# Patient Record
Sex: MALE | Race: WHITE | HISPANIC OR LATINO | Employment: OTHER | URBAN - METROPOLITAN AREA
[De-identification: names, ages, dates, MRNs, and addresses within clinical notes are randomized per-mention and may not be internally consistent; named-entity substitution may affect disease eponyms.]

---

## 2019-06-12 ENCOUNTER — LAB VISIT (OUTPATIENT)
Dept: LAB | Facility: HOSPITAL | Age: 79
End: 2019-06-12
Attending: INTERNAL MEDICINE
Payer: MEDICARE

## 2019-06-12 ENCOUNTER — OFFICE VISIT (OUTPATIENT)
Dept: FAMILY MEDICINE | Facility: CLINIC | Age: 79
End: 2019-06-12
Payer: MEDICARE

## 2019-06-12 VITALS
DIASTOLIC BLOOD PRESSURE: 67 MMHG | WEIGHT: 160.25 LBS | OXYGEN SATURATION: 98 % | HEART RATE: 65 BPM | SYSTOLIC BLOOD PRESSURE: 133 MMHG | TEMPERATURE: 98 F | RESPIRATION RATE: 18 BRPM

## 2019-06-12 DIAGNOSIS — I10 ESSENTIAL HYPERTENSION: ICD-10-CM

## 2019-06-12 DIAGNOSIS — E78.5 DYSLIPIDEMIA: ICD-10-CM

## 2019-06-12 DIAGNOSIS — R09.89 CAROTID BRUIT, UNSPECIFIED LATERALITY: Primary | ICD-10-CM

## 2019-06-12 DIAGNOSIS — I73.9 PAD (PERIPHERAL ARTERY DISEASE): ICD-10-CM

## 2019-06-12 DIAGNOSIS — I25.10 CORONARY ARTERY DISEASE, ANGINA PRESENCE UNSPECIFIED, UNSPECIFIED VESSEL OR LESION TYPE, UNSPECIFIED WHETHER NATIVE OR TRANSPLANTED HEART: ICD-10-CM

## 2019-06-12 DIAGNOSIS — Z98.61 S/P PTCA (PERCUTANEOUS TRANSLUMINAL CORONARY ANGIOPLASTY): ICD-10-CM

## 2019-06-12 DIAGNOSIS — Z86.79 S/P AAA REPAIR: ICD-10-CM

## 2019-06-12 DIAGNOSIS — F32.A DEPRESSION, UNSPECIFIED DEPRESSION TYPE: ICD-10-CM

## 2019-06-12 DIAGNOSIS — E11.65 TYPE 2 DIABETES MELLITUS WITH HYPERGLYCEMIA, WITHOUT LONG-TERM CURRENT USE OF INSULIN: ICD-10-CM

## 2019-06-12 DIAGNOSIS — H91.93 DECREASED HEARING OF BOTH EARS: ICD-10-CM

## 2019-06-12 DIAGNOSIS — Z98.890 S/P AAA REPAIR: ICD-10-CM

## 2019-06-12 LAB
ALBUMIN SERPL BCP-MCNC: 4 G/DL (ref 3.5–5.2)
ALP SERPL-CCNC: 62 U/L (ref 55–135)
ALT SERPL W/O P-5'-P-CCNC: 9 U/L (ref 10–44)
ANION GAP SERPL CALC-SCNC: 11 MMOL/L (ref 8–16)
AST SERPL-CCNC: 17 U/L (ref 10–40)
BASOPHILS # BLD AUTO: 0.03 K/UL (ref 0–0.2)
BASOPHILS NFR BLD: 0.4 % (ref 0–1.9)
BILIRUB SERPL-MCNC: 0.3 MG/DL (ref 0.1–1)
BUN SERPL-MCNC: 23 MG/DL (ref 8–23)
CALCIUM SERPL-MCNC: 9.9 MG/DL (ref 8.7–10.5)
CHLORIDE SERPL-SCNC: 105 MMOL/L (ref 95–110)
CHOLEST SERPL-MCNC: 217 MG/DL (ref 120–199)
CHOLEST/HDLC SERPL: 5.9 {RATIO} (ref 2–5)
CO2 SERPL-SCNC: 24 MMOL/L (ref 23–29)
CREAT SERPL-MCNC: 1.2 MG/DL (ref 0.5–1.4)
DIFFERENTIAL METHOD: NORMAL
EOSINOPHIL # BLD AUTO: 0.1 K/UL (ref 0–0.5)
EOSINOPHIL NFR BLD: 1.9 % (ref 0–8)
ERYTHROCYTE [DISTWIDTH] IN BLOOD BY AUTOMATED COUNT: 13.7 % (ref 11.5–14.5)
EST. GFR  (AFRICAN AMERICAN): >60 ML/MIN/1.73 M^2
EST. GFR  (NON AFRICAN AMERICAN): 57.2 ML/MIN/1.73 M^2
GLUCOSE SERPL-MCNC: 134 MG/DL (ref 70–110)
HCT VFR BLD AUTO: 48.2 % (ref 40–54)
HDLC SERPL-MCNC: 37 MG/DL (ref 40–75)
HDLC SERPL: 17.1 % (ref 20–50)
HGB BLD-MCNC: 15.5 G/DL (ref 14–18)
IMM GRANULOCYTES # BLD AUTO: 0.02 K/UL (ref 0–0.04)
IMM GRANULOCYTES NFR BLD AUTO: 0.3 % (ref 0–0.5)
LDLC SERPL CALC-MCNC: 129 MG/DL (ref 63–159)
LYMPHOCYTES # BLD AUTO: 1.7 K/UL (ref 1–4.8)
LYMPHOCYTES NFR BLD: 24.2 % (ref 18–48)
MCH RBC QN AUTO: 30.3 PG (ref 27–31)
MCHC RBC AUTO-ENTMCNC: 32.2 G/DL (ref 32–36)
MCV RBC AUTO: 94 FL (ref 82–98)
MONOCYTES # BLD AUTO: 0.6 K/UL (ref 0.3–1)
MONOCYTES NFR BLD: 8.6 % (ref 4–15)
NEUTROPHILS # BLD AUTO: 4.6 K/UL (ref 1.8–7.7)
NEUTROPHILS NFR BLD: 64.6 % (ref 38–73)
NONHDLC SERPL-MCNC: 180 MG/DL
NRBC BLD-RTO: 0 /100 WBC
PLATELET # BLD AUTO: 176 K/UL (ref 150–350)
PMV BLD AUTO: 11.7 FL (ref 9.2–12.9)
POTASSIUM SERPL-SCNC: 4.8 MMOL/L (ref 3.5–5.1)
PROT SERPL-MCNC: 7.1 G/DL (ref 6–8.4)
RBC # BLD AUTO: 5.11 M/UL (ref 4.6–6.2)
SODIUM SERPL-SCNC: 140 MMOL/L (ref 136–145)
TRIGL SERPL-MCNC: 255 MG/DL (ref 30–150)
TSH SERPL DL<=0.005 MIU/L-ACNC: 1.33 UIU/ML (ref 0.4–4)
WBC # BLD AUTO: 7.18 K/UL (ref 3.9–12.7)

## 2019-06-12 PROCEDURE — 80053 COMPREHEN METABOLIC PANEL: CPT | Mod: HCNC

## 2019-06-12 PROCEDURE — 99999 PR PBB SHADOW E&M-NEW PATIENT-LVL IV: ICD-10-PCS | Mod: PBBFAC,HCNC,, | Performed by: INTERNAL MEDICINE

## 2019-06-12 PROCEDURE — 3075F PR MOST RECENT SYSTOLIC BLOOD PRESS GE 130-139MM HG: ICD-10-PCS | Mod: HCNC,CPTII,S$GLB, | Performed by: INTERNAL MEDICINE

## 2019-06-12 PROCEDURE — 36415 COLL VENOUS BLD VENIPUNCTURE: CPT | Mod: HCNC,PO

## 2019-06-12 PROCEDURE — 3078F DIAST BP <80 MM HG: CPT | Mod: HCNC,CPTII,S$GLB, | Performed by: INTERNAL MEDICINE

## 2019-06-12 PROCEDURE — 80061 LIPID PANEL: CPT | Mod: HCNC

## 2019-06-12 PROCEDURE — 85025 COMPLETE CBC W/AUTO DIFF WBC: CPT | Mod: HCNC

## 2019-06-12 PROCEDURE — 99999 PR PBB SHADOW E&M-NEW PATIENT-LVL IV: CPT | Mod: PBBFAC,HCNC,, | Performed by: INTERNAL MEDICINE

## 2019-06-12 PROCEDURE — 1101F PT FALLS ASSESS-DOCD LE1/YR: CPT | Mod: HCNC,CPTII,S$GLB, | Performed by: INTERNAL MEDICINE

## 2019-06-12 PROCEDURE — 83036 HEMOGLOBIN GLYCOSYLATED A1C: CPT | Mod: HCNC

## 2019-06-12 PROCEDURE — 99205 OFFICE O/P NEW HI 60 MIN: CPT | Mod: HCNC,S$GLB,, | Performed by: INTERNAL MEDICINE

## 2019-06-12 PROCEDURE — 3075F SYST BP GE 130 - 139MM HG: CPT | Mod: HCNC,CPTII,S$GLB, | Performed by: INTERNAL MEDICINE

## 2019-06-12 PROCEDURE — 99205 PR OFFICE/OUTPT VISIT, NEW, LEVL V, 60-74 MIN: ICD-10-PCS | Mod: HCNC,S$GLB,, | Performed by: INTERNAL MEDICINE

## 2019-06-12 PROCEDURE — 84443 ASSAY THYROID STIM HORMONE: CPT | Mod: HCNC

## 2019-06-12 PROCEDURE — 3078F PR MOST RECENT DIASTOLIC BLOOD PRESSURE < 80 MM HG: ICD-10-PCS | Mod: HCNC,CPTII,S$GLB, | Performed by: INTERNAL MEDICINE

## 2019-06-12 PROCEDURE — 1101F PR PT FALLS ASSESS DOC 0-1 FALLS W/OUT INJ PAST YR: ICD-10-PCS | Mod: HCNC,CPTII,S$GLB, | Performed by: INTERNAL MEDICINE

## 2019-06-12 RX ORDER — LISINOPRIL 10 MG/1
10 TABLET ORAL DAILY
COMMUNITY
End: 2019-06-17 | Stop reason: SDUPTHER

## 2019-06-12 RX ORDER — ASPIRIN 325 MG
325 TABLET ORAL DAILY
Status: ON HOLD | COMMUNITY
End: 2019-10-17 | Stop reason: HOSPADM

## 2019-06-12 RX ORDER — SIMVASTATIN 20 MG/1
20 TABLET, FILM COATED ORAL NIGHTLY
COMMUNITY
End: 2019-06-17 | Stop reason: SDUPTHER

## 2019-06-12 RX ORDER — CLOPIDOGREL BISULFATE 75 MG/1
75 TABLET ORAL DAILY
COMMUNITY
End: 2019-06-17 | Stop reason: SDUPTHER

## 2019-06-12 RX ORDER — METFORMIN HYDROCHLORIDE 1000 MG/1
1000 TABLET ORAL 2 TIMES DAILY WITH MEALS
COMMUNITY
End: 2019-06-17 | Stop reason: SDUPTHER

## 2019-06-12 NOTE — PROGRESS NOTES
Subjective:       Patient ID: Aquiles Kelsey is a 78 y.o. male.    Chief Complaint: Establish Care; Hyperlipidemia; Hypertension; Diabetes; Coronary Artery Disease; and Peripheral Vascular Disease    HPI  Review of Systems    Objective:      Physical Exam    Assessment:       1. Carotid bruit, unspecified laterality    2. Coronary artery disease, angina presence unspecified, unspecified vessel or lesion type, unspecified whether native or transplanted heart    3. S/P PTCA (percutaneous transluminal coronary angioplasty)    4. PAD (peripheral artery disease)    5. S/P AAA repair    6. Essential hypertension    7. Dyslipidemia    8. Type 2 diabetes mellitus with hyperglycemia, without long-term current use of insulin    9. Depression, unspecified depression type    10. Decreased hearing of both ears        Plan:

## 2019-06-12 NOTE — PROGRESS NOTES
Subjective:       Patient ID: Aquiles eKlsey is a 78 y.o. male.    Chief Complaint: Establish Care; Hyperlipidemia; Hypertension; Diabetes; Coronary Artery Disease; and Peripheral Vascular Disease    -est care--------------------    Hyperlipidemia   Associated symptoms include myalgias. Pertinent negatives include no chest pain or shortness of breath.   Hypertension   Pertinent negatives include no chest pain, headaches, neck pain, palpitations or shortness of breath.   Diabetes   Pertinent negatives for hypoglycemia include no confusion, dizziness, headaches, nervousness/anxiousness, pallor, seizures, speech difficulty or tremors. Pertinent negatives for diabetes include no chest pain, no fatigue, no polydipsia, no polyphagia, no polyuria and no weakness.   Coronary Artery Disease   Pertinent negatives include no chest pain, chest tightness, dizziness, leg swelling, palpitations or shortness of breath. Risk factors include hyperlipidemia.     Past Medical History:   Diagnosis Date    Aneurysm, abdominal aortic     Coronary artery disease     Kidney stone      Past Surgical History:   Procedure Laterality Date    APPENDECTOMY      CORONARY STENT PLACEMENT       Family History   Problem Relation Age of Onset    Diabetes Mother     COPD Father     Diabetes Brother      Social History     Socioeconomic History    Marital status:      Spouse name: Not on file    Number of children: Not on file    Years of education: Not on file    Highest education level: Not on file   Occupational History    Not on file   Social Needs    Financial resource strain: Not on file    Food insecurity:     Worry: Not on file     Inability: Not on file    Transportation needs:     Medical: Not on file     Non-medical: Not on file   Tobacco Use    Smoking status: Current Every Day Smoker     Years: 15.00     Types: Cigars    Smokeless tobacco: Current User   Substance and Sexual Activity    Alcohol use: Not  Currently    Drug use: Not Currently    Sexual activity: Not Currently   Lifestyle    Physical activity:     Days per week: Not on file     Minutes per session: Not on file    Stress: Only a little   Relationships    Social connections:     Talks on phone: Not on file     Gets together: Not on file     Attends Alevism service: Not on file     Active member of club or organization: Not on file     Attends meetings of clubs or organizations: Not on file     Relationship status: Not on file   Other Topics Concern    Not on file   Social History Narrative    Not on file     Review of Systems   Constitutional: Negative for activity change, appetite change, chills, diaphoresis, fatigue, fever and unexpected weight change.   HENT: Positive for hearing loss. Negative for drooling, ear discharge, ear pain, facial swelling, mouth sores, nosebleeds, postnasal drip, rhinorrhea, sinus pressure, sneezing, sore throat, tinnitus, trouble swallowing and voice change.    Eyes: Negative for photophobia, redness and visual disturbance.   Respiratory: Negative for apnea, cough, choking, chest tightness, shortness of breath and wheezing.    Cardiovascular: Negative for chest pain, palpitations and leg swelling.   Gastrointestinal: Negative for abdominal distention, abdominal pain, anal bleeding, blood in stool, constipation, diarrhea, nausea and vomiting.   Endocrine: Negative for cold intolerance, heat intolerance, polydipsia, polyphagia and polyuria.   Genitourinary: Negative for difficulty urinating, dysuria, enuresis, flank pain, frequency, genital sores, hematuria and urgency.   Musculoskeletal: Positive for arthralgias and myalgias. Negative for back pain, gait problem, joint swelling, neck pain and neck stiffness.   Skin: Negative for color change, pallor, rash and wound.   Allergic/Immunologic: Negative for food allergies and immunocompromised state.   Neurological: Negative for dizziness, tremors, seizures, syncope,  facial asymmetry, speech difficulty, weakness, light-headedness, numbness and headaches.   Hematological: Negative for adenopathy. Does not bruise/bleed easily.   Psychiatric/Behavioral: Negative for agitation, behavioral problems, confusion, decreased concentration, dysphoric mood, hallucinations, self-injury, sleep disturbance and suicidal ideas. The patient is not nervous/anxious and is not hyperactive.        Objective:      Physical Exam   Constitutional: He is oriented to person, place, and time. He appears well-developed and well-nourished. No distress.   HENT:   Head: Normocephalic and atraumatic.   Eyes: Pupils are equal, round, and reactive to light.   Neck: Normal range of motion. Neck supple. No JVD present. Carotid bruit is not present. No tracheal deviation present. No thyromegaly present.   Cardiovascular: Normal rate, regular rhythm, normal heart sounds and intact distal pulses.   Pulmonary/Chest: Effort normal and breath sounds normal. No respiratory distress. He has no wheezes. He has no rales. He exhibits no tenderness.   Abdominal: Soft. Bowel sounds are normal. He exhibits no distension. There is no tenderness. There is no rebound and no guarding.   Musculoskeletal: Normal range of motion. He exhibits no edema or tenderness.   Lymphadenopathy:     He has no cervical adenopathy.   Neurological: He is alert and oriented to person, place, and time.   Skin: Skin is warm and dry. No rash noted. He is not diaphoretic. No erythema. No pallor.   Psychiatric: He has a normal mood and affect. His behavior is normal. Judgment and thought content normal.   Nursing note and vitals reviewed.      CMP  No results found for: NA, K, CL, CO2, GLU, BUN, CREATININE, CALCIUM, PROT, ALBUMIN, BILITOT, ALKPHOS, AST, ALT, ANIONGAP, ESTGFRAFRICA, EGFRNONAA  No results found for: WBC, HGB, HCT, MCV, PLT  No results found for: CHOL  No results found for: HDL  No results found for: LDLCALC  No results found for: TRIG  No  results found for: CHOLHDL  No results found for: TSH  No results found for: LABA1C, HGBA1C  Assessment:       1. Carotid bruit, unspecified laterality    2. Coronary artery disease, angina presence unspecified, unspecified vessel or lesion type, unspecified whether native or transplanted heart    3. S/P PTCA (percutaneous transluminal coronary angioplasty)    4. PAD (peripheral artery disease)    5. S/P AAA repair    6. Essential hypertension    7. Dyslipidemia    8. Type 2 diabetes mellitus with hyperglycemia, without long-term current use of insulin    9. Depression, unspecified depression type    10. Decreased hearing of both ears        Plan:   Carotid bruit, unspecified laterality  -     US Carotid Bilateral; Future; Expected date: 06/12/2019    Coronary artery disease, angina presence unspecified, unspecified vessel or lesion type, unspecified whether native or transplanted heart  -     Ambulatory referral to Cardiology    S/P PTCA (percutaneous transluminal coronary angioplasty)  -     Ambulatory referral to Cardiology    PAD (peripheral artery disease)  -     Ambulatory referral to Cardiology  -      Carotid Bilateral; Future; Expected date: 06/12/2019    S/P AAA repair    Essential hypertension  -     Comprehensive metabolic panel; Future; Expected date: 06/12/2019  -     CBC auto differential; Future; Expected date: 06/12/2019  -     TSH; Future; Expected date: 06/12/2019    Dyslipidemia  -     Lipid panel; Future; Expected date: 06/12/2019    Type 2 diabetes mellitus with hyperglycemia, without long-term current use of insulin  -     Hemoglobin A1c; Future; Expected date: 06/12/2019    Depression, unspecified depression type    Decreased hearing of both ears  -     Ambulatory referral to ENT    Stable--------------continue current meds.             As above--------------  F/u 1 month.

## 2019-06-13 ENCOUNTER — OFFICE VISIT (OUTPATIENT)
Dept: CARDIOLOGY | Facility: CLINIC | Age: 79
End: 2019-06-13
Payer: MEDICARE

## 2019-06-13 ENCOUNTER — CLINICAL SUPPORT (OUTPATIENT)
Dept: CARDIOLOGY | Facility: CLINIC | Age: 79
End: 2019-06-13
Payer: MEDICARE

## 2019-06-13 VITALS
HEIGHT: 70 IN | HEART RATE: 73 BPM | WEIGHT: 160.69 LBS | SYSTOLIC BLOOD PRESSURE: 148 MMHG | DIASTOLIC BLOOD PRESSURE: 70 MMHG | BODY MASS INDEX: 23.01 KG/M2

## 2019-06-13 DIAGNOSIS — I10 ESSENTIAL HYPERTENSION: ICD-10-CM

## 2019-06-13 DIAGNOSIS — I73.9 PAD (PERIPHERAL ARTERY DISEASE): ICD-10-CM

## 2019-06-13 DIAGNOSIS — I25.118 CORONARY ARTERY DISEASE OF NATIVE ARTERY OF NATIVE HEART WITH STABLE ANGINA PECTORIS: Primary | ICD-10-CM

## 2019-06-13 DIAGNOSIS — I10 ESSENTIAL HYPERTENSION: Primary | ICD-10-CM

## 2019-06-13 DIAGNOSIS — Z98.890 S/P AAA REPAIR: ICD-10-CM

## 2019-06-13 DIAGNOSIS — I73.9 CLAUDICATION IN PERIPHERAL VASCULAR DISEASE: ICD-10-CM

## 2019-06-13 DIAGNOSIS — Z86.79 S/P AAA REPAIR: ICD-10-CM

## 2019-06-13 DIAGNOSIS — R94.31 ABNORMAL ECG: ICD-10-CM

## 2019-06-13 DIAGNOSIS — Z98.61 HISTORY OF PTCA: ICD-10-CM

## 2019-06-13 DIAGNOSIS — E11.65 TYPE 2 DIABETES MELLITUS WITH HYPERGLYCEMIA, WITHOUT LONG-TERM CURRENT USE OF INSULIN: ICD-10-CM

## 2019-06-13 DIAGNOSIS — I49.1 PAC (PREMATURE ATRIAL CONTRACTION): ICD-10-CM

## 2019-06-13 DIAGNOSIS — E78.5 DYSLIPIDEMIA: ICD-10-CM

## 2019-06-13 DIAGNOSIS — Z98.61 S/P PTCA (PERCUTANEOUS TRANSLUMINAL CORONARY ANGIOPLASTY): ICD-10-CM

## 2019-06-13 LAB
ESTIMATED AVG GLUCOSE: 140 MG/DL (ref 68–131)
HBA1C MFR BLD HPLC: 6.5 % (ref 4–5.6)

## 2019-06-13 PROCEDURE — 1101F PR PT FALLS ASSESS DOC 0-1 FALLS W/OUT INJ PAST YR: ICD-10-PCS | Mod: HCNC,CPTII,S$GLB, | Performed by: INTERNAL MEDICINE

## 2019-06-13 PROCEDURE — 3077F SYST BP >= 140 MM HG: CPT | Mod: HCNC,CPTII,S$GLB, | Performed by: INTERNAL MEDICINE

## 2019-06-13 PROCEDURE — 3077F PR MOST RECENT SYSTOLIC BLOOD PRESSURE >= 140 MM HG: ICD-10-PCS | Mod: HCNC,CPTII,S$GLB, | Performed by: INTERNAL MEDICINE

## 2019-06-13 PROCEDURE — 93010 EKG 12-LEAD: ICD-10-PCS | Mod: HCNC,S$GLB,, | Performed by: INTERNAL MEDICINE

## 2019-06-13 PROCEDURE — 93005 EKG 12-LEAD: ICD-10-PCS | Mod: HCNC,S$GLB,, | Performed by: INTERNAL MEDICINE

## 2019-06-13 PROCEDURE — 3078F PR MOST RECENT DIASTOLIC BLOOD PRESSURE < 80 MM HG: ICD-10-PCS | Mod: HCNC,CPTII,S$GLB, | Performed by: INTERNAL MEDICINE

## 2019-06-13 PROCEDURE — 99204 OFFICE O/P NEW MOD 45 MIN: CPT | Mod: HCNC,S$GLB,, | Performed by: INTERNAL MEDICINE

## 2019-06-13 PROCEDURE — 93010 ELECTROCARDIOGRAM REPORT: CPT | Mod: HCNC,S$GLB,, | Performed by: INTERNAL MEDICINE

## 2019-06-13 PROCEDURE — 99204 PR OFFICE/OUTPT VISIT, NEW, LEVL IV, 45-59 MIN: ICD-10-PCS | Mod: HCNC,S$GLB,, | Performed by: INTERNAL MEDICINE

## 2019-06-13 PROCEDURE — 93005 ELECTROCARDIOGRAM TRACING: CPT | Mod: HCNC,S$GLB,, | Performed by: INTERNAL MEDICINE

## 2019-06-13 PROCEDURE — 99999 PR PBB SHADOW E&M-EST. PATIENT-LVL III: CPT | Mod: PBBFAC,HCNC,, | Performed by: INTERNAL MEDICINE

## 2019-06-13 PROCEDURE — 3078F DIAST BP <80 MM HG: CPT | Mod: HCNC,CPTII,S$GLB, | Performed by: INTERNAL MEDICINE

## 2019-06-13 PROCEDURE — 1101F PT FALLS ASSESS-DOCD LE1/YR: CPT | Mod: HCNC,CPTII,S$GLB, | Performed by: INTERNAL MEDICINE

## 2019-06-13 PROCEDURE — 99999 PR PBB SHADOW E&M-EST. PATIENT-LVL III: ICD-10-PCS | Mod: PBBFAC,HCNC,, | Performed by: INTERNAL MEDICINE

## 2019-06-13 RX ORDER — ESCITALOPRAM OXALATE 10 MG/1
10 TABLET ORAL DAILY
COMMUNITY
End: 2019-06-18 | Stop reason: SDUPTHER

## 2019-06-13 NOTE — LETTER
June 13, 2019      Holger Thornton MD  [address] [place] [address]           [facility]  [address] [place] [address]  Phone: [phone]          Patient: Aquiles Kelsey   MR Number: [id number]   YOB: 1940   Date of Visit: 6/13/2019       Dear Dr. Holger Thornton:    Thank you for referring Aquiles Kelsey to me for evaluation. Attached you will find relevant portions of my assessment and plan of care.    If you have questions, please do not hesitate to call me. I look forward to following Aquiles Kelsey along with you.    Sincerely,    Robe Gilbert MD    Enclosure  CC:  No Recipients    If you would like to receive this communication electronically, please contact [email or web address] or [phone] to request more information on [employer] Link access.    For providers and/or their staff who would like to refer a patient to [facility], please contact us through our one-stop-shop provider referral line, [facility], at [phone].    If you feel you have received this communication in error or would no longer like to receive these types of communications, please e-mail [email or web address]

## 2019-06-13 NOTE — PROGRESS NOTES
Subjective:    Patient ID:  Aquiles Kelsey is a 78 y.o. male who presents for evaluation of Coronary Artery Disease; Hypertension; Peripheral Arterial Disease; and Risk Factor Management For Atherosclerosis        Pt referred by Dr. Holger Thornton      HPI  Pt presents to hospitals care.  Pt moved to [place] from [place] recently.  His current medical conditions include CAD (multiple PCI procedures), HTN, DM, PAD, AAA stent graft (approx 2013), hyperlipidemia, cigar use.  ECG today is difficult to read, appears sinus, PACs, nonspecific t wave abnl.   First PCI 1998, total of 5 stents with last one in [place] 2013.  Has h/o L LE PAD stents.  DM controlled.  Lipids not well controlled -- off statin and PCP just prescribed it this week.  Smokes little, cigars.  BP stable.   He states he feels ok, no anginal or CHF sxs.  Has had chronic bilateral buttock pain walking since his AAA stent graft to occluded vessels per pt.  No change in sxs.       Past Medical History:   Diagnosis Date    Aneurysm, abdominal aortic     Coronary artery disease     Kidney stone        Current Outpatient Medications:     aspirin 325 MG tablet, Take 325 mg by mouth once daily., Disp: , Rfl:     clopidogrel (PLAVIX) 75 mg tablet, Take 75 mg by mouth once daily., Disp: , Rfl:     escitalopram oxalate (LEXAPRO) 10 MG tablet, Take 10 mg by mouth once daily., Disp: , Rfl:     lisinopril 10 MG tablet, Take 10 mg by mouth once daily., Disp: , Rfl:     metFORMIN (GLUCOPHAGE) 1000 MG tablet, Take 1,000 mg by mouth 2 (two) times daily with meals., Disp: , Rfl:     simvastatin (ZOCOR) 20 MG tablet, Take 20 mg by mouth every evening., Disp: , Rfl:       Review of Systems   Constitution: Negative.   HENT: Negative.    Eyes: Negative.    Cardiovascular: Positive for claudication.   Endocrine: Negative.    Hematologic/Lymphatic: Negative.    Skin: Negative.    Musculoskeletal: Negative.    Gastrointestinal: Negative.    Genitourinary:  "Negative.    Neurological: Negative.    Psychiatric/Behavioral: Negative.    Allergic/Immunologic: Negative.        BP (!) 148/70 (BP Location: Left arm, Patient Position: Sitting, BP Method: Medium (Manual))   Pulse 73   Ht 5' 10" (1.778 m)   Wt 72.9 kg (160 lb 11.5 oz)   BMI 23.06 kg/m²     Wt Readings from Last 3 Encounters:   06/13/19 72.9 kg (160 lb 11.5 oz)   06/12/19 72.7 kg (160 lb 4.4 oz)     Temp Readings from Last 3 Encounters:   06/12/19 98.1 °F (36.7 °C)     BP Readings from Last 3 Encounters:   06/13/19 (!) 148/70   06/12/19 133/67     Pulse Readings from Last 3 Encounters:   06/13/19 73   06/12/19 65          Objective:    Physical Exam   Constitutional: He is oriented to person, place, and time. He appears well-developed and well-nourished.   HENT:   Head: Normocephalic.   Neck: Normal range of motion. Neck supple. Normal carotid pulses, no hepatojugular reflux and no JVD present. Carotid bruit is not present. No thyromegaly present.   Cardiovascular: Normal rate, regular rhythm, S1 normal and S2 normal. PMI is not displaced. Exam reveals no S3, no S4, no distant heart sounds, no friction rub, no midsystolic click and no opening snap.   No murmur heard.  Pulses:       Radial pulses are 2+ on the right side, and 2+ on the left side.        Femoral pulses are 2+ on the right side, and 2+ on the left side.       Dorsalis pedis pulses are 0 on the right side, and 0 on the left side.        Posterior tibial pulses are 2+ on the right side, and 1+ on the left side.   Pulmonary/Chest: Effort normal and breath sounds normal. He has no wheezes. He has no rales.   Abdominal: Soft. Bowel sounds are normal. He exhibits no distension, no abdominal bruit, no ascites and no mass. There is no tenderness.   Musculoskeletal: He exhibits no edema.   Neurological: He is alert and oriented to person, place, and time.   Skin: Skin is warm.   Psychiatric: He has a normal mood and affect. His behavior is normal. "   Nursing note and vitals reviewed.      I have reviewed all pertinent labs and cardiac studies.      Chemistry        Component Value Date/Time     06/12/2019 1559    K 4.8 06/12/2019 1559     06/12/2019 1559    CO2 24 06/12/2019 1559    BUN 23 06/12/2019 1559    CREATININE 1.2 06/12/2019 1559     (H) 06/12/2019 1559        Component Value Date/Time    CALCIUM 9.9 06/12/2019 1559    ALKPHOS 62 06/12/2019 1559    AST 17 06/12/2019 1559    ALT 9 (L) 06/12/2019 1559    BILITOT 0.3 06/12/2019 1559    ESTGFRAFRICA >60.0 06/12/2019 1559    EGFRNONAA 57.2 (A) 06/12/2019 1559        Lab Results   Component Value Date    WBC 7.18 06/12/2019    HGB 15.5 06/12/2019    HCT 48.2 06/12/2019    MCV 94 06/12/2019     06/12/2019     Lab Results   Component Value Date    HGBA1C 6.5 (H) 06/12/2019     Lab Results   Component Value Date    CHOL 217 (H) 06/12/2019     Lab Results   Component Value Date    HDL 37 (L) 06/12/2019     Lab Results   Component Value Date    LDLCALC 129.0 06/12/2019     Lab Results   Component Value Date    TRIG 255 (H) 06/12/2019     Lab Results   Component Value Date    CHOLHDL 17.1 (L) 06/12/2019           Assessment:       1. Coronary artery disease of native artery of native heart with stable angina pectoris    2. Type 2 diabetes mellitus with hyperglycemia, without long-term current use of insulin    3. S/P PTCA (percutaneous transluminal coronary angioplasty)    4. S/P AAA repair    5. PAD (peripheral artery disease)    6. Essential hypertension    7. Dyslipidemia    8. Abnormal ECG    9. History of PTCA    10. PAC (premature atrial contraction)    11. Claudication in peripheral vascular disease         Plan:             Risk factor modification discussed.  Tobacco cessation advised.  Continue current meds.  Lower lipids -- statin just restarted.  Echocardiogram.  1 week Zio Holter.  B LE arterial vascular studies.  Cardiac diet.  Daily exercise.  F/u after above tests.

## 2019-06-17 ENCOUNTER — OFFICE VISIT (OUTPATIENT)
Dept: OTOLARYNGOLOGY | Facility: CLINIC | Age: 79
End: 2019-06-17
Payer: MEDICARE

## 2019-06-17 ENCOUNTER — HOSPITAL ENCOUNTER (OUTPATIENT)
Dept: RADIOLOGY | Facility: HOSPITAL | Age: 79
Discharge: HOME | End: 2019-06-17
Attending: INTERNAL MEDICINE
Payer: MEDICARE

## 2019-06-17 ENCOUNTER — CLINICAL SUPPORT (OUTPATIENT)
Dept: AUDIOLOGY | Facility: CLINIC | Age: 79
End: 2019-06-17
Payer: MEDICARE

## 2019-06-17 VITALS
HEIGHT: 70 IN | SYSTOLIC BLOOD PRESSURE: 170 MMHG | TEMPERATURE: 98 F | BODY MASS INDEX: 23.06 KG/M2 | HEART RATE: 57 BPM | DIASTOLIC BLOOD PRESSURE: 77 MMHG

## 2019-06-17 DIAGNOSIS — I73.9 PAD (PERIPHERAL ARTERY DISEASE): ICD-10-CM

## 2019-06-17 DIAGNOSIS — H61.23 BILATERAL IMPACTED CERUMEN: Primary | ICD-10-CM

## 2019-06-17 DIAGNOSIS — H90.3 SENSORINEURAL HEARING LOSS, BILATERAL: Primary | ICD-10-CM

## 2019-06-17 DIAGNOSIS — H90.3 SENSORINEURAL HEARING LOSS (SNHL) OF BOTH EARS: ICD-10-CM

## 2019-06-17 DIAGNOSIS — R09.89 CAROTID BRUIT, UNSPECIFIED LATERALITY: ICD-10-CM

## 2019-06-17 DIAGNOSIS — H61.23 IMPACTED CERUMEN OF BOTH EARS: ICD-10-CM

## 2019-06-17 PROCEDURE — 3077F SYST BP >= 140 MM HG: CPT | Mod: HCNC,CPTII,S$GLB, | Performed by: PHYSICIAN ASSISTANT

## 2019-06-17 PROCEDURE — 99203 OFFICE O/P NEW LOW 30 MIN: CPT | Mod: 25,HCNC,S$GLB, | Performed by: PHYSICIAN ASSISTANT

## 2019-06-17 PROCEDURE — 99999 PR PBB SHADOW E&M-EST. PATIENT-LVL III: ICD-10-PCS | Mod: PBBFAC,HCNC,, | Performed by: PHYSICIAN ASSISTANT

## 2019-06-17 PROCEDURE — 93880 US CAROTID BILATERAL: ICD-10-PCS | Mod: 26,HCNC,, | Performed by: RADIOLOGY

## 2019-06-17 PROCEDURE — 3078F DIAST BP <80 MM HG: CPT | Mod: HCNC,CPTII,S$GLB, | Performed by: PHYSICIAN ASSISTANT

## 2019-06-17 PROCEDURE — 93880 EXTRACRANIAL BILAT STUDY: CPT | Mod: TC,HCNC

## 2019-06-17 PROCEDURE — 3078F PR MOST RECENT DIASTOLIC BLOOD PRESSURE < 80 MM HG: ICD-10-PCS | Mod: HCNC,CPTII,S$GLB, | Performed by: PHYSICIAN ASSISTANT

## 2019-06-17 PROCEDURE — 69210 PR REMOVAL IMPACTED CERUMEN REQUIRING INSTRUMENTATION, UNILATERAL: ICD-10-PCS | Mod: HCNC,S$GLB,, | Performed by: PHYSICIAN ASSISTANT

## 2019-06-17 PROCEDURE — 1101F PT FALLS ASSESS-DOCD LE1/YR: CPT | Mod: HCNC,CPTII,S$GLB, | Performed by: PHYSICIAN ASSISTANT

## 2019-06-17 PROCEDURE — 92567 PR TYMPA2METRY: ICD-10-PCS | Mod: HCNC,S$GLB,, | Performed by: AUDIOLOGIST-HEARING AID FITTER

## 2019-06-17 PROCEDURE — 3077F PR MOST RECENT SYSTOLIC BLOOD PRESSURE >= 140 MM HG: ICD-10-PCS | Mod: HCNC,CPTII,S$GLB, | Performed by: PHYSICIAN ASSISTANT

## 2019-06-17 PROCEDURE — 1101F PR PT FALLS ASSESS DOC 0-1 FALLS W/OUT INJ PAST YR: ICD-10-PCS | Mod: HCNC,CPTII,S$GLB, | Performed by: PHYSICIAN ASSISTANT

## 2019-06-17 PROCEDURE — 92557 COMPREHENSIVE HEARING TEST: CPT | Mod: HCNC,S$GLB,, | Performed by: AUDIOLOGIST-HEARING AID FITTER

## 2019-06-17 PROCEDURE — 69210 REMOVE IMPACTED EAR WAX UNI: CPT | Mod: HCNC,S$GLB,, | Performed by: PHYSICIAN ASSISTANT

## 2019-06-17 PROCEDURE — 99999 PR PBB SHADOW E&M-EST. PATIENT-LVL III: CPT | Mod: PBBFAC,HCNC,, | Performed by: PHYSICIAN ASSISTANT

## 2019-06-17 PROCEDURE — 99203 PR OFFICE/OUTPT VISIT, NEW, LEVL III, 30-44 MIN: ICD-10-PCS | Mod: 25,HCNC,S$GLB, | Performed by: PHYSICIAN ASSISTANT

## 2019-06-17 PROCEDURE — 92557 PR COMPREHENSIVE HEARING TEST: ICD-10-PCS | Mod: HCNC,S$GLB,, | Performed by: AUDIOLOGIST-HEARING AID FITTER

## 2019-06-17 PROCEDURE — 92567 TYMPANOMETRY: CPT | Mod: HCNC,S$GLB,, | Performed by: AUDIOLOGIST-HEARING AID FITTER

## 2019-06-17 PROCEDURE — 93880 EXTRACRANIAL BILAT STUDY: CPT | Mod: 26,HCNC,, | Performed by: RADIOLOGY

## 2019-06-17 RX ORDER — SIMVASTATIN 20 MG/1
20 TABLET, FILM COATED ORAL NIGHTLY
Qty: 90 TABLET | Refills: 2 | Status: SHIPPED | OUTPATIENT
Start: 2019-06-17 | End: 2019-08-20 | Stop reason: SDUPTHER

## 2019-06-17 RX ORDER — CLOPIDOGREL BISULFATE 75 MG/1
75 TABLET ORAL DAILY
Qty: 90 TABLET | Refills: 2 | Status: SHIPPED | OUTPATIENT
Start: 2019-06-17 | End: 2019-08-20 | Stop reason: SDUPTHER

## 2019-06-17 RX ORDER — LISINOPRIL 10 MG/1
10 TABLET ORAL DAILY
Qty: 90 TABLET | Refills: 2 | Status: SHIPPED | OUTPATIENT
Start: 2019-06-17 | End: 2019-08-20 | Stop reason: SDUPTHER

## 2019-06-17 RX ORDER — METFORMIN HYDROCHLORIDE 1000 MG/1
1000 TABLET ORAL 2 TIMES DAILY WITH MEALS
Qty: 180 TABLET | Refills: 2 | Status: SHIPPED | OUTPATIENT
Start: 2019-06-17 | End: 2020-06-16

## 2019-06-17 NOTE — LETTER
June 17, 2019      Holger Thornton MD  [address] [place] [address]           [facility]  [address]  [place] [address]  Phone: [phone]  Fax: [phone]          Patient: Aquiles Kelsey   MR Number: [id number]   YOB: 1940   Date of Visit: 6/17/2019       Dear Dr. Holger Thornton:    Thank you for referring Aquiles Kelsey to me for evaluation. Attached you will find relevant portions of my assessment and plan of care.    If you have questions, please do not hesitate to call me. I look forward to following Aquiles Kelsey along with you.    Sincerely,    TRACEY Lopez  CC:  No Recipients    If you would like to receive this communication electronically, please contact [email or web address] or [phone] to request more information on [employer] Link access.    For providers and/or their staff who would like to refer a patient to [facility], please contact us through our one-stop-shop provider referral line, [facility], at [phone].    If you feel you have received this communication in error or would no longer like to receive these types of communications, please e-mail [email or web address]

## 2019-06-17 NOTE — TELEPHONE ENCOUNTER
----- Message from Obdulia Mccracken sent at 6/17/2019  3:12 PM CDT -----  Contact: Pt Wife   Pt Wife is calling regarding requesting to have nurse call pt wife back. Pt wife states that all is concerning  medication list that needs ton be transfer to pharmacy. .[phone] (home)     clopidogrel (PLAVIX) 75 mg tablet  lisinopril 10 MG tablet  metFORMIN (GLUCOPHAGE) 1000 MG tablet  simvastatin (ZOCOR) 20 MG tablet  .  [id number] - [place] - [address] [facility]  [address] [place] [address]  Phone: [phone] Fax: [phone]        .Thank You  Grace Mccracken

## 2019-06-17 NOTE — PROGRESS NOTES
"Referring Provider:    Holger Thornton Md  [address] [place] [address]  Subjective:   Patient: Aquiles Kelsey [id number], :1940   Visit date:2019 8:35 AM    Chief Complaint:  Other (decreased hearing and also wax.)    HPI:  Aquiles FINN is a 78 y.o. male who I was asked to see in consultation for evaluation of the following issue(s):    Hearing Loss:    He describes a both sided hearing loss starting many years ago and has been unchanged.      The patient reports the following risk factors for hearing loss (Negative unless checked off):   [] Familial deafness   [] Ototoxic medication exposure  [] Acoustic trauma  []  Occupational exposure  [] Head injury or trauma  [] Otologic infection  [] History of meningitis  [] Ear surgery (other than pediatric tympanostomy tubes)  [] Metabolic disease       Severity: moderate  Quality: muffled  Modifying factors:  None  Associated symptoms include   [] Vertigo  [] Tinnitus  [x] Otalgia "stuffed up"  [] Drainage or pain   Previous treatments include none.    Does not wear HA's, is interested.   C/o hx of recurrent cerumen impactions, uses peroxide at home, does not use Q-tips.     Review of Systems:  Negative unless checked off.  Gen:  []fever   []fatigue  HENT:  []nosebleeds  []dental problem   Eyes:  []photophobia  []visual disturbance  Resp:  []chest tightness []wheezing  Card:  []chest pain  []leg swelling  GI:  []abdominal pain []blood in stool  :  []dysuria  []hematuria  Musc:  []joint swelling  []gait problem  Skin:  []color change  []pallor  Neuro:  []seizures  []numbness  Hem:  []bruise/bleed easily  Psych:  []hallucinations  []behavioral problems  Allergy/Imm: has No Known Allergies.    His meds, allergies, medical, surgical, social & family histories were reviewed & updated:  -     He has a current medication list which includes the following prescription(s): aspirin, clopidogrel, escitalopram oxalate, lisinopril, metformin, " "and simvastatin.  -     He  has a past medical history of Aneurysm, abdominal aortic, Coronary artery disease, and Kidney stone.   -     He does not have any pertinent problems on file.   -     He  has a past surgical history that includes Coronary stent placement and Appendectomy.  -     He  reports that he has been smoking cigars.  He has smoked for the past 15.00 years. He uses smokeless tobacco. He reports that he drank alcohol. He reports that he has current or past drug history.  -     His family history includes COPD in his father; Diabetes in his brother and mother.  -     He has No Known Allergies.    Objective:     Physical Exam:  Vitals:  BP (!) 170/77   Pulse (!) 57   Temp 98.4 °F (36.9 °C) (Tympanic)   Ht 5' 10" (1.778 m)   BMI 23.06 kg/m²   General appearance:  Well developed, well nourished    Eyes:  Extraocular motions intact, PERRL    Communication:  no hoarseness, no dysphonia    Ears:  Bilateral cerumen impactions present, unable to view TM's prior to removing.   Nose:  No masses/lesions of external nose, nasal mucosa, septum, and turbinates were within normal limits.  Mouth:  No mass/lesion of lips, teeth, gums, hard/soft palate, tongue, tonsils, or oropharynx.    Cardiovascular:  No pedal edema; Radial Pulses +2     Neck & Lymphatics:  No cervical lymphadenopathy, no neck mass/crepitus/ asymmetry, trachea is midline, no thyroid enlargement/tenderness/mass.    Psych: Oriented x3,  Alert with normal mood and affect.     Respiration/Chest:  Symmetric expansion during respiration, normal respiratory effort.    Skin:  Warm and intact. No ulcerations of face, scalp, neck.      AUDIOGRAM:           Assessment & Plan:   Aquiles FINN was seen today for other.    Diagnoses and all orders for this visit:    Bilateral impacted cerumen    Sensorineural hearing loss (SNHL) of both ears        Aquiles FINN presents with new problem(s) today. We discussed that hearing loss may be multifactorial requiring additional " testing to determine various causes of hearing loss and the degree to which they are contributing to symptoms. The prognosis and progression of hearing loss is uncertain and significantly dependent on the etiology.     -HEARING LOSS-  I spent a considerable amount of time educating the patient on hearing and hearing loss.  We discussed the basic characteristics of conductive hearing loss versus sensorineural hearing loss and the significant differences in treatment options between the two categories.      We discussed that in cases of conductive hearing loss, this suggests a mechanical disorder that sometimes can be improved with medications &/or surgery.  It may occur secondary to external ear pathology (atresia, otitis externa, etc), tympanic membrane disorders (large perforations, immobility due to scarring or eustachian tube dysfunction), and middle ear disorders (effusions, ossicular disorders).    Sensorineural hearing loss is the expected hearing loss pattern with aging, but some disorders such as Meniere's may accelerate this process.  Additionally, amplification with hearing aids is generally the best option for hearing rehabilitation, except where the hearing loss is profound.  We discussed that this generally does not represent a dangerous condition, but in cases where there is a large discrepancy between the two ears in terms of nerve function, more investigation is often necessary due to the possiblity of vestibular schwannomas or meningiomas at the cerebellopontine angle.  The definitive test for this is an MRI with gadolinium.  However, these masses are usually very slow growing (1-2mm/year), so patients may elect to repeat an audiogram in about 6 months or obtain an ABR so long as they understand that this may result in a delay in diagnosis (although very unlikely that this would have a significant clinical impact on their outcome due to the slow growing nature of these masses) with the understanding  that if ABR is abnormal or asymmetry increases, an MRI would then be required.    Aquiles FINN  presents with what appears to be sensorineural hearing loss.  Based on this, my recommendation is HA's. Cerumen impactions removed w/o difficulty. Annual audiograms.     Thank you for allowing me to participate in the care of Aquiles FINN.    Lauren Toney PA-C  -----------------------------------------------------------------------------    Patient: Aquiles Kelsey [id number], :1940  Procedure date:2019  Patient's medications, allergies, past medical, surgical, social and family histories were reviewed and updated as appropriate.  Chief Complaint:  Other (decreased hearing and also wax.)    HPI:  Aquiles FINN is a 78 y.o. male with the history of present illness as discussed in the clinic note from today.  During this unrelated patient encounter I observed impacted cerumen.  The otoscopic examination of the tympanic membrane was not possible due to copious cerumen impaction.      Procedure: The patient was in agreement with the examination and debridement of the ears. Removal of the cerumen required a high level of expertise and use of an operating microscope and multiple micro-instruments.     With the patient in the supine position, we used the operating microscope to examine both ears with the appropriate sized ear speculum.  A variety of sterile, micro-instruments were utilized to remove the cerumen atraumatically.  I performed the procedure which required a significant amount of time and effort. The tympanic membrane was then well visualized.  The patient tolerated the procedure well and there were no complications.    Findings:   Right ear had significant wax, the EAC was normal, and the tympanic membrane was intact with no evidence of middle ear fluid.    Left ear had significant wax, the EAC was normal, and the tympanic membrane was intact with no evidence of middle ear fluid.

## 2019-06-17 NOTE — TELEPHONE ENCOUNTER
----- Message from Kenisha Chow sent at 6/17/2019  2:15 PM CDT -----  Contact: wife  Please call pt @ [phone] regarding pt medication, states [facility]/[phone] do not have pt medication, states it six prescriptions, wife do not have the names of meds.

## 2019-06-17 NOTE — TELEPHONE ENCOUNTER
----- Message from Marguerite Prado sent at 6/17/2019 12:14 PM CDT -----  Contact: Pt  Pt is calling in regards to medication not being sent to pharmacy. Pt is requesting call back from nurse.      Pls call pt back at [phone]

## 2019-06-18 RX ORDER — ESCITALOPRAM OXALATE 10 MG/1
10 TABLET ORAL DAILY
Qty: 90 TABLET | Refills: 0 | Status: SHIPPED | OUTPATIENT
Start: 2019-06-18 | End: 2019-12-05 | Stop reason: SDUPTHER

## 2019-06-18 NOTE — PROGRESS NOTES
Referring Provider:Dr. Ruben Edwards Laure was seen 06/18/2019 for an audiological evaluation.  Patient complains of gradual bilateral hearing loss that is greater in the right ear.     Results reveal a mild-to-severe sensorineural hearing loss 250-8000 Hz for the right ear, and a moderate-to-severe sensorineural hearing loss 250-8000 Hz for the left ear.   Speech Reception Thresholds were  40 dBHL for the right ear and 40 dBHL for the left ear. Tympanograms were Type A, normal for the right ear and Type A, normal for the left ear.    Patient was counseled on the above findings.    Recommendations:  1. ENT for cerumen removal.  2. Annual Audiograms.  3. Binaural hearing aids. Patient was provided a copy of his audiogram and was encouraged to contact [facility] regarding benefits.

## 2019-07-05 DIAGNOSIS — I49.1 PREMATURE ATRIAL CONTRACTION: Primary | ICD-10-CM

## 2019-07-10 ENCOUNTER — CLINICAL SUPPORT (OUTPATIENT)
Dept: CARDIOLOGY | Facility: CLINIC | Age: 79
End: 2019-07-10
Attending: INTERNAL MEDICINE
Payer: MEDICARE

## 2019-07-10 DIAGNOSIS — I25.118 CORONARY ARTERY DISEASE OF NATIVE ARTERY OF NATIVE HEART WITH STABLE ANGINA PECTORIS: ICD-10-CM

## 2019-07-10 DIAGNOSIS — Z98.890 S/P AAA REPAIR: ICD-10-CM

## 2019-07-10 DIAGNOSIS — I73.9 CLAUDICATION IN PERIPHERAL VASCULAR DISEASE: ICD-10-CM

## 2019-07-10 DIAGNOSIS — Z98.61 HISTORY OF PTCA: ICD-10-CM

## 2019-07-10 DIAGNOSIS — E78.5 DYSLIPIDEMIA: ICD-10-CM

## 2019-07-10 DIAGNOSIS — R94.31 ABNORMAL ECG: ICD-10-CM

## 2019-07-10 DIAGNOSIS — Z86.79 S/P AAA REPAIR: ICD-10-CM

## 2019-07-10 DIAGNOSIS — I49.1 PREMATURE ATRIAL CONTRACTION: ICD-10-CM

## 2019-07-10 DIAGNOSIS — I49.1 PAC (PREMATURE ATRIAL CONTRACTION): ICD-10-CM

## 2019-07-10 DIAGNOSIS — Z98.61 S/P PTCA (PERCUTANEOUS TRANSLUMINAL CORONARY ANGIOPLASTY): ICD-10-CM

## 2019-07-10 DIAGNOSIS — I73.9 PAD (PERIPHERAL ARTERY DISEASE): ICD-10-CM

## 2019-07-10 DIAGNOSIS — E11.65 TYPE 2 DIABETES MELLITUS WITH HYPERGLYCEMIA, WITHOUT LONG-TERM CURRENT USE OF INSULIN: ICD-10-CM

## 2019-07-10 DIAGNOSIS — I35.9 NONRHEUMATIC AORTIC VALVE DISORDER: ICD-10-CM

## 2019-07-10 DIAGNOSIS — I10 ESSENTIAL HYPERTENSION: ICD-10-CM

## 2019-07-10 LAB
AORTIC VALVE REGURGITATION: ABNORMAL
DIASTOLIC DYSFUNCTION: YES
ESTIMATED PA SYSTOLIC PRESSURE: 36.64
MITRAL VALVE MOBILITY: NORMAL
RETIRED EF AND QEF - SEE NOTES: 55 (ref 55–65)
TRICUSPID VALVE REGURGITATION: ABNORMAL
VASCULAR ANKLE BRACHIAL INDEX (ABI) LEFT: 0.69 (ref 0.9–1.2)

## 2019-07-10 PROCEDURE — 93306 2D ECHO WITH COLOR FLOW DOPPLER: ICD-10-PCS | Mod: HCNC,S$GLB,, | Performed by: INTERNAL MEDICINE

## 2019-07-10 PROCEDURE — 93925 LOWER EXTREMITY STUDY: CPT | Mod: HCNC,S$GLB,, | Performed by: INTERNAL MEDICINE

## 2019-07-10 PROCEDURE — 93922 UPR/L XTREMITY ART 2 LEVELS: CPT | Mod: HCNC,S$GLB,, | Performed by: INTERNAL MEDICINE

## 2019-07-10 PROCEDURE — 93925 CAR US DOPPLER ARTERIAL LEGS BILATERAL: ICD-10-PCS | Mod: HCNC,S$GLB,, | Performed by: INTERNAL MEDICINE

## 2019-07-10 PROCEDURE — 93224 XTRNL ECG REC UP TO 48 HRS: CPT | Mod: HCNC,S$GLB,, | Performed by: INTERNAL MEDICINE

## 2019-07-10 PROCEDURE — 93224 HOLTER MONITOR - 48 HOUR: ICD-10-PCS | Mod: HCNC,S$GLB,, | Performed by: INTERNAL MEDICINE

## 2019-07-10 PROCEDURE — 93922 CAR US ANKLE BRACHIAL INDICES EXT LTD WO STR: ICD-10-PCS | Mod: HCNC,S$GLB,, | Performed by: INTERNAL MEDICINE

## 2019-07-10 PROCEDURE — 93306 TTE W/DOPPLER COMPLETE: CPT | Mod: HCNC,S$GLB,, | Performed by: INTERNAL MEDICINE

## 2019-08-20 ENCOUNTER — OFFICE VISIT (OUTPATIENT)
Dept: CARDIOLOGY | Facility: CLINIC | Age: 79
End: 2019-08-20
Payer: MEDICARE

## 2019-08-20 VITALS
DIASTOLIC BLOOD PRESSURE: 72 MMHG | HEIGHT: 70 IN | HEART RATE: 67 BPM | SYSTOLIC BLOOD PRESSURE: 152 MMHG | WEIGHT: 161.38 LBS | BODY MASS INDEX: 23.1 KG/M2

## 2019-08-20 DIAGNOSIS — I35.1 NONRHEUMATIC AORTIC VALVE INSUFFICIENCY: ICD-10-CM

## 2019-08-20 DIAGNOSIS — I49.1 PAC (PREMATURE ATRIAL CONTRACTION): ICD-10-CM

## 2019-08-20 DIAGNOSIS — I10 ESSENTIAL HYPERTENSION: ICD-10-CM

## 2019-08-20 DIAGNOSIS — E78.5 DYSLIPIDEMIA: ICD-10-CM

## 2019-08-20 DIAGNOSIS — Z86.79 S/P AAA REPAIR: ICD-10-CM

## 2019-08-20 DIAGNOSIS — R94.31 ABNORMAL ECG: ICD-10-CM

## 2019-08-20 DIAGNOSIS — I73.9 CLAUDICATION IN PERIPHERAL VASCULAR DISEASE: Primary | ICD-10-CM

## 2019-08-20 DIAGNOSIS — Z98.61 HISTORY OF PTCA: ICD-10-CM

## 2019-08-20 DIAGNOSIS — I73.9 PAD (PERIPHERAL ARTERY DISEASE): ICD-10-CM

## 2019-08-20 DIAGNOSIS — I25.118 CORONARY ARTERY DISEASE OF NATIVE ARTERY OF NATIVE HEART WITH STABLE ANGINA PECTORIS: ICD-10-CM

## 2019-08-20 DIAGNOSIS — Z98.61 S/P PTCA (PERCUTANEOUS TRANSLUMINAL CORONARY ANGIOPLASTY): ICD-10-CM

## 2019-08-20 DIAGNOSIS — E11.65 TYPE 2 DIABETES MELLITUS WITH HYPERGLYCEMIA, WITHOUT LONG-TERM CURRENT USE OF INSULIN: ICD-10-CM

## 2019-08-20 DIAGNOSIS — Z98.890 S/P AAA REPAIR: ICD-10-CM

## 2019-08-20 PROCEDURE — 99214 PR OFFICE/OUTPT VISIT, EST, LEVL IV, 30-39 MIN: ICD-10-PCS | Mod: HCNC,S$GLB,, | Performed by: INTERNAL MEDICINE

## 2019-08-20 PROCEDURE — 1101F PT FALLS ASSESS-DOCD LE1/YR: CPT | Mod: HCNC,CPTII,S$GLB, | Performed by: INTERNAL MEDICINE

## 2019-08-20 PROCEDURE — 1101F PR PT FALLS ASSESS DOC 0-1 FALLS W/OUT INJ PAST YR: ICD-10-PCS | Mod: HCNC,CPTII,S$GLB, | Performed by: INTERNAL MEDICINE

## 2019-08-20 PROCEDURE — 99999 PR PBB SHADOW E&M-EST. PATIENT-LVL III: CPT | Mod: PBBFAC,HCNC,, | Performed by: INTERNAL MEDICINE

## 2019-08-20 PROCEDURE — 3077F PR MOST RECENT SYSTOLIC BLOOD PRESSURE >= 140 MM HG: ICD-10-PCS | Mod: HCNC,CPTII,S$GLB, | Performed by: INTERNAL MEDICINE

## 2019-08-20 PROCEDURE — 99999 PR PBB SHADOW E&M-EST. PATIENT-LVL III: ICD-10-PCS | Mod: PBBFAC,HCNC,, | Performed by: INTERNAL MEDICINE

## 2019-08-20 PROCEDURE — 3078F PR MOST RECENT DIASTOLIC BLOOD PRESSURE < 80 MM HG: ICD-10-PCS | Mod: HCNC,CPTII,S$GLB, | Performed by: INTERNAL MEDICINE

## 2019-08-20 PROCEDURE — 3077F SYST BP >= 140 MM HG: CPT | Mod: HCNC,CPTII,S$GLB, | Performed by: INTERNAL MEDICINE

## 2019-08-20 PROCEDURE — 99214 OFFICE O/P EST MOD 30 MIN: CPT | Mod: HCNC,S$GLB,, | Performed by: INTERNAL MEDICINE

## 2019-08-20 PROCEDURE — 3078F DIAST BP <80 MM HG: CPT | Mod: HCNC,CPTII,S$GLB, | Performed by: INTERNAL MEDICINE

## 2019-08-20 RX ORDER — CLOPIDOGREL BISULFATE 75 MG/1
75 TABLET ORAL DAILY
Qty: 90 TABLET | Refills: 3 | Status: SHIPPED | OUTPATIENT
Start: 2019-08-20 | End: 2020-10-08

## 2019-08-20 RX ORDER — SIMVASTATIN 20 MG/1
20 TABLET, FILM COATED ORAL NIGHTLY
Qty: 90 TABLET | Refills: 3 | Status: ON HOLD | OUTPATIENT
Start: 2019-08-20 | End: 2019-10-17 | Stop reason: SDUPTHER

## 2019-08-20 RX ORDER — LISINOPRIL 10 MG/1
10 TABLET ORAL DAILY
Qty: 90 TABLET | Refills: 3 | Status: ON HOLD | OUTPATIENT
Start: 2019-08-20 | End: 2019-10-17 | Stop reason: SDUPTHER

## 2019-08-20 NOTE — PROGRESS NOTES
Subjective:    Patient ID:  Aquiles Kelsey is a 78 y.o. male who presents for evaluation of Coronary Artery Disease; Peripheral Arterial Disease; Hypertension; Claudication; and Risk Factor Management For Atherosclerosis      HPI Pt presents for f/u.   Pt seen as new pt 6/19 after pt moved to [place] from [place] recently.  His current medical conditions include CAD (multiple PCI procedures), HTN, DM, PAD, AAA stent graft (approx 2013), hyperlipidemia, cigar use.  H/o L LE stents in [place].   ecg last visit appeared sinus, PACs, nonspecific t wave abnl.   First PCI 1998, total of 5 stents with last one in [place] 2013.  Now here.  CAD seems stable.  He does not seem to be having any active anginal sxs.  Denies cp sxs.  No CHF sxs. Denies dyspnea, pnd/orthopnea.  He has chronic B LE claudication sxs, improved since his last PTA procedure few years ago per pt.  Bilateral buttock pain, with walking.  No change in sxs.   Smokes some cigars.  Compliant with meds.  DM controlled.  Lipids above goal -- pt states he just started statin.  BP above goal.   Echo 7/19 normal LVEF, DD,  LVH, mild AI.   Holter 7/19 NSR, fleeting NSVT, EAT, rare PVCs, occasional PACs.  B LE vasc studies 7/19 B LE PAD, L > R LE.       Current Outpatient Medications:     aspirin 325 MG tablet, Take 325 mg by mouth once daily., Disp: , Rfl:     clopidogrel (PLAVIX) 75 mg tablet, Take 1 tablet (75 mg total) by mouth once daily., Disp: 90 tablet, Rfl: 3    escitalopram oxalate (LEXAPRO) 10 MG tablet, Take 1 tablet (10 mg total) by mouth once daily., Disp: 90 tablet, Rfl: 0    lisinopril 10 MG tablet, Take 1 tablet (10 mg total) by mouth once daily., Disp: 90 tablet, Rfl: 3    metFORMIN (GLUCOPHAGE) 1000 MG tablet, Take 1 tablet (1,000 mg total) by mouth 2 (two) times daily with meals., Disp: 180 tablet, Rfl: 2    simvastatin (ZOCOR) 20 MG tablet, Take 1 tablet (20 mg total) by mouth every evening., Disp: 90 tablet, Rfl: 3      Review of  "Systems   Constitution: Negative.   HENT: Negative.    Eyes: Negative.    Cardiovascular: Positive for claudication.   Respiratory: Negative.    Endocrine: Negative.    Hematologic/Lymphatic: Negative.    Skin: Negative.    Musculoskeletal: Positive for arthritis and joint pain.   Gastrointestinal: Negative.    Genitourinary: Negative.    Neurological: Negative.    Psychiatric/Behavioral: Negative.    Allergic/Immunologic: Negative.        BP (!) 152/72 (BP Location: Right arm, Patient Position: Sitting, BP Method: Medium (Manual))   Pulse 67   Ht 5' 10" (1.778 m)   Wt 73.2 kg (161 lb 6 oz)   BMI 23.16 kg/m²     Wt Readings from Last 3 Encounters:   08/20/19 73.2 kg (161 lb 6 oz)   06/13/19 72.9 kg (160 lb 11.5 oz)   06/12/19 72.7 kg (160 lb 4.4 oz)     Temp Readings from Last 3 Encounters:   06/17/19 98.4 °F (36.9 °C) (Tympanic)   06/12/19 98.1 °F (36.7 °C)     BP Readings from Last 3 Encounters:   08/20/19 (!) 152/72   06/17/19 (!) 170/77   06/13/19 (!) 148/70     Pulse Readings from Last 3 Encounters:   08/20/19 67   06/17/19 (!) 57   06/13/19 73          Objective:    Physical Exam   Constitutional: He is oriented to person, place, and time. He appears well-developed and well-nourished.   HENT:   Head: Normocephalic.   Neck: Normal range of motion. Neck supple. Normal carotid pulses and no JVD present. Carotid bruit is not present. No thyromegaly present.   Cardiovascular: Normal rate, regular rhythm, S1 normal and S2 normal. PMI is not displaced. Exam reveals no S3, no S4, no distant heart sounds, no friction rub, no midsystolic click and no opening snap.   No murmur heard.  Pulses:       Radial pulses are 2+ on the right side, and 2+ on the left side.   Pulmonary/Chest: Effort normal and breath sounds normal. He has no wheezes. He has no rales.   Abdominal: Soft. Bowel sounds are normal. He exhibits no distension, no abdominal bruit, no ascites and no mass. There is no tenderness.   Musculoskeletal: He " exhibits no edema.   Neurological: He is alert and oriented to person, place, and time.   Skin: Skin is warm.   Psychiatric: He has a normal mood and affect. His behavior is normal.   Nursing note and vitals reviewed.      I have reviewed all pertinent labs and cardiac studies.    Lab Results   Component Value Date    HGBA1C 6.5 (H) 06/12/2019           Chemistry        Component Value Date/Time     06/12/2019 1559    K 4.8 06/12/2019 1559     06/12/2019 1559    CO2 24 06/12/2019 1559    BUN 23 06/12/2019 1559    CREATININE 1.2 06/12/2019 1559     (H) 06/12/2019 1559        Component Value Date/Time    CALCIUM 9.9 06/12/2019 1559    ALKPHOS 62 06/12/2019 1559    AST 17 06/12/2019 1559    ALT 9 (L) 06/12/2019 1559    BILITOT 0.3 06/12/2019 1559    ESTGFRAFRICA >60.0 06/12/2019 1559    EGFRNONAA 57.2 (A) 06/12/2019 1559        No results found for: [id number]  Lab Results   Component Value Date    WBC 7.18 06/12/2019    HGB 15.5 06/12/2019    HCT 48.2 06/12/2019    MCV 94 06/12/2019     06/12/2019     Lab Results   Component Value Date    CHOL 217 (H) 06/12/2019     Lab Results   Component Value Date    HDL 37 (L) 06/12/2019     Lab Results   Component Value Date    LDLCALC 129.0 06/12/2019     Lab Results   Component Value Date    TRIG 255 (H) 06/12/2019     Lab Results   Component Value Date    CHOLHDL 17.1 (L) 06/12/2019          Narrative     Date of Procedure: 07/10/2019        TEST DESCRIPTION   Technical Quality: This is a technically challenging study. There is poor endocardial definition.     General: The patient was bradycardic throughout the study.    Aorta: The aortic root is normal in size, measuring 3.0 cm at sinotubular junction and 3.1 cm at Sinuses of Valsalva. The proximal ascending aorta is normal in size, measuring 3.7 cm across.     Left Atrium: The left atrial volume index is moderately enlarged, measuring 41.75 cc/m2.     Left Ventricle: The left ventricle is normal in  size, with an end-diastolic diameter of 4.6 cm, and an end-systolic diameter of 3.4 cm. LV wall thickness is normal, with the septum measuring 1.6 cm and the posterior wall measuring 1.5 cm across. Relative   wall thickness was increased at 0.65, and the LV mass index was increased at 189.5 g/m2 consistent with concentric left ventricular hypertrophy. There are no regional wall motion abnormalities. Left ventricular systolic function appears normal. Visually   estimated ejection fraction is 55-60%. The LV Doppler derived stroke volume equals 82.0 ccs.     Diastolic indices: E wave velocity 0.8 m/s, E/A ratio 0.9,  msec., E/e' ratio(avg) 11. There is pseudonormalization of mitral inflow pattern consistent with significant diastolic dysfunction.     Right Atrium: The right atrium is normal in size, measuring 5.2 cm in length and 3.5 cm in width in the apical view.     Right Ventricle: The right ventricle is normal in size measuring 2.6 cm at the base in the apical right ventricle-focused view. Global right ventricular systolic function appears normal. Tricuspid annular plane systolic excursion (TAPSE) is 2.2 cm. The   estimated PA systolic pressure is 37 mmHg.     Aortic Valve:  The aortic valve is mildly sclerotic with normal leaflet mobility. Additionally, there is mild aortic regurgitation.     Mitral Valve:  The mitral valve is normal in structure with normal leaflet mobility.     Tricuspid Valve:  The tricuspid valve is normal in structure with normal leaflet mobility. There is mild tricuspid regurgitation.     Pulmonary Valve:  The pulmonic valve is normal in structure with normal leaflet mobility. There is trivial pulmonic regurgitation.     IVC: IVC is normal in size and collapses > 50% with a sniff, suggesting normal right atrial pressure of 3 mmHg.     Intracavitary: There is no evidence of pericardial effusion, intracavity mass, thrombi, or vegetation.         CONCLUSIONS     1 - Normal left  ventricular systolic function (EF 55-60%).     2 - Impaired LV relaxation, elevated LAP (grade 2 diastolic dysfunction).     3 - Moderate left atrial enlargement.     4 - Normal right ventricular systolic function .     5 - The estimated PA systolic pressure is 37 mmHg.     6 - Mild aortic regurgitation.     7 - Mild tricuspid regurgitation.             This document has been electronically    SIGNED BY: Eris Severino MD On: 07/10/2019 11:06       Date of Procedure: 07/10/2019    PRE-TEST DATA   The diary was returned, but not completed.        TEST DESCRIPTION   PREDOMINANT RHYTHM  1. Sinus rhythm with heart rates varying between 39 and 98 bpm with an average of 52 bpm.     VENTRICULAR ARRHYTHMIAS  1. There were rare PVCs totalling 301 and averaging 6 per hour.   The ventricular arrhythmias percentage was .2.   There were 3 triplets.     2. There were 3 runs of non-sustained ventricular tachycardia.     3. Longest NSVT - 7 beats; fastest at 174 BPM    SUPRA VENTRICULAR ARRHYTHMIAS  1. There were occasional PACs totalling 1013 and averaging 21 per hour.     2. There were 7 runs of EAT lasting from 1 to 7 beats. The high rate was 150 bpm.     SINUS NODE FUNCTION  1. There was no evidence of high grade SA bria block.     AV CONDUCTION  1. There was no evidence of high grade AV block.     2. The longest RR interval was 1800 msec.     DIARY  1. The diary was returned, but not completed    MISCELLANEOUS  1. This was a tape of adequate length (48 hrs).             This document has been electronically    SIGNED BY: Eris Severino MD On: 07/12/2019 15:49             Narrative     Date of Procedure: 07/10/2019        TEST DESCRIPTION   RIGHT   cm/sec   cm/sec  SFAo 107 cm/sec  SFAp 128 cm/sec  SFAm 306 cm/sec,  hemodynamic significant stenosis  SFAd 101 cm/sec   cm/sec  TIB TRNK 52 cm/sec  PER 40 cm/sec  AT 51 cm/sec  PT 45 cm/sec    The right JOSELYN is .99    LEFT   cm/sec  PFA 52 cm/sec  SFAo 144  cm/sec  SFAp 525 cm/sec,  hemodynamic significant stenosis  SFAm 84 cm/sec  SFAd 33 cm/sec  POP 32 cm/sec  TIB TRNK 17 cm/sec  PER 18 cm/sec  AT 19 cm/sec  PT 19 cm/sec,  occlusion    SFAm stent velocity origin 88 cm/sec,  proximal 66 cm/sec,  mid 62 cm/sec,  distal 70 cm/sec, SFAd stent velocity proximal 63 cm/sec,  mid 75 cm/sec,  distal 214 cm/sec    The left JOSELYN is .69      50-99% stenosis of R mid SFA with biphasic and monophasic waveforms  Min PAD on RLE, with significant plaque noted  50-99% stenosis L prox SFA, patent mid SFA stent  50-99% stenosis of L distal SFA stent with biphasic and monophasic waveforms and scattered plaque with collaterals from distal  Moderate PAD on LLE        This document has been electronically    SIGNED BY: Eris Severino MD On: 07/10/2019 15:10           Assessment:       1. Claudication in peripheral vascular disease    2. PAC (premature atrial contraction)    3. PAD (peripheral artery disease)    4. S/P AAA repair    5. S/P PTCA (percutaneous transluminal coronary angioplasty)    6. Type 2 diabetes mellitus with hyperglycemia, without long-term current use of insulin    7. Abnormal ECG    8. Coronary artery disease of native artery of native heart with stable angina pectoris    9. Dyslipidemia    10. Essential hypertension    11. History of PTCA    12. Nonrheumatic aortic valve insufficiency         Plan:             Reviewed all tests with pt in detail.  Pt is clinically stable with no active anginal and/or CHF sxs and has chronic claudication sxs with h/o stent graft for AAA.  Continue current medical tx.  Refer to Vascular surgery for AAA repair f/u and PAD.  Needs to lower lipids to goal -- just started statin per pt.  Recheck 3 months.  Stop all tobacco products.  Adjust HTN meds next appt if still above goal.  Daily walking exercise.  Cardiac diet.  Medical tx for CAD.  F/u 3 - 4 months.

## 2019-08-26 ENCOUNTER — OFFICE VISIT (OUTPATIENT)
Dept: FAMILY MEDICINE | Facility: CLINIC | Age: 79
End: 2019-08-26
Payer: MEDICARE

## 2019-08-26 VITALS
TEMPERATURE: 98 F | HEART RATE: 65 BPM | BODY MASS INDEX: 22.82 KG/M2 | HEIGHT: 70 IN | SYSTOLIC BLOOD PRESSURE: 150 MMHG | DIASTOLIC BLOOD PRESSURE: 66 MMHG | OXYGEN SATURATION: 98 % | WEIGHT: 159.38 LBS

## 2019-08-26 DIAGNOSIS — R51.9 ACUTE NONINTRACTABLE HEADACHE, UNSPECIFIED HEADACHE TYPE: ICD-10-CM

## 2019-08-26 DIAGNOSIS — H92.02 LEFT EAR PAIN: Primary | ICD-10-CM

## 2019-08-26 PROCEDURE — 99213 PR OFFICE/OUTPT VISIT, EST, LEVL III, 20-29 MIN: ICD-10-PCS | Mod: HCNC,S$GLB,, | Performed by: FAMILY MEDICINE

## 2019-08-26 PROCEDURE — 99999 PR PBB SHADOW E&M-EST. PATIENT-LVL IV: ICD-10-PCS | Mod: PBBFAC,HCNC,, | Performed by: FAMILY MEDICINE

## 2019-08-26 PROCEDURE — 1101F PR PT FALLS ASSESS DOC 0-1 FALLS W/OUT INJ PAST YR: ICD-10-PCS | Mod: HCNC,CPTII,S$GLB, | Performed by: FAMILY MEDICINE

## 2019-08-26 PROCEDURE — 99213 OFFICE O/P EST LOW 20 MIN: CPT | Mod: HCNC,S$GLB,, | Performed by: FAMILY MEDICINE

## 2019-08-26 PROCEDURE — 99999 PR PBB SHADOW E&M-EST. PATIENT-LVL IV: CPT | Mod: PBBFAC,HCNC,, | Performed by: FAMILY MEDICINE

## 2019-08-26 PROCEDURE — 3077F PR MOST RECENT SYSTOLIC BLOOD PRESSURE >= 140 MM HG: ICD-10-PCS | Mod: HCNC,CPTII,S$GLB, | Performed by: FAMILY MEDICINE

## 2019-08-26 PROCEDURE — 3078F PR MOST RECENT DIASTOLIC BLOOD PRESSURE < 80 MM HG: ICD-10-PCS | Mod: HCNC,CPTII,S$GLB, | Performed by: FAMILY MEDICINE

## 2019-08-26 PROCEDURE — 3077F SYST BP >= 140 MM HG: CPT | Mod: HCNC,CPTII,S$GLB, | Performed by: FAMILY MEDICINE

## 2019-08-26 PROCEDURE — 1101F PT FALLS ASSESS-DOCD LE1/YR: CPT | Mod: HCNC,CPTII,S$GLB, | Performed by: FAMILY MEDICINE

## 2019-08-26 PROCEDURE — 3078F DIAST BP <80 MM HG: CPT | Mod: HCNC,CPTII,S$GLB, | Performed by: FAMILY MEDICINE

## 2019-08-26 RX ORDER — METHYLPREDNISOLONE 4 MG/1
TABLET ORAL
Qty: 1 PACKAGE | Refills: 0 | Status: SHIPPED | OUTPATIENT
Start: 2019-08-26 | End: 2019-10-11

## 2019-08-26 NOTE — PROGRESS NOTES
CHIEF COMPLAINT:  This is a 78-year-old male complaining of pain in left ear.    SUBJECTIVE:  Patient complains of a 2 day history of pain in left ear which he states is sharp at at times.  He rates the pain 4/10 on the pain scale.  When he takes 1500 mg of Tylenol pain dissipates for few hours and then reoccurs.  He also tried alcohol and water drops in his ear.  This morning he had pain not only in his ear but the left side of his head.  He apply cold compresses and took Tylenol and now pain has resolved.  Patient has a history of chronic hearing loss.  He denies tinnitus, vertigo or discharge from ear.  Patient has controlled type 2 diabetes.    ROS:  GENERAL: Patient denies fever, chills, night sweats.  Patient denies weight gain or loss. Patient denies anorexia, fatigue, weakness or swollen glands.  SKIN: Patient denies rash.  HEENT: Patient denies sore throat, nasal congestion, or runny nose. Patient denies visual disturbance, eye irritation or discharge. Positive ear pain and hearing loss.  LUNGS: Patient denies cough, wheeze or hemoptysis.  CARDIOVASCULAR: Patient denies chest pain, shortness of breath, palpitations, syncope or lower extremity edema.  NEUROLOGIC: Patient denies vertigo, paresthesias, weakness in limb, dysarthria, dysphagia or abnormality of gait.  Positive headache.    OBJECTIVE:   GENERAL: Well-developed well-nourished pleasant elderly male  alert and oriented x3 in no acute distress.  Memory, judgment and cognition without deficit.  SKIN: Clear without rash.  Normal color and tone.  HEENT: Eyes: Clear conjunctivae.  No scleral icterus.  Pupils equal reactive light accommodation. Extraocular movements intact. No nystagmus.  Fundi not visualized  Ears: Clear canals.  Clear TMs.  Nose: Without congestion.  Pharynx: Without injection or exudates.  NECK: Supple, normal range of motion.  No lymphadenopathy.  No masses or enlarged thyroid.  No JVD.  Carotids 2+ and equal.  No bruits.  LUNGS:  Clear to auscultation.  Normal respiratory effort.  CARDIOVASCULAR: Regular rhythm, normal S1, S2 without murmur, gallop or rub.  NEUROLOGIC:   Cranial nerves II through XII without deficit.  Motor strength equal bilaterally.  Sensation normal to touch.  Deep tendon reflexes 2+ and equal.  Gait without abnormality.  No tremor.  Negative cerebellar signs.    ASSESSMENT:  1. Left ear pain    2. Acute nonintractable headache, unspecified headache type      PLAN:   1.  Medrol Dosepak.  2.  Precautions regarding elevation of blood sugar.  3.  Take Tylenol extra-strength 1000 mg 4 times daily as needed.  4.  Follow up if no improvement or worsening symptoms.    This note is generated with speech recognition software and is subject to transcription error and sound alike phrases that may be missed by proofreading.

## 2019-10-08 ENCOUNTER — OFFICE VISIT (OUTPATIENT)
Dept: CARDIOLOGY | Facility: CLINIC | Age: 79
End: 2019-10-08
Payer: MEDICARE

## 2019-10-08 VITALS
OXYGEN SATURATION: 96 % | DIASTOLIC BLOOD PRESSURE: 62 MMHG | WEIGHT: 162.69 LBS | HEIGHT: 70 IN | HEART RATE: 65 BPM | BODY MASS INDEX: 23.29 KG/M2 | SYSTOLIC BLOOD PRESSURE: 124 MMHG

## 2019-10-08 DIAGNOSIS — R07.89 OTHER CHEST PAIN: ICD-10-CM

## 2019-10-08 DIAGNOSIS — I25.118 CORONARY ARTERY DISEASE OF NATIVE ARTERY OF NATIVE HEART WITH STABLE ANGINA PECTORIS: Primary | ICD-10-CM

## 2019-10-08 DIAGNOSIS — I73.9 PAD (PERIPHERAL ARTERY DISEASE): ICD-10-CM

## 2019-10-08 DIAGNOSIS — E11.65 TYPE 2 DIABETES MELLITUS WITH HYPERGLYCEMIA, WITHOUT LONG-TERM CURRENT USE OF INSULIN: ICD-10-CM

## 2019-10-08 DIAGNOSIS — Z98.890 S/P AAA REPAIR: ICD-10-CM

## 2019-10-08 DIAGNOSIS — I10 ESSENTIAL HYPERTENSION: ICD-10-CM

## 2019-10-08 DIAGNOSIS — Z98.61 S/P PTCA (PERCUTANEOUS TRANSLUMINAL CORONARY ANGIOPLASTY): ICD-10-CM

## 2019-10-08 DIAGNOSIS — R07.2 PRECORDIAL PAIN: ICD-10-CM

## 2019-10-08 DIAGNOSIS — E78.5 DYSLIPIDEMIA: ICD-10-CM

## 2019-10-08 DIAGNOSIS — I35.1 NONRHEUMATIC AORTIC VALVE INSUFFICIENCY: ICD-10-CM

## 2019-10-08 DIAGNOSIS — Z86.79 S/P AAA REPAIR: ICD-10-CM

## 2019-10-08 PROCEDURE — 3078F DIAST BP <80 MM HG: CPT | Mod: HCNC,CPTII,S$GLB, | Performed by: INTERNAL MEDICINE

## 2019-10-08 PROCEDURE — 99215 OFFICE O/P EST HI 40 MIN: CPT | Mod: HCNC,S$GLB,, | Performed by: INTERNAL MEDICINE

## 2019-10-08 PROCEDURE — 99999 PR PBB SHADOW E&M-EST. PATIENT-LVL III: CPT | Mod: PBBFAC,HCNC,, | Performed by: INTERNAL MEDICINE

## 2019-10-08 PROCEDURE — 1101F PR PT FALLS ASSESS DOC 0-1 FALLS W/OUT INJ PAST YR: ICD-10-PCS | Mod: HCNC,CPTII,S$GLB, | Performed by: INTERNAL MEDICINE

## 2019-10-08 PROCEDURE — 99215 PR OFFICE/OUTPT VISIT, EST, LEVL V, 40-54 MIN: ICD-10-PCS | Mod: HCNC,S$GLB,, | Performed by: INTERNAL MEDICINE

## 2019-10-08 PROCEDURE — 3074F PR MOST RECENT SYSTOLIC BLOOD PRESSURE < 130 MM HG: ICD-10-PCS | Mod: HCNC,CPTII,S$GLB, | Performed by: INTERNAL MEDICINE

## 2019-10-08 PROCEDURE — 99499 RISK ADDL DX/OHS AUDIT: ICD-10-PCS | Mod: HCNC,S$GLB,, | Performed by: INTERNAL MEDICINE

## 2019-10-08 PROCEDURE — 99499 UNLISTED E&M SERVICE: CPT | Mod: HCNC,S$GLB,, | Performed by: INTERNAL MEDICINE

## 2019-10-08 PROCEDURE — 3078F PR MOST RECENT DIASTOLIC BLOOD PRESSURE < 80 MM HG: ICD-10-PCS | Mod: HCNC,CPTII,S$GLB, | Performed by: INTERNAL MEDICINE

## 2019-10-08 PROCEDURE — 1101F PT FALLS ASSESS-DOCD LE1/YR: CPT | Mod: HCNC,CPTII,S$GLB, | Performed by: INTERNAL MEDICINE

## 2019-10-08 PROCEDURE — 99999 PR PBB SHADOW E&M-EST. PATIENT-LVL III: ICD-10-PCS | Mod: PBBFAC,HCNC,, | Performed by: INTERNAL MEDICINE

## 2019-10-08 PROCEDURE — 3074F SYST BP LT 130 MM HG: CPT | Mod: HCNC,CPTII,S$GLB, | Performed by: INTERNAL MEDICINE

## 2019-10-08 RX ORDER — ISOSORBIDE MONONITRATE 30 MG/1
30 TABLET, EXTENDED RELEASE ORAL DAILY
Qty: 30 TABLET | Refills: 11 | Status: SHIPPED | OUTPATIENT
Start: 2019-10-08 | End: 2019-10-22 | Stop reason: SDUPTHER

## 2019-10-08 RX ORDER — NITROGLYCERIN 0.4 MG/1
0.4 TABLET SUBLINGUAL EVERY 5 MIN PRN
Qty: 25 TABLET | Refills: 12 | Status: SHIPPED | OUTPATIENT
Start: 2019-10-08 | End: 2022-11-04

## 2019-10-08 NOTE — PROGRESS NOTES
Subjective:   Patient ID:  Aquiles Kelsey is a 79 y.o. male who presents for follow up of Chest Pain      80 yo male, came in for chest pain.  PMH CAD (multiple PCI procedures), First PCI 1998, total of 5 stents with last one in [place] 2013. HTN, DM, PAD H/o L LE stents in [place]. , AAA stent graft (approx 2013), hyperlipidemia, cigar use.  Echo 7/19 normal LVEF, DD,  LVH, mild AI.   Holter 7/19 NSR, fleeting NSVT, EAT, rare PVCs, occasional PACs.  B LE vasc studies 7/19 B LE PAD, L > R LE.    Today, c/ochest pain for 3 days, the pain could last a hour. Occurred at exertion and rest. No radiating pain. No dyspnea, dizziness and cold sweating.  ekg showed NSR and some TWI on anterior leads.  No pain now  Smokes cigar  Chronic lower back pain                    Past Medical History:   Diagnosis Date    Aneurysm, abdominal aortic     Coronary artery disease     Kidney stone        Past Surgical History:   Procedure Laterality Date    APPENDECTOMY      CORONARY STENT PLACEMENT         Social History     Tobacco Use    Smoking status: Current Every Day Smoker     Years: 15.00     Types: Cigars    Smokeless tobacco: Current User   Substance Use Topics    Alcohol use: Not Currently    Drug use: Not Currently       Family History   Problem Relation Age of Onset    Diabetes Mother     COPD Father     Diabetes Brother          Review of Systems   Constitution: Negative.   HENT: Negative.    Eyes: Negative.    Cardiovascular: Positive for chest pain.   Respiratory: Negative.    Endocrine: Negative.    Hematologic/Lymphatic: Negative.    Skin: Negative.    Musculoskeletal: Positive for arthritis and joint pain.   Gastrointestinal: Negative.    Genitourinary: Negative.    Neurological: Negative.    Psychiatric/Behavioral: Negative.    Allergic/Immunologic: Negative.        Objective:   Physical Exam   Constitutional: He is oriented to person, place, and time. He appears well-developed and well-nourished.    HENT:   Head: Normocephalic.   Neck: Normal range of motion. Neck supple. Normal carotid pulses and no JVD present. Carotid bruit is not present. No thyromegaly present.   Cardiovascular: Normal rate, regular rhythm, S1 normal and S2 normal. PMI is not displaced. Exam reveals no S3, no S4, no distant heart sounds, no friction rub, no midsystolic click and no opening snap.   No murmur heard.  Pulses:       Radial pulses are 2+ on the right side, and 2+ on the left side.   Pulmonary/Chest: Effort normal and breath sounds normal. He has no wheezes. He has no rales.   Abdominal: Soft. Bowel sounds are normal. He exhibits no distension, no abdominal bruit, no ascites and no mass. There is no tenderness.   Musculoskeletal: He exhibits no edema.   Neurological: He is alert and oriented to person, place, and time.   Skin: Skin is warm.   Psychiatric: He has a normal mood and affect. His behavior is normal.   Nursing note and vitals reviewed.      Lab Results   Component Value Date    CHOL 217 (H) 06/12/2019     Lab Results   Component Value Date    HDL 37 (L) 06/12/2019     Lab Results   Component Value Date    LDLCALC 129.0 06/12/2019     Lab Results   Component Value Date    TRIG 255 (H) 06/12/2019     Lab Results   Component Value Date    CHOLHDL 17.1 (L) 06/12/2019       Chemistry        Component Value Date/Time     06/12/2019 1559    K 4.8 06/12/2019 1559     06/12/2019 1559    CO2 24 06/12/2019 1559    BUN 23 06/12/2019 1559    CREATININE 1.2 06/12/2019 1559     (H) 06/12/2019 1559        Component Value Date/Time    CALCIUM 9.9 06/12/2019 1559    ALKPHOS 62 06/12/2019 1559    AST 17 06/12/2019 1559    ALT 9 (L) 06/12/2019 1559    BILITOT 0.3 06/12/2019 1559    ESTGFRAFRICA >60.0 06/12/2019 1559    EGFRNONAA 57.2 (A) 06/12/2019 1559          Lab Results   Component Value Date    HGBA1C 6.5 (H) 06/12/2019     Lab Results   Component Value Date    TSH 1.329 06/12/2019     No results found for: INR,  PROTIME  Lab Results   Component Value Date    WBC 7.18 06/12/2019    HGB 15.5 06/12/2019    HCT 48.2 06/12/2019    MCV 94 06/12/2019     06/12/2019     BMP  Sodium   Date Value Ref Range Status   06/12/2019 140 136 - 145 mmol/L Final     Potassium   Date Value Ref Range Status   06/12/2019 4.8 3.5 - 5.1 mmol/L Final     Chloride   Date Value Ref Range Status   06/12/2019 105 95 - 110 mmol/L Final     CO2   Date Value Ref Range Status   06/12/2019 24 23 - 29 mmol/L Final     BUN, Bld   Date Value Ref Range Status   06/12/2019 23 8 - 23 mg/dL Final     Creatinine   Date Value Ref Range Status   06/12/2019 1.2 0.5 - 1.4 mg/dL Final     Calcium   Date Value Ref Range Status   06/12/2019 9.9 8.7 - 10.5 mg/dL Final     Anion Gap   Date Value Ref Range Status   06/12/2019 11 8 - 16 mmol/L Final     eGFR if    Date Value Ref Range Status   06/12/2019 >60.0 >60 mL/min/1.73 m^2 Final     eGFR if non    Date Value Ref Range Status   06/12/2019 57.2 (A) >60 mL/min/1.73 m^2 Final     Comment:     Calculation used to obtain the estimated glomerular filtration  rate (eGFR) is the CKD-EPI equation.        BNP  @LABRCNTIP(BNP,BNPTRIAGEBLO)@  @LABRCNTIP(troponini)@  CrCl cannot be calculated (Patient's most recent lab result is older than the maximum 7 days allowed.).  No results found in the last 24 hours.  No results found in the last 24 hours.  No results found in the last 24 hours.    Assessment:      1. Coronary artery disease of native artery of native heart with stable angina pectoris    2. Precordial pain    3. Other chest pain    4. Essential hypertension    5. Dyslipidemia    6. Nonrheumatic aortic valve insufficiency    7. PAD (peripheral artery disease)    8. S/P PTCA (percutaneous transluminal coronary angioplasty)    9. S/P AAA repair    10. Type 2 diabetes mellitus with hyperglycemia, without long-term current use of insulin      Chest pain    Plan:   Check Troponin   Add NUKE  stress test  Add Imdur 30 mg daily, and NTG SL  Continue simva, lisinopril, asa and Plavix  If the chest pain worse, go to ER  RTC with Dr. Gilbert as scheduled

## 2019-10-09 ENCOUNTER — HOSPITAL ENCOUNTER (OUTPATIENT)
Dept: RADIOLOGY | Facility: HOSPITAL | Age: 79
Discharge: HOME | DRG: 246 | End: 2019-10-09
Attending: INTERNAL MEDICINE
Payer: MEDICARE

## 2019-10-09 ENCOUNTER — DOCUMENTATION ONLY (OUTPATIENT)
Dept: CARDIOLOGY | Facility: CLINIC | Age: 79
End: 2019-10-09

## 2019-10-09 ENCOUNTER — TELEPHONE (OUTPATIENT)
Dept: CARDIOLOGY | Facility: CLINIC | Age: 79
End: 2019-10-09

## 2019-10-09 DIAGNOSIS — R07.2 PRECORDIAL PAIN: ICD-10-CM

## 2019-10-09 NOTE — PROGRESS NOTES
Patient presented today for pharmaceutical nuclear stress test. Upon initial assessment, junctional rhythm noted. Patient c/o chest pain two days ago. /54. HR 51. Confirmed by [facility] Resendiz, and Dr. Severino. Rest dose of 8.8mci given to patient. Suggested discontinuing testing due to ekg tracings. IV removed, pressure dressing applied.  Nuclear tech states inferior wall abnormalities and EF 34% on resting images. Dr. Hobbs notified. Patient informed to report to ER if CP occurs. Dr. Hobbs will review findings and follow up with patient. Patient verbalized understanding.

## 2019-10-11 ENCOUNTER — HOSPITAL ENCOUNTER (INPATIENT)
Facility: HOSPITAL | Age: 79
LOS: 6 days | Discharge: HOME | DRG: 246 | End: 2019-10-17
Attending: EMERGENCY MEDICINE | Admitting: EMERGENCY MEDICINE
Payer: MEDICARE

## 2019-10-11 DIAGNOSIS — I42.9 CARDIOMYOPATHY: ICD-10-CM

## 2019-10-11 DIAGNOSIS — E78.5 DYSLIPIDEMIA: ICD-10-CM

## 2019-10-11 DIAGNOSIS — I21.4 NSTEMI (NON-ST ELEVATED MYOCARDIAL INFARCTION): Primary | ICD-10-CM

## 2019-10-11 DIAGNOSIS — I25.118 CORONARY ARTERY DISEASE OF NATIVE ARTERY OF NATIVE HEART WITH STABLE ANGINA PECTORIS: ICD-10-CM

## 2019-10-11 DIAGNOSIS — K92.0 HEMATEMESIS, PRESENCE OF NAUSEA NOT SPECIFIED: ICD-10-CM

## 2019-10-11 DIAGNOSIS — D62 ACUTE BLOOD LOSS ANEMIA: ICD-10-CM

## 2019-10-11 DIAGNOSIS — I10 ESSENTIAL HYPERTENSION: ICD-10-CM

## 2019-10-11 DIAGNOSIS — I25.10 ATHEROSCLEROTIC HEART DISEASE OF NATIVE CORONARY ARTERY WITHOUT ANGINA PECTORIS: ICD-10-CM

## 2019-10-11 DIAGNOSIS — K92.0 HEMATEMESIS WITH NAUSEA: ICD-10-CM

## 2019-10-11 DIAGNOSIS — R07.9 CHEST PAIN: ICD-10-CM

## 2019-10-11 PROBLEM — I20.9 AP (ANGINA PECTORIS): Status: ACTIVE | Noted: 2019-10-11

## 2019-10-11 PROBLEM — Z72.0 TOBACCO ABUSE: Status: ACTIVE | Noted: 2019-10-11

## 2019-10-11 LAB
ALBUMIN SERPL BCP-MCNC: 3.6 G/DL (ref 3.5–5.2)
ALP SERPL-CCNC: 61 U/L (ref 55–135)
ALT SERPL W/O P-5'-P-CCNC: 7 U/L (ref 10–44)
ANION GAP SERPL CALC-SCNC: 10 MMOL/L (ref 8–16)
AORTIC VALVE REGURGITATION: NORMAL
APTT BLDCRRT: 27.7 SEC (ref 21–32)
AST SERPL-CCNC: 12 U/L (ref 10–40)
BASOPHILS # BLD AUTO: 0.03 K/UL (ref 0–0.2)
BASOPHILS NFR BLD: 0.4 % (ref 0–1.9)
BILIRUB SERPL-MCNC: 0.4 MG/DL (ref 0.1–1)
BNP SERPL-MCNC: 300 PG/ML (ref 0–99)
BUN SERPL-MCNC: 17 MG/DL (ref 8–23)
CALCIUM SERPL-MCNC: 9.7 MG/DL (ref 8.7–10.5)
CHLORIDE SERPL-SCNC: 106 MMOL/L (ref 95–110)
CHOLEST SERPL-MCNC: 140 MG/DL (ref 120–199)
CHOLEST/HDLC SERPL: 4.2 {RATIO} (ref 2–5)
CO2 SERPL-SCNC: 24 MMOL/L (ref 23–29)
CORONARY STENOSIS: ABNORMAL
CORONARY STENT: YES
CREAT SERPL-MCNC: 1.1 MG/DL (ref 0.5–1.4)
DIFFERENTIAL METHOD: ABNORMAL
EOSINOPHIL # BLD AUTO: 0.1 K/UL (ref 0–0.5)
EOSINOPHIL NFR BLD: 1.5 % (ref 0–8)
ERYTHROCYTE [DISTWIDTH] IN BLOOD BY AUTOMATED COUNT: 13.5 % (ref 11.5–14.5)
EST. GFR  (AFRICAN AMERICAN): >60 ML/MIN/1.73 M^2
EST. GFR  (NON AFRICAN AMERICAN): >60 ML/MIN/1.73 M^2
ESTIMATED PA SYSTOLIC PRESSURE: 33.25
GLUCOSE SERPL-MCNC: 127 MG/DL (ref 70–110)
HCT VFR BLD AUTO: 40.5 % (ref 40–54)
HDLC SERPL-MCNC: 33 MG/DL (ref 40–75)
HDLC SERPL: 23.6 % (ref 20–50)
HGB BLD-MCNC: 13.4 G/DL (ref 14–18)
IMM GRANULOCYTES # BLD AUTO: 0.02 K/UL (ref 0–0.04)
IMM GRANULOCYTES NFR BLD AUTO: 0.3 % (ref 0–0.5)
INR PPP: 1 (ref 0.8–1.2)
LDLC SERPL CALC-MCNC: 74.8 MG/DL (ref 63–159)
LYMPHOCYTES # BLD AUTO: 1.5 K/UL (ref 1–4.8)
LYMPHOCYTES NFR BLD: 20.4 % (ref 18–48)
MCH RBC QN AUTO: 30.5 PG (ref 27–31)
MCHC RBC AUTO-ENTMCNC: 33.1 G/DL (ref 32–36)
MCV RBC AUTO: 92 FL (ref 82–98)
MITRAL VALVE REGURGITATION: NORMAL
MONOCYTES # BLD AUTO: 0.8 K/UL (ref 0.3–1)
MONOCYTES NFR BLD: 10.8 % (ref 4–15)
NEUTROPHILS # BLD AUTO: 4.8 K/UL (ref 1.8–7.7)
NEUTROPHILS NFR BLD: 66.6 % (ref 38–73)
NONHDLC SERPL-MCNC: 107 MG/DL
NRBC BLD-RTO: 0 /100 WBC
PLATELET # BLD AUTO: 147 K/UL (ref 150–350)
PMV BLD AUTO: 11.7 FL (ref 9.2–12.9)
POC ACTIVATED CLOTTING TIME K: 252 SEC (ref 74–137)
POC ACTIVATED CLOTTING TIME K: 268 SEC (ref 74–137)
POC ACTIVATED CLOTTING TIME K: 274 SEC (ref 74–137)
POC ACTIVATED CLOTTING TIME K: 395 SEC (ref 74–137)
POTASSIUM SERPL-SCNC: 4.7 MMOL/L (ref 3.5–5.1)
PROT SERPL-MCNC: 6.7 G/DL (ref 6–8.4)
PROTHROMBIN TIME: 10.7 SEC (ref 9–12.5)
RBC # BLD AUTO: 4.4 M/UL (ref 4.6–6.2)
RETIRED EF AND QEF - SEE NOTES: 55 (ref 55–65)
SAMPLE: ABNORMAL
SODIUM SERPL-SCNC: 140 MMOL/L (ref 136–145)
TRIGL SERPL-MCNC: 161 MG/DL (ref 30–150)
TROPONIN I SERPL DL<=0.01 NG/ML-MCNC: 0.47 NG/ML (ref 0–0.03)
TROPONIN I SERPL DL<=0.01 NG/ML-MCNC: 0.49 NG/ML (ref 0–0.03)
TROPONIN I SERPL DL<=0.01 NG/ML-MCNC: 1.04 NG/ML (ref 0–0.03)
WBC # BLD AUTO: 7.15 K/UL (ref 3.9–12.7)

## 2019-10-11 PROCEDURE — 83880 ASSAY OF NATRIURETIC PEPTIDE: CPT | Mod: HCNC

## 2019-10-11 PROCEDURE — 96374 THER/PROPH/DIAG INJ IV PUSH: CPT | Mod: HCNC

## 2019-10-11 PROCEDURE — 93010 ELECTROCARDIOGRAM REPORT: CPT | Mod: HCNC,,, | Performed by: INTERNAL MEDICINE

## 2019-10-11 PROCEDURE — 25000003 PHARM REV CODE 250: Mod: HCNC

## 2019-10-11 PROCEDURE — 25500020 PHARM REV CODE 255: Mod: HCNC

## 2019-10-11 PROCEDURE — 63600175 PHARM REV CODE 636 W HCPCS: Mod: HCNC

## 2019-10-11 PROCEDURE — 93010 EKG 12-LEAD: ICD-10-PCS | Mod: HCNC,,, | Performed by: INTERNAL MEDICINE

## 2019-10-11 PROCEDURE — 80061 LIPID PANEL: CPT | Mod: HCNC

## 2019-10-11 PROCEDURE — 99152 MOD SED SAME PHYS/QHP 5/>YRS: CPT | Mod: HCNC,,, | Performed by: INTERNAL MEDICINE

## 2019-10-11 PROCEDURE — 63600175 PHARM REV CODE 636 W HCPCS: Mod: HCNC | Performed by: EMERGENCY MEDICINE

## 2019-10-11 PROCEDURE — 99291 CRITICAL CARE FIRST HOUR: CPT | Mod: 25,HCNC

## 2019-10-11 PROCEDURE — 92928 PRQ TCAT PLMT NTRAC ST 1 LES: CPT | Mod: LC,HCNC,, | Performed by: INTERNAL MEDICINE

## 2019-10-11 PROCEDURE — 25000003 PHARM REV CODE 250: Mod: HCNC | Performed by: NURSE PRACTITIONER

## 2019-10-11 PROCEDURE — 93458 L HRT ARTERY/VENTRICLE ANGIO: CPT | Mod: 59,HCNC

## 2019-10-11 PROCEDURE — 85025 COMPLETE CBC W/AUTO DIFF WBC: CPT | Mod: HCNC

## 2019-10-11 PROCEDURE — 93458 CATH LAB PROCEDURE: ICD-10-PCS | Mod: 26,59,51,HCNC | Performed by: INTERNAL MEDICINE

## 2019-10-11 PROCEDURE — 93306 TTE W/DOPPLER COMPLETE: CPT | Mod: HCNC

## 2019-10-11 PROCEDURE — 93306 2D ECHO WITH COLOR FLOW DOPPLER: ICD-10-PCS | Mod: 26,HCNC,, | Performed by: INTERNAL MEDICINE

## 2019-10-11 PROCEDURE — C1887 CATHETER, GUIDING: HCPCS | Mod: HCNC

## 2019-10-11 PROCEDURE — 92928 CATH LAB PROCEDURE: ICD-10-PCS | Mod: LC,HCNC,, | Performed by: INTERNAL MEDICINE

## 2019-10-11 PROCEDURE — 93458 L HRT ARTERY/VENTRICLE ANGIO: CPT | Mod: 26,59,51,HCNC | Performed by: INTERNAL MEDICINE

## 2019-10-11 PROCEDURE — 36415 COLL VENOUS BLD VENIPUNCTURE: CPT | Mod: HCNC

## 2019-10-11 PROCEDURE — 85610 PROTHROMBIN TIME: CPT | Mod: HCNC

## 2019-10-11 PROCEDURE — 99223 PR INITIAL HOSPITAL CARE,LEVL III: ICD-10-PCS | Mod: HCNC,,, | Performed by: INTERNAL MEDICINE

## 2019-10-11 PROCEDURE — 85730 THROMBOPLASTIN TIME PARTIAL: CPT | Mod: HCNC

## 2019-10-11 PROCEDURE — 99223 1ST HOSP IP/OBS HIGH 75: CPT | Mod: HCNC,,, | Performed by: INTERNAL MEDICINE

## 2019-10-11 PROCEDURE — 92929 CATH LAB PROCEDURE: CPT | Mod: LC,HCNC,, | Performed by: INTERNAL MEDICINE

## 2019-10-11 PROCEDURE — 80053 COMPREHEN METABOLIC PANEL: CPT | Mod: HCNC

## 2019-10-11 PROCEDURE — 93306 TTE W/DOPPLER COMPLETE: CPT | Mod: 26,HCNC,, | Performed by: INTERNAL MEDICINE

## 2019-10-11 PROCEDURE — 21400001 HC TELEMETRY ROOM: Mod: HCNC

## 2019-10-11 PROCEDURE — 99152 CATH LAB PROCEDURE: ICD-10-PCS | Mod: HCNC,,, | Performed by: INTERNAL MEDICINE

## 2019-10-11 PROCEDURE — 92929 CATH LAB PROCEDURE: ICD-10-PCS | Mod: LC,HCNC,, | Performed by: INTERNAL MEDICINE

## 2019-10-11 PROCEDURE — 93005 ELECTROCARDIOGRAM TRACING: CPT | Mod: HCNC

## 2019-10-11 PROCEDURE — 84484 ASSAY OF TROPONIN QUANT: CPT | Mod: HCNC

## 2019-10-11 PROCEDURE — 84484 ASSAY OF TROPONIN QUANT: CPT | Mod: 91,HCNC

## 2019-10-11 RX ORDER — HEPARIN SODIUM,PORCINE/D5W 25000/250
12 INTRAVENOUS SOLUTION INTRAVENOUS CONTINUOUS
Status: DISCONTINUED | OUTPATIENT
Start: 2019-10-11 | End: 2019-10-12

## 2019-10-11 RX ORDER — GLUCAGON 1 MG
1 KIT INJECTION
Status: DISCONTINUED | OUTPATIENT
Start: 2019-10-11 | End: 2019-10-17 | Stop reason: HOSPADM

## 2019-10-11 RX ORDER — SIMVASTATIN 20 MG/1
20 TABLET, FILM COATED ORAL NIGHTLY
Status: DISCONTINUED | OUTPATIENT
Start: 2019-10-11 | End: 2019-10-12

## 2019-10-11 RX ORDER — CLOPIDOGREL BISULFATE 75 MG/1
75 TABLET ORAL DAILY
Status: DISCONTINUED | OUTPATIENT
Start: 2019-10-12 | End: 2019-10-14

## 2019-10-11 RX ORDER — ASPIRIN 325 MG
325 TABLET ORAL DAILY
Status: DISCONTINUED | OUTPATIENT
Start: 2019-10-12 | End: 2019-10-14

## 2019-10-11 RX ORDER — IBUPROFEN 200 MG
16 TABLET ORAL
Status: DISCONTINUED | OUTPATIENT
Start: 2019-10-11 | End: 2019-10-17 | Stop reason: HOSPADM

## 2019-10-11 RX ORDER — LISINOPRIL 10 MG/1
10 TABLET ORAL DAILY
Status: DISCONTINUED | OUTPATIENT
Start: 2019-10-12 | End: 2019-10-12

## 2019-10-11 RX ORDER — ESCITALOPRAM OXALATE 10 MG/1
10 TABLET ORAL DAILY
Status: DISCONTINUED | OUTPATIENT
Start: 2019-10-12 | End: 2019-10-17 | Stop reason: HOSPADM

## 2019-10-11 RX ORDER — ONDANSETRON 2 MG/ML
4 INJECTION INTRAMUSCULAR; INTRAVENOUS EVERY 8 HOURS PRN
Status: DISCONTINUED | OUTPATIENT
Start: 2019-10-11 | End: 2019-10-17 | Stop reason: HOSPADM

## 2019-10-11 RX ORDER — ISOSORBIDE MONONITRATE 30 MG/1
30 TABLET, EXTENDED RELEASE ORAL DAILY
Status: DISCONTINUED | OUTPATIENT
Start: 2019-10-12 | End: 2019-10-12

## 2019-10-11 RX ORDER — SODIUM CHLORIDE 0.9 % (FLUSH) 0.9 %
10 SYRINGE (ML) INJECTION
Status: DISCONTINUED | OUTPATIENT
Start: 2019-10-11 | End: 2019-10-17 | Stop reason: HOSPADM

## 2019-10-11 RX ORDER — ASPIRIN 325 MG
325 TABLET ORAL
Status: DISCONTINUED | OUTPATIENT
Start: 2019-10-11 | End: 2019-10-11

## 2019-10-11 RX ORDER — IBUPROFEN 200 MG
24 TABLET ORAL
Status: DISCONTINUED | OUTPATIENT
Start: 2019-10-11 | End: 2019-10-17 | Stop reason: HOSPADM

## 2019-10-11 RX ORDER — ACETAMINOPHEN 325 MG/1
650 TABLET ORAL EVERY 4 HOURS PRN
Status: DISCONTINUED | OUTPATIENT
Start: 2019-10-11 | End: 2019-10-17 | Stop reason: HOSPADM

## 2019-10-11 RX ORDER — SODIUM CHLORIDE 9 MG/ML
INJECTION, SOLUTION INTRAVENOUS CONTINUOUS
Status: DISCONTINUED | OUTPATIENT
Start: 2019-10-11 | End: 2019-10-12

## 2019-10-11 RX ORDER — SODIUM CHLORIDE 9 MG/ML
1000 INJECTION, SOLUTION INTRAVENOUS
Status: COMPLETED | OUTPATIENT
Start: 2019-10-11 | End: 2019-10-11

## 2019-10-11 RX ORDER — ONDANSETRON 8 MG/1
8 TABLET, ORALLY DISINTEGRATING ORAL EVERY 8 HOURS PRN
Status: DISCONTINUED | OUTPATIENT
Start: 2019-10-11 | End: 2019-10-17 | Stop reason: HOSPADM

## 2019-10-11 RX ADMIN — HEPARIN SODIUM AND DEXTROSE 12 UNITS/KG/HR: 10000; 5 INJECTION INTRAVENOUS at 12:10

## 2019-10-11 RX ADMIN — SIMVASTATIN 20 MG: 20 TABLET, FILM COATED ORAL at 08:10

## 2019-10-11 RX ADMIN — SODIUM CHLORIDE 1000 ML: 0.9 INJECTION, SOLUTION INTRAVENOUS at 11:10

## 2019-10-11 NOTE — ED NOTES
Pt continues to deny chest pain or SOB at this time. Verbalizes understanding of POC. Call light in reach.

## 2019-10-11 NOTE — H&P (VIEW-ONLY)
[facility] -   Cardiology  Consult Note    Patient Name: Aquiles Kelsey  MRN: [id number]  Admission Date: 10/11/2019  Hospital Length of Stay: 0 days  Code Status: No Order   Attending Provider: Grey Delcid MD   Consulting Provider: Robe Gilbert MD  Primary Care Physician: Saige Miranda MD  Principal Problem:NSTEMI (non-ST elevated myocardial infarction)    Patient information was obtained from patient, past medical records and ER records.     Consults  Subjective:     Chief Complaint:  CHEST PAIN     HPI:   Pt presents with NSTEMI.  His current medical conditions include CAD (multiple PCI procedures), HTN, DM, PAD, AAA stent graft (approx 2013), hyperlipidemia, cigar use.  H/o L LE stents in [place].   First PCI 1998, total of 5 stents with last one in [place] 2013.  Echo 7/19 normal LVEF, DD,  LVH, mild AI.   Holter 7/19 NSR, fleeting NSVT, EAT, rare PVCs, occasional PACs.  B LE vasc studies 7/19 B LE PAD, L > R LE.     Now in ER with several episodes of typical angina over past week and abnl troponin 0.8 range consistent w NSTEMI.  ECG earlier in week 10/9/19 showed new inferolateral st-t wave ischemic changes.  ECG today in ER shows improvement, nonspecific st-t abnl.  Cp free last couple of days.    Pt saw Dr. Hobbs, Cardiology, this week and was put on Imdur and had troponin checked and when found to be abnl pt was advised to go to ER.  He has chronic B LE claudication sxs.      Past Medical History:   Diagnosis Date    Aneurysm, abdominal aortic     Coronary artery disease     Kidney stone        Past Surgical History:   Procedure Laterality Date    APPENDECTOMY      CORONARY STENT PLACEMENT         Review of patient's allergies indicates:  No Known Allergies    No current facility-administered medications on file prior to encounter.      Current Outpatient Medications on File Prior to Encounter   Medication Sig    aspirin 325 MG tablet Take 325 mg by mouth once daily.     clopidogrel (PLAVIX) 75 mg tablet Take 1 tablet (75 mg total) by mouth once daily.    escitalopram oxalate (LEXAPRO) 10 MG tablet Take 1 tablet (10 mg total) by mouth once daily.    isosorbide mononitrate (IMDUR) 30 MG 24 hr tablet Take 1 tablet (30 mg total) by mouth once daily.    lisinopril 10 MG tablet Take 1 tablet (10 mg total) by mouth once daily.    metFORMIN (GLUCOPHAGE) 1000 MG tablet Take 1 tablet (1,000 mg total) by mouth 2 (two) times daily with meals.    simvastatin (ZOCOR) 20 MG tablet Take 1 tablet (20 mg total) by mouth every evening.    nitroGLYCERIN (NITROSTAT) 0.4 MG SL tablet Place 1 tablet (0.4 mg total) under the tongue every 5 (five) minutes as needed for Chest pain.    [DISCONTINUED] methylPREDNISolone (MEDROL DOSEPACK) 4 mg tablet use as directed     Family History     Problem Relation (Age of Onset)    COPD Father    Diabetes Mother, Brother        Tobacco Use    Smoking status: Current Every Day Smoker     Years: 15.00     Types: Cigars    Smokeless tobacco: Current User   Substance and Sexual Activity    Alcohol use: Not Currently    Drug use: Not Currently    Sexual activity: Not Currently     Review of Systems   Constitution: Negative.   HENT: Negative.    Eyes: Negative.    Cardiovascular: Positive for chest pain and claudication.   Respiratory: Negative.    Endocrine: Negative.    Hematologic/Lymphatic: Negative.    Skin: Negative.    Musculoskeletal: Positive for arthritis and joint pain.   Gastrointestinal: Negative.    Genitourinary: Negative.    Neurological: Negative.    Psychiatric/Behavioral: Negative.    Allergic/Immunologic: Negative.      Objective:     Vital Signs (Most Recent):  Temp: 97.9 °F (36.6 °C) (10/11/19 1050)  Pulse: (!) 53 (10/11/19 1131)  Resp: 18 (10/11/19 1050)  BP: (!) 143/61 (10/11/19 1050)  SpO2: 98 % (10/11/19 1050) Vital Signs (24h Range):  Temp:  [97.9 °F (36.6 °C)] 97.9 °F (36.6 °C)  Pulse:  [53-55] 53  Resp:  [18] 18  SpO2:  [98 %] 98  %  BP: (143)/(61) 143/61     Weight: 73.9 kg (162 lb 13 oz)  Body mass index is 23.36 kg/m².    SpO2: 98 %  O2 Device (Oxygen Therapy): room air    No intake or output data in the 24 hours ending 10/11/19 1146    Lines/Drains/Airways     Peripheral Intravenous Line                 Peripheral IV - Single Lumen 10/11/19 1127 20 G Left Forearm less than 1 day         Peripheral IV - Single Lumen 10/11/19 1127 20 G Right Forearm less than 1 day                Physical Exam   Constitutional: He is oriented to person, place, and time. He appears well-developed and well-nourished.   HENT:   Head: Normocephalic.   Neck: Normal range of motion. Neck supple. Normal carotid pulses, no hepatojugular reflux and no JVD present. Carotid bruit is not present. No thyromegaly present.   Cardiovascular: Normal rate, regular rhythm, S1 normal and S2 normal. PMI is not displaced. Exam reveals no S3, no S4, no distant heart sounds, no friction rub, no midsystolic click and no opening snap.   No murmur heard.  Pulses:       Radial pulses are 2+ on the right side, and 2+ on the left side.   Pulmonary/Chest: Effort normal and breath sounds normal. He has no wheezes. He has no rales.   Abdominal: Soft. Bowel sounds are normal. He exhibits no distension, no abdominal bruit, no ascites and no mass. There is no tenderness.   Musculoskeletal: He exhibits no edema.   Neurological: He is alert and oriented to person, place, and time.   Skin: Skin is warm.   Psychiatric: He has a normal mood and affect. His behavior is normal.   Nursing note and vitals reviewed.      Significant Labs:   BMP: No results for input(s): GLU, NA, K, CL, CO2, BUN, CREATININE, CALCIUM, MG in the last 48 hours., CMP No results for input(s): NA, K, CL, CO2, GLU, BUN, CREATININE, CALCIUM, PROT, ALBUMIN, BILITOT, ALKPHOS, AST, ALT, ANIONGAP, ESTGFRAFRICA, EGFRNONAA in the last 48 hours., CBC   Recent Labs   Lab 10/11/19  1108   WBC 7.15   HGB 13.4*   HCT 40.5   *   ,  INR No results for input(s): INR, PROTIME in the last 48 hours. and Troponin   Recent Labs   Lab 10/09/19  1358   TROPONINI 0.835*       Significant Imaging: Echocardiogram:   2D echo with color flow doppler:   Results for orders placed or performed in visit on 07/10/19   2D echo with color flow doppler   Result Value Ref Range    QEF 55 55 - 65    Diastolic Dysfunction Yes (A)     Aortic Valve Regurgitation MILD     Est. PA Systolic Pressure 36.64     Mitral Valve Mobility NORMAL     Tricuspid Valve Regurgitation MILD     Narrative    Date of Procedure: 07/10/2019        TEST DESCRIPTION   Technical Quality: This is a technically challenging study. There is poor endocardial definition.     General: The patient was bradycardic throughout the study.    Aorta: The aortic root is normal in size, measuring 3.0 cm at sinotubular junction and 3.1 cm at Sinuses of Valsalva. The proximal ascending aorta is normal in size, measuring 3.7 cm across.     Left Atrium: The left atrial volume index is moderately enlarged, measuring 41.75 cc/m2.     Left Ventricle: The left ventricle is normal in size, with an end-diastolic diameter of 4.6 cm, and an end-systolic diameter of 3.4 cm. LV wall thickness is normal, with the septum measuring 1.6 cm and the posterior wall measuring 1.5 cm across. Relative   wall thickness was increased at 0.65, and the LV mass index was increased at 189.5 g/m2 consistent with concentric left ventricular hypertrophy. There are no regional wall motion abnormalities. Left ventricular systolic function appears normal. Visually   estimated ejection fraction is 55-60%. The LV Doppler derived stroke volume equals 82.0 ccs.     Diastolic indices: E wave velocity 0.8 m/s, E/A ratio 0.9,  msec., E/e' ratio(avg) 11. There is pseudonormalization of mitral inflow pattern consistent with significant diastolic dysfunction.     Right Atrium: The right atrium is normal in size, measuring 5.2 cm in length and 3.5 cm  in width in the apical view.     Right Ventricle: The right ventricle is normal in size measuring 2.6 cm at the base in the apical right ventricle-focused view. Global right ventricular systolic function appears normal. Tricuspid annular plane systolic excursion (TAPSE) is 2.2 cm. The   estimated PA systolic pressure is 37 mmHg.     Aortic Valve:  The aortic valve is mildly sclerotic with normal leaflet mobility. Additionally, there is mild aortic regurgitation.     Mitral Valve:  The mitral valve is normal in structure with normal leaflet mobility.     Tricuspid Valve:  The tricuspid valve is normal in structure with normal leaflet mobility. There is mild tricuspid regurgitation.     Pulmonary Valve:  The pulmonic valve is normal in structure with normal leaflet mobility. There is trivial pulmonic regurgitation.     IVC: IVC is normal in size and collapses > 50% with a sniff, suggesting normal right atrial pressure of 3 mmHg.     Intracavitary: There is no evidence of pericardial effusion, intracavity mass, thrombi, or vegetation.         CONCLUSIONS     1 - Normal left ventricular systolic function (EF 55-60%).     2 - Impaired LV relaxation, elevated LAP (grade 2 diastolic dysfunction).     3 - Moderate left atrial enlargement.     4 - Normal right ventricular systolic function .     5 - The estimated PA systolic pressure is 37 mmHg.     6 - Mild aortic regurgitation.     7 - Mild tricuspid regurgitation.             This document has been electronically    SIGNED BY: Eris Severino MD On: 07/10/2019 11:06     Assessment and Plan:     NSTEMI  START IV HEPARIN GTT  CONTINUE ASA,  PLAVIX  NPO  IV FLUIDS  ECHO  LHC TODAY.    ALL INDICATIONS, RISKS/BENEFITS OF LHC TO INCLUDE PCI DISCUSSED WITH PT AND WIFE AND AGREEABLE TO PROCEEDING.  ALL QUESTIONS ANSWERED.  MED MGT FOR PAD.  FURTHER RECS TO FOLLOW CATH.      * NSTEMI (non-ST elevated myocardial infarction)  See management plan detailed above.     Coronary artery  disease  See management plan detailed above.     AP (angina pectoris)  See management plan detailed above.     Nonrheumatic aortic valve insufficiency  See management plan detailed above.     Claudication in peripheral vascular disease  See management plan detailed above.     History of PTCA  See management plan detailed above.     Abnormal ECG  See management plan detailed above.     Essential hypertension  See management plan detailed above.     PAD (peripheral artery disease)  See management plan detailed above.       VTE Risk Mitigation (From admission, onward)         Ordered     heparin 25,000 units in dextrose 5% (100 units/ml) IV bolus from bag INITIAL BOLUS (max bolus 4000 units)  Once      10/11/19 1120     heparin 25,000 units in dextrose 5% 250 mL (100 units/mL) infusion LOW INTENSITY nomogram - [facility]  Continuous      10/11/19 1120     heparin 25,000 units in dextrose 5% (100 units/ml) IV bolus from bag - ADDITIONAL PRN BOLUS - 60 units/kg (max bolus 4000 units)  As needed (PRN)      10/11/19 1120     heparin 25,000 units in dextrose 5% (100 units/ml) IV bolus from bag - ADDITIONAL PRN BOLUS - 30 units/kg (max bolus 4000 units)  As needed (PRN)      10/11/19 1120                Thank you for your consult.     Robe Gilbert MD  Cardiology   [facility] -

## 2019-10-11 NOTE — INTERVAL H&P NOTE
The patient has been examined and the H&P has been reviewed:    I concur with the findings and no changes have occurred since H&P was written.    Anesthesia/Surgery risks, benefits and alternative options discussed and understood by patient/family.          Active Hospital Problems    Diagnosis  POA    *NSTEMI (non-ST elevated myocardial infarction) [I21.4]  Yes    AP (angina pectoris) [I20.9]  Yes    Tobacco abuse [Z72.0]  Unknown    Nonrheumatic aortic valve insufficiency [I35.1]  Yes    Claudication in peripheral vascular disease [I73.9]  Yes    PAD (peripheral artery disease) [I73.9]  Yes    Essential hypertension [I10]  Yes    Type 2 diabetes mellitus with hyperglycemia, without long-term current use of insulin [E11.65]  Yes    Dyslipidemia [E78.5]  Yes    Depression [F32.9]  Yes    CAD (coronary artery disease) [I25.10]  Yes      Resolved Hospital Problems   No resolved problems to display.

## 2019-10-11 NOTE — CONSULTS
[facility] -   Cardiology  Consult Note    Patient Name: Aquiles Kelsey  MRN: [id number]  Admission Date: 10/11/2019  Hospital Length of Stay: 0 days  Code Status: No Order   Attending Provider: Grey Delcid MD   Consulting Provider: Robe Gilbert MD  Primary Care Physician: Saige Miranda MD  Principal Problem:NSTEMI (non-ST elevated myocardial infarction)    Patient information was obtained from patient, past medical records and ER records.     Consults  Subjective:     Chief Complaint:  CHEST PAIN     HPI:   Pt presents with NSTEMI.  His current medical conditions include CAD (multiple PCI procedures), HTN, DM, PAD, AAA stent graft (approx 2013), hyperlipidemia, cigar use.  H/o L LE stents in [place].   First PCI 1998, total of 5 stents with last one in [place] 2013.  Echo 7/19 normal LVEF, DD,  LVH, mild AI.   Holter 7/19 NSR, fleeting NSVT, EAT, rare PVCs, occasional PACs.  B LE vasc studies 7/19 B LE PAD, L > R LE.     Now in ER with several episodes of typical angina over past week and abnl troponin 0.8 range consistent w NSTEMI.  ECG earlier in week 10/9/19 showed new inferolateral st-t wave ischemic changes.  ECG today in ER shows improvement, nonspecific st-t abnl.  Cp free last couple of days.    Pt saw Dr. Hobbs, Cardiology, this week and was put on Imdur and had troponin checked and when found to be abnl pt was advised to go to ER.  He has chronic B LE claudication sxs.      Past Medical History:   Diagnosis Date    Aneurysm, abdominal aortic     Coronary artery disease     Kidney stone        Past Surgical History:   Procedure Laterality Date    APPENDECTOMY      CORONARY STENT PLACEMENT         Review of patient's allergies indicates:  No Known Allergies    No current facility-administered medications on file prior to encounter.      Current Outpatient Medications on File Prior to Encounter   Medication Sig    aspirin 325 MG tablet Take 325 mg by mouth once daily.     clopidogrel (PLAVIX) 75 mg tablet Take 1 tablet (75 mg total) by mouth once daily.    escitalopram oxalate (LEXAPRO) 10 MG tablet Take 1 tablet (10 mg total) by mouth once daily.    isosorbide mononitrate (IMDUR) 30 MG 24 hr tablet Take 1 tablet (30 mg total) by mouth once daily.    lisinopril 10 MG tablet Take 1 tablet (10 mg total) by mouth once daily.    metFORMIN (GLUCOPHAGE) 1000 MG tablet Take 1 tablet (1,000 mg total) by mouth 2 (two) times daily with meals.    simvastatin (ZOCOR) 20 MG tablet Take 1 tablet (20 mg total) by mouth every evening.    nitroGLYCERIN (NITROSTAT) 0.4 MG SL tablet Place 1 tablet (0.4 mg total) under the tongue every 5 (five) minutes as needed for Chest pain.    [DISCONTINUED] methylPREDNISolone (MEDROL DOSEPACK) 4 mg tablet use as directed     Family History     Problem Relation (Age of Onset)    COPD Father    Diabetes Mother, Brother        Tobacco Use    Smoking status: Current Every Day Smoker     Years: 15.00     Types: Cigars    Smokeless tobacco: Current User   Substance and Sexual Activity    Alcohol use: Not Currently    Drug use: Not Currently    Sexual activity: Not Currently     Review of Systems   Constitution: Negative.   HENT: Negative.    Eyes: Negative.    Cardiovascular: Positive for chest pain and claudication.   Respiratory: Negative.    Endocrine: Negative.    Hematologic/Lymphatic: Negative.    Skin: Negative.    Musculoskeletal: Positive for arthritis and joint pain.   Gastrointestinal: Negative.    Genitourinary: Negative.    Neurological: Negative.    Psychiatric/Behavioral: Negative.    Allergic/Immunologic: Negative.      Objective:     Vital Signs (Most Recent):  Temp: 97.9 °F (36.6 °C) (10/11/19 1050)  Pulse: (!) 53 (10/11/19 1131)  Resp: 18 (10/11/19 1050)  BP: (!) 143/61 (10/11/19 1050)  SpO2: 98 % (10/11/19 1050) Vital Signs (24h Range):  Temp:  [97.9 °F (36.6 °C)] 97.9 °F (36.6 °C)  Pulse:  [53-55] 53  Resp:  [18] 18  SpO2:  [98 %] 98  %  BP: (143)/(61) 143/61     Weight: 73.9 kg (162 lb 13 oz)  Body mass index is 23.36 kg/m².    SpO2: 98 %  O2 Device (Oxygen Therapy): room air    No intake or output data in the 24 hours ending 10/11/19 1146    Lines/Drains/Airways     Peripheral Intravenous Line                 Peripheral IV - Single Lumen 10/11/19 1127 20 G Left Forearm less than 1 day         Peripheral IV - Single Lumen 10/11/19 1127 20 G Right Forearm less than 1 day                Physical Exam   Constitutional: He is oriented to person, place, and time. He appears well-developed and well-nourished.   HENT:   Head: Normocephalic.   Neck: Normal range of motion. Neck supple. Normal carotid pulses, no hepatojugular reflux and no JVD present. Carotid bruit is not present. No thyromegaly present.   Cardiovascular: Normal rate, regular rhythm, S1 normal and S2 normal. PMI is not displaced. Exam reveals no S3, no S4, no distant heart sounds, no friction rub, no midsystolic click and no opening snap.   No murmur heard.  Pulses:       Radial pulses are 2+ on the right side, and 2+ on the left side.   Pulmonary/Chest: Effort normal and breath sounds normal. He has no wheezes. He has no rales.   Abdominal: Soft. Bowel sounds are normal. He exhibits no distension, no abdominal bruit, no ascites and no mass. There is no tenderness.   Musculoskeletal: He exhibits no edema.   Neurological: He is alert and oriented to person, place, and time.   Skin: Skin is warm.   Psychiatric: He has a normal mood and affect. His behavior is normal.   Nursing note and vitals reviewed.      Significant Labs:   BMP: No results for input(s): GLU, NA, K, CL, CO2, BUN, CREATININE, CALCIUM, MG in the last 48 hours., CMP No results for input(s): NA, K, CL, CO2, GLU, BUN, CREATININE, CALCIUM, PROT, ALBUMIN, BILITOT, ALKPHOS, AST, ALT, ANIONGAP, ESTGFRAFRICA, EGFRNONAA in the last 48 hours., CBC   Recent Labs   Lab 10/11/19  1108   WBC 7.15   HGB 13.4*   HCT 40.5   *   ,  INR No results for input(s): INR, PROTIME in the last 48 hours. and Troponin   Recent Labs   Lab 10/09/19  1358   TROPONINI 0.835*       Significant Imaging: Echocardiogram:   2D echo with color flow doppler:   Results for orders placed or performed in visit on 07/10/19   2D echo with color flow doppler   Result Value Ref Range    QEF 55 55 - 65    Diastolic Dysfunction Yes (A)     Aortic Valve Regurgitation MILD     Est. PA Systolic Pressure 36.64     Mitral Valve Mobility NORMAL     Tricuspid Valve Regurgitation MILD     Narrative    Date of Procedure: 07/10/2019        TEST DESCRIPTION   Technical Quality: This is a technically challenging study. There is poor endocardial definition.     General: The patient was bradycardic throughout the study.    Aorta: The aortic root is normal in size, measuring 3.0 cm at sinotubular junction and 3.1 cm at Sinuses of Valsalva. The proximal ascending aorta is normal in size, measuring 3.7 cm across.     Left Atrium: The left atrial volume index is moderately enlarged, measuring 41.75 cc/m2.     Left Ventricle: The left ventricle is normal in size, with an end-diastolic diameter of 4.6 cm, and an end-systolic diameter of 3.4 cm. LV wall thickness is normal, with the septum measuring 1.6 cm and the posterior wall measuring 1.5 cm across. Relative   wall thickness was increased at 0.65, and the LV mass index was increased at 189.5 g/m2 consistent with concentric left ventricular hypertrophy. There are no regional wall motion abnormalities. Left ventricular systolic function appears normal. Visually   estimated ejection fraction is 55-60%. The LV Doppler derived stroke volume equals 82.0 ccs.     Diastolic indices: E wave velocity 0.8 m/s, E/A ratio 0.9,  msec., E/e' ratio(avg) 11. There is pseudonormalization of mitral inflow pattern consistent with significant diastolic dysfunction.     Right Atrium: The right atrium is normal in size, measuring 5.2 cm in length and 3.5 cm  in width in the apical view.     Right Ventricle: The right ventricle is normal in size measuring 2.6 cm at the base in the apical right ventricle-focused view. Global right ventricular systolic function appears normal. Tricuspid annular plane systolic excursion (TAPSE) is 2.2 cm. The   estimated PA systolic pressure is 37 mmHg.     Aortic Valve:  The aortic valve is mildly sclerotic with normal leaflet mobility. Additionally, there is mild aortic regurgitation.     Mitral Valve:  The mitral valve is normal in structure with normal leaflet mobility.     Tricuspid Valve:  The tricuspid valve is normal in structure with normal leaflet mobility. There is mild tricuspid regurgitation.     Pulmonary Valve:  The pulmonic valve is normal in structure with normal leaflet mobility. There is trivial pulmonic regurgitation.     IVC: IVC is normal in size and collapses > 50% with a sniff, suggesting normal right atrial pressure of 3 mmHg.     Intracavitary: There is no evidence of pericardial effusion, intracavity mass, thrombi, or vegetation.         CONCLUSIONS     1 - Normal left ventricular systolic function (EF 55-60%).     2 - Impaired LV relaxation, elevated LAP (grade 2 diastolic dysfunction).     3 - Moderate left atrial enlargement.     4 - Normal right ventricular systolic function .     5 - The estimated PA systolic pressure is 37 mmHg.     6 - Mild aortic regurgitation.     7 - Mild tricuspid regurgitation.             This document has been electronically    SIGNED BY: Eris Severino MD On: 07/10/2019 11:06     Assessment and Plan:     NSTEMI  START IV HEPARIN GTT  CONTINUE ASA,  PLAVIX  NPO  IV FLUIDS  ECHO  LHC TODAY.    ALL INDICATIONS, RISKS/BENEFITS OF LHC TO INCLUDE PCI DISCUSSED WITH PT AND WIFE AND AGREEABLE TO PROCEEDING.  ALL QUESTIONS ANSWERED.  MED MGT FOR PAD.  FURTHER RECS TO FOLLOW CATH.      * NSTEMI (non-ST elevated myocardial infarction)  See management plan detailed above.     Coronary artery  disease  See management plan detailed above.     AP (angina pectoris)  See management plan detailed above.     Nonrheumatic aortic valve insufficiency  See management plan detailed above.     Claudication in peripheral vascular disease  See management plan detailed above.     History of PTCA  See management plan detailed above.     Abnormal ECG  See management plan detailed above.     Essential hypertension  See management plan detailed above.     PAD (peripheral artery disease)  See management plan detailed above.       VTE Risk Mitigation (From admission, onward)         Ordered     heparin 25,000 units in dextrose 5% (100 units/ml) IV bolus from bag INITIAL BOLUS (max bolus 4000 units)  Once      10/11/19 1120     heparin 25,000 units in dextrose 5% 250 mL (100 units/mL) infusion LOW INTENSITY nomogram - [facility]  Continuous      10/11/19 1120     heparin 25,000 units in dextrose 5% (100 units/ml) IV bolus from bag - ADDITIONAL PRN BOLUS - 60 units/kg (max bolus 4000 units)  As needed (PRN)      10/11/19 1120     heparin 25,000 units in dextrose 5% (100 units/ml) IV bolus from bag - ADDITIONAL PRN BOLUS - 30 units/kg (max bolus 4000 units)  As needed (PRN)      10/11/19 1120                Thank you for your consult.     Robe Gilbert MD  Cardiology   [facility] -

## 2019-10-11 NOTE — ED NOTES
"Patient had complaints of CP that started "about 3-4 days ago". Describes as substernal & sharp with "a lot of weakness.". Patient denies current chest pain at this time.   "

## 2019-10-11 NOTE — SUBJECTIVE & OBJECTIVE
Past Medical History:   Diagnosis Date    Aneurysm, abdominal aortic     Coronary artery disease     Depression     Diabetes mellitus     HLD (hyperlipidemia)     Hypertension     Kidney stone     PAD (peripheral artery disease)     Tobacco abuse        Past Surgical History:   Procedure Laterality Date    APPENDECTOMY      CORONARY STENT PLACEMENT         Review of patient's allergies indicates:  No Known Allergies    No current facility-administered medications on file prior to encounter.      Current Outpatient Medications on File Prior to Encounter   Medication Sig    aspirin 325 MG tablet Take 325 mg by mouth once daily.    clopidogrel (PLAVIX) 75 mg tablet Take 1 tablet (75 mg total) by mouth once daily.    escitalopram oxalate (LEXAPRO) 10 MG tablet Take 1 tablet (10 mg total) by mouth once daily.    isosorbide mononitrate (IMDUR) 30 MG 24 hr tablet Take 1 tablet (30 mg total) by mouth once daily.    lisinopril 10 MG tablet Take 1 tablet (10 mg total) by mouth once daily.    metFORMIN (GLUCOPHAGE) 1000 MG tablet Take 1 tablet (1,000 mg total) by mouth 2 (two) times daily with meals.    simvastatin (ZOCOR) 20 MG tablet Take 1 tablet (20 mg total) by mouth every evening.    nitroGLYCERIN (NITROSTAT) 0.4 MG SL tablet Place 1 tablet (0.4 mg total) under the tongue every 5 (five) minutes as needed for Chest pain.    [DISCONTINUED] methylPREDNISolone (MEDROL DOSEPACK) 4 mg tablet use as directed     Family History     Problem Relation (Age of Onset)    COPD Father    Diabetes Mother, Brother        Tobacco Use    Smoking status: Current Every Day Smoker     Years: 15.00     Types: Cigars    Smokeless tobacco: Current User   Substance and Sexual Activity    Alcohol use: Not Currently    Drug use: Not Currently    Sexual activity: Not Currently     Review of Systems   Constitutional: Negative for chills, diaphoresis, fatigue and fever.   HENT: Negative for hearing loss, mouth sores, sore  throat, tinnitus and trouble swallowing.    Eyes: Negative for pain, discharge and redness.   Respiratory: Positive for chest tightness. Negative for apnea, cough, choking, shortness of breath, wheezing and stridor.    Cardiovascular: Positive for chest pain. Negative for palpitations and leg swelling.        CP PTA that has since resolved.   Gastrointestinal: Negative for abdominal distention, abdominal pain, blood in stool, constipation, diarrhea, nausea, rectal pain and vomiting.   Endocrine: Negative for cold intolerance, heat intolerance, polydipsia, polyphagia and polyuria.   Genitourinary: Negative for difficulty urinating, dysuria, flank pain, frequency, hematuria and urgency.   Musculoskeletal: Negative for arthralgias, back pain, gait problem, joint swelling, neck pain and neck stiffness.   Skin: Negative for color change, rash and wound.   Allergic/Immunologic: Negative for food allergies.   Neurological: Negative for dizziness, tremors, seizures, syncope, speech difficulty, light-headedness and headaches.   Hematological: Negative for adenopathy. Does not bruise/bleed easily.   Psychiatric/Behavioral: Negative for agitation and confusion. The patient is not nervous/anxious.    All other systems reviewed and are negative.    Objective:     Vital Signs (Most Recent):  Temp: 98.3 °F (36.8 °C) (10/11/19 1302)  Pulse: (!) 45 (10/11/19 1302)  Resp: 18 (10/11/19 1302)  BP: 136/62 (10/11/19 1302)  SpO2: 96 % (10/11/19 1302) Vital Signs (24h Range):  Temp:  [97.9 °F (36.6 °C)-98.3 °F (36.8 °C)] 98.3 °F (36.8 °C)  Pulse:  [45-55] 45  Resp:  [18] 18  SpO2:  [96 %-98 %] 96 %  BP: (136-143)/(61-62) 136/62     Weight: 73.9 kg (162 lb 13 oz)  Body mass index is 23.36 kg/m².    Physical Exam   Constitutional: He is oriented to person, place, and time. He appears well-developed and well-nourished. No distress.   [place].     HENT:   Head: Normocephalic and atraumatic.   Mouth/Throat: Oropharynx is clear and moist.   Eyes:  Pupils are equal, round, and reactive to light. Conjunctivae and EOM are normal.   Neck: Normal range of motion. Neck supple. No JVD present.   Cardiovascular: Normal rate, regular rhythm, normal heart sounds and intact distal pulses. Exam reveals no gallop and no friction rub.   No murmur heard.  Pulmonary/Chest: Effort normal and breath sounds normal. No stridor. No respiratory distress. He has no wheezes. He has no rales. He exhibits no tenderness.   Comfortable on RA.   Abdominal: Soft. Bowel sounds are normal. He exhibits no distension and no mass. There is no tenderness. There is no rebound and no guarding.   Musculoskeletal: Normal range of motion. He exhibits no edema or tenderness.   Neurological: He is alert and oriented to person, place, and time. No cranial nerve deficit.   Skin: Skin is warm and dry. No rash noted. He is not diaphoretic. No erythema.   Psychiatric: He has a normal mood and affect. His behavior is normal. Judgment and thought content normal.   Nursing note and vitals reviewed.        CRANIAL NERVES     CN III, IV, VI   Pupils are equal, round, and reactive to light.  Extraocular motions are normal.        Significant Labs:   CBC:   Recent Labs   Lab 10/11/19  1108   WBC 7.15   HGB 13.4*   HCT 40.5   *     CMP:   Recent Labs   Lab 10/11/19  1108      K 4.7      CO2 24   *   BUN 17   CREATININE 1.1   CALCIUM 9.7   PROT 6.7   ALBUMIN 3.6   BILITOT 0.4   ALKPHOS 61   AST 12   ALT 7*   ANIONGAP 10   EGFRNONAA >60     Cardiac Markers:   Recent Labs   Lab 10/11/19  1108   *     Coagulation:   Recent Labs   Lab 10/11/19  1108   INR 1.0   APTT 27.7     Troponin:   Recent Labs   Lab 10/09/19  1358 10/11/19  1108   TROPONINI 0.835* 0.466*       Significant Imaging: I have reviewed all pertinent imaging results/findings within the past 24 hours.

## 2019-10-11 NOTE — ASSESSMENT & PLAN NOTE
- Admit to Tele  - Initial troponin 0.466, will trend  - Given  mg in the ER  - Continue home ASA, Plavix, ACEi, Zocor, and Imdur  - ECHO pending  - Cardiology consulted and recommended a Heparin gtt with plans for a heart cath today with additional recs to follow  - NPO  - IVFs  - Supplemental o2 and SL NTG prn  - Tele monitoring

## 2019-10-11 NOTE — H&P
[facility]  History & Physical    Patient Name: Aquiles Kelsey  MRN: [id number]  Admission Date: 10/11/2019  Attending Physician: Grey Delcid MD   Primary Care Provider: Saige Miranda MD         Patient information was obtained from patient, spouse/SO, past medical records and ER records.     Subjective:     Principal Problem:NSTEMI (non-ST elevated myocardial infarction)    Chief Complaint:   Chief Complaint   Patient presents with    Abnormal Lab     saw Dr. Gilbert 3-4 days ago for CP and had testing done; received t/c from Dr. Gilbert today and was told to come to ED for heart attack        HPI: Aquiles Kelsey is a 79 y.o. male patient with a PMHx of CAD who presented to the Emergency Department today for evaluation of abnormal lab results.  Patient reports seeing Dr. Hobbs (Cardiology) and labs resulting in elevated troponin levels.  Associated sxs include CP.  Patient states he has had CP with a pain rating of 3/10 the past three days, but has complete relief as of today.  Patient denies any SOB, N/V, fever, chills, diaphoresis, palpitations, extremity weakness, dizziness, hematochezia, cough, and all other sxs at this time.  Prior Tx includes Imdur and 325 mg Aspirin.  Pt denies recent bleeding of any kind.  No further complaints or concerns at this time.  ER workup showed:  Stable VS.  12 lead EKG showed SB with nonspecific T wave abnormality.  Troponin yesterday elevated to 0.8345, repeat today 0.466.  CXR negative.  Cardiology consulted from the ER and recommended admission with a Heparin gtt and plans for a heart cath today with additional recs to follow.  ECHO pending.  [facility] called for admission.  Patient will be admitted to [facility].  He is a Full Code.  His SDM is his wife Gayle who can be reached at [phone].       Past Medical History:   Diagnosis Date    Aneurysm, abdominal aortic     Coronary artery disease     Depression      Diabetes mellitus     HLD (hyperlipidemia)     Hypertension     Kidney stone     PAD (peripheral artery disease)     Tobacco abuse        Past Surgical History:   Procedure Laterality Date    APPENDECTOMY      CORONARY STENT PLACEMENT         Review of patient's allergies indicates:  No Known Allergies    No current facility-administered medications on file prior to encounter.      Current Outpatient Medications on File Prior to Encounter   Medication Sig    aspirin 325 MG tablet Take 325 mg by mouth once daily.    clopidogrel (PLAVIX) 75 mg tablet Take 1 tablet (75 mg total) by mouth once daily.    escitalopram oxalate (LEXAPRO) 10 MG tablet Take 1 tablet (10 mg total) by mouth once daily.    isosorbide mononitrate (IMDUR) 30 MG 24 hr tablet Take 1 tablet (30 mg total) by mouth once daily.    lisinopril 10 MG tablet Take 1 tablet (10 mg total) by mouth once daily.    metFORMIN (GLUCOPHAGE) 1000 MG tablet Take 1 tablet (1,000 mg total) by mouth 2 (two) times daily with meals.    simvastatin (ZOCOR) 20 MG tablet Take 1 tablet (20 mg total) by mouth every evening.    nitroGLYCERIN (NITROSTAT) 0.4 MG SL tablet Place 1 tablet (0.4 mg total) under the tongue every 5 (five) minutes as needed for Chest pain.    [DISCONTINUED] methylPREDNISolone (MEDROL DOSEPACK) 4 mg tablet use as directed     Family History     Problem Relation (Age of Onset)    COPD Father    Diabetes Mother, Brother        Tobacco Use    Smoking status: Current Every Day Smoker     Years: 15.00     Types: Cigars    Smokeless tobacco: Current User   Substance and Sexual Activity    Alcohol use: Not Currently    Drug use: Not Currently    Sexual activity: Not Currently     Review of Systems   Constitutional: Negative for chills, diaphoresis, fatigue and fever.   HENT: Negative for hearing loss, mouth sores, sore throat, tinnitus and trouble swallowing.    Eyes: Negative for pain, discharge and redness.   Respiratory: Positive for  chest tightness. Negative for apnea, cough, choking, shortness of breath, wheezing and stridor.    Cardiovascular: Positive for chest pain. Negative for palpitations and leg swelling.        CP PTA that has since resolved.   Gastrointestinal: Negative for abdominal distention, abdominal pain, blood in stool, constipation, diarrhea, nausea, rectal pain and vomiting.   Endocrine: Negative for cold intolerance, heat intolerance, polydipsia, polyphagia and polyuria.   Genitourinary: Negative for difficulty urinating, dysuria, flank pain, frequency, hematuria and urgency.   Musculoskeletal: Negative for arthralgias, back pain, gait problem, joint swelling, neck pain and neck stiffness.   Skin: Negative for color change, rash and wound.   Allergic/Immunologic: Negative for food allergies.   Neurological: Negative for dizziness, tremors, seizures, syncope, speech difficulty, light-headedness and headaches.   Hematological: Negative for adenopathy. Does not bruise/bleed easily.   Psychiatric/Behavioral: Negative for agitation and confusion. The patient is not nervous/anxious.    All other systems reviewed and are negative.    Objective:     Vital Signs (Most Recent):  Temp: 98.3 °F (36.8 °C) (10/11/19 1302)  Pulse: (!) 45 (10/11/19 1302)  Resp: 18 (10/11/19 1302)  BP: 136/62 (10/11/19 1302)  SpO2: 96 % (10/11/19 1302) Vital Signs (24h Range):  Temp:  [97.9 °F (36.6 °C)-98.3 °F (36.8 °C)] 98.3 °F (36.8 °C)  Pulse:  [45-55] 45  Resp:  [18] 18  SpO2:  [96 %-98 %] 96 %  BP: (136-143)/(61-62) 136/62     Weight: 73.9 kg (162 lb 13 oz)  Body mass index is 23.36 kg/m².    Physical Exam   Constitutional: He is oriented to person, place, and time. He appears well-developed and well-nourished. No distress.   [place].     HENT:   Head: Normocephalic and atraumatic.   Mouth/Throat: Oropharynx is clear and moist.   Eyes: Pupils are equal, round, and reactive to light. Conjunctivae and EOM are normal.   Neck: Normal range of motion. Neck  supple. No JVD present.   Cardiovascular: Bradycardia. Regular rhythm, normal heart sounds and intact distal pulses. Exam reveals no gallop and no friction rub.   No murmur heard.  Pulmonary/Chest: Effort normal and breath sounds normal. No stridor. No respiratory distress. He has no wheezes. He has no rales. He exhibits no tenderness.   Comfortable on RA.   Abdominal: Soft. Bowel sounds are normal. He exhibits no distension and no mass. There is no tenderness. There is no rebound and no guarding.   Musculoskeletal: Normal range of motion. He exhibits no edema or tenderness.   Neurological: He is alert and oriented to person, place, and time. No cranial nerve deficit.   Skin: Skin is warm and dry. No rash noted. He is not diaphoretic. No erythema.   Psychiatric: He has a normal mood and affect. His behavior is normal. Judgment and thought content normal.   Nursing note and vitals reviewed.        CRANIAL NERVES     CN III, IV, VI   Pupils are equal, round, and reactive to light.  Extraocular motions are normal.        Significant Labs:   CBC:   Recent Labs   Lab 10/11/19  1108   WBC 7.15   HGB 13.4*   HCT 40.5   *     CMP:   Recent Labs   Lab 10/11/19  1108      K 4.7      CO2 24   *   BUN 17   CREATININE 1.1   CALCIUM 9.7   PROT 6.7   ALBUMIN 3.6   BILITOT 0.4   ALKPHOS 61   AST 12   ALT 7*   ANIONGAP 10   EGFRNONAA >60     Cardiac Markers:   Recent Labs   Lab 10/11/19  1108   *     Coagulation:   Recent Labs   Lab 10/11/19  1108   INR 1.0   APTT 27.7     Troponin:   Recent Labs   Lab 10/09/19  1358 10/11/19  1108   TROPONINI 0.835* 0.466*       Significant Imaging: I have reviewed all pertinent imaging results/findings within the past 24 hours.    Assessment/Plan:     * NSTEMI (non-ST elevated myocardial infarction)  - Admit to Tele  - Initial troponin 0.466, will trend  - Given  mg in the ER  - Continue home ASA, Plavix, ACEi, Zocor, and Imdur  - ECHO pending  - Cardiology  consulted and recommended a Heparin gtt with plans for a heart cath today with additional recs to follow  - NPO  - IVFs  - Supplemental o2 and SL NTG prn  - Tele monitoring       AP (angina pectoris)  - See plan as per above      CAD (coronary artery disease)  - See plan as per above      PAD (peripheral artery disease)  - Continue home meds      Claudication in peripheral vascular disease  - Continue home meds      Essential hypertension  - BP elevated  - Continue home meds  - Monitor and adjust as needed      Type 2 diabetes mellitus with hyperglycemia, without long-term current use of insulin  - A1c 6.5 in June  - Hold home Metformin for now  - Accu checks ACHS with SSI PRN  - Monitor      Dyslipidemia  - Continue home meds      Nonrheumatic aortic valve insufficiency  - Cardiology following      Depression  - Continue home meds      Tobacco abuse  - Counseled on the need for cessation      VTE Risk Mitigation (From admission, onward)         Ordered     IP VTE HIGH RISK PATIENT  Once      10/11/19 1236     Place sequential compression device  Until discontinued      10/11/19 1236     heparin 25,000 units in dextrose 5% 250 mL (100 units/mL) infusion LOW INTENSITY nomogram - [facility]  Continuous      10/11/19 1120     heparin 25,000 units in dextrose 5% (100 units/ml) IV bolus from bag - ADDITIONAL PRN BOLUS - 60 units/kg (max bolus 4000 units)  As needed (PRN)      10/11/19 1120     heparin 25,000 units in dextrose 5% (100 units/ml) IV bolus from bag - ADDITIONAL PRN BOLUS - 30 units/kg (max bolus 4000 units)  As needed (PRN)      10/11/19 1120                   Adriana Álvarez, YOEL, ACNP-BC  Department of Hospital Medicine   [facility]

## 2019-10-11 NOTE — HPI
Aquiles Kelsey is a 79 y.o. male patient with a PMHx of CAD who presented to the Emergency Department today for evaluation of abnormal lab results.  Patient reports seeing Dr. Hobbs (Cardiology) and labs resulting in elevated troponin levels.  Associated sxs include CP.  Patient states he has had CP with a pain rating of 3/10 the past three days, but has complete relief as of today.  Patient denies any SOB, N/V, fever, chills, diaphoresis, palpitations, extremity weakness, dizziness, hematochezia, cough, and all other sxs at this time.  Prior Tx includes Imdur and 325 mg Aspirin.  Pt denies recent bleeding of any kind.  No further complaints or concerns at this time.  ER workup showed:  Stable VS.  12 lead EKG showed SB with nonspecific T wave abnormality.  Troponin yesterday elevated to 0.8345, repeat today 0.466.  CXR negative.  Cardiology consulted from the ER and recommended admission with a Heparin gtt and plans for a heart cath today with additional recs to follow.  ECHO pending.  Hospital Medicine called for admission.  Patient will be admitted to Tele.  He is a Full Code.  His SDM is his wife Gayle who can be reached at [phone].

## 2019-10-11 NOTE — SUBJECTIVE & OBJECTIVE
Past Medical History:   Diagnosis Date    Aneurysm, abdominal aortic     Coronary artery disease     Kidney stone        Past Surgical History:   Procedure Laterality Date    APPENDECTOMY      CORONARY STENT PLACEMENT         Review of patient's allergies indicates:  No Known Allergies    No current facility-administered medications on file prior to encounter.      Current Outpatient Medications on File Prior to Encounter   Medication Sig    aspirin 325 MG tablet Take 325 mg by mouth once daily.    clopidogrel (PLAVIX) 75 mg tablet Take 1 tablet (75 mg total) by mouth once daily.    escitalopram oxalate (LEXAPRO) 10 MG tablet Take 1 tablet (10 mg total) by mouth once daily.    isosorbide mononitrate (IMDUR) 30 MG 24 hr tablet Take 1 tablet (30 mg total) by mouth once daily.    lisinopril 10 MG tablet Take 1 tablet (10 mg total) by mouth once daily.    metFORMIN (GLUCOPHAGE) 1000 MG tablet Take 1 tablet (1,000 mg total) by mouth 2 (two) times daily with meals.    simvastatin (ZOCOR) 20 MG tablet Take 1 tablet (20 mg total) by mouth every evening.    nitroGLYCERIN (NITROSTAT) 0.4 MG SL tablet Place 1 tablet (0.4 mg total) under the tongue every 5 (five) minutes as needed for Chest pain.    [DISCONTINUED] methylPREDNISolone (MEDROL DOSEPACK) 4 mg tablet use as directed     Family History     Problem Relation (Age of Onset)    COPD Father    Diabetes Mother, Brother        Tobacco Use    Smoking status: Current Every Day Smoker     Years: 15.00     Types: Cigars    Smokeless tobacco: Current User   Substance and Sexual Activity    Alcohol use: Not Currently    Drug use: Not Currently    Sexual activity: Not Currently     Review of Systems   Constitution: Negative.   HENT: Negative.    Eyes: Negative.    Cardiovascular: Positive for chest pain and claudication.   Respiratory: Negative.    Endocrine: Negative.    Hematologic/Lymphatic: Negative.    Skin: Negative.    Musculoskeletal: Positive for  arthritis and joint pain.   Gastrointestinal: Negative.    Genitourinary: Negative.    Neurological: Negative.    Psychiatric/Behavioral: Negative.    Allergic/Immunologic: Negative.      Objective:     Vital Signs (Most Recent):  Temp: 97.9 °F (36.6 °C) (10/11/19 1050)  Pulse: (!) 53 (10/11/19 1131)  Resp: 18 (10/11/19 1050)  BP: (!) 143/61 (10/11/19 1050)  SpO2: 98 % (10/11/19 1050) Vital Signs (24h Range):  Temp:  [97.9 °F (36.6 °C)] 97.9 °F (36.6 °C)  Pulse:  [53-55] 53  Resp:  [18] 18  SpO2:  [98 %] 98 %  BP: (143)/(61) 143/61     Weight: 73.9 kg (162 lb 13 oz)  Body mass index is 23.36 kg/m².    SpO2: 98 %  O2 Device (Oxygen Therapy): room air    No intake or output data in the 24 hours ending 10/11/19 1146    Lines/Drains/Airways     Peripheral Intravenous Line                 Peripheral IV - Single Lumen 10/11/19 1127 20 G Left Forearm less than 1 day         Peripheral IV - Single Lumen 10/11/19 1127 20 G Right Forearm less than 1 day                Physical Exam   Constitutional: He is oriented to person, place, and time. He appears well-developed and well-nourished.   HENT:   Head: Normocephalic.   Neck: Normal range of motion. Neck supple. Normal carotid pulses, no hepatojugular reflux and no JVD present. Carotid bruit is not present. No thyromegaly present.   Cardiovascular: Normal rate, regular rhythm, S1 normal and S2 normal. PMI is not displaced. Exam reveals no S3, no S4, no distant heart sounds, no friction rub, no midsystolic click and no opening snap.   No murmur heard.  Pulses:       Radial pulses are 2+ on the right side, and 2+ on the left side.   Pulmonary/Chest: Effort normal and breath sounds normal. He has no wheezes. He has no rales.   Abdominal: Soft. Bowel sounds are normal. He exhibits no distension, no abdominal bruit, no ascites and no mass. There is no tenderness.   Musculoskeletal: He exhibits no edema.   Neurological: He is alert and oriented to person, place, and time.   Skin:  Skin is warm.   Psychiatric: He has a normal mood and affect. His behavior is normal.   Nursing note and vitals reviewed.      Significant Labs:   BMP: No results for input(s): GLU, NA, K, CL, CO2, BUN, CREATININE, CALCIUM, MG in the last 48 hours., CMP No results for input(s): NA, K, CL, CO2, GLU, BUN, CREATININE, CALCIUM, PROT, ALBUMIN, BILITOT, ALKPHOS, AST, ALT, ANIONGAP, ESTGFRAFRICA, EGFRNONAA in the last 48 hours., CBC   Recent Labs   Lab 10/11/19  1108   WBC 7.15   HGB 13.4*   HCT 40.5   *   , INR No results for input(s): INR, PROTIME in the last 48 hours. and Troponin   Recent Labs   Lab 10/09/19  1358   TROPONINI 0.835*       Significant Imaging: Echocardiogram:   2D echo with color flow doppler:   Results for orders placed or performed in visit on 07/10/19   2D echo with color flow doppler   Result Value Ref Range    QEF 55 55 - 65    Diastolic Dysfunction Yes (A)     Aortic Valve Regurgitation MILD     Est. PA Systolic Pressure 36.64     Mitral Valve Mobility NORMAL     Tricuspid Valve Regurgitation MILD     Narrative    Date of Procedure: 07/10/2019        TEST DESCRIPTION   Technical Quality: This is a technically challenging study. There is poor endocardial definition.     General: The patient was bradycardic throughout the study.    Aorta: The aortic root is normal in size, measuring 3.0 cm at sinotubular junction and 3.1 cm at Sinuses of Valsalva. The proximal ascending aorta is normal in size, measuring 3.7 cm across.     Left Atrium: The left atrial volume index is moderately enlarged, measuring 41.75 cc/m2.     Left Ventricle: The left ventricle is normal in size, with an end-diastolic diameter of 4.6 cm, and an end-systolic diameter of 3.4 cm. LV wall thickness is normal, with the septum measuring 1.6 cm and the posterior wall measuring 1.5 cm across. Relative   wall thickness was increased at 0.65, and the LV mass index was increased at 189.5 g/m2 consistent with concentric left  ventricular hypertrophy. There are no regional wall motion abnormalities. Left ventricular systolic function appears normal. Visually   estimated ejection fraction is 55-60%. The LV Doppler derived stroke volume equals 82.0 ccs.     Diastolic indices: E wave velocity 0.8 m/s, E/A ratio 0.9,  msec., E/e' ratio(avg) 11. There is pseudonormalization of mitral inflow pattern consistent with significant diastolic dysfunction.     Right Atrium: The right atrium is normal in size, measuring 5.2 cm in length and 3.5 cm in width in the apical view.     Right Ventricle: The right ventricle is normal in size measuring 2.6 cm at the base in the apical right ventricle-focused view. Global right ventricular systolic function appears normal. Tricuspid annular plane systolic excursion (TAPSE) is 2.2 cm. The   estimated PA systolic pressure is 37 mmHg.     Aortic Valve:  The aortic valve is mildly sclerotic with normal leaflet mobility. Additionally, there is mild aortic regurgitation.     Mitral Valve:  The mitral valve is normal in structure with normal leaflet mobility.     Tricuspid Valve:  The tricuspid valve is normal in structure with normal leaflet mobility. There is mild tricuspid regurgitation.     Pulmonary Valve:  The pulmonic valve is normal in structure with normal leaflet mobility. There is trivial pulmonic regurgitation.     IVC: IVC is normal in size and collapses > 50% with a sniff, suggesting normal right atrial pressure of 3 mmHg.     Intracavitary: There is no evidence of pericardial effusion, intracavity mass, thrombi, or vegetation.         CONCLUSIONS     1 - Normal left ventricular systolic function (EF 55-60%).     2 - Impaired LV relaxation, elevated LAP (grade 2 diastolic dysfunction).     3 - Moderate left atrial enlargement.     4 - Normal right ventricular systolic function .     5 - The estimated PA systolic pressure is 37 mmHg.     6 - Mild aortic regurgitation.     7 - Mild tricuspid  regurgitation.             This document has been electronically    SIGNED BY: Eris Severino MD On: 07/10/2019 11:06

## 2019-10-11 NOTE — ED PROVIDER NOTES
SCRIBE #1 NOTE: I, Tianna Hooker, am scribing for, and in the presence of, Grey Delcid MD. I have scribed the entire note.       History     Chief Complaint   Patient presents with    Abnormal Lab     saw Dr. Gilbert 3-4 days ago for CP and had testing done; received t/c from Dr. Gilbert today and was told to come to ED for heart attack     Review of patient's allergies indicates:  No Known Allergies      History of Present Illness     HPI    10/11/2019, 11:13 AM  History obtained from the patient      History of Present Illness: Aquiles Kelsey is a 79 y.o. male patient with a PMHx of CAD who presents to the Emergency Department for evaluation of abnormal lab results. Pt reports seeing Dr. Hobbs (Cardiology) and labs resulting in elevated troponin levels. Associated sxs include CP. Pt states he has had CP with a pain rating of 3/10 the past three days, but has complete relief as of today. Patient denies any SOB, N/V, fever, chills, diaphoresis, palpitations, extremity weakness, dizziness, hematochezia, cough, and all other sxs at this time. Prior Tx includes Imdur and 325 mg Aspirin. Pt denies recent bleeding of any kind. No further complaints or concerns at this time.         Arrival mode: Personal vehicle     PCP: Saige Miranda MD        Past Medical History:  Past Medical History:   Diagnosis Date    Aneurysm, abdominal aortic     Coronary artery disease     Kidney stone        Past Surgical History:  Past Surgical History:   Procedure Laterality Date    APPENDECTOMY      CORONARY STENT PLACEMENT           Family History:  Family History   Problem Relation Age of Onset    Diabetes Mother     COPD Father     Diabetes Brother        Social History:  Social History     Tobacco Use    Smoking status: Current Every Day Smoker     Years: 15.00     Types: Cigars    Smokeless tobacco: Current User   Substance and Sexual Activity    Alcohol use: Not Currently    Drug use: Not Currently     Sexual activity: Not Currently        Review of Systems     Review of Systems   Constitutional: Negative for chills, diaphoresis and fever.   HENT: Negative for sore throat.    Respiratory: Negative for cough and shortness of breath.    Cardiovascular: Positive for chest pain. Negative for palpitations.   Gastrointestinal: Negative for blood in stool, nausea and vomiting.   Genitourinary: Negative for dysuria.   Musculoskeletal: Negative for back pain.   Skin: Negative for rash.   Neurological: Negative for dizziness and weakness (extremities).   Hematological: Does not bruise/bleed easily.   All other systems reviewed and are negative.     Physical Exam     Initial Vitals [10/11/19 1050]   BP Pulse Resp Temp SpO2   (!) 143/61 (!) 55 18 97.9 °F (36.6 °C) 98 %      MAP       --          Physical Exam  Nursing Notes and Vital Signs Reviewed.  Constitutional: Patient is in no acute distress. Well-developed and well-nourished.  Head: Atraumatic. Normocephalic.  Eyes: PERRL. EOM intact. Conjunctivae are not pale. No scleral icterus.  ENT: Mucous membranes are moist. Oropharynx is clear and symmetric.    Neck: Supple. Full ROM. No lymphadenopathy.  Cardiovascular: Regular rate. Regular rhythm. No murmurs, rubs, or gallops. Distal pulses are 2+ and symmetric.  Pulmonary/Chest: No respiratory distress. Clear to auscultation bilaterally. No wheezing or rales.  Abdominal: Soft and non-distended.  There is no tenderness.  No rebound, guarding, or rigidity. Good bowel sounds.  Musculoskeletal: Moves all extremities. No obvious deformities. No edema.  Skin: Warm and dry.  Neurological:  Alert, awake, and appropriate.  Normal speech.  No acute focal neurological deficits are appreciated.  Psychiatric: Normal affect. Good eye contact. Appropriate in content.     ED Course   Critical Care  Date/Time: 10/11/2019 11:30 AM  Performed by: Grey Delcid MD  Authorized by: Grey Delcid MD   Direct patient critical care time: 10  "minutes  Additional history critical care time: 5 minutes  Ordering / reviewing critical care time: 10 minutes  Documentation critical care time: 5 minutes  Consulting other physicians critical care time: 5 minutes  Total critical care time (exclusive of procedural time) : 35 minutes  Critical care time was exclusive of separately billable procedures and treating other patients and teaching time.  Critical care was necessary to treat or prevent imminent or life-threatening deterioration of the following conditions: NSTEMI.  Critical care was time spent personally by me on the following activities: blood draw for specimens, development of treatment plan with patient or surrogate, discussions with consultants, interpretation of cardiac output measurements, evaluation of patient's response to treatment, examination of patient, obtaining history from patient or surrogate, ordering and performing treatments and interventions, ordering and review of laboratory studies, ordering and review of radiographic studies, pulse oximetry and re-evaluation of patient's condition.        ED Vital Signs:  Vitals:    10/11/19 1050 10/11/19 1131 10/11/19 1134   BP: (!) 143/61     Pulse: (!) 55 (!) 53    Resp: 18     Temp: 97.9 °F (36.6 °C)     TempSrc: Oral     SpO2: 98%     Weight: 73.9 kg (162 lb 13 oz)     Height:   5' 10" (1.778 m)       Abnormal Lab Results:  Labs Reviewed   CBC W/ AUTO DIFFERENTIAL - Abnormal; Notable for the following components:       Result Value    RBC 4.40 (*)     Hemoglobin 13.4 (*)     Platelets 147 (*)     All other components within normal limits    Narrative:     (if patient is on warfarin prior to heparin therapy)   COMPREHENSIVE METABOLIC PANEL - Abnormal; Notable for the following components:    Glucose 127 (*)     ALT 7 (*)     All other components within normal limits    Narrative:     (if patient is on warfarin prior to heparin therapy)   TROPONIN I - Abnormal; Notable for the following components: "    Troponin I 0.466 (*)     All other components within normal limits    Narrative:     (if patient is on warfarin prior to heparin therapy)   B-TYPE NATRIURETIC PEPTIDE - Abnormal; Notable for the following components:     (*)     All other components within normal limits    Narrative:     (if patient is on warfarin prior to heparin therapy)   APTT    Narrative:     (if patient is on warfarin prior to heparin therapy)   PROTIME-INR    Narrative:     (if patient is on warfarin prior to heparin therapy)        All Lab Results:  Results for orders placed or performed during the hospital encounter of 10/11/19   CBC auto differential   Result Value Ref Range    WBC 7.15 3.90 - 12.70 K/uL    RBC 4.40 (L) 4.60 - 6.20 M/uL    Hemoglobin 13.4 (L) 14.0 - 18.0 g/dL    Hematocrit 40.5 40.0 - 54.0 %    Mean Corpuscular Volume 92 82 - 98 fL    Mean Corpuscular Hemoglobin 30.5 27.0 - 31.0 pg    Mean Corpuscular Hemoglobin Conc 33.1 32.0 - 36.0 g/dL    RDW 13.5 11.5 - 14.5 %    Platelets 147 (L) 150 - 350 K/uL    MPV 11.7 9.2 - 12.9 fL    Immature Granulocytes 0.3 0.0 - 0.5 %    Gran # (ANC) 4.8 1.8 - 7.7 K/uL    Immature Grans (Abs) 0.02 0.00 - 0.04 K/uL    Lymph # 1.5 1.0 - 4.8 K/uL    Mono # 0.8 0.3 - 1.0 K/uL    Eos # 0.1 0.0 - 0.5 K/uL    Baso # 0.03 0.00 - 0.20 K/uL    nRBC 0 0 /100 WBC    Gran% 66.6 38.0 - 73.0 %    Lymph% 20.4 18.0 - 48.0 %    Mono% 10.8 4.0 - 15.0 %    Eosinophil% 1.5 0.0 - 8.0 %    Basophil% 0.4 0.0 - 1.9 %    Differential Method Automated    Comprehensive metabolic panel   Result Value Ref Range    Sodium 140 136 - 145 mmol/L    Potassium 4.7 3.5 - 5.1 mmol/L    Chloride 106 95 - 110 mmol/L    CO2 24 23 - 29 mmol/L    Glucose 127 (H) 70 - 110 mg/dL    BUN, Bld 17 8 - 23 mg/dL    Creatinine 1.1 0.5 - 1.4 mg/dL    Calcium 9.7 8.7 - 10.5 mg/dL    Total Protein 6.7 6.0 - 8.4 g/dL    Albumin 3.6 3.5 - 5.2 g/dL    Total Bilirubin 0.4 0.1 - 1.0 mg/dL    Alkaline Phosphatase 61 55 - 135 U/L    AST 12 10  - 40 U/L    ALT 7 (L) 10 - 44 U/L    Anion Gap 10 8 - 16 mmol/L    eGFR if African American >60 >60 mL/min/1.73 m^2    eGFR if non African American >60 >60 mL/min/1.73 m^2   Troponin I #1   Result Value Ref Range    Troponin I 0.466 (H) 0.000 - 0.026 ng/mL   B-Type natriuretic peptide (BNP)   Result Value Ref Range     (H) 0 - 99 pg/mL   APTT   Result Value Ref Range    aPTT 27.7 21.0 - 32.0 sec   Protime-INR   Result Value Ref Range    Prothrombin Time 10.7 9.0 - 12.5 sec    INR 1.0 0.8 - 1.2         Imaging Results:  Imaging Results          X-Ray Chest AP Portable (Final result)  Result time 10/11/19 11:46:27    Final result by Eric Gonzales MD (10/11/19 11:46:27)                 Impression:      No acute abnormality.      Electronically signed by: Eric Gonzales  Date:    10/11/2019  Time:    11:46             Narrative:    EXAMINATION:  XR CHEST AP PORTABLE    CLINICAL HISTORY:  chest pain;.    TECHNIQUE:  Single frontal portable view of the chest was performed.    COMPARISON:  None    FINDINGS:  Support devices: Telemetry leads    The lungs are clear, with normal appearance of pulmonary vasculature and no pleural effusion or pneumothorax.    The cardiac silhouette is normal in size. The hilar and mediastinal contours are unremarkable.    Bones are intact.                                 The EKG was ordered, reviewed, and independently interpreted by the ED provider.  Interpretation time: 10:53  Rate: 55 BPM  Rhythm: sinus bradycardia  Interpretation: Normal axis. No prolonged intervals. No acute ST elevation or depression. No STEMI.             The Emergency Provider reviewed the vital signs and test results, which are outlined above.     ED Discussion   11:13 AM: Discussed pt's case with Dr. Gilbert (Cardiologist) who recommends giving pt Aspirin, fluids at 100/hr, and beginning a heparin drip. Dr. Gilbert recommends pt is admitted and will go to cath lab.    12:03 PM: Discussed case with Adriana Álvarez,  NP (Hospital Medicine). Dr. Burk agrees with current care and management of pt and accepts admission.   Admitting Service: Hospital Medicine  Admitting Physician: Dr. Burk  Admit to: Tele    12:06 PM: Re-evaluated pt. I have discussed test results, shared treatment plan, and the need for admission with patient and family at bedside. Pt and family express understanding at this time and agree with all information. All questions answered. Pt and family have no further questions or concerns at this time. Pt is ready for admit.           Medical Decision Making:   Clinical Tests:   Lab Tests: Reviewed and Ordered  Radiological Study: Ordered and Reviewed  Patient presented to the outpatient cardiology clinic with complaints of having chest pain for the past several days.  Had a troponin drawn which showed a elevated troponin level and was referred here to the emergency department for NSTEMI; Dr. Gilbert evaluated patient in the emergency department and recommended admission; heparin started; patient to go to cath lab today           ED Medication(s):  Medications   aspirin tablet 325 mg (325 mg Oral Not Given 10/11/19 1100)   heparin 25,000 units in dextrose 5% 250 mL (100 units/mL) infusion LOW INTENSITY nomogram - [facility] (12 Units/kg/hr × 73.9 kg Intravenous New Bag 10/11/19 1207)   heparin 25,000 units in dextrose 5% (100 units/ml) IV bolus from bag - ADDITIONAL PRN BOLUS - 60 units/kg (max bolus 4000 units) (has no administration in time range)   heparin 25,000 units in dextrose 5% (100 units/ml) IV bolus from bag - ADDITIONAL PRN BOLUS - 30 units/kg (max bolus 4000 units) (has no administration in time range)   0.9%  NaCl infusion ( Intravenous Canceled Entry 10/11/19 1200)   sodium chloride 0.9% flush 10 mL (has no administration in time range)   glucose chewable tablet 16 g (has no administration in time range)   glucose chewable tablet 24 g (has no administration in time range)   dextrose 10% (D10W) Bolus (has no  administration in time range)   dextrose 10% (D10W) Bolus (has no administration in time range)   glucagon (human recombinant) injection 1 mg (has no administration in time range)   acetaminophen tablet 650 mg (has no administration in time range)   ondansetron disintegrating tablet 8 mg (has no administration in time range)   ondansetron injection 4 mg (has no administration in time range)   heparin 25,000 units in dextrose 5% (100 units/ml) IV bolus from bag INITIAL BOLUS (max bolus 4000 units) (4,000 Units Intravenous Bolus from Bag 10/11/19 1206)   0.9%  NaCl infusion (1,000 mLs Intravenous New Bag 10/11/19 1135)       New Prescriptions    No medications on file               Scribe Attestation:   Scribe #1: I performed the above scribed service and the documentation accurately describes the services I performed. I attest to the accuracy of the note.     Attending:   Physician Attestation Statement for Scribe #1: I, Grey Delcid MD, personally performed the services described in this documentation, as scribed by Tianna Hooker, in my presence, and it is both accurate and complete.           Clinical Impression       ICD-10-CM ICD-9-CM   1. NSTEMI (non-ST elevated myocardial infarction) I21.4 410.70   2. Chest pain R07.9 786.50       Disposition:   Disposition: Admitted  Condition: Fair         Grey Delcid MD  10/11/19 2314

## 2019-10-11 NOTE — HPI
Pt presents with NSTEMI.  His current medical conditions include CAD (multiple PCI procedures), HTN, DM, PAD, AAA stent graft (approx 2013), hyperlipidemia, cigar use.  H/o L LE stents in [place].   First PCI 1998, total of 5 stents with last one in [place] 2013.  Echo 7/19 normal LVEF, DD,  LVH, mild AI.   Holter 7/19 NSR, fleeting NSVT, EAT, rare PVCs, occasional PACs.  B LE vasc studies 7/19 B LE PAD, L > R LE.     Now in ER with several episodes of typical angina over past week and abnl troponin 0.8 range consistent w NSTEMI.  ECG earlier in week 10/9/19 showed new inferolateral st-t wave ischemic changes.  ECG today in ER shows improvement, nonspecific st-t abnl.  Cp free last couple of days.    Pt saw Dr. Hobbs, Cardiology, this week and was put on Imdur and had troponin checked and when found to be abnl pt was advised to go to ER.  He has chronic B LE claudication sxs.

## 2019-10-12 PROBLEM — I25.10 CAD, MULTIPLE VESSEL: Status: ACTIVE | Noted: 2019-10-12

## 2019-10-12 PROBLEM — I25.5 ISCHEMIC CARDIOMYOPATHY: Status: ACTIVE | Noted: 2019-10-12

## 2019-10-12 PROBLEM — R00.1 SINUS BRADYCARDIA: Status: ACTIVE | Noted: 2019-10-12

## 2019-10-12 LAB
ANION GAP SERPL CALC-SCNC: 9 MMOL/L (ref 8–16)
BASOPHILS # BLD AUTO: 0.02 K/UL (ref 0–0.2)
BASOPHILS NFR BLD: 0.2 % (ref 0–1.9)
BUN SERPL-MCNC: 17 MG/DL (ref 8–23)
CALCIUM SERPL-MCNC: 8.7 MG/DL (ref 8.7–10.5)
CHLORIDE SERPL-SCNC: 104 MMOL/L (ref 95–110)
CO2 SERPL-SCNC: 23 MMOL/L (ref 23–29)
CREAT SERPL-MCNC: 1.1 MG/DL (ref 0.5–1.4)
DACRYOCYTES BLD QL SMEAR: ABNORMAL
DIFFERENTIAL METHOD: ABNORMAL
EOSINOPHIL # BLD AUTO: 0.1 K/UL (ref 0–0.5)
EOSINOPHIL NFR BLD: 1.2 % (ref 0–8)
ERYTHROCYTE [DISTWIDTH] IN BLOOD BY AUTOMATED COUNT: 13.3 % (ref 11.5–14.5)
EST. GFR  (AFRICAN AMERICAN): >60 ML/MIN/1.73 M^2
EST. GFR  (NON AFRICAN AMERICAN): >60 ML/MIN/1.73 M^2
GLUCOSE SERPL-MCNC: 138 MG/DL (ref 70–110)
HCT VFR BLD AUTO: 36.8 % (ref 40–54)
HGB BLD-MCNC: 12.1 G/DL (ref 14–18)
IMM GRANULOCYTES # BLD AUTO: 0.04 K/UL (ref 0–0.04)
IMM GRANULOCYTES NFR BLD AUTO: 0.5 % (ref 0–0.5)
LYMPHOCYTES # BLD AUTO: 1.1 K/UL (ref 1–4.8)
LYMPHOCYTES NFR BLD: 13.5 % (ref 18–48)
MCH RBC QN AUTO: 30.3 PG (ref 27–31)
MCHC RBC AUTO-ENTMCNC: 32.9 G/DL (ref 32–36)
MCV RBC AUTO: 92 FL (ref 82–98)
MONOCYTES # BLD AUTO: 0.6 K/UL (ref 0.3–1)
MONOCYTES NFR BLD: 7.7 % (ref 4–15)
NEUTROPHILS # BLD AUTO: 6.2 K/UL (ref 1.8–7.7)
NEUTROPHILS NFR BLD: 76.9 % (ref 38–73)
NRBC BLD-RTO: 0 /100 WBC
PLATELET # BLD AUTO: 131 K/UL (ref 150–350)
PLATELET BLD QL SMEAR: ABNORMAL
PMV BLD AUTO: 11.5 FL (ref 9.2–12.9)
POCT GLUCOSE: 111 MG/DL (ref 70–110)
POCT GLUCOSE: 142 MG/DL (ref 70–110)
POCT GLUCOSE: 152 MG/DL (ref 70–110)
POCT GLUCOSE: 166 MG/DL (ref 70–110)
POIKILOCYTOSIS BLD QL SMEAR: SLIGHT
POTASSIUM SERPL-SCNC: 4.5 MMOL/L (ref 3.5–5.1)
RBC # BLD AUTO: 3.99 M/UL (ref 4.6–6.2)
SODIUM SERPL-SCNC: 136 MMOL/L (ref 136–145)
TROPONIN I SERPL DL<=0.01 NG/ML-MCNC: 2.83 NG/ML (ref 0–0.03)
TROPONIN I SERPL DL<=0.01 NG/ML-MCNC: 4.95 NG/ML (ref 0–0.03)
TROPONIN I SERPL DL<=0.01 NG/ML-MCNC: 6.2 NG/ML (ref 0–0.03)
TROPONIN I SERPL DL<=0.01 NG/ML-MCNC: 6.61 NG/ML (ref 0–0.03)
WBC # BLD AUTO: 8.01 K/UL (ref 3.9–12.7)

## 2019-10-12 PROCEDURE — 85025 COMPLETE CBC W/AUTO DIFF WBC: CPT | Mod: HCNC

## 2019-10-12 PROCEDURE — 25000003 PHARM REV CODE 250: Mod: HCNC | Performed by: INTERNAL MEDICINE

## 2019-10-12 PROCEDURE — 63600175 PHARM REV CODE 636 W HCPCS: Mod: HCNC | Performed by: INTERNAL MEDICINE

## 2019-10-12 PROCEDURE — 84484 ASSAY OF TROPONIN QUANT: CPT | Mod: HCNC

## 2019-10-12 PROCEDURE — 80048 BASIC METABOLIC PNL TOTAL CA: CPT | Mod: HCNC

## 2019-10-12 PROCEDURE — 94761 N-INVAS EAR/PLS OXIMETRY MLT: CPT | Mod: HCNC

## 2019-10-12 PROCEDURE — 99233 PR SUBSEQUENT HOSPITAL CARE,LEVL III: ICD-10-PCS | Mod: HCNC,,, | Performed by: INTERNAL MEDICINE

## 2019-10-12 PROCEDURE — 36415 COLL VENOUS BLD VENIPUNCTURE: CPT | Mod: HCNC

## 2019-10-12 PROCEDURE — 21400001 HC TELEMETRY ROOM: Mod: HCNC

## 2019-10-12 PROCEDURE — 94760 N-INVAS EAR/PLS OXIMETRY 1: CPT | Mod: HCNC

## 2019-10-12 PROCEDURE — 99233 SBSQ HOSP IP/OBS HIGH 50: CPT | Mod: HCNC,,, | Performed by: INTERNAL MEDICINE

## 2019-10-12 RX ORDER — LISINOPRIL 20 MG/1
20 TABLET ORAL DAILY
Status: DISCONTINUED | OUTPATIENT
Start: 2019-10-13 | End: 2019-10-17 | Stop reason: HOSPADM

## 2019-10-12 RX ORDER — SIMVASTATIN 20 MG/1
40 TABLET, FILM COATED ORAL NIGHTLY
Status: DISCONTINUED | OUTPATIENT
Start: 2019-10-12 | End: 2019-10-17 | Stop reason: HOSPADM

## 2019-10-12 RX ORDER — HYDRALAZINE HYDROCHLORIDE 20 MG/ML
10 INJECTION INTRAMUSCULAR; INTRAVENOUS EVERY 4 HOURS PRN
Status: DISCONTINUED | OUTPATIENT
Start: 2019-10-12 | End: 2019-10-17 | Stop reason: HOSPADM

## 2019-10-12 RX ORDER — SODIUM CHLORIDE 9 MG/ML
INJECTION, SOLUTION INTRAVENOUS CONTINUOUS
Status: DISCONTINUED | OUTPATIENT
Start: 2019-10-12 | End: 2019-10-13

## 2019-10-12 RX ORDER — ISOSORBIDE MONONITRATE 30 MG/1
30 TABLET, EXTENDED RELEASE ORAL 2 TIMES DAILY
Status: DISCONTINUED | OUTPATIENT
Start: 2019-10-12 | End: 2019-10-17 | Stop reason: HOSPADM

## 2019-10-12 RX ORDER — RANOLAZINE 500 MG/1
500 TABLET, EXTENDED RELEASE ORAL 2 TIMES DAILY
Status: DISCONTINUED | OUTPATIENT
Start: 2019-10-12 | End: 2019-10-17 | Stop reason: HOSPADM

## 2019-10-12 RX ADMIN — LISINOPRIL 10 MG: 10 TABLET ORAL at 08:10

## 2019-10-12 RX ADMIN — RANOLAZINE 500 MG: 500 TABLET, FILM COATED, EXTENDED RELEASE ORAL at 08:10

## 2019-10-12 RX ADMIN — ISOSORBIDE MONONITRATE 30 MG: 30 TABLET, EXTENDED RELEASE ORAL at 09:10

## 2019-10-12 RX ADMIN — SODIUM CHLORIDE: 0.9 INJECTION, SOLUTION INTRAVENOUS at 10:10

## 2019-10-12 RX ADMIN — CLOPIDOGREL BISULFATE 75 MG: 75 TABLET ORAL at 08:10

## 2019-10-12 RX ADMIN — SIMVASTATIN 40 MG: 20 TABLET, FILM COATED ORAL at 09:10

## 2019-10-12 RX ADMIN — RANOLAZINE 500 MG: 500 TABLET, FILM COATED, EXTENDED RELEASE ORAL at 09:10

## 2019-10-12 RX ADMIN — ASPIRIN 325 MG ORAL TABLET 325 MG: 325 PILL ORAL at 08:10

## 2019-10-12 RX ADMIN — ESCITALOPRAM OXALATE 10 MG: 10 TABLET, FILM COATED ORAL at 08:10

## 2019-10-12 NOTE — PROGRESS NOTES
[facility] Hospital Medicine  Progress Note    Patient Name: Aquiles Kelsey  MRN: [id number]  Patient Class: IP- Inpatient   Admission Date: 10/11/2019  Length of Stay: 1 days  Attending Physician: Dl Cerrato MD  Primary Care Provider: Saige Miranda MD        Subjective:     Principal Problem:NSTEMI (non-ST elevated myocardial infarction)        HPI:  Aquiles Kelsey is a 79 y.o. male patient with a PMHx of CAD who presented to the Emergency Department today for evaluation of abnormal lab results.  Patient reports seeing Dr. Hobbs (Cardiology) and labs resulting in elevated troponin levels.  Associated sxs include CP.  Patient states he has had CP with a pain rating of 3/10 the past three days, but has complete relief as of today.  Patient denies any SOB, N/V, fever, chills, diaphoresis, palpitations, extremity weakness, dizziness, hematochezia, cough, and all other sxs at this time.  Prior Tx includes Imdur and 325 mg Aspirin.  Pt denies recent bleeding of any kind.  No further complaints or concerns at this time.  ER workup showed:  Stable VS.  12 lead EKG showed SB with nonspecific T wave abnormality.  Troponin yesterday elevated to 0.8345, repeat today 0.466.  CXR negative.  Cardiology consulted from the ER and recommended admission with a Heparin gtt and plans for a heart cath today with additional recs to follow.  ECHO pending.  Hospital Medicine called for admission.  Patient will be admitted to [facility].  He is a Full Code.  His SDM is his wife Gayle who can be reached at [phone].       Overview/Hospital Course:  10/12:  S/p C with KYLIE x3 of the LCx and OM. Cards plan to perform PCI of RCA tomorrow.     Interval History: Tolerating procedure well. Denies any issues overnight. Denies any chest pain.     Review of Systems   Constitutional: Negative for fatigue and fever.   Respiratory: Negative for shortness of breath.    Cardiovascular: Negative for chest pain.    Gastrointestinal: Negative for nausea and vomiting.   Skin: Negative for rash.   Neurological: Negative for headaches.     Objective:     Vital Signs (Most Recent):  Temp: 97.5 °F (36.4 °C) (10/12/19 1217)  Pulse: (!) 50 (10/12/19 1217)  Resp: 18 (10/12/19 1217)  BP: (!) 154/70 (10/12/19 1217)  SpO2: 97 % (10/12/19 1217) Vital Signs (24h Range):  Temp:  [97 °F (36.1 °C)-98.3 °F (36.8 °C)] 97.5 °F (36.4 °C)  Pulse:  [42-60] 50  Resp:  [10-22] 18  SpO2:  [90 %-100 %] 97 %  BP: (127-161)/(53-72) 154/70     Weight: 77.4 kg (170 lb 10.2 oz)  Body mass index is 24.48 kg/m².    Intake/Output Summary (Last 24 hours) at 10/12/2019 1402  Last data filed at 10/12/2019 0500  Gross per 24 hour   Intake 770 ml   Output 350 ml   Net 420 ml      Physical Exam   Constitutional: He is oriented to person, place, and time. He appears well-developed and well-nourished. No distress.   Cardiovascular: Normal rate, regular rhythm and normal heart sounds. Exam reveals no gallop and no friction rub.   No murmur heard.  Pulmonary/Chest: Effort normal and breath sounds normal. No stridor. No respiratory distress. He has no wheezes. He has no rales.   Abdominal: Soft. Bowel sounds are normal. He exhibits no distension and no mass. There is no tenderness. There is no guarding.   Musculoskeletal: He exhibits no edema.   Neurological: He is alert and oriented to person, place, and time.   Skin: He is not diaphoretic.       Significant Labs:   BMP:   Recent Labs   Lab 10/12/19  0419   *      K 4.5      CO2 23   BUN 17   CREATININE 1.1   CALCIUM 8.7     CBC:   Recent Labs   Lab 10/11/19  1108 10/12/19  0419   WBC 7.15 8.01   HGB 13.4* 12.1*   HCT 40.5 36.8*   * 131*       Significant Imaging: I have reviewed all pertinent imaging results/findings within the past 24 hours.      Assessment/Plan:      * NSTEMI (non-ST elevated myocardial infarction)  - Admit to Tele  - Initial troponin 0.466, will trend  - Given  mg  in the ER  - Continue home ASA, Plavix, ACEi, Zocor, and Imdur  - ECHO pending  - Cardiology consulted and recommended a Heparin gtt with plans for a heart cath today with additional recs to follow  - NPO  - IVFs  - Supplemental o2 and SL NTG prn  - Tele monitoring     10/12:  S/p PCI with KYLIE x3 of the LCx and OM  Cards planning on performing PCI of RCA in the am  NPO after midnight         Tobacco abuse  - Counseled on the need for cessation      AP (angina pectoris)  - See plan as per above      Nonrheumatic aortic valve insufficiency  - Cardiology following      Claudication in peripheral vascular disease  - Continue home meds      Depression  - Continue home meds      Type 2 diabetes mellitus with hyperglycemia, without long-term current use of insulin  - A1c 6.5 in June  - Hold home Metformin for now  - Accu checks ACHS with SSI PRN  - Monitor    10/12  Cont to hold metformin   Sliding scale   Glucose within goal       Dyslipidemia  - Continue home meds      Essential hypertension  - BP elevated  - Continue home meds  - Monitor and adjust as needed     10/12:  bp slightly elevated  Will continue to monitor   Increasing lisinopril to 20mg daily       PAD (peripheral artery disease)  - Continue home meds      CAD (coronary artery disease)  - See plan as per above        VTE Risk Mitigation (From admission, onward)         Ordered     IP VTE HIGH RISK PATIENT  Once      10/11/19 1236     Place sequential compression device  Until discontinued      10/11/19 1236                      Dl Cerrato MD  Department of Hospital Medicine   [facility]

## 2019-10-12 NOTE — NURSING TRANSFER
Nursing Transfer Note      10/11/2019     Transfer 215    Transfer via BED    Transfer with MONITOR    Transported by NELLY ZAYAS RN    Medicines sent: NONE    Chart send with patient: YES    Notified:WIFE AT BEDSIDE    Patient reassessed at: 11/11/19 2000 REPORT TO DAMARI    Upon arrival to floor: TRANSFERRED TO ROOM. TRANSFERRED TO BED.  TELEMETRY MONITER ON.  IV FLUIDS ON PUMP AT PRESCRIBED RATE.  PROCEDURAL ACCESS SITE WITHOUT BLEEDING OR HEMATOMA.  DRESSING DRY AND INTACT.  CARE HANDED OFF TO.CARLOS........

## 2019-10-12 NOTE — SUBJECTIVE & OBJECTIVE
Interval History: Tolerating procedure well. Denies any issues overnight. Denies any chest pain.     Review of Systems   Constitutional: Negative for fatigue and fever.   Respiratory: Negative for shortness of breath.    Cardiovascular: Negative for chest pain.   Gastrointestinal: Negative for nausea and vomiting.   Skin: Negative for rash.   Neurological: Negative for headaches.     Objective:     Vital Signs (Most Recent):  Temp: 97.5 °F (36.4 °C) (10/12/19 1217)  Pulse: (!) 50 (10/12/19 1217)  Resp: 18 (10/12/19 1217)  BP: (!) 154/70 (10/12/19 1217)  SpO2: 97 % (10/12/19 1217) Vital Signs (24h Range):  Temp:  [97 °F (36.1 °C)-98.3 °F (36.8 °C)] 97.5 °F (36.4 °C)  Pulse:  [42-60] 50  Resp:  [10-22] 18  SpO2:  [90 %-100 %] 97 %  BP: (127-161)/(53-72) 154/70     Weight: 77.4 kg (170 lb 10.2 oz)  Body mass index is 24.48 kg/m².    Intake/Output Summary (Last 24 hours) at 10/12/2019 1402  Last data filed at 10/12/2019 0500  Gross per 24 hour   Intake 770 ml   Output 350 ml   Net 420 ml      Physical Exam   Constitutional: He is oriented to person, place, and time. He appears well-developed and well-nourished. No distress.   Cardiovascular: Normal rate, regular rhythm and normal heart sounds. Exam reveals no gallop and no friction rub.   No murmur heard.  Pulmonary/Chest: Effort normal and breath sounds normal. No stridor. No respiratory distress. He has no wheezes. He has no rales.   Abdominal: Soft. Bowel sounds are normal. He exhibits no distension and no mass. There is no tenderness. There is no guarding.   Musculoskeletal: He exhibits no edema.   Neurological: He is alert and oriented to person, place, and time.   Skin: He is not diaphoretic.       Significant Labs:   BMP:   Recent Labs   Lab 10/12/19  0419   *      K 4.5      CO2 23   BUN 17   CREATININE 1.1   CALCIUM 8.7     CBC:   Recent Labs   Lab 10/11/19  1108 10/12/19  0419   WBC 7.15 8.01   HGB 13.4* 12.1*   HCT 40.5 36.8*   *  131*       Significant Imaging: I have reviewed all pertinent imaging results/findings within the past 24 hours.

## 2019-10-12 NOTE — HOSPITAL COURSE
Patient was admitted for NSTEMI. Cards was consulted on case and LHC was performed. He had 3 KYLIE stents placed in the LCx and OM. PCI of RCA was attempted however unsuccessful. Patient developed upper GI bleeding and was sent to ICU. pRBC was transfused GI and surgery was consulted on case. EGD showed angiodysplasia which was treated with epinephrine and laser. H/H remained stable post EGD. Asa and plavix restarted. Patient to follow up with [facility] outpatient for further workup of CAD.

## 2019-10-12 NOTE — ASSESSMENT & PLAN NOTE
OPTIMIZE MEDICAL TX FOR ICM.  LIMITED ON MEDICAL TX AS HE WILL NOT BE ABLE TO TOLERATE BETA-BLOCKERS DUE TO SINUS BRADYCARDIA ISSUES.

## 2019-10-12 NOTE — PLAN OF CARE
Sw met with patient at bedside to complete assessment. Patient denies any post hospital needs or services at this time. Pt's family will provide transportation upon discharge. Transitional Care Folder, Discharge Planning Begins on Admission pamphlet, [facility] Bedside Delivery pamphlet, Advance Directive information given to patient along with the contact information given. Sw placed contact information on white board. Sw instructed patient or family to call with any questions or concerns.    Pt's PCP is Saige Miranda MD  Pt uses:   [facility]/pharmacy #[id number] - [place] - [address] AT [facility]  [address] [address]  Phone: [phone] Fax: [phone]         10/12/19 1152   Discharge Assessment   Assessment Type Discharge Planning Assessment   Assessment information obtained from? Caregiver   Communicated expected length of stay with patient/caregiver yes   Prior to hospitilization cognitive status: Alert/Oriented   Prior to hospitalization functional status: Independent   Current cognitive status: Alert/Oriented   Current Functional Status: Independent   Facility Arrived From: Home   Lives With spouse;child(amina), adult   Able to Return to Prior Arrangements yes   Is patient able to care for self after discharge? Yes   Who are your caregiver(s) and their phone number(s)? Gayle Woods (wife) [phone]   Patient's perception of discharge disposition home or selfcare   Readmission Within the Last 30 Days no previous admission in last 30 days   Patient currently being followed by outpatient case management? No   Patient currently receives any other outside agency services? No   Equipment Currently Used at Home none   Do you have any problems affording any of your prescribed medications? No   Is the patient taking medications as prescribed? yes   Does the patient have transportation home? Yes   Transportation Anticipated family or friend will provide    Does the patient receive services at the [facility]? No   Discharge Plan A Home with family   Discharge Plan B Home with family;Home Health   DME Needed Upon Discharge  none   Patient/Family in Agreement with Plan yes

## 2019-10-12 NOTE — PROGRESS NOTES
[facility] -   Cardiology  Progress Note    Patient Name: Aquiles Kelsey  MRN: [id number]  Admission Date: 10/11/2019  Hospital Length of Stay: 1 days  Code Status: Full Code   Attending Physician: Dl Cerrato MD   Primary Care Physician: Saige Miranda MD  Expected Discharge Date:   Principal Problem:NSTEMI (non-ST elevated myocardial infarction)    Subjective:     HPI:  Pt presents with NSTEMI.  His current medical conditions include CAD (multiple PCI procedures), HTN, DM, PAD, AAA stent graft (approx 2013), hyperlipidemia, cigar use.  H/o L LE stents in [place].   First PCI 1998, total of 5 stents with last one in [place] 2013.  Echo 7/19 normal LVEF, DD,  LVH, mild AI.   Holter 7/19 NSR, fleeting NSVT, EAT, rare PVCs, occasional PACs.  B LE vasc studies 7/19 B LE PAD, L > R LE.     Now in ER with several episodes of typical angina over past week and abnl troponin 0.8 range consistent w NSTEMI.  ECG earlier in week 10/9/19 showed new inferolateral st-t wave ischemic changes.  ECG today in ER shows improvement, nonspecific st-t abnl.  Cp free last couple of days.    Pt saw Dr. Hobbs, Cardiology, this week and was put on Imdur and had troponin checked and when found to be abnl pt was advised to go to ER.  He has chronic B LE claudication sxs.      Hospital Course:   10/12/19:  PT SEEN AND EXAMINED EARLIER THIS AM.  HAD GOOD NIGHT OVERALL. DENIES RECURRENT CP.  NO CHF SXS.  LABS AND ECG REVIEWED AND ARE STABLE.      Review of Systems   Constitution: Negative.   HENT: Negative.    Eyes: Negative.    Cardiovascular: Positive for chest pain.   Respiratory: Negative.    Endocrine: Negative.    Hematologic/Lymphatic: Negative.    Skin: Negative.    Musculoskeletal: Negative.    Gastrointestinal: Negative.    Genitourinary: Negative.    Neurological: Negative.    Psychiatric/Behavioral: Negative.    Allergic/Immunologic: Negative.      Objective:     Vital Signs (Most Recent):  Temp: 97.5 °F (36.4 °C)  (10/12/19 1217)  Pulse: (!) 50 (10/12/19 1217)  Resp: 18 (10/12/19 1217)  BP: (!) 154/70 (10/12/19 1217)  SpO2: 97 % (10/12/19 1217) Vital Signs (24h Range):  Temp:  [97 °F (36.1 °C)-98.3 °F (36.8 °C)] 97.5 °F (36.4 °C)  Pulse:  [42-60] 50  Resp:  [10-22] 18  SpO2:  [90 %-100 %] 97 %  BP: (127-161)/(53-72) 154/70     Weight: 77.4 kg (170 lb 10.2 oz)  Body mass index is 24.48 kg/m².     SpO2: 97 %  O2 Device (Oxygen Therapy): room air      Intake/Output Summary (Last 24 hours) at 10/12/2019 1220  Last data filed at 10/12/2019 0500  Gross per 24 hour   Intake 770 ml   Output 350 ml   Net 420 ml       Lines/Drains/Airways     Peripheral Intravenous Line                 Peripheral IV - Single Lumen 10/11/19 1127 20 G Left Forearm 1 day         Peripheral IV - Single Lumen 10/11/19 1127 20 G Right Forearm 1 day                Physical Exam   Constitutional: He is oriented to person, place, and time. He appears well-developed and well-nourished.   HENT:   Head: Normocephalic.   Neck: Normal range of motion. Neck supple. Normal carotid pulses, no hepatojugular reflux and no JVD present. Carotid bruit is not present. No thyromegaly present.   Cardiovascular: Regular rhythm, S1 normal and S2 normal. Bradycardia present. PMI is not displaced. Exam reveals no S3, no S4, no distant heart sounds, no friction rub, no midsystolic click and no opening snap.   No murmur heard.  Pulses:       Radial pulses are 2+ on the right side, and 2+ on the left side.   LEFT RADIAL SITE WITH NORMAL PULSE, SOME ECCHYMOSIS.   Pulmonary/Chest: Effort normal and breath sounds normal. He has no wheezes. He has no rales.   Abdominal: Soft. Bowel sounds are normal. He exhibits no distension, no abdominal bruit, no ascites and no mass. There is no tenderness.   Musculoskeletal: He exhibits no edema.   Neurological: He is alert and oriented to person, place, and time.   Skin: Skin is warm.   Psychiatric: He has a normal mood and affect. His behavior is  normal.   Nursing note and vitals reviewed.      Significant Labs:   BMP:   Recent Labs   Lab 10/11/19  1108 10/12/19  0419   * 138*    136   K 4.7 4.5    104   CO2 24 23   BUN 17 17   CREATININE 1.1 1.1   CALCIUM 9.7 8.7   , CMP   Recent Labs   Lab 10/11/19  1108 10/12/19  0419    136   K 4.7 4.5    104   CO2 24 23   * 138*   BUN 17 17   CREATININE 1.1 1.1   CALCIUM 9.7 8.7   PROT 6.7  --    ALBUMIN 3.6  --    BILITOT 0.4  --    ALKPHOS 61  --    AST 12  --    ALT 7*  --    ANIONGAP 10 9   ESTGFRAFRICA >60 >60   EGFRNONAA >60 >60   , CBC   Recent Labs   Lab 10/11/19  1108 10/12/19  0419   WBC 7.15 8.01   HGB 13.4* 12.1*   HCT 40.5 36.8*   * 131*   , INR   Recent Labs   Lab 10/11/19  1108   INR 1.0    and Troponin   Recent Labs   Lab 10/11/19  2245 10/12/19  0419 10/12/19  0942   TROPONINI 1.043* 2.825* 4.952*       Significant Imaging: Echocardiogram:   2D echo with color flow doppler:   Results for orders placed or performed during the hospital encounter of 10/11/19   2D echo with color flow doppler   Result Value Ref Range    QEF 55 55 - 65    Mitral Valve Regurgitation MILD     Aortic Valve Regurgitation MILD     Est. PA Systolic Pressure 33.25     Narrative    Date of Procedure: 10/11/2019        TEST DESCRIPTION   Technical Quality: This is a technically challenging study. There is poor endocardial definition.     Aorta: The aortic root is normal in size, measuring 3.3 cm at sinotubular junction and 3.5 cm at Sinuses of Valsalva. The proximal ascending aorta is normal in size, measuring 3.0 cm across.     Left Atrium: The left atrial volume index is moderately enlarged, measuring 45.51 cc/m2.     Left Ventricle: The left ventricle is normal in size, with an end-diastolic diameter of 4.2 cm, and an end-systolic diameter of 2.6 cm. Septal wall thickness is mildly increased, with the septum measuring 1.4 cm and the posterior wall measuring 1.1 cm   across. Relative  wall thickness was increased at 0.52, and the LV mass index was increased at 116.3 g/m2 consistent with concentric left ventricular hypertrophy. The following segments were mildly hypokinetic: mid inferolateral wall.  Left ventricular systolic function appears normal. Visually estimated ejection fraction is 55-60%. The LV Doppler derived stroke volume equals 78.0 ccs.     Diastolic indices: E wave velocity 0.9 m/s, E/A ratio 1.2,  msec., E/e' ratio(avg) 13. Diastolic function is indeterminate.     Right Atrium: The right atrium is normal in size, measuring 5.2 cm in length and 2.7 cm in width in the apical view.     Right Ventricle: The right ventricle is normal in size. Global right ventricular systolic function appears normal. Tricuspid annular plane systolic excursion (TAPSE) is 2.0 cm. The estimated PA systolic pressure is 33 mmHg.     Aortic Valve:  The aortic valve is mildly sclerotic with normal leaflet mobility. The mean gradient obtained across the aortic valve is 5 mmHg. Additionally, there is mild aortic regurgitation. There is a pressure half time of 747.0 msec.     Mitral Valve:  The mitral valve is normal in structure. The pressure half time is 54 msec. The calculated mitral valve area is 4.07 cm2. There is mild mitral regurgitation.     Tricuspid Valve:  The tricuspid valve is normal in structure.     Pulmonary Valve:  The pulmonic valve is not well seen.     IVC: IVC is normal in size and collapses > 50% with a sniff, suggesting normal right atrial pressure of 3 mmHg.     Intracavitary: There is no evidence of pericardial effusion, intracavity mass, thrombi, or vegetation.         CONCLUSIONS     1 - Normal left ventricular systolic function (EF 55-60%).     2 - Indeterminate LV diastolic function.     3 - Normal right ventricular systolic function .     4 - Mild aortic regurgitation.     5 - Moderate left atrial enlargement.     6 - Wall motion abnormalities.     7 - Concentric hypertrophy.      8 - The estimated PA systolic pressure is 33 mmHg.             This document has been electronically    SIGNED BY: Robe Gilbert MD On: 10/11/2019 16:04     Assessment and Plan:     S/P PCI SEVERE LCX/OM DISEASE WITH KYLIE X 3 OVERLAPPED FOR NSTEMI.  HAS OCCLUDED RCA, UNCLEAR HOW LONG IT HAS BEEN DOWN BUT SUSPECT IT MAY BE MORE CHRONIC.  CONTINUE CURRENT MEDS.  RECHECK LABS IN AM.  NPO AFTER MIDNIGHT WITH IV FLUIDS.  PCI ATTEMPT OF RCA OCCLUSION TOMORROW.  INDICATIONS, RISKS/BENEFITS OF LHC/PCI DISCUSSED AT BEDSIDE.      * NSTEMI (non-ST elevated myocardial infarction)  See management plan detailed above.     Ischemic cardiomyopathy  OPTIMIZE MEDICAL TX FOR ICM.  LIMITED ON MEDICAL TX AS HE WILL NOT BE ABLE TO TOLERATE BETA-BLOCKERS DUE TO SINUS BRADYCARDIA ISSUES.    CAD, multiple vessel  OPTIMIZE MEDICAL TX FOR CAD.    Sinus bradycardia  STABLE.  AT RISK FOR PPM IN FUTURE.    Tobacco abuse  TOBACCO CESSATION ADVISED.    AP (angina pectoris)  See management plan detailed above.     Nonrheumatic aortic valve insufficiency  See management plan detailed above.     Claudication in peripheral vascular disease  See management plan detailed above.     History of PTCA  See management plan detailed above.     Abnormal ECG  See management plan detailed above.     Essential hypertension  See management plan detailed above.     PAD (peripheral artery disease)  See management plan detailed above.     CAD (coronary artery disease)  See management plan detailed above.         VTE Risk Mitigation (From admission, onward)         Ordered     IP VTE HIGH RISK PATIENT  Once      10/11/19 1236     Place sequential compression device  Until discontinued      10/11/19 1236                Robe Gilbert MD  Cardiology  [facility] -

## 2019-10-12 NOTE — PLAN OF CARE
POC reviewed. Pt verbalizes understanding. S/P right radial heart cath. Stents x3. Cath site without hematoma. Splint in place. Comfort measures and safety measures addressed. IVF as ordered. Will continue to monitor.

## 2019-10-12 NOTE — HOSPITAL COURSE
10/12/19:  PT SEEN AND EXAMINED EARLIER THIS AM.  HAD GOOD NIGHT OVERALL. DENIES RECURRENT CP.  NO CHF SXS.  LABS AND ECG REVIEWED AND ARE STABLE.    10/13/19:  PT SEEN AND EXAMINED.  NO CHEST PAIN OR CHF SXS.  STABLE SINUS BRADYCARDIA.  TROPONIN PEAKED.  LABS STABLE.    10/14/19- Patient is s/p PCI of LCX with KYLIE x1 and severe OM2 KYLIE x2 overlapped into mid LCX old stent. OM on Friday and Prox-mid RCA with KYLIE x2  on yesterday. Unsuccessful PCI of Distal RCA occluded and PDA occluded stent. Patient developed acute GIB late last night/early morning. Had hematemesis and black BM. Became hypotensive and diaphoretic. Transfused to ICU and transfused 4 units of blood and 1 unit of platelets. ASA and Plavix on hold today. GI has been consulted and plan to scope patient today. Vitals stable and patient denies any angina or equivalent.     10/15/19- patient is post EGD on yesterday. Noted to have clotted blood in the gastric fundus and red blood in the gastric body and in the gastric antrum.  ASA and Plavix held this morning and plans in place to repeat EGD today. H/H 8.2/24.1. Transfused additional unit of blood yesterday. Patient has no angina or equivalent this morning. IVF stopped.     10/16/19- patient is post repeat EGD on yesterday. Study showed no active bleeding. ASA and Plavix resumed this morning. Patient has no angina or equivalent. No hematemesis or melena.    10/17/19:  PT SEEN AND EXAMINED.  HE FEELS GOOD.  DENIES CHEST PAIN SXS.  NO CHF SXS. NO RECURRENT HEMATEMESIS.  AMBULATED.  LABS/ANEMIA STABLE.

## 2019-10-12 NOTE — ASSESSMENT & PLAN NOTE
- A1c 6.5 in June  - Hold home Metformin for now  - Accu checks ACHS with SSI PRN  - Monitor    10/12  Cont to hold metformin   Sliding scale   Glucose within goal

## 2019-10-12 NOTE — ASSESSMENT & PLAN NOTE
- BP elevated  - Continue home meds  - Monitor and adjust as needed     10/12:  bp slightly elevated  Will continue to monitor   Increasing lisinopril to 20mg daily

## 2019-10-12 NOTE — SUBJECTIVE & OBJECTIVE
Review of Systems   Constitution: Negative.   HENT: Negative.    Eyes: Negative.    Cardiovascular: Positive for chest pain.   Respiratory: Negative.    Endocrine: Negative.    Hematologic/Lymphatic: Negative.    Skin: Negative.    Musculoskeletal: Negative.    Gastrointestinal: Negative.    Genitourinary: Negative.    Neurological: Negative.    Psychiatric/Behavioral: Negative.    Allergic/Immunologic: Negative.      Objective:     Vital Signs (Most Recent):  Temp: 97.5 °F (36.4 °C) (10/12/19 1217)  Pulse: (!) 50 (10/12/19 1217)  Resp: 18 (10/12/19 1217)  BP: (!) 154/70 (10/12/19 1217)  SpO2: 97 % (10/12/19 1217) Vital Signs (24h Range):  Temp:  [97 °F (36.1 °C)-98.3 °F (36.8 °C)] 97.5 °F (36.4 °C)  Pulse:  [42-60] 50  Resp:  [10-22] 18  SpO2:  [90 %-100 %] 97 %  BP: (127-161)/(53-72) 154/70     Weight: 77.4 kg (170 lb 10.2 oz)  Body mass index is 24.48 kg/m².     SpO2: 97 %  O2 Device (Oxygen Therapy): room air      Intake/Output Summary (Last 24 hours) at 10/12/2019 1220  Last data filed at 10/12/2019 0500  Gross per 24 hour   Intake 770 ml   Output 350 ml   Net 420 ml       Lines/Drains/Airways     Peripheral Intravenous Line                 Peripheral IV - Single Lumen 10/11/19 1127 20 G Left Forearm 1 day         Peripheral IV - Single Lumen 10/11/19 1127 20 G Right Forearm 1 day                Physical Exam   Constitutional: He is oriented to person, place, and time. He appears well-developed and well-nourished.   HENT:   Head: Normocephalic.   Neck: Normal range of motion. Neck supple. Normal carotid pulses, no hepatojugular reflux and no JVD present. Carotid bruit is not present. No thyromegaly present.   Cardiovascular: Regular rhythm, S1 normal and S2 normal. Bradycardia present. PMI is not displaced. Exam reveals no S3, no S4, no distant heart sounds, no friction rub, no midsystolic click and no opening snap.   No murmur heard.  Pulses:       Radial pulses are 2+ on the right side, and 2+ on the left  side.   LEFT RADIAL SITE WITH NORMAL PULSE, SOME ECCHYMOSIS.   Pulmonary/Chest: Effort normal and breath sounds normal. He has no wheezes. He has no rales.   Abdominal: Soft. Bowel sounds are normal. He exhibits no distension, no abdominal bruit, no ascites and no mass. There is no tenderness.   Musculoskeletal: He exhibits no edema.   Neurological: He is alert and oriented to person, place, and time.   Skin: Skin is warm.   Psychiatric: He has a normal mood and affect. His behavior is normal.   Nursing note and vitals reviewed.      Significant Labs:   BMP:   Recent Labs   Lab 10/11/19  1108 10/12/19  0419   * 138*    136   K 4.7 4.5    104   CO2 24 23   BUN 17 17   CREATININE 1.1 1.1   CALCIUM 9.7 8.7   , CMP   Recent Labs   Lab 10/11/19  1108 10/12/19  0419    136   K 4.7 4.5    104   CO2 24 23   * 138*   BUN 17 17   CREATININE 1.1 1.1   CALCIUM 9.7 8.7   PROT 6.7  --    ALBUMIN 3.6  --    BILITOT 0.4  --    ALKPHOS 61  --    AST 12  --    ALT 7*  --    ANIONGAP 10 9   ESTGFRAFRICA >60 >60   EGFRNONAA >60 >60   , CBC   Recent Labs   Lab 10/11/19  1108 10/12/19  0419   WBC 7.15 8.01   HGB 13.4* 12.1*   HCT 40.5 36.8*   * 131*   , INR   Recent Labs   Lab 10/11/19  1108   INR 1.0    and Troponin   Recent Labs   Lab 10/11/19  2245 10/12/19  0419 10/12/19  0942   TROPONINI 1.043* 2.825* 4.952*       Significant Imaging: Echocardiogram:   2D echo with color flow doppler:   Results for orders placed or performed during the hospital encounter of 10/11/19   2D echo with color flow doppler   Result Value Ref Range    QEF 55 55 - 65    Mitral Valve Regurgitation MILD     Aortic Valve Regurgitation MILD     Est. PA Systolic Pressure 33.25     Narrative    Date of Procedure: 10/11/2019        TEST DESCRIPTION   Technical Quality: This is a technically challenging study. There is poor endocardial definition.     Aorta: The aortic root is normal in size, measuring 3.3 cm at  sinotubular junction and 3.5 cm at Sinuses of Valsalva. The proximal ascending aorta is normal in size, measuring 3.0 cm across.     Left Atrium: The left atrial volume index is moderately enlarged, measuring 45.51 cc/m2.     Left Ventricle: The left ventricle is normal in size, with an end-diastolic diameter of 4.2 cm, and an end-systolic diameter of 2.6 cm. Septal wall thickness is mildly increased, with the septum measuring 1.4 cm and the posterior wall measuring 1.1 cm   across. Relative wall thickness was increased at 0.52, and the LV mass index was increased at 116.3 g/m2 consistent with concentric left ventricular hypertrophy. The following segments were mildly hypokinetic: mid inferolateral wall.  Left ventricular systolic function appears normal. Visually estimated ejection fraction is 55-60%. The LV Doppler derived stroke volume equals 78.0 ccs.     Diastolic indices: E wave velocity 0.9 m/s, E/A ratio 1.2,  msec., E/e' ratio(avg) 13. Diastolic function is indeterminate.     Right Atrium: The right atrium is normal in size, measuring 5.2 cm in length and 2.7 cm in width in the apical view.     Right Ventricle: The right ventricle is normal in size. Global right ventricular systolic function appears normal. Tricuspid annular plane systolic excursion (TAPSE) is 2.0 cm. The estimated PA systolic pressure is 33 mmHg.     Aortic Valve:  The aortic valve is mildly sclerotic with normal leaflet mobility. The mean gradient obtained across the aortic valve is 5 mmHg. Additionally, there is mild aortic regurgitation. There is a pressure half time of 747.0 msec.     Mitral Valve:  The mitral valve is normal in structure. The pressure half time is 54 msec. The calculated mitral valve area is 4.07 cm2. There is mild mitral regurgitation.     Tricuspid Valve:  The tricuspid valve is normal in structure.     Pulmonary Valve:  The pulmonic valve is not well seen.     IVC: IVC is normal in size and collapses > 50%  with a sniff, suggesting normal right atrial pressure of 3 mmHg.     Intracavitary: There is no evidence of pericardial effusion, intracavity mass, thrombi, or vegetation.         CONCLUSIONS     1 - Normal left ventricular systolic function (EF 55-60%).     2 - Indeterminate LV diastolic function.     3 - Normal right ventricular systolic function .     4 - Mild aortic regurgitation.     5 - Moderate left atrial enlargement.     6 - Wall motion abnormalities.     7 - Concentric hypertrophy.     8 - The estimated PA systolic pressure is 33 mmHg.             This document has been electronically    SIGNED BY: Robe Gilbert MD On: 10/11/2019 16:04

## 2019-10-12 NOTE — ASSESSMENT & PLAN NOTE
- Admit to Tele  - Initial troponin 0.466, will trend  - Given  mg in the ER  - Continue home ASA, Plavix, ACEi, Zocor, and Imdur  - ECHO pending  - Cardiology consulted and recommended a Heparin gtt with plans for a heart cath today with additional recs to follow  - NPO  - IVFs  - Supplemental o2 and SL NTG prn  - Tele monitoring     10/12:  S/p PCI with KYLIE x3 of the LCx and OM  Cards planning on performing PCI of RCA in the am  NPO after midnight

## 2019-10-13 LAB
ALBUMIN SERPL BCP-MCNC: 3.2 G/DL (ref 3.5–5.2)
ALP SERPL-CCNC: 54 U/L (ref 55–135)
ALT SERPL W/O P-5'-P-CCNC: 10 U/L (ref 10–44)
ANION GAP SERPL CALC-SCNC: 10 MMOL/L (ref 8–16)
ANION GAP SERPL CALC-SCNC: 8 MMOL/L (ref 8–16)
AST SERPL-CCNC: 24 U/L (ref 10–40)
BASOPHILS # BLD AUTO: 0.03 K/UL (ref 0–0.2)
BASOPHILS NFR BLD: 0.4 % (ref 0–1.9)
BILIRUB SERPL-MCNC: 0.5 MG/DL (ref 0.1–1)
BNP SERPL-MCNC: 801 PG/ML (ref 0–99)
BUN SERPL-MCNC: 17 MG/DL (ref 8–23)
BUN SERPL-MCNC: 17 MG/DL (ref 8–23)
CALCIUM SERPL-MCNC: 8.8 MG/DL (ref 8.7–10.5)
CALCIUM SERPL-MCNC: 8.9 MG/DL (ref 8.7–10.5)
CHLORIDE SERPL-SCNC: 103 MMOL/L (ref 95–110)
CHLORIDE SERPL-SCNC: 104 MMOL/L (ref 95–110)
CO2 SERPL-SCNC: 23 MMOL/L (ref 23–29)
CO2 SERPL-SCNC: 26 MMOL/L (ref 23–29)
CORONARY STENOSIS: ABNORMAL
CORONARY STENT: YES
CREAT SERPL-MCNC: 1.1 MG/DL (ref 0.5–1.4)
CREAT SERPL-MCNC: 1.1 MG/DL (ref 0.5–1.4)
DACRYOCYTES BLD QL SMEAR: ABNORMAL
DIFFERENTIAL METHOD: ABNORMAL
EOSINOPHIL # BLD AUTO: 0.1 K/UL (ref 0–0.5)
EOSINOPHIL NFR BLD: 1.2 % (ref 0–8)
ERYTHROCYTE [DISTWIDTH] IN BLOOD BY AUTOMATED COUNT: 13.2 % (ref 11.5–14.5)
EST. GFR  (AFRICAN AMERICAN): >60 ML/MIN/1.73 M^2
EST. GFR  (AFRICAN AMERICAN): >60 ML/MIN/1.73 M^2
EST. GFR  (NON AFRICAN AMERICAN): >60 ML/MIN/1.73 M^2
EST. GFR  (NON AFRICAN AMERICAN): >60 ML/MIN/1.73 M^2
GLUCOSE SERPL-MCNC: 140 MG/DL (ref 70–110)
GLUCOSE SERPL-MCNC: 143 MG/DL (ref 70–110)
HCT VFR BLD AUTO: 36 % (ref 40–54)
HGB BLD-MCNC: 12 G/DL (ref 14–18)
IMM GRANULOCYTES # BLD AUTO: 0.02 K/UL (ref 0–0.04)
IMM GRANULOCYTES NFR BLD AUTO: 0.3 % (ref 0–0.5)
LYMPHOCYTES # BLD AUTO: 1 K/UL (ref 1–4.8)
LYMPHOCYTES NFR BLD: 14.2 % (ref 18–48)
MCH RBC QN AUTO: 30.2 PG (ref 27–31)
MCHC RBC AUTO-ENTMCNC: 33.3 G/DL (ref 32–36)
MCV RBC AUTO: 91 FL (ref 82–98)
MONOCYTES # BLD AUTO: 0.7 K/UL (ref 0.3–1)
MONOCYTES NFR BLD: 9.7 % (ref 4–15)
NEUTROPHILS # BLD AUTO: 5.1 K/UL (ref 1.8–7.7)
NEUTROPHILS NFR BLD: 74.2 % (ref 38–73)
NRBC BLD-RTO: 0 /100 WBC
PLATELET # BLD AUTO: 134 K/UL (ref 150–350)
PLATELET BLD QL SMEAR: ABNORMAL
PMV BLD AUTO: 11.6 FL (ref 9.2–12.9)
POC ACTIVATED CLOTTING TIME K: 213 SEC (ref 74–137)
POC ACTIVATED CLOTTING TIME K: 257 SEC (ref 74–137)
POC ACTIVATED CLOTTING TIME K: 334 SEC (ref 74–137)
POCT GLUCOSE: 126 MG/DL (ref 70–110)
POCT GLUCOSE: 132 MG/DL (ref 70–110)
POCT GLUCOSE: 239 MG/DL (ref 70–110)
POIKILOCYTOSIS BLD QL SMEAR: SLIGHT
POTASSIUM SERPL-SCNC: 4.7 MMOL/L (ref 3.5–5.1)
POTASSIUM SERPL-SCNC: 4.8 MMOL/L (ref 3.5–5.1)
PROT SERPL-MCNC: 5.9 G/DL (ref 6–8.4)
RBC # BLD AUTO: 3.97 M/UL (ref 4.6–6.2)
SAMPLE: ABNORMAL
SCHISTOCYTES BLD QL SMEAR: PRESENT
SODIUM SERPL-SCNC: 137 MMOL/L (ref 136–145)
SODIUM SERPL-SCNC: 137 MMOL/L (ref 136–145)
TROPONIN I SERPL DL<=0.01 NG/ML-MCNC: 5.52 NG/ML (ref 0–0.03)
WBC # BLD AUTO: 6.81 K/UL (ref 3.9–12.7)

## 2019-10-13 PROCEDURE — 99152 MOD SED SAME PHYS/QHP 5/>YRS: CPT | Mod: HCNC,,, | Performed by: INTERNAL MEDICINE

## 2019-10-13 PROCEDURE — 93005 ELECTROCARDIOGRAM TRACING: CPT | Mod: HCNC

## 2019-10-13 PROCEDURE — 92943 CATH LAB PROCEDURE: ICD-10-PCS | Mod: RC,HCNC,, | Performed by: INTERNAL MEDICINE

## 2019-10-13 PROCEDURE — 92943 PRQ TRLUML REVSC CH OCC ANT: CPT | Mod: RC,HCNC,, | Performed by: INTERNAL MEDICINE

## 2019-10-13 PROCEDURE — 27000014 CATH LAB PROCEDURE: Mod: HCNC

## 2019-10-13 PROCEDURE — 21400001 HC TELEMETRY ROOM: Mod: HCNC

## 2019-10-13 PROCEDURE — 83880 ASSAY OF NATRIURETIC PEPTIDE: CPT | Mod: HCNC

## 2019-10-13 PROCEDURE — 25000003 PHARM REV CODE 250: Mod: HCNC | Performed by: INTERNAL MEDICINE

## 2019-10-13 PROCEDURE — 99152 CATH LAB PROCEDURE: ICD-10-PCS | Mod: HCNC,,, | Performed by: INTERNAL MEDICINE

## 2019-10-13 PROCEDURE — 36415 COLL VENOUS BLD VENIPUNCTURE: CPT | Mod: HCNC

## 2019-10-13 PROCEDURE — 93010 ELECTROCARDIOGRAM REPORT: CPT | Mod: HCNC,,, | Performed by: INTERNAL MEDICINE

## 2019-10-13 PROCEDURE — 99233 SBSQ HOSP IP/OBS HIGH 50: CPT | Mod: 25,HCNC,, | Performed by: INTERNAL MEDICINE

## 2019-10-13 PROCEDURE — 80053 COMPREHEN METABOLIC PANEL: CPT | Mod: HCNC

## 2019-10-13 PROCEDURE — 85025 COMPLETE CBC W/AUTO DIFF WBC: CPT | Mod: HCNC

## 2019-10-13 PROCEDURE — 84484 ASSAY OF TROPONIN QUANT: CPT | Mod: HCNC

## 2019-10-13 PROCEDURE — 63600175 PHARM REV CODE 636 W HCPCS: Mod: HCNC | Performed by: INTERNAL MEDICINE

## 2019-10-13 PROCEDURE — 63600175 PHARM REV CODE 636 W HCPCS: Mod: HCNC

## 2019-10-13 PROCEDURE — 25000003 PHARM REV CODE 250: Mod: HCNC

## 2019-10-13 PROCEDURE — 93010 EKG 12-LEAD: ICD-10-PCS | Mod: HCNC,,, | Performed by: INTERNAL MEDICINE

## 2019-10-13 PROCEDURE — 25000003 PHARM REV CODE 250: Mod: HCNC | Performed by: FAMILY MEDICINE

## 2019-10-13 PROCEDURE — C9607 PERC D-E COR REVASC CHRO SIN: HCPCS | Mod: RC,HCNC

## 2019-10-13 PROCEDURE — 63600175 PHARM REV CODE 636 W HCPCS: Mod: HCNC | Performed by: NURSE PRACTITIONER

## 2019-10-13 PROCEDURE — 25500020 PHARM REV CODE 255: Mod: HCNC

## 2019-10-13 PROCEDURE — 99233 PR SUBSEQUENT HOSPITAL CARE,LEVL III: ICD-10-PCS | Mod: 25,HCNC,, | Performed by: INTERNAL MEDICINE

## 2019-10-13 PROCEDURE — 80048 BASIC METABOLIC PNL TOTAL CA: CPT | Mod: HCNC

## 2019-10-13 RX ORDER — NITROGLYCERIN 0.4 MG/1
0.4 TABLET SUBLINGUAL EVERY 5 MIN PRN
Status: DISCONTINUED | OUTPATIENT
Start: 2019-10-13 | End: 2019-10-17 | Stop reason: HOSPADM

## 2019-10-13 RX ORDER — SODIUM CHLORIDE 9 MG/ML
INJECTION, SOLUTION INTRAVENOUS CONTINUOUS
Status: DISCONTINUED | OUTPATIENT
Start: 2019-10-13 | End: 2019-10-13 | Stop reason: SDUPTHER

## 2019-10-13 RX ORDER — TIROFIBAN HYDROCHLORIDE 50 UG/ML
0.15 INJECTION INTRAVENOUS CONTINUOUS
Status: DISCONTINUED | OUTPATIENT
Start: 2019-10-13 | End: 2019-10-14

## 2019-10-13 RX ADMIN — RANOLAZINE 500 MG: 500 TABLET, FILM COATED, EXTENDED RELEASE ORAL at 09:10

## 2019-10-13 RX ADMIN — CLOPIDOGREL BISULFATE 75 MG: 75 TABLET ORAL at 08:10

## 2019-10-13 RX ADMIN — TIROFIBAN 0.07 MCG/KG/MIN: 5 INJECTION, SOLUTION INTRAVENOUS at 01:10

## 2019-10-13 RX ADMIN — ASPIRIN 325 MG ORAL TABLET 325 MG: 325 PILL ORAL at 08:10

## 2019-10-13 RX ADMIN — NITROGLYCERIN 0.4 MG: 0.4 TABLET, ORALLY DISINTEGRATING SUBLINGUAL at 07:10

## 2019-10-13 RX ADMIN — ISOSORBIDE MONONITRATE 30 MG: 30 TABLET, EXTENDED RELEASE ORAL at 09:10

## 2019-10-13 RX ADMIN — LISINOPRIL 20 MG: 20 TABLET ORAL at 08:10

## 2019-10-13 RX ADMIN — RANOLAZINE 500 MG: 500 TABLET, FILM COATED, EXTENDED RELEASE ORAL at 08:10

## 2019-10-13 RX ADMIN — SODIUM CHLORIDE: 0.9 INJECTION, SOLUTION INTRAVENOUS at 08:10

## 2019-10-13 RX ADMIN — ESCITALOPRAM OXALATE 10 MG: 10 TABLET, FILM COATED ORAL at 08:10

## 2019-10-13 RX ADMIN — SIMVASTATIN 40 MG: 20 TABLET, FILM COATED ORAL at 09:10

## 2019-10-13 RX ADMIN — ISOSORBIDE MONONITRATE 30 MG: 30 TABLET, EXTENDED RELEASE ORAL at 08:10

## 2019-10-13 NOTE — OP NOTE
[facility] -   Cardiac Catheterization  Procedure Note    SUMMARY     Aquiles Kelsey  [id number]  Saige Miranda MD    Date of Procedure: 10/13/2019    Procedure:   1.  PCI PROXIMAL RCA KYLIE X 2  2. CORONARY ANGIOGRAM    Provider: Robe Gilbert MD    Assisting Provider:  Toni Gracia    Indications: He was referred for cardiac catheterization to further evaluate NSTEMI.    Pre-Procedure Diagnosis:  NSTEMI, MULTIVESSEL CAD    Post-Procedure Diagnosis:  PCI PROX RCA KYLIE X 2    Anesthesia: RN IV Sedation    Description of the Findings of the Procedure:     The risks, benefits, complications, treatment options, and expected outcomes were discussed with the patient. The patient and/or family concurred with the proposed plan, giving informed consent.    FINDINGS:    99% PROX RCA  99% MID RCA ISR  SUCCESSFUL TREATMENT PROX RCA KYLIE X 2 OVERLAPPED INTO OLD MID RCA STENT.  PTCA MID RCA ISR  DISTAL RCA OCCLUDED STENT, OCCLUDED PDA STENT.  COULD NOT CROSS WITH WIRE.  LEFT RADIAL TR BAND.      Complications: None; patient tolerated the procedure well.    Estimated Blood Loss (EBL): Minimal           Implants: KYLIE X 2    Specimens:  NONE    Condition: stable    Disposition: PACU - hemodynamically stable.    Attestation: I was present and scrubbed for the entire procedure.     RECOMMENDATIONS:    USUAL POST PCI CARE  ASA  PLAVIX  MAXIMIZE MEDICAL TX FOR CAD  CARDIAC DIET

## 2019-10-13 NOTE — ASSESSMENT & PLAN NOTE
- A1c 6.5 in June  - Hold home Metformin for now  - Accu checks ACHS with SSI PRN  - Monitor    10/12  Cont to hold metformin   Sliding scale   Glucose within goal     10/13:  Glucose within goal   Continue HSSI

## 2019-10-13 NOTE — NURSING
Pt still in Cath Lab. Attempted to remove 2 ml of air from Left radial TR band at 14:45 and Left radial artery started bleeding.2 ml of air returned to the TR band.  Site bruised from Last LHC on Friday and slightly swollen. No change from pre procedure.At 15:45 attempted to remove 2 ml from the TR band and site bled again. 2 ml Air returned to the TR band. Pt AAO x 3 post cath. Wife contacted concerning the delay for pt. to return to his Tele room.

## 2019-10-13 NOTE — PLAN OF CARE
Pt AAO x3.  VSS.  Pt remained afebrile throughout this shift.   Pt remained free of falls this shift.   Pt c/o of no pain this shift  Blood glucose monitored.  Plan of care reviewed. Patient verbalizes understanding.   Pt moving/turing indepedently. Frequent weight shifting encouraged.  Patient SR/SB on monitor.   Bed low, side rails up x 2, wheels locked, call light in reach.   Bed alarm maintained for safety.   Patient instructed to call for assistance.   Hourly rounding completed.   24 hour chart check completed.  Will continue to monitor.

## 2019-10-13 NOTE — PLAN OF CARE
POC reviewed. Pt verbalizes understanding. Comfort measures and safety measures addressed. Pt remains free from falls and injury. IVF as ordered. Telemetry monitoring as ordered. Pt Sinus bradycardia. Will continue to monitor.

## 2019-10-13 NOTE — NURSING
Air was released from the TR band 2ml at a time every 15 min starting at 1615. Pt tolerated well Wife at bedside  At 1730 the Left radial artery was clean dry and intact with bruising. No swelling or bleeding noted .Sterile drsg and armboard applied. Order for Aggrastat clarified with Dr. Gilbert and Pharmacy. He wants Aggrastat to run at 0.15mcg/kg/hr which is 13.9 ml/hr.Tele nurse Nathaly aware and drip is running at ordered rate until 3AM per our conversation with Dr. Gilbert Pt transported to Telemetry from Cath Lab via bed at 17:40.

## 2019-10-13 NOTE — PROGRESS NOTES
[facility] -   Cardiology  Progress Note    Patient Name: Aquiles Kelsey  MRN: [id number]  Admission Date: 10/11/2019  Hospital Length of Stay: 2 days  Code Status: Full Code   Attending Physician: Dl Cerrato MD   Primary Care Physician: Saige Miranda MD  Expected Discharge Date:   Principal Problem:NSTEMI (non-ST elevated myocardial infarction)    Subjective:     HPI:  Pt presents with NSTEMI.  His current medical conditions include CAD (multiple PCI procedures), HTN, DM, PAD, AAA stent graft (approx 2013), hyperlipidemia, cigar use.  H/o L LE stents in [place].   First PCI 1998, total of 5 stents with last one in [place] 2013.  Echo 7/19 normal LVEF, DD,  LVH, mild AI.   Holter 7/19 NSR, fleeting NSVT, EAT, rare PVCs, occasional PACs.  B LE vasc studies 7/19 B LE PAD, L > R LE.     Now in ER with several episodes of typical angina over past week and abnl troponin 0.8 range consistent w NSTEMI.  ECG earlier in week 10/9/19 showed new inferolateral st-t wave ischemic changes.  ECG today in ER shows improvement, nonspecific st-t abnl.  Cp free last couple of days.    Pt saw Dr. Hobbs, Cardiology, this week and was put on Imdur and had troponin checked and when found to be abnl pt was advised to go to ER.  He has chronic B LE claudication sxs.    Hospital Course:   10/12/19:  PT SEEN AND EXAMINED EARLIER THIS AM.  HAD GOOD NIGHT OVERALL. DENIES RECURRENT CP.  NO CHF SXS.  LABS AND ECG REVIEWED AND ARE STABLE.    10/13/19:  PT SEEN AND EXAMINED.  NO CHEST PAIN OR CHF SXS.  STABLE SINUS BRADYCARDIA.  TROPONIN PEAKED.  LABS STABLE.        Review of Systems   Constitution: Negative.   HENT: Negative.    Eyes: Negative.    Cardiovascular: Positive for chest pain.   Respiratory: Negative.    Endocrine: Negative.    Hematologic/Lymphatic: Negative.    Skin: Negative.    Musculoskeletal: Negative.    Gastrointestinal: Negative.    Genitourinary: Negative.    Neurological: Negative.    Psychiatric/Behavioral:  Negative.    Allergic/Immunologic: Negative.      Objective:     Vital Signs (Most Recent):  Temp: 98.3 °F (36.8 °C) (10/13/19 0738)  Pulse: (!) 48 (10/13/19 1110)  Resp: 18 (10/13/19 0738)  BP: (!) 151/65 (10/13/19 0738)  SpO2: (!) 94 % (10/13/19 0738) Vital Signs (24h Range):  Temp:  [97.5 °F (36.4 °C)-98.3 °F (36.8 °C)] 98.3 °F (36.8 °C)  Pulse:  [45-58] 48  Resp:  [18] 18  SpO2:  [94 %-97 %] 94 %  BP: (108-171)/(56-77) 151/65     Weight: (P) 75.6 kg (166 lb 10.7 oz)  Body mass index is 23.91 kg/m² (pended).     SpO2: (!) 94 %  O2 Device (Oxygen Therapy): room air      Intake/Output Summary (Last 24 hours) at 10/13/2019 1159  Last data filed at 10/13/2019 0600  Gross per 24 hour   Intake 646.25 ml   Output --   Net 646.25 ml       Lines/Drains/Airways     Peripheral Intravenous Line                 Peripheral IV - Single Lumen 10/11/19 1127 20 G Left Forearm 2 days         Peripheral IV - Single Lumen 10/11/19 1127 20 G Right Forearm 2 days                Physical Exam   Constitutional: He is oriented to person, place, and time. He appears well-developed and well-nourished.   HENT:   Head: Normocephalic.   Neck: Normal range of motion. Neck supple. Normal carotid pulses, no hepatojugular reflux and no JVD present. Carotid bruit is not present. No thyromegaly present.   Cardiovascular: Regular rhythm, S1 normal and S2 normal. Bradycardia present. PMI is not displaced. Exam reveals no S3, no S4, no distant heart sounds, no friction rub, no midsystolic click and no opening snap.   No murmur heard.  Pulses:       Radial pulses are 2+ on the right side, and 2+ on the left side.   LEFT RADIAL PULSE 2+  LEFT RADIAL SITE ECCHYMOSIS NOTED.   Pulmonary/Chest: Effort normal and breath sounds normal. He has no wheezes. He has no rales.   Abdominal: Soft. Bowel sounds are normal. He exhibits no distension, no abdominal bruit, no ascites and no mass. There is no tenderness.   Musculoskeletal: He exhibits no edema.    Neurological: He is alert and oriented to person, place, and time.   Skin: Skin is warm.   Psychiatric: He has a normal mood and affect. His behavior is normal.   Nursing note and vitals reviewed.      Significant Labs:   BMP:   Recent Labs   Lab 10/12/19  0419 10/13/19  0402   * 140*  143*    137  137   K 4.5 4.7  4.8    104  103   CO2 23 23  26   BUN 17 17  17   CREATININE 1.1 1.1  1.1   CALCIUM 8.7 8.8  8.9   , CMP   Recent Labs   Lab 10/12/19  0419 10/13/19  0402    137  137   K 4.5 4.7  4.8    104  103   CO2 23 23  26   * 140*  143*   BUN 17 17  17   CREATININE 1.1 1.1  1.1   CALCIUM 8.7 8.8  8.9   PROT  --  5.9*   ALBUMIN  --  3.2*   BILITOT  --  0.5   ALKPHOS  --  54*   AST  --  24   ALT  --  10   ANIONGAP 9 10  8   ESTGFRAFRICA >60 >60  >60   EGFRNONAA >60 >60  >60   , CBC   Recent Labs   Lab 10/12/19  0419 10/13/19  0402   WBC 8.01 6.81   HGB 12.1* 12.0*   HCT 36.8* 36.0*   * 134*   , INR No results for input(s): INR, PROTIME in the last 48 hours. and Troponin   Recent Labs   Lab 10/12/19  1421 10/12/19  1759 10/13/19  0402   TROPONINI 6.199* 6.606* 5.517*       Significant Imaging: Echocardiogram:   2D echo with color flow doppler:   Results for orders placed or performed during the hospital encounter of 10/11/19   2D echo with color flow doppler   Result Value Ref Range    QEF 55 55 - 65    Mitral Valve Regurgitation MILD     Aortic Valve Regurgitation MILD     Est. PA Systolic Pressure 33.25     Narrative    Date of Procedure: 10/11/2019        TEST DESCRIPTION   Technical Quality: This is a technically challenging study. There is poor endocardial definition.     Aorta: The aortic root is normal in size, measuring 3.3 cm at sinotubular junction and 3.5 cm at Sinuses of Valsalva. The proximal ascending aorta is normal in size, measuring 3.0 cm across.     Left Atrium: The left atrial volume index is moderately enlarged, measuring 45.51  cc/m2.     Left Ventricle: The left ventricle is normal in size, with an end-diastolic diameter of 4.2 cm, and an end-systolic diameter of 2.6 cm. Septal wall thickness is mildly increased, with the septum measuring 1.4 cm and the posterior wall measuring 1.1 cm   across. Relative wall thickness was increased at 0.52, and the LV mass index was increased at 116.3 g/m2 consistent with concentric left ventricular hypertrophy. The following segments were mildly hypokinetic: mid inferolateral wall.  Left ventricular systolic function appears normal. Visually estimated ejection fraction is 55-60%. The LV Doppler derived stroke volume equals 78.0 ccs.     Diastolic indices: E wave velocity 0.9 m/s, E/A ratio 1.2,  msec., E/e' ratio(avg) 13. Diastolic function is indeterminate.     Right Atrium: The right atrium is normal in size, measuring 5.2 cm in length and 2.7 cm in width in the apical view.     Right Ventricle: The right ventricle is normal in size. Global right ventricular systolic function appears normal. Tricuspid annular plane systolic excursion (TAPSE) is 2.0 cm. The estimated PA systolic pressure is 33 mmHg.     Aortic Valve:  The aortic valve is mildly sclerotic with normal leaflet mobility. The mean gradient obtained across the aortic valve is 5 mmHg. Additionally, there is mild aortic regurgitation. There is a pressure half time of 747.0 msec.     Mitral Valve:  The mitral valve is normal in structure. The pressure half time is 54 msec. The calculated mitral valve area is 4.07 cm2. There is mild mitral regurgitation.     Tricuspid Valve:  The tricuspid valve is normal in structure.     Pulmonary Valve:  The pulmonic valve is not well seen.     IVC: IVC is normal in size and collapses > 50% with a sniff, suggesting normal right atrial pressure of 3 mmHg.     Intracavitary: There is no evidence of pericardial effusion, intracavity mass, thrombi, or vegetation.         CONCLUSIONS     1 - Normal left  ventricular systolic function (EF 55-60%).     2 - Indeterminate LV diastolic function.     3 - Normal right ventricular systolic function .     4 - Mild aortic regurgitation.     5 - Moderate left atrial enlargement.     6 - Wall motion abnormalities.     7 - Concentric hypertrophy.     8 - The estimated PA systolic pressure is 33 mmHg.             This document has been electronically    SIGNED BY: Robe Gilbert MD On: 10/11/2019 16:04     Assessment and Plan:     S/P PCI SEVERE LCX/OM STENOSES KYLEI X 3 OVERLAPPED.  WILL PROCEED WITH ATTEMPT PCI OCCLUDED RCA TODAY.  INDICATIONS, ALL RISKS/BENEFTIS DISCUSSED AGAIN WITH PT AND WIFE.  CONTINUE CURRENT MEDS.  CARDIAC DIET.  FURTHER RECS TO FOLLOW CATH.  MONITOR SINUS BRADYCARDIA.      * NSTEMI (non-ST elevated myocardial infarction)  See management plan detailed above.     Ischemic cardiomyopathy  OPTIMIZE MEDICAL TX FOR ICM.  LIMITED ON MEDICAL TX AS HE WILL NOT BE ABLE TO TOLERATE BETA-BLOCKERS DUE TO SINUS BRADYCARDIA ISSUES.    CAD, multiple vessel  OPTIMIZE MEDICAL TX FOR CAD.    Sinus bradycardia  STABLE.  AT RISK FOR PPM IN FUTURE.    Tobacco abuse  TOBACCO CESSATION ADVISED.    AP (angina pectoris)  See management plan detailed above.     Nonrheumatic aortic valve insufficiency  See management plan detailed above.     Claudication in peripheral vascular disease  See management plan detailed above.     History of PTCA  See management plan detailed above.     Abnormal ECG  See management plan detailed above.     Essential hypertension  See management plan detailed above.     PAD (peripheral artery disease)  See management plan detailed above.     CAD (coronary artery disease)  See management plan detailed above.         VTE Risk Mitigation (From admission, onward)         Ordered     IP VTE HIGH RISK PATIENT  Once      10/11/19 1236     Place sequential compression device  Until discontinued      10/11/19 1236                Robe Gilbert MD  Cardiology  [facility] -

## 2019-10-13 NOTE — SUBJECTIVE & OBJECTIVE
Interval History: Denies any issues overnight. To go for PCI of RCA today.     Review of Systems   Constitutional: Negative for fatigue and fever.   Respiratory: Negative for shortness of breath.    Cardiovascular: Negative for chest pain.   Gastrointestinal: Negative for nausea and vomiting.   Skin: Negative for rash.   Neurological: Negative for headaches.     Objective:     Vital Signs (Most Recent):  Temp: 98.3 °F (36.8 °C) (10/13/19 0738)  Pulse: (!) 48 (10/13/19 1110)  Resp: 18 (10/13/19 0738)  BP: (!) 151/65 (10/13/19 0738)  SpO2: (!) 94 % (10/13/19 0738) Vital Signs (24h Range):  Temp:  [98 °F (36.7 °C)-98.3 °F (36.8 °C)] 98.3 °F (36.8 °C)  Pulse:  [45-58] 48  Resp:  [18] 18  SpO2:  [94 %-96 %] 94 %  BP: (108-171)/(56-77) 151/65     Weight: (P) 75.6 kg (166 lb 10.7 oz)  Body mass index is 23.91 kg/m² (pended).    Intake/Output Summary (Last 24 hours) at 10/13/2019 1342  Last data filed at 10/13/2019 0600  Gross per 24 hour   Intake 646.25 ml   Output --   Net 646.25 ml      Physical Exam   Constitutional: He is oriented to person, place, and time. He appears well-developed and well-nourished. No distress.   Cardiovascular: Normal rate, regular rhythm and normal heart sounds. Exam reveals no gallop and no friction rub.   No murmur heard.  Pulmonary/Chest: Effort normal and breath sounds normal. No stridor. No respiratory distress. He has no wheezes. He has no rales.   Abdominal: Soft. Bowel sounds are normal. He exhibits no distension and no mass. There is no tenderness. There is no guarding.   Musculoskeletal: He exhibits no edema.   Neurological: He is alert and oriented to person, place, and time.   Skin: He is not diaphoretic.       Significant Labs:   BMP:   Recent Labs   Lab 10/13/19  0402   *  143*     137   K 4.7  4.8     103   CO2 23  26   BUN 17  17   CREATININE 1.1  1.1   CALCIUM 8.8  8.9     CBC:   Recent Labs   Lab 10/12/19  0419 10/13/19  0402   WBC 8.01 6.81   HGB  12.1* 12.0*   HCT 36.8* 36.0*   * 134*       Significant Imaging: I have reviewed all pertinent imaging results/findings within the past 24 hours.

## 2019-10-13 NOTE — ASSESSMENT & PLAN NOTE
- Admit to Tele  - Initial troponin 0.466, will trend  - Given  mg in the ER  - Continue home ASA, Plavix, ACEi, Zocor, and Imdur  - ECHO pending  - Cardiology consulted and recommended a Heparin gtt with plans for a heart cath today with additional recs to follow  - NPO  - IVFs  - Supplemental o2 and SL NTG prn  - Tele monitoring     10/12:  S/p PCI with KYLIE x3 of the LCx and OM  Cards planning on performing PCI of RCA in the am  NPO after midnight     10/13:  PCI of RCA pending today  Cards recs to follow  Resume diet postop

## 2019-10-13 NOTE — PROGRESS NOTES
[facility] Hospital Medicine  Progress Note    Patient Name: Aquiles Kelsey  MRN: [id number]  Patient Class: IP- Inpatient   Admission Date: 10/11/2019  Length of Stay: 2 days  Attending Physician: Dl eCrrato MD  Primary Care Provider: Saige Miranda MD        Subjective:     Principal Problem:NSTEMI (non-ST elevated myocardial infarction)        HPI:  Aquiles Kelsey is a 79 y.o. male patient with a PMHx of CAD who presented to the Emergency Department today for evaluation of abnormal lab results.  Patient reports seeing Dr. Hobbs (Cardiology) and labs resulting in elevated troponin levels.  Associated sxs include CP.  Patient states he has had CP with a pain rating of 3/10 the past three days, but has complete relief as of today.  Patient denies any SOB, N/V, fever, chills, diaphoresis, palpitations, extremity weakness, dizziness, hematochezia, cough, and all other sxs at this time.  Prior Tx includes Imdur and 325 mg Aspirin.  Pt denies recent bleeding of any kind.  No further complaints or concerns at this time.  ER workup showed:  Stable VS.  12 lead EKG showed SB with nonspecific T wave abnormality.  Troponin yesterday elevated to 0.8345, repeat today 0.466.  CXR negative.  Cardiology consulted from the ER and recommended admission with a Heparin gtt and plans for a heart cath today with additional recs to follow.  ECHO pending.  Hospital Medicine called for admission.  Patient will be admitted to [facility].  He is a Full Code.  His SDM is his wife Gayle who can be reached at [phone].       Overview/Hospital Course:  10/12:  S/p LHC with KYLIE x3 of the LCx and OM. Cards plan to perform PCI of RCA tomorrow.     10/13:  Cardio attempting PCI of RCA today     Interval History: Denies any issues overnight. To go for PCI of RCA today.     Review of Systems   Constitutional: Negative for fatigue and fever.   Respiratory: Negative for shortness of breath.    Cardiovascular: Negative  for chest pain.   Gastrointestinal: Negative for nausea and vomiting.   Skin: Negative for rash.   Neurological: Negative for headaches.     Objective:     Vital Signs (Most Recent):  Temp: 98.3 °F (36.8 °C) (10/13/19 0738)  Pulse: (!) 48 (10/13/19 1110)  Resp: 18 (10/13/19 0738)  BP: (!) 151/65 (10/13/19 0738)  SpO2: (!) 94 % (10/13/19 0738) Vital Signs (24h Range):  Temp:  [98 °F (36.7 °C)-98.3 °F (36.8 °C)] 98.3 °F (36.8 °C)  Pulse:  [45-58] 48  Resp:  [18] 18  SpO2:  [94 %-96 %] 94 %  BP: (108-171)/(56-77) 151/65     Weight: (P) 75.6 kg (166 lb 10.7 oz)  Body mass index is 23.91 kg/m² (pended).    Intake/Output Summary (Last 24 hours) at 10/13/2019 1342  Last data filed at 10/13/2019 0600  Gross per 24 hour   Intake 646.25 ml   Output --   Net 646.25 ml      Physical Exam   Constitutional: He is oriented to person, place, and time. He appears well-developed and well-nourished. No distress.   Cardiovascular: Normal rate, regular rhythm and normal heart sounds. Exam reveals no gallop and no friction rub.   No murmur heard.  Pulmonary/Chest: Effort normal and breath sounds normal. No stridor. No respiratory distress. He has no wheezes. He has no rales.   Abdominal: Soft. Bowel sounds are normal. He exhibits no distension and no mass. There is no tenderness. There is no guarding.   Musculoskeletal: He exhibits no edema.   Neurological: He is alert and oriented to person, place, and time.   Skin: He is not diaphoretic.       Significant Labs:   BMP:   Recent Labs   Lab 10/13/19  0402   *  143*     137   K 4.7  4.8     103   CO2 23  26   BUN 17  17   CREATININE 1.1  1.1   CALCIUM 8.8  8.9     CBC:   Recent Labs   Lab 10/12/19  0419 10/13/19  0402   WBC 8.01 6.81   HGB 12.1* 12.0*   HCT 36.8* 36.0*   * 134*       Significant Imaging: I have reviewed all pertinent imaging results/findings within the past 24 hours.      Assessment/Plan:      * NSTEMI (non-ST elevated myocardial  infarction)  - Admit to Tele  - Initial troponin 0.466, will trend  - Given  mg in the ER  - Continue home ASA, Plavix, ACEi, Zocor, and Imdur  - ECHO pending  - Cardiology consulted and recommended a Heparin gtt with plans for a heart cath today with additional recs to follow  - NPO  - IVFs  - Supplemental o2 and SL NTG prn  - Tele monitoring     10/12:  S/p PCI with KYLIE x3 of the LCx and OM  Cards planning on performing PCI of RCA in the am  NPO after midnight     10/13:  PCI of RCA pending today  Cards recs to follow  Resume diet postop         Sinus bradycardia  10/13:  Stable  Continue to monitor     Tobacco abuse  - Counseled on the need for cessation      AP (angina pectoris)  - See plan as per above      Nonrheumatic aortic valve insufficiency  - Cardiology following      Claudication in peripheral vascular disease  - Continue home meds      Depression  - Continue home meds      Type 2 diabetes mellitus with hyperglycemia, without long-term current use of insulin  - A1c 6.5 in June  - Hold home Metformin for now  - Accu checks ACHS with SSI PRN  - Monitor    10/12  Cont to hold metformin   Sliding scale   Glucose within goal     10/13:  Glucose within goal   Continue HSSI    Dyslipidemia  - Continue home meds      Essential hypertension  - BP elevated  - Continue home meds  - Monitor and adjust as needed     10/12:  bp slightly elevated  Will continue to monitor   Increasing lisinopril to 20mg daily       PAD (peripheral artery disease)  - Continue home meds      CAD (coronary artery disease)  - See plan as per above        VTE Risk Mitigation (From admission, onward)         Ordered     IP VTE HIGH RISK PATIENT  Once      10/11/19 1236     Place sequential compression device  Until discontinued      10/11/19 1236                      Dl Cerrato MD  Department of Hospital Medicine   [facility]

## 2019-10-13 NOTE — SUBJECTIVE & OBJECTIVE
Review of Systems   Constitution: Negative.   HENT: Negative.    Eyes: Negative.    Cardiovascular: Positive for chest pain.   Respiratory: Negative.    Endocrine: Negative.    Hematologic/Lymphatic: Negative.    Skin: Negative.    Musculoskeletal: Negative.    Gastrointestinal: Negative.    Genitourinary: Negative.    Neurological: Negative.    Psychiatric/Behavioral: Negative.    Allergic/Immunologic: Negative.      Objective:     Vital Signs (Most Recent):  Temp: 98.3 °F (36.8 °C) (10/13/19 0738)  Pulse: (!) 48 (10/13/19 1110)  Resp: 18 (10/13/19 0738)  BP: (!) 151/65 (10/13/19 0738)  SpO2: (!) 94 % (10/13/19 0738) Vital Signs (24h Range):  Temp:  [97.5 °F (36.4 °C)-98.3 °F (36.8 °C)] 98.3 °F (36.8 °C)  Pulse:  [45-58] 48  Resp:  [18] 18  SpO2:  [94 %-97 %] 94 %  BP: (108-171)/(56-77) 151/65     Weight: (P) 75.6 kg (166 lb 10.7 oz)  Body mass index is 23.91 kg/m² (pended).     SpO2: (!) 94 %  O2 Device (Oxygen Therapy): room air      Intake/Output Summary (Last 24 hours) at 10/13/2019 1159  Last data filed at 10/13/2019 0600  Gross per 24 hour   Intake 646.25 ml   Output --   Net 646.25 ml       Lines/Drains/Airways     Peripheral Intravenous Line                 Peripheral IV - Single Lumen 10/11/19 1127 20 G Left Forearm 2 days         Peripheral IV - Single Lumen 10/11/19 1127 20 G Right Forearm 2 days                Physical Exam   Constitutional: He is oriented to person, place, and time. He appears well-developed and well-nourished.   HENT:   Head: Normocephalic.   Neck: Normal range of motion. Neck supple. Normal carotid pulses, no hepatojugular reflux and no JVD present. Carotid bruit is not present. No thyromegaly present.   Cardiovascular: Regular rhythm, S1 normal and S2 normal. Bradycardia present. PMI is not displaced. Exam reveals no S3, no S4, no distant heart sounds, no friction rub, no midsystolic click and no opening snap.   No murmur heard.  Pulses:       Radial pulses are 2+ on the right  side, and 2+ on the left side.   LEFT RADIAL PULSE 2+  LEFT RADIAL SITE ECCHYMOSIS NOTED.   Pulmonary/Chest: Effort normal and breath sounds normal. He has no wheezes. He has no rales.   Abdominal: Soft. Bowel sounds are normal. He exhibits no distension, no abdominal bruit, no ascites and no mass. There is no tenderness.   Musculoskeletal: He exhibits no edema.   Neurological: He is alert and oriented to person, place, and time.   Skin: Skin is warm.   Psychiatric: He has a normal mood and affect. His behavior is normal.   Nursing note and vitals reviewed.      Significant Labs:   BMP:   Recent Labs   Lab 10/12/19  0419 10/13/19  0402   * 140*  143*    137  137   K 4.5 4.7  4.8    104  103   CO2 23 23  26   BUN 17 17  17   CREATININE 1.1 1.1  1.1   CALCIUM 8.7 8.8  8.9   , CMP   Recent Labs   Lab 10/12/19  0419 10/13/19  0402    137  137   K 4.5 4.7  4.8    104  103   CO2 23 23  26   * 140*  143*   BUN 17 17  17   CREATININE 1.1 1.1  1.1   CALCIUM 8.7 8.8  8.9   PROT  --  5.9*   ALBUMIN  --  3.2*   BILITOT  --  0.5   ALKPHOS  --  54*   AST  --  24   ALT  --  10   ANIONGAP 9 10  8   ESTGFRAFRICA >60 >60  >60   EGFRNONAA >60 >60  >60   , CBC   Recent Labs   Lab 10/12/19  0419 10/13/19  0402   WBC 8.01 6.81   HGB 12.1* 12.0*   HCT 36.8* 36.0*   * 134*   , INR No results for input(s): INR, PROTIME in the last 48 hours. and Troponin   Recent Labs   Lab 10/12/19  1421 10/12/19  1759 10/13/19  0402   TROPONINI 6.199* 6.606* 5.517*       Significant Imaging: Echocardiogram:   2D echo with color flow doppler:   Results for orders placed or performed during the hospital encounter of 10/11/19   2D echo with color flow doppler   Result Value Ref Range    QEF 55 55 - 65    Mitral Valve Regurgitation MILD     Aortic Valve Regurgitation MILD     Est. PA Systolic Pressure 33.25     Narrative    Date of Procedure: 10/11/2019        TEST DESCRIPTION   Technical Quality:  This is a technically challenging study. There is poor endocardial definition.     Aorta: The aortic root is normal in size, measuring 3.3 cm at sinotubular junction and 3.5 cm at Sinuses of Valsalva. The proximal ascending aorta is normal in size, measuring 3.0 cm across.     Left Atrium: The left atrial volume index is moderately enlarged, measuring 45.51 cc/m2.     Left Ventricle: The left ventricle is normal in size, with an end-diastolic diameter of 4.2 cm, and an end-systolic diameter of 2.6 cm. Septal wall thickness is mildly increased, with the septum measuring 1.4 cm and the posterior wall measuring 1.1 cm   across. Relative wall thickness was increased at 0.52, and the LV mass index was increased at 116.3 g/m2 consistent with concentric left ventricular hypertrophy. The following segments were mildly hypokinetic: mid inferolateral wall.  Left ventricular systolic function appears normal. Visually estimated ejection fraction is 55-60%. The LV Doppler derived stroke volume equals 78.0 ccs.     Diastolic indices: E wave velocity 0.9 m/s, E/A ratio 1.2,  msec., E/e' ratio(avg) 13. Diastolic function is indeterminate.     Right Atrium: The right atrium is normal in size, measuring 5.2 cm in length and 2.7 cm in width in the apical view.     Right Ventricle: The right ventricle is normal in size. Global right ventricular systolic function appears normal. Tricuspid annular plane systolic excursion (TAPSE) is 2.0 cm. The estimated PA systolic pressure is 33 mmHg.     Aortic Valve:  The aortic valve is mildly sclerotic with normal leaflet mobility. The mean gradient obtained across the aortic valve is 5 mmHg. Additionally, there is mild aortic regurgitation. There is a pressure half time of 747.0 msec.     Mitral Valve:  The mitral valve is normal in structure. The pressure half time is 54 msec. The calculated mitral valve area is 4.07 cm2. There is mild mitral regurgitation.     Tricuspid Valve:  The  tricuspid valve is normal in structure.     Pulmonary Valve:  The pulmonic valve is not well seen.     IVC: IVC is normal in size and collapses > 50% with a sniff, suggesting normal right atrial pressure of 3 mmHg.     Intracavitary: There is no evidence of pericardial effusion, intracavity mass, thrombi, or vegetation.         CONCLUSIONS     1 - Normal left ventricular systolic function (EF 55-60%).     2 - Indeterminate LV diastolic function.     3 - Normal right ventricular systolic function .     4 - Mild aortic regurgitation.     5 - Moderate left atrial enlargement.     6 - Wall motion abnormalities.     7 - Concentric hypertrophy.     8 - The estimated PA systolic pressure is 33 mmHg.             This document has been electronically    SIGNED BY: Robe Gilbert MD On: 10/11/2019 16:04

## 2019-10-14 ENCOUNTER — ANESTHESIA (OUTPATIENT)
Dept: ENDOSCOPY | Facility: HOSPITAL | Age: 79
DRG: 246 | End: 2019-10-14
Payer: MEDICARE

## 2019-10-14 ENCOUNTER — ANESTHESIA EVENT (OUTPATIENT)
Dept: ENDOSCOPY | Facility: HOSPITAL | Age: 79
DRG: 246 | End: 2019-10-14
Payer: MEDICARE

## 2019-10-14 DIAGNOSIS — D62 ACUTE POST-HEMORRHAGIC ANEMIA: ICD-10-CM

## 2019-10-14 DIAGNOSIS — K92.0 HEMATEMESIS WITH NAUSEA: Primary | ICD-10-CM

## 2019-10-14 PROBLEM — R07.9 CHEST PAIN: Status: ACTIVE | Noted: 2019-10-14

## 2019-10-14 PROBLEM — R57.1 HYPOVOLEMIC SHOCK: Status: ACTIVE | Noted: 2019-10-14

## 2019-10-14 LAB
ABO + RH BLD: NORMAL
ALBUMIN SERPL BCP-MCNC: 2.6 G/DL (ref 3.5–5.2)
ALP SERPL-CCNC: 42 U/L (ref 55–135)
ALT SERPL W/O P-5'-P-CCNC: 10 U/L (ref 10–44)
ANION GAP SERPL CALC-SCNC: 14 MMOL/L (ref 8–16)
AST SERPL-CCNC: 18 U/L (ref 10–40)
BASOPHILS # BLD AUTO: 0.02 K/UL (ref 0–0.2)
BASOPHILS NFR BLD: 0.2 % (ref 0–1.9)
BILIRUB SERPL-MCNC: 0.4 MG/DL (ref 0.1–1)
BLD GP AB SCN CELLS X3 SERPL QL: NORMAL
BLD PROD TYP BPU: NORMAL
BLOOD UNIT EXPIRATION DATE: NORMAL
BLOOD UNIT TYPE CODE: 5100
BLOOD UNIT TYPE CODE: 6200
BLOOD UNIT TYPE: NORMAL
BUN SERPL-MCNC: 37 MG/DL (ref 8–23)
CALCIUM SERPL-MCNC: 8.2 MG/DL (ref 8.7–10.5)
CHLORIDE SERPL-SCNC: 106 MMOL/L (ref 95–110)
CO2 SERPL-SCNC: 17 MMOL/L (ref 23–29)
CODING SYSTEM: NORMAL
CREAT SERPL-MCNC: 1.3 MG/DL (ref 0.5–1.4)
DIFFERENTIAL METHOD: ABNORMAL
DISPENSE STATUS: NORMAL
EOSINOPHIL # BLD AUTO: 0 K/UL (ref 0–0.5)
EOSINOPHIL NFR BLD: 0.2 % (ref 0–8)
ERYTHROCYTE [DISTWIDTH] IN BLOOD BY AUTOMATED COUNT: 13.4 % (ref 11.5–14.5)
ERYTHROCYTE [DISTWIDTH] IN BLOOD BY AUTOMATED COUNT: 13.5 % (ref 11.5–14.5)
ERYTHROCYTE [DISTWIDTH] IN BLOOD BY AUTOMATED COUNT: 14.2 % (ref 11.5–14.5)
EST. GFR  (AFRICAN AMERICAN): 60 ML/MIN/1.73 M^2
EST. GFR  (NON AFRICAN AMERICAN): 52 ML/MIN/1.73 M^2
ESTIMATED AVG GLUCOSE: 120 MG/DL (ref 68–131)
GLUCOSE SERPL-MCNC: 293 MG/DL (ref 70–110)
HBA1C MFR BLD HPLC: 5.8 % (ref 4–5.6)
HCT VFR BLD AUTO: 26.7 % (ref 40–54)
HCT VFR BLD AUTO: 28.1 % (ref 40–54)
HCT VFR BLD AUTO: 28.2 % (ref 40–54)
HCT VFR BLD AUTO: 30.8 % (ref 40–54)
HGB BLD-MCNC: 8.6 G/DL (ref 14–18)
HGB BLD-MCNC: 9.2 G/DL (ref 14–18)
HGB BLD-MCNC: 9.3 G/DL (ref 14–18)
HGB BLD-MCNC: 9.8 G/DL (ref 14–18)
IMM GRANULOCYTES # BLD AUTO: 0.05 K/UL (ref 0–0.04)
IMM GRANULOCYTES NFR BLD AUTO: 0.6 % (ref 0–0.5)
INR PPP: 1.2 (ref 0.8–1.2)
LYMPHOCYTES # BLD AUTO: 0.9 K/UL (ref 1–4.8)
LYMPHOCYTES NFR BLD: 10.4 % (ref 18–48)
MCH RBC QN AUTO: 29.3 PG (ref 27–31)
MCH RBC QN AUTO: 30.3 PG (ref 27–31)
MCH RBC QN AUTO: 30.6 PG (ref 27–31)
MCHC RBC AUTO-ENTMCNC: 31.8 G/DL (ref 32–36)
MCHC RBC AUTO-ENTMCNC: 32.2 G/DL (ref 32–36)
MCHC RBC AUTO-ENTMCNC: 33 G/DL (ref 32–36)
MCV RBC AUTO: 92 FL (ref 82–98)
MCV RBC AUTO: 92 FL (ref 82–98)
MCV RBC AUTO: 95 FL (ref 82–98)
MONOCYTES # BLD AUTO: 0.5 K/UL (ref 0.3–1)
MONOCYTES NFR BLD: 5.8 % (ref 4–15)
NEUTROPHILS # BLD AUTO: 6.9 K/UL (ref 1.8–7.7)
NEUTROPHILS NFR BLD: 82.8 % (ref 38–73)
NRBC BLD-RTO: 0 /100 WBC
NUM UNITS TRANS PACKED RBC: NORMAL
PLATELET # BLD AUTO: 142 K/UL (ref 150–350)
PLATELET # BLD AUTO: 160 K/UL (ref 150–350)
PLATELET # BLD AUTO: 172 K/UL (ref 150–350)
PMV BLD AUTO: 10.9 FL (ref 9.2–12.9)
PMV BLD AUTO: 11 FL (ref 9.2–12.9)
PMV BLD AUTO: 11.8 FL (ref 9.2–12.9)
POCT GLUCOSE: 180 MG/DL (ref 70–110)
POCT GLUCOSE: 184 MG/DL (ref 70–110)
POCT GLUCOSE: 189 MG/DL (ref 70–110)
POTASSIUM SERPL-SCNC: 4.7 MMOL/L (ref 3.5–5.1)
PROT SERPL-MCNC: 4.7 G/DL (ref 6–8.4)
PROTHROMBIN TIME: 12.9 SEC (ref 9–12.5)
RBC # BLD AUTO: 2.81 M/UL (ref 4.6–6.2)
RBC # BLD AUTO: 3.07 M/UL (ref 4.6–6.2)
RBC # BLD AUTO: 3.34 M/UL (ref 4.6–6.2)
SODIUM SERPL-SCNC: 137 MMOL/L (ref 136–145)
TRANS PLATPHERESIS VOL PATIENT: NORMAL ML
WBC # BLD AUTO: 7.14 K/UL (ref 3.9–12.7)
WBC # BLD AUTO: 8.28 K/UL (ref 3.9–12.7)
WBC # BLD AUTO: 9.7 K/UL (ref 3.9–12.7)

## 2019-10-14 PROCEDURE — 83036 HEMOGLOBIN GLYCOSYLATED A1C: CPT | Mod: HCNC

## 2019-10-14 PROCEDURE — 85018 HEMOGLOBIN: CPT | Mod: HCNC

## 2019-10-14 PROCEDURE — 93005 ELECTROCARDIOGRAM TRACING: CPT | Mod: HCNC

## 2019-10-14 PROCEDURE — 25000003 PHARM REV CODE 250: Mod: HCNC | Performed by: INTERNAL MEDICINE

## 2019-10-14 PROCEDURE — 43235 EGD DIAGNOSTIC BRUSH WASH: CPT | Mod: HCNC | Performed by: INTERNAL MEDICINE

## 2019-10-14 PROCEDURE — 80053 COMPREHEN METABOLIC PANEL: CPT | Mod: HCNC

## 2019-10-14 PROCEDURE — C9113 INJ PANTOPRAZOLE SODIUM, VIA: HCPCS | Mod: HCNC | Performed by: PHYSICIAN ASSISTANT

## 2019-10-14 PROCEDURE — 99900035 HC TECH TIME PER 15 MIN (STAT): Mod: HCNC

## 2019-10-14 PROCEDURE — 27000221 HC OXYGEN, UP TO 24 HOURS: Mod: HCNC

## 2019-10-14 PROCEDURE — 25000003 PHARM REV CODE 250: Mod: HCNC | Performed by: NURSE ANESTHETIST, CERTIFIED REGISTERED

## 2019-10-14 PROCEDURE — C9113 INJ PANTOPRAZOLE SODIUM, VIA: HCPCS | Mod: HCNC | Performed by: HOSPITALIST

## 2019-10-14 PROCEDURE — 85610 PROTHROMBIN TIME: CPT | Mod: HCNC

## 2019-10-14 PROCEDURE — 99291 PR CRITICAL CARE, E/M 30-74 MINUTES: ICD-10-PCS | Mod: HCNC,,, | Performed by: NURSE PRACTITIONER

## 2019-10-14 PROCEDURE — 63600175 PHARM REV CODE 636 W HCPCS: Mod: HCNC | Performed by: INTERNAL MEDICINE

## 2019-10-14 PROCEDURE — 63600175 PHARM REV CODE 636 W HCPCS: Mod: HCNC | Performed by: HOSPITALIST

## 2019-10-14 PROCEDURE — 20000000 HC ICU ROOM: Mod: HCNC

## 2019-10-14 PROCEDURE — 37000008 HC ANESTHESIA 1ST 15 MINUTES: Mod: HCNC | Performed by: INTERNAL MEDICINE

## 2019-10-14 PROCEDURE — 93010 ELECTROCARDIOGRAM REPORT: CPT | Mod: HCNC,,, | Performed by: INTERNAL MEDICINE

## 2019-10-14 PROCEDURE — P9035 PLATELET PHERES LEUKOREDUCED: HCPCS | Mod: HCNC

## 2019-10-14 PROCEDURE — 37000009 HC ANESTHESIA EA ADD 15 MINS: Mod: HCNC | Performed by: INTERNAL MEDICINE

## 2019-10-14 PROCEDURE — 86901 BLOOD TYPING SEROLOGIC RH(D): CPT | Mod: HCNC

## 2019-10-14 PROCEDURE — 36415 COLL VENOUS BLD VENIPUNCTURE: CPT | Mod: HCNC

## 2019-10-14 PROCEDURE — 36430 TRANSFUSION BLD/BLD COMPNT: CPT | Mod: HCNC

## 2019-10-14 PROCEDURE — 99222 PR INITIAL HOSPITAL CARE,LEVL II: ICD-10-PCS | Mod: HCNC,,, | Performed by: SURGERY

## 2019-10-14 PROCEDURE — 86920 COMPATIBILITY TEST SPIN: CPT | Mod: HCNC

## 2019-10-14 PROCEDURE — 93010 EKG 12-LEAD: ICD-10-PCS | Mod: HCNC,,, | Performed by: INTERNAL MEDICINE

## 2019-10-14 PROCEDURE — 43235 EGD DIAGNOSTIC BRUSH WASH: CPT | Mod: HCNC,,, | Performed by: INTERNAL MEDICINE

## 2019-10-14 PROCEDURE — 25000003 PHARM REV CODE 250: Mod: HCNC | Performed by: HOSPITALIST

## 2019-10-14 PROCEDURE — 63600175 PHARM REV CODE 636 W HCPCS: Mod: HCNC | Performed by: NURSE PRACTITIONER

## 2019-10-14 PROCEDURE — 99291 CRITICAL CARE FIRST HOUR: CPT | Mod: HCNC,,, | Performed by: NURSE PRACTITIONER

## 2019-10-14 PROCEDURE — 43235 PR EGD, FLEX, DIAGNOSTIC: ICD-10-PCS | Mod: HCNC,,, | Performed by: INTERNAL MEDICINE

## 2019-10-14 PROCEDURE — 63600175 PHARM REV CODE 636 W HCPCS: Mod: JG,HCNC | Performed by: INTERNAL MEDICINE

## 2019-10-14 PROCEDURE — 99232 PR SUBSEQUENT HOSPITAL CARE,LEVL II: ICD-10-PCS | Mod: HCNC,,, | Performed by: INTERNAL MEDICINE

## 2019-10-14 PROCEDURE — 85025 COMPLETE CBC W/AUTO DIFF WBC: CPT | Mod: HCNC

## 2019-10-14 PROCEDURE — P9016 RBC LEUKOCYTES REDUCED: HCPCS | Mod: HCNC

## 2019-10-14 PROCEDURE — 25500020 PHARM REV CODE 255: Mod: HCNC | Performed by: FAMILY MEDICINE

## 2019-10-14 PROCEDURE — C9113 INJ PANTOPRAZOLE SODIUM, VIA: HCPCS | Mod: HCNC | Performed by: INTERNAL MEDICINE

## 2019-10-14 PROCEDURE — 99232 SBSQ HOSP IP/OBS MODERATE 35: CPT | Mod: HCNC,,, | Performed by: INTERNAL MEDICINE

## 2019-10-14 PROCEDURE — 63600175 PHARM REV CODE 636 W HCPCS: Mod: HCNC | Performed by: PHYSICIAN ASSISTANT

## 2019-10-14 PROCEDURE — 63600175 PHARM REV CODE 636 W HCPCS: Mod: HCNC | Performed by: NURSE ANESTHETIST, CERTIFIED REGISTERED

## 2019-10-14 PROCEDURE — 99222 1ST HOSP IP/OBS MODERATE 55: CPT | Mod: HCNC,,, | Performed by: SURGERY

## 2019-10-14 PROCEDURE — 85014 HEMATOCRIT: CPT | Mod: HCNC

## 2019-10-14 PROCEDURE — 85027 COMPLETE CBC AUTOMATED: CPT | Mod: 91,HCNC

## 2019-10-14 RX ORDER — SODIUM CHLORIDE 9 MG/ML
INJECTION, SOLUTION INTRAVENOUS CONTINUOUS
Status: DISCONTINUED | OUTPATIENT
Start: 2019-10-14 | End: 2019-10-16

## 2019-10-14 RX ORDER — PROPOFOL 10 MG/ML
VIAL (ML) INTRAVENOUS
Status: DISCONTINUED | OUTPATIENT
Start: 2019-10-14 | End: 2019-10-14

## 2019-10-14 RX ORDER — ASPIRIN 81 MG/1
81 TABLET ORAL DAILY
Status: DISCONTINUED | OUTPATIENT
Start: 2019-10-15 | End: 2019-10-15

## 2019-10-14 RX ORDER — PANTOPRAZOLE SODIUM 40 MG/10ML
40 INJECTION, POWDER, LYOPHILIZED, FOR SOLUTION INTRAVENOUS 2 TIMES DAILY
Status: DISCONTINUED | OUTPATIENT
Start: 2019-10-14 | End: 2019-10-17 | Stop reason: HOSPADM

## 2019-10-14 RX ORDER — HYDROCODONE BITARTRATE AND ACETAMINOPHEN 500; 5 MG/1; MG/1
TABLET ORAL
Status: DISCONTINUED | OUTPATIENT
Start: 2019-10-14 | End: 2019-10-14

## 2019-10-14 RX ORDER — CLOPIDOGREL BISULFATE 75 MG/1
75 TABLET ORAL DAILY
Status: DISCONTINUED | OUTPATIENT
Start: 2019-10-15 | End: 2019-10-15

## 2019-10-14 RX ORDER — ETOMIDATE 2 MG/ML
INJECTION INTRAVENOUS
Status: DISCONTINUED | OUTPATIENT
Start: 2019-10-14 | End: 2019-10-14

## 2019-10-14 RX ORDER — PANTOPRAZOLE SODIUM 40 MG/10ML
40 INJECTION, POWDER, LYOPHILIZED, FOR SOLUTION INTRAVENOUS 2 TIMES DAILY
Status: DISCONTINUED | OUTPATIENT
Start: 2019-10-14 | End: 2019-10-14

## 2019-10-14 RX ORDER — INSULIN ASPART 100 [IU]/ML
1-10 INJECTION, SOLUTION INTRAVENOUS; SUBCUTANEOUS EVERY 6 HOURS PRN
Status: DISCONTINUED | OUTPATIENT
Start: 2019-10-14 | End: 2019-10-17 | Stop reason: HOSPADM

## 2019-10-14 RX ORDER — DOPAMINE HYDROCHLORIDE 160 MG/100ML
5 INJECTION, SOLUTION INTRAVENOUS CONTINUOUS
Status: DISCONTINUED | OUTPATIENT
Start: 2019-10-14 | End: 2019-10-14

## 2019-10-14 RX ORDER — HYDROCODONE BITARTRATE AND ACETAMINOPHEN 500; 5 MG/1; MG/1
TABLET ORAL
Status: DISCONTINUED | OUTPATIENT
Start: 2019-10-14 | End: 2019-10-15

## 2019-10-14 RX ADMIN — PROPOFOL 30 MG: 10 INJECTION, EMULSION INTRAVENOUS at 04:10

## 2019-10-14 RX ADMIN — ERYTHROMYCIN LACTOBIONATE 250 MG: 500 INJECTION, POWDER, LYOPHILIZED, FOR SOLUTION INTRAVENOUS at 01:10

## 2019-10-14 RX ADMIN — ISOSORBIDE MONONITRATE 30 MG: 30 TABLET, EXTENDED RELEASE ORAL at 10:10

## 2019-10-14 RX ADMIN — ETOMIDATE 4 MG: 2 INJECTION, SOLUTION INTRAVENOUS at 03:10

## 2019-10-14 RX ADMIN — ISOSORBIDE MONONITRATE 30 MG: 30 TABLET, EXTENDED RELEASE ORAL at 08:10

## 2019-10-14 RX ADMIN — DEXTROSE 8 MG/HR: 50 INJECTION, SOLUTION INTRAVENOUS at 07:10

## 2019-10-14 RX ADMIN — LISINOPRIL 20 MG: 20 TABLET ORAL at 10:10

## 2019-10-14 RX ADMIN — SIMVASTATIN 40 MG: 20 TABLET, FILM COATED ORAL at 08:10

## 2019-10-14 RX ADMIN — SODIUM CHLORIDE 1000 ML: 0.9 INJECTION, SOLUTION INTRAVENOUS at 03:10

## 2019-10-14 RX ADMIN — INSULIN ASPART 2 UNITS: 100 INJECTION, SOLUTION INTRAVENOUS; SUBCUTANEOUS at 11:10

## 2019-10-14 RX ADMIN — SODIUM CHLORIDE 1000 ML: 0.9 INJECTION, SOLUTION INTRAVENOUS at 01:10

## 2019-10-14 RX ADMIN — IOHEXOL 100 ML: 350 INJECTION, SOLUTION INTRAVENOUS at 05:10

## 2019-10-14 RX ADMIN — ETOMIDATE 4 MG: 2 INJECTION, SOLUTION INTRAVENOUS at 04:10

## 2019-10-14 RX ADMIN — ESCITALOPRAM OXALATE 10 MG: 10 TABLET, FILM COATED ORAL at 10:10

## 2019-10-14 RX ADMIN — INSULIN ASPART 2 UNITS: 100 INJECTION, SOLUTION INTRAVENOUS; SUBCUTANEOUS at 05:10

## 2019-10-14 RX ADMIN — DEXTROSE 8 MG/HR: 50 INJECTION, SOLUTION INTRAVENOUS at 02:10

## 2019-10-14 RX ADMIN — RANOLAZINE 500 MG: 500 TABLET, FILM COATED, EXTENDED RELEASE ORAL at 10:10

## 2019-10-14 RX ADMIN — ETOMIDATE 10 MG: 2 INJECTION, SOLUTION INTRAVENOUS at 03:10

## 2019-10-14 RX ADMIN — SODIUM CHLORIDE: 0.9 INJECTION, SOLUTION INTRAVENOUS at 04:10

## 2019-10-14 RX ADMIN — ERYTHROMYCIN LACTOBIONATE 250 MG: 500 INJECTION, POWDER, LYOPHILIZED, FOR SOLUTION INTRAVENOUS at 07:10

## 2019-10-14 RX ADMIN — RANOLAZINE 500 MG: 500 TABLET, FILM COATED, EXTENDED RELEASE ORAL at 08:10

## 2019-10-14 RX ADMIN — TRANEXAMIC ACID 1161 MG: 100 INJECTION, SOLUTION INTRAVENOUS at 05:10

## 2019-10-14 RX ADMIN — ONDANSETRON 4 MG: 2 INJECTION INTRAMUSCULAR; INTRAVENOUS at 03:10

## 2019-10-14 RX ADMIN — PANTOPRAZOLE SODIUM 40 MG: 40 INJECTION, POWDER, LYOPHILIZED, FOR SOLUTION INTRAVENOUS at 01:10

## 2019-10-14 RX ADMIN — PANTOPRAZOLE SODIUM 40 MG: 40 INJECTION, POWDER, LYOPHILIZED, FOR SOLUTION INTRAVENOUS at 08:10

## 2019-10-14 NOTE — PROGRESS NOTES
Antonette RN, and this RN in constant communication with Dr Parikh concerning pt's low BP. Numbers for Cards, GI, IR, and Surgery given to MD. Per Dr Parikh he was unable to get in touch with Dr ROBERT Gilbert but able to speak with GI and IR concerning GIB.

## 2019-10-14 NOTE — ASSESSMENT & PLAN NOTE
Baseline hemoglobin 13-14; was 12 on morning of 10/13  Was holding post transfusion this am at 9.8 but with recurrent hematemesis and now hgb 9.3, will transfuse additional PRBC now  Serial h/h monitoring

## 2019-10-14 NOTE — PLAN OF CARE
Patient having hematemesis and melanous stools, EGD done at bedside, to be repeated in the AM, family aware, updated at bedside. Patient received 1 unit PRBC, tolerated well.

## 2019-10-14 NOTE — PROGRESS NOTES
BP remains low at 88/35 (51) after 1 L bolus of NS. Hospital medicine notified, instructed to give another 500ml bolus of NS and transfuse 1 unit PRBCs and evaluate s/p transfusion. Will continue to monitor.

## 2019-10-14 NOTE — HPI
79 year old male with PMH including HTN, DM (on metformin), HLD, PAD, CAD with stent (on plavix and ASA), kidney stone, and AAA per record  Presented to ED on 10/11 per cardiology instructions; he reported episodes of CP at cards office earlier in week and was called with advise to go to ED due to elevated troponin 0.835   In ED he reported mild chest pain intermittent since office visit but none at that time, repeat troponin 0.46 along with platelets 147, glucose 127  He was taken for [facility] early that evening and had stents x 3 placed to LCX, OM2; also noted severe RCA disease with occluded stent and EF 45%  He was admitted to telemetry   On 10/13 he returned to [facility] and underwent PCI to RCA with stent x 2

## 2019-10-14 NOTE — PROGRESS NOTES
Pt vomited 1 liter of mixed bright red and dark red blood and had small dark tarry BM. eICU Dr. Parikh and on call GI MD Mendiola notified. New orders noted

## 2019-10-14 NOTE — ANESTHESIA POSTPROCEDURE EVALUATION
Anesthesia Post Evaluation    Patient: Aquiles Kelsey    Procedure(s) Performed: Procedure(s) (LRB):  EGD (ESOPHAGOGASTRODUODENOSCOPY) (N/A)    Final Anesthesia Type: MAC  Patient location during evaluation: ICU  Patient participation: Yes- Able to Participate  Level of consciousness: awake and alert  Post-procedure vital signs: reviewed and stable  Pain management: adequate  Airway patency: patent  PONV status at discharge: No PONV  Anesthetic complications: no      Cardiovascular status: blood pressure returned to baseline  Respiratory status: unassisted and spontaneous ventilation  Hydration status: euvolemic  Follow-up not needed.          Vitals Value Taken Time   /58 10/14/2019  4:26 PM   Temp 36.8 °C (98.2 °F) 10/14/2019  3:30 PM   Pulse 65 10/14/2019  4:26 PM   Resp 23 10/14/2019  4:26 PM   SpO2 100 % 10/14/2019  4:26 PM   Vitals shown include unvalidated device data.      No case tracking events are documented in the log.      Pain/Carmen Score: No data recorded

## 2019-10-14 NOTE — PROGRESS NOTES
MD Mendiola (GI) notified of CT abd results showing active GI bleed in greater curve of stomach. Plan to scope today per MD

## 2019-10-14 NOTE — CONSULTS
[facility] -   General Surgery  Consult Note    Patient Name: Aquiles Kelsey  MRN: [id number]  Code Status: Full Code  Admission Date: 10/11/2019  Hospital Length of Stay: 3 days  Attending Physician: Dl Cerrato MD  Primary Care Provider: Saige Miranda MD    Patient information was obtained from patient and ER records.     Consults  Subjective:     Principal Problem: NSTEMI (non-ST elevated myocardial infarction)    History of Present Illness: 80 yo M with h/o CAD on plavix and ASA at home for multiple prior PCIs presented to ER with chest pain. Treated for NSTEMI with stent and continued anticoagulation. Overnight he developed hematemesis and melena with hypotension. He was transferred to ICU for monitored care, IV protonix and has received 4 units pRBC and 1 unit platelets. He denies any prior epidoses of UGIB or LGIB. He denies NSAID use or h/o PUD. He is currently HD stable awaiting EGD by GI. CTA showed bleeding ulcer along greater curvature of stomach. Surgery consulted for evaluation.      No current facility-administered medications on file prior to encounter.      Current Outpatient Medications on File Prior to Encounter   Medication Sig    aspirin 325 MG tablet Take 325 mg by mouth once daily.    clopidogrel (PLAVIX) 75 mg tablet Take 1 tablet (75 mg total) by mouth once daily.    escitalopram oxalate (LEXAPRO) 10 MG tablet Take 1 tablet (10 mg total) by mouth once daily.    isosorbide mononitrate (IMDUR) 30 MG 24 hr tablet Take 1 tablet (30 mg total) by mouth once daily.    lisinopril 10 MG tablet Take 1 tablet (10 mg total) by mouth once daily.    metFORMIN (GLUCOPHAGE) 1000 MG tablet Take 1 tablet (1,000 mg total) by mouth 2 (two) times daily with meals.    simvastatin (ZOCOR) 20 MG tablet Take 1 tablet (20 mg total) by mouth every evening.    nitroGLYCERIN (NITROSTAT) 0.4 MG SL tablet Place 1 tablet (0.4 mg total) under the tongue every 5 (five) minutes as needed for  Chest pain.       Review of patient's allergies indicates:  No Known Allergies    Past Medical History:   Diagnosis Date    Acute blood loss anemia 10/14/2019    Aneurysm, abdominal aortic     Coronary artery disease     Depression     Diabetes mellitus     HLD (hyperlipidemia)     Hypertension     Kidney stone     PAD (peripheral artery disease)     Tobacco abuse      Past Surgical History:   Procedure Laterality Date    APPENDECTOMY      CORONARY STENT PLACEMENT       Family History     Problem Relation (Age of Onset)    COPD Father    Diabetes Mother, Brother        Tobacco Use    Smoking status: Current Every Day Smoker     Years: 15.00     Types: Cigars    Smokeless tobacco: Current User   Substance and Sexual Activity    Alcohol use: Not Currently    Drug use: Not Currently    Sexual activity: Not Currently     Review of Systems   Constitutional: Negative for unexpected weight change.   HENT: Negative for congestion.    Eyes: Negative for visual disturbance.   Respiratory: Negative for shortness of breath.    Cardiovascular: Negative for chest pain.   Gastrointestinal: Positive for blood in stool and diarrhea. Negative for abdominal distention, abdominal pain and nausea.        Hematemesis   Genitourinary: Negative for difficulty urinating.   Skin: Negative for rash.   Allergic/Immunologic: Negative for immunocompromised state.   Neurological: Negative for weakness.   Psychiatric/Behavioral: The patient is not nervous/anxious.      Objective:     Vital Signs (Most Recent):  Temp: 97.8 °F (36.6 °C) (10/14/19 0827)  Pulse: 71 (10/14/19 1100)  Resp: (!) 23 (10/14/19 1100)  BP: (!) 142/65 (10/14/19 1100)  SpO2: 97 % (10/14/19 1100) Vital Signs (24h Range):  Temp:  [97.5 °F (36.4 °C)-98.4 °F (36.9 °C)] 97.8 °F (36.6 °C)  Pulse:  [52-79] 71  Resp:  [10-26] 23  SpO2:  [92 %-100 %] 97 %  BP: ()/(21-72) 142/65     Weight: 75.6 kg (166 lb 10.7 oz)  Body mass index is 23.91 kg/m².    Physical Exam    Constitutional: He is oriented to person, place, and time. He appears well-developed and well-nourished. No distress.   HENT:   Head: Normocephalic and atraumatic.   Eyes: EOM are normal.   Neck: Neck supple.   Cardiovascular: Normal rate and regular rhythm.   Pulmonary/Chest: Effort normal.   Abdominal: Soft. He exhibits no distension. There is no tenderness.   Musculoskeletal: Normal range of motion.   Neurological: He is alert and oriented to person, place, and time.   Skin: Skin is warm.   Vitals reviewed.      Significant Labs:  CBC:   Recent Labs   Lab 10/14/19  1341   WBC 9.70   RBC 3.07*   HGB 9.3*   HCT 28.2*      MCV 92   MCH 30.3   MCHC 33.0     BMP:   Recent Labs   Lab 10/14/19  0100   *      K 4.7      CO2 17*   BUN 37*   CREATININE 1.3   CALCIUM 8.2*       Significant Diagnostics:  I have reviewed and interpreted all pertinent imaging results/findings within the past 24 hours. arterial blush at greater curvature     Assessment/Plan:     Hematemesis/vomiting blood  EGD by GI today  Continue resuscitation  No acute surgical intervention at this time.      VTE Risk Mitigation (From admission, onward)         Ordered     IP VTE HIGH RISK PATIENT  Once      10/11/19 1236     Place sequential compression device  Until discontinued      10/11/19 1236                Thank you for your consult. I will follow-up with patient. Please contact us if you have any additional questions.    Maria De Jesus Wells MD  General Surgery  [facility] -

## 2019-10-14 NOTE — DISCHARGE INSTRUCTIONS
La endoscopia de la parte superior del aparato gastrointestinal     Tayla la endoscopia se introduce un tubo kat y flexible para abdulaziz el interior de la parte superior del tracto gastrointestinal.     La endoscopia de la parte superior del aparato gastrointestinal permite que mckeon proveedor de atención médica pueda mirar directamente esta parte del organismo, la cual está formada por el esófago, el estómago y el duodeno (la primera sección del intestino villela).   Antes del examen  Siga estas instrucciones y otras que le den antes de la endoscopia. Si no sigue las instrucciones del proveedor de atención médica al pie de la letra, podrían tener que cancelar la prueba o repetirla.  · No coma ni dora nada después de la medianoche anterior a la prueba. Si va a ser por la tarde, dora solo líquidos dylon en la mañana, y no coma ni dora nada 8 horas antes de la prueba. En algunos casos, es posible que pueda leila darrin medicamentos con un sorbo de agua hasta 2 horas antes del procedimiento. Hable con mckeon proveedor de atención médica acerca de esto.  · Lleve consigo radiografías y otros resultados de pruebas que le hayan hecho antes.  · Usama va a estar sedado (muy relajado por efecto de los medicamentos), pida que alguien lo lleve a casa después de la prueba.  · Antes de la prueba, dígale a mckeon proveedor de atención médica qué medicamentos shayy y si tiene algún problema de kishan.  El procedimiento  [place] es lo que puede esperar:  · Le pedirán que se acueste en natalee sinclair de endoscopia. Por lo general los pacientes se recuestan del lado hans.  · Lo monitorearán y le darán oxígeno.  · Con un atomizador o líquido para hacer gárgaras le adormecerán la garganta. También le darán medicamentos a través de natalee sonda intravenosa (IV) para que se sienta más relajado y cómodo. Tayla el procedimiento, puede estar dormido o despierto.  · El proveedor de atención médica pondrá el endoscopio por la boca y lo hará bajar por mckeon esófago.  Rosita tubo es más villela que muchos de los trozos de comida que come. No tendrá problemas para respirar. Los medicamentos que le dieron ayudan a evitar que le dé arcadas.  · Se introduce aire para expandir el tracto gastrointestinal, lo cual puede hacerle eructar.  · Tayla el procedimiento, el proveedor de atención médica puede leila biopsias (muestras de tejido), remover anormalidades janelle pólipos, o tratar las anormalidades por medio de natalee variedad de dispositivos que se colocan a través del endoscopio. Usted no sentirá esto.  · El endoscopio lleva imágenes del tracto gastrointestinal superior a natalee pantalla de video. Si usted está despierto, podría abdulaziz las imágenes.  · Después del procedimiento, descansará un rato, y luego un adulto deberá llevarlo a casa.  Llame al proveedor de atención médica:  Contacte a mckeon proveedor de atención médica si tiene:  · Heces negruzcas similares a la samantha; wisam en las heces  · Fiebre  · Dolor persistente en el estómago  · Náuseas y vómito, o vomita wisam   Date Last Reviewed: 3/15/2014  © 5441-1523 [employer]. [address] [place] [address], [place] [address]. Todos los derechos reservados. Esta información no pretende sustituir la atención médica profesional. Sólo mckeon médico puede diagnosticar y tratar un problema de kishan.

## 2019-10-14 NOTE — SUBJECTIVE & OBJECTIVE
Review of Systems   Constitution: Negative for diaphoresis, malaise/fatigue, weight gain and weight loss.   HENT: Negative for congestion and nosebleeds.    Cardiovascular: Negative for chest pain, claudication, cyanosis, dyspnea on exertion, irregular heartbeat, leg swelling, near-syncope, orthopnea, palpitations, paroxysmal nocturnal dyspnea and syncope.   Respiratory: Negative for cough, hemoptysis, shortness of breath, sleep disturbances due to breathing, snoring, sputum production and wheezing.    Hematologic/Lymphatic: Negative for bleeding problem. Does not bruise/bleed easily.   Skin: Negative for rash.   Musculoskeletal: Negative for arthritis, back pain, falls, joint pain, muscle cramps and muscle weakness.   Gastrointestinal: Positive for hematemesis and melena. Negative for abdominal pain, constipation, diarrhea, heartburn, hematochezia, nausea and vomiting.   Genitourinary: Negative for dysuria, hematuria and nocturia.   Neurological: Negative for excessive daytime sleepiness, dizziness, headaches, light-headedness, loss of balance, numbness, vertigo and weakness.     Objective:     Vital Signs (Most Recent):  Temp: 97.8 °F (36.6 °C) (10/14/19 0827)  Pulse: 71 (10/14/19 1100)  Resp: (!) 23 (10/14/19 1100)  BP: (!) 142/65 (10/14/19 1100)  SpO2: 97 % (10/14/19 1100) Vital Signs (24h Range):  Temp:  [97.5 °F (36.4 °C)-98.4 °F (36.9 °C)] 97.8 °F (36.6 °C)  Pulse:  [52-79] 71  Resp:  [10-26] 23  SpO2:  [92 %-100 %] 97 %  BP: ()/(21-72) 142/65     Weight: 75.6 kg (166 lb 10.7 oz)  Body mass index is 23.91 kg/m².     SpO2: 97 %  O2 Device (Oxygen Therapy): nasal cannula      Intake/Output Summary (Last 24 hours) at 10/14/2019 1135  Last data filed at 10/14/2019 1100  Gross per 24 hour   Intake 4246.83 ml   Output 1375 ml   Net 2871.83 ml       Lines/Drains/Airways     Peripheral Intravenous Line                 Peripheral IV - Single Lumen 10/11/19 1127 20 G Left Forearm 3 days         Peripheral IV  - Single Lumen 10/11/19 1127 20 G Right Forearm 3 days                Physical Exam   Constitutional: He is oriented to person, place, and time. He appears well-developed and well-nourished.   Neck: Neck supple. No JVD present.   Cardiovascular: Normal rate, regular rhythm, normal heart sounds and normal pulses. Exam reveals no friction rub.   No murmur heard.  Pulmonary/Chest: Effort normal and breath sounds normal. No respiratory distress. He has no wheezes. He has no rales.   Abdominal: Soft. Bowel sounds are normal. He exhibits no distension.   Musculoskeletal: He exhibits no edema or tenderness.   Neurological: He is alert and oriented to person, place, and time.   Skin: Skin is warm and dry. No rash noted.   Left radial access site without redness, tenderness, swelling, or drainage. There is no active external bleeding or hematoma.    Psychiatric: He has a normal mood and affect. His behavior is normal.   Nursing note and vitals reviewed.      Significant Labs:   All pertinent lab results from the last 24 hours have been reviewed. and   Recent Lab Results       10/14/19  1102   10/14/19  0814   10/14/19  0611   10/14/19  0133   10/14/19  0100        Unit Blood Type Code       5100[P]              5100[P]              6200[P]              5100[P]              5100[P]       Unit Expiration       [id number][P]              [id number][P]              [id number][P]              [id number][P]              [id number][P]       Unit Blood Type       O POS[P]              O POS[P]              A POS[P]              O POS[P]              O POS[P]       Albumin         2.6     Alkaline Phosphatase         42     ALT         10     Anion Gap         14     AST         18     Baso #         0.02     Basophil%         0.2     BILIRUBIN TOTAL         0.4  Comment:  For infants and newborns, interpretation of results should be based  on gestational age, weight and in agreement with clinical  observations.  Premature  Infant recommended reference ranges:  Up to 24 hours.............<8.0 mg/dL  Up to 48 hours............<12.0 mg/dL  3-5 days..................<15.0 mg/dL  6-29 days.................<15.0 mg/dL       BUN, Bld         37     Calcium         8.2     Chloride         106     CO2         17     CODING SYSTEM       MDFW912[P]              DOBR504[P]              MNXG929[P]              CPWY855[P]              CFRO035[P]       Creatinine         1.3     Differential Method         Automated     DISPENSE STATUS       ISSUED[P]              ISSUED[P]              ISSUED[P]              ISSUED[P]              ISSUED[P]       eGFR if          60     eGFR if non          52  Comment:  Calculation used to obtain the estimated glomerular filtration  rate (eGFR) is the CKD-EPI equation.        Eos #         0.0     Eosinophil%         0.2     Glucose         293     Gran # (ANC)         6.9     Gran%         82.8     Group & Rh       O POS       Hematocrit   30.8     26.7  Comment:  Results confirmed, test repeated     Hemoglobin   9.8     8.6  Comment:  Results confirmed, test repeated     Immature Grans (Abs)         0.05  Comment:  Mild elevation in immature granulocytes is non specific and   can be seen in a variety of conditions including stress response,   acute inflammation, trauma and pregnancy. Correlation with other   laboratory and clinical findings is essential.       Immature Granulocytes         0.6     INDIRECT MICK       NEG       Coumadin Monitoring INR   1.2  Comment:  Coumadin Therapy:  2.0 - 3.0 for INR for all indicators except mechanical heart valves  and antiphospholipid syndromes which should use 2.5 - 3.5.             Lymph #         0.9     Lymph%         10.4     MCH   29.3     30.6     MCHC   31.8     32.2     MCV   92     95     Mono #         0.5     Mono%         5.8     MPV   11.0     10.9     nRBC         0     Platelets   142     172     POC ACTIVATED CLOTTING  TIME K               POCT Glucose 184   189         Potassium         4.7     Product Code       T5846X57[P]              G7730N26[P]              P9150T80[P]              [id number][P]              [id number][P]       PROTEIN TOTAL         4.7     Protime   12.9           RBC   3.34     2.81     RDW   13.5     13.4     Sample               Sodium         137     UNIT NUMBER       [id number][P]              [id number][P]              [id number][P]              [id number][P]              [id number][P]       WBC   7.14     8.28                      10/13/19  2110   10/13/19  1325   10/13/19  1306   10/13/19  1229        Unit Blood Type Code             Unit Expiration             Unit Blood Type             Albumin             Alkaline Phosphatase             ALT             Anion Gap             AST             Baso #             Basophil%             BILIRUBIN TOTAL             BUN, Bld             Calcium             Chloride             CO2             CODING SYSTEM             Creatinine             Differential Method             DISPENSE STATUS             eGFR if              eGFR if non              Eos #             Eosinophil%             Glucose             Gran # (ANC)             Gran%             Group & Rh             Hematocrit             Hemoglobin             Immature Grans (Abs)             Immature Granulocytes             INDIRECT MICK             Coumadin Monitoring INR             Lymph #             Lymph%             MCH             MCHC             MCV             Mono #             Mono%             MPV             nRBC             Platelets             POC ACTIVATED CLOTTING TIME K   334 257 213     POCT Glucose 239           Potassium             Product Code             PROTEIN TOTAL             Protime             RBC             RDW             Sample   unknown unknown unknown     Sodium             UNIT NUMBER             WBC                    Significant Imaging: Echocardiogram:   2D echo with color flow doppler:   Results for orders placed or performed during the hospital encounter of 10/11/19   2D echo with color flow doppler   Result Value Ref Range    QEF 55 55 - 65    Mitral Valve Regurgitation MILD     Aortic Valve Regurgitation MILD     Est. PA Systolic Pressure 33.25     Narrative    Date of Procedure: 10/11/2019        TEST DESCRIPTION   Technical Quality: This is a technically challenging study. There is poor endocardial definition.     Aorta: The aortic root is normal in size, measuring 3.3 cm at sinotubular junction and 3.5 cm at Sinuses of Valsalva. The proximal ascending aorta is normal in size, measuring 3.0 cm across.     Left Atrium: The left atrial volume index is moderately enlarged, measuring 45.51 cc/m2.     Left Ventricle: The left ventricle is normal in size, with an end-diastolic diameter of 4.2 cm, and an end-systolic diameter of 2.6 cm. Septal wall thickness is mildly increased, with the septum measuring 1.4 cm and the posterior wall measuring 1.1 cm   across. Relative wall thickness was increased at 0.52, and the LV mass index was increased at 116.3 g/m2 consistent with concentric left ventricular hypertrophy. The following segments were mildly hypokinetic: mid inferolateral wall.  Left ventricular systolic function appears normal. Visually estimated ejection fraction is 55-60%. The LV Doppler derived stroke volume equals 78.0 ccs.     Diastolic indices: E wave velocity 0.9 m/s, E/A ratio 1.2,  msec., E/e' ratio(avg) 13. Diastolic function is indeterminate.     Right Atrium: The right atrium is normal in size, measuring 5.2 cm in length and 2.7 cm in width in the apical view.     Right Ventricle: The right ventricle is normal in size. Global right ventricular systolic function appears normal. Tricuspid annular plane systolic excursion (TAPSE) is 2.0 cm. The estimated PA systolic pressure is 33 mmHg.     Aortic  Valve:  The aortic valve is mildly sclerotic with normal leaflet mobility. The mean gradient obtained across the aortic valve is 5 mmHg. Additionally, there is mild aortic regurgitation. There is a pressure half time of 747.0 msec.     Mitral Valve:  The mitral valve is normal in structure. The pressure half time is 54 msec. The calculated mitral valve area is 4.07 cm2. There is mild mitral regurgitation.     Tricuspid Valve:  The tricuspid valve is normal in structure.     Pulmonary Valve:  The pulmonic valve is not well seen.     IVC: IVC is normal in size and collapses > 50% with a sniff, suggesting normal right atrial pressure of 3 mmHg.     Intracavitary: There is no evidence of pericardial effusion, intracavity mass, thrombi, or vegetation.         CONCLUSIONS     1 - Normal left ventricular systolic function (EF 55-60%).     2 - Indeterminate LV diastolic function.     3 - Normal right ventricular systolic function .     4 - Mild aortic regurgitation.     5 - Moderate left atrial enlargement.     6 - Wall motion abnormalities.     7 - Concentric hypertrophy.     8 - The estimated PA systolic pressure is 33 mmHg.             This document has been electronically    SIGNED BY: Robe Gilbert MD On: 10/11/2019 16:04

## 2019-10-14 NOTE — SUBJECTIVE & OBJECTIVE
Past Medical History:   Diagnosis Date    Acute blood loss anemia 10/14/2019    Aneurysm, abdominal aortic     Coronary artery disease     Depression     Diabetes mellitus     HLD (hyperlipidemia)     Hypertension     Kidney stone     PAD (peripheral artery disease)     Tobacco abuse        Past Surgical History:   Procedure Laterality Date    APPENDECTOMY      CORONARY STENT PLACEMENT         Review of patient's allergies indicates:  No Known Allergies    Family History     Problem Relation (Age of Onset)    COPD Father    Diabetes Mother, Brother        Tobacco Use    Smoking status: Current Every Day Smoker     Years: 15.00     Types: Cigars    Smokeless tobacco: Current User   Substance and Sexual Activity    Alcohol use: Not Currently    Drug use: Not Currently    Sexual activity: Not Currently         Review of Systems   Constitutional: Positive for fatigue.   HENT: Negative for congestion.    Respiratory: Negative for chest tightness and shortness of breath.    Cardiovascular: Negative for chest pain and leg swelling.   Gastrointestinal: Positive for abdominal pain (mid epigastric pain), blood in stool, diarrhea, nausea (hematemesis) and vomiting.   Genitourinary: Negative.    Musculoskeletal: Negative for back pain and joint swelling.   Skin: Negative for color change.   Neurological: Negative for headaches.   Hematological: Bruises/bleeds easily.   Psychiatric/Behavioral: The patient is not nervous/anxious.      Objective:     Vital Signs (Most Recent):  Temp: 97.8 °F (36.6 °C) (10/14/19 0827)  Pulse: 80 (10/14/19 1509)  Resp: 20 (10/14/19 1509)  BP: (!) 100/49 (10/14/19 1509)  SpO2: (!) 94 % (10/14/19 1509) Vital Signs (24h Range):  Temp:  [97.5 °F (36.4 °C)-98.4 °F (36.9 °C)] 97.8 °F (36.6 °C)  Pulse:  [52-83] 80  Resp:  [10-28] 20  SpO2:  [92 %-100 %] 94 %  BP: ()/(21-72) 100/49     Weight: 75.6 kg (166 lb 10.7 oz)  Body mass index is 23.91 kg/m².      Intake/Output Summary (Last  24 hours) at 10/14/2019 1514  Last data filed at 10/14/2019 1400  Gross per 24 hour   Intake 4646.83 ml   Output 1375 ml   Net 3271.83 ml       Physical Exam   Constitutional: He is oriented to person, place, and time. He is cooperative. He appears ill.   HENT:   Head: Atraumatic.   Eyes: Pupils are equal, round, and reactive to light. Conjunctivae are normal. No scleral icterus.   Neck: No JVD present. No tracheal deviation present.   Cardiovascular: Normal rate and regular rhythm.   Pulses:       Radial pulses are 2+ on the right side, and 2+ on the left side.        Dorsalis pedis pulses are 2+ on the right side, and 2+ on the left side.   Pulmonary/Chest: Effort normal and breath sounds normal. He has no wheezes. He has no rhonchi. He has no rales.   Abdominal: Soft. He exhibits no distension. Bowel sounds are increased. There is no tenderness.   Musculoskeletal: He exhibits no edema.   Neurological: He is alert and oriented to person, place, and time.   Skin: Skin is warm and dry. Capillary refill takes 2 to 3 seconds. Bruising (scattered upper extremity) noted. No cyanosis.        Psychiatric: He has a normal mood and affect. His behavior is normal. Judgment and thought content normal.       Vents:       Lines/Drains/Airways     Peripheral Intravenous Line                 Peripheral IV - Single Lumen 10/11/19 1127 20 G Left Forearm 3 days         Peripheral IV - Single Lumen 10/11/19 1127 20 G Right Forearm 3 days                Significant Labs:    CBC/Anemia Profile:  Recent Labs   Lab 10/14/19  0100 10/14/19  0814 10/14/19  1341   WBC 8.28 7.14 9.70   HGB 8.6* 9.8* 9.3*   HCT 26.7* 30.8* 28.2*    142* 160   MCV 95 92 92   RDW 13.4 13.5 14.2        Chemistries:  Recent Labs   Lab 10/13/19  0402 10/14/19  0100     137 137   K 4.7  4.8 4.7     103 106   CO2 23  26 17*   BUN 17  17 37*   CREATININE 1.1  1.1 1.3   CALCIUM 8.8  8.9 8.2*   ALBUMIN 3.2* 2.6*   PROT 5.9* 4.7*   BILITOT 0.5  0.4   ALKPHOS 54* 42*   ALT 10 10   AST 24 18       All pertinent labs within the past 24 hours have been reviewed.    Significant Imaging:   I have reviewed all pertinent imaging results/findings within the past 24 hours.

## 2019-10-14 NOTE — NURSING
Pt c/o of having bloody emesis. Pt stood up from bathroom to walk to bed when stated he felt like he was going to faint. Placed pt in bed and called for rapid response. Pt was hypotensive. Pt c/o of weakness and dizziness. Transfer to ICU, bedside report given to ISIDRO Whitman.

## 2019-10-14 NOTE — NURSING
RANDA Siddiqui went into pt room while patient was in bathroom. When pt got up from toilet to go back to bed, Fabiola noticed that pt looked weak, leaning forward. Pt c/o feeling faint, weak and dizzy. Pt was hypotensive. Rapid response was initiated at 0042. Staff arrived and began interventions such as vitals, bolus, labs, etc. Dr Oconnor determined pt would be transferred to ICU for closer observation.

## 2019-10-14 NOTE — ASSESSMENT & PLAN NOTE
Now post [facility] x 2 this admission with stents x 3 to LCX, OM2 and then x 2 to occluded RCA  Holding ASA and plavix s/t acute blood loss anemia with active GI bleeding  ICU hemodynamic monitoring  Continue statin, ranexa, imdur as tolerated  Cardiology following

## 2019-10-14 NOTE — HPI
78 yo M with h/o CAD on plavix and ASA at home for multiple prior PCIs presented to ER with chest pain. Treated for NSTEMI with stent and continued anticoagulation. Overnight he developed hematemesis and melena with hypotension. He was transferred to ICU for monitored care, IV protonix and has received 4 units pRBC and 1 unit platelets. He denies any prior epidoses of UGIB or LGIB. He denies NSAID use or h/o PUD. He is currently HD stable awaiting EGD by GI. CTA showed bleeding ulcer along greater curvature of stomach. Surgery consulted for evaluation.

## 2019-10-14 NOTE — PROGRESS NOTES
eICU Brief Admission Note       Briefly, 79 y.o. male patient with a PMHx of CAD who presented to the Emergency Department today for evaluation of abnormal lab results.  Patient reports seeing Dr. Hobbs (Cardiology) and labs resulting in elevated troponin levels. Found to have NSTEMi, later had cath s/p stent. Had episode of emesis of blood, drop in BP & so transferred to ICU.            Video assessment :    Vitals reviewed   Afebrile, stable vitals     LABs reviewed       Radiology reviewed   Cardiac cath 10/13  1. LHC  2. LEFT VENTRICULOGRAM 3. CORONARY ANGIOGRAM  4.  PCI MID LCX KYLIE X ONE  5.  PCI PROX-MID OM2 KYLIE X 2 OVERLAPPED  EF 45 %     PCI MID LCX ISR WITH KYLIE X ONE AND SEVERE OM2 DISEASE WITH KYLIE X 2 OVERLAPPED.  OM2 STENTS OVERLAPPED INTO MID LCX OLD STENT.  MULTIVESSEL CAD  RCA SEVERE DISEASE, OCCLUDED MID STENT, MULTIPLE STENTS NOTED IN RCA (MID, DISTAL, PDA).  EF 45%  LEFT RADIAL TR BAND        TTE 10/2019    1 - Normal left ventricular systolic function (EF 55-60%).     2 - Indeterminate LV diastolic function.     3 - Normal right ventricular systolic function .     4 - Mild aortic regurgitation.     5 - Moderate left atrial enlargement.     6 - Wall motion abnormalities.     7 - Concentric hypertrophy.     8 - The estimated PA systolic pressure is 33 mmHg.       Assessment / Plan :  # hemorrhagic shock   # GI bleed, hypotensive, drop in Hb by 3.5 points   # NSTEMI /p cath & stent; on ASA, Plavix; was on Heparin drip before cath, then on Tirofiban drip after cath   # CHF , EF 45 %   # NOAH/CKD  # DM   # HTN   # Dyslipidemia   # PAD   # h/o abdominal aortic aneurysm   - will transfuse to keep Hb > 10, so will give 2 PRBCs now   - s/p 1 L NS bolus, getting 2nd one until PRC ready   - start on Vasopressin drip   - PPI drip   - will give 1 platelets if OK with Cardiology   - hold Plavix, off of Tirofiban drip , continue ASA   - GI & surgery will be consulted   - f/u with cardiology   - check LA, INR ,  BNP        DVT Px : SCD   GI Px : PPI drip       Patient seen over video : Yes  Chart reviewed : Yes  Spoke with RN : Yes         3:27 AM   Spoke with GI  Also spoke with IR   Had another massive hematemesis of 1 L with melena   - will get stat CTA A/P & call radiologist to read it stat   - GI & IT stand by   - will hold ASA, Plavix   - give 1 PRBC   - Tranexamic acid IV   - will give additional 2 PRBCs, so total 4 PRBCs as of now

## 2019-10-14 NOTE — EICU
Received phone call from bedside about pt having dark maroon bloody vomit, BP 95/42, HR 76. Message sent to Dr Parikh.    03:12 Called into room via eLert for pt vomiting maroon bloody emesis. HR60's, /46, MAP 66. Px 98%.  03:15 Dr Parikh notified and  in room

## 2019-10-14 NOTE — PROGRESS NOTES
[facility] -   Cardiology  Progress Note    Patient Name: Aquiles Kelsey  MRN: [id number]  Admission Date: 10/11/2019  Hospital Length of Stay: 3 days  Code Status: Full Code   Attending Physician: Dl Cerrato MD   Primary Care Physician: Saige Miranda MD  Expected Discharge Date:   Principal Problem:NSTEMI (non-ST elevated myocardial infarction)    Subjective:   Brief HPI:  Pt presents with NSTEMI.  His current medical conditions include CAD (multiple PCI procedures), HTN, DM, PAD, AAA stent graft (approx 2013), hyperlipidemia, cigar use.  H/o L LE stents in [place].   First PCI 1998, total of 5 stents with last one in [place] 2013.  Echo 7/19 normal LVEF, DD,  LVH, mild AI.   Holter 7/19 NSR, fleeting NSVT, EAT, rare PVCs, occasional PACs.  B LE vasc studies 7/19 B LE PAD, L > R LE.     Now in ER with several episodes of typical angina over past week and abnl troponin 0.8 range consistent w NSTEMI.  ECG earlier in week 10/9/19 showed new inferolateral st-t wave ischemic changes.  ECG today in ER shows improvement, nonspecific st-t abnl.  Cp free last couple of days.    Pt saw Dr. Hobbs, Cardiology, this week and was put on Imdur and had troponin checked and when found to be abnl pt was advised to go to ER.  He has chronic B LE claudication sxs.        Hospital Course:   10/12/19:  PT SEEN AND EXAMINED EARLIER THIS AM.  HAD GOOD NIGHT OVERALL. DENIES RECURRENT CP.  NO CHF SXS.  LABS AND ECG REVIEWED AND ARE STABLE.    10/13/19:  PT SEEN AND EXAMINED.  NO CHEST PAIN OR CHF SXS.  STABLE SINUS BRADYCARDIA.  TROPONIN PEAKED.  LABS STABLE.    10/14/19- Patient is s/p PCI of LCX with KYLIE x1 and severe OM2 KYLIE x2 overlapped into mid LCX old stent. OM on Friday and Prox-mid RCA with KYLIE x2  on yesterday. Unsuccessful PCI of Distal RCA occluded and PDA occluded stent. Patient developed acute GIB late last night/early morning. Had hematemesis and black BM. Became hypotensive and diaphoretic. Transfused to  ICU and transfused 4 units of blood and 1 unit of platelets. ASA and Plavix on hold today. GI has been consulted and plan to scope patient today. Vitals stable and patient denies any angina or equivalent.         Review of Systems   Constitution: Negative for diaphoresis, malaise/fatigue, weight gain and weight loss.   HENT: Negative for congestion and nosebleeds.    Cardiovascular: Negative for chest pain, claudication, cyanosis, dyspnea on exertion, irregular heartbeat, leg swelling, near-syncope, orthopnea, palpitations, paroxysmal nocturnal dyspnea and syncope.   Respiratory: Negative for cough, hemoptysis, shortness of breath, sleep disturbances due to breathing, snoring, sputum production and wheezing.    Hematologic/Lymphatic: Negative for bleeding problem. Does not bruise/bleed easily.   Skin: Negative for rash.   Musculoskeletal: Negative for arthritis, back pain, falls, joint pain, muscle cramps and muscle weakness.   Gastrointestinal: Positive for hematemesis and melena. Negative for abdominal pain, constipation, diarrhea, heartburn, hematochezia, nausea and vomiting.   Genitourinary: Negative for dysuria, hematuria and nocturia.   Neurological: Negative for excessive daytime sleepiness, dizziness, headaches, light-headedness, loss of balance, numbness, vertigo and weakness.     Objective:     Vital Signs (Most Recent):  Temp: 97.8 °F (36.6 °C) (10/14/19 0827)  Pulse: 71 (10/14/19 1100)  Resp: (!) 23 (10/14/19 1100)  BP: (!) 142/65 (10/14/19 1100)  SpO2: 97 % (10/14/19 1100) Vital Signs (24h Range):  Temp:  [97.5 °F (36.4 °C)-98.4 °F (36.9 °C)] 97.8 °F (36.6 °C)  Pulse:  [52-79] 71  Resp:  [10-26] 23  SpO2:  [92 %-100 %] 97 %  BP: ()/(21-72) 142/65     Weight: 75.6 kg (166 lb 10.7 oz)  Body mass index is 23.91 kg/m².     SpO2: 97 %  O2 Device (Oxygen Therapy): nasal cannula      Intake/Output Summary (Last 24 hours) at 10/14/2019 1135  Last data filed at 10/14/2019 1100  Gross per 24 hour   Intake  4246.83 ml   Output 1375 ml   Net 2871.83 ml       Lines/Drains/Airways     Peripheral Intravenous Line                 Peripheral IV - Single Lumen 10/11/19 1127 20 G Left Forearm 3 days         Peripheral IV - Single Lumen 10/11/19 1127 20 G Right Forearm 3 days                Physical Exam   Constitutional: He is oriented to person, place, and time. He appears well-developed and well-nourished.   Neck: Neck supple. No JVD present.   Cardiovascular: Normal rate, regular rhythm, normal heart sounds and normal pulses. Exam reveals no friction rub.   No murmur heard.  Pulmonary/Chest: Effort normal and breath sounds normal. No respiratory distress. He has no wheezes. He has no rales.   Abdominal: Soft. Bowel sounds are normal. He exhibits no distension.   Musculoskeletal: He exhibits no edema or tenderness.   Neurological: He is alert and oriented to person, place, and time.   Skin: Skin is warm and dry. No rash noted.   Left radial access site without redness, tenderness, swelling, or drainage. There is no active external bleeding or hematoma.    Psychiatric: He has a normal mood and affect. His behavior is normal.   Nursing note and vitals reviewed.      Significant Labs:   All pertinent lab results from the last 24 hours have been reviewed. and   Recent Lab Results       10/14/19  1102   10/14/19  0814   10/14/19  0611   10/14/19  0133   10/14/19  0100        Unit Blood Type Code       5100[P]              5100[P]              6200[P]              5100[P]              5100[P]       Unit Expiration       [id number][P]              [id number][P]              [id number][P]              [id number][P]              [id number][P]       Unit Blood Type       O POS[P]              O POS[P]              A POS[P]              O POS[P]              O POS[P]       Albumin         2.6     Alkaline Phosphatase         42     ALT         10     Anion Gap         14     AST         18     Baso #         0.02      Basophil%         0.2     BILIRUBIN TOTAL         0.4  Comment:  For infants and newborns, interpretation of results should be based  on gestational age, weight and in agreement with clinical  observations.  Premature Infant recommended reference ranges:  Up to 24 hours.............<8.0 mg/dL  Up to 48 hours............<12.0 mg/dL  3-5 days..................<15.0 mg/dL  6-29 days.................<15.0 mg/dL       BUN, Bld         37     Calcium         8.2     Chloride         106     CO2         17     CODING SYSTEM       TKRM676[P]              PRLQ994[P]              ADLH194[P]              UVVI252[P]              XQXZ144[P]       Creatinine         1.3     Differential Method         Automated     DISPENSE STATUS       ISSUED[P]              ISSUED[P]              ISSUED[P]              ISSUED[P]              ISSUED[P]       eGFR if          60     eGFR if non          52  Comment:  Calculation used to obtain the estimated glomerular filtration  rate (eGFR) is the CKD-EPI equation.        Eos #         0.0     Eosinophil%         0.2     Glucose         293     Gran # (ANC)         6.9     Gran%         82.8     Group & Rh       O POS       Hematocrit   30.8     26.7  Comment:  Results confirmed, test repeated     Hemoglobin   9.8     8.6  Comment:  Results confirmed, test repeated     Immature Grans (Abs)         0.05  Comment:  Mild elevation in immature granulocytes is non specific and   can be seen in a variety of conditions including stress response,   acute inflammation, trauma and pregnancy. Correlation with other   laboratory and clinical findings is essential.       Immature Granulocytes         0.6     INDIRECT MICK       NEG       Coumadin Monitoring INR   1.2  Comment:  Coumadin Therapy:  2.0 - 3.0 for INR for all indicators except mechanical heart valves  and antiphospholipid syndromes which should use 2.5 - 3.5.             Lymph #         0.9     Lymph%          10.4     MCH   29.3     30.6     MCHC   31.8     32.2     MCV   92     95     Mono #         0.5     Mono%         5.8     MPV   11.0     10.9     nRBC         0     Platelets   142     172     POC ACTIVATED CLOTTING TIME K               POCT Glucose 184   189         Potassium         4.7     Product Code       N7739Q72[P]              [id number][P]              [id number][P]              Y3715C27[P]              [id number][P]       PROTEIN TOTAL         4.7     Protime   12.9           RBC   3.34     2.81     RDW   13.5     13.4     Sample               Sodium         137     UNIT NUMBER       [id number][P]              [id number][P]              [id number][P]              [id number][P]              [id number][P]       WBC   7.14     8.28                      10/13/19  2110   10/13/19  1325   10/13/19  1306   10/13/19  1229        Unit Blood Type Code             Unit Expiration             Unit Blood Type             Albumin             Alkaline Phosphatase             ALT             Anion Gap             AST             Baso #             Basophil%             BILIRUBIN TOTAL             BUN, Bld             Calcium             Chloride             CO2             CODING SYSTEM             Creatinine             Differential Method             DISPENSE STATUS             eGFR if              eGFR if non              Eos #             Eosinophil%             Glucose             Gran # (ANC)             Gran%             Group & Rh             Hematocrit             Hemoglobin             Immature Grans (Abs)             Immature Granulocytes             INDIRECT MICK             Coumadin Monitoring INR             Lymph #             Lymph%             MCH             MCHC             MCV             Mono #             Mono%             MPV             nRBC             Platelets             POC ACTIVATED CLOTTING TIME K   334 257 213     POCT Glucose 239            Potassium             Product Code             PROTEIN TOTAL             Protime             RBC             RDW             Sample   unknown unknown unknown     Sodium             UNIT NUMBER             WBC                   Significant Imaging: Echocardiogram:   2D echo with color flow doppler:   Results for orders placed or performed during the hospital encounter of 10/11/19   2D echo with color flow doppler   Result Value Ref Range    QEF 55 55 - 65    Mitral Valve Regurgitation MILD     Aortic Valve Regurgitation MILD     Est. PA Systolic Pressure 33.25     Narrative    Date of Procedure: 10/11/2019        TEST DESCRIPTION   Technical Quality: This is a technically challenging study. There is poor endocardial definition.     Aorta: The aortic root is normal in size, measuring 3.3 cm at sinotubular junction and 3.5 cm at Sinuses of Valsalva. The proximal ascending aorta is normal in size, measuring 3.0 cm across.     Left Atrium: The left atrial volume index is moderately enlarged, measuring 45.51 cc/m2.     Left Ventricle: The left ventricle is normal in size, with an end-diastolic diameter of 4.2 cm, and an end-systolic diameter of 2.6 cm. Septal wall thickness is mildly increased, with the septum measuring 1.4 cm and the posterior wall measuring 1.1 cm   across. Relative wall thickness was increased at 0.52, and the LV mass index was increased at 116.3 g/m2 consistent with concentric left ventricular hypertrophy. The following segments were mildly hypokinetic: mid inferolateral wall.  Left ventricular systolic function appears normal. Visually estimated ejection fraction is 55-60%. The LV Doppler derived stroke volume equals 78.0 ccs.     Diastolic indices: E wave velocity 0.9 m/s, E/A ratio 1.2,  msec., E/e' ratio(avg) 13. Diastolic function is indeterminate.     Right Atrium: The right atrium is normal in size, measuring 5.2 cm in length and 2.7 cm in width in the apical view.     Right Ventricle:  The right ventricle is normal in size. Global right ventricular systolic function appears normal. Tricuspid annular plane systolic excursion (TAPSE) is 2.0 cm. The estimated PA systolic pressure is 33 mmHg.     Aortic Valve:  The aortic valve is mildly sclerotic with normal leaflet mobility. The mean gradient obtained across the aortic valve is 5 mmHg. Additionally, there is mild aortic regurgitation. There is a pressure half time of 747.0 msec.     Mitral Valve:  The mitral valve is normal in structure. The pressure half time is 54 msec. The calculated mitral valve area is 4.07 cm2. There is mild mitral regurgitation.     Tricuspid Valve:  The tricuspid valve is normal in structure.     Pulmonary Valve:  The pulmonic valve is not well seen.     IVC: IVC is normal in size and collapses > 50% with a sniff, suggesting normal right atrial pressure of 3 mmHg.     Intracavitary: There is no evidence of pericardial effusion, intracavity mass, thrombi, or vegetation.         CONCLUSIONS     1 - Normal left ventricular systolic function (EF 55-60%).     2 - Indeterminate LV diastolic function.     3 - Normal right ventricular systolic function .     4 - Mild aortic regurgitation.     5 - Moderate left atrial enlargement.     6 - Wall motion abnormalities.     7 - Concentric hypertrophy.     8 - The estimated PA systolic pressure is 33 mmHg.             This document has been electronically    SIGNED BY: Robe Gilbert MD On: 10/11/2019 16:04     Assessment and Plan:       * NSTEMI (non-ST elevated myocardial infarction)  See plan for CAD    Ischemic cardiomyopathy  OPTIMIZE MEDICAL TX FOR ICM.  LIMITED ON MEDICAL TX AS HE WILL NOT BE ABLE TO TOLERATE BETA-BLOCKERS DUE TO SINUS BRADYCARDIA ISSUES.    CAD, multiple vessel  OPTIMIZE MEDICAL TX FOR CAD.    10/14/19  -Patient is s/p PCI of LCX with KYLIE x1 and severe OM2 KYLIE x2 overlapped into mid LCX old stent. OM on Friday and Prox-mid RCA with KYLIE x2  on yesterday. Unsuccessful  PCI of Distal RCA occluded and PDA occluded stent  -ASA and Plavix on hold for today secondary to acute GIB  -Plans in place for GI to scope patient today   -Plan to restart ASA and Plavix tomorrow given his recent MI and multiple KYLIE   -Continue Statin, Ranexa, Imdur and ARB  -No BB due to bradycardia      Sinus bradycardia  STABLE.  AT RISK FOR PPM IN FUTURE.    Tobacco abuse  TOBACCO CESSATION ADVISED.    Essential hypertension  Continue losartan and Imdur   No BB due to bradycardia           VTE Risk Mitigation (From admission, onward)         Ordered     IP VTE HIGH RISK PATIENT  Once      10/11/19 1236     Place sequential compression device  Until discontinued      10/11/19 1236              Chart reviewed. Patient examined by Dr. Waldrop and agrees with plan that has been outlined.     Iris Selby, LAYOP  Cardiology  [facility] [place]

## 2019-10-14 NOTE — PLAN OF CARE
Pt transferred from tele to ICU overnight after episode of hematemesis and hypotension. BP now stable after 2L NS bolus, 4 u PRBCs and 1 u platelets given.  CT abd/pelvis done - active GI bleed noted in stomach - GI aware with plans for endoscopy today. SB-SR on monitor, HR 50s-60s. O2 at 3L NC. Afebrile. AAOx3. Protonix gtt initiated, NS infusing per order. Multiple small black tarry diarrhea stools. Episode of bloody emesis (see note). Voids per urinal. Turns/repositions independently. POC reviewed with pt, wife updated via phone.

## 2019-10-14 NOTE — PROGRESS NOTES
ED MD Henderson at bedside to insert central line. Attempted insertion in R IJ, attempted x2 and unsuccessful. Per MD carotid punctured, pressure applied, no active bleeding, small hematoma noted to R side neck

## 2019-10-14 NOTE — ASSESSMENT & PLAN NOTE
CT showed bleeding in greater curvature of stomach  Likely gastric ulcer  Continue PPI  Hold ASA, lasix  GI consulted  Anticipate EGD this afternoon  IR and general surgery aware of bleed and available if needed for additional intervention

## 2019-10-14 NOTE — ANESTHESIA PREPROCEDURE EVALUATION
10/14/2019  Aquiles Kelsey is a 79 y.o., male.    Anesthesia Evaluation    I have reviewed the Patient Summary Reports.    I have reviewed the Nursing Notes.   I have reviewed the Medications.     Review of Systems  Anesthesia Hx:  No problems with previous Anesthesia    Social:  Smoker    Hematology/Oncology:     Oncology Normal    -- Anemia:   EENT/Dental:EENT/Dental Normal   Cardiovascular:   Exercise tolerance: poor Hypertension, well controlled Valvular problems/Murmurs, AI Past MI CAD asymptomatic CABG/stent  Angina, with exertion PVD hyperlipidemia    Pulmonary:  Pulmonary Normal    Renal/:   Chronic Renal Disease, CRI    Musculoskeletal:  Musculoskeletal Normal    Neurological:  Neurology Normal    Endocrine:   Diabetes, well controlled, type 2    Dermatological:  Skin Normal    Psych:   Psychiatric History anxiety depression          Physical Exam  General:  Well nourished    Airway/Jaw/Neck:  Airway Findings: Mouth Opening: Normal Tongue: Normal       Chest/Lungs:  Chest/Lungs Findings: Clear to auscultation     Heart/Vascular:  Heart Findings: Rate: Normal             Anesthesia Plan  Type of Anesthesia, risks & benefits discussed:  Anesthesia Type:  MAC  Patient's Preference:   Intra-op Monitoring Plan: standard ASA monitors  Intra-op Monitoring Plan Comments:   Post Op Pain Control Plan:   Post Op Pain Control Plan Comments:   Induction:   IV  Beta Blocker:  Patient is not currently on a Beta-Blocker (No further documentation required).       Informed Consent: Patient understands risks and agrees with Anesthesia plan.  Questions answered. Anesthesia consent signed with patient.  ASA Score: 3     Day of Surgery Review of History & Physical: I have interviewed and examined the patient. I have reviewed the patient's H&P dated:  There are no significant changes.          Ready For Surgery  From Anesthesia Perspective.

## 2019-10-14 NOTE — SUBJECTIVE & OBJECTIVE
Interval History: Patient developed gross hematemesis overnight and was transferred to ICU. 4 units total pRBC was given. Patient states he filled up multiple urine containers with dark black vomit. Reports having dark black stools as well.      Review of Systems   Constitutional: Negative for fatigue and fever.   Respiratory: Negative for shortness of breath.    Cardiovascular: Negative for chest pain.   Gastrointestinal: Positive for vomiting. Negative for abdominal pain, anal bleeding, blood in stool and nausea.        Hematemesis   Skin: Negative for rash.   Neurological: Negative for headaches.     Objective:     Vital Signs (Most Recent):  Temp: 97.8 °F (36.6 °C) (10/14/19 0827)  Pulse: 71 (10/14/19 1100)  Resp: (!) 23 (10/14/19 1100)  BP: (!) 142/65 (10/14/19 1100)  SpO2: 97 % (10/14/19 1100) Vital Signs (24h Range):  Temp:  [97.5 °F (36.4 °C)-98.4 °F (36.9 °C)] 97.8 °F (36.6 °C)  Pulse:  [52-79] 71  Resp:  [10-26] 23  SpO2:  [92 %-100 %] 97 %  BP: ()/(21-72) 142/65     Weight: 75.6 kg (166 lb 10.7 oz)  Body mass index is 23.91 kg/m².    Intake/Output Summary (Last 24 hours) at 10/14/2019 1328  Last data filed at 10/14/2019 1100  Gross per 24 hour   Intake 4246.83 ml   Output 1375 ml   Net 2871.83 ml      Physical Exam   Constitutional: He is oriented to person, place, and time. He appears well-developed and well-nourished. No distress.   Cardiovascular: Normal rate, regular rhythm and normal heart sounds. Exam reveals no gallop and no friction rub.   No murmur heard.  Pulmonary/Chest: Effort normal and breath sounds normal. No stridor. No respiratory distress. He has no wheezes. He has no rales.   Abdominal: Soft. Bowel sounds are normal. He exhibits no distension and no mass. There is no tenderness. There is no guarding.   Musculoskeletal: He exhibits no edema.   Neurological: He is alert and oriented to person, place, and time.   Skin: He is not diaphoretic.       Significant Labs:   BMP:   Recent  Labs   Lab 10/14/19  0100   *      K 4.7      CO2 17*   BUN 37*   CREATININE 1.3   CALCIUM 8.2*     CBC:   Recent Labs   Lab 10/13/19  0402 10/14/19  0100 10/14/19  0814   WBC 6.81 8.28 7.14   HGB 12.0* 8.6* 9.8*   HCT 36.0* 26.7* 30.8*   * 172 142*       Significant Imaging: I have reviewed all pertinent imaging results/findings within the past 24 hours.

## 2019-10-14 NOTE — PROGRESS NOTES
Patient with PCI with KYLIE (in stent restenosis) yesterday on aspirin 325mg and Plavix with hematemesis.     Received 1L NS and is currently on 1 of 2u pRBC's. He is awake, responsive, and asymptomatic (denies CP, dyspnea, lightheadedness).     BP (!) 95/48 (BP Location: Left arm, Patient Position: Lying)   Pulse (!) 58   Temp 97.5 °F (36.4 °C) (Oral)   Resp 16     Will proceed with volume and blood resuscitation at this time. Keep patient npo. Case discussed with his nurse Antonette and ICU - Dr. Parikh.    Gerri Mendiola MD   Gastroenterology

## 2019-10-14 NOTE — NURSING
VSS, pt resting in bed, pt states he is feeling better, no c/o chest pain. No s/s of distress noted. Will continue to monitor.

## 2019-10-14 NOTE — PROGRESS NOTES
Call received from Dr. Gilbert, instructed to hold morning dose of plavix and aspirin, will resume on Tuesday AM, and start dopamine infusion for BP support

## 2019-10-14 NOTE — ASSESSMENT & PLAN NOTE
- Admit to Tele  - Initial troponin 0.466, will trend  - Given  mg in the ER  - Continue home ASA, Plavix, ACEi, Zocor, and Imdur  - ECHO pending  - Cardiology consulted and recommended a Heparin gtt with plans for a heart cath today with additional recs to follow  - NPO  - IVFs  - Supplemental o2 and SL NTG prn  - Tele monitoring     10/12:  S/p PCI with KYLIE x3 of the LCx and OM  Cards planning on performing PCI of RCA in the am  NPO after midnight     10/13:  PCI of RCA pending today  Cards recs to follow  Resume diet postop     10/14:  Attempted PCI of RCA was unsuccessful   Asa and plavix on hold due to GI bleed  Plan to resume meds post EGD  If HR is improved can resume BB as well

## 2019-10-14 NOTE — NURSING
Placed pt on 3 L of O2 via NC, pt stats in the mid 90's. Pt states he is feeling better, B/P improving, NP notified.

## 2019-10-14 NOTE — SUBJECTIVE & OBJECTIVE
No current facility-administered medications on file prior to encounter.      Current Outpatient Medications on File Prior to Encounter   Medication Sig    aspirin 325 MG tablet Take 325 mg by mouth once daily.    clopidogrel (PLAVIX) 75 mg tablet Take 1 tablet (75 mg total) by mouth once daily.    escitalopram oxalate (LEXAPRO) 10 MG tablet Take 1 tablet (10 mg total) by mouth once daily.    isosorbide mononitrate (IMDUR) 30 MG 24 hr tablet Take 1 tablet (30 mg total) by mouth once daily.    lisinopril 10 MG tablet Take 1 tablet (10 mg total) by mouth once daily.    metFORMIN (GLUCOPHAGE) 1000 MG tablet Take 1 tablet (1,000 mg total) by mouth 2 (two) times daily with meals.    simvastatin (ZOCOR) 20 MG tablet Take 1 tablet (20 mg total) by mouth every evening.    nitroGLYCERIN (NITROSTAT) 0.4 MG SL tablet Place 1 tablet (0.4 mg total) under the tongue every 5 (five) minutes as needed for Chest pain.       Review of patient's allergies indicates:  No Known Allergies    Past Medical History:   Diagnosis Date    Acute blood loss anemia 10/14/2019    Aneurysm, abdominal aortic     Coronary artery disease     Depression     Diabetes mellitus     HLD (hyperlipidemia)     Hypertension     Kidney stone     PAD (peripheral artery disease)     Tobacco abuse      Past Surgical History:   Procedure Laterality Date    APPENDECTOMY      CORONARY STENT PLACEMENT       Family History     Problem Relation (Age of Onset)    COPD Father    Diabetes Mother, Brother        Tobacco Use    Smoking status: Current Every Day Smoker     Years: 15.00     Types: Cigars    Smokeless tobacco: Current User   Substance and Sexual Activity    Alcohol use: Not Currently    Drug use: Not Currently    Sexual activity: Not Currently     Review of Systems   Constitutional: Negative for unexpected weight change.   HENT: Negative for congestion.    Eyes: Negative for visual disturbance.   Respiratory: Negative for shortness of  breath.    Cardiovascular: Negative for chest pain.   Gastrointestinal: Positive for blood in stool and diarrhea. Negative for abdominal distention, abdominal pain and nausea.        Hematemesis   Genitourinary: Negative for difficulty urinating.   Skin: Negative for rash.   Allergic/Immunologic: Negative for immunocompromised state.   Neurological: Negative for weakness.   Psychiatric/Behavioral: The patient is not nervous/anxious.      Objective:     Vital Signs (Most Recent):  Temp: 97.8 °F (36.6 °C) (10/14/19 0827)  Pulse: 71 (10/14/19 1100)  Resp: (!) 23 (10/14/19 1100)  BP: (!) 142/65 (10/14/19 1100)  SpO2: 97 % (10/14/19 1100) Vital Signs (24h Range):  Temp:  [97.5 °F (36.4 °C)-98.4 °F (36.9 °C)] 97.8 °F (36.6 °C)  Pulse:  [52-79] 71  Resp:  [10-26] 23  SpO2:  [92 %-100 %] 97 %  BP: ()/(21-72) 142/65     Weight: 75.6 kg (166 lb 10.7 oz)  Body mass index is 23.91 kg/m².    Physical Exam   Constitutional: He is oriented to person, place, and time. He appears well-developed and well-nourished. No distress.   HENT:   Head: Normocephalic and atraumatic.   Eyes: EOM are normal.   Neck: Neck supple.   Cardiovascular: Normal rate and regular rhythm.   Pulmonary/Chest: Effort normal.   Abdominal: Soft. He exhibits no distension. There is no tenderness.   Musculoskeletal: Normal range of motion.   Neurological: He is alert and oriented to person, place, and time.   Skin: Skin is warm.   Vitals reviewed.      Significant Labs:  CBC:   Recent Labs   Lab 10/14/19  1341   WBC 9.70   RBC 3.07*   HGB 9.3*   HCT 28.2*      MCV 92   MCH 30.3   MCHC 33.0     BMP:   Recent Labs   Lab 10/14/19  0100   *      K 4.7      CO2 17*   BUN 37*   CREATININE 1.3   CALCIUM 8.2*       Significant Diagnostics:  I have reviewed and interpreted all pertinent imaging results/findings within the past 24 hours. arterial blush at greater curvature

## 2019-10-14 NOTE — HPI
The patient presented to the ER for CP and elevated troponin. He was treated for NSTEMI with heart cath and stenting. Overnight, he developed hematemesis, melena and became hypotensive. He was transferred to the ICU and started on IV Protonix. He has received 4 units of PRBC and 1 unit of PLT. He hasn't had any further vomiting and vitals have become more stable. CTA showed bleeding in the stomach. Hgb yesterday was 12 and went to 8.6. Last Hgb 9.8. BUN 37 and creatinine 1.3. Home meds include Plavix and 325 mg ASA. Plavix and ASA being held today. Aggrastat was given yesterday but also being held now. He denies regular GI symptoms at home. He denies a history of ulcers. He doesn't usually take a PPI. He denies NSAID use.

## 2019-10-14 NOTE — SUBJECTIVE & OBJECTIVE
Past Medical History:   Diagnosis Date    Aneurysm, abdominal aortic     Coronary artery disease     Depression     Diabetes mellitus     HLD (hyperlipidemia)     Hypertension     Kidney stone     PAD (peripheral artery disease)     Tobacco abuse        Past Surgical History:   Procedure Laterality Date    APPENDECTOMY      CORONARY STENT PLACEMENT         Review of patient's allergies indicates:  No Known Allergies  Family History     Problem Relation (Age of Onset)    COPD Father    Diabetes Mother, Brother        Tobacco Use    Smoking status: Current Every Day Smoker     Years: 15.00     Types: Cigars    Smokeless tobacco: Current User   Substance and Sexual Activity    Alcohol use: Not Currently    Drug use: Not Currently    Sexual activity: Not Currently     Review of Systems   Constitutional: Positive for fatigue. Negative for fever.   HENT: Negative for hearing loss.    Eyes: Negative for visual disturbance.   Respiratory: Positive for shortness of breath (improved). Negative for cough.    Cardiovascular: Positive for chest pain (improved). Negative for palpitations.   Gastrointestinal:        As per HPI.   Genitourinary: Negative for difficulty urinating, dysuria, frequency and hematuria.   Musculoskeletal: Negative for arthralgias and back pain.   Skin: Negative for color change and rash.   Neurological: Negative for seizures, syncope, numbness and headaches.   Hematological: Does not bruise/bleed easily.   Psychiatric/Behavioral: The patient is not nervous/anxious.      Objective:     Vital Signs (Most Recent):  Temp: 97.8 °F (36.6 °C) (10/14/19 0827)  Pulse: 71 (10/14/19 1100)  Resp: (!) 23 (10/14/19 1100)  BP: (!) 142/65 (10/14/19 1100)  SpO2: 97 % (10/14/19 1100) Vital Signs (24h Range):  Temp:  [97.5 °F (36.4 °C)-98.4 °F (36.9 °C)] 97.8 °F (36.6 °C)  Pulse:  [52-79] 71  Resp:  [10-26] 23  SpO2:  [92 %-100 %] 97 %  BP: ()/(21-72) 142/65     Weight: 75.6 kg (166 lb 10.7 oz)  (10/13/19 0600)  Body mass index is 23.91 kg/m².      Intake/Output Summary (Last 24 hours) at 10/14/2019 1154  Last data filed at 10/14/2019 1100  Gross per 24 hour   Intake 4246.83 ml   Output 1375 ml   Net 2871.83 ml       Lines/Drains/Airways     Peripheral Intravenous Line                 Peripheral IV - Single Lumen 10/11/19 1127 20 G Left Forearm 3 days         Peripheral IV - Single Lumen 10/11/19 1127 20 G Right Forearm 3 days                Physical Exam   Constitutional: He is oriented to person, place, and time. He appears well-developed and well-nourished.   HENT:   Head: Normocephalic and atraumatic.   Eyes: EOM are normal.   Neck: Normal range of motion. Neck supple.   Cardiovascular: Normal rate, regular rhythm and normal heart sounds.   No murmur heard.  Pulmonary/Chest: Effort normal and breath sounds normal. No respiratory distress. He has no wheezes.   Abdominal: Soft. Bowel sounds are normal. He exhibits no distension and no mass. There is no hepatomegaly. There is no tenderness.   Musculoskeletal: He exhibits no edema.   Neurological: He is alert and oriented to person, place, and time. No cranial nerve deficit. Gait normal.   Skin: Skin is warm and dry. No rash noted.   Psychiatric: He has a normal mood and affect.       Significant Labs:  CBC:   Recent Labs   Lab 10/13/19  0402 10/14/19  0100 10/14/19  0814   WBC 6.81 8.28 7.14   HGB 12.0* 8.6* 9.8*   HCT 36.0* 26.7* 30.8*   * 172 142*     CMP:   Recent Labs   Lab 10/14/19  0100   *   CALCIUM 8.2*   ALBUMIN 2.6*   PROT 4.7*      K 4.7   CO2 17*      BUN 37*   CREATININE 1.3   ALKPHOS 42*   ALT 10   AST 18   BILITOT 0.4     Coagulation:   Recent Labs   Lab 10/14/19  0814   INR 1.2       Significant Imaging:  Imaging results within the past 24 hours have been reviewed.

## 2019-10-14 NOTE — CONSULTS
Consult regarding upper gi bleed.      CTA demonstrates active bleed within the stomach along the greater curve.      Recommend GI endoscopy.  Will continue to follow.  Thank you for the consult

## 2019-10-14 NOTE — ANESTHESIA RELEASE NOTE
"Anesthesia Release from PACU Note    Patient: Aquiles Kelsey    Procedure(s) Performed: Procedure(s) (LRB):  EGD (ESOPHAGOGASTRODUODENOSCOPY) (N/A)    Anesthesia type: MAC    Post pain: Adequate analgesia    Post assessment: no apparent anesthetic complications, tolerated procedure well and no evidence of recall    Last Vitals:   Visit Vitals  BP (!) 113/57   Pulse 71   Temp 36.8 °C (98.2 °F) (Oral)   Resp 17   Ht 5' 10" (1.778 m)   Wt 75.6 kg (166 lb 10.7 oz)   SpO2 97%   BMI 23.91 kg/m²       Post vital signs: stable    Level of consciousness: awake, alert  and oriented    Nausea/Vomiting: no nausea/no vomiting    Complications: none    Airway Patency: patent    Respiratory: unassisted, spontaneous ventilation, room air    Cardiovascular: stable and blood pressure at baseline    Hydration: euvolemic  "

## 2019-10-14 NOTE — HOSPITAL COURSE
About 1am on 10/14 pt got up to restroom and had hypotension, near syncope with report of hematemesis and bloody stool; he was emergently transferred to ICU and received 4u PRBC, 1 Plt, and one dose of TXA  hgb up this morning from 8.6 to 9.8 with no overt bleeding from about 3am until this afternoon when he is again having multiple black liquid stools and one episode of ~100ml bloody emesis  10/15 - EGD last evening with evidence of bleeding from lesion at greater curvature of stomach, unable to treat; continued melanous stools, additional unit of PRBC transfused today; remains NPO with ongoing emycin for motility stimulation and anticipate repeat endoscopy today  10/16 - repeat EGD last evening revealed 2 areas of angioectasia (both non bleeding 4mm and 5mm) both injected with epinephrine; tolerated po intake overnight with h/h stable

## 2019-10-14 NOTE — H&P (VIEW-ONLY)
[facility] -   Gastroenterology  Consult Note    Patient Name: Aquiles Kelsey  MRN: [id number]  Admission Date: 10/11/2019  Hospital Length of Stay: 3 days  Code Status: Full Code   Attending Provider: Dl Cerrato MD   Consulting Provider: Hubert Downing PA-C  Primary Care Physician: Saige Miranda MD  Principal Problem:NSTEMI (non-ST elevated myocardial infarction)    Inpatient consult to Gastroenterology  Consult performed by: Hubert Downing PA-C  Consult ordered by: Andrew Parikh MD  Reason for consult: GI bleed        Subjective:     HPI:  The patient presented to the ER for CP and elevated troponin. He was treated for NSTEMI with heart cath and stenting. Overnight, he developed hematemesis, melena and became hypotensive. He was transferred to the ICU and started on IV Protonix. He has received 4 units of PRBC and 1 unit of PLT. He hasn't had any further vomiting and vitals have become more stable. CTA showed bleeding in the stomach. Hgb yesterday was 12 and went to 8.6. Last Hgb 9.8. BUN 37 and creatinine 1.3. Home meds include Plavix and 325 mg ASA. Plavix and ASA being held today. Aggrastat was given yesterday but also being held now. He denies regular GI symptoms at home. He denies a history of ulcers. He doesn't usually take a PPI. He denies NSAID use.     Past Medical History:   Diagnosis Date    Aneurysm, abdominal aortic     Coronary artery disease     Depression     Diabetes mellitus     HLD (hyperlipidemia)     Hypertension     Kidney stone     PAD (peripheral artery disease)     Tobacco abuse        Past Surgical History:   Procedure Laterality Date    APPENDECTOMY      CORONARY STENT PLACEMENT         Review of patient's allergies indicates:  No Known Allergies  Family History     Problem Relation (Age of Onset)    COPD Father    Diabetes Mother, Brother        Tobacco Use    Smoking status: Current Every Day Smoker     Years: 15.00     Types: Cigars     Smokeless tobacco: Current User   Substance and Sexual Activity    Alcohol use: Not Currently    Drug use: Not Currently    Sexual activity: Not Currently     Review of Systems   Constitutional: Positive for fatigue. Negative for fever.   HENT: Negative for hearing loss.    Eyes: Negative for visual disturbance.   Respiratory: Positive for shortness of breath (improved). Negative for cough.    Cardiovascular: Positive for chest pain (improved). Negative for palpitations.   Gastrointestinal:        As per HPI.   Genitourinary: Negative for difficulty urinating, dysuria, frequency and hematuria.   Musculoskeletal: Negative for arthralgias and back pain.   Skin: Negative for color change and rash.   Neurological: Negative for seizures, syncope, numbness and headaches.   Hematological: Does not bruise/bleed easily.   Psychiatric/Behavioral: The patient is not nervous/anxious.      Objective:     Vital Signs (Most Recent):  Temp: 97.8 °F (36.6 °C) (10/14/19 0827)  Pulse: 71 (10/14/19 1100)  Resp: (!) 23 (10/14/19 1100)  BP: (!) 142/65 (10/14/19 1100)  SpO2: 97 % (10/14/19 1100) Vital Signs (24h Range):  Temp:  [97.5 °F (36.4 °C)-98.4 °F (36.9 °C)] 97.8 °F (36.6 °C)  Pulse:  [52-79] 71  Resp:  [10-26] 23  SpO2:  [92 %-100 %] 97 %  BP: ()/(21-72) 142/65     Weight: 75.6 kg (166 lb 10.7 oz) (10/13/19 0600)  Body mass index is 23.91 kg/m².      Intake/Output Summary (Last 24 hours) at 10/14/2019 1154  Last data filed at 10/14/2019 1100  Gross per 24 hour   Intake 4246.83 ml   Output 1375 ml   Net 2871.83 ml       Lines/Drains/Airways     Peripheral Intravenous Line                 Peripheral IV - Single Lumen 10/11/19 1127 20 G Left Forearm 3 days         Peripheral IV - Single Lumen 10/11/19 1127 20 G Right Forearm 3 days                Physical Exam   Constitutional: He is oriented to person, place, and time. He appears well-developed and well-nourished.   HENT:   Head: Normocephalic and atraumatic.   Eyes: EOM  are normal.   Neck: Normal range of motion. Neck supple.   Cardiovascular: Normal rate, regular rhythm and normal heart sounds.   No murmur heard.  Pulmonary/Chest: Effort normal and breath sounds normal. No respiratory distress. He has no wheezes.   Abdominal: Soft. Bowel sounds are normal. He exhibits no distension and no mass. There is no hepatomegaly. There is no tenderness.   Musculoskeletal: He exhibits no edema.   Neurological: He is alert and oriented to person, place, and time. No cranial nerve deficit. Gait normal.   Skin: Skin is warm and dry. No rash noted.   Psychiatric: He has a normal mood and affect.       Significant Labs:  CBC:   Recent Labs   Lab 10/13/19  0402 10/14/19  0100 10/14/19  0814   WBC 6.81 8.28 7.14   HGB 12.0* 8.6* 9.8*   HCT 36.0* 26.7* 30.8*   * 172 142*     CMP:   Recent Labs   Lab 10/14/19  0100   *   CALCIUM 8.2*   ALBUMIN 2.6*   PROT 4.7*      K 4.7   CO2 17*      BUN 37*   CREATININE 1.3   ALKPHOS 42*   ALT 10   AST 18   BILITOT 0.4     Coagulation:   Recent Labs   Lab 10/14/19  0814   INR 1.2       Significant Imaging:  Imaging results within the past 24 hours have been reviewed.    Assessment/Plan:     * NSTEMI (non-ST elevated myocardial infarction)  S/p cath with stenting. Cardiology is following.     Hematemesis/vomiting blood  UGI bleed in patient requiring antiplatelet therapy for KYLIE.   Plan for EGD today.   Continue IV Protonix 40 mg bid.     Acute blood loss anemia  Continue to monitor H/H and transfuse additional units as needed.     Hypovolemic shock  Improved with IV fluids and blood products.         Thank you for your consult. I will follow-up with patient. Please contact us if you have any additional questions.    Hubert Downing PA-C  Gastroenterology  [facility] [place]

## 2019-10-14 NOTE — SIGNIFICANT EVENT
CARDIOLOGY ON CALL NOTE:    Pt s/p PCI RCA today and PCI LCX/OM few days ago performed by myself.    I was notified by the cath lab team at 0300 that the patient had rapid response few hours earlier with raymond hematemesis and was transferred to ICU.   Also per chart earlier in the evening (approx 2016)  he had c/o diaphoresis and some chest pain sxs and weakness.    I was not notified by any nursing staff from telemetry or ICU of patient's condition, deterioration.    I discussed by phone with Fabiola Murray, nurse taking care of patient on telemetry tonight and then the nursing supervisor on telemetry about why I was not notified of patient's acute issues.    There was not a good explanation to explain the absence of Cardiology notification.  I have asked the nursing supervisor to help prevent such matters in future from occurring again.      Discussed with ICU nursing staff.    Agree with PRBC transfusion 3 - 4 units.  Aggrastat stopped when rapid response called.    Will hold asa and plavix today, but restart tomorrow as he has had MI this admission with multiple KYLIE.    Start low dose Dopamine for HR/BP support.    IV fluids.      DR SANZ

## 2019-10-14 NOTE — TRANSFER OF CARE
"Anesthesia Transfer of Care Note    Patient: Aquiles Kelsey    Procedure(s) Performed: Procedure(s) (LRB):  EGD (ESOPHAGOGASTRODUODENOSCOPY) (N/A)    Patient location: ICU    Anesthesia Type: MAC    Transport from OR: Transported from OR on room air with adequate spontaneous ventilation    Post pain: adequate analgesia    Post assessment: no apparent anesthetic complications    Post vital signs: stable    Level of consciousness: awake, alert and oriented    Nausea/Vomiting: no nausea/vomiting    Complications: none    Transfer of care protocol was followed      Last vitals:   Visit Vitals  BP (!) 113/57   Pulse 71   Temp 36.8 °C (98.2 °F) (Oral)   Resp 17   Ht 5' 10" (1.778 m)   Wt 75.6 kg (166 lb 10.7 oz)   SpO2 97%   BMI 23.91 kg/m²     "

## 2019-10-14 NOTE — ASSESSMENT & PLAN NOTE
OPTIMIZE MEDICAL TX FOR CAD.    10/14/19  -Patient is s/p PCI of LCX with KYLIE x1 and severe OM2 KYLIE x2 overlapped into mid LCX old stent. OM on Friday and Prox-mid RCA with KYLIE x2  on yesterday. Unsuccessful PCI of Distal RCA occluded and PDA occluded stent  -ASA and Plavix on hold for today secondary to acute GIB  -Plans in place for GI to scope patient today   -Plan to restart ASA and Plavix tomorrow given his recent MI and multiple KYLIE   -Continue Statin, Ranexa, Imdur and ARB  -No BB due to bradycardia

## 2019-10-14 NOTE — CONSULTS
[facility] -   Critical Care Medicine  Consult Note    Patient Name: Aquiles Kelsey  MRN: [id number]  Admission Date: 10/11/2019  Hospital Length of Stay: 3 days  Code Status: Full Code  Attending Physician: Dl Cerrato MD   Primary Care Provider: Saige Miranda MD   Principal Problem: NSTEMI (non-ST elevated myocardial infarction)      Subjective:     HPI:  79 year old male with PMH including HTN, DM (on metformin), HLD, PAD, CAD with stent (on plavix and ASA), kidney stone, and AAA per record  Presented to ED on 10/11 per cardiology instructions; he reported episodes of CP at cards office earlier in week and was called with advise to go to ED due to elevated troponin 0.835   In ED he reported mild chest pain intermittent since office visit but none at that time, repeat troponin 0.46 along with platelets 147, glucose 127  He was taken for [facility] early that evening and had stents x 3 placed to LCX, OM2; also noted severe RCA disease with occluded stent and EF 45%  He was admitted to telemetry   On 10/13 he returned to [facility] and underwent PCI to RCA with stent x 2    Hospital/ICU Course:  About 1am on 10/14 pt got up to restroom and had hypotension, near syncope with report of hematemesis and bloody stool; he was emergently transferred to ICU and received 4u PRBC, 1 Plt, and one dose of TXA  hgb up this morning from 8.6 to 9.8 with no overt bleeding from about 3am until this afternoon when he is again having multiple black liquid stools and one episode of ~100ml bloody emesis    Past Medical History:   Diagnosis Date    Acute blood loss anemia 10/14/2019    Aneurysm, abdominal aortic     Coronary artery disease     Depression     Diabetes mellitus     HLD (hyperlipidemia)     Hypertension     Kidney stone     PAD (peripheral artery disease)     Tobacco abuse        Past Surgical History:   Procedure Laterality Date    APPENDECTOMY      CORONARY STENT PLACEMENT         Review of patient's  allergies indicates:  No Known Allergies    Family History     Problem Relation (Age of Onset)    COPD Father    Diabetes Mother, Brother        Tobacco Use    Smoking status: Current Every Day Smoker     Years: 15.00     Types: Cigars    Smokeless tobacco: Current User   Substance and Sexual Activity    Alcohol use: Not Currently    Drug use: Not Currently    Sexual activity: Not Currently         Review of Systems   Constitutional: Positive for fatigue.   HENT: Negative for congestion.    Respiratory: Negative for chest tightness and shortness of breath.    Cardiovascular: Negative for chest pain and leg swelling.   Gastrointestinal: Positive for abdominal pain (mid epigastric pain), blood in stool, diarrhea, nausea (hematemesis) and vomiting.   Genitourinary: Negative.    Musculoskeletal: Negative for back pain and joint swelling.   Skin: Negative for color change.   Neurological: Negative for headaches.   Hematological: Bruises/bleeds easily.   Psychiatric/Behavioral: The patient is not nervous/anxious.      Objective:     Vital Signs (Most Recent):  Temp: 97.8 °F (36.6 °C) (10/14/19 0827)  Pulse: 80 (10/14/19 1509)  Resp: 20 (10/14/19 1509)  BP: (!) 100/49 (10/14/19 1509)  SpO2: (!) 94 % (10/14/19 1509) Vital Signs (24h Range):  Temp:  [97.5 °F (36.4 °C)-98.4 °F (36.9 °C)] 97.8 °F (36.6 °C)  Pulse:  [52-83] 80  Resp:  [10-28] 20  SpO2:  [92 %-100 %] 94 %  BP: ()/(21-72) 100/49     Weight: 75.6 kg (166 lb 10.7 oz)  Body mass index is 23.91 kg/m².      Intake/Output Summary (Last 24 hours) at 10/14/2019 1514  Last data filed at 10/14/2019 1400  Gross per 24 hour   Intake 4646.83 ml   Output 1375 ml   Net 3271.83 ml       Physical Exam   Constitutional: He is oriented to person, place, and time. He is cooperative. He appears ill.   HENT:   Head: Atraumatic.   Eyes: Pupils are equal, round, and reactive to light. Conjunctivae are normal. No scleral icterus.   Neck: No JVD present. No tracheal deviation  present.   Cardiovascular: Normal rate and regular rhythm.   Pulses:       Radial pulses are 2+ on the right side, and 2+ on the left side.        Dorsalis pedis pulses are 2+ on the right side, and 2+ on the left side.   Pulmonary/Chest: Effort normal and breath sounds normal. He has no wheezes. He has no rhonchi. He has no rales.   Abdominal: Soft. He exhibits no distension. Bowel sounds are increased. There is no tenderness.   Musculoskeletal: He exhibits no edema.   Neurological: He is alert and oriented to person, place, and time.   Skin: Skin is warm and dry. Capillary refill takes 2 to 3 seconds. Bruising (scattered upper extremity) noted. No cyanosis.        Psychiatric: He has a normal mood and affect. His behavior is normal. Judgment and thought content normal.       Vents:       Lines/Drains/Airways     Peripheral Intravenous Line                 Peripheral IV - Single Lumen 10/11/19 1127 20 G Left Forearm 3 days         Peripheral IV - Single Lumen 10/11/19 1127 20 G Right Forearm 3 days                Significant Labs:    CBC/Anemia Profile:  Recent Labs   Lab 10/14/19  0100 10/14/19  0814 10/14/19  1341   WBC 8.28 7.14 9.70   HGB 8.6* 9.8* 9.3*   HCT 26.7* 30.8* 28.2*    142* 160   MCV 95 92 92   RDW 13.4 13.5 14.2        Chemistries:  Recent Labs   Lab 10/13/19  0402 10/14/19  0100     137 137   K 4.7  4.8 4.7     103 106   CO2 23  26 17*   BUN 17  17 37*   CREATININE 1.1  1.1 1.3   CALCIUM 8.8  8.9 8.2*   ALBUMIN 3.2* 2.6*   PROT 5.9* 4.7*   BILITOT 0.5 0.4   ALKPHOS 54* 42*   ALT 10 10   AST 24 18       All pertinent labs within the past 24 hours have been reviewed.    Significant Imaging:   I have reviewed all pertinent imaging results/findings within the past 24 hours.      ABG  No results for input(s): PH, PO2, PCO2, HCO3, BE in the last 168 hours.  Assessment/Plan:     Cardiac/Vascular  * NSTEMI (non-ST elevated myocardial infarction)  Medical optimization post [facility] with  stenting    CAD, multiple vessel  Now post [facility] x 2 this admission with stents x 3 to LCX, OM2 and then x 2 to occluded RCA  Holding ASA and plavix s/t acute blood loss anemia with active GI bleeding  ICU hemodynamic monitoring  Continue statin, ranexa, imdur as tolerated  Cardiology following    Oncology  Acute blood loss anemia  Baseline hemoglobin 13-14; was 12 on morning of 10/13  Was holding post transfusion this am at 9.8 but with recurrent hematemesis and now hgb 9.3, will transfuse additional PRBC now  Serial h/h monitoring    Endocrine  Type 2 diabetes mellitus with hyperglycemia, without long-term current use of insulin  Glucose trend up 180-290 last 12 hours, likely stress reaction with acute GI bleed  Continue monitoring with SSI prn, goal glucose range 100-180 mg/dl; avoid hypoglycemia    GI  Hematemesis/vomiting blood  CT showed bleeding in greater curvature of stomach  Likely gastric ulcer  Continue PPI  Hold ASA, lasix  GI consulted  Anticipate EGD this afternoon  IR and general surgery aware of bleed and available if needed for additional intervention    Other  Hypovolemic shock  Hypotension overnight responded appropriately to IVF and transfusion  ICU hemodynamic monitoring         Critical Care Daily Checklist:    A: Awake: RASS Goal/Actual Goal: RASS Goal: 0-->alert and calm  Actual: Pereira Agitation Sedation Scale (RASS): Alert and calm   B: Spontaneous Breathing Trial Performed?     C: SAT & SBT Coordinated?  n/a                      D: Delirium: CAM-ICU Overall CAM-ICU: Negative   E: Early Mobility Performed? Yes   F: Feeding Goal:    Status:     Current Diet Order   Procedures    Diet NPO Except for: Ice Chips, Medication, Sips with Medication     Order Specific Question:   Except for     Answer:   Ice Chips     Order Specific Question:   Except for     Answer:   Medication     Order Specific Question:   Except for     Answer:   Sips with Medication      AS: Analgesia/Sedation prn   T:  Thromboembolic Prophylaxis SCD   H: HOB > 300 Yes   U: Stress Ulcer Prophylaxis (if needed) PPI   G: Glucose Control SSI prn   B: Bowel Function Stool Occurrence: 1   I: Indwelling Catheter (Lines & Malik) Necessity reviewed   D: De-escalation of Antimicrobials/Pharmacotherapies reviewed    Plan for the day/ETD As above    Code Status:  Family/Goals of Care: Full Code  Home on discharge   I have discussed case and plan of care in detail with Dr Zaragoza and Dr Cerrato; Status and plan of care were discussed with team on multidisciplinary rounds.    Critical Care Time: 50 minutes  Critical secondary to acute GI bleed post NSTEMI, PCI with stenting; Critical care was time spent personally by me on the following activities: development of treatment plan with patient or surrogate and bedside caregivers, discussions with consultants, evaluation of patient's response to treatment, examination of patient, ordering and performing treatments and interventions, ordering and review of laboratory studies, ordering and review of radiographic studies, pulse oximetry, re-evaluation of patient's condition. This critical care time did not overlap with that of any other provider or involve time for any procedures.    Thank you for your consult.      DESTINY Sheridan-BC  Critical Care Medicine  [facility]

## 2019-10-14 NOTE — ASSESSMENT & PLAN NOTE
Glucose trend up 180-290 last 12 hours, likely stress reaction with acute GI bleed  Continue monitoring with SSI prn, goal glucose range 100-180 mg/dl; avoid hypoglycemia

## 2019-10-14 NOTE — ASSESSMENT & PLAN NOTE
UGI bleed in patient requiring antiplatelet therapy for KYLIE.   Plan for EGD today.   Continue IV Protonix 40 mg bid.

## 2019-10-14 NOTE — TREATMENT PLAN
BP improved with fluid and 2u pRBC. He will be receiving an additional 2u. Continue PPI drip. Keep npo. Discussed with Antonette, ICU nurse. She will contact me with any changes.

## 2019-10-14 NOTE — PROGRESS NOTES
[facility] Hospital Medicine  Progress Note    Patient Name: Aquiles Kelsey  MRN: [id number]  Patient Class: IP- Inpatient   Admission Date: 10/11/2019  Length of Stay: 3 days  Attending Physician: Dl Cerrato MD  Primary Care Provider: Saige Miranda MD        Subjective:     Principal Problem:NSTEMI (non-ST elevated myocardial infarction)        HPI:  Aquiles Kelsey is a 79 y.o. male patient with a PMHx of CAD who presented to the Emergency Department today for evaluation of abnormal lab results.  Patient reports seeing Dr. Hobbs (Cardiology) and labs resulting in elevated troponin levels.  Associated sxs include CP.  Patient states he has had CP with a pain rating of 3/10 the past three days, but has complete relief as of today.  Patient denies any SOB, N/V, fever, chills, diaphoresis, palpitations, extremity weakness, dizziness, hematochezia, cough, and all other sxs at this time.  Prior Tx includes Imdur and 325 mg Aspirin.  Pt denies recent bleeding of any kind.  No further complaints or concerns at this time.  ER workup showed:  Stable VS.  12 lead EKG showed SB with nonspecific T wave abnormality.  Troponin yesterday elevated to 0.8345, repeat today 0.466.  CXR negative.  Cardiology consulted from the ER and recommended admission with a Heparin gtt and plans for a heart cath today with additional recs to follow.  ECHO pending.  Hospital Medicine called for admission.  Patient will be admitted to [facility].  He is a Full Code.  His SDM is his wife Gayle who can be reached at [phone].       Overview/Hospital Course:  10/12:  S/p LHC with KYLIE x3 of the LCx and OM. Cards plan to perform PCI of RCA tomorrow.     10/13:  Cardio attempting PCI of RCA today     10/14:  Unsuccessful PCI of RCA, developed gross hematemesis overnight. H/H dropped. 4 units pRBC transfused. Transferred to ICU. GI consulted on case. CT scan abdomen showed bleeding in greater curvature of stomach.      Interval History: Patient developed gross hematemesis overnight and was transferred to ICU. 4 units total pRBC was given. Patient states he filled up multiple urine containers with dark black vomit. Reports having dark black stools as well.      Review of Systems   Constitutional: Negative for fatigue and fever.   Respiratory: Negative for shortness of breath.    Cardiovascular: Negative for chest pain.   Gastrointestinal: Positive for vomiting. Negative for abdominal pain, anal bleeding, blood in stool and nausea.        Hematemesis   Skin: Negative for rash.   Neurological: Negative for headaches.     Objective:     Vital Signs (Most Recent):  Temp: 97.8 °F (36.6 °C) (10/14/19 0827)  Pulse: 71 (10/14/19 1100)  Resp: (!) 23 (10/14/19 1100)  BP: (!) 142/65 (10/14/19 1100)  SpO2: 97 % (10/14/19 1100) Vital Signs (24h Range):  Temp:  [97.5 °F (36.4 °C)-98.4 °F (36.9 °C)] 97.8 °F (36.6 °C)  Pulse:  [52-79] 71  Resp:  [10-26] 23  SpO2:  [92 %-100 %] 97 %  BP: ()/(21-72) 142/65     Weight: 75.6 kg (166 lb 10.7 oz)  Body mass index is 23.91 kg/m².    Intake/Output Summary (Last 24 hours) at 10/14/2019 1328  Last data filed at 10/14/2019 1100  Gross per 24 hour   Intake 4246.83 ml   Output 1375 ml   Net 2871.83 ml      Physical Exam   Constitutional: He is oriented to person, place, and time. He appears well-developed and well-nourished. No distress.   Cardiovascular: Normal rate, regular rhythm and normal heart sounds. Exam reveals no gallop and no friction rub.   No murmur heard.  Pulmonary/Chest: Effort normal and breath sounds normal. No stridor. No respiratory distress. He has no wheezes. He has no rales.   Abdominal: Soft. Bowel sounds are normal. He exhibits no distension and no mass. There is no tenderness. There is no guarding.   Musculoskeletal: He exhibits no edema.   Neurological: He is alert and oriented to person, place, and time.   Skin: He is not diaphoretic.       Significant Labs:   BMP:    Recent Labs   Lab 10/14/19  0100   *      K 4.7      CO2 17*   BUN 37*   CREATININE 1.3   CALCIUM 8.2*     CBC:   Recent Labs   Lab 10/13/19  0402 10/14/19  0100 10/14/19  0814   WBC 6.81 8.28 7.14   HGB 12.0* 8.6* 9.8*   HCT 36.0* 26.7* 30.8*   * 172 142*       Significant Imaging: I have reviewed all pertinent imaging results/findings within the past 24 hours.      Assessment/Plan:      * NSTEMI (non-ST elevated myocardial infarction)  - Admit to Tele  - Initial troponin 0.466, will trend  - Given  mg in the ER  - Continue home ASA, Plavix, ACEi, Zocor, and Imdur  - ECHO pending  - Cardiology consulted and recommended a Heparin gtt with plans for a heart cath today with additional recs to follow  - NPO  - IVFs  - Supplemental o2 and SL NTG prn  - Tele monitoring     10/12:  S/p PCI with KYLIE x3 of the LCx and OM  Cards planning on performing PCI of RCA in the am  NPO after midnight     10/13:  PCI of RCA pending today  Cards recs to follow  Resume diet postop     10/14:  Attempted PCI of RCA was unsuccessful   Asa and plavix on hold due to GI bleed  Plan to resume meds post EGD  If HR is improved can resume BB as well       Hematemesis/vomiting blood  10/14:  H/H stable post 4 units pRBC transfused  GI on case, EGD today  protonix 40mg bid   Will await EGD results  Continue to monitor H/H       Sinus bradycardia  10/13:  Stable  Continue to monitor     Tobacco abuse  - Counseled on the need for cessation      AP (angina pectoris)  - See plan as per above      Nonrheumatic aortic valve insufficiency  - Cardiology following      Claudication in peripheral vascular disease  - Continue home meds      Depression  - Continue home meds      Type 2 diabetes mellitus with hyperglycemia, without long-term current use of insulin  - A1c 6.5 in June  - Hold home Metformin for now  - Accu checks ACHS with SSI PRN  - Monitor    10/12  Cont to hold metformin   Sliding scale   Glucose within goal      10/13-10/14  Glucose within goal   Continue HSSI        Dyslipidemia  - Continue home meds      Essential hypertension  - BP elevated  - Continue home meds  - Monitor and adjust as needed     10/12:  bp slightly elevated  Will continue to monitor   Increasing lisinopril to 20mg daily     10/14:  BP controlled       PAD (peripheral artery disease)  - Continue home meds      CAD (coronary artery disease)  - See plan as per above        VTE Risk Mitigation (From admission, onward)         Ordered     IP VTE HIGH RISK PATIENT  Once      10/11/19 1236     Place sequential compression device  Until discontinued      10/11/19 1236                Critical care time spent on the evaluation and treatment of severe organ dysfunction, review of pertinent labs and imaging studies, discussions with consulting providers and discussions with patient/family: 35 minutes.      Dl Cerrato MD  Department of Hospital Medicine   [facility]

## 2019-10-14 NOTE — ASSESSMENT & PLAN NOTE
10/14:  H/H stable post 4 units pRBC transfused  GI on case, EGD today  protonix 40mg bid   Will await EGD results  Continue to monitor H/H

## 2019-10-14 NOTE — ASSESSMENT & PLAN NOTE
- A1c 6.5 in June  - Hold home Metformin for now  - Accu checks ACHS with SSI PRN  - Monitor    10/12  Cont to hold metformin   Sliding scale   Glucose within goal     10/13-10/14  Glucose within goal   Continue HSSI

## 2019-10-14 NOTE — [employer] PATIENT INSTRUCTIONS
Discharge Summary/Instructions after an Endoscopic Procedure  Patient Name: Aquiles Kelsey  Patient MRN: [id number]  Patient   YOB: 1940 Monday, October 14, 2019 Carlos Mcneal III, MD  RESTRICTIONS:  During your procedure today, you received medications for sedation.  These   medications may affect your judgment, balance and coordination.  Therefore,   for 24 hours, you have the following restrictions:   - DO NOT drive a car, operate machinery, make legal/financial decisions,   sign important papers or drink alcohol.    ACTIVITY:  Today: no heavy lifting, straining or running due to procedural   sedation/anesthesia.  The following day: return to full activity including work.  DIET:  Eat and drink normally unless instructed otherwise.     TREATMENT FOR COMMON SIDE EFFECTS:  - Mild abdominal pain, nausea, belching, bloating or excessive gas:  rest,   eat lightly and use a heating pad.  - Sore Throat: treat with throat lozenges and/or gargle with warm salt   water.  - Because air was used during the procedure, expelling large amounts of air   from your rectum or belching is normal.  - If a bowel prep was taken, you may not have a bowel movement for 1-3 days.    This is normal.  SYMPTOMS TO WATCH FOR AND REPORT TO YOUR PHYSICIAN:  1. Abdominal pain or bloating, other than gas cramps.  2. Chest pain.  3. Back pain.  4. Signs of infection such as: chills or fever occurring within 24 hours   after the procedure.  5. Rectal bleeding, which would show as bright red, maroon, or black stools.   (A tablespoon of blood from the rectum is not serious, especially if   hemorrhoids are present.)  6. Vomiting.  7. Weakness or dizziness.  GO DIRECTLY TO THE NEAREST EMERGENCY ROOM IF YOU HAVE ANY OF THE FOLLOWING:      Difficulty breathing              Chills and/or fever over 101 F   Persistent vomiting and/or vomiting blood   Severe abdominal pain   Severe chest pain   Black, tarry stools   Bleeding- more than one  tablespoon   Any other symptom or condition that you feel may need urgent attention  Your doctor recommends these additional instructions:  If any biopsies were taken, your doctors clinic will contact you in 1 to 2   weeks with any results.  - Return patient to ICU for ongoing care.   - Recommend a motility agent today.   - Clear liquid diet.   - NPO after midnight tonight.   - Repeat upper endoscopy tomorrow because the preparation was poor and for   retreatment.  For questions, problems or results please call your physician Carlos Mcneal III, MD at Work:  [phone]  If you have any questions about the above instructions, call the GI   department at [phone] or call the endoscopy unit at [phone]   from 7am until 3 pm.  [facility], EMERGENCY ROOM PHONE NUMBER:   [phone]  IF A COMPLICATION OR EMERGENCY SITUATION ARISES AND YOU ARE UNABLE TO REACH   YOUR PHYSICIAN - GO DIRECTLY TO THE EMERGENCY ROOM.  I have read or have had read to me these discharge instructions for my   procedure and have received a written copy.  I understand these   instructions and will follow-up with my physician if I have any questions.     __________________________________       _____________________________________  Nurse Signature                                          Patient/Designated   Responsible Party Signature  Carlos Mcneal III, MD  10/14/2019 5:04:52 PM  This report has been verified and signed electronically.  [employer]

## 2019-10-15 ENCOUNTER — ANESTHESIA EVENT (OUTPATIENT)
Dept: ENDOSCOPY | Facility: HOSPITAL | Age: 79
DRG: 246 | End: 2019-10-15
Payer: MEDICARE

## 2019-10-15 ENCOUNTER — ANESTHESIA (OUTPATIENT)
Dept: ENDOSCOPY | Facility: HOSPITAL | Age: 79
DRG: 246 | End: 2019-10-15
Payer: MEDICARE

## 2019-10-15 PROBLEM — K25.0 ACUTE GASTRIC ULCER WITH HEMORRHAGE: Status: ACTIVE | Noted: 2019-10-14

## 2019-10-15 LAB
ABO + RH BLD: NORMAL
ANION GAP SERPL CALC-SCNC: 8 MMOL/L (ref 8–16)
BLD GP AB SCN CELLS X3 SERPL QL: NORMAL
BLD PROD TYP BPU: NORMAL
BLOOD UNIT EXPIRATION DATE: NORMAL
BLOOD UNIT TYPE CODE: 5100
BLOOD UNIT TYPE: NORMAL
BUN SERPL-MCNC: 52 MG/DL (ref 8–23)
CALCIUM SERPL-MCNC: 7.1 MG/DL (ref 8.7–10.5)
CHLORIDE SERPL-SCNC: 110 MMOL/L (ref 95–110)
CO2 SERPL-SCNC: 22 MMOL/L (ref 23–29)
CODING SYSTEM: NORMAL
CREAT SERPL-MCNC: 1 MG/DL (ref 0.5–1.4)
DISPENSE STATUS: NORMAL
EST. GFR  (AFRICAN AMERICAN): >60 ML/MIN/1.73 M^2
EST. GFR  (NON AFRICAN AMERICAN): >60 ML/MIN/1.73 M^2
GLUCOSE SERPL-MCNC: 142 MG/DL (ref 70–110)
HCT VFR BLD AUTO: 24.1 % (ref 40–54)
HCT VFR BLD AUTO: 26.5 % (ref 40–54)
HCT VFR BLD AUTO: 26.5 % (ref 40–54)
HGB BLD-MCNC: 8.2 G/DL (ref 14–18)
HGB BLD-MCNC: 8.6 G/DL (ref 14–18)
HGB BLD-MCNC: 8.8 G/DL (ref 14–18)
INR PPP: 1.2 (ref 0.8–1.2)
NUM UNITS TRANS PACKED RBC: NORMAL
PLATELET # BLD AUTO: 109 K/UL (ref 150–350)
PMV BLD AUTO: 11.8 FL (ref 9.2–12.9)
POCT GLUCOSE: 151 MG/DL (ref 70–110)
POCT GLUCOSE: 167 MG/DL (ref 70–110)
POCT GLUCOSE: 168 MG/DL (ref 70–110)
POCT GLUCOSE: 169 MG/DL (ref 70–110)
POCT GLUCOSE: 173 MG/DL (ref 70–110)
POTASSIUM SERPL-SCNC: 4.1 MMOL/L (ref 3.5–5.1)
PROTHROMBIN TIME: 12.5 SEC (ref 9–12.5)
SODIUM SERPL-SCNC: 140 MMOL/L (ref 136–145)

## 2019-10-15 PROCEDURE — 80048 BASIC METABOLIC PNL TOTAL CA: CPT | Mod: HCNC

## 2019-10-15 PROCEDURE — C9113 INJ PANTOPRAZOLE SODIUM, VIA: HCPCS | Mod: HCNC | Performed by: INTERNAL MEDICINE

## 2019-10-15 PROCEDURE — 85018 HEMOGLOBIN: CPT | Mod: HCNC

## 2019-10-15 PROCEDURE — 86850 RBC ANTIBODY SCREEN: CPT | Mod: HCNC

## 2019-10-15 PROCEDURE — 63600175 PHARM REV CODE 636 W HCPCS: Mod: HCNC | Performed by: INTERNAL MEDICINE

## 2019-10-15 PROCEDURE — P9016 RBC LEUKOCYTES REDUCED: HCPCS | Mod: HCNC

## 2019-10-15 PROCEDURE — 99232 PR SUBSEQUENT HOSPITAL CARE,LEVL II: ICD-10-PCS | Mod: HCNC,,, | Performed by: INTERNAL MEDICINE

## 2019-10-15 PROCEDURE — 85610 PROTHROMBIN TIME: CPT | Mod: HCNC

## 2019-10-15 PROCEDURE — 99291 PR CRITICAL CARE, E/M 30-74 MINUTES: ICD-10-PCS | Mod: HCNC,,, | Performed by: NURSE PRACTITIONER

## 2019-10-15 PROCEDURE — 85014 HEMATOCRIT: CPT | Mod: 91,HCNC

## 2019-10-15 PROCEDURE — 37000009 HC ANESTHESIA EA ADD 15 MINS: Mod: HCNC | Performed by: INTERNAL MEDICINE

## 2019-10-15 PROCEDURE — 99232 SBSQ HOSP IP/OBS MODERATE 35: CPT | Mod: HCNC,,, | Performed by: INTERNAL MEDICINE

## 2019-10-15 PROCEDURE — 25000003 PHARM REV CODE 250: Mod: HCNC | Performed by: INTERNAL MEDICINE

## 2019-10-15 PROCEDURE — 63600175 PHARM REV CODE 636 W HCPCS: Mod: HCNC | Performed by: NURSE ANESTHETIST, CERTIFIED REGISTERED

## 2019-10-15 PROCEDURE — 37000008 HC ANESTHESIA 1ST 15 MINUTES: Mod: HCNC | Performed by: INTERNAL MEDICINE

## 2019-10-15 PROCEDURE — 63600175 PHARM REV CODE 636 W HCPCS: Mod: HCNC | Performed by: PHYSICIAN ASSISTANT

## 2019-10-15 PROCEDURE — 36430 TRANSFUSION BLD/BLD COMPNT: CPT | Mod: HCNC

## 2019-10-15 PROCEDURE — 27200959 HC CATHETER, ERBE, ARGON PLASMA: Mod: HCNC | Performed by: INTERNAL MEDICINE

## 2019-10-15 PROCEDURE — 27201028 HC NEEDLE, SCLERO: Mod: HCNC | Performed by: INTERNAL MEDICINE

## 2019-10-15 PROCEDURE — 85049 AUTOMATED PLATELET COUNT: CPT | Mod: HCNC

## 2019-10-15 PROCEDURE — 99291 CRITICAL CARE FIRST HOUR: CPT | Mod: HCNC,,, | Performed by: NURSE PRACTITIONER

## 2019-10-15 PROCEDURE — 20000000 HC ICU ROOM: Mod: HCNC

## 2019-10-15 PROCEDURE — 86920 COMPATIBILITY TEST SPIN: CPT | Mod: HCNC

## 2019-10-15 PROCEDURE — 27000221 HC OXYGEN, UP TO 24 HOURS: Mod: HCNC

## 2019-10-15 PROCEDURE — 36415 COLL VENOUS BLD VENIPUNCTURE: CPT | Mod: HCNC

## 2019-10-15 PROCEDURE — 43270 EGD LESION ABLATION: CPT | Mod: HCNC | Performed by: INTERNAL MEDICINE

## 2019-10-15 PROCEDURE — 43236 UPPR GI SCOPE W/SUBMUC INJ: CPT | Mod: HCNC

## 2019-10-15 PROCEDURE — 43255 EGD CONTROL BLEEDING ANY: CPT | Performed by: INTERNAL MEDICINE

## 2019-10-15 PROCEDURE — 43270 PR EGD, FLEX, W/ABLATION, TUMOR/POLYP/LESION(S), W/ DILATION: ICD-10-PCS | Mod: HCNC,,, | Performed by: INTERNAL MEDICINE

## 2019-10-15 PROCEDURE — 43270 EGD LESION ABLATION: CPT | Mod: HCNC,,, | Performed by: INTERNAL MEDICINE

## 2019-10-15 RX ORDER — CLOPIDOGREL BISULFATE 75 MG/1
75 TABLET ORAL DAILY
Status: DISCONTINUED | OUTPATIENT
Start: 2019-10-16 | End: 2019-10-17 | Stop reason: HOSPADM

## 2019-10-15 RX ORDER — SODIUM CHLORIDE, SODIUM LACTATE, POTASSIUM CHLORIDE, CALCIUM CHLORIDE 600; 310; 30; 20 MG/100ML; MG/100ML; MG/100ML; MG/100ML
INJECTION, SOLUTION INTRAVENOUS CONTINUOUS PRN
Status: DISCONTINUED | OUTPATIENT
Start: 2019-10-15 | End: 2019-10-15

## 2019-10-15 RX ORDER — HYDROCODONE BITARTRATE AND ACETAMINOPHEN 500; 5 MG/1; MG/1
TABLET ORAL
Status: DISCONTINUED | OUTPATIENT
Start: 2019-10-15 | End: 2019-10-17 | Stop reason: HOSPADM

## 2019-10-15 RX ORDER — ASPIRIN 81 MG/1
81 TABLET ORAL DAILY
Status: DISCONTINUED | OUTPATIENT
Start: 2019-10-16 | End: 2019-10-17 | Stop reason: HOSPADM

## 2019-10-15 RX ORDER — PROPOFOL 10 MG/ML
VIAL (ML) INTRAVENOUS
Status: DISCONTINUED | OUTPATIENT
Start: 2019-10-15 | End: 2019-10-15

## 2019-10-15 RX ORDER — EPINEPHRINE 1 MG/ML
INJECTION, SOLUTION INTRACARDIAC; INTRAMUSCULAR; INTRAVENOUS; SUBCUTANEOUS
Status: COMPLETED | OUTPATIENT
Start: 2019-10-15 | End: 2019-10-15

## 2019-10-15 RX ADMIN — INSULIN ASPART 1 UNITS: 100 INJECTION, SOLUTION INTRAVENOUS; SUBCUTANEOUS at 11:10

## 2019-10-15 RX ADMIN — PANTOPRAZOLE SODIUM 40 MG: 40 INJECTION, POWDER, LYOPHILIZED, FOR SOLUTION INTRAVENOUS at 10:10

## 2019-10-15 RX ADMIN — PROPOFOL 50 MG: 10 INJECTION, EMULSION INTRAVENOUS at 03:10

## 2019-10-15 RX ADMIN — ISOSORBIDE MONONITRATE 30 MG: 30 TABLET, EXTENDED RELEASE ORAL at 08:10

## 2019-10-15 RX ADMIN — RANOLAZINE 500 MG: 500 TABLET, FILM COATED, EXTENDED RELEASE ORAL at 08:10

## 2019-10-15 RX ADMIN — RANOLAZINE 500 MG: 500 TABLET, FILM COATED, EXTENDED RELEASE ORAL at 10:10

## 2019-10-15 RX ADMIN — INSULIN ASPART 1 UNITS: 100 INJECTION, SOLUTION INTRAVENOUS; SUBCUTANEOUS at 12:10

## 2019-10-15 RX ADMIN — PANTOPRAZOLE SODIUM 40 MG: 40 INJECTION, POWDER, LYOPHILIZED, FOR SOLUTION INTRAVENOUS at 08:10

## 2019-10-15 RX ADMIN — INSULIN ASPART 2 UNITS: 100 INJECTION, SOLUTION INTRAVENOUS; SUBCUTANEOUS at 06:10

## 2019-10-15 RX ADMIN — EPINEPHRINE 0.2 MG: 1 INJECTION, SOLUTION INTRAMUSCULAR; SUBCUTANEOUS at 03:10

## 2019-10-15 RX ADMIN — PROPOFOL 50 MG: 10 INJECTION, EMULSION INTRAVENOUS at 04:10

## 2019-10-15 RX ADMIN — ISOSORBIDE MONONITRATE 30 MG: 30 TABLET, EXTENDED RELEASE ORAL at 10:10

## 2019-10-15 RX ADMIN — INSULIN ASPART 2 UNITS: 100 INJECTION, SOLUTION INTRAVENOUS; SUBCUTANEOUS at 12:10

## 2019-10-15 RX ADMIN — SIMVASTATIN 40 MG: 20 TABLET, FILM COATED ORAL at 08:10

## 2019-10-15 RX ADMIN — ERYTHROMYCIN LACTOBIONATE 250 MG: 500 INJECTION, POWDER, LYOPHILIZED, FOR SOLUTION INTRAVENOUS at 09:10

## 2019-10-15 RX ADMIN — ESCITALOPRAM OXALATE 10 MG: 10 TABLET, FILM COATED ORAL at 10:10

## 2019-10-15 RX ADMIN — SODIUM CHLORIDE, SODIUM LACTATE, POTASSIUM CHLORIDE, AND CALCIUM CHLORIDE: 600; 310; 30; 20 INJECTION, SOLUTION INTRAVENOUS at 03:10

## 2019-10-15 RX ADMIN — LISINOPRIL 20 MG: 20 TABLET ORAL at 10:10

## 2019-10-15 NOTE — PROGRESS NOTES
[facility] -   Critical Care Medicine  Progress Note    Patient Name: Aquiles Kelsey  MRN: [id number]  Admission Date: 10/11/2019  Hospital Length of Stay: 4 days  Code Status: Full Code  Attending Provider: Dl Cerrato MD  Primary Care Provider: Saige Miranda MD   Principal Problem: NSTEMI (non-ST elevated myocardial infarction)    Subjective:     HPI:  79 year old male with PMH including HTN, DM (on metformin), HLD, PAD, CAD with stent (on plavix and ASA), kidney stone, and AAA per record  Presented to ED on 10/11 per cardiology instructions; he reported episodes of CP at cards office earlier in week and was called with advise to go to ED due to elevated troponin 0.835   In ED he reported mild chest pain intermittent since office visit but none at that time, repeat troponin 0.46 along with platelets 147, glucose 127  He was taken for [facility] early that evening and had stents x 3 placed to LCX, OM2; also noted severe RCA disease with occluded stent and EF 45%  He was admitted to telemetry   On 10/13 he returned to [facility] and underwent PCI to RCA with stent x 2    Hospital/ICU Course:  About 1am on 10/14 pt got up to restroom and had hypotension, near syncope with report of hematemesis and bloody stool; he was emergently transferred to ICU and received 4u PRBC, 1 Plt, and one dose of TXA  hgb up this morning from 8.6 to 9.8 with no overt bleeding from about 3am until this afternoon when he is again having multiple black liquid stools and one episode of ~100ml bloody emesis  10/15 - EGD last evening with evidence of bleeding from lesion at greater curvature of stomach, unable to treat; continued melanous stools, additional unit of PRBC transfused today; remains NPO with ongoing emycin for motility stimulation and anticipate repeat endoscopy today    Review of Systems   Constitutional: Negative for chills and fever.   Respiratory: Negative for shortness of breath.    Cardiovascular: Negative for chest  pain and leg swelling.   Gastrointestinal: Positive for blood in stool.        Incontinence of stool   Genitourinary: Negative.    Musculoskeletal: Negative for back pain.   Skin: Negative for itching and rash.   Neurological: Negative for focal weakness and headaches.   Psychiatric/Behavioral: The patient is not nervous/anxious.          Objective:     Vital Signs (Most Recent):  Temp: 97.6 °F (36.4 °C) (10/15/19 0300)  Pulse: 63 (10/15/19 0800)  Resp: (!) 21 (10/15/19 0800)  BP: (!) 143/65 (10/15/19 0800)  SpO2: 100 % (10/15/19 0800) Vital Signs (24h Range):  Temp:  [97.6 °F (36.4 °C)-99 °F (37.2 °C)] 97.6 °F (36.4 °C)  Pulse:  [60-83] 63  Resp:  [14-27] 21  SpO2:  [94 %-100 %] 100 %  BP: ()/() 143/65     Weight: 75.6 kg (166 lb 10.7 oz)  Body mass index is 23.91 kg/m².      Intake/Output Summary (Last 24 hours) at 10/15/2019 1242  Last data filed at 10/15/2019 0600  Gross per 24 hour   Intake 2482.75 ml   Output 750 ml   Net 1732.75 ml       Physical Exam   Constitutional: He is sleeping. He is easily aroused. He appears ill.   HENT:   Head: Atraumatic.   Eyes: Pupils are equal, round, and reactive to light. Conjunctivae are normal. No scleral icterus.   Neck: No JVD present. No tracheal deviation present.   Cardiovascular: Normal rate and regular rhythm.   Pulses:       Radial pulses are 2+ on the right side, and 2+ on the left side.        Dorsalis pedis pulses are 2+ on the right side, and 2+ on the left side.   Pulmonary/Chest: Effort normal and breath sounds normal. He has no wheezes. He has no rhonchi. He has no rales.   Abdominal: Soft. He exhibits no distension. Bowel sounds are increased. There is no tenderness.   Musculoskeletal: He exhibits no edema.   Neurological: He is easily aroused. GCS eye subscore is 3. GCS verbal subscore is 5. GCS motor subscore is 6.   Skin: Skin is warm and dry. Capillary refill takes 2 to 3 seconds. Bruising (scattered upper extremity) noted. No cyanosis.         Psychiatric: He has a normal mood and affect. His behavior is normal. Judgment and thought content normal.       Vents:       Lines/Drains/Airways     Peripheral Intravenous Line                 Peripheral IV - Single Lumen 10/11/19 1127 20 G Left Forearm 4 days         Peripheral IV - Single Lumen 10/11/19 1127 20 G Right Forearm 4 days                Significant Labs:    CBC/Anemia Profile:  Recent Labs   Lab 10/14/19  0100 10/14/19  0814 10/14/19  1341 10/14/19  1952 10/15/19  0344 10/15/19  1149   WBC 8.28 7.14 9.70  --   --   --    HGB 8.6* 9.8* 9.3* 9.2* 8.2* 8.6*   HCT 26.7* 30.8* 28.2* 28.1* 24.1* 26.5*    142* 160  --  109*  --    MCV 95 92 92  --   --   --    RDW 13.4 13.5 14.2  --   --   --         Chemistries:  Recent Labs   Lab 10/14/19  0100 10/15/19  0344    140   K 4.7 4.1    110   CO2 17* 22*   BUN 37* 52*   CREATININE 1.3 1.0   CALCIUM 8.2* 7.1*   ALBUMIN 2.6*  --    PROT 4.7*  --    BILITOT 0.4  --    ALKPHOS 42*  --    ALT 10  --    AST 18  --        All pertinent labs within the past 24 hours have been reviewed.    Significant Imaging:  I have reviewed all pertinent imaging results/findings within the past 24 hours.      ABG  No results for input(s): PH, PO2, PCO2, HCO3, BE in the last 168 hours.  Assessment/Plan:     Cardiac/Vascular  * NSTEMI (non-ST elevated myocardial infarction)  Medical optimization post [facility] with stenting  Holding ASA, plavix s/t active GI bleeding  Continue ismo, ranexa, statin    CAD, multiple vessel  Now post [facility] x 2 this admission with stents x 3 to LCX, OM2 and then x 2 to occluded RCA  Holding ASA and plavix s/t acute blood loss anemia with active GI bleeding  ICU hemodynamic monitoring  Continue statin, ranexa, imdur as tolerated  Cardiology following    Oncology  Acute blood loss anemia  Baseline hemoglobin 13-14; was 12 on morning of 10/13  Was holding post transfusion this am at 9.8 but with recurrent hematemesis and now hgb 9.3, will  transfuse additional PRBC now  Serial h/h monitoring  10/15 - additional unit PRBC and continue monitoring h/h; await repeat EGD with potential treatment    Endocrine  Type 2 diabetes mellitus with hyperglycemia, without long-term current use of insulin  Glucose trend up 180-290 last 12 hours, likely stress reaction with acute GI bleed  Continue monitoring with SSI prn, goal glucose range 100-180 mg/dl; avoid hypoglycemia  10/15 - trend 184-151, continue SSI prn with monitoring    GI  Acute gastric ulcer with hemorrhage  CT showed bleeding in greater curvature of stomach  Likely gastric ulcer  Continue PPI  Hold ASA, plavix  GI consulted  IR and general surgery aware of bleed and available if needed for additional intervention  10/15 - repeat EGD eval with possible directed therapy this afternoon; continue h/h trending    Other  Hypovolemic shock  Hypotension overnight responded appropriately to IVF and transfusion  10/15 - continue ICU hemodynamic monitoring until bleeding source controlled and hemodynamically stable       Critical Care Daily Checklist:    A: Awake: RASS Goal/Actual Goal: RASS Goal: 0-->alert and calm  Actual: Pereira Agitation Sedation Scale (RASS): Alert and calm   B: Spontaneous Breathing Trial Performed?     C: SAT & SBT Coordinated?  n/a                      D: Delirium: CAM-ICU Overall CAM-ICU: Negative   E: Early Mobility Performed? Yes   F: Feeding Goal:    Status:     Current Diet Order   Procedures    Diet NPO Except for: Medication     Order Specific Question:   Except for     Answer:   Medication      AS: Analgesia/Sedation prn   T: Thromboembolic Prophylaxis SCD   H: HOB > 300 Yes   U: Stress Ulcer Prophylaxis (if needed) PPI   G: Glucose Control SSI prn   B: Bowel Function Stool Occurrence: 1   I: Indwelling Catheter (Lines & Malik) Necessity reviewed   D: De-escalation of Antimicrobials/Pharmacotherapies reviewed    Plan for the day/ETD As above    Code Status:  Family/Goals of  Care: Full Code  Home on d/c pending hospital course/potential for debilitation with advanced age and prolonged inpt care   I have discussed case and plan of care in detail with Dr Zaragoza and Dr Cerrato; Status and plan of care were discussed with team on multidisciplinary rounds.    Critical Care Time: 45 minutes  Critical secondary to acute GI bleed; Critical care was time spent personally by me on the following activities: development of treatment plan with patient or surrogate and bedside caregivers, discussions with consultants, evaluation of patient's response to treatment, examination of patient, ordering and performing treatments and interventions, ordering and review of laboratory studies, ordering and review of radiographic studies, pulse oximetry, re-evaluation of patient's condition. This critical care time did not overlap with that of any other provider or involve time for any procedures.     DESTINY Sheridan-BC  Critical Care Medicine  [facility] [place]

## 2019-10-15 NOTE — ASSESSMENT & PLAN NOTE
Hypotension overnight responded appropriately to IVF and transfusion  10/15 - continue ICU hemodynamic monitoring until bleeding source controlled and hemodynamically stable

## 2019-10-15 NOTE — ASSESSMENT & PLAN NOTE
Baseline hemoglobin 13-14; was 12 on morning of 10/13  Was holding post transfusion this am at 9.8 but with recurrent hematemesis and now hgb 9.3, will transfuse additional PRBC now  Serial h/h monitoring  10/15 - additional unit PRBC and continue monitoring h/h; await repeat EGD with potential treatment

## 2019-10-15 NOTE — PHYSICIAN QUERY
PT Name: Aquiles Kelsey  MR #: [id number]  Physician Query Form - CKD Clarification     CDS/: Shobha Sanches RN, CCDS              Contact information: [email or web address]  This form is a permanent document in the medical record.     Query Date: October 15, 2019    By submitting this query, we are merely seeking further clarification of documentation. Please utilize your independent clinical judgment when addressing the question(s) below.    The Medical record contains the following:     Indicators   Supporting Clinical Findings   Location in Medical Record   X CKD or Chronic Kidney (Renal) Failure / Disease NOAH/CKD 10/14 eicu note   X BUN/Creatinine                          GFR Cr 1.1->1.3->1.0  gfr >60-->52-->>60 10/11- 10/15 lab    Dehydration      Nausea / Vomiting      Dialysis / CRRT      Medication      Treatment     X Other Chronic Conditions Htn, dm, cad, pad 10/11 h/p    Other       Provider, please further specify the stage of CKD.    [   ] Chronic Kidney Disease (CKD) (please specify stage* below)      [employer] Definitions     Stage Description eGFR (mL/min)   [   ]    I Slight kidney damage with normal or increased filtration 90+   [x   ]   II Mildly reduced kidney function 60-89   [   ]    III Moderately reduced kidney function 30-59   [   ] Other (please specify): ____________    [   ]  Clinically Undetermined          Please document in your progress notes daily for the duration of treatment until resolved and include in your discharge summary.

## 2019-10-15 NOTE — SUBJECTIVE & OBJECTIVE
Review of Systems   Constitutional: Negative for chills and fever.   Respiratory: Negative for shortness of breath.    Cardiovascular: Negative for chest pain and leg swelling.   Gastrointestinal: Positive for blood in stool.        Incontinence of stool   Genitourinary: Negative.    Musculoskeletal: Negative for back pain.   Skin: Negative for itching and rash.   Neurological: Negative for focal weakness and headaches.   Psychiatric/Behavioral: The patient is not nervous/anxious.          Objective:     Vital Signs (Most Recent):  Temp: 97.6 °F (36.4 °C) (10/15/19 0300)  Pulse: 63 (10/15/19 0800)  Resp: (!) 21 (10/15/19 0800)  BP: (!) 143/65 (10/15/19 0800)  SpO2: 100 % (10/15/19 0800) Vital Signs (24h Range):  Temp:  [97.6 °F (36.4 °C)-99 °F (37.2 °C)] 97.6 °F (36.4 °C)  Pulse:  [60-83] 63  Resp:  [14-27] 21  SpO2:  [94 %-100 %] 100 %  BP: ()/() 143/65     Weight: 75.6 kg (166 lb 10.7 oz)  Body mass index is 23.91 kg/m².      Intake/Output Summary (Last 24 hours) at 10/15/2019 1242  Last data filed at 10/15/2019 0600  Gross per 24 hour   Intake 2482.75 ml   Output 750 ml   Net 1732.75 ml       Physical Exam   Constitutional: He is sleeping. He is easily aroused. He appears ill.   HENT:   Head: Atraumatic.   Eyes: Pupils are equal, round, and reactive to light. Conjunctivae are normal. No scleral icterus.   Neck: No JVD present. No tracheal deviation present.   Cardiovascular: Normal rate and regular rhythm.   Pulses:       Radial pulses are 2+ on the right side, and 2+ on the left side.        Dorsalis pedis pulses are 2+ on the right side, and 2+ on the left side.   Pulmonary/Chest: Effort normal and breath sounds normal. He has no wheezes. He has no rhonchi. He has no rales.   Abdominal: Soft. He exhibits no distension. Bowel sounds are increased. There is no tenderness.   Musculoskeletal: He exhibits no edema.   Neurological: He is easily aroused. GCS eye subscore is 3. GCS verbal subscore is 5. GCS  motor subscore is 6.   Skin: Skin is warm and dry. Capillary refill takes 2 to 3 seconds. Bruising (scattered upper extremity) noted. No cyanosis.        Psychiatric: He has a normal mood and affect. His behavior is normal. Judgment and thought content normal.       Vents:       Lines/Drains/Airways     Peripheral Intravenous Line                 Peripheral IV - Single Lumen 10/11/19 1127 20 G Left Forearm 4 days         Peripheral IV - Single Lumen 10/11/19 1127 20 G Right Forearm 4 days                Significant Labs:    CBC/Anemia Profile:  Recent Labs   Lab 10/14/19  0100 10/14/19  0814 10/14/19  1341 10/14/19  1952 10/15/19  0344 10/15/19  1149   WBC 8.28 7.14 9.70  --   --   --    HGB 8.6* 9.8* 9.3* 9.2* 8.2* 8.6*   HCT 26.7* 30.8* 28.2* 28.1* 24.1* 26.5*    142* 160  --  109*  --    MCV 95 92 92  --   --   --    RDW 13.4 13.5 14.2  --   --   --         Chemistries:  Recent Labs   Lab 10/14/19  0100 10/15/19  0344    140   K 4.7 4.1    110   CO2 17* 22*   BUN 37* 52*   CREATININE 1.3 1.0   CALCIUM 8.2* 7.1*   ALBUMIN 2.6*  --    PROT 4.7*  --    BILITOT 0.4  --    ALKPHOS 42*  --    ALT 10  --    AST 18  --        All pertinent labs within the past 24 hours have been reviewed.    Significant Imaging:  I have reviewed all pertinent imaging results/findings within the past 24 hours.

## 2019-10-15 NOTE — SUBJECTIVE & OBJECTIVE
Interval History: Tolerated scope yesterday. States he is feeling fine this morning. GI to repeat scope today.     Review of Systems   Constitutional: Negative for fatigue and fever.   Respiratory: Negative for shortness of breath.    Cardiovascular: Negative for chest pain.   Gastrointestinal: Positive for vomiting. Negative for abdominal pain, anal bleeding, blood in stool and nausea.   Skin: Negative for rash.   Neurological: Negative for headaches.     Objective:     Vital Signs (Most Recent):  Temp: 98.1 °F (36.7 °C) (10/15/19 1115)  Pulse: 64 (10/15/19 1400)  Resp: 17 (10/15/19 1400)  BP: (!) 107/47 (10/15/19 1400)  SpO2: 99 % (10/15/19 1400) Vital Signs (24h Range):  Temp:  [97.6 °F (36.4 °C)-99 °F (37.2 °C)] 98.1 °F (36.7 °C)  Pulse:  [60-80] 64  Resp:  [14-27] 17  SpO2:  [94 %-100 %] 99 %  BP: ()/() 107/47     Weight: 75.6 kg (166 lb 10.7 oz)  Body mass index is 23.91 kg/m².    Intake/Output Summary (Last 24 hours) at 10/15/2019 1416  Last data filed at 10/15/2019 1400  Gross per 24 hour   Intake 2982.75 ml   Output 1130 ml   Net 1852.75 ml      Physical Exam   Constitutional: He is oriented to person, place, and time. He appears well-developed and well-nourished. No distress.   Cardiovascular: Normal rate, regular rhythm and normal heart sounds. Exam reveals no gallop and no friction rub.   No murmur heard.  Pulmonary/Chest: Effort normal and breath sounds normal. No stridor. No respiratory distress. He has no wheezes. He has no rales.   Abdominal: Soft. Bowel sounds are normal. He exhibits no distension and no mass. There is no tenderness. There is no guarding.   Musculoskeletal: He exhibits no edema.   Neurological: He is alert and oriented to person, place, and time.   Skin: He is not diaphoretic.       Significant Labs:   CBC:   Recent Labs   Lab 10/14/19  0100 10/14/19  0814 10/14/19  1341 10/14/19  1952 10/15/19  0344 10/15/19  1149   WBC 8.28 7.14 9.70  --   --   --    HGB 8.6* 9.8* 9.3*  9.2* 8.2* 8.6*   HCT 26.7* 30.8* 28.2* 28.1* 24.1* 26.5*    142* 160  --  109*  --      CMP:   Recent Labs   Lab 10/14/19  0100 10/15/19  0344    140   K 4.7 4.1    110   CO2 17* 22*   * 142*   BUN 37* 52*   CREATININE 1.3 1.0   CALCIUM 8.2* 7.1*   PROT 4.7*  --    ALBUMIN 2.6*  --    BILITOT 0.4  --    ALKPHOS 42*  --    AST 18  --    ALT 10  --    ANIONGAP 14 8   EGFRNONAA 52* >60       Significant Imaging: I have reviewed all pertinent imaging results/findings within the past 24 hours.

## 2019-10-15 NOTE — SUBJECTIVE & OBJECTIVE
Review of Systems   Constitution: Negative for diaphoresis, malaise/fatigue, weight gain and weight loss.   HENT: Negative for congestion and nosebleeds.    Cardiovascular: Negative for chest pain, claudication, cyanosis, dyspnea on exertion, irregular heartbeat, leg swelling, near-syncope, orthopnea, palpitations, paroxysmal nocturnal dyspnea and syncope.   Respiratory: Negative for cough, hemoptysis, shortness of breath, sleep disturbances due to breathing, snoring, sputum production and wheezing.    Hematologic/Lymphatic: Negative for bleeding problem. Does not bruise/bleed easily.   Skin: Negative for rash.   Musculoskeletal: Negative for arthritis, back pain, falls, joint pain, muscle cramps and muscle weakness.   Gastrointestinal: Positive for hematemesis and melena. Negative for abdominal pain, constipation, diarrhea, heartburn, hematochezia, nausea and vomiting.   Genitourinary: Negative for dysuria, hematuria and nocturia.   Neurological: Negative for excessive daytime sleepiness, dizziness, headaches, light-headedness, loss of balance, numbness, vertigo and weakness.     Objective:     Vital Signs (Most Recent):  Temp: 97.6 °F (36.4 °C) (10/15/19 0300)  Pulse: 63 (10/15/19 0800)  Resp: (!) 21 (10/15/19 0800)  BP: (!) 143/65 (10/15/19 0800)  SpO2: 100 % (10/15/19 0800) Vital Signs (24h Range):  Temp:  [97.6 °F (36.4 °C)-99 °F (37.2 °C)] 97.6 °F (36.4 °C)  Pulse:  [60-83] 63  Resp:  [13-28] 21  SpO2:  [94 %-100 %] 100 %  BP: ()/() 143/65     Weight: 75.6 kg (166 lb 10.7 oz)  Body mass index is 23.91 kg/m².     SpO2: 100 %  O2 Device (Oxygen Therapy): nasal cannula      Intake/Output Summary (Last 24 hours) at 10/15/2019 0947  Last data filed at 10/15/2019 0600  Gross per 24 hour   Intake 2822.75 ml   Output 751 ml   Net 2071.75 ml       Lines/Drains/Airways     Peripheral Intravenous Line                 Peripheral IV - Single Lumen 10/11/19 1127 20 G Left Forearm 3 days         Peripheral IV  - Single Lumen 10/11/19 1127 20 G Right Forearm 3 days                Physical Exam   Constitutional: He is oriented to person, place, and time. He appears well-developed and well-nourished.   Neck: Neck supple. No JVD present.   Cardiovascular: Normal rate, regular rhythm, normal heart sounds and normal pulses. Exam reveals no friction rub.   No murmur heard.  Pulmonary/Chest: Effort normal and breath sounds normal. No respiratory distress. He has no wheezes. He has no rales.   Abdominal: Soft. Bowel sounds are normal. He exhibits no distension.   Musculoskeletal: He exhibits edema ( BUE). He exhibits no tenderness.   Neurological: He is alert and oriented to person, place, and time.   Skin: Skin is warm and dry. No rash noted.   Left radial access site without redness, tenderness, swelling, or drainage. There is no active external bleeding or hematoma.    Psychiatric: He has a normal mood and affect. His behavior is normal.   Nursing note and vitals reviewed.      Significant Labs:   All pertinent lab results from the last 24 hours have been reviewed. and   Recent Lab Results       10/15/19  0612   10/15/19  0344   10/15/19  0022   10/14/19  1952   10/14/19  1712        Anion Gap   8           BUN, Bld   52           Calcium   7.1           Chloride   110           CO2   22           Creatinine   1.0           eGFR if    >60           eGFR if non    >60  Comment:  Calculation used to obtain the estimated glomerular filtration  rate (eGFR) is the CKD-EPI equation.              Glucose   142           Hematocrit   24.1   28.1       Hemoglobin   8.2   9.2       Coumadin Monitoring INR   1.2  Comment:  Coumadin Therapy:  2.0 - 3.0 for INR for all indicators except mechanical heart valves  and antiphospholipid syndromes which should use 2.5 - 3.5.             MCH               MCHC               MCV               MPV   11.8           Platelets   109           POCT Glucose 169   173   180      Potassium   4.1           Protime   12.5           RBC               RDW               Sodium   140           WBC                                10/14/19  1341   10/14/19  1102        Anion Gap         BUN, Bld         Calcium         Chloride         CO2         Creatinine         eGFR if          eGFR if non African American         Glucose         Hematocrit 28.2       Hemoglobin 9.3       Coumadin Monitoring INR         MCH 30.3       MCHC 33.0       MCV 92       MPV 11.8       Platelets 160       POCT Glucose   184     Potassium         Protime         RBC 3.07       RDW 14.2       Sodium         WBC 9.70             Significant Imaging: Echocardiogram:   2D echo with color flow doppler:   Results for orders placed or performed during the hospital encounter of 10/11/19   2D echo with color flow doppler   Result Value Ref Range    QEF 55 55 - 65    Mitral Valve Regurgitation MILD     Aortic Valve Regurgitation MILD     Est. PA Systolic Pressure 33.25     Narrative    Date of Procedure: 10/11/2019        TEST DESCRIPTION   Technical Quality: This is a technically challenging study. There is poor endocardial definition.     Aorta: The aortic root is normal in size, measuring 3.3 cm at sinotubular junction and 3.5 cm at Sinuses of Valsalva. The proximal ascending aorta is normal in size, measuring 3.0 cm across.     Left Atrium: The left atrial volume index is moderately enlarged, measuring 45.51 cc/m2.     Left Ventricle: The left ventricle is normal in size, with an end-diastolic diameter of 4.2 cm, and an end-systolic diameter of 2.6 cm. Septal wall thickness is mildly increased, with the septum measuring 1.4 cm and the posterior wall measuring 1.1 cm   across. Relative wall thickness was increased at 0.52, and the LV mass index was increased at 116.3 g/m2 consistent with concentric left ventricular hypertrophy. The following segments were mildly hypokinetic: mid inferolateral wall.  Left  ventricular systolic function appears normal. Visually estimated ejection fraction is 55-60%. The LV Doppler derived stroke volume equals 78.0 ccs.     Diastolic indices: E wave velocity 0.9 m/s, E/A ratio 1.2,  msec., E/e' ratio(avg) 13. Diastolic function is indeterminate.     Right Atrium: The right atrium is normal in size, measuring 5.2 cm in length and 2.7 cm in width in the apical view.     Right Ventricle: The right ventricle is normal in size. Global right ventricular systolic function appears normal. Tricuspid annular plane systolic excursion (TAPSE) is 2.0 cm. The estimated PA systolic pressure is 33 mmHg.     Aortic Valve:  The aortic valve is mildly sclerotic with normal leaflet mobility. The mean gradient obtained across the aortic valve is 5 mmHg. Additionally, there is mild aortic regurgitation. There is a pressure half time of 747.0 msec.     Mitral Valve:  The mitral valve is normal in structure. The pressure half time is 54 msec. The calculated mitral valve area is 4.07 cm2. There is mild mitral regurgitation.     Tricuspid Valve:  The tricuspid valve is normal in structure.     Pulmonary Valve:  The pulmonic valve is not well seen.     IVC: IVC is normal in size and collapses > 50% with a sniff, suggesting normal right atrial pressure of 3 mmHg.     Intracavitary: There is no evidence of pericardial effusion, intracavity mass, thrombi, or vegetation.         CONCLUSIONS     1 - Normal left ventricular systolic function (EF 55-60%).     2 - Indeterminate LV diastolic function.     3 - Normal right ventricular systolic function .     4 - Mild aortic regurgitation.     5 - Moderate left atrial enlargement.     6 - Wall motion abnormalities.     7 - Concentric hypertrophy.     8 - The estimated PA systolic pressure is 33 mmHg.             This document has been electronically    SIGNED BY: Robe Gilbert MD On: 10/11/2019 16:04

## 2019-10-15 NOTE — ASSESSMENT & PLAN NOTE
Medical optimization post LHC with stenting  Holding ASA, plavix s/t active GI bleeding  Continue ismo, ranexa, statin

## 2019-10-15 NOTE — ANESTHESIA RELEASE NOTE
"Anesthesia Release from PACU Note    Patient: Aquiles Kelsey    Procedure(s) Performed: Procedure(s) (LRB):  EGD (ESOPHAGOGASTRODUODENOSCOPY) (N/A)    Anesthesia type: MAC    Post pain: Adequate analgesia    Post assessment: no apparent anesthetic complications, tolerated procedure well and no evidence of recall    Last Vitals:   Visit Vitals  BP (!) 133/54   Pulse 66   Temp 37.3 °C (99.1 °F) (Oral)   Resp 20   Ht 5' 10" (1.778 m)   Wt 75.6 kg (166 lb 10.7 oz)   SpO2 98%   BMI 23.91 kg/m²       Post vital signs: stable    Level of consciousness: awake, alert  and oriented    Nausea/Vomiting: no nausea/no vomiting    Complications: none    Airway Patency: patent    Respiratory: unassisted, spontaneous ventilation, room air    Cardiovascular: stable and blood pressure at baseline    Hydration: euvolemic  "

## 2019-10-15 NOTE — ASSESSMENT & PLAN NOTE
Glucose trend up 180-290 last 12 hours, likely stress reaction with acute GI bleed  Continue monitoring with SSI prn, goal glucose range 100-180 mg/dl; avoid hypoglycemia  10/15 - trend 184-151, continue SSI prn with monitoring

## 2019-10-15 NOTE — PHYSICIAN QUERY
"PT Name: Aquiles Kelsey  MR #: [id number]    Physician Query Form - Heart  Condition Clarification     CDS/: Shobha Sanches RN, CCDS              Contact information: [email or web address]  This form is a permanent document in the medical record.     Query Date: October 15, 2019    By submitting this query, we are merely seeking further clarification of documentation. Please utilize your independent clinical judgment when addressing the question(s) below.    The medical record contains the following   Indicators     Supporting Clinical Findings Location in Medical Record   X -->801 10/11, 10/13 lab   X EF 55-60% 10/11 tte   X Radiology findings No acute process seen. 10/11 cxr   X Echo Results 1 - Normal left ventricular systolic function (EF 55-60%).     2 - Indeterminate LV diastolic function.     3 - Normal right ventricular systolic function .     4 - Mild aortic regurgitation.     5 - Moderate left atrial enlargement.     6 - Wall motion abnormalities.     7 - Concentric hypertrophy.     8 - The estimated PA systolic pressure is 33 mmHg.  10/11 tte    "Ascites" documented      "SOB" or "MONTOYA" documented      "Hypoxia" documented     X Heart Failure documented CHF , EF 45 %  10/14 eicu note    "Edema" documented      Diuretics/Meds      Treatment:     X Other:   NO CHF SXS 10/12- 10/15 prog notes   Heart failure (HF) can be acute, chronic or both. It is generally further specificed as systolic, diastolic, or combined. Lastly, it is important to identify an underlying etiology if known or suspected.     Common clues to acute exacerbation:  Rapidly progressive symptoms (w/in 2 weeks of presentation), using IV diuretics to treat, using supplemental O2, pulmonary edema on Xray, MI w/in 4 weeks, and/or BNP >500    Systolic Heart Failure: is defined as chart documentation of a left ventricular ejection fraction (LVEF) less than 40%     Diastolic Heart Failure: is defined as a left ventricular " ejection fraction (LVEF) greater than 40%   +      Evidence of diastolic dysfunction on echocardiography OR    Right heart catheterization wedge pressure above 12 mm Hg OR    Left heart catheterization left ventricular end diastolic pressure 18 mm Hg or above.    References: *American Heart Association    The clinical guidelines noted below are only system guidelines, and do not replace the providers clinical judgment.     Provider, please specify the type and acuity of CHF.  [x   ] Chronic Diastolic Heart Failure - Pre-existing diastolic HF diagnosis.  EF > 40%  without  acute HF symptoms documented   [   ] Other Type of Heart Failure (please specify type):   [   ] Heart Failure Ruled Out   [   ] Other (please specify):   [  ] Clinically Undetermined                           Please document in your progress notes daily for the duration of treatment until resolved and include in your discharge summary.

## 2019-10-15 NOTE — PROGRESS NOTES
[facility] -   General Surgery  Progress Note    Subjective:     History of Present Illness:  78 yo M with h/o CAD on plavix and ASA at home for multiple prior PCIs presented to ER with chest pain. Treated for NSTEMI with stent and continued anticoagulation. Overnight he developed hematemesis and melena with hypotension. He was transferred to ICU for monitored care, IV protonix and has received 4 units pRBC and 1 unit platelets. He denies any prior epidoses of UGIB or LGIB. He denies NSAID use or h/o PUD. He is currently HD stable awaiting EGD by GI. CTA showed bleeding ulcer along greater curvature of stomach. Surgery consulted for evaluation.      Post-Op Info:  Procedure(s) (LRB):  EGD (ESOPHAGOGASTRODUODENOSCOPY) (N/A)   1 Day Post-Op     Interval History: Remains HD stable. No further transfusion requirements. EGD without evidence of ulceration, suspect hemorrhagic gastritis. Tolerating clears. No hematemesis.     Medications:  Continuous Infusions:   sodium chloride 0.9% 100 mL/hr at 10/15/19 0600     Scheduled Meds:   erythromycin  250 mg Intravenous Once    escitalopram oxalate  10 mg Oral Daily    isosorbide mononitrate  30 mg Oral BID    lisinopril  20 mg Oral Daily    pantoprazole  40 mg Intravenous BID    ranolazine  500 mg Oral BID    simvastatin  40 mg Oral QHS     PRN Meds:sodium chloride, acetaminophen, Dextrose 10% Bolus, Dextrose 10% Bolus, glucagon (human recombinant), glucose, glucose, hydrALAZINE, influenza, insulin aspart U-100, nitroGLYCERIN, ondansetron, ondansetron, pneumoc 13-wiliam conj-dip cr(PF), sodium chloride 0.9%     Review of patient's allergies indicates:  No Known Allergies  Objective:     Vital Signs (Most Recent):  Temp: 97.6 °F (36.4 °C) (10/15/19 0300)  Pulse: 63 (10/15/19 0800)  Resp: (!) 21 (10/15/19 0800)  BP: (!) 143/65 (10/15/19 0800)  SpO2: 100 % (10/15/19 0800) Vital Signs (24h Range):  Temp:  [97.6 °F (36.4 °C)-99 °F (37.2 °C)] 97.6 °F (36.4  °C)  Pulse:  [60-83] 63  Resp:  [13-28] 21  SpO2:  [94 %-100 %] 100 %  BP: ()/() 143/65     Weight: 75.6 kg (166 lb 10.7 oz)  Body mass index is 23.91 kg/m².    Intake/Output - Last 3 Shifts       10/13 0700 - 10/14 0659 10/14 0700 - 10/15 0659 10/15 0700 - 10/16 0659    P.O.  260     I.V. (mL/kg) 245 (3.2) 2379 (31.5)     Blood 999.8 645.8     IV Piggyback 2100 200     Total Intake(mL/kg) 3344.8 (44.2) 3484.8 (46.1)     Urine (mL/kg/hr) 275 (0.2) 850 (0.5)     Emesis/NG output 1000 1     Stool 0 0     Total Output 1275 851     Net +2069.8 +2633.8            Urine Occurrence  5 x     Stool Occurrence 1 x 7 x           Physical Exam   Constitutional: He is oriented to person, place, and time. He appears well-developed and well-nourished. No distress.   HENT:   Head: Normocephalic and atraumatic.   Eyes: EOM are normal.   Neck: Neck supple.   Cardiovascular: Normal rate and regular rhythm.   Pulmonary/Chest: Effort normal.   Abdominal: Soft. He exhibits no distension. There is no tenderness.   Musculoskeletal: Normal range of motion.   Neurological: He is alert and oriented to person, place, and time.   Skin: Skin is warm.   Vitals reviewed.      Significant Labs:  CBC:   Recent Labs   Lab 10/14/19  1341  10/15/19  0344   WBC 9.70  --   --    RBC 3.07*  --   --    HGB 9.3*   < > 8.2*   HCT 28.2*   < > 24.1*     --  109*   MCV 92  --   --    MCH 30.3  --   --    MCHC 33.0  --   --     < > = values in this interval not displayed.     BMP:   Recent Labs   Lab 10/15/19  0344   *      K 4.1      CO2 22*   BUN 52*   CREATININE 1.0   CALCIUM 7.1*       Significant Diagnostics:  none EGD report reviewed personally.    Assessment/Plan:     Hematemesis/vomiting blood  Repeat EGD by GI today  Continue supportive care  No acute surgical intervention at this time.        Maria De Jesus Wells MD  General Surgery  [facility]

## 2019-10-15 NOTE — PLAN OF CARE
POC and interventions discussed with patient and family - VU.  Understands need for second EGD today.  NPO after MD.  H/H has been stable - see Labs.  Denies N/V.  Several dark tarry stools cleaned.  Voids per urinal.  No c/o pain.  Vitals stable.

## 2019-10-15 NOTE — [employer] PATIENT INSTRUCTIONS
Discharge Summary/Instructions after an Endoscopic Procedure  Patient Name: Aquiles Kelsey  Patient MRN: [id number]  Patient   YOB: 1940  Tuesday, October 15, 2019 Carlos Mcneal III, MD  RESTRICTIONS:  During your procedure today, you received medications for sedation.  These   medications may affect your judgment, balance and coordination.  Therefore,   for 24 hours, you have the following restrictions:   - DO NOT drive a car, operate machinery, make legal/financial decisions,   sign important papers or drink alcohol.    ACTIVITY:  Today: no heavy lifting, straining or running due to procedural   sedation/anesthesia.  The following day: return to full activity including work.  DIET:  Eat and drink normally unless instructed otherwise.     TREATMENT FOR COMMON SIDE EFFECTS:  - Mild abdominal pain, nausea, belching, bloating or excessive gas:  rest,   eat lightly and use a heating pad.  - Sore Throat: treat with throat lozenges and/or gargle with warm salt   water.  - Because air was used during the procedure, expelling large amounts of air   from your rectum or belching is normal.  - If a bowel prep was taken, you may not have a bowel movement for 1-3 days.    This is normal.  SYMPTOMS TO WATCH FOR AND REPORT TO YOUR PHYSICIAN:  1. Abdominal pain or bloating, other than gas cramps.  2. Chest pain.  3. Back pain.  4. Signs of infection such as: chills or fever occurring within 24 hours   after the procedure.  5. Rectal bleeding, which would show as bright red, maroon, or black stools.   (A tablespoon of blood from the rectum is not serious, especially if   hemorrhoids are present.)  6. Vomiting.  7. Weakness or dizziness.  GO DIRECTLY TO THE NEAREST EMERGENCY ROOM IF YOU HAVE ANY OF THE FOLLOWING:      Difficulty breathing              Chills and/or fever over 101 F   Persistent vomiting and/or vomiting blood   Severe abdominal pain   Severe chest pain   Black, tarry stools   Bleeding- more than one  tablespoon   Any other symptom or condition that you feel may need urgent attention  Your doctor recommends these additional instructions:  If any biopsies were taken, your doctors clinic will contact you in 1 to 2   weeks with any results.  - Return patient to ICU for ongoing care.   - Advance diet as tolerated.   - Continue present medications.  For questions, problems or results please call your physician Carlos Mcneal III, MD at Work:  [phone]  If you have any questions about the above instructions, call the GI   department at [phone] or call the endoscopy unit at [phone]   from 7am until 3 pm.  [facility] [place], EMERGENCY ROOM PHONE NUMBER:   [phone]  IF A COMPLICATION OR EMERGENCY SITUATION ARISES AND YOU ARE UNABLE TO REACH   YOUR PHYSICIAN - GO DIRECTLY TO THE EMERGENCY ROOM.  I have read or have had read to me these discharge instructions for my   procedure and have received a written copy.  I understand these   instructions and will follow-up with my physician if I have any questions.     __________________________________       _____________________________________  Nurse Signature                                          Patient/Designated   Responsible Party Signature  Carlos Mcneal III, MD  10/15/2019 4:55:29 PM  This report has been verified and signed electronically.  [employer]

## 2019-10-15 NOTE — PROGRESS NOTES
[facility] -   Cardiology  Progress Note    Patient Name: Aquiles Kelsey  MRN: [id number]  Admission Date: 10/11/2019  Hospital Length of Stay: 4 days  Code Status: Full Code   Attending Physician: Dl Cerrato MD   Primary Care Physician: Saige Miranda MD  Expected Discharge Date:   Principal Problem:NSTEMI (non-ST elevated myocardial infarction)    Subjective:   Brief HPI:  Pt presents with NSTEMI.  His current medical conditions include CAD (multiple PCI procedures), HTN, DM, PAD, AAA stent graft (approx 2013), hyperlipidemia, cigar use.  H/o L LE stents in [place].   First PCI 1998, total of 5 stents with last one in [place] 2013.  Echo 7/19 normal LVEF, DD,  LVH, mild AI.   Holter 7/19 NSR, fleeting NSVT, EAT, rare PVCs, occasional PACs.  B LE vasc studies 7/19 B LE PAD, L > R LE.     Now in ER with several episodes of typical angina over past week and abnl troponin 0.8 range consistent w NSTEMI.  ECG earlier in week 10/9/19 showed new inferolateral st-t wave ischemic changes.  ECG today in ER shows improvement, nonspecific st-t abnl.  Cp free last couple of days.    Pt saw Dr. Hobbs, Cardiology, this week and was put on Imdur and had troponin checked and when found to be abnl pt was advised to go to ER.  He has chronic B LE claudication sxs.        Hospital Course:   10/12/19:  PT SEEN AND EXAMINED EARLIER THIS AM.  HAD GOOD NIGHT OVERALL. DENIES RECURRENT CP.  NO CHF SXS.  LABS AND ECG REVIEWED AND ARE STABLE.    10/13/19:  PT SEEN AND EXAMINED.  NO CHEST PAIN OR CHF SXS.  STABLE SINUS BRADYCARDIA.  TROPONIN PEAKED.  LABS STABLE.    10/14/19- Patient is s/p PCI of LCX with KYLIE x1 and severe OM2 KYLIE x2 overlapped into mid LCX old stent. OM on Friday and Prox-mid RCA with KYLIE x2  on yesterday. Unsuccessful PCI of Distal RCA occluded and PDA occluded stent. Patient developed acute GIB late last night/early morning. Had hematemesis and black BM. Became hypotensive and diaphoretic. Transfused to  ICU and transfused 4 units of blood and 1 unit of platelets. ASA and Plavix on hold today. GI has been consulted and plan to scope patient today. Vitals stable and patient denies any angina or equivalent.     10/15/19- patient is post EGD on yesterday. Noted to have clotted blood in the gastric fundus and red blood in the gastric body and in the gastric antrum.  ASA and Plavix held this morning and plans in place to repeat EGD today. H/H 8.2/24.1. Transfused additional unit of blood yesterday. Patient has no angina or equivalent this morning. IVF stopped.         Review of Systems   Constitution: Negative for diaphoresis, malaise/fatigue, weight gain and weight loss.   HENT: Negative for congestion and nosebleeds.    Cardiovascular: Negative for chest pain, claudication, cyanosis, dyspnea on exertion, irregular heartbeat, leg swelling, near-syncope, orthopnea, palpitations, paroxysmal nocturnal dyspnea and syncope.   Respiratory: Negative for cough, hemoptysis, shortness of breath, sleep disturbances due to breathing, snoring, sputum production and wheezing.    Hematologic/Lymphatic: Negative for bleeding problem. Does not bruise/bleed easily.   Skin: Negative for rash.   Musculoskeletal: Negative for arthritis, back pain, falls, joint pain, muscle cramps and muscle weakness.   Gastrointestinal: Positive for hematemesis and melena. Negative for abdominal pain, constipation, diarrhea, heartburn, hematochezia, nausea and vomiting.   Genitourinary: Negative for dysuria, hematuria and nocturia.   Neurological: Negative for excessive daytime sleepiness, dizziness, headaches, light-headedness, loss of balance, numbness, vertigo and weakness.     Objective:     Vital Signs (Most Recent):  Temp: 97.6 °F (36.4 °C) (10/15/19 0300)  Pulse: 63 (10/15/19 0800)  Resp: (!) 21 (10/15/19 0800)  BP: (!) 143/65 (10/15/19 0800)  SpO2: 100 % (10/15/19 0800) Vital Signs (24h Range):  Temp:  [97.6 °F (36.4 °C)-99 °F (37.2 °C)] 97.6 °F  (36.4 °C)  Pulse:  [60-83] 63  Resp:  [13-28] 21  SpO2:  [94 %-100 %] 100 %  BP: ()/() 143/65     Weight: 75.6 kg (166 lb 10.7 oz)  Body mass index is 23.91 kg/m².     SpO2: 100 %  O2 Device (Oxygen Therapy): nasal cannula      Intake/Output Summary (Last 24 hours) at 10/15/2019 0947  Last data filed at 10/15/2019 0600  Gross per 24 hour   Intake 2822.75 ml   Output 751 ml   Net 2071.75 ml       Lines/Drains/Airways     Peripheral Intravenous Line                 Peripheral IV - Single Lumen 10/11/19 1127 20 G Left Forearm 3 days         Peripheral IV - Single Lumen 10/11/19 1127 20 G Right Forearm 3 days                Physical Exam   Constitutional: He is oriented to person, place, and time. He appears well-developed and well-nourished.   Neck: Neck supple. No JVD present.   Cardiovascular: Normal rate, regular rhythm, normal heart sounds and normal pulses. Exam reveals no friction rub.   No murmur heard.  Pulmonary/Chest: Effort normal and breath sounds normal. No respiratory distress. He has no wheezes. He has no rales.   Abdominal: Soft. Bowel sounds are normal. He exhibits no distension.   Musculoskeletal: He exhibits edema ( BUE). He exhibits no tenderness.   Neurological: He is alert and oriented to person, place, and time.   Skin: Skin is warm and dry. No rash noted.   Left radial access site without redness, tenderness, swelling, or drainage. There is no active external bleeding or hematoma.    Psychiatric: He has a normal mood and affect. His behavior is normal.   Nursing note and vitals reviewed.      Significant Labs:   All pertinent lab results from the last 24 hours have been reviewed. and   Recent Lab Results       10/15/19  0612   10/15/19  0344   10/15/19  0022   10/14/19  1952   10/14/19  1712        Anion Gap   8           BUN, Bld   52           Calcium   7.1           Chloride   110           CO2   22           Creatinine   1.0           eGFR if    >60           eGFR  if non    >60  Comment:  Calculation used to obtain the estimated glomerular filtration  rate (eGFR) is the CKD-EPI equation.              Glucose   142           Hematocrit   24.1   28.1       Hemoglobin   8.2   9.2       Coumadin Monitoring INR   1.2  Comment:  Coumadin Therapy:  2.0 - 3.0 for INR for all indicators except mechanical heart valves  and antiphospholipid syndromes which should use 2.5 - 3.5.             MCH               MCHC               MCV               MPV   11.8           Platelets   109           POCT Glucose 169   173   180     Potassium   4.1           Protime   12.5           RBC               RDW               Sodium   140           WBC                                10/14/19  1341   10/14/19  1102        Anion Gap         BUN, Bld         Calcium         Chloride         CO2         Creatinine         eGFR if          eGFR if non African American         Glucose         Hematocrit 28.2       Hemoglobin 9.3       Coumadin Monitoring INR         MCH 30.3       MCHC 33.0       MCV 92       MPV 11.8       Platelets 160       POCT Glucose   184     Potassium         Protime         RBC 3.07       RDW 14.2       Sodium         WBC 9.70             Significant Imaging: Echocardiogram:   2D echo with color flow doppler:   Results for orders placed or performed during the hospital encounter of 10/11/19   2D echo with color flow doppler   Result Value Ref Range    QEF 55 55 - 65    Mitral Valve Regurgitation MILD     Aortic Valve Regurgitation MILD     Est. PA Systolic Pressure 33.25     Narrative    Date of Procedure: 10/11/2019        TEST DESCRIPTION   Technical Quality: This is a technically challenging study. There is poor endocardial definition.     Aorta: The aortic root is normal in size, measuring 3.3 cm at sinotubular junction and 3.5 cm at Sinuses of Valsalva. The proximal ascending aorta is normal in size, measuring 3.0 cm across.     Left Atrium: The  left atrial volume index is moderately enlarged, measuring 45.51 cc/m2.     Left Ventricle: The left ventricle is normal in size, with an end-diastolic diameter of 4.2 cm, and an end-systolic diameter of 2.6 cm. Septal wall thickness is mildly increased, with the septum measuring 1.4 cm and the posterior wall measuring 1.1 cm   across. Relative wall thickness was increased at 0.52, and the LV mass index was increased at 116.3 g/m2 consistent with concentric left ventricular hypertrophy. The following segments were mildly hypokinetic: mid inferolateral wall.  Left ventricular systolic function appears normal. Visually estimated ejection fraction is 55-60%. The LV Doppler derived stroke volume equals 78.0 ccs.     Diastolic indices: E wave velocity 0.9 m/s, E/A ratio 1.2,  msec., E/e' ratio(avg) 13. Diastolic function is indeterminate.     Right Atrium: The right atrium is normal in size, measuring 5.2 cm in length and 2.7 cm in width in the apical view.     Right Ventricle: The right ventricle is normal in size. Global right ventricular systolic function appears normal. Tricuspid annular plane systolic excursion (TAPSE) is 2.0 cm. The estimated PA systolic pressure is 33 mmHg.     Aortic Valve:  The aortic valve is mildly sclerotic with normal leaflet mobility. The mean gradient obtained across the aortic valve is 5 mmHg. Additionally, there is mild aortic regurgitation. There is a pressure half time of 747.0 msec.     Mitral Valve:  The mitral valve is normal in structure. The pressure half time is 54 msec. The calculated mitral valve area is 4.07 cm2. There is mild mitral regurgitation.     Tricuspid Valve:  The tricuspid valve is normal in structure.     Pulmonary Valve:  The pulmonic valve is not well seen.     IVC: IVC is normal in size and collapses > 50% with a sniff, suggesting normal right atrial pressure of 3 mmHg.     Intracavitary: There is no evidence of pericardial effusion, intracavity mass,  thrombi, or vegetation.         CONCLUSIONS     1 - Normal left ventricular systolic function (EF 55-60%).     2 - Indeterminate LV diastolic function.     3 - Normal right ventricular systolic function .     4 - Mild aortic regurgitation.     5 - Moderate left atrial enlargement.     6 - Wall motion abnormalities.     7 - Concentric hypertrophy.     8 - The estimated PA systolic pressure is 33 mmHg.             This document has been electronically    SIGNED BY: Robe Gilbert MD On: 10/11/2019 16:04     Assessment and Plan:       * NSTEMI (non-ST elevated myocardial infarction)  See plan for CAD    Hematemesis/vomiting blood  GI on board and planning for repeat EGD    Ischemic cardiomyopathy  OPTIMIZE MEDICAL TX FOR ICM.  LIMITED ON MEDICAL TX AS HE WILL NOT BE ABLE TO TOLERATE BETA-BLOCKERS DUE TO SINUS BRADYCARDIA ISSUES.    CAD, multiple vessel  OPTIMIZE MEDICAL TX FOR CAD.    10/14/19  -Patient is s/p PCI of LCX with KYLIE x1 and severe OM2 KYLIE x2 overlapped into mid LCX old stent. OM on Friday and Prox-mid RCA with KYLIE x2  on yesterday. Unsuccessful PCI of Distal RCA occluded and PDA occluded stent  -ASA and Plavix on hold for today secondary to acute GIB  -Plans in place for GI to scope patient today   -Plan to restart ASA and Plavix tomorrow given his recent MI and multiple KYLIE   -Continue Statin, Ranexa, Imdur and ARB  -No BB due to bradycardia      10/15/19  -Continue to hold ASA and Plavix today for active bleeding.   -GI planning to repeat EGD today   -Continue Statin, Ranexa, Imdur and ARB  -BB on hold for bradycardia during admit       Sinus bradycardia  STABLE.  AT RISK FOR PPM IN FUTURE.    Tobacco abuse  TOBACCO CESSATION ADVISED.    Essential hypertension  Continue losartan and Imdur   No BB due to bradycardia           VTE Risk Mitigation (From admission, onward)         Ordered     IP VTE HIGH RISK PATIENT  Once      10/11/19 1236     Place sequential compression device  Until discontinued       10/11/19 1236              Chart reviewed. Patient examined by Dr. Jara and agrees with plan that has been outlined.       Iris Selby, JARVIS  Cardiology  [facility]

## 2019-10-15 NOTE — ASSESSMENT & PLAN NOTE
CT showed bleeding in greater curvature of stomach  Likely gastric ulcer  Continue PPI  Hold ASA, plavix  GI consulted  IR and general surgery aware of bleed and available if needed for additional intervention  10/15 - repeat EGD eval with possible directed therapy this afternoon; continue h/h trending

## 2019-10-15 NOTE — ANESTHESIA POSTPROCEDURE EVALUATION
Anesthesia Post Evaluation    Patient: Aquiles Kelsey    Procedure(s) Performed: Procedure(s) (LRB):  EGD (ESOPHAGOGASTRODUODENOSCOPY) (N/A)    Final Anesthesia Type: MAC  Patient location during evaluation: ICU  Patient participation: Yes- Able to Participate  Level of consciousness: awake and alert  Post-procedure vital signs: reviewed and stable  Pain management: adequate  Airway patency: patent  PONV status at discharge: No PONV  Anesthetic complications: no      Cardiovascular status: blood pressure returned to baseline  Respiratory status: unassisted and spontaneous ventilation  Hydration status: euvolemic  Follow-up not needed.          Vitals Value Taken Time   /59 10/15/2019  4:17 PM   Temp 37.3 °C (99.1 °F) 10/15/2019  3:15 PM   Pulse 61 10/15/2019  4:19 PM   Resp 19 10/15/2019  4:19 PM   SpO2 97 % 10/15/2019  4:19 PM   Vitals shown include unvalidated device data.      No case tracking events are documented in the log.      Pain/Carmen Score: No data recorded

## 2019-10-15 NOTE — ANESTHESIA PREPROCEDURE EVALUATION
10/15/2019  Aquiles Kelsey is a 79 y.o., male.    Anesthesia Evaluation    I have reviewed the Patient Summary Reports.    I have reviewed the Nursing Notes.   I have reviewed the Medications.     Review of Systems  Anesthesia Hx:  No problems with previous Anesthesia Denies Hx of Anesthetic complications  History of prior surgery of interest to airway management or planning: heart surgery.   Social:  Smoker    Hematology/Oncology:     Oncology Normal    -- Anemia:   EENT/Dental:EENT/Dental Normal   Cardiovascular:   Hypertension Past MI CAD  CABG/stent  Angina  Abdominal Aortic Aneurysm  Hypertension    Renal/:  Renal/ Normal     Hepatic/GI:   PUD,    Neurological:   Peripheral Neuropathy    Endocrine:   Diabetes, type 2    Psych:   depression          Physical Exam  General:  Obesity    Airway/Jaw/Neck:  Airway Findings: Mouth Opening: Normal Tongue: Normal  General Airway Assessment: Adult  Mallampati: II  TM Distance: Normal, at least 6 cm                 Anesthesia Plan  Type of Anesthesia, risks & benefits discussed:  Anesthesia Type:  MAC  Patient's Preference:   Intra-op Monitoring Plan: standard ASA monitors  Intra-op Monitoring Plan Comments:   Post Op Pain Control Plan:   Post Op Pain Control Plan Comments:   Induction:   IV  Beta Blocker:  Patient is not currently on a Beta-Blocker (No further documentation required).       Informed Consent: Patient understands risks and agrees with Anesthesia plan.  Questions answered. Anesthesia consent signed with patient.  ASA Score: 4     Day of Surgery Review of History & Physical: I have interviewed and examined the patient. I have reviewed the patient's H&P dated:  There are no significant changes.          Ready For Surgery From Anesthesia Perspective.

## 2019-10-15 NOTE — PROGRESS NOTES
[facility] Hospital Medicine  Progress Note    Patient Name: Aquiles Kelsey  MRN: [id number]  Patient Class: IP- Inpatient   Admission Date: 10/11/2019  Length of Stay: 4 days  Attending Physician: Dl Cerrato MD  Primary Care Provider: Saige Miranda MD        Subjective:     Principal Problem:NSTEMI (non-ST elevated myocardial infarction)        HPI:  Aquiles Kelsey is a 79 y.o. male patient with a PMHx of CAD who presented to the Emergency Department today for evaluation of abnormal lab results.  Patient reports seeing Dr. Hobbs (Cardiology) and labs resulting in elevated troponin levels.  Associated sxs include CP.  Patient states he has had CP with a pain rating of 3/10 the past three days, but has complete relief as of today.  Patient denies any SOB, N/V, fever, chills, diaphoresis, palpitations, extremity weakness, dizziness, hematochezia, cough, and all other sxs at this time.  Prior Tx includes Imdur and 325 mg Aspirin.  Pt denies recent bleeding of any kind.  No further complaints or concerns at this time.  ER workup showed:  Stable VS.  12 lead EKG showed SB with nonspecific T wave abnormality.  Troponin yesterday elevated to 0.8345, repeat today 0.466.  CXR negative.  Cardiology consulted from the ER and recommended admission with a Heparin gtt and plans for a heart cath today with additional recs to follow.  ECHO pending.  Hospital Medicine called for admission.  Patient will be admitted to [facility].  He is a Full Code.  His SDM is his wife Gayle who can be reached at [phone].       Overview/Hospital Course:  10/12:  S/p LHC with KYLIE x3 of the LCx and OM. Cards plan to perform PCI of RCA tomorrow.     10/13:  Cardio attempting PCI of RCA today     10/14:  Unsuccessful PCI of RCA, developed gross hematemesis overnight. H/H dropped. 4 units pRBC transfused. Transferred to ICU. GI consulted on case. CT scan abdomen showed bleeding in greater curvature of stomach.      10/15:  Patient to undergo repeat EGD today.     Interval History: Tolerated scope yesterday. States he is feeling fine this morning. GI to repeat scope today.     Review of Systems   Constitutional: Negative for fatigue and fever.   Respiratory: Negative for shortness of breath.    Cardiovascular: Negative for chest pain.   Gastrointestinal: Positive for vomiting. Negative for abdominal pain, anal bleeding, blood in stool and nausea.   Skin: Negative for rash.   Neurological: Negative for headaches.     Objective:     Vital Signs (Most Recent):  Temp: 98.1 °F (36.7 °C) (10/15/19 1115)  Pulse: 64 (10/15/19 1400)  Resp: 17 (10/15/19 1400)  BP: (!) 107/47 (10/15/19 1400)  SpO2: 99 % (10/15/19 1400) Vital Signs (24h Range):  Temp:  [97.6 °F (36.4 °C)-99 °F (37.2 °C)] 98.1 °F (36.7 °C)  Pulse:  [60-80] 64  Resp:  [14-27] 17  SpO2:  [94 %-100 %] 99 %  BP: ()/() 107/47     Weight: 75.6 kg (166 lb 10.7 oz)  Body mass index is 23.91 kg/m².    Intake/Output Summary (Last 24 hours) at 10/15/2019 1416  Last data filed at 10/15/2019 1400  Gross per 24 hour   Intake 2982.75 ml   Output 1130 ml   Net 1852.75 ml      Physical Exam   Constitutional: He is oriented to person, place, and time. He appears well-developed and well-nourished. No distress.   Cardiovascular: Normal rate, regular rhythm and normal heart sounds. Exam reveals no gallop and no friction rub.   No murmur heard.  Pulmonary/Chest: Effort normal and breath sounds normal. No stridor. No respiratory distress. He has no wheezes. He has no rales.   Abdominal: Soft. Bowel sounds are normal. He exhibits no distension and no mass. There is no tenderness. There is no guarding.   Musculoskeletal: He exhibits no edema.   Neurological: He is alert and oriented to person, place, and time.   Skin: He is not diaphoretic.       Significant Labs:   CBC:   Recent Labs   Lab 10/14/19  0100 10/14/19  0814 10/14/19  1341 10/14/19  1952 10/15/19  0344 10/15/19  1149    WBC 8.28 7.14 9.70  --   --   --    HGB 8.6* 9.8* 9.3* 9.2* 8.2* 8.6*   HCT 26.7* 30.8* 28.2* 28.1* 24.1* 26.5*    142* 160  --  109*  --      CMP:   Recent Labs   Lab 10/14/19  0100 10/15/19  0344    140   K 4.7 4.1    110   CO2 17* 22*   * 142*   BUN 37* 52*   CREATININE 1.3 1.0   CALCIUM 8.2* 7.1*   PROT 4.7*  --    ALBUMIN 2.6*  --    BILITOT 0.4  --    ALKPHOS 42*  --    AST 18  --    ALT 10  --    ANIONGAP 14 8   EGFRNONAA 52* >60       Significant Imaging: I have reviewed all pertinent imaging results/findings within the past 24 hours.      Assessment/Plan:      * NSTEMI (non-ST elevated myocardial infarction)  - Admit to Tele  - Initial troponin 0.466, will trend  - Given  mg in the ER  - Continue home ASA, Plavix, ACEi, Zocor, and Imdur  - ECHO pending  - Cardiology consulted and recommended a Heparin gtt with plans for a heart cath today with additional recs to follow  - NPO  - IVFs  - Supplemental o2 and SL NTG prn  - Tele monitoring     10/12:  S/p PCI with KYLIE x3 of the LCx and OM  Cards planning on performing PCI of RCA in the am  NPO after midnight     10/13:  PCI of RCA pending today  Cards recs to follow  Resume diet postop     10/14:  Attempted PCI of RCA was unsuccessful   Asa and plavix on hold due to GI bleed  Plan to resume meds post EGD  If HR is improved can resume BB as well     10/15:  Continue to hold asa and plavix  Will discuss with cards on resuming bb         Acute gastric ulcer with hemorrhage  10/14:  H/H stable post 4 units pRBC transfused  GI on case, EGD today  protonix 40mg bid   Will await EGD results  Continue to monitor H/H     10/15:  Continue to monitor h/h  protonix bid   Discussed case with GI, plan for repeat EGD this afternoon       Sinus bradycardia  10/13:  Stable  Continue to monitor     10/15:  Improved  Will defer to cards on when to restart bb       Tobacco abuse  - Counseled on the need for cessation      AP (angina pectoris)  -  See plan as per above      Nonrheumatic aortic valve insufficiency  - Cardiology following      Claudication in peripheral vascular disease  - Continue home meds      Depression  - Continue home meds      Type 2 diabetes mellitus with hyperglycemia, without long-term current use of insulin  - A1c 6.5 in June  - Hold home Metformin for now  - Accu checks ACHS with SSI PRN  - Monitor    10/12  Cont to hold metformin   Sliding scale   Glucose within goal     10/13-10/15  Glucose within goal   Continue HSSI        Dyslipidemia  - Continue home meds      Essential hypertension  - BP elevated  - Continue home meds  - Monitor and adjust as needed     10/12:  bp slightly elevated  Will continue to monitor   Increasing lisinopril to 20mg daily     10/14:  BP controlled       PAD (peripheral artery disease)  - Continue home meds      CAD (coronary artery disease)  - See plan as per above        VTE Risk Mitigation (From admission, onward)         Ordered     IP VTE HIGH RISK PATIENT  Once      10/11/19 1236     Place sequential compression device  Until discontinued      10/11/19 1236                Critical care time spent on the evaluation and treatment of severe organ dysfunction, review of pertinent labs and imaging studies, discussions with consulting providers and discussions with patient/family: 35 minutes.      Dl Cerrato MD  Department of Hospital Medicine   [facility]

## 2019-10-15 NOTE — ASSESSMENT & PLAN NOTE
OPTIMIZE MEDICAL TX FOR CAD.    10/14/19  -Patient is s/p PCI of LCX with KYLIE x1 and severe OM2 KYLIE x2 overlapped into mid LCX old stent. OM on Friday and Prox-mid RCA with KYLIE x2  on yesterday. Unsuccessful PCI of Distal RCA occluded and PDA occluded stent  -ASA and Plavix on hold for today secondary to acute GIB  -Plans in place for GI to scope patient today   -Plan to restart ASA and Plavix tomorrow given his recent MI and multiple KYLIE   -Continue Statin, Ranexa, Imdur and ARB  -No BB due to bradycardia      10/15/19  -Continue to hold ASA and Plavix today for active bleeding.   -GI planning to repeat EGD today   -Continue Statin, Ranexa, Imdur and ARB  -BB on hold for bradycardia during admit

## 2019-10-15 NOTE — SUBJECTIVE & OBJECTIVE
Interval History: Remains HD stable. No further transfusion requirements. EGD without evidence of ulceration, suspect hemorrhagic gastritis. Tolerating clears. No hematemesis.     Medications:  Continuous Infusions:   sodium chloride 0.9% 100 mL/hr at 10/15/19 0600     Scheduled Meds:   erythromycin  250 mg Intravenous Once    escitalopram oxalate  10 mg Oral Daily    isosorbide mononitrate  30 mg Oral BID    lisinopril  20 mg Oral Daily    pantoprazole  40 mg Intravenous BID    ranolazine  500 mg Oral BID    simvastatin  40 mg Oral QHS     PRN Meds:sodium chloride, acetaminophen, Dextrose 10% Bolus, Dextrose 10% Bolus, glucagon (human recombinant), glucose, glucose, hydrALAZINE, influenza, insulin aspart U-100, nitroGLYCERIN, ondansetron, ondansetron, pneumoc 13-wiliam conj-dip cr(PF), sodium chloride 0.9%     Review of patient's allergies indicates:  No Known Allergies  Objective:     Vital Signs (Most Recent):  Temp: 97.6 °F (36.4 °C) (10/15/19 0300)  Pulse: 63 (10/15/19 0800)  Resp: (!) 21 (10/15/19 0800)  BP: (!) 143/65 (10/15/19 0800)  SpO2: 100 % (10/15/19 0800) Vital Signs (24h Range):  Temp:  [97.6 °F (36.4 °C)-99 °F (37.2 °C)] 97.6 °F (36.4 °C)  Pulse:  [60-83] 63  Resp:  [13-28] 21  SpO2:  [94 %-100 %] 100 %  BP: ()/() 143/65     Weight: 75.6 kg (166 lb 10.7 oz)  Body mass index is 23.91 kg/m².    Intake/Output - Last 3 Shifts       10/13 0700 - 10/14 0659 10/14 0700 - 10/15 0659 10/15 0700 - 10/16 0659    P.O.  260     I.V. (mL/kg) 245 (3.2) 2379 (31.5)     Blood 999.8 645.8     IV Piggyback 2100 200     Total Intake(mL/kg) 3344.8 (44.2) 3484.8 (46.1)     Urine (mL/kg/hr) 275 (0.2) 850 (0.5)     Emesis/NG output 1000 1     Stool 0 0     Total Output 1275 851     Net +2069.8 +2633.8            Urine Occurrence  5 x     Stool Occurrence 1 x 7 x           Physical Exam   Constitutional: He is oriented to person, place, and time. He appears well-developed and well-nourished. No distress.    HENT:   Head: Normocephalic and atraumatic.   Eyes: EOM are normal.   Neck: Neck supple.   Cardiovascular: Normal rate and regular rhythm.   Pulmonary/Chest: Effort normal.   Abdominal: Soft. He exhibits no distension. There is no tenderness.   Musculoskeletal: Normal range of motion.   Neurological: He is alert and oriented to person, place, and time.   Skin: Skin is warm.   Vitals reviewed.      Significant Labs:  CBC:   Recent Labs   Lab 10/14/19  1341  10/15/19  0344   WBC 9.70  --   --    RBC 3.07*  --   --    HGB 9.3*   < > 8.2*   HCT 28.2*   < > 24.1*     --  109*   MCV 92  --   --    MCH 30.3  --   --    MCHC 33.0  --   --     < > = values in this interval not displayed.     BMP:   Recent Labs   Lab 10/15/19  0344   *      K 4.1      CO2 22*   BUN 52*   CREATININE 1.0   CALCIUM 7.1*       Significant Diagnostics:  none EGD report reviewed personally.

## 2019-10-15 NOTE — ASSESSMENT & PLAN NOTE
10/13:  Stable  Continue to monitor     10/15:  Improved  Will defer to cards on when to restart bb

## 2019-10-15 NOTE — INTERVAL H&P NOTE
The patient has been examined and the H&P has been reviewed:I have reviewed this note and I agree with this assessment. The patient remains stable for endoscopy at the time of this present evaluation.         Anesthesia/Surgery risks, benefits and alternative options discussed and understood by patient/family.          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

## 2019-10-15 NOTE — ASSESSMENT & PLAN NOTE
10/14:  H/H stable post 4 units pRBC transfused  GI on case, EGD today  protonix 40mg bid   Will await EGD results  Continue to monitor H/H     10/15:  Continue to monitor h/h  protonix bid   Discussed case with GI, plan for repeat EGD this afternoon

## 2019-10-15 NOTE — ASSESSMENT & PLAN NOTE
- Admit to Tele  - Initial troponin 0.466, will trend  - Given  mg in the ER  - Continue home ASA, Plavix, ACEi, Zocor, and Imdur  - ECHO pending  - Cardiology consulted and recommended a Heparin gtt with plans for a heart cath today with additional recs to follow  - NPO  - IVFs  - Supplemental o2 and SL NTG prn  - Tele monitoring     10/12:  S/p PCI with KYLIE x3 of the LCx and OM  Cards planning on performing PCI of RCA in the am  NPO after midnight     10/13:  PCI of RCA pending today  Cards recs to follow  Resume diet postop     10/14:  Attempted PCI of RCA was unsuccessful   Asa and plavix on hold due to GI bleed  Plan to resume meds post EGD  If HR is improved can resume BB as well     10/15:  Continue to hold asa and plavix  Will discuss with cards on resuming bb

## 2019-10-15 NOTE — ASSESSMENT & PLAN NOTE
Now post [facility] x 2 this admission with stents x 3 to LCX, OM2 and then x 2 to occluded RCA  Holding ASA and plavix s/t acute blood loss anemia with active GI bleeding  ICU hemodynamic monitoring  Continue statin, ranexa, imdur as tolerated  Cardiology following

## 2019-10-15 NOTE — ASSESSMENT & PLAN NOTE
- A1c 6.5 in June  - Hold home Metformin for now  - Accu checks ACHS with SSI PRN  - Monitor    10/12  Cont to hold metformin   Sliding scale   Glucose within goal     10/13-10/15  Glucose within goal   Continue HSSI

## 2019-10-15 NOTE — TRANSFER OF CARE
"Anesthesia Transfer of Care Note    Patient: Aquiles Kelsey    Procedure(s) Performed: Procedure(s) (LRB):  EGD (ESOPHAGOGASTRODUODENOSCOPY) (N/A)    Patient location: ICU    Anesthesia Type: MAC    Transport from OR: Transported from OR on room air with adequate spontaneous ventilation    Post pain: adequate analgesia    Post assessment: no apparent anesthetic complications    Post vital signs: stable    Level of consciousness: awake, alert and oriented    Nausea/Vomiting: no nausea/vomiting    Complications: none    Transfer of care protocol was followed      Last vitals:   Visit Vitals  BP (!) 133/54   Pulse 66   Temp 37.3 °C (99.1 °F) (Oral)   Resp 20   Ht 5' 10" (1.778 m)   Wt 75.6 kg (166 lb 10.7 oz)   SpO2 98%   BMI 23.91 kg/m²     "

## 2019-10-16 PROBLEM — K92.2 UPPER GI BLEED: Status: ACTIVE | Noted: 2019-10-14

## 2019-10-16 PROBLEM — R57.1 HYPOVOLEMIC SHOCK: Status: RESOLVED | Noted: 2019-10-14 | Resolved: 2019-10-16

## 2019-10-16 LAB
ANION GAP SERPL CALC-SCNC: 7 MMOL/L (ref 8–16)
BUN SERPL-MCNC: 30 MG/DL (ref 8–23)
CALCIUM SERPL-MCNC: 7.2 MG/DL (ref 8.7–10.5)
CHLORIDE SERPL-SCNC: 112 MMOL/L (ref 95–110)
CO2 SERPL-SCNC: 21 MMOL/L (ref 23–29)
CREAT SERPL-MCNC: 0.9 MG/DL (ref 0.5–1.4)
EST. GFR  (AFRICAN AMERICAN): >60 ML/MIN/1.73 M^2
EST. GFR  (NON AFRICAN AMERICAN): >60 ML/MIN/1.73 M^2
GLUCOSE SERPL-MCNC: 123 MG/DL (ref 70–110)
HCT VFR BLD AUTO: 25.9 % (ref 40–54)
HCT VFR BLD AUTO: 26 % (ref 40–54)
HGB BLD-MCNC: 8.4 G/DL (ref 14–18)
HGB BLD-MCNC: 8.6 G/DL (ref 14–18)
INR PPP: 1.1 (ref 0.8–1.2)
PLATELET # BLD AUTO: 113 K/UL (ref 150–350)
PMV BLD AUTO: 11.1 FL (ref 9.2–12.9)
POCT GLUCOSE: 146 MG/DL (ref 70–110)
POCT GLUCOSE: 149 MG/DL (ref 70–110)
POCT GLUCOSE: 247 MG/DL (ref 70–110)
POTASSIUM SERPL-SCNC: 4 MMOL/L (ref 3.5–5.1)
PROTHROMBIN TIME: 11.6 SEC (ref 9–12.5)
SODIUM SERPL-SCNC: 140 MMOL/L (ref 136–145)

## 2019-10-16 PROCEDURE — 36415 COLL VENOUS BLD VENIPUNCTURE: CPT | Mod: HCNC

## 2019-10-16 PROCEDURE — 25000003 PHARM REV CODE 250: Mod: HCNC | Performed by: INTERNAL MEDICINE

## 2019-10-16 PROCEDURE — 25000003 PHARM REV CODE 250: Mod: HCNC | Performed by: NURSE PRACTITIONER

## 2019-10-16 PROCEDURE — 99232 SBSQ HOSP IP/OBS MODERATE 35: CPT | Mod: HCNC,,, | Performed by: SURGERY

## 2019-10-16 PROCEDURE — 99232 SBSQ HOSP IP/OBS MODERATE 35: CPT | Mod: HCNC,,, | Performed by: PHYSICIAN ASSISTANT

## 2019-10-16 PROCEDURE — 99232 PR SUBSEQUENT HOSPITAL CARE,LEVL II: ICD-10-PCS | Mod: HCNC,,, | Performed by: PHYSICIAN ASSISTANT

## 2019-10-16 PROCEDURE — 63600175 PHARM REV CODE 636 W HCPCS: Mod: HCNC | Performed by: INTERNAL MEDICINE

## 2019-10-16 PROCEDURE — 94760 N-INVAS EAR/PLS OXIMETRY 1: CPT | Mod: HCNC

## 2019-10-16 PROCEDURE — 99900035 HC TECH TIME PER 15 MIN (STAT): Mod: HCNC

## 2019-10-16 PROCEDURE — 99233 SBSQ HOSP IP/OBS HIGH 50: CPT | Mod: HCNC,,, | Performed by: NURSE PRACTITIONER

## 2019-10-16 PROCEDURE — 85018 HEMOGLOBIN: CPT | Mod: 91,HCNC

## 2019-10-16 PROCEDURE — C9113 INJ PANTOPRAZOLE SODIUM, VIA: HCPCS | Mod: HCNC | Performed by: NURSE PRACTITIONER

## 2019-10-16 PROCEDURE — 85018 HEMOGLOBIN: CPT | Mod: HCNC

## 2019-10-16 PROCEDURE — 80048 BASIC METABOLIC PNL TOTAL CA: CPT | Mod: HCNC

## 2019-10-16 PROCEDURE — 63600175 PHARM REV CODE 636 W HCPCS: Mod: HCNC | Performed by: NURSE PRACTITIONER

## 2019-10-16 PROCEDURE — 99233 PR SUBSEQUENT HOSPITAL CARE,LEVL III: ICD-10-PCS | Mod: HCNC,,, | Performed by: NURSE PRACTITIONER

## 2019-10-16 PROCEDURE — 21400001 HC TELEMETRY ROOM: Mod: HCNC

## 2019-10-16 PROCEDURE — C9113 INJ PANTOPRAZOLE SODIUM, VIA: HCPCS | Mod: HCNC | Performed by: INTERNAL MEDICINE

## 2019-10-16 PROCEDURE — 85610 PROTHROMBIN TIME: CPT | Mod: HCNC

## 2019-10-16 PROCEDURE — 27000221 HC OXYGEN, UP TO 24 HOURS: Mod: HCNC

## 2019-10-16 PROCEDURE — 99232 PR SUBSEQUENT HOSPITAL CARE,LEVL II: ICD-10-PCS | Mod: HCNC,,, | Performed by: SURGERY

## 2019-10-16 PROCEDURE — 85014 HEMATOCRIT: CPT | Mod: HCNC

## 2019-10-16 PROCEDURE — 85014 HEMATOCRIT: CPT | Mod: 91,HCNC

## 2019-10-16 PROCEDURE — 85049 AUTOMATED PLATELET COUNT: CPT | Mod: HCNC

## 2019-10-16 RX ORDER — METOPROLOL TARTRATE 25 MG/1
12.5 TABLET ORAL 2 TIMES DAILY
Status: DISCONTINUED | OUTPATIENT
Start: 2019-10-16 | End: 2019-10-16

## 2019-10-16 RX ADMIN — ISOSORBIDE MONONITRATE 30 MG: 30 TABLET, EXTENDED RELEASE ORAL at 09:10

## 2019-10-16 RX ADMIN — LISINOPRIL 20 MG: 20 TABLET ORAL at 09:10

## 2019-10-16 RX ADMIN — INSULIN ASPART 4 UNITS: 100 INJECTION, SOLUTION INTRAVENOUS; SUBCUTANEOUS at 11:10

## 2019-10-16 RX ADMIN — ASPIRIN 81 MG: 81 TABLET, COATED ORAL at 09:10

## 2019-10-16 RX ADMIN — SODIUM CHLORIDE: 0.9 INJECTION, SOLUTION INTRAVENOUS at 02:10

## 2019-10-16 RX ADMIN — RANOLAZINE 500 MG: 500 TABLET, FILM COATED, EXTENDED RELEASE ORAL at 09:10

## 2019-10-16 RX ADMIN — CLOPIDOGREL BISULFATE 75 MG: 75 TABLET ORAL at 09:10

## 2019-10-16 RX ADMIN — RANOLAZINE 500 MG: 500 TABLET, FILM COATED, EXTENDED RELEASE ORAL at 08:10

## 2019-10-16 RX ADMIN — PANTOPRAZOLE SODIUM 40 MG: 40 INJECTION, POWDER, LYOPHILIZED, FOR SOLUTION INTRAVENOUS at 09:10

## 2019-10-16 RX ADMIN — ISOSORBIDE MONONITRATE 30 MG: 30 TABLET, EXTENDED RELEASE ORAL at 08:10

## 2019-10-16 RX ADMIN — SIMVASTATIN 40 MG: 20 TABLET, FILM COATED ORAL at 08:10

## 2019-10-16 RX ADMIN — PANTOPRAZOLE SODIUM 40 MG: 40 INJECTION, POWDER, LYOPHILIZED, FOR SOLUTION INTRAVENOUS at 08:10

## 2019-10-16 RX ADMIN — ESCITALOPRAM OXALATE 10 MG: 10 TABLET, FILM COATED ORAL at 09:10

## 2019-10-16 NOTE — SUBJECTIVE & OBJECTIVE
Review of Systems   Constitution: Negative for diaphoresis, malaise/fatigue, weight gain and weight loss.   HENT: Negative for congestion and nosebleeds.    Cardiovascular: Negative for chest pain, claudication, cyanosis, dyspnea on exertion, irregular heartbeat, leg swelling, near-syncope, orthopnea, palpitations, paroxysmal nocturnal dyspnea and syncope.   Respiratory: Negative for cough, hemoptysis, shortness of breath, sleep disturbances due to breathing, snoring, sputum production and wheezing.    Hematologic/Lymphatic: Negative for bleeding problem. Does not bruise/bleed easily.   Skin: Negative for rash.   Musculoskeletal: Negative for arthritis, back pain, falls, joint pain, muscle cramps and muscle weakness.   Gastrointestinal: Negative for abdominal pain, constipation, diarrhea, heartburn, hematemesis, hematochezia, melena, nausea and vomiting.   Genitourinary: Negative for dysuria, hematuria and nocturia.   Neurological: Negative for excessive daytime sleepiness, dizziness, headaches, light-headedness, loss of balance, numbness, vertigo and weakness.     Objective:     Vital Signs (Most Recent):  Temp: 98 °F (36.7 °C) (10/16/19 0701)  Pulse: 69 (10/16/19 1000)  Resp: 20 (10/16/19 1000)  BP: (!) 138/43 (10/16/19 0800)  SpO2: 99 % (10/16/19 0800) Vital Signs (24h Range):  Temp:  [98 °F (36.7 °C)-99.1 °F (37.3 °C)] 98 °F (36.7 °C)  Pulse:  [58-72] 69  Resp:  [13-26] 20  SpO2:  [95 %-100 %] 99 %  BP: (102-166)/(37-84) 138/43     Weight: 75.6 kg (166 lb 10.7 oz)  Body mass index is 23.91 kg/m².     SpO2: 99 %  O2 Device (Oxygen Therapy): nasal cannula      Intake/Output Summary (Last 24 hours) at 10/16/2019 1051  Last data filed at 10/16/2019 0600  Gross per 24 hour   Intake 3055.5 ml   Output 875 ml   Net 2180.5 ml       Lines/Drains/Airways     Peripheral Intravenous Line                 Peripheral IV - Single Lumen 10/11/19 1127 20 G Left Forearm 4 days         Peripheral IV - Single Lumen 10/15/19 1100  18 G Right Antecubital less than 1 day                Physical Exam   Constitutional: He is oriented to person, place, and time. He appears well-developed and well-nourished.   Neck: Neck supple. No JVD present.   Cardiovascular: Normal rate, regular rhythm, normal heart sounds and normal pulses. Exam reveals no friction rub.   No murmur heard.  Pulmonary/Chest: Effort normal and breath sounds normal. No respiratory distress. He has no wheezes. He has no rales.   Abdominal: Soft. Bowel sounds are normal. He exhibits no distension.   Musculoskeletal: He exhibits no edema or tenderness.   Neurological: He is alert and oriented to person, place, and time.   Skin: Skin is warm and dry. No rash noted.   Left radial access site without redness, tenderness, swelling, or drainage. There is no active external bleeding or hematoma.    Psychiatric: He has a normal mood and affect. His behavior is normal.   Nursing note and vitals reviewed.      Significant Labs:   All pertinent lab results from the last 24 hours have been reviewed. and   Recent Lab Results       10/16/19  0524   10/16/19  0352   10/15/19  2317   10/15/19  2022   10/15/19  1829        Unit Blood Type Code               Unit Expiration               Unit Blood Type               Anion Gap   7           BUN, Bld   30           Calcium   7.2           Chloride   112           CO2   21           CODING SYSTEM               Creatinine   0.9           DISPENSE STATUS               eGFR if    >60           eGFR if non    >60  Comment:  Calculation used to obtain the estimated glomerular filtration  rate (eGFR) is the CKD-EPI equation.              Glucose   123           Group & Rh               Hematocrit   25.9   26.5       Hemoglobin   8.4   8.8       INDIRECT MICK               Coumadin Monitoring INR   1.1  Comment:  Coumadin Therapy:  2.0 - 3.0 for INR for all indicators except mechanical heart valves  and antiphospholipid  syndromes which should use 2.5 - 3.5.             MPV   11.1           Platelets   113           POCT Glucose 149   167   168     Potassium   4.0           Product Code               Protime   11.6           Sodium   140           UNIT NUMBER                                10/15/19  1200   10/15/19  1149   10/15/19  1138        Unit Blood Type Code     5100     Unit Expiration     [id number]     Unit Blood Type     O POS     Anion Gap           BUN, Bld           Calcium           Chloride           CO2           CODING SYSTEM     [id number]     Creatinine           DISPENSE STATUS     TRANSFUSED     eGFR if            eGFR if non African American           Glucose           Group & Rh     O POS     Hematocrit   26.5       Hemoglobin   8.6       INDIRECT MICK     NEG     Coumadin Monitoring INR           MPV           Platelets           POCT Glucose 151         Potassium           Product Code     [id number]     Protime           Sodium           UNIT NUMBER     [id number]           Significant Imaging: Echocardiogram:   2D echo with color flow doppler:   Results for orders placed or performed during the hospital encounter of 10/11/19   2D echo with color flow doppler   Result Value Ref Range    QEF 55 55 - 65    Mitral Valve Regurgitation MILD     Aortic Valve Regurgitation MILD     Est. PA Systolic Pressure 33.25     Narrative    Date of Procedure: 10/11/2019        TEST DESCRIPTION   Technical Quality: This is a technically challenging study. There is poor endocardial definition.     Aorta: The aortic root is normal in size, measuring 3.3 cm at sinotubular junction and 3.5 cm at Sinuses of Valsalva. The proximal ascending aorta is normal in size, measuring 3.0 cm across.     Left Atrium: The left atrial volume index is moderately enlarged, measuring 45.51 cc/m2.     Left Ventricle: The left ventricle is normal in size, with an end-diastolic diameter of 4.2 cm, and an end-systolic diameter of  2.6 cm. Septal wall thickness is mildly increased, with the septum measuring 1.4 cm and the posterior wall measuring 1.1 cm   across. Relative wall thickness was increased at 0.52, and the LV mass index was increased at 116.3 g/m2 consistent with concentric left ventricular hypertrophy. The following segments were mildly hypokinetic: mid inferolateral wall.  Left ventricular systolic function appears normal. Visually estimated ejection fraction is 55-60%. The LV Doppler derived stroke volume equals 78.0 ccs.     Diastolic indices: E wave velocity 0.9 m/s, E/A ratio 1.2,  msec., E/e' ratio(avg) 13. Diastolic function is indeterminate.     Right Atrium: The right atrium is normal in size, measuring 5.2 cm in length and 2.7 cm in width in the apical view.     Right Ventricle: The right ventricle is normal in size. Global right ventricular systolic function appears normal. Tricuspid annular plane systolic excursion (TAPSE) is 2.0 cm. The estimated PA systolic pressure is 33 mmHg.     Aortic Valve:  The aortic valve is mildly sclerotic with normal leaflet mobility. The mean gradient obtained across the aortic valve is 5 mmHg. Additionally, there is mild aortic regurgitation. There is a pressure half time of 747.0 msec.     Mitral Valve:  The mitral valve is normal in structure. The pressure half time is 54 msec. The calculated mitral valve area is 4.07 cm2. There is mild mitral regurgitation.     Tricuspid Valve:  The tricuspid valve is normal in structure.     Pulmonary Valve:  The pulmonic valve is not well seen.     IVC: IVC is normal in size and collapses > 50% with a sniff, suggesting normal right atrial pressure of 3 mmHg.     Intracavitary: There is no evidence of pericardial effusion, intracavity mass, thrombi, or vegetation.         CONCLUSIONS     1 - Normal left ventricular systolic function (EF 55-60%).     2 - Indeterminate LV diastolic function.     3 - Normal right ventricular systolic function .      4 - Mild aortic regurgitation.     5 - Moderate left atrial enlargement.     6 - Wall motion abnormalities.     7 - Concentric hypertrophy.     8 - The estimated PA systolic pressure is 33 mmHg.             This document has been electronically    SIGNED BY: Robe Gilbert MD On: 10/11/2019 16:04

## 2019-10-16 NOTE — ASSESSMENT & PLAN NOTE
UGI bleed in patient requiring antiplatelet therapy for KYLIE.   EGD showed AMV which was treated.    Can change Protonix to 40 mg daily.

## 2019-10-16 NOTE — PROGRESS NOTES
[facility]  Gastroenterology  Progress Note    Patient Name: Aquiles Kelsey  MRN: [id number]  Admission Date: 10/11/2019  Hospital Length of Stay: 5 days  Code Status: Full Code   Attending Provider: Dl Cerrato MD  Consulting Provider: Hubert Downing PA-C  Primary Care Physician: Saige Miranda MD  Principal Problem: NSTEMI (non-ST elevated myocardial infarction)      Subjective:     Interval History:   The patient is feeling better, just fatigued. The patient is tolerating his diet. He denies abdominal pain. He denies bloody stools EGD showed AVM which was treated.     Review of Systems   Constitutional:        See Interval History for daily ROS.      Objective:     Vital Signs (Most Recent):  Temp: 97.7 °F (36.5 °C) (10/16/19 1100)  Pulse: 64 (10/16/19 1406)  Resp: (!) 22 (10/16/19 1406)  BP: (!) 103/39 (10/16/19 1406)  SpO2: 100 % (10/16/19 0830) Vital Signs (24h Range):  Temp:  [97.7 °F (36.5 °C)-99.1 °F (37.3 °C)] 97.7 °F (36.5 °C)  Pulse:  [58-73] 64  Resp:  [15-26] 22  SpO2:  [95 %-100 %] 100 %  BP: (102-160)/() 103/39     Weight: 75.6 kg (166 lb 10.7 oz) (10/13/19 0600)  Body mass index is 23.91 kg/m².      Intake/Output Summary (Last 24 hours) at 10/16/2019 1443  Last data filed at 10/16/2019 0600  Gross per 24 hour   Intake 2255.5 ml   Output 675 ml   Net 1580.5 ml       Lines/Drains/Airways     Peripheral Intravenous Line                 Peripheral IV - Single Lumen 10/11/19 1127 20 G Left Forearm 5 days         Peripheral IV - Single Lumen 10/15/19 1100 18 G Right Antecubital 1 day                Physical Exam   Constitutional: He is oriented to person, place, and time. He appears well-developed and well-nourished. No distress.   HENT:   Head: Normocephalic and atraumatic.   Eyes: EOM are normal.   Cardiovascular: Normal rate and regular rhythm.   Pulmonary/Chest: Effort normal and breath sounds normal. No respiratory distress. He has no wheezes.   Abdominal: Soft. Bowel  sounds are normal. He exhibits no distension. There is no tenderness.   Neurological: He is alert and oriented to person, place, and time. No cranial nerve deficit.   Skin: He is not diaphoretic.   Psychiatric: His behavior is normal.       Significant Labs:  CBC:   Recent Labs   Lab 10/15/19  0344 10/15/19  1149 10/15/19  2022 10/16/19  0352   HGB 8.2* 8.6* 8.8* 8.4*   HCT 24.1* 26.5* 26.5* 25.9*   *  --   --  113*         Significant Imaging:  Imaging results within the past 24 hours have been reviewed.    Assessment/Plan:     Hematemesis  UGI bleed in patient requiring antiplatelet therapy for KYLIE.   EGD showed AMV which was treated.    Can change Protonix to 40 mg daily.     Acute blood loss anemia  Continue to monitor H/H and transfuse additional units as needed.     CAD, multiple vessel  Cardiology following. Ok to restart antiplatelet treatment.        Thank you for your consult. I will sign off. Please contact us if you have any additional questions.    Hubert Downing PA-C  Gastroenterology  [facility]

## 2019-10-16 NOTE — ASSESSMENT & PLAN NOTE
- A1c 6.5 in June  - Hold home Metformin for now  - Accu checks ACHS with SSI PRN  - Monitor    10/12  Cont to hold metformin   Sliding scale   Glucose within goal     10/13-10/16  Glucose within goal   Continue HSSI

## 2019-10-16 NOTE — PROGRESS NOTES
[facility] -   General Surgery  Progress Note    Subjective:     History of Present Illness:  78 yo M with h/o CAD on plavix and ASA at home for multiple prior PCIs presented to ER with chest pain. Treated for NSTEMI with stent and continued anticoagulation. Overnight he developed hematemesis and melena with hypotension. He was transferred to ICU for monitored care, IV protonix and has received 4 units pRBC and 1 unit platelets. He denies any prior epidoses of UGIB or LGIB. He denies NSAID use or h/o PUD. He is currently HD stable awaiting EGD by GI. CTA showed bleeding ulcer along greater curvature of stomach. Surgery consulted for evaluation.      Post-Op Info:  Procedure(s) (LRB):  EGD (ESOPHAGOGASTRODUODENOSCOPY) (N/A)   1 Day Post-Op     Interval History: No acute issues. EGD therapeutic for AVM without active bleeding yesterday.     Medications:  Continuous Infusions:   sodium chloride 0.9% 50 mL/hr at 10/16/19 1000     Scheduled Meds:   aspirin  81 mg Oral Daily    clopidogrel  75 mg Oral Daily    escitalopram oxalate  10 mg Oral Daily    isosorbide mononitrate  30 mg Oral BID    lisinopril  20 mg Oral Daily    pantoprazole  40 mg Intravenous BID    ranolazine  500 mg Oral BID    simvastatin  40 mg Oral QHS     PRN Meds:sodium chloride, acetaminophen, Dextrose 10% Bolus, Dextrose 10% Bolus, glucagon (human recombinant), glucose, glucose, hydrALAZINE, influenza, insulin aspart U-100, nitroGLYCERIN, ondansetron, ondansetron, pneumoc 13-wiliam conj-dip cr(PF), sodium chloride 0.9%     Review of patient's allergies indicates:  No Known Allergies  Objective:     Vital Signs (Most Recent):  Temp: 98 °F (36.7 °C) (10/16/19 0701)  Pulse: 69 (10/16/19 1000)  Resp: 20 (10/16/19 1000)  BP: (!) 138/43 (10/16/19 0800)  SpO2: 99 % (10/16/19 0800) Vital Signs (24h Range):  Temp:  [98 °F (36.7 °C)-99.1 °F (37.3 °C)] 98 °F (36.7 °C)  Pulse:  [58-72] 69  Resp:  [13-26] 20  SpO2:  [95 %-100 %] 99 %  BP:  (102-166)/(37-84) 138/43     Weight: 75.6 kg (166 lb 10.7 oz)  Body mass index is 23.91 kg/m².    Intake/Output - Last 3 Shifts       10/14 0700 - 10/15 0659 10/15 0700 - 10/16 0659 10/16 0700 - 10/17 0659    P.O. 260 370     I.V. (mL/kg) 2379 (31.5) 2373 (31.4)     Blood 645.8 312.5     IV Piggyback 200      Total Intake(mL/kg) 3484.8 (46.1) 3055.5 (40.4)     Urine (mL/kg/hr) 850 (0.5) 1055 (0.6)     Emesis/NG output 1      Stool 0 0     Total Output 851 1055     Net +2633.8 +2000.5            Urine Occurrence 5 x      Stool Occurrence 7 x 2 x 1 x          Physical Exam   Constitutional: He is oriented to person, place, and time. He appears well-developed and well-nourished. No distress.   HENT:   Head: Normocephalic and atraumatic.   Eyes: EOM are normal.   Neck: Neck supple.   Cardiovascular: Normal rate and regular rhythm.   Pulmonary/Chest: Effort normal.   Abdominal: Soft. He exhibits no distension. There is no tenderness.   Musculoskeletal: Normal range of motion.   Neurological: He is alert and oriented to person, place, and time.   Skin: Skin is warm.   Vitals reviewed.      Significant Labs:  CBC:   Recent Labs   Lab 10/14/19  1341  10/16/19  0352   WBC 9.70  --   --    RBC 3.07*  --   --    HGB 9.3*   < > 8.4*   HCT 28.2*   < > 25.9*      < > 113*   MCV 92  --   --    MCH 30.3  --   --    MCHC 33.0  --   --     < > = values in this interval not displayed.     BMP:   Recent Labs   Lab 10/16/19  0352   *      K 4.0   *   CO2 21*   BUN 30*   CREATININE 0.9   CALCIUM 7.2*       Significant Diagnostics:  none    Assessment/Plan:     Upper GI bleed s/t angiectasia  Resolved   No acute surgical intervention at this time.  Call with questions        Maria De Jesus Wells MD  General Surgery  [facility]

## 2019-10-16 NOTE — SUBJECTIVE & OBJECTIVE
Interval History: No acute issues. EGD therapeutic for AVM without active bleeding yesterday.     Medications:  Continuous Infusions:   sodium chloride 0.9% 50 mL/hr at 10/16/19 1000     Scheduled Meds:   aspirin  81 mg Oral Daily    clopidogrel  75 mg Oral Daily    escitalopram oxalate  10 mg Oral Daily    isosorbide mononitrate  30 mg Oral BID    lisinopril  20 mg Oral Daily    pantoprazole  40 mg Intravenous BID    ranolazine  500 mg Oral BID    simvastatin  40 mg Oral QHS     PRN Meds:sodium chloride, acetaminophen, Dextrose 10% Bolus, Dextrose 10% Bolus, glucagon (human recombinant), glucose, glucose, hydrALAZINE, influenza, insulin aspart U-100, nitroGLYCERIN, ondansetron, ondansetron, pneumoc 13-wiliam conj-dip cr(PF), sodium chloride 0.9%     Review of patient's allergies indicates:  No Known Allergies  Objective:     Vital Signs (Most Recent):  Temp: 98 °F (36.7 °C) (10/16/19 0701)  Pulse: 69 (10/16/19 1000)  Resp: 20 (10/16/19 1000)  BP: (!) 138/43 (10/16/19 0800)  SpO2: 99 % (10/16/19 0800) Vital Signs (24h Range):  Temp:  [98 °F (36.7 °C)-99.1 °F (37.3 °C)] 98 °F (36.7 °C)  Pulse:  [58-72] 69  Resp:  [13-26] 20  SpO2:  [95 %-100 %] 99 %  BP: (102-166)/(37-84) 138/43     Weight: 75.6 kg (166 lb 10.7 oz)  Body mass index is 23.91 kg/m².    Intake/Output - Last 3 Shifts       10/14 0700 - 10/15 0659 10/15 0700 - 10/16 0659 10/16 0700 - 10/17 0659    P.O. 260 370     I.V. (mL/kg) 2379 (31.5) 2373 (31.4)     Blood 645.8 312.5     IV Piggyback 200      Total Intake(mL/kg) 3484.8 (46.1) 3055.5 (40.4)     Urine (mL/kg/hr) 850 (0.5) 1055 (0.6)     Emesis/NG output 1      Stool 0 0     Total Output 851 1055     Net +2633.8 +2000.5            Urine Occurrence 5 x      Stool Occurrence 7 x 2 x 1 x          Physical Exam   Constitutional: He is oriented to person, place, and time. He appears well-developed and well-nourished. No distress.   HENT:   Head: Normocephalic and atraumatic.   Eyes: EOM are normal.    Neck: Neck supple.   Cardiovascular: Normal rate and regular rhythm.   Pulmonary/Chest: Effort normal.   Abdominal: Soft. He exhibits no distension. There is no tenderness.   Musculoskeletal: Normal range of motion.   Neurological: He is alert and oriented to person, place, and time.   Skin: Skin is warm.   Vitals reviewed.      Significant Labs:  CBC:   Recent Labs   Lab 10/14/19  1341  10/16/19  0352   WBC 9.70  --   --    RBC 3.07*  --   --    HGB 9.3*   < > 8.4*   HCT 28.2*   < > 25.9*      < > 113*   MCV 92  --   --    MCH 30.3  --   --    MCHC 33.0  --   --     < > = values in this interval not displayed.     BMP:   Recent Labs   Lab 10/16/19  0352   *      K 4.0   *   CO2 21*   BUN 30*   CREATININE 0.9   CALCIUM 7.2*       Significant Diagnostics:  none

## 2019-10-16 NOTE — PROGRESS NOTES
[facility] Hospital Medicine  Progress Note    Patient Name: Aquiles Kelsey  MRN: [id number]  Patient Class: IP- Inpatient   Admission Date: 10/11/2019  Length of Stay: 5 days  Attending Physician: Dl Cerrato MD  Primary Care Provider: Saige Miranda MD        Subjective:     Principal Problem:NSTEMI (non-ST elevated myocardial infarction)        HPI:  Aquiles Kelsey is a 79 y.o. male patient with a PMHx of CAD who presented to the Emergency Department today for evaluation of abnormal lab results.  Patient reports seeing Dr. Hobbs (Cardiology) and labs resulting in elevated troponin levels.  Associated sxs include CP.  Patient states he has had CP with a pain rating of 3/10 the past three days, but has complete relief as of today.  Patient denies any SOB, N/V, fever, chills, diaphoresis, palpitations, extremity weakness, dizziness, hematochezia, cough, and all other sxs at this time.  Prior Tx includes Imdur and 325 mg Aspirin.  Pt denies recent bleeding of any kind.  No further complaints or concerns at this time.  ER workup showed:  Stable VS.  12 lead EKG showed SB with nonspecific T wave abnormality.  Troponin yesterday elevated to 0.8345, repeat today 0.466.  CXR negative.  Cardiology consulted from the ER and recommended admission with a Heparin gtt and plans for a heart cath today with additional recs to follow.  ECHO pending.  Hospital Medicine called for admission.  Patient will be admitted to [facility].  He is a Full Code.  His SDM is his wife Gayle who can be reached at [phone].       Overview/Hospital Course:  10/12:  S/p LHC with KYLIE x3 of the LCx and OM. Cards plan to perform PCI of RCA tomorrow.     10/13:  Cardio attempting PCI of RCA today     10/14:  Unsuccessful PCI of RCA, developed gross hematemesis overnight. H/H dropped. 4 units pRBC transfused. Transferred to ICU. GI consulted on case. CT scan abdomen showed bleeding in greater curvature of stomach.      10/15:  Patient to undergo repeat EGD today.     10/16:  Angiodysplasia noted on EGD, corrected by GI, no further hematemesis or melena reported. H/h stable    Interval History: Tolerated procedure well. Denies any further vomiting or dark black stools. States he is tolerating his diet.     Review of Systems   Constitutional: Negative for fatigue and fever.   Respiratory: Negative for shortness of breath.    Cardiovascular: Negative for chest pain.   Gastrointestinal: Negative for abdominal pain, anal bleeding, blood in stool, nausea and vomiting.   Skin: Negative for rash.   Neurological: Negative for headaches.     Objective:     Vital Signs (Most Recent):  Temp: 98 °F (36.7 °C) (10/16/19 0701)  Pulse: 69 (10/16/19 1000)  Resp: 20 (10/16/19 1000)  BP: (!) 138/43 (10/16/19 0800)  SpO2: 99 % (10/16/19 0800) Vital Signs (24h Range):  Temp:  [98 °F (36.7 °C)-99.1 °F (37.3 °C)] 98 °F (36.7 °C)  Pulse:  [58-72] 69  Resp:  [13-26] 20  SpO2:  [95 %-100 %] 99 %  BP: (102-166)/(37-84) 138/43     Weight: 75.6 kg (166 lb 10.7 oz)  Body mass index is 23.91 kg/m².    Intake/Output Summary (Last 24 hours) at 10/16/2019 1028  Last data filed at 10/16/2019 0600  Gross per 24 hour   Intake 3055.5 ml   Output 875 ml   Net 2180.5 ml      Physical Exam   Constitutional: He is oriented to person, place, and time. He appears well-developed and well-nourished. No distress.   Cardiovascular: Normal rate, regular rhythm and normal heart sounds. Exam reveals no gallop and no friction rub.   No murmur heard.  Pulmonary/Chest: Effort normal and breath sounds normal. No stridor. No respiratory distress. He has no wheezes. He has no rales.   Abdominal: Soft. Bowel sounds are normal. He exhibits no distension and no mass. There is no tenderness. There is no guarding.   Musculoskeletal: He exhibits no edema.   Neurological: He is alert and oriented to person, place, and time.   Skin: He is not diaphoretic.       Significant Labs:   BMP:    Recent Labs   Lab 10/16/19  0352   *      K 4.0   *   CO2 21*   BUN 30*   CREATININE 0.9   CALCIUM 7.2*     CBC:   Recent Labs   Lab 10/14/19  1341  10/15/19  0344 10/15/19  1149 10/15/19  2022 10/16/19  0352   WBC 9.70  --   --   --   --   --    HGB 9.3*   < > 8.2* 8.6* 8.8* 8.4*   HCT 28.2*   < > 24.1* 26.5* 26.5* 25.9*     --  109*  --   --  113*    < > = values in this interval not displayed.       Significant Imaging: I have reviewed all pertinent imaging results/findings within the past 24 hours.      Assessment/Plan:      * NSTEMI (non-ST elevated myocardial infarction)  - Admit to Tele  - Initial troponin 0.466, will trend  - Given  mg in the ER  - Continue home ASA, Plavix, ACEi, Zocor, and Imdur  - ECHO pending  - Cardiology consulted and recommended a Heparin gtt with plans for a heart cath today with additional recs to follow  - NPO  - IVFs  - Supplemental o2 and SL NTG prn  - Tele monitoring     10/12:  S/p PCI with KYLIE x3 of the LCx and OM  Cards planning on performing PCI of RCA in the am  NPO after midnight     10/13:  PCI of RCA pending today  Cards recs to follow  Resume diet postop     10/14:  Attempted PCI of RCA was unsuccessful   Asa and plavix on hold due to GI bleed  Plan to resume meds post EGD  If HR is improved can resume BB as well     10/15:  Continue to hold asa and plavix  Will discuss with cards on resuming bb     10/16:  Resume asa and plavix   Awaiting cards recs on plan for possible discharge   Will resume bb, continue to monitor for bradycardia   Will need further monitoring of h/h  Possible dc tomorrow if cleared by cards and gi         Upper GI bleed s/t angiectasia  10/14:  H/H stable post 4 units pRBC transfused  GI on case, EGD today  protonix 40mg bid   Will await EGD results  Continue to monitor H/H     10/15:  Continue to monitor h/h  protonix bid   Discussed case with GI, plan for repeat EGD this afternoon     10/16:  Repeat EGD showed  angiodysplasia which was corrected by GI  No further vomiting or bleeding reported   H/h stable  Ok to resume asa and plavix when discussed with GI  Step down from ICU      Sinus bradycardia  10/13:  Stable  Continue to monitor     10/15:  Improved  Will defer to cards on when to restart bb       Tobacco abuse  - Counseled on the need for cessation      AP (angina pectoris)  - See plan as per above      Nonrheumatic aortic valve insufficiency  - Cardiology following      Claudication in peripheral vascular disease  - Continue home meds      Depression  - Continue home meds      Type 2 diabetes mellitus with hyperglycemia, without long-term current use of insulin  - A1c 6.5 in June  - Hold home Metformin for now  - Accu checks ACHS with SSI PRN  - Monitor    10/12  Cont to hold metformin   Sliding scale   Glucose within goal     10/13-10/16  Glucose within goal   Continue HSSI        Dyslipidemia  - Continue home meds      Essential hypertension  - BP elevated  - Continue home meds  - Monitor and adjust as needed     10/12:  bp slightly elevated  Will continue to monitor   Increasing lisinopril to 20mg daily     10/14:  BP controlled       PAD (peripheral artery disease)  - Continue home meds      CAD (coronary artery disease)  - See plan as per above        VTE Risk Mitigation (From admission, onward)         Ordered     IP VTE HIGH RISK PATIENT  Once      10/11/19 1236     Place sequential compression device  Until discontinued      10/11/19 1236                Critical care time spent on the evaluation and treatment of severe organ dysfunction, review of pertinent labs and imaging studies, discussions with consulting providers and discussions with patient/family: 35 minutes.      Dl Cerrato MD  Department of Hospital Medicine   [facility]

## 2019-10-16 NOTE — ASSESSMENT & PLAN NOTE
24 hour Glucose trend up 123-168   Continue monitoring with SSI prn, goal glucose range 100-180 mg/dl; avoid hypoglycemia

## 2019-10-16 NOTE — PHYSICIAN QUERY
PT Name: Aquiles Kelsey  MR #: [id number]    Physician Query Form - Cause and Effect Relationship Clarification      CDS/: Shobha Sanches RN, CCDS              Contact information: [email or web address]    This form is a permanent document in the medical record.     Query Date: October 16, 2019    By submitting this query, we are merely seeking further clarification of documentation. Please utilize your independent clinical judgment when addressing the question(s) below.    The Medical record contains the following:  Supporting Clinical Findings   Location in record     Had episode of emesis of blood, drop in BP & so transferred to ICU.                                                                 hemorrhagic shock   # GI bleed, hypotensive, drop in Hb by 3.5 points   # NSTEMI s/p cath & stent; on ASA, Plavix; was on Heparin drip before cath, then on Tirofiban drip after cath                        10/14 eicu md note                                                                        Treated for NSTEMI with stent and continued anticoagulation. Overnight he developed hematemesis and melena with hypotension.                                                                                                                        10/14 - 10/16 gen surg note         Provider, please clarify if there is any correlation between __GI Bleed___ and anticoagulation_.           Are the conditions:      [x  ] Due to or associated with each other   [  ] Unrelated to each other   [  ] Other (Please Specify): _________________________   [  ] Clinically Undetermined

## 2019-10-16 NOTE — SUBJECTIVE & OBJECTIVE
Subjective:     Interval History:   The patient is feeling better, just fatigued. The patient is tolerating his diet. He denies abdominal pain. He denies bloody stools EGD showed AVM which was treated.     Review of Systems   Constitutional:        See Interval History for daily ROS.      Objective:     Vital Signs (Most Recent):  Temp: 97.7 °F (36.5 °C) (10/16/19 1100)  Pulse: 64 (10/16/19 1406)  Resp: (!) 22 (10/16/19 1406)  BP: (!) 103/39 (10/16/19 1406)  SpO2: 100 % (10/16/19 0830) Vital Signs (24h Range):  Temp:  [97.7 °F (36.5 °C)-99.1 °F (37.3 °C)] 97.7 °F (36.5 °C)  Pulse:  [58-73] 64  Resp:  [15-26] 22  SpO2:  [95 %-100 %] 100 %  BP: (102-160)/() 103/39     Weight: 75.6 kg (166 lb 10.7 oz) (10/13/19 0600)  Body mass index is 23.91 kg/m².      Intake/Output Summary (Last 24 hours) at 10/16/2019 1443  Last data filed at 10/16/2019 0600  Gross per 24 hour   Intake 2255.5 ml   Output 675 ml   Net 1580.5 ml       Lines/Drains/Airways     Peripheral Intravenous Line                 Peripheral IV - Single Lumen 10/11/19 1127 20 G Left Forearm 5 days         Peripheral IV - Single Lumen 10/15/19 1100 18 G Right Antecubital 1 day                Physical Exam   Constitutional: He is oriented to person, place, and time. He appears well-developed and well-nourished. No distress.   HENT:   Head: Normocephalic and atraumatic.   Eyes: EOM are normal.   Cardiovascular: Normal rate and regular rhythm.   Pulmonary/Chest: Effort normal and breath sounds normal. No respiratory distress. He has no wheezes.   Abdominal: Soft. Bowel sounds are normal. He exhibits no distension. There is no tenderness.   Neurological: He is alert and oriented to person, place, and time. No cranial nerve deficit.   Skin: He is not diaphoretic.   Psychiatric: His behavior is normal.       Significant Labs:  CBC:   Recent Labs   Lab 10/15/19  0344 10/15/19  1149 10/15/19  2022 10/16/19  0352   HGB 8.2* 8.6* 8.8* 8.4*   HCT 24.1* 26.5* 26.5*  25.9*   *  --   --  113*         Significant Imaging:  Imaging results within the past 24 hours have been reviewed.

## 2019-10-16 NOTE — PROGRESS NOTES
[facility] -   Cardiology  Progress Note    Patient Name: Aquiles Kelsey  MRN: [id number]  Admission Date: 10/11/2019  Hospital Length of Stay: 5 days  Code Status: Full Code   Attending Physician: Dl Cerrato MD   Primary Care Physician: Saige Miranda MD  Expected Discharge Date:   Principal Problem:NSTEMI (non-ST elevated myocardial infarction)    Subjective:   Brief HPI:  Pt presents with NSTEMI.  His current medical conditions include CAD (multiple PCI procedures), HTN, DM, PAD, AAA stent graft (approx 2013), hyperlipidemia, cigar use.  H/o L LE stents in [place].   First PCI 1998, total of 5 stents with last one in [place] 2013.  Echo 7/19 normal LVEF, DD,  LVH, mild AI.   Holter 7/19 NSR, fleeting NSVT, EAT, rare PVCs, occasional PACs.  B LE vasc studies 7/19 B LE PAD, L > R LE.     Now in ER with several episodes of typical angina over past week and abnl troponin 0.8 range consistent w NSTEMI.  ECG earlier in week 10/9/19 showed new inferolateral st-t wave ischemic changes.  ECG today in ER shows improvement, nonspecific st-t abnl.  Cp free last couple of days.    Pt saw Dr. Hobbs, Cardiology, this week and was put on Imdur and had troponin checked and when found to be abnl pt was advised to go to ER.  He has chronic B LE claudication sxs.        Hospital Course:   10/12/19:  PT SEEN AND EXAMINED EARLIER THIS AM.  HAD GOOD NIGHT OVERALL. DENIES RECURRENT CP.  NO CHF SXS.  LABS AND ECG REVIEWED AND ARE STABLE.    10/13/19:  PT SEEN AND EXAMINED.  NO CHEST PAIN OR CHF SXS.  STABLE SINUS BRADYCARDIA.  TROPONIN PEAKED.  LABS STABLE.    10/14/19- Patient is s/p PCI of LCX with KYLIE x1 and severe OM2 KYLIE x2 overlapped into mid LCX old stent. OM on Friday and Prox-mid RCA with KYLIE x2  on yesterday. Unsuccessful PCI of Distal RCA occluded and PDA occluded stent. Patient developed acute GIB late last night/early morning. Had hematemesis and black BM. Became hypotensive and diaphoretic. Transfused to  ICU and transfused 4 units of blood and 1 unit of platelets. ASA and Plavix on hold today. GI has been consulted and plan to scope patient today. Vitals stable and patient denies any angina or equivalent.     10/15/19- patient is post EGD on yesterday. Noted to have clotted blood in the gastric fundus and red blood in the gastric body and in the gastric antrum.  ASA and Plavix held this morning and plans in place to repeat EGD today. H/H 8.2/24.1. Transfused additional unit of blood yesterday. Patient has no angina or equivalent this morning. IVF stopped.     10/16/19- patient is post repeat EGD on yesterday. Study showed no active bleeding. ASA and Plavix resumed this morning. Patient has no angina or equivalent. No hematemesis or melena         Review of Systems   Constitution: Negative for diaphoresis, malaise/fatigue, weight gain and weight loss.   HENT: Negative for congestion and nosebleeds.    Cardiovascular: Negative for chest pain, claudication, cyanosis, dyspnea on exertion, irregular heartbeat, leg swelling, near-syncope, orthopnea, palpitations, paroxysmal nocturnal dyspnea and syncope.   Respiratory: Negative for cough, hemoptysis, shortness of breath, sleep disturbances due to breathing, snoring, sputum production and wheezing.    Hematologic/Lymphatic: Negative for bleeding problem. Does not bruise/bleed easily.   Skin: Negative for rash.   Musculoskeletal: Negative for arthritis, back pain, falls, joint pain, muscle cramps and muscle weakness.   Gastrointestinal: Negative for abdominal pain, constipation, diarrhea, heartburn, hematemesis, hematochezia, melena, nausea and vomiting.   Genitourinary: Negative for dysuria, hematuria and nocturia.   Neurological: Negative for excessive daytime sleepiness, dizziness, headaches, light-headedness, loss of balance, numbness, vertigo and weakness.     Objective:     Vital Signs (Most Recent):  Temp: 98 °F (36.7 °C) (10/16/19 0701)  Pulse: 69 (10/16/19  1000)  Resp: 20 (10/16/19 1000)  BP: (!) 138/43 (10/16/19 0800)  SpO2: 99 % (10/16/19 0800) Vital Signs (24h Range):  Temp:  [98 °F (36.7 °C)-99.1 °F (37.3 °C)] 98 °F (36.7 °C)  Pulse:  [58-72] 69  Resp:  [13-26] 20  SpO2:  [95 %-100 %] 99 %  BP: (102-166)/(37-84) 138/43     Weight: 75.6 kg (166 lb 10.7 oz)  Body mass index is 23.91 kg/m².     SpO2: 99 %  O2 Device (Oxygen Therapy): nasal cannula      Intake/Output Summary (Last 24 hours) at 10/16/2019 1051  Last data filed at 10/16/2019 0600  Gross per 24 hour   Intake 3055.5 ml   Output 875 ml   Net 2180.5 ml       Lines/Drains/Airways     Peripheral Intravenous Line                 Peripheral IV - Single Lumen 10/11/19 1127 20 G Left Forearm 4 days         Peripheral IV - Single Lumen 10/15/19 1100 18 G Right Antecubital less than 1 day                Physical Exam   Constitutional: He is oriented to person, place, and time. He appears well-developed and well-nourished.   Neck: Neck supple. No JVD present.   Cardiovascular: Normal rate, regular rhythm, normal heart sounds and normal pulses. Exam reveals no friction rub.   No murmur heard.  Pulmonary/Chest: Effort normal and breath sounds normal. No respiratory distress. He has no wheezes. He has no rales.   Abdominal: Soft. Bowel sounds are normal. He exhibits no distension.   Musculoskeletal: He exhibits no edema or tenderness.   Neurological: He is alert and oriented to person, place, and time.   Skin: Skin is warm and dry. No rash noted.   Left radial access site without redness, tenderness, swelling, or drainage. There is no active external bleeding or hematoma.    Psychiatric: He has a normal mood and affect. His behavior is normal.   Nursing note and vitals reviewed.      Significant Labs:   All pertinent lab results from the last 24 hours have been reviewed. and   Recent Lab Results       10/16/19  0524   10/16/19  0352   10/15/19  2317   10/15/19  2022   10/15/19  1829        Unit Blood Type Code                Unit Expiration               Unit Blood Type               Anion Gap   7           BUN, Bld   30           Calcium   7.2           Chloride   112           CO2   21           CODING SYSTEM               Creatinine   0.9           DISPENSE STATUS               eGFR if    >60           eGFR if non    >60  Comment:  Calculation used to obtain the estimated glomerular filtration  rate (eGFR) is the CKD-EPI equation.              Glucose   123           Group & Rh               Hematocrit   25.9   26.5       Hemoglobin   8.4   8.8       INDIRECT MICK               Coumadin Monitoring INR   1.1  Comment:  Coumadin Therapy:  2.0 - 3.0 for INR for all indicators except mechanical heart valves  and antiphospholipid syndromes which should use 2.5 - 3.5.             MPV   11.1           Platelets   113           POCT Glucose 149   167   168     Potassium   4.0           Product Code               Protime   11.6           Sodium   140           UNIT NUMBER                                10/15/19  1200   10/15/19  1149   10/15/19  1138        Unit Blood Type Code     5100     Unit Expiration     [id number]     Unit Blood Type     O POS     Anion Gap           BUN, Bld           Calcium           Chloride           CO2           CODING SYSTEM     [id number]     Creatinine           DISPENSE STATUS     TRANSFUSED     eGFR if            eGFR if non African American           Glucose           Group & Rh     O POS     Hematocrit   26.5       Hemoglobin   8.6       INDIRECT MICK     NEG     Coumadin Monitoring INR           MPV           Platelets           POCT Glucose 151         Potassium           Product Code     X5314H53     Protime           Sodium           UNIT NUMBER     [id number]           Significant Imaging: Echocardiogram:   2D echo with color flow doppler:   Results for orders placed or performed during the hospital encounter of 10/11/19   2D echo with color  flow doppler   Result Value Ref Range    QEF 55 55 - 65    Mitral Valve Regurgitation MILD     Aortic Valve Regurgitation MILD     Est. PA Systolic Pressure 33.25     Narrative    Date of Procedure: 10/11/2019        TEST DESCRIPTION   Technical Quality: This is a technically challenging study. There is poor endocardial definition.     Aorta: The aortic root is normal in size, measuring 3.3 cm at sinotubular junction and 3.5 cm at Sinuses of Valsalva. The proximal ascending aorta is normal in size, measuring 3.0 cm across.     Left Atrium: The left atrial volume index is moderately enlarged, measuring 45.51 cc/m2.     Left Ventricle: The left ventricle is normal in size, with an end-diastolic diameter of 4.2 cm, and an end-systolic diameter of 2.6 cm. Septal wall thickness is mildly increased, with the septum measuring 1.4 cm and the posterior wall measuring 1.1 cm   across. Relative wall thickness was increased at 0.52, and the LV mass index was increased at 116.3 g/m2 consistent with concentric left ventricular hypertrophy. The following segments were mildly hypokinetic: mid inferolateral wall.  Left ventricular systolic function appears normal. Visually estimated ejection fraction is 55-60%. The LV Doppler derived stroke volume equals 78.0 ccs.     Diastolic indices: E wave velocity 0.9 m/s, E/A ratio 1.2,  msec., E/e' ratio(avg) 13. Diastolic function is indeterminate.     Right Atrium: The right atrium is normal in size, measuring 5.2 cm in length and 2.7 cm in width in the apical view.     Right Ventricle: The right ventricle is normal in size. Global right ventricular systolic function appears normal. Tricuspid annular plane systolic excursion (TAPSE) is 2.0 cm. The estimated PA systolic pressure is 33 mmHg.     Aortic Valve:  The aortic valve is mildly sclerotic with normal leaflet mobility. The mean gradient obtained across the aortic valve is 5 mmHg. Additionally, there is mild aortic regurgitation.  There is a pressure half time of 747.0 msec.     Mitral Valve:  The mitral valve is normal in structure. The pressure half time is 54 msec. The calculated mitral valve area is 4.07 cm2. There is mild mitral regurgitation.     Tricuspid Valve:  The tricuspid valve is normal in structure.     Pulmonary Valve:  The pulmonic valve is not well seen.     IVC: IVC is normal in size and collapses > 50% with a sniff, suggesting normal right atrial pressure of 3 mmHg.     Intracavitary: There is no evidence of pericardial effusion, intracavity mass, thrombi, or vegetation.         CONCLUSIONS     1 - Normal left ventricular systolic function (EF 55-60%).     2 - Indeterminate LV diastolic function.     3 - Normal right ventricular systolic function .     4 - Mild aortic regurgitation.     5 - Moderate left atrial enlargement.     6 - Wall motion abnormalities.     7 - Concentric hypertrophy.     8 - The estimated PA systolic pressure is 33 mmHg.             This document has been electronically    SIGNED BY: Robe Gilbert MD On: 10/11/2019 16:04     Assessment and Plan:       * NSTEMI (non-ST elevated myocardial infarction)  See plan for CAD    Upper GI bleed s/t angiectasia  GI on board and planning for repeat EGD    10/16/19  -Resolved     Ischemic cardiomyopathy  OPTIMIZE MEDICAL TX FOR ICM.  LIMITED ON MEDICAL TX AS HE WILL NOT BE ABLE TO TOLERATE BETA-BLOCKERS DUE TO SINUS BRADYCARDIA ISSUES.    CAD, multiple vessel  OPTIMIZE MEDICAL TX FOR CAD.    10/14/19  -Patient is s/p PCI of LCX with KYLIE x1 and severe OM2 KYLIE x2 overlapped into mid LCX old stent. OM on Friday and Prox-mid RCA with KYLIE x2  on yesterday. Unsuccessful PCI of Distal RCA occluded and PDA occluded stent  -ASA and Plavix on hold for today secondary to acute GIB  -Plans in place for GI to scope patient today   -Plan to restart ASA and Plavix tomorrow given his recent MI and multiple KYLIE   -Continue Statin, Ranexa, Imdur and ARB  -No BB due to bradycardia       10/15/19  -Continue to hold ASA and Plavix today for active bleeding.   -GI planning to repeat EGD today   -Continue Statin, Ranexa, Imdur and ARB  -BB on hold for bradycardia during admit     10/16/19  -ASA and Plavix both resumed this morning given no evidence of active bleeding on his EGD and recent KYLIE placement   -Recommend that patient stay for observation since resume DAPT given his recent GI bleed  -Continue Statin, Ranexa, Imdur and ARB  -HR improved today, but will still hold BB for now     Sinus bradycardia  STABLE.  AT RISK FOR PPM IN FUTURE.    Tobacco abuse  TOBACCO CESSATION ADVISED.    Essential hypertension  Continue losartan and Imdur   Will see how HR responds to low dose BB        VTE Risk Mitigation (From admission, onward)         Ordered     IP VTE HIGH RISK PATIENT  Once      10/11/19 1236     Place sequential compression device  Until discontinued      10/11/19 1236              Chart reviewed. Patient examined by Dr. Waldrop and agrees with plan that has been outlined.     Iris Selby, LAYOP  Cardiology  [facility]

## 2019-10-16 NOTE — ASSESSMENT & PLAN NOTE
10/16 - small angiectasia x 2 on repeat EGD treated with epi  H/h stable  No evidence of continued bleeding  OK to resume ASA, plavix if necessary; continue BID PPI dosing  Tolerating diet  Will transfer out of ICU

## 2019-10-16 NOTE — SUBJECTIVE & OBJECTIVE
Review of Systems   Constitutional: Positive for malaise/fatigue.   HENT: Negative for congestion and sinus pain.    Respiratory: Negative for shortness of breath.    Cardiovascular: Negative for chest pain and leg swelling.   Gastrointestinal: Positive for blood in stool.   Genitourinary: Negative.    Musculoskeletal: Negative.    Neurological: Negative for focal weakness.   Psychiatric/Behavioral: The patient is not nervous/anxious.          Objective:     Vital Signs (Most Recent):  Temp: 98.3 °F (36.8 °C) (10/16/19 0300)  Pulse: 61 (10/16/19 0724)  Resp: 20 (10/16/19 0724)  BP: (!) 160/69 (10/16/19 0600)  SpO2: 99 % (10/16/19 0724) Vital Signs (24h Range):  Temp:  [98.1 °F (36.7 °C)-99.1 °F (37.3 °C)] 98.3 °F (36.8 °C)  Pulse:  [58-72] 61  Resp:  [13-26] 20  SpO2:  [95 %-100 %] 99 %  BP: (102-166)/(37-84) 160/69     Weight: 75.6 kg (166 lb 10.7 oz)  Body mass index is 23.91 kg/m².      Intake/Output Summary (Last 24 hours) at 10/16/2019 0833  Last data filed at 10/16/2019 0600  Gross per 24 hour   Intake 3055.5 ml   Output 1055 ml   Net 2000.5 ml       Physical Exam   Constitutional: He is sleeping. He is easily aroused. He appears ill.   HENT:   Head: Atraumatic.   Eyes: Pupils are equal, round, and reactive to light. Conjunctivae are normal. No scleral icterus.   Neck: No JVD present. No tracheal deviation present.   Cardiovascular: Normal rate and regular rhythm.   Pulses:       Radial pulses are 2+ on the right side, and 2+ on the left side.        Dorsalis pedis pulses are 2+ on the right side, and 2+ on the left side.   Pulmonary/Chest: Effort normal and breath sounds normal. He has no wheezes. He has no rhonchi. He has no rales.   Abdominal: Soft. Bowel sounds are normal. He exhibits no distension. There is no tenderness.   Musculoskeletal: He exhibits no edema.   Neurological: He is easily aroused. GCS eye subscore is 3. GCS verbal subscore is 5. GCS motor subscore is 6.   Skin: Skin is warm and dry.  Capillary refill takes 2 to 3 seconds. Bruising (scattered upper extremity) noted. No cyanosis.        Psychiatric: He has a normal mood and affect. His behavior is normal. Judgment and thought content normal.       Vents:  Oxygen Concentration (%): 32 (10/16/19 0724)    Lines/Drains/Airways     Peripheral Intravenous Line                 Peripheral IV - Single Lumen 10/11/19 1127 20 G Left Forearm 4 days         Peripheral IV - Single Lumen 10/15/19 1100 18 G Right Antecubital less than 1 day                Significant Labs:    CBC/Anemia Profile:  Recent Labs   Lab 10/14/19  1341  10/15/19  0344 10/15/19  1149 10/15/19  2022 10/16/19  0352   WBC 9.70  --   --   --   --   --    HGB 9.3*   < > 8.2* 8.6* 8.8* 8.4*   HCT 28.2*   < > 24.1* 26.5* 26.5* 25.9*     --  109*  --   --  113*   MCV 92  --   --   --   --   --    RDW 14.2  --   --   --   --   --     < > = values in this interval not displayed.        Chemistries:  Recent Labs   Lab 10/15/19  0344 10/16/19  0352    140   K 4.1 4.0    112*   CO2 22* 21*   BUN 52* 30*   CREATININE 1.0 0.9   CALCIUM 7.1* 7.2*       All pertinent labs within the past 24 hours have been reviewed.    Significant Imaging:  I have reviewed all pertinent imaging results/findings within the past 24 hours.

## 2019-10-16 NOTE — PROGRESS NOTES
"Received a call from [facility], lab.  Notified that last letter of last name appears to be an "r" on blood band instead of a "z".  [facility]iota states she will have to write an SOS on this and new type and screen will have to be drawn.  Noted.  JADA Otero NP notified.   "

## 2019-10-16 NOTE — ASSESSMENT & PLAN NOTE
Medical optimization post LHC with stenting  Continue ismo, ranexa, statin  Resume ASA, plavix today

## 2019-10-16 NOTE — ASSESSMENT & PLAN NOTE
Hypotension overnight responded appropriately to IVF and transfusion  10/15 - continue ICU hemodynamic monitoring until bleeding source controlled and hemodynamically stable  1016 - remains hemodynamically stable

## 2019-10-16 NOTE — ASSESSMENT & PLAN NOTE
Baseline hemoglobin 13-14; was 12 on morning of 10/13  Was holding post transfusion this am at 9.8 but with recurrent hematemesis and now hgb 9.3, will transfuse additional PRBC now  Serial h/h monitoring  10/15 - additional unit PRBC and continue monitoring h/h; await repeat EGD with potential treatment  10/16 - stable this am, decrease monitoring frequency

## 2019-10-16 NOTE — ASSESSMENT & PLAN NOTE
OPTIMIZE MEDICAL TX FOR CAD.    10/14/19  -Patient is s/p PCI of LCX with KYLIE x1 and severe OM2 KYLIE x2 overlapped into mid LCX old stent. OM on Friday and Prox-mid RCA with KYLIE x2  on yesterday. Unsuccessful PCI of Distal RCA occluded and PDA occluded stent  -ASA and Plavix on hold for today secondary to acute GIB  -Plans in place for GI to scope patient today   -Plan to restart ASA and Plavix tomorrow given his recent MI and multiple KYLIE   -Continue Statin, Ranexa, Imdur and ARB  -No BB due to bradycardia      10/15/19  -Continue to hold ASA and Plavix today for active bleeding.   -GI planning to repeat EGD today   -Continue Statin, Ranexa, Imdur and ARB  -BB on hold for bradycardia during admit     10/16/19  -ASA and Plavix both resumed this morning given no evidence of active bleeding on his EGD and recent KYLIE placement   -Recommend that patient stay for observation since resume DAPT given his recent GI bleed  -Continue Statin, Ranexa, Imdur and ARB  -HR improved today, will see if he can tolerate restarting BB

## 2019-10-16 NOTE — ASSESSMENT & PLAN NOTE
Now post [facility] x 2 this admission with stents x 3 to LCX, OM2 and then x 2 to occluded RCA  Continue statin, ranexa, imdur as tolerated  Cardiology following  Resume ASA, plavix today  Telemetry cardiac monitoring

## 2019-10-16 NOTE — SUBJECTIVE & OBJECTIVE
Interval History: Tolerated procedure well. Denies any further vomiting or dark black stools. States he is tolerating his diet.     Review of Systems   Constitutional: Negative for fatigue and fever.   Respiratory: Negative for shortness of breath.    Cardiovascular: Negative for chest pain.   Gastrointestinal: Negative for abdominal pain, anal bleeding, blood in stool, nausea and vomiting.   Skin: Negative for rash.   Neurological: Negative for headaches.     Objective:     Vital Signs (Most Recent):  Temp: 98 °F (36.7 °C) (10/16/19 0701)  Pulse: 69 (10/16/19 1000)  Resp: 20 (10/16/19 1000)  BP: (!) 138/43 (10/16/19 0800)  SpO2: 99 % (10/16/19 0800) Vital Signs (24h Range):  Temp:  [98 °F (36.7 °C)-99.1 °F (37.3 °C)] 98 °F (36.7 °C)  Pulse:  [58-72] 69  Resp:  [13-26] 20  SpO2:  [95 %-100 %] 99 %  BP: (102-166)/(37-84) 138/43     Weight: 75.6 kg (166 lb 10.7 oz)  Body mass index is 23.91 kg/m².    Intake/Output Summary (Last 24 hours) at 10/16/2019 1028  Last data filed at 10/16/2019 0600  Gross per 24 hour   Intake 3055.5 ml   Output 875 ml   Net 2180.5 ml      Physical Exam   Constitutional: He is oriented to person, place, and time. He appears well-developed and well-nourished. No distress.   Cardiovascular: Normal rate, regular rhythm and normal heart sounds. Exam reveals no gallop and no friction rub.   No murmur heard.  Pulmonary/Chest: Effort normal and breath sounds normal. No stridor. No respiratory distress. He has no wheezes. He has no rales.   Abdominal: Soft. Bowel sounds are normal. He exhibits no distension and no mass. There is no tenderness. There is no guarding.   Musculoskeletal: He exhibits no edema.   Neurological: He is alert and oriented to person, place, and time.   Skin: He is not diaphoretic.       Significant Labs:   BMP:   Recent Labs   Lab 10/16/19  0352   *      K 4.0   *   CO2 21*   BUN 30*   CREATININE 0.9   CALCIUM 7.2*     CBC:   Recent Labs   Lab 10/14/19  1341   10/15/19  0344 10/15/19  1149 10/15/19  2022 10/16/19  0352   WBC 9.70  --   --   --   --   --    HGB 9.3*   < > 8.2* 8.6* 8.8* 8.4*   HCT 28.2*   < > 24.1* 26.5* 26.5* 25.9*     --  109*  --   --  113*    < > = values in this interval not displayed.       Significant Imaging: I have reviewed all pertinent imaging results/findings within the past 24 hours.

## 2019-10-16 NOTE — PLAN OF CARE
Pt received one unit of blood today per orders.  PT VSS today.  Pt tolerated EGD this afternoon with no issues.  See Dr. Mcneal's note for findings and interventions.  Pt has active BS this afternoon, so diet advanced as ordered.  Pt tolerated dinner with no nausea or other issues.  Pt has not had another BM since this am.  Pt continues to pas gas.  Pt and his son updated on POC.  MDs to discuss findings with family tomorrow.

## 2019-10-16 NOTE — ASSESSMENT & PLAN NOTE
10/14:  H/H stable post 4 units pRBC transfused  GI on case, EGD today  protonix 40mg bid   Will await EGD results  Continue to monitor H/H     10/15:  Continue to monitor h/h  protonix bid   Discussed case with GI, plan for repeat EGD this afternoon     10/16:  Repeat EGD showed angiodysplasia which was corrected by GI  No further vomiting or bleeding reported   H/h stable  Ok to resume asa and plavix when discussed with GI  Step down from ICU

## 2019-10-16 NOTE — PROGRESS NOTES
"Received call from [facility] lab tech who states that this new blood band has an "s" as last initial instead of a "z".  [facility] states she will notify phlebotomist who sasha sample and placed band and will send another phlebotomist to draw another type and screen and place new band.  JADA Otero NP notified.  "

## 2019-10-16 NOTE — ASSESSMENT & PLAN NOTE
- Admit to Tele  - Initial troponin 0.466, will trend  - Given  mg in the ER  - Continue home ASA, Plavix, ACEi, Zocor, and Imdur  - ECHO pending  - Cardiology consulted and recommended a Heparin gtt with plans for a heart cath today with additional recs to follow  - NPO  - IVFs  - Supplemental o2 and SL NTG prn  - Tele monitoring     10/12:  S/p PCI with KYLIE x3 of the LCx and OM  Cards planning on performing PCI of RCA in the am  NPO after midnight     10/13:  PCI of RCA pending today  Cards recs to follow  Resume diet postop     10/14:  Attempted PCI of RCA was unsuccessful   Asa and plavix on hold due to GI bleed  Plan to resume meds post EGD  If HR is improved can resume BB as well     10/15:  Continue to hold asa and plavix  Will discuss with cards on resuming bb     10/16:  Resume asa and plavix   Awaiting cards recs on plan for possible discharge   Will resume bb, continue to monitor for bradycardia   Will need further monitoring of h/h  Possible dc tomorrow if cleared by cards and gi

## 2019-10-16 NOTE — PROGRESS NOTES
Care assumed. Chart reviewed. Skin assessment done -pt ambulated in unit. Denies pain. Safety maintained. Son at bedside update given. No active bleeding. VSS.

## 2019-10-16 NOTE — PLAN OF CARE
POC and interventions discussed with patient and wife - NEDA.  AAO x 4.  VItals stable.  EGD results discussed with patient and wife per MD Mcneal - NEDA.  Only a few very small BM's with black tarry stool noted thus far this shift.  Hemodynamically stable. Denies Abd. Discomfort. Heart with RRR.  Oxygenating well on NC.  Denies Pain.  Voiding per urinal.  H/H stable - see Lab results

## 2019-10-16 NOTE — PROGRESS NOTES
[facility] -   Critical Care Medicine  Progress Note    Patient Name: Aquiles Kelsey  MRN: [id number]  Admission Date: 10/11/2019  Hospital Length of Stay: 5 days  Code Status: Full Code  Attending Provider: Dl Cerrato MD  Primary Care Provider: Saige Miranda MD   Principal Problem: NSTEMI (non-ST elevated myocardial infarction)    Subjective:     HPI:  79 year old male with PMH including HTN, DM (on metformin), HLD, PAD, CAD with stent (on plavix and ASA), kidney stone, and AAA per record  Presented to ED on 10/11 per cardiology instructions; he reported episodes of CP at cards office earlier in week and was called with advise to go to ED due to elevated troponin 0.835   In ED he reported mild chest pain intermittent since office visit but none at that time, repeat troponin 0.46 along with platelets 147, glucose 127  He was taken for [facility] early that evening and had stents x 3 placed to LCX, OM2; also noted severe RCA disease with occluded stent and EF 45%  He was admitted to telemetry   On 10/13 he returned to [facility] and underwent PCI to RCA with stent x 2    Hospital/ICU Course:  About 1am on 10/14 pt got up to restroom and had hypotension, near syncope with report of hematemesis and bloody stool; he was emergently transferred to ICU and received 4u PRBC, 1 Plt, and one dose of TXA  hgb up this morning from 8.6 to 9.8 with no overt bleeding from about 3am until this afternoon when he is again having multiple black liquid stools and one episode of ~100ml bloody emesis  10/15 - EGD last evening with evidence of bleeding from lesion at greater curvature of stomach, unable to treat; continued melanous stools, additional unit of PRBC transfused today; remains NPO with ongoing emycin for motility stimulation and anticipate repeat endoscopy today  10/16 - repeat EGD last evening revealed 2 areas of angioectasia (both non bleeding 4mm and 5mm) both injected with epinephrine; tolerated po intake  overnight with h/h stable    Review of Systems   Constitutional: Positive for malaise/fatigue.   HENT: Negative for congestion and sinus pain.    Respiratory: Negative for shortness of breath.    Cardiovascular: Negative for chest pain and leg swelling.   Gastrointestinal: Positive for blood in stool.   Genitourinary: Negative.    Musculoskeletal: Negative.    Neurological: Negative for focal weakness.   Psychiatric/Behavioral: The patient is not nervous/anxious.          Objective:     Vital Signs (Most Recent):  Temp: 98.3 °F (36.8 °C) (10/16/19 0300)  Pulse: 61 (10/16/19 0724)  Resp: 20 (10/16/19 0724)  BP: (!) 160/69 (10/16/19 0600)  SpO2: 99 % (10/16/19 0724) Vital Signs (24h Range):  Temp:  [98.1 °F (36.7 °C)-99.1 °F (37.3 °C)] 98.3 °F (36.8 °C)  Pulse:  [58-72] 61  Resp:  [13-26] 20  SpO2:  [95 %-100 %] 99 %  BP: (102-166)/(37-84) 160/69     Weight: 75.6 kg (166 lb 10.7 oz)  Body mass index is 23.91 kg/m².      Intake/Output Summary (Last 24 hours) at 10/16/2019 0833  Last data filed at 10/16/2019 0600  Gross per 24 hour   Intake 3055.5 ml   Output 1055 ml   Net 2000.5 ml       Physical Exam   Constitutional: He is sleeping. He is easily aroused. He appears ill.   HENT:   Head: Atraumatic.   Eyes: Pupils are equal, round, and reactive to light. Conjunctivae are normal. No scleral icterus.   Neck: No JVD present. No tracheal deviation present.   Cardiovascular: Normal rate and regular rhythm.   Pulses:       Radial pulses are 2+ on the right side, and 2+ on the left side.        Dorsalis pedis pulses are 2+ on the right side, and 2+ on the left side.   Pulmonary/Chest: Effort normal and breath sounds normal. He has no wheezes. He has no rhonchi. He has no rales.   Abdominal: Soft. Bowel sounds are normal. He exhibits no distension. There is no tenderness.   Musculoskeletal: He exhibits no edema.   Neurological: He is easily aroused. GCS eye subscore is 3. GCS verbal subscore is 5. GCS motor subscore is 6.    Skin: Skin is warm and dry. Capillary refill takes 2 to 3 seconds. Bruising (scattered upper extremity) noted. No cyanosis.        Psychiatric: He has a normal mood and affect. His behavior is normal. Judgment and thought content normal.       Vents:  Oxygen Concentration (%): 32 (10/16/19 0724)    Lines/Drains/Airways     Peripheral Intravenous Line                 Peripheral IV - Single Lumen 10/11/19 1127 20 G Left Forearm 4 days         Peripheral IV - Single Lumen 10/15/19 1100 18 G Right Antecubital less than 1 day                Significant Labs:    CBC/Anemia Profile:  Recent Labs   Lab 10/14/19  1341  10/15/19  0344 10/15/19  1149 10/15/19  2022 10/16/19  0352   WBC 9.70  --   --   --   --   --    HGB 9.3*   < > 8.2* 8.6* 8.8* 8.4*   HCT 28.2*   < > 24.1* 26.5* 26.5* 25.9*     --  109*  --   --  113*   MCV 92  --   --   --   --   --    RDW 14.2  --   --   --   --   --     < > = values in this interval not displayed.        Chemistries:  Recent Labs   Lab 10/15/19  0344 10/16/19  0352    140   K 4.1 4.0    112*   CO2 22* 21*   BUN 52* 30*   CREATININE 1.0 0.9   CALCIUM 7.1* 7.2*       All pertinent labs within the past 24 hours have been reviewed.    Significant Imaging:  I have reviewed all pertinent imaging results/findings within the past 24 hours.      ABG  No results for input(s): PH, PO2, PCO2, HCO3, BE in the last 168 hours.  Assessment/Plan:     Cardiac/Vascular  * NSTEMI (non-ST elevated myocardial infarction)  Medical optimization post [facility] with stenting  Continue ismo, ranexa, statin  Resume ASA, plavix today    CAD, multiple vessel  Now post [facility] x 2 this admission with stents x 3 to LCX, OM2 and then x 2 to occluded RCA  Continue statin, ranexa, imdur as tolerated  Cardiology following  Resume ASA, plavix today  Telemetry cardiac monitoring    Oncology  Acute blood loss anemia  Baseline hemoglobin 13-14; was 12 on morning of 10/13  Was holding post transfusion this am at 9.8  but with recurrent hematemesis and now hgb 9.3, will transfuse additional PRBC now  Serial h/h monitoring  10/15 - additional unit PRBC and continue monitoring h/h; await repeat EGD with potential treatment  10/16 - stable this am, decrease monitoring frequency    Endocrine  Type 2 diabetes mellitus with hyperglycemia, without long-term current use of insulin  24 hour Glucose trend up 123-168   Continue monitoring with SSI prn, goal glucose range 100-180 mg/dl; avoid hypoglycemia    GI  Upper GI bleed s/t angiectasia  10/16 - small angiectasia x 2 on repeat EGD treated with epi  H/h stable  No evidence of continued bleeding  OK to resume ASA, plavix if necessary; continue BID PPI dosing  Tolerating diet  Will transfer out of ICU    Other  Hypovolemic shock  Hypotension overnight responded appropriately to IVF and transfusion  10/15 - continue ICU hemodynamic monitoring until bleeding source controlled and hemodynamically stable  1016 - remains hemodynamically stable       Critical Care Daily Checklist:    A: Awake: RASS Goal/Actual Goal: RASS Goal: 0-->alert and calm  Actual: Pereira Agitation Sedation Scale (RASS): Alert and calm   B: Spontaneous Breathing Trial Performed?     C: SAT & SBT Coordinated?  n/a                      D: Delirium: CAM-ICU Overall CAM-ICU: Negative   E: Early Mobility Performed? ROM, OOB today   F: Feeding Goal:    Status:     Current Diet Order   Procedures    Diet diabetic [facility]; 2000 Calorie; Cardiac (Low Na/Chol)     Order Specific Question:   Indicate patient location for additional diet options:     Answer:   [facility]     Order Specific Question:   Total calories:     Answer:   2000 Calorie     Order Specific Question:   Additional Diet Options:     Answer:   Cardiac (Low Na/Chol)      AS: Analgesia/Sedation prn   T: Thromboembolic Prophylaxis SCD   H: HOB > 300 Yes   U: Stress Ulcer Prophylaxis (if needed) PPI BID   G: Glucose Control SSI prn   B: Bowel Function  Stool Occurrence: 1   I: Indwelling Catheter (Lines & Malik) Necessity reviewed   D: De-escalation of Antimicrobials/Pharmacotherapies reviewed    Plan for the day/ETD Transfer out of ICU    Code Status:  Family/Goals of Care: Full Code  Home on discharge   I have discussed case and plan of care in detail with Dr Zaragoza and Dr Cerrato; Status and plan of care were discussed with team on multidisciplinary rounds.  We will sign off with transfer out of ICU, please call if we can be of any additional assistance.    DESTINY Sheridan-BC  Critical Care Medicine  [facility]

## 2019-10-17 VITALS
HEART RATE: 63 BPM | SYSTOLIC BLOOD PRESSURE: 150 MMHG | HEIGHT: 70 IN | BODY MASS INDEX: 24.34 KG/M2 | TEMPERATURE: 98 F | OXYGEN SATURATION: 97 % | DIASTOLIC BLOOD PRESSURE: 85 MMHG | RESPIRATION RATE: 18 BRPM | WEIGHT: 170 LBS

## 2019-10-17 PROBLEM — R07.9 CHEST PAIN: Status: RESOLVED | Noted: 2019-10-14 | Resolved: 2019-10-17

## 2019-10-17 PROBLEM — K92.0 HEMATEMESIS: Status: RESOLVED | Noted: 2019-10-14 | Resolved: 2019-10-17

## 2019-10-17 LAB
ANION GAP SERPL CALC-SCNC: 7 MMOL/L (ref 8–16)
BASOPHILS # BLD AUTO: 0.01 K/UL (ref 0–0.2)
BASOPHILS NFR BLD: 0.1 % (ref 0–1.9)
BUN SERPL-MCNC: 21 MG/DL (ref 8–23)
CALCIUM SERPL-MCNC: 7.4 MG/DL (ref 8.7–10.5)
CHLORIDE SERPL-SCNC: 112 MMOL/L (ref 95–110)
CO2 SERPL-SCNC: 21 MMOL/L (ref 23–29)
CREAT SERPL-MCNC: 0.9 MG/DL (ref 0.5–1.4)
DIFFERENTIAL METHOD: ABNORMAL
EOSINOPHIL # BLD AUTO: 0 K/UL (ref 0–0.5)
EOSINOPHIL NFR BLD: 0.4 % (ref 0–8)
ERYTHROCYTE [DISTWIDTH] IN BLOOD BY AUTOMATED COUNT: 14.1 % (ref 11.5–14.5)
EST. GFR  (AFRICAN AMERICAN): >60 ML/MIN/1.73 M^2
EST. GFR  (NON AFRICAN AMERICAN): >60 ML/MIN/1.73 M^2
GLUCOSE SERPL-MCNC: 147 MG/DL (ref 70–110)
HCT VFR BLD AUTO: 25.6 % (ref 40–54)
HCT VFR BLD AUTO: 25.6 % (ref 40–54)
HGB BLD-MCNC: 8.7 G/DL (ref 14–18)
HGB BLD-MCNC: 8.7 G/DL (ref 14–18)
IMM GRANULOCYTES # BLD AUTO: 0.05 K/UL (ref 0–0.04)
IMM GRANULOCYTES NFR BLD AUTO: 0.7 % (ref 0–0.5)
INR PPP: 1.1 (ref 0.8–1.2)
LYMPHOCYTES # BLD AUTO: 0.9 K/UL (ref 1–4.8)
LYMPHOCYTES NFR BLD: 11.5 % (ref 18–48)
MCH RBC QN AUTO: 31.4 PG (ref 27–31)
MCHC RBC AUTO-ENTMCNC: 34 G/DL (ref 32–36)
MCV RBC AUTO: 92 FL (ref 82–98)
MONOCYTES # BLD AUTO: 0.6 K/UL (ref 0.3–1)
MONOCYTES NFR BLD: 7.5 % (ref 4–15)
NEUTROPHILS # BLD AUTO: 6 K/UL (ref 1.8–7.7)
NEUTROPHILS NFR BLD: 79.8 % (ref 38–73)
NRBC BLD-RTO: 0 /100 WBC
PLATELET # BLD AUTO: 113 K/UL (ref 150–350)
PMV BLD AUTO: 11.7 FL (ref 9.2–12.9)
POTASSIUM SERPL-SCNC: 4.4 MMOL/L (ref 3.5–5.1)
PROTHROMBIN TIME: 11.5 SEC (ref 9–12.5)
RBC # BLD AUTO: 2.77 M/UL (ref 4.6–6.2)
SODIUM SERPL-SCNC: 140 MMOL/L (ref 136–145)
WBC # BLD AUTO: 7.57 K/UL (ref 3.9–12.7)

## 2019-10-17 PROCEDURE — 25000003 PHARM REV CODE 250: Mod: HCNC | Performed by: NURSE PRACTITIONER

## 2019-10-17 PROCEDURE — C9113 INJ PANTOPRAZOLE SODIUM, VIA: HCPCS | Mod: HCNC | Performed by: NURSE PRACTITIONER

## 2019-10-17 PROCEDURE — 99233 SBSQ HOSP IP/OBS HIGH 50: CPT | Mod: HCNC,,, | Performed by: INTERNAL MEDICINE

## 2019-10-17 PROCEDURE — 36415 COLL VENOUS BLD VENIPUNCTURE: CPT | Mod: HCNC

## 2019-10-17 PROCEDURE — 63600175 PHARM REV CODE 636 W HCPCS: Mod: HCNC | Performed by: NURSE PRACTITIONER

## 2019-10-17 PROCEDURE — 80048 BASIC METABOLIC PNL TOTAL CA: CPT | Mod: HCNC

## 2019-10-17 PROCEDURE — 85025 COMPLETE CBC W/AUTO DIFF WBC: CPT | Mod: HCNC

## 2019-10-17 PROCEDURE — 99233 PR SUBSEQUENT HOSPITAL CARE,LEVL III: ICD-10-PCS | Mod: HCNC,,, | Performed by: INTERNAL MEDICINE

## 2019-10-17 PROCEDURE — 85610 PROTHROMBIN TIME: CPT | Mod: HCNC

## 2019-10-17 RX ORDER — RANOLAZINE 500 MG/1
500 TABLET, EXTENDED RELEASE ORAL 2 TIMES DAILY
Qty: 60 TABLET | Refills: 1 | Status: SHIPPED | OUTPATIENT
Start: 2019-10-17 | End: 2022-11-04

## 2019-10-17 RX ORDER — ASPIRIN 81 MG/1
81 TABLET ORAL DAILY
Refills: 0 | COMMUNITY
Start: 2019-10-18 | End: 2023-12-08

## 2019-10-17 RX ORDER — PANTOPRAZOLE SODIUM 40 MG/1
40 TABLET, DELAYED RELEASE ORAL DAILY
Qty: 30 TABLET | Refills: 1 | Status: SHIPPED | OUTPATIENT
Start: 2019-10-17 | End: 2022-11-04

## 2019-10-17 RX ORDER — SIMVASTATIN 20 MG/1
40 TABLET, FILM COATED ORAL NIGHTLY
Qty: 90 TABLET | Refills: 3 | Status: SHIPPED | OUTPATIENT
Start: 2019-10-17 | End: 2020-09-15

## 2019-10-17 RX ORDER — LISINOPRIL 10 MG/1
20 TABLET ORAL DAILY
Qty: 90 TABLET | Refills: 3 | Status: SHIPPED | OUTPATIENT
Start: 2019-10-17 | End: 2022-11-04

## 2019-10-17 RX ADMIN — ESCITALOPRAM OXALATE 10 MG: 10 TABLET, FILM COATED ORAL at 09:10

## 2019-10-17 RX ADMIN — ISOSORBIDE MONONITRATE 30 MG: 30 TABLET, EXTENDED RELEASE ORAL at 09:10

## 2019-10-17 RX ADMIN — LISINOPRIL 20 MG: 20 TABLET ORAL at 09:10

## 2019-10-17 RX ADMIN — CLOPIDOGREL BISULFATE 75 MG: 75 TABLET ORAL at 09:10

## 2019-10-17 RX ADMIN — ASPIRIN 81 MG: 81 TABLET, COATED ORAL at 09:10

## 2019-10-17 RX ADMIN — PANTOPRAZOLE SODIUM 40 MG: 40 INJECTION, POWDER, LYOPHILIZED, FOR SOLUTION INTRAVENOUS at 09:10

## 2019-10-17 RX ADMIN — RANOLAZINE 500 MG: 500 TABLET, FILM COATED, EXTENDED RELEASE ORAL at 09:10

## 2019-10-17 NOTE — DISCHARGE SUMMARY
[facility] Hospital Medicine  Discharge Summary      Patient Name: Aquiles Kelsey  MRN: [id number]  Admission Date: 10/11/2019  Hospital Length of Stay: 6 days  Discharge Date and Time: 10/17/2019  1:05 PM  Attending Physician: No att. providers found   Discharging Provider: Dl Cerrato MD  Primary Care Provider: Saige Miranda MD      HPI:   Aquiles Kelsey is a 79 y.o. male patient with a PMHx of CAD who presented to the Emergency Department today for evaluation of abnormal lab results.  Patient reports seeing Dr. Hobbs (Cardiology) and labs resulting in elevated troponin levels.  Associated sxs include CP.  Patient states he has had CP with a pain rating of 3/10 the past three days, but has complete relief as of today.  Patient denies any SOB, N/V, fever, chills, diaphoresis, palpitations, extremity weakness, dizziness, hematochezia, cough, and all other sxs at this time.  Prior Tx includes Imdur and 325 mg Aspirin.  Pt denies recent bleeding of any kind.  No further complaints or concerns at this time.  ER workup showed:  Stable VS.  12 lead EKG showed SB with nonspecific T wave abnormality.  Troponin yesterday elevated to 0.8345, repeat today 0.466.  CXR negative.  Cardiology consulted from the ER and recommended admission with a Heparin gtt and plans for a heart cath today with additional recs to follow.  ECHO pending.  [facility] called for admission.  Patient will be admitted to [facility].  He is a Full Code.  His SDM is his wife Gayle who can be reached at [phone].       Procedure(s) (LRB):  EGD (ESOPHAGOGASTRODUODENOSCOPY) (N/A)      Hospital Course:   Patient was admitted for NSTEMI. Cards was consulted on case and LHC was performed. He had 3 KYLIE stents placed in the LCx and OM. PCI of RCA was attempted however unsuccessful. Patient developed upper GI bleeding and was sent to ICU. pRBC was transfused GI and surgery was consulted on case. EGD showed angiodysplasia  which was treated with epinephrine and laser. H/H remained stable post EGD. Asa and plavix restarted. Patient to follow up with cards outpatient for further workup of CAD.          Consults:   Consults (From admission, onward)        Status Ordering Provider     Inpatient consult to Gastroenterology  Once     Provider:  Gerri Mendiola MD    Completed EDIN SHEIKH     Inpatient consult to General Surgery  Once     Provider:  Maria De Jesus Wells MD    Completed DOUGLAS HENDRIX III     Inpatient consult to Interventional Radiology  Once     Provider:  Grey Vieira MD    Completed EDIN SHEIKH     Inpatient consult to Pulmonology  Once     Provider:  Vidal Marie MD    Completed ROXANNE SAMANO.          No new Assessment & Plan notes have been filed under this hospital service since the last note was generated.  Service: Hospital Medicine    Final Active Diagnoses:    Diagnosis Date Noted POA    PRINCIPAL PROBLEM:  NSTEMI (non-ST elevated myocardial infarction) [I21.4] 10/11/2019 Yes    Acute blood loss anemia [D62] 10/14/2019 No    Sinus bradycardia [R00.1] 10/12/2019 Yes    CAD, multiple vessel [I25.10] 10/12/2019 Yes    Ischemic cardiomyopathy [I25.5] 10/12/2019 Yes    AP (angina pectoris) [I20.9] 10/11/2019 Yes    Tobacco abuse [Z72.0] 10/11/2019 Yes    Nonrheumatic aortic valve insufficiency [I35.1] 08/20/2019 Yes    Claudication in peripheral vascular disease [I73.9] 06/13/2019 Yes    PAD (peripheral artery disease) [I73.9] 06/12/2019 Yes    Essential hypertension [I10] 06/12/2019 Yes    Type 2 diabetes mellitus with hyperglycemia, without long-term current use of insulin [E11.65] 06/12/2019 Yes    Dyslipidemia [E78.5] 06/12/2019 Yes    Depression [F32.9] 06/12/2019 Yes    CAD (coronary artery disease) [I25.10] 06/12/2019 Yes      Problems Resolved During this Admission:    Diagnosis Date Noted Date Resolved POA    Hypovolemic shock [R57.1] 10/14/2019 10/16/2019 Yes     Hematemesis [K92.0] 10/14/2019 10/17/2019 Unknown    Chest pain [R07.9] 10/14/2019 10/17/2019 Yes       Discharged Condition: good    Disposition: Home or Self Care    Follow Up:  Follow-up Information     Robe Gilbert MD In 1 week.    Specialties:  Cardiology, INTERVENTIONAL CARDIOLOGY  Contact information:  [address]  [place] [address]  [phone]             Saige Miranda MD In 2 weeks.    Specialty:  Family Medicine  Contact information:  [address] [id number] [phone]                 Patient Instructions:      Diet Cardiac     Lifting restrictions     Other restrictions (specify):   Order Comments: Light activity until follow up with cards outpatient       Significant Diagnostic Studies: Labs:   BMP:   Recent Labs   Lab 10/16/19  0352 10/17/19  0448   * 147*    140   K 4.0 4.4   * 112*   CO2 21* 21*   BUN 30* 21   CREATININE 0.9 0.9   CALCIUM 7.2* 7.4*    and CBC   Recent Labs   Lab 10/16/19  0352 10/16/19  1640 10/17/19  0448   WBC  --   --  7.57   HGB 8.4* 8.6* 8.7*  8.7*   HCT 25.9* 26.0* 25.6*  25.6*   *  --  113*       Pending Diagnostic Studies:     None         Medications:  Reconciled Home Medications:      Medication List      START taking these medications    aspirin 81 MG EC tablet  Commonly known as:  ECOTRIN  Take 1 tablet (81 mg total) by mouth once daily.  Start taking on:  October 18, 2019  Replaces:  aspirin 325 MG tablet     pantoprazole 40 MG tablet  Commonly known as:  PROTONIX  Take 1 tablet (40 mg total) by mouth once daily.     ranolazine 500 MG Tb12  Commonly known as:  RANEXA  Take 1 tablet (500 mg total) by mouth 2 (two) times daily.        CHANGE how you take these medications    lisinopril 10 MG tablet  Take 2 tablets (20 mg total) by mouth once daily.  What changed:  how much to take     simvastatin 20 MG tablet  Commonly known as:  ZOCOR  Take 2 tablets (40 mg total) by mouth every evening.  What  changed:  how much to take        CONTINUE taking these medications    clopidogrel 75 mg tablet  Commonly known as:  PLAVIX  Take 1 tablet (75 mg total) by mouth once daily.     escitalopram oxalate 10 MG tablet  Commonly known as:  LEXAPRO  Take 1 tablet (10 mg total) by mouth once daily.     isosorbide mononitrate 30 MG 24 hr tablet  Commonly known as:  IMDUR  Take 1 tablet (30 mg total) by mouth once daily.     metFORMIN 1000 MG tablet  Commonly known as:  GLUCOPHAGE  Take 1 tablet (1,000 mg total) by mouth 2 (two) times daily with meals.     nitroGLYCERIN 0.4 MG SL tablet  Commonly known as:  NITROSTAT  Place 1 tablet (0.4 mg total) under the tongue every 5 (five) minutes as needed for Chest pain.        STOP taking these medications    aspirin 325 MG tablet  Replaced by:  aspirin 81 MG EC tablet            Indwelling Lines/Drains at time of discharge:   Lines/Drains/Airways     None                 Time spent on the discharge of patient: 35 minutes  Patient was seen and examined on the date of discharge and determined to be suitable for discharge.         Dl Cerrato MD  Department of Hospital Medicine  [facility]

## 2019-10-17 NOTE — NURSING
Discharged order received and reviewed with patient and family. Patient instructed when to take each medication next dose. IV removed, tele removed. Patient walked to front lobby by staff to be transported home by family.

## 2019-10-17 NOTE — PLAN OF CARE
Patient received from ICU via wheelchair. Report received from ISIDRO Barriga. Telemetry monitoring initiated, patient is in sinus rhythm. Blood glucose checked, no SSI needed. No complaints of pain. Patient oriented to room, fall precautions, call light, hourly rounding and room service. Bed low and locked, call light in reach. Will continue to monitor.

## 2019-10-17 NOTE — SUBJECTIVE & OBJECTIVE
Review of Systems   Constitution: Negative.   HENT: Negative.    Eyes: Negative.    Cardiovascular: Negative.    Respiratory: Negative.    Endocrine: Negative.    Hematologic/Lymphatic: Negative.    Skin: Negative.    Musculoskeletal: Negative.    Gastrointestinal: Negative.    Genitourinary: Negative.    Neurological: Negative.    Psychiatric/Behavioral: Negative.    Allergic/Immunologic: Negative.          Objective:     Vital Signs (Most Recent):  Temp: 98.3 °F (36.8 °C) (10/17/19 0758)  Pulse: (!) 57 (10/17/19 0758)  Resp: 18 (10/17/19 0758)  BP: (!) 152/72 (10/17/19 0758)  SpO2: (!) 94 % (10/17/19 0758) Vital Signs (24h Range):  Temp:  [97.6 °F (36.4 °C)-98.4 °F (36.9 °C)] 98.3 °F (36.8 °C)  Pulse:  [57-73] 57  Resp:  [18-22] 18  SpO2:  [94 %-97 %] 94 %  BP: (103-160)/(39-72) 152/72     Weight: 77.1 kg (169 lb 15.6 oz)  Body mass index is 24.39 kg/m².     SpO2: (!) 94 %  O2 Device (Oxygen Therapy): room air      Intake/Output Summary (Last 24 hours) at 10/17/2019 1036  Last data filed at 10/16/2019 2300  Gross per 24 hour   Intake 1043.33 ml   Output --   Net 1043.33 ml       Lines/Drains/Airways     Peripheral Intravenous Line                 Peripheral IV - Single Lumen 10/11/19 1127 20 G Left Forearm 5 days         Peripheral IV - Single Lumen 10/15/19 1100 18 G Right Antecubital 1 day                Physical Exam   Constitutional: He is oriented to person, place, and time. He appears well-developed and well-nourished.   HENT:   Head: Normocephalic.   Neck: Normal range of motion. Neck supple. Normal carotid pulses, no hepatojugular reflux and no JVD present. Carotid bruit is not present. No thyromegaly present.   Cardiovascular: Normal rate, regular rhythm, S1 normal and S2 normal. PMI is not displaced. Exam reveals no S3, no S4, no distant heart sounds, no friction rub, no midsystolic click and no opening snap.   No murmur heard.  Pulses:       Radial pulses are 2+ on the right side, and 2+ on the left  side.   Pulmonary/Chest: Effort normal and breath sounds normal. He has no wheezes. He has no rales.   Abdominal: Soft. Bowel sounds are normal. He exhibits no distension, no abdominal bruit, no ascites and no mass. There is no tenderness.   Musculoskeletal: He exhibits no edema.   Neurological: He is alert and oriented to person, place, and time.   Skin: Skin is warm.   Psychiatric: He has a normal mood and affect. His behavior is normal.   Nursing note and vitals reviewed.      Significant Labs:   BMP:   Recent Labs   Lab 10/16/19  0352 10/17/19  0448   * 147*    140   K 4.0 4.4   * 112*   CO2 21* 21*   BUN 30* 21   CREATININE 0.9 0.9   CALCIUM 7.2* 7.4*   , CMP   Recent Labs   Lab 10/16/19  0352 10/17/19  0448    140   K 4.0 4.4   * 112*   CO2 21* 21*   * 147*   BUN 30* 21   CREATININE 0.9 0.9   CALCIUM 7.2* 7.4*   ANIONGAP 7* 7*   ESTGFRAFRICA >60 >60   EGFRNONAA >60 >60   , CBC   Recent Labs   Lab 10/16/19  0352 10/16/19  1640 10/17/19  0448   WBC  --   --  7.57   HGB 8.4* 8.6* 8.7*  8.7*   HCT 25.9* 26.0* 25.6*  25.6*   *  --  113*   , INR   Recent Labs   Lab 10/16/19  0352 10/17/19  0448   INR 1.1 1.1    and Troponin No results for input(s): TROPONINI in the last 48 hours.    Significant Imaging: Echocardiogram:   2D echo with color flow doppler:   Results for orders placed or performed during the hospital encounter of 10/11/19   2D echo with color flow doppler   Result Value Ref Range    QEF 55 55 - 65    Mitral Valve Regurgitation MILD     Aortic Valve Regurgitation MILD     Est. PA Systolic Pressure 33.25     Narrative    Date of Procedure: 10/11/2019        TEST DESCRIPTION   Technical Quality: This is a technically challenging study. There is poor endocardial definition.     Aorta: The aortic root is normal in size, measuring 3.3 cm at sinotubular junction and 3.5 cm at Sinuses of Valsalva. The proximal ascending aorta is normal in size, measuring 3.0 cm  across.     Left Atrium: The left atrial volume index is moderately enlarged, measuring 45.51 cc/m2.     Left Ventricle: The left ventricle is normal in size, with an end-diastolic diameter of 4.2 cm, and an end-systolic diameter of 2.6 cm. Septal wall thickness is mildly increased, with the septum measuring 1.4 cm and the posterior wall measuring 1.1 cm   across. Relative wall thickness was increased at 0.52, and the LV mass index was increased at 116.3 g/m2 consistent with concentric left ventricular hypertrophy. The following segments were mildly hypokinetic: mid inferolateral wall.  Left ventricular systolic function appears normal. Visually estimated ejection fraction is 55-60%. The LV Doppler derived stroke volume equals 78.0 ccs.     Diastolic indices: E wave velocity 0.9 m/s, E/A ratio 1.2,  msec., E/e' ratio(avg) 13. Diastolic function is indeterminate.     Right Atrium: The right atrium is normal in size, measuring 5.2 cm in length and 2.7 cm in width in the apical view.     Right Ventricle: The right ventricle is normal in size. Global right ventricular systolic function appears normal. Tricuspid annular plane systolic excursion (TAPSE) is 2.0 cm. The estimated PA systolic pressure is 33 mmHg.     Aortic Valve:  The aortic valve is mildly sclerotic with normal leaflet mobility. The mean gradient obtained across the aortic valve is 5 mmHg. Additionally, there is mild aortic regurgitation. There is a pressure half time of 747.0 msec.     Mitral Valve:  The mitral valve is normal in structure. The pressure half time is 54 msec. The calculated mitral valve area is 4.07 cm2. There is mild mitral regurgitation.     Tricuspid Valve:  The tricuspid valve is normal in structure.     Pulmonary Valve:  The pulmonic valve is not well seen.     IVC: IVC is normal in size and collapses > 50% with a sniff, suggesting normal right atrial pressure of 3 mmHg.     Intracavitary: There is no evidence of pericardial  effusion, intracavity mass, thrombi, or vegetation.         CONCLUSIONS     1 - Normal left ventricular systolic function (EF 55-60%).     2 - Indeterminate LV diastolic function.     3 - Normal right ventricular systolic function .     4 - Mild aortic regurgitation.     5 - Moderate left atrial enlargement.     6 - Wall motion abnormalities.     7 - Concentric hypertrophy.     8 - The estimated PA systolic pressure is 33 mmHg.             This document has been electronically    SIGNED BY: Robe Gilbert MD On: 10/11/2019 16:04

## 2019-10-17 NOTE — PROGRESS NOTES
[facility] -   Cardiology  Progress Note    Patient Name: Aquiles Kelsey  MRN: [id number]  Admission Date: 10/11/2019  Hospital Length of Stay: 6 days  Code Status: Full Code   Attending Physician: Dl Cerrato MD   Primary Care Physician: Saige Miranda MD  Expected Discharge Date:   Principal Problem:NSTEMI (non-ST elevated myocardial infarction)    Subjective:       HPI:  Pt presents with NSTEMI.  His current medical conditions include CAD (multiple PCI procedures), HTN, DM, PAD, AAA stent graft (approx 2013), hyperlipidemia, cigar use.  H/o L LE stents in [place].   First PCI 1998, total of 5 stents with last one in [place] 2013.  Echo 7/19 normal LVEF, DD,  LVH, mild AI.   Holter 7/19 NSR, fleeting NSVT, EAT, rare PVCs, occasional PACs.  B LE vasc studies 7/19 B LE PAD, L > R LE.     Now in ER with several episodes of typical angina over past week and abnl troponin 0.8 range consistent w NSTEMI.  ECG earlier in week 10/9/19 showed new inferolateral st-t wave ischemic changes.  ECG today in ER shows improvement, nonspecific st-t abnl.  Cp free last couple of days.    Pt saw Dr. Hobbs, Cardiology, this week and was put on Imdur and had troponin checked and when found to be abnl pt was advised to go to ER.  He has chronic B LE claudication sxs.    Hospital Course:   10/12/19:  PT SEEN AND EXAMINED EARLIER THIS AM.  HAD GOOD NIGHT OVERALL. DENIES RECURRENT CP.  NO CHF SXS.  LABS AND ECG REVIEWED AND ARE STABLE.    10/13/19:  PT SEEN AND EXAMINED.  NO CHEST PAIN OR CHF SXS.  STABLE SINUS BRADYCARDIA.  TROPONIN PEAKED.  LABS STABLE.    10/14/19- Patient is s/p PCI of LCX with KYLIE x1 and severe OM2 KYLIE x2 overlapped into mid LCX old stent. OM on Friday and Prox-mid RCA with KYILE x2  on yesterday. Unsuccessful PCI of Distal RCA occluded and PDA occluded stent. Patient developed acute GIB late last night/early morning. Had hematemesis and black BM. Became hypotensive and diaphoretic. Transfused to ICU and  transfused 4 units of blood and 1 unit of platelets. ASA and Plavix on hold today. GI has been consulted and plan to scope patient today. Vitals stable and patient denies any angina or equivalent.     10/15/19- patient is post EGD on yesterday. Noted to have clotted blood in the gastric fundus and red blood in the gastric body and in the gastric antrum.  ASA and Plavix held this morning and plans in place to repeat EGD today. H/H 8.2/24.1. Transfused additional unit of blood yesterday. Patient has no angina or equivalent this morning. IVF stopped.     10/16/19- patient is post repeat EGD on yesterday. Study showed no active bleeding. ASA and Plavix resumed this morning. Patient has no angina or equivalent. No hematemesis or melena.    10/17/19:  PT SEEN AND EXAMINED.  HE FEELS GOOD.  DENIES CHEST PAIN SXS.  NO CHF SXS. NO RECURRENT HEMATEMESIS.  AMBULATED.  LABS/ANEMIA STABLE.         Review of Systems   Constitution: Negative.   HENT: Negative.    Eyes: Negative.    Cardiovascular: Negative.    Respiratory: Negative.    Endocrine: Negative.    Hematologic/Lymphatic: Negative.    Skin: Negative.    Musculoskeletal: Negative.    Gastrointestinal: Negative.    Genitourinary: Negative.    Neurological: Negative.    Psychiatric/Behavioral: Negative.    Allergic/Immunologic: Negative.          Objective:     Vital Signs (Most Recent):  Temp: 98.3 °F (36.8 °C) (10/17/19 0758)  Pulse: (!) 57 (10/17/19 0758)  Resp: 18 (10/17/19 0758)  BP: (!) 152/72 (10/17/19 0758)  SpO2: (!) 94 % (10/17/19 0758) Vital Signs (24h Range):  Temp:  [97.6 °F (36.4 °C)-98.4 °F (36.9 °C)] 98.3 °F (36.8 °C)  Pulse:  [57-73] 57  Resp:  [18-22] 18  SpO2:  [94 %-97 %] 94 %  BP: (103-160)/(39-72) 152/72     Weight: 77.1 kg (169 lb 15.6 oz)  Body mass index is 24.39 kg/m².     SpO2: (!) 94 %  O2 Device (Oxygen Therapy): room air      Intake/Output Summary (Last 24 hours) at 10/17/2019 1036  Last data filed at 10/16/2019 2300  Gross per 24 hour   Intake  1043.33 ml   Output --   Net 1043.33 ml       Lines/Drains/Airways     Peripheral Intravenous Line                 Peripheral IV - Single Lumen 10/11/19 1127 20 G Left Forearm 5 days         Peripheral IV - Single Lumen 10/15/19 1100 18 G Right Antecubital 1 day                Physical Exam   Constitutional: He is oriented to person, place, and time. He appears well-developed and well-nourished.   HENT:   Head: Normocephalic.   Neck: Normal range of motion. Neck supple. Normal carotid pulses, no hepatojugular reflux and no JVD present. Carotid bruit is not present. No thyromegaly present.   Cardiovascular: Normal rate, regular rhythm, S1 normal and S2 normal. PMI is not displaced. Exam reveals no S3, no S4, no distant heart sounds, no friction rub, no midsystolic click and no opening snap.   No murmur heard.  Pulses:       Radial pulses are 2+ on the right side, and 2+ on the left side.   Pulmonary/Chest: Effort normal and breath sounds normal. He has no wheezes. He has no rales.   Abdominal: Soft. Bowel sounds are normal. He exhibits no distension, no abdominal bruit, no ascites and no mass. There is no tenderness.   Musculoskeletal: He exhibits no edema.   Neurological: He is alert and oriented to person, place, and time.   Skin: Skin is warm.   Psychiatric: He has a normal mood and affect. His behavior is normal.   Nursing note and vitals reviewed.      Significant Labs:   BMP:   Recent Labs   Lab 10/16/19  0352 10/17/19  0448   * 147*    140   K 4.0 4.4   * 112*   CO2 21* 21*   BUN 30* 21   CREATININE 0.9 0.9   CALCIUM 7.2* 7.4*   , CMP   Recent Labs   Lab 10/16/19  0352 10/17/19  0448    140   K 4.0 4.4   * 112*   CO2 21* 21*   * 147*   BUN 30* 21   CREATININE 0.9 0.9   CALCIUM 7.2* 7.4*   ANIONGAP 7* 7*   ESTGFRAFRICA >60 >60   EGFRNONAA >60 >60   , CBC   Recent Labs   Lab 10/16/19  0352 10/16/19  1640 10/17/19  0448   WBC  --   --  7.57   HGB 8.4* 8.6* 8.7*  8.7*   HCT  25.9* 26.0* 25.6*  25.6*   *  --  113*   , INR   Recent Labs   Lab 10/16/19  0352 10/17/19  0448   INR 1.1 1.1    and Troponin No results for input(s): TROPONINI in the last 48 hours.    Significant Imaging: Echocardiogram:   2D echo with color flow doppler:   Results for orders placed or performed during the hospital encounter of 10/11/19   2D echo with color flow doppler   Result Value Ref Range    QEF 55 55 - 65    Mitral Valve Regurgitation MILD     Aortic Valve Regurgitation MILD     Est. PA Systolic Pressure 33.25     Narrative    Date of Procedure: 10/11/2019        TEST DESCRIPTION   Technical Quality: This is a technically challenging study. There is poor endocardial definition.     Aorta: The aortic root is normal in size, measuring 3.3 cm at sinotubular junction and 3.5 cm at Sinuses of Valsalva. The proximal ascending aorta is normal in size, measuring 3.0 cm across.     Left Atrium: The left atrial volume index is moderately enlarged, measuring 45.51 cc/m2.     Left Ventricle: The left ventricle is normal in size, with an end-diastolic diameter of 4.2 cm, and an end-systolic diameter of 2.6 cm. Septal wall thickness is mildly increased, with the septum measuring 1.4 cm and the posterior wall measuring 1.1 cm   across. Relative wall thickness was increased at 0.52, and the LV mass index was increased at 116.3 g/m2 consistent with concentric left ventricular hypertrophy. The following segments were mildly hypokinetic: mid inferolateral wall.  Left ventricular systolic function appears normal. Visually estimated ejection fraction is 55-60%. The LV Doppler derived stroke volume equals 78.0 ccs.     Diastolic indices: E wave velocity 0.9 m/s, E/A ratio 1.2,  msec., E/e' ratio(avg) 13. Diastolic function is indeterminate.     Right Atrium: The right atrium is normal in size, measuring 5.2 cm in length and 2.7 cm in width in the apical view.     Right Ventricle: The right ventricle is normal in  size. Global right ventricular systolic function appears normal. Tricuspid annular plane systolic excursion (TAPSE) is 2.0 cm. The estimated PA systolic pressure is 33 mmHg.     Aortic Valve:  The aortic valve is mildly sclerotic with normal leaflet mobility. The mean gradient obtained across the aortic valve is 5 mmHg. Additionally, there is mild aortic regurgitation. There is a pressure half time of 747.0 msec.     Mitral Valve:  The mitral valve is normal in structure. The pressure half time is 54 msec. The calculated mitral valve area is 4.07 cm2. There is mild mitral regurgitation.     Tricuspid Valve:  The tricuspid valve is normal in structure.     Pulmonary Valve:  The pulmonic valve is not well seen.     IVC: IVC is normal in size and collapses > 50% with a sniff, suggesting normal right atrial pressure of 3 mmHg.     Intracavitary: There is no evidence of pericardial effusion, intracavity mass, thrombi, or vegetation.         CONCLUSIONS     1 - Normal left ventricular systolic function (EF 55-60%).     2 - Indeterminate LV diastolic function.     3 - Normal right ventricular systolic function .     4 - Mild aortic regurgitation.     5 - Moderate left atrial enlargement.     6 - Wall motion abnormalities.     7 - Concentric hypertrophy.     8 - The estimated PA systolic pressure is 33 mmHg.             This document has been electronically    SIGNED BY: Robe Gilbert MD On: 10/11/2019 16:04     Assessment and Plan:       DISCUSSED CV CONDITIONS AND RECENT UGI BLEED ISSUES WITH PT AND FAMILY.  HE LOOKS GOOD TODAY AND HE WANTS TO GO HOME.  ANEMIA STABLE.  ASA AND PLAVIX HAVE BEEN RESTARTED.  NO RECURRENT GI BLEED, S/P EGD WITH CAUTERIZATION OF ANGIODYSPLASIA.  NEEDS OPTIMAL MEDICAL TX FOR CAD.  S/P KYLIE X 3 10/11/9 TO LCX/OM FOR NSTEMI.  FAILED  DISTAL RCA 10/13/19, KYLIE X 2 PROXIMAL AND  PTCA SEVERE MID ISR TO PRESERVE INFLOW TO SOME RV MARGINAL BRANCHES THAT GO DISTALLY TO HELP POSSIBLE COLLATERALS IN  FUTURE TO .  PT MAY GO HOME TODAY.  POST PCI CARE DISCUSSED.  HE IS ADVISED TO STOP SMOKING.  NO STRENUOUS ACTIVITY UNTIL F/U APPT.  F/U CARDS CLINIC NEXT WEEK.      * NSTEMI (non-ST elevated myocardial infarction)  See plan for CAD    Hematemesis  GI on board and planning for repeat EGD    Ischemic cardiomyopathy  OPTIMIZE MEDICAL TX FOR ICM.  LIMITED ON MEDICAL TX AS HE WILL NOT BE ABLE TO TOLERATE BETA-BLOCKERS DUE TO SINUS BRADYCARDIA ISSUES.    CAD, multiple vessel  OPTIMIZE MEDICAL TX FOR CAD.    10/14/19  -Patient is s/p PCI of LCX with KYLIE x1 and severe OM2 KYLIE x2 overlapped into mid LCX old stent. OM on Friday and Prox-mid RCA with KYLIE x2  on yesterday. Unsuccessful PCI of Distal RCA occluded and PDA occluded stent  -ASA and Plavix on hold for today secondary to acute GIB  -Plans in place for GI to scope patient today   -Plan to restart ASA and Plavix tomorrow given his recent MI and multiple KYLIE   -Continue Statin, Ranexa, Imdur and ARB  -No BB due to bradycardia      10/15/19  -Continue to hold ASA and Plavix today for active bleeding.   -GI planning to repeat EGD today   -Continue Statin, Ranexa, Imdur and ARB  -BB on hold for bradycardia during admit     10/16/19  -ASA and Plavix both resumed this morning given no evidence of active bleeding on his EGD and recent KYLIE placement   -Recommend that patient stay for observation since resume DAPT given his recent GI bleed  -Continue Statin, Ranexa, Imdur and ARB  -HR improved today, will see if he can tolerate restarting BB      Sinus bradycardia  STABLE.  AT RISK FOR PPM IN FUTURE.    Tobacco abuse  TOBACCO CESSATION ADVISED.    AP (angina pectoris)  See management plan detailed above.     Nonrheumatic aortic valve insufficiency  See management plan detailed above.     Claudication in peripheral vascular disease  See management plan detailed above.     History of PTCA  See management plan detailed above.     Abnormal ECG  See management plan detailed  above.     Essential hypertension  Continue losartan and Imdur   Will see how HR responds to low dose BB    PAD (peripheral artery disease)  See management plan detailed above.     CAD (coronary artery disease)  See management plan detailed above.         VTE Risk Mitigation (From admission, onward)         Ordered     IP VTE HIGH RISK PATIENT  Once      10/11/19 1236     Place sequential compression device  Until discontinued      10/11/19 1236                Robe Gilbert MD  Cardiology  [facility] -

## 2019-10-18 ENCOUNTER — TELEPHONE (OUTPATIENT)
Dept: CARDIOLOGY | Facility: CLINIC | Age: 79
End: 2019-10-18

## 2019-10-18 DIAGNOSIS — I25.10 CAD, MULTIPLE VESSEL: Primary | ICD-10-CM

## 2019-10-18 NOTE — TELEPHONE ENCOUNTER
Patient contacted.  Follow up scheduled.  Labs scheduled.   For 10/22/2019.  Patient stated understanding with no questions or concerns.  Previous message:  Patient needs CBC next week and follow up with NP

## 2019-10-18 NOTE — TELEPHONE ENCOUNTER
----- Message from JARVIS Byrne sent at 10/18/2019  8:38 AM CDT -----  Patient needs CBC next week and follow up with NP    Thanks

## 2019-10-22 ENCOUNTER — LAB VISIT (OUTPATIENT)
Dept: LAB | Facility: HOSPITAL | Age: 79
End: 2019-10-22
Attending: INTERNAL MEDICINE
Payer: MEDICARE

## 2019-10-22 ENCOUNTER — OFFICE VISIT (OUTPATIENT)
Dept: CARDIOLOGY | Facility: CLINIC | Age: 79
End: 2019-10-22
Payer: MEDICARE

## 2019-10-22 VITALS
SYSTOLIC BLOOD PRESSURE: 164 MMHG | HEIGHT: 70 IN | HEART RATE: 84 BPM | WEIGHT: 171.94 LBS | DIASTOLIC BLOOD PRESSURE: 82 MMHG | OXYGEN SATURATION: 98 % | BODY MASS INDEX: 24.61 KG/M2

## 2019-10-22 DIAGNOSIS — I10 ESSENTIAL HYPERTENSION: ICD-10-CM

## 2019-10-22 DIAGNOSIS — E78.5 DYSLIPIDEMIA: ICD-10-CM

## 2019-10-22 DIAGNOSIS — Z72.0 TOBACCO ABUSE: ICD-10-CM

## 2019-10-22 DIAGNOSIS — Z98.61 S/P PTCA (PERCUTANEOUS TRANSLUMINAL CORONARY ANGIOPLASTY): ICD-10-CM

## 2019-10-22 DIAGNOSIS — I21.4 NSTEMI (NON-ST ELEVATED MYOCARDIAL INFARCTION): ICD-10-CM

## 2019-10-22 DIAGNOSIS — E11.65 TYPE 2 DIABETES MELLITUS WITH HYPERGLYCEMIA, WITHOUT LONG-TERM CURRENT USE OF INSULIN: ICD-10-CM

## 2019-10-22 DIAGNOSIS — I25.10 CAD, MULTIPLE VESSEL: ICD-10-CM

## 2019-10-22 DIAGNOSIS — I25.10 CAD, MULTIPLE VESSEL: Primary | ICD-10-CM

## 2019-10-22 DIAGNOSIS — K25.4 GASTROINTESTINAL HEMORRHAGE ASSOCIATED WITH GASTRIC ULCER: ICD-10-CM

## 2019-10-22 DIAGNOSIS — I25.5 ISCHEMIC CARDIOMYOPATHY: ICD-10-CM

## 2019-10-22 PROBLEM — K92.2 GI BLEEDING: Status: ACTIVE | Noted: 2019-10-22

## 2019-10-22 LAB
BASOPHILS # BLD AUTO: 0.03 K/UL (ref 0–0.2)
BASOPHILS NFR BLD: 0.4 % (ref 0–1.9)
DIFFERENTIAL METHOD: ABNORMAL
EOSINOPHIL # BLD AUTO: 0.2 K/UL (ref 0–0.5)
EOSINOPHIL NFR BLD: 2.6 % (ref 0–8)
ERYTHROCYTE [DISTWIDTH] IN BLOOD BY AUTOMATED COUNT: 15.1 % (ref 11.5–14.5)
HCT VFR BLD AUTO: 30.9 % (ref 40–54)
HGB BLD-MCNC: 10 G/DL (ref 14–18)
IMM GRANULOCYTES # BLD AUTO: 0.04 K/UL (ref 0–0.04)
IMM GRANULOCYTES NFR BLD AUTO: 0.6 % (ref 0–0.5)
LYMPHOCYTES # BLD AUTO: 1 K/UL (ref 1–4.8)
LYMPHOCYTES NFR BLD: 15.1 % (ref 18–48)
MCH RBC QN AUTO: 30.1 PG (ref 27–31)
MCHC RBC AUTO-ENTMCNC: 32.4 G/DL (ref 32–36)
MCV RBC AUTO: 93 FL (ref 82–98)
MONOCYTES # BLD AUTO: 0.6 K/UL (ref 0.3–1)
MONOCYTES NFR BLD: 8.6 % (ref 4–15)
NEUTROPHILS # BLD AUTO: 5 K/UL (ref 1.8–7.7)
NEUTROPHILS NFR BLD: 73.3 % (ref 38–73)
NRBC BLD-RTO: 0 /100 WBC
PLATELET # BLD AUTO: 204 K/UL (ref 150–350)
PMV BLD AUTO: 9.6 FL (ref 9.2–12.9)
RBC # BLD AUTO: 3.32 M/UL (ref 4.6–6.2)
WBC # BLD AUTO: 6.84 K/UL (ref 3.9–12.7)

## 2019-10-22 PROCEDURE — 3077F SYST BP >= 140 MM HG: CPT | Mod: HCNC,CPTII,S$GLB, | Performed by: INTERNAL MEDICINE

## 2019-10-22 PROCEDURE — 85025 COMPLETE CBC W/AUTO DIFF WBC: CPT | Mod: HCNC

## 2019-10-22 PROCEDURE — 99999 PR PBB SHADOW E&M-EST. PATIENT-LVL III: ICD-10-PCS | Mod: PBBFAC,HCNC,, | Performed by: INTERNAL MEDICINE

## 2019-10-22 PROCEDURE — 3077F PR MOST RECENT SYSTOLIC BLOOD PRESSURE >= 140 MM HG: ICD-10-PCS | Mod: HCNC,CPTII,S$GLB, | Performed by: INTERNAL MEDICINE

## 2019-10-22 PROCEDURE — 99214 OFFICE O/P EST MOD 30 MIN: CPT | Mod: HCNC,S$GLB,, | Performed by: INTERNAL MEDICINE

## 2019-10-22 PROCEDURE — 3079F PR MOST RECENT DIASTOLIC BLOOD PRESSURE 80-89 MM HG: ICD-10-PCS | Mod: HCNC,CPTII,S$GLB, | Performed by: INTERNAL MEDICINE

## 2019-10-22 PROCEDURE — 99499 RISK ADDL DX/OHS AUDIT: ICD-10-PCS | Mod: HCNC,S$GLB,, | Performed by: INTERNAL MEDICINE

## 2019-10-22 PROCEDURE — 99499 UNLISTED E&M SERVICE: CPT | Mod: HCNC,S$GLB,, | Performed by: INTERNAL MEDICINE

## 2019-10-22 PROCEDURE — 99999 PR PBB SHADOW E&M-EST. PATIENT-LVL III: CPT | Mod: PBBFAC,HCNC,, | Performed by: INTERNAL MEDICINE

## 2019-10-22 PROCEDURE — 36415 COLL VENOUS BLD VENIPUNCTURE: CPT | Mod: HCNC

## 2019-10-22 PROCEDURE — 3079F DIAST BP 80-89 MM HG: CPT | Mod: HCNC,CPTII,S$GLB, | Performed by: INTERNAL MEDICINE

## 2019-10-22 PROCEDURE — 99214 PR OFFICE/OUTPT VISIT, EST, LEVL IV, 30-39 MIN: ICD-10-PCS | Mod: HCNC,S$GLB,, | Performed by: INTERNAL MEDICINE

## 2019-10-22 PROCEDURE — 1101F PT FALLS ASSESS-DOCD LE1/YR: CPT | Mod: HCNC,CPTII,S$GLB, | Performed by: INTERNAL MEDICINE

## 2019-10-22 PROCEDURE — 1101F PR PT FALLS ASSESS DOC 0-1 FALLS W/OUT INJ PAST YR: ICD-10-PCS | Mod: HCNC,CPTII,S$GLB, | Performed by: INTERNAL MEDICINE

## 2019-10-22 RX ORDER — AMLODIPINE BESYLATE 2.5 MG/1
2.5 TABLET ORAL DAILY
Qty: 30 TABLET | Refills: 11 | Status: SHIPPED | OUTPATIENT
Start: 2019-10-22 | End: 2020-09-10

## 2019-10-22 RX ORDER — ISOSORBIDE MONONITRATE 60 MG/1
60 TABLET, EXTENDED RELEASE ORAL DAILY
Qty: 30 TABLET | Refills: 11 | Status: SHIPPED | OUTPATIENT
Start: 2019-10-22 | End: 2020-09-21 | Stop reason: ALTCHOICE

## 2019-10-22 NOTE — PROGRESS NOTES
Subjective:   Patient ID:  Aquiles Kelsey is a 79 y.o. male who presents for follow up of CAD F/U      80 yo male, came in for post PCI f/u  PMH CAD (multiple PCI procedures), First PCI 1998, total of 5 stents with last one in [place] 2013. HTN, DM, PAD H/o L LE stents in [place]. , AAA stent graft (approx 2013) f/u with [facility], hyperlipidemia, cigar use.  Echo 7/19 normal LVEF, DD,  LVH, mild AI.   Holter 7/19 NSR, fleeting NSVT, EAT, rare PVCs, occasional PACs.  B LE vasc studies 7/19 B LE PAD, L > R LE.  Smokes cigar  Chronic lower back pain  10/08/2019 visit, c/ochest pain for 3 days, the pain could last a hour. Occurred at exertion and rest. No radiating pain. No dyspnea, dizziness and cold sweating. ekg showed NSR and some TWI on anterior leads. Troponin showed 4.9\  10/13 did [facility] PCI Successful PCI 99% proximal RCA stenosis wtih Stent [place] KYLIE x 2 overlapped. Successful PTCA 99% diffuse ISR mid RCA. Unsucessful PCI  distal RCA.  10/15 develop GI bleeding with dark stool. Dropped to 8.6 from 13. EGD showed AVM in stomach and had cauterized.  Today states that no current dark stool.  No chest pain, dyspnea  Mild fatigue.                    Past Medical History:   Diagnosis Date    Acute blood loss anemia 10/14/2019    Aneurysm, abdominal aortic     Coronary artery disease     Depression     Diabetes mellitus     HLD (hyperlipidemia)     Hypertension     Kidney stone     PAD (peripheral artery disease)     Tobacco abuse        Past Surgical History:   Procedure Laterality Date    APPENDECTOMY      CORONARY STENT PLACEMENT      ESOPHAGOGASTRODUODENOSCOPY N/A 10/14/2019    Procedure: EGD (ESOPHAGOGASTRODUODENOSCOPY);  Surgeon: Carlos Mcneal III, MD;  Location: [facility] ENDO;  Service: Endoscopy;  Laterality: N/A;    ESOPHAGOGASTRODUODENOSCOPY N/A 10/15/2019    Procedure: EGD (ESOPHAGOGASTRODUODENOSCOPY);  Surgeon: Carlos Mcneal III, MD;  Location: [facility] ENDO;  Service: Endoscopy;  Laterality:  N/A;    LEFT HEART CATHETERIZATION Left 10/11/2019    Procedure: CATHETERIZATION, HEART, LEFT;  Surgeon: Robe Gilbert MD;  Location: [facility] CATH LAB;  Service: Cardiology;  Laterality: Left;    LEFT HEART CATHETERIZATION Left 10/13/2019    Procedure: CATHETERIZATION, HEART, LEFT;  Surgeon: Robe Gilbert MD;  Location: [facility] CATH LAB;  Service: Cardiology;  Laterality: Left;       Social History     Tobacco Use    Smoking status: Current Every Day Smoker     Years: 15.00     Types: Cigars    Smokeless tobacco: Current User   Substance Use Topics    Alcohol use: Not Currently    Drug use: Not Currently       Family History   Problem Relation Age of Onset    Diabetes Mother     COPD Father     Diabetes Brother          Review of Systems   Constitution: Positive for malaise/fatigue.   HENT: Negative.    Eyes: Negative.    Respiratory: Negative.    Endocrine: Negative.    Hematologic/Lymphatic: Negative.    Skin: Negative.    Musculoskeletal: Positive for arthritis and joint pain.   Gastrointestinal: Negative.    Genitourinary: Negative.    Neurological: Negative.    Psychiatric/Behavioral: Negative.    Allergic/Immunologic: Negative.        Objective:   Physical Exam   Constitutional: He is oriented to person, place, and time. He appears well-developed and well-nourished.   HENT:   Head: Normocephalic.   Neck: Normal range of motion. Neck supple. Normal carotid pulses and no JVD present. Carotid bruit is not present. No thyromegaly present.   Cardiovascular: Normal rate, regular rhythm, S1 normal and S2 normal. PMI is not displaced. Exam reveals no S3, no S4, no distant heart sounds, no friction rub, no midsystolic click and no opening snap.   No murmur heard.  Pulses:       Radial pulses are 2+ on the right side, and 2+ on the left side.   Pulmonary/Chest: Effort normal and breath sounds normal. He has no wheezes. He has no rales.   Abdominal: Soft. Bowel sounds are normal. He exhibits no distension, no  abdominal bruit, no ascites and no mass. There is no tenderness.   Musculoskeletal: He exhibits no edema.   Neurological: He is alert and oriented to person, place, and time.   Skin: Skin is warm.   Psychiatric: He has a normal mood and affect. His behavior is normal.   Nursing note and vitals reviewed.      Lab Results   Component Value Date    CHOL 140 10/11/2019    CHOL 217 (H) 06/12/2019     Lab Results   Component Value Date    HDL 33 (L) 10/11/2019    HDL 37 (L) 06/12/2019     Lab Results   Component Value Date    LDLCALC 74.8 10/11/2019    LDLCALC 129.0 06/12/2019     Lab Results   Component Value Date    TRIG 161 (H) 10/11/2019    TRIG 255 (H) 06/12/2019     Lab Results   Component Value Date    CHOLHDL 23.6 10/11/2019    CHOLHDL 17.1 (L) 06/12/2019       Chemistry        Component Value Date/Time     10/17/2019 0448    K 4.4 10/17/2019 0448     (H) 10/17/2019 0448    CO2 21 (L) 10/17/2019 0448    BUN 21 10/17/2019 0448    CREATININE 0.9 10/17/2019 0448     (H) 10/17/2019 0448        Component Value Date/Time    CALCIUM 7.4 (L) 10/17/2019 0448    ALKPHOS 42 (L) 10/14/2019 0100    AST 18 10/14/2019 0100    ALT 10 10/14/2019 0100    BILITOT 0.4 10/14/2019 0100    ESTGFRAFRICA >60 10/17/2019 0448    EGFRNONAA >60 10/17/2019 0448          Lab Results   Component Value Date    HGBA1C 5.8 (H) 10/14/2019     Lab Results   Component Value Date    TSH 1.329 06/12/2019     Lab Results   Component Value Date    INR 1.1 10/17/2019    INR 1.1 10/16/2019    INR 1.2 10/15/2019     Lab Results   Component Value Date    WBC 7.57 10/17/2019    HGB 8.7 (L) 10/17/2019    HGB 8.7 (L) 10/17/2019    HCT 25.6 (L) 10/17/2019    HCT 25.6 (L) 10/17/2019    MCV 92 10/17/2019     (L) 10/17/2019     BMP  Sodium   Date Value Ref Range Status   10/17/2019 140 136 - 145 mmol/L Final     Potassium   Date Value Ref Range Status   10/17/2019 4.4 3.5 - 5.1 mmol/L Final     Chloride   Date Value Ref Range Status    10/17/2019 112 (H) 95 - 110 mmol/L Final     CO2   Date Value Ref Range Status   10/17/2019 21 (L) 23 - 29 mmol/L Final     BUN, Bld   Date Value Ref Range Status   10/17/2019 21 8 - 23 mg/dL Final     Creatinine   Date Value Ref Range Status   10/17/2019 0.9 0.5 - 1.4 mg/dL Final     Calcium   Date Value Ref Range Status   10/17/2019 7.4 (L) 8.7 - 10.5 mg/dL Final     Anion Gap   Date Value Ref Range Status   10/17/2019 7 (L) 8 - 16 mmol/L Final     eGFR if    Date Value Ref Range Status   10/17/2019 >60 >60 mL/min/1.73 m^2 Final     eGFR if non    Date Value Ref Range Status   10/17/2019 >60 >60 mL/min/1.73 m^2 Final     Comment:     Calculation used to obtain the estimated glomerular filtration  rate (eGFR) is the CKD-EPI equation.        BNP  @LABRCNTIP(BNP,BNPTRIAGEBLO)@  @LABRCNTIP(troponini)@  Estimated Creatinine Clearance: 67.6 mL/min (based on SCr of 0.9 mg/dL).  No results found in the last 24 hours.  No results found in the last 24 hours.  No results found in the last 24 hours.    Assessment:      1. CAD, multiple vessel    2. S/P PTCA (percutaneous transluminal coronary angioplasty)    3. NSTEMI (non-ST elevated myocardial infarction)    4. Ischemic cardiomyopathy    5. Essential hypertension    6. Dyslipidemia    7. Type 2 diabetes mellitus with hyperglycemia, without long-term current use of insulin    8. Tobacco abuse    9. Gastrointestinal hemorrhage associated with gastric ulcer      Recent PCI and subsequent G bleeding. Cauterized stomach AVM  BP high  LDL and BS wnl  Plan:   Increase Imdur to 60 mg   Add Amlodipine 2.5 mg daily  Continue ASA, Plavix, lisinopril 20 mg, and statin  Check BP at home and keep < 140/90mmHG  DASH  Recommend heart-healthy diet, weight control and regular exercise.  Robert. Risk modification.   RTC in 6 months    I have reviewed all pertinent labs and cardiac studies. Plans and recommendations have been formulated under my direct  supervision. All questions answered and patient voiced understanding. Patient to continue current medications.

## 2019-11-06 ENCOUNTER — PATIENT OUTREACH (OUTPATIENT)
Dept: ADMINISTRATIVE | Facility: HOSPITAL | Age: 79
End: 2019-11-06

## 2019-11-06 NOTE — PROGRESS NOTES
L./m for pt to call     Julianna STERLING LPN Care Coordinator  Care Coordination Department  [facility]  [phone]

## 2019-11-14 ENCOUNTER — PATIENT OUTREACH (OUTPATIENT)
Dept: ADMINISTRATIVE | Facility: HOSPITAL | Age: 79
End: 2019-11-14

## 2019-11-14 NOTE — PROGRESS NOTES
L/M for pt to call         .Julianna STERLING LPN Care Coordinator  Care Coordination Department  [facility]  [phone]

## 2019-11-15 ENCOUNTER — OFFICE VISIT (OUTPATIENT)
Dept: FAMILY MEDICINE | Facility: CLINIC | Age: 79
End: 2019-11-15
Payer: MEDICARE

## 2019-11-15 VITALS
SYSTOLIC BLOOD PRESSURE: 129 MMHG | BODY MASS INDEX: 22.84 KG/M2 | HEART RATE: 61 BPM | TEMPERATURE: 98 F | WEIGHT: 159.19 LBS | OXYGEN SATURATION: 98 % | DIASTOLIC BLOOD PRESSURE: 61 MMHG

## 2019-11-15 DIAGNOSIS — M54.2 NECK PAIN: Primary | ICD-10-CM

## 2019-11-15 PROCEDURE — 3074F SYST BP LT 130 MM HG: CPT | Mod: HCNC,CPTII,S$GLB, | Performed by: INTERNAL MEDICINE

## 2019-11-15 PROCEDURE — 99999 PR PBB SHADOW E&M-EST. PATIENT-LVL III: CPT | Mod: PBBFAC,HCNC,, | Performed by: INTERNAL MEDICINE

## 2019-11-15 PROCEDURE — 3078F DIAST BP <80 MM HG: CPT | Mod: HCNC,CPTII,S$GLB, | Performed by: INTERNAL MEDICINE

## 2019-11-15 PROCEDURE — 99999 PR PBB SHADOW E&M-EST. PATIENT-LVL III: ICD-10-PCS | Mod: PBBFAC,HCNC,, | Performed by: INTERNAL MEDICINE

## 2019-11-15 PROCEDURE — 1101F PR PT FALLS ASSESS DOC 0-1 FALLS W/OUT INJ PAST YR: ICD-10-PCS | Mod: HCNC,CPTII,S$GLB, | Performed by: INTERNAL MEDICINE

## 2019-11-15 PROCEDURE — 99213 OFFICE O/P EST LOW 20 MIN: CPT | Mod: HCNC,S$GLB,, | Performed by: INTERNAL MEDICINE

## 2019-11-15 PROCEDURE — 1101F PT FALLS ASSESS-DOCD LE1/YR: CPT | Mod: HCNC,CPTII,S$GLB, | Performed by: INTERNAL MEDICINE

## 2019-11-15 PROCEDURE — 99213 PR OFFICE/OUTPT VISIT, EST, LEVL III, 20-29 MIN: ICD-10-PCS | Mod: HCNC,S$GLB,, | Performed by: INTERNAL MEDICINE

## 2019-11-15 PROCEDURE — 3074F PR MOST RECENT SYSTOLIC BLOOD PRESSURE < 130 MM HG: ICD-10-PCS | Mod: HCNC,CPTII,S$GLB, | Performed by: INTERNAL MEDICINE

## 2019-11-15 PROCEDURE — 3078F PR MOST RECENT DIASTOLIC BLOOD PRESSURE < 80 MM HG: ICD-10-PCS | Mod: HCNC,CPTII,S$GLB, | Performed by: INTERNAL MEDICINE

## 2019-11-15 RX ORDER — BACLOFEN 10 MG/1
10 TABLET ORAL NIGHTLY PRN
Qty: 30 TABLET | Refills: 0 | Status: SHIPPED | OUTPATIENT
Start: 2019-11-15 | End: 2019-12-07 | Stop reason: SDUPTHER

## 2019-11-15 NOTE — PROGRESS NOTES
Subjective:       Patient ID: Aquiles Kelsey is a 79 y.o. male.    Chief Complaint: Neck Pain    1 week worsening posterior neck pain in pt with hx of same---------positional------------    Neck Pain    Pertinent negatives include no chest pain or fever.     Past Medical History:   Diagnosis Date    Acute blood loss anemia 10/14/2019    Aneurysm, abdominal aortic     Coronary artery disease     Depression     Diabetes mellitus     HLD (hyperlipidemia)     Hypertension     Kidney stone     PAD (peripheral artery disease)     Tobacco abuse      Past Surgical History:   Procedure Laterality Date    APPENDECTOMY      CORONARY STENT PLACEMENT      ESOPHAGOGASTRODUODENOSCOPY N/A 10/14/2019    Procedure: EGD (ESOPHAGOGASTRODUODENOSCOPY);  Surgeon: Carlos Mcneal III, MD;  Location: [facility];  Service: Endoscopy;  Laterality: N/A;    ESOPHAGOGASTRODUODENOSCOPY N/A 10/15/2019    Procedure: EGD (ESOPHAGOGASTRODUODENOSCOPY);  Surgeon: Carlos Mcneal III, MD;  Location: [facility] ENDO;  Service: Endoscopy;  Laterality: N/A;    LEFT HEART CATHETERIZATION Left 10/11/2019    Procedure: CATHETERIZATION, HEART, LEFT;  Surgeon: Robe Gilbert MD;  Location: [facility] CATH LAB;  Service: Cardiology;  Laterality: Left;    LEFT HEART CATHETERIZATION Left 10/13/2019    Procedure: CATHETERIZATION, HEART, LEFT;  Surgeon: Robe Gilbert MD;  Location: [facility] CATH LAB;  Service: Cardiology;  Laterality: Left;     Family History   Problem Relation Age of Onset    Diabetes Mother     COPD Father     Diabetes Brother      Social History     Socioeconomic History    Marital status:      Spouse name: Not on file    Number of children: Not on file    Years of education: Not on file    Highest education level: Not on file   Occupational History    Not on file   Social Needs    Financial resource strain: Not on file    Food insecurity:     Worry: Not on file     Inability: Not on file    Transportation needs:      Medical: Not on file     Non-medical: Not on file   Tobacco Use    Smoking status: Current Every Day Smoker     Years: 15.00     Types: Cigars    Smokeless tobacco: Current User   Substance and Sexual Activity    Alcohol use: Not Currently    Drug use: Not Currently    Sexual activity: Not Currently   Lifestyle    Physical activity:     Days per week: Not on file     Minutes per session: Not on file    Stress: Only a little   Relationships    Social connections:     Talks on phone: Not on file     Gets together: Not on file     Attends [employer] service: Not on file     Active member of club or organization: Not on file     Attends meetings of clubs or organizations: Not on file     Relationship status: Not on file   Other Topics Concern    Not on file   Social History Narrative    Not on file     Review of Systems   Constitutional: Negative for chills and fever.   Respiratory: Negative for apnea, cough, choking, chest tightness, shortness of breath, wheezing and stridor.    Cardiovascular: Negative for chest pain and palpitations.   Gastrointestinal: Negative for abdominal pain, blood in stool, nausea and vomiting.   Genitourinary: Negative for dysuria, hematuria and urgency.   Musculoskeletal: Positive for neck pain.   Neurological: Negative for dizziness and light-headedness.   Psychiatric/Behavioral: Negative for agitation, behavioral problems and confusion.       Objective:      Physical Exam   Constitutional: He is oriented to person, place, and time. He appears well-developed and well-nourished. No distress.   HENT:   Head: Normocephalic and atraumatic.   Eyes: Pupils are equal, round, and reactive to light.   Neck: Normal range of motion. Neck supple. Carotid bruit is not present.   Cardiovascular: Normal rate, regular rhythm, normal heart sounds and intact distal pulses.   Pulmonary/Chest: Effort normal and breath sounds normal.   Abdominal: Soft. Bowel sounds are normal.   Musculoskeletal:  Normal range of motion. He exhibits tenderness.   Neurological: He is alert and oriented to person, place, and time.   Skin: Skin is warm and dry. No rash noted. He is not diaphoretic. No erythema. No pallor.   Psychiatric: He has a normal mood and affect. His behavior is normal. Judgment and thought content normal.   Nursing note and vitals reviewed.      CMP  Sodium   Date Value Ref Range Status   10/17/2019 140 136 - 145 mmol/L Final     Potassium   Date Value Ref Range Status   10/17/2019 4.4 3.5 - 5.1 mmol/L Final     Chloride   Date Value Ref Range Status   10/17/2019 112 (H) 95 - 110 mmol/L Final     CO2   Date Value Ref Range Status   10/17/2019 21 (L) 23 - 29 mmol/L Final     Glucose   Date Value Ref Range Status   10/17/2019 147 (H) 70 - 110 mg/dL Final     BUN, Bld   Date Value Ref Range Status   10/17/2019 21 8 - 23 mg/dL Final     Creatinine   Date Value Ref Range Status   10/17/2019 0.9 0.5 - 1.4 mg/dL Final     Calcium   Date Value Ref Range Status   10/17/2019 7.4 (L) 8.7 - 10.5 mg/dL Final     Total Protein   Date Value Ref Range Status   10/14/2019 4.7 (L) 6.0 - 8.4 g/dL Final     Albumin   Date Value Ref Range Status   10/14/2019 2.6 (L) 3.5 - 5.2 g/dL Final     Total Bilirubin   Date Value Ref Range Status   10/14/2019 0.4 0.1 - 1.0 mg/dL Final     Comment:     For infants and newborns, interpretation of results should be based  on gestational age, weight and in agreement with clinical  observations.  Premature Infant recommended reference ranges:  Up to 24 hours.............<8.0 mg/dL  Up to 48 hours............<12.0 mg/dL  3-5 days..................<15.0 mg/dL  6-29 days.................<15.0 mg/dL       Alkaline Phosphatase   Date Value Ref Range Status   10/14/2019 42 (L) 55 - 135 U/L Final     AST   Date Value Ref Range Status   10/14/2019 18 10 - 40 U/L Final     ALT   Date Value Ref Range Status   10/14/2019 10 10 - 44 U/L Final     Anion Gap   Date Value Ref Range Status   10/17/2019 7  (L) 8 - 16 mmol/L Final     eGFR if    Date Value Ref Range Status   10/17/2019 >60 >60 mL/min/1.73 m^2 Final     eGFR if non    Date Value Ref Range Status   10/17/2019 >60 >60 mL/min/1.73 m^2 Final     Comment:     Calculation used to obtain the estimated glomerular filtration  rate (eGFR) is the CKD-EPI equation.        Lab Results   Component Value Date    WBC 6.84 10/22/2019    HGB 10.0 (L) 10/22/2019    HCT 30.9 (L) 10/22/2019    MCV 93 10/22/2019     10/22/2019     Lab Results   Component Value Date    CHOL 140 10/11/2019     Lab Results   Component Value Date    HDL 33 (L) 10/11/2019     Lab Results   Component Value Date    LDLCALC 74.8 10/11/2019     Lab Results   Component Value Date    TRIG 161 (H) 10/11/2019     Lab Results   Component Value Date    CHOLHDL 23.6 10/11/2019     Lab Results   Component Value Date    TSH 1.329 06/12/2019     Lab Results   Component Value Date    HGBA1C 5.8 (H) 10/14/2019     Assessment:       1. Neck pain        Plan:   Neck pain  -     baclofen (LIORESAL) 10 MG tablet; Take 1 tablet (10 mg total) by mouth nightly as needed.  Dispense: 30 tablet; Refill: 0    Tylenol with baclofen prn---------------no NSAID's-------------f/u pcp if persists----------

## 2019-11-20 ENCOUNTER — TELEPHONE (OUTPATIENT)
Dept: CARDIOLOGY | Facility: HOSPITAL | Age: 79
End: 2019-11-20

## 2019-11-20 ENCOUNTER — LAB VISIT (OUTPATIENT)
Dept: LAB | Facility: HOSPITAL | Age: 79
End: 2019-11-20
Attending: INTERNAL MEDICINE
Payer: MEDICARE

## 2019-11-20 DIAGNOSIS — E78.5 DYSLIPIDEMIA: ICD-10-CM

## 2019-11-20 LAB
ALBUMIN SERPL BCP-MCNC: 3.8 G/DL (ref 3.5–5.2)
ALP SERPL-CCNC: 65 U/L (ref 55–135)
ALT SERPL W/O P-5'-P-CCNC: 13 U/L (ref 10–44)
ANION GAP SERPL CALC-SCNC: 9 MMOL/L (ref 8–16)
AST SERPL-CCNC: 17 U/L (ref 10–40)
BILIRUB SERPL-MCNC: 0.6 MG/DL (ref 0.1–1)
BUN SERPL-MCNC: 21 MG/DL (ref 8–23)
CALCIUM SERPL-MCNC: 9.6 MG/DL (ref 8.7–10.5)
CHLORIDE SERPL-SCNC: 102 MMOL/L (ref 95–110)
CHOLEST SERPL-MCNC: 138 MG/DL (ref 120–199)
CHOLEST/HDLC SERPL: 3 {RATIO} (ref 2–5)
CO2 SERPL-SCNC: 27 MMOL/L (ref 23–29)
CREAT SERPL-MCNC: 1.1 MG/DL (ref 0.5–1.4)
EST. GFR  (AFRICAN AMERICAN): >60 ML/MIN/1.73 M^2
EST. GFR  (NON AFRICAN AMERICAN): >60 ML/MIN/1.73 M^2
GLUCOSE SERPL-MCNC: 146 MG/DL (ref 70–110)
HDLC SERPL-MCNC: 46 MG/DL (ref 40–75)
HDLC SERPL: 33.3 % (ref 20–50)
LDLC SERPL CALC-MCNC: 72.8 MG/DL (ref 63–159)
NONHDLC SERPL-MCNC: 92 MG/DL
POTASSIUM SERPL-SCNC: 4.3 MMOL/L (ref 3.5–5.1)
PROT SERPL-MCNC: 7.2 G/DL (ref 6–8.4)
SODIUM SERPL-SCNC: 138 MMOL/L (ref 136–145)
TRIGL SERPL-MCNC: 96 MG/DL (ref 30–150)

## 2019-11-20 PROCEDURE — 80053 COMPREHEN METABOLIC PANEL: CPT | Mod: HCNC

## 2019-11-20 PROCEDURE — 80061 LIPID PANEL: CPT | Mod: HCNC

## 2019-11-20 PROCEDURE — 36415 COLL VENOUS BLD VENIPUNCTURE: CPT | Mod: HCNC

## 2019-11-21 NOTE — TELEPHONE ENCOUNTER
Patient contacted and left message to return call to return call to the clinic.  Labs stable.  Cholesterol well controlled.  F/u next appt.

## 2019-11-21 NOTE — TELEPHONE ENCOUNTER
Patient contacted and left message to return call to return call to the clinic.  Labs stable.  Cholesterol well controlled.  F/u next appt

## 2019-12-05 RX ORDER — ESCITALOPRAM OXALATE 10 MG/1
TABLET ORAL
Qty: 90 TABLET | Refills: 3 | Status: SHIPPED | OUTPATIENT
Start: 2019-12-05 | End: 2020-06-18 | Stop reason: SDUPTHER

## 2019-12-07 DIAGNOSIS — M54.2 NECK PAIN: ICD-10-CM

## 2019-12-08 RX ORDER — BACLOFEN 10 MG/1
TABLET ORAL
Qty: 30 TABLET | Refills: 0 | Status: SHIPPED | OUTPATIENT
Start: 2019-12-08 | End: 2019-12-31

## 2019-12-31 DIAGNOSIS — M54.2 NECK PAIN: ICD-10-CM

## 2019-12-31 RX ORDER — BACLOFEN 10 MG/1
TABLET ORAL
Qty: 30 TABLET | Refills: 0 | Status: SHIPPED | OUTPATIENT
Start: 2019-12-31 | End: 2020-01-22 | Stop reason: SDUPTHER

## 2020-01-22 DIAGNOSIS — M54.2 NECK PAIN: ICD-10-CM

## 2020-01-22 RX ORDER — BACLOFEN 10 MG/1
10 TABLET ORAL NIGHTLY
Qty: 30 TABLET | Refills: 0 | Status: SHIPPED | OUTPATIENT
Start: 2020-01-22 | End: 2022-11-04

## 2020-06-18 RX ORDER — METFORMIN HYDROCHLORIDE 1000 MG/1
TABLET ORAL
Qty: 180 TABLET | Refills: 4 | Status: SHIPPED | OUTPATIENT
Start: 2020-06-18 | End: 2021-06-26

## 2020-06-18 RX ORDER — ESCITALOPRAM OXALATE 10 MG/1
10 TABLET ORAL DAILY
Qty: 90 TABLET | Refills: 3 | Status: SHIPPED | OUTPATIENT
Start: 2020-06-18 | End: 2021-08-19

## 2020-08-05 ENCOUNTER — PATIENT OUTREACH (OUTPATIENT)
Dept: ADMINISTRATIVE | Facility: OTHER | Age: 80
End: 2020-08-05

## 2020-08-09 PROBLEM — I51.7 LVH (LEFT VENTRICULAR HYPERTROPHY): Status: ACTIVE | Noted: 2020-08-09

## 2020-08-09 PROBLEM — I51.7 LAE (LEFT ATRIAL ENLARGEMENT): Status: ACTIVE | Noted: 2020-08-09

## 2020-08-09 NOTE — PROGRESS NOTES
Subjective:    Patient ID:  Aquiles Kelsey is a 79 y.o. male who presents for evaluation of Claudication in peripheral vascular disease, Peripheral Arterial Disease, Claudication, Hyperlipidemia, Hypertension, Coronary Artery Disease, and Risk Factor Management For Atherosclerosis          HPI Pt presents for f/u.  His current medical conditions include CAD (multiple PCI procedures), HTN, DM, LAE, LVH, PAD, AAA stent graft (approx 2013), hyperlipidemia, cigar use.  Past hx pertinent for following:  H/o L LE stents in [place].   First PCI 1998, total of 5 stents with last one in [place] 2013.  Echo 7/19 normal LVEF, DD,  LVH, mild AI.   Holter 7/19 NSR, fleeting NSVT, EAT, rare PVCs, occasional PACs.  B LE vasc studies 7/19 B LE PAD, L > R LE.   Now here.  This is first time I have seen pt since I performed PCI 10/19 for NSTEMI.  Pt had PCI KYLIE x one to mid LCX ISR, KYLIE x 2 to OM2, and PCI prox RCA KYLIE x 2, PTCA mid RCA ISR.  Failed PCI distal occluded RCA.  EF 45%.  Nonobstructive LAD disease, small diffusely diseased diagonal vessels.  He developed post PCI GI bleed and required PRBC and EGD showing angiodysplasia tx w cauterization.  DM HGAIC at goal on 10/19 labs.  Lipids 11/19 well controlled on Simvastatin.  ecg 10/14/19 junctional bradycardia 54 bpm, inferior infarct, inferolateral st-t abnl.  Echo 10/19 normal LV function, mild AI, LAE, LVH, inferolateral WMA.  He states he is doing ok overall but does have some left sided cp and Tylenol helps alleviate sxs.  States it is sensitive and thinks it is MSK.  Cp nonexertional.  sxs x one month.  sxs daily, short lasting.  He states it is different than his MI anginal pains.  Has B LE calf claudication sxs.  sxs chronic.  R > L LE.   No dyspnea.  Still smokes.  BP is controlled.   No recurrent hemorrhage.  No abdominal pain issues.      Current Outpatient Medications:     amLODIPine (NORVASC) 2.5 MG tablet, Take 1 tablet (2.5 mg total) by mouth once daily.,  "Disp: 30 tablet, Rfl: 11    aspirin (ECOTRIN) 81 MG EC tablet, Take 1 tablet (81 mg total) by mouth once daily., Disp: , Rfl: 0    baclofen (LIORESAL) 10 MG tablet, Take 1 tablet (10 mg total) by mouth every evening., Disp: 30 tablet, Rfl: 0    clopidogrel (PLAVIX) 75 mg tablet, Take 1 tablet (75 mg total) by mouth once daily., Disp: 90 tablet, Rfl: 3    escitalopram oxalate (LEXAPRO) 10 MG tablet, Take 1 tablet (10 mg total) by mouth once daily., Disp: 90 tablet, Rfl: 3    isosorbide mononitrate (IMDUR) 60 MG 24 hr tablet, Take 1 tablet (60 mg total) by mouth once daily., Disp: 30 tablet, Rfl: 11    lisinopril 10 MG tablet, Take 2 tablets (20 mg total) by mouth once daily., Disp: 90 tablet, Rfl: 3    metFORMIN (GLUCOPHAGE) 1000 MG tablet, EARLINE 1 TABLETA VIA ORAL 2 VECES AL NORAH CON COMIDA 90, Disp: 180 tablet, Rfl: 4    nitroGLYCERIN (NITROSTAT) 0.4 MG SL tablet, Place 1 tablet (0.4 mg total) under the tongue every 5 (five) minutes as needed for Chest pain., Disp: 25 tablet, Rfl: 12    pantoprazole (PROTONIX) 40 MG tablet, Take 1 tablet (40 mg total) by mouth once daily., Disp: 30 tablet, Rfl: 1    ranolazine (RANEXA) 500 MG Tb12, Take 1 tablet (500 mg total) by mouth 2 (two) times daily., Disp: 60 tablet, Rfl: 1    simvastatin (ZOCOR) 20 MG tablet, Take 2 tablets (40 mg total) by mouth every evening., Disp: 90 tablet, Rfl: 3      Review of Systems   Constitution: Negative.   HENT: Negative.    Eyes: Negative.    Cardiovascular: Positive for chest pain and claudication.   Respiratory: Negative.    Endocrine: Negative.    Hematologic/Lymphatic: Negative.    Skin: Negative.    Musculoskeletal: Positive for arthritis.   Gastrointestinal: Negative.    Genitourinary: Negative.    Neurological: Negative.    Psychiatric/Behavioral: Negative.    Allergic/Immunologic: Negative.        /66 (BP Location: Left arm, Patient Position: Sitting, BP Method: Medium (Manual))   Pulse 66   Ht 5' 10" (1.778 m)   Wt " 72.8 kg (160 lb 7.9 oz)   SpO2 95%   BMI 23.03 kg/m²   Wt Readings from Last 3 Encounters:   08/10/20 72.8 kg (160 lb 7.9 oz)   11/15/19 72.2 kg (159 lb 2.8 oz)   10/22/19 78 kg (171 lb 15.3 oz)     Temp Readings from Last 3 Encounters:   11/15/19 97.6 °F (36.4 °C) (Oral)   10/17/19 97.6 °F (36.4 °C)   08/26/19 98.4 °F (36.9 °C)     BP Readings from Last 3 Encounters:   08/10/20 138/66   11/15/19 129/61   10/22/19 (!) 164/82     Pulse Readings from Last 3 Encounters:   08/10/20 66   11/15/19 61   10/22/19 84          Objective:    Physical Exam   Constitutional: He is oriented to person, place, and time. He appears well-developed and well-nourished.   HENT:   Head: Normocephalic.   Neck: Normal range of motion. Neck supple. Normal carotid pulses, no hepatojugular reflux and no JVD present. Carotid bruit is not present. No thyromegaly present.   Cardiovascular: Normal rate, regular rhythm, S1 normal and S2 normal. PMI is not displaced. Exam reveals no S3, no S4, no distant heart sounds, no friction rub, no midsystolic click and no opening snap.   No murmur heard.  Pulses:       Radial pulses are 2+ on the right side and 2+ on the left side.        Femoral pulses are 2+ on the right side with bruit and 2+ on the left side.       Dorsalis pedis pulses are 1+ on the right side and 1+ on the left side.        Posterior tibial pulses are 2+ on the right side and 2+ on the left side.   Pulmonary/Chest: Effort normal and breath sounds normal. He has no wheezes. He has no rales.   Abdominal: Soft. Bowel sounds are normal. He exhibits no distension, no abdominal bruit, no ascites and no mass. There is no abdominal tenderness.   Musculoskeletal:         General: No edema.   Neurological: He is alert and oriented to person, place, and time.   Skin: Skin is warm.   Psychiatric: He has a normal mood and affect. His behavior is normal.   Nursing note and vitals reviewed.      I have reviewed all pertinent labs and cardiac  studies.        Chemistry        Component Value Date/Time     11/20/2019 0805    K 4.3 11/20/2019 0805     11/20/2019 0805    CO2 27 11/20/2019 0805    BUN 21 11/20/2019 0805    CREATININE 1.1 11/20/2019 0805     (H) 11/20/2019 0805        Component Value Date/Time    CALCIUM 9.6 11/20/2019 0805    ALKPHOS 65 11/20/2019 0805    AST 17 11/20/2019 0805    ALT 13 11/20/2019 0805    BILITOT 0.6 11/20/2019 0805    ESTGFRAFRICA >60.0 11/20/2019 0805    EGFRNONAA >60.0 11/20/2019 0805        Lab Results   Component Value Date    WBC 6.84 10/22/2019    HGB 10.0 (L) 10/22/2019    HCT 30.9 (L) 10/22/2019    MCV 93 10/22/2019     10/22/2019       Lab Results   Component Value Date    HGBA1C 5.8 (H) 10/14/2019     Lab Results   Component Value Date    CHOL 138 11/20/2019    CHOL 140 10/11/2019    CHOL 217 (H) 06/12/2019     Lab Results   Component Value Date    HDL 46 11/20/2019    HDL 33 (L) 10/11/2019    HDL 37 (L) 06/12/2019     Lab Results   Component Value Date    LDLCALC 72.8 11/20/2019    LDLCALC 74.8 10/11/2019    LDLCALC 129.0 06/12/2019     Lab Results   Component Value Date    TRIG 96 11/20/2019    TRIG 161 (H) 10/11/2019    TRIG 255 (H) 06/12/2019     Lab Results   Component Value Date    CHOLHDL 33.3 11/20/2019    CHOLHDL 23.6 10/11/2019    CHOLHDL 17.1 (L) 06/12/2019           Assessment:       1. Atypical chest pain    2. Abnormal ECG    3. AP (angina pectoris)    4. Coronary artery disease of native artery of native heart with stable angina pectoris    5. CAD, multiple vessel    6. Claudication in peripheral vascular disease    7. Dyslipidemia    8. Essential hypertension    9. History of PTCA    10. Ischemic cardiomyopathy    11. Gastrointestinal hemorrhage associated with gastric ulcer    12. S/P PTCA (percutaneous transluminal coronary angioplasty)    13. S/P AAA repair    14. PAD (peripheral artery disease)    15. PAC (premature atrial contraction)    16. NSTEMI (non-ST elevated  myocardial infarction)    17. Nonrheumatic aortic valve insufficiency    18. Type 2 diabetes mellitus with hyperglycemia, without long-term current use of insulin    19. Tobacco abuse    20. Sinus bradycardia    21. LAE (left atrial enlargement)    22. LVH (left ventricular hypertrophy)         Plan:             Atypical cp, different than prior anginal sxs.  Unclear etiology, could be MSK or other etiology.  Will further evaluate with CXR.  Tylenol ok to continue.   Also echocardiogram and stress MPI.  Continue OMT for CAD.  Chronic B LE claudication sxs.  Reevaluate with B LE PAD vasc studies.  Stable cardiovascular conditions at present time on current medical treatment.  Reviewed all tests and above medical conditions with patient in detail and formulated treatment plan.  Continue optimal medical treatment for cardiovascular conditions.  Cardiac low salt diet discussed.  Daily exercise encouraged, goal 30 +  minutes aerobic exercise as tolerated.  Maintaining healthy weight and weight loss goals (if needed) were discussed in clinic.  Smoking cessation again strongly advised.  Optimal DM HGAIC control advised.  No changes in meds today.  F/u after above tests to make further tx decisions.

## 2020-08-10 ENCOUNTER — OFFICE VISIT (OUTPATIENT)
Dept: CARDIOLOGY | Facility: CLINIC | Age: 80
End: 2020-08-10
Payer: MEDICARE

## 2020-08-10 VITALS
WEIGHT: 160.5 LBS | SYSTOLIC BLOOD PRESSURE: 138 MMHG | HEART RATE: 66 BPM | BODY MASS INDEX: 22.98 KG/M2 | DIASTOLIC BLOOD PRESSURE: 66 MMHG | OXYGEN SATURATION: 95 % | HEIGHT: 70 IN

## 2020-08-10 DIAGNOSIS — I25.118 CORONARY ARTERY DISEASE OF NATIVE ARTERY OF NATIVE HEART WITH STABLE ANGINA PECTORIS: ICD-10-CM

## 2020-08-10 DIAGNOSIS — E78.5 DYSLIPIDEMIA: ICD-10-CM

## 2020-08-10 DIAGNOSIS — R94.31 ABNORMAL ECG: ICD-10-CM

## 2020-08-10 DIAGNOSIS — I35.1 NONRHEUMATIC AORTIC VALVE INSUFFICIENCY: ICD-10-CM

## 2020-08-10 DIAGNOSIS — I20.9 AP (ANGINA PECTORIS): ICD-10-CM

## 2020-08-10 DIAGNOSIS — R00.1 SINUS BRADYCARDIA: ICD-10-CM

## 2020-08-10 DIAGNOSIS — R07.89 ATYPICAL CHEST PAIN: Primary | ICD-10-CM

## 2020-08-10 DIAGNOSIS — I51.7 LAE (LEFT ATRIAL ENLARGEMENT): ICD-10-CM

## 2020-08-10 DIAGNOSIS — I25.5 ISCHEMIC CARDIOMYOPATHY: ICD-10-CM

## 2020-08-10 DIAGNOSIS — I51.7 LVH (LEFT VENTRICULAR HYPERTROPHY): ICD-10-CM

## 2020-08-10 DIAGNOSIS — I73.9 PAD (PERIPHERAL ARTERY DISEASE): ICD-10-CM

## 2020-08-10 DIAGNOSIS — Z72.0 TOBACCO ABUSE: ICD-10-CM

## 2020-08-10 DIAGNOSIS — I10 ESSENTIAL HYPERTENSION: ICD-10-CM

## 2020-08-10 DIAGNOSIS — I25.10 CAD, MULTIPLE VESSEL: ICD-10-CM

## 2020-08-10 DIAGNOSIS — Z98.61 HISTORY OF PTCA: ICD-10-CM

## 2020-08-10 DIAGNOSIS — Z98.890 S/P AAA REPAIR: ICD-10-CM

## 2020-08-10 DIAGNOSIS — E11.65 TYPE 2 DIABETES MELLITUS WITH HYPERGLYCEMIA, WITHOUT LONG-TERM CURRENT USE OF INSULIN: ICD-10-CM

## 2020-08-10 DIAGNOSIS — I21.4 NSTEMI (NON-ST ELEVATED MYOCARDIAL INFARCTION): ICD-10-CM

## 2020-08-10 DIAGNOSIS — Z86.79 S/P AAA REPAIR: ICD-10-CM

## 2020-08-10 DIAGNOSIS — I49.1 PAC (PREMATURE ATRIAL CONTRACTION): ICD-10-CM

## 2020-08-10 DIAGNOSIS — K25.4 GASTROINTESTINAL HEMORRHAGE ASSOCIATED WITH GASTRIC ULCER: ICD-10-CM

## 2020-08-10 DIAGNOSIS — Z98.61 S/P PTCA (PERCUTANEOUS TRANSLUMINAL CORONARY ANGIOPLASTY): ICD-10-CM

## 2020-08-10 DIAGNOSIS — I73.9 CLAUDICATION IN PERIPHERAL VASCULAR DISEASE: ICD-10-CM

## 2020-08-10 PROCEDURE — 99215 PR OFFICE/OUTPT VISIT, EST, LEVL V, 40-54 MIN: ICD-10-PCS | Mod: HCNC,S$GLB,, | Performed by: INTERNAL MEDICINE

## 2020-08-10 PROCEDURE — 1101F PR PT FALLS ASSESS DOC 0-1 FALLS W/OUT INJ PAST YR: ICD-10-PCS | Mod: HCNC,CPTII,S$GLB, | Performed by: INTERNAL MEDICINE

## 2020-08-10 PROCEDURE — 1159F MED LIST DOCD IN RCRD: CPT | Mod: HCNC,S$GLB,, | Performed by: INTERNAL MEDICINE

## 2020-08-10 PROCEDURE — 3075F PR MOST RECENT SYSTOLIC BLOOD PRESS GE 130-139MM HG: ICD-10-PCS | Mod: HCNC,CPTII,S$GLB, | Performed by: INTERNAL MEDICINE

## 2020-08-10 PROCEDURE — 3075F SYST BP GE 130 - 139MM HG: CPT | Mod: HCNC,CPTII,S$GLB, | Performed by: INTERNAL MEDICINE

## 2020-08-10 PROCEDURE — 99999 PR PBB SHADOW E&M-EST. PATIENT-LVL IV: ICD-10-PCS | Mod: PBBFAC,HCNC,, | Performed by: INTERNAL MEDICINE

## 2020-08-10 PROCEDURE — 99999 PR PBB SHADOW E&M-EST. PATIENT-LVL IV: CPT | Mod: PBBFAC,HCNC,, | Performed by: INTERNAL MEDICINE

## 2020-08-10 PROCEDURE — 99499 UNLISTED E&M SERVICE: CPT | Mod: HCNC,S$GLB,, | Performed by: INTERNAL MEDICINE

## 2020-08-10 PROCEDURE — 1126F AMNT PAIN NOTED NONE PRSNT: CPT | Mod: HCNC,S$GLB,, | Performed by: INTERNAL MEDICINE

## 2020-08-10 PROCEDURE — 99215 OFFICE O/P EST HI 40 MIN: CPT | Mod: HCNC,S$GLB,, | Performed by: INTERNAL MEDICINE

## 2020-08-10 PROCEDURE — 3078F DIAST BP <80 MM HG: CPT | Mod: HCNC,CPTII,S$GLB, | Performed by: INTERNAL MEDICINE

## 2020-08-10 PROCEDURE — 1126F PR PAIN SEVERITY QUANTIFIED, NO PAIN PRESENT: ICD-10-PCS | Mod: HCNC,S$GLB,, | Performed by: INTERNAL MEDICINE

## 2020-08-10 PROCEDURE — 99499 RISK ADDL DX/OHS AUDIT: ICD-10-PCS | Mod: HCNC,S$GLB,, | Performed by: INTERNAL MEDICINE

## 2020-08-10 PROCEDURE — 3078F PR MOST RECENT DIASTOLIC BLOOD PRESSURE < 80 MM HG: ICD-10-PCS | Mod: HCNC,CPTII,S$GLB, | Performed by: INTERNAL MEDICINE

## 2020-08-10 PROCEDURE — 1159F PR MEDICATION LIST DOCUMENTED IN MEDICAL RECORD: ICD-10-PCS | Mod: HCNC,S$GLB,, | Performed by: INTERNAL MEDICINE

## 2020-08-10 PROCEDURE — 1101F PT FALLS ASSESS-DOCD LE1/YR: CPT | Mod: HCNC,CPTII,S$GLB, | Performed by: INTERNAL MEDICINE

## 2020-08-17 ENCOUNTER — HOSPITAL ENCOUNTER (OUTPATIENT)
Dept: CARDIOLOGY | Facility: HOSPITAL | Age: 80
Discharge: HOME | End: 2020-08-17
Attending: INTERNAL MEDICINE
Payer: MEDICARE

## 2020-08-17 ENCOUNTER — HOSPITAL ENCOUNTER (OUTPATIENT)
Dept: RADIOLOGY | Facility: HOSPITAL | Age: 80
Discharge: HOME | End: 2020-08-17
Attending: INTERNAL MEDICINE
Payer: MEDICARE

## 2020-08-17 VITALS
HEIGHT: 70 IN | BODY MASS INDEX: 22.9 KG/M2 | BODY MASS INDEX: 22.9 KG/M2 | HEIGHT: 70 IN | WEIGHT: 160 LBS | WEIGHT: 160 LBS

## 2020-08-17 DIAGNOSIS — I73.9 PAD (PERIPHERAL ARTERY DISEASE): ICD-10-CM

## 2020-08-17 DIAGNOSIS — I73.9 CLAUDICATION IN PERIPHERAL VASCULAR DISEASE: ICD-10-CM

## 2020-08-17 DIAGNOSIS — I51.7 LAE (LEFT ATRIAL ENLARGEMENT): ICD-10-CM

## 2020-08-17 DIAGNOSIS — R07.89 ATYPICAL CHEST PAIN: ICD-10-CM

## 2020-08-17 LAB
LEFT ABI: 0.62
LEFT ANT TIBIAL SYS PSV: 20 CM/S
LEFT ARM BP: 178 MMHG
LEFT CFA PSV: 137 CM/S
LEFT DORSALIS PEDIS: 97 MMHG
LEFT PERONEAL SYS PSV: 26 CM/S
LEFT POPLITEAL PSV: 57 CM/S
LEFT POST TIBIAL SYS PSV: 0 CM/S
LEFT POSTERIOR TIBIAL: 111 MMHG
LEFT PROFUNDA SYS PSV: 187 CM/S
LEFT SUPER FEMORAL DIST SYS PSV: 323 CM/S
LEFT SUPER FEMORAL MID SYS PSV: 114 CM/S
LEFT SUPER FEMORAL OSTIAL SYS PSV: 94 CM/S
LEFT SUPER FEMORAL PROX SYS PSV: 448 CM/S
LEFT TBI: 0.4
LEFT TIB/PER TRUNK SYS PSV: 23 CM/S
LEFT TOE PRESSURE: 72 MMHG
OHS CV LEFT LOWER EXTREMITY ABI (NO CALC): 0.62
OHS CV LOWER EXTREMITY STENT MEASUREMENTS - LEFT - DSFA S1 - DIST: 441
OHS CV LOWER EXTREMITY STENT MEASUREMENTS - LEFT - DSFA S1 - MID: 74
OHS CV LOWER EXTREMITY STENT MEASUREMENTS - LEFT - DSFA S1 - PROX: 62
OHS CV LOWER EXTREMITY STENT MEASUREMENTS - LEFT - MSFA S1 - DIST: 58
OHS CV LOWER EXTREMITY STENT MEASUREMENTS - LEFT - MSFA S1 - MID: 62
OHS CV LOWER EXTREMITY STENT MEASUREMENTS - LEFT - MSFA S1 - OST: 114
OHS CV LOWER EXTREMITY STENT MEASUREMENTS - LEFT - MSFA S1 - PROX: 78
OHS CV RIGHT ABI LOWER EXTREMITY (NO CALC): 0.76
RIGHT ABI: 0.76
RIGHT ANT TIBIAL SYS PSV: 11 CM/S
RIGHT ARM BP: 180 MMHG
RIGHT CFA PSV: 218 CM/S
RIGHT DORSALIS PEDIS: 137 MMHG
RIGHT PERONEAL SYS PSV: 0 CM/S
RIGHT POPLITEAL PSV: 88 CM/S
RIGHT POST TIBIAL SYS PSV: 29 CM/S
RIGHT POSTERIOR TIBIAL: 135 MMHG
RIGHT PROFUNDA SYS PSV: 224 CM/S
RIGHT SUPER FEMORAL DIST SYS PSV: 49 CM/S
RIGHT SUPER FEMORAL MID SYS PSV: 306 CM/S
RIGHT SUPER FEMORAL OSTIAL SYS PSV: 125 CM/S
RIGHT SUPER FEMORAL PROX SYS PSV: 264 CM/S
RIGHT TBI: 0.36
RIGHT TIB/PER TRUNK SYS PSV: 37 CM/S
RIGHT TOE PRESSURE: 65 MMHG

## 2020-08-17 PROCEDURE — 93925 LOWER EXTREMITY STUDY: CPT | Mod: 26,HCNC,, | Performed by: INTERNAL MEDICINE

## 2020-08-17 PROCEDURE — 71046 X-RAY EXAM CHEST 2 VIEWS: CPT | Mod: 26,HCNC,, | Performed by: RADIOLOGY

## 2020-08-17 PROCEDURE — 71046 XR CHEST PA AND LATERAL: ICD-10-PCS | Mod: 26,HCNC,, | Performed by: RADIOLOGY

## 2020-08-17 PROCEDURE — 93925 CV US DOPPLER ARTERIAL LEGS BILATERAL (CUPID ONLY): ICD-10-PCS | Mod: 26,HCNC,, | Performed by: INTERNAL MEDICINE

## 2020-08-17 PROCEDURE — 93922 UPR/L XTREMITY ART 2 LEVELS: CPT | Mod: 26,HCNC,, | Performed by: INTERNAL MEDICINE

## 2020-08-17 PROCEDURE — 93922 CV US ANKLE BRACHIAL INDICES EXT LTD WO STRESS (CUPID ONLY): ICD-10-PCS | Mod: 26,HCNC,, | Performed by: INTERNAL MEDICINE

## 2020-08-17 PROCEDURE — 93925 LOWER EXTREMITY STUDY: CPT | Mod: HCNC

## 2020-08-17 PROCEDURE — 71046 X-RAY EXAM CHEST 2 VIEWS: CPT | Mod: TC,HCNC

## 2020-08-17 PROCEDURE — 93922 UPR/L XTREMITY ART 2 LEVELS: CPT | Mod: HCNC

## 2020-09-02 ENCOUNTER — HOSPITAL ENCOUNTER (OUTPATIENT)
Dept: PULMONOLOGY | Facility: HOSPITAL | Age: 80
Discharge: HOME | End: 2020-09-02
Attending: INTERNAL MEDICINE
Payer: MEDICARE

## 2020-09-02 ENCOUNTER — HOSPITAL ENCOUNTER (OUTPATIENT)
Dept: RADIOLOGY | Facility: HOSPITAL | Age: 80
Discharge: HOME | End: 2020-09-02
Attending: INTERNAL MEDICINE
Payer: MEDICARE

## 2020-09-02 ENCOUNTER — HOSPITAL ENCOUNTER (OUTPATIENT)
Dept: CARDIOLOGY | Facility: HOSPITAL | Age: 80
Discharge: HOME | End: 2020-09-02
Attending: INTERNAL MEDICINE
Payer: MEDICARE

## 2020-09-02 VITALS
BODY MASS INDEX: 22.9 KG/M2 | BODY MASS INDEX: 22.9 KG/M2 | HEART RATE: 52 BPM | HEIGHT: 70 IN | DIASTOLIC BLOOD PRESSURE: 66 MMHG | HEART RATE: 66 BPM | DIASTOLIC BLOOD PRESSURE: 82 MMHG | WEIGHT: 160 LBS | HEIGHT: 70 IN | SYSTOLIC BLOOD PRESSURE: 138 MMHG | WEIGHT: 160 LBS | SYSTOLIC BLOOD PRESSURE: 147 MMHG

## 2020-09-02 DIAGNOSIS — I73.9 CLAUDICATION IN PERIPHERAL VASCULAR DISEASE: ICD-10-CM

## 2020-09-02 DIAGNOSIS — I49.1 PAC (PREMATURE ATRIAL CONTRACTION): ICD-10-CM

## 2020-09-02 DIAGNOSIS — I21.4 NSTEMI (NON-ST ELEVATED MYOCARDIAL INFARCTION): ICD-10-CM

## 2020-09-02 DIAGNOSIS — Z98.61 HISTORY OF PTCA: ICD-10-CM

## 2020-09-02 DIAGNOSIS — I73.9 PAD (PERIPHERAL ARTERY DISEASE): ICD-10-CM

## 2020-09-02 DIAGNOSIS — R07.89 ATYPICAL CHEST PAIN: ICD-10-CM

## 2020-09-02 DIAGNOSIS — K25.4 GASTROINTESTINAL HEMORRHAGE ASSOCIATED WITH GASTRIC ULCER: ICD-10-CM

## 2020-09-02 DIAGNOSIS — I25.10 CAD, MULTIPLE VESSEL: ICD-10-CM

## 2020-09-02 DIAGNOSIS — I35.1 NONRHEUMATIC AORTIC VALVE INSUFFICIENCY: ICD-10-CM

## 2020-09-02 DIAGNOSIS — R94.31 ABNORMAL ECG: ICD-10-CM

## 2020-09-02 DIAGNOSIS — I10 ESSENTIAL HYPERTENSION: ICD-10-CM

## 2020-09-02 DIAGNOSIS — Z72.0 TOBACCO ABUSE: ICD-10-CM

## 2020-09-02 DIAGNOSIS — I51.7 LAE (LEFT ATRIAL ENLARGEMENT): ICD-10-CM

## 2020-09-02 DIAGNOSIS — Z98.61 S/P PTCA (PERCUTANEOUS TRANSLUMINAL CORONARY ANGIOPLASTY): ICD-10-CM

## 2020-09-02 DIAGNOSIS — I51.7 LVH (LEFT VENTRICULAR HYPERTROPHY): ICD-10-CM

## 2020-09-02 DIAGNOSIS — Z86.79 S/P AAA REPAIR: ICD-10-CM

## 2020-09-02 DIAGNOSIS — I25.118 CORONARY ARTERY DISEASE OF NATIVE ARTERY OF NATIVE HEART WITH STABLE ANGINA PECTORIS: ICD-10-CM

## 2020-09-02 DIAGNOSIS — I20.9 AP (ANGINA PECTORIS): ICD-10-CM

## 2020-09-02 DIAGNOSIS — R00.1 SINUS BRADYCARDIA: ICD-10-CM

## 2020-09-02 DIAGNOSIS — E11.65 TYPE 2 DIABETES MELLITUS WITH HYPERGLYCEMIA, WITHOUT LONG-TERM CURRENT USE OF INSULIN: ICD-10-CM

## 2020-09-02 DIAGNOSIS — I25.5 ISCHEMIC CARDIOMYOPATHY: ICD-10-CM

## 2020-09-02 DIAGNOSIS — E78.5 DYSLIPIDEMIA: ICD-10-CM

## 2020-09-02 DIAGNOSIS — Z98.890 S/P AAA REPAIR: ICD-10-CM

## 2020-09-02 LAB
ASCENDING AORTA: 3.81 CM
AV MEAN GRADIENT: 5 MMHG
AV PEAK GRADIENT: 9 MMHG
BSA FOR ECHO PROCEDURE: 1.89 M2
CV ECHO LV RWT: 0.69 CM
CV STRESS BASE HR: 52 BPM
DIASTOLIC BLOOD PRESSURE: 82 MMHG
DOP CALC AO PEAK VEL: 1.46 M/S
DOP CALC AO VTI: 31.14 CM
DOP CALC LVOT AREA: 6.6 CM2
DOP CALC LVOT DIAMETER: 2.91 CM
E WAVE DECELERATION TIME: 286.36 MSEC
E/A RATIO: 0.87
ECHO LV POSTERIOR WALL: 1.6 CM (ref 0.6–1.1)
EJECTION FRACTION- HIGH: 65 %
END DIASTOLIC INDEX-HIGH: 158 ML/M2
END DIASTOLIC INDEX-LOW: 94 ML/M2
END SYSTOLIC INDEX-HIGH: 71 ML/M2
END SYSTOLIC INDEX-LOW: 33 ML/M2
FRACTIONAL SHORTENING: 14 % (ref 28–44)
INTERVENTRICULAR SEPTUM: 1.65 CM (ref 0.6–1.1)
LA MAJOR: 5.39 CM
LA MINOR: 5.52 CM
LA WIDTH: 4.13 CM
LEFT ATRIUM SIZE: 4.51 CM
LEFT ATRIUM VOLUME INDEX: 45.5 ML/M2
LEFT ATRIUM VOLUME: 86.35 CM3
LEFT INTERNAL DIMENSION IN SYSTOLE: 4.01 CM (ref 2.1–4)
LEFT VENTRICLE DIASTOLIC VOLUME INDEX: 52.36 ML/M2
LEFT VENTRICLE DIASTOLIC VOLUME: 99.41 ML
LEFT VENTRICLE MASS INDEX: 172 G/M2
LEFT VENTRICLE SYSTOLIC VOLUME INDEX: 37.2 ML/M2
LEFT VENTRICLE SYSTOLIC VOLUME: 70.62 ML
LEFT VENTRICULAR INTERNAL DIMENSION IN DIASTOLE: 4.64 CM (ref 3.5–6)
LEFT VENTRICULAR MASS: 326.14 G
MV PEAK A VEL: 0.69 M/S
MV PEAK E VEL: 0.6 M/S
MV STENOSIS PRESSURE HALF TIME: 83.04 MS
MV VALVE AREA P 1/2 METHOD: 2.65 CM2
NUC REST DIASTOLIC VOLUME INDEX: 140
NUC REST EJECTION FRACTION: 42
NUC REST SYSTOLIC VOLUME INDEX: 81
NUC STRESS DIASTOLIC VOLUME INDEX: 137
NUC STRESS EJECTION FRACTION: 53 %
NUC STRESS SYSTOLIC VOLUME INDEX: 65
OHS CV CPX 85 PERCENT MAX PREDICTED HEART RATE MALE: 119
OHS CV CPX MAX PREDICTED HEART RATE: 140
OHS CV CPX PATIENT IS FEMALE: 0
OHS CV CPX PATIENT IS MALE: 1
OHS CV CPX PEAK DIASTOLIC BLOOD PRESSURE: 82 MMHG
OHS CV CPX PEAK HEAR RATE: 63 BPM
OHS CV CPX PEAK RATE PRESSURE PRODUCT: 9261
OHS CV CPX PEAK SYSTOLIC BLOOD PRESSURE: 147 MMHG
OHS CV CPX PERCENT MAX PREDICTED HEART RATE ACHIEVED: 45
OHS CV CPX RATE PRESSURE PRODUCT PRESENTING: 7644
PISA TR MAX VEL: 2.27 M/S
RA MAJOR: 5.27 CM
RA PRESSURE: 3 MMHG
RA WIDTH: 3.74 CM
RETIRED EF AND QEF - SEE NOTES: 53 %
SINUS: 3.59 CM
STJ: 3.16 CM
SYSTOLIC BLOOD PRESSURE: 147 MMHG
TR MAX PG: 21 MMHG
TRICUSPID ANNULAR PLANE SYSTOLIC EXCURSION: 1.64 CM
TV REST PULMONARY ARTERY PRESSURE: 24 MMHG

## 2020-09-02 PROCEDURE — A9502 TC99M TETROFOSMIN: HCPCS | Mod: HCNC

## 2020-09-02 PROCEDURE — 93306 TTE W/DOPPLER COMPLETE: CPT | Mod: 26,HCNC,, | Performed by: INTERNAL MEDICINE

## 2020-09-02 PROCEDURE — 93306 ECHO (CUPID ONLY): ICD-10-PCS | Mod: 26,HCNC,, | Performed by: INTERNAL MEDICINE

## 2020-09-02 PROCEDURE — 63600175 PHARM REV CODE 636 W HCPCS: Mod: HCNC | Performed by: INTERNAL MEDICINE

## 2020-09-02 PROCEDURE — 93306 TTE W/DOPPLER COMPLETE: CPT | Mod: HCNC

## 2020-09-02 RX ORDER — REGADENOSON 0.08 MG/ML
0.4 INJECTION, SOLUTION INTRAVENOUS ONCE
Status: COMPLETED | OUTPATIENT
Start: 2020-09-02 | End: 2020-09-02

## 2020-09-02 RX ADMIN — REGADENOSON 0.4 MG: 0.08 INJECTION, SOLUTION INTRAVENOUS at 09:09

## 2020-09-21 ENCOUNTER — OFFICE VISIT (OUTPATIENT)
Dept: CARDIOLOGY | Facility: CLINIC | Age: 80
End: 2020-09-21
Payer: MEDICARE

## 2020-09-21 ENCOUNTER — PATIENT OUTREACH (OUTPATIENT)
Dept: ADMINISTRATIVE | Facility: OTHER | Age: 80
End: 2020-09-21

## 2020-09-21 VITALS
HEART RATE: 68 BPM | BODY MASS INDEX: 23.01 KG/M2 | OXYGEN SATURATION: 97 % | SYSTOLIC BLOOD PRESSURE: 170 MMHG | DIASTOLIC BLOOD PRESSURE: 80 MMHG | WEIGHT: 160.69 LBS | HEIGHT: 70 IN

## 2020-09-21 DIAGNOSIS — I25.10 CAD, MULTIPLE VESSEL: ICD-10-CM

## 2020-09-21 DIAGNOSIS — I73.9 PAD (PERIPHERAL ARTERY DISEASE): ICD-10-CM

## 2020-09-21 DIAGNOSIS — I25.118 CORONARY ARTERY DISEASE OF NATIVE ARTERY OF NATIVE HEART WITH STABLE ANGINA PECTORIS: ICD-10-CM

## 2020-09-21 DIAGNOSIS — I51.7 LAE (LEFT ATRIAL ENLARGEMENT): ICD-10-CM

## 2020-09-21 DIAGNOSIS — I25.5 ISCHEMIC CARDIOMYOPATHY: ICD-10-CM

## 2020-09-21 DIAGNOSIS — I10 ESSENTIAL HYPERTENSION: Primary | ICD-10-CM

## 2020-09-21 DIAGNOSIS — R07.89 ATYPICAL CHEST PAIN: ICD-10-CM

## 2020-09-21 DIAGNOSIS — Z98.61 S/P PTCA (PERCUTANEOUS TRANSLUMINAL CORONARY ANGIOPLASTY): ICD-10-CM

## 2020-09-21 DIAGNOSIS — E11.65 TYPE 2 DIABETES MELLITUS WITH HYPERGLYCEMIA, WITHOUT LONG-TERM CURRENT USE OF INSULIN: ICD-10-CM

## 2020-09-21 DIAGNOSIS — Z86.79 S/P AAA REPAIR: ICD-10-CM

## 2020-09-21 DIAGNOSIS — Z98.61 HISTORY OF PTCA: ICD-10-CM

## 2020-09-21 DIAGNOSIS — Z98.890 S/P AAA REPAIR: ICD-10-CM

## 2020-09-21 DIAGNOSIS — I20.9 AP (ANGINA PECTORIS): ICD-10-CM

## 2020-09-21 DIAGNOSIS — I35.1 NONRHEUMATIC AORTIC VALVE INSUFFICIENCY: ICD-10-CM

## 2020-09-21 DIAGNOSIS — I21.4 NSTEMI (NON-ST ELEVATED MYOCARDIAL INFARCTION): ICD-10-CM

## 2020-09-21 DIAGNOSIS — Z72.0 TOBACCO ABUSE: ICD-10-CM

## 2020-09-21 DIAGNOSIS — E78.5 DYSLIPIDEMIA: ICD-10-CM

## 2020-09-21 DIAGNOSIS — R00.1 SINUS BRADYCARDIA: ICD-10-CM

## 2020-09-21 DIAGNOSIS — I49.1 PAC (PREMATURE ATRIAL CONTRACTION): ICD-10-CM

## 2020-09-21 DIAGNOSIS — I73.9 CLAUDICATION IN PERIPHERAL VASCULAR DISEASE: ICD-10-CM

## 2020-09-21 DIAGNOSIS — I25.118 CORONARY ARTERY DISEASE OF NATIVE ARTERY OF NATIVE HEART WITH STABLE ANGINA PECTORIS: Primary | ICD-10-CM

## 2020-09-21 DIAGNOSIS — E11.65 TYPE 2 DIABETES MELLITUS WITH HYPERGLYCEMIA, WITHOUT LONG-TERM CURRENT USE OF INSULIN: Primary | ICD-10-CM

## 2020-09-21 DIAGNOSIS — I51.7 LVH (LEFT VENTRICULAR HYPERTROPHY): ICD-10-CM

## 2020-09-21 DIAGNOSIS — R94.31 ABNORMAL ECG: ICD-10-CM

## 2020-09-21 PROCEDURE — 3077F PR MOST RECENT SYSTOLIC BLOOD PRESSURE >= 140 MM HG: ICD-10-PCS | Mod: HCNC,CPTII,S$GLB, | Performed by: INTERNAL MEDICINE

## 2020-09-21 PROCEDURE — 99214 OFFICE O/P EST MOD 30 MIN: CPT | Mod: HCNC,S$GLB,, | Performed by: INTERNAL MEDICINE

## 2020-09-21 PROCEDURE — 99999 PR PBB SHADOW E&M-EST. PATIENT-LVL III: ICD-10-PCS | Mod: PBBFAC,HCNC,, | Performed by: INTERNAL MEDICINE

## 2020-09-21 PROCEDURE — 1159F MED LIST DOCD IN RCRD: CPT | Mod: HCNC,S$GLB,, | Performed by: INTERNAL MEDICINE

## 2020-09-21 PROCEDURE — 3079F DIAST BP 80-89 MM HG: CPT | Mod: HCNC,CPTII,S$GLB, | Performed by: INTERNAL MEDICINE

## 2020-09-21 PROCEDURE — 3079F PR MOST RECENT DIASTOLIC BLOOD PRESSURE 80-89 MM HG: ICD-10-PCS | Mod: HCNC,CPTII,S$GLB, | Performed by: INTERNAL MEDICINE

## 2020-09-21 PROCEDURE — 1101F PR PT FALLS ASSESS DOC 0-1 FALLS W/OUT INJ PAST YR: ICD-10-PCS | Mod: HCNC,CPTII,S$GLB, | Performed by: INTERNAL MEDICINE

## 2020-09-21 PROCEDURE — 1126F PR PAIN SEVERITY QUANTIFIED, NO PAIN PRESENT: ICD-10-PCS | Mod: HCNC,S$GLB,, | Performed by: INTERNAL MEDICINE

## 2020-09-21 PROCEDURE — 1159F PR MEDICATION LIST DOCUMENTED IN MEDICAL RECORD: ICD-10-PCS | Mod: HCNC,S$GLB,, | Performed by: INTERNAL MEDICINE

## 2020-09-21 PROCEDURE — 3077F SYST BP >= 140 MM HG: CPT | Mod: HCNC,CPTII,S$GLB, | Performed by: INTERNAL MEDICINE

## 2020-09-21 PROCEDURE — 99499 RISK ADDL DX/OHS AUDIT: ICD-10-PCS | Mod: HCNC,S$GLB,, | Performed by: INTERNAL MEDICINE

## 2020-09-21 PROCEDURE — 99999 PR PBB SHADOW E&M-EST. PATIENT-LVL III: CPT | Mod: PBBFAC,HCNC,, | Performed by: INTERNAL MEDICINE

## 2020-09-21 PROCEDURE — 1126F AMNT PAIN NOTED NONE PRSNT: CPT | Mod: HCNC,S$GLB,, | Performed by: INTERNAL MEDICINE

## 2020-09-21 PROCEDURE — 1101F PT FALLS ASSESS-DOCD LE1/YR: CPT | Mod: HCNC,CPTII,S$GLB, | Performed by: INTERNAL MEDICINE

## 2020-09-21 PROCEDURE — 99499 UNLISTED E&M SERVICE: CPT | Mod: HCNC,S$GLB,, | Performed by: INTERNAL MEDICINE

## 2020-09-21 PROCEDURE — 99214 PR OFFICE/OUTPT VISIT, EST, LEVL IV, 30-39 MIN: ICD-10-PCS | Mod: HCNC,S$GLB,, | Performed by: INTERNAL MEDICINE

## 2020-09-21 RX ORDER — AMLODIPINE BESYLATE 5 MG/1
5 TABLET ORAL DAILY
Qty: 30 TABLET | Refills: 11 | Status: SHIPPED | OUTPATIENT
Start: 2020-09-21 | End: 2021-10-02

## 2020-09-21 RX ORDER — ISOSORBIDE MONONITRATE 120 MG/1
120 TABLET, EXTENDED RELEASE ORAL DAILY
Qty: 30 TABLET | Refills: 11 | Status: SHIPPED | OUTPATIENT
Start: 2020-09-21 | End: 2021-08-01

## 2020-09-21 NOTE — PROGRESS NOTES
Subjective:    Patient ID:  Aquiles Kelsey is a 80 y.o. male who presents for evaluation of Hypertension, Hyperlipidemia, Coronary Artery Disease, Risk Factor Management For Atherosclerosis, Peripheral Arterial Disease, Congestive Heart Failure, and Claudication        HPI Pt presents for f/u.  His current medical conditions include CAD (multiple PCI procedures), HTN, DM, LAE, LVH, PAD, AAA stent graft (approx 2013), hyperlipidemia, cigar use.  Past hx pertinent for following:  H/o L LE stents in [place].   First PCI 1998, total of 5 stents with last one in [place] 2013.  Echo 7/19 normal LVEF, DD,  LVH, mild AI.   Holter 7/19 NSR, fleeting NSVT, EAT, rare PVCs, occasional PACs.  B LE vasc studies 7/19 B LE PAD, L > R LE.   Pt had PCI KYLIE x one to mid LCX ISR, KYLIE x 2 to OM2, and PCI prox RCA KYLIE x 2, PTCA mid RCA ISR.  Failed PCI distal occluded RCA.  EF 45%.  Nonobstructive LAD disease, small diffusely diseased diagonal vessels.  He developed post PCI GI bleed and required PRBC and EGD showing angiodysplasia tx w cauterization.  DM HGAIC at goal on 10/19 labs.  Lipids 11/19 well controlled on Simvastatin.  ecg 10/14/19 junctional bradycardia 54 bpm, inferior infarct, inferolateral st-t abnl.  Echo 10/19 normal LV function, mild AI, LAE, LVH, inferolateral WMA.  Pt seen 8/20 and reported the following:  He states he is doing ok overall but does have some left sided cp and Tylenol helps alleviate sxs.  States it is sensitive and thinks it is MSK.  Cp nonexertional.  sxs x one month.  sxs daily, short lasting.  He states it is different than his MI anginal pains.  Has B LE calf claudication sxs.  sxs chronic.  R > L LE.   Now here.  No change in symptomatology from last month.  Atypical cp/angina.  No active CHF sxs.  Stable claudication sxs.  Stress MPI 9/20 inferolateral scar with mild pato-infarct ischemia, EF 42%.  Echo 9/20 LVEF 40 - 45%, DD, LAE, LVH, WMA.  B LE arterial vasc studies 8/20:  Severe B LE PAD.    JOSELYN 8/20  R LE 0.76, L LE 0.62  BP runs high in clinic.  Still smokes few cigars daily.  DM HGAIC controlled.      Current Outpatient Medications:     amLODIPine (NORVASC) 2.5 MG tablet, TAKE 1 TABLET BY MOUTH EVERY DAY, Disp: 90 tablet, Rfl: 3    aspirin (ECOTRIN) 81 MG EC tablet, Take 1 tablet (81 mg total) by mouth once daily., Disp: , Rfl: 0    baclofen (LIORESAL) 10 MG tablet, Take 1 tablet (10 mg total) by mouth every evening., Disp: 30 tablet, Rfl: 0    clopidogrel (PLAVIX) 75 mg tablet, Take 1 tablet (75 mg total) by mouth once daily., Disp: 90 tablet, Rfl: 3    escitalopram oxalate (LEXAPRO) 10 MG tablet, Take 1 tablet (10 mg total) by mouth once daily., Disp: 90 tablet, Rfl: 3    isosorbide mononitrate (IMDUR) 60 MG 24 hr tablet, Take 1 tablet (60 mg total) by mouth once daily., Disp: 30 tablet, Rfl: 11    lisinopril 10 MG tablet, Take 2 tablets (20 mg total) by mouth once daily., Disp: 90 tablet, Rfl: 3    metFORMIN (GLUCOPHAGE) 1000 MG tablet, EARLINE 1 TABLETA VIA ORAL 2 VECES AL NORAH CON COMIDA 90, Disp: 180 tablet, Rfl: 4    nitroGLYCERIN (NITROSTAT) 0.4 MG SL tablet, Place 1 tablet (0.4 mg total) under the tongue every 5 (five) minutes as needed for Chest pain., Disp: 25 tablet, Rfl: 12    pantoprazole (PROTONIX) 40 MG tablet, Take 1 tablet (40 mg total) by mouth once daily., Disp: 30 tablet, Rfl: 1    ranolazine (RANEXA) 500 MG Tb12, Take 1 tablet (500 mg total) by mouth 2 (two) times daily., Disp: 60 tablet, Rfl: 1    simvastatin (ZOCOR) 20 MG tablet, TAKE 1 TABLET BY MOUTH EVERY DAY IN THE EVENING, Disp: 90 tablet, Rfl: 3      Review of Systems   Constitution: Negative.   HENT: Negative.    Eyes: Negative.    Cardiovascular: Positive for chest pain and claudication.   Respiratory: Negative.    Endocrine: Negative.    Hematologic/Lymphatic: Negative.    Skin: Negative.    Musculoskeletal: Positive for arthritis.   Gastrointestinal: Negative.    Genitourinary: Negative.    Neurological:  "Negative.    Psychiatric/Behavioral: Negative.    Allergic/Immunologic: Negative.        BP (!) 170/80 (BP Location: Left arm, Patient Position: Sitting, BP Method: Large (Manual))   Pulse 68   Ht 5' 10" (1.778 m)   Wt 72.9 kg (160 lb 11.5 oz)   SpO2 97%   BMI 23.06 kg/m²     Wt Readings from Last 3 Encounters:   09/21/20 72.9 kg (160 lb 11.5 oz)   09/02/20 72.6 kg (160 lb)   09/02/20 72.6 kg (160 lb)     Temp Readings from Last 3 Encounters:   11/15/19 97.6 °F (36.4 °C) (Oral)   10/17/19 97.6 °F (36.4 °C)   08/26/19 98.4 °F (36.9 °C)     BP Readings from Last 3 Encounters:   09/21/20 (!) 170/80   09/02/20 138/66   09/02/20 (!) 147/82     Pulse Readings from Last 3 Encounters:   09/21/20 68   09/02/20 66   09/02/20 (!) 52          Objective:    Physical Exam   Constitutional: He is oriented to person, place, and time. He appears well-developed and well-nourished.   HENT:   Head: Normocephalic.   Neck: Normal range of motion. Neck supple. Normal carotid pulses, no hepatojugular reflux and no JVD present. Carotid bruit is not present. No thyromegaly present.   Cardiovascular: Normal rate, regular rhythm, S1 normal and S2 normal. PMI is not displaced. Exam reveals no S3, no S4, no distant heart sounds, no friction rub, no midsystolic click and no opening snap.   No murmur heard.  Pulses:       Radial pulses are 2+ on the right side and 2+ on the left side.   Pulmonary/Chest: Effort normal and breath sounds normal. He has no wheezes. He has no rales.   Abdominal: Soft. Bowel sounds are normal. He exhibits no distension, no abdominal bruit, no ascites and no mass. There is no abdominal tenderness.   Musculoskeletal:         General: No edema.   Neurological: He is alert and oriented to person, place, and time.   Skin: Skin is warm.   Psychiatric: He has a normal mood and affect. His behavior is normal.   Nursing note and vitals reviewed.      I have reviewed all pertinent labs and cardiac studies.      Chemistry     "    Component Value Date/Time     11/20/2019 0805    K 4.3 11/20/2019 0805     11/20/2019 0805    CO2 27 11/20/2019 0805    BUN 21 11/20/2019 0805    CREATININE 1.1 11/20/2019 0805     (H) 11/20/2019 0805        Component Value Date/Time    CALCIUM 9.6 11/20/2019 0805    ALKPHOS 65 11/20/2019 0805    AST 17 11/20/2019 0805    ALT 13 11/20/2019 0805    BILITOT 0.6 11/20/2019 0805    ESTGFRAFRICA >60.0 11/20/2019 0805    EGFRNONAA >60.0 11/20/2019 0805        Lab Results   Component Value Date    WBC 6.84 10/22/2019    HGB 10.0 (L) 10/22/2019    HCT 30.9 (L) 10/22/2019    MCV 93 10/22/2019     10/22/2019       Lab Results   Component Value Date    HGBA1C 5.8 (H) 10/14/2019       Lab Results   Component Value Date    CHOL 138 11/20/2019    CHOL 140 10/11/2019    CHOL 217 (H) 06/12/2019     Lab Results   Component Value Date    HDL 46 11/20/2019    HDL 33 (L) 10/11/2019    HDL 37 (L) 06/12/2019     Lab Results   Component Value Date    LDLCALC 72.8 11/20/2019    LDLCALC 74.8 10/11/2019    LDLCALC 129.0 06/12/2019     Lab Results   Component Value Date    TRIG 96 11/20/2019    TRIG 161 (H) 10/11/2019    TRIG 255 (H) 06/12/2019     Lab Results   Component Value Date    CHOLHDL 33.3 11/20/2019    CHOLHDL 23.6 10/11/2019    CHOLHDL 17.1 (L) 06/12/2019       Results for orders placed during the hospital encounter of 09/02/20   Nuclear Stress - Cardiology Interpreted    Narrative   Abnormal myocardial perfusion study.    Perfusion Defect There is a severe intensity, fixed defect consistent   with scar  in the basal to distal inferolateral wall(s) with mild   pato-infarct ischemia.    Gated perfusion images showed an ejection fraction of 42% at rest and   53% post stress. Normal ejection fraction is greater than 53%.    The EKG portion of this study is negative for ischemia.    There were no arrhythmias during stress.             Results for orders placed during the hospital encounter of 09/02/20    Echo Color Flow Doppler? Yes    Narrative · Mildly decreased left ventricular systolic function. The estimated   ejection fraction is 40 - 45%.  · Grade I (mild) left ventricular diastolic dysfunction consistent with   impaired relaxation.  · Normal right ventricular systolic function.  · Concentric left ventricular hypertrophy.  · Local segmental wall motion abnormalities.  · Moderate left atrial enlargement.        Conclusion    · Mili suggestive of moderate pad bilaterally worse on the left.  · Bilateral 76-99% sfa stenosis with monophasic flow ijn frapopliteal vessle.s  · 76-99% stensosi in lsfa stent.              Assessment:       1. Essential hypertension    2. S/P AAA repair    3. PAD (peripheral artery disease)    4. S/P PTCA (percutaneous transluminal coronary angioplasty)    5. Coronary artery disease of native artery of native heart with stable angina pectoris    6. Type 2 diabetes mellitus with hyperglycemia, without long-term current use of insulin    7. Dyslipidemia    8. History of PTCA    9. Abnormal ECG    10. PAC (premature atrial contraction)    11. Claudication in peripheral vascular disease    12. Nonrheumatic aortic valve insufficiency    13. NSTEMI (non-ST elevated myocardial infarction)    14. AP (angina pectoris)    15. Tobacco abuse    16. Atypical chest pain    17. LVH (left ventricular hypertrophy)    18. LAE (left atrial enlargement)    19. Ischemic cardiomyopathy    20. CAD, multiple vessel    21. Sinus bradycardia         Plan:             Optimize med tx further for chronic multivessel CAD and atypical cp/angina.  Increase Imdur from 60 mg qd to 120 mg qd.  Increase Amlodipine 2.5 mg qd to 5 mg qd.  Continue other CV meds.  Vascular surgery consult for AAA f/u and PAD.  Continue DAPT for CV protection.  Reviewed all tests and above medical conditions with patient in detail and formulated treatment plan.  Continue optimal medical treatment for cardiovascular conditions.  Cardiac low  salt diet discussed.  Daily exercise encouraged, goal 30 +  minutes aerobic exercise as tolerated.  Maintaining healthy weight and weight loss goals (if needed) were discussed in clinic.  Smoking cessation advised.  F/u 8 weeks.

## 2020-09-21 NOTE — PROGRESS NOTES
Chart Reviewed  Care Everywhere updated  Immunizations reconciled  Health Maintenance updated  Ordered: a1c  Upcoming:

## 2020-10-08 DIAGNOSIS — I25.118 CORONARY ARTERY DISEASE OF NATIVE ARTERY OF NATIVE HEART WITH STABLE ANGINA PECTORIS: ICD-10-CM

## 2020-10-08 DIAGNOSIS — I73.9 PAD (PERIPHERAL ARTERY DISEASE): Primary | ICD-10-CM

## 2020-10-08 DIAGNOSIS — I25.118 CORONARY ARTERY DISEASE OF NATIVE ARTERY OF NATIVE HEART WITH STABLE ANGINA PECTORIS: Primary | ICD-10-CM

## 2020-10-08 RX ORDER — CLOPIDOGREL BISULFATE 75 MG/1
TABLET ORAL
Qty: 90 TABLET | Refills: 3 | Status: SHIPPED | OUTPATIENT
Start: 2020-10-08 | End: 2021-10-08

## 2020-10-12 ENCOUNTER — OFFICE VISIT (OUTPATIENT)
Dept: FAMILY MEDICINE | Facility: CLINIC | Age: 80
End: 2020-10-12
Payer: MEDICARE

## 2020-10-12 VITALS
SYSTOLIC BLOOD PRESSURE: 160 MMHG | WEIGHT: 160.69 LBS | BODY MASS INDEX: 23.01 KG/M2 | TEMPERATURE: 98 F | DIASTOLIC BLOOD PRESSURE: 78 MMHG | HEART RATE: 70 BPM | HEIGHT: 70 IN

## 2020-10-12 DIAGNOSIS — N64.4 BREAST PAIN IN MALE: Primary | ICD-10-CM

## 2020-10-12 DIAGNOSIS — I10 ESSENTIAL HYPERTENSION: ICD-10-CM

## 2020-10-12 PROCEDURE — 99213 PR OFFICE/OUTPT VISIT, EST, LEVL III, 20-29 MIN: ICD-10-PCS | Mod: HCNC,S$GLB,, | Performed by: REGISTERED NURSE

## 2020-10-12 PROCEDURE — 3078F PR MOST RECENT DIASTOLIC BLOOD PRESSURE < 80 MM HG: ICD-10-PCS | Mod: HCNC,CPTII,S$GLB, | Performed by: REGISTERED NURSE

## 2020-10-12 PROCEDURE — 99213 OFFICE O/P EST LOW 20 MIN: CPT | Mod: HCNC,S$GLB,, | Performed by: REGISTERED NURSE

## 2020-10-12 PROCEDURE — 3077F SYST BP >= 140 MM HG: CPT | Mod: HCNC,CPTII,S$GLB, | Performed by: REGISTERED NURSE

## 2020-10-12 PROCEDURE — 1101F PT FALLS ASSESS-DOCD LE1/YR: CPT | Mod: HCNC,CPTII,S$GLB, | Performed by: REGISTERED NURSE

## 2020-10-12 PROCEDURE — 99999 PR PBB SHADOW E&M-EST. PATIENT-LVL IV: CPT | Mod: PBBFAC,HCNC,, | Performed by: REGISTERED NURSE

## 2020-10-12 PROCEDURE — 3078F DIAST BP <80 MM HG: CPT | Mod: HCNC,CPTII,S$GLB, | Performed by: REGISTERED NURSE

## 2020-10-12 PROCEDURE — 1126F PR PAIN SEVERITY QUANTIFIED, NO PAIN PRESENT: ICD-10-PCS | Mod: HCNC,S$GLB,, | Performed by: REGISTERED NURSE

## 2020-10-12 PROCEDURE — 1159F PR MEDICATION LIST DOCUMENTED IN MEDICAL RECORD: ICD-10-PCS | Mod: HCNC,S$GLB,, | Performed by: REGISTERED NURSE

## 2020-10-12 PROCEDURE — 99999 PR PBB SHADOW E&M-EST. PATIENT-LVL IV: ICD-10-PCS | Mod: PBBFAC,HCNC,, | Performed by: REGISTERED NURSE

## 2020-10-12 PROCEDURE — 1101F PR PT FALLS ASSESS DOC 0-1 FALLS W/OUT INJ PAST YR: ICD-10-PCS | Mod: HCNC,CPTII,S$GLB, | Performed by: REGISTERED NURSE

## 2020-10-12 PROCEDURE — 3077F PR MOST RECENT SYSTOLIC BLOOD PRESSURE >= 140 MM HG: ICD-10-PCS | Mod: HCNC,CPTII,S$GLB, | Performed by: REGISTERED NURSE

## 2020-10-12 PROCEDURE — 1126F AMNT PAIN NOTED NONE PRSNT: CPT | Mod: HCNC,S$GLB,, | Performed by: REGISTERED NURSE

## 2020-10-12 PROCEDURE — 1159F MED LIST DOCD IN RCRD: CPT | Mod: HCNC,S$GLB,, | Performed by: REGISTERED NURSE

## 2020-10-12 RX ORDER — NEOMYCIN SULFATE, POLYMYXIN B SULFATE AND HYDROCORTISONE 10; 3.5; 1 MG/ML; MG/ML; [USP'U]/ML
SUSPENSION/ DROPS AURICULAR (OTIC)
COMMUNITY
Start: 2020-08-15 | End: 2022-11-04

## 2020-10-12 NOTE — PROGRESS NOTES
"Subjective:       Patient ID: Aquiles Kelsey is a 80 y.o. male.    Chief Complaint   Patient presents with    Breast Pain       HPI     Patient here today with c/o tender left breast & nipple.  Denies injury or trauma.  Denies f/h breast problems.  Nipple and underneath "has been sensitive".  Pinching pain.  Has not treated at home.      Review of Systems  See HPI      Review of patient's allergies indicates:   Allergen Reactions    Metoprolol      Junctional rhythm           Patient Active Problem List   Diagnosis    CAD (coronary artery disease)    S/P PTCA (percutaneous transluminal coronary angioplasty)    PAD (peripheral artery disease)    S/P AAA repair    Essential hypertension    Dyslipidemia    Type 2 diabetes mellitus with hyperglycemia, without long-term current use of insulin    Depression    Decreased hearing of both ears    Abnormal ECG    History of PTCA    PAC (premature atrial contraction)    Claudication in peripheral vascular disease    Nonrheumatic aortic valve insufficiency    Other chest pain    NSTEMI (non-ST elevated myocardial infarction)    AP (angina pectoris)    Tobacco abuse    Sinus bradycardia    CAD, multiple vessel    Ischemic cardiomyopathy    Acute blood loss anemia    GI bleeding    LAE (left atrial enlargement)    LVH (left ventricular hypertrophy)    Atypical chest pain         Current Outpatient Medications:     amLODIPine (NORVASC) 5 MG tablet, Take 1 tablet (5 mg total) by mouth once daily., Disp: 30 tablet, Rfl: 11    aspirin (ECOTRIN) 81 MG EC tablet, Take 1 tablet (81 mg total) by mouth once daily., Disp: , Rfl: 0    baclofen (LIORESAL) 10 MG tablet, Take 1 tablet (10 mg total) by mouth every evening., Disp: 30 tablet, Rfl: 0    clopidogreL (PLAVIX) 75 mg tablet, TAKE 1 TABLET BY MOUTH EVERY DAY, Disp: 90 tablet, Rfl: 3    escitalopram oxalate (LEXAPRO) 10 MG tablet, Take 1 tablet (10 mg total) by mouth once daily., Disp: 90 " "tablet, Rfl: 3    isosorbide mononitrate (IMDUR) 120 MG 24 hr tablet, Take 1 tablet (120 mg total) by mouth once daily., Disp: 30 tablet, Rfl: 11    lisinopril 10 MG tablet, Take 2 tablets (20 mg total) by mouth once daily., Disp: 90 tablet, Rfl: 3    metFORMIN (GLUCOPHAGE) 1000 MG tablet, EARLINE 1 TABLETA VIA ORAL 2 VECES AL NORAH CON COMIDA 90, Disp: 180 tablet, Rfl: 4    neomycin-polymyxin-hydrocortisone (CORTISPORIN) 3.5-10,000-1 mg/mL-unit/mL-% otic suspension, , Disp: , Rfl:     nitroGLYCERIN (NITROSTAT) 0.4 MG SL tablet, Place 1 tablet (0.4 mg total) under the tongue every 5 (five) minutes as needed for Chest pain., Disp: 25 tablet, Rfl: 12    pantoprazole (PROTONIX) 40 MG tablet, Take 1 tablet (40 mg total) by mouth once daily., Disp: 30 tablet, Rfl: 1    ranolazine (RANEXA) 500 MG Tb12, Take 1 tablet (500 mg total) by mouth 2 (two) times daily., Disp: 60 tablet, Rfl: 1    simvastatin (ZOCOR) 20 MG tablet, TAKE 1 TABLET BY MOUTH EVERY DAY IN THE EVENING, Disp: 90 tablet, Rfl: 3        Past medical, surgical, family and social histories have been reviewed today.      Objective:     Vitals:    10/12/20 1405   BP: (!) 160/78   Pulse: 70   Temp: 97.9 °F (36.6 °C)   TempSrc: Oral   Weight: 72.9 kg (160 lb 11.5 oz)   Height: 5' 10" (1.778 m)   PainSc: 0-No pain         Physical Exam  Vitals signs reviewed.   HENT:      Head: Normocephalic and atraumatic.   Chest:      Chest wall: No mass or swelling.      Breasts:         Right: Normal.         Left: Tenderness present. No swelling, bleeding, inverted nipple, mass, nipple discharge or skin change.       Skin:     Findings: No erythema or rash.   Neurological:      Mental Status: He is alert and oriented to person, place, and time.   Psychiatric:         Mood and Affect: Mood normal.         Behavior: Behavior normal.         Thought Content: Thought content normal.         Judgment: Judgment normal.           Diagnosis       1. Breast pain in male    2. " Essential hypertension          Assessment/ Plan     Breast pain in male --- cause is unclear at this time.  Breast US ordered.  Can try ice and/or heat to area, Tylenol prn pain.  -     US Breast Left Complete; Future; Expected date: 10/12/2020    Essential hypertension --- uncontrolled, per Cardiology.        Follow-up:   US pending.  Return prn.      TIFFANY Marley  [facility]

## 2020-10-16 ENCOUNTER — TELEPHONE (OUTPATIENT)
Dept: FAMILY MEDICINE | Facility: CLINIC | Age: 80
End: 2020-10-16

## 2020-10-16 DIAGNOSIS — N64.4 BREAST PAIN IN MALE: Primary | ICD-10-CM

## 2020-10-16 NOTE — TELEPHONE ENCOUNTER
----- Message from Gertrude Urbina sent at 10/16/2020  9:55 AM CDT -----  Good morning patient was booked for ultrasound of the breast. Males always have to have a mammogram digital diagnostic bilateral order and a ultrasound of the breast. Please put in order for mammogram diagnostic bilateral .   Thank you

## 2020-10-19 ENCOUNTER — HOSPITAL ENCOUNTER (OUTPATIENT)
Dept: RADIOLOGY | Facility: HOSPITAL | Age: 80
Discharge: HOME | End: 2020-10-19
Attending: REGISTERED NURSE
Payer: MEDICARE

## 2020-10-19 VITALS — WEIGHT: 160.69 LBS | BODY MASS INDEX: 23.01 KG/M2 | HEIGHT: 70 IN

## 2020-10-19 DIAGNOSIS — N64.4 BREAST PAIN IN MALE: ICD-10-CM

## 2020-10-19 PROCEDURE — 77066 MAMMO DIGITAL DIAGNOSTIC BILAT WITH TOMO: ICD-10-PCS | Mod: 26,HCNC,, | Performed by: RADIOLOGY

## 2020-10-19 PROCEDURE — 76642 US BREAST LEFT LIMITED: ICD-10-PCS | Mod: 26,HCNC,LT, | Performed by: RADIOLOGY

## 2020-10-19 PROCEDURE — 77062 BREAST TOMOSYNTHESIS BI: CPT | Mod: TC,HCNC

## 2020-10-19 PROCEDURE — 76642 ULTRASOUND BREAST LIMITED: CPT | Mod: 26,HCNC,LT, | Performed by: RADIOLOGY

## 2020-10-19 PROCEDURE — 76642 ULTRASOUND BREAST LIMITED: CPT | Mod: TC,HCNC,LT

## 2020-10-19 PROCEDURE — 77066 DX MAMMO INCL CAD BI: CPT | Mod: 26,HCNC,, | Performed by: RADIOLOGY

## 2020-10-19 PROCEDURE — 77062 BREAST TOMOSYNTHESIS BI: CPT | Mod: 26,HCNC,, | Performed by: RADIOLOGY

## 2020-10-19 PROCEDURE — 77062 MAMMO DIGITAL DIAGNOSTIC BILAT WITH TOMO: ICD-10-PCS | Mod: 26,HCNC,, | Performed by: RADIOLOGY

## 2020-10-27 ENCOUNTER — TELEPHONE (OUTPATIENT)
Dept: FAMILY MEDICINE | Facility: CLINIC | Age: 80
End: 2020-10-27

## 2020-10-27 NOTE — TELEPHONE ENCOUNTER
----- Message from Maura Werner sent at 10/27/2020  4:19 PM CDT -----  Regarding: Sarah-wife  .Type:  Test Results    Who Called: Sarah  Name of Test (Lab/Mammo/Etc): ultrasound  Date of Test: 10/19/20  Ordering Provider: Abundio  Where the test was performed: [facility]  Would the patient rather a call back or a response via [facility]? Call back  Best Call Back Number: [phone]  Additional Information:

## 2020-11-11 ENCOUNTER — OFFICE VISIT (OUTPATIENT)
Dept: FAMILY MEDICINE | Facility: CLINIC | Age: 80
End: 2020-11-11
Payer: MEDICARE

## 2020-11-11 VITALS
TEMPERATURE: 98 F | DIASTOLIC BLOOD PRESSURE: 78 MMHG | BODY MASS INDEX: 22.85 KG/M2 | HEART RATE: 71 BPM | HEIGHT: 70 IN | SYSTOLIC BLOOD PRESSURE: 150 MMHG | WEIGHT: 159.63 LBS

## 2020-11-11 DIAGNOSIS — N60.02 SIMPLE CYST OF LEFT BREAST: Primary | ICD-10-CM

## 2020-11-11 PROBLEM — R07.89 OTHER CHEST PAIN: Status: RESOLVED | Noted: 2019-10-08 | Resolved: 2020-11-11

## 2020-11-11 PROBLEM — Z87.19 HISTORY OF GI BLEED: Status: ACTIVE | Noted: 2019-10-22

## 2020-11-11 PROBLEM — D62 ACUTE BLOOD LOSS ANEMIA: Status: RESOLVED | Noted: 2019-10-14 | Resolved: 2020-11-11

## 2020-11-11 PROBLEM — R07.89 ATYPICAL CHEST PAIN: Status: RESOLVED | Noted: 2020-08-10 | Resolved: 2020-11-11

## 2020-11-11 PROBLEM — I25.10 CAD (CORONARY ARTERY DISEASE): Status: RESOLVED | Noted: 2019-06-12 | Resolved: 2020-11-11

## 2020-11-11 PROBLEM — R00.1 SINUS BRADYCARDIA: Status: RESOLVED | Noted: 2019-10-12 | Resolved: 2020-11-11

## 2020-11-11 PROCEDURE — 1125F PR PAIN SEVERITY QUANTIFIED, PAIN PRESENT: ICD-10-PCS | Mod: HCNC,S$GLB,, | Performed by: REGISTERED NURSE

## 2020-11-11 PROCEDURE — 99213 PR OFFICE/OUTPT VISIT, EST, LEVL III, 20-29 MIN: ICD-10-PCS | Mod: HCNC,S$GLB,, | Performed by: REGISTERED NURSE

## 2020-11-11 PROCEDURE — 1101F PR PT FALLS ASSESS DOC 0-1 FALLS W/OUT INJ PAST YR: ICD-10-PCS | Mod: HCNC,CPTII,S$GLB, | Performed by: REGISTERED NURSE

## 2020-11-11 PROCEDURE — 1159F MED LIST DOCD IN RCRD: CPT | Mod: HCNC,S$GLB,, | Performed by: REGISTERED NURSE

## 2020-11-11 PROCEDURE — 3077F PR MOST RECENT SYSTOLIC BLOOD PRESSURE >= 140 MM HG: ICD-10-PCS | Mod: HCNC,CPTII,S$GLB, | Performed by: REGISTERED NURSE

## 2020-11-11 PROCEDURE — 99999 PR PBB SHADOW E&M-EST. PATIENT-LVL IV: ICD-10-PCS | Mod: PBBFAC,HCNC,, | Performed by: REGISTERED NURSE

## 2020-11-11 PROCEDURE — 3078F DIAST BP <80 MM HG: CPT | Mod: HCNC,CPTII,S$GLB, | Performed by: REGISTERED NURSE

## 2020-11-11 PROCEDURE — 99213 OFFICE O/P EST LOW 20 MIN: CPT | Mod: HCNC,S$GLB,, | Performed by: REGISTERED NURSE

## 2020-11-11 PROCEDURE — 1101F PT FALLS ASSESS-DOCD LE1/YR: CPT | Mod: HCNC,CPTII,S$GLB, | Performed by: REGISTERED NURSE

## 2020-11-11 PROCEDURE — 1159F PR MEDICATION LIST DOCUMENTED IN MEDICAL RECORD: ICD-10-PCS | Mod: HCNC,S$GLB,, | Performed by: REGISTERED NURSE

## 2020-11-11 PROCEDURE — 3078F PR MOST RECENT DIASTOLIC BLOOD PRESSURE < 80 MM HG: ICD-10-PCS | Mod: HCNC,CPTII,S$GLB, | Performed by: REGISTERED NURSE

## 2020-11-11 PROCEDURE — 99999 PR PBB SHADOW E&M-EST. PATIENT-LVL IV: CPT | Mod: PBBFAC,HCNC,, | Performed by: REGISTERED NURSE

## 2020-11-11 PROCEDURE — 1125F AMNT PAIN NOTED PAIN PRSNT: CPT | Mod: HCNC,S$GLB,, | Performed by: REGISTERED NURSE

## 2020-11-11 PROCEDURE — 3077F SYST BP >= 140 MM HG: CPT | Mod: HCNC,CPTII,S$GLB, | Performed by: REGISTERED NURSE

## 2020-11-11 NOTE — PROGRESS NOTES
Subjective:       Patient ID: Aquiles Kelsey is a 80 y.o. male.      Chief Complaint   Patient presents with    Breast Problem       Mr. Edwards returns today to discuss recent breast studies.  Still having some tenderness to left breast, more so when putting things in front shirt pocket.  No swelling, enlarged breast, nipple discharge, or skin changes.  He is adamant about getting the cyst removed.      Review of Systems  Per HPI      Review of patient's allergies indicates:   Allergen Reactions    Metoprolol      Junctional rhythm           Patient Active Problem List   Diagnosis    S/P PTCA (percutaneous transluminal coronary angioplasty)    PAD (peripheral artery disease)    S/P AAA repair    Essential hypertension    Dyslipidemia    Type 2 diabetes mellitus with hyperglycemia, without long-term current use of insulin    Depression    Decreased hearing of both ears    Abnormal ECG    History of PTCA    PAC (premature atrial contraction)    Claudication in peripheral vascular disease    Nonrheumatic aortic valve insufficiency    NSTEMI (non-ST elevated myocardial infarction)    AP (angina pectoris)    Tobacco abuse    CAD, multiple vessel    Ischemic cardiomyopathy    History of GI bleed    LAE (left atrial enlargement)    LVH (left ventricular hypertrophy)           Current Outpatient Medications:     amLODIPine (NORVASC) 5 MG tablet, Take 1 tablet (5 mg total) by mouth once daily., Disp: 30 tablet, Rfl: 11    aspirin (ECOTRIN) 81 MG EC tablet, Take 1 tablet (81 mg total) by mouth once daily., Disp: , Rfl: 0    baclofen (LIORESAL) 10 MG tablet, Take 1 tablet (10 mg total) by mouth every evening., Disp: 30 tablet, Rfl: 0    clopidogreL (PLAVIX) 75 mg tablet, TAKE 1 TABLET BY MOUTH EVERY DAY, Disp: 90 tablet, Rfl: 3    escitalopram oxalate (LEXAPRO) 10 MG tablet, Take 1 tablet (10 mg total) by mouth once daily., Disp: 90 tablet, Rfl: 3    isosorbide mononitrate (IMDUR) 120 MG  "24 hr tablet, Take 1 tablet (120 mg total) by mouth once daily., Disp: 30 tablet, Rfl: 11    lisinopril 10 MG tablet, Take 2 tablets (20 mg total) by mouth once daily., Disp: 90 tablet, Rfl: 3    metFORMIN (GLUCOPHAGE) 1000 MG tablet, EARLINE 1 TABLETA VIA ORAL 2 VECES AL NORAH CON COMIDA 90, Disp: 180 tablet, Rfl: 4    neomycin-polymyxin-hydrocortisone (CORTISPORIN) 3.5-10,000-1 mg/mL-unit/mL-% otic suspension, , Disp: , Rfl:     nitroGLYCERIN (NITROSTAT) 0.4 MG SL tablet, Place 1 tablet (0.4 mg total) under the tongue every 5 (five) minutes as needed for Chest pain., Disp: 25 tablet, Rfl: 12    pantoprazole (PROTONIX) 40 MG tablet, Take 1 tablet (40 mg total) by mouth once daily., Disp: 30 tablet, Rfl: 1    ranolazine (RANEXA) 500 MG Tb12, Take 1 tablet (500 mg total) by mouth 2 (two) times daily., Disp: 60 tablet, Rfl: 1    simvastatin (ZOCOR) 20 MG tablet, TAKE 1 TABLET BY MOUTH EVERY DAY IN THE EVENING, Disp: 90 tablet, Rfl: 3        Past medical, surgical, family and social histories have been reviewed today.      Objective:     Vitals:    11/11/20 1559   BP: (!) 150/78   Pulse: 71   Temp: 97.7 °F (36.5 °C)   TempSrc: Oral   Weight: 72.4 kg (159 lb 9.8 oz)   Height: 5' 10" (1.778 m)   PainSc:   2   PainLoc: Breast         Physical Exam  Vitals signs reviewed.   Chest:      Breasts:         Left: Mass and tenderness present. No swelling, bleeding, inverted nipple, nipple discharge or skin change.       Neurological:      Mental Status: He is alert and oriented to person, place, and time.           Mammo Digital Diagnostic Bilat with Vito  Assessment    Overall    2 - Benign    Study Mammography Recommendations    Breast Density     Overall   Breast Composition a - Almost entirely fatty   Details    Narrative & Impression     Result:   US Breast Left Limited  Mammo Digital Diagnostic Bilat with Vito     History:  Patient is 80 y.o. and is seen for a diagnostic mammogram.     Films Compared:  None   " "  Findings:  The breasts are almost entirely fatty.      No findings in area of skin marker on the left at site of reported  palpable abnormality/ pain and tenderness.     Note made of focal asymmetry in 6 o'clock location on the left, rem,chrissy from skin marker.     There is no other  evidence of suspicious masses, calcifications, or other abnormal findings.     Limited Left Breast Ultrasound:     Targeted imaging in area of concern/skin marker was un revealing without mass noted.     Imaging in the 6 o'clock location and 5 CMFN demonstrated a tiny benign appearing cyst measuring 4 mm x 2 mm x 4 mm.  This correlates well with the mammogram findings.     Impression:  Bilateral     There is no mammographic or sonographic evidence of malignancy.      No significant findings on left in area of pain/palpable abnormality.     4 mm simple cyst on the left at 6 o'clock.     BI-RADS Category:   Overall: 2 - Benign     Recommendation:  Routine screening mammogram in 1 year, or as clinically indicated.                   Assessment         ICD-10-CM ICD-9-CM    1. Simple cyst of left breast  N60.02 610.0 Ambulatory referral/consult to General Surgery    To General Surg for further evaluation.  Had long discussion with him today regarding cyst, treatment and monitoring.  He refuses to monitor and wants "it taken care of".             Follow-up     Return prn.      TIFFANY Marlye  [facility]     "

## 2020-11-12 ENCOUNTER — PATIENT OUTREACH (OUTPATIENT)
Dept: ADMINISTRATIVE | Facility: HOSPITAL | Age: 80
End: 2020-11-12

## 2020-11-12 NOTE — PROGRESS NOTES
[employer] HTN Report: Attempted to reach out to Pt about blood pressure. Pt did not answer left voicemail with callback number.

## 2020-11-15 ENCOUNTER — PATIENT OUTREACH (OUTPATIENT)
Dept: ADMINISTRATIVE | Facility: OTHER | Age: 80
End: 2020-11-15

## 2020-11-15 NOTE — PROGRESS NOTES
Health Maintenance Due   Topic Date Due    Foot Exam  09/14/1950    Eye Exam  09/14/1950    TETANUS VACCINE  09/14/1957    Shingles Vaccine (1 of 2) 09/14/1989    Pneumococcal Vaccine (65+ Low/Medium Risk) (1 of 2 - PCV13) 09/14/2004    Hemoglobin A1c  04/14/2020    Influenza Vaccine (1) 08/01/2020     Updates were requested from care everywhere.  Chart was reviewed for overdue Proactive [facility] Encounters ([facility]) topics (CRS, Breast Cancer Screening, Eye exam)  Health Maintenance has been updated.  [facility] immunization registry triggered.  Patient not found in LINKS.

## 2020-11-16 ENCOUNTER — OFFICE VISIT (OUTPATIENT)
Dept: SURGERY | Facility: CLINIC | Age: 80
End: 2020-11-16
Payer: MEDICARE

## 2020-11-16 VITALS
SYSTOLIC BLOOD PRESSURE: 113 MMHG | WEIGHT: 159 LBS | BODY MASS INDEX: 22.76 KG/M2 | HEIGHT: 70 IN | DIASTOLIC BLOOD PRESSURE: 64 MMHG | TEMPERATURE: 98 F | HEART RATE: 63 BPM

## 2020-11-16 DIAGNOSIS — N60.02 SIMPLE CYST OF LEFT BREAST: ICD-10-CM

## 2020-11-16 PROCEDURE — 99999 PR PBB SHADOW E&M-EST. PATIENT-LVL V: ICD-10-PCS | Mod: PBBFAC,HCNC,, | Performed by: SURGERY

## 2020-11-16 PROCEDURE — 99999 PR PBB SHADOW E&M-EST. PATIENT-LVL V: CPT | Mod: PBBFAC,HCNC,, | Performed by: SURGERY

## 2020-11-16 PROCEDURE — 1159F MED LIST DOCD IN RCRD: CPT | Mod: HCNC,S$GLB,, | Performed by: SURGERY

## 2020-11-16 PROCEDURE — 99213 PR OFFICE/OUTPT VISIT, EST, LEVL III, 20-29 MIN: ICD-10-PCS | Mod: HCNC,S$GLB,, | Performed by: SURGERY

## 2020-11-16 PROCEDURE — 1159F PR MEDICATION LIST DOCUMENTED IN MEDICAL RECORD: ICD-10-PCS | Mod: HCNC,S$GLB,, | Performed by: SURGERY

## 2020-11-16 PROCEDURE — 3074F PR MOST RECENT SYSTOLIC BLOOD PRESSURE < 130 MM HG: ICD-10-PCS | Mod: HCNC,CPTII,S$GLB, | Performed by: SURGERY

## 2020-11-16 PROCEDURE — 3078F PR MOST RECENT DIASTOLIC BLOOD PRESSURE < 80 MM HG: ICD-10-PCS | Mod: HCNC,CPTII,S$GLB, | Performed by: SURGERY

## 2020-11-16 PROCEDURE — 1126F PR PAIN SEVERITY QUANTIFIED, NO PAIN PRESENT: ICD-10-PCS | Mod: HCNC,S$GLB,, | Performed by: SURGERY

## 2020-11-16 PROCEDURE — 1126F AMNT PAIN NOTED NONE PRSNT: CPT | Mod: HCNC,S$GLB,, | Performed by: SURGERY

## 2020-11-16 PROCEDURE — 3074F SYST BP LT 130 MM HG: CPT | Mod: HCNC,CPTII,S$GLB, | Performed by: SURGERY

## 2020-11-16 PROCEDURE — 3078F DIAST BP <80 MM HG: CPT | Mod: HCNC,CPTII,S$GLB, | Performed by: SURGERY

## 2020-11-16 PROCEDURE — 99213 OFFICE O/P EST LOW 20 MIN: CPT | Mod: HCNC,S$GLB,, | Performed by: SURGERY

## 2020-11-16 NOTE — PATIENT INSTRUCTIONS
You have a small cyst of your chest wall that measures 4 x 4 x 2 mm.  There is nothing we need to do about it.    If it gets bigger please come and see us.    If it causes pain you can try some over the counter Tylenol, Motrin or Aleve.    We will see you back as needed    Our office phone numbers are  [phone] and

## 2020-11-16 NOTE — PROGRESS NOTES
Patient ID: Aquiles Kelsey is a 80 y.o. male.    Left breast cyst    Chief Complaint: Consult and Cyst (left breast)      HPI:  Patient's up primary care for left breast pain a mammogram and ultrasound were done.  He was sent to see surgery for a 4 mm cyst 5 cm below the nipple area or area of the left breast.    Patient states he noticed his breasts are somewhat fatty.  He has pain on the left side on occasion.  It is a sharp pain.  He does not feel any masses.          Review of Systems   Constitutional: Negative.    HENT: Negative.    Eyes: Negative.    Respiratory: Negative.    Cardiovascular: Negative.    Gastrointestinal: Negative.    Musculoskeletal: Negative.    Skin:        Occasional left breast pain, no masses       Current Outpatient Medications   Medication Sig Dispense Refill    amLODIPine (NORVASC) 5 MG tablet Take 1 tablet (5 mg total) by mouth once daily. 30 tablet 11    baclofen (LIORESAL) 10 MG tablet Take 1 tablet (10 mg total) by mouth every evening. 30 tablet 0    clopidogreL (PLAVIX) 75 mg tablet TAKE 1 TABLET BY MOUTH EVERY DAY 90 tablet 3    escitalopram oxalate (LEXAPRO) 10 MG tablet Take 1 tablet (10 mg total) by mouth once daily. 90 tablet 3    isosorbide mononitrate (IMDUR) 120 MG 24 hr tablet Take 1 tablet (120 mg total) by mouth once daily. 30 tablet 11    isosorbide mononitrate (IMDUR) 60 MG 24 hr tablet TAKE 1 TABLET BY MOUTH ONCE DAILY 90 tablet 3    lisinopril 10 MG tablet Take 2 tablets (20 mg total) by mouth once daily. 90 tablet 3    metFORMIN (GLUCOPHAGE) 1000 MG tablet EARLINE 1 TABLETA VIA ORAL 2 VECES AL NORAH CON COMIDA 90 180 tablet 4    neomycin-polymyxin-hydrocortisone (CORTISPORIN) 3.5-10,000-1 mg/mL-unit/mL-% otic suspension       nitroGLYCERIN (NITROSTAT) 0.4 MG SL tablet Place 1 tablet (0.4 mg total) under the tongue every 5 (five) minutes as needed for Chest pain. 25 tablet 12    simvastatin (ZOCOR) 20 MG tablet TAKE 1 TABLET BY MOUTH EVERY DAY IN  THE EVENING 90 tablet 3    aspirin (ECOTRIN) 81 MG EC tablet Take 1 tablet (81 mg total) by mouth once daily.  0    pantoprazole (PROTONIX) 40 MG tablet Take 1 tablet (40 mg total) by mouth once daily. 30 tablet 1    ranolazine (RANEXA) 500 MG Tb12 Take 1 tablet (500 mg total) by mouth 2 (two) times daily. 60 tablet 1     No current facility-administered medications for this visit.        Review of patient's allergies indicates:   Allergen Reactions    Metoprolol      Junctional rhythm         Past Medical History:   Diagnosis Date    Acute blood loss anemia 10/14/2019    Aneurysm, abdominal aortic     Coronary artery disease     Depression     Diabetes mellitus     HLD (hyperlipidemia)     Hypertension     Kidney stone     PAD (peripheral artery disease)     Tobacco abuse        Past Surgical History:   Procedure Laterality Date    APPENDECTOMY      CORONARY STENT PLACEMENT      ESOPHAGOGASTRODUODENOSCOPY N/A 10/14/2019    Procedure: EGD (ESOPHAGOGASTRODUODENOSCOPY);  Surgeon: Carlos Mcneal III, MD;  [facility];  Service: Endoscopy;  Laterality: N/A;    ESOPHAGOGASTRODUODENOSCOPY N/A 10/15/2019    Procedure: EGD (ESOPHAGOGASTRODUODENOSCOPY);  Surgeon: Carlos Mcneal III, MD;  Location: [facility] ENDO;  Service: Endoscopy;  Laterality: N/A;    LEFT HEART CATHETERIZATION Left 10/11/2019    Procedure: CATHETERIZATION, HEART, LEFT;  Surgeon: Robe Gilbert MD;  Location: [facility] CATH LAB;  Service: Cardiology;  Laterality: Left;    LEFT HEART CATHETERIZATION Left 10/13/2019    Procedure: CATHETERIZATION, HEART, LEFT;  Surgeon: Robe Gilbert MD;  Location: [facility] CATH LAB;  Service: Cardiology;  Laterality: Left;       Family History   Problem Relation Age of Onset    Diabetes Mother     COPD Father     Diabetes Brother        Social History     Socioeconomic History    Marital status:      Spouse name: Not on file    Number of children: Not on file    Years of education: Not on  file    Highest education level: Not on file   Occupational History    Not on file   Social Needs    Financial resource strain: Not on file    Food insecurity     Worry: Not on file     Inability: Not on file    Transportation needs     Medical: Not on file     Non-medical: Not on file   Tobacco Use    Smoking status: Current Every Day Smoker     Years: 15.00     Types: Cigars    Smokeless tobacco: Current User   Substance and Sexual Activity    Alcohol use: Not Currently    Drug use: Not Currently    Sexual activity: Not Currently   Lifestyle    Physical activity     Days per week: Not on file     Minutes per session: Not on file    Stress: Only a little   Relationships    Social connections     Talks on phone: Not on file     Gets together: Not on file     Attends Nondenominational service: Not on file     Active member of club or organization: Not on file     Attends meetings of clubs or organizations: Not on file     Relationship status: Not on file   Other Topics Concern    Not on file   Social History Narrative    Not on file       Vitals:    11/16/20 1123   BP: 113/64   Pulse: 63   Temp: 98 °F (36.7 °C)       Physical Exam  Vitals signs reviewed.   Neck:      Musculoskeletal: Normal range of motion and neck supple.   Cardiovascular:      Rate and Rhythm: Normal rate and regular rhythm.   Pulmonary:      Effort: Pulmonary effort is normal.      Breath sounds: Normal breath sounds.   Abdominal:      General: Abdomen is flat. Bowel sounds are normal.      Palpations: Abdomen is soft.   Musculoskeletal: Normal range of motion.   Skin:     Capillary Refill: Capillary refill takes less than 2 seconds.      Comments: Bilateral breast exam was performed.  There are no skin changes, masses, nipple discharges nor axillary adenopathy.    There is no enlargement of the subareolar breast tissue.  There is no palpable chest wall masses   Neurological:      Mental Status: He is alert.      Mammogram and ultrasound  reviewed    Result:   US Breast Left Limited  Mammo Digital Diagnostic Bilat with Vito     History:  Patient is 80 y.o. and is seen for a diagnostic mammogram.     Films Compared:  None     Findings:  The breasts are almost entirely fatty.      No findings in area of skin marker on the left at site of reported  palpable abnormality/ pain and tenderness.     Note made of focal asymmetry in 6 o'clock location on the left, rem,chrissy from skin marker.     There is no other  evidence of suspicious masses, calcifications, or other abnormal findings.     Limited Left Breast Ultrasound:     Targeted imaging in area of concern/skin marker was un revealing without mass noted.     Imaging in the 6 o'clock location and 5 CMFN demonstrated a tiny benign appearing cyst measuring 4 mm x 2 mm x 4 mm.  This correlates well with the mammogram findings.     Impression:  Bilateral     There is no mammographic or sonographic evidence of malignancy.      No significant findings on left in area of pain/palpable abnormality.     4 mm simple cyst on the left at 6 o'clock.     BI-RADS Category:   Overall: 2 - Benign     Recommendation:  Routine screening mammogram in 1 year, or as clinically indicated.                   Last Resulted: 10/21/20 07:47 Order Details View Encounter Lab and Collection Details Routing Result History         Scans on Order [id number]    Scan on 10/19/2020  1:38 PM by Josefina Landa, RT: Mammography Screening Form             External Result Report    External Result Report   Narrative & Impression    Result:   US Breast Left Limited  Mammo Digital Diagnostic Bilat with Vito     History:  Patient is 80 y.o. and is seen for a diagnostic mammogram.     Films Compared:  None     Findings:  The breasts are almost entirely fatty.      No findings in area of skin marker on the left at site of reported  palpable abnormality/ pain and tenderness.     Note made of focal asymmetry in 6 o'clock location on the left, rem,chrissy  from skin marker.     There is no other  evidence of suspicious masses, calcifications, or other abnormal findings.     Limited Left Breast Ultrasound:     Targeted imaging in area of concern/skin marker was un revealing without mass noted.     Imaging in the 6 o'clock location and 5 CMFN demonstrated a tiny benign appearing cyst measuring 4 mm x 2 mm x 4 mm.  This correlates well with the mammogram findings.     Impression:  Bilateral     There is no mammographic or sonographic evidence of malignancy.      No significant findings on left in area of pain/palpable abnormality.     4 mm simple cyst on the left at 6 o'clock.     BI-RADS Category:   Overall: 2 - Benign     Recommendation:  Routine screening mammogram in 1 year, or as clinically indicated.            Assessment & Plan:  4 mm chest wall cyst on the left side  No clinically palpable breast abnormalities.  Over-the-counter analgesics as needed for intermittent pain    As the cyst is of the chest wall and not of the breast tissue proper follow-up imaging per primary care    Follow up with surgery as needed

## 2020-11-16 NOTE — LETTER
November 16, 2020      Robbin Del Castillo, NP  [address] [facility] [address]           [facility]  [address] [place] [address]  Phone: [phone]  Fax: [phone]          Patient: Aquiles Kelsey   MR Number: [id number]   YOB: 1940   Date of Visit: 11/16/2020       Dear Robbin Del Castillo:    Thank you for referring Aquiles Kelsey to me for evaluation. Attached you will find relevant portions of my assessment and plan of care.    If you have questions, please do not hesitate to call me. I look forward to following Aquiles Kelsey along with you.    Sincerely,    Carlos Mcqueen MD    Enclosure  CC:  No Recipients    If you would like to receive this communication electronically, please contact [email or web address] or [phone] to request more information on [employer] access.    For providers and/or their staff who would like to refer a patient to [facility], please contact us through our one-stop-shop provider referral line, [facility], at [phone].    If you feel you have received this communication in error or would no longer like to receive these types of communications, please e-mail [email or web address]

## 2020-11-23 ENCOUNTER — OFFICE VISIT (OUTPATIENT)
Dept: CARDIOLOGY | Facility: CLINIC | Age: 80
End: 2020-11-23
Payer: MEDICARE

## 2020-11-23 VITALS
BODY MASS INDEX: 22.85 KG/M2 | OXYGEN SATURATION: 95 % | HEIGHT: 70 IN | HEART RATE: 73 BPM | SYSTOLIC BLOOD PRESSURE: 140 MMHG | DIASTOLIC BLOOD PRESSURE: 70 MMHG | WEIGHT: 159.63 LBS

## 2020-11-23 DIAGNOSIS — I25.5 ISCHEMIC CARDIOMYOPATHY: ICD-10-CM

## 2020-11-23 DIAGNOSIS — I51.7 LVH (LEFT VENTRICULAR HYPERTROPHY): ICD-10-CM

## 2020-11-23 DIAGNOSIS — Z72.0 TOBACCO ABUSE: ICD-10-CM

## 2020-11-23 DIAGNOSIS — I73.9 PAD (PERIPHERAL ARTERY DISEASE): ICD-10-CM

## 2020-11-23 DIAGNOSIS — Z98.890 S/P AAA REPAIR: ICD-10-CM

## 2020-11-23 DIAGNOSIS — I49.1 PAC (PREMATURE ATRIAL CONTRACTION): ICD-10-CM

## 2020-11-23 DIAGNOSIS — I21.4 NSTEMI (NON-ST ELEVATED MYOCARDIAL INFARCTION): ICD-10-CM

## 2020-11-23 DIAGNOSIS — Z87.19 HISTORY OF GI BLEED: ICD-10-CM

## 2020-11-23 DIAGNOSIS — I73.9 CLAUDICATION IN PERIPHERAL VASCULAR DISEASE: ICD-10-CM

## 2020-11-23 DIAGNOSIS — Z98.61 S/P PTCA (PERCUTANEOUS TRANSLUMINAL CORONARY ANGIOPLASTY): ICD-10-CM

## 2020-11-23 DIAGNOSIS — E78.5 DYSLIPIDEMIA: ICD-10-CM

## 2020-11-23 DIAGNOSIS — Z86.79 S/P AAA REPAIR: ICD-10-CM

## 2020-11-23 DIAGNOSIS — Z98.61 HISTORY OF PTCA: ICD-10-CM

## 2020-11-23 DIAGNOSIS — R94.31 ABNORMAL ECG: ICD-10-CM

## 2020-11-23 DIAGNOSIS — I35.1 NONRHEUMATIC AORTIC VALVE INSUFFICIENCY: ICD-10-CM

## 2020-11-23 DIAGNOSIS — I51.7 LAE (LEFT ATRIAL ENLARGEMENT): ICD-10-CM

## 2020-11-23 DIAGNOSIS — E11.65 TYPE 2 DIABETES MELLITUS WITH HYPERGLYCEMIA, WITHOUT LONG-TERM CURRENT USE OF INSULIN: ICD-10-CM

## 2020-11-23 DIAGNOSIS — I10 ESSENTIAL HYPERTENSION: ICD-10-CM

## 2020-11-23 DIAGNOSIS — I25.10 CAD, MULTIPLE VESSEL: Primary | ICD-10-CM

## 2020-11-23 DIAGNOSIS — I20.9 AP (ANGINA PECTORIS): ICD-10-CM

## 2020-11-23 PROCEDURE — 3077F SYST BP >= 140 MM HG: CPT | Mod: HCNC,CPTII,S$GLB, | Performed by: INTERNAL MEDICINE

## 2020-11-23 PROCEDURE — 1159F MED LIST DOCD IN RCRD: CPT | Mod: HCNC,S$GLB,, | Performed by: INTERNAL MEDICINE

## 2020-11-23 PROCEDURE — 3078F PR MOST RECENT DIASTOLIC BLOOD PRESSURE < 80 MM HG: ICD-10-PCS | Mod: HCNC,CPTII,S$GLB, | Performed by: INTERNAL MEDICINE

## 2020-11-23 PROCEDURE — 99999 PR PBB SHADOW E&M-EST. PATIENT-LVL III: ICD-10-PCS | Mod: PBBFAC,HCNC,, | Performed by: INTERNAL MEDICINE

## 2020-11-23 PROCEDURE — 3077F PR MOST RECENT SYSTOLIC BLOOD PRESSURE >= 140 MM HG: ICD-10-PCS | Mod: HCNC,CPTII,S$GLB, | Performed by: INTERNAL MEDICINE

## 2020-11-23 PROCEDURE — 99499 UNLISTED E&M SERVICE: CPT | Mod: S$GLB,,, | Performed by: INTERNAL MEDICINE

## 2020-11-23 PROCEDURE — 3078F DIAST BP <80 MM HG: CPT | Mod: HCNC,CPTII,S$GLB, | Performed by: INTERNAL MEDICINE

## 2020-11-23 PROCEDURE — 1126F PR PAIN SEVERITY QUANTIFIED, NO PAIN PRESENT: ICD-10-PCS | Mod: HCNC,S$GLB,, | Performed by: INTERNAL MEDICINE

## 2020-11-23 PROCEDURE — 99999 PR PBB SHADOW E&M-EST. PATIENT-LVL III: CPT | Mod: PBBFAC,HCNC,, | Performed by: INTERNAL MEDICINE

## 2020-11-23 PROCEDURE — 99499 RISK ADDL DX/OHS AUDIT: ICD-10-PCS | Mod: S$GLB,,, | Performed by: INTERNAL MEDICINE

## 2020-11-23 PROCEDURE — 99214 PR OFFICE/OUTPT VISIT, EST, LEVL IV, 30-39 MIN: ICD-10-PCS | Mod: HCNC,S$GLB,, | Performed by: INTERNAL MEDICINE

## 2020-11-23 PROCEDURE — 1126F AMNT PAIN NOTED NONE PRSNT: CPT | Mod: HCNC,S$GLB,, | Performed by: INTERNAL MEDICINE

## 2020-11-23 PROCEDURE — 1159F PR MEDICATION LIST DOCUMENTED IN MEDICAL RECORD: ICD-10-PCS | Mod: HCNC,S$GLB,, | Performed by: INTERNAL MEDICINE

## 2020-11-23 PROCEDURE — 99214 OFFICE O/P EST MOD 30 MIN: CPT | Mod: HCNC,S$GLB,, | Performed by: INTERNAL MEDICINE

## 2020-11-23 NOTE — PROGRESS NOTES
Subjective:    Patient ID:  Aquiles Kelsey is a 80 y.o. male who presents for evaluation of Hyperlipidemia, Hypertension, Coronary Artery Disease, Chest Pain, Peripheral Arterial Disease, Claudication, and Risk Factor Management For Atherosclerosis        HPI Pt presents for f/u.  His current medical conditions include CAD (multiple PCI procedures), HTN, DM, LAE, LVH, PAD, AAA stent graft (approx 2013), hyperlipidemia, cigar use.  Past hx pertinent for following:  H/o L LE stents in [place].   First PCI 1998, total of 5 stents with last one in [place] 2013.  Echo 7/19 normal LVEF, DD,  LVH, mild AI.   Holter 7/19 NSR, fleeting NSVT, EAT, rare PVCs, occasional PACs.  B LE vasc studies 7/19 B LE PAD, L > R LE.   Pt had PCI KYLIE x one to mid LCX ISR, KYLIE x 2 to OM2, and PCI prox RCA KYLIE x 2, PTCA mid RCA ISR.  Failed PCI distal occluded RCA.  EF 45%.  Nonobstructive LAD disease, small diffusely diseased diagonal vessels.  He developed post PCI GI bleed and required PRBC and EGD showing angiodysplasia tx w cauterization.  DM HGAIC at goal on 10/19 labs.  Lipids 11/19 well controlled on Simvastatin.  ecg 10/14/19 junctional bradycardia 54 bpm, inferior infarct, inferolateral st-t abnl.  Echo 10/19 normal LV function, mild AI, LAE, LVH, inferolateral WMA.  Stress MPI 9/20 inferolateral scar with mild pato-infarct ischemia, EF 42%.  Echo 9/20 LVEF 40 - 45%, DD, LAE, LVH, WMA.  B LE arterial vasc studies 8/20:  Severe B LE PAD.   JOSELYN 8/20  R LE 0.76, L LE 0.62  Now here.  Pt last seen 9/20.  Imdur increased to 120 mg qd and Amlodipine to 5 mg qd last appt for HTN control and for CAD/angina.  Also referred to vascular surgery for his PAD/AAA f/u.  He did see Dr. Mccarthy, vasc surgery, with f/u appt planned.  Has seen PCP and gen surgery for breast cyst.  Has chronic claudication sxs that are stable.  No CHF sxs.  Angina controlled on current medical tx.  Since last appt he seems ok without any active angina.  Denies  "dyspnea.  No GI bleeding on DAPT.  HTN controlled.  Lipids last check, controlled on statin tx.  Compliant w meds.  Smokes cigars.    Current Outpatient Medications   Medication Instructions    amLODIPine (NORVASC) 5 mg, Oral, Daily    aspirin (ECOTRIN) 81 mg, Oral, Daily    baclofen (LIORESAL) 10 mg, Oral, Nightly    clopidogreL (PLAVIX) 75 mg tablet TAKE 1 TABLET BY MOUTH EVERY DAY    escitalopram oxalate (LEXAPRO) 10 mg, Oral, Daily    isosorbide mononitrate (IMDUR) 60 MG 24 hr tablet TAKE 1 TABLET BY MOUTH ONCE DAILY    isosorbide mononitrate (IMDUR) 120 mg, Oral, Daily    lisinopriL 20 mg, Oral, Daily    metFORMIN (GLUCOPHAGE) 1000 MG tablet EARLINE 1 TABLETA VIA ORAL 2 VECES AL NORAH CON COMIDA 90    neomycin-polymyxin-hydrocortisone (CORTISPORIN) 3.5-10,000-1 mg/mL-unit/mL-% otic suspension No dose, route, or frequency recorded.    nitroGLYCERIN (NITROSTAT) 0.4 mg, Sublingual, Every 5 min PRN    pantoprazole (PROTONIX) 40 mg, Oral, Daily    ranolazine (RANEXA) 500 mg, Oral, 2 times daily    simvastatin (ZOCOR) 20 MG tablet TAKE 1 TABLET BY MOUTH EVERY DAY IN THE EVENING       Review of Systems   Constitution: Negative.   HENT: Negative.    Eyes: Negative.    Cardiovascular: Positive for claudication.   Respiratory: Negative.    Endocrine: Negative.    Hematologic/Lymphatic: Negative.    Skin: Negative.    Musculoskeletal: Positive for arthritis and joint pain.   Gastrointestinal: Negative.    Genitourinary: Negative.    Neurological: Negative.    Psychiatric/Behavioral: Negative.    Allergic/Immunologic: Negative.        BP (!) 140/70 (BP Location: Left arm, Patient Position: Sitting, BP Method: Large (Manual))   Pulse 73   Ht 5' 10" (1.778 m)   Wt 72.4 kg (159 lb 9.8 oz)   SpO2 95%   BMI 22.90 kg/m²     Wt Readings from Last 3 Encounters:   11/23/20 72.4 kg (159 lb 9.8 oz)   11/16/20 72.1 kg (159 lb)   11/11/20 72.4 kg (159 lb 9.8 oz)     Temp Readings from Last 3 Encounters:   11/16/20 98 °F " (36.7 °C)   11/11/20 97.7 °F (36.5 °C) (Oral)   10/12/20 97.9 °F (36.6 °C) (Oral)     BP Readings from Last 3 Encounters:   11/23/20 (!) 140/70   11/16/20 113/64   11/11/20 (!) 150/78     Pulse Readings from Last 3 Encounters:   11/23/20 73   11/16/20 63   11/11/20 71          Objective:    Physical Exam   Constitutional: He is oriented to person, place, and time. He appears well-developed and well-nourished.   HENT:   Head: Normocephalic.   Neck: Normal range of motion. Neck supple. Normal carotid pulses, no hepatojugular reflux and no JVD present. Carotid bruit is not present. No thyromegaly present.   Cardiovascular: Normal rate, regular rhythm, S1 normal and S2 normal. PMI is not displaced. Exam reveals no S3, no S4, no distant heart sounds, no friction rub, no midsystolic click and no opening snap.   No murmur heard.  Pulses:       Radial pulses are 2+ on the right side and 2+ on the left side.   Pulmonary/Chest: Effort normal and breath sounds normal. He has no wheezes. He has no rales.   Abdominal: Soft. Bowel sounds are normal. He exhibits no distension, no abdominal bruit, no ascites and no mass. There is no abdominal tenderness.   Musculoskeletal:         General: No edema.   Neurological: He is alert and oriented to person, place, and time.   Skin: Skin is warm.   Psychiatric: He has a normal mood and affect. His behavior is normal.   Nursing note and vitals reviewed.    I have reviewed all pertinent labs and cardiac studies.      Chemistry        Component Value Date/Time     11/20/2019 0805    K 4.3 11/20/2019 0805     11/20/2019 0805    CO2 27 11/20/2019 0805    BUN 21 11/20/2019 0805    CREATININE 1.1 11/20/2019 0805     (H) 11/20/2019 0805        Component Value Date/Time    CALCIUM 9.6 11/20/2019 0805    ALKPHOS 65 11/20/2019 0805    AST 17 11/20/2019 0805    ALT 13 11/20/2019 0805    BILITOT 0.6 11/20/2019 0805    ESTGFRAFRICA >60.0 11/20/2019 0805    EGFRNONAA >60.0 11/20/2019  0805        Lab Results   Component Value Date    WBC 6.84 10/22/2019    HGB 10.0 (L) 10/22/2019    HCT 30.9 (L) 10/22/2019    MCV 93 10/22/2019     10/22/2019       Lab Results   Component Value Date    HGBA1C 5.8 (H) 10/14/2019     Lab Results   Component Value Date    CHOL 138 11/20/2019    CHOL 140 10/11/2019    CHOL 217 (H) 06/12/2019     Lab Results   Component Value Date    HDL 46 11/20/2019    HDL 33 (L) 10/11/2019    HDL 37 (L) 06/12/2019     Lab Results   Component Value Date    LDLCALC 72.8 11/20/2019    LDLCALC 74.8 10/11/2019    LDLCALC 129.0 06/12/2019     Lab Results   Component Value Date    TRIG 96 11/20/2019    TRIG 161 (H) 10/11/2019    TRIG 255 (H) 06/12/2019     Lab Results   Component Value Date    CHOLHDL 33.3 11/20/2019    CHOLHDL 23.6 10/11/2019    CHOLHDL 17.1 (L) 06/12/2019           Assessment:       1. CAD, multiple vessel    2. Essential hypertension    3. S/P PTCA (percutaneous transluminal coronary angioplasty)    4. AP (angina pectoris)    5. Claudication in peripheral vascular disease    6. Type 2 diabetes mellitus with hyperglycemia, without long-term current use of insulin    7. Tobacco abuse    8. S/P AAA repair    9. PAD (peripheral artery disease)    10. PAC (premature atrial contraction)    11. NSTEMI (non-ST elevated myocardial infarction)    12. Nonrheumatic aortic valve insufficiency    13. LVH (left ventricular hypertrophy)    14. LAE (left atrial enlargement)    15. Ischemic cardiomyopathy    16. History of PTCA    17. History of GI bleed    18. Dyslipidemia    19. Abnormal ECG         Plan:               Stable cardiovascular conditions at present time on current medical treatment.  Continue OMT for chronic CAD/angina.  Reviewed all tests and above medical conditions with patient in detail and formulated treatment plan.  Continue optimal medical treatment for cardiovascular conditions.  Cardiac low salt diet discussed.  Daily exercise encouraged, goal 30 +   minutes aerobic exercise as tolerated.  Maintaining healthy weight and weight loss goals (if needed) were discussed in clinic.  Optimal DM HGAIC control advised.  Vascular surgery f/u for PAD/AAA f/u.  Smoking cessation advised.  Update labs/lipids.  Phone review.  F/u in 3 months.

## 2020-12-02 ENCOUNTER — TELEPHONE (OUTPATIENT)
Dept: CARDIOLOGY | Facility: HOSPITAL | Age: 80
End: 2020-12-02

## 2020-12-02 ENCOUNTER — LAB VISIT (OUTPATIENT)
Dept: LAB | Facility: HOSPITAL | Age: 80
End: 2020-12-02
Attending: INTERNAL MEDICINE
Payer: MEDICARE

## 2020-12-02 DIAGNOSIS — I25.5 ISCHEMIC CARDIOMYOPATHY: ICD-10-CM

## 2020-12-02 DIAGNOSIS — I51.7 LAE (LEFT ATRIAL ENLARGEMENT): ICD-10-CM

## 2020-12-02 DIAGNOSIS — E78.5 DYSLIPIDEMIA: ICD-10-CM

## 2020-12-02 DIAGNOSIS — I73.9 CLAUDICATION IN PERIPHERAL VASCULAR DISEASE: ICD-10-CM

## 2020-12-02 DIAGNOSIS — I51.7 LVH (LEFT VENTRICULAR HYPERTROPHY): ICD-10-CM

## 2020-12-02 DIAGNOSIS — I10 ESSENTIAL HYPERTENSION: ICD-10-CM

## 2020-12-02 DIAGNOSIS — Z98.61 HISTORY OF PTCA: ICD-10-CM

## 2020-12-02 DIAGNOSIS — Z86.79 S/P AAA REPAIR: ICD-10-CM

## 2020-12-02 DIAGNOSIS — I49.1 PAC (PREMATURE ATRIAL CONTRACTION): ICD-10-CM

## 2020-12-02 DIAGNOSIS — I73.9 PAD (PERIPHERAL ARTERY DISEASE): ICD-10-CM

## 2020-12-02 DIAGNOSIS — I35.1 NONRHEUMATIC AORTIC VALVE INSUFFICIENCY: ICD-10-CM

## 2020-12-02 DIAGNOSIS — I20.9 AP (ANGINA PECTORIS): ICD-10-CM

## 2020-12-02 DIAGNOSIS — Z87.19 HISTORY OF GI BLEED: ICD-10-CM

## 2020-12-02 DIAGNOSIS — Z72.0 TOBACCO ABUSE: ICD-10-CM

## 2020-12-02 DIAGNOSIS — I21.4 NSTEMI (NON-ST ELEVATED MYOCARDIAL INFARCTION): ICD-10-CM

## 2020-12-02 DIAGNOSIS — I25.10 CAD, MULTIPLE VESSEL: ICD-10-CM

## 2020-12-02 DIAGNOSIS — Z98.61 S/P PTCA (PERCUTANEOUS TRANSLUMINAL CORONARY ANGIOPLASTY): ICD-10-CM

## 2020-12-02 DIAGNOSIS — R94.31 ABNORMAL ECG: ICD-10-CM

## 2020-12-02 DIAGNOSIS — Z98.890 S/P AAA REPAIR: ICD-10-CM

## 2020-12-02 DIAGNOSIS — E11.65 TYPE 2 DIABETES MELLITUS WITH HYPERGLYCEMIA, WITHOUT LONG-TERM CURRENT USE OF INSULIN: ICD-10-CM

## 2020-12-02 LAB
ALBUMIN SERPL BCP-MCNC: 3.8 G/DL (ref 3.5–5.2)
ALP SERPL-CCNC: 63 U/L (ref 55–135)
ALT SERPL W/O P-5'-P-CCNC: 10 U/L (ref 10–44)
ANION GAP SERPL CALC-SCNC: 9 MMOL/L (ref 8–16)
AST SERPL-CCNC: 14 U/L (ref 10–40)
BASOPHILS # BLD AUTO: 0.03 K/UL (ref 0–0.2)
BASOPHILS NFR BLD: 0.5 % (ref 0–1.9)
BILIRUB SERPL-MCNC: 0.5 MG/DL (ref 0.1–1)
BUN SERPL-MCNC: 23 MG/DL (ref 8–23)
CALCIUM SERPL-MCNC: 9.2 MG/DL (ref 8.7–10.5)
CHLORIDE SERPL-SCNC: 103 MMOL/L (ref 95–110)
CHOLEST SERPL-MCNC: 146 MG/DL (ref 120–199)
CHOLEST/HDLC SERPL: 3.4 {RATIO} (ref 2–5)
CO2 SERPL-SCNC: 26 MMOL/L (ref 23–29)
CREAT SERPL-MCNC: 1.1 MG/DL (ref 0.5–1.4)
DIFFERENTIAL METHOD: NORMAL
EOSINOPHIL # BLD AUTO: 0.1 K/UL (ref 0–0.5)
EOSINOPHIL NFR BLD: 1.5 % (ref 0–8)
ERYTHROCYTE [DISTWIDTH] IN BLOOD BY AUTOMATED COUNT: 13.5 % (ref 11.5–14.5)
EST. GFR  (AFRICAN AMERICAN): >60 ML/MIN/1.73 M^2
EST. GFR  (NON AFRICAN AMERICAN): >60 ML/MIN/1.73 M^2
ESTIMATED AVG GLUCOSE: 131 MG/DL (ref 68–131)
GLUCOSE SERPL-MCNC: 155 MG/DL (ref 70–110)
HBA1C MFR BLD HPLC: 6.2 % (ref 4–5.6)
HCT VFR BLD AUTO: 44.5 % (ref 40–54)
HDLC SERPL-MCNC: 43 MG/DL (ref 40–75)
HDLC SERPL: 29.5 % (ref 20–50)
HGB BLD-MCNC: 14.4 G/DL (ref 14–18)
IMM GRANULOCYTES # BLD AUTO: 0.02 K/UL (ref 0–0.04)
IMM GRANULOCYTES NFR BLD AUTO: 0.3 % (ref 0–0.5)
LDLC SERPL CALC-MCNC: 71 MG/DL (ref 63–159)
LYMPHOCYTES # BLD AUTO: 1.2 K/UL (ref 1–4.8)
LYMPHOCYTES NFR BLD: 20.1 % (ref 18–48)
MCH RBC QN AUTO: 30.8 PG (ref 27–31)
MCHC RBC AUTO-ENTMCNC: 32.4 G/DL (ref 32–36)
MCV RBC AUTO: 95 FL (ref 82–98)
MONOCYTES # BLD AUTO: 0.6 K/UL (ref 0.3–1)
MONOCYTES NFR BLD: 8.9 % (ref 4–15)
NEUTROPHILS # BLD AUTO: 4.2 K/UL (ref 1.8–7.7)
NEUTROPHILS NFR BLD: 68.7 % (ref 38–73)
NONHDLC SERPL-MCNC: 103 MG/DL
NRBC BLD-RTO: 0 /100 WBC
PLATELET # BLD AUTO: 154 K/UL (ref 150–350)
PMV BLD AUTO: 11.2 FL (ref 9.2–12.9)
POTASSIUM SERPL-SCNC: 4.5 MMOL/L (ref 3.5–5.1)
PROT SERPL-MCNC: 6.9 G/DL (ref 6–8.4)
RBC # BLD AUTO: 4.68 M/UL (ref 4.6–6.2)
SODIUM SERPL-SCNC: 138 MMOL/L (ref 136–145)
TRIGL SERPL-MCNC: 160 MG/DL (ref 30–150)
WBC # BLD AUTO: 6.17 K/UL (ref 3.9–12.7)

## 2020-12-02 PROCEDURE — 80061 LIPID PANEL: CPT | Mod: HCNC

## 2020-12-02 PROCEDURE — 36415 COLL VENOUS BLD VENIPUNCTURE: CPT | Mod: HCNC

## 2020-12-02 PROCEDURE — 85025 COMPLETE CBC W/AUTO DIFF WBC: CPT | Mod: HCNC

## 2020-12-02 PROCEDURE — 83036 HEMOGLOBIN GLYCOSYLATED A1C: CPT | Mod: HCNC

## 2020-12-02 PROCEDURE — 80053 COMPREHEN METABOLIC PANEL: CPT | Mod: HCNC

## 2020-12-03 NOTE — TELEPHONE ENCOUNTER
Please call pt.  Labs all look good.  Lipids and DM labs are good.  Continue current meds.  F/u next appt.    Dr Gilbert

## 2020-12-16 ENCOUNTER — PES CALL (OUTPATIENT)
Dept: ADMINISTRATIVE | Facility: CLINIC | Age: 80
End: 2020-12-16

## 2021-02-23 ENCOUNTER — OFFICE VISIT (OUTPATIENT)
Dept: CARDIOLOGY | Facility: CLINIC | Age: 81
End: 2021-02-23
Payer: MEDICARE

## 2021-02-23 VITALS
HEIGHT: 70 IN | BODY MASS INDEX: 23.51 KG/M2 | DIASTOLIC BLOOD PRESSURE: 78 MMHG | WEIGHT: 164.25 LBS | SYSTOLIC BLOOD PRESSURE: 130 MMHG | HEART RATE: 75 BPM | OXYGEN SATURATION: 97 %

## 2021-02-23 DIAGNOSIS — I25.10 CAD, MULTIPLE VESSEL: Primary | ICD-10-CM

## 2021-02-23 DIAGNOSIS — Z87.19 HISTORY OF GI BLEED: ICD-10-CM

## 2021-02-23 DIAGNOSIS — I35.1 NONRHEUMATIC AORTIC VALVE INSUFFICIENCY: ICD-10-CM

## 2021-02-23 DIAGNOSIS — I49.1 PAC (PREMATURE ATRIAL CONTRACTION): ICD-10-CM

## 2021-02-23 DIAGNOSIS — I51.7 LVH (LEFT VENTRICULAR HYPERTROPHY): ICD-10-CM

## 2021-02-23 DIAGNOSIS — I73.9 PAD (PERIPHERAL ARTERY DISEASE): ICD-10-CM

## 2021-02-23 DIAGNOSIS — Z72.0 TOBACCO ABUSE: ICD-10-CM

## 2021-02-23 DIAGNOSIS — I73.9 CLAUDICATION IN PERIPHERAL VASCULAR DISEASE: ICD-10-CM

## 2021-02-23 DIAGNOSIS — I51.7 LAE (LEFT ATRIAL ENLARGEMENT): ICD-10-CM

## 2021-02-23 DIAGNOSIS — I21.4 NSTEMI (NON-ST ELEVATED MYOCARDIAL INFARCTION): ICD-10-CM

## 2021-02-23 DIAGNOSIS — Z98.61 S/P PTCA (PERCUTANEOUS TRANSLUMINAL CORONARY ANGIOPLASTY): ICD-10-CM

## 2021-02-23 DIAGNOSIS — I25.5 ISCHEMIC CARDIOMYOPATHY: ICD-10-CM

## 2021-02-23 DIAGNOSIS — E11.65 TYPE 2 DIABETES MELLITUS WITH HYPERGLYCEMIA, WITHOUT LONG-TERM CURRENT USE OF INSULIN: ICD-10-CM

## 2021-02-23 DIAGNOSIS — Z98.61 HISTORY OF PTCA: ICD-10-CM

## 2021-02-23 DIAGNOSIS — E78.5 DYSLIPIDEMIA: ICD-10-CM

## 2021-02-23 DIAGNOSIS — Z86.79 S/P AAA REPAIR: ICD-10-CM

## 2021-02-23 DIAGNOSIS — I10 ESSENTIAL HYPERTENSION: ICD-10-CM

## 2021-02-23 DIAGNOSIS — R94.31 ABNORMAL ECG: ICD-10-CM

## 2021-02-23 DIAGNOSIS — I20.9 AP (ANGINA PECTORIS): ICD-10-CM

## 2021-02-23 DIAGNOSIS — Z98.890 S/P AAA REPAIR: ICD-10-CM

## 2021-02-23 PROCEDURE — 99499 UNLISTED E&M SERVICE: CPT | Mod: S$GLB,,, | Performed by: INTERNAL MEDICINE

## 2021-02-23 PROCEDURE — 3078F PR MOST RECENT DIASTOLIC BLOOD PRESSURE < 80 MM HG: ICD-10-PCS | Mod: CPTII,S$GLB,, | Performed by: INTERNAL MEDICINE

## 2021-02-23 PROCEDURE — 3075F SYST BP GE 130 - 139MM HG: CPT | Mod: CPTII,S$GLB,, | Performed by: INTERNAL MEDICINE

## 2021-02-23 PROCEDURE — 1126F AMNT PAIN NOTED NONE PRSNT: CPT | Mod: S$GLB,,, | Performed by: INTERNAL MEDICINE

## 2021-02-23 PROCEDURE — 3078F DIAST BP <80 MM HG: CPT | Mod: CPTII,S$GLB,, | Performed by: INTERNAL MEDICINE

## 2021-02-23 PROCEDURE — 1159F MED LIST DOCD IN RCRD: CPT | Mod: S$GLB,,, | Performed by: INTERNAL MEDICINE

## 2021-02-23 PROCEDURE — 1159F PR MEDICATION LIST DOCUMENTED IN MEDICAL RECORD: ICD-10-PCS | Mod: S$GLB,,, | Performed by: INTERNAL MEDICINE

## 2021-02-23 PROCEDURE — 99999 PR PBB SHADOW E&M-EST. PATIENT-LVL III: ICD-10-PCS | Mod: PBBFAC,,, | Performed by: INTERNAL MEDICINE

## 2021-02-23 PROCEDURE — 99999 PR PBB SHADOW E&M-EST. PATIENT-LVL III: CPT | Mod: PBBFAC,,, | Performed by: INTERNAL MEDICINE

## 2021-02-23 PROCEDURE — 99214 OFFICE O/P EST MOD 30 MIN: CPT | Mod: S$GLB,,, | Performed by: INTERNAL MEDICINE

## 2021-02-23 PROCEDURE — 99214 PR OFFICE/OUTPT VISIT, EST, LEVL IV, 30-39 MIN: ICD-10-PCS | Mod: S$GLB,,, | Performed by: INTERNAL MEDICINE

## 2021-02-23 PROCEDURE — 1126F PR PAIN SEVERITY QUANTIFIED, NO PAIN PRESENT: ICD-10-PCS | Mod: S$GLB,,, | Performed by: INTERNAL MEDICINE

## 2021-02-23 PROCEDURE — 99499 RISK ADDL DX/OHS AUDIT: ICD-10-PCS | Mod: S$GLB,,, | Performed by: INTERNAL MEDICINE

## 2021-02-23 PROCEDURE — 3075F PR MOST RECENT SYSTOLIC BLOOD PRESS GE 130-139MM HG: ICD-10-PCS | Mod: CPTII,S$GLB,, | Performed by: INTERNAL MEDICINE

## 2021-03-03 ENCOUNTER — IMMUNIZATION (OUTPATIENT)
Dept: INTERNAL MEDICINE | Facility: CLINIC | Age: 81
End: 2021-03-03
Payer: MEDICARE

## 2021-03-03 DIAGNOSIS — Z23 NEED FOR VACCINATION: Primary | ICD-10-CM

## 2021-03-03 PROCEDURE — 91300 COVID-19, MRNA, LNP-S, PF, 30 MCG/0.3 ML DOSE VACCINE: CPT | Mod: PBBFAC | Performed by: FAMILY MEDICINE

## 2021-03-12 ENCOUNTER — TELEPHONE (OUTPATIENT)
Dept: CARDIOLOGY | Facility: CLINIC | Age: 81
End: 2021-03-12

## 2021-03-24 ENCOUNTER — IMMUNIZATION (OUTPATIENT)
Dept: INTERNAL MEDICINE | Facility: CLINIC | Age: 81
End: 2021-03-24
Payer: MEDICARE

## 2021-03-24 DIAGNOSIS — Z23 NEED FOR VACCINATION: Primary | ICD-10-CM

## 2021-03-24 PROCEDURE — 0002A COVID-19, MRNA, LNP-S, PF, 30 MCG/0.3 ML DOSE VACCINE: CPT | Mod: HCNC,PBBFAC | Performed by: FAMILY MEDICINE

## 2021-03-24 PROCEDURE — 91300 COVID-19, MRNA, LNP-S, PF, 30 MCG/0.3 ML DOSE VACCINE: CPT | Mod: HCNC,PBBFAC | Performed by: FAMILY MEDICINE

## 2021-04-26 ENCOUNTER — PES CALL (OUTPATIENT)
Dept: ADMINISTRATIVE | Facility: CLINIC | Age: 81
End: 2021-04-26

## 2021-04-27 ENCOUNTER — OFFICE VISIT (OUTPATIENT)
Dept: FAMILY MEDICINE | Facility: CLINIC | Age: 81
End: 2021-04-27
Payer: MEDICARE

## 2021-04-27 VITALS
SYSTOLIC BLOOD PRESSURE: 132 MMHG | WEIGHT: 158.94 LBS | DIASTOLIC BLOOD PRESSURE: 70 MMHG | TEMPERATURE: 97 F | RESPIRATION RATE: 18 BRPM | HEIGHT: 70 IN | OXYGEN SATURATION: 95 % | BODY MASS INDEX: 22.75 KG/M2 | HEART RATE: 67 BPM

## 2021-04-27 DIAGNOSIS — Z00.00 ENCOUNTER FOR PREVENTIVE HEALTH EXAMINATION: Primary | ICD-10-CM

## 2021-04-27 DIAGNOSIS — Z72.0 TOBACCO ABUSE: ICD-10-CM

## 2021-04-27 DIAGNOSIS — E78.5 DYSLIPIDEMIA: ICD-10-CM

## 2021-04-27 DIAGNOSIS — I73.9 PAD (PERIPHERAL ARTERY DISEASE): ICD-10-CM

## 2021-04-27 DIAGNOSIS — I20.9 AP (ANGINA PECTORIS): ICD-10-CM

## 2021-04-27 DIAGNOSIS — F32.A MILD DEPRESSION: ICD-10-CM

## 2021-04-27 DIAGNOSIS — I25.10 CAD, MULTIPLE VESSEL: ICD-10-CM

## 2021-04-27 DIAGNOSIS — I10 ESSENTIAL HYPERTENSION: ICD-10-CM

## 2021-04-27 DIAGNOSIS — E11.65 TYPE 2 DIABETES MELLITUS WITH HYPERGLYCEMIA, WITHOUT LONG-TERM CURRENT USE OF INSULIN: ICD-10-CM

## 2021-04-27 DIAGNOSIS — Z74.09 OTHER REDUCED MOBILITY: ICD-10-CM

## 2021-04-27 PROCEDURE — 99999 PR PBB SHADOW E&M-EST. PATIENT-LVL V: CPT | Mod: PBBFAC,,, | Performed by: NURSE PRACTITIONER

## 2021-04-27 PROCEDURE — 99999 PR PBB SHADOW E&M-EST. PATIENT-LVL V: ICD-10-PCS | Mod: PBBFAC,,, | Performed by: NURSE PRACTITIONER

## 2021-04-27 PROCEDURE — 99499 RISK ADDL DX/OHS AUDIT: ICD-10-PCS | Mod: HCNC,S$GLB,, | Performed by: NURSE PRACTITIONER

## 2021-04-27 PROCEDURE — G0439 PR MEDICARE ANNUAL WELLNESS SUBSEQUENT VISIT: ICD-10-PCS | Mod: S$GLB,,, | Performed by: NURSE PRACTITIONER

## 2021-04-27 PROCEDURE — 1158F PR ADVANCE CARE PLANNING DISCUSS DOCUMENTED IN MEDICAL RECORD: ICD-10-PCS | Mod: S$GLB,,, | Performed by: NURSE PRACTITIONER

## 2021-04-27 PROCEDURE — 3288F FALL RISK ASSESSMENT DOCD: CPT | Mod: CPTII,S$GLB,, | Performed by: NURSE PRACTITIONER

## 2021-04-27 PROCEDURE — 1101F PR PT FALLS ASSESS DOC 0-1 FALLS W/OUT INJ PAST YR: ICD-10-PCS | Mod: CPTII,S$GLB,, | Performed by: NURSE PRACTITIONER

## 2021-04-27 PROCEDURE — 3288F PR FALLS RISK ASSESSMENT DOCUMENTED: ICD-10-PCS | Mod: CPTII,S$GLB,, | Performed by: NURSE PRACTITIONER

## 2021-04-27 PROCEDURE — 99499 UNLISTED E&M SERVICE: CPT | Mod: HCNC,S$GLB,, | Performed by: NURSE PRACTITIONER

## 2021-04-27 PROCEDURE — 1126F PR PAIN SEVERITY QUANTIFIED, NO PAIN PRESENT: ICD-10-PCS | Mod: S$GLB,,, | Performed by: NURSE PRACTITIONER

## 2021-04-27 PROCEDURE — 1101F PT FALLS ASSESS-DOCD LE1/YR: CPT | Mod: CPTII,S$GLB,, | Performed by: NURSE PRACTITIONER

## 2021-04-27 PROCEDURE — 1126F AMNT PAIN NOTED NONE PRSNT: CPT | Mod: S$GLB,,, | Performed by: NURSE PRACTITIONER

## 2021-04-27 PROCEDURE — G0439 PPPS, SUBSEQ VISIT: HCPCS | Mod: S$GLB,,, | Performed by: NURSE PRACTITIONER

## 2021-04-27 PROCEDURE — 1158F ADVNC CARE PLAN TLK DOCD: CPT | Mod: S$GLB,,, | Performed by: NURSE PRACTITIONER

## 2021-05-21 ENCOUNTER — PATIENT OUTREACH (OUTPATIENT)
Dept: ADMINISTRATIVE | Facility: OTHER | Age: 81
End: 2021-05-21

## 2021-05-24 ENCOUNTER — OFFICE VISIT (OUTPATIENT)
Dept: CARDIOLOGY | Facility: CLINIC | Age: 81
End: 2021-05-24
Payer: MEDICARE

## 2021-05-24 VITALS
HEART RATE: 67 BPM | WEIGHT: 162.06 LBS | OXYGEN SATURATION: 99 % | SYSTOLIC BLOOD PRESSURE: 140 MMHG | HEIGHT: 70 IN | BODY MASS INDEX: 23.2 KG/M2 | DIASTOLIC BLOOD PRESSURE: 72 MMHG

## 2021-05-24 DIAGNOSIS — I25.5 ISCHEMIC CARDIOMYOPATHY: ICD-10-CM

## 2021-05-24 DIAGNOSIS — I51.7 LVH (LEFT VENTRICULAR HYPERTROPHY): ICD-10-CM

## 2021-05-24 DIAGNOSIS — Z87.19 HISTORY OF GI BLEED: ICD-10-CM

## 2021-05-24 DIAGNOSIS — I10 ESSENTIAL HYPERTENSION: ICD-10-CM

## 2021-05-24 DIAGNOSIS — E11.65 TYPE 2 DIABETES MELLITUS WITH HYPERGLYCEMIA, WITHOUT LONG-TERM CURRENT USE OF INSULIN: ICD-10-CM

## 2021-05-24 DIAGNOSIS — Z86.79 S/P AAA REPAIR: ICD-10-CM

## 2021-05-24 DIAGNOSIS — I73.9 CLAUDICATION IN PERIPHERAL VASCULAR DISEASE: ICD-10-CM

## 2021-05-24 DIAGNOSIS — Z98.890 S/P AAA REPAIR: ICD-10-CM

## 2021-05-24 DIAGNOSIS — I21.4 NSTEMI (NON-ST ELEVATED MYOCARDIAL INFARCTION): ICD-10-CM

## 2021-05-24 DIAGNOSIS — Z72.0 TOBACCO ABUSE: ICD-10-CM

## 2021-05-24 DIAGNOSIS — I25.10 CAD, MULTIPLE VESSEL: Primary | ICD-10-CM

## 2021-05-24 DIAGNOSIS — E78.5 DYSLIPIDEMIA: ICD-10-CM

## 2021-05-24 DIAGNOSIS — I49.1 PAC (PREMATURE ATRIAL CONTRACTION): ICD-10-CM

## 2021-05-24 DIAGNOSIS — I73.9 PAD (PERIPHERAL ARTERY DISEASE): ICD-10-CM

## 2021-05-24 DIAGNOSIS — R94.31 ABNORMAL ECG: ICD-10-CM

## 2021-05-24 DIAGNOSIS — Z98.61 S/P PTCA (PERCUTANEOUS TRANSLUMINAL CORONARY ANGIOPLASTY): ICD-10-CM

## 2021-05-24 DIAGNOSIS — I51.7 LAE (LEFT ATRIAL ENLARGEMENT): ICD-10-CM

## 2021-05-24 PROCEDURE — 1159F MED LIST DOCD IN RCRD: CPT | Mod: S$GLB,,, | Performed by: INTERNAL MEDICINE

## 2021-05-24 PROCEDURE — 99999 PR PBB SHADOW E&M-EST. PATIENT-LVL III: CPT | Mod: PBBFAC,,, | Performed by: INTERNAL MEDICINE

## 2021-05-24 PROCEDURE — 1159F PR MEDICATION LIST DOCUMENTED IN MEDICAL RECORD: ICD-10-PCS | Mod: S$GLB,,, | Performed by: INTERNAL MEDICINE

## 2021-05-24 PROCEDURE — 1126F AMNT PAIN NOTED NONE PRSNT: CPT | Mod: S$GLB,,, | Performed by: INTERNAL MEDICINE

## 2021-05-24 PROCEDURE — 99999 PR PBB SHADOW E&M-EST. PATIENT-LVL III: ICD-10-PCS | Mod: PBBFAC,,, | Performed by: INTERNAL MEDICINE

## 2021-05-24 PROCEDURE — 99214 PR OFFICE/OUTPT VISIT, EST, LEVL IV, 30-39 MIN: ICD-10-PCS | Mod: S$GLB,,, | Performed by: INTERNAL MEDICINE

## 2021-05-24 PROCEDURE — 99214 OFFICE O/P EST MOD 30 MIN: CPT | Mod: S$GLB,,, | Performed by: INTERNAL MEDICINE

## 2021-05-24 PROCEDURE — 99499 UNLISTED E&M SERVICE: CPT | Mod: HCNC,S$GLB,, | Performed by: INTERNAL MEDICINE

## 2021-05-24 PROCEDURE — 99499 RISK ADDL DX/OHS AUDIT: ICD-10-PCS | Mod: HCNC,S$GLB,, | Performed by: INTERNAL MEDICINE

## 2021-05-24 PROCEDURE — 1126F PR PAIN SEVERITY QUANTIFIED, NO PAIN PRESENT: ICD-10-PCS | Mod: S$GLB,,, | Performed by: INTERNAL MEDICINE

## 2021-08-10 ENCOUNTER — TELEPHONE (OUTPATIENT)
Dept: CARDIOLOGY | Facility: CLINIC | Age: 81
End: 2021-08-10

## 2021-08-11 ENCOUNTER — TELEPHONE (OUTPATIENT)
Dept: CARDIOLOGY | Facility: CLINIC | Age: 81
End: 2021-08-11

## 2021-08-11 DIAGNOSIS — I25.10 CAD, MULTIPLE VESSEL: Primary | ICD-10-CM

## 2021-08-11 DIAGNOSIS — I10 ESSENTIAL HYPERTENSION: ICD-10-CM

## 2021-08-11 DIAGNOSIS — E78.5 DYSLIPIDEMIA: ICD-10-CM

## 2021-08-11 DIAGNOSIS — E11.65 TYPE 2 DIABETES MELLITUS WITH HYPERGLYCEMIA, WITHOUT LONG-TERM CURRENT USE OF INSULIN: ICD-10-CM

## 2021-08-19 RX ORDER — ESCITALOPRAM OXALATE 10 MG/1
TABLET ORAL
Qty: 90 TABLET | Refills: 0 | Status: SHIPPED | OUTPATIENT
Start: 2021-08-19 | End: 2021-11-26

## 2021-09-13 ENCOUNTER — PATIENT OUTREACH (OUTPATIENT)
Dept: ADMINISTRATIVE | Facility: OTHER | Age: 81
End: 2021-09-13

## 2021-09-13 ENCOUNTER — TELEPHONE (OUTPATIENT)
Dept: CARDIOLOGY | Facility: CLINIC | Age: 81
End: 2021-09-13

## 2021-09-13 DIAGNOSIS — I73.9 PAD (PERIPHERAL ARTERY DISEASE): Primary | ICD-10-CM

## 2021-09-13 DIAGNOSIS — E11.9 TYPE 2 DIABETES MELLITUS WITHOUT COMPLICATION, UNSPECIFIED WHETHER LONG TERM INSULIN USE: Primary | ICD-10-CM

## 2021-09-14 ENCOUNTER — OFFICE VISIT (OUTPATIENT)
Dept: CARDIOLOGY | Facility: CLINIC | Age: 81
End: 2021-09-14
Payer: MEDICARE

## 2021-09-14 ENCOUNTER — HOSPITAL ENCOUNTER (OUTPATIENT)
Dept: CARDIOLOGY | Facility: HOSPITAL | Age: 81
Discharge: HOME | End: 2021-09-14
Attending: INTERNAL MEDICINE
Payer: MEDICARE

## 2021-09-14 VITALS
WEIGHT: 160 LBS | HEIGHT: 70 IN | BODY MASS INDEX: 22.9 KG/M2 | DIASTOLIC BLOOD PRESSURE: 68 MMHG | RESPIRATION RATE: 16 BRPM | SYSTOLIC BLOOD PRESSURE: 148 MMHG | OXYGEN SATURATION: 97 % | HEART RATE: 69 BPM

## 2021-09-14 DIAGNOSIS — I20.9 AP (ANGINA PECTORIS): ICD-10-CM

## 2021-09-14 DIAGNOSIS — I51.7 LVH (LEFT VENTRICULAR HYPERTROPHY): ICD-10-CM

## 2021-09-14 DIAGNOSIS — I10 ESSENTIAL HYPERTENSION: ICD-10-CM

## 2021-09-14 DIAGNOSIS — I73.9 PAD (PERIPHERAL ARTERY DISEASE): ICD-10-CM

## 2021-09-14 DIAGNOSIS — R94.31 ABNORMAL ECG: ICD-10-CM

## 2021-09-14 DIAGNOSIS — E11.65 TYPE 2 DIABETES MELLITUS WITH HYPERGLYCEMIA, WITHOUT LONG-TERM CURRENT USE OF INSULIN: ICD-10-CM

## 2021-09-14 DIAGNOSIS — Z98.890 S/P AAA REPAIR: ICD-10-CM

## 2021-09-14 DIAGNOSIS — I25.118 CORONARY ARTERY DISEASE OF NATIVE ARTERY OF NATIVE HEART WITH STABLE ANGINA PECTORIS: ICD-10-CM

## 2021-09-14 DIAGNOSIS — I49.1 PAC (PREMATURE ATRIAL CONTRACTION): ICD-10-CM

## 2021-09-14 DIAGNOSIS — R00.1 SINUS BRADYCARDIA: ICD-10-CM

## 2021-09-14 DIAGNOSIS — I25.5 ISCHEMIC CARDIOMYOPATHY: ICD-10-CM

## 2021-09-14 DIAGNOSIS — Z98.61 S/P PTCA (PERCUTANEOUS TRANSLUMINAL CORONARY ANGIOPLASTY): ICD-10-CM

## 2021-09-14 DIAGNOSIS — I35.1 NONRHEUMATIC AORTIC VALVE INSUFFICIENCY: ICD-10-CM

## 2021-09-14 DIAGNOSIS — Z72.0 TOBACCO ABUSE: ICD-10-CM

## 2021-09-14 DIAGNOSIS — I25.10 CAD, MULTIPLE VESSEL: Primary | ICD-10-CM

## 2021-09-14 DIAGNOSIS — I73.9 CLAUDICATION IN PERIPHERAL VASCULAR DISEASE: ICD-10-CM

## 2021-09-14 DIAGNOSIS — Z87.19 HISTORY OF GI BLEED: ICD-10-CM

## 2021-09-14 DIAGNOSIS — I51.7 LAE (LEFT ATRIAL ENLARGEMENT): ICD-10-CM

## 2021-09-14 DIAGNOSIS — E78.5 DYSLIPIDEMIA: ICD-10-CM

## 2021-09-14 DIAGNOSIS — Z98.61 HISTORY OF PTCA: ICD-10-CM

## 2021-09-14 DIAGNOSIS — Z86.79 S/P AAA REPAIR: ICD-10-CM

## 2021-09-14 PROCEDURE — 99214 PR OFFICE/OUTPT VISIT, EST, LEVL IV, 30-39 MIN: ICD-10-PCS | Mod: HCNC,25,S$GLB, | Performed by: INTERNAL MEDICINE

## 2021-09-14 PROCEDURE — 93010 EKG 12-LEAD: ICD-10-PCS | Mod: HCNC,,, | Performed by: INTERNAL MEDICINE

## 2021-09-14 PROCEDURE — 1126F PR PAIN SEVERITY QUANTIFIED, NO PAIN PRESENT: ICD-10-PCS | Mod: HCNC,CPTII,S$GLB, | Performed by: INTERNAL MEDICINE

## 2021-09-14 PROCEDURE — 1126F AMNT PAIN NOTED NONE PRSNT: CPT | Mod: HCNC,CPTII,S$GLB, | Performed by: INTERNAL MEDICINE

## 2021-09-14 PROCEDURE — 93010 ELECTROCARDIOGRAM REPORT: CPT | Mod: HCNC,,, | Performed by: INTERNAL MEDICINE

## 2021-09-14 PROCEDURE — 3288F PR FALLS RISK ASSESSMENT DOCUMENTED: ICD-10-PCS | Mod: HCNC,CPTII,S$GLB, | Performed by: INTERNAL MEDICINE

## 2021-09-14 PROCEDURE — 3288F FALL RISK ASSESSMENT DOCD: CPT | Mod: HCNC,CPTII,S$GLB, | Performed by: INTERNAL MEDICINE

## 2021-09-14 PROCEDURE — 1101F PT FALLS ASSESS-DOCD LE1/YR: CPT | Mod: HCNC,CPTII,S$GLB, | Performed by: INTERNAL MEDICINE

## 2021-09-14 PROCEDURE — 1159F PR MEDICATION LIST DOCUMENTED IN MEDICAL RECORD: ICD-10-PCS | Mod: HCNC,CPTII,S$GLB, | Performed by: INTERNAL MEDICINE

## 2021-09-14 PROCEDURE — 3078F PR MOST RECENT DIASTOLIC BLOOD PRESSURE < 80 MM HG: ICD-10-PCS | Mod: HCNC,CPTII,S$GLB, | Performed by: INTERNAL MEDICINE

## 2021-09-14 PROCEDURE — 1160F PR REVIEW ALL MEDS BY PRESCRIBER/CLIN PHARMACIST DOCUMENTED: ICD-10-PCS | Mod: HCNC,CPTII,S$GLB, | Performed by: INTERNAL MEDICINE

## 2021-09-14 PROCEDURE — 99999 PR PBB SHADOW E&M-EST. PATIENT-LVL III: CPT | Mod: PBBFAC,HCNC,, | Performed by: INTERNAL MEDICINE

## 2021-09-14 PROCEDURE — 93005 ELECTROCARDIOGRAM TRACING: CPT | Mod: HCNC

## 2021-09-14 PROCEDURE — 99999 PR PBB SHADOW E&M-EST. PATIENT-LVL III: ICD-10-PCS | Mod: PBBFAC,HCNC,, | Performed by: INTERNAL MEDICINE

## 2021-09-14 PROCEDURE — 99499 UNLISTED E&M SERVICE: CPT | Mod: HCNC,S$GLB,, | Performed by: INTERNAL MEDICINE

## 2021-09-14 PROCEDURE — 1159F MED LIST DOCD IN RCRD: CPT | Mod: HCNC,CPTII,S$GLB, | Performed by: INTERNAL MEDICINE

## 2021-09-14 PROCEDURE — 3078F DIAST BP <80 MM HG: CPT | Mod: HCNC,CPTII,S$GLB, | Performed by: INTERNAL MEDICINE

## 2021-09-14 PROCEDURE — 1160F RVW MEDS BY RX/DR IN RCRD: CPT | Mod: HCNC,CPTII,S$GLB, | Performed by: INTERNAL MEDICINE

## 2021-09-14 PROCEDURE — 3077F SYST BP >= 140 MM HG: CPT | Mod: HCNC,CPTII,S$GLB, | Performed by: INTERNAL MEDICINE

## 2021-09-14 PROCEDURE — 1101F PR PT FALLS ASSESS DOC 0-1 FALLS W/OUT INJ PAST YR: ICD-10-PCS | Mod: HCNC,CPTII,S$GLB, | Performed by: INTERNAL MEDICINE

## 2021-09-14 PROCEDURE — 99499 RISK ADDL DX/OHS AUDIT: ICD-10-PCS | Mod: HCNC,S$GLB,, | Performed by: INTERNAL MEDICINE

## 2021-09-14 PROCEDURE — 3077F PR MOST RECENT SYSTOLIC BLOOD PRESSURE >= 140 MM HG: ICD-10-PCS | Mod: HCNC,CPTII,S$GLB, | Performed by: INTERNAL MEDICINE

## 2021-09-14 PROCEDURE — 99214 OFFICE O/P EST MOD 30 MIN: CPT | Mod: HCNC,25,S$GLB, | Performed by: INTERNAL MEDICINE

## 2021-09-16 ENCOUNTER — LAB VISIT (OUTPATIENT)
Dept: LAB | Facility: HOSPITAL | Age: 81
End: 2021-09-16
Attending: INTERNAL MEDICINE
Payer: MEDICARE

## 2021-09-16 ENCOUNTER — TELEPHONE (OUTPATIENT)
Dept: CARDIOLOGY | Facility: CLINIC | Age: 81
End: 2021-09-16

## 2021-09-16 DIAGNOSIS — R00.1 SINUS BRADYCARDIA: ICD-10-CM

## 2021-09-16 DIAGNOSIS — R94.31 ABNORMAL ECG: ICD-10-CM

## 2021-09-16 DIAGNOSIS — I25.5 ISCHEMIC CARDIOMYOPATHY: ICD-10-CM

## 2021-09-16 DIAGNOSIS — E78.5 DYSLIPIDEMIA: ICD-10-CM

## 2021-09-16 DIAGNOSIS — I10 ESSENTIAL HYPERTENSION: ICD-10-CM

## 2021-09-16 DIAGNOSIS — I35.1 NONRHEUMATIC AORTIC VALVE INSUFFICIENCY: ICD-10-CM

## 2021-09-16 DIAGNOSIS — Z72.0 TOBACCO ABUSE: ICD-10-CM

## 2021-09-16 DIAGNOSIS — Z86.79 S/P AAA REPAIR: ICD-10-CM

## 2021-09-16 DIAGNOSIS — I73.9 CLAUDICATION IN PERIPHERAL VASCULAR DISEASE: ICD-10-CM

## 2021-09-16 DIAGNOSIS — E11.65 TYPE 2 DIABETES MELLITUS WITH HYPERGLYCEMIA, WITHOUT LONG-TERM CURRENT USE OF INSULIN: ICD-10-CM

## 2021-09-16 DIAGNOSIS — I25.10 CAD, MULTIPLE VESSEL: ICD-10-CM

## 2021-09-16 DIAGNOSIS — I49.1 PAC (PREMATURE ATRIAL CONTRACTION): ICD-10-CM

## 2021-09-16 DIAGNOSIS — I51.7 LAE (LEFT ATRIAL ENLARGEMENT): ICD-10-CM

## 2021-09-16 DIAGNOSIS — I25.118 CORONARY ARTERY DISEASE OF NATIVE ARTERY OF NATIVE HEART WITH STABLE ANGINA PECTORIS: ICD-10-CM

## 2021-09-16 DIAGNOSIS — I20.9 AP (ANGINA PECTORIS): ICD-10-CM

## 2021-09-16 DIAGNOSIS — Z98.890 S/P AAA REPAIR: ICD-10-CM

## 2021-09-16 DIAGNOSIS — Z87.19 HISTORY OF GI BLEED: ICD-10-CM

## 2021-09-16 DIAGNOSIS — I51.7 LVH (LEFT VENTRICULAR HYPERTROPHY): ICD-10-CM

## 2021-09-16 DIAGNOSIS — Z98.61 S/P PTCA (PERCUTANEOUS TRANSLUMINAL CORONARY ANGIOPLASTY): ICD-10-CM

## 2021-09-16 DIAGNOSIS — Z98.61 HISTORY OF PTCA: ICD-10-CM

## 2021-09-16 DIAGNOSIS — I73.9 PAD (PERIPHERAL ARTERY DISEASE): ICD-10-CM

## 2021-09-16 LAB
ALBUMIN SERPL BCP-MCNC: 3.9 G/DL (ref 3.5–5.2)
ALP SERPL-CCNC: 60 U/L (ref 55–135)
ALT SERPL W/O P-5'-P-CCNC: 13 U/L (ref 10–44)
ANION GAP SERPL CALC-SCNC: 7 MMOL/L (ref 8–16)
AST SERPL-CCNC: 15 U/L (ref 10–40)
BASOPHILS # BLD AUTO: 0.06 K/UL (ref 0–0.2)
BASOPHILS NFR BLD: 0.8 % (ref 0–1.9)
BILIRUB SERPL-MCNC: 0.6 MG/DL (ref 0.1–1)
BNP SERPL-MCNC: 170 PG/ML (ref 0–99)
BUN SERPL-MCNC: 24 MG/DL (ref 8–23)
CALCIUM SERPL-MCNC: 9.4 MG/DL (ref 8.7–10.5)
CHLORIDE SERPL-SCNC: 102 MMOL/L (ref 95–110)
CHOLEST SERPL-MCNC: 135 MG/DL (ref 120–199)
CHOLEST/HDLC SERPL: 3.9 {RATIO} (ref 2–5)
CO2 SERPL-SCNC: 27 MMOL/L (ref 23–29)
CREAT SERPL-MCNC: 1.2 MG/DL (ref 0.5–1.4)
DIFFERENTIAL METHOD: NORMAL
EOSINOPHIL # BLD AUTO: 0.1 K/UL (ref 0–0.5)
EOSINOPHIL NFR BLD: 1.7 % (ref 0–8)
ERYTHROCYTE [DISTWIDTH] IN BLOOD BY AUTOMATED COUNT: 14.5 % (ref 11.5–14.5)
EST. GFR  (AFRICAN AMERICAN): >60 ML/MIN/1.73 M^2
EST. GFR  (NON AFRICAN AMERICAN): 56 ML/MIN/1.73 M^2
ESTIMATED AVG GLUCOSE: 143 MG/DL (ref 68–131)
GLUCOSE SERPL-MCNC: 179 MG/DL (ref 70–110)
HBA1C MFR BLD: 6.6 % (ref 4–5.6)
HCT VFR BLD AUTO: 44.1 % (ref 40–54)
HDLC SERPL-MCNC: 35 MG/DL (ref 40–75)
HDLC SERPL: 25.9 % (ref 20–50)
HGB BLD-MCNC: 14.5 G/DL (ref 14–18)
IMM GRANULOCYTES # BLD AUTO: 0.03 K/UL (ref 0–0.04)
IMM GRANULOCYTES NFR BLD AUTO: 0.4 % (ref 0–0.5)
LDLC SERPL CALC-MCNC: 65 MG/DL (ref 63–159)
LYMPHOCYTES # BLD AUTO: 1.3 K/UL (ref 1–4.8)
LYMPHOCYTES NFR BLD: 18.5 % (ref 18–48)
MCH RBC QN AUTO: 29.6 PG (ref 27–31)
MCHC RBC AUTO-ENTMCNC: 32.9 G/DL (ref 32–36)
MCV RBC AUTO: 90 FL (ref 82–98)
MONOCYTES # BLD AUTO: 0.6 K/UL (ref 0.3–1)
MONOCYTES NFR BLD: 8.3 % (ref 4–15)
NEUTROPHILS # BLD AUTO: 5 K/UL (ref 1.8–7.7)
NEUTROPHILS NFR BLD: 70.3 % (ref 38–73)
NONHDLC SERPL-MCNC: 100 MG/DL
NRBC BLD-RTO: 0 /100 WBC
PLATELET # BLD AUTO: 219 K/UL (ref 150–450)
PMV BLD AUTO: 11.3 FL (ref 9.2–12.9)
POTASSIUM SERPL-SCNC: 4.4 MMOL/L (ref 3.5–5.1)
PROT SERPL-MCNC: 7 G/DL (ref 6–8.4)
RBC # BLD AUTO: 4.9 M/UL (ref 4.6–6.2)
SODIUM SERPL-SCNC: 136 MMOL/L (ref 136–145)
TRIGL SERPL-MCNC: 175 MG/DL (ref 30–150)
WBC # BLD AUTO: 7.07 K/UL (ref 3.9–12.7)

## 2021-09-16 PROCEDURE — 85025 COMPLETE CBC W/AUTO DIFF WBC: CPT | Mod: HCNC | Performed by: INTERNAL MEDICINE

## 2021-09-16 PROCEDURE — 36415 COLL VENOUS BLD VENIPUNCTURE: CPT | Mod: HCNC | Performed by: INTERNAL MEDICINE

## 2021-09-16 PROCEDURE — 83880 ASSAY OF NATRIURETIC PEPTIDE: CPT | Mod: HCNC | Performed by: INTERNAL MEDICINE

## 2021-09-16 PROCEDURE — 83036 HEMOGLOBIN GLYCOSYLATED A1C: CPT | Mod: HCNC | Performed by: INTERNAL MEDICINE

## 2021-09-16 PROCEDURE — 80053 COMPREHEN METABOLIC PANEL: CPT | Mod: HCNC | Performed by: INTERNAL MEDICINE

## 2021-09-16 PROCEDURE — 80061 LIPID PANEL: CPT | Mod: HCNC | Performed by: INTERNAL MEDICINE

## 2021-09-22 ENCOUNTER — OFFICE VISIT (OUTPATIENT)
Dept: FAMILY MEDICINE | Facility: CLINIC | Age: 81
End: 2021-09-22
Payer: MEDICARE

## 2021-09-22 VITALS
SYSTOLIC BLOOD PRESSURE: 122 MMHG | WEIGHT: 158.94 LBS | BODY MASS INDEX: 22.75 KG/M2 | OXYGEN SATURATION: 95 % | TEMPERATURE: 98 F | HEIGHT: 70 IN | DIASTOLIC BLOOD PRESSURE: 76 MMHG

## 2021-09-22 DIAGNOSIS — M79.605 PAIN IN BOTH LOWER EXTREMITIES: ICD-10-CM

## 2021-09-22 DIAGNOSIS — M79.604 PAIN IN BOTH LOWER EXTREMITIES: ICD-10-CM

## 2021-09-22 PROCEDURE — 99999 PR PBB SHADOW E&M-EST. PATIENT-LVL IV: CPT | Mod: PBBFAC,HCNC,, | Performed by: INTERNAL MEDICINE

## 2021-09-22 PROCEDURE — 99213 PR OFFICE/OUTPT VISIT, EST, LEVL III, 20-29 MIN: ICD-10-PCS | Mod: HCNC,S$GLB,, | Performed by: INTERNAL MEDICINE

## 2021-09-22 PROCEDURE — 3074F SYST BP LT 130 MM HG: CPT | Mod: HCNC,CPTII,S$GLB, | Performed by: INTERNAL MEDICINE

## 2021-09-22 PROCEDURE — 99999 PR PBB SHADOW E&M-EST. PATIENT-LVL IV: ICD-10-PCS | Mod: PBBFAC,HCNC,, | Performed by: INTERNAL MEDICINE

## 2021-09-22 PROCEDURE — 3078F PR MOST RECENT DIASTOLIC BLOOD PRESSURE < 80 MM HG: ICD-10-PCS | Mod: HCNC,CPTII,S$GLB, | Performed by: INTERNAL MEDICINE

## 2021-09-22 PROCEDURE — 1126F PR PAIN SEVERITY QUANTIFIED, NO PAIN PRESENT: ICD-10-PCS | Mod: HCNC,CPTII,S$GLB, | Performed by: INTERNAL MEDICINE

## 2021-09-22 PROCEDURE — 3074F PR MOST RECENT SYSTOLIC BLOOD PRESSURE < 130 MM HG: ICD-10-PCS | Mod: HCNC,CPTII,S$GLB, | Performed by: INTERNAL MEDICINE

## 2021-09-22 PROCEDURE — 1159F PR MEDICATION LIST DOCUMENTED IN MEDICAL RECORD: ICD-10-PCS | Mod: HCNC,CPTII,S$GLB, | Performed by: INTERNAL MEDICINE

## 2021-09-22 PROCEDURE — 1101F PT FALLS ASSESS-DOCD LE1/YR: CPT | Mod: HCNC,CPTII,S$GLB, | Performed by: INTERNAL MEDICINE

## 2021-09-22 PROCEDURE — 1101F PR PT FALLS ASSESS DOC 0-1 FALLS W/OUT INJ PAST YR: ICD-10-PCS | Mod: HCNC,CPTII,S$GLB, | Performed by: INTERNAL MEDICINE

## 2021-09-22 PROCEDURE — 1159F MED LIST DOCD IN RCRD: CPT | Mod: HCNC,CPTII,S$GLB, | Performed by: INTERNAL MEDICINE

## 2021-09-22 PROCEDURE — 3288F FALL RISK ASSESSMENT DOCD: CPT | Mod: HCNC,CPTII,S$GLB, | Performed by: INTERNAL MEDICINE

## 2021-09-22 PROCEDURE — 3288F PR FALLS RISK ASSESSMENT DOCUMENTED: ICD-10-PCS | Mod: HCNC,CPTII,S$GLB, | Performed by: INTERNAL MEDICINE

## 2021-09-22 PROCEDURE — 99213 OFFICE O/P EST LOW 20 MIN: CPT | Mod: HCNC,S$GLB,, | Performed by: INTERNAL MEDICINE

## 2021-09-22 PROCEDURE — 1126F AMNT PAIN NOTED NONE PRSNT: CPT | Mod: HCNC,CPTII,S$GLB, | Performed by: INTERNAL MEDICINE

## 2021-09-22 PROCEDURE — 3078F DIAST BP <80 MM HG: CPT | Mod: HCNC,CPTII,S$GLB, | Performed by: INTERNAL MEDICINE

## 2021-09-22 RX ORDER — GABAPENTIN 100 MG/1
100 CAPSULE ORAL NIGHTLY
Qty: 30 CAPSULE | Refills: 5 | Status: SHIPPED | OUTPATIENT
Start: 2021-09-22 | End: 2022-11-04

## 2021-09-27 ENCOUNTER — OFFICE VISIT (OUTPATIENT)
Dept: UROLOGY | Facility: CLINIC | Age: 81
End: 2021-09-27
Payer: MEDICARE

## 2021-09-27 VITALS — WEIGHT: 158 LBS | BODY MASS INDEX: 22.67 KG/M2

## 2021-09-27 DIAGNOSIS — N32.81 OAB (OVERACTIVE BLADDER): Primary | ICD-10-CM

## 2021-09-27 PROCEDURE — 1159F PR MEDICATION LIST DOCUMENTED IN MEDICAL RECORD: ICD-10-PCS | Mod: HCNC,CPTII,S$GLB, | Performed by: NURSE PRACTITIONER

## 2021-09-27 PROCEDURE — 99999 PR PBB SHADOW E&M-EST. PATIENT-LVL III: CPT | Mod: PBBFAC,HCNC,, | Performed by: NURSE PRACTITIONER

## 2021-09-27 PROCEDURE — 99214 OFFICE O/P EST MOD 30 MIN: CPT | Mod: HCNC,S$GLB,, | Performed by: NURSE PRACTITIONER

## 2021-09-27 PROCEDURE — 1159F MED LIST DOCD IN RCRD: CPT | Mod: HCNC,CPTII,S$GLB, | Performed by: NURSE PRACTITIONER

## 2021-09-27 PROCEDURE — 99999 PR PBB SHADOW E&M-EST. PATIENT-LVL III: ICD-10-PCS | Mod: PBBFAC,HCNC,, | Performed by: NURSE PRACTITIONER

## 2021-09-27 PROCEDURE — 1126F AMNT PAIN NOTED NONE PRSNT: CPT | Mod: HCNC,CPTII,S$GLB, | Performed by: NURSE PRACTITIONER

## 2021-09-27 PROCEDURE — 99214 PR OFFICE/OUTPT VISIT, EST, LEVL IV, 30-39 MIN: ICD-10-PCS | Mod: HCNC,S$GLB,, | Performed by: NURSE PRACTITIONER

## 2021-09-27 PROCEDURE — 1126F PR PAIN SEVERITY QUANTIFIED, NO PAIN PRESENT: ICD-10-PCS | Mod: HCNC,CPTII,S$GLB, | Performed by: NURSE PRACTITIONER

## 2021-09-27 RX ORDER — MIRABEGRON 50 MG/1
50 TABLET, FILM COATED, EXTENDED RELEASE ORAL DAILY
Qty: 30 TABLET | Refills: 11 | Status: SHIPPED | OUTPATIENT
Start: 2021-09-27 | End: 2022-06-16 | Stop reason: SDUPTHER

## 2021-12-03 ENCOUNTER — IMMUNIZATION (OUTPATIENT)
Dept: PRIMARY CARE CLINIC | Facility: CLINIC | Age: 81
End: 2021-12-03
Payer: MEDICARE

## 2021-12-03 DIAGNOSIS — Z23 NEED FOR VACCINATION: Primary | ICD-10-CM

## 2021-12-03 PROCEDURE — 0004A COVID-19, MRNA, LNP-S, PF, 30 MCG/0.3 ML DOSE VACCINE: CPT | Mod: CV19,PBBFAC | Performed by: FAMILY MEDICINE

## 2021-12-14 ENCOUNTER — PATIENT OUTREACH (OUTPATIENT)
Dept: ADMINISTRATIVE | Facility: OTHER | Age: 81
End: 2021-12-14
Payer: MEDICARE

## 2022-04-04 NOTE — TELEPHONE ENCOUNTER
Care Due:                  Date            Visit Type   Department     Provider  --------------------------------------------------------------------------------                                SAME DAY -                              ESTABLISHED   [facility]  Last Visit: 09-      PATIENT      MEDICINE       Holger Thornton  Next Visit: None Scheduled  None         None Found                                                            Last  Test          Frequency    Reason                     Performed    Due Date  --------------------------------------------------------------------------------    HBA1C.......  6 months...  metFORMIN................  09-   03-    Powered by [employer]. Reference number: [id number].   4/04/2022 8:31:45 AM CDT

## 2022-04-06 RX ORDER — METFORMIN HYDROCHLORIDE 1000 MG/1
TABLET ORAL
Qty: 180 TABLET | Refills: 0 | Status: SHIPPED | OUTPATIENT
Start: 2022-04-06 | End: 2022-11-04

## 2022-04-06 NOTE — TELEPHONE ENCOUNTER
Refill Routing Note   Medication(s) are not appropriate for processing by [facility] for the following reason(s):      - Required laboratory values are outdated    [facility] action(s):  Defer Medication-related problems identified:   Requires labs  Requires appointment        Medication reconciliation completed: No     Appointments  past 12m or future 3m with PCP    Date Provider   Last Visit   9/22/2021 Holger Thornton MD   Next Visit   Visit date not found Holger Thornton MD   ED visits in past 90 days: 0        Note composed:7:57 PM 04/05/2022

## 2022-04-16 ENCOUNTER — HOSPITAL ENCOUNTER (EMERGENCY)
Facility: HOSPITAL | Age: 82
Discharge: HOME | End: 2022-04-16
Attending: EMERGENCY MEDICINE
Payer: MEDICARE

## 2022-04-16 VITALS
TEMPERATURE: 98 F | HEART RATE: 52 BPM | BODY MASS INDEX: 22.67 KG/M2 | HEIGHT: 70 IN | DIASTOLIC BLOOD PRESSURE: 60 MMHG | RESPIRATION RATE: 18 BRPM | OXYGEN SATURATION: 98 % | SYSTOLIC BLOOD PRESSURE: 124 MMHG
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
## 2022-04-16 PROCEDURE — 99283 EMERGENCY DEPT VISIT LOW MDM: CPT | Mod: 25

## 2022-04-17 NOTE — ED PROVIDER NOTES
Encounter Date: 4/16/2022       History     Chief Complaint   Patient presents with    Fall     Fall yesterday, c/o left shoulder pain/ left elbow pain     Patient states he lost his balance and fell complains of left shoulder pain and elbow pain.  Denies LOC denies head injury family states he is acting normal today.        Review of patient's allergies indicates:   Allergen Reactions    Metoprolol      Junctional rhythm       Past Medical History:   Diagnosis Date    Acute blood loss anemia 10/14/2019    Aneurysm, abdominal aortic     Coronary artery disease     Depression     Diabetes mellitus     HLD (hyperlipidemia)     Hypertension     Kidney stone     PAD (peripheral artery disease)     Tobacco abuse      Past Surgical History:   Procedure Laterality Date    APPENDECTOMY      CORONARY STENT PLACEMENT      x 4    ESOPHAGOGASTRODUODENOSCOPY N/A 10/14/2019    Procedure: EGD (ESOPHAGOGASTRODUODENOSCOPY);  Surgeon: Carlos Mcneal III, MD;  Location: [facility];  Service: Endoscopy;  Laterality: N/A;    ESOPHAGOGASTRODUODENOSCOPY N/A 10/15/2019    Procedure: EGD (ESOPHAGOGASTRODUODENOSCOPY);  Surgeon: Carlos Mcneal III, MD;  Location: [facility] ENDO;  Service: Endoscopy;  Laterality: N/A;    LEFT HEART CATHETERIZATION Left 10/11/2019    Procedure: CATHETERIZATION, HEART, LEFT;  Surgeon: Robe Gilbert MD;  Location: [facility] CATH LAB;  Service: Cardiology;  Laterality: Left;    LEFT HEART CATHETERIZATION Left 10/13/2019    Procedure: CATHETERIZATION, HEART, LEFT;  Surgeon: Robe Gilbert MD;  Location: [facility] CATH LAB;  Service: Cardiology;  Laterality: Left;    leg stent Left      Family History   Problem Relation Age of Onset    Diabetes Mother     COPD Father     Diabetes Brother      Social History     Tobacco Use    Smoking status: Current Every Day Smoker     Years: 15.00     Types: Cigars    Smokeless tobacco: Current User   Substance Use Topics    Alcohol use: Not Currently    Drug use:  Not Currently     Review of Systems   Constitutional: Negative for fever.   HENT: Negative for sore throat.    Respiratory: Negative for shortness of breath.    Cardiovascular: Negative for chest pain.   Gastrointestinal: Negative for nausea.   Genitourinary: Negative for dysuria.   Musculoskeletal: Negative for back pain.   Skin: Negative for rash.   Neurological: Negative for weakness.   Hematological: Does not bruise/bleed easily.       Physical Exam     Initial Vitals [04/16/22 1441]   BP Pulse Resp Temp SpO2   129/66 (!) 56 16 97.9 °F (36.6 °C) 98 %      MAP       --         Physical Exam    Nursing note and vitals reviewed.  Constitutional: He appears well-developed and well-nourished.   HENT:   Head: Normocephalic and atraumatic.   Eyes: Conjunctivae are normal. Pupils are equal, round, and reactive to light.   Neck: Neck supple.   Normal range of motion.  Cardiovascular: Normal rate, regular rhythm, normal heart sounds and intact distal pulses.   Pulmonary/Chest: Breath sounds normal.   Abdominal: Abdomen is soft. There is no rebound and no guarding.   Musculoskeletal:         General: Normal range of motion.      Cervical back: Normal range of motion and neck supple.      Comments: TTP with left shoulder range of motion.  No deformity noted mild abrasions on shoulder and elbow covered by primary nurse     Neurological: He is alert.   Skin: Skin is warm and dry.   Psychiatric: He has a normal mood and affect. His behavior is normal. Thought content normal.         ED Course   Procedures  Labs Reviewed - No data to display       Imaging Results          X-Ray Shoulder 2 or More Views Left (Final result)  Result time 04/16/22 15:12:16    Final result by Randolph Torres Jr., MD (04/16/22 15:12:16)                 Impression:      1.  No acute fracture or dislocation left shoulder      Electronically signed by: Randolph Torres Jr., MD  Date:    04/16/2022  Time:    15:12             Narrative:    EXAMINATION:  XR  SHOULDER COMPLETE 2 OR MORE VIEWS LEFT    CLINICAL HISTORY:  fall;    COMPARISON:  None    FINDINGS:  Spurring of the humeral head and inferior glenoid.  Spurring with narrowing of the left AC joint.  No acute fracture or subluxation.                                 Medications - No data to display                       Clinical Impression:   Final diagnoses:  [W19.XXXA] Fall, initial encounter (Primary)          ED Disposition Condition    Discharge Stable        ED Prescriptions     None        Follow-up Information     Follow up With Specialties Details Why Contact Info    Holger Thornton MD Internal Medicine Schedule an appointment as soon as possible for a visit  As needed, If symptoms worsen [phone] [facility] [id number]  [phone]             Arvind Hankins NP  04/17/22 6762

## 2022-05-21 NOTE — TELEPHONE ENCOUNTER
Refill Routing Note   Medication(s) are not appropriate for processing by [facility] for the following reason(s):      - Patient has been seen in the ED/Hospital since the last PCP visit    [facility] action(s):  Defer          Medication reconciliation completed: No     Appointments  past 12m or future 3m with PCP    Date Provider   Last Visit   9/22/2021 Holger Thornton MD   Next Visit   Visit date not found Holger Thornton MD   ED visits in past 90 days: 1        Note composed:2:29 PM 05/21/2022

## 2022-05-21 NOTE — TELEPHONE ENCOUNTER
No new care gaps identified.  [facility] Embedded Care Gaps. Reference number: [id number]. 5/21/2022   12:15:35 AM CDT

## 2022-05-22 RX ORDER — ESCITALOPRAM OXALATE 10 MG/1
TABLET ORAL
Qty: 90 TABLET | Refills: 0 | Status: SHIPPED | OUTPATIENT
Start: 2022-05-22 | End: 2022-11-04

## 2022-06-06 ENCOUNTER — PES CALL (OUTPATIENT)
Dept: ADMINISTRATIVE | Facility: CLINIC | Age: 82
End: 2022-06-06
Payer: MEDICARE

## 2022-06-13 ENCOUNTER — HOSPITAL ENCOUNTER (OUTPATIENT)
Facility: HOSPITAL | Age: 82
Discharge: HOME | End: 2022-06-15
Attending: EMERGENCY MEDICINE | Admitting: INTERNAL MEDICINE
Payer: MEDICARE

## 2022-06-13 DIAGNOSIS — G45.9 TIA (TRANSIENT ISCHEMIC ATTACK): Primary | ICD-10-CM

## 2022-06-13 DIAGNOSIS — R00.1 BRADYCARDIA: ICD-10-CM

## 2022-06-13 DIAGNOSIS — I63.9 STROKE: ICD-10-CM

## 2022-06-13 DIAGNOSIS — I51.7 LVH (LEFT VENTRICULAR HYPERTROPHY): ICD-10-CM

## 2022-06-13 DIAGNOSIS — I25.5 ISCHEMIC CARDIOMYOPATHY: ICD-10-CM

## 2022-06-13 PROBLEM — E78.5 DYSLIPIDEMIA: Chronic | Status: ACTIVE | Noted: 2019-06-12

## 2022-06-13 PROBLEM — I10 ESSENTIAL HYPERTENSION: Chronic | Status: ACTIVE | Noted: 2019-06-12

## 2022-06-13 PROBLEM — I73.9 PAD (PERIPHERAL ARTERY DISEASE): Chronic | Status: ACTIVE | Noted: 2019-06-12

## 2022-06-13 PROBLEM — E11.65 TYPE 2 DIABETES MELLITUS WITH HYPERGLYCEMIA, WITHOUT LONG-TERM CURRENT USE OF INSULIN: Chronic | Status: ACTIVE | Noted: 2019-06-12

## 2022-06-13 PROBLEM — Z72.0 TOBACCO ABUSE: Chronic | Status: ACTIVE | Noted: 2019-10-11

## 2022-06-13 PROBLEM — I25.10 CAD (CORONARY ARTERY DISEASE): Chronic | Status: ACTIVE | Noted: 2019-06-12

## 2022-06-13 LAB
ALBUMIN SERPL BCP-MCNC: 3.5 G/DL (ref 3.5–5.2)
ALP SERPL-CCNC: 61 U/L (ref 55–135)
ALT SERPL W/O P-5'-P-CCNC: 12 U/L (ref 10–44)
ANION GAP SERPL CALC-SCNC: 8 MMOL/L (ref 8–16)
AST SERPL-CCNC: 14 U/L (ref 10–40)
BASOPHILS # BLD AUTO: 0.04 K/UL (ref 0–0.2)
BASOPHILS NFR BLD: 0.6 % (ref 0–1.9)
BILIRUB SERPL-MCNC: 0.3 MG/DL (ref 0.1–1)
BUN SERPL-MCNC: 20 MG/DL (ref 8–23)
CALCIUM SERPL-MCNC: 8.7 MG/DL (ref 8.7–10.5)
CHLORIDE SERPL-SCNC: 108 MMOL/L (ref 95–110)
CO2 SERPL-SCNC: 24 MMOL/L (ref 23–29)
CREAT SERPL-MCNC: 1 MG/DL (ref 0.5–1.4)
DIFFERENTIAL METHOD: ABNORMAL
EOSINOPHIL # BLD AUTO: 0.1 K/UL (ref 0–0.5)
EOSINOPHIL NFR BLD: 1.8 % (ref 0–8)
ERYTHROCYTE [DISTWIDTH] IN BLOOD BY AUTOMATED COUNT: 14.2 % (ref 11.5–14.5)
EST. GFR  (AFRICAN AMERICAN): >60 ML/MIN/1.73 M^2
EST. GFR  (NON AFRICAN AMERICAN): >60 ML/MIN/1.73 M^2
GLUCOSE SERPL-MCNC: 132 MG/DL (ref 70–110)
HCT VFR BLD AUTO: 37.6 % (ref 40–54)
HGB BLD-MCNC: 12.6 G/DL (ref 14–18)
IMM GRANULOCYTES # BLD AUTO: 0.01 K/UL (ref 0–0.04)
IMM GRANULOCYTES NFR BLD AUTO: 0.2 % (ref 0–0.5)
INR PPP: 1.1 (ref 0.8–1.2)
LYMPHOCYTES # BLD AUTO: 1.1 K/UL (ref 1–4.8)
LYMPHOCYTES NFR BLD: 16.6 % (ref 18–48)
MCH RBC QN AUTO: 30.4 PG (ref 27–31)
MCHC RBC AUTO-ENTMCNC: 33.5 G/DL (ref 32–36)
MCV RBC AUTO: 91 FL (ref 82–98)
MONOCYTES # BLD AUTO: 0.5 K/UL (ref 0.3–1)
MONOCYTES NFR BLD: 7.3 % (ref 4–15)
NEUTROPHILS # BLD AUTO: 4.8 K/UL (ref 1.8–7.7)
NEUTROPHILS NFR BLD: 73.5 % (ref 38–73)
NRBC BLD-RTO: 0 /100 WBC
PLATELET # BLD AUTO: 149 K/UL (ref 150–450)
PMV BLD AUTO: 10.6 FL (ref 9.2–12.9)
POCT GLUCOSE: 127 MG/DL (ref 70–110)
POTASSIUM SERPL-SCNC: 4.1 MMOL/L (ref 3.5–5.1)
PROT SERPL-MCNC: 6.1 G/DL (ref 6–8.4)
PROTHROMBIN TIME: 11.5 SEC (ref 9–12.5)
RBC # BLD AUTO: 4.14 M/UL (ref 4.6–6.2)
SARS-COV-2 RDRP RESP QL NAA+PROBE: NEGATIVE
SODIUM SERPL-SCNC: 140 MMOL/L (ref 136–145)
TSH SERPL DL<=0.005 MIU/L-ACNC: 2.36 UIU/ML (ref 0.4–4)
WBC # BLD AUTO: 6.55 K/UL (ref 3.9–12.7)

## 2022-06-13 PROCEDURE — 80061 LIPID PANEL: CPT | Performed by: EMERGENCY MEDICINE

## 2022-06-13 PROCEDURE — G0378 HOSPITAL OBSERVATION PER HR: HCPCS

## 2022-06-13 PROCEDURE — 85025 COMPLETE CBC W/AUTO DIFF WBC: CPT | Performed by: EMERGENCY MEDICINE

## 2022-06-13 PROCEDURE — 93005 ELECTROCARDIOGRAM TRACING: CPT

## 2022-06-13 PROCEDURE — U0002 COVID-19 LAB TEST NON-CDC: HCPCS | Performed by: EMERGENCY MEDICINE

## 2022-06-13 PROCEDURE — 85610 PROTHROMBIN TIME: CPT | Performed by: EMERGENCY MEDICINE

## 2022-06-13 PROCEDURE — 93010 EKG 12-LEAD: ICD-10-PCS | Mod: ,,, | Performed by: INTERNAL MEDICINE

## 2022-06-13 PROCEDURE — 93010 ELECTROCARDIOGRAM REPORT: CPT | Mod: ,,, | Performed by: INTERNAL MEDICINE

## 2022-06-13 PROCEDURE — 84443 ASSAY THYROID STIM HORMONE: CPT | Performed by: EMERGENCY MEDICINE

## 2022-06-13 PROCEDURE — 82962 GLUCOSE BLOOD TEST: CPT | Mod: 91

## 2022-06-13 PROCEDURE — 80053 COMPREHEN METABOLIC PANEL: CPT | Performed by: EMERGENCY MEDICINE

## 2022-06-13 PROCEDURE — 99285 EMERGENCY DEPT VISIT HI MDM: CPT | Mod: 25,CS

## 2022-06-13 RX ORDER — SODIUM CHLORIDE 0.9 % (FLUSH) 0.9 %
10 SYRINGE (ML) INJECTION
Status: DISCONTINUED | OUTPATIENT
Start: 2022-06-14 | End: 2022-06-15 | Stop reason: HOSPADM

## 2022-06-13 RX ORDER — INSULIN ASPART 100 [IU]/ML
0-5 INJECTION, SOLUTION INTRAVENOUS; SUBCUTANEOUS
Status: DISCONTINUED | OUTPATIENT
Start: 2022-06-14 | End: 2022-06-15 | Stop reason: HOSPADM

## 2022-06-13 RX ORDER — ENOXAPARIN SODIUM 100 MG/ML
40 INJECTION SUBCUTANEOUS EVERY 24 HOURS
Status: DISCONTINUED | OUTPATIENT
Start: 2022-06-14 | End: 2022-06-15 | Stop reason: HOSPADM

## 2022-06-13 RX ORDER — HYDRALAZINE HYDROCHLORIDE 20 MG/ML
10 INJECTION INTRAMUSCULAR; INTRAVENOUS EVERY 8 HOURS PRN
Status: DISCONTINUED | OUTPATIENT
Start: 2022-06-14 | End: 2022-06-15 | Stop reason: HOSPADM

## 2022-06-13 RX ORDER — ASPIRIN 81 MG/1
81 TABLET ORAL DAILY
Status: DISCONTINUED | OUTPATIENT
Start: 2022-06-14 | End: 2022-06-15 | Stop reason: HOSPADM

## 2022-06-13 RX ORDER — ISOSORBIDE MONONITRATE 30 MG/1
120 TABLET, EXTENDED RELEASE ORAL DAILY
Status: DISCONTINUED | OUTPATIENT
Start: 2022-06-14 | End: 2022-06-14

## 2022-06-13 RX ORDER — AMLODIPINE BESYLATE 5 MG/1
5 TABLET ORAL DAILY
Status: DISCONTINUED | OUTPATIENT
Start: 2022-06-14 | End: 2022-06-15 | Stop reason: HOSPADM

## 2022-06-13 RX ORDER — GABAPENTIN 100 MG/1
100 CAPSULE ORAL NIGHTLY
Status: DISCONTINUED | OUTPATIENT
Start: 2022-06-14 | End: 2022-06-15 | Stop reason: HOSPADM

## 2022-06-13 RX ORDER — IBUPROFEN 200 MG
24 TABLET ORAL
Status: DISCONTINUED | OUTPATIENT
Start: 2022-06-14 | End: 2022-06-15 | Stop reason: HOSPADM

## 2022-06-13 RX ORDER — RANOLAZINE 500 MG/1
500 TABLET, EXTENDED RELEASE ORAL 2 TIMES DAILY
Status: DISCONTINUED | OUTPATIENT
Start: 2022-06-14 | End: 2022-06-15 | Stop reason: HOSPADM

## 2022-06-13 RX ORDER — ATORVASTATIN CALCIUM 40 MG/1
40 TABLET, FILM COATED ORAL DAILY
Refills: 3 | Status: DISCONTINUED | OUTPATIENT
Start: 2022-06-14 | End: 2022-06-14

## 2022-06-13 RX ORDER — ACETAMINOPHEN 325 MG/1
650 TABLET ORAL EVERY 6 HOURS PRN
Status: DISCONTINUED | OUTPATIENT
Start: 2022-06-14 | End: 2022-06-15 | Stop reason: HOSPADM

## 2022-06-13 RX ORDER — LISINOPRIL 20 MG/1
20 TABLET ORAL DAILY
Status: DISCONTINUED | OUTPATIENT
Start: 2022-06-14 | End: 2022-06-15 | Stop reason: HOSPADM

## 2022-06-13 RX ORDER — METOCLOPRAMIDE 5 MG/1
5 TABLET ORAL EVERY 6 HOURS PRN
Status: DISCONTINUED | OUTPATIENT
Start: 2022-06-14 | End: 2022-06-15 | Stop reason: HOSPADM

## 2022-06-13 RX ORDER — ESCITALOPRAM OXALATE 5 MG/1
10 TABLET ORAL DAILY
Status: DISCONTINUED | OUTPATIENT
Start: 2022-06-14 | End: 2022-06-15 | Stop reason: HOSPADM

## 2022-06-13 RX ORDER — PANTOPRAZOLE SODIUM 40 MG/1
40 TABLET, DELAYED RELEASE ORAL DAILY
Status: DISCONTINUED | OUTPATIENT
Start: 2022-06-14 | End: 2022-06-15 | Stop reason: HOSPADM

## 2022-06-13 RX ORDER — BISACODYL 10 MG
10 SUPPOSITORY, RECTAL RECTAL DAILY PRN
Status: DISCONTINUED | OUTPATIENT
Start: 2022-06-14 | End: 2022-06-15 | Stop reason: HOSPADM

## 2022-06-13 RX ORDER — IBUPROFEN 200 MG
16 TABLET ORAL
Status: DISCONTINUED | OUTPATIENT
Start: 2022-06-14 | End: 2022-06-15 | Stop reason: HOSPADM

## 2022-06-13 RX ORDER — ONDANSETRON 2 MG/ML
4 INJECTION INTRAMUSCULAR; INTRAVENOUS EVERY 12 HOURS PRN
Status: DISCONTINUED | OUTPATIENT
Start: 2022-06-14 | End: 2022-06-15 | Stop reason: HOSPADM

## 2022-06-13 RX ORDER — NITROGLYCERIN 0.4 MG/1
0.4 TABLET SUBLINGUAL EVERY 5 MIN PRN
Status: DISCONTINUED | OUTPATIENT
Start: 2022-06-14 | End: 2022-06-15 | Stop reason: HOSPADM

## 2022-06-13 RX ORDER — CLOPIDOGREL BISULFATE 75 MG/1
75 TABLET ORAL DAILY
Status: DISCONTINUED | OUTPATIENT
Start: 2022-06-14 | End: 2022-06-15 | Stop reason: HOSPADM

## 2022-06-13 RX ORDER — GLUCAGON 1 MG
1 KIT INJECTION
Status: DISCONTINUED | OUTPATIENT
Start: 2022-06-14 | End: 2022-06-15 | Stop reason: HOSPADM

## 2022-06-13 NOTE — Clinical Note
Diagnosis: TIA (transient ischemic attack) [587620]   Future Attending Provider: ERON DELUCA [[id number]]   Admitting Provider:: ERON DELUCA [[id number]]   Special Needs:: Fall Risk [15]

## 2022-06-14 PROBLEM — E11.9 TYPE 2 DIABETES MELLITUS, WITHOUT LONG-TERM CURRENT USE OF INSULIN: Chronic | Status: ACTIVE | Noted: 2019-06-12

## 2022-06-14 LAB
ALBUMIN SERPL BCP-MCNC: 3.2 G/DL (ref 3.5–5.2)
ALP SERPL-CCNC: 58 U/L (ref 55–135)
ALT SERPL W/O P-5'-P-CCNC: 7 U/L (ref 10–44)
ANION GAP SERPL CALC-SCNC: 7 MMOL/L (ref 8–16)
AORTIC ROOT ANNULUS: 3.38 CM
APTT BLDCRRT: 27.9 SEC (ref 21–32)
ASCENDING AORTA: 3.51 CM
AST SERPL-CCNC: 11 U/L (ref 10–40)
AV INDEX (PROSTH): 0.48
AV MEAN GRADIENT: 7 MMHG
AV PEAK GRADIENT: 14 MMHG
AV REGURGITATION PRESSURE HALF TIME: 969.29 MS
AV VALVE AREA: 2.78 CM2
AV VELOCITY RATIO: 0.44
BASOPHILS # BLD AUTO: 0.03 K/UL (ref 0–0.2)
BASOPHILS NFR BLD: 0.5 % (ref 0–1.9)
BILIRUB SERPL-MCNC: 0.4 MG/DL (ref 0.1–1)
BSA FOR ECHO PROCEDURE: 1.78 M2
BUN SERPL-MCNC: 19 MG/DL (ref 8–23)
CALCIUM SERPL-MCNC: 8.4 MG/DL (ref 8.7–10.5)
CHLORIDE SERPL-SCNC: 110 MMOL/L (ref 95–110)
CHOLEST SERPL-MCNC: 124 MG/DL (ref 120–199)
CHOLEST/HDLC SERPL: 3.6 {RATIO} (ref 2–5)
CK MB SERPL-MCNC: 1.4 NG/ML (ref 0.1–6.5)
CK MB SERPL-RTO: ABNORMAL % (ref 0–5)
CK SERPL-CCNC: <7 U/L (ref 20–200)
CO2 SERPL-SCNC: 24 MMOL/L (ref 23–29)
CREAT SERPL-MCNC: 0.8 MG/DL (ref 0.5–1.4)
CV ECHO LV RWT: 0.88 CM
DIFFERENTIAL METHOD: ABNORMAL
DOP CALC AO PEAK VEL: 1.89 M/S
DOP CALC AO VTI: 46.1 CM
DOP CALC LVOT AREA: 5.8 CM2
DOP CALC LVOT DIAMETER: 2.72 CM
DOP CALC LVOT PEAK VEL: 0.83 M/S
DOP CALC LVOT STROKE VOLUME: 128.35 CM3
DOP CALC RVOT PEAK VEL: 0.64 M/S
DOP CALC RVOT VTI: 19 CM
DOP CALCLVOT PEAK VEL VTI: 22.1 CM
E WAVE DECELERATION TIME: 258.73 MSEC
E/A RATIO: 1.19
E/E' RATIO: 11.63 M/S
ECHO LV POSTERIOR WALL: 1.79 CM (ref 0.6–1.1)
EJECTION FRACTION: 55 %
EOSINOPHIL # BLD AUTO: 0.1 K/UL (ref 0–0.5)
EOSINOPHIL NFR BLD: 1.9 % (ref 0–8)
ERYTHROCYTE [DISTWIDTH] IN BLOOD BY AUTOMATED COUNT: 14 % (ref 11.5–14.5)
EST. GFR  (AFRICAN AMERICAN): >60 ML/MIN/1.73 M^2
EST. GFR  (NON AFRICAN AMERICAN): >60 ML/MIN/1.73 M^2
ESTIMATED AVG GLUCOSE: 123 MG/DL (ref 68–131)
FRACTIONAL SHORTENING: 25 % (ref 28–44)
GLUCOSE SERPL-MCNC: 101 MG/DL (ref 70–110)
HBA1C MFR BLD: 5.9 % (ref 4–5.6)
HCT VFR BLD AUTO: 37.4 % (ref 40–54)
HDLC SERPL-MCNC: 34 MG/DL (ref 40–75)
HDLC SERPL: 27.4 % (ref 20–50)
HGB BLD-MCNC: 12.6 G/DL (ref 14–18)
IMM GRANULOCYTES # BLD AUTO: 0.02 K/UL (ref 0–0.04)
IMM GRANULOCYTES NFR BLD AUTO: 0.3 % (ref 0–0.5)
INR PPP: 1.1 (ref 0.8–1.2)
INTERVENTRICULAR SEPTUM: 1.72 CM (ref 0.6–1.1)
IVC DIAMETER: 2.17 CM
IVRT: 79.92 MSEC
LA MAJOR: 5.49 CM
LA MINOR: 5.35 CM
LA WIDTH: 4.3 CM
LDLC SERPL CALC-MCNC: 69.2 MG/DL (ref 63–159)
LEFT ATRIUM SIZE: 3.7 CM
LEFT ATRIUM VOLUME INDEX: 40.7 ML/M2
LEFT ATRIUM VOLUME: 73.29 CM3
LEFT INTERNAL DIMENSION IN SYSTOLE: 3.08 CM (ref 2.1–4)
LEFT VENTRICLE DIASTOLIC VOLUME INDEX: 40.72 ML/M2
LEFT VENTRICLE DIASTOLIC VOLUME: 73.29 ML
LEFT VENTRICLE MASS INDEX: 171 G/M2
LEFT VENTRICLE SYSTOLIC VOLUME INDEX: 20.7 ML/M2
LEFT VENTRICLE SYSTOLIC VOLUME: 37.21 ML
LEFT VENTRICULAR INTERNAL DIMENSION IN DIASTOLE: 4.08 CM (ref 3.5–6)
LEFT VENTRICULAR MASS: 307.88 G
LV LATERAL E/E' RATIO: 10.33 M/S
LV SEPTAL E/E' RATIO: 13.29 M/S
LVOT MG: 1.21 MMHG
LVOT MV: 0.52 CM/S
LYMPHOCYTES # BLD AUTO: 1.1 K/UL (ref 1–4.8)
LYMPHOCYTES NFR BLD: 17.4 % (ref 18–48)
MAGNESIUM SERPL-MCNC: 1.9 MG/DL (ref 1.6–2.6)
MCH RBC QN AUTO: 30.4 PG (ref 27–31)
MCHC RBC AUTO-ENTMCNC: 33.7 G/DL (ref 32–36)
MCV RBC AUTO: 90 FL (ref 82–98)
MONOCYTES # BLD AUTO: 0.5 K/UL (ref 0.3–1)
MONOCYTES NFR BLD: 7.9 % (ref 4–15)
MV PEAK A VEL: 0.78 M/S
MV PEAK E VEL: 0.93 M/S
MV STENOSIS PRESSURE HALF TIME: 75.03 MS
MV VALVE AREA P 1/2 METHOD: 2.93 CM2
NEUTROPHILS # BLD AUTO: 4.5 K/UL (ref 1.8–7.7)
NEUTROPHILS NFR BLD: 72 % (ref 38–73)
NONHDLC SERPL-MCNC: 90 MG/DL
NRBC BLD-RTO: 0 /100 WBC
PHOSPHATE SERPL-MCNC: 2.6 MG/DL (ref 2.7–4.5)
PISA AR MAX VEL: 3.02 M/S
PISA MRMAX VEL: 5.35 M/S
PISA TR MAX VEL: 3.16 M/S
PLATELET # BLD AUTO: 143 K/UL (ref 150–450)
PMV BLD AUTO: 9.8 FL (ref 9.2–12.9)
POCT GLUCOSE: 104 MG/DL (ref 70–110)
POCT GLUCOSE: 136 MG/DL (ref 70–110)
POCT GLUCOSE: 209 MG/DL (ref 70–110)
POTASSIUM SERPL-SCNC: 3.5 MMOL/L (ref 3.5–5.1)
PROT SERPL-MCNC: 6 G/DL (ref 6–8.4)
PROTHROMBIN TIME: 11.3 SEC (ref 9–12.5)
PV MEAN GRADIENT: 1.11 MMHG
RA MAJOR: 5.2 CM
RA WIDTH: 3.44 CM
RBC # BLD AUTO: 4.15 M/UL (ref 4.6–6.2)
SINUS: 3.71 CM
SODIUM SERPL-SCNC: 141 MMOL/L (ref 136–145)
STJ: 2.63 CM
TDI LATERAL: 0.09 M/S
TDI SEPTAL: 0.07 M/S
TDI: 0.08 M/S
TR MAX PG: 40 MMHG
TRICUSPID ANNULAR PLANE SYSTOLIC EXCURSION: 1.81 CM
TRIGL SERPL-MCNC: 104 MG/DL (ref 30–150)
TROPONIN I SERPL DL<=0.01 NG/ML-MCNC: 0.01 NG/ML (ref 0–0.03)
WBC # BLD AUTO: 6.31 K/UL (ref 3.9–12.7)

## 2022-06-14 PROCEDURE — 85730 THROMBOPLASTIN TIME PARTIAL: CPT | Performed by: NURSE PRACTITIONER

## 2022-06-14 PROCEDURE — 85025 COMPLETE CBC W/AUTO DIFF WBC: CPT | Performed by: NURSE PRACTITIONER

## 2022-06-14 PROCEDURE — 97166 OT EVAL MOD COMPLEX 45 MIN: CPT

## 2022-06-14 PROCEDURE — 92610 EVALUATE SWALLOWING FUNCTION: CPT

## 2022-06-14 PROCEDURE — 25000003 PHARM REV CODE 250: Performed by: STUDENT IN AN ORGANIZED HEALTH CARE EDUCATION/TRAINING PROGRAM

## 2022-06-14 PROCEDURE — 83735 ASSAY OF MAGNESIUM: CPT | Performed by: NURSE PRACTITIONER

## 2022-06-14 PROCEDURE — 80053 COMPREHEN METABOLIC PANEL: CPT | Performed by: NURSE PRACTITIONER

## 2022-06-14 PROCEDURE — 96360 HYDRATION IV INFUSION INIT: CPT | Mod: 59

## 2022-06-14 PROCEDURE — 25000003 PHARM REV CODE 250: Performed by: NURSE PRACTITIONER

## 2022-06-14 PROCEDURE — 96372 THER/PROPH/DIAG INJ SC/IM: CPT | Performed by: NURSE PRACTITIONER

## 2022-06-14 PROCEDURE — G0378 HOSPITAL OBSERVATION PER HR: HCPCS

## 2022-06-14 PROCEDURE — 97161 PT EVAL LOW COMPLEX 20 MIN: CPT

## 2022-06-14 PROCEDURE — 83036 HEMOGLOBIN GLYCOSYLATED A1C: CPT | Performed by: NURSE PRACTITIONER

## 2022-06-14 PROCEDURE — 84484 ASSAY OF TROPONIN QUANT: CPT | Performed by: NURSE PRACTITIONER

## 2022-06-14 PROCEDURE — 85610 PROTHROMBIN TIME: CPT | Performed by: NURSE PRACTITIONER

## 2022-06-14 PROCEDURE — 82553 CREATINE MB FRACTION: CPT | Performed by: NURSE PRACTITIONER

## 2022-06-14 PROCEDURE — 84100 ASSAY OF PHOSPHORUS: CPT | Performed by: NURSE PRACTITIONER

## 2022-06-14 PROCEDURE — 92523 SPEECH SOUND LANG COMPREHEN: CPT

## 2022-06-14 PROCEDURE — 63600175 PHARM REV CODE 636 W HCPCS: Performed by: NURSE PRACTITIONER

## 2022-06-14 RX ORDER — ATORVASTATIN CALCIUM 40 MG/1
80 TABLET, FILM COATED ORAL DAILY
Status: DISCONTINUED | OUTPATIENT
Start: 2022-06-15 | End: 2022-06-15 | Stop reason: HOSPADM

## 2022-06-14 RX ORDER — IBUPROFEN 200 MG
1 TABLET ORAL
Status: DISPENSED | OUTPATIENT
Start: 2022-06-14 | End: 2022-06-14

## 2022-06-14 RX ORDER — LORAZEPAM 0.5 MG/1
0.5 TABLET ORAL
Status: DISPENSED | OUTPATIENT
Start: 2022-06-14 | End: 2022-06-14

## 2022-06-14 RX ORDER — ISOSORBIDE MONONITRATE 30 MG/1
120 TABLET, EXTENDED RELEASE ORAL DAILY
Status: DISCONTINUED | OUTPATIENT
Start: 2022-06-14 | End: 2022-06-15 | Stop reason: HOSPADM

## 2022-06-14 RX ADMIN — POTASSIUM PHOSPHATE, MONOBASIC 1000 MG: 500 TABLET, SOLUBLE ORAL at 12:06

## 2022-06-14 RX ADMIN — AMLODIPINE BESYLATE 5 MG: 5 TABLET ORAL at 10:06

## 2022-06-14 RX ADMIN — PANTOPRAZOLE SODIUM 40 MG: 40 TABLET, DELAYED RELEASE ORAL at 09:06

## 2022-06-14 RX ADMIN — GABAPENTIN 100 MG: 100 CAPSULE ORAL at 09:06

## 2022-06-14 RX ADMIN — ISOSORBIDE MONONITRATE 120 MG: 30 TABLET, EXTENDED RELEASE ORAL at 10:06

## 2022-06-14 RX ADMIN — ENOXAPARIN SODIUM 40 MG: 100 INJECTION SUBCUTANEOUS at 05:06

## 2022-06-14 RX ADMIN — RANOLAZINE 500 MG: 500 TABLET, EXTENDED RELEASE ORAL at 09:06

## 2022-06-14 RX ADMIN — ASPIRIN 81 MG: 81 TABLET, COATED ORAL at 09:06

## 2022-06-14 RX ADMIN — ATORVASTATIN CALCIUM 40 MG: 40 TABLET, FILM COATED ORAL at 09:06

## 2022-06-14 RX ADMIN — ISOSORBIDE MONONITRATE 120 MG: 30 TABLET, EXTENDED RELEASE ORAL at 09:06

## 2022-06-14 RX ADMIN — CLOPIDOGREL 75 MG: 75 TABLET, FILM COATED ORAL at 09:06

## 2022-06-14 RX ADMIN — SODIUM CHLORIDE 500 ML: 9 INJECTION, SOLUTION INTRAVENOUS at 09:06

## 2022-06-14 RX ADMIN — ESCITALOPRAM 10 MG: 5 TABLET, FILM COATED ORAL at 09:06

## 2022-06-14 RX ADMIN — LISINOPRIL 20 MG: 20 TABLET ORAL at 09:06

## 2022-06-14 NOTE — PROGRESS NOTES
Discussed briefly with the ED physician, reviewed records in epic, vital signs, imaging studies.  81-year-old man with history coronary artery disease, peripheral arterial disease, multiple stents.  Presents with an episode of left-sided facial weakness, now resolved.      Normotensive in the ED.  CT head shows wedge of encephalomalacia right frontal lobe remote lacunar infarcts in the right thalamus, left caudate/internal capsule.  No acute hemorrhage or acute infarct.    He is not a candidate for IV tPA or intervention as his symptoms have resolved.  Left-sided symptoms very possibly related to old injury.  MRI brain would be useful to assess for any acute ischemia; maximize stroke risk factor reduction, quit smoking, check Plavix / P2Y12 response assay.

## 2022-06-14 NOTE — ED NOTES
Pt out of bed, attempting to leave, just received message from house supervisor pt going to med surg, 3rd floor, not ready for repor at this time, called floor, informed of situation.  Asked to call back in 15 min.     Informed pt of bed status, pt agreed to stay in bed, pt noted to have skin break on right elbow, band aide applied, non skid socks applied  Pt urinated on clothes and bed linens, tech to change to  Gown and bed linens.  VS stable  Pt refuses BP cuff, 02 sat monitor

## 2022-06-14 NOTE — HPI
Aquiles Kelsey is an 81 y.o. male with a PMHx of AAA s/p repair, CAD, DM II, HLD, HTN, PAD, and tobacco use who presented to the ED with c/o transient left-sided facial droop that started around 20:30 tonight. No aggravating or alleviating factors. Associated speech difficulty and generalized weakness, and confusion. Patient reports episode lasted approximately 15 minutes. Denies any HA, visual disturbance, hemiparesis, numbness/tingling, dysarthria, lightheadedness, dizziness, syncope, CP, palpitations, SOB, ABD pain, N/V/D, fever or chills. In the ED, patient had NIH stroke scale of 0. Initial work-up was benign. CT of head showed no acute abnormality, right frontal encephalomalacia, mild microvascular ischemic change, and bilateral basal ganglia lacunar infarcts. Vascular Neurology was consulted via Tele Stroke in the ED and deemed patient not a candidate for tPA due to resolution of symptoms, and recommended admission for full CVA work-up. Hospital Medicine was contacted for admission and patient placed in Observation.  Patient is a Full Code.

## 2022-06-14 NOTE — HOSPITAL COURSE
Aquiles Kelsey was placed into observation for stroke work up. MRI braid with chronic lacunar infarcts, chronic cerebellar infarcts, and generalized atrophy. Carotid US with carotic bulb plaque- recommending OP f/u with vascular. Patient is without symptoms at this time and back to previous level of functioning. Seen at bedside with wife present. Neurology consulted. Recommending dual antiplatelets for 30 days. Discussed the need for smoking cessation although patient has to will to quit. Discussed need to f/u with PCP and neurology OP. Patient was incidentally given 240 mg isosorbide XR by nursing staff today. SBP dropped into the low 100's and HR 50. Continue cardiac monitoring and observation overnight and will plan to discharge in the morning.  06/15/2022  Patient was observed overnight. Patient did have some asymptomatic bradycardia overnight with HR in the 30's which has resolved at this time. This is likely attributed to the double dose of Imdur. Patient is aaox3 and in NAD. He is stable and appropriate for discharge. Discussed need for f/u with PCP, Cardiology, neuro, and vascular surgery with patient and his spouse. Will continue ASA, plavix, and statin.

## 2022-06-14 NOTE — SUBJECTIVE & OBJECTIVE
Past Medical History:   Diagnosis Date    Acute blood loss anemia 10/14/2019    Aneurysm, abdominal aortic     Coronary artery disease     Depression     Diabetes mellitus     HLD (hyperlipidemia)     Hypertension     Kidney stone     PAD (peripheral artery disease)     Tobacco abuse        Past Surgical History:   Procedure Laterality Date    APPENDECTOMY      CORONARY STENT PLACEMENT      x 4    ESOPHAGOGASTRODUODENOSCOPY N/A 10/14/2019    Procedure: EGD (ESOPHAGOGASTRODUODENOSCOPY);  Surgeon: Carlos Mcneal III, MD;  Location: [facility] ENDO;  Service: Endoscopy;  Laterality: N/A;    ESOPHAGOGASTRODUODENOSCOPY N/A 10/15/2019    Procedure: EGD (ESOPHAGOGASTRODUODENOSCOPY);  Surgeon: Carlos Mcneal III, MD;  Location: [facility] ENDO;  Service: Endoscopy;  Laterality: N/A;    LEFT HEART CATHETERIZATION Left 10/11/2019    Procedure: CATHETERIZATION, HEART, LEFT;  Surgeon: Robe Gilbert MD;  Location: [facility] CATH LAB;  Service: Cardiology;  Laterality: Left;    LEFT HEART CATHETERIZATION Left 10/13/2019    Procedure: CATHETERIZATION, HEART, LEFT;  Surgeon: Robe Gilbert MD;  Location: [facility] CATH LAB;  Service: Cardiology;  Laterality: Left;    leg stent Left        Review of patient's allergies indicates:   Allergen Reactions    Metoprolol      Junctional rhythm         No current facility-administered medications on file prior to encounter.     Current Outpatient Medications on File Prior to Encounter   Medication Sig    amLODIPine (NORVASC) 5 MG tablet TAKE 1 TABLET BY MOUTH EVERY DAY    aspirin (ECOTRIN) 81 MG EC tablet Take 1 tablet (81 mg total) by mouth once daily.    baclofen (LIORESAL) 10 MG tablet Take 1 tablet (10 mg total) by mouth every evening.    clopidogreL (PLAVIX) 75 mg tablet TAKE 1 TABLET BY MOUTH EVERY DAY    EScitalopram oxalate (LEXAPRO) 10 MG tablet TAKE 1 TABLET BY MOUTH EVERY DAY    gabapentin (NEURONTIN) 100 MG capsule Take 1 capsule (100 mg total) by mouth every evening.    isosorbide mononitrate  (IMDUR) 120 MG 24 hr tablet TAKE 1 TABLET (120 MG TOTAL) BY MOUTH ONCE DAILY.    lisinopril 10 MG tablet Take 2 tablets (20 mg total) by mouth once daily.    metFORMIN (GLUCOPHAGE) 1000 MG tablet EARLINE 1 TABLETA VIA ORAL 2 VECES AL NORAH CON COMIDA 90    mirabegron (MYRBETRIQ) 50 mg Tb24 Take 1 tablet (50 mg total) by mouth once daily.    neomycin-polymyxin-hydrocortisone (CORTISPORIN) 3.5-10,000-1 mg/mL-unit/mL-% otic suspension     nitroGLYCERIN (NITROSTAT) 0.4 MG SL tablet Place 1 tablet (0.4 mg total) under the tongue every 5 (five) minutes as needed for Chest pain.    pantoprazole (PROTONIX) 40 MG tablet Take 1 tablet (40 mg total) by mouth once daily.    ranolazine (RANEXA) 500 MG Tb12 Take 1 tablet (500 mg total) by mouth 2 (two) times daily.    simvastatin (ZOCOR) 20 MG tablet TAKE 1 TABLET BY MOUTH EVERY DAY IN THE EVENING     Family History       Problem Relation (Age of Onset)    COPD Father    Diabetes Mother, Brother          Tobacco Use    Smoking status: Current Every Day Smoker     Years: 15.00     Types: Cigars    Smokeless tobacco: Current User   Substance and Sexual Activity    Alcohol use: Not Currently    Drug use: Not Currently    Sexual activity: Not Currently     Review of Systems   Constitutional:  Negative for chills, diaphoresis, fatigue and fever.   HENT:  Negative for drooling and trouble swallowing.    Eyes:  Negative for photophobia and visual disturbance.   Respiratory:  Negative for cough, shortness of breath and wheezing.    Cardiovascular:  Negative for chest pain, palpitations and leg swelling.   Gastrointestinal:  Negative for abdominal pain, diarrhea, nausea and vomiting.   Genitourinary:  Negative for dysuria and hematuria.   Musculoskeletal:  Negative for arthralgias, back pain and myalgias.   Skin:  Negative for pallor and rash.   Neurological:  Positive for facial asymmetry, speech difficulty and weakness (generalized). Negative for dizziness, syncope, light-headedness,  numbness and headaches.   Psychiatric/Behavioral:  Positive for confusion. The patient is not nervous/anxious.    All other systems reviewed and are negative.  Objective:     Vital Signs (Most Recent):  Temp: 98.3 °F (36.8 °C) (06/13/22 2300)  Pulse: (!) 49 (06/13/22 2300)  Resp: (!) 23 (06/13/22 2300)  BP: (!) 129/59 (06/13/22 2300)  SpO2: (!) 93 % (06/13/22 2300)   Vital Signs (24h Range):  Temp:  [98.3 °F (36.8 °C)] 98.3 °F (36.8 °C)  Pulse:  [49-60] 49  Resp:  [16-28] 23  SpO2:  [93 %-95 %] 93 %  BP: (119-129)/(57-70) 129/59     Weight: 64.5 kg (142 lb 3.2 oz)  Body mass index is 20.4 kg/m².    Physical Exam  Vitals and nursing note reviewed.   Constitutional:       General: He is awake. He is not in acute distress.     Appearance: Normal appearance. He is well-developed. He is not diaphoretic.   HENT:      Head: Normocephalic and atraumatic.   Eyes:      General: No visual field deficit.     Conjunctiva/sclera: Conjunctivae normal.   Cardiovascular:      Rate and Rhythm: Normal rate and regular rhythm. No extrasystoles are present.     Heart sounds: S1 normal and S2 normal. No murmur heard.  Pulmonary:      Effort: Pulmonary effort is normal. No tachypnea.      Breath sounds: Normal breath sounds and air entry. No wheezing, rhonchi or rales.   Abdominal:      General: Bowel sounds are normal. There is no distension.      Palpations: Abdomen is soft.      Tenderness: There is no abdominal tenderness.   Musculoskeletal:         General: No tenderness or deformity. Normal range of motion.      Cervical back: Normal range of motion and neck supple.      Right lower leg: No edema.      Left lower leg: No edema.   Skin:     General: Skin is warm and dry.      Capillary Refill: Capillary refill takes less than 2 seconds.      Findings: No erythema or rash.   Neurological:      General: No focal deficit present.      Mental Status: He is alert and oriented to person, place, and time.      GCS: GCS eye subscore is 4.  GCS verbal subscore is 5. GCS motor subscore is 6.      Cranial Nerves: Cranial nerves are intact. No dysarthria or facial asymmetry.      Sensory: Sensation is intact.      Motor: Motor function is intact. No weakness or pronator drift.      Coordination: Coordination is intact.   Psychiatric:         Mood and Affect: Mood and affect normal.         Speech: Speech normal. Speech is not slurred.         Behavior: Behavior normal. Behavior is cooperative.   Current NIH Stroke Score Values      Flowsheet Row Most Recent Value   Interval baseline   1a. Level of Consciousness 0-->Alert, keenly responsive   1b. LOC Questions 0-->Answers both questions correctly   1c. LOC Commands 0-->Performs both tasks correctly   2. Best Gaze 0-->Normal   3. Visual 0-->No visual loss   4. Facial Palsy 0-->Normal symmetrical movements   5a. Motor Arm, Left 0-->No drift, limb holds 90 (or 45) degrees for full 10 secs   5b. Motor Arm, Right 0-->No drift, limb holds 90 (or 45) degrees for full 10 secs   6a. Motor Leg, Left 0-->No drift, leg holds 30 degree position for full 5 secs   6b. Motor Leg, Right 0-->No drift, leg holds 30 degree position for full 5 secs   7. Limb Ataxia 0-->Absent   8. Sensory 0-->Normal, no sensory loss   9. Best Language 0-->No aphasia, normal   10. Dysarthria 0-->Normal   11. Extinction and Inattention (formerly Neglect) 0-->No abnormality   Total (NIH Stroke Scale) 0                Significant Labs:  Results for orders placed or performed during the hospital encounter of 06/13/22   CBC W/ AUTO DIFFERENTIAL   Result Value Ref Range    WBC 6.55 3.90 - 12.70 K/uL    RBC 4.14 (L) 4.60 - 6.20 M/uL    Hemoglobin 12.6 (L) 14.0 - 18.0 g/dL    Hematocrit 37.6 (L) 40.0 - 54.0 %    MCV 91 82 - 98 fL    MCH 30.4 27.0 - 31.0 pg    MCHC 33.5 32.0 - 36.0 g/dL    RDW 14.2 11.5 - 14.5 %    Platelets 149 (L) 150 - 450 K/uL    MPV 10.6 9.2 - 12.9 fL    Immature Granulocytes 0.2 0.0 - 0.5 %    Gran # (ANC) 4.8 1.8 - 7.7 K/uL     Immature Grans (Abs) 0.01 0.00 - 0.04 K/uL    Lymph # 1.1 1.0 - 4.8 K/uL    Mono # 0.5 0.3 - 1.0 K/uL    Eos # 0.1 0.0 - 0.5 K/uL    Baso # 0.04 0.00 - 0.20 K/uL    nRBC 0 0 /100 WBC    Gran % 73.5 (H) 38.0 - 73.0 %    Lymph % 16.6 (L) 18.0 - 48.0 %    Mono % 7.3 4.0 - 15.0 %    Eosinophil % 1.8 0.0 - 8.0 %    Basophil % 0.6 0.0 - 1.9 %    Differential Method Automated    Comprehensive metabolic panel   Result Value Ref Range    Sodium 140 136 - 145 mmol/L    Potassium 4.1 3.5 - 5.1 mmol/L    Chloride 108 95 - 110 mmol/L    CO2 24 23 - 29 mmol/L    Glucose 132 (H) 70 - 110 mg/dL    BUN 20 8 - 23 mg/dL    Creatinine 1.0 0.5 - 1.4 mg/dL    Calcium 8.7 8.7 - 10.5 mg/dL    Total Protein 6.1 6.0 - 8.4 g/dL    Albumin 3.5 3.5 - 5.2 g/dL    Total Bilirubin 0.3 0.1 - 1.0 mg/dL    Alkaline Phosphatase 61 55 - 135 U/L    AST 14 10 - 40 U/L    ALT 12 10 - 44 U/L    Anion Gap 8 8 - 16 mmol/L    eGFR if African American >60 >60 mL/min/1.73 m^2    eGFR if non African American >60 >60 mL/min/1.73 m^2   Protime-INR   Result Value Ref Range    Prothrombin Time 11.5 9.0 - 12.5 sec    INR 1.1 0.8 - 1.2   TSH   Result Value Ref Range    TSH 2.357 0.400 - 4.000 uIU/mL   POCT glucose   Result Value Ref Range    POCT Glucose 127 (H) 70 - 110 mg/dL      All pertinent labs within the past 24 hours have been reviewed.    Significant Imaging:  Imaging Results              CT Head Without Contrast (Final result)  Result time 06/13/22 21:32:20      Final result by Rl Ronquillo MD (06/13/22 21:32:20)                   Impression:      No hemorrhage or acute major vascular distribution infarction.    Senescent changes as above.    ASPECTS score 10 of 10.    All CT scans at this facility are performed  using dose modulation techniques as appropriate to performed exam including the following:  automated exposure control; adjustment of mA and/or kV according to the patients size (this includes techniques or standardized protocols for targeted  exams where dose is matched to indication/reason for exam: i.e. extremities or head);  iterative reconstruction technique.      Electronically signed by: Rl Ronquillo  Date:    06/13/2022  Time:    21:32               Narrative:    EXAMINATION:  CT HEAD WITHOUT CONTRAST    CLINICAL HISTORY:  Transient ischemic attack (TIA);    TECHNIQUE:  Low dose axial CT images obtained throughout the head without intravenous contrast. Sagittal and coronal reconstructions were performed.    COMPARISON:  None.    FINDINGS:  Intracranial compartment:    Ventricles and sulci are normal in size for age without evidence of hydrocephalus. No extra-axial blood or fluid collections.    Right frontal encephalomalacia.  Mild microvascular ischemic change.  Bilateral basal ganglia lacunar infarcts.  No parenchymal mass, hemorrhage, edema or major vascular distribution infarct.    Skull/extracranial contents (limited evaluation): No fracture. Mastoid air cells and paranasal sinuses are essentially clear.                                     I have reviewed all pertinent imaging results/findings within the past 24 hours.

## 2022-06-14 NOTE — PROGRESS NOTES
Pharmacist Renal Dose Adjustment Note    Aquiles Kelsey is a 81 y.o. male being treated with the medication metoclopramide    Patient Data:    Vital Signs (Most Recent):  Temp: 98.3 °F (36.8 °C) (06/13/22 2300)  Pulse: (!) 49 (06/13/22 2300)  Resp: (!) 23 (06/13/22 2300)  BP: (!) 129/59 (06/13/22 2300)  SpO2: (!) 93 % (06/13/22 2300)   Vital Signs (72h Range):  Temp:  [98.3 °F (36.8 °C)]   Pulse:  [49-60]   Resp:  [16-28]   BP: (119-129)/(57-70)   SpO2:  [93 %-95 %]      Recent Labs   Lab 06/13/22 2145   CREATININE 1.0     Serum creatinine: 1 mg/dL 06/13/22 2145  Estimated creatinine clearance: 52 mL/min    Metoclopramide 10 mg q6h PRN will be changed to metoclopramide 5 mg q6h PRN for CrCl <60 mL/min.     Pharmacist's Name: Nuvia Manley  Pharmacist's Extension: [phone]

## 2022-06-14 NOTE — CONSULTS
The patient has been seen and examined.  His symptoms of speech difficulty have totally resolved.  The patient is ambulating in the [place] with supervision.  MRI brain does not show any acute changes, but does show older infarct.    He is requesting discharge to home.  He should go home on his current medications, with DAPT for at least 30 days.  Follow up with primary care.

## 2022-06-14 NOTE — PLAN OF CARE
Ongoing (interventions implemented as appropriate)  Pt confused at times  VSS  Pt able to make needs known.  Pt remained afebrile throughout this shift.   Pt remained free of falls this shift.   Pt denies pain this shift.  Plan of care reviewed. Patient verbalizes understanding.   Pt moving/turing independent. Frequent weight shifting encouraged.  Patient sinus sarita rhythm on monitor.   Bed low, side rails up x 2, wheels locked, call light in reach.   Hourly rounding completed.   Will continue to observe.

## 2022-06-14 NOTE — ASSESSMENT & PLAN NOTE
- Place in Observation on Telemetry to r/o acute CVA.  NIHSS = 0.  - CT of the head showed no acute abnormality.  - Will obtain MRI of the brain and carotid US.  - 2D echo.  - Check FLP and HbA1c.  - Neuro checks.  - Continue ASA, Plavix, and high intensity statin.  - PT/OT/ST consult to eval and treat.  - Neurology consult.

## 2022-06-14 NOTE — PLAN OF CARE
[facility] - Med Surg 3  Initial Discharge Assessment       Primary Care Provider: Holger Thornton MD    Admission Diagnosis: TIA (transient ischemic attack) [G45.9]  Stroke [I63.9]    Admission Date: 6/13/2022  Expected Discharge Date:     Discharge Barriers Identified: None    Payor: HU[employer] MANAGED MEDICARE / Plan: HU[employer] MEDICARE HMO / Product Type: Capitation /     Extended Emergency Contact Information  Primary Emergency Contact: Gayle Woods  Home Phone: [phone]  Mobile Phone: [phone]  Relation: Spouse    Discharge Plan A: Home  Discharge Plan B: Home      [id number] [place] - [address] [facility]  [address] [address]  Phone: [phone] Fax: [phone]      Initial Assessment (most recent)     Adult Discharge Assessment - 06/14/22 1309        Discharge Assessment    Assessment Type Discharge Planning Assessment     Confirmed/corrected address, phone number and insurance Yes     Confirmed Demographics Correct on Facesheet     Source of Information family     Does patient/caregiver understand observation status Yes     Communicated ZEINA with patient/caregiver Date not available/Unable to determine     Reason For Admission TIA     Lives With spouse     Facility Arrived From: home     Do you expect to return to your current living situation? Yes     Do you have help at home or someone to help you manage your care at home? No     Prior to hospitilization cognitive status: Alert/Oriented     Current cognitive status: Alert/Oriented     Walking or Climbing Stairs Difficulty none     Dressing/Bathing Difficulty none     Equipment Currently Used at Home none     Readmission within 30 days? No     Patient currently being followed by outpatient case management? No     Do you currently have service(s) that help you manage your care at home? No     Do you take prescription medications? Yes     Do you have prescription coverage? Yes      Coverage [employer]     Do you have any problems affording any of your prescribed medications? No     Is the patient taking medications as prescribed? yes     Who is going to help you get home at discharge? spouse     How do you get to doctors appointments? family or friend will provide     Are you on dialysis? No     Do you take coumadin? No     Discharge Plan A Home     Discharge Plan B Home     DME Needed Upon Discharge  none     Discharge Plan discussed with: Patient     Discharge Barriers Identified None                  Initial assessment completed with patients' spouse Gayle. Patient's spouse reports independence with ADL's  prior to hospitalization. Patient uses no DME at home. Patient currently has no cm needs upon DC. CM will continue following to assist with other needs.   CM provided a transitional care folder, information on advanced directives, information on pharmacy bedside delivery, and discharge planning begins on admission with contact information for any needs/questions.

## 2022-06-14 NOTE — PT/OT/SLP EVAL
Speech Language Pathology Evaluation  Cognitive/Bedside Swallow    Patient Name:  Aquiles Kelsey   MRN:  [id number]  Admitting Diagnosis: TIA (transient ischemic attack)    Recommendations:                  General Recommendations:  Follow-up not indicated  Diet recommendations:  Regular Diet - IDDSI Level 7, Thin liquids - IDDSI Level 0   Aspiration Precautions: Standard aspiration precautions   General Precautions: Standard, aspiration  Communication strategies:  provide increased time to answer and English and Kazakh-speaking    History:     Past Medical History:   Diagnosis Date    Acute blood loss anemia 10/14/2019    Aneurysm, abdominal aortic     Coronary artery disease     Depression     Diabetes mellitus     HLD (hyperlipidemia)     Hypertension     Kidney stone     PAD (peripheral artery disease)     Tobacco abuse        Past Surgical History:   Procedure Laterality Date    APPENDECTOMY      CORONARY STENT PLACEMENT      x 4    ESOPHAGOGASTRODUODENOSCOPY N/A 10/14/2019    Procedure: EGD (ESOPHAGOGASTRODUODENOSCOPY);  Surgeon: Carlos Mcneal III, MD;  Location: [facility];  Service: Endoscopy;  Laterality: N/A;    ESOPHAGOGASTRODUODENOSCOPY N/A 10/15/2019    Procedure: EGD (ESOPHAGOGASTRODUODENOSCOPY);  Surgeon: Carlos Mcneal III, MD;  Location: [facility];  Service: Endoscopy;  Laterality: N/A;    LEFT HEART CATHETERIZATION Left 10/11/2019    Procedure: CATHETERIZATION, HEART, LEFT;  Surgeon: Robe Gilbert MD;  Location: [facility];  Service: Cardiology;  Laterality: Left;    LEFT HEART CATHETERIZATION Left 10/13/2019    Procedure: CATHETERIZATION, HEART, LEFT;  Surgeon: Robe Gilbert MD;  Location: [facility];  Service: Cardiology;  Laterality: Left;    leg stent Left        Social History: Patient lives with wife at home.  Independent with ADL's.    Chest X-Rays: See medical chart.    Prior diet: Pt consumes a regular consistency diet/thin liquids.  No hx dysphagia  "reported.    Subjective     Pt seen bedside for ST evaluation.  No c/o pain.  Pt's wife present.  Patient goals: "I'm ready to eat!"    Pain/Comfort:  · Pain Rating 1: 0/10  · Pain Rating Post-Intervention 1: 0/10  · Pain Rating 2: 0/10  · Pain Rating Post-Intervention 2: 0/10    Respiratory Status: Room air    Objective:     Cognitive Status:    WFL. Pt oriented x4 with adequate recall of recent events.  Pt is bilingual but pt's wife stated he sometimes gets confused following English conversation, so she speaks in Yoruba to him as well.     Receptive Language:   Comprehension:      WFL    Pragmatics:    WFL.  Pleasant and communicative.    Expressive Language:  Verbal:    WFL      Motor Speech:  WFL    Voice:   WFL    Oral Musculature Evaluation  · Oral Musculature: WFL (no facial asymmetry appreciated.)  · Dentition: present and adequate  · Mucosal Quality: good  · Mandibular Strength and Mobility: WFL  · Oral Labial Strength and Mobility: WFL  · Lingual Strength and Mobility: WFL  · Velar Elevation: WFL  · Buccal Strength and Mobility: WFL  · Volitional Cough: present  · Volitional Swallow: present  · Voice Prior to PO Intake: WFL    Bedside Swallow Eval:   Consistencies Assessed:  · Pt consumed thin liquids, pureed and soft solids during bedside CSE.    Oral Phase:   · WFL    Pharyngeal Phase:   · no overt clinical signs/symptoms of aspiration    Compensatory Strategies  · None    Treatment: Pt recommended for regular consistency diet/thin liquids. No further SLP intervention indicated at this time.    Assessment:     Aquiles Kelsey is a 81 y.o. male admitted with dx TIA and c/o left sided facial droop. OM WFL. He presents with functional/baseline communication and consuming regular diet/thin liquids without s/s of dysphagia.  No further SLP intervention indicated at this time.  MD to reconsult as needed.    Goals:   Multidisciplinary Problems     SLP Goals     Not on file                Plan: "     · Plan of Care reviewed with:  patient, spouse   · SLP Follow-Up:  No       Discharge recommendations:  Discharge Facility/Level of Care Needs: home   Barriers to Discharge:  None    Time Tracking:     SLP Treatment Date:   06/14/22  Speech Start Time:  1000  Speech Stop Time:  1030     Speech Total Time (min):  30 min    Billable Minutes: Eval 15 minutes  and Eval Swallow and Oral Function 15 minutes    06/14/2022

## 2022-06-14 NOTE — PT/OT/SLP EVAL
"Physical Therapy Evaluation and Discharge Note    Patient Name:  Aquiles Kelsey   MRN:  [id number]    Recommendations:     Discharge Recommendations:  home   Discharge Equipment Recommendations: none   Barriers to discharge: None    Assessment:     Aquiles Kelsey is a 81 y.o. male admitted with a medical diagnosis of TIA (transient ischemic attack). .  At this time, patient is functioning at their prior level of function and does not require further acute PT services.     Recent Surgery: * No surgery found *     Plan:     During this hospitalization, patient does not require further acute PT services.  Please re-consult if situation changes.  D/C PT FROM P.T. SERVICES TO [facility] FOR CONT. GAIT TO TOLERANCE    Subjective     Chief Complaint: READY TO EAT "POPEYES"  Patient/Family Comments/goals:   Pain/Comfort:  · Pain Rating 1: 0/10    Patients cultural, spiritual, Rastafarian conflicts given the current situation:      Living Environment:  PT LIVES WITH WIFE AND SON 1 STORY HOUSE NO STEPS, AMB INDEP COMMUNITY DISTANCES, DOES NOT DRIVE, INDEP WITH ADL'S  Prior to admission, patients level of function was INDEP.  Equipment used at home: none.  DME owned (not currently used): none.  Upon discharge, patient will have assistance from WIFE.    Objective:     Communicated with NURSE prior to session.  Patient found ambulatory in room/[place] with peripheral IV, telemetry upon PT entry to room.    General Precautions: Standard  Orthopedic Precautions:N/A   Braces: N/A   Respiratory Status: Room air    Exams:  · Cognitive Exam:  Patient is oriented to Person, Place, Time and Situation  · Postural Exam:  Patient presented with the following abnormalities:    · -       Rounded shoulders  · Sensation:    · -       Intact  · RLE ROM: WNL  · RLE Strength: WNL  · LLE ROM: WNL  · LLE Strength: WNL    Functional Mobility:  · Bed Mobility:     · Rolling Left:  independence  · Rolling Right: " independence  · Scooting: independence  · Supine to Sit: independence  · Sit to Supine: independence  · Transfers:     · Sit to Stand:  independence with no AD  · Toilet Transfer: independence with  no AD  using  Step Transfer  · Gait: PT ' NO AD SUNI NO LOB OR SOB ON ROOM AIR  · Balance: GOOD    AM-PAC 6 CLICK MOBILITY  Total Score:21     Therapeutic Activities and Exercises:   PT AND WIFE EDUCATED IN ROLE OF P.T., PT ENCOURAGED TO INCREASE TIME OOB IN CHAIR, AMBULATE TO TOLERANCE    AM-PAC 6 CLICK MOBILITY  Total Score:21     Patient left SEATED ON STRETCHER with all lines intact, call button in reach, NURSE notified and WIFE present.    GOALS:   Multidisciplinary Problems     Physical Therapy Goals     Not on file                History:     Past Medical History:   Diagnosis Date    Acute blood loss anemia 10/14/2019    Aneurysm, abdominal aortic     Coronary artery disease     Depression     Diabetes mellitus     HLD (hyperlipidemia)     Hypertension     Kidney stone     PAD (peripheral artery disease)     Tobacco abuse        Past Surgical History:   Procedure Laterality Date    APPENDECTOMY      CORONARY STENT PLACEMENT      x 4    ESOPHAGOGASTRODUODENOSCOPY N/A 10/14/2019    Procedure: EGD (ESOPHAGOGASTRODUODENOSCOPY);  Surgeon: Carlso Mcneal III, MD;  Location: [facility];  Service: Endoscopy;  Laterality: N/A;    ESOPHAGOGASTRODUODENOSCOPY N/A 10/15/2019    Procedure: EGD (ESOPHAGOGASTRODUODENOSCOPY);  Surgeon: Carlos Mcneal III, MD;  Location: [facility];  Service: Endoscopy;  Laterality: N/A;    LEFT HEART CATHETERIZATION Left 10/11/2019    Procedure: CATHETERIZATION, HEART, LEFT;  Surgeon: Robe Gilbert MD;  Location: [facility];  Service: Cardiology;  Laterality: Left;    LEFT HEART CATHETERIZATION Left 10/13/2019    Procedure: CATHETERIZATION, HEART, LEFT;  Surgeon: Robe Gilbert MD;  Location: [facility];  Service: Cardiology;  Laterality: Left;    leg stent Left         Time Tracking:     PT Received On: 06/14/22  PT Start Time: 0900     PT Stop Time: 0915  PT Total Time (min): 15 min     Billable Minutes: Evaluation 15    06/14/2022

## 2022-06-14 NOTE — ASSESSMENT & PLAN NOTE
- Place in Observation on Telemetry to r/o acute CVA.  NIHSS = 0.  - CT of the head showed no acute abnormality.  - Will obtain MRI of the brain and carotid US.  - 2D echo.  - Check FLP and HbA1c.  - Neuro checks.  - Continue ASA, Plavix, and high intensity statin.  - PT/OT/ST consult to eval and treat.  - Neurology consult.     06/14/2022  Chronic on MRI  Continue ASA, plavix, change to atorvastatin 80mg daily  PT/OT- at baseline level of function  Neuro cleared to [facility] for 1 month  OP f/u  Stressed Smoking cessation

## 2022-06-14 NOTE — PROGRESS NOTES
[facility]  [facility]  Progress Note    Patient Name: Aquiles Kelsey  MRN: [id number]  Patient Class: OP- Observation   Admission Date: 6/13/2022  Length of Stay: 0 days  Attending Physician: Anderson Sanchez MD  Primary Care Provider: Holger Thornton MD        Subjective:     Principal Problem:TIA (transient ischemic attack)        HPI:  Aquiles Kelsey is an 81 y.o. male with a PMHx of AAA s/p repair, CAD, DM II, HLD, HTN, PAD, and tobacco use who presented to the ED with c/o transient left-sided facial droop that started around 20:30 tonight. No aggravating or alleviating factors. Associated speech difficulty and generalized weakness, and confusion. Patient reports episode lasted approximately 15 minutes. Denies any HA, visual disturbance, hemiparesis, numbness/tingling, dysarthria, lightheadedness, dizziness, syncope, CP, palpitations, SOB, ABD pain, N/V/D, fever or chills. In the ED, patient had NIH stroke scale of 0. Initial work-up was benign. CT of head showed no acute abnormality, right frontal encephalomalacia, mild microvascular ischemic change, and bilateral basal ganglia lacunar infarcts. Vascular Neurology was consulted via Tele Stroke in the ED and deemed patient not a candidate for tPA due to resolution of symptoms, and recommended admission for full CVA work-up. Hospital Medicine was contacted for admission and patient placed in Observation.  Patient is a Full Code.       Overview/Hospital Course:  Aquiles Kelsey was placed into observation for stroke work up. MRI braid with chronic lacunar infarcts, chronic cerebellar infarcts, and generalized atrophy. Carotid US with carotic bulb plaque- recommending OP f/u with vascular. Patient is without symptoms at this time and back to previous level of functioning. Seen at bedside with wife present. Neurology consulted. Recommending dual antiplatelets for 30 days. Discussed the need for smoking cessation  although patient has to will to quit. Discussed need to f/u with PCP and neurology OP. Patient was incidentally given 240 mg isosorbide XR by nursing staff today. SBP dropped into the low 100's and HR 50. Continue cardiac monitoring and observation overnight and will plan to discharge in the morning.      Interval History: see above    Review of Systems   Constitutional:  Negative for chills, diaphoresis, fatigue and fever.   HENT:  Positive for hearing loss ([place]). Negative for drooling and trouble swallowing.    Eyes:  Negative for photophobia and visual disturbance.   Respiratory:  Negative for cough, shortness of breath and wheezing.    Cardiovascular:  Negative for chest pain, palpitations and leg swelling.   Gastrointestinal:  Negative for abdominal pain, diarrhea, nausea and vomiting.   Genitourinary:  Negative for dysuria and hematuria.   Musculoskeletal:  Positive for myalgias (generalized). Negative for arthralgias and back pain.   Skin:  Negative for pallor and rash.   Neurological:  Positive for facial asymmetry, speech difficulty and weakness (generalized). Negative for dizziness, syncope, light-headedness, numbness and headaches.   Psychiatric/Behavioral:  Positive for confusion (baseline). The patient is not nervous/anxious.    All other systems reviewed and are negative.  Objective:     Vital Signs (Most Recent):  Temp: 97.9 °F (36.6 °C) (06/14/22 1503)  Pulse: (!) 50 (06/14/22 1503)  Resp: 18 (06/14/22 1503)  BP: (!) 102/47 (06/14/22 1503)  SpO2: 95 % (06/14/22 1503)   Vital Signs (24h Range):  Temp:  [97.7 °F (36.5 °C)-98.6 °F (37 °C)] 97.9 °F (36.6 °C)  Pulse:  [49-61] 50  Resp:  [16-28] 18  SpO2:  [93 %-97 %] 95 %  BP: (102-160)/(47-72) 102/47     Weight: 64.4 kg (142 lb)  Body mass index is 20.37 kg/m².  No intake or output data in the 24 hours ending 06/14/22 1708   Physical Exam  Vitals and nursing note reviewed.   Constitutional:       General: He is awake. He is not in acute distress.      Appearance: Normal appearance. He is well-developed. He is not diaphoretic.   HENT:      Head: Normocephalic and atraumatic.   Eyes:      General: No visual field deficit.     Extraocular Movements: Extraocular movements intact.      Conjunctiva/sclera: Conjunctivae normal.   Cardiovascular:      Rate and Rhythm: Normal rate and regular rhythm. No extrasystoles are present.     Heart sounds: S1 normal and S2 normal. No murmur heard.  Pulmonary:      Effort: Pulmonary effort is normal. No tachypnea or respiratory distress.      Breath sounds: Normal breath sounds and air entry.   Abdominal:      General: There is no distension.      Palpations: Abdomen is soft.   Musculoskeletal:         General: No tenderness or deformity. Normal range of motion.      Cervical back: Normal range of motion.      Right lower leg: No edema.      Left lower leg: No edema.      Comments: Ambulating in room without assistance and with steady gait.   Skin:     General: Skin is warm and dry.      Capillary Refill: Capillary refill takes less than 2 seconds.   Neurological:      General: No focal deficit present.      Mental Status: He is alert and oriented to person, place, and time.      GCS: GCS eye subscore is 4. GCS verbal subscore is 5. GCS motor subscore is 6.      Cranial Nerves: Cranial nerves are intact. No dysarthria or facial asymmetry.      Sensory: Sensation is intact.      Motor: Weakness (generalized) present. No pronator drift.      Coordination: Coordination is intact.   Psychiatric:         Mood and Affect: Mood and affect normal.         Speech: Speech normal. Speech is not slurred.         Behavior: Behavior normal. Behavior is cooperative.       Significant Labs: All pertinent labs within the past 24 hours have been reviewed.  Recent Lab Results  (Last 5 results in the past 24 hours)        06/14/22  1558   06/14/22  0740   06/14/22  0524   06/14/22  0234   06/13/22  2304        Albumin     3.2           Alkaline  Phosphatase     58           ALT     7           Anion Gap     7           Ao root annulus   3.38             Ascending aorta   3.51             Ao peak americo   1.89             Ao VTI   46.1             aPTT     27.9  Comment: aPTT therapeutic range = 39-69 seconds           AR Max Americo   3.02             AST     11           AV valve area   2.78             AV mean gradient   7             AV index (prosthetic)   0.48             AV peak gradient   14             AV regurgitation pressure 1/2 time   [id number]             AV Velocity Ratio   0.44             Baso #     0.03           Basophil %     0.5           BILIRUBIN TOTAL     0.4  Comment: For infants and newborns, interpretation of results should be based  on gestational age, weight and in agreement with clinical  observations.    Premature Infant recommended reference ranges:  Up to 24 hours.............<8.0 mg/dL  Up to 48 hours............<12.0 mg/dL  3-5 days..................<15.0 mg/dL  6-29 days.................<15.0 mg/dL             BSA   1.78             BUN     19           Calcium     8.4           Chloride     110           Cholesterol               CO2     24           CPK     <7           CPK MB     1.4           Creatinine     0.8           Left Ventricle Relative Wall Thickness   0.88             Differential Method     Automated           E/A ratio   1.19             E/E' ratio   11.63             eGFR if      >60           eGFR if non      >60  Comment: Calculation used to obtain the estimated glomerular filtration  rate (eGFR) is the CKD-EPI equation.              EF   55             Eos #     0.1           Eosinophil %     1.9           Estimated Avg Glucose     123           E wave deceleration time   [id number]             FS   25             Glucose     101           Gran # (ANC)     4.5           Gran %     72.0           HDL               HDL/Cholesterol Ratio               Hematocrit      37.4           Hemoglobin     12.6           Hemoglobin A1C External     5.9  Comment: ADA Screening Guidelines:  5.7-6.4%  Consistent with prediabetes  >or=6.5%  Consistent with diabetes    High levels of fetal hemoglobin interfere with the HbA1C  assay. Heterozygous hemoglobin variants (HbS, HgC, etc)do  not significantly interfere with this assay.   However, presence of multiple variants may affect accuracy.             Immature Grans (Abs)     0.02  Comment: Mild elevation in immature granulocytes is non specific and   can be seen in a variety of conditions including stress response,   acute inflammation, trauma and pregnancy. Correlation with other   laboratory and clinical findings is essential.             Immature Granulocytes     0.3           INR     1.1  Comment: Coumadin Therapy:  2.0 - 3.0 for INR for all indicators except mechanical heart valves  and antiphospholipid syndromes which should use 2.5 - 3.5.             IVC diameter   2.17             IVRT   [id number]             IVSd   1.72             LA WIDTH   4.30             Left Atrium Major Axis   5.49             Left Atrium Minor Axis   5.35             LA size   3.70             LA volume   73.29             LA vol index   40.7             LVOT area   5.8             LDL Cholesterol External               LV LATERAL E/E' RATIO   10.33             LV SEPTAL E/E' RATIO   13.29             LV EDV BP   73.29             LV Diastolic Volume Index   40.72             LVIDd   4.08             LVIDs   3.08             LV mass   307.88             LV Mass Index   171             Left Ventricular Outflow Tract Mean Gradient   1.21             Left Ventricular Outflow Tract Mean Velocity   [id number]             LVOT diameter   2.72             LVOT peak anel   0.83             LVOT stroke volume   128.35             LVOT peak VTI   22.10             LV ESV BP   37.21             LV Systolic Volume Index   20.7             Lymph #     1.1            Lymph %     17.4           Magnesium     1.9           MB %     Unable to calculate  Comment: To be positive, the MB% must be greater than 5% AND the CK-MB  greater than 6.5 ng/mL. Values not in the reference interval,   but not qualifying as positive, should be considered trace.             MCH     30.4           MCHC     33.7           MCV     90           Mean e'   0.08             Mono #     0.5           Mono %     7.9           MPV     9.8           Mr max americo   5.35             MV valve area p 1/2 method   2.93             MV Peak A Americo   0.78             MV Peak E Americo   0.93             MV stenosis pressure 1/2 time   [id number]             Non-HDL Cholesterol               nRBC     0           Phosphorus     2.6           Platelets     143           POCT Glucose 104       209         Potassium     3.5           PROTEIN TOTAL     6.0           Protime     11.3           PV mean gradient   1.11             Posterior Wall   1.79             RA Major Axis   5.20             RA Width   3.44             RBC     4.15           RDW     14.0           RVOT peak americo   0.64             RVOT peak VTI   19.0             SARS-CoV-2 RNA, Amplification, Qual         Negative  Comment: This test utilizes isothermal nucleic acid amplification   technology to detect the SARS-CoV-2 RdRp nucleic acid segment.   The analytical sensitivity (limit of detection) is 125 genome   equivalents/mL.     A POSITIVE result implies infection with the SARS-CoV-2 virus;  the patient is presumed to be contagious.    A NEGATIVE result means that SARS-CoV-2 nucleic acids are not  present above the limit of detection. A NEGATIVE result should be   treated as presumptive. It does not rule out the possibility of   COVID-19 and should not be the sole basis for treatment decisions.   If COVID-19 is strongly suspected based on clinical and exposure   history, re-testing using an alternate molecular assay should be   considered.        This test is only for use under the Food and Drug   Administration s Emergency Use Authorization (EUA).   Commercial kits are provided by [employer].   Performance characteristics of the EUA have been independently  verified by [facility] Department of  Pathology and Laboratory Medicine.   _________________________________________________________________  The ID NOW COVID-19 Letter of Authorization, along with the   authorized Fact Sheet for Healthcare Providers, the authorized Fact  Sheet for Patients, and authorized labeling are available on the FDA   website:  www.fda.gov/MedicalDevices/Safety/EmergencySituations/vsy744706.htm         Sinus   3.71             Sodium     141           STJ   2.63             TAPSE   1.81             TDI SEPTAL   0.07             TDI LATERAL   0.09             Total Cholesterol/HDL Ratio               Triglycerides               Triscuspid Valve Regurgitation Peak Gradient   40             TR Max Americo   3.16             Troponin I     0.011  Comment: The reference interval for Troponin I represents the 99th percentile   cutoff   for our facility and is consistent with 3rd generation assay   performance.             TSH               WBC     6.31                                  Significant Imaging: I have reviewed all pertinent imaging results/findings within the past 24 hours.  Imaging Results              MRI BRAIN WITHOUT CONTRAST (Final result)  Result time 06/14/22 11:41:08      Final result by Sunny Mims Jr., MD (06/14/22 11:41:08)                   Impression:      1. No acute infarction.  2. 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  3. Numerous chronic lacunar infarcts as described including within the basal ganglia, external capsules, and posterior limb of the left internal capsule.  There are also chronic cerebellar infarcts.  4. Mild patchy T2, nonspecific signal within the white matter  likely relates to chronic microvascular ischemia.  5. Moderate generalized atrophy without hydrocephalus.      Electronically signed by: Sunny Mims Jr., MD  Date:    06/14/2022  Time:    11:41               Narrative:    EXAMINATION:  MRI BRAIN WITHOUT CONTRAST    CLINICAL HISTORY:  Transient ischemic attack (TIA);    TECHNIQUE:  Sagittal T1. Axial T1, T2, T2 FLAIR, GRE, DWI. Coronal T2 FLAIR.    COMPARISON:  CT of the head from 06/13/2022 was reviewed.    FINDINGS:  There is no restricted diffusion. There is chronic cortical and deep white matter infarction involving the right middle and inferior frontal gyri anteriorly with associated cortical T1 hyperintense laminar necrosis.  There are prominent perivascular spaces within the basal ganglia bilaterally.  Chronic lacunar infarcts within the bilateral external capsules.  Chronic lacunar infarct within the posterior limb of the left internal capsule.  Chronic lacunar infarcts within the bilateral thalami, right more extensive than left.  There are chronic infarcts within the peripheries of both cerebellar hemispheres.  Mild patchy, nonspecific T2 FLAIR hyperintensity within the periventricular white matter.  No acute intracranial hemorrhage.    Moderate generalized atrophy with prominent CSF spaces.  No signs of hydrocephalus.  Midline structures are normal. There are preserved arterial flow-voids on T2 weighted imaging. The paranasal sinuses and mastoid air cells are clear.    No abnormal marrow signal is identified.                                       US Carotid Bilateral (Final result)  Result time 06/14/22 08:06:02      Final result by Rena Fox MD (06/14/22 08:06:02)                   Impression:      No evidence of a hemodynamically significant carotid bifurcation stenosis.  However noncalcified plaque is noted at the left carotid bulb which demonstrates motion with blood flow, which may predispose this patient to embolism.      Electronically  signed by: Rena Fox  Date:    06/14/2022  Time:    08:06               Narrative:    EXAMINATION:  US CAROTID BILATERAL    CLINICAL HISTORY:  Transient cerebral ischemic attack, unspecified    TECHNIQUE:  Grayscale and color Doppler ultrasound examination of the carotid and vertebral artery systems bilaterally.  Stenosis estimates are per the NASCET measurement criteria.    COMPARISON:  Bilateral carotid ultrasound 06/17/2019    FINDINGS:  Right:    Internal Carotid Artery (ICA) peak systolic velocity 50 cm/sec    ICA/CCA peak systolic velocity ratio: 0.98    Plaque formation: Homogeneous    Vertebral artery: Antegrade flow and normal waveform.    Left:    Internal Carotid Artery (ICA)  peak systolic velocity 52 cm/sec    ICA/CCA peak systolic velocity ratio: 1.02    Plaque formation: Homogeneous.  Noncalcified plaque noted at the bulb demonstrates mild motion with blood flow.  No stenosis.    Vertebral artery: Antegrade flow and normal waveform.                                       CT Head Without Contrast (Final result)  Result time 06/13/22 21:32:20      Final result by Rl Ronquillo MD (06/13/22 21:32:20)                   Impression:      No hemorrhage or acute major vascular distribution infarction.    Senescent changes as above.    ASPECTS score 10 of 10.    All CT scans at this facility are performed  using dose modulation techniques as appropriate to performed exam including the following:  automated exposure control; adjustment of mA and/or kV according to the patients size (this includes techniques or standardized protocols for targeted exams where dose is matched to indication/reason for exam: i.e. extremities or head);  iterative reconstruction technique.      Electronically signed by: Rl Ronquillo  Date:    06/13/2022  Time:    21:32               Narrative:    EXAMINATION:  CT HEAD WITHOUT CONTRAST    CLINICAL HISTORY:  Transient ischemic attack (TIA);    TECHNIQUE:  Low dose axial CT  images obtained throughout the head without intravenous contrast. Sagittal and coronal reconstructions were performed.    COMPARISON:  None.    FINDINGS:  Intracranial compartment:    Ventricles and sulci are normal in size for age without evidence of hydrocephalus. No extra-axial blood or fluid collections.    Right frontal encephalomalacia.  Mild microvascular ischemic change.  Bilateral basal ganglia lacunar infarcts.  No parenchymal mass, hemorrhage, edema or major vascular distribution infarct.    Skull/extracranial contents (limited evaluation): No fracture. Mastoid air cells and paranasal sinuses are essentially clear.                                          Assessment/Plan:      * TIA (transient ischemic attack)  - Place in Observation on Telemetry to r/o acute CVA.  NIHSS = 0.  - CT of the head showed no acute abnormality.  - Will obtain MRI of the brain and carotid US.  - 2D echo.  - Check FLP and HbA1c.  - Neuro checks.  - Continue ASA, Plavix, and high intensity statin.  - PT/OT/ST consult to eval and treat.  - Neurology consult.     06/14/2022  Chronic on MRI  Continue ASA, plavix, change to atorvastatin 80mg daily  PT/OT- at baseline level of function  Neuro cleared to [facility] for 1 month  OP f/u  Stressed Smoking cessation    Tobacco abuse  - Assistance with smoking cessation was offered, including:  [x]  Medications  [x]  Counseling  [x]  Printed Information on Smoking Cessation  []  Referral to a Smoking Cessation Program  - Patient was counseled regarding smoking for 3-10 minutes.    Type 2 diabetes mellitus, without long-term current use of insulin  - Hold metformin during hospital stay.   - Accuchecks with low dose SSI.  - Diabetic diet.  Hemoglobin A1C   Date Value Ref Range Status   06/14/2022 5.9 (H) 4.0 - 5.6 % Final     Comment:     ADA Screening Guidelines:  5.7-6.4%  Consistent with prediabetes  >or=6.5%  Consistent with diabetes    High levels of fetal hemoglobin interfere with the  HbA1C  assay. Heterozygous hemoglobin variants (HbS, HgC, etc)do  not significantly interfere with this assay.   However, presence of multiple variants may affect accuracy.         Dyslipidemia  - Continue statin.     Essential hypertension  - Continue home antihypertensives.     PAD (peripheral artery disease)  - Continue ASA, Plavix, and statin.     CAD (coronary artery disease)  - Stable, no angina.   - Continue medical management.       VTE Risk Mitigation (From admission, onward)         Ordered     enoxaparin injection 40 mg  Daily         06/13/22 2338     IP VTE HIGH RISK PATIENT  Once         06/13/22 2338     Place sequential compression device  Until discontinued         06/13/22 2338                Discharge Planning   ZEINA:      Code Status: Full Code   Is the patient medically ready for discharge?:     Reason for patient still in hospital (select all that apply): Patient trending condition  Discharge Plan A: Home                  Meera Jordan NP  Department of Hospital Medicine   [facility]

## 2022-06-14 NOTE — PT/OT/SLP EVAL
"Occupational Therapy   Evaluation and Discharge Note    Name: Aquiles Kelsey  MRN: [id number]  Admitting Diagnosis:  TIA (transient ischemic attack)   Recent Surgery: * No surgery found *      Recommendations:     Discharge Recommendations: home  Discharge Equipment Recommendations:  none  Barriers to discharge:  None    Assessment:     Aquiles Kelsey is a 81 y.o. male with a medical diagnosis of TIA (transient ischemic attack). At this time, patient is functioning at their prior level of function and does not require further acute OT services.     Plan:     During this hospitalization, patient does not require further acute OT services.  Please re-consult if situation changes.    · Plan of Care Reviewed with: patient, spouse    Subjective     Chief Complaint: Reported "I need F-O-O-D!"  Patient/Family Comments/goals: none reported    Occupational Profile:  Living Environment: Patient resides in a one story home with a threshold to enter, with his wife, son, and dog.  Previous level of function: Patient was independent with ADLs and ambulation prior to admission. He does not drive and is retired.  Roles and Routines: n/a  Equipment Used at home:  none  Assistance upon Discharge: wife    Pain/Comfort:  · Pain Rating 1: 0/10    Objective:     Communicated with: ER nurses, prior to session.  Patient found walking to room from bathroom with wife and with peripheral IV upon OT presentation to ER.    General Precautions: Standard, head of hearing  Orthopedic Precautions:N/A   Braces: N/A  Respiratory Status: Room air     Bed Mobility:    · Patient completed Sit to Supine with independence    Functional Mobility/Transfers:  · Patient completed Sit <> Stand Transfer with independence  with  no assistive device   · Patient completed Bed <> Chair Transfer using Step Transfer technique with independence with no assistive device  · Functional Mobility: Patient completed functional mobility x200ft without AD, " independently.    Activities of Daily Living:  · Upper Body Dressing: independence ..  · Lower Body Dressing: independence .    Cognitive/Visual Perceptual:  Cognitive/Psychosocial Skills:     -       Oriented to: Person, Place, Time and Situation   -       Follows Commands/attention:Follows multistep  commands    Physical Exam:  Balance:    -       sitting: WFL standing: WFL  Sensation:    -       Intact  Dominant hand:    -       right  Upper Extremity Range of Motion:     -       Right Upper Extremity: WFL  -       Left Upper Extremity: WFL  Upper Extremity Strength:    -       Right Upper Extremity: WFL  -       Left Upper Extremity: WFL   Strength:    -       Right Upper Extremity: WFL  -       Left Upper Extremity: WFL  Fine Motor Coordination:    -       Intact    AMPAC 6 Click ADL:  AMPAC Total Score: 24    Treatment & Education:  Patient educated on role of OT in acute setting and benefits of participation. No residual deficits elicited. At functional baseline. D/C OT.  Education:    Patient left sitting edge of bed with all lines intact, call button in reach and wife present    History:     Past Medical History:   Diagnosis Date    Acute blood loss anemia 10/14/2019    Aneurysm, abdominal aortic     Coronary artery disease     Depression     Diabetes mellitus     HLD (hyperlipidemia)     Hypertension     Kidney stone     PAD (peripheral artery disease)     Tobacco abuse        Past Surgical History:   Procedure Laterality Date    APPENDECTOMY      CORONARY STENT PLACEMENT      x 4    ESOPHAGOGASTRODUODENOSCOPY N/A 10/14/2019    Procedure: EGD (ESOPHAGOGASTRODUODENOSCOPY);  Surgeon: Carlos Mcneal III, MD;  Location: [facility];  Service: Endoscopy;  Laterality: N/A;    ESOPHAGOGASTRODUODENOSCOPY N/A 10/15/2019    Procedure: EGD (ESOPHAGOGASTRODUODENOSCOPY);  Surgeon: Carlos Mcneal III, MD;  Location: [facility];  Service: Endoscopy;  Laterality: N/A;    LEFT HEART CATHETERIZATION Left  10/11/2019    Procedure: CATHETERIZATION, HEART, LEFT;  Surgeon: Robe Gilbert MD;  Location: [facility] CATH LAB;  Service: Cardiology;  Laterality: Left;    LEFT HEART CATHETERIZATION Left 10/13/2019    Procedure: CATHETERIZATION, HEART, LEFT;  Surgeon: Robe Gilbert MD;  Location: [facility] CATH LAB;  Service: Cardiology;  Laterality: Left;    leg stent Left        Time Tracking:     OT Date of Treatment: 06/14/22  OT Start Time: 0900  OT Stop Time: 0925  OT Total Time (min): 25 min    Billable Minutes:Evaluation 25 6/14/2022

## 2022-06-14 NOTE — SUBJECTIVE & OBJECTIVE
Interval History: see above    Review of Systems   Constitutional:  Negative for chills, diaphoresis, fatigue and fever.   HENT:  Positive for hearing loss ([place]). Negative for drooling and trouble swallowing.    Eyes:  Negative for photophobia and visual disturbance.   Respiratory:  Negative for cough, shortness of breath and wheezing.    Cardiovascular:  Negative for chest pain, palpitations and leg swelling.   Gastrointestinal:  Negative for abdominal pain, diarrhea, nausea and vomiting.   Genitourinary:  Negative for dysuria and hematuria.   Musculoskeletal:  Positive for myalgias (generalized). Negative for arthralgias and back pain.   Skin:  Negative for pallor and rash.   Neurological:  Positive for facial asymmetry, speech difficulty and weakness (generalized). Negative for dizziness, syncope, light-headedness, numbness and headaches.   Psychiatric/Behavioral:  Positive for confusion (baseline). The patient is not nervous/anxious.    All other systems reviewed and are negative.  Objective:     Vital Signs (Most Recent):  Temp: 97.9 °F (36.6 °C) (06/14/22 1503)  Pulse: (!) 50 (06/14/22 1503)  Resp: 18 (06/14/22 1503)  BP: (!) 102/47 (06/14/22 1503)  SpO2: 95 % (06/14/22 1503)   Vital Signs (24h Range):  Temp:  [97.7 °F (36.5 °C)-98.6 °F (37 °C)] 97.9 °F (36.6 °C)  Pulse:  [49-61] 50  Resp:  [16-28] 18  SpO2:  [93 %-97 %] 95 %  BP: (102-160)/(47-72) 102/47     Weight: 64.4 kg (142 lb)  Body mass index is 20.37 kg/m².  No intake or output data in the 24 hours ending 06/14/22 1708   Physical Exam  Vitals and nursing note reviewed.   Constitutional:       General: He is awake. He is not in acute distress.     Appearance: Normal appearance. He is well-developed. He is not diaphoretic.   HENT:      Head: Normocephalic and atraumatic.   Eyes:      General: No visual field deficit.     Extraocular Movements: Extraocular movements intact.      Conjunctiva/sclera: Conjunctivae normal.   Cardiovascular:      Rate and  Rhythm: Normal rate and regular rhythm. No extrasystoles are present.     Heart sounds: S1 normal and S2 normal. No murmur heard.  Pulmonary:      Effort: Pulmonary effort is normal. No tachypnea or respiratory distress.      Breath sounds: Normal breath sounds and air entry.   Abdominal:      General: There is no distension.      Palpations: Abdomen is soft.   Musculoskeletal:         General: No tenderness or deformity. Normal range of motion.      Cervical back: Normal range of motion.      Right lower leg: No edema.      Left lower leg: No edema.      Comments: Ambulating in room without assistance and with steady gait.   Skin:     General: Skin is warm and dry.      Capillary Refill: Capillary refill takes less than 2 seconds.   Neurological:      General: No focal deficit present.      Mental Status: He is alert and oriented to person, place, and time.      GCS: GCS eye subscore is 4. GCS verbal subscore is 5. GCS motor subscore is 6.      Cranial Nerves: Cranial nerves are intact. No dysarthria or facial asymmetry.      Sensory: Sensation is intact.      Motor: Weakness (generalized) present. No pronator drift.      Coordination: Coordination is intact.   Psychiatric:         Mood and Affect: Mood and affect normal.         Speech: Speech normal. Speech is not slurred.         Behavior: Behavior normal. Behavior is cooperative.       Significant Labs: All pertinent labs within the past 24 hours have been reviewed.  Recent Lab Results  (Last 5 results in the past 24 hours)        06/14/22  1558   06/14/22  0740   06/14/22  0524   06/14/22  0234   06/13/22  2304        Albumin     3.2           Alkaline Phosphatase     58           ALT     7           Anion Gap     7           Ao root annulus   3.38             Ascending aorta   3.51             Ao peak americo   1.89             Ao VTI   46.1             aPTT     27.9  Comment: aPTT therapeutic range = 39-69 seconds           AR Max Americo   3.02             AST      11           AV valve area   2.78             AV mean gradient   7             AV index (prosthetic)   0.48             AV peak gradient   14             AV regurgitation pressure 1/2 time   969.908181780320768             AV Velocity Ratio   0.44             Baso #     0.03           Basophil %     0.5           BILIRUBIN TOTAL     0.4  Comment: For infants and newborns, interpretation of results should be based  on gestational age, weight and in agreement with clinical  observations.    Premature Infant recommended reference ranges:  Up to 24 hours.............<8.0 mg/dL  Up to 48 hours............<12.0 mg/dL  3-5 days..................<15.0 mg/dL  6-29 days.................<15.0 mg/dL             BSA   1.78             BUN     19           Calcium     8.4           Chloride     110           Cholesterol               CO2     24           CPK     <7           CPK MB     1.4           Creatinine     0.8           Left Ventricle Relative Wall Thickness   0.88             Differential Method     Automated           E/A ratio   1.19             E/E' ratio   11.63             eGFR if      >60           eGFR if non      >60  Comment: Calculation used to obtain the estimated glomerular filtration  rate (eGFR) is the CKD-EPI equation.              EF   55             Eos #     0.1           Eosinophil %     1.9           Estimated Avg Glucose     123           E wave deceleration time   [id number]             FS   25             Glucose     101           Gran # (ANC)     4.5           Gran %     72.0           HDL               HDL/Cholesterol Ratio               Hematocrit     37.4           Hemoglobin     12.6           Hemoglobin A1C External     5.9  Comment: ADA Screening Guidelines:  5.7-6.4%  Consistent with prediabetes  >or=6.5%  Consistent with diabetes    High levels of fetal hemoglobin interfere with the HbA1C  assay. Heterozygous hemoglobin variants (HbS, HgC,  etc)do  not significantly interfere with this assay.   However, presence of multiple variants may affect accuracy.             Immature Grans (Abs)     0.02  Comment: Mild elevation in immature granulocytes is non specific and   can be seen in a variety of conditions including stress response,   acute inflammation, trauma and pregnancy. Correlation with other   laboratory and clinical findings is essential.             Immature Granulocytes     0.3           INR     1.1  Comment: Coumadin Therapy:  2.0 - 3.0 for INR for all indicators except mechanical heart valves  and antiphospholipid syndromes which should use 2.5 - 3.5.             IVC diameter   2.17             IVRT   [id number]             IVSd   1.72             LA WIDTH   4.30             Left Atrium Major Axis   5.49             Left Atrium Minor Axis   5.35             LA size   3.70             LA volume   73.29             LA vol index   40.7             LVOT area   5.8             LDL Cholesterol External               LV LATERAL E/E' RATIO   10.33             LV SEPTAL E/E' RATIO   13.29             LV EDV BP   73.29             LV Diastolic Volume Index   40.72             LVIDd   4.08             LVIDs   3.08             LV mass   307.88             LV Mass Index   171             Left Ventricular Outflow Tract Mean Gradient   1.21             Left Ventricular Outflow Tract Mean Velocity   [id number]             LVOT diameter   2.72             LVOT peak anel   0.83             LVOT stroke volume   128.35             LVOT peak VTI   22.10             LV ESV BP   37.21             LV Systolic Volume Index   20.7             Lymph #     1.1           Lymph %     17.4           Magnesium     1.9           MB %     Unable to calculate  Comment: To be positive, the MB% must be greater than 5% AND the CK-MB  greater than 6.5 ng/mL. Values not in the reference interval,   but not qualifying as positive, should be considered trace.             MCH      30.4           MCHC     33.7           MCV     90           Mean e'   0.08             Mono #     0.5           Mono %     7.9           MPV     9.8           Mr max americo   5.35             MV valve area p 1/2 method   2.93             MV Peak A Americo   0.78             MV Peak E Americo   0.93             MV stenosis pressure 1/2 time   [id number]             Non-HDL Cholesterol               nRBC     0           Phosphorus     2.6           Platelets     143           POCT Glucose 104       209         Potassium     3.5           PROTEIN TOTAL     6.0           Protime     11.3           PV mean gradient   1.11             Posterior Wall   1.79             RA Major Axis   5.20             RA Width   3.44             RBC     4.15           RDW     14.0           RVOT peak americo   0.64             RVOT peak VTI   19.0             SARS-CoV-2 RNA, Amplification, Qual         Negative  Comment: This test utilizes isothermal nucleic acid amplification   technology to detect the SARS-CoV-2 RdRp nucleic acid segment.   The analytical sensitivity (limit of detection) is 125 genome   equivalents/mL.     A POSITIVE result implies infection with the SARS-CoV-2 virus;  the patient is presumed to be contagious.    A NEGATIVE result means that SARS-CoV-2 nucleic acids are not  present above the limit of detection. A NEGATIVE result should be   treated as presumptive. It does not rule out the possibility of   COVID-19 and should not be the sole basis for treatment decisions.   If COVID-19 is strongly suspected based on clinical and exposure   history, re-testing using an alternate molecular assay should be   considered.       This test is only for use under the Food and Drug   Administration s Emergency Use Authorization (EUA).   Commercial kits are provided by [employer].   Performance characteristics of the EUA have been independently  verified by [facility] Department of  Pathology and Laboratory  Medicine.   _________________________________________________________________  The ID NOW COVID-19 Letter of Authorization, along with the   authorized Fact Sheet for Healthcare Providers, the authorized Fact  Sheet for Patients, and authorized labeling are available on the FDA   website:  www.fda.gov/MedicalDevices/Safety/EmergencySituations/xbh824503.htm         Sinus   3.71             Sodium     141           STJ   2.63             TAPSE   1.81             TDI SEPTAL   0.07             TDI LATERAL   0.09             Total Cholesterol/HDL Ratio               Triglycerides               Triscuspid Valve Regurgitation Peak Gradient   40             TR Max Americo   3.16             Troponin I     0.011  Comment: The reference interval for Troponin I represents the 99th percentile   cutoff   for our facility and is consistent with 3rd generation assay   performance.             TSH               WBC     6.31                                  Significant Imaging: I have reviewed all pertinent imaging results/findings within the past 24 hours.  Imaging Results              MRI BRAIN WITHOUT CONTRAST (Final result)  Result time 06/14/22 11:41:08      Final result by Sunny Mims Jr., MD (06/14/22 11:41:08)                   Impression:      1. No acute infarction.  2. 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  3. Numerous chronic lacunar infarcts as described including within the basal ganglia, external capsules, and posterior limb of the left internal capsule.  There are also chronic cerebellar infarcts.  4. Mild patchy T2, nonspecific signal within the white matter likely relates to chronic microvascular ischemia.  5. Moderate generalized atrophy without hydrocephalus.      Electronically signed by: Sunny Mims Jr., MD  Date:    06/14/2022  Time:    11:41               Narrative:    EXAMINATION:  MRI BRAIN WITHOUT CONTRAST    CLINICAL HISTORY:  Transient ischemic  attack (TIA);    TECHNIQUE:  Sagittal T1. Axial T1, T2, T2 FLAIR, GRE, DWI. Coronal T2 FLAIR.    COMPARISON:  CT of the head from 06/13/2022 was reviewed.    FINDINGS:  There is no restricted diffusion. There is chronic cortical and deep white matter infarction involving the right middle and inferior frontal gyri anteriorly with associated cortical T1 hyperintense laminar necrosis.  There are prominent perivascular spaces within the basal ganglia bilaterally.  Chronic lacunar infarcts within the bilateral external capsules.  Chronic lacunar infarct within the posterior limb of the left internal capsule.  Chronic lacunar infarcts within the bilateral thalami, right more extensive than left.  There are chronic infarcts within the peripheries of both cerebellar hemispheres.  Mild patchy, nonspecific T2 FLAIR hyperintensity within the periventricular white matter.  No acute intracranial hemorrhage.    Moderate generalized atrophy with prominent CSF spaces.  No signs of hydrocephalus.  Midline structures are normal. There are preserved arterial flow-voids on T2 weighted imaging. The paranasal sinuses and mastoid air cells are clear.    No abnormal marrow signal is identified.                                       US Carotid Bilateral (Final result)  Result time 06/14/22 08:06:02      Final result by Rena Fox MD (06/14/22 08:06:02)                   Impression:      No evidence of a hemodynamically significant carotid bifurcation stenosis.  However noncalcified plaque is noted at the left carotid bulb which demonstrates motion with blood flow, which may predispose this patient to embolism.      Electronically signed by: Rena Fox  Date:    06/14/2022  Time:    08:06               Narrative:    EXAMINATION:  US CAROTID BILATERAL    CLINICAL HISTORY:  Transient cerebral ischemic attack, unspecified    TECHNIQUE:  Grayscale and color Doppler ultrasound examination of the carotid and vertebral artery  systems bilaterally.  Stenosis estimates are per the NASCET measurement criteria.    COMPARISON:  Bilateral carotid ultrasound 06/17/2019    FINDINGS:  Right:    Internal Carotid Artery (ICA) peak systolic velocity 50 cm/sec    ICA/CCA peak systolic velocity ratio: 0.98    Plaque formation: Homogeneous    Vertebral artery: Antegrade flow and normal waveform.    Left:    Internal Carotid Artery (ICA)  peak systolic velocity 52 cm/sec    ICA/CCA peak systolic velocity ratio: 1.02    Plaque formation: Homogeneous.  Noncalcified plaque noted at the bulb demonstrates mild motion with blood flow.  No stenosis.    Vertebral artery: Antegrade flow and normal waveform.                                       CT Head Without Contrast (Final result)  Result time 06/13/22 21:32:20      Final result by Rl Ronquillo MD (06/13/22 21:32:20)                   Impression:      No hemorrhage or acute major vascular distribution infarction.    Senescent changes as above.    ASPECTS score 10 of 10.    All CT scans at this facility are performed  using dose modulation techniques as appropriate to performed exam including the following:  automated exposure control; adjustment of mA and/or kV according to the patients size (this includes techniques or standardized protocols for targeted exams where dose is matched to indication/reason for exam: i.e. extremities or head);  iterative reconstruction technique.      Electronically signed by: Rl Ronquillo  Date:    06/13/2022  Time:    21:32               Narrative:    EXAMINATION:  CT HEAD WITHOUT CONTRAST    CLINICAL HISTORY:  Transient ischemic attack (TIA);    TECHNIQUE:  Low dose axial CT images obtained throughout the head without intravenous contrast. Sagittal and coronal reconstructions were performed.    COMPARISON:  None.    FINDINGS:  Intracranial compartment:    Ventricles and sulci are normal in size for age without evidence of hydrocephalus. No extra-axial blood or fluid  collections.    Right frontal encephalomalacia.  Mild microvascular ischemic change.  Bilateral basal ganglia lacunar infarcts.  No parenchymal mass, hemorrhage, edema or major vascular distribution infarct.    Skull/extracranial contents (limited evaluation): No fracture. Mastoid air cells and paranasal sinuses are essentially clear.

## 2022-06-14 NOTE — ASSESSMENT & PLAN NOTE
- Hold metformin during hospital stay.   - Accuchecks with low dose SSI.  - Diabetic diet.  Hemoglobin A1C   Date Value Ref Range Status   06/14/2022 5.9 (H) 4.0 - 5.6 % Final     Comment:     ADA Screening Guidelines:  5.7-6.4%  Consistent with prediabetes  >or=6.5%  Consistent with diabetes    High levels of fetal hemoglobin interfere with the HbA1C  assay. Heterozygous hemoglobin variants (HbS, HgC, etc)do  not significantly interfere with this assay.   However, presence of multiple variants may affect accuracy.

## 2022-06-14 NOTE — ASSESSMENT & PLAN NOTE
- Hold metformin during hospital stay.   - Accuchecks with low dose SSI.  - Diabetic diet.  - Check HbA1c.

## 2022-06-14 NOTE — H&P
[facility]  [facility] Medicine  History & Physical    Patient Name: Aquiles Kelsey  MRN: [id number]  Patient Class: OP- Observation  Admission Date: 6/13/2022  Attending Physician: Kyle Dejesus MD   Primary Care Provider: Holger Thornton MD         Patient information was obtained from patient, past medical records and ER records.     Subjective:     Principal Problem:TIA (transient ischemic attack)    Chief Complaint:   Chief Complaint   Patient presents with    Facial Droop        HPI: Aquiles Kelsey is an 81 y.o. male with a PMHx of AAA s/p repair, CAD, DM II, HLD, HTN, PAD, and tobacco use who presented to the ED with c/o transient left-sided facial droop that started around 20:30 tonight. No aggravating or alleviating factors. Associated speech difficulty and generalized weakness, and confusion. Patient reports episode lasted approximately 15 minutes. Denies any HA, visual disturbance, hemiparesis, numbness/tingling, dysarthria, lightheadedness, dizziness, syncope, CP, palpitations, SOB, ABD pain, N/V/D, fever or chills. In the ED, patient had NIH stroke scale of 0. Initial work-up was benign. CT of head showed no acute abnormality, right frontal encephalomalacia, mild microvascular ischemic change, and bilateral basal ganglia lacunar infarcts. Vascular Neurology was consulted via Tele Stroke in the ED and deemed patient not a candidate for tPA due to resolution of symptoms, and recommended admission for full CVA work-up. Hospital Medicine was contacted for admission and patient placed in Observation.  Patient is a Full Code.       Past Medical History:   Diagnosis Date    Acute blood loss anemia 10/14/2019    Aneurysm, abdominal aortic     Coronary artery disease     Depression     Diabetes mellitus     HLD (hyperlipidemia)     Hypertension     Kidney stone     PAD (peripheral artery disease)     Tobacco abuse        Past Surgical History:   Procedure  Laterality Date    APPENDECTOMY      CORONARY STENT PLACEMENT      x 4    ESOPHAGOGASTRODUODENOSCOPY N/A 10/14/2019    Procedure: EGD (ESOPHAGOGASTRODUODENOSCOPY);  Surgeon: Carlos Mcneal III, MD;  Location: [facility] ENDO;  Service: Endoscopy;  Laterality: N/A;    ESOPHAGOGASTRODUODENOSCOPY N/A 10/15/2019    Procedure: EGD (ESOPHAGOGASTRODUODENOSCOPY);  Surgeon: Carlos Mcneal III, MD;  Location: [facility] ENDO;  Service: Endoscopy;  Laterality: N/A;    LEFT HEART CATHETERIZATION Left 10/11/2019    Procedure: CATHETERIZATION, HEART, LEFT;  Surgeon: Robe Gilbert MD;  Location: [facility] CATH LAB;  Service: Cardiology;  Laterality: Left;    LEFT HEART CATHETERIZATION Left 10/13/2019    Procedure: CATHETERIZATION, HEART, LEFT;  Surgeon: Robe Gilbert MD;  Location: [facility] CATH LAB;  Service: Cardiology;  Laterality: Left;    leg stent Left        Review of patient's allergies indicates:   Allergen Reactions    Metoprolol      Junctional rhythm         No current facility-administered medications on file prior to encounter.     Current Outpatient Medications on File Prior to Encounter   Medication Sig    amLODIPine (NORVASC) 5 MG tablet TAKE 1 TABLET BY MOUTH EVERY DAY    aspirin (ECOTRIN) 81 MG EC tablet Take 1 tablet (81 mg total) by mouth once daily.    baclofen (LIORESAL) 10 MG tablet Take 1 tablet (10 mg total) by mouth every evening.    clopidogreL (PLAVIX) 75 mg tablet TAKE 1 TABLET BY MOUTH EVERY DAY    EScitalopram oxalate (LEXAPRO) 10 MG tablet TAKE 1 TABLET BY MOUTH EVERY DAY    gabapentin (NEURONTIN) 100 MG capsule Take 1 capsule (100 mg total) by mouth every evening.    isosorbide mononitrate (IMDUR) 120 MG 24 hr tablet TAKE 1 TABLET (120 MG TOTAL) BY MOUTH ONCE DAILY.    lisinopril 10 MG tablet Take 2 tablets (20 mg total) by mouth once daily.    metFORMIN (GLUCOPHAGE) 1000 MG tablet EARLINE 1 TABLETA VIA ORAL 2 VECES AL NORAH CON COMIDA 90    mirabegron (MYRBETRIQ) 50 mg Tb24 Take 1 tablet (50 mg  total) by mouth once daily.    neomycin-polymyxin-hydrocortisone (CORTISPORIN) 3.5-10,000-1 mg/mL-unit/mL-% otic suspension     nitroGLYCERIN (NITROSTAT) 0.4 MG SL tablet Place 1 tablet (0.4 mg total) under the tongue every 5 (five) minutes as needed for Chest pain.    pantoprazole (PROTONIX) 40 MG tablet Take 1 tablet (40 mg total) by mouth once daily.    ranolazine (RANEXA) 500 MG Tb12 Take 1 tablet (500 mg total) by mouth 2 (two) times daily.    simvastatin (ZOCOR) 20 MG tablet TAKE 1 TABLET BY MOUTH EVERY DAY IN THE EVENING     Family History       Problem Relation (Age of Onset)    COPD Father    Diabetes Mother, Brother          Tobacco Use    Smoking status: Current Every Day Smoker     Years: 15.00     Types: Cigars    Smokeless tobacco: Current User   Substance and Sexual Activity    Alcohol use: Not Currently    Drug use: Not Currently    Sexual activity: Not Currently     Review of Systems   Constitutional:  Negative for chills, diaphoresis, fatigue and fever.   HENT:  Negative for drooling and trouble swallowing.    Eyes:  Negative for photophobia and visual disturbance.   Respiratory:  Negative for cough, shortness of breath and wheezing.    Cardiovascular:  Negative for chest pain, palpitations and leg swelling.   Gastrointestinal:  Negative for abdominal pain, diarrhea, nausea and vomiting.   Genitourinary:  Negative for dysuria and hematuria.   Musculoskeletal:  Negative for arthralgias, back pain and myalgias.   Skin:  Negative for pallor and rash.   Neurological:  Positive for facial asymmetry, speech difficulty and weakness (generalized). Negative for dizziness, syncope, light-headedness, numbness and headaches.   Psychiatric/Behavioral:  Positive for confusion. The patient is not nervous/anxious.    All other systems reviewed and are negative.  Objective:     Vital Signs (Most Recent):  Temp: 98.3 °F (36.8 °C) (06/13/22 2300)  Pulse: (!) 49 (06/13/22 2300)  Resp: (!) 23 (06/13/22  2300)  BP: (!) 129/59 (06/13/22 2300)  SpO2: (!) 93 % (06/13/22 2300)   Vital Signs (24h Range):  Temp:  [98.3 °F (36.8 °C)] 98.3 °F (36.8 °C)  Pulse:  [49-60] 49  Resp:  [16-28] 23  SpO2:  [93 %-95 %] 93 %  BP: (119-129)/(57-70) 129/59     Weight: 64.5 kg (142 lb 3.2 oz)  Body mass index is 20.4 kg/m².    Physical Exam  Vitals and nursing note reviewed.   Constitutional:       General: He is awake. He is not in acute distress.     Appearance: Normal appearance. He is well-developed. He is not diaphoretic.   HENT:      Head: Normocephalic and atraumatic.   Eyes:      General: No visual field deficit.     Conjunctiva/sclera: Conjunctivae normal.   Cardiovascular:      Rate and Rhythm: Normal rate and regular rhythm. No extrasystoles are present.     Heart sounds: S1 normal and S2 normal. No murmur heard.  Pulmonary:      Effort: Pulmonary effort is normal. No tachypnea.      Breath sounds: Normal breath sounds and air entry. No wheezing, rhonchi or rales.   Abdominal:      General: Bowel sounds are normal. There is no distension.      Palpations: Abdomen is soft.      Tenderness: There is no abdominal tenderness.   Musculoskeletal:         General: No tenderness or deformity. Normal range of motion.      Cervical back: Normal range of motion and neck supple.      Right lower leg: No edema.      Left lower leg: No edema.   Skin:     General: Skin is warm and dry.      Capillary Refill: Capillary refill takes less than 2 seconds.      Findings: No erythema or rash.   Neurological:      General: No focal deficit present.      Mental Status: He is alert and oriented to person, place, and time.      GCS: GCS eye subscore is 4. GCS verbal subscore is 5. GCS motor subscore is 6.      Cranial Nerves: Cranial nerves are intact. No dysarthria or facial asymmetry.      Sensory: Sensation is intact.      Motor: Motor function is intact. No weakness or pronator drift.      Coordination: Coordination is intact.   Psychiatric:          Mood and Affect: Mood and affect normal.         Speech: Speech normal. Speech is not slurred.         Behavior: Behavior normal. Behavior is cooperative.   Current NIH Stroke Score Values      Flowsheet Row Most Recent Value   Interval baseline   1a. Level of Consciousness 0-->Alert, keenly responsive   1b. LOC Questions 0-->Answers both questions correctly   1c. LOC Commands 0-->Performs both tasks correctly   2. Best Gaze 0-->Normal   3. Visual 0-->No visual loss   4. Facial Palsy 0-->Normal symmetrical movements   5a. Motor Arm, Left 0-->No drift, limb holds 90 (or 45) degrees for full 10 secs   5b. Motor Arm, Right 0-->No drift, limb holds 90 (or 45) degrees for full 10 secs   6a. Motor Leg, Left 0-->No drift, leg holds 30 degree position for full 5 secs   6b. Motor Leg, Right 0-->No drift, leg holds 30 degree position for full 5 secs   7. Limb Ataxia 0-->Absent   8. Sensory 0-->Normal, no sensory loss   9. Best Language 0-->No aphasia, normal   10. Dysarthria 0-->Normal   11. Extinction and Inattention (formerly Neglect) 0-->No abnormality   Total (NIH Stroke Scale) 0                Significant Labs:  Results for orders placed or performed during the hospital encounter of 06/13/22   CBC W/ AUTO DIFFERENTIAL   Result Value Ref Range    WBC 6.55 3.90 - 12.70 K/uL    RBC 4.14 (L) 4.60 - 6.20 M/uL    Hemoglobin 12.6 (L) 14.0 - 18.0 g/dL    Hematocrit 37.6 (L) 40.0 - 54.0 %    MCV 91 82 - 98 fL    MCH 30.4 27.0 - 31.0 pg    MCHC 33.5 32.0 - 36.0 g/dL    RDW 14.2 11.5 - 14.5 %    Platelets 149 (L) 150 - 450 K/uL    MPV 10.6 9.2 - 12.9 fL    Immature Granulocytes 0.2 0.0 - 0.5 %    Gran # (ANC) 4.8 1.8 - 7.7 K/uL    Immature Grans (Abs) 0.01 0.00 - 0.04 K/uL    Lymph # 1.1 1.0 - 4.8 K/uL    Mono # 0.5 0.3 - 1.0 K/uL    Eos # 0.1 0.0 - 0.5 K/uL    Baso # 0.04 0.00 - 0.20 K/uL    nRBC 0 0 /100 WBC    Gran % 73.5 (H) 38.0 - 73.0 %    Lymph % 16.6 (L) 18.0 - 48.0 %    Mono % 7.3 4.0 - 15.0 %    Eosinophil % 1.8 0.0  - 8.0 %    Basophil % 0.6 0.0 - 1.9 %    Differential Method Automated    Comprehensive metabolic panel   Result Value Ref Range    Sodium 140 136 - 145 mmol/L    Potassium 4.1 3.5 - 5.1 mmol/L    Chloride 108 95 - 110 mmol/L    CO2 24 23 - 29 mmol/L    Glucose 132 (H) 70 - 110 mg/dL    BUN 20 8 - 23 mg/dL    Creatinine 1.0 0.5 - 1.4 mg/dL    Calcium 8.7 8.7 - 10.5 mg/dL    Total Protein 6.1 6.0 - 8.4 g/dL    Albumin 3.5 3.5 - 5.2 g/dL    Total Bilirubin 0.3 0.1 - 1.0 mg/dL    Alkaline Phosphatase 61 55 - 135 U/L    AST 14 10 - 40 U/L    ALT 12 10 - 44 U/L    Anion Gap 8 8 - 16 mmol/L    eGFR if African American >60 >60 mL/min/1.73 m^2    eGFR if non African American >60 >60 mL/min/1.73 m^2   Protime-INR   Result Value Ref Range    Prothrombin Time 11.5 9.0 - 12.5 sec    INR 1.1 0.8 - 1.2   TSH   Result Value Ref Range    TSH 2.357 0.400 - 4.000 uIU/mL   POCT glucose   Result Value Ref Range    POCT Glucose 127 (H) 70 - 110 mg/dL      All pertinent labs within the past 24 hours have been reviewed.    Significant Imaging:  Imaging Results              CT Head Without Contrast (Final result)  Result time 06/13/22 21:32:20      Final result by Rl Ronquillo MD (06/13/22 21:32:20)                   Impression:      No hemorrhage or acute major vascular distribution infarction.    Senescent changes as above.    ASPECTS score 10 of 10.    All CT scans at this facility are performed  using dose modulation techniques as appropriate to performed exam including the following:  automated exposure control; adjustment of mA and/or kV according to the patients size (this includes techniques or standardized protocols for targeted exams where dose is matched to indication/reason for exam: i.e. extremities or head);  iterative reconstruction technique.      Electronically signed by: Rl Ronquillo  Date:    06/13/2022  Time:    21:32               Narrative:    EXAMINATION:  CT HEAD WITHOUT CONTRAST    CLINICAL  HISTORY:  Transient ischemic attack (TIA);    TECHNIQUE:  Low dose axial CT images obtained throughout the head without intravenous contrast. Sagittal and coronal reconstructions were performed.    COMPARISON:  None.    FINDINGS:  Intracranial compartment:    Ventricles and sulci are normal in size for age without evidence of hydrocephalus. No extra-axial blood or fluid collections.    Right frontal encephalomalacia.  Mild microvascular ischemic change.  Bilateral basal ganglia lacunar infarcts.  No parenchymal mass, hemorrhage, edema or major vascular distribution infarct.    Skull/extracranial contents (limited evaluation): No fracture. Mastoid air cells and paranasal sinuses are essentially clear.                                     I have reviewed all pertinent imaging results/findings within the past 24 hours.              Assessment/Plan:     * TIA (transient ischemic attack)  - Place in Observation on Telemetry to r/o acute CVA.  NIHSS = 0.  - CT of the head showed no acute abnormality.  - Will obtain MRI of the brain and carotid US.  - 2D echo.  - Check FLP and HbA1c.  - Neuro checks.  - Continue ASA, Plavix, and high intensity statin.  - PT/OT/ST consult to eval and treat.  - Neurology consult.     Tobacco abuse  - Assistance with smoking cessation was offered, including:  [x]  Medications  [x]  Counseling  [x]  Printed Information on Smoking Cessation  []  Referral to a Smoking Cessation Program  - Patient was counseled regarding smoking for 3-10 minutes.    Type 2 diabetes mellitus, without long-term current use of insulin  - Hold metformin during hospital stay.   - Accuchecks with low dose SSI.  - Diabetic diet.  - Check HbA1c.     Dyslipidemia  - Continue statin.     Essential hypertension  - Continue home antihypertensives.     PAD (peripheral artery disease)  - Continue ASA, Plavix, and statin.     CAD (coronary artery disease)  - Stable, no angina.   - Continue medical management.       VTE Risk  Mitigation (From admission, onward)         Ordered     enoxaparin injection 40 mg  Daily         06/13/22 2338     IP VTE HIGH RISK PATIENT  Once         06/13/22 2338     Place sequential compression device  Until discontinued         06/13/22 2338                   Fartun Cisneros NP  Department of Hospital Medicine   '[facility] - Emergency Dept.

## 2022-06-14 NOTE — PLAN OF CARE
OT eval completed. Patient at functional independent baseline. Continued skilled OT in acute or post acute not warranted. D/C OT.

## 2022-06-14 NOTE — ED PROVIDER NOTES
SCRIBE #1 NOTE: I, Babita Anderson, am scribing for, and in the presence of, Merary Keen MD. I have scribed the entire note.      History      Chief Complaint   Patient presents with    Facial Droop       Review of patient's allergies indicates:   Allergen Reactions    Metoprolol      Junctional rhythm          HPI   HPI    6/13/2022, 10:39 PM   History obtained from the patient      History of Present Illness: Aquiles Kelsey is a 81 y.o. male patient with PMHx of CAD, DM, PAD, HLD, and HTN who presents to the Emergency Department for left sided facial droop which onset 20:30 today. Pt had an incident of speech difficulty and generalized weakness that lasted 15 minutes. Symptoms are episodic and moderate in severity. No mitigating or exacerbating factors reported. Associated sxs include confusion . Patient denies any fever, chills, cough, congestion, SOB, CP, abd pain, N/V/D, HA, numbness, dizziness, and all other sxs at this time. No prior Tx. No further complaints or concerns at this time.        Arrival mode:   EMS      PCP: Holger Thornton MD       Past Medical History:  Past Medical History:   Diagnosis Date    Acute blood loss anemia 10/14/2019    Aneurysm, abdominal aortic     Coronary artery disease     Depression     Diabetes mellitus     HLD (hyperlipidemia)     Hypertension     Kidney stone     PAD (peripheral artery disease)     Tobacco abuse        Past Surgical History:  Past Surgical History:   Procedure Laterality Date    APPENDECTOMY      CORONARY STENT PLACEMENT      x 4    ESOPHAGOGASTRODUODENOSCOPY N/A 10/14/2019    Procedure: EGD (ESOPHAGOGASTRODUODENOSCOPY);  Surgeon: Carlos Mcneal III, MD;  Location: [facility] ENDO;  Service: Endoscopy;  Laterality: N/A;    ESOPHAGOGASTRODUODENOSCOPY N/A 10/15/2019    Procedure: EGD (ESOPHAGOGASTRODUODENOSCOPY);  Surgeon: Carlos Mcneal III, MD;  Location: [facility] ENDO;  Service: Endoscopy;  Laterality: N/A;    LEFT HEART  CATHETERIZATION Left 10/11/2019    Procedure: CATHETERIZATION, HEART, LEFT;  Surgeon: Robe Gilbert MD;  Location: [facility] CATH LAB;  Service: Cardiology;  Laterality: Left;    LEFT HEART CATHETERIZATION Left 10/13/2019    Procedure: CATHETERIZATION, HEART, LEFT;  Surgeon: Robe Gilbert MD;  Location: [facility] CATH LAB;  Service: Cardiology;  Laterality: Left;    leg stent Left          Family History:  Family History   Problem Relation Age of Onset    Diabetes Mother     COPD Father     Diabetes Brother        Social History:  Social History     Tobacco Use    Smoking status: Current Every Day Smoker     Years: 15.00     Types: Cigars    Smokeless tobacco: Current User   Substance and Sexual Activity    Alcohol use: Not Currently    Drug use: Not Currently    Sexual activity: Not Currently       ROS   Review of Systems   Constitutional: Negative for chills, diaphoresis and fever.   HENT: Negative for congestion and sore throat.    Respiratory: Negative for cough and shortness of breath.    Cardiovascular: Negative for chest pain.   Gastrointestinal: Negative for abdominal pain, diarrhea, nausea and vomiting.   Genitourinary: Negative for dysuria.   Musculoskeletal: Negative for back pain and neck pain.   Skin: Negative for rash.   Neurological: Positive for facial asymmetry, speech difficulty and weakness (general). Negative for dizziness, numbness and headaches.   Hematological: Does not bruise/bleed easily.   Psychiatric/Behavioral: Positive for confusion.   All other systems reviewed and are negative.      Physical Exam      Initial Vitals   BP Pulse Resp Temp SpO2   06/13/22 2101 06/13/22 2101 06/13/22 2101 06/13/22 2300 06/13/22 2101   120/70 60 16 98.3 °F (36.8 °C) 95 %      MAP       --                 Physical Exam  Nursing Notes and Vital Signs Reviewed.  Constitutional: Patient is in no acute distress. Well-developed and well-nourished.  Head: Atraumatic. Normocephalic.  Eyes: PERRL. EOM intact.  "Conjunctivae are not pale. No scleral icterus.  ENT: Mucous membranes are moist. Oropharynx is clear and symmetric.    Neck: Supple. Full ROM. No lymphadenopathy.  Cardiovascular: Regular rate. Regular rhythm. No murmurs, rubs, or gallops. Distal pulses are 2+ and symmetric.  Pulmonary/Chest: No respiratory distress. Clear to auscultation bilaterally. No wheezing or rales.  Abdominal: Soft and non-distended.  There is no tenderness.  No rebound, guarding, or rigidity. Good bowel sounds.  Genitourinary: No CVA tenderness  Musculoskeletal: Moves all extremities. No obvious deformities. No edema. No calf tenderness.  Skin: Warm and dry.  Neurological: Patient is alert and oriented to person, place and time. Pupils ERRL and EOM normal. Cranial nerves II-XII are intact. Strength is full bilaterally; it is equal and 5/5 in bilateral upper and lower extremities. There is no pronator drift of outstretched arms. Light touch sense is intact. Speech is clear and normal. No acute focal neurological deficits noted.  Psychiatric: Normal affect. Good eye contact. Appropriate in content.    ED Course    Procedures  ED Vital Signs:  Vitals:    06/14/22 0529 06/14/22 0745 06/14/22 0944 06/14/22 1014   BP: (!) 147/66 (!) 160/72 (!) 159/70 134/63   Pulse: (!) 55 60 (!) 56 (!) 50   Resp: 16 18 16 18   Temp: 98.6 °F (37 °C) 98.4 °F (36.9 °C)  97.7 °F (36.5 °C)   TempSrc: Oral Oral  Oral   SpO2: 96% 96% 97% 96%   Weight: 64.4 kg (142 lb)      Height: 5' 10" (1.778 m)       06/14/22 1100 06/14/22 1138 06/14/22 1246 06/14/22 1300   BP:  130/60 (!) 146/67    Pulse: (!) 52 (!) 51 61 (!) 59   Resp: 18 18 18    Temp:  97.9 °F (36.6 °C) 97.9 °F (36.6 °C)    TempSrc:  Oral Oral    SpO2: 97% 95% 97% 96%   Weight:       Height:        06/14/22 1500 06/14/22 1503 06/14/22 1700 06/14/22 1723   BP:  (!) 102/47  (!) 115/56   Pulse: (!) 50 (!) 50 (!) 52 (!) 51   Resp: 18 18 18 18   Temp:  97.9 °F (36.6 °C)  97.9 °F (36.6 °C)   TempSrc:  Oral  Oral "   SpO2:  95% 95% 95%   Weight:       Height:        06/14/22 1925 06/14/22 1935 06/14/22 2100   BP: (!) 106/56     Pulse: (!) 53 (!) 53 (!) 56   Resp: 18 18    Temp: 98 °F (36.7 °C)     TempSrc: Oral     SpO2: 95% 95%    Weight:      Height:          Abnormal Lab Results:  Labs Reviewed   CBC W/ AUTO DIFFERENTIAL - Abnormal; Notable for the following components:       Result Value    RBC 4.14 (*)     Hemoglobin 12.6 (*)     Hematocrit 37.6 (*)     Platelets 149 (*)     Gran % 73.5 (*)     Lymph % 16.6 (*)     All other components within normal limits   COMPREHENSIVE METABOLIC PANEL - Abnormal; Notable for the following components:    Glucose 132 (*)     All other components within normal limits   LIPID PANEL - Abnormal; Notable for the following components:    HDL 34 (*)     All other components within normal limits   COMPREHENSIVE METABOLIC PANEL - Abnormal; Notable for the following components:    Calcium 8.4 (*)     Albumin 3.2 (*)     ALT 7 (*)     Anion Gap 7 (*)     All other components within normal limits   PHOSPHORUS - Abnormal; Notable for the following components:    Phosphorus 2.6 (*)     All other components within normal limits   CBC W/ AUTO DIFFERENTIAL - Abnormal; Notable for the following components:    RBC 4.15 (*)     Hemoglobin 12.6 (*)     Hematocrit 37.4 (*)     Platelets 143 (*)     Lymph % 17.4 (*)     All other components within normal limits   CK-MB - Abnormal; Notable for the following components:    CPK <7 (*)     All other components within normal limits   POCT GLUCOSE - Abnormal; Notable for the following components:    POCT Glucose 127 (*)     All other components within normal limits   POCT GLUCOSE - Abnormal; Notable for the following components:    POCT Glucose 209 (*)     All other components within normal limits   POCT GLUCOSE - Abnormal; Notable for the following components:    POCT Glucose 136 (*)     All other components within normal limits   PROTIME-INR   TSH   SARS-COV-2 RNA  AMPLIFICATION, QUAL   MAGNESIUM   TROPONIN I   APTT   PROTIME-INR   POCT GLUCOSE MONITORING CONTINUOUS        All Lab Results:  Results for orders placed or performed during the hospital encounter of 06/13/22   CBC W/ AUTO DIFFERENTIAL   Result Value Ref Range    WBC 6.55 3.90 - 12.70 K/uL    RBC 4.14 (L) 4.60 - 6.20 M/uL    Hemoglobin 12.6 (L) 14.0 - 18.0 g/dL    Hematocrit 37.6 (L) 40.0 - 54.0 %    MCV 91 82 - 98 fL    MCH 30.4 27.0 - 31.0 pg    MCHC 33.5 32.0 - 36.0 g/dL    RDW 14.2 11.5 - 14.5 %    Platelets 149 (L) 150 - 450 K/uL    MPV 10.6 9.2 - 12.9 fL    Immature Granulocytes 0.2 0.0 - 0.5 %    Gran # (ANC) 4.8 1.8 - 7.7 K/uL    Immature Grans (Abs) 0.01 0.00 - 0.04 K/uL    Lymph # 1.1 1.0 - 4.8 K/uL    Mono # 0.5 0.3 - 1.0 K/uL    Eos # 0.1 0.0 - 0.5 K/uL    Baso # 0.04 0.00 - 0.20 K/uL    nRBC 0 0 /100 WBC    Gran % 73.5 (H) 38.0 - 73.0 %    Lymph % 16.6 (L) 18.0 - 48.0 %    Mono % 7.3 4.0 - 15.0 %    Eosinophil % 1.8 0.0 - 8.0 %    Basophil % 0.6 0.0 - 1.9 %    Differential Method Automated    Comprehensive metabolic panel   Result Value Ref Range    Sodium 140 136 - 145 mmol/L    Potassium 4.1 3.5 - 5.1 mmol/L    Chloride 108 95 - 110 mmol/L    CO2 24 23 - 29 mmol/L    Glucose 132 (H) 70 - 110 mg/dL    BUN 20 8 - 23 mg/dL    Creatinine 1.0 0.5 - 1.4 mg/dL    Calcium 8.7 8.7 - 10.5 mg/dL    Total Protein 6.1 6.0 - 8.4 g/dL    Albumin 3.5 3.5 - 5.2 g/dL    Total Bilirubin 0.3 0.1 - 1.0 mg/dL    Alkaline Phosphatase 61 55 - 135 U/L    AST 14 10 - 40 U/L    ALT 12 10 - 44 U/L    Anion Gap 8 8 - 16 mmol/L    eGFR if African American >60 >60 mL/min/1.73 m^2    eGFR if non African American >60 >60 mL/min/1.73 m^2   Protime-INR   Result Value Ref Range    Prothrombin Time 11.5 9.0 - 12.5 sec    INR 1.1 0.8 - 1.2   TSH   Result Value Ref Range    TSH 2.357 0.400 - 4.000 uIU/mL   LDL - Lipid Panel   Result Value Ref Range    Cholesterol 124 120 - 199 mg/dL    Triglycerides 104 30 - 150 mg/dL    HDL 34 (L) 40 - 75  mg/dL    LDL Cholesterol 69.2 63.0 - 159.0 mg/dL    HDL/Cholesterol Ratio 27.4 20.0 - 50.0 %    Total Cholesterol/HDL Ratio 3.6 2.0 - 5.0    Non-HDL Cholesterol 90 mg/dL   COVID-19 Rapid Screening   Result Value Ref Range    SARS-CoV-2 RNA, Amplification, Qual Negative Negative   Comprehensive metabolic panel   Result Value Ref Range    Sodium 141 136 - 145 mmol/L    Potassium 3.5 3.5 - 5.1 mmol/L    Chloride 110 95 - 110 mmol/L    CO2 24 23 - 29 mmol/L    Glucose 101 70 - 110 mg/dL    BUN 19 8 - 23 mg/dL    Creatinine 0.8 0.5 - 1.4 mg/dL    Calcium 8.4 (L) 8.7 - 10.5 mg/dL    Total Protein 6.0 6.0 - 8.4 g/dL    Albumin 3.2 (L) 3.5 - 5.2 g/dL    Total Bilirubin 0.4 0.1 - 1.0 mg/dL    Alkaline Phosphatase 58 55 - 135 U/L    AST 11 10 - 40 U/L    ALT 7 (L) 10 - 44 U/L    Anion Gap 7 (L) 8 - 16 mmol/L    eGFR if African American >60 >60 mL/min/1.73 m^2    eGFR if non African American >60 >60 mL/min/1.73 m^2   Magnesium   Result Value Ref Range    Magnesium 1.9 1.6 - 2.6 mg/dL   Phosphorus   Result Value Ref Range    Phosphorus 2.6 (L) 2.7 - 4.5 mg/dL   Hemoglobin A1c   Result Value Ref Range    Hemoglobin A1C 5.9 (H) 4.0 - 5.6 %    Estimated Avg Glucose 123 68 - 131 mg/dL   CBC auto differential   Result Value Ref Range    WBC 6.31 3.90 - 12.70 K/uL    RBC 4.15 (L) 4.60 - 6.20 M/uL    Hemoglobin 12.6 (L) 14.0 - 18.0 g/dL    Hematocrit 37.4 (L) 40.0 - 54.0 %    MCV 90 82 - 98 fL    MCH 30.4 27.0 - 31.0 pg    MCHC 33.7 32.0 - 36.0 g/dL    RDW 14.0 11.5 - 14.5 %    Platelets 143 (L) 150 - 450 K/uL    MPV 9.8 9.2 - 12.9 fL    Immature Granulocytes 0.3 0.0 - 0.5 %    Gran # (ANC) 4.5 1.8 - 7.7 K/uL    Immature Grans (Abs) 0.02 0.00 - 0.04 K/uL    Lymph # 1.1 1.0 - 4.8 K/uL    Mono # 0.5 0.3 - 1.0 K/uL    Eos # 0.1 0.0 - 0.5 K/uL    Baso # 0.03 0.00 - 0.20 K/uL    nRBC 0 0 /100 WBC    Gran % 72.0 38.0 - 73.0 %    Lymph % 17.4 (L) 18.0 - 48.0 %    Mono % 7.9 4.0 - 15.0 %    Eosinophil % 1.9 0.0 - 8.0 %    Basophil % 0.5 0.0 -  1.9 %    Differential Method Automated    Troponin I   Result Value Ref Range    Troponin I 0.011 0.000 - 0.026 ng/mL   CK-MB   Result Value Ref Range    CPK <7 (L) 20 - 200 U/L    CPK MB 1.4 0.1 - 6.5 ng/mL    MB % Unable to calculate 0.0 - 5.0 %   APTT   Result Value Ref Range    aPTT 27.9 21.0 - 32.0 sec   Protime-INR   Result Value Ref Range    Prothrombin Time 11.3 9.0 - 12.5 sec    INR 1.1 0.8 - 1.2   Echo   Result Value Ref Range    BSA 1.78 m2    TDI SEPTAL 0.07 m/s    LV LATERAL E/E' RATIO 10.33 m/s    LV SEPTAL E/E' RATIO 13.29 m/s    LA WIDTH 4.30 cm    IVC diameter 2.17 cm    Left Ventricular Outflow Tract Mean Velocity 0.68891836163 cm/s    Left Ventricular Outflow Tract Mean Gradient 1.21 mmHg    TDI LATERAL 0.09 m/s    LVIDd 4.08 3.5 - 6.0 cm    IVS 1.72 (A) 0.6 - 1.1 cm    Posterior Wall 1.79 (A) 0.6 - 1.1 cm    Ao root annulus 3.38 cm    LVIDs 3.08 2.1 - 4.0 cm    FS 25 28 - 44 %    LA volume 73.29 cm3    Sinus 3.71 cm    STJ 2.63 cm    Ascending aorta 3.51 cm    LV mass 307.88 g    LA size 3.70 cm    TAPSE 1.81 cm    Left Ventricle Relative Wall Thickness 0.88 cm    AV regurgitation pressure 1/2 time 969.140424532800995 ms    AV mean gradient 7 mmHg    AV valve area 2.78 cm2    AV Velocity Ratio 0.44     AV index (prosthetic) 0.48     MV valve area p 1/2 method 2.93 cm2    E/A ratio 1.19     Mean e' 0.08 m/s    E wave deceleration time 258.826702566391308 msec    IVRT 79.182251382402426 msec    LVOT diameter 2.72 cm    LVOT area 5.8 cm2    LVOT peak americo 0.83 m/s    LVOT peak VTI 22.10 cm    Ao peak americo 1.89 m/s    Ao VTI 46.1 cm    RVOT peak americo 0.64 m/s    RVOT peak VTI 19.0 cm    Mr max americo 5.35 m/s    LVOT stroke volume 128.35 cm3    AV peak gradient 14 mmHg    PV mean gradient 1.11 mmHg    E/E' ratio 11.63 m/s    MV Peak E Americo 0.93 m/s    AR Max Americo 3.02 m/s    TR Max Americo 3.16 m/s    MV stenosis pressure 1/2 time 75.628154484931362 ms    MV Peak A Americo 0.78 m/s    LV Systolic Volume 37.21 mL     LV Systolic Volume Index 20.7 mL/m2    LV Diastolic Volume 73.29 mL    LV Diastolic Volume Index 40.72 mL/m2    LA Volume Index 40.7 mL/m2    LV Mass Index 171 g/m2    RA Major Axis 5.20 cm    Left Atrium Minor Axis 5.35 cm    Left Atrium Major Axis 5.49 cm    Triscuspid Valve Regurgitation Peak Gradient 40 mmHg    RA Width 3.44 cm    EF 55 %   POCT glucose   Result Value Ref Range    POCT Glucose 127 (H) 70 - 110 mg/dL   POCT glucose   Result Value Ref Range    POCT Glucose 209 (H) 70 - 110 mg/dL   POCT glucose   Result Value Ref Range    POCT Glucose 136 (H) 70 - 110 mg/dL   POCT glucose   Result Value Ref Range    POCT Glucose 104 70 - 110 mg/dL         Imaging Results:  Imaging Results          MRI BRAIN WITHOUT CONTRAST (Final result)  Result time 06/14/22 11:41:08    Final result by Sunny Mims Jr., MD (06/14/22 11:41:08)                 Impression:      1. No acute infarction.  2. 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  3. Numerous chronic lacunar infarcts as described including within the basal ganglia, external capsules, and posterior limb of the left internal capsule.  There are also chronic cerebellar infarcts.  4. Mild patchy T2, nonspecific signal within the white matter likely relates to chronic microvascular ischemia.  5. Moderate generalized atrophy without hydrocephalus.      Electronically signed by: Sunny Mims Jr., MD  Date:    06/14/2022  Time:    11:41             Narrative:    EXAMINATION:  MRI BRAIN WITHOUT CONTRAST    CLINICAL HISTORY:  Transient ischemic attack (TIA);    TECHNIQUE:  Sagittal T1. Axial T1, T2, T2 FLAIR, GRE, DWI. Coronal T2 FLAIR.    COMPARISON:  CT of the head from 06/13/2022 was reviewed.    FINDINGS:  There is no restricted diffusion. There is chronic cortical and deep white matter infarction involving the right middle and inferior frontal gyri anteriorly with associated cortical T1 hyperintense laminar necrosis.   There are prominent perivascular spaces within the basal ganglia bilaterally.  Chronic lacunar infarcts within the bilateral external capsules.  Chronic lacunar infarct within the posterior limb of the left internal capsule.  Chronic lacunar infarcts within the bilateral thalami, right more extensive than left.  There are chronic infarcts within the peripheries of both cerebellar hemispheres.  Mild patchy, nonspecific T2 FLAIR hyperintensity within the periventricular white matter.  No acute intracranial hemorrhage.    Moderate generalized atrophy with prominent CSF spaces.  No signs of hydrocephalus.  Midline structures are normal. There are preserved arterial flow-voids on T2 weighted imaging. The paranasal sinuses and mastoid air cells are clear.    No abnormal marrow signal is identified.                               US Carotid Bilateral (Final result)  Result time 06/14/22 08:06:02    Final result by Rena Fox MD (06/14/22 08:06:02)                 Impression:      No evidence of a hemodynamically significant carotid bifurcation stenosis.  However noncalcified plaque is noted at the left carotid bulb which demonstrates motion with blood flow, which may predispose this patient to embolism.      Electronically signed by: Rena Fox  Date:    06/14/2022  Time:    08:06             Narrative:    EXAMINATION:  US CAROTID BILATERAL    CLINICAL HISTORY:  Transient cerebral ischemic attack, unspecified    TECHNIQUE:  Grayscale and color Doppler ultrasound examination of the carotid and vertebral artery systems bilaterally.  Stenosis estimates are per the NASCET measurement criteria.    COMPARISON:  Bilateral carotid ultrasound 06/17/2019    FINDINGS:  Right:    Internal Carotid Artery (ICA) peak systolic velocity 50 cm/sec    ICA/CCA peak systolic velocity ratio: 0.98    Plaque formation: Homogeneous    Vertebral artery: Antegrade flow and normal waveform.    Left:    Internal Carotid Artery (ICA)   peak systolic velocity 52 cm/sec    ICA/CCA peak systolic velocity ratio: 1.02    Plaque formation: Homogeneous.  Noncalcified plaque noted at the bulb demonstrates mild motion with blood flow.  No stenosis.    Vertebral artery: Antegrade flow and normal waveform.                               CT Head Without Contrast (Final result)  Result time 06/13/22 21:32:20    Final result by Rl Ronquillo MD (06/13/22 21:32:20)                 Impression:      No hemorrhage or acute major vascular distribution infarction.    Senescent changes as above.    ASPECTS score 10 of 10.    All CT scans at this facility are performed  using dose modulation techniques as appropriate to performed exam including the following:  automated exposure control; adjustment of mA and/or kV according to the patients size (this includes techniques or standardized protocols for targeted exams where dose is matched to indication/reason for exam: i.e. extremities or head);  iterative reconstruction technique.      Electronically signed by: Rl Ronquillo  Date:    06/13/2022  Time:    21:32             Narrative:    EXAMINATION:  CT HEAD WITHOUT CONTRAST    CLINICAL HISTORY:  Transient ischemic attack (TIA);    TECHNIQUE:  Low dose axial CT images obtained throughout the head without intravenous contrast. Sagittal and coronal reconstructions were performed.    COMPARISON:  None.    FINDINGS:  Intracranial compartment:    Ventricles and sulci are normal in size for age without evidence of hydrocephalus. No extra-axial blood or fluid collections.    Right frontal encephalomalacia.  Mild microvascular ischemic change.  Bilateral basal ganglia lacunar infarcts.  No parenchymal mass, hemorrhage, edema or major vascular distribution infarct.    Skull/extracranial contents (limited evaluation): No fracture. Mastoid air cells and paranasal sinuses are essentially clear.                                      The EKG was ordered, reviewed, and independently  interpreted by the ED provider.  Interpretation time: 21:25  Rate: 53 BPM  Rhythm: sinus tachycardia  Interpretation: Inferior infarct (cited on or before 14-SEP-2021). No STEMI.      The Emergency Provider reviewed the vital signs and test results, which are outlined above.    ED Discussion     10:56 PM: Discussed case with Fartun Cisneros NP ([facility]). Dr. Sanchez agrees with current care and management of pt and accepts admission.   Admitting Service: Hospital Medicine  Admitting Physician: Dr. Sanchez  Admit to: Obs    10:59 PM: Re-evaluated pt. I have discussed test results, shared treatment plan, and the need for admission with patient and family at bedside. Pt and family express understanding at this time and agree with all information. All questions answered. Pt and family have no further questions or concerns at this time. Pt is ready for admit.           ED Medication(s):  Medications   amLODIPine tablet 5 mg (5 mg Oral Given 6/14/22 1035)   aspirin EC tablet 81 mg (81 mg Oral Given 6/14/22 0935)   clopidogreL tablet 75 mg (75 mg Oral Given 6/14/22 0935)   EScitalopram oxalate tablet 10 mg (10 mg Oral Given 6/14/22 0936)   gabapentin capsule 100 mg (100 mg Oral Given 6/14/22 2130)   lisinopriL tablet 20 mg (20 mg Oral Given 6/14/22 0933)   nitroGLYCERIN SL tablet 0.4 mg (has no administration in time range)   pantoprazole EC tablet 40 mg (40 mg Oral Given 6/14/22 0934)   ranolazine 12 hr tablet 500 mg (500 mg Oral Given 6/14/22 2130)   sodium chloride 0.9% flush 10 mL (has no administration in time range)   sodium chloride 0.9% bolus 500 mL (500 mLs Intravenous New Bag 6/14/22 2131)   enoxaparin injection 40 mg (40 mg Subcutaneous Given 6/14/22 1723)   acetaminophen tablet 650 mg (has no administration in time range)   hydrALAZINE injection 10 mg (has no administration in time range)   ondansetron injection 4 mg (has no administration in time range)   metoclopramide HCl tablet 5 mg (has no  administration in time range)   bisacodyL suppository 10 mg (has no administration in time range)   glucose chewable tablet 16 g (has no administration in time range)   glucose chewable tablet 24 g (has no administration in time range)   dextrose 10% bolus 125 mL (has no administration in time range)   dextrose 10% bolus 250 mL (has no administration in time range)   glucagon (human recombinant) injection 1 mg (has no administration in time range)   insulin aspart U-100 pen 0-5 Units (has no administration in time range)   LORazepam tablet 0.5 mg (0.5 mg Oral Not Given 6/14/22 0300)   nicotine 21 mg/24 hr 1 patch (1 patch Transdermal Not Given 6/14/22 0300)   isosorbide mononitrate 24 hr tablet 120 mg (120 mg Oral Given 6/14/22 1034)   atorvastatin tablet 80 mg (has no administration in time range)   potassium phosphate (monobasic) tablet 1,000 mg (1,000 mg Oral Given 6/14/22 1244)     Current Discharge Medication List               Medical Decision Making    Medical Decision Making:   Clinical Tests:   Lab Tests: Ordered and Reviewed  Radiological Study: Ordered and Reviewed  Medical Tests: Ordered and Reviewed           Scribe Attestation:   Scribe #1: I performed the above scribed service and the documentation accurately describes the services I performed. I attest to the accuracy of the note.    Attending:   Physician Attestation Statement for Scribe #1: I, Merary Keen MD, personally performed the services described in this documentation, as scribed by Babita Anderson, in my presence, and it is both accurate and complete.          Clinical Impression       ICD-10-CM ICD-9-CM   1. TIA (transient ischemic attack)  G45.9 435.9   2. Stroke  I63.9 434.91       Disposition:   Disposition: Placed in Observation  Condition: Fair         Merary Keen MD  06/14/22 3668

## 2022-06-15 VITALS
HEIGHT: 70 IN | TEMPERATURE: 98 F | SYSTOLIC BLOOD PRESSURE: 123 MMHG | DIASTOLIC BLOOD PRESSURE: 67 MMHG | HEART RATE: 52 BPM | OXYGEN SATURATION: 99 % | BODY MASS INDEX: 20.33 KG/M2 | RESPIRATION RATE: 20 BRPM | WEIGHT: 142 LBS

## 2022-06-15 LAB
ALBUMIN SERPL BCP-MCNC: 3.3 G/DL (ref 3.5–5.2)
ALP SERPL-CCNC: 62 U/L (ref 55–135)
ALT SERPL W/O P-5'-P-CCNC: 8 U/L (ref 10–44)
ANION GAP SERPL CALC-SCNC: 8 MMOL/L (ref 8–16)
AST SERPL-CCNC: 13 U/L (ref 10–40)
BASOPHILS # BLD AUTO: 0.04 K/UL (ref 0–0.2)
BASOPHILS NFR BLD: 0.7 % (ref 0–1.9)
BILIRUB SERPL-MCNC: 0.5 MG/DL (ref 0.1–1)
BUN SERPL-MCNC: 19 MG/DL (ref 8–23)
CALCIUM SERPL-MCNC: 8.6 MG/DL (ref 8.7–10.5)
CHLORIDE SERPL-SCNC: 108 MMOL/L (ref 95–110)
CO2 SERPL-SCNC: 22 MMOL/L (ref 23–29)
CREAT SERPL-MCNC: 1 MG/DL (ref 0.5–1.4)
DIFFERENTIAL METHOD: ABNORMAL
EOSINOPHIL # BLD AUTO: 0.1 K/UL (ref 0–0.5)
EOSINOPHIL NFR BLD: 2 % (ref 0–8)
ERYTHROCYTE [DISTWIDTH] IN BLOOD BY AUTOMATED COUNT: 14 % (ref 11.5–14.5)
EST. GFR  (AFRICAN AMERICAN): >60 ML/MIN/1.73 M^2
EST. GFR  (NON AFRICAN AMERICAN): >60 ML/MIN/1.73 M^2
GLUCOSE SERPL-MCNC: 115 MG/DL (ref 70–110)
HCT VFR BLD AUTO: 37.6 % (ref 40–54)
HGB BLD-MCNC: 12.4 G/DL (ref 14–18)
IMM GRANULOCYTES # BLD AUTO: 0.02 K/UL (ref 0–0.04)
IMM GRANULOCYTES NFR BLD AUTO: 0.4 % (ref 0–0.5)
LYMPHOCYTES # BLD AUTO: 1.1 K/UL (ref 1–4.8)
LYMPHOCYTES NFR BLD: 20.6 % (ref 18–48)
MCH RBC QN AUTO: 30.3 PG (ref 27–31)
MCHC RBC AUTO-ENTMCNC: 33 G/DL (ref 32–36)
MCV RBC AUTO: 92 FL (ref 82–98)
MONOCYTES # BLD AUTO: 0.4 K/UL (ref 0.3–1)
MONOCYTES NFR BLD: 8 % (ref 4–15)
NEUTROPHILS # BLD AUTO: 3.7 K/UL (ref 1.8–7.7)
NEUTROPHILS NFR BLD: 68.3 % (ref 38–73)
NRBC BLD-RTO: 0 /100 WBC
PLATELET # BLD AUTO: 157 K/UL (ref 150–450)
PMV BLD AUTO: 11 FL (ref 9.2–12.9)
POTASSIUM SERPL-SCNC: 4 MMOL/L (ref 3.5–5.1)
PROT SERPL-MCNC: 6.1 G/DL (ref 6–8.4)
RBC # BLD AUTO: 4.09 M/UL (ref 4.6–6.2)
SODIUM SERPL-SCNC: 138 MMOL/L (ref 136–145)
WBC # BLD AUTO: 5.48 K/UL (ref 3.9–12.7)

## 2022-06-15 PROCEDURE — 36415 COLL VENOUS BLD VENIPUNCTURE: CPT | Performed by: NURSE PRACTITIONER

## 2022-06-15 PROCEDURE — 93010 EKG 12-LEAD: ICD-10-PCS | Mod: ,,, | Performed by: INTERNAL MEDICINE

## 2022-06-15 PROCEDURE — G0378 HOSPITAL OBSERVATION PER HR: HCPCS

## 2022-06-15 PROCEDURE — 93010 ELECTROCARDIOGRAM REPORT: CPT | Mod: ,,, | Performed by: INTERNAL MEDICINE

## 2022-06-15 PROCEDURE — 93005 ELECTROCARDIOGRAM TRACING: CPT

## 2022-06-15 PROCEDURE — 25000003 PHARM REV CODE 250: Performed by: NURSE PRACTITIONER

## 2022-06-15 PROCEDURE — 94760 N-INVAS EAR/PLS OXIMETRY 1: CPT

## 2022-06-15 PROCEDURE — 25000003 PHARM REV CODE 250: Performed by: STUDENT IN AN ORGANIZED HEALTH CARE EDUCATION/TRAINING PROGRAM

## 2022-06-15 PROCEDURE — 85025 COMPLETE CBC W/AUTO DIFF WBC: CPT | Performed by: NURSE PRACTITIONER

## 2022-06-15 PROCEDURE — 80053 COMPREHEN METABOLIC PANEL: CPT | Performed by: NURSE PRACTITIONER

## 2022-06-15 RX ADMIN — ATORVASTATIN CALCIUM 80 MG: 40 TABLET, FILM COATED ORAL at 08:06

## 2022-06-15 RX ADMIN — ASPIRIN 81 MG: 81 TABLET, COATED ORAL at 08:06

## 2022-06-15 RX ADMIN — CLOPIDOGREL 75 MG: 75 TABLET, FILM COATED ORAL at 08:06

## 2022-06-15 RX ADMIN — ISOSORBIDE MONONITRATE 120 MG: 30 TABLET, EXTENDED RELEASE ORAL at 08:06

## 2022-06-15 RX ADMIN — AMLODIPINE BESYLATE 5 MG: 5 TABLET ORAL at 08:06

## 2022-06-15 RX ADMIN — LISINOPRIL 20 MG: 20 TABLET ORAL at 08:06

## 2022-06-15 RX ADMIN — ESCITALOPRAM 10 MG: 5 TABLET, FILM COATED ORAL at 08:06

## 2022-06-15 RX ADMIN — RANOLAZINE 500 MG: 500 TABLET, EXTENDED RELEASE ORAL at 08:06

## 2022-06-15 RX ADMIN — PANTOPRAZOLE SODIUM 40 MG: 40 TABLET, DELAYED RELEASE ORAL at 08:06

## 2022-06-15 NOTE — ASSESSMENT & PLAN NOTE
- Stable, no angina.   - Continue medical management.     06/15/2022 resume home meds, f/u with cards OP

## 2022-06-15 NOTE — NURSING
Pt admitted for TIA, bradycardic during night shift. Lowest hr 32, pt asymptomatic, no distress. Pt current hr 45. Provider notified.

## 2022-06-15 NOTE — ASSESSMENT & PLAN NOTE
- Place in Observation on Telemetry to r/o acute CVA.  NIHSS = 0.  - CT of the head showed no acute abnormality.  - Will obtain MRI of the brain and carotid US.  - 2D echo.  - Check FLP and HbA1c.  - Neuro checks.  - Continue ASA, Plavix, and high intensity statin.  - PT/OT/ST consult to eval and treat.  - Neurology consult.     06/15/2022  Chronic on MRI  Continue ASA, plavix, continue statin  PT/OT- at baseline level of function  OP f/u with neurology  Stressed Smoking cessation

## 2022-06-15 NOTE — PLAN OF CARE
[facility]  Discharge Final Note    Primary Care Provider: Holger Thornton MD    Expected Discharge Date: 6/15/2022    Final Discharge Note (most recent)     Final Note - 06/14/22 1309        Final Note    Assessment Type Discharge Planning Assessment        Post-Acute Status    Coverage [employer]                   Contact Info     Abner Neville MD   Specialty: Neurology    [address]  [place] [address]   Phone: [phone]       Next Steps: Schedule an appointment as soon as possible for a visit    Robe Gilbert MD   Specialty: Interventional Cardiology, Cardiology    [address]  [place] [address]   Phone: [phone]       Next Steps: Schedule an appointment as soon as possible for a visit    Lee Mccarthy MD   Specialty: Vascular Surgery    [address]  [address] [place] [address]   Phone: [phone]       Next Steps: Schedule an appointment as soon as possible for a visit        Jun16 Health Assessment with Graciela Gates NP  Thursday Jun 16, 2022 10:00 AM  [facility] is committed to providing you with the care you need. One way we do this is through a free, personalized Annual Wellness Visit. As a reminder, an AWV determines your current overall health status, identifies possible chronic disease like heart disease, diabetes, obesity or arthritis and your potential risk, and supports development of a plan to maintain and improve your health. During your visit, you will be seen by a Nurse Practioner who will provide your primary care physician with more detailed information than your regular annual physical.  1. Arrive at check-in approximately 15 minutes before your scheduled appointment time.  2. Bring insurance cards and all outside medical records and imaging, along with a list of your current medications.  Please park in surface lot and check in at main registration. [facility]  [address] [facility] [address] [facility]  [address]  [phone]           Your care is important to us. If your provider recommended a follow-up appointment or test, we are happy to help you coordinate your recommended care. It is important that you complete your recommended follow-up.  If you need help scheduling, please call [phone]. Appointments can also be made online through the patient portal.      While scheduling and attending your appointments is your responsibility, our goal is to support and empower you throughout that process.     Pt to DC home with no CM DC needs.    Cash Prakash LMSW 6/15/2022 9:51 AM

## 2022-06-15 NOTE — ASSESSMENT & PLAN NOTE
- Assistance with smoking cessation was offered, including:  [x]  Medications  [x]  Counseling  [x]  Printed Information on Smoking Cessation  []  Referral to a Smoking Cessation Program  - Patient was counseled regarding smoking for 3-10 minutes.  06/15/2022 counseled on cessation again today for 3-10 minutes, patient has no urge to quit

## 2022-06-15 NOTE — DISCHARGE SUMMARY
[facility]  Hospital Medicine  Discharge Summary      Patient Name: Aquiles Kelsey  MRN: [id number]  Patient Class: OP- Observation  Admission Date: 6/13/2022  Hospital Length of Stay: 0 days  Discharge Date and Time: No discharge date for patient encounter.  Attending Physician: Kyle Dejesus MD   Discharging Provider: Meera Jordan NP  Primary Care Provider: Holger Thornton MD      HPI:   Aquiles Kelsey is an 81 y.o. male with a PMHx of AAA s/p repair, CAD, DM II, HLD, HTN, PAD, and tobacco use who presented to the ED with c/o transient left-sided facial droop that started around 20:30 tonight. No aggravating or alleviating factors. Associated speech difficulty and generalized weakness, and confusion. Patient reports episode lasted approximately 15 minutes. Denies any HA, visual disturbance, hemiparesis, numbness/tingling, dysarthria, lightheadedness, dizziness, syncope, CP, palpitations, SOB, ABD pain, N/V/D, fever or chills. In the ED, patient had NIH stroke scale of 0. Initial work-up was benign. CT of head showed no acute abnormality, right frontal encephalomalacia, mild microvascular ischemic change, and bilateral basal ganglia lacunar infarcts. Vascular Neurology was consulted via Tele Stroke in the ED and deemed patient not a candidate for tPA due to resolution of symptoms, and recommended admission for full CVA work-up. Hospital Medicine was contacted for admission and patient placed in Observation.  Patient is a Full Code.       * No surgery found *      Hospital Course:   Aquiles Kelsey was placed into observation for stroke work up. MRI braid with chronic lacunar infarcts, chronic cerebellar infarcts, and generalized atrophy. Carotid US with carotic bulb plaque- recommending OP f/u with vascular. Patient is without symptoms at this time and back to previous level of functioning. Seen at bedside with wife present. Neurology consulted. Recommending dual  antiplatelets for 30 days. Discussed the need for smoking cessation although patient has to will to quit. Discussed need to f/u with PCP and neurology OP. Patient was incidentally given 240 mg isosorbide XR by nursing staff today. SBP dropped into the low 100's and HR 50. Continue cardiac monitoring and observation overnight and will plan to discharge in the morning.  06/15/2022  Patient was observed overnight. Patient did have some asymptomatic bradycardia overnight with HR in the 30's which has resolved at this time. This is likely attributed to the double dose of Imdur. Patient is aaox3 and in NAD. He is stable and appropriate for discharge. Discussed need for f/u with PCP, Cardiology, neuro, and vascular surgery with patient and his spouse. Will continue ASA, plavix, and statin.       Goals of Care Treatment Preferences:  Code Status: Full Code      Consults:   Consults (From admission, onward)        Status Ordering Provider     Inpatient consult to Social Work  Once        Provider:  (Not yet assigned)    Completed FREDERIC LAL     Inpatient consult to Neurology  Once        Provider:  Abner Neville MD    Completed MICHAEL COBB     Inpatient consult to Registered Dietitian/Nutritionist  Once        Provider:  (Not yet assigned)    Acknowledged MICHAEL COBB     IP consult to case management/social work  Once        Provider:  (Not yet assigned)    Completed MICHAEL COBB     Consult to Telemedicine - Acute Stroke  Once        Provider:  (Not yet assigned)    Acknowledged MICHAEL COBB.          * TIA (transient ischemic attack)  - Place in Observation on Telemetry to r/o acute CVA.  NIHSS = 0.  - CT of the head showed no acute abnormality.  - Will obtain MRI of the brain and carotid US.  - 2D echo.  - Check FLP and HbA1c.  - Neuro checks.  - Continue ASA, Plavix, and high intensity statin.  - PT/OT/ST consult to eval and treat.  - Neurology consult.     06/15/2022  Chronic on  MRI  Continue ASA, plavix, continue statin  PT/OT- at baseline level of function  OP f/u with neurology  Stressed Smoking cessation    Tobacco abuse  - Assistance with smoking cessation was offered, including:  [x]  Medications  [x]  Counseling  [x]  Printed Information on Smoking Cessation  []  Referral to a Smoking Cessation Program  - Patient was counseled regarding smoking for 3-10 minutes.  06/15/2022 counseled on cessation again today for 3-10 minutes, patient has no urge to quit    Type 2 diabetes mellitus, without long-term current use of insulin  - Hold metformin during hospital stay.   - Accuchecks with low dose SSI.  - Diabetic diet.  Hemoglobin A1C   Date Value Ref Range Status   06/14/2022 5.9 (H) 4.0 - 5.6 % Final     Comment:     ADA Screening Guidelines:  5.7-6.4%  Consistent with prediabetes  >or=6.5%  Consistent with diabetes    High levels of fetal hemoglobin interfere with the HbA1C  assay. Heterozygous hemoglobin variants (HbS, HgC, etc)do  not significantly interfere with this assay.   However, presence of multiple variants may affect accuracy.     06/15/2022- resume home meds and f/u with PCP OP    Dyslipidemia  - Continue statin.     Essential hypertension  - Continue home antihypertensives.     06/15/2022 f/u with PCP and cards OP    PAD (peripheral artery disease)  - Continue ASA, Plavix, and statin.     06/15/2022 resume home meds, f/u with cards OP      CAD (coronary artery disease)  - Stable, no angina.   - Continue medical management.     06/15/2022 resume home meds, f/u with cards OP      Final Active Diagnoses:    Diagnosis Date Noted POA    PRINCIPAL PROBLEM:  TIA (transient ischemic attack) [G45.9] 06/13/2022 Yes    Tobacco abuse [Z72.0] 10/11/2019 Yes     Chronic    Type 2 diabetes mellitus, without long-term current use of insulin [E11.9] 06/12/2019 Yes     Chronic    CAD (coronary artery disease) [I25.10] 06/12/2019 Yes     Chronic    Essential hypertension [I10] 06/12/2019  Yes     Chronic    Dyslipidemia [E78.5] 06/12/2019 Yes     Chronic    PAD (peripheral artery disease) [I73.9] 06/12/2019 Yes     Chronic      Problems Resolved During this Admission:       Discharged Condition: good    Disposition: Home or Self Care    Follow Up:   Follow-up Information     Abner Neville MD. Schedule an appointment as soon as possible for a visit.    Specialty: Neurology  Contact information:  [address]  [place] [address]  [phone]             Robe Gilbert MD. Schedule an appointment as soon as possible for a visit.    Specialties: Interventional Cardiology, Cardiology  Contact information:  [address]  [place] [address]  [phone]             Lee Mccarthy MD. Schedule an appointment as soon as possible for a visit.    Specialty: Vascular Surgery  Contact information:  [address]  [address]  [facility] [address]  [phone]                       Patient Instructions:      Diet Cardiac   Standing Status:    Order Comments: See Stroke Patient Education Guide Booklet for details.     Call 911 for any of the following:   Standing Status:    Order Comments: Call 911  right away if any of the following warning signs come on suddenly, even if the symptoms only last for a few minutes. With stroke, timing is very important.   - Warning Signs of Stroke:  - Weakness: You may feel a sudden weakness, tingling or loss of feeling on one side of your face or body.  - Vision Problems: You may have sudden double vision or trouble seeing in one or both eyes.  - Speech Problems: You may have sudden trouble talking, slured speech, or problems understanding others.  - Headache: You may have sudden, severe headache.  - Movement Problems: You may experience dizziness, a feeling of spinning, a loss of balance, a feeling of falling or blackouts.       Significant Diagnostic Studies: Labs:   CMP   Recent Labs   Lab 06/13/22  2145 06/14/22  0524 06/15/22  0459    141 138   K  4.1 3.5 4.0    110 108   CO2 24 24 22*   * 101 115*   BUN 20 19 19   CREATININE 1.0 0.8 1.0   CALCIUM 8.7 8.4* 8.6*   PROT 6.1 6.0 6.1   ALBUMIN 3.5 3.2* 3.3*   BILITOT 0.3 0.4 0.5   ALKPHOS 61 58 62   AST 14 11 13   ALT 12 7* 8*   ANIONGAP 8 7* 8   ESTGFRAFRICA >60 >60 >60   EGFRNONAA >60 >60 >60    and CBC   Recent Labs   Lab 06/13/22  2145 06/14/22  0524 06/15/22  0459   WBC 6.55 6.31 5.48   HGB 12.6* 12.6* 12.4*   HCT 37.6* 37.4* 37.6*   * 143* 157       Pending Diagnostic Studies:     None         Medications:  Reconciled Home Medications:      Medication List      CONTINUE taking these medications    amLODIPine 5 MG tablet  Commonly known as: NORVASC  TAKE 1 TABLET BY MOUTH EVERY DAY     aspirin 81 MG EC tablet  Commonly known as: ECOTRIN  Take 1 tablet (81 mg total) by mouth once daily.     baclofen 10 MG tablet  Commonly known as: LIORESAL  Take 1 tablet (10 mg total) by mouth every evening.     clopidogreL 75 mg tablet  Commonly known as: PLAVIX  TAKE 1 TABLET BY MOUTH EVERY DAY     EScitalopram oxalate 10 MG tablet  Commonly known as: LEXAPRO  TAKE 1 TABLET BY MOUTH EVERY DAY     gabapentin 100 MG capsule  Commonly known as: NEURONTIN  Take 1 capsule (100 mg total) by mouth every evening.     isosorbide mononitrate 120 MG 24 hr tablet  Commonly known as: IMDUR  TAKE 1 TABLET (120 MG TOTAL) BY MOUTH ONCE DAILY.     lisinopriL 10 MG tablet  Take 2 tablets (20 mg total) by mouth once daily.     metFORMIN 1000 MG tablet  Commonly known as: GLUCOPHAGE  EARLINE 1 TABLETA VIA ORAL 2 VECES AL NORAH CON COMIDA 90     MYRBETRIQ 50 mg Tb24  Generic drug: mirabegron  Take 1 tablet (50 mg total) by mouth once daily.     neomycin-polymyxin-hydrocortisone 3.5-10,000-1 mg/mL-unit/mL-% otic suspension  Commonly known as: CORTISPORIN     nitroGLYCERIN 0.4 MG SL tablet  Commonly known as: NITROSTAT  Place 1 tablet (0.4 mg total) under the tongue every 5 (five) minutes as needed for Chest pain.      pantoprazole 40 MG tablet  Commonly known as: PROTONIX  Take 1 tablet (40 mg total) by mouth once daily.     ranolazine 500 MG Tb12  Commonly known as: RANEXA  Take 1 tablet (500 mg total) by mouth 2 (two) times daily.     simvastatin 20 MG tablet  Commonly known as: ZOCOR  TAKE 1 TABLET BY MOUTH EVERY DAY IN THE EVENING            Indwelling Lines/Drains at time of discharge:   Lines/Drains/Airways     None                 Time spent on the discharge of patient: >35 minutes         Meera Jordan NP  Department of Hospital Medicine  [facility]

## 2022-06-15 NOTE — ASSESSMENT & PLAN NOTE
- Hold metformin during hospital stay.   - Accuchecks with low dose SSI.  - Diabetic diet.  Hemoglobin A1C   Date Value Ref Range Status   06/14/2022 5.9 (H) 4.0 - 5.6 % Final     Comment:     ADA Screening Guidelines:  5.7-6.4%  Consistent with prediabetes  >or=6.5%  Consistent with diabetes    High levels of fetal hemoglobin interfere with the HbA1C  assay. Heterozygous hemoglobin variants (HbS, HgC, etc)do  not significantly interfere with this assay.   However, presence of multiple variants may affect accuracy.     06/15/2022- resume home meds and f/u with PCP OP

## 2022-06-15 NOTE — PLAN OF CARE
Problem: Fall Injury Risk  Goal: Absence of Fall and Fall-Related Injury  Intervention: Identify and Manage Contributors  Flowsheets (Taken 6/15/2022 0036)  Self-Care Promotion: safe use of adaptive equipment encouraged  Medication Review/Management: medications reviewed  Intervention: Promote Injury-Free Environment  Flowsheets (Taken 6/15/2022 0036)  Safety Promotion/Fall Prevention:   assistive device/personal item within reach   bed alarm set   Fall Risk reviewed with patient/family   medications reviewed   nonskid shoes/socks when out of bed   instructed to call staff for mobility     Problem: Infection  Goal: Absence of Infection Signs and Symptoms  Intervention: Prevent or Manage Infection  Flowsheets (Taken 6/15/2022 0036)  Fever Reduction/Comfort Measures: lightweight clothing  Infection Management: aseptic technique maintained     Problem: Diabetes Comorbidity  Goal: Blood Glucose Level Within Targeted Range  Intervention: Monitor and Manage Glycemia  Flowsheets (Taken 6/15/2022 0036)  Glycemic Management:   blood glucose monitored   oral hydration promoted

## 2022-06-16 ENCOUNTER — OFFICE VISIT (OUTPATIENT)
Dept: FAMILY MEDICINE | Facility: CLINIC | Age: 82
End: 2022-06-16
Payer: MEDICARE

## 2022-06-16 VITALS
DIASTOLIC BLOOD PRESSURE: 64 MMHG | SYSTOLIC BLOOD PRESSURE: 120 MMHG | OXYGEN SATURATION: 98 % | HEIGHT: 70 IN | WEIGHT: 140.63 LBS | RESPIRATION RATE: 18 BRPM | TEMPERATURE: 97 F | BODY MASS INDEX: 20.13 KG/M2 | HEART RATE: 54 BPM

## 2022-06-16 DIAGNOSIS — G45.9 TIA (TRANSIENT ISCHEMIC ATTACK): ICD-10-CM

## 2022-06-16 DIAGNOSIS — I10 ESSENTIAL HYPERTENSION: Chronic | ICD-10-CM

## 2022-06-16 DIAGNOSIS — Z00.00 ENCOUNTER FOR PREVENTIVE HEALTH EXAMINATION: Primary | ICD-10-CM

## 2022-06-16 DIAGNOSIS — I25.118 CORONARY ARTERY DISEASE OF NATIVE ARTERY OF NATIVE HEART WITH STABLE ANGINA PECTORIS: Chronic | ICD-10-CM

## 2022-06-16 DIAGNOSIS — R39.15 URINARY URGENCY: ICD-10-CM

## 2022-06-16 DIAGNOSIS — Z86.79 S/P AAA REPAIR: ICD-10-CM

## 2022-06-16 DIAGNOSIS — E78.5 DYSLIPIDEMIA: Chronic | ICD-10-CM

## 2022-06-16 DIAGNOSIS — I20.9 AP (ANGINA PECTORIS): ICD-10-CM

## 2022-06-16 DIAGNOSIS — Z72.0 TOBACCO ABUSE: Chronic | ICD-10-CM

## 2022-06-16 DIAGNOSIS — F32.A MILD DEPRESSION: ICD-10-CM

## 2022-06-16 DIAGNOSIS — I51.7 LVH (LEFT VENTRICULAR HYPERTROPHY): ICD-10-CM

## 2022-06-16 DIAGNOSIS — E11.9 TYPE 2 DIABETES MELLITUS WITHOUT COMPLICATION, WITHOUT LONG-TERM CURRENT USE OF INSULIN: Chronic | ICD-10-CM

## 2022-06-16 DIAGNOSIS — Z98.890 S/P AAA REPAIR: ICD-10-CM

## 2022-06-16 DIAGNOSIS — I73.9 PAD (PERIPHERAL ARTERY DISEASE): Chronic | ICD-10-CM

## 2022-06-16 DIAGNOSIS — H91.93 DECREASED HEARING OF BOTH EARS: ICD-10-CM

## 2022-06-16 PROBLEM — I25.10 CAD (CORONARY ARTERY DISEASE): Chronic | Status: RESOLVED | Noted: 2019-06-12 | Resolved: 2022-06-16

## 2022-06-16 PROCEDURE — 1160F PR REVIEW ALL MEDS BY PRESCRIBER/CLIN PHARMACIST DOCUMENTED: ICD-10-PCS | Mod: CPTII,S$GLB,, | Performed by: NURSE PRACTITIONER

## 2022-06-16 PROCEDURE — 3074F SYST BP LT 130 MM HG: CPT | Mod: CPTII,S$GLB,, | Performed by: NURSE PRACTITIONER

## 2022-06-16 PROCEDURE — 1170F FXNL STATUS ASSESSED: CPT | Mod: CPTII,S$GLB,, | Performed by: NURSE PRACTITIONER

## 2022-06-16 PROCEDURE — 1126F PR PAIN SEVERITY QUANTIFIED, NO PAIN PRESENT: ICD-10-PCS | Mod: CPTII,S$GLB,, | Performed by: NURSE PRACTITIONER

## 2022-06-16 PROCEDURE — 3288F FALL RISK ASSESSMENT DOCD: CPT | Mod: CPTII,S$GLB,, | Performed by: NURSE PRACTITIONER

## 2022-06-16 PROCEDURE — 99999 PR PBB SHADOW E&M-EST. PATIENT-LVL IV: CPT | Mod: PBBFAC,,, | Performed by: NURSE PRACTITIONER

## 2022-06-16 PROCEDURE — 99999 PR PBB SHADOW E&M-EST. PATIENT-LVL IV: ICD-10-PCS | Mod: PBBFAC,,, | Performed by: NURSE PRACTITIONER

## 2022-06-16 PROCEDURE — G0439 PR MEDICARE ANNUAL WELLNESS SUBSEQUENT VISIT: ICD-10-PCS | Mod: S$GLB,,, | Performed by: NURSE PRACTITIONER

## 2022-06-16 PROCEDURE — 3288F PR FALLS RISK ASSESSMENT DOCUMENTED: ICD-10-PCS | Mod: CPTII,S$GLB,, | Performed by: NURSE PRACTITIONER

## 2022-06-16 PROCEDURE — 3078F DIAST BP <80 MM HG: CPT | Mod: CPTII,S$GLB,, | Performed by: NURSE PRACTITIONER

## 2022-06-16 PROCEDURE — 3078F PR MOST RECENT DIASTOLIC BLOOD PRESSURE < 80 MM HG: ICD-10-PCS | Mod: CPTII,S$GLB,, | Performed by: NURSE PRACTITIONER

## 2022-06-16 PROCEDURE — 1100F PTFALLS ASSESS-DOCD GE2>/YR: CPT | Mod: CPTII,S$GLB,, | Performed by: NURSE PRACTITIONER

## 2022-06-16 PROCEDURE — 1126F AMNT PAIN NOTED NONE PRSNT: CPT | Mod: CPTII,S$GLB,, | Performed by: NURSE PRACTITIONER

## 2022-06-16 PROCEDURE — 1159F MED LIST DOCD IN RCRD: CPT | Mod: CPTII,S$GLB,, | Performed by: NURSE PRACTITIONER

## 2022-06-16 PROCEDURE — G0439 PPPS, SUBSEQ VISIT: HCPCS | Mod: S$GLB,,, | Performed by: NURSE PRACTITIONER

## 2022-06-16 PROCEDURE — 1159F PR MEDICATION LIST DOCUMENTED IN MEDICAL RECORD: ICD-10-PCS | Mod: CPTII,S$GLB,, | Performed by: NURSE PRACTITIONER

## 2022-06-16 PROCEDURE — 1170F PR FUNCTIONAL STATUS ASSESSED: ICD-10-PCS | Mod: CPTII,S$GLB,, | Performed by: NURSE PRACTITIONER

## 2022-06-16 PROCEDURE — 3074F PR MOST RECENT SYSTOLIC BLOOD PRESSURE < 130 MM HG: ICD-10-PCS | Mod: CPTII,S$GLB,, | Performed by: NURSE PRACTITIONER

## 2022-06-16 PROCEDURE — 1160F RVW MEDS BY RX/DR IN RCRD: CPT | Mod: CPTII,S$GLB,, | Performed by: NURSE PRACTITIONER

## 2022-06-16 PROCEDURE — 1100F PR PT FALLS ASSESS DOC 2+ FALLS/FALL W/INJURY/YR: ICD-10-PCS | Mod: CPTII,S$GLB,, | Performed by: NURSE PRACTITIONER

## 2022-06-16 RX ORDER — MIRABEGRON 50 MG/1
50 TABLET, FILM COATED, EXTENDED RELEASE ORAL DAILY
Qty: 90 TABLET | Refills: 0 | Status: SHIPPED | OUTPATIENT
Start: 2022-06-16 | End: 2022-09-14

## 2022-06-16 NOTE — PROGRESS NOTES
"  Aquiles Kelsey presented for a  Medicare AWV and comprehensive Health Risk Assessment today. The following components were reviewed and updated:  Patient accompanied by WIFE- INTREPTER  , who was present throughout the visit and aided in information gathering.        · Medical history  · Family History  · Social history  · Allergies and Current Medications  · Health Risk Assessment  · Health Maintenance  · Care Team         ** See Completed Assessments for Annual Wellness Visit within the encounter summary.**         The following assessments were completed:  · Living Situation  · CAGE  · Depression Screening  · Timed Get Up and Go  · Whisper Test  · Cognitive Function Screening  · Nutrition Screening  · ADL Screening  · PAQ Screening        Vitals:    06/16/22 0948   BP: 120/64   Pulse: (!) 54   Resp: 18   Temp: 97 °F (36.1 °C)   SpO2: 98%   Weight: 63.8 kg (140 lb 10.5 oz)   Height: 5' 10" (1.778 m)     Body mass index is 20.18 kg/m².  Physical Exam  Vitals and nursing note reviewed.   Constitutional:       General: He is not in acute distress.     Appearance: He is well-developed.   HENT:      Head: Normocephalic and atraumatic.      Comments: Hearing impaired  Eyes:      Pupils: Pupils are equal, round, and reactive to light.   Neck:      Vascular: No carotid bruit.   Cardiovascular:      Rate and Rhythm: Normal rate and regular rhythm.      Pulses: Normal pulses.      Heart sounds: Normal heart sounds. No murmur heard.    No gallop.   Pulmonary:      Effort: Pulmonary effort is normal.      Breath sounds: Normal breath sounds.   Abdominal:      General: Bowel sounds are normal. There is no distension.      Palpations: Abdomen is soft.      Tenderness: There is no abdominal tenderness.   Musculoskeletal:         General: Normal range of motion.   Skin:     General: Skin is warm and dry.   Neurological:      Motor: Weakness present. No abnormal muscle tone.      Gait: Gait abnormal.   Psychiatric:        "  Mood and Affect: Affect is flat.         Speech: Speech normal.         Behavior: Behavior is slowed.         Thought Content: Thought content normal.         Judgment: Judgment normal.             Current Outpatient Medications   Medication Instructions    amlodipine (NORVASC) 5 MG tablet TAKE 1 TABLET BY MOUTH EVERY DAY    aspirin (ECOTRIN) 81 mg, Oral, Daily    baclofen (LIORESAL) 10 mg, Oral, Nightly    clopidogrel (PLAVIX) 75 mg tablet TAKE 1 TABLET BY MOUTH EVERY DAY    ES citalopram oxalate (LEXAPRO) 10 MG tablet TAKE 1 TABLET BY MOUTH EVERY DAY    gabapentin (NEURONTIN) 100 mg, Oral, Nightly    isosorbide mononitrate (IMDUR) 120 mg, Oral, Daily    lisinopriL 20 mg, Oral, Daily    metFORMIN (GLUCOPHAGE) 1000 MG tablet EARLINE 1 TABLETA VIA ORAL 2 VECES AL NORAH CON COMIDA 90    MYRBETRIQ 50 mg, Oral, Daily    neomycin-polymyxin-hydrocortisone (CORTISPORIN) 3.5-10,000-1 mg/mL-unit/mL-% otic suspension No dose, route, or frequency recorded.    nitroGLYCERIN (NITROSTAT) 0.4 mg, Sublingual, Every 5 min PRN    pantoprazole (PROTONIX) 40 mg, Oral, Daily    ranolazine (RANEXA) 500 mg, Oral, 2 times daily    simvastatin (ZOCOR) 20 MG tablet TAKE 1 TABLET BY MOUTH EVERY DAY IN THE EVENING       Diagnoses and health risks identified today and associated recommendations/orders:    1. Encounter for preventive health examination    DECLINE PNEUOMINA    2. TIA (transient ischemic attack)   6/14/ MRI showed no acute stroke- after pt went to ER with s/s of a stroke  1. No acute infarction.  2. 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  3. Numerous chronic lacunar infarcts as described including within the basal ganglia, external capsules, and posterior limb of the left internal capsule.  There are also chronic cerebellar infarcts.  4. Mild patchy T2, nonspecific signal within the white matter likely relates to chronic microvascular ischemia.  5. Moderate  generalized atrophy without hydrocephalus.    Pt wife to call and set up  follow up with brandy- Dr. Gilbert and vascular MD       3. Coronary artery disease of native artery of native heart with stable angina pectoris  6/14/ 2022 echo.... The left ventricle is normal in size with severe concentric hypertrophy and normal systolic function.  · Mild left atrial enlargement.  · The estimated ejection fraction is 55%.  · The left ventricle is normal in size with severe concentric hypertrophy and normal systolic function.  · Mild left atrial enlargement.  · The estimated ejection fraction is 55%.  · Grade II left ventricular diastolic dysfunction.  · Normal right ventricular size with normal right ventricular systolic function.    · S/P PTCA - hx of cardiac stent   · Chronic and Stable on Zocor,  ASA Imdur and Plavix . Continue current treatment plan as previously prescribed with your card md    4. PAD (peripheral artery disease)   8/ 2020 SFAp demonstrates moderate (50-75%) stenosis and has biphasic flow. SFAp has plaque present. The plaque is homogeneous.     SFAm demonstrates severe (76-99%) stenosis and has biphasic flow.    Chronic and Stable on Zocor, Plavix, ASA Continue current treatment plan as previously prescribed with your card md     5. Essential hypertension  Chronic and Stable on  Norvasc. Continue current treatment plan as previously prescribed with your  Card md     6. Dyslipidemia  Chronic and Stable on Zocor. Continue current treatment plan as previously prescribed with your card md     7. AP (angina pectoris)  Chronic and Stable on Zocor, Imdur  And Plavix. Continue current treatment plan as previously prescribed with your card md     8. LVH (left ventricular hypertrophy)  Chronic and Stable on Zocor  And Plavix. Continue current treatment plan as previously prescribed with your card Md     9. Mild depression  Chronic and Stable on Lexapro Continue current treatment plan as previously prescribed with your  card md     10. Type 2 diabetes mellitus without complication, without long-term current use of insulin  Chronic and Stable on Metformin. Continue current treatment plan as previously prescribed with your PCP  11. Tobacco abuse  Pt smokes 2  cigarettes per day- decline to smoking     12. S/P AAA repair  PAD, AAA stent graft (naproxen 2013- followed by  Vascular Md - Dr.Tr connor    13. Decreased hearing of both ears  Chronic and Stable- wears hearing occasional . Followed by audiologist    14. Urinary urgency   sent for refill to PCP to restart  MAYBE 50 mg, Oral, Daily     I offered to discuss advanced care planning, including how to pick a person who would make decisions for you if you were unable to make them for yourself, called a health care power of , and what kind of decisions you might make such as use of life sustaining treatments such as ventilators and tube feeding when faced with a life limiting illness recorded on a living will that they will need to know. (How you want to be cared for as you near the end of your natural life)     X Patient is interested in learning more about how to make advanced directives.  I provided them paperwork and offered to discuss this with them     I  Provided Aquiles with a 5-10 year written screening schedule and personal prevention plan. Recommendations were developed using the USPHS age appropriate recommendations. Education, counseling, and referrals were provided as needed. After Visit Summary printed and given to patient which includes a list of additional screenings\tests needed.     1 year annual wellness  Graciela Gates NP .

## 2022-06-16 NOTE — Clinical Note
Your patient was seen today for a HRA visit. I have included a copy of my visit note, please review the note and feel free to contact me with any questions.  Thank you for allowing me to participate in the care of your patients.  Graciela Gates NP

## 2022-06-16 NOTE — PATIENT INSTRUCTIONS
Counseling and Referral of Other Preventative  (Italic type indicates deductible and co-insurance are waived)    Patient Name: Aquiles Kelsey  Today's Date: 6/16/2022    Health Maintenance       Date Due Completion Date    Diabetes Urine Screening Never done ---    Pneumococcal Vaccines (Age 65+) (1 - PCV) Never done ---    Foot Exam Never done ---    Eye Exam Never done ---    TETANUS VACCINE Never done ---    Shingles Vaccine (1 of 2) Never done ---    COVID-19 Vaccine (4 - Booster for Pfizer series) 04/03/2022 12/3/2021    Influenza Vaccine (Season Ended) 09/01/2022 ---    Hemoglobin A1c 12/14/2022 6/14/2022    Lipid Panel 06/13/2023 6/13/2022        No orders of the defined types were placed in this encounter.    The following information is provided to all patients.  This information is to help you find resources for any of the problems found today that may be affecting your health:                Living healthy guide: www.[facility].[place].[facility]      Understanding Diabetes: www.diabetes.org      Eating healthy: www.cdc.gov/healthyweight      [facility] home safety checklist: www.cdc.gov/steadi/patient.html      Agency on Aging: www.goea.[place]      Alcoholics anonymous (AA): www.aa.org      Physical Activity: www.nancie.nih.gov/sa3rdvu      Tobacco use: www.quitwithusla.org     Counseling and Referral of Other Preventative  (Italic type indicates deductible and co-insurance are waived)    Patient Name: Aquiles Kelsey  Today's Date: 6/16/2022    Health Maintenance       Date Due Completion Date    Diabetes Urine Screening Never done ---    Pneumococcal Vaccines (Age 65+) (1 - PCV) Never done ---    Foot Exam Never done ---    Eye Exam Never done ---    TETANUS VACCINE Never done ---    Shingles Vaccine (1 of 2) Never done ---    COVID-19 Vaccine (4 - Booster for Pfizer series) 04/03/2022 12/3/2021    Influenza Vaccine (Season Ended) 09/01/2022 ---    Hemoglobin A1c 12/14/2022 6/14/2022    Lipid Panel  06/13/2023 6/13/2022        No orders of the defined types were placed in this encounter.    The following information is provided to all patients.  This information is to help you find resources for any of the problems found today that may be affecting your health:                Living healthy guide: www.[facility].[place].[facility]      Understanding Diabetes: www.diabetes.org      Eating healthy: www.cdc.gov/healthyweight      CDC home safety checklist: www.cdc.gov/steadi/patient.html      Agency on Aging: www.goea.[place]      Alcoholics anonymous (AA): www.aa.org      Physical Activity: www.nancie.nih.gov/fs7vonf      Tobacco use: www.quitwithusla.org

## 2022-06-17 ENCOUNTER — TELEPHONE (OUTPATIENT)
Dept: CARDIOLOGY | Facility: CLINIC | Age: 82
End: 2022-06-17
Payer: MEDICARE

## 2022-06-17 NOTE — TELEPHONE ENCOUNTER
Lvm for pt to return call to clinic in regards to an earlier appt    ----- Message from Bita Andre sent at 6/17/2022  4:23 PM CDT -----  Contact: [phone]  Type:  Sooner Apoointment Request    Caller is requesting a sooner appointment.  Caller declined first available appointment listed below.  Caller will not accept being placed on the waitlist and is requesting a message be sent to doctor.  Name of Caller:wife  When is the first available appointment?08/08/2022  Symptoms:hospital f/u  Would the patient rather a call back or a response via [facility]? Call back   Best Call Back Number:[phone]  Additional Information:

## 2022-06-26 NOTE — PROGRESS NOTES
Subjective:   Patient ID:  Aquiles Kelsey is a 81 y.o. male who presents for evaluation of Hyperlipidemia, Hypertension, Coronary Artery Disease, and Peripheral Arterial Disease     HPI Pt presents for eval.   His current medical conditions include CAD (multiple PCI procedures), HTN, DM, LAE, LVH, PAD, AAA stent graft (approx 2013), hyperlipidemia, cigar use.   Past hx pertinent for following:   H/o L LE stents in [place].   First PCI 1998, total of 5 stents with last one in [place] 2013.   Echo 7/19 normal LVEF, DD, LVH, mild AI.   Holter 7/19 NSR, fleeting NSVT, EAT, rare PVCs, occasional PACs.   B LE vasc studies 7/19 B LE PAD, L > R LE.   Pt had PCI KYLIE x one to mid LCX ISR, KYLIE x 2 to OM2, and PCI prox RCA KYLIE x 2, PTCA mid RCA ISR.   Failed PCI distal occluded RCA. EF 45%. Nonobstructive LAD disease, small diffusely diseased diagonal vessels.   He developed post PCI GI bleed and required PRBC and EGD showing angiodysplasia tx w cauterization.   ecg 10/14/19 junctional bradycardia 54 bpm, inferior infarct, inferolateral st-t abnl.   Echo 10/19 normal LV function, mild AI, LAE, LVH, inferolateral WMA.   Stress MPI 9/20 inferolateral scar with mild pato-infarct ischemia, EF 42%.   Echo 9/20 LVEF 40 - 45%, DD, LAE, LVH, WMA.   B LE arterial vasc studies 8/20: Severe B LE PAD.   JOSELYN 8/20 R LE 0.76, L LE 0.62   Meds adjusted 9/20 appt: Imdur increased to 120 mg qd and Amlodipine to 5 mg qd last appt for HTN control and for CAD/angina.   Also referred to vascular surgery for his PAD/AAA f/u.   He did see Dr. Mccarthy, [facility] surgery, with f/u appt planned.   Now here.   CAD is stable.   Angina controlled on current med tx.   No active chest pain sxs.   No CHF sxs.   BP is controlled.   PAD seems stable.   Stable claudication sxs.   Still smoking cigars.   DM HGAIC 6.2% on 12/20 labs.   Lipids 12/20: LDL 71, , HDL 42.   On statin tx.      Patient was recently hospitalized for several days with evidence of TIA.   Patient has evidence of multiple lacunar infarcts in mild carotid disease.  Patient is known today to have significant vascular disease with multiple stents and peripheral vascular disease with claudication.  Patient however has not recently taken all of his amlodipine and has had hypertension.  I have stressed the need the patient to follow-up with neurology.  Also stressed the need with the wife did call his medications and renewal of amlodipine in his other medications.  It is very important that his blood pressure be under control and continue with aspirin and Plavix.      Review of Systems   Constitutional: Negative for chills, diaphoresis, night sweats, weight gain and weight loss.   HENT: Negative for congestion, hoarse voice, sore throat and stridor.    Eyes: Negative for double vision and pain.   Cardiovascular: Negative for chest pain, claudication, cyanosis, dyspnea on exertion, irregular heartbeat, leg swelling, near-syncope, orthopnea, palpitations, paroxysmal nocturnal dyspnea and syncope.   Respiratory: Negative for cough, hemoptysis, shortness of breath, sleep disturbances due to breathing, snoring, sputum production and wheezing.    Endocrine: Negative for cold intolerance, heat intolerance and polydipsia.   Hematologic/Lymphatic: Negative for bleeding problem. Does not bruise/bleed easily.   Skin: Negative for color change, dry skin and rash.   Musculoskeletal: Negative for joint swelling and muscle cramps.   Gastrointestinal: Negative for bloating, abdominal pain, constipation, diarrhea, dysphagia, melena, nausea and vomiting.   Genitourinary: Negative for flank pain and urgency.   Neurological: Negative for dizziness, focal weakness, headaches, light-headedness, loss of balance, seizures and weakness.   Psychiatric/Behavioral: Negative for altered mental status and memory loss. The patient is not nervous/anxious.      Family History   Problem Relation Age of Onset    Diabetes Mother     COPD  Father     Diabetes Brother      Past Medical History:   Diagnosis Date    Acute blood loss anemia 10/14/2019    Aneurysm, abdominal aortic     Coronary artery disease     Depression     Diabetes mellitus     HLD (hyperlipidemia)     Hypertension     Kidney stone     PAD (peripheral artery disease)     Tobacco abuse      Social History     Socioeconomic History    Marital status:    Tobacco Use    Smoking status: Current Every Day Smoker     Years: 15.00     Types: Cigars    Smokeless tobacco: Current User   Substance and Sexual Activity    Alcohol use: Not Currently    Drug use: Not Currently    Sexual activity: Not Currently     Social Determinants of Health     Financial Resource Strain: Low Risk     Difficulty of Paying Living Expenses: Not hard at all   Food Insecurity: Unknown    Worried About Running Out of Food in the Last Year: Never true   Transportation Needs: No Transportation Needs    Lack of Transportation (Medical): No    Lack of Transportation (Non-Medical): No   Physical Activity: Inactive    Days of Exercise per Week: 0 days    Minutes of Exercise per Session: 0 min   Stress: No Stress Concern Present    Feeling of Stress : Only a little   Social Connections: Socially Isolated    Frequency of Communication with Friends and Family: Once a week    Frequency of Social Gatherings with Friends and Family: Once a week    Attends Synagogue Services: Never    Active Member of Clubs or Organizations: No    Attends Club or Organization Meetings: Never    Marital Status:    Housing Stability: Unknown    Unable to Pay for Housing in the Last Year: No    Unstable Housing in the Last Year: No     Current Outpatient Medications on File Prior to Visit   Medication Sig Dispense Refill    amLODIPine (NORVASC) 5 MG tablet TAKE 1 TABLET BY MOUTH EVERY DAY 90 tablet 3    aspirin (ECOTRIN) 81 MG EC tablet Take 1 tablet (81 mg total) by mouth once daily.  0    baclofen  (LIORESAL) 10 MG tablet Take 1 tablet (10 mg total) by mouth every evening. 30 tablet 0    clopidogreL (PLAVIX) 75 mg tablet TAKE 1 TABLET BY MOUTH EVERY DAY 90 tablet 3    EScitalopram oxalate (LEXAPRO) 10 MG tablet TAKE 1 TABLET BY MOUTH EVERY DAY 90 tablet 0    gabapentin (NEURONTIN) 100 MG capsule Take 1 capsule (100 mg total) by mouth every evening. (Patient not taking: No sig reported) 30 capsule 5    isosorbide mononitrate (IMDUR) 120 MG 24 hr tablet TAKE 1 TABLET (120 MG TOTAL) BY MOUTH ONCE DAILY. 90 tablet 3    lisinopril 10 MG tablet Take 2 tablets (20 mg total) by mouth once daily. 90 tablet 3    metFORMIN (GLUCOPHAGE) 1000 MG tablet EARLINE 1 TABLETA VIA ORAL 2 VECES AL NORAH CON COMIDA 90 180 tablet 0    mirabegron (MYRBETRIQ) 50 mg Tb24 Take 1 tablet (50 mg total) by mouth once daily. 90 tablet 0    neomycin-polymyxin-hydrocortisone (CORTISPORIN) 3.5-10,000-1 mg/mL-unit/mL-% otic suspension       nitroGLYCERIN (NITROSTAT) 0.4 MG SL tablet Place 1 tablet (0.4 mg total) under the tongue every 5 (five) minutes as needed for Chest pain. 25 tablet 12    pantoprazole (PROTONIX) 40 MG tablet Take 1 tablet (40 mg total) by mouth once daily. 30 tablet 1    ranolazine (RANEXA) 500 MG Tb12 Take 1 tablet (500 mg total) by mouth 2 (two) times daily. 60 tablet 1    simvastatin (ZOCOR) 20 MG tablet TAKE 1 TABLET BY MOUTH EVERY DAY IN THE EVENING 90 tablet 3     No current facility-administered medications on file prior to visit.     Review of patient's allergies indicates:   Allergen Reactions    Metoprolol      Junctional rhythm         Objective:     Physical Exam  Eyes:      Pupils: Pupils are equal, round, and reactive to light.   Neck:      Trachea: No tracheal deviation.   Cardiovascular:      Rate and Rhythm: Normal rate and regular rhythm.      Pulses: Intact distal pulses.           Carotid pulses are 2+ on the right side and 2+ on the left side.       Radial pulses are 2+ on the right side and 2+  on the left side.        Femoral pulses are 2+ on the right side and 2+ on the left side.       Popliteal pulses are 2+ on the right side and 2+ on the left side.        Dorsalis pedis pulses are 2+ on the right side and 2+ on the left side.        Posterior tibial pulses are 2+ on the right side and 2+ on the left side.      Heart sounds: Normal heart sounds. No murmur heard.    No friction rub. No gallop.   Pulmonary:      Effort: Pulmonary effort is normal. No respiratory distress.      Breath sounds: Normal breath sounds. No stridor. No wheezing or rales.   Chest:      Chest wall: No tenderness.   Abdominal:      General: There is no distension.      Tenderness: There is no abdominal tenderness. There is no rebound.   Musculoskeletal:         General: No tenderness.      Cervical back: Normal range of motion.   Skin:     General: Skin is warm and dry.   Neurological:      Mental Status: He is alert and oriented to person, place, and time.       6/13/22  · The left ventricle is normal in size with severe concentric hypertrophy and normal systolic function.  · Mild left atrial enlargement.  · The estimated ejection fraction is 55%.  · Grade II left ventricular diastolic dysfunction.  · Normal right ventricular size with normal right ventricular systolic function.     Ref Range & Units 2 wk ago   (6/13/22) 9 mo ago   (9/16/21) 1 yr ago   (12/2/20) 2 yr ago   (11/20/19) 2 yr ago   (10/11/19) 3 yr ago   (6/12/19)    Cholesterol 120 - 199 mg/dL 124  135 CM  146 CM  138 CM  140 CM  217 High  CM    Comment: The National Cholesterol Education Program (NCEP) has set the   following guidelines (reference ranges) for Cholesterol:   Optimal.....................<200 mg/dL   Borderline High.............200-239 mg/dL   High........................> or = 240 mg/dL    Triglycerides 30 - 150 mg/dL 104  175 High  CM  160 High  CM  96 CM  161 High  CM  255 High  CM    Comment: The National Cholesterol Education Program (NCEP) has  set the   following guidelines (reference values) for triglycerides:   Normal......................<150 mg/dL   Borderline High.............150-199 mg/dL   High........................200-499 mg/dL    HDL 40 - 75 mg/dL 34 Low   35 Low  CM  43 CM  46 CM  33 Low  CM  37 Low  CM    Comment: The National Cholesterol Education Program (NCEP) has set the   following guidelines (reference values) for HDL Cholesterol:   Low...............<40 mg/dL   Optimal...........>60 mg/dL    LDL Cholesterol 63.0 - 159.0 mg/dL 69.2  65.0 CM  71.0 CM  72.8 CM  74.8 CM  129.0 CM    Comment: The National Cholesterol Education Program (NCEP) has set the   following guidelines (reference values) for LDL Cholesterol:   Optimal.......................<130 mg/dL   Borderline High...............130-159 mg/dL   High..........................160-189 mg/dL   Very High.....................>190 mg/dL    HDL/Cholesterol Ratio 20.0 - 50.0 % 27.4  25.9  29.5  33.3  23.6  17.1 Low     Total Cholesterol/HDL Ratio 2.0 - 5.0 3.6  3.9  3.4  3.0  4        Assessment:     1. PAD (peripheral artery disease)    2. Essential hypertension    3. Abnormal ECG    4. Dyslipidemia    5. Claudication in peripheral vascular disease    6. Coronary artery disease of native artery of native heart with stable angina pectoris    7. AP (angina pectoris)    8. Ischemic cardiomyopathy    9. History of PTCA    10. CAD, multiple vessel    11. History of GI bleed    12    TIA    Plan:     PAD (peripheral artery disease)    Essential hypertension    Abnormal ECG    Dyslipidemia    Claudication in peripheral vascular disease    Coronary artery disease of native artery of native heart with stable angina pectoris    AP (angina pectoris)    Ischemic cardiomyopathy    History of PTCA    CAD, multiple vessel    History of GI bleed    Impression 1. Recent TIA resolved most likely related to hypertension and multiple lacunar infarcts which are chronic.  Patient is now stable somewhat  confused intermittently and has significant vascular disease.  Patient has had hypertension will continue take all medications and renewal of amlodipine being today.  All medications were reviewed with the wife and the patient today.  2. Multivessel disease CAD stable no recurrence of angina continues on aspirin, Plavix, lisinopril, isosorbide.  Amlodipine.  All questions answered today and the patient and the wife were happy with the outcome.  She will resolved to take control of the medications and to make sure that he gets all his medicines.  Patient will follow-up with Dr. Gilbert in the next 2 months.

## 2022-06-27 ENCOUNTER — OFFICE VISIT (OUTPATIENT)
Dept: CARDIOLOGY | Facility: CLINIC | Age: 82
End: 2022-06-27
Payer: MEDICARE

## 2022-06-27 VITALS
DIASTOLIC BLOOD PRESSURE: 66 MMHG | RESPIRATION RATE: 16 BRPM | HEART RATE: 60 BPM | OXYGEN SATURATION: 97 % | SYSTOLIC BLOOD PRESSURE: 158 MMHG | BODY MASS INDEX: 20.17 KG/M2 | HEIGHT: 70 IN | WEIGHT: 140.88 LBS

## 2022-06-27 DIAGNOSIS — I25.10 CAD, MULTIPLE VESSEL: ICD-10-CM

## 2022-06-27 DIAGNOSIS — E78.5 DYSLIPIDEMIA: ICD-10-CM

## 2022-06-27 DIAGNOSIS — Z98.61 HISTORY OF PTCA: ICD-10-CM

## 2022-06-27 DIAGNOSIS — G45.9 TIA (TRANSIENT ISCHEMIC ATTACK): ICD-10-CM

## 2022-06-27 DIAGNOSIS — I10 ESSENTIAL HYPERTENSION: ICD-10-CM

## 2022-06-27 DIAGNOSIS — R94.31 ABNORMAL ECG: ICD-10-CM

## 2022-06-27 DIAGNOSIS — I25.118 CORONARY ARTERY DISEASE OF NATIVE ARTERY OF NATIVE HEART WITH STABLE ANGINA PECTORIS: ICD-10-CM

## 2022-06-27 DIAGNOSIS — I25.5 ISCHEMIC CARDIOMYOPATHY: ICD-10-CM

## 2022-06-27 DIAGNOSIS — I73.9 PAD (PERIPHERAL ARTERY DISEASE): Primary | ICD-10-CM

## 2022-06-27 DIAGNOSIS — Z87.19 HISTORY OF GI BLEED: ICD-10-CM

## 2022-06-27 DIAGNOSIS — I73.9 CLAUDICATION IN PERIPHERAL VASCULAR DISEASE: ICD-10-CM

## 2022-06-27 DIAGNOSIS — I20.9 AP (ANGINA PECTORIS): ICD-10-CM

## 2022-06-27 PROCEDURE — 99999 PR PBB SHADOW E&M-EST. PATIENT-LVL IV: CPT | Mod: PBBFAC,,, | Performed by: INTERNAL MEDICINE

## 2022-06-27 PROCEDURE — 99214 OFFICE O/P EST MOD 30 MIN: CPT | Mod: S$GLB,,, | Performed by: INTERNAL MEDICINE

## 2022-06-27 PROCEDURE — 1160F RVW MEDS BY RX/DR IN RCRD: CPT | Mod: CPTII,S$GLB,, | Performed by: INTERNAL MEDICINE

## 2022-06-27 PROCEDURE — 1159F PR MEDICATION LIST DOCUMENTED IN MEDICAL RECORD: ICD-10-PCS | Mod: CPTII,S$GLB,, | Performed by: INTERNAL MEDICINE

## 2022-06-27 PROCEDURE — 99214 PR OFFICE/OUTPT VISIT, EST, LEVL IV, 30-39 MIN: ICD-10-PCS | Mod: S$GLB,,, | Performed by: INTERNAL MEDICINE

## 2022-06-27 PROCEDURE — 1101F PT FALLS ASSESS-DOCD LE1/YR: CPT | Mod: CPTII,S$GLB,, | Performed by: INTERNAL MEDICINE

## 2022-06-27 PROCEDURE — 3078F DIAST BP <80 MM HG: CPT | Mod: CPTII,S$GLB,, | Performed by: INTERNAL MEDICINE

## 2022-06-27 PROCEDURE — 1126F PR PAIN SEVERITY QUANTIFIED, NO PAIN PRESENT: ICD-10-PCS | Mod: CPTII,S$GLB,, | Performed by: INTERNAL MEDICINE

## 2022-06-27 PROCEDURE — 1101F PR PT FALLS ASSESS DOC 0-1 FALLS W/OUT INJ PAST YR: ICD-10-PCS | Mod: CPTII,S$GLB,, | Performed by: INTERNAL MEDICINE

## 2022-06-27 PROCEDURE — 1159F MED LIST DOCD IN RCRD: CPT | Mod: CPTII,S$GLB,, | Performed by: INTERNAL MEDICINE

## 2022-06-27 PROCEDURE — 3077F PR MOST RECENT SYSTOLIC BLOOD PRESSURE >= 140 MM HG: ICD-10-PCS | Mod: CPTII,S$GLB,, | Performed by: INTERNAL MEDICINE

## 2022-06-27 PROCEDURE — 99499 RISK ADDL DX/OHS AUDIT: ICD-10-PCS | Mod: S$GLB,,, | Performed by: INTERNAL MEDICINE

## 2022-06-27 PROCEDURE — 3288F PR FALLS RISK ASSESSMENT DOCUMENTED: ICD-10-PCS | Mod: CPTII,S$GLB,, | Performed by: INTERNAL MEDICINE

## 2022-06-27 PROCEDURE — 1126F AMNT PAIN NOTED NONE PRSNT: CPT | Mod: CPTII,S$GLB,, | Performed by: INTERNAL MEDICINE

## 2022-06-27 PROCEDURE — 3077F SYST BP >= 140 MM HG: CPT | Mod: CPTII,S$GLB,, | Performed by: INTERNAL MEDICINE

## 2022-06-27 PROCEDURE — 3078F PR MOST RECENT DIASTOLIC BLOOD PRESSURE < 80 MM HG: ICD-10-PCS | Mod: CPTII,S$GLB,, | Performed by: INTERNAL MEDICINE

## 2022-06-27 PROCEDURE — 99999 PR PBB SHADOW E&M-EST. PATIENT-LVL IV: ICD-10-PCS | Mod: PBBFAC,,, | Performed by: INTERNAL MEDICINE

## 2022-06-27 PROCEDURE — 99499 UNLISTED E&M SERVICE: CPT | Mod: S$GLB,,, | Performed by: INTERNAL MEDICINE

## 2022-06-27 PROCEDURE — 1160F PR REVIEW ALL MEDS BY PRESCRIBER/CLIN PHARMACIST DOCUMENTED: ICD-10-PCS | Mod: CPTII,S$GLB,, | Performed by: INTERNAL MEDICINE

## 2022-06-27 PROCEDURE — 3288F FALL RISK ASSESSMENT DOCD: CPT | Mod: CPTII,S$GLB,, | Performed by: INTERNAL MEDICINE

## 2022-08-09 ENCOUNTER — OFFICE VISIT (OUTPATIENT)
Dept: FAMILY MEDICINE | Facility: CLINIC | Age: 82
End: 2022-08-09
Payer: MEDICARE

## 2022-08-09 ENCOUNTER — LAB VISIT (OUTPATIENT)
Dept: LAB | Facility: HOSPITAL | Age: 82
End: 2022-08-09
Attending: INTERNAL MEDICINE
Payer: MEDICARE

## 2022-08-09 VITALS
OXYGEN SATURATION: 97 % | HEIGHT: 70 IN | TEMPERATURE: 97 F | BODY MASS INDEX: 20.8 KG/M2 | HEART RATE: 56 BPM | DIASTOLIC BLOOD PRESSURE: 68 MMHG | SYSTOLIC BLOOD PRESSURE: 136 MMHG | WEIGHT: 145.31 LBS

## 2022-08-09 DIAGNOSIS — G45.9 TIA (TRANSIENT ISCHEMIC ATTACK): ICD-10-CM

## 2022-08-09 DIAGNOSIS — Z98.890 S/P AAA REPAIR: ICD-10-CM

## 2022-08-09 DIAGNOSIS — Z98.61 S/P PTCA (PERCUTANEOUS TRANSLUMINAL CORONARY ANGIOPLASTY): ICD-10-CM

## 2022-08-09 DIAGNOSIS — I73.9 PAD (PERIPHERAL ARTERY DISEASE): Chronic | ICD-10-CM

## 2022-08-09 DIAGNOSIS — D64.9 ANEMIA, UNSPECIFIED TYPE: ICD-10-CM

## 2022-08-09 DIAGNOSIS — Z86.79 S/P AAA REPAIR: ICD-10-CM

## 2022-08-09 DIAGNOSIS — E78.5 DYSLIPIDEMIA: Chronic | ICD-10-CM

## 2022-08-09 DIAGNOSIS — Z87.19 HISTORY OF GI BLEED: ICD-10-CM

## 2022-08-09 DIAGNOSIS — E11.9 TYPE 2 DIABETES MELLITUS WITHOUT COMPLICATION, WITHOUT LONG-TERM CURRENT USE OF INSULIN: Primary | Chronic | ICD-10-CM

## 2022-08-09 DIAGNOSIS — I25.10 CORONARY ARTERY DISEASE, UNSPECIFIED VESSEL OR LESION TYPE, UNSPECIFIED WHETHER ANGINA PRESENT, UNSPECIFIED WHETHER NATIVE OR TRANSPLANTED HEART: ICD-10-CM

## 2022-08-09 DIAGNOSIS — I10 ESSENTIAL HYPERTENSION: Chronic | ICD-10-CM

## 2022-08-09 LAB
BASOPHILS # BLD AUTO: 0.05 K/UL (ref 0–0.2)
BASOPHILS NFR BLD: 0.9 % (ref 0–1.9)
DIFFERENTIAL METHOD: ABNORMAL
EOSINOPHIL # BLD AUTO: 0.2 K/UL (ref 0–0.5)
EOSINOPHIL NFR BLD: 3 % (ref 0–8)
ERYTHROCYTE [DISTWIDTH] IN BLOOD BY AUTOMATED COUNT: 13.9 % (ref 11.5–14.5)
HCT VFR BLD AUTO: 37.4 % (ref 40–54)
HGB BLD-MCNC: 12.5 G/DL (ref 14–18)
IMM GRANULOCYTES # BLD AUTO: 0.02 K/UL (ref 0–0.04)
IMM GRANULOCYTES NFR BLD AUTO: 0.4 % (ref 0–0.5)
LYMPHOCYTES # BLD AUTO: 1.1 K/UL (ref 1–4.8)
LYMPHOCYTES NFR BLD: 18.7 % (ref 18–48)
MCH RBC QN AUTO: 30.9 PG (ref 27–31)
MCHC RBC AUTO-ENTMCNC: 33.4 G/DL (ref 32–36)
MCV RBC AUTO: 92 FL (ref 82–98)
MONOCYTES # BLD AUTO: 0.5 K/UL (ref 0.3–1)
MONOCYTES NFR BLD: 8.3 % (ref 4–15)
NEUTROPHILS # BLD AUTO: 3.9 K/UL (ref 1.8–7.7)
NEUTROPHILS NFR BLD: 68.7 % (ref 38–73)
NRBC BLD-RTO: 0 /100 WBC
PLATELET # BLD AUTO: 142 K/UL (ref 150–450)
PMV BLD AUTO: 12.5 FL (ref 9.2–12.9)
RBC # BLD AUTO: 4.05 M/UL (ref 4.6–6.2)
WBC # BLD AUTO: 5.67 K/UL (ref 3.9–12.7)

## 2022-08-09 PROCEDURE — 99999 PR PBB SHADOW E&M-EST. PATIENT-LVL IV: CPT | Mod: PBBFAC,,, | Performed by: INTERNAL MEDICINE

## 2022-08-09 PROCEDURE — 1126F PR PAIN SEVERITY QUANTIFIED, NO PAIN PRESENT: ICD-10-PCS | Mod: CPTII,S$GLB,, | Performed by: INTERNAL MEDICINE

## 2022-08-09 PROCEDURE — 36415 COLL VENOUS BLD VENIPUNCTURE: CPT | Mod: PO | Performed by: INTERNAL MEDICINE

## 2022-08-09 PROCEDURE — 99214 PR OFFICE/OUTPT VISIT, EST, LEVL IV, 30-39 MIN: ICD-10-PCS | Mod: S$GLB,,, | Performed by: INTERNAL MEDICINE

## 2022-08-09 PROCEDURE — 1159F MED LIST DOCD IN RCRD: CPT | Mod: CPTII,S$GLB,, | Performed by: INTERNAL MEDICINE

## 2022-08-09 PROCEDURE — 85025 COMPLETE CBC W/AUTO DIFF WBC: CPT | Performed by: INTERNAL MEDICINE

## 2022-08-09 PROCEDURE — 1101F PR PT FALLS ASSESS DOC 0-1 FALLS W/OUT INJ PAST YR: ICD-10-PCS | Mod: CPTII,S$GLB,, | Performed by: INTERNAL MEDICINE

## 2022-08-09 PROCEDURE — 1159F PR MEDICATION LIST DOCUMENTED IN MEDICAL RECORD: ICD-10-PCS | Mod: CPTII,S$GLB,, | Performed by: INTERNAL MEDICINE

## 2022-08-09 PROCEDURE — 1126F AMNT PAIN NOTED NONE PRSNT: CPT | Mod: CPTII,S$GLB,, | Performed by: INTERNAL MEDICINE

## 2022-08-09 PROCEDURE — 3078F PR MOST RECENT DIASTOLIC BLOOD PRESSURE < 80 MM HG: ICD-10-PCS | Mod: CPTII,S$GLB,, | Performed by: INTERNAL MEDICINE

## 2022-08-09 PROCEDURE — 3288F PR FALLS RISK ASSESSMENT DOCUMENTED: ICD-10-PCS | Mod: CPTII,S$GLB,, | Performed by: INTERNAL MEDICINE

## 2022-08-09 PROCEDURE — 3288F FALL RISK ASSESSMENT DOCD: CPT | Mod: CPTII,S$GLB,, | Performed by: INTERNAL MEDICINE

## 2022-08-09 PROCEDURE — 1101F PT FALLS ASSESS-DOCD LE1/YR: CPT | Mod: CPTII,S$GLB,, | Performed by: INTERNAL MEDICINE

## 2022-08-09 PROCEDURE — 3075F SYST BP GE 130 - 139MM HG: CPT | Mod: CPTII,S$GLB,, | Performed by: INTERNAL MEDICINE

## 2022-08-09 PROCEDURE — 99999 PR PBB SHADOW E&M-EST. PATIENT-LVL IV: ICD-10-PCS | Mod: PBBFAC,,, | Performed by: INTERNAL MEDICINE

## 2022-08-09 PROCEDURE — 3075F PR MOST RECENT SYSTOLIC BLOOD PRESS GE 130-139MM HG: ICD-10-PCS | Mod: CPTII,S$GLB,, | Performed by: INTERNAL MEDICINE

## 2022-08-09 PROCEDURE — 99214 OFFICE O/P EST MOD 30 MIN: CPT | Mod: S$GLB,,, | Performed by: INTERNAL MEDICINE

## 2022-08-09 PROCEDURE — 3078F DIAST BP <80 MM HG: CPT | Mod: CPTII,S$GLB,, | Performed by: INTERNAL MEDICINE

## 2022-08-09 NOTE — PROGRESS NOTES
Subjective:       Patient ID: Aquiles Kelsey is a 81 y.o. male.    Chief Complaint: Annual Exam, Hypertension, Hyperlipidemia, Diabetes, Coronary Artery Disease, and Peripheral Vascular Disease    Here with wife-    Hypertension  Pertinent negatives include no chest pain, headaches, neck pain, palpitations or shortness of breath.   Hyperlipidemia  Associated symptoms include myalgias. Pertinent negatives include no chest pain or shortness of breath.   Diabetes  Pertinent negatives for hypoglycemia include no confusion, dizziness, headaches, nervousness/anxiousness, pallor, seizures, speech difficulty or tremors. Pertinent negatives for diabetes include no chest pain, no fatigue, no polydipsia, no polyphagia, no polyuria and no weakness.   Coronary Artery Disease  Pertinent negatives include no chest pain, chest tightness, dizziness, leg swelling, palpitations or shortness of breath. Risk factors include hyperlipidemia.     Past Medical History:   Diagnosis Date    Acute blood loss anemia 10/14/2019    Aneurysm, abdominal aortic     Coronary artery disease     Depression     Diabetes mellitus     HLD (hyperlipidemia)     Hypertension     Kidney stone     PAD (peripheral artery disease)     Tobacco abuse      Past Surgical History:   Procedure Laterality Date    APPENDECTOMY      CORONARY STENT PLACEMENT      x 4    ESOPHAGOGASTRODUODENOSCOPY N/A 10/14/2019    Procedure: EGD (ESOPHAGOGASTRODUODENOSCOPY);  Surgeon: Carlos Mcneal III, MD;  Location: [facility] ENDO;  Service: Endoscopy;  Laterality: N/A;    ESOPHAGOGASTRODUODENOSCOPY N/A 10/15/2019    Procedure: EGD (ESOPHAGOGASTRODUODENOSCOPY);  Surgeon: Carlos Mcneal III, MD;  Location: [facility] ENDO;  Service: Endoscopy;  Laterality: N/A;    LEFT HEART CATHETERIZATION Left 10/11/2019    Procedure: CATHETERIZATION, HEART, LEFT;  Surgeon: Robe Gilbert MD;  Location: [facility] CATH LAB;  Service: Cardiology;  Laterality: Left;    LEFT HEART  CATHETERIZATION Left 10/13/2019    Procedure: CATHETERIZATION, HEART, LEFT;  Surgeon: Robe Gilbert MD;  Location: [facility] CATH LAB;  Service: Cardiology;  Laterality: Left;    leg stent Left      Family History   Problem Relation Age of Onset    Diabetes Mother     COPD Father     Diabetes Brother      Social History     Socioeconomic History    Marital status:    Tobacco Use    Smoking status: Current Every Day Smoker     Years: 15.00     Types: Cigars    Smokeless tobacco: Current User   Substance and Sexual Activity    Alcohol use: Not Currently    Drug use: Not Currently    Sexual activity: Not Currently     Social Determinants of Health     Financial Resource Strain: Low Risk     Difficulty of Paying Living Expenses: Not hard at all   Food Insecurity: Unknown    Worried About Running Out of Food in the Last Year: Never true   Transportation Needs: No Transportation Needs    Lack of Transportation (Medical): No    Lack of Transportation (Non-Medical): No   Physical Activity: Inactive    Days of Exercise per Week: 0 days    Minutes of Exercise per Session: 0 min   Stress: No Stress Concern Present    Feeling of Stress : Only a little   Social Connections: Socially Isolated    Frequency of Communication with Friends and Family: Once a week    Frequency of Social Gatherings with Friends and Family: Once a week    Attends [employer] Services: Never    Active Member of Clubs or Organizations: No    Attends Club or Organization Meetings: Never    Marital Status:    Housing Stability: Unknown    Unable to Pay for Housing in the Last Year: No    Unstable Housing in the Last Year: No     Review of Systems   Constitutional: Negative for activity change, appetite change, chills, diaphoresis, fatigue, fever and unexpected weight change.   HENT: Positive for hearing loss. Negative for drooling, ear discharge, ear pain, facial swelling, mouth sores, nosebleeds, postnasal drip, rhinorrhea,  sinus pressure, sneezing, sore throat, tinnitus, trouble swallowing and voice change.    Eyes: Negative for photophobia, redness and visual disturbance.   Respiratory: Negative for apnea, cough, choking, chest tightness, shortness of breath and wheezing.    Cardiovascular: Negative for chest pain, palpitations and leg swelling.   Gastrointestinal: Negative for abdominal distention, abdominal pain, anal bleeding, blood in stool, constipation, diarrhea, nausea, rectal pain and vomiting.   Endocrine: Negative for cold intolerance, heat intolerance, polydipsia, polyphagia and polyuria.   Genitourinary: Negative for difficulty urinating, dysuria, enuresis, flank pain, frequency, genital sores, hematuria and urgency.   Musculoskeletal: Positive for arthralgias and myalgias. Negative for back pain, gait problem, joint swelling, neck pain and neck stiffness.   Skin: Negative for color change, pallor, rash and wound.   Allergic/Immunologic: Negative for food allergies and immunocompromised state.   Neurological: Negative for dizziness, tremors, seizures, syncope, facial asymmetry, speech difficulty, weakness, light-headedness, numbness and headaches.   Hematological: Negative for adenopathy. Does not bruise/bleed easily.   Psychiatric/Behavioral: Negative for agitation, behavioral problems, confusion, decreased concentration, dysphoric mood, hallucinations, self-injury, sleep disturbance and suicidal ideas. The patient is not nervous/anxious and is not hyperactive.        Objective:      Physical Exam  Vitals and nursing note reviewed.   Constitutional:       General: He is not in acute distress.     Appearance: Normal appearance. He is well-developed. He is not diaphoretic.   HENT:      Head: Normocephalic and atraumatic.      Mouth/Throat:      Pharynx: No oropharyngeal exudate.   Eyes:      General: No scleral icterus.     Pupils: Pupils are equal, round, and reactive to light.   Neck:      Thyroid: No thyromegaly.       Vascular: No carotid bruit or JVD.      Trachea: No tracheal deviation.   Cardiovascular:      Rate and Rhythm: Normal rate and regular rhythm.      Heart sounds: Normal heart sounds.   Pulmonary:      Effort: Pulmonary effort is normal. No respiratory distress.      Breath sounds: Normal breath sounds. No wheezing or rales.   Chest:      Chest wall: No tenderness.   Abdominal:      General: Bowel sounds are normal. There is no distension.      Palpations: Abdomen is soft.      Tenderness: There is no abdominal tenderness. There is no guarding or rebound.   Musculoskeletal:         General: No tenderness. Normal range of motion.      Cervical back: Normal range of motion and neck supple.   Lymphadenopathy:      Cervical: No cervical adenopathy.   Skin:     General: Skin is warm and dry.      Coloration: Skin is not pale.      Findings: No erythema or rash.   Neurological:      Mental Status: He is alert and oriented to person, place, and time.   Psychiatric:         Behavior: Behavior normal.         Thought Content: Thought content normal.         Judgment: Judgment normal.         CMP  Sodium   Date Value Ref Range Status   06/15/2022 138 136 - 145 mmol/L Final     Potassium   Date Value Ref Range Status   06/15/2022 4.0 3.5 - 5.1 mmol/L Final     Chloride   Date Value Ref Range Status   06/15/2022 108 95 - 110 mmol/L Final     CO2   Date Value Ref Range Status   06/15/2022 22 (L) 23 - 29 mmol/L Final     Glucose   Date Value Ref Range Status   06/15/2022 115 (H) 70 - 110 mg/dL Final     BUN   Date Value Ref Range Status   06/15/2022 19 8 - 23 mg/dL Final     Creatinine   Date Value Ref Range Status   06/15/2022 1.0 0.5 - 1.4 mg/dL Final     Calcium   Date Value Ref Range Status   06/15/2022 8.6 (L) 8.7 - 10.5 mg/dL Final     Total Protein   Date Value Ref Range Status   06/15/2022 6.1 6.0 - 8.4 g/dL Final     Albumin   Date Value Ref Range Status   06/15/2022 3.3 (L) 3.5 - 5.2 g/dL Final     Total Bilirubin    Date Value Ref Range Status   06/15/2022 0.5 0.1 - 1.0 mg/dL Final     Comment:     For infants and newborns, interpretation of results should be based  on gestational age, weight and in agreement with clinical  observations.    Premature Infant recommended reference ranges:  Up to 24 hours.............<8.0 mg/dL  Up to 48 hours............<12.0 mg/dL  3-5 days..................<15.0 mg/dL  6-29 days.................<15.0 mg/dL       Alkaline Phosphatase   Date Value Ref Range Status   06/15/2022 62 55 - 135 U/L Final     AST   Date Value Ref Range Status   06/15/2022 13 10 - 40 U/L Final     ALT   Date Value Ref Range Status   06/15/2022 8 (L) 10 - 44 U/L Final     Anion Gap   Date Value Ref Range Status   06/15/2022 8 8 - 16 mmol/L Final     eGFR if    Date Value Ref Range Status   06/15/2022 >60 >60 mL/min/1.73 m^2 Final     eGFR if non    Date Value Ref Range Status   06/15/2022 >60 >60 mL/min/1.73 m^2 Final     Comment:     Calculation used to obtain the estimated glomerular filtration  rate (eGFR) is the CKD-EPI equation.        Lab Results   Component Value Date    WBC 5.48 06/15/2022    HGB 12.4 (L) 06/15/2022    HCT 37.6 (L) 06/15/2022    MCV 92 06/15/2022     06/15/2022     Lab Results   Component Value Date    CHOL 124 06/13/2022     Lab Results   Component Value Date    HDL 34 (L) 06/13/2022     Lab Results   Component Value Date    LDLCALC 69.2 06/13/2022     Lab Results   Component Value Date    TRIG 104 06/13/2022     Lab Results   Component Value Date    CHOLHDL 27.4 06/13/2022     Lab Results   Component Value Date    TSH 2.357 06/13/2022     Lab Results   Component Value Date    HGBA1C 5.9 (H) 06/14/2022     Assessment:       1. Type 2 diabetes mellitus without complication, without long-term current use of insulin    2. TIA (transient ischemic attack)    3. S/P PTCA (percutaneous transluminal coronary angioplasty)    4. S/P AAA repair    5. PAD (peripheral  artery disease)    6. History of GI bleed    7. Essential hypertension    8. Dyslipidemia    9. Anemia, unspecified type    10. Coronary artery disease, unspecified vessel or lesion type, unspecified whether angina present, unspecified whether native or transplanted heart        Plan:   Type 2 diabetes mellitus without complication, without long-term current use of insulin    TIA (transient ischemic attack)    S/P PTCA (percutaneous transluminal coronary angioplasty)    S/P AAA repair    PAD (peripheral artery disease)  -     Ambulatory referral/consult to Cardiology; Future; Expected date: 08/16/2022    History of GI bleed    Essential hypertension    Dyslipidemia    Anemia, unspecified type  -     CBC Auto Differential; Future; Expected date: 08/09/2022    Coronary artery disease, unspecified vessel or lesion type, unspecified whether angina present, unspecified whether native or transplanted heart  -     Ambulatory referral/consult to Cardiology; Future; Expected date: 08/16/2022    Stable----------------continue meds--------------------as above---------------f/u cards.           Has neurology appt neuromedical,      Sees vascular dr mittal----------------f/u 4 months-------

## 2022-08-24 ENCOUNTER — OFFICE VISIT (OUTPATIENT)
Dept: DERMATOLOGY | Facility: CLINIC | Age: 82
End: 2022-08-24
Payer: MEDICARE

## 2022-08-24 DIAGNOSIS — L57.0 ACTINIC KERATOSES: ICD-10-CM

## 2022-08-24 DIAGNOSIS — D48.5 NEOPLASM OF UNCERTAIN BEHAVIOR OF SKIN: Primary | ICD-10-CM

## 2022-08-24 PROCEDURE — 1160F PR REVIEW ALL MEDS BY PRESCRIBER/CLIN PHARMACIST DOCUMENTED: ICD-10-PCS | Mod: CPTII,S$GLB,, | Performed by: STUDENT IN AN ORGANIZED HEALTH CARE EDUCATION/TRAINING PROGRAM

## 2022-08-24 PROCEDURE — 99203 OFFICE O/P NEW LOW 30 MIN: CPT | Mod: 25,S$GLB,, | Performed by: STUDENT IN AN ORGANIZED HEALTH CARE EDUCATION/TRAINING PROGRAM

## 2022-08-24 PROCEDURE — 99999 PR PBB SHADOW E&M-EST. PATIENT-LVL III: CPT | Mod: PBBFAC,,, | Performed by: STUDENT IN AN ORGANIZED HEALTH CARE EDUCATION/TRAINING PROGRAM

## 2022-08-24 PROCEDURE — 1160F RVW MEDS BY RX/DR IN RCRD: CPT | Mod: CPTII,S$GLB,, | Performed by: STUDENT IN AN ORGANIZED HEALTH CARE EDUCATION/TRAINING PROGRAM

## 2022-08-24 PROCEDURE — 17000 PR DESTRUCTION(LASER SURGERY,CRYOSURGERY,CHEMOSURGERY),PREMALIGNANT LESIONS,FIRST LESION: ICD-10-PCS | Mod: 59,S$GLB,, | Performed by: STUDENT IN AN ORGANIZED HEALTH CARE EDUCATION/TRAINING PROGRAM

## 2022-08-24 PROCEDURE — 99999 PR PBB SHADOW E&M-EST. PATIENT-LVL III: ICD-10-PCS | Mod: PBBFAC,,, | Performed by: STUDENT IN AN ORGANIZED HEALTH CARE EDUCATION/TRAINING PROGRAM

## 2022-08-24 PROCEDURE — 1159F MED LIST DOCD IN RCRD: CPT | Mod: CPTII,S$GLB,, | Performed by: STUDENT IN AN ORGANIZED HEALTH CARE EDUCATION/TRAINING PROGRAM

## 2022-08-24 PROCEDURE — 88305 TISSUE EXAM BY PATHOLOGIST: CPT | Mod: 26,,, | Performed by: PATHOLOGY

## 2022-08-24 PROCEDURE — 3288F FALL RISK ASSESSMENT DOCD: CPT | Mod: CPTII,S$GLB,, | Performed by: STUDENT IN AN ORGANIZED HEALTH CARE EDUCATION/TRAINING PROGRAM

## 2022-08-24 PROCEDURE — 1159F PR MEDICATION LIST DOCUMENTED IN MEDICAL RECORD: ICD-10-PCS | Mod: CPTII,S$GLB,, | Performed by: STUDENT IN AN ORGANIZED HEALTH CARE EDUCATION/TRAINING PROGRAM

## 2022-08-24 PROCEDURE — 3288F PR FALLS RISK ASSESSMENT DOCUMENTED: ICD-10-PCS | Mod: CPTII,S$GLB,, | Performed by: STUDENT IN AN ORGANIZED HEALTH CARE EDUCATION/TRAINING PROGRAM

## 2022-08-24 PROCEDURE — 1101F PT FALLS ASSESS-DOCD LE1/YR: CPT | Mod: CPTII,S$GLB,, | Performed by: STUDENT IN AN ORGANIZED HEALTH CARE EDUCATION/TRAINING PROGRAM

## 2022-08-24 PROCEDURE — 17000 DESTRUCT PREMALG LESION: CPT | Mod: 59,S$GLB,, | Performed by: STUDENT IN AN ORGANIZED HEALTH CARE EDUCATION/TRAINING PROGRAM

## 2022-08-24 PROCEDURE — 1126F PR PAIN SEVERITY QUANTIFIED, NO PAIN PRESENT: ICD-10-PCS | Mod: CPTII,S$GLB,, | Performed by: STUDENT IN AN ORGANIZED HEALTH CARE EDUCATION/TRAINING PROGRAM

## 2022-08-24 PROCEDURE — 1101F PR PT FALLS ASSESS DOC 0-1 FALLS W/OUT INJ PAST YR: ICD-10-PCS | Mod: CPTII,S$GLB,, | Performed by: STUDENT IN AN ORGANIZED HEALTH CARE EDUCATION/TRAINING PROGRAM

## 2022-08-24 PROCEDURE — 88305 TISSUE EXAM BY PATHOLOGIST: ICD-10-PCS | Mod: 26,,, | Performed by: PATHOLOGY

## 2022-08-24 PROCEDURE — 17003 DESTRUCTION, PREMALIGNANT LESIONS; SECOND THROUGH 14 LESIONS: ICD-10-PCS | Mod: S$GLB,,, | Performed by: STUDENT IN AN ORGANIZED HEALTH CARE EDUCATION/TRAINING PROGRAM

## 2022-08-24 PROCEDURE — 11102 PR TANGENTIAL BIOPSY, SKIN, SINGLE LESION: ICD-10-PCS | Mod: S$GLB,,, | Performed by: STUDENT IN AN ORGANIZED HEALTH CARE EDUCATION/TRAINING PROGRAM

## 2022-08-24 PROCEDURE — 1126F AMNT PAIN NOTED NONE PRSNT: CPT | Mod: CPTII,S$GLB,, | Performed by: STUDENT IN AN ORGANIZED HEALTH CARE EDUCATION/TRAINING PROGRAM

## 2022-08-24 PROCEDURE — 11102 TANGNTL BX SKIN SINGLE LES: CPT | Mod: S$GLB,,, | Performed by: STUDENT IN AN ORGANIZED HEALTH CARE EDUCATION/TRAINING PROGRAM

## 2022-08-24 PROCEDURE — 99203 PR OFFICE/OUTPT VISIT, NEW, LEVL III, 30-44 MIN: ICD-10-PCS | Mod: 25,S$GLB,, | Performed by: STUDENT IN AN ORGANIZED HEALTH CARE EDUCATION/TRAINING PROGRAM

## 2022-08-24 PROCEDURE — 88305 TISSUE EXAM BY PATHOLOGIST: CPT | Performed by: PATHOLOGY

## 2022-08-24 PROCEDURE — 17003 DESTRUCT PREMALG LES 2-14: CPT | Mod: S$GLB,,, | Performed by: STUDENT IN AN ORGANIZED HEALTH CARE EDUCATION/TRAINING PROGRAM

## 2022-08-24 NOTE — PROGRESS NOTES
Subjective:       Patient ID:  Aquiles Kelsey is a 81 y.o. male who presents for   Chief Complaint   Patient presents with    Growth     History of Present Illness: The patient presents with chief complaint of dark skin lesions.  Location: face  Duration: getting bigger over several months  Signs/Symptoms: dark, thickened plaques on the forehead and cheeks  Prior treatments: none.   Denies any history of skin cancer.       Growth        Review of Systems   Constitutional: Negative for fever and chills.   Skin: Negative for itching, rash and dry skin.        Objective:    Physical Exam   Constitutional: He appears well-developed and well-nourished. No distress.   Neurological: He is alert and oriented to person, place, and time. He is not disoriented.   Psychiatric: He has a normal mood and affect.   Skin:   Areas Examined (abnormalities noted in diagram):   Head / Face Inspection Performed  Neck Inspection Performed  Chest / Axilla Inspection Performed  Abdomen Inspection Performed  Back Inspection Performed  RUE Inspected  LUE Inspection Performed                   Diagram Legend     Erythematous scaling macule/papule c/w actinic keratosis       Vascular papule c/w angioma      Pigmented verrucoid papule/plaque c/w seborrheic keratosis      Yellow umbilicated papule c/w sebaceous hyperplasia      Irregularly shaped tan macule c/w lentigo     1-2 mm smooth white papules consistent with Milia      Movable subcutaneous cyst with punctum c/w epidermal inclusion cyst      Subcutaneous movable cyst c/w pilar cyst      Firm pink to brown papule c/w dermatofibroma      Pedunculated fleshy papule(s) c/w skin tag(s)      Evenly pigmented macule c/w junctional nevus     Mildly variegated pigmented, slightly irregular-bordered macule c/w mildly atypical nevus      Flesh colored to evenly pigmented papule c/w intradermal nevus       Pink pearly papule/plaque c/w basal cell carcinoma      Erythematous hyperkeratotic  cursted plaque c/w SCC      Surgical scar with no sign of skin cancer recurrence      Open and closed comedones      Inflammatory papules and pustules      Verrucoid papule consistent consistent with wart     Erythematous eczematous patches and plaques     Dystrophic onycholytic nail with subungual debris c/w onychomycosis     Umbilicated papule    Erythematous-base heme-crusted tan verrucoid plaque consistent with inflamed seborrheic keratosis     Erythematous Silvery Scaling Plaque c/w Psoriasis     See annotation              Assessment / Plan:      Pathology Orders:     Normal Orders This Visit    Specimen to Pathology, Dermatology     Questions:    Procedure Type: Dermatology and skin neoplasms    Number of Specimens: 1    ------------------------: -------------------------    Spec 1 Procedure: Biopsy    Spec 1 Clinical Impression: r/o pigmented SK vs other    Spec 1 Source: chest    Release to patient:         Neoplasm of uncertain behavior of skin  -     Specimen to Pathology, Dermatology    Shave biopsy procedure note:    Shave biopsy performed after verbal consent including risk of infection, scar, recurrence, need for additional treatment of site. Area prepped with alcohol, anesthetized with approximately 1.0cc of 1% lidocaine with epinephrine. Lesional tissue shaved with razor blade. Hemostasis achieved with application of aluminum chloride followed by hyfrecation. No complications. Dressing applied. Wound care explained.      Actinic keratoses  Cryosurgery Procedure Note    Verbal consent from the patient is obtained including, but not limited to, risk of hypopigmentation/hyperpigmentation, scar, recurrence of lesion. The patient is aware of the precancerous quality and need for treatment of these lesions. Liquid nitrogen cryosurgery is applied to the 5 actinic keratoses, as detailed in the physical exam, to produce a freeze injury. The patient is aware that blisters may form and is instructed on wound  care with gentle cleansing and use of vaseline ointment to keep moist until healed. The patient is supplied a handout on cryosurgery and is instructed to call if lesions do not completely resolve.           Follow up in about 1 year (around 8/24/2023).

## 2022-08-29 LAB
FINAL PATHOLOGIC DIAGNOSIS: NORMAL
GROSS: NORMAL
Lab: NORMAL
MICROSCOPIC EXAM: NORMAL

## 2022-09-08 ENCOUNTER — PROCEDURE VISIT (OUTPATIENT)
Dept: DERMATOLOGY | Facility: CLINIC | Age: 82
End: 2022-09-08
Payer: MEDICARE

## 2022-09-08 DIAGNOSIS — C44.91 BASAL CELL CARCINOMA (BCC), UNSPECIFIED SITE: Primary | ICD-10-CM

## 2022-09-08 PROCEDURE — 99499 UNLISTED E&M SERVICE: CPT | Mod: S$GLB,,, | Performed by: STUDENT IN AN ORGANIZED HEALTH CARE EDUCATION/TRAINING PROGRAM

## 2022-09-08 PROCEDURE — 11622 PR EXC SKIN MALIG 1.1-2 CM REMAINDR BODY: ICD-10-PCS | Mod: S$GLB,,, | Performed by: STUDENT IN AN ORGANIZED HEALTH CARE EDUCATION/TRAINING PROGRAM

## 2022-09-08 PROCEDURE — 12031 PR LAYR CLOS WND TRUNK,ARM,LEG <2.5 CM: ICD-10-PCS | Mod: 51,S$GLB,, | Performed by: STUDENT IN AN ORGANIZED HEALTH CARE EDUCATION/TRAINING PROGRAM

## 2022-09-08 PROCEDURE — 88305 TISSUE EXAM BY PATHOLOGIST: CPT | Performed by: DERMATOLOGY

## 2022-09-08 PROCEDURE — 88305 TISSUE EXAM BY PATHOLOGIST: ICD-10-PCS | Mod: 26,,, | Performed by: DERMATOLOGY

## 2022-09-08 PROCEDURE — 11622 EXC S/N/H/F/G MAL+MRG 1.1-2: CPT | Mod: S$GLB,,, | Performed by: STUDENT IN AN ORGANIZED HEALTH CARE EDUCATION/TRAINING PROGRAM

## 2022-09-08 PROCEDURE — 88305 TISSUE EXAM BY PATHOLOGIST: CPT | Mod: 26,,, | Performed by: DERMATOLOGY

## 2022-09-08 PROCEDURE — 12031 INTMD RPR S/A/T/EXT 2.5 CM/<: CPT | Mod: 51,S$GLB,, | Performed by: STUDENT IN AN ORGANIZED HEALTH CARE EDUCATION/TRAINING PROGRAM

## 2022-09-08 PROCEDURE — 99499 NO LOS: ICD-10-PCS | Mod: S$GLB,,, | Performed by: STUDENT IN AN ORGANIZED HEALTH CARE EDUCATION/TRAINING PROGRAM

## 2022-09-08 NOTE — PATIENT INSTRUCTIONS
Wound Care    A pressure dressing and vaseline ointment has been placed over the site.  This should remain in place for 48 hours.  It is recommended that you keep the area dry for the first 48 hours.  After 48 hours, you may remove the bandage and wash the area with warm soap and water, apply Vaseline, and keep covered with a bandage.  This should be repeated every 24 hours. Many patients prefer to use Neosporin or Bacitracin ointment.  This is acceptable; however, know that you can develop an allergy to this medication even if you have used it safely for years.  It is important to keep the area moist.  Letting it dry out and get air slows healing time, and will worsen the scar.  Keep the areas covered with a bandage until sutures are removed. Sutures will be removed in 1 week for face sutures and 2 weeks for body sutures unless otherwise specified.      If you notice increasing redness, tenderness, pain, or yellow drainage at the site, please notify your doctor.  These are signs of an infection.    If your site is bleeding, apply firm pressure for 15 minutes straight.  Repeat for another 15 minutes, if it is still bleeding.   If the surgical site continues to bleed, then please contact your doctor.      [facility]  Department of Dermatology  [facility]   [id number] [place] [address]   B[place] Villegas [address]  Dept: [phone]   Dept Fax: [phone]

## 2022-09-08 NOTE — PROGRESS NOTES
Wound Care    A pressure dressing and vaseline ointment has been placed over the site.  This should remain in place for 48 hours.  It is recommended that you keep the area dry for the first 48 hours.  After 48 hours, you may remove the bandage and wash the area with warm soap and water, apply Vaseline, and keep covered with a bandage.  This should be repeated every 24 hours. Many patients prefer to use Neosporin or Bacitracin ointment.  This is acceptable; however, know that you can develop an allergy to this medication even if you have used it safely for years.  It is important to keep the area moist.  Letting it dry out and get air slows healing time, and will worsen the scar.  Keep the areas covered with a bandage until sutures are removed. Sutures will be removed in 1 week for face sutures and 2 weeks for body sutures unless otherwise specified.      If you notice increasing redness, tenderness, pain, or yellow drainage at the site, please notify your doctor.  These are signs of an infection.    If your site is bleeding, apply firm pressure for 15 minutes straight.  Repeat for another 15 minutes, if it is still bleeding.   If the surgical site continues to bleed, then please contact your doctor.      [facility]  Department of Dermatology  [facility]   [id number] [place] [address]   [place] Vil[place] [address]  Dept: [phone]   Dept Fax: [phone]

## 2022-09-08 NOTE — PROGRESS NOTES
PROCEDURE: Elliptical excision with intermediate layered repair    ANESTHETIC: 4 cc 1% Lidocaine with Epinephrine 1:100,000    SURGICAL PREP: Betadine and EtOH    SURGEON: Wilmer Benjamin MD    ASSISTANTS:  Sabine Ann MA      PREOPERATIVE DIAGNOSIS: BCC    POSTOPERATIVE DIAGNOSIS: BCC    PATHOLOGIC DIAGNOSIS: Pending    LOCATION: chest    INITIAL LESION SIZE: 1.2 cm    EXCISED DIAMETER: 1.5 cm    PREPARATION:  The diagnosis, procedure, alternatives, benefits and risks, including but not limited to: drug reactions, pain, scar or cosmetic defect, local sensation disturbances, and/or recurrence of present condition were explained to the patient. The patient elected to proceed.    PROCEDURE:  The area of the chest was prepped, draped, and anesthetized in the usual sterile fashion. Lesional tissue was carefully marked prior to administration of the anesthesia. An elliptical excision was drawn around the lesion with margins. A fusiform elliptical excision was done with a #15 blade carried down completely through the dermis into the subcutaneous tissues. Then, with a combination of blunt and sharp dissection, the lesion was removed.  The specimen was marked at the 12 o'clock position with suture and submitted for histologic evaluation. The operative site was widely undermined in the subcutaneous tissue plane. Blood loss was minimal. The wound was then approximated in a layered fashion with subcutaneous and intradermal 4-0 Vicryl sutures. The wound was then superficially closed with running 4-0 Ethilon sutures.    The patient tolerated the procedure well.    The area was cleaned and dressed appropriately and the patient was given wound care instructions, as well as an appointment for follow-up evaluation.    LENGTH OF REPAIR: 2.5 cm    There were no vitals filed for this visit.    Follow up in about 6 months (around 3/8/2023).

## 2022-09-15 LAB
FINAL PATHOLOGIC DIAGNOSIS: NORMAL
GROSS: NORMAL
Lab: NORMAL
MICROSCOPIC EXAM: NORMAL

## 2022-09-22 ENCOUNTER — CLINICAL SUPPORT (OUTPATIENT)
Dept: DERMATOLOGY | Facility: CLINIC | Age: 82
End: 2022-09-22
Payer: MEDICARE

## 2022-09-22 DIAGNOSIS — Z48.02 VISIT FOR SUTURE REMOVAL: Primary | ICD-10-CM

## 2022-09-22 PROCEDURE — 99999 PR PBB SHADOW E&M-EST. PATIENT-LVL II: ICD-10-PCS | Mod: PBBFAC,,,

## 2022-09-22 PROCEDURE — 99024 POSTOP FOLLOW-UP VISIT: CPT | Mod: S$GLB,,, | Performed by: STUDENT IN AN ORGANIZED HEALTH CARE EDUCATION/TRAINING PROGRAM

## 2022-09-22 PROCEDURE — 99024 PR POST-OP FOLLOW-UP VISIT: ICD-10-PCS | Mod: S$GLB,,, | Performed by: STUDENT IN AN ORGANIZED HEALTH CARE EDUCATION/TRAINING PROGRAM

## 2022-09-22 PROCEDURE — 99999 PR PBB SHADOW E&M-EST. PATIENT-LVL II: CPT | Mod: PBBFAC,,,

## 2022-09-26 ENCOUNTER — OFFICE VISIT (OUTPATIENT)
Dept: CARDIOLOGY | Facility: CLINIC | Age: 82
End: 2022-09-26
Payer: MEDICARE

## 2022-09-26 VITALS
HEIGHT: 70 IN | RESPIRATION RATE: 16 BRPM | DIASTOLIC BLOOD PRESSURE: 66 MMHG | WEIGHT: 150.81 LBS | BODY MASS INDEX: 21.59 KG/M2 | HEART RATE: 56 BPM | OXYGEN SATURATION: 98 % | SYSTOLIC BLOOD PRESSURE: 150 MMHG

## 2022-09-26 DIAGNOSIS — I10 ESSENTIAL HYPERTENSION: Chronic | ICD-10-CM

## 2022-09-26 DIAGNOSIS — I73.9 PAD (PERIPHERAL ARTERY DISEASE): Chronic | ICD-10-CM

## 2022-09-26 DIAGNOSIS — I35.1 NONRHEUMATIC AORTIC VALVE INSUFFICIENCY: ICD-10-CM

## 2022-09-26 DIAGNOSIS — Z72.0 TOBACCO ABUSE: Chronic | ICD-10-CM

## 2022-09-26 DIAGNOSIS — Z87.19 HISTORY OF GI BLEED: ICD-10-CM

## 2022-09-26 DIAGNOSIS — I73.9 CLAUDICATION IN PERIPHERAL VASCULAR DISEASE: ICD-10-CM

## 2022-09-26 DIAGNOSIS — I20.9 AP (ANGINA PECTORIS): ICD-10-CM

## 2022-09-26 DIAGNOSIS — I25.5 ISCHEMIC CARDIOMYOPATHY: ICD-10-CM

## 2022-09-26 DIAGNOSIS — I25.10 CAD, MULTIPLE VESSEL: Primary | ICD-10-CM

## 2022-09-26 DIAGNOSIS — Z98.61 HISTORY OF PTCA: ICD-10-CM

## 2022-09-26 DIAGNOSIS — G45.9 TIA (TRANSIENT ISCHEMIC ATTACK): ICD-10-CM

## 2022-09-26 DIAGNOSIS — I25.10 CORONARY ARTERY DISEASE, UNSPECIFIED VESSEL OR LESION TYPE, UNSPECIFIED WHETHER ANGINA PRESENT, UNSPECIFIED WHETHER NATIVE OR TRANSPLANTED HEART: ICD-10-CM

## 2022-09-26 DIAGNOSIS — I51.7 LVH (LEFT VENTRICULAR HYPERTROPHY): ICD-10-CM

## 2022-09-26 DIAGNOSIS — I51.7 LAE (LEFT ATRIAL ENLARGEMENT): ICD-10-CM

## 2022-09-26 DIAGNOSIS — I49.1 PAC (PREMATURE ATRIAL CONTRACTION): ICD-10-CM

## 2022-09-26 DIAGNOSIS — E11.9 TYPE 2 DIABETES MELLITUS WITHOUT COMPLICATION, WITHOUT LONG-TERM CURRENT USE OF INSULIN: Chronic | ICD-10-CM

## 2022-09-26 DIAGNOSIS — R94.31 ABNORMAL ECG: ICD-10-CM

## 2022-09-26 DIAGNOSIS — Z98.61 S/P PTCA (PERCUTANEOUS TRANSLUMINAL CORONARY ANGIOPLASTY): ICD-10-CM

## 2022-09-26 PROCEDURE — 3077F PR MOST RECENT SYSTOLIC BLOOD PRESSURE >= 140 MM HG: ICD-10-PCS | Mod: CPTII,S$GLB,, | Performed by: INTERNAL MEDICINE

## 2022-09-26 PROCEDURE — 1160F RVW MEDS BY RX/DR IN RCRD: CPT | Mod: CPTII,S$GLB,, | Performed by: INTERNAL MEDICINE

## 2022-09-26 PROCEDURE — 1159F PR MEDICATION LIST DOCUMENTED IN MEDICAL RECORD: ICD-10-PCS | Mod: CPTII,S$GLB,, | Performed by: INTERNAL MEDICINE

## 2022-09-26 PROCEDURE — 1160F PR REVIEW ALL MEDS BY PRESCRIBER/CLIN PHARMACIST DOCUMENTED: ICD-10-PCS | Mod: CPTII,S$GLB,, | Performed by: INTERNAL MEDICINE

## 2022-09-26 PROCEDURE — 99214 PR OFFICE/OUTPT VISIT, EST, LEVL IV, 30-39 MIN: ICD-10-PCS | Mod: S$GLB,,, | Performed by: INTERNAL MEDICINE

## 2022-09-26 PROCEDURE — 3078F PR MOST RECENT DIASTOLIC BLOOD PRESSURE < 80 MM HG: ICD-10-PCS | Mod: CPTII,S$GLB,, | Performed by: INTERNAL MEDICINE

## 2022-09-26 PROCEDURE — 99999 PR PBB SHADOW E&M-EST. PATIENT-LVL III: ICD-10-PCS | Mod: PBBFAC,,, | Performed by: INTERNAL MEDICINE

## 2022-09-26 PROCEDURE — 99999 PR PBB SHADOW E&M-EST. PATIENT-LVL III: CPT | Mod: PBBFAC,,, | Performed by: INTERNAL MEDICINE

## 2022-09-26 PROCEDURE — 3077F SYST BP >= 140 MM HG: CPT | Mod: CPTII,S$GLB,, | Performed by: INTERNAL MEDICINE

## 2022-09-26 PROCEDURE — 1159F MED LIST DOCD IN RCRD: CPT | Mod: CPTII,S$GLB,, | Performed by: INTERNAL MEDICINE

## 2022-09-26 PROCEDURE — 99499 UNLISTED E&M SERVICE: CPT | Mod: HCNC,S$GLB,, | Performed by: INTERNAL MEDICINE

## 2022-09-26 PROCEDURE — 3078F DIAST BP <80 MM HG: CPT | Mod: CPTII,S$GLB,, | Performed by: INTERNAL MEDICINE

## 2022-09-26 PROCEDURE — 99499 RISK ADDL DX/OHS AUDIT: ICD-10-PCS | Mod: HCNC,S$GLB,, | Performed by: INTERNAL MEDICINE

## 2022-09-26 PROCEDURE — 99214 OFFICE O/P EST MOD 30 MIN: CPT | Mod: S$GLB,,, | Performed by: INTERNAL MEDICINE

## 2022-09-26 NOTE — PROGRESS NOTES
Subjective:    Patient ID:  Aquiles Kelsey is a 82 y.o. male who presents for evaluation of Peripheral Arterial Disease, Coronary Artery Disease, Hypertension, and Hyperlipidemia      HPIPt presents for eval.  His current medical conditions include CAD (multiple PCI procedures), HTN, DM, LAE, LVH, PAD, AAA stent graft (approx 2013), hyperlipidemia, cigar use.  Past hx pertinent for following:  H/o L LE stents in [place].   First PCI 1998, total of 5 stents with last one in [place] 2013.  Echo 7/19 normal LVEF, DD,  LVH, mild AI.   Holter 7/19 NSR, fleeting NSVT, EAT, rare PVCs, occasional PACs.  B LE vasc studies 7/19 B LE PAD, L > R LE.   S/p C Oct 2019 for NSTEMI.  Pt had PCI KYLIE x one to mid LCX ISR, KYLIE x 2 to OM2, and PCI prox RCA KYLIE x 2, PTCA mid RCA ISR.  Failed PCI distal occluded RCA.  EF 45%.  Nonobstructive LAD disease, small diffusely diseased diagonal vessels.  He developed post PCI GI bleed and required PRBC and EGD showing angiodysplasia tx w cauterization.  ecg 10/14/19 junctional bradycardia 54 bpm, inferior infarct, inferolateral st-t abnl.  Echo 10/19 normal LV function, mild AI, LAE, LVH, inferolateral WMA.  Stress MPI 9/20 inferolateral scar with mild pato-infarct ischemia, EF 42%.  Echo 9/20 LVEF 40 - 45%, DD, LAE, LVH, WMA.  B LE arterial vasc studies 8/20:  Severe B LE PAD.   JOSELYN 8/20  R LE 0.76, L LE 0.62  Referred to vascular surgery for his PAD/AAA f/u.  He did see Dr. Mccarthy, [facility] surgery, with f/u appt planned.  Now here.  I last saw pt Sept 2021.  He was hospitalized 6/22 for TIA.  Patient had evidence of multiple lacunar/cerebellar infarcts on imaging, and mild carotid disease.  Was thought to be due to vascular disease.  Echo June 2022 normal EF, grade I DD, LVH, LAE.  CAD is stable.  No active angina sxs.  No CHF sxs.  Stable claudication.  States he is taking his meds.  Labs reviewed.  DM HGAIC controlled.  Lipids controlled on statin tx.  Still smokes cigars.      Past Medical  History:   Diagnosis Date    Acute blood loss anemia 10/14/2019    Aneurysm, abdominal aortic     Coronary artery disease     Depression     Diabetes mellitus     HLD (hyperlipidemia)     Hypertension     Kidney stone     PAD (peripheral artery disease)     Tobacco abuse      Current Outpatient Medications   Medication Instructions    amLODIPine (NORVASC) 5 MG tablet TAKE 1 TABLET BY MOUTH EVERY DAY    aspirin (ECOTRIN) 81 mg, Oral, Daily    baclofen (LIORESAL) 10 mg, Oral, Nightly    clopidogreL (PLAVIX) 75 mg tablet TAKE 1 TABLET BY MOUTH EVERY DAY    EScitalopram oxalate (LEXAPRO) 10 MG tablet TAKE 1 TABLET BY MOUTH EVERY DAY    gabapentin (NEURONTIN) 100 mg, Oral, Nightly    isosorbide mononitrate (IMDUR) 120 MG 24 hr tablet TAKE 1 TABLET BY MOUTH ONCE DAILY    lisinopriL 20 mg, Oral, Daily    metFORMIN (GLUCOPHAGE) 1000 MG tablet EARLINE 1 TABLETA VIA ORAL 2 VECES AL NORAH CON COMIDA 90    MYRBETRIQ 50 mg Tb24 TAKE 1 TABLET BY MOUTH EVERY DAY    neomycin-polymyxin-hydrocortisone (CORTISPORIN) 3.5-10,000-1 mg/mL-unit/mL-% otic suspension No dose, route, or frequency recorded.    nitroGLYCERIN (NITROSTAT) 0.4 mg, Sublingual, Every 5 min PRN    pantoprazole (PROTONIX) 40 mg, Oral, Daily    ranolazine (RANEXA) 500 mg, Oral, 2 times daily    simvastatin (ZOCOR) 20 MG tablet TAKE 1 TABLET BY MOUTH EVERY DAY IN THE EVENING       Review of Systems   Constitutional: Negative.   HENT: Negative.     Eyes: Negative.    Cardiovascular:  Positive for claudication.   Respiratory: Negative.     Endocrine: Negative.    Hematologic/Lymphatic: Negative.    Skin: Negative.    Musculoskeletal:  Positive for arthritis and joint pain.   Gastrointestinal: Negative.    Genitourinary: Negative.    Neurological:  Positive for weakness.   Psychiatric/Behavioral: Negative.     Allergic/Immunologic: Negative.         BP (!) 150/66 (BP Location: Left arm, Patient Position: Sitting, BP Method: Large (Manual))   Pulse (!) 56   Resp 16   Ht 5'  "10" (1.778 m)   Wt 68.4 kg (150 lb 12.7 oz)   SpO2 98%   BMI 21.64 kg/m²     Wt Readings from Last 3 Encounters:   09/26/22 68.4 kg (150 lb 12.7 oz)   08/09/22 65.9 kg (145 lb 4.5 oz)   06/27/22 63.9 kg (140 lb 14 oz)     Temp Readings from Last 3 Encounters:   08/09/22 96.8 °F (36 °C)   06/16/22 97 °F (36.1 °C)   06/15/22 97.7 °F (36.5 °C) (Oral)     BP Readings from Last 3 Encounters:   09/26/22 (!) 150/66   08/09/22 136/68   06/27/22 (!) 158/66     Pulse Readings from Last 3 Encounters:   09/26/22 (!) 56   08/09/22 (!) 56   06/27/22 60       Objective:    Physical Exam  Vitals and nursing note reviewed.   Constitutional:       Appearance: He is well-developed.   HENT:      Head: Normocephalic.   Neck:      Thyroid: No thyromegaly.      Vascular: No carotid bruit or JVD.   Cardiovascular:      Rate and Rhythm: Regular rhythm. Bradycardia present.      Chest Wall: PMI is not displaced.      Pulses:           Radial pulses are 2+ on the right side and 2+ on the left side.      Heart sounds: S1 normal and S2 normal. Heart sounds not distant. No midsystolic click and no opening snap. No murmur heard.    No friction rub. No S3 or S4 sounds.   Pulmonary:      Effort: Pulmonary effort is normal.      Breath sounds: Normal breath sounds. No wheezing or rales.   Abdominal:      General: Bowel sounds are normal. There is no distension or abdominal bruit.      Palpations: Abdomen is soft. There is no mass.      Tenderness: There is no abdominal tenderness.   Musculoskeletal:      Cervical back: Normal range of motion and neck supple.   Skin:     General: Skin is warm.   Neurological:      Mental Status: He is alert.   Psychiatric:         Behavior: Behavior normal.         I have reviewed all pertinent labs and cardiac studies.      Chemistry        Component Value Date/Time     06/15/2022 0459    K 4.0 06/15/2022 0459     06/15/2022 0459    CO2 22 (L) 06/15/2022 0459    BUN 19 06/15/2022 0459    CREATININE " 1.0 06/15/2022 0459     (H) 06/15/2022 0459        Component Value Date/Time    CALCIUM 8.6 (L) 06/15/2022 0459    ALKPHOS 62 06/15/2022 0459    AST 13 06/15/2022 0459    ALT 8 (L) 06/15/2022 0459    BILITOT 0.5 06/15/2022 0459    ESTGFRAFRICA >60 06/15/2022 0459    EGFRNONAA >60 06/15/2022 0459        Lab Results   Component Value Date    WBC 5.67 08/09/2022    HGB 12.5 (L) 08/09/2022    HCT 37.4 (L) 08/09/2022    MCV 92 08/09/2022     (L) 08/09/2022       Lab Results   Component Value Date    HGBA1C 5.9 (H) 06/14/2022       Lab Results   Component Value Date    CHOL 124 06/13/2022    CHOL 135 09/16/2021    CHOL 146 12/02/2020     Lab Results   Component Value Date    HDL 34 (L) 06/13/2022    HDL 35 (L) 09/16/2021    HDL 43 12/02/2020     Lab Results   Component Value Date    LDLCALC 69.2 06/13/2022    LDLCALC 65.0 09/16/2021    LDLCALC 71.0 12/02/2020     Lab Results   Component Value Date    TRIG 104 06/13/2022    TRIG 175 (H) 09/16/2021    TRIG 160 (H) 12/02/2020     Lab Results   Component Value Date    CHOLHDL 27.4 06/13/2022    CHOLHDL 25.9 09/16/2021    CHOLHDL 29.5 12/02/2020         Results for orders placed during the hospital encounter of 06/13/22    Echo    Interpretation Summary  · The left ventricle is normal in size with severe concentric hypertrophy and normal systolic function.  · Mild left atrial enlargement.  · The estimated ejection fraction is 55%.  · Grade II left ventricular diastolic dysfunction.  · Normal right ventricular size with normal right ventricular systolic function.    Assessment:       1. CAD, multiple vessel    2. PAD (peripheral artery disease)    3. Coronary artery disease, unspecified vessel or lesion type, unspecified whether angina present, unspecified whether native or transplanted heart    4. Type 2 diabetes mellitus without complication, without long-term current use of insulin    5. Tobacco abuse    6. S/P PTCA (percutaneous transluminal coronary  angioplasty)    7. TIA (transient ischemic attack)    8. LVH (left ventricular hypertrophy)    9. LAE (left atrial enlargement)    10. Ischemic cardiomyopathy    11. History of PTCA    12. Nonrheumatic aortic valve insufficiency    13. PAC (premature atrial contraction)    14. Essential hypertension    15. History of GI bleed    16. Claudication in peripheral vascular disease    17. AP (angina pectoris)    18. Abnormal ECG         Plan:             Stable cardiovascular conditions at present time on current medical treatment.  Continue DAPT.  Smoking cessation again advised.  F/u w Neurology as scheduled in Nov 2022.  F/u w Dr. Mccarthy, [facility], as scheduled.  Reviewed all tests and above medical conditions with patient in detail and formulated treatment plan.  Continue optimal medical treatment for cardiovascular conditions.  Cardiac low salt diet advised.  Fall risk precautions.  Maintaining healthy weight and weight loss goals (if needed) were discussed in clinic.  Need for BP control and HTN goals (if needed) were discussed and tx plan formulated.  Importance of optimal lipid control were discussed in detail as well as possible pharmacologic and lifestyle changes that may be needed.  Statin tx.  DM HGAIC control advised.  F/u in 6 months.      I have reviewed all pertinent labs and cardiac studies independently. Plans and recommendations have been formulated under my direct supervision. All questions answered and patient voiced understanding.

## 2022-10-26 ENCOUNTER — PATIENT OUTREACH (OUTPATIENT)
Dept: ADMINISTRATIVE | Facility: HOSPITAL | Age: 82
End: 2022-10-26
Payer: MEDICARE

## 2022-10-26 NOTE — PROGRESS NOTES
Blood Pressure Report: Called Pt to obtain home blood pressure reading, Pt did not answer, left voicemail with callback number.

## 2022-11-04 ENCOUNTER — HOSPITAL ENCOUNTER (INPATIENT)
Facility: HOSPITAL | Age: 82
LOS: 3 days | Discharge: NURSING, REHAB OR LONG-TERM CARE | DRG: 066 | End: 2022-11-09
Attending: EMERGENCY MEDICINE | Admitting: INTERNAL MEDICINE
Payer: MEDICARE

## 2022-11-04 DIAGNOSIS — I63.9 CEREBROVASCULAR ACCIDENT (CVA), UNSPECIFIED MECHANISM: Primary | ICD-10-CM

## 2022-11-04 DIAGNOSIS — I63.9 STROKE: ICD-10-CM

## 2022-11-04 LAB
ALBUMIN SERPL BCP-MCNC: 3.2 G/DL (ref 3.5–5.2)
ALP SERPL-CCNC: 66 U/L (ref 55–135)
ALT SERPL W/O P-5'-P-CCNC: 9 U/L (ref 10–44)
AMPHET+METHAMPHET UR QL: NEGATIVE
ANION GAP SERPL CALC-SCNC: 10 MMOL/L (ref 8–16)
APTT BLDCRRT: 26.5 SEC (ref 21–32)
AST SERPL-CCNC: 13 U/L (ref 10–40)
BACTERIA #/AREA URNS HPF: NORMAL /HPF
BARBITURATES UR QL SCN>200 NG/ML: NEGATIVE
BASOPHILS # BLD AUTO: 0.03 K/UL (ref 0–0.2)
BASOPHILS NFR BLD: 0.5 % (ref 0–1.9)
BENZODIAZ UR QL SCN>200 NG/ML: NEGATIVE
BILIRUB SERPL-MCNC: 0.5 MG/DL (ref 0.1–1)
BILIRUB UR QL STRIP: NEGATIVE
BUN SERPL-MCNC: 26 MG/DL (ref 8–23)
BZE UR QL SCN: NEGATIVE
CALCIUM SERPL-MCNC: 9.1 MG/DL (ref 8.7–10.5)
CANNABINOIDS UR QL SCN: NEGATIVE
CHLORIDE SERPL-SCNC: 107 MMOL/L (ref 95–110)
CLARITY UR: CLEAR
CO2 SERPL-SCNC: 22 MMOL/L (ref 23–29)
COLOR UR: YELLOW
CREAT SERPL-MCNC: 1.3 MG/DL (ref 0.5–1.4)
CREAT UR-MCNC: 37.5 MG/DL (ref 23–375)
DIFFERENTIAL METHOD: ABNORMAL
EOSINOPHIL # BLD AUTO: 0.2 K/UL (ref 0–0.5)
EOSINOPHIL NFR BLD: 3.4 % (ref 0–8)
ERYTHROCYTE [DISTWIDTH] IN BLOOD BY AUTOMATED COUNT: 14.1 % (ref 11.5–14.5)
EST. GFR  (NO RACE VARIABLE): 55 ML/MIN/1.73 M^2
GLUCOSE SERPL-MCNC: 201 MG/DL (ref 70–110)
GLUCOSE UR QL STRIP: NEGATIVE
HCT VFR BLD AUTO: 39.3 % (ref 40–54)
HGB BLD-MCNC: 13.1 G/DL (ref 14–18)
HGB UR QL STRIP: ABNORMAL
HYALINE CASTS #/AREA URNS LPF: 0 /LPF
IMM GRANULOCYTES # BLD AUTO: 0.02 K/UL (ref 0–0.04)
IMM GRANULOCYTES NFR BLD AUTO: 0.3 % (ref 0–0.5)
INR PPP: 1 (ref 0.8–1.2)
KETONES UR QL STRIP: NEGATIVE
LEUKOCYTE ESTERASE UR QL STRIP: NEGATIVE
LYMPHOCYTES # BLD AUTO: 1 K/UL (ref 1–4.8)
LYMPHOCYTES NFR BLD: 16 % (ref 18–48)
MCH RBC QN AUTO: 29.8 PG (ref 27–31)
MCHC RBC AUTO-ENTMCNC: 33.3 G/DL (ref 32–36)
MCV RBC AUTO: 90 FL (ref 82–98)
METHADONE UR QL SCN>300 NG/ML: NEGATIVE
MICROSCOPIC COMMENT: NORMAL
MONOCYTES # BLD AUTO: 0.5 K/UL (ref 0.3–1)
MONOCYTES NFR BLD: 9.1 % (ref 4–15)
NEUTROPHILS # BLD AUTO: 4.2 K/UL (ref 1.8–7.7)
NEUTROPHILS NFR BLD: 70.7 % (ref 38–73)
NITRITE UR QL STRIP: NEGATIVE
NRBC BLD-RTO: 0 /100 WBC
OPIATES UR QL SCN: NEGATIVE
PCP UR QL SCN>25 NG/ML: NEGATIVE
PH UR STRIP: 7 [PH] (ref 5–8)
PLATELET # BLD AUTO: 141 K/UL (ref 150–450)
PMV BLD AUTO: 11.2 FL (ref 9.2–12.9)
POCT GLUCOSE: 197 MG/DL (ref 70–110)
POTASSIUM SERPL-SCNC: 4 MMOL/L (ref 3.5–5.1)
PROT SERPL-MCNC: 6.6 G/DL (ref 6–8.4)
PROT UR QL STRIP: ABNORMAL
PROTHROMBIN TIME: 10.9 SEC (ref 9–12.5)
RBC # BLD AUTO: 4.39 M/UL (ref 4.6–6.2)
RBC #/AREA URNS HPF: 1 /HPF (ref 0–4)
SODIUM SERPL-SCNC: 139 MMOL/L (ref 136–145)
SP GR UR STRIP: >1.03 (ref 1–1.03)
TOXICOLOGY INFORMATION: NORMAL
TROPONIN I SERPL DL<=0.01 NG/ML-MCNC: 0.03 NG/ML (ref 0–0.03)
TSH SERPL DL<=0.005 MIU/L-ACNC: 1.94 UIU/ML (ref 0.4–4)
URN SPEC COLLECT METH UR: ABNORMAL
UROBILINOGEN UR STRIP-ACNC: NEGATIVE EU/DL
WBC # BLD AUTO: 5.95 K/UL (ref 3.9–12.7)
WBC #/AREA URNS HPF: 2 /HPF (ref 0–5)

## 2022-11-04 PROCEDURE — 85730 THROMBOPLASTIN TIME PARTIAL: CPT | Performed by: EMERGENCY MEDICINE

## 2022-11-04 PROCEDURE — 96372 THER/PROPH/DIAG INJ SC/IM: CPT | Mod: 59 | Performed by: INTERNAL MEDICINE

## 2022-11-04 PROCEDURE — A9585 GADOBUTROL INJECTION: HCPCS | Performed by: INTERNAL MEDICINE

## 2022-11-04 PROCEDURE — 25000003 PHARM REV CODE 250: Performed by: INTERNAL MEDICINE

## 2022-11-04 PROCEDURE — 25500020 PHARM REV CODE 255: Performed by: INTERNAL MEDICINE

## 2022-11-04 PROCEDURE — 63600175 PHARM REV CODE 636 W HCPCS: Performed by: INTERNAL MEDICINE

## 2022-11-04 PROCEDURE — 80061 LIPID PANEL: CPT | Performed by: EMERGENCY MEDICINE

## 2022-11-04 PROCEDURE — 85610 PROTHROMBIN TIME: CPT | Performed by: EMERGENCY MEDICINE

## 2022-11-04 PROCEDURE — 83036 HEMOGLOBIN GLYCOSYLATED A1C: CPT | Performed by: INTERNAL MEDICINE

## 2022-11-04 PROCEDURE — 93005 ELECTROCARDIOGRAM TRACING: CPT

## 2022-11-04 PROCEDURE — 36415 COLL VENOUS BLD VENIPUNCTURE: CPT | Performed by: INTERNAL MEDICINE

## 2022-11-04 PROCEDURE — 85025 COMPLETE CBC W/AUTO DIFF WBC: CPT | Performed by: EMERGENCY MEDICINE

## 2022-11-04 PROCEDURE — 93010 ELECTROCARDIOGRAM REPORT: CPT | Mod: ,,, | Performed by: INTERNAL MEDICINE

## 2022-11-04 PROCEDURE — 99285 EMERGENCY DEPT VISIT HI MDM: CPT | Mod: 25

## 2022-11-04 PROCEDURE — 25500020 PHARM REV CODE 255: Performed by: EMERGENCY MEDICINE

## 2022-11-04 PROCEDURE — 80053 COMPREHEN METABOLIC PANEL: CPT | Performed by: EMERGENCY MEDICINE

## 2022-11-04 PROCEDURE — 84484 ASSAY OF TROPONIN QUANT: CPT | Performed by: EMERGENCY MEDICINE

## 2022-11-04 PROCEDURE — 80307 DRUG TEST PRSMV CHEM ANLYZR: CPT | Performed by: INTERNAL MEDICINE

## 2022-11-04 PROCEDURE — G0378 HOSPITAL OBSERVATION PER HR: HCPCS

## 2022-11-04 PROCEDURE — 82962 GLUCOSE BLOOD TEST: CPT

## 2022-11-04 PROCEDURE — 84443 ASSAY THYROID STIM HORMONE: CPT | Performed by: EMERGENCY MEDICINE

## 2022-11-04 PROCEDURE — 93010 EKG 12-LEAD: ICD-10-PCS | Mod: ,,, | Performed by: INTERNAL MEDICINE

## 2022-11-04 PROCEDURE — 81000 URINALYSIS NONAUTO W/SCOPE: CPT | Mod: 59 | Performed by: INTERNAL MEDICINE

## 2022-11-04 RX ORDER — CLOPIDOGREL BISULFATE 75 MG/1
75 TABLET ORAL DAILY
Status: DISCONTINUED | OUTPATIENT
Start: 2022-11-05 | End: 2022-11-04

## 2022-11-04 RX ORDER — ASPIRIN 81 MG/1
81 TABLET ORAL DAILY
Status: DISCONTINUED | OUTPATIENT
Start: 2022-11-05 | End: 2022-11-04

## 2022-11-04 RX ORDER — ESCITALOPRAM OXALATE 10 MG/1
10 TABLET ORAL DAILY
Status: DISCONTINUED | OUTPATIENT
Start: 2022-11-05 | End: 2022-11-09 | Stop reason: HOSPADM

## 2022-11-04 RX ORDER — GLUCAGON 1 MG
1 KIT INJECTION
Status: DISCONTINUED | OUTPATIENT
Start: 2022-11-04 | End: 2022-11-09 | Stop reason: HOSPADM

## 2022-11-04 RX ORDER — ONDANSETRON 2 MG/ML
4 INJECTION INTRAMUSCULAR; INTRAVENOUS EVERY 6 HOURS PRN
Status: DISCONTINUED | OUTPATIENT
Start: 2022-11-04 | End: 2022-11-09 | Stop reason: HOSPADM

## 2022-11-04 RX ORDER — OXYBUTYNIN CHLORIDE 5 MG/1
10 TABLET, EXTENDED RELEASE ORAL DAILY
Status: DISCONTINUED | OUTPATIENT
Start: 2022-11-05 | End: 2022-11-09 | Stop reason: HOSPADM

## 2022-11-04 RX ORDER — IBUPROFEN 200 MG
24 TABLET ORAL
Status: DISCONTINUED | OUTPATIENT
Start: 2022-11-04 | End: 2022-11-09 | Stop reason: HOSPADM

## 2022-11-04 RX ORDER — CLOPIDOGREL BISULFATE 75 MG/1
75 TABLET ORAL DAILY
Status: DISCONTINUED | OUTPATIENT
Start: 2022-11-04 | End: 2022-11-09 | Stop reason: HOSPADM

## 2022-11-04 RX ORDER — ASPIRIN 81 MG/1
81 TABLET ORAL DAILY
Status: DISCONTINUED | OUTPATIENT
Start: 2022-11-04 | End: 2022-11-09 | Stop reason: HOSPADM

## 2022-11-04 RX ORDER — HYDRALAZINE HYDROCHLORIDE 20 MG/ML
10 INJECTION INTRAMUSCULAR; INTRAVENOUS EVERY 6 HOURS PRN
Status: DISCONTINUED | OUTPATIENT
Start: 2022-11-04 | End: 2022-11-09 | Stop reason: HOSPADM

## 2022-11-04 RX ORDER — ENOXAPARIN SODIUM 100 MG/ML
40 INJECTION SUBCUTANEOUS EVERY 24 HOURS
Status: DISCONTINUED | OUTPATIENT
Start: 2022-11-04 | End: 2022-11-09 | Stop reason: HOSPADM

## 2022-11-04 RX ORDER — ATORVASTATIN CALCIUM 40 MG/1
40 TABLET, FILM COATED ORAL DAILY
Status: DISCONTINUED | OUTPATIENT
Start: 2022-11-05 | End: 2022-11-04

## 2022-11-04 RX ORDER — GADOBUTROL 604.72 MG/ML
10 INJECTION INTRAVENOUS
Status: COMPLETED | OUTPATIENT
Start: 2022-11-04 | End: 2022-11-04

## 2022-11-04 RX ORDER — INSULIN ASPART 100 [IU]/ML
1-10 INJECTION, SOLUTION INTRAVENOUS; SUBCUTANEOUS
Status: DISCONTINUED | OUTPATIENT
Start: 2022-11-04 | End: 2022-11-09 | Stop reason: HOSPADM

## 2022-11-04 RX ORDER — ATORVASTATIN CALCIUM 40 MG/1
40 TABLET, FILM COATED ORAL NIGHTLY
Status: DISCONTINUED | OUTPATIENT
Start: 2022-11-04 | End: 2022-11-04

## 2022-11-04 RX ORDER — PANTOPRAZOLE SODIUM 40 MG/1
40 TABLET, DELAYED RELEASE ORAL DAILY
Status: DISCONTINUED | OUTPATIENT
Start: 2022-11-05 | End: 2022-11-09 | Stop reason: HOSPADM

## 2022-11-04 RX ORDER — SODIUM CHLORIDE 9 MG/ML
INJECTION, SOLUTION INTRAVENOUS CONTINUOUS
Status: ACTIVE | OUTPATIENT
Start: 2022-11-04 | End: 2022-11-05

## 2022-11-04 RX ORDER — SODIUM CHLORIDE 0.9 % (FLUSH) 0.9 %
10 SYRINGE (ML) INJECTION
Status: DISCONTINUED | OUTPATIENT
Start: 2022-11-04 | End: 2022-11-09 | Stop reason: HOSPADM

## 2022-11-04 RX ORDER — ATORVASTATIN CALCIUM 40 MG/1
40 TABLET, FILM COATED ORAL DAILY
Status: DISCONTINUED | OUTPATIENT
Start: 2022-11-04 | End: 2022-11-09 | Stop reason: HOSPADM

## 2022-11-04 RX ORDER — RANOLAZINE 500 MG/1
500 TABLET, EXTENDED RELEASE ORAL 2 TIMES DAILY
Status: DISCONTINUED | OUTPATIENT
Start: 2022-11-04 | End: 2022-11-09 | Stop reason: HOSPADM

## 2022-11-04 RX ORDER — IBUPROFEN 200 MG
16 TABLET ORAL
Status: DISCONTINUED | OUTPATIENT
Start: 2022-11-04 | End: 2022-11-09 | Stop reason: HOSPADM

## 2022-11-04 RX ADMIN — IOHEXOL 100 ML: 350 INJECTION, SOLUTION INTRAVENOUS at 12:11

## 2022-11-04 RX ADMIN — ENOXAPARIN SODIUM 40 MG: 40 INJECTION SUBCUTANEOUS at 05:11

## 2022-11-04 RX ADMIN — GADOBUTROL 6 ML: 604.72 INJECTION INTRAVENOUS at 05:11

## 2022-11-04 RX ADMIN — SODIUM CHLORIDE: 0.9 INJECTION, SOLUTION INTRAVENOUS at 10:11

## 2022-11-04 RX ADMIN — CLOPIDOGREL BISULFATE 75 MG: 75 TABLET ORAL at 05:11

## 2022-11-04 RX ADMIN — RANOLAZINE 500 MG: 500 TABLET, EXTENDED RELEASE ORAL at 10:11

## 2022-11-04 RX ADMIN — ASPIRIN 81 MG: 81 TABLET, COATED ORAL at 05:11

## 2022-11-04 RX ADMIN — ATORVASTATIN CALCIUM 40 MG: 40 TABLET, FILM COATED ORAL at 05:11

## 2022-11-04 NOTE — ED PROVIDER NOTES
SCRIBE #1 NOTE: I, Hina Yen, am scribing for, and in the presence of, Serafin Merchant MD. I have scribed the entire note.       History     Chief Complaint   Patient presents with    Facial Droop     3am pt. Started seeing double, right side facial droop noticed by family around 8;15 am     Review of patient's allergies indicates:   Allergen Reactions    Metoprolol Other (See Comments)     Junctional rhythm           History of Present Illness     HPI    11/4/2022, 11:53 AM  History obtained from the patient      History of Present Illness: Aquiles Kelsey is a 82 y.o. male patient with a PMHx of Hyperlipidemia, HTN Diabetes Mellitus who presents to the Emergency Department for evaluation of vision disturbances and facial droop which onset gradually around 3 am. Pt states he went to bed last night feeling normal, and then woke up around 3 am and wife noticed right-sided facial droop and uncoordinated walking. Pt admits that when he opens both eyes, he starts seeing double, but when he closes one eye  he can see normally again. Pt also admits to feeling dizzy. Symptoms are constant and moderate in severity. Associated sxs include facial droop, seeing double, uncoordinated walking, and dizziness. Patient denies any nausea, vomiting, diarrhea, fever, and all other sxs at this time. No further complaints or concerns at this time.       Arrival mode: Personal vehicle     PCP: Holger Thornton MD        Past Medical History:  Past Medical History:   Diagnosis Date    Acute blood loss anemia 10/14/2019    Aneurysm, abdominal aortic     Coronary artery disease     Depression     Diabetes mellitus     HLD (hyperlipidemia)     Hypertension     Kidney stone     PAD (peripheral artery disease)     Tobacco abuse        Past Surgical History:  Past Surgical History:   Procedure Laterality Date    APPENDECTOMY      CORONARY STENT PLACEMENT      x 4    ESOPHAGOGASTRODUODENOSCOPY N/A 10/14/2019    Procedure:  EGD (ESOPHAGOGASTRODUODENOSCOPY);  Surgeon: Carlos Mcneal III, MD;  Location: [facility] ENDO;  Service: Endoscopy;  Laterality: N/A;    ESOPHAGOGASTRODUODENOSCOPY N/A 10/15/2019    Procedure: EGD (ESOPHAGOGASTRODUODENOSCOPY);  Surgeon: Carlos Mcneal III, MD;  Location: [facility] ENDO;  Service: Endoscopy;  Laterality: N/A;    LEFT HEART CATHETERIZATION Left 10/11/2019    Procedure: CATHETERIZATION, HEART, LEFT;  Surgeon: Robe Gilbert MD;  Location: [facility] CATH LAB;  Service: Cardiology;  Laterality: Left;    LEFT HEART CATHETERIZATION Left 10/13/2019    Procedure: CATHETERIZATION, HEART, LEFT;  Surgeon: Robe Gilbert MD;  Location: [facility] CATH LAB;  Service: Cardiology;  Laterality: Left;    leg stent Left          Family History:  Family History   Problem Relation Age of Onset    Diabetes Mother     COPD Father     Diabetes Brother        Social History:  Social History     Tobacco Use    Smoking status: Every Day     Types: Cigars     Passive exposure: Never    Smokeless tobacco: Current   Substance and Sexual Activity    Alcohol use: Not Currently    Drug use: Not Currently    Sexual activity: Not Currently        Review of Systems     Review of Systems   Constitutional:  Negative for fever.   HENT:  Negative for sore throat.    Eyes:  Positive for visual disturbance.   Respiratory:  Negative for cough, shortness of breath and wheezing.    Cardiovascular:  Negative for chest pain.   Gastrointestinal:  Negative for diarrhea, nausea and vomiting.   Genitourinary:  Negative for dysuria.   Musculoskeletal:  Negative for back pain.   Skin:  Negative for rash.   Neurological:  Positive for dizziness, facial asymmetry and weakness.   Hematological:  Does not bruise/bleed easily.   All other systems reviewed and are negative.     Physical Exam     Initial Vitals [11/04/22 1028]   BP Pulse Resp Temp SpO2   (!) 146/66 (!) 55 16 98.6 °F (37 °C) 96 %      MAP       --          Physical Exam  Nursing Notes and Vital Signs  Reviewed.  Constitutional: Patient is in no apparent distress. Well-developed and well-nourished.  Head: Atraumatic.   Eyes: EOM intact except superior EO palsy bilat. Conjunctivae are not pale.   ENT: Mucous membranes are moist. Oropharynx is clear and symmetric.    Neck: Supple. Full ROM.   Cardiovascular: Regular rate. Regular rhythm. No murmurs, rubs, or gallops. Distal pulses are 2+ and symmetric.  Pulmonary/Chest: No respiratory distress. Clear to auscultation bilaterally. No wheezing or rales.  Abdominal: Soft and non-distended.  There is no tenderness.  No rebound, guarding, or rigidity.   Musculoskeletal: Left leg 4/5 strength with drift. No obvious deformities. No edema.  Skin: Warm and dry.  Neurological:  Alert, awake, and appropriate.  Normal speech. Cranial nerve 2-12 intact except: right nasolabial fold flattening and difficulty with superior EOM of both eyes. Left leg drift.   Psychiatric: Normal affect. Good eye contact. Appropriate in content.     ED Course   Critical Care    Date/Time: 11/4/2022 1:03 PM  Performed by: Serafin Merchant MD  Authorized by: Serafin Merchant MD   Direct patient critical care time: 8 minutes  Additional history critical care time: 6 minutes  Ordering / reviewing critical care time: 5 minutes  Documentation critical care time: 4 minutes  Consulting other physicians critical care time: 7 minutes  Consult with family critical care time: 2 minutes  Other critical care time: 3 minutes  Total critical care time (exclusive of procedural time) : 35 minutes  Critical care time was exclusive of teaching time and separately billable procedures and treating other patients.  Critical care was necessary to treat or prevent imminent or life-threatening deterioration of the following conditions: Stroke.  Critical care was time spent personally by me on the following activities: blood draw for specimens, development of treatment plan with patient or surrogate, discussions with  "consultants, interpretation of cardiac output measurements, evaluation of patient's response to treatment, examination of patient, obtaining history from patient or surrogate, ordering and performing treatments and interventions, ordering and review of radiographic studies, ordering and review of laboratory studies, re-evaluation of patient's condition, pulse oximetry and review of old charts.      ED Vital Signs:  Vitals:    11/05/22 1147 11/05/22 1243 11/05/22 1254 11/05/22 1300   BP: (!) 158/72      Pulse: (!) 53   (!) 57   Resp: 17      Temp: 98.1 °F (36.7 °C)      TempSrc:       SpO2: 96%      Weight:  69 kg (152 lb 1.9 oz)     Height:  5' 10" (1.778 m) 5' 10" (1.778 m)     11/05/22 1622 11/05/22 1857 11/05/22 1925 11/05/22 2347   BP: (!) 177/79  (!) 171/76 (!) 183/81   Pulse: (!) 51 (!) 58 60 (!) 55   Resp: 18  18 18   Temp: 97.4 °F (36.3 °C)  98.2 °F (36.8 °C) 98 °F (36.7 °C)   TempSrc: Oral  Oral Oral   SpO2: 95%  97% 96%   Weight:    70.5 kg (155 lb 6.8 oz)   Height:        11/06/22 0357 11/06/22 0709 11/06/22 0740 11/06/22 1116   BP: (!) 142/67  (!) 162/70 139/68   Pulse: (!) 52 (!) 48 69 (!) 53   Resp: 18  20 17   Temp: 98 °F (36.7 °C)   97.6 °F (36.4 °C)   TempSrc: Oral   Oral   SpO2: 98%  95% 98%   Weight:       Height:        11/06/22 1539 11/06/22 1957 11/06/22 2020   BP: (!) 154/70 (!) 118/58    Pulse: (!) 52 (!) 58 60   Resp: 17 17    Temp: 98.2 °F (36.8 °C) 98.2 °F (36.8 °C)    TempSrc:  Oral    SpO2: 95% 95%    Weight:      Height:          Abnormal Lab Results:  Labs Reviewed   CBC W/ AUTO DIFFERENTIAL - Abnormal; Notable for the following components:       Result Value    RBC 4.39 (*)     Hemoglobin 13.1 (*)     Hematocrit 39.3 (*)     Platelets 141 (*)     Lymph % 16.0 (*)     All other components within normal limits   COMPREHENSIVE METABOLIC PANEL - Abnormal; Notable for the following components:    CO2 22 (*)     Glucose 201 (*)     BUN 26 (*)     Albumin 3.2 (*)     ALT 9 (*)     eGFR 55 " (*)     All other components within normal limits   URINALYSIS, REFLEX TO URINE CULTURE - Abnormal; Notable for the following components:    Specific Gravity, UA >1.030 (*)     Protein, UA 3+ (*)     Occult Blood UA Trace (*)     All other components within normal limits    Narrative:     Specimen Source->Urine   POCT GLUCOSE - Abnormal; Notable for the following components:    POCT Glucose 197 (*)     All other components within normal limits   PROTIME-INR   TSH   TROPONIN I   APTT   DRUG SCREEN PANEL, URINE EMERGENCY   URINALYSIS MICROSCOPIC    Narrative:     Specimen Source->Urine   DRUG SCREEN PANEL, URINE EMERGENCY    Narrative:     Specimen Source->Urine   POCT GLUCOSE, HAND-HELD DEVICE   POCT GLUCOSE MONITORING CONTINUOUS        All Lab Results:  Results for orders placed or performed during the hospital encounter of 11/04/22   CBC W/ AUTO DIFFERENTIAL   Result Value Ref Range    WBC 5.95 3.90 - 12.70 K/uL    RBC 4.39 (L) 4.60 - 6.20 M/uL    Hemoglobin 13.1 (L) 14.0 - 18.0 g/dL    Hematocrit 39.3 (L) 40.0 - 54.0 %    MCV 90 82 - 98 fL    MCH 29.8 27.0 - 31.0 pg    MCHC 33.3 32.0 - 36.0 g/dL    RDW 14.1 11.5 - 14.5 %    Platelets 141 (L) 150 - 450 K/uL    MPV 11.2 9.2 - 12.9 fL    Immature Granulocytes 0.3 0.0 - 0.5 %    Gran # (ANC) 4.2 1.8 - 7.7 K/uL    Immature Grans (Abs) 0.02 0.00 - 0.04 K/uL    Lymph # 1.0 1.0 - 4.8 K/uL    Mono # 0.5 0.3 - 1.0 K/uL    Eos # 0.2 0.0 - 0.5 K/uL    Baso # 0.03 0.00 - 0.20 K/uL    nRBC 0 0 /100 WBC    Gran % 70.7 38.0 - 73.0 %    Lymph % 16.0 (L) 18.0 - 48.0 %    Mono % 9.1 4.0 - 15.0 %    Eosinophil % 3.4 0.0 - 8.0 %    Basophil % 0.5 0.0 - 1.9 %    Differential Method Automated    Comprehensive metabolic panel   Result Value Ref Range    Sodium 139 136 - 145 mmol/L    Potassium 4.0 3.5 - 5.1 mmol/L    Chloride 107 95 - 110 mmol/L    CO2 22 (L) 23 - 29 mmol/L    Glucose 201 (H) 70 - 110 mg/dL    BUN 26 (H) 8 - 23 mg/dL    Creatinine 1.3 0.5 - 1.4 mg/dL    Calcium 9.1 8.7 -  10.5 mg/dL    Total Protein 6.6 6.0 - 8.4 g/dL    Albumin 3.2 (L) 3.5 - 5.2 g/dL    Total Bilirubin 0.5 0.1 - 1.0 mg/dL    Alkaline Phosphatase 66 55 - 135 U/L    AST 13 10 - 40 U/L    ALT 9 (L) 10 - 44 U/L    Anion Gap 10 8 - 16 mmol/L    eGFR 55 (A) >60 mL/min/1.73 m^2   Protime-INR   Result Value Ref Range    Prothrombin Time 10.9 9.0 - 12.5 sec    INR 1.0 0.8 - 1.2   TSH   Result Value Ref Range    TSH 1.944 0.400 - 4.000 uIU/mL   LDL - Lipid Panel   Result Value Ref Range    Cholesterol 158 120 - 199 mg/dL    Triglycerides 106 30 - 150 mg/dL    HDL 40 40 - 75 mg/dL    LDL Cholesterol 96.8 63.0 - 159.0 mg/dL    HDL/Cholesterol Ratio 25.3 20.0 - 50.0 %    Total Cholesterol/HDL Ratio 4.0 2.0 - 5.0    Non-HDL Cholesterol 118 mg/dL   Troponin I   Result Value Ref Range    Troponin I 0.026 0.000 - 0.026 ng/mL   APTT   Result Value Ref Range    aPTT 26.5 21.0 - 32.0 sec   Hemoglobin A1c   Result Value Ref Range    Hemoglobin A1C 6.5 (H) 4.0 - 5.6 %    Estimated Avg Glucose 140 (H) 68 - 131 mg/dL   Urinalysis, Reflex to Urine Culture Urine, Clean Catch    Specimen: Urine   Result Value Ref Range    Specimen UA Urine, Clean Catch     Color, UA Yellow Yellow, Straw, Caren    Appearance, UA Clear Clear    pH, UA 7.0 5.0 - 8.0    Specific Gravity, UA >1.030 (A) 1.005 - 1.030    Protein, UA 3+ (A) Negative    Glucose, UA Negative Negative    Ketones, UA Negative Negative    Bilirubin (UA) Negative Negative    Occult Blood UA Trace (A) Negative    Nitrite, UA Negative Negative    Urobilinogen, UA Negative <2.0 EU/dL    Leukocytes, UA Negative Negative   Urinalysis Microscopic   Result Value Ref Range    RBC, UA 1 0 - 4 /hpf    WBC, UA 2 0 - 5 /hpf    Bacteria None None-Occ /hpf    Hyaline Casts, UA 0 0-1/lpf /lpf    Microscopic Comment SEE COMMENT    Drug screen panel, in-house   Result Value Ref Range    Benzodiazepines Negative Negative    Methadone metabolites Negative Negative    Cocaine (Metab.) Negative Negative     Opiate Scrn, Ur Negative Negative    Barbiturate Screen, Ur Negative Negative    Amphetamine Screen, Ur Negative Negative    THC Negative Negative    Phencyclidine Negative Negative    Creatinine, Urine 37.5 23.0 - 375.0 mg/dL    Toxicology Information SEE COMMENT    Comprehensive metabolic panel   Result Value Ref Range    Sodium 141 136 - 145 mmol/L    Potassium 4.1 3.5 - 5.1 mmol/L    Chloride 109 95 - 110 mmol/L    CO2 24 23 - 29 mmol/L    Glucose 129 (H) 70 - 110 mg/dL    BUN 26 (H) 8 - 23 mg/dL    Creatinine 1.3 0.5 - 1.4 mg/dL    Calcium 9.2 8.7 - 10.5 mg/dL    Total Protein 6.1 6.0 - 8.4 g/dL    Albumin 3.0 (L) 3.5 - 5.2 g/dL    Total Bilirubin 0.4 0.1 - 1.0 mg/dL    Alkaline Phosphatase 68 55 - 135 U/L    AST 10 10 - 40 U/L    ALT 8 (L) 10 - 44 U/L    Anion Gap 8 8 - 16 mmol/L    eGFR 55 (A) >60 mL/min/1.73 m^2   Magnesium   Result Value Ref Range    Magnesium 1.9 1.6 - 2.6 mg/dL   Phosphorus   Result Value Ref Range    Phosphorus 3.4 2.7 - 4.5 mg/dL   CBC auto differential   Result Value Ref Range    WBC 6.04 3.90 - 12.70 K/uL    RBC 4.38 (L) 4.60 - 6.20 M/uL    Hemoglobin 13.1 (L) 14.0 - 18.0 g/dL    Hematocrit 38.6 (L) 40.0 - 54.0 %    MCV 88 82 - 98 fL    MCH 29.9 27.0 - 31.0 pg    MCHC 33.9 32.0 - 36.0 g/dL    RDW 14.1 11.5 - 14.5 %    Platelets 136 (L) 150 - 450 K/uL    MPV 11.2 9.2 - 12.9 fL    Immature Granulocytes 0.3 0.0 - 0.5 %    Gran # (ANC) 4.0 1.8 - 7.7 K/uL    Immature Grans (Abs) 0.02 0.00 - 0.04 K/uL    Lymph # 1.1 1.0 - 4.8 K/uL    Mono # 0.7 0.3 - 1.0 K/uL    Eos # 0.2 0.0 - 0.5 K/uL    Baso # 0.03 0.00 - 0.20 K/uL    nRBC 0 0 /100 WBC    Gran % 66.4 38.0 - 73.0 %    Lymph % 17.4 (L) 18.0 - 48.0 %    Mono % 11.8 4.0 - 15.0 %    Eosinophil % 3.6 0.0 - 8.0 %    Basophil % 0.5 0.0 - 1.9 %    Differential Method Automated    Troponin I   Result Value Ref Range    Troponin I 0.031 (H) 0.000 - 0.026 ng/mL   CK-MB   Result Value Ref Range    CPK 64 20 - 200 U/L    CPK MB 2.0 0.1 - 6.5 ng/mL     MB % 3.1 0.0 - 5.0 %   APTT   Result Value Ref Range    aPTT 28.6 21.0 - 32.0 sec   Protime-INR   Result Value Ref Range    Prothrombin Time 10.9 9.0 - 12.5 sec    INR 1.0 0.8 - 1.2   Comprehensive metabolic panel   Result Value Ref Range    Sodium 141 136 - 145 mmol/L    Potassium 4.6 3.5 - 5.1 mmol/L    Chloride 106 95 - 110 mmol/L    CO2 25 23 - 29 mmol/L    Glucose 149 (H) 70 - 110 mg/dL    BUN 22 8 - 23 mg/dL    Creatinine 1.2 0.5 - 1.4 mg/dL    Calcium 9.4 8.7 - 10.5 mg/dL    Total Protein 6.6 6.0 - 8.4 g/dL    Albumin 3.2 (L) 3.5 - 5.2 g/dL    Total Bilirubin 0.8 0.1 - 1.0 mg/dL    Alkaline Phosphatase 73 55 - 135 U/L    AST 13 10 - 40 U/L    ALT 6 (L) 10 - 44 U/L    Anion Gap 10 8 - 16 mmol/L    eGFR >60 >60 mL/min/1.73 m^2   CBC auto differential   Result Value Ref Range    WBC 5.79 3.90 - 12.70 K/uL    RBC 4.57 (L) 4.60 - 6.20 M/uL    Hemoglobin 13.7 (L) 14.0 - 18.0 g/dL    Hematocrit 40.4 40.0 - 54.0 %    MCV 88 82 - 98 fL    MCH 30.0 27.0 - 31.0 pg    MCHC 33.9 32.0 - 36.0 g/dL    RDW 14.1 11.5 - 14.5 %    Platelets 141 (L) 150 - 450 K/uL    MPV 10.5 9.2 - 12.9 fL    Immature Granulocytes 0.3 0.0 - 0.5 %    Gran # (ANC) 4.3 1.8 - 7.7 K/uL    Immature Grans (Abs) 0.02 0.00 - 0.04 K/uL    Lymph # 0.8 (L) 1.0 - 4.8 K/uL    Mono # 0.5 0.3 - 1.0 K/uL    Eos # 0.1 0.0 - 0.5 K/uL    Baso # 0.03 0.00 - 0.20 K/uL    nRBC 0 0 /100 WBC    Gran % 73.8 (H) 38.0 - 73.0 %    Lymph % 14.0 (L) 18.0 - 48.0 %    Mono % 9.0 4.0 - 15.0 %    Eosinophil % 2.4 0.0 - 8.0 %    Basophil % 0.5 0.0 - 1.9 %    Differential Method Automated    POCT glucose   Result Value Ref Range    POCT Glucose 197 (H) 70 - 110 mg/dL   POCT glucose   Result Value Ref Range    POCT Glucose 122 (H) 70 - 110 mg/dL   POCT glucose   Result Value Ref Range    POCT Glucose 169 (H) 70 - 110 mg/dL   POCT glucose   Result Value Ref Range    POCT Glucose 124 (H) 70 - 110 mg/dL   POCT glucose   Result Value Ref Range    POCT Glucose 140 (H) 70 - 110 mg/dL    POCT glucose   Result Value Ref Range    POCT Glucose 140 (H) 70 - 110 mg/dL   POCT glucose   Result Value Ref Range    POCT Glucose 195 (H) 70 - 110 mg/dL         Imaging Results:  Imaging Results              MRI Brain W WO Contrast (Final result)  Result time 11/04/22 18:11:19      Final result by Rl Ronquillo MD (11/04/22 18:11:19)                   Impression:      Tiny the left thalamic acute infarct.    COMMUNICATION  This critical result was discovered/received at 18:05.  The critical information above was relayed directly by me by electronic message system to Dr. Sana Michael on 11/04/2022 at 18:09.      Electronically signed by: Rl Ronquillo  Date:    11/04/2022  Time:    18:11               Narrative:    EXAMINATION:  MRI BRAIN W WO CONTRAST    CLINICAL HISTORY:  Transient ischemic attack (TIA);.    TECHNIQUE:  Multiplanar multisequence MR imaging of the brain was performed before and after the administration of 6 mL Gadavist  intravenous contrast.    COMPARISON:  Multiple priors.    FINDINGS:  Intracranial compartment:    Ventricles and sulci are normal in size for age without evidence of hydrocephalus. No extra-axial blood or fluid collections.    Tiny acute infarct in the left thalamus.  No hemorrhage or midline shift.  Remote encephalomalacia in the right frontal lobe.  No mass lesion, acute hemorrhage, edema. No abnormal enhancement.    Irregularity in the right transverse sinus likely related to arachnoid granulations better delineated on the prior CTA head neck.    Skull/extracranial contents (limited evaluation): Bone marrow signal intensity is normal.                                       CTA Head and Neck (xpd) (Final result)  Result time 11/04/22 14:19:23      Final result by Samuel Calloway MD (11/04/22 14:19:23)                   Impression:      1. Tiny saccular aneurysm arising inferiorly from the supraclinoid right ICA measuring 4 x 3 x 2 mm.  2. No evidence for intracranial  thromboembolism.  No evidence for intracranial vasculitis.  3. Calcific atherosclerotic plaque in the cavernous right internal carotid artery causing moderate grade stenosis of the cavernous right ICA.  4. No hemodynamically significant stenosis of either the left or right internal carotid artery.  5. Marginal soft plaque throughout the transverse aortic arch with extension of some soft plaque in the innominate artery causing no hemodynamically significant stenosis.  All CT scans at this facility are performed  using dose modulation techniques as appropriate to performed exam including the following:  automated exposure control; adjustment of mA and/or kV according to the patients size (this includes techniques or standardized protocols for targeted exams where dose is matched to indication/reason for exam: i.e. extremities or head);  iterative reconstruction technique.      Electronically signed by: Samuel Calloway MD  Date:    11/04/2022  Time:    14:19               Narrative:    EXAMINATION:  CTA HEAD AND NECK (XPD)    CLINICAL HISTORY:  Stroke/TIA, determine embolic source;    TECHNIQUE:  Non contrast low dose axial images were obtained through the head.  CT angiogram was performed from the level of the norberto to the top of the head following the IV administration of 100mL of Omnipaque 350.   Sagittal and coronal reconstructions and maximum intensity projection reconstructions were performed.    Arterial stenosis percentages are based on NASCET measurement criteria.    COMPARISON:  MRI brain, 06/14/2022    FINDINGS:  Head:    The unenhanced head CT shows a large area of encephalomalacia in the right frontal lobe from an old infarct.  Bilateral old lacunar infarcts.  No midline shift or hydrocephalus.  No intracranial hemorrhage or extra-axial fluid collection.    The angiogram shows a tiny saccular aneurysm arising inferiorly from the right supraclinoid ICA measuring 4 x 3 x 2 mm.  No intracranial AVM there are no  arterial occlusions or truncation is.  No filling defects in the intracranial arterial vasculature.  There is asymmetric calcific atherosclerotic plaque in the cavernous right internal carotid artery causing a moderate grade stenosis of the cavernous right ICA.  There are no intracranial segmental stenoses to suggest vasculitis.  The internal cerebral veins and dural venous sinuses are patent.    Neck:    There is marginal soft plaque throughout the aortic arch there is a bovine arch.  There is soft plaque at the origin of the innominate artery causing no hemodynamically significant stenosis.  The bilateral cervical vertebral arteries are patent.    The right common carotid artery is patent.  No right ICA dissection.    The left common carotid artery is patent.  No left ICA dissection.    Right intraparotid nodule measuring 18 x 9 x 20 mm.  Left intraparotid nodule measuring 14 x 13 x 10 mm.  Both of these nodules may be intraparotid lymph nodes.  No cervical chain lymphadenopathy a. no inflammatory change.  Lung apices show centrilobular emphysema.                                       The EKG was ordered, reviewed, and independently interpreted by the ED provider.  Interpretation time: 11:11:51  Rate: 49 BPM  Rhythm: Junctional Rhythm  Interpretation: Inferior Infarct, age undetermined. Abnormal ECG. No STEMI.  When compared to EKG performed Rachel 15, 2022, Previous ECG has undetermined rhythm, needs review Minimal criteria for Anterior infarct are now Present Inferior infarct is now Present.          The Emergency Provider reviewed the vital signs and test results, which are outlined above.     ED Discussion       1:24 PM: Discussed case with Dr. Cedeno (Stroke Neurologist), who agrees tier 2, VAN+ stroke with negative CTA -- not tPA or interventional candidate. Recs standards CVA admit, MRI (stroke on CT looks >24 hrs out).     Wilton Dejesus NP agrees with current care and management of pt and accepts admission.    Admitting Service: Hospital Medicine  Admitting Physician: Dr. Hood  Admit to: [employer] tele      Medical Decision Making:   Clinical Tests:   Lab Tests: Ordered and Reviewed  Radiological Study: Ordered and Reviewed  Medical Tests: Ordered and Reviewed         ED Medication(s):  Medications   pantoprazole EC tablet 40 mg (40 mg Oral Given 11/6/22 0840)   ranolazine 12 hr tablet 500 mg (500 mg Oral Given 11/6/22 0841)   EScitalopram oxalate tablet 10 mg (10 mg Oral Given 11/6/22 0840)   sodium chloride 0.9% flush 10 mL (has no administration in time range)   sodium chloride 0.9% bolus 500 mL (has no administration in time range)   enoxaparin injection 40 mg (40 mg Subcutaneous Given 11/6/22 1719)   hydrALAZINE injection 10 mg (10 mg Intravenous Given 11/5/22 1721)   ondansetron injection 4 mg (has no administration in time range)   aspirin EC tablet 81 mg (81 mg Oral Given 11/6/22 0841)   atorvastatin tablet 40 mg (40 mg Oral Given 11/6/22 0841)   clopidogreL tablet 75 mg (75 mg Oral Given 11/6/22 0841)   glucose chewable tablet 16 g (has no administration in time range)   glucose chewable tablet 24 g (has no administration in time range)   glucagon (human recombinant) injection 1 mg (has no administration in time range)   dextrose 10% bolus 125 mL (has no administration in time range)   dextrose 10% bolus 250 mL (has no administration in time range)   insulin aspart U-100 pen 1-10 Units (2 Units Subcutaneous Given 11/6/22 1726)   oxybutynin 24 hr tablet 10 mg (10 mg Oral Given 11/6/22 0841)   0.9%  NaCl infusion ( Intravenous New Bag 11/4/22 2208)   amLODIPine tablet 5 mg (5 mg Oral Given 11/6/22 1513)   isosorbide mononitrate 24 hr tablet 120 mg (120 mg Oral Given 11/6/22 1512)   iohexoL (OMNIPAQUE 350) injection 100 mL (100 mLs Intravenous Given 11/4/22 1219)   gadobutroL (GADAVIST) injection 10 mL (6 mLs Intravenous Given 11/4/22 1729)       Current Discharge Medication List                  Scribe  Attestation:   Scribe #1: I performed the above scribed service and the documentation accurately describes the services I performed. I attest to the accuracy of the note.     Attending:   Physician Attestation Statement for Scribe #1: I, Serafin Merchant MD, personally performed the services described in this documentation, as scribed by Hina Yen, in my presence, and it is both accurate and complete.           Clinical Impression       ICD-10-CM ICD-9-CM   1. Cerebrovascular accident (CVA), unspecified mechanism  I63.9 434.91   2. Stroke  I63.9 434.91               Serafin Merchant MD  11/06/22 2044

## 2022-11-04 NOTE — ED NOTES
Patient changed into hospital gown and placed on continuous pulse ox, blood pressure, and cardiac monitor. Call light within reach of patient.

## 2022-11-04 NOTE — CARE UPDATE
Tele stroke attending on call:    Unable to connect with the spoke facility due to technical difficulty on their end.  Spoke with ED attending over the phone and he does not think we need to pursue a tele stroke on this gentleman with prior stroke who now comes in with mild L sided weakness and slurred speech, last known well last night.  His CT head showed no acute abnormality, CTA brain did not reveal LVO, and no evidence of hemodynamically significant carotid disease noted on CTA neck.  MRI brain, medical work up recommended.    .Hawk Cedeno MD   Vascular Neurology  [facility]  [facility]

## 2022-11-04 NOTE — HPI
80 y/o. male with a PMHx of AAA s/p repair, CAD, DM II, HLD, HTN, PAD,TIA and tobacco use who presented to the ED with c/o  of vision disturbances and facial droop which onset gradually around 3 am. Pt states he went to bed last night feeling normal, and then woke up around 3 am and wife noticed right-sided facial droop and uncoordinated walking. Pt admits that when he opens both eyes, he starts seeing double, but when he closes one eye  he can see normally again. Pt also admits to feeling dizzy. Associated sxs include facial droop, seeing double, uncoordinated walking, and dizziness. Patient denies any nausea, vomiting, diarrhea, fever,chest pain , sob  or GI/ sx . He was admitted on 6/22 with similar complain and df/C with a Dx of TIA after a negative acute CVA work up .  ER COURSE . Tele vascular neurology consulted from the ER and did not rec TPA . Head and neck CTA show did not show any acute finding .   ER VS:  BP Pulse Resp Temp SpO2   (!) 146/66 (!) 55 16 98.6 °F (37 °C) 96 %     Pt will be admitted to observation with a Dx of TIA  CODE STATUS FULL CODE

## 2022-11-04 NOTE — PHARMACY MED REC
"Admission Medication History     The home medication history was taken by Freddy Marin.    You may go to "Admission" then "Reconcile Home Medications" tabs to review and/or act upon these items.     The home medication list has been updated by the Pharmacy department.   Please read ALL comments highlighted in yellow.   Please address this information as you see fit.    Feel free to contact us if you have any questions or require assistance.    Unable to assess; no pill bottles, lists, or family members present; compiled list with [employer] analytical software and by comparing list with his pharmacy's dispensing record. Last doses unknown.    The medications listed below were removed from the home medication list.   Please reorder if appropriate:  I discontinued the following medication(s):  BACLOFEN 10 MG TABLET  ESCITALOPRAM OXALATE 10 MG TABLET  GABAPENTIN 100 MG CAPSULE  LISINOPRIL 10 MG TABLET  METFORMIN 1,000 MG TABLET  NEOMYCIN-POLYMYXIN-HYDROCORTISONE 3.5-10,000-1 MG/mL-UNIT/mL-% OTIC SUSPENSION  NITROGLYCERIN 0.4 MG SUBLINGUAL TABLET  PANTOPRAZOLE 40 MG TABLET  RANOLAZINE 500 MG Tb 12 TABLET      Medications listed below were obtained from: Analytic software- [employer] and Patient's pharmacy  (Not in a hospital admission)      Potential issues to be addressed PRIOR TO DISCHARGE: NONE    Freddy Marin CPhT-Adv  Pharmacy Technician Specialist-Medication History  [facility] [phone]  Secure chat preferred     Current Outpatient Medications on File Prior to Encounter   Medication Sig Dispense Refill Last Dose    simvastatin (ZOCOR) 20 MG tablet TAKE 1 TABLET BY MOUTH EVERY DAY IN THE EVENING 90 tablet 3 Unknown    amLODIPine (NORVASC) 5 MG tablet TAKE 1 TABLET BY MOUTH EVERY DAY 90 tablet 3 Unknown    aspirin (ECOTRIN) 81 MG EC tablet Take 1 tablet (81 mg total) by mouth once daily.  0 Unknown    clopidogreL (PLAVIX) 75 mg tablet TAKE 1 TABLET BY MOUTH EVERY DAY 90 tablet 3 Unknown    isosorbide mononitrate " (IMDUR) 120 MG 24 hr tablet TAKE 1 TABLET BY MOUTH ONCE DAILY 90 tablet 3 Unknown    MYRBETRIQ 50 mg Tb24 TAKE 1 TABLET BY MOUTH EVERY DAY 30 tablet 2 Unknown    [DISCONTINUED] baclofen (LIORESAL) 10 MG tablet Take 1 tablet (10 mg total) by mouth every evening. 30 tablet 0     [DISCONTINUED] EScitalopram oxalate (LEXAPRO) 10 MG tablet TAKE 1 TABLET BY MOUTH EVERY DAY 90 tablet 0     [DISCONTINUED] gabapentin (NEURONTIN) 100 MG capsule Take 1 capsule (100 mg total) by mouth every evening. 30 capsule 5     [DISCONTINUED] lisinopril 10 MG tablet Take 2 tablets (20 mg total) by mouth once daily. 90 tablet 3     [DISCONTINUED] metFORMIN (GLUCOPHAGE) 1000 MG tablet EARLINE 1 TABLETA VIA ORAL 2 VECES AL NORAH CON COMIDA 90 180 tablet 0     [DISCONTINUED] neomycin-polymyxin-hydrocortisone (CORTISPORIN) 3.5-10,000-1 mg/mL-unit/mL-% otic suspension        [DISCONTINUED] nitroGLYCERIN (NITROSTAT) 0.4 MG SL tablet Place 1 tablet (0.4 mg total) under the tongue every 5 (five) minutes as needed for Chest pain. 25 tablet 12     [DISCONTINUED] pantoprazole (PROTONIX) 40 MG tablet Take 1 tablet (40 mg total) by mouth once daily. (Patient not taking: Reported on 6/27/2022) 30 tablet 1     [DISCONTINUED] ranolazine (RANEXA) 500 MG Tb12 Take 1 tablet (500 mg total) by mouth 2 (two) times daily. (Patient not taking: Reported on 6/27/2022) 60 tablet 1                              .

## 2022-11-04 NOTE — ASSESSMENT & PLAN NOTE
Patient's FSGs are controlled on current medication regimen.  Last A1c reviewed-   Lab Results   Component Value Date    HGBA1C 5.9 (H) 06/14/2022     Most recent fingerstick glucose reviewed-   Recent Labs   Lab 11/04/22  1041   POCTGLUCOSE 197*     Current correctional scale  Medium  Maintain anti-hyperglycemic dose as follows-   Antihyperglycemics (From admission, onward)    Start     Stop Route Frequency Ordered    11/04/22 1730  insulin aspart U-100 pen 1-10 Units         -- SubQ Before meals & nightly PRN 11/04/22 1630        Hold Oral hypoglycemics while patient is in the hospital.

## 2022-11-04 NOTE — SUBJECTIVE & OBJECTIVE
Past Medical History:   Diagnosis Date    Acute blood loss anemia 10/14/2019    Aneurysm, abdominal aortic     Coronary artery disease     Depression     Diabetes mellitus     HLD (hyperlipidemia)     Hypertension     Kidney stone     PAD (peripheral artery disease)     Tobacco abuse        Past Surgical History:   Procedure Laterality Date    APPENDECTOMY      CORONARY STENT PLACEMENT      x 4    ESOPHAGOGASTRODUODENOSCOPY N/A 10/14/2019    Procedure: EGD (ESOPHAGOGASTRODUODENOSCOPY);  Surgeon: Carlos Mcneal III, MD;  Location: [facility] ENDO;  Service: Endoscopy;  Laterality: N/A;    ESOPHAGOGASTRODUODENOSCOPY N/A 10/15/2019    Procedure: EGD (ESOPHAGOGASTRODUODENOSCOPY);  Surgeon: Carlos Mcneal III, MD;  Location: [facility] ENDO;  Service: Endoscopy;  Laterality: N/A;    LEFT HEART CATHETERIZATION Left 10/11/2019    Procedure: CATHETERIZATION, HEART, LEFT;  Surgeon: Robe Gilbert MD;  Location: [facility] CATH LAB;  Service: Cardiology;  Laterality: Left;    LEFT HEART CATHETERIZATION Left 10/13/2019    Procedure: CATHETERIZATION, HEART, LEFT;  Surgeon: Robe Gilbert MD;  Location: [facility] CATH LAB;  Service: Cardiology;  Laterality: Left;    leg stent Left        Review of patient's allergies indicates:   Allergen Reactions    Metoprolol Other (See Comments)     Junctional rhythm         No current facility-administered medications on file prior to encounter.     Current Outpatient Medications on File Prior to Encounter   Medication Sig    simvastatin (ZOCOR) 20 MG tablet TAKE 1 TABLET BY MOUTH EVERY DAY IN THE EVENING    amLODIPine (NORVASC) 5 MG tablet TAKE 1 TABLET BY MOUTH EVERY DAY    aspirin (ECOTRIN) 81 MG EC tablet Take 1 tablet (81 mg total) by mouth once daily.    clopidogreL (PLAVIX) 75 mg tablet TAKE 1 TABLET BY MOUTH EVERY DAY    isosorbide mononitrate (IMDUR) 120 MG 24 hr tablet TAKE 1 TABLET BY MOUTH ONCE DAILY    MYRBETRIQ 50 mg Tb24 TAKE 1 TABLET BY MOUTH EVERY DAY    [DISCONTINUED] baclofen (LIORESAL)  10 MG tablet Take 1 tablet (10 mg total) by mouth every evening.    [DISCONTINUED] EScitalopram oxalate (LEXAPRO) 10 MG tablet TAKE 1 TABLET BY MOUTH EVERY DAY    [DISCONTINUED] gabapentin (NEURONTIN) 100 MG capsule Take 1 capsule (100 mg total) by mouth every evening.    [DISCONTINUED] lisinopril 10 MG tablet Take 2 tablets (20 mg total) by mouth once daily.    [DISCONTINUED] metFORMIN (GLUCOPHAGE) 1000 MG tablet EARLINE 1 TABLETA VIA ORAL 2 VECES AL NORAH CON COMIDA 90    [DISCONTINUED] neomycin-polymyxin-hydrocortisone (CORTISPORIN) 3.5-10,000-1 mg/mL-unit/mL-% otic suspension     [DISCONTINUED] nitroGLYCERIN (NITROSTAT) 0.4 MG SL tablet Place 1 tablet (0.4 mg total) under the tongue every 5 (five) minutes as needed for Chest pain.    [DISCONTINUED] pantoprazole (PROTONIX) 40 MG tablet Take 1 tablet (40 mg total) by mouth once daily. (Patient not taking: Reported on 6/27/2022)    [DISCONTINUED] ranolazine (RANEXA) 500 MG Tb12 Take 1 tablet (500 mg total) by mouth 2 (two) times daily. (Patient not taking: Reported on 6/27/2022)     Family History       Problem Relation (Age of Onset)    COPD Father    Diabetes Mother, Brother          Tobacco Use    Smoking status: Every Day     Types: Cigars     Passive exposure: Never    Smokeless tobacco: Current   Substance and Sexual Activity    Alcohol use: Not Currently    Drug use: Not Currently    Sexual activity: Not Currently     Review of Systems   Constitutional:  Positive for activity change and fatigue.   HENT: Negative.     Eyes:  Positive for visual disturbance.   Respiratory: Negative.     Cardiovascular: Negative.    Gastrointestinal: Negative.    Endocrine: Negative.    Genitourinary: Negative.    Musculoskeletal: Negative.    Skin: Negative.    Neurological:  Positive for dizziness and weakness.   Hematological: Negative.    Psychiatric/Behavioral: Negative.     Objective:     Vital Signs (Most Recent):  Temp: 98.6 °F (37 °C) (11/04/22 1028)  Pulse: (!) 45  (11/04/22 1500)  Resp: 20 (11/04/22 1500)  BP: (!) 145/64 (11/04/22 1500)  SpO2: 96 % (11/04/22 1500)   Vital Signs (24h Range):  Temp:  [98.6 °F (37 °C)] 98.6 °F (37 °C)  Pulse:  [45-63] 45  Resp:  [16-22] 20  SpO2:  [95 %-97 %] 96 %  BP: (145-190)/(64-84) 145/64     Weight: 75.8 kg (167 lb)  Body mass index is 23.96 kg/m².    Physical Exam  Vitals and nursing note reviewed.   Constitutional:       Appearance: Normal appearance. He is ill-appearing.   HENT:      Head: Normocephalic and atraumatic.      Mouth/Throat:      Mouth: Mucous membranes are moist.   Eyes:      Extraocular Movements: Extraocular movements intact.      Conjunctiva/sclera: Conjunctivae normal.      Pupils: Pupils are equal, round, and reactive to light.   Cardiovascular:      Rate and Rhythm: Regular rhythm. Bradycardia present.      Pulses: Normal pulses.      Heart sounds: No murmur heard.    No friction rub.   Pulmonary:      Effort: Pulmonary effort is normal. No respiratory distress.      Breath sounds: No stridor. No wheezing or rhonchi.   Chest:      Chest wall: No tenderness.   Abdominal:      General: Abdomen is flat. Bowel sounds are normal. There is no distension.      Palpations: Abdomen is soft. There is no mass.      Tenderness: There is no abdominal tenderness. There is no guarding or rebound.      Hernia: No hernia is present.   Musculoskeletal:         General: No swelling, tenderness, deformity or signs of injury. Normal range of motion.      Cervical back: Normal range of motion. No rigidity or tenderness.   Skin:     General: Skin is warm.      Coloration: Skin is not jaundiced or pale.      Findings: No bruising or erythema.   Neurological:      General: No focal deficit present.      Mental Status: He is alert and oriented to person, place, and time. Mental status is at baseline.      Cranial Nerves: No cranial nerve deficit.      Sensory: No sensory deficit.      Motor: No weakness.      Coordination: Coordination  normal.   Psychiatric:         Mood and Affect: Mood normal.         CRANIAL NERVES     CN III, IV, VI   Pupils are equal, round, and reactive to light.     Significant Labs: All pertinent labs within the past 24 hours have been reviewed.    Results for orders placed or performed during the hospital encounter of 11/04/22   CBC W/ AUTO DIFFERENTIAL   Result Value Ref Range    WBC 5.95 3.90 - 12.70 K/uL    RBC 4.39 (L) 4.60 - 6.20 M/uL    Hemoglobin 13.1 (L) 14.0 - 18.0 g/dL    Hematocrit 39.3 (L) 40.0 - 54.0 %    MCV 90 82 - 98 fL    MCH 29.8 27.0 - 31.0 pg    MCHC 33.3 32.0 - 36.0 g/dL    RDW 14.1 11.5 - 14.5 %    Platelets 141 (L) 150 - 450 K/uL    MPV 11.2 9.2 - 12.9 fL    Immature Granulocytes 0.3 0.0 - 0.5 %    Gran # (ANC) 4.2 1.8 - 7.7 K/uL    Immature Grans (Abs) 0.02 0.00 - 0.04 K/uL    Lymph # 1.0 1.0 - 4.8 K/uL    Mono # 0.5 0.3 - 1.0 K/uL    Eos # 0.2 0.0 - 0.5 K/uL    Baso # 0.03 0.00 - 0.20 K/uL    nRBC 0 0 /100 WBC    Gran % 70.7 38.0 - 73.0 %    Lymph % 16.0 (L) 18.0 - 48.0 %    Mono % 9.1 4.0 - 15.0 %    Eosinophil % 3.4 0.0 - 8.0 %    Basophil % 0.5 0.0 - 1.9 %    Differential Method Automated    Comprehensive metabolic panel   Result Value Ref Range    Sodium 139 136 - 145 mmol/L    Potassium 4.0 3.5 - 5.1 mmol/L    Chloride 107 95 - 110 mmol/L    CO2 22 (L) 23 - 29 mmol/L    Glucose 201 (H) 70 - 110 mg/dL    BUN 26 (H) 8 - 23 mg/dL    Creatinine 1.3 0.5 - 1.4 mg/dL    Calcium 9.1 8.7 - 10.5 mg/dL    Total Protein 6.6 6.0 - 8.4 g/dL    Albumin 3.2 (L) 3.5 - 5.2 g/dL    Total Bilirubin 0.5 0.1 - 1.0 mg/dL    Alkaline Phosphatase 66 55 - 135 U/L    AST 13 10 - 40 U/L    ALT 9 (L) 10 - 44 U/L    Anion Gap 10 8 - 16 mmol/L    eGFR 55 (A) >60 mL/min/1.73 m^2   Protime-INR   Result Value Ref Range    Prothrombin Time 10.9 9.0 - 12.5 sec    INR 1.0 0.8 - 1.2   TSH   Result Value Ref Range    TSH 1.944 0.400 - 4.000 uIU/mL   Troponin I   Result Value Ref Range    Troponin I 0.026 0.000 - 0.026 ng/mL   APTT    Result Value Ref Range    aPTT 26.5 21.0 - 32.0 sec   POCT glucose   Result Value Ref Range    POCT Glucose 197 (H) 70 - 110 mg/dL       Significant Imaging: I have reviewed all pertinent imaging results/findings within the past 24 hours.    CTA Head and Neck (xpd)   Final Result      1. Tiny saccular aneurysm arising inferiorly from the supraclinoid right ICA measuring 4 x 3 x 2 mm.   2. No evidence for intracranial thromboembolism.  No evidence for intracranial vasculitis.   3. Calcific atherosclerotic plaque in the cavernous right internal carotid artery causing moderate grade stenosis of the cavernous right ICA.   4. No hemodynamically significant stenosis of either the left or right internal carotid artery.   5. Marginal soft plaque throughout the transverse aortic arch with extension of some soft plaque in the innominate artery causing no hemodynamically significant stenosis.   All CT scans at this facility are performed  using dose modulation techniques as appropriate to performed exam including the following:  automated exposure control; adjustment of mA and/or kV according to the patients size (this includes techniques or standardized protocols for targeted exams where dose is matched to indication/reason for exam: i.e. extremities or head);  iterative reconstruction technique.         Electronically signed by: Samuel Calloway MD   Date:    11/04/2022   Time:    14:19      MRI Brain W WO Contrast    (Results Pending)

## 2022-11-04 NOTE — ASSESSMENT & PLAN NOTE
Assistance with smoking cessation was offered, including:  [x]  Medications  []  Counseling  []  Printed Information on Smoking Cessation  []  Referral to a Smoking Cessation Program    Patient was counseled regarding smoking for >10 minutes.

## 2022-11-04 NOTE — H&P
[facility] - Emergency Dept.  [facility] Medicine  History & Physical    Patient Name: Aquiles Kelsey  MRN: [id number]  Patient Class: OP- Observation  Admission Date: 11/4/2022  Attending Physician: John Michael MD  Primary Care Provider: Holger Thornton MD         Patient information was obtained from patient and ER records.     Subjective:     Principal Problem:TIA (transient ischemic attack)    Chief Complaint:   Chief Complaint   Patient presents with    Facial Droop     3am pt. Started seeing double, right side facial droop noticed by family around 8;15 am        HPI:   80 y/o. male with a PMHx of AAA s/p repair, CAD, DM II, HLD, HTN, PAD,TIA and tobacco use who presented to the ED with c/o  of vision disturbances and facial droop which onset gradually around 3 am. Pt states he went to bed last night feeling normal, and then woke up around 3 am and wife noticed right-sided facial droop and uncoordinated walking. Pt admits that when he opens both eyes, he starts seeing double, but when he closes one eye  he can see normally again. Pt also admits to feeling dizzy. Associated sxs include facial droop, seeing double, uncoordinated walking, and dizziness. Patient denies any nausea, vomiting, diarrhea, fever,chest pain , sob  or GI/ sx . He was admitted on 6/22 with similar complain and df/C with a Dx of TIA after a negative acute CVA work up .  ER COURSE . Tele vascular neurology consulted from the ER and did not rec TPA . Head and neck CTA show did not show any acute finding .   ER VS:  BP Pulse Resp Temp SpO2   (!) 146/66 (!) 55 16 98.6 °F (37 °C) 96 %     Pt will be admitted to observation with a Dx of TIA  CODE STATUS FULL CODE      Past Medical History:   Diagnosis Date    Acute blood loss anemia 10/14/2019    Aneurysm, abdominal aortic     Coronary artery disease     Depression     Diabetes mellitus     HLD (hyperlipidemia)     Hypertension     Kidney stone     PAD (peripheral  artery disease)     Tobacco abuse        Past Surgical History:   Procedure Laterality Date    APPENDECTOMY      CORONARY STENT PLACEMENT      x 4    ESOPHAGOGASTRODUODENOSCOPY N/A 10/14/2019    Procedure: EGD (ESOPHAGOGASTRODUODENOSCOPY);  Surgeon: Carlos Mcneal III, MD;  Location: [facility] ENDO;  Service: Endoscopy;  Laterality: N/A;    ESOPHAGOGASTRODUODENOSCOPY N/A 10/15/2019    Procedure: EGD (ESOPHAGOGASTRODUODENOSCOPY);  Surgeon: Carlos Mcneal III, MD;  Location: [facility] ENDO;  Service: Endoscopy;  Laterality: N/A;    LEFT HEART CATHETERIZATION Left 10/11/2019    Procedure: CATHETERIZATION, HEART, LEFT;  Surgeon: Robe Gilbert MD;  Location: [facility] CATH LAB;  Service: Cardiology;  Laterality: Left;    LEFT HEART CATHETERIZATION Left 10/13/2019    Procedure: CATHETERIZATION, HEART, LEFT;  Surgeon: Robe Gilbert MD;  Location: [facility] CATH LAB;  Service: Cardiology;  Laterality: Left;    leg stent Left        Review of patient's allergies indicates:   Allergen Reactions    Metoprolol Other (See Comments)     Junctional rhythm         No current facility-administered medications on file prior to encounter.     Current Outpatient Medications on File Prior to Encounter   Medication Sig    simvastatin (ZOCOR) 20 MG tablet TAKE 1 TABLET BY MOUTH EVERY DAY IN THE EVENING    amLODIPine (NORVASC) 5 MG tablet TAKE 1 TABLET BY MOUTH EVERY DAY    aspirin (ECOTRIN) 81 MG EC tablet Take 1 tablet (81 mg total) by mouth once daily.    clopidogreL (PLAVIX) 75 mg tablet TAKE 1 TABLET BY MOUTH EVERY DAY    isosorbide mononitrate (IMDUR) 120 MG 24 hr tablet TAKE 1 TABLET BY MOUTH ONCE DAILY    MYRBETRIQ 50 mg Tb24 TAKE 1 TABLET BY MOUTH EVERY DAY    [DISCONTINUED] baclofen (LIORESAL) 10 MG tablet Take 1 tablet (10 mg total) by mouth every evening.    [DISCONTINUED] EScitalopram oxalate (LEXAPRO) 10 MG tablet TAKE 1 TABLET BY MOUTH EVERY DAY    [DISCONTINUED] gabapentin (NEURONTIN) 100 MG capsule Take 1 capsule  (100 mg total) by mouth every evening.    [DISCONTINUED] lisinopril 10 MG tablet Take 2 tablets (20 mg total) by mouth once daily.    [DISCONTINUED] metFORMIN (GLUCOPHAGE) 1000 MG tablet EARLINE 1 TABLETA VIA ORAL 2 VECES AL NORAH CON COMIDA 90    [DISCONTINUED] neomycin-polymyxin-hydrocortisone (CORTISPORIN) 3.5-10,000-1 mg/mL-unit/mL-% otic suspension     [DISCONTINUED] nitroGLYCERIN (NITROSTAT) 0.4 MG SL tablet Place 1 tablet (0.4 mg total) under the tongue every 5 (five) minutes as needed for Chest pain.    [DISCONTINUED] pantoprazole (PROTONIX) 40 MG tablet Take 1 tablet (40 mg total) by mouth once daily. (Patient not taking: Reported on 6/27/2022)    [DISCONTINUED] ranolazine (RANEXA) 500 MG Tb12 Take 1 tablet (500 mg total) by mouth 2 (two) times daily. (Patient not taking: Reported on 6/27/2022)     Family History       Problem Relation (Age of Onset)    COPD Father    Diabetes Mother, Brother          Tobacco Use    Smoking status: Every Day     Types: Cigars     Passive exposure: Never    Smokeless tobacco: Current   Substance and Sexual Activity    Alcohol use: Not Currently    Drug use: Not Currently    Sexual activity: Not Currently     Review of Systems   Constitutional:  Positive for activity change and fatigue.   HENT: Negative.     Eyes:  Positive for visual disturbance.   Respiratory: Negative.     Cardiovascular: Negative.    Gastrointestinal: Negative.    Endocrine: Negative.    Genitourinary: Negative.    Musculoskeletal: Negative.    Skin: Negative.    Neurological:  Positive for dizziness and weakness.   Hematological: Negative.    Psychiatric/Behavioral: Negative.     Objective:     Vital Signs (Most Recent):  Temp: 98.6 °F (37 °C) (11/04/22 1028)  Pulse: (!) 45 (11/04/22 1500)  Resp: 20 (11/04/22 1500)  BP: (!) 145/64 (11/04/22 1500)  SpO2: 96 % (11/04/22 1500)   Vital Signs (24h Range):  Temp:  [98.6 °F (37 °C)] 98.6 °F (37 °C)  Pulse:  [45-63] 45  Resp:  [16-22] 20  SpO2:  [95 %-97  %] 96 %  BP: (145-190)/(64-84) 145/64     Weight: 75.8 kg (167 lb)  Body mass index is 23.96 kg/m².    Physical Exam  Vitals and nursing note reviewed.   Constitutional:       Appearance: Normal appearance. He is ill-appearing.   HENT:      Head: Normocephalic and atraumatic.      Mouth/Throat:      Mouth: Mucous membranes are moist.   Eyes:      Extraocular Movements: Extraocular movements intact.      Conjunctiva/sclera: Conjunctivae normal.      Pupils: Pupils are equal, round, and reactive to light.   Cardiovascular:      Rate and Rhythm: Regular rhythm. Bradycardia present.      Pulses: Normal pulses.      Heart sounds: No murmur heard.    No friction rub.   Pulmonary:      Effort: Pulmonary effort is normal. No respiratory distress.      Breath sounds: No stridor. No wheezing or rhonchi.   Chest:      Chest wall: No tenderness.   Abdominal:      General: Abdomen is flat. Bowel sounds are normal. There is no distension.      Palpations: Abdomen is soft. There is no mass.      Tenderness: There is no abdominal tenderness. There is no guarding or rebound.      Hernia: No hernia is present.   Musculoskeletal:         General: No swelling, tenderness, deformity or signs of injury. Normal range of motion.      Cervical back: Normal range of motion. No rigidity or tenderness.   Skin:     General: Skin is warm.      Coloration: Skin is not jaundiced or pale.      Findings: No bruising or erythema.   Neurological:      General: No focal deficit present.      Mental Status: He is alert and oriented to person, place, and time. Mental status is at baseline.      Cranial Nerves: No cranial nerve deficit.      Sensory: No sensory deficit.      Motor: No weakness.      Coordination: Coordination normal.   Psychiatric:         Mood and Affect: Mood normal.         CRANIAL NERVES     CN III, IV, VI   Pupils are equal, round, and reactive to light.     Significant Labs: All pertinent labs within the past 24 hours have been  reviewed.    Results for orders placed or performed during the hospital encounter of 11/04/22   CBC W/ AUTO DIFFERENTIAL   Result Value Ref Range    WBC 5.95 3.90 - 12.70 K/uL    RBC 4.39 (L) 4.60 - 6.20 M/uL    Hemoglobin 13.1 (L) 14.0 - 18.0 g/dL    Hematocrit 39.3 (L) 40.0 - 54.0 %    MCV 90 82 - 98 fL    MCH 29.8 27.0 - 31.0 pg    MCHC 33.3 32.0 - 36.0 g/dL    RDW 14.1 11.5 - 14.5 %    Platelets 141 (L) 150 - 450 K/uL    MPV 11.2 9.2 - 12.9 fL    Immature Granulocytes 0.3 0.0 - 0.5 %    Gran # (ANC) 4.2 1.8 - 7.7 K/uL    Immature Grans (Abs) 0.02 0.00 - 0.04 K/uL    Lymph # 1.0 1.0 - 4.8 K/uL    Mono # 0.5 0.3 - 1.0 K/uL    Eos # 0.2 0.0 - 0.5 K/uL    Baso # 0.03 0.00 - 0.20 K/uL    nRBC 0 0 /100 WBC    Gran % 70.7 38.0 - 73.0 %    Lymph % 16.0 (L) 18.0 - 48.0 %    Mono % 9.1 4.0 - 15.0 %    Eosinophil % 3.4 0.0 - 8.0 %    Basophil % 0.5 0.0 - 1.9 %    Differential Method Automated    Comprehensive metabolic panel   Result Value Ref Range    Sodium 139 136 - 145 mmol/L    Potassium 4.0 3.5 - 5.1 mmol/L    Chloride 107 95 - 110 mmol/L    CO2 22 (L) 23 - 29 mmol/L    Glucose 201 (H) 70 - 110 mg/dL    BUN 26 (H) 8 - 23 mg/dL    Creatinine 1.3 0.5 - 1.4 mg/dL    Calcium 9.1 8.7 - 10.5 mg/dL    Total Protein 6.6 6.0 - 8.4 g/dL    Albumin 3.2 (L) 3.5 - 5.2 g/dL    Total Bilirubin 0.5 0.1 - 1.0 mg/dL    Alkaline Phosphatase 66 55 - 135 U/L    AST 13 10 - 40 U/L    ALT 9 (L) 10 - 44 U/L    Anion Gap 10 8 - 16 mmol/L    eGFR 55 (A) >60 mL/min/1.73 m^2   Protime-INR   Result Value Ref Range    Prothrombin Time 10.9 9.0 - 12.5 sec    INR 1.0 0.8 - 1.2   TSH   Result Value Ref Range    TSH 1.944 0.400 - 4.000 uIU/mL   Troponin I   Result Value Ref Range    Troponin I 0.026 0.000 - 0.026 ng/mL   APTT   Result Value Ref Range    aPTT 26.5 21.0 - 32.0 sec   POCT glucose   Result Value Ref Range    POCT Glucose 197 (H) 70 - 110 mg/dL       Significant Imaging: I have reviewed all pertinent imaging results/findings within the  past 24 hours.    CTA Head and Neck (xpd)   Final Result      1. Tiny saccular aneurysm arising inferiorly from the supraclinoid right ICA measuring 4 x 3 x 2 mm.   2. No evidence for intracranial thromboembolism.  No evidence for intracranial vasculitis.   3. Calcific atherosclerotic plaque in the cavernous right internal carotid artery causing moderate grade stenosis of the cavernous right ICA.   4. No hemodynamically significant stenosis of either the left or right internal carotid artery.   5. Marginal soft plaque throughout the transverse aortic arch with extension of some soft plaque in the innominate artery causing no hemodynamically significant stenosis.   All CT scans at this facility are performed  using dose modulation techniques as appropriate to performed exam including the following:  automated exposure control; adjustment of mA and/or kV according to the patients size (this includes techniques or standardized protocols for targeted exams where dose is matched to indication/reason for exam: i.e. extremities or head);  iterative reconstruction technique.         Electronically signed by: Samuel Calloway MD   Date:    11/04/2022   Time:    14:19      MRI Brain W WO Contrast    (Results Pending)         Assessment/Plan:     * TIA (transient ischemic attack)  -Permessive HTN  -resume statin , asa  And plavix  -F/U MRI  Brain   -Neurocheck   -PT/OT/SLP      Tobacco abuse  Assistance with smoking cessation was offered, including:  [x]  Medications  []  Counseling  []  Printed Information on Smoking Cessation  []  Referral to a Smoking Cessation Program    Patient was counseled regarding smoking for >10 minutes.        Type 2 diabetes mellitus, without long-term current use of insulin  Patient's FSGs are controlled on current medication regimen.  Last A1c reviewed-   Lab Results   Component Value Date    HGBA1C 5.9 (H) 06/14/2022     Most recent fingerstick glucose reviewed-   Recent Labs   Lab 11/04/22  1041    POCTGLUCOSE 197*     Current correctional scale  Medium  Maintain anti-hyperglycemic dose as follows-   Antihyperglycemics (From admission, onward)    Start     Stop Route Frequency Ordered    11/04/22 1730  insulin aspart U-100 pen 1-10 Units         -- SubQ Before meals & nightly PRN 11/04/22 1630        Hold Oral hypoglycemics while patient is in the hospital.    Dyslipidemia  Resume statin       Essential hypertension  Permissive HTN until acute CVA r/io with Robe MRI   Hydralazine PRN if SBP >200       PAD (peripheral artery disease)  Cont Statin , plavix and asa      CAD (coronary artery disease)  Cont asa , statin and asa  Stable           VTE Risk Mitigation (From admission, onward)         Ordered     enoxaparin injection 40 mg  Daily         11/04/22 1627     IP VTE HIGH RISK PATIENT  Once         11/04/22 1627     Place sequential compression device  Until discontinued         11/04/22 1627                   John Michael MD  Department of Hospital Medicine   '[facility] - Emergency Dept.

## 2022-11-05 LAB
ALBUMIN SERPL BCP-MCNC: 3 G/DL (ref 3.5–5.2)
ALP SERPL-CCNC: 68 U/L (ref 55–135)
ALT SERPL W/O P-5'-P-CCNC: 8 U/L (ref 10–44)
ANION GAP SERPL CALC-SCNC: 8 MMOL/L (ref 8–16)
APTT BLDCRRT: 28.6 SEC (ref 21–32)
AST SERPL-CCNC: 10 U/L (ref 10–40)
BASOPHILS # BLD AUTO: 0.03 K/UL (ref 0–0.2)
BASOPHILS NFR BLD: 0.5 % (ref 0–1.9)
BILIRUB SERPL-MCNC: 0.4 MG/DL (ref 0.1–1)
BUN SERPL-MCNC: 26 MG/DL (ref 8–23)
CALCIUM SERPL-MCNC: 9.2 MG/DL (ref 8.7–10.5)
CHLORIDE SERPL-SCNC: 109 MMOL/L (ref 95–110)
CHOLEST SERPL-MCNC: 158 MG/DL (ref 120–199)
CHOLEST/HDLC SERPL: 4 {RATIO} (ref 2–5)
CK MB SERPL-MCNC: 2 NG/ML (ref 0.1–6.5)
CK MB SERPL-RTO: 3.1 % (ref 0–5)
CK SERPL-CCNC: 64 U/L (ref 20–200)
CO2 SERPL-SCNC: 24 MMOL/L (ref 23–29)
CREAT SERPL-MCNC: 1.3 MG/DL (ref 0.5–1.4)
DIFFERENTIAL METHOD: ABNORMAL
EOSINOPHIL # BLD AUTO: 0.2 K/UL (ref 0–0.5)
EOSINOPHIL NFR BLD: 3.6 % (ref 0–8)
ERYTHROCYTE [DISTWIDTH] IN BLOOD BY AUTOMATED COUNT: 14.1 % (ref 11.5–14.5)
EST. GFR  (NO RACE VARIABLE): 55 ML/MIN/1.73 M^2
ESTIMATED AVG GLUCOSE: 140 MG/DL (ref 68–131)
GLUCOSE SERPL-MCNC: 129 MG/DL (ref 70–110)
HBA1C MFR BLD: 6.5 % (ref 4–5.6)
HCT VFR BLD AUTO: 38.6 % (ref 40–54)
HDLC SERPL-MCNC: 40 MG/DL (ref 40–75)
HDLC SERPL: 25.3 % (ref 20–50)
HGB BLD-MCNC: 13.1 G/DL (ref 14–18)
IMM GRANULOCYTES # BLD AUTO: 0.02 K/UL (ref 0–0.04)
IMM GRANULOCYTES NFR BLD AUTO: 0.3 % (ref 0–0.5)
INR PPP: 1 (ref 0.8–1.2)
LDLC SERPL CALC-MCNC: 96.8 MG/DL (ref 63–159)
LYMPHOCYTES # BLD AUTO: 1.1 K/UL (ref 1–4.8)
LYMPHOCYTES NFR BLD: 17.4 % (ref 18–48)
MAGNESIUM SERPL-MCNC: 1.9 MG/DL (ref 1.6–2.6)
MCH RBC QN AUTO: 29.9 PG (ref 27–31)
MCHC RBC AUTO-ENTMCNC: 33.9 G/DL (ref 32–36)
MCV RBC AUTO: 88 FL (ref 82–98)
MONOCYTES # BLD AUTO: 0.7 K/UL (ref 0.3–1)
MONOCYTES NFR BLD: 11.8 % (ref 4–15)
NEUTROPHILS # BLD AUTO: 4 K/UL (ref 1.8–7.7)
NEUTROPHILS NFR BLD: 66.4 % (ref 38–73)
NONHDLC SERPL-MCNC: 118 MG/DL
NRBC BLD-RTO: 0 /100 WBC
PHOSPHATE SERPL-MCNC: 3.4 MG/DL (ref 2.7–4.5)
PLATELET # BLD AUTO: 136 K/UL (ref 150–450)
PMV BLD AUTO: 11.2 FL (ref 9.2–12.9)
POCT GLUCOSE: 122 MG/DL (ref 70–110)
POCT GLUCOSE: 124 MG/DL (ref 70–110)
POCT GLUCOSE: 169 MG/DL (ref 70–110)
POTASSIUM SERPL-SCNC: 4.1 MMOL/L (ref 3.5–5.1)
PROT SERPL-MCNC: 6.1 G/DL (ref 6–8.4)
PROTHROMBIN TIME: 10.9 SEC (ref 9–12.5)
RBC # BLD AUTO: 4.38 M/UL (ref 4.6–6.2)
SODIUM SERPL-SCNC: 141 MMOL/L (ref 136–145)
TRIGL SERPL-MCNC: 106 MG/DL (ref 30–150)
TROPONIN I SERPL DL<=0.01 NG/ML-MCNC: 0.03 NG/ML (ref 0–0.03)
WBC # BLD AUTO: 6.04 K/UL (ref 3.9–12.7)

## 2022-11-05 PROCEDURE — 25000003 PHARM REV CODE 250: Performed by: INTERNAL MEDICINE

## 2022-11-05 PROCEDURE — 97167 OT EVAL HIGH COMPLEX 60 MIN: CPT

## 2022-11-05 PROCEDURE — 82553 CREATINE MB FRACTION: CPT | Performed by: INTERNAL MEDICINE

## 2022-11-05 PROCEDURE — 99223 PR INITIAL HOSPITAL CARE,LEVL III: ICD-10-PCS | Mod: ,,, | Performed by: PSYCHIATRY & NEUROLOGY

## 2022-11-05 PROCEDURE — 96374 THER/PROPH/DIAG INJ IV PUSH: CPT

## 2022-11-05 PROCEDURE — 92523 SPEECH SOUND LANG COMPREHEN: CPT

## 2022-11-05 PROCEDURE — 83735 ASSAY OF MAGNESIUM: CPT | Performed by: INTERNAL MEDICINE

## 2022-11-05 PROCEDURE — G0378 HOSPITAL OBSERVATION PER HR: HCPCS

## 2022-11-05 PROCEDURE — 84484 ASSAY OF TROPONIN QUANT: CPT | Performed by: INTERNAL MEDICINE

## 2022-11-05 PROCEDURE — 99223 1ST HOSP IP/OBS HIGH 75: CPT | Mod: ,,, | Performed by: PSYCHIATRY & NEUROLOGY

## 2022-11-05 PROCEDURE — 97116 GAIT TRAINING THERAPY: CPT

## 2022-11-05 PROCEDURE — 63600175 PHARM REV CODE 636 W HCPCS: Performed by: INTERNAL MEDICINE

## 2022-11-05 PROCEDURE — 36415 COLL VENOUS BLD VENIPUNCTURE: CPT | Performed by: INTERNAL MEDICINE

## 2022-11-05 PROCEDURE — 80053 COMPREHEN METABOLIC PANEL: CPT | Performed by: INTERNAL MEDICINE

## 2022-11-05 PROCEDURE — 92610 EVALUATE SWALLOWING FUNCTION: CPT

## 2022-11-05 PROCEDURE — 85730 THROMBOPLASTIN TIME PARTIAL: CPT | Performed by: INTERNAL MEDICINE

## 2022-11-05 PROCEDURE — 85610 PROTHROMBIN TIME: CPT | Performed by: INTERNAL MEDICINE

## 2022-11-05 PROCEDURE — 97162 PT EVAL MOD COMPLEX 30 MIN: CPT

## 2022-11-05 PROCEDURE — 84100 ASSAY OF PHOSPHORUS: CPT | Performed by: INTERNAL MEDICINE

## 2022-11-05 PROCEDURE — 85025 COMPLETE CBC W/AUTO DIFF WBC: CPT | Performed by: INTERNAL MEDICINE

## 2022-11-05 PROCEDURE — 97112 NEUROMUSCULAR REEDUCATION: CPT

## 2022-11-05 PROCEDURE — 96372 THER/PROPH/DIAG INJ SC/IM: CPT | Performed by: INTERNAL MEDICINE

## 2022-11-05 RX ADMIN — PANTOPRAZOLE SODIUM 40 MG: 40 TABLET, DELAYED RELEASE ORAL at 08:11

## 2022-11-05 RX ADMIN — ESCITALOPRAM OXALATE 10 MG: 10 TABLET, FILM COATED ORAL at 08:11

## 2022-11-05 RX ADMIN — HYDRALAZINE HYDROCHLORIDE 10 MG: 20 INJECTION, SOLUTION INTRAMUSCULAR; INTRAVENOUS at 05:11

## 2022-11-05 RX ADMIN — OXYBUTYNIN CHLORIDE 10 MG: 5 TABLET, EXTENDED RELEASE ORAL at 08:11

## 2022-11-05 RX ADMIN — ASPIRIN 81 MG: 81 TABLET, COATED ORAL at 08:11

## 2022-11-05 RX ADMIN — ATORVASTATIN CALCIUM 40 MG: 40 TABLET, FILM COATED ORAL at 08:11

## 2022-11-05 RX ADMIN — RANOLAZINE 500 MG: 500 TABLET, EXTENDED RELEASE ORAL at 08:11

## 2022-11-05 RX ADMIN — CLOPIDOGREL BISULFATE 75 MG: 75 TABLET ORAL at 08:11

## 2022-11-05 RX ADMIN — RANOLAZINE 500 MG: 500 TABLET, EXTENDED RELEASE ORAL at 09:11

## 2022-11-05 RX ADMIN — ENOXAPARIN SODIUM 40 MG: 40 INJECTION SUBCUTANEOUS at 05:11

## 2022-11-05 NOTE — PT/OT/SLP EVAL
Occupational Therapy   Evaluation    Name: Aquiles Kelsey  MRN: [id number]  Admitting Diagnosis:  TIA (transient ischemic attack)  Recent Surgery: * No surgery found *      Recommendations:     Discharge Recommendations: rehabilitation facility  Discharge Equipment Recommendations:  walker, rolling, shower chair  Barriers to discharge:  None    Assessment:     Aquiles Kelsey is a 82 y.o. male with a medical diagnosis of TIA (transient ischemic attack).  He presents with SELF CARE DEBILITY.   Performance deficits affecting function: weakness, impaired endurance, impaired self care skills, impaired functional mobility, gait instability, impaired balance, visual deficits, decreased safety awareness.      Rehab Prognosis: Good; patient would benefit from acute skilled OT services to address these deficits and reach maximum level of function.       Plan:     Patient to be seen 2 x/week to address the above listed problems via self-care/home management, therapeutic activities, therapeutic exercises  Plan of Care Expires: 11/19/22  Plan of Care Reviewed with: patient    Subjective     Chief Complaint: DOUBLE VISION  Patient/Family Comments/goals: WANTS TO GO HOME.    Occupational Profile:  Living Environment: PATIENT LIVES IN 1 STORY HOUSE WITH 0 STEPS WITH WIFE AND 30 YEAR OLD SON.   Previous level of function: PATIENT STATED HE WAS (I) WITH ALL LEVELS OF SELF CARE.  PATIENT WAS NOT DRIVING.   Roles and Routines: PATIENT STATED HE WAS (I) WITH ALL LEVELS OF SELF CARE.  PATIENT WAS NOT DRIVING.   Equipment Used at Home:  none  Assistance upon Discharge: FAMILY    Pain/Comfort:  Pain Rating 1: 0/10    Patients cultural, spiritual, Restorationism conflicts given the current situation:      Objective:     Communicated with: NURSE prior to session.  Patient found supine with peripheral IV, oxygen, telemetry upon OT entry to room.    General Precautions: Standard, fall, vision impaired   Orthopedic Precautions:     Braces:    Respiratory Status:  PATIENT'S O2 HOSE WAS NOT IN USE.    Occupational Performance:    Bed Mobility:    Patient completed Scooting/Bridging with contact guard assistance  Patient completed Supine to Sit with stand by assistance  Patient completed Sit to Supine with stand by assistance    Functional Mobility/Transfers:  Patient completed Sit <> Stand Transfer with minimum assistance  with  rolling walker   Patient completed Bed <> Chair Transfer using Stand Pivot technique with minimum assistance with rolling walker and INCREASED LEAN TO (R).  Patient completed Toilet Transfer Stand Pivot technique with minimum assistance with  rolling walker  Functional Mobility: PATIENT MIN (A) WITH RW DUE TO INCREASED LEAN TO (R).    Activities of Daily Living:  Upper Body Dressing: minimum assistance    Lower Body Dressing: minimum assistance    Toileting: minimum assistance      Cognitive/Visual Perceptual:  COGNITION APPEARS INTACE.  PATIENT WITH C/O  DOUBLE VISION.    Physical Exam:  Balance:    -       PATIENT WITH INCREASED LEAN TO (R) WITH PROLONGED STANDING AND WITH AMBULATION.  Upper Extremity Range of Motion:   BUE AROM WFL.    AMPAC 6 Click ADL:  AMPAC Total Score: 18    OT EVAL PERFORMED.  PATIENT EDUCATED RE:  PURPOSE OF OT.  PATIENT PARTICIPATED IN FUNC MOBILITY, NEURO RE-ED AND SELF CARE TASKS     Patient left supine with all lines intact, call button in reach, and bed alarm on    GOALS:   Multidisciplinary Problems       Occupational Therapy Goals          Problem: Occupational Therapy    Goal Priority Disciplines Outcome Interventions   Occupational Therapy Goal     OT, PT/OT     Description: LTG'S TO BE MET IN 14 DAYS (11/19/22)    1)  PATIENT WILL PERFORM UB DRESSING WITH MOD (I).    2)  PATIENT WILL PERFORM LB DRESSING WITH SBA    3)  PATIENT WILL PERFORM TOILET T/F WITH (S).    4)  PATIENT WILL PERFORM BUE HEP FOR INCREASED FUNC STRENGTH AND ENDURANCE WITH MIN VC FOR PROPER TECHNIQUE  TO INCREASE  SAFETY AND (I) WITH SELF CARE TASKS..                        History:     Past Medical History:   Diagnosis Date    Acute blood loss anemia 10/14/2019    Aneurysm, abdominal aortic     Coronary artery disease     Depression     Diabetes mellitus     HLD (hyperlipidemia)     Hypertension     Kidney stone     PAD (peripheral artery disease)     Tobacco abuse          Past Surgical History:   Procedure Laterality Date    APPENDECTOMY      CORONARY STENT PLACEMENT      x 4    ESOPHAGOGASTRODUODENOSCOPY N/A 10/14/2019    Procedure: EGD (ESOPHAGOGASTRODUODENOSCOPY);  Surgeon: Carlos Mcneal III, MD;  Location: [facility] ENDO;  Service: Endoscopy;  Laterality: N/A;    ESOPHAGOGASTRODUODENOSCOPY N/A 10/15/2019    Procedure: EGD (ESOPHAGOGASTRODUODENOSCOPY);  Surgeon: Carlos Mcneal III, MD;  Location: [facility] ENDO;  Service: Endoscopy;  Laterality: N/A;    LEFT HEART CATHETERIZATION Left 10/11/2019    Procedure: CATHETERIZATION, HEART, LEFT;  Surgeon: Robe Gilbert MD;  Location: [facility] CATH LAB;  Service: Cardiology;  Laterality: Left;    LEFT HEART CATHETERIZATION Left 10/13/2019    Procedure: CATHETERIZATION, HEART, LEFT;  Surgeon: Robe Gilbert MD;  Location: [facility] CATH LAB;  Service: Cardiology;  Laterality: Left;    leg stent Left        Time Tracking:     OT Date of Treatment: 11/05/22  OT Start Time: 0918  OT Stop Time: 0941  OT Total Time (min): 23 min    Billable Minutes:Evaluation 10  Neuromuscular Re-education 13    11/5/2022

## 2022-11-05 NOTE — ASSESSMENT & PLAN NOTE
Patient's FSGs are controlled on current medication regimen.  Last A1c reviewed-   Lab Results   Component Value Date    HGBA1C 6.5 (H) 11/04/2022     Most recent fingerstick glucose reviewed-   Recent Labs   Lab 11/05/22  0453 11/05/22  1146   POCTGLUCOSE 122* 169*     Current correctional scale  Medium  Maintain anti-hyperglycemic dose as follows-   Antihyperglycemics (From admission, onward)    Start     Stop Route Frequency Ordered    11/04/22 1730  insulin aspart U-100 pen 1-10 Units         -- SubQ Before meals & nightly PRN 11/04/22 1630        Hold Oral hypoglycemics while patient is in the hospital.

## 2022-11-05 NOTE — PLAN OF CARE
Recommendations  1) advance PO diet to DM 1800 kcal, cardiac diet   2) weigh weekly   3) Nutrition education and handout given    Goals: 1) PO intakes > 75% of meals at f/u  Nutrition Goal Status: new  Communication of RD Recs:  (POC, sticky note)

## 2022-11-05 NOTE — CONSULTS
[facility] - Med Surg  Adult Nutrition  Consult Note    SUMMARY     Recommendations  1) advance PO diet to DM 1800 kcal, cardiac diet   2) weigh weekly   3) Nutrition education and handout given    Goals: 1) PO intakes > 75% of meals at f/u  Nutrition Goal Status: new  Communication of RD Recs:  (POC, sticky note)    Assessment and Plan    Undesirable food choices  R/t food and nutrition related knowledge deficit  As evidenced by possible TIA, usual recall  Intervention: nutrition education and carb/fat/sodium modified diet  new     Malnutrition Assessment         Micronutrient Evaluation: suspected deficiency  Micronutrient Evaluation Comments: check iron               Edema (Fluid Accumulation): 0-->no edema present             Reason for Assessment    Reason For Assessment: consult  Diagnosis:  (TIA)  Relevant Medical History: CAD, DM2, HTN, HLD, PVD, TIA, tobacco use  Interdisciplinary Rounds: did not attend (remote)    General Information Comments: 83 y/o [place]ian speaking male admitted with possible TIA. SLP rec. regulat textures, thin liquids. I called pt on his cell phone and spoke with him and his wife ( they are bilingual but unsure if pt caught everything I said)- she tells me she and the rest of the family tries to get him to eat healthy but he does not want to do it, orders Popeyes etc.. often and drinks regular Coke. I encouraged the pt to cut back on salt, red meats, fried foods and Coke at home and stressed the importance of dietary changes to prevent furture heart attacks and strokes. He says it is possible for him to make some of these changes. NFPE to be done by on-site RD at f/u. Will attach nutrition materials in both English and [place]ian.    Nutrition Discharge Planning: To be determined- DM 1800 kcal, cardiac diet    Nutrition Risk Screen    Nutrition Risk Screen: no indicators present    Nutrition/Diet History    Patient Reported Diet/Restrictions/Preferences: general  Spiritual, Cultural  "Beliefs, [employer]al Practices, Values that Affect Care: no  Food Allergies: NKFA  Factors Affecting Nutritional Intake: None identified at this time    Anthropometrics    Temp: 98.1 °F (36.7 °C)  Height Method: Stated  Height: 5' 10"  Height (inches): 70 in  Weight Method: Bed Scale  Weight: 69 kg (152 lb 1.9 oz)  Weight (lb): 152.12 lb  Ideal Body Weight (IBW), Male: 166 lb  % Ideal Body Weight, Male (lb): 91.64 %  BMI (Calculated): 21.8  BMI Grade: 18.5-24.9 - normal  Usual Body Weight (UBW), k.7 kg (21)  % Usual Body Weight: 96.44  % Weight Change From Usual Weight: -3.77 %       Lab/Procedures/Meds    Pertinent Labs Reviewed: reviewed  BMP  Lab Results   Component Value Date     2022    K 4.1 2022     2022    CO2 24 2022    BUN 26 (H) 2022    CREATININE 1.3 2022    CALCIUM 9.2 2022    ANIONGAP 8 2022    ESTGFRAFRICA >60 06/15/2022    EGFRNONAA >60 06/15/2022     Lab Results   Component Value Date    CHOL 158 2022    CHOL 124 2022    CHOL 135 2021     Lab Results   Component Value Date    HDL 40 2022    HDL 34 (L) 2022    HDL 35 (L) 2021     Lab Results   Component Value Date    LDLCALC 96.8 2022    LDLCALC 69.2 2022    LDLCALC 65.0 2021     Lab Results   Component Value Date    TRIG 106 2022    TRIG 104 2022    TRIG 175 (H) 2021     Lab Results   Component Value Date    CHOLHDL 25.3 2022    CHOLHDL 27.4 2022    CHOLHDL 25.9 2021       Pertinent Medications Reviewed: reviewed  Pertinent Medications Comments: statin, NS @ 75 ml/hr, insuln, zofran    Estimated/Assessed Needs    Weight Used For Calorie Calculations: 69 kg (152 lb 1.9 oz)  Energy Calorie Requirements (kcal): MSJ ( x 1.2-1.4) = 1501-9126 kcal  Energy Need Method: Dayana Cooper  Protein Requirements: 1-1.2 g protein/kg ( age) = 69-82 g  Weight Used For Protein Calculations: 69 kg (152 lb 1.9 " oz)  Fluid Requirements (mL): 4567-7931 ml or per MD  Estimated Fluid Requirement Method: RDA Method  CHO Requirement: 187-229 g      Nutrition Prescription Ordered    Current Diet Order: NPO x 1 day    Evaluation of Received Nutrient/Fluid Intake    Energy Calories Required: not meeting needs  Protein Required: not meeting needs  Fluid Required: not meeting needs  Tolerance:  (NPO)     Intake/Output Summary (Last 24 hours) at 11/5/2022 1300  Last data filed at 11/5/2022 1113  Gross per 24 hour   Intake 240 ml   Output 400 ml   Net -160 ml       % Intake of Estimated Energy Needs: 0%  ( 75% prior to NPO)  % Meal Intake: NPO    Nutrition Risk    Level of Risk/Frequency of Follow-up: low - moderate 1 x weekly      Monitor and Evaluation    Food and Nutrient Intake: energy intake, food and beverage intake  Food and Nutrient Adminstration: diet order  Knowledge/Beliefs/Attitudes: food and nutrition knowledge/skill  Anthropometric Measurements: weight  Biochemical Data, Medical Tests and Procedures: electrolyte and renal panel, glucose/endocrine profile  Nutrition-Focused Physical Findings: overall appearance       Nutrition Follow-Up    RD Follow-up?: Yes

## 2022-11-05 NOTE — PROGRESS NOTES
[facility] Medicine  Progress Note    Patient Name: Aquiles Kelsey  MRN: [id number]  Patient Class: OP- Observation   Admission Date: 11/4/2022  Length of Stay: 0 days  Attending Physician: John Michael, *  Primary Care Provider: Holger Thornton MD        Subjective:     Principal Problem:TIA (transient ischemic attack)        HPI:  80 y/o. male with a PMHx of AAA s/p repair, CAD, DM II, HLD, HTN, PAD,TIA and tobacco use who presented to the ED with c/o  of vision disturbances and facial droop which onset gradually around 3 am. Pt states he went to bed last night feeling normal, and then woke up around 3 am and wife noticed right-sided facial droop and uncoordinated walking. Pt admits that when he opens both eyes, he starts seeing double, but when he closes one eye  he can see normally again. Pt also admits to feeling dizzy. Associated sxs include facial droop, seeing double, uncoordinated walking, and dizziness. Patient denies any nausea, vomiting, diarrhea, fever,chest pain , sob  or GI/ sx . He was admitted on 6/22 with similar complain and df/C with a Dx of TIA after a negative acute CVA work up .  ER COURSE . Tele vascular neurology consulted from the ER and did not rec TPA . Head and neck CTA show did not show any acute finding .   ER VS:  BP Pulse Resp Temp SpO2   (!) 146/66 (!) 55 16 98.6 °F (37 °C) 96 %     Pt will be admitted to observation with a Dx of TIA  CODE STATUS FULL CODE      Overview/Hospital Course:  Pt admitted to Observation for suspected TIA. 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 Neurology consulted. PT/OT evaluation completed with Rehab facility recommended. Speech therapy recommended regular diet with thin liquids.        Interval History: pt in bed upon exam and reports continued visual disturbance. Pt able to follow commands with strengths and  equal. MRI confirmed tiny acute infarct. PT/OT evaluation completed with Rehab facility recommended. Cardiology consulted for Bradycardia.     Review of Systems   Constitutional:  Positive for activity change and fatigue.   HENT: Negative.     Eyes:  Positive for visual disturbance.   Respiratory: Negative.     Cardiovascular: Negative.    Gastrointestinal: Negative.    Endocrine: Negative.    Genitourinary: Negative.    Musculoskeletal: Negative.    Skin: Negative.    Neurological:  Positive for dizziness and weakness.   Hematological: Negative.    Psychiatric/Behavioral: Negative.     Objective:     Vital Signs (Most Recent):  Temp: 98.1 °F (36.7 °C) (11/05/22 1147)  Pulse: (!) 53 (11/05/22 1147)  Resp: 17 (11/05/22 1147)  BP: (!) 158/72 (11/05/22 1147)  SpO2: 96 % (11/05/22 1147)   Vital Signs (24h Range):  Temp:  [97.6 °F (36.4 °C)-98.4 °F (36.9 °C)] 98.1 °F (36.7 °C)  Pulse:  [45-72] 53  Resp:  [15-22] 17  SpO2:  [93 %-98 %] 96 %  BP: (126-182)/(65-87) 158/72     Weight: 69 kg (152 lb 1.9 oz)  Body mass index is 21.83 kg/m².    Intake/Output Summary (Last 24 hours) at 11/5/2022 1558  Last data filed at 11/5/2022 1113  Gross per 24 hour   Intake 240 ml   Output 400 ml   Net -160 ml      Physical Exam  Vitals and nursing note reviewed.   Constitutional:       Appearance: Normal appearance. He is ill-appearing.   HENT:      Head: Normocephalic and atraumatic.      Mouth/Throat:      Mouth: Mucous membranes are moist.   Eyes:      Extraocular Movements: Extraocular movements intact.      Conjunctiva/sclera: Conjunctivae normal.      Pupils: Pupils are equal, round, and reactive to light.   Cardiovascular:      Rate and Rhythm: Regular rhythm. Bradycardia present.      Pulses: Normal pulses.      Heart sounds: No murmur heard.    No friction rub.   Pulmonary:      Effort:  Pulmonary effort is normal. No respiratory distress.      Breath sounds: No stridor. No wheezing or rhonchi.   Chest:      Chest wall: No tenderness.   Abdominal:      General: Abdomen is flat. Bowel sounds are normal. There is no distension.      Palpations: Abdomen is soft. There is no mass.      Tenderness: There is no abdominal tenderness. There is no guarding or rebound.      Hernia: No hernia is present.   Musculoskeletal:         General: No swelling, tenderness, deformity or signs of injury. Normal range of motion.      Cervical back: Normal range of motion. No rigidity or tenderness.   Skin:     General: Skin is warm.      Coloration: Skin is not jaundiced or pale.      Findings: No bruising or erythema.   Neurological:      General: No focal deficit present.      Mental Status: He is alert and oriented to person, place, and time. Mental status is at baseline.      Cranial Nerves: No cranial nerve deficit.      Sensory: No sensory deficit.      Motor: No weakness.      Coordination: Coordination normal.   Psychiatric:         Mood and Affect: Mood normal.       Significant Labs: All pertinent labs within the past 24 hours have been reviewed.  CBC:   Recent Labs   Lab 11/04/22  1101 11/05/22  0543   WBC 5.95 6.04   HGB 13.1* 13.1*   HCT 39.3* 38.6*   * 136*     CMP:   Recent Labs   Lab 11/04/22  1101 11/05/22  0543    141   K 4.0 4.1    109   CO2 22* 24   * 129*   BUN 26* 26*   CREATININE 1.3 1.3   CALCIUM 9.1 9.2   PROT 6.6 6.1   ALBUMIN 3.2* 3.0*   BILITOT 0.5 0.4   ALKPHOS 66 68   AST 13 10   ALT 9* 8*   ANIONGAP 10 8     POCT Glucose:   Recent Labs   Lab 11/04/22  1041 11/05/22  0453 11/05/22  1146   POCTGLUCOSE 197* 122* 169*       Significant Imaging: I have reviewed all pertinent imaging results/findings within the past 24 hours.      Assessment/Plan:      * TIA (transient ischemic attack)  -Permessive HTN  -resume statin , asa  And plavix  -F/U MRI  Brain- showed acute  infarct   -Neurocheck   -PT/OT- rehab facility recommended   -SLP- regular diet with thin liquids       Tobacco abuse  Assistance with smoking cessation was offered, including:  [x]  Medications  []  Counseling  []  Printed Information on Smoking Cessation  []  Referral to a Smoking Cessation Program    Patient was counseled regarding smoking for >10 minutes.        Type 2 diabetes mellitus, without long-term current use of insulin  Patient's FSGs are controlled on current medication regimen.  Last A1c reviewed-   Lab Results   Component Value Date    HGBA1C 6.5 (H) 11/04/2022     Most recent fingerstick glucose reviewed-   Recent Labs   Lab 11/05/22  0453 11/05/22  1146   POCTGLUCOSE 122* 169*     Current correctional scale  Medium  Maintain anti-hyperglycemic dose as follows-   Antihyperglycemics (From admission, onward)    Start     Stop Route Frequency Ordered    11/04/22 1730  insulin aspart U-100 pen 1-10 Units         -- SubQ Before meals & nightly PRN 11/04/22 1630        Hold Oral hypoglycemics while patient is in the hospital.    Dyslipidemia  Resume statin       Essential hypertension  Permissive HTN until acute CVA r/io with Robe MRI   Hydralazine PRN if SBP >200       PAD (peripheral artery disease)  Cont Statin , plavix and asa      CAD (coronary artery disease)  Cont asa , statin and asa  Stable           VTE Risk Mitigation (From admission, onward)         Ordered     enoxaparin injection 40 mg  Daily         11/04/22 1627     IP VTE HIGH RISK PATIENT  Once         11/04/22 1627     Place sequential compression device  Until discontinued         11/04/22 1627                Discharge Planning   ZEINA:      Code Status: Full Code   Is the patient medically ready for discharge?:     Reason for patient still in hospital (select all that apply): Patient trending condition, Laboratory test, Treatment, Imaging, Consult recommendations and PT / OT recommendations                     Cynthia Giraldo,  NP  Department of Hospital Medicine   'Count includes the [facility] Surg

## 2022-11-05 NOTE — PT/OT/SLP EVAL
Speech Language Pathology Evaluation  Cognitive/Bedside Swallow    Patient Name:  Aquiles Kelsey   MRN:  [id number]  Admitting Diagnosis: TIA (transient ischemic attack)    Recommendations:                  General Recommendations:  Cognitive-linguistic therapy  Diet recommendations:  Regular Diet - IDDSI Level 7, Thin liquids - IDDSI Level 0   Aspiration Precautions: Standard aspiration precautions   General Precautions: Standard,    Communication strategies:   [place]    History:     Past Medical History:   Diagnosis Date    Acute blood loss anemia 10/14/2019    Aneurysm, abdominal aortic     Coronary artery disease     Depression     Diabetes mellitus     HLD (hyperlipidemia)     Hypertension     Kidney stone     PAD (peripheral artery disease)     Tobacco abuse        Past Surgical History:   Procedure Laterality Date    APPENDECTOMY      CORONARY STENT PLACEMENT      x 4    ESOPHAGOGASTRODUODENOSCOPY N/A 10/14/2019    Procedure: EGD (ESOPHAGOGASTRODUODENOSCOPY);  Surgeon: Carlos Mcneal III, MD;  Location: [facility];  Service: Endoscopy;  Laterality: N/A;    ESOPHAGOGASTRODUODENOSCOPY N/A 10/15/2019    Procedure: EGD (ESOPHAGOGASTRODUODENOSCOPY);  Surgeon: Carlos Mcneal III, MD;  Location: [facility];  Service: Endoscopy;  Laterality: N/A;    LEFT HEART CATHETERIZATION Left 10/11/2019    Procedure: CATHETERIZATION, HEART, LEFT;  Surgeon: Robe Gilbert MD;  Location: [facility] CATH LAB;  Service: Cardiology;  Laterality: Left;    LEFT HEART CATHETERIZATION Left 10/13/2019    Procedure: CATHETERIZATION, HEART, LEFT;  Surgeon: Robe Gilbert MD;  Location: [facility] CATH LAB;  Service: Cardiology;  Laterality: Left;    leg stent Left        Social History: Patient lives at home with spouse and son.    Prior diet: Regular solids with thin liquids.    Subjective     Patient alert, cooperative, and see at bedside. He denied any difficulty eating or drinking.    Pain/Comfort:  Pain Rating 1: 0/10  Pain Rating  Post-Intervention 1: 0/10    Respiratory Status: Room air    Objective:     Cognitive Status:    Orientation Person      Receptive and Expressive Language:   Comprehension:      Difficult to assess due to hearing deficits and language barrier      Oral Musculature Evaluation  Oral Musculature: general weakness  Dentition: present and adequate    Bedside Swallow Eval:   Consistencies Assessed:  Thin liquids via cup  Soft solids nikkie cracker      Oral Phase:   WFL    Pharyngeal Phase:   no overt clinical signs/symptoms of aspiration  no overt clinical signs/symptoms of pharyngeal dysphagia    Compensatory Strategies  Education provided on dysphagia strategies    Treatment: Bedside swallowing evaluation completed. Oral mech unremarkable. Patient [place] requiring frequent repetitions. He was oriented to person only. He was disoriented to place and time. Difficult to assess cognitive and language due to hearing deficits and language barrier. Unknown baseline status He tolerated po trials of thin liquids via cup, smooth puree, and soft solids. Recommend IDDSI 7/0 with standard aspiration precautions.     Assessment:     Aquiles Kelsey is a 82 y.o. male with an SLP diagnosis of Cognitive-Linguistic Impairment.  He presents with varying levels of confusion.  Difficult to assess cognitive and language due to hearing deficits and language barrier. Unknown baseline status. Patient would benefit from continued ST services to address functional deficits.    Goals:   Multidisciplinary Problems       SLP Goals          Problem: SLP    Goal Priority Disciplines Outcome   SLP Goal     SLP    Description: TPW complete orientation recall to place and time given environmental cues  TPW complete cognitive-linguistic tasks to improve processing                       Plan:     Patient to be seen:  2 x/week   Plan of Care expires:     Plan of Care reviewed with:  patient   SLP Follow-Up:  Yes         Time Tracking:     SLP  Treatment Date:   11/05/22  Speech Start Time:  0742  Speech Stop Time:  0813     Speech Total Time (min):  31 min    Billable Minutes: Holleyal 27 11/05/2022

## 2022-11-05 NOTE — HOSPITAL COURSE
Pt admitted to Observation for suspected TIA. 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 Neurology consulted. PT/OT evaluation completed with Rehab facility recommended. Speech therapy recommended regular diet with thin liquids.     11/6  He cont complaining dizziness and  gait problem . MRI brain  show a thalamic infarct . PT/OT rec [facility] . Neurology and cardiology consulted  11/7 NAEON . He cont complaining of blurry vision over the right eyes . PT/OT [facility] . CM consulted for rehab placement .  11/08: awaiting insurance auth for [facility] rehab. Will transfer to [facility]  11/09: Peer to peer sucesseliana - [employer] authorized [facility] re[facility] for patient. Spouse and patient informed. Patient was seen and examined today and deemed stable for discharge to [facility].

## 2022-11-05 NOTE — PLAN OF CARE
Patient remained free from injury. Bed alarm set. Blood glucose and cardiac monitored. Neuro q4 checks. No s/s of acute distress. 12hr chart check complete.

## 2022-11-05 NOTE — PLAN OF CARE
PT eval complete. The following goals will be met in 14 days  PT IND with bed mobility  PT mod IND with transfer  Pt amb 140 ft RW and Min A with upright posture

## 2022-11-05 NOTE — PLAN OF CARE
OT EVAL COMPLETE.  PATIENT DEMO INCREASED LEAN TO (R) WITH STANDING AND AMBULATION AND CONT C/O DOUBLE VISION.  PATIENT WOULD BENEFIT FROM CONT SKILLED OT INTERVENTION.  OT RECOMMENDS REHAB AT D/C.

## 2022-11-05 NOTE — SUBJECTIVE & OBJECTIVE
Interval History: pt in bed upon exam and reports continued visual disturbance. Pt able to follow commands with strengths and  equal. MRI confirmed tiny acute infarct. PT/OT evaluation completed with Rehab facility recommended. Cardiology consulted for Bradycardia.     Review of Systems   Constitutional:  Positive for activity change and fatigue.   HENT: Negative.     Eyes:  Positive for visual disturbance.   Respiratory: Negative.     Cardiovascular: Negative.    Gastrointestinal: Negative.    Endocrine: Negative.    Genitourinary: Negative.    Musculoskeletal: Negative.    Skin: Negative.    Neurological:  Positive for dizziness and weakness.   Hematological: Negative.    Psychiatric/Behavioral: Negative.     Objective:     Vital Signs (Most Recent):  Temp: 98.1 °F (36.7 °C) (11/05/22 1147)  Pulse: (!) 53 (11/05/22 1147)  Resp: 17 (11/05/22 1147)  BP: (!) 158/72 (11/05/22 1147)  SpO2: 96 % (11/05/22 1147)   Vital Signs (24h Range):  Temp:  [97.6 °F (36.4 °C)-98.4 °F (36.9 °C)] 98.1 °F (36.7 °C)  Pulse:  [45-72] 53  Resp:  [15-22] 17  SpO2:  [93 %-98 %] 96 %  BP: (126-182)/(65-87) 158/72     Weight: 69 kg (152 lb 1.9 oz)  Body mass index is 21.83 kg/m².    Intake/Output Summary (Last 24 hours) at 11/5/2022 1558  Last data filed at 11/5/2022 1113  Gross per 24 hour   Intake 240 ml   Output 400 ml   Net -160 ml      Physical Exam  Vitals and nursing note reviewed.   Constitutional:       Appearance: Normal appearance. He is ill-appearing.   HENT:      Head: Normocephalic and atraumatic.      Mouth/Throat:      Mouth: Mucous membranes are moist.   Eyes:      Extraocular Movements: Extraocular movements intact.      Conjunctiva/sclera: Conjunctivae normal.      Pupils: Pupils are equal, round, and reactive to light.   Cardiovascular:      Rate and Rhythm: Regular rhythm. Bradycardia present.      Pulses: Normal pulses.      Heart sounds: No murmur heard.    No friction rub.   Pulmonary:      Effort: Pulmonary  effort is normal. No respiratory distress.      Breath sounds: No stridor. No wheezing or rhonchi.   Chest:      Chest wall: No tenderness.   Abdominal:      General: Abdomen is flat. Bowel sounds are normal. There is no distension.      Palpations: Abdomen is soft. There is no mass.      Tenderness: There is no abdominal tenderness. There is no guarding or rebound.      Hernia: No hernia is present.   Musculoskeletal:         General: No swelling, tenderness, deformity or signs of injury. Normal range of motion.      Cervical back: Normal range of motion. No rigidity or tenderness.   Skin:     General: Skin is warm.      Coloration: Skin is not jaundiced or pale.      Findings: No bruising or erythema.   Neurological:      General: No focal deficit present.      Mental Status: He is alert and oriented to person, place, and time. Mental status is at baseline.      Cranial Nerves: No cranial nerve deficit.      Sensory: No sensory deficit.      Motor: No weakness.      Coordination: Coordination normal.   Psychiatric:         Mood and Affect: Mood normal.       Significant Labs: All pertinent labs within the past 24 hours have been reviewed.  CBC:   Recent Labs   Lab 11/04/22  1101 11/05/22  0543   WBC 5.95 6.04   HGB 13.1* 13.1*   HCT 39.3* 38.6*   * 136*     CMP:   Recent Labs   Lab 11/04/22  1101 11/05/22  0543    141   K 4.0 4.1    109   CO2 22* 24   * 129*   BUN 26* 26*   CREATININE 1.3 1.3   CALCIUM 9.1 9.2   PROT 6.6 6.1   ALBUMIN 3.2* 3.0*   BILITOT 0.5 0.4   ALKPHOS 66 68   AST 13 10   ALT 9* 8*   ANIONGAP 10 8     POCT Glucose:   Recent Labs   Lab 11/04/22  1041 11/05/22  0453 11/05/22  1146   POCTGLUCOSE 197* 122* 169*       Significant Imaging: I have reviewed all pertinent imaging results/findings within the past 24 hours.

## 2022-11-05 NOTE — PT/OT/SLP EVAL
Physical Therapy Evaluation    Patient Name:  Aquiles Kelsey   MRN:  [id number]    Recommendations:     Discharge Recommendations:  rehabilitation facility   Discharge Equipment Recommendations: walker, rolling, shower chair   Barriers to discharge: Decreased caregiver support    Assessment:     Aquiles Kelsey is a 82 y.o. male admitted with a medical diagnosis of TIA (transient ischemic attack).  He presents with the following impairments/functional limitations:  gait instability, impaired self care skills, decreased lower extremity function, decreased coordination, decreased safety awareness.Will benefit from acute care PT.    Rehab Prognosis: Good; patient would benefit from acute skilled PT services to address these deficits and reach maximum level of function.    Recent Surgery: * No surgery found *      Plan:     During this hospitalization, patient to be seen 3 x/week to address the identified rehab impairments via gait training, therapeutic activities, therapeutic exercises, neuromuscular re-education and progress toward the following goals:    Plan of Care Expires:  11/19/22    Subjective     Chief Complaint: double vision  Patient/Family Comments/goals: improve safety  Pain/Comfort:  Pain Rating 1: 0/10    Patients cultural, spiritual, Mandaen conflicts given the current situation:      Living Environment:  Pt lives with wife in one story home  Prior to admission, patients level of function was IND.  Equipment used at home: none.  DME owned (not currently used): none.  Upon discharge, patient will have assistance from facility/family.    Objective:     Communicated with Epic and nurse Waterman prior to session.  Patient found supine with peripheral IV  upon PT entry to room.    General Precautions: Standard,     Orthopedic Precautions:    Braces:    Respiratory Status: Room air    Exams:  Gross Coordination impaired  BLE MMT WFL  Functional Mobility:  Bed Mobility:     Supine to Sit:  stand by assistance  Transfers:     Bed to Chair: contact guard assistance with  rolling walker  using  Stand Pivot  Gait: amb 2 x 35 ft with RW . Demo severe R lean and needed max A twice to prevent loss of balance      AM-PAC 6 CLICK MOBILITY  Total Score:17       Treatment & Education:  Gait trained in use of RW with tactile and VC for upright posture.  Pt with very impulsive movements with poor safety awareness    Patient left supine with all lines intact, call button in reach, and bed alarm on.    GOALS:   Multidisciplinary Problems       Physical Therapy Goals          Problem: Physical Therapy    Goal Priority Disciplines Outcome Goal Variances Interventions   Physical Therapy Goal     PT, PT/OT                          History:     Past Medical History:   Diagnosis Date    Acute blood loss anemia 10/14/2019    Aneurysm, abdominal aortic     Coronary artery disease     Depression     Diabetes mellitus     HLD (hyperlipidemia)     Hypertension     Kidney stone     PAD (peripheral artery disease)     Tobacco abuse        Past Surgical History:   Procedure Laterality Date    APPENDECTOMY      CORONARY STENT PLACEMENT      x 4    ESOPHAGOGASTRODUODENOSCOPY N/A 10/14/2019    Procedure: EGD (ESOPHAGOGASTRODUODENOSCOPY);  Surgeon: Carlos Mcneal III, MD;  Location: [facility];  Service: Endoscopy;  Laterality: N/A;    ESOPHAGOGASTRODUODENOSCOPY N/A 10/15/2019    Procedure: EGD (ESOPHAGOGASTRODUODENOSCOPY);  Surgeon: Carlos Mcneal III, MD;  Location: [facility] ENDO;  Service: Endoscopy;  Laterality: N/A;    LEFT HEART CATHETERIZATION Left 10/11/2019    Procedure: CATHETERIZATION, HEART, LEFT;  Surgeon: Robe Gilbert MD;  Location: [facility] CATH LAB;  Service: Cardiology;  Laterality: Left;    LEFT HEART CATHETERIZATION Left 10/13/2019    Procedure: CATHETERIZATION, HEART, LEFT;  Surgeon: Robe Gilbert MD;  Location: [facility] CATH LAB;  Service: Cardiology;  Laterality: Left;    leg stent Left        Time Tracking:     PT  Received On: 11/05/22  PT Start Time: 0900     PT Stop Time: 0925  PT Total Time (min): 25 min     Billable Minutes: Evaluation- 15 minutes Gait- 10 min      11/05/2022

## 2022-11-05 NOTE — ASSESSMENT & PLAN NOTE
-Permessive HTN  -resume statin , asa  And plavix  -F/U MRI  Brain- showed acute infarct   -Neurocheck   -PT/OT- rehab facility recommended   -SLP- regular diet with thin liquids

## 2022-11-06 PROBLEM — I63.9 ACUTE CVA (CEREBROVASCULAR ACCIDENT): Status: ACTIVE | Noted: 2022-11-06

## 2022-11-06 LAB
ALBUMIN SERPL BCP-MCNC: 3.2 G/DL (ref 3.5–5.2)
ALP SERPL-CCNC: 73 U/L (ref 55–135)
ALT SERPL W/O P-5'-P-CCNC: 6 U/L (ref 10–44)
ANION GAP SERPL CALC-SCNC: 10 MMOL/L (ref 8–16)
AST SERPL-CCNC: 13 U/L (ref 10–40)
BASOPHILS # BLD AUTO: 0.03 K/UL (ref 0–0.2)
BASOPHILS NFR BLD: 0.5 % (ref 0–1.9)
BILIRUB SERPL-MCNC: 0.8 MG/DL (ref 0.1–1)
BUN SERPL-MCNC: 22 MG/DL (ref 8–23)
CALCIUM SERPL-MCNC: 9.4 MG/DL (ref 8.7–10.5)
CHLORIDE SERPL-SCNC: 106 MMOL/L (ref 95–110)
CO2 SERPL-SCNC: 25 MMOL/L (ref 23–29)
CREAT SERPL-MCNC: 1.2 MG/DL (ref 0.5–1.4)
DIFFERENTIAL METHOD: ABNORMAL
EOSINOPHIL # BLD AUTO: 0.1 K/UL (ref 0–0.5)
EOSINOPHIL NFR BLD: 2.4 % (ref 0–8)
ERYTHROCYTE [DISTWIDTH] IN BLOOD BY AUTOMATED COUNT: 14.1 % (ref 11.5–14.5)
EST. GFR  (NO RACE VARIABLE): >60 ML/MIN/1.73 M^2
GLUCOSE SERPL-MCNC: 149 MG/DL (ref 70–110)
HCT VFR BLD AUTO: 40.4 % (ref 40–54)
HGB BLD-MCNC: 13.7 G/DL (ref 14–18)
IMM GRANULOCYTES # BLD AUTO: 0.02 K/UL (ref 0–0.04)
IMM GRANULOCYTES NFR BLD AUTO: 0.3 % (ref 0–0.5)
LYMPHOCYTES # BLD AUTO: 0.8 K/UL (ref 1–4.8)
LYMPHOCYTES NFR BLD: 14 % (ref 18–48)
MCH RBC QN AUTO: 30 PG (ref 27–31)
MCHC RBC AUTO-ENTMCNC: 33.9 G/DL (ref 32–36)
MCV RBC AUTO: 88 FL (ref 82–98)
MONOCYTES # BLD AUTO: 0.5 K/UL (ref 0.3–1)
MONOCYTES NFR BLD: 9 % (ref 4–15)
NEUTROPHILS # BLD AUTO: 4.3 K/UL (ref 1.8–7.7)
NEUTROPHILS NFR BLD: 73.8 % (ref 38–73)
NRBC BLD-RTO: 0 /100 WBC
PLATELET # BLD AUTO: 141 K/UL (ref 150–450)
PMV BLD AUTO: 10.5 FL (ref 9.2–12.9)
POCT GLUCOSE: 140 MG/DL (ref 70–110)
POCT GLUCOSE: 140 MG/DL (ref 70–110)
POCT GLUCOSE: 195 MG/DL (ref 70–110)
POTASSIUM SERPL-SCNC: 4.6 MMOL/L (ref 3.5–5.1)
PROT SERPL-MCNC: 6.6 G/DL (ref 6–8.4)
RBC # BLD AUTO: 4.57 M/UL (ref 4.6–6.2)
SODIUM SERPL-SCNC: 141 MMOL/L (ref 136–145)
WBC # BLD AUTO: 5.79 K/UL (ref 3.9–12.7)

## 2022-11-06 PROCEDURE — 21400001 HC TELEMETRY ROOM

## 2022-11-06 PROCEDURE — 80053 COMPREHEN METABOLIC PANEL: CPT | Performed by: INTERNAL MEDICINE

## 2022-11-06 PROCEDURE — 25000003 PHARM REV CODE 250: Performed by: INTERNAL MEDICINE

## 2022-11-06 PROCEDURE — 85025 COMPLETE CBC W/AUTO DIFF WBC: CPT | Performed by: INTERNAL MEDICINE

## 2022-11-06 PROCEDURE — 99233 PR SUBSEQUENT HOSPITAL CARE,LEVL III: ICD-10-PCS | Mod: ,,, | Performed by: INTERNAL MEDICINE

## 2022-11-06 PROCEDURE — 36415 COLL VENOUS BLD VENIPUNCTURE: CPT | Performed by: INTERNAL MEDICINE

## 2022-11-06 PROCEDURE — 97535 SELF CARE MNGMENT TRAINING: CPT

## 2022-11-06 PROCEDURE — 99233 SBSQ HOSP IP/OBS HIGH 50: CPT | Mod: ,,, | Performed by: INTERNAL MEDICINE

## 2022-11-06 PROCEDURE — 92507 TX SP LANG VOICE COMM INDIV: CPT

## 2022-11-06 PROCEDURE — 63600175 PHARM REV CODE 636 W HCPCS: Performed by: INTERNAL MEDICINE

## 2022-11-06 PROCEDURE — 97530 THERAPEUTIC ACTIVITIES: CPT

## 2022-11-06 PROCEDURE — 11000001 HC ACUTE MED/SURG PRIVATE ROOM

## 2022-11-06 RX ORDER — ISOSORBIDE MONONITRATE 60 MG/1
120 TABLET, EXTENDED RELEASE ORAL DAILY
Status: DISCONTINUED | OUTPATIENT
Start: 2022-11-06 | End: 2022-11-09 | Stop reason: HOSPADM

## 2022-11-06 RX ORDER — AMLODIPINE BESYLATE 5 MG/1
5 TABLET ORAL DAILY
Status: DISCONTINUED | OUTPATIENT
Start: 2022-11-06 | End: 2022-11-09 | Stop reason: HOSPADM

## 2022-11-06 RX ADMIN — ATORVASTATIN CALCIUM 40 MG: 40 TABLET, FILM COATED ORAL at 08:11

## 2022-11-06 RX ADMIN — PANTOPRAZOLE SODIUM 40 MG: 40 TABLET, DELAYED RELEASE ORAL at 08:11

## 2022-11-06 RX ADMIN — AMLODIPINE BESYLATE 5 MG: 5 TABLET ORAL at 03:11

## 2022-11-06 RX ADMIN — ISOSORBIDE MONONITRATE 120 MG: 60 TABLET, EXTENDED RELEASE ORAL at 03:11

## 2022-11-06 RX ADMIN — OXYBUTYNIN CHLORIDE 10 MG: 5 TABLET, EXTENDED RELEASE ORAL at 08:11

## 2022-11-06 RX ADMIN — RANOLAZINE 500 MG: 500 TABLET, EXTENDED RELEASE ORAL at 08:11

## 2022-11-06 RX ADMIN — INSULIN ASPART 2 UNITS: 100 INJECTION, SOLUTION INTRAVENOUS; SUBCUTANEOUS at 05:11

## 2022-11-06 RX ADMIN — ENOXAPARIN SODIUM 40 MG: 40 INJECTION SUBCUTANEOUS at 05:11

## 2022-11-06 RX ADMIN — RANOLAZINE 500 MG: 500 TABLET, EXTENDED RELEASE ORAL at 09:11

## 2022-11-06 RX ADMIN — ASPIRIN 81 MG: 81 TABLET, COATED ORAL at 08:11

## 2022-11-06 RX ADMIN — CLOPIDOGREL BISULFATE 75 MG: 75 TABLET ORAL at 08:11

## 2022-11-06 RX ADMIN — ESCITALOPRAM OXALATE 10 MG: 10 TABLET, FILM COATED ORAL at 08:11

## 2022-11-06 NOTE — PT/OT/SLP PROGRESS
Occupational Therapy   Treatment    Name: Aquiles Kelsey  MRN: [id number]  Admitting Diagnosis:  TIA (transient ischemic attack)       Recommendations:     Discharge Recommendations: rehabilitation facility, home health OT (HHOT with 24/7 SPV and A vs rehab)  Discharge Equipment Recommendations:  walker, rolling, bedside commode, bath bench  Barriers to discharge:  None    Assessment:     Aquiles Kelsey is a 82 y.o. male with a medical diagnosis of TIA (transient ischemic attack).  He presents with the following performance deficits affecting function are weakness, impaired endurance, impaired self care skills, impaired functional mobility, impaired balance, decreased safety awareness, impaired cardiopulmonary response to activity.     Rehab Prognosis:  Good; patient would benefit from acute skilled OT services to address these deficits and reach maximum level of function.       Plan:     Patient to be seen 2 x/week to address the above listed problems via self-care/home management, therapeutic activities, therapeutic exercises  Plan of Care Expires: 11/19/22  Plan of Care Reviewed with: patient    Subjective     Pain/Comfort:  Pain Rating 1: 0/10    Objective:     Communicated with: NurseSupriya, prior to session.  Patient found supine with bed alarm, telemetry, peripheral IV upon OT entry to room.    General Precautions: Standard, fall, vision impaired, hearing impaired   Orthopedic Precautions:N/A   Braces: N/A  Respiratory Status: Room air    Bed Mobility:    Patient completed Supine to Sit with contact guard assistance     Functional Mobility/Transfers:  Patient completed Sit <> Stand Transfer with contact guard assistance  with  rolling walker   Patient completed Bed <> Chair Transfer using Step Transfer technique with minimum assistance with rolling walker  Patient completed Toilet Transfer Step Transfer technique with minimum assistance with  rolling walker and grab bars  Functional  Mobility: Patient completed x180ft functional mobility with RW and min A to increase dynamic standing balance and activity tolerance needed for ADL completion.    Activities of Daily Living:  Toileting: minimum assistance A with management of pull tab brief only. Completed toileting hygiene after BM with setup.    [facility] 6 Click ADL: 20    Treatment & Education:  Patient tolerated intervention well. Decreased impulsivity noted. Hard of hearing requiring repetition of commands for comprehension. Patient provided with v/c for technique with transfers to improve safety and independence. Patient educated on completion of B UE AROM therex throughout the day to increase functional strength and activity tolerance. Patient stated understanding and in agreement with POC. In agreement to call for A to transfer back to bed.    Patient left up in chair with all lines intact, call button in reach, chair alarm on, and nurse notified (located and initiated use of chair alarm for generalized safety)    GOALS:   Multidisciplinary Problems       Occupational Therapy Goals          Problem: Occupational Therapy    Goal Priority Disciplines Outcome Interventions   Occupational Therapy Goal     OT, PT/OT Ongoing, Progressing    Description: LTG'S TO BE MET IN 14 DAYS (11/19/22)    1)  PATIENT WILL PERFORM UB DRESSING WITH MOD (I).    2)  PATIENT WILL PERFORM LB DRESSING WITH SBA    3)  PATIENT WILL PERFORM TOILET T/F WITH (S).    4)  PATIENT WILL PERFORM BUE HEP FOR INCREASED FUNC STRENGTH AND ENDURANCE WITH MIN VC FOR PROPER TECHNIQUE  TO INCREASE SAFETY AND (I) WITH SELF CARE TASKS..                        Time Tracking:     OT Date of Treatment: 11/06/22  OT Start Time: 0905  OT Stop Time: 0930  OT Total Time (min): 25 min    Billable Minutes:Self Care/Home Management 10  Therapeutic Activity 15    11/6/2022

## 2022-11-06 NOTE — SUBJECTIVE & OBJECTIVE
Interval History:     Review of Systems   Constitutional:  Positive for activity change and fatigue.   HENT: Negative.     Eyes:  Positive for visual disturbance.   Respiratory: Negative.     Cardiovascular: Negative.    Gastrointestinal: Negative.    Endocrine: Negative.    Genitourinary: Negative.    Musculoskeletal: Negative.    Skin: Negative.    Neurological:  Positive for dizziness and weakness.   Hematological: Negative.    Psychiatric/Behavioral: Negative.     Objective:     Vital Signs (Most Recent):  Temp: 97.6 °F (36.4 °C) (11/06/22 1116)  Pulse: (!) 53 (11/06/22 1116)  Resp: 17 (11/06/22 1116)  BP: 139/68 (11/06/22 1116)  SpO2: 98 % (11/06/22 1116)   Vital Signs (24h Range):  Temp:  [97.4 °F (36.3 °C)-98.2 °F (36.8 °C)] 97.6 °F (36.4 °C)  Pulse:  [48-69] 53  Resp:  [17-20] 17  SpO2:  [95 %-98 %] 98 %  BP: (139-183)/(67-81) 139/68     Weight: 70.5 kg (155 lb 6.8 oz)  Body mass index is 22.3 kg/m².    Intake/Output Summary (Last 24 hours) at 11/6/2022 1507  Last data filed at 11/6/2022 1414  Gross per 24 hour   Intake --   Output 1050 ml   Net -1050 ml        Physical Exam  Vitals and nursing note reviewed.   Constitutional:       Appearance: Normal appearance. He is ill-appearing.   HENT:      Head: Normocephalic and atraumatic.      Mouth/Throat:      Mouth: Mucous membranes are moist.   Eyes:      Extraocular Movements: Extraocular movements intact.      Conjunctiva/sclera: Conjunctivae normal.      Pupils: Pupils are equal, round, and reactive to light.   Cardiovascular:      Rate and Rhythm: Regular rhythm. Bradycardia present.      Pulses: Normal pulses.      Heart sounds: No murmur heard.    No friction rub.   Pulmonary:      Effort: Pulmonary effort is normal. No respiratory distress.      Breath sounds: No stridor. No wheezing or rhonchi.   Chest:      Chest wall: No tenderness.   Abdominal:      General: Abdomen is flat. Bowel sounds are normal. There is no distension.      Palpations: Abdomen is  soft. There is no mass.      Tenderness: There is no abdominal tenderness. There is no guarding or rebound.      Hernia: No hernia is present.   Musculoskeletal:         General: No swelling, tenderness, deformity or signs of injury. Normal range of motion.      Cervical back: Normal range of motion. No rigidity or tenderness.   Skin:     General: Skin is warm.      Coloration: Skin is not jaundiced or pale.      Findings: No bruising or erythema.   Neurological:      General: No focal deficit present.      Mental Status: He is alert and oriented to person, place, and time. Mental status is at baseline.      Cranial Nerves: No cranial nerve deficit.      Sensory: No sensory deficit.      Motor: No weakness.      Coordination: Coordination normal.   Psychiatric:         Mood and Affect: Mood normal.       Significant Labs: All pertinent labs within the past 24 hours have been reviewed.      Significant Imaging: I have reviewed all pertinent imaging results/findings within the past 24 hours.    Review of Systems   Constitutional: Positive for activity change and fatigue.   HENT: Negative.     Eyes:  Positive for visual disturbance.   Cardiovascular: Negative.    Respiratory: Negative.     Endocrine: Negative.    Hematologic/Lymphatic: Negative.    Skin: Negative.    Musculoskeletal: Negative.    Gastrointestinal: Negative.    Genitourinary: Negative.    Neurological:  Positive for dizziness and weakness.   Psychiatric/Behavioral: Negative.     Physical Exam  Vitals and nursing note reviewed.   Constitutional:       Appearance: Normal appearance. He is ill-appearing.   HENT:      Head: Normocephalic and atraumatic.      Mouth/Throat:      Mouth: Mucous membranes are moist.   Eyes:      Extraocular Movements: Extraocular movements intact.      Conjunctiva/sclera: Conjunctivae normal.      Pupils: Pupils are equal, round, and reactive to light.   Cardiovascular:      Rate and Rhythm: Regular rhythm. Bradycardia present.       Pulses: Normal pulses.      Heart sounds: No murmur heard.    No friction rub.   Pulmonary:      Effort: Pulmonary effort is normal. No respiratory distress.      Breath sounds: No stridor. No wheezing or rhonchi.   Chest:      Chest wall: No tenderness.   Abdominal:      General: Abdomen is flat. Bowel sounds are normal. There is no distension.      Palpations: Abdomen is soft. There is no mass.      Tenderness: There is no abdominal tenderness. There is no guarding or rebound.      Hernia: No hernia is present.   Musculoskeletal:         General: No swelling, tenderness, deformity or signs of injury. Normal range of motion.      Cervical back: Normal range of motion. No rigidity or tenderness.   Skin:     General: Skin is warm.      Coloration: Skin is not jaundiced or pale.      Findings: No bruising or erythema.   Neurological:      General: No focal deficit present.      Mental Status: He is alert and oriented to person, place, and time. Mental status is at baseline.      Cranial Nerves: No cranial nerve deficit.      Sensory: No sensory deficit.      Motor: No weakness.      Coordination: Coordination normal.   Psychiatric:         Mood and Affect: Mood normal.

## 2022-11-06 NOTE — CONSULTS
Consultation Requested by: IM   Chief Complaint: CVA   Service used: NO    HPI: Aquiles Kelsey is a 82 y.o. male with a PMHx of AAA s/p repair, CAD, DM II, HLD, HTN, PAD,TIA and tobacco use who presented to the ED with c/o  of vision disturbances and facial droop which onset gradually around 3 am. Pt states he went to bed last night feeling normal, and then woke up around 3 am and wife noticed right-sided facial droop and uncoordinated walking. Pt admits that when he opens both eyes, he starts seeing double, but when he closes one eye  he can see normally again. Pt also admits to feeling dizzy. He was admitted on 6/22 with similar complain and df/C with a Dx of TIA after a negative acute CVA work up.     Past Medical History:   Diagnosis Date    Acute blood loss anemia 10/14/2019    Aneurysm, abdominal aortic     Coronary artery disease     Depression     Diabetes mellitus     HLD (hyperlipidemia)     Hypertension     Kidney stone     PAD (peripheral artery disease)     Tobacco abuse          Past Surgical History:   Procedure Laterality Date    APPENDECTOMY      CORONARY STENT PLACEMENT      x 4    ESOPHAGOGASTRODUODENOSCOPY N/A 10/14/2019    Procedure: EGD (ESOPHAGOGASTRODUODENOSCOPY);  Surgeon: Carlos Mcneal III, MD;  Location: [facility] ENDO;  Service: Endoscopy;  Laterality: N/A;    ESOPHAGOGASTRODUODENOSCOPY N/A 10/15/2019    Procedure: EGD (ESOPHAGOGASTRODUODENOSCOPY);  Surgeon: Carlos Mcneal III, MD;  Location: [facility] ENDO;  Service: Endoscopy;  Laterality: N/A;    LEFT HEART CATHETERIZATION Left 10/11/2019    Procedure: CATHETERIZATION, HEART, LEFT;  Surgeon: Robe Gilbert MD;  Location: [facility] CATH LAB;  Service: Cardiology;  Laterality: Left;    LEFT HEART CATHETERIZATION Left 10/13/2019    Procedure: CATHETERIZATION, HEART, LEFT;  Surgeon: Robe Gilbert MD;  Location: [facility] CATH LAB;  Service: Cardiology;  Laterality: Left;    leg stent Left          Review of patient's allergies  indicates:   Allergen Reactions    Metoprolol Other (See Comments)     Junctional rhythm              Current Facility-Administered Medications:     0.9%  NaCl infusion, , Intravenous, Continuous, John Michael MD, Last Rate: 75 mL/hr at 11/04/22 2208, New Bag at 11/04/22 2208    aspirin EC tablet 81 mg, 81 mg, Oral, Daily, John Michael MD, 81 mg at 11/05/22 0858    atorvastatin tablet 40 mg, 40 mg, Oral, Daily, John Michael MD, 40 mg at 11/05/22 0858    clopidogreL tablet 75 mg, 75 mg, Oral, Daily, John Michael MD, 75 mg at 11/05/22 0857    dextrose 10% bolus 125 mL, 12.5 g, Intravenous, PRN, John Michael MD    dextrose 10% bolus 250 mL, 25 g, Intravenous, PRN, John Michael MD    enoxaparin injection 40 mg, 40 mg, Subcutaneous, Daily, John Michael MD, 40 mg at 11/05/22 1721    EScitalopram oxalate tablet 10 mg, 10 mg, Oral, Daily, John Michael MD, 10 mg at 11/05/22 0858    glucagon (human recombinant) injection 1 mg, 1 mg, Intramuscular, PRN, John Michael MD    glucose chewable tablet 16 g, 16 g, Oral, PRN, John Michael MD    glucose chewable tablet 24 g, 24 g, Oral, PRN, John Michael MD    hydrALAZINE injection 10 mg, 10 mg, Intravenous, Q6H PRN, John Michael MD, 10 mg at 11/05/22 1721    insulin aspart U-100 pen 1-10 Units, 1-10 Units, Subcutaneous, QID (AC + HS) PRN, John Michael MD    ondansetron injection 4 mg, 4 mg, Intravenous, Q6H PRN, John Michael MD    oxybutynin 24 hr tablet 10 mg, 10 mg, Oral, Daily, John Michael MD, 10 mg at 11/05/22 0857    pantoprazole EC tablet 40 mg, 40 mg, Oral, Daily, John Michael MD, 40 mg at 11/05/22 0858    ranolazine 12 hr tablet 500 mg, 500 mg, Oral, BID, John Michael MD, 500 mg at 11/05/22 0857    sodium chloride 0.9% bolus 500 mL, 500 mL, Intravenous, Continuous PRN, John Michael,  MD    sodium chloride 0.9% flush 10 mL, 10 mL, Intravenous, PRN, John Michael MD       Social History     Socioeconomic History    Marital status:    Tobacco Use    Smoking status: Every Day     Types: Cigars     Passive exposure: Never    Smokeless tobacco: Current   Substance and Sexual Activity    Alcohol use: Not Currently    Drug use: Not Currently    Sexual activity: Not Currently     Social Determinants of Health     Financial Resource Strain: Low Risk     Difficulty of Paying Living Expenses: Not hard at all   Food Insecurity: Unknown    Worried About Running Out of Food in the Last Year: Never true   Transportation Needs: No Transportation Needs    Lack of Transportation (Medical): No    Lack of Transportation (Non-Medical): No   Physical Activity: Inactive    Days of Exercise per Week: 0 days    Minutes of Exercise per Session: 0 min   Stress: No Stress Concern Present    Feeling of Stress : Only a little   Social Connections: Socially Isolated    Frequency of Communication with Friends and Family: Once a week    Frequency of Social Gatherings with Friends and Family: Once a week    Attends Jewish Services: Never    Active Member of Clubs or Organizations: No    Attends Club or Organization Meetings: Never    Marital Status:    Housing Stability: Unknown    Unable to Pay for Housing in the Last Year: No    Unstable Housing in the Last Year: No         Family History   Problem Relation Age of Onset    Diabetes Mother     COPD Father     Diabetes Brother      Review of Systems  Constitutional: no fevers, no weight changes,  No diaphoresis  HEENT: No congestion, no doublevision, no dysphagia, no rhinnorhea, no lacrimation  Cardiovascular: no chest pain or palpitations  Respiratory: no shortness of breath, no cough  Gastrointestinal: no diarrhea, no constipation  Genitourinary: no dysuria  Musculoskeletal: no joint swelling. No myalgia  Skin: no rash  Psychiatric: no hallucinations,  no depression or anxiety  Neurologic: as per HPI  All other review of systems is negative and as per HPI.    Vitals:    11/05/22 1925   BP: (!) 171/76   Pulse: 60   Resp: 18   Temp: 98.2 °F (36.8 °C)         Exam  General: Pleasant, conversant, Well-kempt  HEENT: Head atraumatic. No nasal abnormality. No gaze preference. EOMI.  Cardiovascular:  Regular  Lungs:  Not in distress  Mental Status: Awake alert and oriented person and place but not to time. Follows commands. No aphasia or dysarthria. Naming and repetition intact.   Cranial Nerve: PERRL. VFF. EOMI. Facial sensation intact. No facial asymmetry. Hearing intact. Palate elevates. Uvula midline. SCM strong. No tongue deviation.  Motor: Normal tone. No cogwheel rigidity. No bradykinesia. No pronator drift. 5/5 strength bilaterally in the upper extremities including deltoids, biceps, triceps, wrist extensors/flexors, finger flexors/extensors, interossei, and APB. 5/5 strength bilaterally in the lower extremities including iliopsoas, hamstring, quadriceps, tibialis anterior, gastrocnemius, EHL.  Deep Tendon Reflexes: 2+ in biceps, tricepts, brachioradialis b/l.   Sensory: Intact to soft touch. No neglect.  Cerebellar: Finger to nose intact.   Gait:  Deferred in the setting of acute stroke       Other tests:    HbA1c: 6.5  LDL: 96    ECHO:  The left ventricle is normal in size with severe concentric hypertrophy and normal systolic function.  Mild left atrial enlargement.  The estimated ejection fraction is 55%.  Grade II left ventricular diastolic dysfunction.  Normal right ventricular size with normal right ventricular systolic function.     CTA head and neck.  Impression:     1. Tiny saccular aneurysm arising inferiorly from the supraclinoid right ICA measuring 4 x 3 x 2 mm.  2. No evidence for intracranial thromboembolism.  No evidence for intracranial vasculitis.  3. Calcific atherosclerotic plaque in the cavernous right internal carotid artery causing moderate  grade stenosis of the cavernous right ICA.  4. No hemodynamically significant stenosis of either the left or right internal carotid artery.  5. Marginal soft plaque throughout the transverse aortic arch with extension of some soft plaque in the innominate artery causing no hemodynamically significant stenosis.    MRI brain:  Impression:  Tiny the left thalamic and midbrain acute infarct.    Assessment:    Aquiles Kelsey is a 82 y.o. male with a PMHx of AAA s/p repair, CAD, DM II, HLD, HTN, PAD,TIA and tobacco use who presented to the ED with c/o double vision and facial droop which onset gradually around 3 am.  On my review MRI brain showed left thalamic and left midbrain infarct.  He was out of her tPA window and was not a candidate for thrombectomy.    Stroke etiology: Small vessel disease in the setting of multiple uncontrolled vascular risk factor    Pre stroke mRS:0    Problem List:  -right thalamic infarct  -hypertension  -diabetes mellitus  -hyperlipidemia  -history of stroke    Plan:    -- inpatient  -- Initiate Stroke orderset  -- Place patient on telemetry  -- Activity as tolerated  -- Normoglycemia  -- Normothermia  -- BP goal normotensive  -- Do not reduce BP more then 10-15% per day  -- ASA 81 mg daily PO   -- Plavix 75 mg daily   -- DVT prophylaxis  -- Atorvastatin 40 mg daily   -- Ophthalmology consult as an outpatient for double vision.  No visual correction for 3 months to give a chance to natural healing  -- No driving due to double vision until cleared from occupational therapy driving testing.  -- RN swallow screen to be completed by the RN prior to any PO intake. If the patient fails the RN swallow screen then make patient NPO. A formal clinical swallow evaluation by SLP is needed.  -- Follow speech recommendations for starting diet  -- Start 0.9% NS @  cc/ hr unless contraindicated   -- Discontinue IV fluids when the patient becomes stable, is taking fluids by mouth, and is not  required for BP management   -- Physical therapy  -- Occupational therapy  -- Speech therapy  -- Rehab consult  -- We had an in depth discussion with patient and family regarding the imaging findings, stroke etiology and our plan  -- Case and plan discussed with primary team    -- We discussed her vascular risk factors and the need for her to continue to modify her risks e.g. taking her mediations, proper diet, and exercise.  -- Work with primary care provider on stroke risk factor management, BP <140/90, cholesterol monitoring  -- We discussed the need for her to continue to exercise her body and her mind with physical activity and mental activities.  -- We discussed BEFAST and the need for her to recognize stroke symptoms and report to the ER early is needed.    B-Balance  E-Eyes, vision  F-Facial Droop  A-Arm or leg weakness on one side  S-Speech trouble  T-Time - CALL 911!    -- Please have this patient follow up in stroke clinic in 2-3 months    Sanjiv Bautista MD  Neurology   [facility]

## 2022-11-06 NOTE — PLAN OF CARE
Patient tolerated intervention well. Functional mobility with RW and min A x180ft. Recommending HHOT with 24/7 SPV and A vs inpatient rehab.

## 2022-11-06 NOTE — ASSESSMENT & PLAN NOTE
-Permessive HTN  -resume statin , asa  And plavix  -MRI  Brain- showed acute infarct   -Neurocheck   -PT/OT- rehab facility recommended   -SLP- regular diet with thin liquids

## 2022-11-06 NOTE — ASSESSMENT & PLAN NOTE
Patient's FSGs are controlled on current medication regimen.  Last A1c reviewed-   Lab Results   Component Value Date    HGBA1C 6.5 (H) 11/04/2022     Most recent fingerstick glucose reviewed-   Recent Labs   Lab 11/05/22 2117 11/06/22  0541 11/06/22  1124   POCTGLUCOSE 124* 140* 140*     Current correctional scale  Medium  Maintain anti-hyperglycemic dose as follows-   Antihyperglycemics (From admission, onward)    Start     Stop Route Frequency Ordered    11/04/22 1730  insulin aspart U-100 pen 1-10 Units         -- SubQ Before meals & nightly PRN 11/04/22 1630        Hold Oral hypoglycemics while patient is in the hospital.

## 2022-11-06 NOTE — CONSULTS
[facility]  Cardiology  Consult Note    Patient Name: Aquiles Kelsey  MRN: [id number]  Admission Date: 11/4/2022  Hospital Length of Stay: 0 days  Code Status: Full Code   Attending Provider: John Michael, *   Consulting Provider: Eris Severino Md, MD  Primary Care Physician: Holger Thornton MD  Principal Problem:Acute CVA (cerebrovascular accident)    Patient information was obtained from patient, past medical records, ER records and primary team.     Inpatient consult to Cardiology  Consult performed by: Eris Severino MD  Consult ordered by: Cynthia Giraldo NP  Reason for consult: bradycardia         Subjective:     Chief Complaint:  Visual and facial changes     HPI:   82 y/o. male with a PMHx of AAA s/p repair, CAD, DM II, HLD, HTN, PAD,TIA and tobacco use who presented to the ED with c/o  of vision disturbances and facial droop which onset gradually around 3 am. Pt states he went to bed last night feeling normal, and then woke up around 3 am and wife noticed right-sided facial droop and uncoordinated walking. Pt admits that when he opens both eyes, he starts seeing double, but when he closes one eye  he can see normally again. Pt also admits to feeling dizzy. Associated sxs include facial droop, seeing double, uncoordinated walking, and dizziness. Patient denies any nausea, vomiting, diarrhea, fever,chest pain , sob  or GI/ sx . He was admitted on 6/22 with similar complain and df/C with a Dx of TIA after a negative acute CVA work up .  ER COURSE . Tele vascular neurology consulted from the ER and did not rec TPA . Head and neck CTA show did not show any acute finding .   ER VS:  BP Pulse Resp Temp SpO2   (!) 146/66 (!) 55 16 98.6 °F (37 °C) 96 %      Pt will be admitted to observation with a Dx of TIA  CODE STATUS FULL CODE        Overview/Hospital Course:  Pt admitted to Observation for suspected TIA. CTA of head and neck showed tiny saccular aneurysm arising inferiorly from the  supraclinoid right ICA measuring 4 x 3 x 2 mm and no evidence for intracranial thromboembolism or evidence for intracranial vasculitis. 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 Neurology consulted. PT/OT evaluation completed with Rehab facility recommended. Speech therapy recommended regular diet with thin liquids.      11/6  He cont complaining dizziness and  gait problem . MRI brain  show a thalamic infarct . PT/OT rec j[facility] carlos . Neurology and cardiology consutled      Cardiology consulted for bradycardia; pt has been stable in rates 50s-60s without syncope; not on any AVN agents, follows with Dr. Gilbert outpt clinic. Discussed he needs to f/u with Dr. Gilbert, will need 2 week Vital monitor to eval for arrhythmias; pt remains weak may need further PT/OT at inpt rehab/SNF.            Interval History:     Review of Systems   Constitutional:  Positive for activity change and fatigue.   HENT: Negative.     Eyes:  Positive for visual disturbance.   Respiratory: Negative.     Cardiovascular: Negative.    Gastrointestinal: Negative.    Endocrine: Negative.    Genitourinary: Negative.    Musculoskeletal: Negative.    Skin: Negative.    Neurological:  Positive for dizziness and weakness.   Hematological: Negative.    Psychiatric/Behavioral: Negative.     Objective:     Vital Signs (Most Recent):  Temp: 97.6 °F (36.4 °C) (11/06/22 1116)  Pulse: (!) 53 (11/06/22 1116)  Resp: 17 (11/06/22 1116)  BP: 139/68 (11/06/22 1116)  SpO2: 98 % (11/06/22 1116)   Vital Signs (24h Range):  Temp:  [97.4 °F (36.3 °C)-98.2 °F (36.8 °C)] 97.6 °F (36.4 °C)  Pulse:  [48-69] 53  Resp:  [17-20] 17  SpO2:  [95 %-98 %] 98 %  BP: (139-183)/(67-81) 139/68     Weight: 70.5 kg (155 lb 6.8 oz)  Body mass index is 22.3 kg/m².    Intake/Output Summary (Last 24 hours) at 11/6/2022 1507  Last data filed at 11/6/2022 1414  Gross per 24 hour   Intake --   Output 1050 ml   Net -1050 ml        Physical Exam  Vitals and nursing note reviewed.    Constitutional:       Appearance: Normal appearance. He is ill-appearing.   HENT:      Head: Normocephalic and atraumatic.      Mouth/Throat:      Mouth: Mucous membranes are moist.   Eyes:      Extraocular Movements: Extraocular movements intact.      Conjunctiva/sclera: Conjunctivae normal.      Pupils: Pupils are equal, round, and reactive to light.   Cardiovascular:      Rate and Rhythm: Regular rhythm. Bradycardia present.      Pulses: Normal pulses.      Heart sounds: No murmur heard.    No friction rub.   Pulmonary:      Effort: Pulmonary effort is normal. No respiratory distress.      Breath sounds: No stridor. No wheezing or rhonchi.   Chest:      Chest wall: No tenderness.   Abdominal:      General: Abdomen is flat. Bowel sounds are normal. There is no distension.      Palpations: Abdomen is soft. There is no mass.      Tenderness: There is no abdominal tenderness. There is no guarding or rebound.      Hernia: No hernia is present.   Musculoskeletal:         General: No swelling, tenderness, deformity or signs of injury. Normal range of motion.      Cervical back: Normal range of motion. No rigidity or tenderness.   Skin:     General: Skin is warm.      Coloration: Skin is not jaundiced or pale.      Findings: No bruising or erythema.   Neurological:      General: No focal deficit present.      Mental Status: He is alert and oriented to person, place, and time. Mental status is at baseline.      Cranial Nerves: No cranial nerve deficit.      Sensory: No sensory deficit.      Motor: No weakness.      Coordination: Coordination normal.   Psychiatric:         Mood and Affect: Mood normal.       Significant Labs: All pertinent labs within the past 24 hours have been reviewed.      Significant Imaging: I have reviewed all pertinent imaging results/findings within the past 24 hours.    Review of Systems   Constitutional: Positive for activity change and fatigue.   HENT: Negative.     Eyes:  Positive for visual  disturbance.   Cardiovascular: Negative.    Respiratory: Negative.     Endocrine: Negative.    Hematologic/Lymphatic: Negative.    Skin: Negative.    Musculoskeletal: Negative.    Gastrointestinal: Negative.    Genitourinary: Negative.    Neurological:  Positive for dizziness and weakness.   Psychiatric/Behavioral: Negative.     Physical Exam  Vitals and nursing note reviewed.   Constitutional:       Appearance: Normal appearance. He is ill-appearing.   HENT:      Head: Normocephalic and atraumatic.      Mouth/Throat:      Mouth: Mucous membranes are moist.   Eyes:      Extraocular Movements: Extraocular movements intact.      Conjunctiva/sclera: Conjunctivae normal.      Pupils: Pupils are equal, round, and reactive to light.   Cardiovascular:      Rate and Rhythm: Regular rhythm. Bradycardia present.      Pulses: Normal pulses.      Heart sounds: No murmur heard.    No friction rub.   Pulmonary:      Effort: Pulmonary effort is normal. No respiratory distress.      Breath sounds: No stridor. No wheezing or rhonchi.   Chest:      Chest wall: No tenderness.   Abdominal:      General: Abdomen is flat. Bowel sounds are normal. There is no distension.      Palpations: Abdomen is soft. There is no mass.      Tenderness: There is no abdominal tenderness. There is no guarding or rebound.      Hernia: No hernia is present.   Musculoskeletal:         General: No swelling, tenderness, deformity or signs of injury. Normal range of motion.      Cervical back: Normal range of motion. No rigidity or tenderness.   Skin:     General: Skin is warm.      Coloration: Skin is not jaundiced or pale.      Findings: No bruising or erythema.   Neurological:      General: No focal deficit present.      Mental Status: He is alert and oriented to person, place, and time. Mental status is at baseline.      Cranial Nerves: No cranial nerve deficit.      Sensory: No sensory deficit.      Motor: No weakness.      Coordination: Coordination  normal.   Psychiatric:         Mood and Affect: Mood normal.       Assessment and Plan:     * Acute CVA (cerebrovascular accident)  Cont tx per neuro and primary team  Rec f/u in CV clinic - 2 week Vital monitor and discuss if ILR needed    Bradycardia  Chronic condition  Cont to monitor on tele  Rec 14 day Vital monitor as outpt  Will f/u PRN here    Tobacco abuse  Needs smoking cessation     Type 2 diabetes mellitus, without long-term current use of insulin  Cont tx per primary team     Dyslipidemia  Cont statin    Essential hypertension  Titrate meds for permissive HTN     PAD (peripheral artery disease)  Cont asa, statin, plavix     CAD (coronary artery disease)  Cont asa, statin, plavix  Avoid BB due to bradycardia  No CP/SOB      Follow up with Dr. Gilbert upon DC from hospital/SNF    VTE Risk Mitigation (From admission, onward)         Ordered     enoxaparin injection 40 mg  Daily         11/04/22 1627     IP VTE HIGH RISK PATIENT  Once         11/04/22 1627     Place sequential compression device  Until discontinued         11/04/22 1627                Thank you for your consult. I will sign off. Please contact us if you have any additional questions.    Eris Severino Md, MD  Cardiology   [facility]

## 2022-11-06 NOTE — SUBJECTIVE & OBJECTIVE
Interval History:     Review of Systems   Constitutional:  Positive for activity change and fatigue.   HENT: Negative.     Eyes:  Positive for visual disturbance.   Respiratory: Negative.     Cardiovascular: Negative.    Gastrointestinal: Negative.    Endocrine: Negative.    Genitourinary: Negative.    Musculoskeletal: Negative.    Skin: Negative.    Neurological:  Positive for dizziness and weakness.   Hematological: Negative.    Psychiatric/Behavioral: Negative.     Objective:     Vital Signs (Most Recent):  Temp: 97.6 °F (36.4 °C) (11/06/22 1116)  Pulse: (!) 53 (11/06/22 1116)  Resp: 17 (11/06/22 1116)  BP: 139/68 (11/06/22 1116)  SpO2: 98 % (11/06/22 1116)   Vital Signs (24h Range):  Temp:  [97.4 °F (36.3 °C)-98.2 °F (36.8 °C)] 97.6 °F (36.4 °C)  Pulse:  [48-69] 53  Resp:  [17-20] 17  SpO2:  [95 %-98 %] 98 %  BP: (139-183)/(67-81) 139/68     Weight: 70.5 kg (155 lb 6.8 oz)  Body mass index is 22.3 kg/m².    Intake/Output Summary (Last 24 hours) at 11/6/2022 1432  Last data filed at 11/6/2022 1414  Gross per 24 hour   Intake --   Output 1050 ml   Net -1050 ml      Physical Exam  Vitals and nursing note reviewed.   Constitutional:       Appearance: Normal appearance. He is ill-appearing.   HENT:      Head: Normocephalic and atraumatic.      Mouth/Throat:      Mouth: Mucous membranes are moist.   Eyes:      Extraocular Movements: Extraocular movements intact.      Conjunctiva/sclera: Conjunctivae normal.      Pupils: Pupils are equal, round, and reactive to light.   Cardiovascular:      Rate and Rhythm: Regular rhythm. Bradycardia present.      Pulses: Normal pulses.      Heart sounds: No murmur heard.    No friction rub.   Pulmonary:      Effort: Pulmonary effort is normal. No respiratory distress.      Breath sounds: No stridor. No wheezing or rhonchi.   Chest:      Chest wall: No tenderness.   Abdominal:      General: Abdomen is flat. Bowel sounds are normal. There is no distension.      Palpations: Abdomen is  soft. There is no mass.      Tenderness: There is no abdominal tenderness. There is no guarding or rebound.      Hernia: No hernia is present.   Musculoskeletal:         General: No swelling, tenderness, deformity or signs of injury. Normal range of motion.      Cervical back: Normal range of motion. No rigidity or tenderness.   Skin:     General: Skin is warm.      Coloration: Skin is not jaundiced or pale.      Findings: No bruising or erythema.   Neurological:      General: No focal deficit present.      Mental Status: He is alert and oriented to person, place, and time. Mental status is at baseline.      Cranial Nerves: No cranial nerve deficit.      Sensory: No sensory deficit.      Motor: No weakness.      Coordination: Coordination normal.   Psychiatric:         Mood and Affect: Mood normal.       Significant Labs: All pertinent labs within the past 24 hours have been reviewed.      Significant Imaging: I have reviewed all pertinent imaging results/findings within the past 24 hours.

## 2022-11-06 NOTE — ASSESSMENT & PLAN NOTE
Cont tx per neuro and primary team  Rec f/u in CV clinic - 2 week Vital monitor and discuss if ILR needed

## 2022-11-06 NOTE — HPI
82 y/o. male with a PMHx of AAA s/p repair, CAD, DM II, HLD, HTN, PAD,TIA and tobacco use who presented to the ED with c/o  of vision disturbances and facial droop which onset gradually around 3 am. Pt states he went to bed last night feeling normal, and then woke up around 3 am and wife noticed right-sided facial droop and uncoordinated walking. Pt admits that when he opens both eyes, he starts seeing double, but when he closes one eye  he can see normally again. Pt also admits to feeling dizzy. Associated sxs include facial droop, seeing double, uncoordinated walking, and dizziness. Patient denies any nausea, vomiting, diarrhea, fever,chest pain , sob  or GI/ sx . He was admitted on 6/22 with similar complain and df/C with a Dx of TIA after a negative acute CVA work up .  ER COURSE . Tele vascular neurology consulted from the ER and did not rec TPA . Head and neck CTA show did not show any acute finding .   ER VS:  BP Pulse Resp Temp SpO2   (!) 146/66 (!) 55 16 98.6 °F (37 °C) 96 %      Pt will be admitted to observation with a Dx of TIA  CODE STATUS FULL CODE        Overview/Hospital Course:  Pt admitted to Observation for suspected TIA. 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 Neurology consulted. PT/OT evaluation completed with Rehab facility recommended. Speech therapy recommended regular diet with thin liquids.      11/6  He cont complaining dizziness and  gait problem . MRI brain  show a thalamic infarct . PT/OT rec j[facility] car[facility] . Neurology and cardiology consutled      Cardiology consulted for bradycardia; pt has been stable in rates 50s-60s without syncope; not on any AVN agents, follows with Dr. Gilbert outpt clinic. Discussed he needs to f/u with Dr. Gilbert, will need 2 week Vital  monitor to eval for arrhythmias; pt remains weak may need further PT/OT at inpt rehab/SNF.

## 2022-11-06 NOTE — PROGRESS NOTES
[facility] Medicine  Progress Note    Patient Name: Aquiles Kelsey  MRN: [id number]  Patient Class: IP- Inpatient   Admission Date: 11/4/2022  Length of Stay: 0 days  Attending Physician: John Michael, *  Primary Care Provider: Holger Thornton MD        Subjective:     Principal Problem:Acute CVA (cerebrovascular accident)        HPI:  82 y/o. male with a PMHx of AAA s/p repair, CAD, DM II, HLD, HTN, PAD,TIA and tobacco use who presented to the ED with c/o  of vision disturbances and facial droop which onset gradually around 3 am. Pt states he went to bed last night feeling normal, and then woke up around 3 am and wife noticed right-sided facial droop and uncoordinated walking. Pt admits that when he opens both eyes, he starts seeing double, but when he closes one eye  he can see normally again. Pt also admits to feeling dizzy. Associated sxs include facial droop, seeing double, uncoordinated walking, and dizziness. Patient denies any nausea, vomiting, diarrhea, fever,chest pain , sob  or GI/ sx . He was admitted on 6/22 with similar complain and df/C with a Dx of TIA after a negative acute CVA work up .  ER COURSE . Tele vascular neurology consulted from the ER and did not rec TPA . Head and neck CTA show did not show any acute finding .   ER VS:  BP Pulse Resp Temp SpO2   (!) 146/66 (!) 55 16 98.6 °F (37 °C) 96 %     Pt will be admitted to observation with a Dx of TIA  CODE STATUS FULL CODE      Overview/Hospital Course:  Pt admitted to Observation for suspected TIA. 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 Neurology consulted. PT/OT evaluation completed with Rehab facility recommended. Speech therapy recommended regular diet with thin  liquids.     11/6  He cont complaining dizziness and  gait problem . MRI brain  show a thalamic infarct . PT/OT rec jose carlos . Neurology and cardiology consutled      Interval History:     Review of Systems   Constitutional:  Positive for activity change and fatigue.   HENT: Negative.     Eyes:  Positive for visual disturbance.   Respiratory: Negative.     Cardiovascular: Negative.    Gastrointestinal: Negative.    Endocrine: Negative.    Genitourinary: Negative.    Musculoskeletal: Negative.    Skin: Negative.    Neurological:  Positive for dizziness and weakness.   Hematological: Negative.    Psychiatric/Behavioral: Negative.     Objective:     Vital Signs (Most Recent):  Temp: 97.6 °F (36.4 °C) (11/06/22 1116)  Pulse: (!) 53 (11/06/22 1116)  Resp: 17 (11/06/22 1116)  BP: 139/68 (11/06/22 1116)  SpO2: 98 % (11/06/22 1116)   Vital Signs (24h Range):  Temp:  [97.4 °F (36.3 °C)-98.2 °F (36.8 °C)] 97.6 °F (36.4 °C)  Pulse:  [48-69] 53  Resp:  [17-20] 17  SpO2:  [95 %-98 %] 98 %  BP: (139-183)/(67-81) 139/68     Weight: 70.5 kg (155 lb 6.8 oz)  Body mass index is 22.3 kg/m².    Intake/Output Summary (Last 24 hours) at 11/6/2022 1432  Last data filed at 11/6/2022 1414  Gross per 24 hour   Intake --   Output 1050 ml   Net -1050 ml      Physical Exam  Vitals and nursing note reviewed.   Constitutional:       Appearance: Normal appearance. He is ill-appearing.   HENT:      Head: Normocephalic and atraumatic.      Mouth/Throat:      Mouth: Mucous membranes are moist.   Eyes:      Extraocular Movements: Extraocular movements intact.      Conjunctiva/sclera: Conjunctivae normal.      Pupils: Pupils are equal, round, and reactive to light.   Cardiovascular:      Rate and Rhythm: Regular rhythm. Bradycardia present.      Pulses: Normal pulses.      Heart sounds: No murmur heard.    No friction rub.   Pulmonary:      Effort: Pulmonary effort is normal. No respiratory distress.      Breath sounds: No stridor. No wheezing or rhonchi.    Chest:      Chest wall: No tenderness.   Abdominal:      General: Abdomen is flat. Bowel sounds are normal. There is no distension.      Palpations: Abdomen is soft. There is no mass.      Tenderness: There is no abdominal tenderness. There is no guarding or rebound.      Hernia: No hernia is present.   Musculoskeletal:         General: No swelling, tenderness, deformity or signs of injury. Normal range of motion.      Cervical back: Normal range of motion. No rigidity or tenderness.   Skin:     General: Skin is warm.      Coloration: Skin is not jaundiced or pale.      Findings: No bruising or erythema.   Neurological:      General: No focal deficit present.      Mental Status: He is alert and oriented to person, place, and time. Mental status is at baseline.      Cranial Nerves: No cranial nerve deficit.      Sensory: No sensory deficit.      Motor: No weakness.      Coordination: Coordination normal.   Psychiatric:         Mood and Affect: Mood normal.       Significant Labs: All pertinent labs within the past 24 hours have been reviewed.      Significant Imaging: I have reviewed all pertinent imaging results/findings within the past 24 hours.        Assessment/Plan:      * Acute CVA (cerebrovascular accident)  -Permessive HTN  -resume statin , asa  And plavix  -MRI  Brain- showed acute infarct   -Neurocheck   -PT/OT- rehab facility recommended   -SLP- regular diet with thin liquids       TIA (transient ischemic attack)        Tobacco abuse  Assistance with smoking cessation was offered, including:  [x]  Medications  []  Counseling  []  Printed Information on Smoking Cessation  []  Referral to a Smoking Cessation Program    Patient was counseled regarding smoking for >10 minutes.        Type 2 diabetes mellitus, without long-term current use of insulin  Patient's FSGs are controlled on current medication regimen.  Last A1c reviewed-   Lab Results   Component Value Date    HGBA1C 6.5 (H) 11/04/2022     Most  recent fingerstick glucose reviewed-   Recent Labs   Lab 11/05/22 2117 11/06/22  0541 11/06/22  1124   POCTGLUCOSE 124* 140* 140*     Current correctional scale  Medium  Maintain anti-hyperglycemic dose as follows-   Antihyperglycemics (From admission, onward)    Start     Stop Route Frequency Ordered    11/04/22 1730  insulin aspart U-100 pen 1-10 Units         -- SubQ Before meals & nightly PRN 11/04/22 1630        Hold Oral hypoglycemics while patient is in the hospital.    Dyslipidemia  Resume statin       Essential hypertension  Permissive HTN until acute CVA r/io with [facility] MRI   Hydralazine PRN if SBP >200       PAD (peripheral artery disease)  Cont Statin , plavix and asa      CAD (coronary artery disease)  Cont asa , statin and asa  Stable           VTE Risk Mitigation (From admission, onward)         Ordered     enoxaparin injection 40 mg  Daily         11/04/22 1627     IP VTE HIGH RISK PATIENT  Once         11/04/22 1627     Place sequential compression device  Until discontinued         11/04/22 1627                Discharge Planning   ZEINA:      Code Status: Full Code   Is the patient medically ready for discharge?:     Reason for patient still in hospital (select all that apply): Treatment                     John Michael MD  Department of Hospital Medicine   [facility]

## 2022-11-06 NOTE — PT/OT/SLP PROGRESS
Speech Language Pathology Treatment    Patient Name:  Aquiles Kelsey   MRN:  [id number]  Admitting Diagnosis: TIA (transient ischemic attack)    Recommendations:                 General Recommendations:  Speech/language therapy  Diet recommendations:  Regular Diet - IDDSI Level 7, Liquid Diet Level: Thin liquids - IDDSI Level 0   Aspiration Precautions: Standard aspiration precautions   General Precautions: Standard,    Communication strategies:  provide increased time to answer    Subjective     Pt sitting up in chair and indicated he ate breakfast   Patient goals: to get back home      Pain/Comfort:   0/10    Respiratory Status: Room air    Objective:     Has the patient been evaluated by SLP for swallowing?    yes  Keep patient NPO?   no  Current Respiratory Status:    see medical chart     Pt  completed tasks to improve cognitive linguistic skills:  Orientation: Pt oriented to person, place, time, and situation  2 unit commands: 100%  2 unit yes/no: 100%  Name items in categories: 90%  Short term memory tasks: 80%    Assessment:     Aquiles Kelsey is an 82 y.o. male with an SLP diagnosis of decreased cognitive skills. Pt oriented to time and situation.  He was able to communicate needs/ideas.  Pt with some short term memory difficulties but overall is improving with cognitive/linguistic skills.     Goals:   Multidisciplinary Problems       SLP Goals          Problem: SLP    Goal Priority Disciplines Outcome   SLP Goal     SLP Ongoing, Progressing   Description: TPW complete orientation recall to place and time given environmental cues  TPW complete cognitive-linguistic tasks to improve processing                       Plan:     Patient to be seen:  2 x/week   Plan of Care expires:   11/11/22  Plan of Care reviewed with:  patient   SLP Follow-Up:  Yes       Discharge recommendations:      Barriers to Discharge:   n/a    Time Tracking:     SLP Treatment Date:    11/6/22  Speech Start Time:    0950  Speech Stop Time:    1015    Speech Total Time (min):   20 min    Billable Minutes: Total Time 20 minutes     11/06/2022

## 2022-11-07 ENCOUNTER — TELEPHONE (OUTPATIENT)
Dept: NEUROLOGY | Facility: CLINIC | Age: 82
End: 2022-11-07
Payer: MEDICARE

## 2022-11-07 LAB
POCT GLUCOSE: 124 MG/DL (ref 70–110)
POCT GLUCOSE: 132 MG/DL (ref 70–110)
POCT GLUCOSE: 138 MG/DL (ref 70–110)
POCT GLUCOSE: 143 MG/DL (ref 70–110)

## 2022-11-07 PROCEDURE — 63600175 PHARM REV CODE 636 W HCPCS: Performed by: INTERNAL MEDICINE

## 2022-11-07 PROCEDURE — 97530 THERAPEUTIC ACTIVITIES: CPT

## 2022-11-07 PROCEDURE — 94761 N-INVAS EAR/PLS OXIMETRY MLT: CPT

## 2022-11-07 PROCEDURE — 92507 TX SP LANG VOICE COMM INDIV: CPT

## 2022-11-07 PROCEDURE — 21400001 HC TELEMETRY ROOM

## 2022-11-07 PROCEDURE — 25000003 PHARM REV CODE 250: Performed by: INTERNAL MEDICINE

## 2022-11-07 PROCEDURE — 97116 GAIT TRAINING THERAPY: CPT

## 2022-11-07 RX ADMIN — RANOLAZINE 500 MG: 500 TABLET, EXTENDED RELEASE ORAL at 09:11

## 2022-11-07 RX ADMIN — ISOSORBIDE MONONITRATE 120 MG: 60 TABLET, EXTENDED RELEASE ORAL at 10:11

## 2022-11-07 RX ADMIN — ASPIRIN 81 MG: 81 TABLET, COATED ORAL at 10:11

## 2022-11-07 RX ADMIN — ENOXAPARIN SODIUM 40 MG: 40 INJECTION SUBCUTANEOUS at 04:11

## 2022-11-07 RX ADMIN — ESCITALOPRAM OXALATE 10 MG: 10 TABLET, FILM COATED ORAL at 10:11

## 2022-11-07 RX ADMIN — ATORVASTATIN CALCIUM 40 MG: 40 TABLET, FILM COATED ORAL at 10:11

## 2022-11-07 RX ADMIN — AMLODIPINE BESYLATE 5 MG: 5 TABLET ORAL at 10:11

## 2022-11-07 RX ADMIN — PANTOPRAZOLE SODIUM 40 MG: 40 TABLET, DELAYED RELEASE ORAL at 10:11

## 2022-11-07 RX ADMIN — RANOLAZINE 500 MG: 500 TABLET, EXTENDED RELEASE ORAL at 10:11

## 2022-11-07 RX ADMIN — OXYBUTYNIN CHLORIDE 10 MG: 5 TABLET, EXTENDED RELEASE ORAL at 10:11

## 2022-11-07 RX ADMIN — CLOPIDOGREL BISULFATE 75 MG: 75 TABLET ORAL at 10:11

## 2022-11-07 NOTE — PLAN OF CARE
Problem: Adjustment to Illness (Stroke, Ischemic/Transient Ischemic Attack)  Goal: Optimal Coping  Outcome: Ongoing, Progressing     Problem: Bowel Elimination Impaired (Stroke, Ischemic/Transient Ischemic Attack)  Goal: Effective Bowel Elimination  Outcome: Ongoing, Progressing     Problem: Cerebral Tissue Perfusion (Stroke, Ischemic/Transient Ischemic Attack)  Goal: Optimal Cerebral Tissue Perfusion  Outcome: Ongoing, Progressing     Problem: Cognitive Impairment (Stroke, Ischemic/Transient Ischemic Attack)  Goal: Optimal Cognitive Function  Outcome: Ongoing, Progressing     Problem: Communication Impairment (Stroke, Ischemic/Transient Ischemic Attack)  Goal: Improved Communication Skills  Outcome: Ongoing, Progressing     Problem: Functional Ability Impaired (Stroke, Ischemic/Transient Ischemic Attack)  Goal: Optimal Functional Ability  Outcome: Ongoing, Progressing     Problem: Respiratory Compromise (Stroke, Ischemic/Transient Ischemic Attack)  Goal: Effective Oxygenation and Ventilation  Outcome: Ongoing, Progressing     Problem: Sensorimotor Impairment (Stroke, Ischemic/Transient Ischemic Attack)  Goal: Improved Sensorimotor Function  Outcome: Ongoing, Progressing     Problem: Swallowing Impairment (Stroke, Ischemic/Transient Ischemic Attack)  Goal: Optimal Eating and Swallowing without Aspiration  Outcome: Ongoing, Progressing     Problem: Urinary Elimination Impaired (Stroke, Ischemic/Transient Ischemic Attack)  Goal: Effective Urinary Elimination  Outcome: Ongoing, Progressing     Problem: Fall Injury Risk  Goal: Absence of Fall and Fall-Related Injury  Outcome: Ongoing, Progressing     Problem: Adult Inpatient Plan of Care  Goal: Plan of Care Review  Outcome: Ongoing, Progressing  Goal: Patient-Specific Goal (Individualized)  Outcome: Ongoing, Progressing  Goal: Absence of Hospital-Acquired Illness or Injury  Outcome: Ongoing, Progressing  Goal: Optimal Comfort and Wellbeing  Outcome: Ongoing,  Progressing  Goal: Readiness for Transition of Care  Outcome: Ongoing, Progressing     Problem: Infection  Goal: Absence of Infection Signs and Symptoms  Outcome: Ongoing, Progressing     Problem: Diabetes Comorbidity  Goal: Blood Glucose Level Within Targeted Range  Outcome: Ongoing, Progressing

## 2022-11-07 NOTE — PLAN OF CARE
[facility] and [facility] both clinically accepting. CM left voicemail with wife requesting facility preference. Awaiting return call.

## 2022-11-07 NOTE — PT/OT/SLP PROGRESS
"Physical Therapy  Treatment    Aquiles Kelsey   MRN: [id number]   Admitting Diagnosis: Acute CVA (cerebrovascular accident)    PT Received On: 11/07/22  PT Start Time: 0923     PT Stop Time: 0950    PT Total Time (min): 27 min       Billable Minutes:  Gait Training 17 and Therapeutic Activity 10    Treatment Type: Treatment  PT/PTA: PT     PTA Visit Number: 0       General Precautions: Standard, fall, vision impaired, hearing impaired  Orthopedic Precautions: N/A   Braces: N/A  Respiratory Status: Room air         Subjective:  Communicated with NURSE BILLINGS AND [facility] CHART REVIEW  prior to session.   PT AGREED TO TX. PT HARD OF HEARING AND REPORTED SEEING DOUBLE.     Pain/Comfort  Pain Rating 1: 0/10  Pain Rating Post-Intervention 1: 0/10    Objective:   Patient found with: peripheral IV, telemetry, bed alarm    Functional Mobility:  PT SUP.SIT EOB WITH SBA. PT SEATED EOB AND REPORTED DOUBLE VISION. P.T. DONNED PATCH TO RIGHT EYES AS PT REPORTED THIS IMPROVED VISION. P.T. EDUCATED PT AND WIFE TO WEAR PATCH AS NEEDED TO ASSIST WITH VISION FOR SAFETY AND IMPROVED BALANCE WITH GT AND GROSS FUNC MOBILITY. PT STOOD WITH RW AND GT TRAINED 3X50' WITH RW AND SLIGHT R LATERAL LEAN AND MIN A. PT WITH CUES FOR POSTURE AND RW SAFETY. PT RETURNED TO  TO BATHROOM FOR TOILETING AND CGA FOR STANDING BALANCE WHILE TOILETING. PT TO CHAIR WITH CGA. PT SEATED FOR REST. PT COMPLETED LBE X 2.5  WITH EMPHASIS ON LE COORDINATION. PT CONT TX WITH SIT>STAND TA WITH CGA>SBA  WITH B UE D1 PATTERNS FOR CHALLENGED BALANCE TRAINING. PT LEFT SEATED IN CHAIR WITH ALL NEEDS MET AND PT EDUCATED ON RISK FOR FALLS DUE TO GENERALIZED WEAKNESS, EDUCATED ON "CALL DON'T FALL", ENCOURAGED TO CALL FOR ASSISTANCE WITH ALL NEEDS SUCH AS BED<>CHAIR TRANSFERS OR TRIPS TO BATHROOM      AM-PAC 6 CLICK MOBILITY  How much help from another person does this patient currently need?   1 = Unable, Total/Dependent Assistance  2 = A lot, Maximum/Moderate " Assistance  3 = A little, Minimum/Contact Guard/Supervision  4 = None, Modified [place]/Independent    Turning over in bed (including adjusting bedclothes, sheets and blankets)?: 4  Sitting down on and standing up from a chair with arms (e.g., wheelchair, bedside commode, etc.): 3  Moving from lying on back to sitting on the side of the bed?: 4  Moving to and from a bed to a chair (including a wheelchair)?: 3  Need to walk in hospital room?: 3  Climbing 3-5 steps with a railing?: 1  Basic Mobility Total Score: 18    AM-PAC Raw Score CMS G-Code Modifier Level of Impairment Assistance   6 % Total / Unable   7 - 9 CM 80 - 100% Maximal Assist   10 - 14 CL 60 - 80% Moderate Assist   15 - 19 CK 40 - 60% Moderate Assist   20 - 22 CJ 20 - 40% Minimal Assist   23 CI 1-20% SBA / CGA   24 CH 0% Independent/ Mod I     Patient left up in chair with call button in reach and chair alarm on.    Assessment:  PT PROGRESSING WITH GT AND GROSS FUNC MOBILITY. PT WILL BENEFIT FROM CONT TX AND INPT REHAB TO ADDRESS IMPAIRMENTS LISTED    Rehab identified problem list/impairments: Rehab identified problem list/impairments: weakness, impaired endurance, impaired balance, gait instability, impaired functional mobility, impaired self care skills, visual deficits    Rehab potential is good.    Activity tolerance: Good    Discharge recommendations: Discharge Facility/Level of Care Needs: rehabilitation facility     Barriers to discharge:      Equipment recommendations: Equipment Needed After Discharge: walker, rolling     GOALS:   Multidisciplinary Problems       Physical Therapy Goals          Problem: Physical Therapy    Goal Priority Disciplines Outcome Goal Variances Interventions   Physical Therapy Goal     PT, PT/OT Ongoing, Progressing     Description: PT eval complete. The following goals will be met in 14 days  1. PT IND with bed mobility  2. PT mod IND with transfer  3. Pt amb 140 ft RW and Min A with upright posture                        PLAN:    Patient to be seen 3 x/week  to address the above listed problems via gait training, therapeutic activities, therapeutic exercises, neuromuscular re-education  Plan of Care expires: 11/19/22  Plan of Care reviewed with: patient, spouse         11/07/2022

## 2022-11-07 NOTE — PROGRESS NOTES
[facility] [place] Medicine  Progress Note    Patient Name: Aquiles Kelsey  MRN: [id number]  Patient Class: IP- Inpatient   Admission Date: 11/4/2022  Length of Stay: 1 days  Attending Physician: John Michael, *  Primary Care Provider: Holger Thornton MD        Subjective:     Principal Problem:Acute CVA (cerebrovascular accident)        HPI:  82 y/o. male with a PMHx of AAA s/p repair, CAD, DM II, HLD, HTN, PAD,TIA and tobacco use who presented to the ED with c/o  of vision disturbances and facial droop which onset gradually around 3 am. Pt states he went to bed last night feeling normal, and then woke up around 3 am and wife noticed right-sided facial droop and uncoordinated walking. Pt admits that when he opens both eyes, he starts seeing double, but when he closes one eye  he can see normally again. Pt also admits to feeling dizzy. Associated sxs include facial droop, seeing double, uncoordinated walking, and dizziness. Patient denies any nausea, vomiting, diarrhea, fever,chest pain , sob  or GI/ sx . He was admitted on 6/22 with similar complain and df/C with a Dx of TIA after a negative acute CVA work up .  ER COURSE . Tele vascular neurology consulted from the ER and did not rec TPA . Head and neck CTA show did not show any acute finding .   ER VS:  BP Pulse Resp Temp SpO2   (!) 146/66 (!) 55 16 98.6 °F (37 °C) 96 %     Pt will be admitted to observation with a Dx of TIA  CODE STATUS FULL CODE      Overview/Hospital Course:  Pt admitted to Observation for suspected TIA. 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 Neurology consulted. PT/OT evaluation completed with Rehab facility recommended. Speech therapy recommended regular diet with thin  liquids.     11/6  He cont complaining dizziness and  gait problem . MRI brain  show a thalamic infarct . PT/OT rec [facility] . Neurology and cardiology consulted  11/7 [facility] . He cont complaining of blurry vision over the right eyes . PT/[facility] . CM consulted for rehab placement .      Interval History:     Review of Systems   Constitutional:  Positive for activity change and fatigue.   HENT: Negative.     Eyes:  Positive for visual disturbance.   Respiratory: Negative.     Cardiovascular: Negative.    Gastrointestinal: Negative.    Endocrine: Negative.    Genitourinary: Negative.    Musculoskeletal: Negative.    Skin: Negative.    Neurological:  Positive for dizziness and weakness.   Hematological: Negative.    Psychiatric/Behavioral: Negative.     Objective:     Vital Signs (Most Recent):  Temp: 97.3 °F (36.3 °C) (11/07/22 1148)  Pulse: (!) 50 (11/07/22 1148)  Resp: 17 (11/07/22 1148)  BP: 139/65 (11/07/22 1148)  SpO2: 99 % (11/07/22 1148)   Vital Signs (24h Range):  Temp:  [97.3 °F (36.3 °C)-98.6 °F (37 °C)] 97.3 °F (36.3 °C)  Pulse:  [47-60] 50  Resp:  [16-17] 17  SpO2:  [95 %-99 %] 99 %  BP: (109-154)/(56-71) 139/65     Weight: 70.5 kg (155 lb 6.8 oz)  Body mass index is 22.3 kg/m².    Intake/Output Summary (Last 24 hours) at 11/7/2022 1220  Last data filed at 11/7/2022 0400  Gross per 24 hour   Intake --   Output 550 ml   Net -550 ml      Physical Exam  Vitals and nursing note reviewed.   Constitutional:       Appearance: Normal appearance. He is ill-appearing.   HENT:      Head: Normocephalic and atraumatic.      Mouth/Throat:      Mouth: Mucous membranes are moist.   Eyes:      Extraocular Movements: Extraocular movements intact.      Conjunctiva/sclera: Conjunctivae normal.      Pupils: Pupils are equal, round, and reactive to light.   Cardiovascular:      Rate and Rhythm: Regular rhythm. Bradycardia present.      Pulses: Normal pulses.      Heart sounds: No murmur heard.    No friction rub.    Pulmonary:      Effort: Pulmonary effort is normal. No respiratory distress.      Breath sounds: No stridor. No wheezing or rhonchi.   Chest:      Chest wall: No tenderness.   Abdominal:      General: Abdomen is flat. Bowel sounds are normal. There is no distension.      Palpations: Abdomen is soft. There is no mass.      Tenderness: There is no abdominal tenderness. There is no guarding or rebound.      Hernia: No hernia is present.   Musculoskeletal:         General: No swelling, tenderness, deformity or signs of injury. Normal range of motion.      Cervical back: Normal range of motion. No rigidity or tenderness.   Skin:     General: Skin is warm.      Coloration: Skin is not jaundiced or pale.      Findings: No bruising or erythema.   Neurological:      General: No focal deficit present.      Mental Status: He is alert and oriented to person, place, and time. Mental status is at baseline.      Cranial Nerves: No cranial nerve deficit.      Sensory: No sensory deficit.      Motor: No weakness.      Coordination: Coordination normal.   Psychiatric:         Mood and Affect: Mood normal.       Significant Labs: All pertinent labs within the past 24 hours have been reviewed.      Significant Imaging: I have reviewed all pertinent imaging results/findings within the past 24 hours.        Assessment/Plan:      * Acute CVA (cerebrovascular accident)  -Permessive HTN  -resume statin , asa  And plavix  -MRI  Brain- showed acute infarct   -Neurocheck   -PT/OT- rehab facility recommended   -SLP- regular diet with thin liquids   -TTE from  4 months ago show normal EF and diastolic dysfunction       TIA (transient ischemic attack)        Bradycardia  Cardiology consulted        Tobacco abuse  Assistance with smoking cessation was offered, including:  [x]  Medications  []  Counseling  []  Printed Information on Smoking Cessation  []  Referral to a Smoking Cessation Program    Patient was counseled regarding smoking for >10  minutes.        Type 2 diabetes mellitus, without long-term current use of insulin  Patient's FSGs are controlled on current medication regimen.  Last A1c reviewed-   Lab Results   Component Value Date    HGBA1C 6.5 (H) 11/04/2022     Most recent fingerstick glucose reviewed-   Recent Labs   Lab 11/06/22  1717 11/07/22  0527 11/07/22  1203   POCTGLUCOSE 195* 132* 138*     Current correctional scale  Medium  Maintain anti-hyperglycemic dose as follows-   Antihyperglycemics (From admission, onward)    Start     Stop Route Frequency Ordered    11/04/22 1730  insulin aspart U-100 pen 1-10 Units         -- SubQ Before meals & nightly PRN 11/04/22 1630        Hold Oral hypoglycemics while patient is in the hospital.    Dyslipidemia  Resume statin       Essential hypertension  Permissive HTN until acute CVA r/io with Robe MRI   Hydralazine PRN if SBP >200       PAD (peripheral artery disease)  Cont Statin , plavix and asa      CAD (coronary artery disease)  Cont asa , statin and asa  Stable           VTE Risk Mitigation (From admission, onward)         Ordered     enoxaparin injection 40 mg  Daily         11/04/22 1627     IP VTE HIGH RISK PATIENT  Once         11/04/22 1627     Place sequential compression device  Until discontinued         11/04/22 1627                Discharge Planning   ZEINA:      Code Status: Full Code   Is the patient medically ready for discharge?:     Reason for patient still in hospital (select all that apply): Treatment                     John Michael MD  Department of Hospital Medicine   [facility] - Med Surg

## 2022-11-07 NOTE — ASSESSMENT & PLAN NOTE
-Permessive HTN  -resume statin , asa  And plavix  -MRI  Brain- showed acute infarct   -Neurocheck   -PT/OT- rehab facility recommended   -SLP- regular diet with thin liquids   -TTE from  4 months ago show normal EF and diastolic dysfunction

## 2022-11-07 NOTE — ASSESSMENT & PLAN NOTE
Patient's FSGs are controlled on current medication regimen.  Last A1c reviewed-   Lab Results   Component Value Date    HGBA1C 6.5 (H) 11/04/2022     Most recent fingerstick glucose reviewed-   Recent Labs   Lab 11/06/22  1717 11/07/22  0527 11/07/22  1203   POCTGLUCOSE 195* 132* 138*     Current correctional scale  Medium  Maintain anti-hyperglycemic dose as follows-   Antihyperglycemics (From admission, onward)    Start     Stop Route Frequency Ordered    11/04/22 1730  insulin aspart U-100 pen 1-10 Units         -- SubQ Before meals & nightly PRN 11/04/22 1630        Hold Oral hypoglycemics while patient is in the hospital.

## 2022-11-07 NOTE — SUBJECTIVE & OBJECTIVE
Interval History:     Review of Systems   Constitutional:  Positive for activity change and fatigue.   HENT: Negative.     Eyes:  Positive for visual disturbance.   Respiratory: Negative.     Cardiovascular: Negative.    Gastrointestinal: Negative.    Endocrine: Negative.    Genitourinary: Negative.    Musculoskeletal: Negative.    Skin: Negative.    Neurological:  Positive for dizziness and weakness.   Hematological: Negative.    Psychiatric/Behavioral: Negative.     Objective:     Vital Signs (Most Recent):  Temp: 97.3 °F (36.3 °C) (11/07/22 1148)  Pulse: (!) 50 (11/07/22 1148)  Resp: 17 (11/07/22 1148)  BP: 139/65 (11/07/22 1148)  SpO2: 99 % (11/07/22 1148)   Vital Signs (24h Range):  Temp:  [97.3 °F (36.3 °C)-98.6 °F (37 °C)] 97.3 °F (36.3 °C)  Pulse:  [47-60] 50  Resp:  [16-17] 17  SpO2:  [95 %-99 %] 99 %  BP: (109-154)/(56-71) 139/65     Weight: 70.5 kg (155 lb 6.8 oz)  Body mass index is 22.3 kg/m².    Intake/Output Summary (Last 24 hours) at 11/7/2022 1220  Last data filed at 11/7/2022 0400  Gross per 24 hour   Intake --   Output 550 ml   Net -550 ml      Physical Exam  Vitals and nursing note reviewed.   Constitutional:       Appearance: Normal appearance. He is ill-appearing.   HENT:      Head: Normocephalic and atraumatic.      Mouth/Throat:      Mouth: Mucous membranes are moist.   Eyes:      Extraocular Movements: Extraocular movements intact.      Conjunctiva/sclera: Conjunctivae normal.      Pupils: Pupils are equal, round, and reactive to light.   Cardiovascular:      Rate and Rhythm: Regular rhythm. Bradycardia present.      Pulses: Normal pulses.      Heart sounds: No murmur heard.    No friction rub.   Pulmonary:      Effort: Pulmonary effort is normal. No respiratory distress.      Breath sounds: No stridor. No wheezing or rhonchi.   Chest:      Chest wall: No tenderness.   Abdominal:      General: Abdomen is flat. Bowel sounds are normal. There is no distension.      Palpations: Abdomen is soft.  There is no mass.      Tenderness: There is no abdominal tenderness. There is no guarding or rebound.      Hernia: No hernia is present.   Musculoskeletal:         General: No swelling, tenderness, deformity or signs of injury. Normal range of motion.      Cervical back: Normal range of motion. No rigidity or tenderness.   Skin:     General: Skin is warm.      Coloration: Skin is not jaundiced or pale.      Findings: No bruising or erythema.   Neurological:      General: No focal deficit present.      Mental Status: He is alert and oriented to person, place, and time. Mental status is at baseline.      Cranial Nerves: No cranial nerve deficit.      Sensory: No sensory deficit.      Motor: No weakness.      Coordination: Coordination normal.   Psychiatric:         Mood and Affect: Mood normal.       Significant Labs: All pertinent labs within the past 24 hours have been reviewed.      Significant Imaging: I have reviewed all pertinent imaging results/findings within the past 24 hours.

## 2022-11-07 NOTE — PLAN OF CARE
Pt resting in bed, remains free of falls and injuries this shift. VSS. No obvious s/sx of distress noted. No complaints of pain throughout the shift. Blood sugar monitored as ordered. POC reviewed with the patient and wife, verbalized understanding. No further needs at this time, call light within reach. Will continue POC as ordered.

## 2022-11-07 NOTE — PLAN OF CARE
[facility]  Initial Discharge Assessment       Primary Care Provider: Holger Thornton MD    Admission Diagnosis: Stroke [I63.9]  Cerebrovascular accident (CVA), unspecified mechanism [I63.9]    Admission Date: 11/4/2022  Expected Discharge Date:     Discharge Barriers Identified: None    Payor: [employer] MANAGED MEDICARE / Plan: [employer] MEDICARE HMO / Product Type: Capitation /     Extended Emergency Contact Information  Primary Emergency Contact: Gayle Woods  Home Phone: [phone]  Mobile Phone: [phone]  Relation: Spouse    Discharge Plan A: Rehab         [id number] [place] - [address] [facility]  [address] [address]  Phone: [phone] Fax: [phone]      Initial Assessment (most recent)       Adult Discharge Assessment - 11/07/22 1341          Discharge Assessment    Assessment Type Discharge Planning Assessment     Confirmed/corrected address, phone number and insurance Yes     Confirmed Demographics Correct on Facesheet     Source of Information patient;family     Communicated ZEINA with patient/caregiver Date not available/Unable to determine     Reason For Admission CVA     Lives With spouse     Facility Arrived From: home     Do you expect to return to your current living situation? Yes     Do you have help at home or someone to help you manage your care at home? Yes     Prior to hospitilization cognitive status: Alert/Oriented     Current cognitive status: Alert/Oriented     Walking or Climbing Stairs Difficulty ambulation difficulty, requires equipment     Dressing/Bathing Difficulty bathing difficulty, requires equipment     Equipment Currently Used at Home walker, rolling;shower chair     Readmission within 30 days? No     Patient currently being followed by outpatient case management? No     Do you currently have service(s) that help you manage your care at home? No     Do you take prescription medications?  Yes     Do you have prescription coverage? Yes     Do you have any problems affording any of your prescribed medications? No     Is the patient taking medications as prescribed? yes     Who is going to help you get home at discharge? spouse or son     How do you get to doctors appointments? car, drives self;family or friend will provide     Are you on dialysis? No     Discharge Plan A Rehab     DME Needed Upon Discharge  none     Discharge Plan discussed with: Spouse/sig other;Patient     Discharge Barriers Identified None                      Anticipated DC Dispo: Rehab  Prior level of Function: independent per wife, use of walker for mobility  PCP: Dr. Thornton  Comments:  CM met with patient at bedside to introduce role and discuss d/c planning. Patient [place], requested CM contact wife. CM contacted wife and discussed current recommendation for Rehab upon d/c. Reviewed list over phone and placed at bedside. Referrals were sent to [facility] and [facility] per wife request.

## 2022-11-07 NOTE — TELEPHONE ENCOUNTER
----- Message from Sanjiv Bautista MD sent at 11/5/2022  8:37 PM CDT -----    Please schedule with me in January. 1 hour visit

## 2022-11-08 LAB
POCT GLUCOSE: 133 MG/DL (ref 70–110)
POCT GLUCOSE: 134 MG/DL (ref 70–110)
POCT GLUCOSE: 144 MG/DL (ref 70–110)
POCT GLUCOSE: 193 MG/DL (ref 70–110)

## 2022-11-08 PROCEDURE — 63600175 PHARM REV CODE 636 W HCPCS: Performed by: INTERNAL MEDICINE

## 2022-11-08 PROCEDURE — 25000003 PHARM REV CODE 250: Performed by: INTERNAL MEDICINE

## 2022-11-08 PROCEDURE — 21400001 HC TELEMETRY ROOM

## 2022-11-08 RX ADMIN — ESCITALOPRAM OXALATE 10 MG: 10 TABLET, FILM COATED ORAL at 09:11

## 2022-11-08 RX ADMIN — OXYBUTYNIN CHLORIDE 10 MG: 5 TABLET, EXTENDED RELEASE ORAL at 09:11

## 2022-11-08 RX ADMIN — AMLODIPINE BESYLATE 5 MG: 5 TABLET ORAL at 09:11

## 2022-11-08 RX ADMIN — INSULIN ASPART 2 UNITS: 100 INJECTION, SOLUTION INTRAVENOUS; SUBCUTANEOUS at 11:11

## 2022-11-08 RX ADMIN — ASPIRIN 81 MG: 81 TABLET, COATED ORAL at 09:11

## 2022-11-08 RX ADMIN — ENOXAPARIN SODIUM 40 MG: 40 INJECTION SUBCUTANEOUS at 05:11

## 2022-11-08 RX ADMIN — RANOLAZINE 500 MG: 500 TABLET, EXTENDED RELEASE ORAL at 09:11

## 2022-11-08 RX ADMIN — ISOSORBIDE MONONITRATE 120 MG: 60 TABLET, EXTENDED RELEASE ORAL at 09:11

## 2022-11-08 RX ADMIN — CLOPIDOGREL BISULFATE 75 MG: 75 TABLET ORAL at 09:11

## 2022-11-08 RX ADMIN — ATORVASTATIN CALCIUM 40 MG: 40 TABLET, FILM COATED ORAL at 09:11

## 2022-11-08 RX ADMIN — PANTOPRAZOLE SODIUM 40 MG: 40 TABLET, DELAYED RELEASE ORAL at 09:11

## 2022-11-08 NOTE — SUBJECTIVE & OBJECTIVE
Interval History: awaiting insurance auth for  rehab. Will transfer to [facility]    Review of Systems   Constitutional:  Positive for activity change and fatigue.   HENT:  Negative for congestion, rhinorrhea and sore throat.    Eyes:  Negative for visual disturbance.   Respiratory:  Negative for cough, shortness of breath and wheezing.    Cardiovascular:  Negative for chest pain, palpitations and leg swelling.   Gastrointestinal:  Positive for constipation. Negative for abdominal distention, abdominal pain and diarrhea.   Endocrine: Negative for cold intolerance and heat intolerance.   Genitourinary:  Negative for difficulty urinating and flank pain.   Musculoskeletal:  Negative for arthralgias and myalgias.   Skin:  Negative for color change and wound.   Allergic/Immunologic: Negative.    Neurological:  Positive for dizziness and weakness.   Hematological: Negative.    Psychiatric/Behavioral:  Negative for agitation, behavioral problems and confusion. The patient is not nervous/anxious.    Objective:     Vital Signs (Most Recent):  Temp: 97.7 °F (36.5 °C) (11/08/22 1218)  Pulse: 66 (11/08/22 1500)  Resp: 18 (11/08/22 1218)  BP: (!) 150/71 (11/08/22 1218)  SpO2: 96 % (11/08/22 1218)   Vital Signs (24h Range):  Temp:  [97.3 °F (36.3 °C)-98 °F (36.7 °C)] 97.7 °F (36.5 °C)  Pulse:  [52-66] 66  Resp:  [16-18] 18  SpO2:  [94 %-98 %] 96 %  BP: (131-161)/(58-72) 150/71     Weight: 70.5 kg (155 lb 6.8 oz)  Body mass index is 22.3 kg/m².    Intake/Output Summary (Last 24 hours) at 11/8/2022 1609  Last data filed at 11/8/2022 1548  Gross per 24 hour   Intake 270 ml   Output 400 ml   Net -130 ml      Physical Exam  Vitals and nursing note reviewed.   Constitutional:       Appearance: Normal appearance. He is not ill-appearing.   HENT:      Head: Normocephalic and atraumatic.      Mouth/Throat:      Mouth: Mucous membranes are moist.   Eyes:      Extraocular Movements: Extraocular movements intact.      Conjunctiva/sclera:  Conjunctivae normal.      Pupils: Pupils are equal, round, and reactive to light.   Cardiovascular:      Rate and Rhythm: Regular rhythm. Bradycardia present.      Pulses: Normal pulses.      Heart sounds: No murmur heard.    No friction rub.   Pulmonary:      Effort: Pulmonary effort is normal. No respiratory distress.      Breath sounds: No stridor. No wheezing or rhonchi.   Chest:      Chest wall: No tenderness.   Abdominal:      General: Abdomen is flat. Bowel sounds are normal. There is no distension.      Palpations: Abdomen is soft. There is no mass.      Tenderness: There is no abdominal tenderness. There is no guarding or rebound.      Hernia: No hernia is present.   Genitourinary:     Comments: deferred  Musculoskeletal:         General: No swelling, tenderness, deformity or signs of injury. Normal range of motion.      Cervical back: Normal range of motion. No rigidity or tenderness.   Skin:     General: Skin is warm.      Capillary Refill: Capillary refill takes 2 to 3 seconds.      Coloration: Skin is not jaundiced or pale.      Findings: No bruising or erythema.   Neurological:      General: No focal deficit present.      Mental Status: He is alert and oriented to person, place, and time. Mental status is at baseline.      Cranial Nerves: No cranial nerve deficit.      Sensory: No sensory deficit.      Motor: No weakness.      Coordination: Coordination normal.   Psychiatric:         Mood and Affect: Mood normal.       Significant Labs: All pertinent labs within the past 24 hours have been reviewed.  Recent Lab Results         11/08/22  1114   11/08/22  0529   11/07/22  2138   11/07/22  1650        POCT Glucose 193   134   124   143               Significant Imaging: I have reviewed all pertinent imaging results/findings within the past 24 hours.

## 2022-11-08 NOTE — CONSULTS
[facility]  Hospital Medicine  Telemedicine Consult Note     [facility] Medicine consulted for Aquiles Kelsey to be followed through telemedicine modalities.  The patient is currently not appropriate for virtual visits due to admission for acute CVA with new neurological changes today as he was complaining of change in vision of his right eyes. Will need daily neurological exam documented till stability is noted before transferring to [facility].  Please re-consult once the patient is appropriate for virtual visits.      Tyesha Priest MD  Hospital Medicine Staff

## 2022-11-08 NOTE — PLAN OF CARE
[facility] has submitted for insurance auth.   CM left voicemail with spouse, Gayle with request for return call.

## 2022-11-08 NOTE — PLAN OF CARE
Pt remains free of falls/injury this shift. Safety precautions maintained. Blood glucose monitored. VSS. No signs and symptoms of acute distress noted at this time. 12 hour chart check completed.Will continue to monitor.

## 2022-11-08 NOTE — PT/OT/SLP PROGRESS
Occupational Therapy      Patient Name:  Aquiles Kelsey   MRN:  [id number]    OT treatment attempted at 10:15 AM. Patient not seen today secondary to pt sleeping and unable to arouse at this time. Will follow-up at a later time.    11/8/2022  Velma Silva, KALEIGH   1018

## 2022-11-08 NOTE — PT/OT/SLP PROGRESS
Physical Therapy      Patient Name:  Aquiles Kelsey   MRN:  [id number]      10:10 a.m.  Patient sleeping soundly upon entry into patient rooms. Unable to arouse patient after several attempts.  Will follow up during next scheduled PT session.

## 2022-11-08 NOTE — NURSING
Monitor room called to report that patient had a 14sec run of vtach. Pt was observed to be sleep during this time. Pt sleep was interrupted to for further assessed. Pt was oriented, VS stable, denying any abnormal symptoms. Will continue to monitor for any changes. On call notified. No further actions at this time.

## 2022-11-08 NOTE — PROGRESS NOTES
[facility] Medicine  Progress Note    Patient Name: Aquiles Kelsey  MRN: [id number]  Patient Class: IP- Inpatient   Admission Date: 11/4/2022  Length of Stay: 2 days  Attending Physician: Octavio Turner, *  Primary Care Provider: Holger Thornton MD        Subjective:     Principal Problem:Acute CVA (cerebrovascular accident)        HPI:  80 y/o. male with a PMHx of AAA s/p repair, CAD, DM II, HLD, HTN, PAD,TIA and tobacco use who presented to the ED with c/o  of vision disturbances and facial droop which onset gradually around 3 am. Pt states he went to bed last night feeling normal, and then woke up around 3 am and wife noticed right-sided facial droop and uncoordinated walking. Pt admits that when he opens both eyes, he starts seeing double, but when he closes one eye  he can see normally again. Pt also admits to feeling dizzy. Associated sxs include facial droop, seeing double, uncoordinated walking, and dizziness. Patient denies any nausea, vomiting, diarrhea, fever,chest pain , sob  or GI/ sx . He was admitted on 6/22 with similar complain and df/C with a Dx of TIA after a negative acute CVA work up .  ER COURSE . Tele vascular neurology consulted from the ER and did not rec TPA . Head and neck CTA show did not show any acute finding .   ER VS:  BP Pulse Resp Temp SpO2   (!) 146/66 (!) 55 16 98.6 °F (37 °C) 96 %     Pt will be admitted to observation with a Dx of TIA  CODE STATUS FULL CODE      Overview/Hospital Course:  Pt admitted to Observation for suspected TIA. 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 Neurology consulted. PT/OT evaluation completed with Rehab facility recommended. Speech therapy recommended regular diet with thin  liquids.     11/6  He cont complaining dizziness and  gait problem . MRI brain  show a thalamic infarct . PT/OT rec [facility] . Neurology and cardiology consulted  11/7 NAEON . He cont complaining of blurry vision over the right eyes . PT/OT [facility] . CM consulted for rehab placement .      Interval History: awaiting insurance auth for [facility]. Will transfer to [facility]    Review of Systems   Constitutional:  Positive for activity change and fatigue.   HENT:  Negative for congestion, rhinorrhea and sore throat.    Eyes:  Negative for visual disturbance.   Respiratory:  Negative for cough, shortness of breath and wheezing.    Cardiovascular:  Negative for chest pain, palpitations and leg swelling.   Gastrointestinal:  Positive for constipation. Negative for abdominal distention, abdominal pain and diarrhea.   Endocrine: Negative for cold intolerance and heat intolerance.   Genitourinary:  Negative for difficulty urinating and flank pain.   Musculoskeletal:  Negative for arthralgias and myalgias.   Skin:  Negative for color change and wound.   Allergic/Immunologic: Negative.    Neurological:  Positive for dizziness and weakness.   Hematological: Negative.    Psychiatric/Behavioral:  Negative for agitation, behavioral problems and confusion. The patient is not nervous/anxious.    Objective:     Vital Signs (Most Recent):  Temp: 97.7 °F (36.5 °C) (11/08/22 1218)  Pulse: 66 (11/08/22 1500)  Resp: 18 (11/08/22 1218)  BP: (!) 150/71 (11/08/22 1218)  SpO2: 96 % (11/08/22 1218)   Vital Signs (24h Range):  Temp:  [97.3 °F (36.3 °C)-98 °F (36.7 °C)] 97.7 °F (36.5 °C)  Pulse:  [52-66] 66  Resp:  [16-18] 18  SpO2:  [94 %-98 %] 96 %  BP: (131-161)/(58-72) 150/71     Weight: 70.5 kg (155 lb 6.8 oz)  Body mass index is 22.3 kg/m².    Intake/Output Summary (Last 24 hours) at 11/8/2022 1609  Last data filed at 11/8/2022 1548  Gross per 24 hour   Intake 270 ml   Output 400 ml   Net -130 ml      Physical Exam  Vitals and nursing  note reviewed.   Constitutional:       Appearance: Normal appearance. He is not ill-appearing.   HENT:      Head: Normocephalic and atraumatic.      Mouth/Throat:      Mouth: Mucous membranes are moist.   Eyes:      Extraocular Movements: Extraocular movements intact.      Conjunctiva/sclera: Conjunctivae normal.      Pupils: Pupils are equal, round, and reactive to light.   Cardiovascular:      Rate and Rhythm: Regular rhythm. Bradycardia present.      Pulses: Normal pulses.      Heart sounds: No murmur heard.    No friction rub.   Pulmonary:      Effort: Pulmonary effort is normal. No respiratory distress.      Breath sounds: No stridor. No wheezing or rhonchi.   Chest:      Chest wall: No tenderness.   Abdominal:      General: Abdomen is flat. Bowel sounds are normal. There is no distension.      Palpations: Abdomen is soft. There is no mass.      Tenderness: There is no abdominal tenderness. There is no guarding or rebound.      Hernia: No hernia is present.   Genitourinary:     Comments: deferred  Musculoskeletal:         General: No swelling, tenderness, deformity or signs of injury. Normal range of motion.      Cervical back: Normal range of motion. No rigidity or tenderness.   Skin:     General: Skin is warm.      Capillary Refill: Capillary refill takes 2 to 3 seconds.      Coloration: Skin is not jaundiced or pale.      Findings: No bruising or erythema.   Neurological:      General: No focal deficit present.      Mental Status: He is alert and oriented to person, place, and time. Mental status is at baseline.      Cranial Nerves: No cranial nerve deficit.      Sensory: No sensory deficit.      Motor: No weakness.      Coordination: Coordination normal.   Psychiatric:         Mood and Affect: Mood normal.       Significant Labs: All pertinent labs within the past 24 hours have been reviewed.  Recent Lab Results         11/08/22  1114   11/08/22  0529   11/07/22  2138   11/07/22  1650        POCT Glucose  193   134   124   143               Significant Imaging: I have reviewed all pertinent imaging results/findings within the past 24 hours.      Assessment/Plan:      * Acute CVA (cerebrovascular accident)  -Permessive HTN  -resume statin , asa  And plavix  -MRI  Brain- showed acute infarct   -Neurocheck   -PT/OT- rehab facility recommended   -SLP- regular diet with thin liquids   -TTE from  4 months ago show normal EF and diastolic dysfunction       TIA (transient ischemic attack)        Bradycardia  Cardiology consulted        Tobacco abuse  Assistance with smoking cessation was offered, including:  [x]  Medications  []  Counseling  []  Printed Information on Smoking Cessation  []  Referral to a Smoking Cessation Program    Patient was counseled regarding smoking for >10 minutes.        Type 2 diabetes mellitus, without long-term current use of insulin  Patient's FSGs are controlled on current medication regimen.  Last A1c reviewed-   Lab Results   Component Value Date    HGBA1C 6.5 (H) 11/04/2022     Most recent fingerstick glucose reviewed-   Recent Labs   Lab 11/06/22  1717 11/07/22  0527 11/07/22  1203   POCTGLUCOSE 195* 132* 138*     Current correctional scale  Medium  Maintain anti-hyperglycemic dose as follows-   Antihyperglycemics (From admission, onward)    Start     Stop Route Frequency Ordered    11/04/22 1730  insulin aspart U-100 pen 1-10 Units         -- SubQ Before meals & nightly PRN 11/04/22 1630        Hold Oral hypoglycemics while patient is in the hospital.    Dyslipidemia  Resume statin       Essential hypertension  Permissive HTN until acute CVA r/io with Robe MRI   Hydralazine PRN if SBP >200       PAD (peripheral artery disease)  Cont Statin , plavix and asa      CAD (coronary artery disease)  Cont asa , statin and asa  Stable           VTE Risk Mitigation (From admission, onward)         Ordered     enoxaparin injection 40 mg  Daily         11/04/22 1627     IP VTE HIGH RISK PATIENT  Once          11/04/22 1627     Place sequential compression device  Until discontinued         11/04/22 1627                Discharge Planning   ZEINA:      Code Status: Full Code   Is the patient medically ready for discharge?:     Reason for patient still in hospital (select all that apply): Pending disposition  Discharge Plan A: Rehab                  CHARISSA Matta  Department of [facility] Medicine   [facility] S[facility]

## 2022-11-08 NOTE — PLAN OF CARE
Received return call from wife confirming choice for [facility].    CM left voicemail with [facility] who called. Awaiting callback ([phone], ext [phone])

## 2022-11-09 VITALS
HEART RATE: 76 BPM | TEMPERATURE: 99 F | OXYGEN SATURATION: 94 % | DIASTOLIC BLOOD PRESSURE: 71 MMHG | HEIGHT: 70 IN | RESPIRATION RATE: 18 BRPM | WEIGHT: 146.38 LBS | SYSTOLIC BLOOD PRESSURE: 134 MMHG | BODY MASS INDEX: 20.96 KG/M2

## 2022-11-09 LAB
ANION GAP SERPL CALC-SCNC: 9 MMOL/L (ref 8–16)
BASOPHILS # BLD AUTO: 0.02 K/UL (ref 0–0.2)
BASOPHILS NFR BLD: 0.2 % (ref 0–1.9)
BUN SERPL-MCNC: 31 MG/DL (ref 8–23)
CALCIUM SERPL-MCNC: 9.1 MG/DL (ref 8.7–10.5)
CHLORIDE SERPL-SCNC: 104 MMOL/L (ref 95–110)
CO2 SERPL-SCNC: 25 MMOL/L (ref 23–29)
CREAT SERPL-MCNC: 1.6 MG/DL (ref 0.5–1.4)
DIFFERENTIAL METHOD: ABNORMAL
EOSINOPHIL # BLD AUTO: 0 K/UL (ref 0–0.5)
EOSINOPHIL NFR BLD: 0.2 % (ref 0–8)
ERYTHROCYTE [DISTWIDTH] IN BLOOD BY AUTOMATED COUNT: 13.8 % (ref 11.5–14.5)
EST. GFR  (NO RACE VARIABLE): 42 ML/MIN/1.73 M^2
GLUCOSE SERPL-MCNC: 190 MG/DL (ref 70–110)
HCT VFR BLD AUTO: 38.1 % (ref 40–54)
HGB BLD-MCNC: 13.2 G/DL (ref 14–18)
IMM GRANULOCYTES # BLD AUTO: 0.03 K/UL (ref 0–0.04)
IMM GRANULOCYTES NFR BLD AUTO: 0.4 % (ref 0–0.5)
LYMPHOCYTES # BLD AUTO: 0.6 K/UL (ref 1–4.8)
LYMPHOCYTES NFR BLD: 6.5 % (ref 18–48)
MCH RBC QN AUTO: 29.7 PG (ref 27–31)
MCHC RBC AUTO-ENTMCNC: 34.6 G/DL (ref 32–36)
MCV RBC AUTO: 86 FL (ref 82–98)
MONOCYTES # BLD AUTO: 0.7 K/UL (ref 0.3–1)
MONOCYTES NFR BLD: 7.9 % (ref 4–15)
NEUTROPHILS # BLD AUTO: 7.2 K/UL (ref 1.8–7.7)
NEUTROPHILS NFR BLD: 84.8 % (ref 38–73)
NRBC BLD-RTO: 0 /100 WBC
PLATELET # BLD AUTO: 155 K/UL (ref 150–450)
PMV BLD AUTO: 10.8 FL (ref 9.2–12.9)
POCT GLUCOSE: 153 MG/DL (ref 70–110)
POCT GLUCOSE: 165 MG/DL (ref 70–110)
POTASSIUM SERPL-SCNC: 4.1 MMOL/L (ref 3.5–5.1)
RBC # BLD AUTO: 4.44 M/UL (ref 4.6–6.2)
SODIUM SERPL-SCNC: 138 MMOL/L (ref 136–145)
WBC # BLD AUTO: 8.5 K/UL (ref 3.9–12.7)

## 2022-11-09 PROCEDURE — 63600175 PHARM REV CODE 636 W HCPCS: Performed by: INTERNAL MEDICINE

## 2022-11-09 PROCEDURE — 36415 COLL VENOUS BLD VENIPUNCTURE: CPT | Performed by: NURSE PRACTITIONER

## 2022-11-09 PROCEDURE — 85025 COMPLETE CBC W/AUTO DIFF WBC: CPT | Performed by: NURSE PRACTITIONER

## 2022-11-09 PROCEDURE — 80048 BASIC METABOLIC PNL TOTAL CA: CPT | Performed by: NURSE PRACTITIONER

## 2022-11-09 PROCEDURE — 25000003 PHARM REV CODE 250: Performed by: INTERNAL MEDICINE

## 2022-11-09 RX ORDER — ATORVASTATIN CALCIUM 40 MG/1
40 TABLET, FILM COATED ORAL DAILY
Qty: 90 TABLET | Refills: 3
Start: 2022-11-10 | End: 2022-12-09 | Stop reason: SDUPTHER

## 2022-11-09 RX ORDER — INSULIN ASPART 100 [IU]/ML
1-10 INJECTION, SOLUTION INTRAVENOUS; SUBCUTANEOUS
Refills: 0
Start: 2022-11-09 | End: 2023-01-06

## 2022-11-09 RX ORDER — RANOLAZINE 500 MG/1
500 TABLET, EXTENDED RELEASE ORAL 2 TIMES DAILY
Qty: 60 TABLET | Refills: 11
Start: 2022-11-09 | End: 2023-02-20

## 2022-11-09 RX ADMIN — RANOLAZINE 500 MG: 500 TABLET, EXTENDED RELEASE ORAL at 09:11

## 2022-11-09 RX ADMIN — PANTOPRAZOLE SODIUM 40 MG: 40 TABLET, DELAYED RELEASE ORAL at 09:11

## 2022-11-09 RX ADMIN — CLOPIDOGREL BISULFATE 75 MG: 75 TABLET ORAL at 09:11

## 2022-11-09 RX ADMIN — ATORVASTATIN CALCIUM 40 MG: 40 TABLET, FILM COATED ORAL at 09:11

## 2022-11-09 RX ADMIN — ISOSORBIDE MONONITRATE 120 MG: 60 TABLET, EXTENDED RELEASE ORAL at 09:11

## 2022-11-09 RX ADMIN — OXYBUTYNIN CHLORIDE 10 MG: 5 TABLET, EXTENDED RELEASE ORAL at 09:11

## 2022-11-09 RX ADMIN — ASPIRIN 81 MG: 81 TABLET, COATED ORAL at 09:11

## 2022-11-09 RX ADMIN — INSULIN ASPART 2 UNITS: 100 INJECTION, SOLUTION INTRAVENOUS; SUBCUTANEOUS at 11:11

## 2022-11-09 RX ADMIN — ESCITALOPRAM OXALATE 10 MG: 10 TABLET, FILM COATED ORAL at 09:11

## 2022-11-09 RX ADMIN — AMLODIPINE BESYLATE 5 MG: 5 TABLET ORAL at 09:11

## 2022-11-09 NOTE — PLAN OF CARE
[facility] - Med Surg  Discharge Final Note    Primary Care Provider: Holger Thornton MD    Expected Discharge Date: 11/9/2022    Final Discharge Note (most recent)       Final Note - 11/09/22 1230          Final Note    Assessment Type Final Discharge Note     Anticipated Discharge Disposition Rehab Facility     Hospital Resources/Appts/Education Provided Provided patient/caregiver with written discharge plan information;Appointments scheduled and added to AVS        Post-Acute Status    Discharge Delays None known at this time                     Important Message from Medicare             Contact Info       [facility]   Specialty: Physical Therapy, Occupational Therapy    [address]  [place] [address]   Phone: [phone]       Next Steps: Follow up today          Patient to dc to  rehab. Number for report given to nurse [phone]. Transport to arrive at 3:00PM. No other cm needs.

## 2022-11-09 NOTE — PLAN OF CARE
Patient remains free from falls and injuries this shift. Patient has hd zero complaining of pain. Patient is sleeping heavily and needs multiple attempts to arouse patient. Will continue to monitor patient. Chart check completed

## 2022-11-09 NOTE — PT/OT/SLP PROGRESS
Physical Therapy      Patient Name:  Aquiles Kelsey   MRN:  [id number]    1500 Unable to see patient at this time as he had already D/C from facility.   No need for follow up.

## 2022-11-09 NOTE — PLAN OF CARE
Trevon received report from Komal Lester that patient was pending Peer to peer with [facility] for Rehab and would take place at 9:30 this morning.     Update: Per Dr. Yue lin agreed with P2P and approved rehab. Trevon informed that we would have to wait until [facility] officially notifies [facility] to get dc orders and get patient transferred.  11/09/2022  9:36AM

## 2022-11-09 NOTE — DISCHARGE SUMMARY
[facility] Medicine  Discharge Summary      Patient Name: Aquiles Kelsey  MRN: [id number]  AYESHA: [id number]  Patient Class: IP- Inpatient  Admission Date: 11/4/2022  Hospital Length of Stay: 3 days  Discharge Date and Time: 11/9/2022  3:03 PM  Attending Physician: No att. providers found   Discharging Provider: Kinsey Hanson, LAYOP-C  Primary Care Provider: Holger Thornton MD    Primary Care Team: [facility] MEDICINE B    HPI:   80 y/o. male with a PMHx of AAA s/p repair, CAD, DM II, HLD, HTN, PAD,TIA and tobacco use who presented to the ED with c/o  of vision disturbances and facial droop which onset gradually around 3 am. Pt states he went to bed last night feeling normal, and then woke up around 3 am and wife noticed right-sided facial droop and uncoordinated walking. Pt admits that when he opens both eyes, he starts seeing double, but when he closes one eye  he can see normally again. Pt also admits to feeling dizzy. Associated sxs include facial droop, seeing double, uncoordinated walking, and dizziness. Patient denies any nausea, vomiting, diarrhea, fever,chest pain , sob  or GI/ sx . He was admitted on 6/22 with similar complain and df/C with a Dx of TIA after a negative acute CVA work up .  ER COURSE . Tele vascular neurology consulted from the ER and did not rec TPA . Head and neck CTA show did not show any acute finding .   ER VS:  BP Pulse Resp Temp SpO2   (!) 146/66 (!) 55 16 98.6 °F (37 °C) 96 %     Pt will be admitted to observation with a Dx of TIA  CODE STATUS FULL CODE      * No surgery found *      Hospital Course:   Pt admitted to Observation for suspected TIA. 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 Neurology consulted. PT/OT  evaluation completed with Rehab facility recommended. Speech therapy recommended regular diet with thin liquids.     11/6  He cont complaining dizziness and  gait problem . MRI brain  show a thalamic infarct . PT/OT rec [facility] . Neurology and cardiology consulted  11/7 NAEON . He cont complaining of blurry vision over the right eyes . PT/OT [facility] . CM consulted for rehab placement .  11/08: awaiting insurance auth for [facility]. Will transfer to [facility]  11/09: Peer to peer suck[facility] - [employer] authorized [facility] for patient. Spouse and patient informed. Patient was seen and examined today and deemed stable for discharge to [facility].       Goals of Care Treatment Preferences:  Code Status: Full Code      Consults:   Consults (From admission, onward)        Status Ordering Provider     Inpatient virtual consult to Hospital Medicine  Once        Provider:  Tyesha Priest MD    Completed MARGARET SANDERS     Inpatient consult to Social Work  Once        Provider:  (Not yet assigned)    Completed PO ABEL     Inpatient consult to Neurology  Once        Provider:  (Not yet assigned)    Completed YEISON DELA CRUZ     Inpatient consult to Cardiology  Once        Provider:  (Not yet assigned)    Completed YEISON DELA CRUZ     Inpatient consult to Physical Medicine Rehab  Once        Provider:  (Not yet assigned)    Acknowledged PO ABEL     Inpatient consult to Registered Dietitian/Nutritionist  Once        Provider:  (Not yet assigned)    Completed PO ABEL     IP consult to case management/social work  Once        Provider:  (Not yet assigned)    Completed PO ABEL          No new Assessment & Plan notes have been filed under this hospital service since the last note was generated.  Service: Hospital Medicine    Final Active Diagnoses:    Diagnosis Date Noted POA    PRINCIPAL PROBLEM:  Acute CVA (cerebrovascular accident) [I63.9] 11/06/2022  Yes    Bradycardia [R00.1] 10/12/2019 Yes    Tobacco abuse [Z72.0] 10/11/2019 Yes     Chronic    CAD (coronary artery disease) [I25.10] 06/12/2019 Yes     Chronic    PAD (peripheral artery disease) [I73.9] 06/12/2019 Yes     Chronic    Essential hypertension [I10] 06/12/2019 Yes     Chronic    Dyslipidemia [E78.5] 06/12/2019 Yes     Chronic    Type 2 diabetes mellitus, without long-term current use of insulin [E11.9] 06/12/2019 Yes     Chronic      Problems Resolved During this Admission:       Discharged Condition: stable    Disposition: Rehab Facility    Follow Up:   Follow-up Information     [facility] Follow up today.    Specialties: Physical Therapy, Occupational Therapy  Contact information:  [address]  [place] [address] [phone]                       Patient Instructions:      Ambulatory referral/consult to Neurology   Standing Status: Future   Referral Priority: Routine Referral Type: Consultation   Referral Reason: Specialty Services Required   Requested Specialty: Neurology   Number of Visits Requested: 1     Diet Cardiac     Diet diabetic     Notify your health care provider if you experience any of the following:  increased confusion or weakness     Notify your health care provider if you experience any of the following:  persistent dizziness, light-headedness, or visual disturbances     Notify your health care provider if you experience any of the following:  severe persistent headache     Activity as tolerated       Significant Diagnostic Studies: Labs:   BMP:   Recent Labs   Lab 11/09/22  1104   *      K 4.1      CO2 25   BUN 31*   CREATININE 1.6*   CALCIUM 9.1   , CMP   Recent Labs   Lab 11/09/22  1104      K 4.1      CO2 25   *   BUN 31*   CREATININE 1.6*   CALCIUM 9.1   ANIONGAP 9   , CBC   Recent Labs   Lab 11/09/22  1104   WBC 8.50   HGB 13.2*   HCT 38.1*       and All labs within the past 24 hours have been  reviewed    Pending Diagnostic Studies:     None         Medications:  Reconciled Home Medications:      Medication List      START taking these medications    atorvastatin 40 MG tablet  Commonly known as: LIPITOR  Take 1 tablet (40 mg total) by mouth once daily.  Start taking on: November 10, 2022     insulin aspart U-100 100 unit/mL (3 mL) Inpn pen  Commonly known as: NovoLOG  Inject 1-10 Units into the skin before meals and at bedtime as needed (Hyperglycemia).        CONTINUE taking these medications    amLODIPine 5 MG tablet  Commonly known as: NORVASC  TAKE 1 TABLET BY MOUTH EVERY DAY     aspirin 81 MG EC tablet  Commonly known as: ECOTRIN  Take 1 tablet (81 mg total) by mouth once daily.     clopidogreL 75 mg tablet  Commonly known as: PLAVIX  TAKE 1 TABLET BY MOUTH EVERY DAY     isosorbide mononitrate 120 MG 24 hr tablet  Commonly known as: IMDUR  TAKE 1 TABLET BY MOUTH ONCE DAILY     MYRBETRIQ 50 mg Tb24  Generic drug: mirabegron  TAKE 1 TABLET BY MOUTH EVERY DAY     ranolazine 500 MG Tb12  Commonly known as: RANEXA  Take 1 tablet (500 mg total) by mouth 2 (two) times daily.        STOP taking these medications    simvastatin 20 MG tablet  Commonly known as: ZOCOR            Indwelling Lines/Drains at time of discharge:   Lines/Drains/Airways     None                 Time spent on the discharge of patient: 50 minutes         CHARISSA Matta  Department of Hospital Medicine  '[facility]

## 2022-11-09 NOTE — PLAN OF CARE
Pt being discharged Rehab in stable condition. IV removed, catheter intact, pt tolerated well. Tele monitor removed, given to US. Discharge instructions given to pt, pt verbalized understanding. 12 hour chart check completed. Report called to [facility].

## 2022-11-25 DIAGNOSIS — E11.9 DIABETES MELLITUS WITHOUT COMPLICATION: Primary | ICD-10-CM

## 2022-11-25 NOTE — TELEPHONE ENCOUNTER
No new care gaps identified.  [facility] Embedded Care Gaps. Reference number: [id number]. 11/25/2022   4:34:11 PM CST

## 2022-11-25 NOTE — TELEPHONE ENCOUNTER
----- Message from Joe Bravo sent at 11/25/2022  4:17 PM CST -----  Contact: Wife  ..Type:  Patient Returning Call    Who Called: Sarah   Who Left Message for Patient:  Does the patient know what this is regarding?:medication   Would the patient rather a call back or a response via [facility]? Call back   Best Call Back Number:.[phone]  Additional Information:

## 2022-11-25 NOTE — TELEPHONE ENCOUNTER
Spoke to pt's wife mr Kwan is having a lot of nerve pain and the physical therapist mentioned to them that they need to call  and ask if we can change his gabapentin from 100 mgs to 300 mgs.

## 2022-11-26 RX ORDER — ISOPROPYL ALCOHOL 70 ML/100ML
SWAB TOPICAL
Qty: 400 EACH | Refills: 3 | Status: SHIPPED | OUTPATIENT
Start: 2022-11-26

## 2022-11-26 RX ORDER — LANCETS
EACH MISCELLANEOUS
Qty: 200 EACH | Refills: 6 | Status: SHIPPED | OUTPATIENT
Start: 2022-11-26

## 2022-12-01 ENCOUNTER — TELEPHONE (OUTPATIENT)
Dept: FAMILY MEDICINE | Facility: CLINIC | Age: 82
End: 2022-12-01
Payer: MEDICARE

## 2022-12-01 NOTE — TELEPHONE ENCOUNTER
Spoke to Amrita with [facility].  she states that she thinks his anti depression meds needs to be increased.  Pt stay in bed won't eat or drink just little bits here and there.  He won't participate in any activities just stay's in bed.  Please advise

## 2022-12-01 NOTE — TELEPHONE ENCOUNTER
Spoke with wife she states that the pt is on 20mgs of citalopram and it is not working.  Advised her to take him to the ER and she stated that she will talk to Amrita alves first

## 2022-12-01 NOTE — TELEPHONE ENCOUNTER
----- Message from Bita Andre sent at 12/1/2022  1:49 PM CST -----  Contact: [id number]  Calling regarding pt who doesn't want to participate any activities have been in bed for 4 days and wife have to make pt eat , also states pt is on  low dose of anti depressant meds . Please call back at [phone] . Álvaro/jaimie

## 2022-12-02 ENCOUNTER — HOSPITAL ENCOUNTER (EMERGENCY)
Facility: HOSPITAL | Age: 82
Discharge: HOME | End: 2022-12-02
Attending: EMERGENCY MEDICINE
Payer: MEDICARE

## 2022-12-02 VITALS
DIASTOLIC BLOOD PRESSURE: 68 MMHG | OXYGEN SATURATION: 96 % | TEMPERATURE: 98 F | SYSTOLIC BLOOD PRESSURE: 159 MMHG | HEART RATE: 74 BPM | RESPIRATION RATE: 20 BRPM

## 2022-12-02 DIAGNOSIS — J18.9 PNEUMONIA OF RIGHT MIDDLE LOBE DUE TO INFECTIOUS ORGANISM: ICD-10-CM

## 2022-12-02 DIAGNOSIS — R53.1 WEAKNESS: Primary | ICD-10-CM

## 2022-12-02 DIAGNOSIS — R53.83 FATIGUE, UNSPECIFIED TYPE: ICD-10-CM

## 2022-12-02 DIAGNOSIS — R41.82 AMS (ALTERED MENTAL STATUS): ICD-10-CM

## 2022-12-02 LAB
ALBUMIN SERPL BCP-MCNC: 2.6 G/DL (ref 3.5–5.2)
ALP SERPL-CCNC: 108 U/L (ref 55–135)
ALT SERPL W/O P-5'-P-CCNC: 18 U/L (ref 10–44)
ANION GAP SERPL CALC-SCNC: 13 MMOL/L (ref 8–16)
AST SERPL-CCNC: 15 U/L (ref 10–40)
BACTERIA #/AREA URNS HPF: NORMAL /HPF
BASOPHILS # BLD AUTO: 0.02 K/UL (ref 0–0.2)
BASOPHILS NFR BLD: 0.2 % (ref 0–1.9)
BILIRUB SERPL-MCNC: 0.6 MG/DL (ref 0.1–1)
BILIRUB UR QL STRIP: NEGATIVE
BNP SERPL-MCNC: 177 PG/ML (ref 0–99)
BUN SERPL-MCNC: 57 MG/DL (ref 8–23)
CALCIUM SERPL-MCNC: 9.6 MG/DL (ref 8.7–10.5)
CHLORIDE SERPL-SCNC: 105 MMOL/L (ref 95–110)
CK SERPL-CCNC: 25 U/L (ref 20–200)
CLARITY UR: CLEAR
CO2 SERPL-SCNC: 21 MMOL/L (ref 23–29)
COLOR UR: YELLOW
CREAT SERPL-MCNC: 1.5 MG/DL (ref 0.5–1.4)
CTP QC/QA: YES
CTP QC/QA: YES
DIFFERENTIAL METHOD: ABNORMAL
EOSINOPHIL # BLD AUTO: 0 K/UL (ref 0–0.5)
EOSINOPHIL NFR BLD: 0.3 % (ref 0–8)
ERYTHROCYTE [DISTWIDTH] IN BLOOD BY AUTOMATED COUNT: 13.6 % (ref 11.5–14.5)
EST. GFR  (NO RACE VARIABLE): 46 ML/MIN/1.73 M^2
GLUCOSE SERPL-MCNC: 128 MG/DL (ref 70–110)
GLUCOSE UR QL STRIP: NEGATIVE
HCT VFR BLD AUTO: 38.2 % (ref 40–54)
HCV AB SERPL QL IA: NEGATIVE
HEP C VIRUS HOLD SPECIMEN: NORMAL
HGB BLD-MCNC: 13 G/DL (ref 14–18)
HGB UR QL STRIP: NEGATIVE
HIV 1+2 AB+HIV1 P24 AG SERPL QL IA: NEGATIVE
HYALINE CASTS #/AREA URNS LPF: 0 /LPF
IMM GRANULOCYTES # BLD AUTO: 0.06 K/UL (ref 0–0.04)
IMM GRANULOCYTES NFR BLD AUTO: 0.6 % (ref 0–0.5)
KETONES UR QL STRIP: NEGATIVE
LEUKOCYTE ESTERASE UR QL STRIP: NEGATIVE
LYMPHOCYTES # BLD AUTO: 0.8 K/UL (ref 1–4.8)
LYMPHOCYTES NFR BLD: 7.5 % (ref 18–48)
MCH RBC QN AUTO: 29.5 PG (ref 27–31)
MCHC RBC AUTO-ENTMCNC: 34 G/DL (ref 32–36)
MCV RBC AUTO: 87 FL (ref 82–98)
MICROSCOPIC COMMENT: NORMAL
MONOCYTES # BLD AUTO: 0.6 K/UL (ref 0.3–1)
MONOCYTES NFR BLD: 6 % (ref 4–15)
NEUTROPHILS # BLD AUTO: 8.6 K/UL (ref 1.8–7.7)
NEUTROPHILS NFR BLD: 85.4 % (ref 38–73)
NITRITE UR QL STRIP: NEGATIVE
NRBC BLD-RTO: 0 /100 WBC
PH UR STRIP: 6 [PH] (ref 5–8)
PLATELET # BLD AUTO: 486 K/UL (ref 150–450)
PMV BLD AUTO: 10 FL (ref 9.2–12.9)
POC MOLECULAR INFLUENZA A AGN: NEGATIVE
POC MOLECULAR INFLUENZA B AGN: NEGATIVE
POTASSIUM SERPL-SCNC: 4.6 MMOL/L (ref 3.5–5.1)
PROT SERPL-MCNC: 7.9 G/DL (ref 6–8.4)
PROT UR QL STRIP: ABNORMAL
RBC # BLD AUTO: 4.41 M/UL (ref 4.6–6.2)
RBC #/AREA URNS HPF: 0 /HPF (ref 0–4)
SARS-COV-2 RDRP RESP QL NAA+PROBE: NEGATIVE
SODIUM SERPL-SCNC: 139 MMOL/L (ref 136–145)
SP GR UR STRIP: 1.02 (ref 1–1.03)
TROPONIN I SERPL DL<=0.01 NG/ML-MCNC: 0.03 NG/ML (ref 0–0.03)
URN SPEC COLLECT METH UR: ABNORMAL
UROBILINOGEN UR STRIP-ACNC: NEGATIVE EU/DL
WBC # BLD AUTO: 10.04 K/UL (ref 3.9–12.7)
WBC #/AREA URNS HPF: 2 /HPF (ref 0–5)
WBC CLUMPS URNS QL MICRO: NORMAL

## 2022-12-02 PROCEDURE — 93010 EKG 12-LEAD: ICD-10-PCS | Mod: ,,, | Performed by: STUDENT IN AN ORGANIZED HEALTH CARE EDUCATION/TRAINING PROGRAM

## 2022-12-02 PROCEDURE — 87502 INFLUENZA DNA AMP PROBE: CPT

## 2022-12-02 PROCEDURE — 87635 SARS-COV-2 COVID-19 AMP PRB: CPT | Performed by: EMERGENCY MEDICINE

## 2022-12-02 PROCEDURE — 93010 ELECTROCARDIOGRAM REPORT: CPT | Mod: ,,, | Performed by: STUDENT IN AN ORGANIZED HEALTH CARE EDUCATION/TRAINING PROGRAM

## 2022-12-02 PROCEDURE — 80053 COMPREHEN METABOLIC PANEL: CPT | Performed by: EMERGENCY MEDICINE

## 2022-12-02 PROCEDURE — 36000 PLACE NEEDLE IN VEIN: CPT

## 2022-12-02 PROCEDURE — 82550 ASSAY OF CK (CPK): CPT | Performed by: EMERGENCY MEDICINE

## 2022-12-02 PROCEDURE — 99285 EMERGENCY DEPT VISIT HI MDM: CPT | Mod: 25

## 2022-12-02 PROCEDURE — 87389 HIV-1 AG W/HIV-1&-2 AB AG IA: CPT | Performed by: PHYSICIAN ASSISTANT

## 2022-12-02 PROCEDURE — 85025 COMPLETE CBC W/AUTO DIFF WBC: CPT | Performed by: EMERGENCY MEDICINE

## 2022-12-02 PROCEDURE — 81000 URINALYSIS NONAUTO W/SCOPE: CPT | Performed by: EMERGENCY MEDICINE

## 2022-12-02 PROCEDURE — 93005 ELECTROCARDIOGRAM TRACING: CPT

## 2022-12-02 PROCEDURE — 83880 ASSAY OF NATRIURETIC PEPTIDE: CPT | Performed by: EMERGENCY MEDICINE

## 2022-12-02 PROCEDURE — 84484 ASSAY OF TROPONIN QUANT: CPT | Performed by: EMERGENCY MEDICINE

## 2022-12-02 PROCEDURE — 86803 HEPATITIS C AB TEST: CPT | Performed by: PHYSICIAN ASSISTANT

## 2022-12-02 RX ORDER — ONDANSETRON 4 MG/1
4 TABLET, ORALLY DISINTEGRATING ORAL EVERY 6 HOURS PRN
Qty: 15 TABLET | Refills: 0 | Status: SHIPPED | OUTPATIENT
Start: 2022-12-02 | End: 2023-05-08

## 2022-12-02 RX ORDER — LEVOFLOXACIN 500 MG/1
500 TABLET, FILM COATED ORAL DAILY
Qty: 5 TABLET | Refills: 0 | Status: SHIPPED | OUTPATIENT
Start: 2022-12-02 | End: 2022-12-07

## 2022-12-02 NOTE — ED PROVIDER NOTES
Encounter Date: 12/2/2022       History     Chief Complaint   Patient presents with    Fatigue     EMS reports malaise for the last 5 days with increased confusion. Tajik speaking     Family member reports taht patient is at his mental status baseline, and he has just been weak for the last few days, and has had a cough for the last few days as well productive of green sputum.    The history is provided by the EMS personnel.   Fatigue  This is a new problem. The current episode started yesterday. The problem occurs constantly. The problem has not changed since onset.Pertinent negatives include no chest pain, no abdominal pain, no headaches and no shortness of breath.   Review of patient's allergies indicates:   Allergen Reactions    Metoprolol Other (See Comments)     Junctional rhythm       Past Medical History:   Diagnosis Date    Acute blood loss anemia 10/14/2019    Aneurysm, abdominal aortic     Coronary artery disease     Depression     Diabetes mellitus     HLD (hyperlipidemia)     Hypertension     Kidney stone     PAD (peripheral artery disease)     Tobacco abuse      Past Surgical History:   Procedure Laterality Date    APPENDECTOMY      CORONARY STENT PLACEMENT      x 4    ESOPHAGOGASTRODUODENOSCOPY N/A 10/14/2019    Procedure: EGD (ESOPHAGOGASTRODUODENOSCOPY);  Surgeon: Carlos Mcneal III, MD;  Location: [facility];  Service: Endoscopy;  Laterality: N/A;    ESOPHAGOGASTRODUODENOSCOPY N/A 10/15/2019    Procedure: EGD (ESOPHAGOGASTRODUODENOSCOPY);  Surgeon: Carlos Mcneal III, MD;  Location: [facility] ENDO;  Service: Endoscopy;  Laterality: N/A;    LEFT HEART CATHETERIZATION Left 10/11/2019    Procedure: CATHETERIZATION, HEART, LEFT;  Surgeon: Robe Gilbert MD;  Location: [facility] CATH LAB;  Service: Cardiology;  Laterality: Left;    LEFT HEART CATHETERIZATION Left 10/13/2019    Procedure: CATHETERIZATION, HEART, LEFT;  Surgeon: Robe Gilbert MD;  Location: [facility] CATH LAB;  Service: Cardiology;  Laterality:  Left;    leg stent Left      Family History   Problem Relation Age of Onset    Diabetes Mother     COPD Father     Diabetes Brother      Social History     Tobacco Use    Smoking status: Every Day     Types: Cigars     Passive exposure: Never    Smokeless tobacco: Current   Substance Use Topics    Alcohol use: Not Currently    Drug use: Not Currently     Review of Systems   Constitutional:  Positive for fatigue. Negative for fever.   HENT:  Negative for sore throat.    Respiratory:  Negative for shortness of breath.    Cardiovascular:  Negative for chest pain.   Gastrointestinal:  Negative for abdominal pain and nausea.   Genitourinary:  Negative for dysuria.   Musculoskeletal:  Negative for back pain.   Skin:  Negative for rash.   Neurological:  Positive for weakness. Negative for headaches.   Hematological:  Does not bruise/bleed easily.     Physical Exam     Initial Vitals [12/02/22 1146]   BP Pulse Resp Temp SpO2   122/86 82 14 97.9 °F (36.6 °C) 96 %      MAP       --         Physical Exam    Nursing note and vitals reviewed.  Constitutional: He appears well-developed and well-nourished. No distress.   HENT:   Head: Normocephalic and atraumatic.   Mouth/Throat: Oropharynx is clear and moist.   Eyes: Conjunctivae and EOM are normal. Pupils are equal, round, and reactive to light.   Neck: Neck supple.   Normal range of motion.  Cardiovascular:  Normal rate, regular rhythm and normal heart sounds.     Exam reveals no gallop and no friction rub.       No murmur heard.  Pulmonary/Chest: Breath sounds normal. No respiratory distress. He has no wheezes. He has no rhonchi. He has no rales.   Abdominal: Abdomen is soft. Bowel sounds are normal. He exhibits no distension and no mass. There is no abdominal tenderness. There is no rebound and no guarding.   Musculoskeletal:         General: No edema. Normal range of motion.      Cervical back: Normal range of motion and neck supple.     Neurological: He is alert and  oriented to person, place, and time. He has normal strength.   Skin: Skin is warm and dry. No rash noted.   Psychiatric: He has a normal mood and affect. Thought content normal.       ED Course   Procedures  Labs Reviewed   CBC W/ AUTO DIFFERENTIAL - Abnormal; Notable for the following components:       Result Value    RBC 4.41 (*)     Hemoglobin 13.0 (*)     Hematocrit 38.2 (*)     Platelets 486 (*)     Immature Granulocytes 0.6 (*)     Gran # (ANC) 8.6 (*)     Immature Grans (Abs) 0.06 (*)     Lymph # 0.8 (*)     Gran % 85.4 (*)     Lymph % 7.5 (*)     All other components within normal limits    Narrative:     Release to patient->Immediate   COMPREHENSIVE METABOLIC PANEL - Abnormal; Notable for the following components:    CO2 21 (*)     Glucose 128 (*)     BUN 57 (*)     Creatinine 1.5 (*)     Albumin 2.6 (*)     eGFR 46 (*)     All other components within normal limits    Narrative:     Release to patient->Immediate   URINALYSIS, REFLEX TO URINE CULTURE - Abnormal; Notable for the following components:    Protein, UA 3+ (*)     All other components within normal limits    Narrative:     Specimen Source->Urine   B-TYPE NATRIURETIC PEPTIDE - Abnormal; Notable for the following components:     (*)     All other components within normal limits    Narrative:     Release to patient->Immediate   TROPONIN I - Abnormal; Notable for the following components:    Troponin I 0.030 (*)     All other components within normal limits    Narrative:     Release to patient->Immediate   SARS-COV-2 RDRP GENE - Normal    Narrative:     This test utilizes isothermal nucleic acid amplification technology to detect the SARS-CoV-2 RdRp nucleic acid segment. The analytical sensitivity (limit of detection) is 500 copies/swab.     A POSITIVE result is indicative of the presence of SARS-CoV-2 RNA; clinical correlation with patient history and other diagnostic information is necessary to determine patient infection status.    A  NEGATIVE result means that SARS-CoV-2 nucleic acids are not present above the limit of detection. A NEGATIVE result should be treated as presumptive. It does not rule out the possibility of COVID-19 and should not be the sole basis for treatment decisions. If COVID-19 is strongly suspected based on clinical and exposure history, re-testing using an alternate molecular assay should be considered.     This test is only for use under the Food and Drug Administration s Emergency Use Authorization (EUA).     Commercial kits are provided by [employer]. Performance characteristics of the EUA have been independently verified by [facility] Department of Pathology and Laboratory Medicine.   _________________________________________________________________   The authorized Fact Sheet for Healthcare Providers and the authorized Fact Sheet for Patients of the ID NOW COVID-19 are available on the FDA website:    https://www.fda.gov/media/222379/download      https://www.fda.gov/media/295881/download       POCT INFLUENZA A/B MOLECULAR - Normal   HIV 1 / 2 ANTIBODY    Narrative:     Release to patient->Immediate   HEPATITIS C ANTIBODY    Narrative:     Release to patient->Immediate   HEP C VIRUS HOLD SPECIMEN    Narrative:     Release to patient->Immediate   CK    Narrative:     Release to patient->Immediate   URINALYSIS MICROSCOPIC    Narrative:     Specimen Source->Urine        ECG Results              EKG 12-lead (In process)  Result time 12/02/22 12:52:25      In process by Interface, Lab In [facility] (12/02/22 12:52:25)                   Narrative:    Test Reason : R41.82,    Vent. Rate : 100 BPM     Atrial Rate : 104 BPM     P-R Int : 000 ms          QRS Dur : 084 ms      QT Int : 346 ms       P-R-T Axes : 000 -06 -82 degrees     QTc Int : 446 ms    Undetermined rhythm  Inferior infarct (cited on or before 04-NOV-2022)  Abnormal ECG  When compared with ECG of 04-NOV-2022 11:11,  Current undetermined rhythm  precludes rhythm comparison, needs review  Minimal criteria for Anterior infarct are no longer Present    Referred By: [email or web address]   SELF           Confirmed By:                                   Imaging Results              CT Head Without Contrast (Final result)  Result time 12/02/22 13:41:48      Final result by Eric Gonzales MD (12/02/22 13:41:48)                   Impression:      No acute abnormality.    All CT scans at this facility use dose modulation, iterative reconstruction, and/or weight based dosing when appropriate to reduce radiation dose to as low as reasonably achievable.      Electronically signed by: Eric Gonzales  Date:    12/02/2022  Time:    13:41               Narrative:    EXAMINATION:  CT HEAD WITHOUT CONTRAST    CLINICAL HISTORY:  Mental status change, unknown cause;    TECHNIQUE:  Low dose axial CT images obtained throughout the head without intravenous contrast. Sagittal and coronal reconstructions were performed.    COMPARISON:  11/04/2022    FINDINGS:  Intracranial compartment:    Sulcal widening suggesting age-related white matter volume loss. No extra-axial blood or fluid collections.    Chronic encephalomalacia right frontal lobe similar to 11/04/2022.  Bilateral mild periventricular white matter hypoattenuation as can be seen with chronic microangiopathic small-vessel disease.  No parenchymal mass, hemorrhage, edema or major vascular distribution infarct.    Skull/extracranial contents (limited evaluation): No fracture. Mastoid air cells and paranasal sinuses are essentially clear.                                       X-Ray Chest AP Portable (Final result)  Result time 12/02/22 12:22:41      Final result by Eric Gonzales MD (12/02/22 12:22:41)                   Impression:      Ill-defined opacification right perihilar region raising question of airspace disease/pneumonia versus mass lesion. Consider further evaluation with chest CT without IV contrast on a nonemergent basis or as  clinically warranted.      Electronically signed by: Eric Gonzales  Date:    12/02/2022  Time:    12:22               Narrative:    EXAMINATION:  XR CHEST AP PORTABLE    CLINICAL HISTORY:  ams;.    TECHNIQUE:  Single frontal portable view of the chest was performed.    COMPARISON:  08/17/2020    FINDINGS:  Support devices: None    Ill-defined opacification right perihilar region raising question of airspace disease/pneumonia versus mass lesion.    Heart normal size.  No pneumothorax or pleural effusion.    Bones are intact.                                       Medications - No data to display      ED Vital Signs:  Vitals:    12/02/22 1146 12/02/22 1212 12/02/22 1215 12/02/22 1247   BP: 122/86  (!) 152/75 (!) 166/85   Pulse: 82 93 96 76   Resp: 14  18 20   Temp: 97.9 °F (36.6 °C)      TempSrc: Oral      SpO2: 96%   97%    12/02/22 1330 12/02/22 1415   BP: (!) 144/79 (!) 159/69   Pulse: 75 77   Resp: (!) 21 20   Temp:     TempSrc:     SpO2: 97% 96%         Abnormal Lab Results:  Labs Reviewed   CBC W/ AUTO DIFFERENTIAL - Abnormal; Notable for the following components:       Result Value    RBC 4.41 (*)     Hemoglobin 13.0 (*)     Hematocrit 38.2 (*)     Platelets 486 (*)     Immature Granulocytes 0.6 (*)     Gran # (ANC) 8.6 (*)     Immature Grans (Abs) 0.06 (*)     Lymph # 0.8 (*)     Gran % 85.4 (*)     Lymph % 7.5 (*)     All other components within normal limits    Narrative:     Release to patient->Immediate   COMPREHENSIVE METABOLIC PANEL - Abnormal; Notable for the following components:    CO2 21 (*)     Glucose 128 (*)     BUN 57 (*)     Creatinine 1.5 (*)     Albumin 2.6 (*)     eGFR 46 (*)     All other components within normal limits    Narrative:     Release to patient->Immediate   URINALYSIS, REFLEX TO URINE CULTURE - Abnormal; Notable for the following components:    Protein, UA 3+ (*)     All other components within normal limits    Narrative:     Specimen Source->Urine   B-TYPE NATRIURETIC PEPTIDE -  Abnormal; Notable for the following components:     (*)     All other components within normal limits    Narrative:     Release to patient->Immediate   TROPONIN I - Abnormal; Notable for the following components:    Troponin I 0.030 (*)     All other components within normal limits    Narrative:     Release to patient->Immediate   SARS-COV-2 RDRP GENE - Normal    Narrative:     This test utilizes isothermal nucleic acid amplification technology to detect the SARS-CoV-2 RdRp nucleic acid segment. The analytical sensitivity (limit of detection) is 500 copies/swab.     A POSITIVE result is indicative of the presence of SARS-CoV-2 RNA; clinical correlation with patient history and other diagnostic information is necessary to determine patient infection status.    A NEGATIVE result means that SARS-CoV-2 nucleic acids are not present above the limit of detection. A NEGATIVE result should be treated as presumptive. It does not rule out the possibility of COVID-19 and should not be the sole basis for treatment decisions. If COVID-19 is strongly suspected based on clinical and exposure history, re-testing using an alternate molecular assay should be considered.     This test is only for use under the Food and Drug Administration s Emergency Use Authorization (EUA).     Commercial kits are provided by [employer]. Performance characteristics of the EUA have been independently verified by [facility] Department of Pathology and Laboratory Medicine.   _________________________________________________________________   The authorized Fact Sheet for Healthcare Providers and the authorized Fact Sheet for Patients of the ID NOW COVID-19 are available on the FDA website:    https://www.fda.gov/media/470072/download      https://www.fda.gov/media/530518/download       POCT INFLUENZA A/B MOLECULAR - Normal   HIV 1 / 2 ANTIBODY    Narrative:     Release to patient->Immediate   HEPATITIS C ANTIBODY    Narrative:      Release to patient->Immediate   HEP C VIRUS HOLD SPECIMEN    Narrative:     Release to patient->Immediate   CK    Narrative:     Release to patient->Immediate   URINALYSIS MICROSCOPIC    Narrative:     Specimen Source->Urine          All Lab Results:  Results for orders placed or performed during the hospital encounter of 12/02/22   HIV 1/2 Ag/Ab (4th Gen)   Result Value Ref Range    HIV 1/2 Ag/Ab Negative Negative   Hepatitis C Antibody   Result Value Ref Range    Hepatitis C Ab Negative Negative   HCV Virus Hold Specimen   Result Value Ref Range    HEP C Virus Hold Specimen Hold for HCV sendout    CBC Auto Differential   Result Value Ref Range    WBC 10.04 3.90 - 12.70 K/uL    RBC 4.41 (L) 4.60 - 6.20 M/uL    Hemoglobin 13.0 (L) 14.0 - 18.0 g/dL    Hematocrit 38.2 (L) 40.0 - 54.0 %    MCV 87 82 - 98 fL    MCH 29.5 27.0 - 31.0 pg    MCHC 34.0 32.0 - 36.0 g/dL    RDW 13.6 11.5 - 14.5 %    Platelets 486 (H) 150 - 450 K/uL    MPV 10.0 9.2 - 12.9 fL    Immature Granulocytes 0.6 (H) 0.0 - 0.5 %    Gran # (ANC) 8.6 (H) 1.8 - 7.7 K/uL    Immature Grans (Abs) 0.06 (H) 0.00 - 0.04 K/uL    Lymph # 0.8 (L) 1.0 - 4.8 K/uL    Mono # 0.6 0.3 - 1.0 K/uL    Eos # 0.0 0.0 - 0.5 K/uL    Baso # 0.02 0.00 - 0.20 K/uL    nRBC 0 0 /100 WBC    Gran % 85.4 (H) 38.0 - 73.0 %    Lymph % 7.5 (L) 18.0 - 48.0 %    Mono % 6.0 4.0 - 15.0 %    Eosinophil % 0.3 0.0 - 8.0 %    Basophil % 0.2 0.0 - 1.9 %    Differential Method Automated    Comprehensive Metabolic Panel   Result Value Ref Range    Sodium 139 136 - 145 mmol/L    Potassium 4.6 3.5 - 5.1 mmol/L    Chloride 105 95 - 110 mmol/L    CO2 21 (L) 23 - 29 mmol/L    Glucose 128 (H) 70 - 110 mg/dL    BUN 57 (H) 8 - 23 mg/dL    Creatinine 1.5 (H) 0.5 - 1.4 mg/dL    Calcium 9.6 8.7 - 10.5 mg/dL    Total Protein 7.9 6.0 - 8.4 g/dL    Albumin 2.6 (L) 3.5 - 5.2 g/dL    Total Bilirubin 0.6 0.1 - 1.0 mg/dL    Alkaline Phosphatase 108 55 - 135 U/L    AST 15 10 - 40 U/L    ALT 18 10 - 44 U/L    Anion  Gap 13 8 - 16 mmol/L    eGFR 46 (A) >60 mL/min/1.73 m^2   Urinalysis, Reflex to Urine Culture Urine, Clean Catch    Specimen: Urine   Result Value Ref Range    Specimen UA Urine, Clean Catch     Color, UA Yellow Yellow, Straw, Caren    Appearance, UA Clear Clear    pH, UA 6.0 5.0 - 8.0    Specific Gravity, UA 1.025 1.005 - 1.030    Protein, UA 3+ (A) Negative    Glucose, UA Negative Negative    Ketones, UA Negative Negative    Bilirubin (UA) Negative Negative    Occult Blood UA Negative Negative    Nitrite, UA Negative Negative    Urobilinogen, UA Negative <2.0 EU/dL    Leukocytes, UA Negative Negative   BNP   Result Value Ref Range     (H) 0 - 99 pg/mL   CK   Result Value Ref Range    CPK 25 20 - 200 U/L   Troponin I   Result Value Ref Range    Troponin I 0.030 (H) 0.000 - 0.026 ng/mL   Urinalysis Microscopic   Result Value Ref Range    RBC, UA 0 0 - 4 /hpf    WBC, UA 2 0 - 5 /hpf    WBC Clumps, UA Rare None-Rare    Bacteria None None-Occ /hpf    Hyaline Casts, UA 0 0-1/lpf /lpf    Microscopic Comment SEE COMMENT    POCT COVID-19 Rapid Screening   Result Value Ref Range    POC Rapid COVID Negative Negative     Acceptable Yes    POCT Influenza A/B Molecular   Result Value Ref Range    POC Molecular Influenza A Ag Negative Negative, Not Reported    POC Molecular Influenza B Ag Negative Negative, Not Reported     Acceptable Yes            Imaging Results:  Imaging Results              CT Head Without Contrast (Final result)  Result time 12/02/22 13:41:48      Final result by Eric Gonzales MD (12/02/22 13:41:48)                   Impression:      No acute abnormality.    All CT scans at this facility use dose modulation, iterative reconstruction, and/or weight based dosing when appropriate to reduce radiation dose to as low as reasonably achievable.      Electronically signed by: Eric Gonzales  Date:    12/02/2022  Time:    13:41               Narrative:    EXAMINATION:  CT HEAD  WITHOUT CONTRAST    CLINICAL HISTORY:  Mental status change, unknown cause;    TECHNIQUE:  Low dose axial CT images obtained throughout the head without intravenous contrast. Sagittal and coronal reconstructions were performed.    COMPARISON:  11/04/2022    FINDINGS:  Intracranial compartment:    Sulcal widening suggesting age-related white matter volume loss. No extra-axial blood or fluid collections.    Chronic encephalomalacia right frontal lobe similar to 11/04/2022.  Bilateral mild periventricular white matter hypoattenuation as can be seen with chronic microangiopathic small-vessel disease.  No parenchymal mass, hemorrhage, edema or major vascular distribution infarct.    Skull/extracranial contents (limited evaluation): No fracture. Mastoid air cells and paranasal sinuses are essentially clear.                                       X-Ray Chest AP Portable (Final result)  Result time 12/02/22 12:22:41      Final result by Eric Gonzales MD (12/02/22 12:22:41)                   Impression:      Ill-defined opacification right perihilar region raising question of airspace disease/pneumonia versus mass lesion. Consider further evaluation with chest CT without IV contrast on a nonemergent basis or as clinically warranted.      Electronically signed by: Eric Gonzales  Date:    12/02/2022  Time:    12:22               Narrative:    EXAMINATION:  XR CHEST AP PORTABLE    CLINICAL HISTORY:  ams;.    TECHNIQUE:  Single frontal portable view of the chest was performed.    COMPARISON:  08/17/2020    FINDINGS:  Support devices: None    Ill-defined opacification right perihilar region raising question of airspace disease/pneumonia versus mass lesion.    Heart normal size.  No pneumothorax or pleural effusion.    Bones are intact.                                         The Emergency Provider reviewed the vital signs and test results, which are outlined above.    ED Discussions:  2:48 PM: Reassessed pt at this time.  Pt  states his condition has improved at this time. Discussed with pt all pertinent ED information and results. Discussed pt dx of pneumonia, weakness and fatigue. and plan of tx. Gave pt all f/u and return to the ED instructions. All questions and concerns were addressed at this time. Pt expresses understanding of information and instructions, and is comfortable with plan to discharge. Pt is stable for discharge.                             Clinical Impression:   Final diagnoses:  [R41.82] AMS (altered mental status)  [R53.1] Weakness (Primary)  [R53.83] Fatigue, unspecified type  [J18.9] Pneumonia of right middle lobe due to infectious organism        ED Disposition Condition    Discharge Stable          ED Prescriptions       Medication Sig Dispense Start Date End Date Auth. Provider    ondansetron (ZOFRAN-ODT) 4 MG TbDL Take 1 tablet (4 mg total) by mouth every 6 (six) hours as needed. 15 tablet 12/2/2022 -- Emir Collins MD    levoFLOXacin (LEVAQUIN) 500 MG tablet Take 1 tablet (500 mg total) by mouth once daily. for 5 days 5 tablet 12/2/2022 12/7/2022 Emir Collins MD          Follow-up Information       Follow up With Specialties Details Why Contact Info    Holger Thornton MD Internal Medicine   [address] [facility] [address]  [phone]               Emir Collins MD  12/02/22 9353       Emir Collins MD  12/02/22 0391

## 2022-12-09 ENCOUNTER — HOSPITAL ENCOUNTER (OUTPATIENT)
Dept: RADIOLOGY | Facility: HOSPITAL | Age: 82
Discharge: HOME | End: 2022-12-09
Attending: INTERNAL MEDICINE
Payer: MEDICARE

## 2022-12-09 ENCOUNTER — TELEPHONE (OUTPATIENT)
Dept: FAMILY MEDICINE | Facility: CLINIC | Age: 82
End: 2022-12-09

## 2022-12-09 ENCOUNTER — OFFICE VISIT (OUTPATIENT)
Dept: FAMILY MEDICINE | Facility: CLINIC | Age: 82
End: 2022-12-09
Payer: MEDICARE

## 2022-12-09 VITALS
BODY MASS INDEX: 19.3 KG/M2 | HEART RATE: 80 BPM | RESPIRATION RATE: 20 BRPM | DIASTOLIC BLOOD PRESSURE: 60 MMHG | SYSTOLIC BLOOD PRESSURE: 130 MMHG | WEIGHT: 134.5 LBS | TEMPERATURE: 98 F

## 2022-12-09 DIAGNOSIS — I63.9 CEREBROVASCULAR ACCIDENT (CVA), UNSPECIFIED MECHANISM: ICD-10-CM

## 2022-12-09 DIAGNOSIS — Z86.79 S/P AAA REPAIR: ICD-10-CM

## 2022-12-09 DIAGNOSIS — I25.118 CORONARY ARTERY DISEASE OF NATIVE ARTERY OF NATIVE HEART WITH STABLE ANGINA PECTORIS: Chronic | ICD-10-CM

## 2022-12-09 DIAGNOSIS — E78.5 DYSLIPIDEMIA: Primary | ICD-10-CM

## 2022-12-09 DIAGNOSIS — G45.9 TIA (TRANSIENT ISCHEMIC ATTACK): ICD-10-CM

## 2022-12-09 DIAGNOSIS — R53.1 WEAKNESS: ICD-10-CM

## 2022-12-09 DIAGNOSIS — J18.9 PNEUMONIA DUE TO INFECTIOUS ORGANISM, UNSPECIFIED LATERALITY, UNSPECIFIED PART OF LUNG: ICD-10-CM

## 2022-12-09 DIAGNOSIS — R41.3 MEMORY DEFICIT: ICD-10-CM

## 2022-12-09 DIAGNOSIS — I10 ESSENTIAL HYPERTENSION: Chronic | ICD-10-CM

## 2022-12-09 DIAGNOSIS — Z98.890 S/P AAA REPAIR: ICD-10-CM

## 2022-12-09 DIAGNOSIS — E11.9 TYPE 2 DIABETES MELLITUS WITHOUT COMPLICATION, WITHOUT LONG-TERM CURRENT USE OF INSULIN: Chronic | ICD-10-CM

## 2022-12-09 DIAGNOSIS — H53.8 BLURRED VISION: ICD-10-CM

## 2022-12-09 DIAGNOSIS — J18.9 PNEUMONIA DUE TO INFECTIOUS ORGANISM, UNSPECIFIED LATERALITY, UNSPECIFIED PART OF LUNG: Primary | ICD-10-CM

## 2022-12-09 PROCEDURE — 3288F PR FALLS RISK ASSESSMENT DOCUMENTED: ICD-10-PCS | Mod: HCNC,CPTII,S$GLB, | Performed by: INTERNAL MEDICINE

## 2022-12-09 PROCEDURE — 1126F AMNT PAIN NOTED NONE PRSNT: CPT | Mod: HCNC,CPTII,S$GLB, | Performed by: INTERNAL MEDICINE

## 2022-12-09 PROCEDURE — 3078F DIAST BP <80 MM HG: CPT | Mod: HCNC,CPTII,S$GLB, | Performed by: INTERNAL MEDICINE

## 2022-12-09 PROCEDURE — 1111F DSCHRG MED/CURRENT MED MERGE: CPT | Mod: HCNC,CPTII,S$GLB, | Performed by: INTERNAL MEDICINE

## 2022-12-09 PROCEDURE — 1111F PR DISCHARGE MEDS RECONCILED W/ CURRENT OUTPATIENT MED LIST: ICD-10-PCS | Mod: HCNC,CPTII,S$GLB, | Performed by: INTERNAL MEDICINE

## 2022-12-09 PROCEDURE — 1126F PR PAIN SEVERITY QUANTIFIED, NO PAIN PRESENT: ICD-10-PCS | Mod: HCNC,CPTII,S$GLB, | Performed by: INTERNAL MEDICINE

## 2022-12-09 PROCEDURE — 1101F PR PT FALLS ASSESS DOC 0-1 FALLS W/OUT INJ PAST YR: ICD-10-PCS | Mod: HCNC,CPTII,S$GLB, | Performed by: INTERNAL MEDICINE

## 2022-12-09 PROCEDURE — 3078F PR MOST RECENT DIASTOLIC BLOOD PRESSURE < 80 MM HG: ICD-10-PCS | Mod: HCNC,CPTII,S$GLB, | Performed by: INTERNAL MEDICINE

## 2022-12-09 PROCEDURE — 99999 PR PBB SHADOW E&M-EST. PATIENT-LVL IV: CPT | Mod: PBBFAC,,, | Performed by: INTERNAL MEDICINE

## 2022-12-09 PROCEDURE — 3075F PR MOST RECENT SYSTOLIC BLOOD PRESS GE 130-139MM HG: ICD-10-PCS | Mod: HCNC,CPTII,S$GLB, | Performed by: INTERNAL MEDICINE

## 2022-12-09 PROCEDURE — 99999 PR PBB SHADOW E&M-EST. PATIENT-LVL IV: ICD-10-PCS | Mod: PBBFAC,,, | Performed by: INTERNAL MEDICINE

## 2022-12-09 PROCEDURE — 99215 PR OFFICE/OUTPT VISIT, EST, LEVL V, 40-54 MIN: ICD-10-PCS | Mod: HCNC,S$GLB,, | Performed by: INTERNAL MEDICINE

## 2022-12-09 PROCEDURE — 99215 OFFICE O/P EST HI 40 MIN: CPT | Mod: HCNC,S$GLB,, | Performed by: INTERNAL MEDICINE

## 2022-12-09 PROCEDURE — 3288F FALL RISK ASSESSMENT DOCD: CPT | Mod: HCNC,CPTII,S$GLB, | Performed by: INTERNAL MEDICINE

## 2022-12-09 PROCEDURE — 71046 X-RAY EXAM CHEST 2 VIEWS: CPT | Mod: TC,FY,PO

## 2022-12-09 PROCEDURE — 1159F PR MEDICATION LIST DOCUMENTED IN MEDICAL RECORD: ICD-10-PCS | Mod: HCNC,CPTII,S$GLB, | Performed by: INTERNAL MEDICINE

## 2022-12-09 PROCEDURE — 71046 XR CHEST PA AND LATERAL: ICD-10-PCS | Mod: 26,,, | Performed by: RADIOLOGY

## 2022-12-09 PROCEDURE — 3075F SYST BP GE 130 - 139MM HG: CPT | Mod: HCNC,CPTII,S$GLB, | Performed by: INTERNAL MEDICINE

## 2022-12-09 PROCEDURE — 71046 X-RAY EXAM CHEST 2 VIEWS: CPT | Mod: 26,,, | Performed by: RADIOLOGY

## 2022-12-09 PROCEDURE — 1159F MED LIST DOCD IN RCRD: CPT | Mod: HCNC,CPTII,S$GLB, | Performed by: INTERNAL MEDICINE

## 2022-12-09 PROCEDURE — 1101F PT FALLS ASSESS-DOCD LE1/YR: CPT | Mod: HCNC,CPTII,S$GLB, | Performed by: INTERNAL MEDICINE

## 2022-12-09 RX ORDER — ATORVASTATIN CALCIUM 40 MG/1
40 TABLET, FILM COATED ORAL DAILY
Qty: 90 TABLET | Refills: 3 | Status: SHIPPED | OUTPATIENT
Start: 2022-12-09 | End: 2023-12-07

## 2022-12-09 NOTE — PROGRESS NOTES
Subjective:       Patient ID: Aquiles Kelsey is a 82 y.o. male.    Chief Complaint: Cerebrovascular Accident and Pneumonia    Cerebrovascular Accident  Associated symptoms include arthralgias, fatigue, myalgias and weakness. Pertinent negatives include no abdominal pain, chest pain, chills, coughing, diaphoresis, fever, headaches, joint swelling, nausea, neck pain, numbness, rash, sore throat or vomiting.   Pneumonia  There is no cough, shortness of breath or wheezing. Associated symptoms include appetite change and myalgias. Pertinent negatives include no chest pain, ear pain, fever, headaches, postnasal drip, rhinorrhea, sneezing, sore throat or trouble swallowing.   Past Medical History:   Diagnosis Date    Acute blood loss anemia 10/14/2019    Aneurysm, abdominal aortic     Coronary artery disease     Depression     Diabetes mellitus     HLD (hyperlipidemia)     Hypertension     Kidney stone     PAD (peripheral artery disease)     Tobacco abuse      Past Surgical History:   Procedure Laterality Date    APPENDECTOMY      CORONARY STENT PLACEMENT      x 4    ESOPHAGOGASTRODUODENOSCOPY N/A 10/14/2019    Procedure: EGD (ESOPHAGOGASTRODUODENOSCOPY);  Surgeon: Carlos Mcneal III, MD;  Location: [facility];  Service: Endoscopy;  Laterality: N/A;    ESOPHAGOGASTRODUODENOSCOPY N/A 10/15/2019    Procedure: EGD (ESOPHAGOGASTRODUODENOSCOPY);  Surgeon: Carlos Mcneal III, MD;  Location: [facility] ENDO;  Service: Endoscopy;  Laterality: N/A;    LEFT HEART CATHETERIZATION Left 10/11/2019    Procedure: CATHETERIZATION, HEART, LEFT;  Surgeon: Robe Gilbert MD;  Location: [facility] CATH LAB;  Service: Cardiology;  Laterality: Left;    LEFT HEART CATHETERIZATION Left 10/13/2019    Procedure: CATHETERIZATION, HEART, LEFT;  Surgeon: Robe Gilbert MD;  Location: [facility] CATH LAB;  Service: Cardiology;  Laterality: Left;    leg stent Left      Family History   Problem Relation Age of Onset    Diabetes Mother     COPD Father      Diabetes Brother      Social History     Socioeconomic History    Marital status:    Tobacco Use    Smoking status: Every Day     Types: Cigars     Passive exposure: Never    Smokeless tobacco: Current   Substance and Sexual Activity    Alcohol use: Not Currently    Drug use: Not Currently    Sexual activity: Not Currently     Social Determinants of Health     Financial Resource Strain: Low Risk     Difficulty of Paying Living Expenses: Not hard at all   Food Insecurity: Unknown    Worried About Running Out of Food in the Last Year: Never true   Transportation Needs: No Transportation Needs    Lack of Transportation (Medical): No    Lack of Transportation (Non-Medical): No   Physical Activity: Inactive    Days of Exercise per Week: 0 days    Minutes of Exercise per Session: 0 min   Stress: No Stress Concern Present    Feeling of Stress : Only a little   Social Connections: Socially Isolated    Frequency of Communication with Friends and Family: Once a week    Frequency of Social Gatherings with Friends and Family: Once a week    Attends [employer] Services: Never    Active Member of Clubs or Organizations: No    Attends Club or Organization Meetings: Never    Marital Status:    Housing Stability: Unknown    Unable to Pay for Housing in the Last Year: No    Unstable Housing in the Last Year: No     Review of Systems   Constitutional:  Positive for appetite change and fatigue. Negative for activity change, chills, diaphoresis, fever and unexpected weight change.   HENT:  Negative for drooling, ear discharge, ear pain, facial swelling, hearing loss, mouth sores, nosebleeds, postnasal drip, rhinorrhea, sinus pressure, sneezing, sore throat, tinnitus, trouble swallowing and voice change.    Eyes:  Positive for visual disturbance. Negative for photophobia and redness.   Respiratory:  Negative for apnea, cough, choking, chest tightness, shortness of breath and wheezing.    Cardiovascular:  Negative for chest  pain, palpitations and leg swelling.   Gastrointestinal:  Negative for abdominal distention, abdominal pain, anal bleeding, blood in stool, constipation, diarrhea, nausea and vomiting.   Endocrine: Negative for cold intolerance, heat intolerance, polydipsia, polyphagia and polyuria.   Genitourinary:  Negative for difficulty urinating, dysuria, enuresis, flank pain, frequency, genital sores, hematuria and urgency.   Musculoskeletal:  Positive for arthralgias, gait problem and myalgias. Negative for back pain, joint swelling, neck pain and neck stiffness.   Skin:  Negative for color change, pallor, rash and wound.   Allergic/Immunologic: Negative for food allergies and immunocompromised state.   Neurological:  Positive for weakness. Negative for dizziness, tremors, seizures, syncope, facial asymmetry, speech difficulty, light-headedness, numbness and headaches.   Hematological:  Negative for adenopathy. Does not bruise/bleed easily.   Psychiatric/Behavioral:  Negative for agitation, behavioral problems, confusion, decreased concentration, dysphoric mood, hallucinations, self-injury, sleep disturbance and suicidal ideas. The patient is not nervous/anxious and is not hyperactive.      Objective:      Physical Exam  Vitals and nursing note reviewed.   Constitutional:       General: He is not in acute distress.     Appearance: He is well-developed. He is not diaphoretic.   HENT:      Head: Normocephalic and atraumatic.      Mouth/Throat:      Pharynx: No oropharyngeal exudate.   Eyes:      General: No scleral icterus.     Pupils: Pupils are equal, round, and reactive to light.   Neck:      Thyroid: No thyromegaly.      Vascular: No carotid bruit or JVD.      Trachea: No tracheal deviation.   Cardiovascular:      Rate and Rhythm: Normal rate and regular rhythm.      Heart sounds: Normal heart sounds.   Pulmonary:      Effort: Pulmonary effort is normal. No respiratory distress.      Breath sounds: Normal breath sounds. No  wheezing or rales.   Chest:      Chest wall: No tenderness.   Abdominal:      General: Bowel sounds are normal. There is no distension.      Palpations: Abdomen is soft.      Tenderness: There is no abdominal tenderness. There is no guarding or rebound.   Musculoskeletal:         General: No tenderness. Normal range of motion.      Cervical back: Normal range of motion and neck supple.   Lymphadenopathy:      Cervical: No cervical adenopathy.   Skin:     General: Skin is warm and dry.      Coloration: Skin is not pale.      Findings: No erythema or rash.   Neurological:      Mental Status: He is alert.       CMP  Sodium   Date Value Ref Range Status   12/02/2022 139 136 - 145 mmol/L Final     Potassium   Date Value Ref Range Status   12/02/2022 4.6 3.5 - 5.1 mmol/L Final     Chloride   Date Value Ref Range Status   12/02/2022 105 95 - 110 mmol/L Final     CO2   Date Value Ref Range Status   12/02/2022 21 (L) 23 - 29 mmol/L Final     Glucose   Date Value Ref Range Status   12/02/2022 128 (H) 70 - 110 mg/dL Final     BUN   Date Value Ref Range Status   12/02/2022 57 (H) 8 - 23 mg/dL Final     Creatinine   Date Value Ref Range Status   12/02/2022 1.5 (H) 0.5 - 1.4 mg/dL Final     Calcium   Date Value Ref Range Status   12/02/2022 9.6 8.7 - 10.5 mg/dL Final     Total Protein   Date Value Ref Range Status   12/02/2022 7.9 6.0 - 8.4 g/dL Final     Albumin   Date Value Ref Range Status   12/02/2022 2.6 (L) 3.5 - 5.2 g/dL Final     Total Bilirubin   Date Value Ref Range Status   12/02/2022 0.6 0.1 - 1.0 mg/dL Final     Comment:     For infants and newborns, interpretation of results should be based  on gestational age, weight and in agreement with clinical  observations.    Premature Infant recommended reference ranges:  Up to 24 hours.............<8.0 mg/dL  Up to 48 hours............<12.0 mg/dL  3-5 days..................<15.0 mg/dL  6-29 days.................<15.0 mg/dL       Alkaline Phosphatase   Date Value Ref Range  Status   12/02/2022 108 55 - 135 U/L Final     AST   Date Value Ref Range Status   12/02/2022 15 10 - 40 U/L Final     ALT   Date Value Ref Range Status   12/02/2022 18 10 - 44 U/L Final     Anion Gap   Date Value Ref Range Status   12/02/2022 13 8 - 16 mmol/L Final     eGFR if    Date Value Ref Range Status   06/15/2022 >60 >60 mL/min/1.73 m^2 Final     eGFR if non    Date Value Ref Range Status   06/15/2022 >60 >60 mL/min/1.73 m^2 Final     Comment:     Calculation used to obtain the estimated glomerular filtration  rate (eGFR) is the CKD-EPI equation.        Lab Results   Component Value Date    WBC 10.04 12/02/2022    HGB 13.0 (L) 12/02/2022    HCT 38.2 (L) 12/02/2022    MCV 87 12/02/2022     (H) 12/02/2022     Lab Results   Component Value Date    CHOL 158 11/04/2022     Lab Results   Component Value Date    HDL 40 11/04/2022     Lab Results   Component Value Date    LDLCALC 96.8 11/04/2022     Lab Results   Component Value Date    TRIG 106 11/04/2022     Lab Results   Component Value Date    CHOLHDL 25.3 11/04/2022     Lab Results   Component Value Date    TSH 1.944 11/04/2022     Lab Results   Component Value Date    HGBA1C 6.5 (H) 11/04/2022     Assessment:       1. Dyslipidemia    2. Cerebrovascular accident (CVA), unspecified mechanism    3. Coronary artery disease of native artery of native heart with stable angina pectoris    4. Essential hypertension    5. Type 2 diabetes mellitus without complication, without long-term current use of insulin    6. S/P AAA repair    7. TIA (transient ischemic attack)    8. Weakness    9. Blurred vision    10. Memory deficit    11. Pneumonia due to infectious organism, unspecified laterality, unspecified part of lung          Plan:   Dyslipidemia  -     atorvastatin (LIPITOR) 40 MG tablet; Take 1 tablet (40 mg total) by mouth once daily.  Dispense: 90 tablet; Refill: 3    Cerebrovascular accident (CVA), unspecified  mechanism----------------has H.H. with P.T.,O.T.,S.T. ----------has f/u with neurology------------f/u with cardiology------------  -     WHEELCHAIR FOR HOME USE  -     COMMODE FOR HOME USE    Coronary artery disease of native artery of native heart with stable angina pectoris    Essential hypertension  -     Comprehensive Metabolic Panel; Future; Expected date: 12/09/2022  -     CBC Auto Differential; Future; Expected date: 12/09/2022    Type 2 diabetes mellitus without complication, without long-term current use of insulin    S/P AAA repair    TIA (transient ischemic attack)    Weakness    Blurred vision  -     Ambulatory referral/consult to Optometry; Future; Expected date: 12/16/2022    Memory deficit------------neurology consult-------------    Pneumonia due to infectious organism, unspecified laterality, unspecified part of lung-----treated levaquin------------  -     X-Ray Chest PA And Lateral; Future; Expected date: 12/09/2022      Continue meds----------------as above--------------------f/u 1 month------------

## 2023-01-04 ENCOUNTER — TELEPHONE (OUTPATIENT)
Dept: FAMILY MEDICINE | Facility: CLINIC | Age: 83
End: 2023-01-04
Payer: MEDICARE

## 2023-01-04 NOTE — TELEPHONE ENCOUNTER
Spoke to wife and she states that Mr. Kwan is not allowing her to stick him with any kind of needles.  She is asking if he can go back on his metformin and if he can get a frees style olimpia or dexcom?

## 2023-01-04 NOTE — TELEPHONE ENCOUNTER
----- Message from Jocy Ramos sent at 1/4/2023 11:06 AM CST -----  Contact: Wife(Sarah)-[phone]  Patients wife called ink to consult with nurse regarding his diabetes medication. Please call her back at [phone]. Álvaro/MADELIN

## 2023-01-04 NOTE — TELEPHONE ENCOUNTER
Wife notified of appt she forgot they had one she asked if it can be changed to an audio visit.  Unable to get the pt up out of bed.  Freda changed to audio

## 2023-01-06 ENCOUNTER — TELEPHONE (OUTPATIENT)
Dept: CARDIOLOGY | Facility: CLINIC | Age: 83
End: 2023-01-06
Payer: MEDICARE

## 2023-01-06 ENCOUNTER — OFFICE VISIT (OUTPATIENT)
Dept: FAMILY MEDICINE | Facility: CLINIC | Age: 83
End: 2023-01-06
Payer: MEDICARE

## 2023-01-06 DIAGNOSIS — R41.3 MEMORY DEFICIT: ICD-10-CM

## 2023-01-06 DIAGNOSIS — Z86.79 S/P AAA REPAIR: ICD-10-CM

## 2023-01-06 DIAGNOSIS — I10 ESSENTIAL HYPERTENSION: Chronic | ICD-10-CM

## 2023-01-06 DIAGNOSIS — Z98.890 S/P AAA REPAIR: ICD-10-CM

## 2023-01-06 DIAGNOSIS — E78.5 DYSLIPIDEMIA: Chronic | ICD-10-CM

## 2023-01-06 DIAGNOSIS — R53.1 WEAKNESS: ICD-10-CM

## 2023-01-06 DIAGNOSIS — I63.9 CEREBROVASCULAR ACCIDENT (CVA), UNSPECIFIED MECHANISM: ICD-10-CM

## 2023-01-06 DIAGNOSIS — I63.9 ACUTE CVA (CEREBROVASCULAR ACCIDENT): Primary | ICD-10-CM

## 2023-01-06 DIAGNOSIS — I21.4 NSTEMI (NON-ST ELEVATED MYOCARDIAL INFARCTION): ICD-10-CM

## 2023-01-06 DIAGNOSIS — E11.9 TYPE 2 DIABETES MELLITUS WITHOUT COMPLICATION, WITHOUT LONG-TERM CURRENT USE OF INSULIN: Chronic | ICD-10-CM

## 2023-01-06 DIAGNOSIS — G89.29 OTHER CHRONIC PAIN: ICD-10-CM

## 2023-01-06 DIAGNOSIS — I51.7 LAE (LEFT ATRIAL ENLARGEMENT): ICD-10-CM

## 2023-01-06 DIAGNOSIS — J18.9 PNEUMONIA DUE TO INFECTIOUS ORGANISM, UNSPECIFIED LATERALITY, UNSPECIFIED PART OF LUNG: ICD-10-CM

## 2023-01-06 DIAGNOSIS — I25.118 CORONARY ARTERY DISEASE OF NATIVE ARTERY OF NATIVE HEART WITH STABLE ANGINA PECTORIS: Chronic | ICD-10-CM

## 2023-01-06 PROCEDURE — 99442 PR PHYSICIAN TELEPHONE EVALUATION 11-20 MIN: CPT | Mod: HCNC,95,, | Performed by: INTERNAL MEDICINE

## 2023-01-06 PROCEDURE — 99442 PR PHYSICIAN TELEPHONE EVALUATION 11-20 MIN: ICD-10-PCS | Mod: HCNC,95,, | Performed by: INTERNAL MEDICINE

## 2023-01-06 RX ORDER — GABAPENTIN 100 MG/1
200 CAPSULE ORAL 2 TIMES DAILY PRN
Qty: 120 CAPSULE | Refills: 11 | Status: SHIPPED | OUTPATIENT
Start: 2023-01-06 | End: 2023-05-08

## 2023-01-06 RX ORDER — METFORMIN HYDROCHLORIDE 500 MG/1
500 TABLET ORAL
Qty: 90 TABLET | Refills: 3 | Status: SHIPPED | OUTPATIENT
Start: 2023-01-06 | End: 2024-01-09

## 2023-01-06 NOTE — TELEPHONE ENCOUNTER
Provider message sent to ask for advice on the virtual visit request. Will await the providers response.

## 2023-01-06 NOTE — PROGRESS NOTES
Subjective:       Patient ID: Aquiles Kelsey is a 82 y.o. male.    Chief Complaint: Hypertension, Hyperlipidemia, Diabetes, Coronary Artery Disease, and Cerebrovascular Accident    Audio Only Telehealth Visit     The [place]  The chief complaint leading to consultation is: f/u  Visit type: Virtual visit with audio only (telephone)  Total time spent with patient: 15 minutes     The reason for the audio only service rather than synchronous audio and video virtual visit was related to technical difficulties or patient preference/necessity.     Each patient to whom I provide medical services by telemedicine is:  (1) informed of the relationship between the physician and patient and the respective role of any other health care provider with respect to management of the patient; and (2) notified that they may decline to receive medical services by telemedicine and may withdraw from such care at any time. Patient verbally consented to receive this service via voice-only telephone call.       HPI: spoke to wife---pt in bed sleeping.--------------very hard for pt to get around,          worsening memory and general chronic pan------     Assessment and plan:                          This service was not originating from a related E/M service provided within the previous 7 days nor will  to an E/M service or procedure within the next 24 hours or my soonest available appointment.  Prevailing standard of care was able to be met in this audio-only visit.         Hypertension  Pertinent negatives include no chest pain, palpitations or shortness of breath.   Hyperlipidemia  Associated symptoms include myalgias. Pertinent negatives include no chest pain or shortness of breath.   Diabetes  Hypoglycemia symptoms include confusion. Associated symptoms include weakness. Pertinent negatives for diabetes include no chest pain.   Coronary Artery Disease  Pertinent negatives include no chest pain, chest  tightness, palpitations or shortness of breath. Risk factors include hyperlipidemia.   Cerebrovascular Accident  Associated symptoms include arthralgias, myalgias and weakness. Pertinent negatives include no abdominal pain, chest pain, chills, coughing, fever, nausea or vomiting.   Past Medical History:   Diagnosis Date    Acute blood loss anemia 10/14/2019    Aneurysm, abdominal aortic     Coronary artery disease     Depression     Diabetes mellitus     HLD (hyperlipidemia)     Hypertension     Kidney stone     PAD (peripheral artery disease)     Tobacco abuse      Past Surgical History:   Procedure Laterality Date    APPENDECTOMY      CORONARY STENT PLACEMENT      x 4    ESOPHAGOGASTRODUODENOSCOPY N/A 10/14/2019    Procedure: EGD (ESOPHAGOGASTRODUODENOSCOPY);  Surgeon: Carlos Mcneal III, MD;  Location: [facility] ENDO;  Service: Endoscopy;  Laterality: N/A;    ESOPHAGOGASTRODUODENOSCOPY N/A 10/15/2019    Procedure: EGD (ESOPHAGOGASTRODUODENOSCOPY);  Surgeon: Carlos Mcneal III, MD;  Location: [facility] ENDO;  Service: Endoscopy;  Laterality: N/A;    LEFT HEART CATHETERIZATION Left 10/11/2019    Procedure: CATHETERIZATION, HEART, LEFT;  Surgeon: Robe Gilbert MD;  Location: [facility] CATH LAB;  Service: Cardiology;  Laterality: Left;    LEFT HEART CATHETERIZATION Left 10/13/2019    Procedure: CATHETERIZATION, HEART, LEFT;  Surgeon: Robe Gilbert MD;  Location: [facility] CATH LAB;  Service: Cardiology;  Laterality: Left;    leg stent Left      Family History   Problem Relation Age of Onset    Diabetes Mother     COPD Father     Diabetes Brother      Social History     Socioeconomic History    Marital status:    Tobacco Use    Smoking status: Every Day     Types: Cigars     Passive exposure: Never    Smokeless tobacco: Current   Substance and Sexual Activity    Alcohol use: Not Currently    Drug use: Not Currently    Sexual activity: Not Currently     Social Determinants of Health     Financial Resource Strain: Low Risk      Difficulty of Paying Living Expenses: Not hard at all   Food Insecurity: Unknown    Worried About Running Out of Food in the Last Year: Never true   Transportation Needs: No Transportation Needs    Lack of Transportation (Medical): No    Lack of Transportation (Non-Medical): No   Physical Activity: Inactive    Days of Exercise per Week: 0 days    Minutes of Exercise per Session: 0 min   Stress: No Stress Concern Present    Feeling of Stress : Only a little   Social Connections: Socially Isolated    Frequency of Communication with Friends and Family: Once a week    Frequency of Social Gatherings with Friends and Family: Once a week    Attends Muslim Services: Never    Active Member of Clubs or Organizations: No    Attends Club or Organization Meetings: Never    Marital Status:    Housing Stability: Unknown    Unable to Pay for Housing in the Last Year: No    Unstable Housing in the Last Year: No     Review of Systems   Constitutional:  Negative for chills and fever.   HENT: Negative.     Respiratory:  Negative for apnea, cough, choking, chest tightness, shortness of breath, wheezing and stridor.    Cardiovascular:  Negative for chest pain and palpitations.   Gastrointestinal:  Negative for abdominal pain, nausea and vomiting.   Musculoskeletal:  Positive for arthralgias, gait problem and myalgias.   Neurological:  Positive for weakness. Negative for light-headedness.   Psychiatric/Behavioral:  Positive for agitation, behavioral problems and confusion.      Objective:      Physical Exam    CMP  Sodium   Date Value Ref Range Status   12/09/2022 136 136 - 145 mmol/L Final     Potassium   Date Value Ref Range Status   12/09/2022 4.3 3.5 - 5.1 mmol/L Final     Chloride   Date Value Ref Range Status   12/09/2022 105 95 - 110 mmol/L Final     CO2   Date Value Ref Range Status   12/09/2022 19 (L) 23 - 29 mmol/L Final     Glucose   Date Value Ref Range Status   12/09/2022 121 (H) 70 - 110 mg/dL Final     BUN    Date Value Ref Range Status   12/09/2022 49 (H) 8 - 23 mg/dL Final     Creatinine   Date Value Ref Range Status   12/09/2022 1.4 0.5 - 1.4 mg/dL Final     Calcium   Date Value Ref Range Status   12/09/2022 9.5 8.7 - 10.5 mg/dL Final     Total Protein   Date Value Ref Range Status   12/09/2022 7.1 6.0 - 8.4 g/dL Final     Albumin   Date Value Ref Range Status   12/09/2022 2.7 (L) 3.5 - 5.2 g/dL Final     Total Bilirubin   Date Value Ref Range Status   12/09/2022 0.3 0.1 - 1.0 mg/dL Final     Comment:     For infants and newborns, interpretation of results should be based  on gestational age, weight and in agreement with clinical  observations.    Premature Infant recommended reference ranges:  Up to 24 hours.............<8.0 mg/dL  Up to 48 hours............<12.0 mg/dL  3-5 days..................<15.0 mg/dL  6-29 days.................<15.0 mg/dL       Alkaline Phosphatase   Date Value Ref Range Status   12/09/2022 87 55 - 135 U/L Final     AST   Date Value Ref Range Status   12/09/2022 15 10 - 40 U/L Final     ALT   Date Value Ref Range Status   12/09/2022 12 10 - 44 U/L Final     Anion Gap   Date Value Ref Range Status   12/09/2022 12 8 - 16 mmol/L Final     eGFR if    Date Value Ref Range Status   06/15/2022 >60 >60 mL/min/1.73 m^2 Final     eGFR if non    Date Value Ref Range Status   06/15/2022 >60 >60 mL/min/1.73 m^2 Final     Comment:     Calculation used to obtain the estimated glomerular filtration  rate (eGFR) is the CKD-EPI equation.        Lab Results   Component Value Date    WBC 8.55 12/09/2022    HGB 13.4 (L) 12/09/2022    HCT 42.1 12/09/2022    MCV 91 12/09/2022     12/09/2022     Lab Results   Component Value Date    CHOL 158 11/04/2022     Lab Results   Component Value Date    HDL 40 11/04/2022     Lab Results   Component Value Date    LDLCALC 96.8 11/04/2022     Lab Results   Component Value Date    TRIG 106 11/04/2022     Lab Results   Component Value Date     CHOLHDL 25.3 11/04/2022     Lab Results   Component Value Date    TSH 1.944 11/04/2022     Lab Results   Component Value Date    HGBA1C 6.5 (H) 11/04/2022     Assessment:       1. Acute CVA (cerebrovascular accident)    2. Coronary artery disease of native artery of native heart with stable angina pectoris    3. Cerebrovascular accident (CVA), unspecified mechanism    4. Dyslipidemia    5. Essential hypertension    6. LAE (left atrial enlargement)    7. Memory deficit    8. NSTEMI (non-ST elevated myocardial infarction)    9. S/P AAA repair    10. Type 2 diabetes mellitus without complication, without long-term current use of insulin    11. Weakness    12. Other chronic pain    13. Pneumonia due to infectious organism, unspecified laterality, unspecified part of lung          Plan:   Acute CVA (cerebrovascular accident)------has f/u appt with neurology dr carvalho 1-11-23-------  -     Ambulatory referral/consult to [facility]; Future; Expected date: 01/13/2023  -     Ambulatory referral/consult to Home Health; Future; Expected date: 01/07/2023    Coronary artery disease of native artery of native heart with stable angina pectoris--------has f/u appt with [facility] dr shepard 1-24-23-    Cerebrovascular accident (CVA), unspecified mechanism    Dyslipidemia    Essential hypertension    LAE (left atrial enlargement)    Memory deficit    NSTEMI (non-ST elevated myocardial infarction)    S/P AAA repair    Type 2 diabetes mellitus without complication, without long-term current use of insulin    Weakness  -     Ambulatory referral/consult to Home Health; Future; Expected date: 01/07/2023    Other chronic pain  -     gabapentin (NEURONTIN) 100 MG capsule; Take 2 capsules (200 mg total) by mouth 2 (two) times daily as needed.  Dispense: 120 capsule; Refill: 11  -     Ambulatory referral/consult to Home Health; Future; Expected date: 01/07/2023    Pneumonia due to infectious organism, unspecified  laterality, unspecified part of lung--------------treated-- f/u cxr for clearing----  -     X-Ray Chest PA And Lateral; Future; Expected date: 01/06/2023    Other orders  -     metFORMIN (GLUCOPHAGE) 500 MG tablet; Take 1 tablet (500 mg total) by mouth daily with breakfast.  Dispense: 90 tablet; Refill: 3      Continue meds----------------------as above----------advised wife consider nursing home placement----------f/u 1 month-

## 2023-01-09 PROCEDURE — G0180 MD CERTIFICATION HHA PATIENT: HCPCS | Mod: ,,, | Performed by: INTERNAL MEDICINE

## 2023-01-09 PROCEDURE — G0180 PR HOME HEALTH MD CERTIFICATION: ICD-10-PCS | Mod: ,,, | Performed by: INTERNAL MEDICINE

## 2023-01-10 ENCOUNTER — PES CALL (OUTPATIENT)
Dept: ADMINISTRATIVE | Facility: CLINIC | Age: 83
End: 2023-01-10
Payer: MEDICARE

## 2023-01-10 DIAGNOSIS — I63.9 CEREBROVASCULAR ACCIDENT (CVA), UNSPECIFIED MECHANISM: Primary | ICD-10-CM

## 2023-01-11 ENCOUNTER — TELEPHONE (OUTPATIENT)
Dept: NEUROLOGY | Facility: CLINIC | Age: 83
End: 2023-01-11
Payer: MEDICARE

## 2023-01-11 ENCOUNTER — TELEPHONE (OUTPATIENT)
Dept: CARDIOLOGY | Facility: CLINIC | Age: 83
End: 2023-01-11
Payer: MEDICARE

## 2023-01-11 NOTE — TELEPHONE ENCOUNTER
[facility]  Caller: Unspecified (Today,  9:19 AM)  Pt is calling ot see if the appt can be changed to virtual audio only this is bc they are older her  can not move and she does not know how to work my chart . Pls call her back at [phone] for further info about the pt current condition that is preventing him from attending visit in person [phone]      Thank you      MLiur.     Returned call no answer lvm to return call

## 2023-01-11 NOTE — TELEPHONE ENCOUNTER
Pt has no strength the have to help with everything ,. She stated that he did go to a rehab . She has had to call 911 to get him off the toilet a few times

## 2023-01-11 NOTE — TELEPHONE ENCOUNTER
----- Message from [place] sent at 1/11/2023  9:14 AM CST -----  Contact: pt wife  Pt is calling back in . Pls call her back at [phone] the phone hung up .      She is not coming in today      M.r.

## 2023-01-16 ENCOUNTER — CARE AT HOME (OUTPATIENT)
Dept: HOME HEALTH SERVICES | Facility: CLINIC | Age: 83
End: 2023-01-16
Payer: MEDICARE

## 2023-01-16 VITALS
DIASTOLIC BLOOD PRESSURE: 70 MMHG | TEMPERATURE: 98 F | SYSTOLIC BLOOD PRESSURE: 130 MMHG | HEART RATE: 59 BPM | OXYGEN SATURATION: 95 % | RESPIRATION RATE: 18 BRPM

## 2023-01-16 DIAGNOSIS — I63.9 ACUTE CVA (CEREBROVASCULAR ACCIDENT): ICD-10-CM

## 2023-01-16 PROCEDURE — 99350 HOME/RES VST EST HIGH MDM 60: CPT | Mod: ,,, | Performed by: NURSE PRACTITIONER

## 2023-01-16 PROCEDURE — 99350 PR HOME VISIT,ESTAB PATIENT,LEVEL IV: ICD-10-PCS | Mod: ,,, | Performed by: NURSE PRACTITIONER

## 2023-01-16 NOTE — PROGRESS NOTES
[facility]  Medical Home Visit    Visit Date: 1/16/2023  Encounter Provider: Diane Mike  PCP:  Holger Thornton MD    Subjective:      Patient ID: Aquiles Kelsey is a 82 y.o. male.    Consult Requested By:  Dr. Holger Zayas*  Reason for Consult:  Follow up    Aquiles is being seen at home due to being seen at home due to physical debility that presents a taxing effort to leave the home, to mitigate high risk of hospital readmission and/or due to the limited availability of reliable or safe options for transportation to the point of access to the provider. Prior to treatment on this visit the chart was reviewed and patient verbal consent was obtained.    Chief Complaint: No chief complaint on file.      Patient is being seen today for a medical visit. He was seen in the ER on 12/02 for weakness. He was hospitalized in November for a stroke. Upon arrival the patient was laying in the bed and wife was present for the visit. She reports that the patient did get up today and ambulated with the walker. CBG non fasting is 177mg/dl at visit.  Medications reviewed and wife reports that patient is not on ranolazine at this time. VSS.          Review of Systems   Constitutional: Negative.    HENT: Negative.     Eyes: Negative.    Respiratory:  Positive for shortness of breath (with exertion).    Cardiovascular: Negative.    Gastrointestinal:  Positive for constipation. Negative for diarrhea, nausea and vomiting.   Endocrine: Negative.    Genitourinary: Negative.    Musculoskeletal:  Positive for gait problem (ambulates with walker and personal assist).   Skin: Negative.    Allergic/Immunologic: Negative.    Neurological:  Positive for weakness.   Hematological: Negative.    Psychiatric/Behavioral:  Positive for sleep disturbance (sleeps a lot).      Assessments:  Environmental: Patient lives in a single story home with his wife. There are no steps at the entrance.   Functional Status: Ambulatory  with walker and personal assist  Safety: Fall Precautions  Nutritional: Adequate food in the home  Home Health/DME/Supplies: [facility], DMEs: walker, bedside commode, wheelchair    Objective:   Physical Exam  Vitals reviewed.   Constitutional:       General: He is not in acute distress.  HENT:      Head: Normocephalic and atraumatic.      Nose: Nose normal.      Mouth/Throat:      Mouth: Mucous membranes are moist.      Pharynx: Oropharynx is clear.   Cardiovascular:      Rate and Rhythm: Regular rhythm. Bradycardia present.      Pulses: Normal pulses.      Heart sounds: Normal heart sounds.   Pulmonary:      Breath sounds: Normal breath sounds.   Abdominal:      General: Abdomen is flat. Bowel sounds are normal.      Palpations: Abdomen is soft.   Musculoskeletal:      Cervical back: Normal range of motion and neck supple.      Right lower leg: No edema.      Left lower leg: No edema.   Skin:     General: Skin is warm and dry.      Capillary Refill: Capillary refill takes less than 2 seconds.   Neurological:      Mental Status: Mental status is at baseline.      Motor: Weakness present.   Psychiatric:         Mood and Affect: Mood normal.         Behavior: Behavior normal.       Vitals:    01/16/23 1200   BP: 130/70   Pulse: (!) 59   Resp: 18   Temp: 98.1 °F (36.7 °C)   SpO2: 95%     There is no height or weight on file to calculate BMI.    Assessment:     1. Acute CVA (cerebrovascular accident)        Plan:     Ethical / Legal: Advance Care Planning   Surrogate decision maker:  Naima Woods, Relationship: Wife  Code Status:  Full Code  [place]POST:  None  Other advance directive:  None, Capacity to make medical decisions:  Yes, Conflict None       1. Acute CVA (cerebrovascular accident)  -     Ambulatory referral/consult to [facility]  -     HOSPITAL BED FOR HOME USE     Encounter for Medical Follow-Up and Medication Review  - [facility] at NP to schedule  follow-up visit with patient in 4 weeks or PRN     Patient Instructions Given:  - Continue all medications, treatments and therapies as ordered.   - Follow all instructions, recommendations as discussed.  - Maintain Safety Precautions at all times.  - Attend all medical appointments as scheduled.  - For worsening symptoms: call Primary Care Physician or Nurse Practitioner.  - For emergencies, call 911 or immediately report to the nearest emergency room       Were controlled substances prescribed?  No    Follow Up Appointments:   Future Appointments   Date Time Provider Department Center   1/24/2023  3:00 PM Robe Gilbert MD [facility]   2/6/2023  1:00 PM Holger Thornton MD [facility]   2/20/2023  8:00 AM Diaen Mcdowell NP [address] [facility]   3/13/2023  8:00 AM Sanjiv Bautista MD ON NEURO  Medical C   3/27/2023 11:40 AM Robe Gilbert MD [facility]       Signature: Diane Mcdowell NP

## 2023-01-24 ENCOUNTER — OFFICE VISIT (OUTPATIENT)
Dept: CARDIOLOGY | Facility: CLINIC | Age: 83
End: 2023-01-24
Payer: MEDICARE

## 2023-01-24 VITALS
WEIGHT: 134.94 LBS | RESPIRATION RATE: 16 BRPM | HEART RATE: 62 BPM | DIASTOLIC BLOOD PRESSURE: 71 MMHG | SYSTOLIC BLOOD PRESSURE: 127 MMHG | BODY MASS INDEX: 19.36 KG/M2

## 2023-01-24 DIAGNOSIS — Z98.890 S/P AAA REPAIR: ICD-10-CM

## 2023-01-24 DIAGNOSIS — R00.1 BRADYCARDIA: ICD-10-CM

## 2023-01-24 DIAGNOSIS — I48.0 PAF (PAROXYSMAL ATRIAL FIBRILLATION): Primary | ICD-10-CM

## 2023-01-24 DIAGNOSIS — Z86.79 S/P AAA REPAIR: ICD-10-CM

## 2023-01-24 DIAGNOSIS — I20.9 AP (ANGINA PECTORIS): ICD-10-CM

## 2023-01-24 DIAGNOSIS — R94.31 ABNORMAL ECG: ICD-10-CM

## 2023-01-24 DIAGNOSIS — I35.1 NONRHEUMATIC AORTIC VALVE INSUFFICIENCY: ICD-10-CM

## 2023-01-24 DIAGNOSIS — I51.7 LVH (LEFT VENTRICULAR HYPERTROPHY): ICD-10-CM

## 2023-01-24 DIAGNOSIS — I73.9 CLAUDICATION IN PERIPHERAL VASCULAR DISEASE: ICD-10-CM

## 2023-01-24 DIAGNOSIS — I49.1 PAC (PREMATURE ATRIAL CONTRACTION): ICD-10-CM

## 2023-01-24 DIAGNOSIS — Z98.61 S/P PTCA (PERCUTANEOUS TRANSLUMINAL CORONARY ANGIOPLASTY): ICD-10-CM

## 2023-01-24 DIAGNOSIS — I10 ESSENTIAL HYPERTENSION: Chronic | ICD-10-CM

## 2023-01-24 DIAGNOSIS — I63.9 ACUTE CVA (CEREBROVASCULAR ACCIDENT): ICD-10-CM

## 2023-01-24 DIAGNOSIS — I73.9 PAD (PERIPHERAL ARTERY DISEASE): Chronic | ICD-10-CM

## 2023-01-24 DIAGNOSIS — E78.5 DYSLIPIDEMIA: Chronic | ICD-10-CM

## 2023-01-24 DIAGNOSIS — Z98.61 HISTORY OF PTCA: ICD-10-CM

## 2023-01-24 DIAGNOSIS — I51.7 LAE (LEFT ATRIAL ENLARGEMENT): ICD-10-CM

## 2023-01-24 DIAGNOSIS — E11.9 TYPE 2 DIABETES MELLITUS WITHOUT COMPLICATION, WITHOUT LONG-TERM CURRENT USE OF INSULIN: Chronic | ICD-10-CM

## 2023-01-24 DIAGNOSIS — I25.10 CAD, MULTIPLE VESSEL: ICD-10-CM

## 2023-01-24 DIAGNOSIS — I25.118 CORONARY ARTERY DISEASE OF NATIVE ARTERY OF NATIVE HEART WITH STABLE ANGINA PECTORIS: Chronic | ICD-10-CM

## 2023-01-24 PROCEDURE — 99215 OFFICE O/P EST HI 40 MIN: CPT | Mod: HCNC,S$GLB,, | Performed by: INTERNAL MEDICINE

## 2023-01-24 PROCEDURE — 1160F PR REVIEW ALL MEDS BY PRESCRIBER/CLIN PHARMACIST DOCUMENTED: ICD-10-PCS | Mod: HCNC,CPTII,S$GLB, | Performed by: INTERNAL MEDICINE

## 2023-01-24 PROCEDURE — 3074F SYST BP LT 130 MM HG: CPT | Mod: HCNC,CPTII,S$GLB, | Performed by: INTERNAL MEDICINE

## 2023-01-24 PROCEDURE — 3074F PR MOST RECENT SYSTOLIC BLOOD PRESSURE < 130 MM HG: ICD-10-PCS | Mod: HCNC,CPTII,S$GLB, | Performed by: INTERNAL MEDICINE

## 2023-01-24 PROCEDURE — 99999 PR PBB SHADOW E&M-EST. PATIENT-LVL III: ICD-10-PCS | Mod: PBBFAC,HCNC,, | Performed by: INTERNAL MEDICINE

## 2023-01-24 PROCEDURE — 3078F PR MOST RECENT DIASTOLIC BLOOD PRESSURE < 80 MM HG: ICD-10-PCS | Mod: HCNC,CPTII,S$GLB, | Performed by: INTERNAL MEDICINE

## 2023-01-24 PROCEDURE — 3078F DIAST BP <80 MM HG: CPT | Mod: HCNC,CPTII,S$GLB, | Performed by: INTERNAL MEDICINE

## 2023-01-24 PROCEDURE — 1159F MED LIST DOCD IN RCRD: CPT | Mod: HCNC,CPTII,S$GLB, | Performed by: INTERNAL MEDICINE

## 2023-01-24 PROCEDURE — 1159F PR MEDICATION LIST DOCUMENTED IN MEDICAL RECORD: ICD-10-PCS | Mod: HCNC,CPTII,S$GLB, | Performed by: INTERNAL MEDICINE

## 2023-01-24 PROCEDURE — 99999 PR PBB SHADOW E&M-EST. PATIENT-LVL III: CPT | Mod: PBBFAC,HCNC,, | Performed by: INTERNAL MEDICINE

## 2023-01-24 PROCEDURE — 1160F RVW MEDS BY RX/DR IN RCRD: CPT | Mod: HCNC,CPTII,S$GLB, | Performed by: INTERNAL MEDICINE

## 2023-01-24 PROCEDURE — 99215 PR OFFICE/OUTPT VISIT, EST, LEVL V, 40-54 MIN: ICD-10-PCS | Mod: HCNC,S$GLB,, | Performed by: INTERNAL MEDICINE

## 2023-01-24 NOTE — PROGRESS NOTES
Subjective:    Patient ID:  Aquiles Kelsey is a 82 y.o. male who presents for evaluation of Hypertension, Coronary Artery Disease, Hyperlipidemia, and Peripheral Arterial Disease      HPI Pt presents for eval.  His current medical conditions include CAD (multiple PCI procedures), HTN, DM, LAE, LVH, PAD, AAA stent graft (approx 2013), hyperlipidemia, cigar use.  Past hx pertinent for following:  H/o L LE stents in [place].   First PCI 1998, total of 5 stents with last one in [place] 2013.  Echo 7/19 normal LVEF, DD,  LVH, mild AI.   Holter 7/19 NSR, fleeting NSVT, EAT, rare PVCs, occasional PACs.  B LE vasc studies 7/19 B LE PAD, L > R LE.   S/p C Oct 2019 for NSTEMI.  Pt had PCI KYLIE x one to mid LCX ISR, KYLIE x 2 to OM2, and PCI prox RCA KYLIE x 2, PTCA mid RCA ISR.  Failed PCI distal occluded RCA.  EF 45%.  Nonobstructive LAD disease, small diffusely diseased diagonal vessels.  He developed post PCI GI bleed and required PRBC and EGD showing angiodysplasia tx w cauterization.  ecg 10/14/19 junctional bradycardia 54 bpm, inferior infarct, inferolateral st-t abnl.  Echo 10/19 normal LV function, mild AI, LAE, LVH, inferolateral WMA.  Stress MPI 9/20 inferolateral scar with mild pato-infarct ischemia, EF 42%.  Echo 9/20 LVEF 40 - 45%, DD, LAE, LVH, WMA.  B LE arterial vasc studies 8/20:  Severe B LE PAD.   JOSELYN 8/20  R LE 0.76, L LE 0.62  Referred to vascular surgery for his PAD/AAA f/u.  He did see Dr. Mccarthy, [facility] surgery, with f/u appt planned.  He was hospitalized 6/22 for TIA.  Patient had evidence of multiple lacunar/cerebellar infarcts on imaging, and mild carotid disease.  Was thought to be due to vascular disease.  Echo June 2022 normal EF, grade I DD, LVH, LAE.  Now here.  S/p acute CVA Nov 2022.  Has been weaker, less talkative, off balance since his latest stroke.  Memory loss.  CAD seems stable.  Wife states he has not c/o cp.  No CHF sxs.  BP is stable.  Not smoking cigars last few months per  wife.  Looks weak, more frail than in past.  On DAPT.  No claudication sxs.  Ecg 12/2/22 a fib noted, old inferior infarct.  A fib would be a new dx.  Uses walker.        Current Outpatient Medications:     alcohol swabs (ALCOHOL PREP PADS) PadM, Twice a day, Disp: 400 each, Rfl: 3    amLODIPine (NORVASC) 5 MG tablet, TAKE 1 TABLET BY MOUTH EVERY DAY, Disp: 90 tablet, Rfl: 3    apixaban (ELIQUIS) 5 mg Tab, Take 1 tablet (5 mg total) by mouth 2 (two) times daily., Disp: 60 tablet, Rfl: 12    aspirin (ECOTRIN) 81 MG EC tablet, Take 1 tablet (81 mg total) by mouth once daily., Disp: , Rfl: 0    atorvastatin (LIPITOR) 40 MG tablet, Take 1 tablet (40 mg total) by mouth once daily., Disp: 90 tablet, Rfl: 3    blood sugar diagnostic Strp, Test blood sugars twice a day, Disp: 200 strip, Rfl: 6    gabapentin (NEURONTIN) 100 MG capsule, Take 2 capsules (200 mg total) by mouth 2 (two) times daily as needed., Disp: 120 capsule, Rfl: 11    isosorbide mononitrate (IMDUR) 120 MG 24 hr tablet, TAKE 1 TABLET BY MOUTH ONCE DAILY, Disp: 90 tablet, Rfl: 3    lancets (ACCU-CHEK SOFTCLIX LANCETS) Misc, Test blood sugars twice a day, Disp: 200 each, Rfl: 6    metFORMIN (GLUCOPHAGE) 500 MG tablet, Take 1 tablet (500 mg total) by mouth daily with breakfast., Disp: 90 tablet, Rfl: 3    MYRBETRIQ 50 mg Tb24, TAKE 1 TABLET BY MOUTH EVERY DAY, Disp: 90 tablet, Rfl: 3    ondansetron (ZOFRAN-ODT) 4 MG TbDL, Take 1 tablet (4 mg total) by mouth every 6 (six) hours as needed., Disp: 15 tablet, Rfl: 0    ranolazine (RANEXA) 500 MG Tb12, Take 1 tablet (500 mg total) by mouth 2 (two) times daily., Disp: 60 tablet, Rfl: 11      Review of Systems   Constitutional: Positive for weight loss.   HENT: Negative.     Eyes: Negative.    Cardiovascular: Negative.    Respiratory: Negative.     Endocrine: Negative.    Hematologic/Lymphatic: Negative.    Skin: Negative.    Musculoskeletal: Negative.    Gastrointestinal: Negative.    Genitourinary: Negative.     Neurological:  Positive for loss of balance and weakness.   Psychiatric/Behavioral:  Positive for memory loss.    Allergic/Immunologic: Negative.         /71   Pulse 62   Resp 16   Wt 61.2 kg (134 lb 14.7 oz)   BMI 19.36 kg/m²     Wt Readings from Last 3 Encounters:   01/24/23 61.2 kg (134 lb 14.7 oz)   12/09/22 61 kg (134 lb 7.7 oz)   11/09/22 66.4 kg (146 lb 6.2 oz)     Temp Readings from Last 3 Encounters:   01/16/23 98.1 °F (36.7 °C)   12/09/22 98.1 °F (36.7 °C)   12/02/22 97.9 °F (36.6 °C) (Oral)     BP Readings from Last 3 Encounters:   01/24/23 127/71   01/16/23 130/70   12/09/22 130/60     Pulse Readings from Last 3 Encounters:   01/24/23 62   01/16/23 (!) 59   12/09/22 80       Objective:    Physical Exam  Vitals and nursing note reviewed.   Constitutional:       Appearance: He is well-developed.   HENT:      Head: Normocephalic.   Neck:      Thyroid: No thyromegaly.      Vascular: No carotid bruit or JVD.   Cardiovascular:      Rate and Rhythm: Normal rate and regular rhythm.      Pulses:           Radial pulses are 2+ on the right side and 2+ on the left side.      Heart sounds: S1 normal and S2 normal. Heart sounds not distant. No midsystolic click and no opening snap. No murmur heard.    No friction rub. No S3 or S4 sounds.   Pulmonary:      Effort: Pulmonary effort is normal.      Breath sounds: Normal breath sounds. No wheezing or rales.   Abdominal:      General: Bowel sounds are normal. There is no distension or abdominal bruit.      Palpations: Abdomen is soft. There is no mass.      Tenderness: There is no abdominal tenderness.   Musculoskeletal:      Cervical back: Neck supple.   Skin:     General: Skin is warm.   Neurological:      Mental Status: He is alert.   Psychiatric:         Behavior: Behavior normal.     I have reviewed all pertinent labs and cardiac studies.      Chemistry        Component Value Date/Time     12/09/2022 1003    K 4.3 12/09/2022 1003      12/09/2022 1003    CO2 19 (L) 12/09/2022 1003    BUN 49 (H) 12/09/2022 1003    CREATININE 1.4 12/09/2022 1003     (H) 12/09/2022 1003        Component Value Date/Time    CALCIUM 9.5 12/09/2022 1003    ALKPHOS 87 12/09/2022 1003    AST 15 12/09/2022 1003    ALT 12 12/09/2022 1003    BILITOT 0.3 12/09/2022 1003    ESTGFRAFRICA >60 06/15/2022 0459    EGFRNONAA >60 06/15/2022 0459        Lab Results   Component Value Date    WBC 8.55 12/09/2022    HGB 13.4 (L) 12/09/2022    HCT 42.1 12/09/2022    MCV 91 12/09/2022     12/09/2022       Lab Results   Component Value Date    HGBA1C 6.5 (H) 11/04/2022     Lab Results   Component Value Date    CHOL 158 11/04/2022    CHOL 124 06/13/2022    CHOL 135 09/16/2021     Lab Results   Component Value Date    HDL 40 11/04/2022    HDL 34 (L) 06/13/2022    HDL 35 (L) 09/16/2021     Lab Results   Component Value Date    LDLCALC 96.8 11/04/2022    LDLCALC 69.2 06/13/2022    LDLCALC 65.0 09/16/2021     Lab Results   Component Value Date    TRIG 106 11/04/2022    TRIG 104 06/13/2022    TRIG 175 (H) 09/16/2021     Lab Results   Component Value Date    CHOLHDL 25.3 11/04/2022    CHOLHDL 27.4 06/13/2022    CHOLHDL 25.9 09/16/2021         Results for orders placed during the hospital encounter of 06/13/22    Echo    Interpretation Summary  · The left ventricle is normal in size with severe concentric hypertrophy and normal systolic function.  · Mild left atrial enlargement.  · The estimated ejection fraction is 55%.  · Grade II left ventricular diastolic dysfunction.  · Normal right ventricular size with normal right ventricular systolic function.        Assessment:       1. PAF (paroxysmal atrial fibrillation)    2. Abnormal ECG    3. AP (angina pectoris)    4. Acute CVA (cerebrovascular accident)    5. Coronary artery disease of native artery of native heart with stable angina pectoris    6. CAD, multiple vessel    7. Bradycardia    8. Claudication in peripheral vascular disease     9. Essential hypertension    10. Dyslipidemia    11. LVH (left ventricular hypertrophy)    12. LAE (left atrial enlargement)    13. History of PTCA    14. PAD (peripheral artery disease)    15. PAC (premature atrial contraction)    16. Nonrheumatic aortic valve insufficiency    17. Type 2 diabetes mellitus without complication, without long-term current use of insulin    18. S/P PTCA (percutaneous transluminal coronary angioplasty)    19. S/P AAA repair         Plan:             S/p acute CVA Nov 2022.  Ecg Dec 2022 a fib noted. New dx.  Start Eliquis 5 mg bid.  Patient advised of indications for above medications, risks/benefits and side effect profile.  2 week Vital Holter.  EP consult advised asap.  Stop Plavix once he starts Eliquis.  Stay on 81 mg asa qd.  OMT for CAD.  Fall risk precautions strongly advised.  Reviewed all tests and above medical conditions with patient in detail and formulated treatment plan.  Continue optimal medical treatment for cardiovascular conditions.  Cardiac low salt diet advised.  Watch weight loss; f/u w PCP.  Need for BP control and HTN goals (if needed) were discussed and tx plan formulated.  Importance of optimal lipid control were discussed in detail as well as possible pharmacologic and lifestyle changes that may be needed.  Statin tx.  Smoking cessation advised.  DM HGAIC control.  F/u w Vasc Surgery as advised.  Phone review.  F/u in 4 months.      I have reviewed all pertinent labs and cardiac studies independently. Plans and recommendations have been formulated under my direct supervision. All questions answered and patient voiced understanding.     Complex visit reviewing chart/records, extensive CV conditions, formulating tx/mgt plan.

## 2023-01-26 ENCOUNTER — HOSPITAL ENCOUNTER (OUTPATIENT)
Dept: CARDIOLOGY | Facility: HOSPITAL | Age: 83
Discharge: HOME | End: 2023-01-26
Attending: INTERNAL MEDICINE
Payer: MEDICARE

## 2023-01-26 DIAGNOSIS — I48.0 PAF (PAROXYSMAL ATRIAL FIBRILLATION): ICD-10-CM

## 2023-01-26 PROCEDURE — 93248 CV CARDIAC MONITOR - 3-15 DAY ADULT (CUPID ONLY): ICD-10-PCS | Mod: HCNC,,, | Performed by: INTERNAL MEDICINE

## 2023-01-26 PROCEDURE — 93248 EXT ECG>7D<15D REV&INTERPJ: CPT | Mod: HCNC,,, | Performed by: INTERNAL MEDICINE

## 2023-01-27 ENCOUNTER — EXTERNAL HOME HEALTH (OUTPATIENT)
Dept: HOME HEALTH SERVICES | Facility: HOSPITAL | Age: 83
End: 2023-01-27
Payer: MEDICARE

## 2023-01-31 ENCOUNTER — DOCUMENT SCAN (OUTPATIENT)
Dept: HOME HEALTH SERVICES | Facility: HOSPITAL | Age: 83
End: 2023-01-31
Payer: MEDICARE

## 2023-02-02 ENCOUNTER — DOCUMENT SCAN (OUTPATIENT)
Dept: HOME HEALTH SERVICES | Facility: HOSPITAL | Age: 83
End: 2023-02-02
Payer: MEDICARE

## 2023-02-06 ENCOUNTER — OFFICE VISIT (OUTPATIENT)
Dept: FAMILY MEDICINE | Facility: CLINIC | Age: 83
End: 2023-02-06
Payer: MEDICARE

## 2023-02-06 ENCOUNTER — DOCUMENT SCAN (OUTPATIENT)
Dept: HOME HEALTH SERVICES | Facility: HOSPITAL | Age: 83
End: 2023-02-06
Payer: MEDICARE

## 2023-02-06 DIAGNOSIS — Z79.01 ANTICOAGULATED: ICD-10-CM

## 2023-02-06 DIAGNOSIS — I10 ESSENTIAL HYPERTENSION: Chronic | ICD-10-CM

## 2023-02-06 DIAGNOSIS — I63.9 CEREBROVASCULAR ACCIDENT (CVA), UNSPECIFIED MECHANISM: ICD-10-CM

## 2023-02-06 DIAGNOSIS — I25.118 CORONARY ARTERY DISEASE OF NATIVE ARTERY OF NATIVE HEART WITH STABLE ANGINA PECTORIS: Primary | Chronic | ICD-10-CM

## 2023-02-06 DIAGNOSIS — E78.5 DYSLIPIDEMIA: Chronic | ICD-10-CM

## 2023-02-06 DIAGNOSIS — E11.9 TYPE 2 DIABETES MELLITUS WITHOUT COMPLICATION, WITHOUT LONG-TERM CURRENT USE OF INSULIN: Chronic | ICD-10-CM

## 2023-02-06 DIAGNOSIS — R53.1 WEAKNESS: ICD-10-CM

## 2023-02-06 DIAGNOSIS — I48.0 PAF (PAROXYSMAL ATRIAL FIBRILLATION): ICD-10-CM

## 2023-02-06 PROBLEM — G45.9 TIA (TRANSIENT ISCHEMIC ATTACK): Status: RESOLVED | Noted: 2022-06-13 | Resolved: 2023-02-06

## 2023-02-06 PROCEDURE — 99441 PR PHYSICIAN TELEPHONE EVALUATION 5-10 MIN: ICD-10-PCS | Mod: HCNC,95,, | Performed by: INTERNAL MEDICINE

## 2023-02-06 PROCEDURE — 99441 PR PHYSICIAN TELEPHONE EVALUATION 5-10 MIN: CPT | Mod: HCNC,95,, | Performed by: INTERNAL MEDICINE

## 2023-02-07 ENCOUNTER — OFFICE VISIT (OUTPATIENT)
Dept: OPHTHALMOLOGY | Facility: CLINIC | Age: 83
End: 2023-02-07
Payer: MEDICARE

## 2023-02-07 ENCOUNTER — DOCUMENT SCAN (OUTPATIENT)
Dept: HOME HEALTH SERVICES | Facility: HOSPITAL | Age: 83
End: 2023-02-07
Payer: MEDICARE

## 2023-02-07 DIAGNOSIS — H52.4 MYOPIA WITH ASTIGMATISM AND PRESBYOPIA, BILATERAL: ICD-10-CM

## 2023-02-07 DIAGNOSIS — H53.2 DIPLOPIA: ICD-10-CM

## 2023-02-07 DIAGNOSIS — Z96.1 PSEUDOPHAKIA OF BOTH EYES: ICD-10-CM

## 2023-02-07 DIAGNOSIS — Z86.73 HISTORY OF STROKE: ICD-10-CM

## 2023-02-07 DIAGNOSIS — H52.203 MYOPIA WITH ASTIGMATISM AND PRESBYOPIA, BILATERAL: ICD-10-CM

## 2023-02-07 DIAGNOSIS — E11.9 TYPE 2 DIABETES MELLITUS WITHOUT RETINOPATHY: Primary | ICD-10-CM

## 2023-02-07 DIAGNOSIS — H52.13 MYOPIA WITH ASTIGMATISM AND PRESBYOPIA, BILATERAL: ICD-10-CM

## 2023-02-07 PROCEDURE — 92004 PR EYE EXAM, NEW PATIENT,COMPREHESV: ICD-10-PCS | Mod: HCNC,S$GLB,, | Performed by: OPTOMETRIST

## 2023-02-07 PROCEDURE — 92015 PR REFRACTION: ICD-10-PCS | Mod: HCNC,S$GLB,, | Performed by: OPTOMETRIST

## 2023-02-07 PROCEDURE — 1159F MED LIST DOCD IN RCRD: CPT | Mod: HCNC,CPTII,S$GLB, | Performed by: OPTOMETRIST

## 2023-02-07 PROCEDURE — 1160F PR REVIEW ALL MEDS BY PRESCRIBER/CLIN PHARMACIST DOCUMENTED: ICD-10-PCS | Mod: HCNC,CPTII,S$GLB, | Performed by: OPTOMETRIST

## 2023-02-07 PROCEDURE — 1160F RVW MEDS BY RX/DR IN RCRD: CPT | Mod: HCNC,CPTII,S$GLB, | Performed by: OPTOMETRIST

## 2023-02-07 PROCEDURE — 99999 PR PBB SHADOW E&M-EST. PATIENT-LVL III: ICD-10-PCS | Mod: PBBFAC,HCNC,, | Performed by: OPTOMETRIST

## 2023-02-07 PROCEDURE — 1159F PR MEDICATION LIST DOCUMENTED IN MEDICAL RECORD: ICD-10-PCS | Mod: HCNC,CPTII,S$GLB, | Performed by: OPTOMETRIST

## 2023-02-07 PROCEDURE — 92004 COMPRE OPH EXAM NEW PT 1/>: CPT | Mod: HCNC,S$GLB,, | Performed by: OPTOMETRIST

## 2023-02-07 PROCEDURE — 99999 PR PBB SHADOW E&M-EST. PATIENT-LVL III: CPT | Mod: PBBFAC,HCNC,, | Performed by: OPTOMETRIST

## 2023-02-07 PROCEDURE — 92015 DETERMINE REFRACTIVE STATE: CPT | Mod: HCNC,S$GLB,, | Performed by: OPTOMETRIST

## 2023-02-07 NOTE — PROGRESS NOTES
"HPI    Pt suffered a 2nd stroke in November/December and the double vision came   after that  Double VA is with both eyes open.   Last edited by Aida Vickers on 2/7/2023 10:10 AM.            Assessment /Plan     For exam results, see Encounter Report.    Type 2 diabetes mellitus without retinopathy  There was no diabetic retinopathy present in either eye today.   Recommended that pt continue care with PCP and/or specialists regarding diabetes.  Follow-up dilated eye exam recommended in 12 months, sooner with any vision changes or new concerns.    Diplopia   History of stroke  Thalamus stroke  Intermittent horizontal diplopia since stroke in November 2022  Has not worn specs (other than otc readers) since cataract surgery    Upgaze palsy bilaterally  Intermittent XT bilateral    Poor va uncorrected-difficult va's  Much better with correction  Trial frame in phoroptor, improved "diplopia" no change with added prism  Will start with spec rx without prism  Consider adding prism if symptoms worsen    Pseudophakia of both eyes  Stable OU  Monitor 12 months    Myopia with astigmatism and presbyopia, bilateral  Eyeglass Final Rx       Eyeglass Final Rx         Sphere Cylinder Axis Add    Right -0.75 +2.25 025 +2.50    Left -1.50 +2.00 170 +2.50      Expiration Date: 2/7/2024                  RTC 1 month for EOM check or PRN if any problems.   Discussed above and answered questions.                     "

## 2023-02-16 ENCOUNTER — DOCUMENT SCAN (OUTPATIENT)
Dept: HOME HEALTH SERVICES | Facility: HOSPITAL | Age: 83
End: 2023-02-16
Payer: MEDICARE

## 2023-02-16 LAB
OHS CV HOLTER SINUS AVERAGE HR: 59
OHS CV HOLTER SINUS MAX HR: 87
OHS CV HOLTER SINUS MIN HR: 42

## 2023-02-20 ENCOUNTER — CARE AT HOME (OUTPATIENT)
Dept: HOME HEALTH SERVICES | Facility: CLINIC | Age: 83
End: 2023-02-20
Payer: MEDICARE

## 2023-02-20 VITALS — RESPIRATION RATE: 18 BRPM | SYSTOLIC BLOOD PRESSURE: 157 MMHG | DIASTOLIC BLOOD PRESSURE: 82 MMHG | HEART RATE: 55 BPM

## 2023-02-20 DIAGNOSIS — R15.9 INCONTINENCE OF FECES, UNSPECIFIED FECAL INCONTINENCE TYPE: Primary | ICD-10-CM

## 2023-02-20 PROCEDURE — 99213 OFFICE O/P EST LOW 20 MIN: CPT | Mod: S$GLB,,, | Performed by: NURSE PRACTITIONER

## 2023-02-20 PROCEDURE — 99213 PR OFFICE/OUTPT VISIT, EST, LEVL III, 20-29 MIN: ICD-10-PCS | Mod: S$GLB,,, | Performed by: NURSE PRACTITIONER

## 2023-02-20 NOTE — PROGRESS NOTES
"[facility]  Medical Home Visit    Visit Date: 2/20/2023  Encounter Provider: Diane Mike  PCP:  Holger Thornton MD    Subjective:      Patient ID: Aquiles Kelsey is a 82 y.o. male.    Consult Requested By:  No ref. provider found  Reason for Consult:  Follow Up    Aquiles is being seen at home due to being seen at home due to physical debility that presents a taxing effort to leave the home, to mitigate high risk of hospital readmission and/or due to the limited availability of reliable or safe options for transportation to the point of access to the provider. Prior to treatment on this visit the chart was reviewed and patient verbal consent was obtained.    Chief Complaint: Follow-up      Patient is being see for a follow up visit. Upon arrival patient is sleeping. Patient's wife reports that the patient is having issues "with his memory" and that he cannot remember that he has to go to the bathroom to have a bowel movement. She reports that he has issues with his short term memory and cannot remember if he ate breakfast and will ask for breakfast about an hour after he eats. Medications reviewed with the patient's wife and she reports compliance with all medications. VSS. Wife reports that the CBGs have been running in the low to mid 100s.          Review of Systems   Constitutional: Negative.    HENT:  Positive for hearing loss.    Eyes:  Positive for visual disturbance.   Respiratory:  Positive for shortness of breath (with exertion).    Cardiovascular: Negative.    Gastrointestinal:  Positive for constipation (is incontinent of bowel and bladder).   Genitourinary:  Positive for enuresis (has some urinary incontinence).   Musculoskeletal: Negative.    Skin: Negative.    Allergic/Immunologic: Negative.    Neurological:  Positive for weakness.   Hematological:  Bruises/bleeds easily.   Psychiatric/Behavioral:  Positive for behavioral problems (wife reports that "he raises his voice" at " times) and confusion.      Assessments:  Environmental: Patient lives in a single story home with his wife. There are no steps at the entrance. Lighting is bright and temperature is comfortable.  Functional Status: Patient is ambulatory for a short distance with a walker and assistance. Patient needs assistance for ADLs  Safety: Fall Precautions  Nutritional: Adequate food in the home  Home Health/DME/Supplies: [facility], DMEs: walker, shower bench, wheelchair    Objective:   Physical Exam  Vitals reviewed.   Constitutional:       General: He is not in acute distress.  HENT:      Head: Normocephalic and atraumatic.      Nose: Nose normal.      Mouth/Throat:      Mouth: Mucous membranes are moist.      Pharynx: Oropharynx is clear. Posterior oropharyngeal erythema present.   Eyes:      Pupils: Pupils are equal, round, and reactive to light.   Cardiovascular:      Rate and Rhythm: Regular rhythm. Bradycardia present.      Pulses: Normal pulses.      Heart sounds: Normal heart sounds.   Pulmonary:      Effort: Pulmonary effort is normal.   Abdominal:      General: Abdomen is flat. There is no distension.      Palpations: Abdomen is soft.      Tenderness: There is no abdominal tenderness.   Musculoskeletal:      Right lower leg: No edema.      Left lower leg: No edema.   Skin:     General: Skin is warm and dry.   Neurological:      Mental Status: Mental status is at baseline.       Vitals:    02/20/23 1016   BP: (!) 157/82   Pulse: (!) 55   Resp: 18   PainSc: 0-No pain     There is no height or weight on file to calculate BMI.    Assessment:     1. Incontinence of feces, unspecified fecal incontinence type        Plan:     Ethical / Legal: Advance Care Planning   Surrogate decision maker:  Name Gayle Woods, Relationship: Wife  Code Status:  Full Code  [place]POST:  None  Other advance directive:  None, Capacity to make medical decisions:  No, Conflict None       1. Incontinence of feces, unspecified fecal  incontinence type  Comments:  Follow up with neurology as directed       Encounter for Medical Follow-Up and Medication Review  - [facility] at NP to schedule follow-up visit with patient in 4 weeks or PRN     Patient Instructions Given:  - Continue all medications, treatments and therapies as ordered.   - Follow all instructions, recommendations as discussed.  - Maintain Safety Precautions at all times.  - Attend all medical appointments as scheduled.  - For worsening symptoms: call Primary Care Physician or Nurse Practitioner.  - For emergencies, call 911 or immediately report to the nearest emergency room     Were controlled substances prescribed?  No    Follow Up Appointments:   Future Appointments   Date Time Provider Department Center   3/1/2023  3:45 PM Robel Stevens MD ON ARR [facility]   3/13/2023  8:00 AM Sanjiv Bautista MD ON NEURO [facility]   4/6/2023  9:50 AM Kerwin Dickey OD [facility]PH[facility] [place]   5/8/2023  1:00 PM Holger Thornton MD [facility]   5/30/2023  2:20 PM Robe Gilbert MD [facility]       Signature: Diane Mike NP

## 2023-02-24 ENCOUNTER — DOCUMENT SCAN (OUTPATIENT)
Dept: HOME HEALTH SERVICES | Facility: HOSPITAL | Age: 83
End: 2023-02-24
Payer: MEDICARE

## 2023-02-27 DIAGNOSIS — I48.0 PAF (PAROXYSMAL ATRIAL FIBRILLATION): Primary | ICD-10-CM

## 2023-03-01 ENCOUNTER — DOCUMENT SCAN (OUTPATIENT)
Dept: HOME HEALTH SERVICES | Facility: HOSPITAL | Age: 83
End: 2023-03-01
Payer: MEDICARE

## 2023-03-01 ENCOUNTER — OFFICE VISIT (OUTPATIENT)
Dept: CARDIOLOGY | Facility: CLINIC | Age: 83
End: 2023-03-01
Payer: MEDICARE

## 2023-03-01 VITALS
WEIGHT: 134.94 LBS | DIASTOLIC BLOOD PRESSURE: 60 MMHG | HEIGHT: 70 IN | SYSTOLIC BLOOD PRESSURE: 120 MMHG | BODY MASS INDEX: 19.32 KG/M2 | OXYGEN SATURATION: 98 % | HEART RATE: 62 BPM

## 2023-03-01 DIAGNOSIS — I25.5 ISCHEMIC CARDIOMYOPATHY: ICD-10-CM

## 2023-03-01 DIAGNOSIS — I11.0 HYPERTENSIVE HEART DISEASE WITH HEART FAILURE: ICD-10-CM

## 2023-03-01 DIAGNOSIS — E11.9 TYPE 2 DIABETES MELLITUS WITHOUT COMPLICATION, WITHOUT LONG-TERM CURRENT USE OF INSULIN: Chronic | ICD-10-CM

## 2023-03-01 DIAGNOSIS — I10 ESSENTIAL HYPERTENSION: Chronic | ICD-10-CM

## 2023-03-01 DIAGNOSIS — I48.0 PAF (PAROXYSMAL ATRIAL FIBRILLATION): ICD-10-CM

## 2023-03-01 DIAGNOSIS — Z98.61 S/P PTCA (PERCUTANEOUS TRANSLUMINAL CORONARY ANGIOPLASTY): Primary | ICD-10-CM

## 2023-03-01 PROCEDURE — 99205 PR OFFICE/OUTPT VISIT, NEW, LEVL V, 60-74 MIN: ICD-10-PCS | Mod: HCNC,S$GLB,, | Performed by: INTERNAL MEDICINE

## 2023-03-01 PROCEDURE — 1159F PR MEDICATION LIST DOCUMENTED IN MEDICAL RECORD: ICD-10-PCS | Mod: HCNC,CPTII,S$GLB, | Performed by: INTERNAL MEDICINE

## 2023-03-01 PROCEDURE — 3074F PR MOST RECENT SYSTOLIC BLOOD PRESSURE < 130 MM HG: ICD-10-PCS | Mod: HCNC,CPTII,S$GLB, | Performed by: INTERNAL MEDICINE

## 2023-03-01 PROCEDURE — 3074F SYST BP LT 130 MM HG: CPT | Mod: HCNC,CPTII,S$GLB, | Performed by: INTERNAL MEDICINE

## 2023-03-01 PROCEDURE — 99999 PR PBB SHADOW E&M-EST. PATIENT-LVL IV: CPT | Mod: PBBFAC,HCNC,, | Performed by: INTERNAL MEDICINE

## 2023-03-01 PROCEDURE — 1101F PT FALLS ASSESS-DOCD LE1/YR: CPT | Mod: HCNC,CPTII,S$GLB, | Performed by: INTERNAL MEDICINE

## 2023-03-01 PROCEDURE — 3078F DIAST BP <80 MM HG: CPT | Mod: HCNC,CPTII,S$GLB, | Performed by: INTERNAL MEDICINE

## 2023-03-01 PROCEDURE — 3078F PR MOST RECENT DIASTOLIC BLOOD PRESSURE < 80 MM HG: ICD-10-PCS | Mod: HCNC,CPTII,S$GLB, | Performed by: INTERNAL MEDICINE

## 2023-03-01 PROCEDURE — 1126F AMNT PAIN NOTED NONE PRSNT: CPT | Mod: HCNC,CPTII,S$GLB, | Performed by: INTERNAL MEDICINE

## 2023-03-01 PROCEDURE — 1101F PR PT FALLS ASSESS DOC 0-1 FALLS W/OUT INJ PAST YR: ICD-10-PCS | Mod: HCNC,CPTII,S$GLB, | Performed by: INTERNAL MEDICINE

## 2023-03-01 PROCEDURE — 99205 OFFICE O/P NEW HI 60 MIN: CPT | Mod: HCNC,S$GLB,, | Performed by: INTERNAL MEDICINE

## 2023-03-01 PROCEDURE — 3288F FALL RISK ASSESSMENT DOCD: CPT | Mod: HCNC,CPTII,S$GLB, | Performed by: INTERNAL MEDICINE

## 2023-03-01 PROCEDURE — 99999 PR PBB SHADOW E&M-EST. PATIENT-LVL IV: ICD-10-PCS | Mod: PBBFAC,HCNC,, | Performed by: INTERNAL MEDICINE

## 2023-03-01 PROCEDURE — 1126F PR PAIN SEVERITY QUANTIFIED, NO PAIN PRESENT: ICD-10-PCS | Mod: HCNC,CPTII,S$GLB, | Performed by: INTERNAL MEDICINE

## 2023-03-01 PROCEDURE — 1159F MED LIST DOCD IN RCRD: CPT | Mod: HCNC,CPTII,S$GLB, | Performed by: INTERNAL MEDICINE

## 2023-03-01 PROCEDURE — 3288F PR FALLS RISK ASSESSMENT DOCUMENTED: ICD-10-PCS | Mod: HCNC,CPTII,S$GLB, | Performed by: INTERNAL MEDICINE

## 2023-03-01 NOTE — PROGRESS NOTES
Subjective:    Patient ID:  Aquiles Kelsey is a 82 y.o. male who presents for evaluation of Atrial Fibrillation      82 yoM referred for AF management. AF noted 12/2/22, first known arrhythmia. He was started on apixaban 5 mg bid. EVent monitor with no AF but he had some NSAT and WCT that I believe is more consistent with NSAT with aberrancy. It is difficult to ascertain symptoms given his status after his stroke. Normal EF 6/22. Most of the history was obtained by his wife.     Echo 6/22:  · The left ventricle is normal in size with severe concentric hypertrophy and normal systolic function.  · Mild left atrial enlargement.  · The estimated ejection fraction is 55%.  · Grade II left ventricular diastolic dysfunction.  · Normal right ventricular size with normal right ventricular systolic function.      Past Medical History:  10/14/2019: Acute blood loss anemia  No date: Aneurysm, abdominal aortic  No date: Coronary artery disease  No date: Depression  No date: Diabetes mellitus  No date: HLD (hyperlipidemia)  No date: Hypertension  No date: Kidney stone  No date: PAD (peripheral artery disease)  1/24/2023: PAF (paroxysmal atrial fibrillation)  No date: Tobacco abuse    Past Surgical History:  No date: APPENDECTOMY  No date: CORONARY STENT PLACEMENT      Comment:  x 4  10/14/2019: ESOPHAGOGASTRODUODENOSCOPY; N/A      Comment:  Procedure: EGD (ESOPHAGOGASTRODUODENOSCOPY);  Surgeon:                Carlos Mcneal III, MD;  Location: [facility] ENDO;  Service:               Endoscopy;  Laterality: N/A;  10/15/2019: ESOPHAGOGASTRODUODENOSCOPY; N/A      Comment:  Procedure: EGD (ESOPHAGOGASTRODUODENOSCOPY);  Surgeon:                Carlos Mcneal III, MD;  Location: [facility] ENDO;  Service:               Endoscopy;  Laterality: N/A;  10/11/2019: LEFT HEART CATHETERIZATION; Left      Comment:  Procedure: CATHETERIZATION, HEART, LEFT;  Surgeon: Robe Gilbert MD;  Location: [facility] CATH LAB;  Service:                 Cardiology;  Laterality: Left;  10/13/2019: LEFT HEART CATHETERIZATION; Left      Comment:  Procedure: CATHETERIZATION, HEART, LEFT;  Surgeon: Robe Gilbert MD;  Location: [facility] CATH LAB;  Service:                Cardiology;  Laterality: Left;  No date: leg stent; Left    Social History    Socioeconomic History      Marital status:     Tobacco Use      Smoking status: Every Day        Types: Cigars        Passive exposure: Never      Smokeless tobacco: Current    Substance and Sexual Activity      Alcohol use: Not Currently      Drug use: Not Currently      Sexual activity: Not Currently    Social Determinants of Health  Financial Resource Strain: Low Risk       Difficulty of Paying Living Expenses: Not hard at all  Food Insecurity: Unknown      Worried About Running Out of Food in the Last Year: Never true  Transportation Needs: No Transportation Needs      Lack of Transportation (Medical): No      Lack of Transportation (Non-Medical): No  Physical Activity: Inactive      Days of Exercise per Week: 0 days      Minutes of Exercise per Session: 0 min  Stress: No Stress Concern Present      Feeling of Stress : Only a little  Social Connections: Socially Isolated      Frequency of Communication with Friends and Family: Once a week      Frequency of Social Gatherings with Friends and Family: Once a week      Attends Hindu Services: Never      Active Member of Clubs or Organizations: No      Attends Club or Organization Meetings: Never      Marital Status:   Housing Stability: Unknown      Unable to Pay for Housing in the Last Year: No      Unstable Housing in the Last Year: No    Review of patient's family history indicates:    Current Outpatient Medications:  alcohol swabs (ALCOHOL PREP PADS) PadM, Twice a day, Disp: 400 each, Rfl: 3  amLODIPine (NORVASC) 5 MG tablet, TAKE 1 TABLET BY MOUTH EVERY DAY, Disp: 90 tablet, Rfl: 3  apixaban (ELIQUIS) 5 mg Tab, Take 1 tablet (5 mg  total) by mouth 2 (two) times daily., Disp: 60 tablet, Rfl: 12  aspirin (ECOTRIN) 81 MG EC tablet, Take 1 tablet (81 mg total) by mouth once daily., Disp: , Rfl: 0  atorvastatin (LIPITOR) 40 MG tablet, Take 1 tablet (40 mg total) by mouth once daily., Disp: 90 tablet, Rfl: 3  blood sugar diagnostic Strp, Test blood sugars twice a day, Disp: 200 strip, Rfl: 6  gabapentin (NEURONTIN) 100 MG capsule, Take 2 capsules (200 mg total) by mouth 2 (two) times daily as needed., Disp: 120 capsule, Rfl: 11  isosorbide mononitrate (IMDUR) 120 MG 24 hr tablet, TAKE 1 TABLET BY MOUTH ONCE DAILY, Disp: 90 tablet, Rfl: 3  lancets (ACCU-CHEK SOFTCLIX LANCETS) Misc, Test blood sugars twice a day, Disp: 200 each, Rfl: 6  metFORMIN (GLUCOPHAGE) 500 MG tablet, Take 1 tablet (500 mg total) by mouth daily with breakfast., Disp: 90 tablet, Rfl: 3  MYRBETRIQ 50 mg Tb24, TAKE 1 TABLET BY MOUTH EVERY DAY, Disp: 90 tablet, Rfl: 3  ondansetron (ZOFRAN-ODT) 4 MG TbDL, Take 1 tablet (4 mg total) by mouth every 6 (six) hours as needed., Disp: 15 tablet, Rfl: 0    No current facility-administered medications for this visit.            Review of Systems   Constitutional: Positive for weight loss.   HENT: Negative.     Eyes:  Positive for vision loss in right eye.   Cardiovascular: Negative.    Respiratory:  Positive for shortness of breath.    Endocrine: Negative.    Hematologic/Lymphatic: Negative.    Skin: Negative.    Musculoskeletal: Negative.    Gastrointestinal: Negative.    Genitourinary: Negative.    Neurological:  Positive for loss of balance and weakness.   Psychiatric/Behavioral:  Positive for memory loss.    Allergic/Immunologic: Negative.       Objective:    Physical Exam  Vitals and nursing note reviewed.   Constitutional:       Appearance: He is well-developed.   HENT:      Head: Normocephalic.   Neck:      Thyroid: No thyromegaly.      Vascular: No carotid bruit or JVD.   Cardiovascular:      Rate and Rhythm: Normal rate and regular  rhythm.      Pulses:           Radial pulses are 2+ on the right side and 2+ on the left side.      Heart sounds: S1 normal and S2 normal. Heart sounds not distant. No midsystolic click and no opening snap. No murmur heard.    No friction rub. No S3 or S4 sounds.   Pulmonary:      Effort: Pulmonary effort is normal.      Breath sounds: Normal breath sounds. No wheezing or rales.   Abdominal:      General: Bowel sounds are normal. There is no distension or abdominal bruit.      Palpations: Abdomen is soft. There is no mass.      Tenderness: There is no abdominal tenderness.   Musculoskeletal:      Cervical back: Neck supple.   Skin:     General: Skin is warm.   Neurological:      Mental Status: He is alert.   Psychiatric:         Behavior: Behavior normal.         Assessment:       1. S/P PTCA (percutaneous transluminal coronary angioplasty)    2. PAF (paroxysmal atrial fibrillation)    3. Type 2 diabetes mellitus without complication, without long-term current use of insulin    4. Ischemic cardiomyopathy    5. Hypertensive heart disease with heart failure    6. Essential hypertension         Plan:       82 yoM here for arrhythmia management. He has a CVA 11/22 and subsequently had AF detected 12/22. Agree with apixaban. Given no AF on his event monitor and asymptomatic ectopy on event monitor, I would not add rate control or rhythm control agents at this time. RTC 6m

## 2023-03-03 ENCOUNTER — DOCUMENT SCAN (OUTPATIENT)
Dept: HOME HEALTH SERVICES | Facility: HOSPITAL | Age: 83
End: 2023-03-03
Payer: MEDICARE

## 2023-03-13 ENCOUNTER — OFFICE VISIT (OUTPATIENT)
Dept: NEUROLOGY | Facility: CLINIC | Age: 83
End: 2023-03-13
Payer: MEDICARE

## 2023-03-13 ENCOUNTER — PATIENT OUTREACH (OUTPATIENT)
Dept: ADMINISTRATIVE | Facility: HOSPITAL | Age: 83
End: 2023-03-13
Payer: MEDICARE

## 2023-03-13 VITALS
HEIGHT: 70 IN | SYSTOLIC BLOOD PRESSURE: 135 MMHG | OXYGEN SATURATION: 98 % | DIASTOLIC BLOOD PRESSURE: 69 MMHG | HEART RATE: 47 BPM | WEIGHT: 135.81 LBS | BODY MASS INDEX: 19.44 KG/M2 | RESPIRATION RATE: 16 BRPM

## 2023-03-13 DIAGNOSIS — I10 ESSENTIAL HYPERTENSION: Primary | Chronic | ICD-10-CM

## 2023-03-13 DIAGNOSIS — I48.0 PAF (PAROXYSMAL ATRIAL FIBRILLATION): ICD-10-CM

## 2023-03-13 DIAGNOSIS — E11.9 TYPE 2 DIABETES MELLITUS WITHOUT COMPLICATION, WITHOUT LONG-TERM CURRENT USE OF INSULIN: Chronic | ICD-10-CM

## 2023-03-13 DIAGNOSIS — I63.012 CEREBROVASCULAR ACCIDENT (CVA) DUE TO THROMBOSIS OF LEFT VERTEBRAL ARTERY: ICD-10-CM

## 2023-03-13 PROBLEM — I63.9 CVA (CEREBRAL VASCULAR ACCIDENT): Status: RESOLVED | Noted: 2022-12-09 | Resolved: 2023-03-13

## 2023-03-13 PROCEDURE — 3078F PR MOST RECENT DIASTOLIC BLOOD PRESSURE < 80 MM HG: ICD-10-PCS | Mod: HCNC,CPTII,S$GLB, | Performed by: PSYCHIATRY & NEUROLOGY

## 2023-03-13 PROCEDURE — 1101F PT FALLS ASSESS-DOCD LE1/YR: CPT | Mod: HCNC,CPTII,S$GLB, | Performed by: PSYCHIATRY & NEUROLOGY

## 2023-03-13 PROCEDURE — 99214 PR OFFICE/OUTPT VISIT, EST, LEVL IV, 30-39 MIN: ICD-10-PCS | Mod: HCNC,S$GLB,, | Performed by: PSYCHIATRY & NEUROLOGY

## 2023-03-13 PROCEDURE — 3078F DIAST BP <80 MM HG: CPT | Mod: HCNC,CPTII,S$GLB, | Performed by: PSYCHIATRY & NEUROLOGY

## 2023-03-13 PROCEDURE — 3288F FALL RISK ASSESSMENT DOCD: CPT | Mod: HCNC,CPTII,S$GLB, | Performed by: PSYCHIATRY & NEUROLOGY

## 2023-03-13 PROCEDURE — 1126F PR PAIN SEVERITY QUANTIFIED, NO PAIN PRESENT: ICD-10-PCS | Mod: HCNC,CPTII,S$GLB, | Performed by: PSYCHIATRY & NEUROLOGY

## 2023-03-13 PROCEDURE — 1160F PR REVIEW ALL MEDS BY PRESCRIBER/CLIN PHARMACIST DOCUMENTED: ICD-10-PCS | Mod: HCNC,CPTII,S$GLB, | Performed by: PSYCHIATRY & NEUROLOGY

## 2023-03-13 PROCEDURE — 1160F RVW MEDS BY RX/DR IN RCRD: CPT | Mod: HCNC,CPTII,S$GLB, | Performed by: PSYCHIATRY & NEUROLOGY

## 2023-03-13 PROCEDURE — 1101F PR PT FALLS ASSESS DOC 0-1 FALLS W/OUT INJ PAST YR: ICD-10-PCS | Mod: HCNC,CPTII,S$GLB, | Performed by: PSYCHIATRY & NEUROLOGY

## 2023-03-13 PROCEDURE — 3075F PR MOST RECENT SYSTOLIC BLOOD PRESS GE 130-139MM HG: ICD-10-PCS | Mod: HCNC,CPTII,S$GLB, | Performed by: PSYCHIATRY & NEUROLOGY

## 2023-03-13 PROCEDURE — 3288F PR FALLS RISK ASSESSMENT DOCUMENTED: ICD-10-PCS | Mod: HCNC,CPTII,S$GLB, | Performed by: PSYCHIATRY & NEUROLOGY

## 2023-03-13 PROCEDURE — 3075F SYST BP GE 130 - 139MM HG: CPT | Mod: HCNC,CPTII,S$GLB, | Performed by: PSYCHIATRY & NEUROLOGY

## 2023-03-13 PROCEDURE — 99214 OFFICE O/P EST MOD 30 MIN: CPT | Mod: HCNC,S$GLB,, | Performed by: PSYCHIATRY & NEUROLOGY

## 2023-03-13 PROCEDURE — 1159F PR MEDICATION LIST DOCUMENTED IN MEDICAL RECORD: ICD-10-PCS | Mod: HCNC,CPTII,S$GLB, | Performed by: PSYCHIATRY & NEUROLOGY

## 2023-03-13 PROCEDURE — 99999 PR PBB SHADOW E&M-EST. PATIENT-LVL IV: CPT | Mod: PBBFAC,HCNC,, | Performed by: PSYCHIATRY & NEUROLOGY

## 2023-03-13 PROCEDURE — 99999 PR PBB SHADOW E&M-EST. PATIENT-LVL IV: ICD-10-PCS | Mod: PBBFAC,HCNC,, | Performed by: PSYCHIATRY & NEUROLOGY

## 2023-03-13 PROCEDURE — 1126F AMNT PAIN NOTED NONE PRSNT: CPT | Mod: HCNC,CPTII,S$GLB, | Performed by: PSYCHIATRY & NEUROLOGY

## 2023-03-13 PROCEDURE — 1159F MED LIST DOCD IN RCRD: CPT | Mod: HCNC,CPTII,S$GLB, | Performed by: PSYCHIATRY & NEUROLOGY

## 2023-03-13 NOTE — PATIENT INSTRUCTIONS
-- Activity as tolerated   -- ASA 81 mg daily PO   -- Eliquis 5 mg BID   -- Atorvastatin 40 mg daily   -- Follow-up with cardiology and ophthalmology   -- Ophthalmology recommendations appreciated. Opthalmology recommending spec rx without prism and consider adding prism if symptoms worsen.    -- No driving due to double vision until cleared from occupational therapy driving testing.  -- Memory evaluation after 3 month   -- We had an in depth discussion with patient and family regarding the imaging findings, stroke etiology and our plan    -- We discussed his vascular risk factors and the need for her to continue to modify his risks e.g. taking his mediations, proper diet, and exercise.  -- Work with primary care provider on stroke risk factor management, BP <140/90, cholesterol monitoring  -- We discussed the need for his to continue to exercise his body and his mind with physical activity and mental activities.  -- We discussed BEFAST and the need for her to recognize stroke symptoms and report to the ER early is needed.     B-Balance  E-Eyes, vision  F-Facial Droop   A-Arm or leg weakness on one side  S-Speech trouble  T-Time - CALL 911!     -- Follow-up in stroke clinic PRN.

## 2023-03-13 NOTE — PROGRESS NOTES
Chief Complaint: Post hospitalization follow-up after CVA   Service used:No    HPI: (3/13/2023)  Diplopia no significant improvement. No new symptoms reported. Walk with walker and normal communication per family. Complaining of memory problem which little worsen after recent stroke. Opthalmology recommending spec rx without prism and consider adding prism if symptoms worsen. AF noted 12/2/22, first known arrhythmia. He was started on apixaban 5 mg bid.       Note from 11/4/2022 admitted with CVA.   Aquiles Kelsey is a 82 y.o. male with a PMHx of deafness, AAA s/p repair, CAD, DM II, HLD, HTN, PAD,TIA and tobacco use who presented to the ED with c/o  of vision disturbances and facial droop which onset gradually around 3 am. Pt states he went to bed last night feeling normal, and then woke up around 3 am and wife noticed right-sided facial droop and uncoordinated walking. Pt admits that when he opens both eyes, he starts seeing double, but when he closes one eye  he can see normally again. Pt also admits to feeling dizzy. He was admitted on 6/22 with similar complain and df/C with a Dx of TIA after a negative acute CVA work up.      Past Medical History:   Diagnosis Date    Acute blood loss anemia 10/14/2019    Aneurysm, abdominal aortic     Coronary artery disease     Depression     Diabetes mellitus     HLD (hyperlipidemia)     Hypertension     Kidney stone     PAD (peripheral artery disease)     PAF (paroxysmal atrial fibrillation) 1/24/2023    Tobacco abuse      Past Surgical History:   Procedure Laterality Date    APPENDECTOMY      CORONARY STENT PLACEMENT      x 4    ESOPHAGOGASTRODUODENOSCOPY N/A 10/14/2019    Procedure: EGD (ESOPHAGOGASTRODUODENOSCOPY);  Surgeon: Carlos Mcneal III, MD;  Location: [facility];  Service: Endoscopy;  Laterality: N/A;    ESOPHAGOGASTRODUODENOSCOPY N/A 10/15/2019    Procedure: EGD (ESOPHAGOGASTRODUODENOSCOPY);  Surgeon: Carlos Mcneal III, MD;  Location:  [facility] ENDO;  Service: Endoscopy;  Laterality: N/A;    LEFT HEART CATHETERIZATION Left 10/11/2019    Procedure: CATHETERIZATION, HEART, LEFT;  Surgeon: Robe Gilbert MD;  Location: [facility] CATH LAB;  Service: Cardiology;  Laterality: Left;    LEFT HEART CATHETERIZATION Left 10/13/2019    Procedure: CATHETERIZATION, HEART, LEFT;  Surgeon: Robe Gilbert MD;  Location: [facility] CATH LAB;  Service: Cardiology;  Laterality: Left;    leg stent Left      Review of patient's allergies indicates:   Allergen Reactions    Metoprolol Other (See Comments)     Junctional rhythm         Current Outpatient Medications:     alcohol swabs (ALCOHOL PREP PADS) PadM, Twice a day, Disp: 400 each, Rfl: 3    amLODIPine (NORVASC) 5 MG tablet, TAKE 1 TABLET BY MOUTH EVERY DAY, Disp: 90 tablet, Rfl: 3    apixaban (ELIQUIS) 5 mg Tab, Take 1 tablet (5 mg total) by mouth 2 (two) times daily., Disp: 60 tablet, Rfl: 12    atorvastatin (LIPITOR) 40 MG tablet, Take 1 tablet (40 mg total) by mouth once daily., Disp: 90 tablet, Rfl: 3    blood sugar diagnostic Strp, Test blood sugars twice a day, Disp: 200 strip, Rfl: 6    gabapentin (NEURONTIN) 100 MG capsule, Take 2 capsules (200 mg total) by mouth 2 (two) times daily as needed., Disp: 120 capsule, Rfl: 11    isosorbide mononitrate (IMDUR) 120 MG 24 hr tablet, TAKE 1 TABLET BY MOUTH ONCE DAILY, Disp: 90 tablet, Rfl: 3    lancets (ACCU-CHEK SOFTCLIX LANCETS) Misc, Test blood sugars twice a day, Disp: 200 each, Rfl: 6    metFORMIN (GLUCOPHAGE) 500 MG tablet, Take 1 tablet (500 mg total) by mouth daily with breakfast., Disp: 90 tablet, Rfl: 3    MYRBETRIQ 50 mg Tb24, TAKE 1 TABLET BY MOUTH EVERY DAY, Disp: 90 tablet, Rfl: 3    ondansetron (ZOFRAN-ODT) 4 MG TbDL, Take 1 tablet (4 mg total) by mouth every 6 (six) hours as needed., Disp: 15 tablet, Rfl: 0    aspirin (ECOTRIN) 81 MG EC tablet, Take 1 tablet (81 mg total) by mouth once daily., Disp: , Rfl: 0       Social History     Socioeconomic History     Marital status:    Tobacco Use    Smoking status: Every Day     Types: Cigars     Passive exposure: Never    Smokeless tobacco: Current   Substance and Sexual Activity    Alcohol use: Not Currently    Drug use: Not Currently    Sexual activity: Not Currently     Social Determinants of Health     Financial Resource Strain: Low Risk     Difficulty of Paying Living Expenses: Not hard at all   Food Insecurity: Unknown    Worried About Running Out of Food in the Last Year: Never true   Transportation Needs: No Transportation Needs    Lack of Transportation (Medical): No    Lack of Transportation (Non-Medical): No   Physical Activity: Inactive    Days of Exercise per Week: 0 days    Minutes of Exercise per Session: 0 min   Stress: No Stress Concern Present    Feeling of Stress : Only a little   Social Connections: Socially Isolated    Frequency of Communication with Friends and Family: Once a week    Frequency of Social Gatherings with Friends and Family: Once a week    Attends Adventist Services: Never    Active Member of Clubs or Organizations: No    Attends Club or Organization Meetings: Never    Marital Status:    Housing Stability: Unknown    Unable to Pay for Housing in the Last Year: No    Unstable Housing in the Last Year: No     Family History   Problem Relation Age of Onset    Diabetes Mother     COPD Father     Diabetes Brother      Review of Systems  Constitutional: no fevers, no weight changes,  No diaphoresis  HEENT: No congestion, no doublevision, no dysphagia, no rhinnorhea, no lacrimation  Cardiovascular: no chest pain or palpitations  Respiratory: no shortness of breath, no cough  Gastrointestinal: no diarrhea, no constipation  Genitourinary: no dysuria  Musculoskeletal: no joint swelling. No myalgia  Skin: no rash  Psychiatric: no hallucinations, no depression or anxiety  Neurologic: as per HPI  All other review of systems is negative and as per HPI.     Vitals:    03/13/23 0807   BP:  135/69   Pulse: (!) 47   Resp: 16      Exam  General: Pleasant, conversant, Well-kempt  HEENT: Head atraumatic. No nasal abnormality. No gaze preference. EOMI.  Cardiovascular: No palpitations   Lungs: Not in distress   Mental Status: Awake alert and oriented person and place but not to time. Follows commands. No aphasia or dysarthria. Naming and repetition intact. Mood is appropriate.  Cranial Nerve: PERRL. VFF. Double VA is with both eyes open. EOMI. Facial sensation intact. No facial asymmetry. Hearing intact. Palate elevates. Uvula midline. SCM strong. No tongue deviation.   Motor: Normal tone. No cogwheel rigidity. No pronator drift. 5/5 strength bilaterally in the upper extremities including deltoids, biceps, triceps, wrist extensors/flexors, finger flexors/extensors, interossei, and APB. 5/5 strength bilaterally in the lower extremities including iliopsoas, hamstring, quadriceps, tibialis anterior, gastrocnemius, EHL.  Deep Tendon Reflexes: 2+ in biceps, tricepts, brachioradialis b/l. 2+ in patellar and ankle jerks.   Sensory: Intact to soft touch, cold, pinprick, decrease vibration. No neglect.  Cerebellar: Finger to nose intact. Heel to shin intact.   Gait:Walk with walker    Other tests:     HbA1c: 6.5  LDL: 96     ECHO:  The left ventricle is normal in size with severe concentric hypertrophy and normal systolic function.  Mild left atrial enlargement.  The estimated ejection fraction is 55%.  Grade II left ventricular diastolic dysfunction.  Normal right ventricular size with normal right ventricular systolic function.      CTA head and neck.  Impression:     1. Tiny saccular aneurysm arising inferiorly from the supraclinoid right ICA measuring 4 x 3 x 2 mm.  2. No evidence for intracranial thromboembolism.  No evidence for intracranial vasculitis.  3. Calcific atherosclerotic plaque in the cavernous right internal carotid artery causing moderate grade stenosis of the cavernous right ICA.  4. No  hemodynamically significant stenosis of either the left or right internal carotid artery.  5. Marginal soft plaque throughout the transverse aortic arch with extension of some soft plaque in the innominate artery causing no hemodynamically significant stenosis.     MRI brain:  Impression:  Tiny the left thalamic and midbrain acute infarct.     Assessment:     Aquiles Kelsey is a 82 y.o. male with a PMHx of AAA s/p repair, CAD, DM II, HLD, HTN, PAD,TIA and tobacco use who presented to the ED  on 11/4/2022 with c/o double vision and facial droop. Today in my clinic for follow-up  MRI brain showed left thalamic and left midbrain infarct.  He was out of her tPA window and was not a candidate for thrombectomy. Today reported no significant improvement in diplopia. No new symptoms reported. Walk with walker and normal communication per family. Opthalmology recommending spec rx without prism and consider adding prism if symptoms worsen. AF noted 12/2/22, first known arrhythmia. He was started on apixaban 5 mg bid. Overall stable and no new neurological deficits reported.          Stroke etiology: Small vessel disease in the setting of multiple uncontrolled vascular risk factor     Problem List:  -Left thalamic and left midbrain infarct  -Atrial fibrillation   -hypertension  -diabetes mellitus  -hyperlipidemia  -history of stroke     Plan:     -- Activity as tolerated   -- Normoglycemia  -- Normothermia  -- BP goal normotensive  -- ASA 81 mg daily PO. He does not need ASA from stroke stand point while on Eliquis. I will leave the decision regarding ASA on cardiology.     -- Eliquis 5 mg BID   -- Atorvastatin 40 mg daily   -- Ophthalmology recommendations appreciated. Opthalmology recommending spec rx without prism and consider adding prism if symptoms worsen.    -- No driving due to double vision until cleared from occupational therapy driving testing.  -- We had an in depth discussion with patient and family  regarding the imaging findings, stroke etiology and our plan    -- We discussed his vascular risk factors and the need for her to continue to modify his risks e.g. taking his mediations, proper diet, and exercise.  -- Work with primary care provider on stroke risk factor management, BP <140/90, cholesterol monitoring  -- We discussed the need for his to continue to exercise his body and his mind with physical activity and mental activities.  -- We discussed BEFAST and the need for her to recognize stroke symptoms and report to the ER early is needed.     B-Balance  E-Eyes, vision  F-Facial Droop  A-Arm or leg weakness on one side  S-Speech trouble  T-Time - CALL 911!     -- Follow-up in stroke clinic PRN.      Sanjiv Bautista MD  Neurology   [facility]

## 2023-03-14 ENCOUNTER — DOCUMENT SCAN (OUTPATIENT)
Dept: HOME HEALTH SERVICES | Facility: HOSPITAL | Age: 83
End: 2023-03-14
Payer: MEDICARE

## 2023-04-06 ENCOUNTER — OFFICE VISIT (OUTPATIENT)
Dept: OPHTHALMOLOGY | Facility: CLINIC | Age: 83
End: 2023-04-06
Payer: MEDICARE

## 2023-04-06 DIAGNOSIS — H53.2 DIPLOPIA: Primary | ICD-10-CM

## 2023-04-06 DIAGNOSIS — Z86.73 HISTORY OF STROKE: ICD-10-CM

## 2023-04-06 PROCEDURE — 1159F MED LIST DOCD IN RCRD: CPT | Mod: HCNC,CPTII,S$GLB, | Performed by: OPTOMETRIST

## 2023-04-06 PROCEDURE — 99999 PR PBB SHADOW E&M-EST. PATIENT-LVL II: CPT | Mod: PBBFAC,HCNC,, | Performed by: OPTOMETRIST

## 2023-04-06 PROCEDURE — 92012 PR EYE EXAM, EST PATIENT,INTERMED: ICD-10-PCS | Mod: HCNC,S$GLB,, | Performed by: OPTOMETRIST

## 2023-04-06 PROCEDURE — 92012 INTRM OPH EXAM EST PATIENT: CPT | Mod: HCNC,S$GLB,, | Performed by: OPTOMETRIST

## 2023-04-06 PROCEDURE — 1160F RVW MEDS BY RX/DR IN RCRD: CPT | Mod: HCNC,CPTII,S$GLB, | Performed by: OPTOMETRIST

## 2023-04-06 PROCEDURE — 99999 PR PBB SHADOW E&M-EST. PATIENT-LVL II: ICD-10-PCS | Mod: PBBFAC,HCNC,, | Performed by: OPTOMETRIST

## 2023-04-06 PROCEDURE — 1159F PR MEDICATION LIST DOCUMENTED IN MEDICAL RECORD: ICD-10-PCS | Mod: HCNC,CPTII,S$GLB, | Performed by: OPTOMETRIST

## 2023-04-06 PROCEDURE — 1160F PR REVIEW ALL MEDS BY PRESCRIBER/CLIN PHARMACIST DOCUMENTED: ICD-10-PCS | Mod: HCNC,CPTII,S$GLB, | Performed by: OPTOMETRIST

## 2023-04-06 NOTE — PROGRESS NOTES
HPI     Follow-up            Comments: Pt reports for EOM check, pt refused to get glasses but is   willing now. Pt is still having double VA         Last edited by Comfort Deal MA on 4/6/2023 10:10 AM.            Assessment /Plan     For exam results, see Encounter Report.    Diplopia    History of stroke    EOMs remain unchanged today with persistent vertical gaze palsy upgaze>downgaze  No diplopia in primary gaze  Improved va with refraction, no diplopia at time of testing  Recommended filling spec rx  Consider adding prism if diplopia returns  Otherwise:  RTC 10 months for dilated exam or PRN  Discussed above and all questions were answered.

## 2023-04-13 ENCOUNTER — HOSPITAL ENCOUNTER (OUTPATIENT)
Facility: HOSPITAL | Age: 83
Discharge: HOME HEALTH | End: 2023-04-18
Attending: EMERGENCY MEDICINE | Admitting: FAMILY MEDICINE
Payer: MEDICARE

## 2023-04-13 DIAGNOSIS — I10 ESSENTIAL HYPERTENSION: Chronic | ICD-10-CM

## 2023-04-13 DIAGNOSIS — R53.83 FATIGUE, UNSPECIFIED TYPE: ICD-10-CM

## 2023-04-13 DIAGNOSIS — D64.9 ANEMIA, UNSPECIFIED TYPE: Primary | ICD-10-CM

## 2023-04-13 DIAGNOSIS — R53.1 WEAKNESS: ICD-10-CM

## 2023-04-13 DIAGNOSIS — K92.1 MELENA: ICD-10-CM

## 2023-04-13 DIAGNOSIS — I48.0 PAF (PAROXYSMAL ATRIAL FIBRILLATION): ICD-10-CM

## 2023-04-13 LAB
ALBUMIN SERPL BCP-MCNC: 2.9 G/DL (ref 3.5–5.2)
ALP SERPL-CCNC: 137 U/L (ref 55–135)
ALT SERPL W/O P-5'-P-CCNC: 10 U/L (ref 10–44)
ANION GAP SERPL CALC-SCNC: 9 MMOL/L (ref 8–16)
AST SERPL-CCNC: 10 U/L (ref 10–40)
BACTERIA #/AREA URNS HPF: NORMAL /HPF
BASOPHILS # BLD AUTO: 0.03 K/UL (ref 0–0.2)
BASOPHILS NFR BLD: 0.4 % (ref 0–1.9)
BILIRUB SERPL-MCNC: 0.6 MG/DL (ref 0.1–1)
BILIRUB UR QL STRIP: NEGATIVE
BNP SERPL-MCNC: 363 PG/ML (ref 0–99)
BUN SERPL-MCNC: 26 MG/DL (ref 8–23)
CALCIUM SERPL-MCNC: 9.5 MG/DL (ref 8.7–10.5)
CHLORIDE SERPL-SCNC: 105 MMOL/L (ref 95–110)
CK SERPL-CCNC: 40 U/L (ref 20–200)
CLARITY UR: CLEAR
CO2 SERPL-SCNC: 24 MMOL/L (ref 23–29)
COLOR UR: YELLOW
CREAT SERPL-MCNC: 1 MG/DL (ref 0.5–1.4)
DIFFERENTIAL METHOD: ABNORMAL
EOSINOPHIL # BLD AUTO: 0 K/UL (ref 0–0.5)
EOSINOPHIL NFR BLD: 0.3 % (ref 0–8)
ERYTHROCYTE [DISTWIDTH] IN BLOOD BY AUTOMATED COUNT: 14.6 % (ref 11.5–14.5)
EST. GFR  (NO RACE VARIABLE): >60 ML/MIN/1.73 M^2
FERRITIN SERPL-MCNC: 68 NG/ML (ref 20–300)
GLUCOSE SERPL-MCNC: 195 MG/DL (ref 70–110)
GLUCOSE UR QL STRIP: ABNORMAL
HCT VFR BLD AUTO: 32.1 % (ref 40–54)
HGB BLD-MCNC: 10.2 G/DL (ref 14–18)
HGB UR QL STRIP: ABNORMAL
HYALINE CASTS #/AREA URNS LPF: 1 /LPF
IMM GRANULOCYTES # BLD AUTO: 0.03 K/UL (ref 0–0.04)
IMM GRANULOCYTES NFR BLD AUTO: 0.4 % (ref 0–0.5)
KETONES UR QL STRIP: NEGATIVE
LEUKOCYTE ESTERASE UR QL STRIP: NEGATIVE
LYMPHOCYTES # BLD AUTO: 0.6 K/UL (ref 1–4.8)
LYMPHOCYTES NFR BLD: 7.8 % (ref 18–48)
MCH RBC QN AUTO: 26.8 PG (ref 27–31)
MCHC RBC AUTO-ENTMCNC: 31.8 G/DL (ref 32–36)
MCV RBC AUTO: 85 FL (ref 82–98)
MICROSCOPIC COMMENT: NORMAL
MONOCYTES # BLD AUTO: 0.7 K/UL (ref 0.3–1)
MONOCYTES NFR BLD: 10.1 % (ref 4–15)
NEUTROPHILS # BLD AUTO: 6 K/UL (ref 1.8–7.7)
NEUTROPHILS NFR BLD: 81 % (ref 38–73)
NITRITE UR QL STRIP: NEGATIVE
NRBC BLD-RTO: 0 /100 WBC
OB PNL STL: POSITIVE
PH UR STRIP: 6 [PH] (ref 5–8)
PLATELET # BLD AUTO: 272 K/UL (ref 150–450)
PMV BLD AUTO: 9.7 FL (ref 9.2–12.9)
POTASSIUM SERPL-SCNC: 3.7 MMOL/L (ref 3.5–5.1)
PROT SERPL-MCNC: 7.1 G/DL (ref 6–8.4)
PROT UR QL STRIP: ABNORMAL
RBC # BLD AUTO: 3.8 M/UL (ref 4.6–6.2)
RBC #/AREA URNS HPF: 1 /HPF (ref 0–4)
SODIUM SERPL-SCNC: 138 MMOL/L (ref 136–145)
SP GR UR STRIP: 1.02 (ref 1–1.03)
TROPONIN I SERPL DL<=0.01 NG/ML-MCNC: 0.02 NG/ML (ref 0–0.03)
URN SPEC COLLECT METH UR: ABNORMAL
UROBILINOGEN UR STRIP-ACNC: NEGATIVE EU/DL
WBC # BLD AUTO: 7.35 K/UL (ref 3.9–12.7)
WBC #/AREA URNS HPF: 0 /HPF (ref 0–5)

## 2023-04-13 PROCEDURE — 93010 EKG 12-LEAD: ICD-10-PCS | Mod: HCNC,,, | Performed by: STUDENT IN AN ORGANIZED HEALTH CARE EDUCATION/TRAINING PROGRAM

## 2023-04-13 PROCEDURE — 82728 ASSAY OF FERRITIN: CPT | Mod: HCNC | Performed by: FAMILY MEDICINE

## 2023-04-13 PROCEDURE — G0378 HOSPITAL OBSERVATION PER HR: HCPCS | Mod: HCNC

## 2023-04-13 PROCEDURE — 83036 HEMOGLOBIN GLYCOSYLATED A1C: CPT | Mod: HCNC | Performed by: FAMILY MEDICINE

## 2023-04-13 PROCEDURE — 93010 ELECTROCARDIOGRAM REPORT: CPT | Mod: HCNC,,, | Performed by: STUDENT IN AN ORGANIZED HEALTH CARE EDUCATION/TRAINING PROGRAM

## 2023-04-13 PROCEDURE — 36415 COLL VENOUS BLD VENIPUNCTURE: CPT | Mod: HCNC | Performed by: FAMILY MEDICINE

## 2023-04-13 PROCEDURE — 84466 ASSAY OF TRANSFERRIN: CPT | Mod: HCNC | Performed by: FAMILY MEDICINE

## 2023-04-13 PROCEDURE — 82962 GLUCOSE BLOOD TEST: CPT | Mod: HCNC

## 2023-04-13 PROCEDURE — 81000 URINALYSIS NONAUTO W/SCOPE: CPT | Mod: HCNC | Performed by: EMERGENCY MEDICINE

## 2023-04-13 PROCEDURE — 82272 OCCULT BLD FECES 1-3 TESTS: CPT | Mod: HCNC | Performed by: EMERGENCY MEDICINE

## 2023-04-13 PROCEDURE — 25000003 PHARM REV CODE 250: Mod: HCNC | Performed by: FAMILY MEDICINE

## 2023-04-13 PROCEDURE — 96361 HYDRATE IV INFUSION ADD-ON: CPT | Mod: HCNC

## 2023-04-13 PROCEDURE — 99285 EMERGENCY DEPT VISIT HI MDM: CPT | Mod: 25,HCNC

## 2023-04-13 PROCEDURE — 99284 PR EMERGENCY DEPT VISIT,LEVEL IV: ICD-10-PCS | Mod: HCNC,,, | Performed by: INTERNAL MEDICINE

## 2023-04-13 PROCEDURE — 99284 EMERGENCY DEPT VISIT MOD MDM: CPT | Mod: HCNC,,, | Performed by: INTERNAL MEDICINE

## 2023-04-13 PROCEDURE — 84484 ASSAY OF TROPONIN QUANT: CPT | Mod: HCNC | Performed by: EMERGENCY MEDICINE

## 2023-04-13 PROCEDURE — 83880 ASSAY OF NATRIURETIC PEPTIDE: CPT | Mod: HCNC | Performed by: EMERGENCY MEDICINE

## 2023-04-13 PROCEDURE — 93005 ELECTROCARDIOGRAM TRACING: CPT | Mod: HCNC

## 2023-04-13 PROCEDURE — 82550 ASSAY OF CK (CPK): CPT | Mod: HCNC | Performed by: EMERGENCY MEDICINE

## 2023-04-13 PROCEDURE — 85025 COMPLETE CBC W/AUTO DIFF WBC: CPT | Mod: HCNC | Performed by: EMERGENCY MEDICINE

## 2023-04-13 PROCEDURE — 25000003 PHARM REV CODE 250: Mod: HCNC | Performed by: EMERGENCY MEDICINE

## 2023-04-13 PROCEDURE — 80053 COMPREHEN METABOLIC PANEL: CPT | Mod: HCNC | Performed by: EMERGENCY MEDICINE

## 2023-04-13 RX ORDER — DEXTROSE 40 %
30 GEL (GRAM) ORAL
Status: DISCONTINUED | OUTPATIENT
Start: 2023-04-13 | End: 2023-04-18 | Stop reason: HOSPADM

## 2023-04-13 RX ORDER — SODIUM CHLORIDE 0.9 % (FLUSH) 0.9 %
10 SYRINGE (ML) INJECTION
Status: DISCONTINUED | OUTPATIENT
Start: 2023-04-13 | End: 2023-04-18 | Stop reason: HOSPADM

## 2023-04-13 RX ORDER — AMLODIPINE BESYLATE 5 MG/1
5 TABLET ORAL DAILY
Status: DISCONTINUED | OUTPATIENT
Start: 2023-04-14 | End: 2023-04-18 | Stop reason: HOSPADM

## 2023-04-13 RX ORDER — ACETAMINOPHEN 325 MG/1
650 TABLET ORAL EVERY 4 HOURS PRN
Status: DISCONTINUED | OUTPATIENT
Start: 2023-04-13 | End: 2023-04-18 | Stop reason: HOSPADM

## 2023-04-13 RX ORDER — HYDROCODONE BITARTRATE AND ACETAMINOPHEN 5; 325 MG/1; MG/1
1 TABLET ORAL EVERY 4 HOURS PRN
Status: DISCONTINUED | OUTPATIENT
Start: 2023-04-13 | End: 2023-04-18 | Stop reason: HOSPADM

## 2023-04-13 RX ORDER — ATORVASTATIN CALCIUM 40 MG/1
40 TABLET, FILM COATED ORAL DAILY
Status: DISCONTINUED | OUTPATIENT
Start: 2023-04-13 | End: 2023-04-18 | Stop reason: HOSPADM

## 2023-04-13 RX ORDER — ACETAMINOPHEN 325 MG/1
650 TABLET ORAL EVERY 8 HOURS PRN
Status: DISCONTINUED | OUTPATIENT
Start: 2023-04-13 | End: 2023-04-18 | Stop reason: HOSPADM

## 2023-04-13 RX ORDER — OXYBUTYNIN CHLORIDE 5 MG/1
10 TABLET, EXTENDED RELEASE ORAL DAILY
Status: DISCONTINUED | OUTPATIENT
Start: 2023-04-13 | End: 2023-04-15

## 2023-04-13 RX ORDER — INSULIN ASPART 100 [IU]/ML
1-10 INJECTION, SOLUTION INTRAVENOUS; SUBCUTANEOUS
Status: DISCONTINUED | OUTPATIENT
Start: 2023-04-13 | End: 2023-04-18 | Stop reason: HOSPADM

## 2023-04-13 RX ORDER — POLYETHYLENE GLYCOL 3350 17 G/17G
17 POWDER, FOR SOLUTION ORAL DAILY
Status: DISCONTINUED | OUTPATIENT
Start: 2023-04-13 | End: 2023-04-18 | Stop reason: HOSPADM

## 2023-04-13 RX ORDER — AMOXICILLIN 250 MG
1 CAPSULE ORAL 2 TIMES DAILY
Status: DISCONTINUED | OUTPATIENT
Start: 2023-04-13 | End: 2023-04-18 | Stop reason: HOSPADM

## 2023-04-13 RX ORDER — GLUCAGON 1 MG
1 KIT INJECTION
Status: DISCONTINUED | OUTPATIENT
Start: 2023-04-13 | End: 2023-04-18 | Stop reason: HOSPADM

## 2023-04-13 RX ORDER — DEXTROSE 40 %
15 GEL (GRAM) ORAL
Status: DISCONTINUED | OUTPATIENT
Start: 2023-04-13 | End: 2023-04-18 | Stop reason: HOSPADM

## 2023-04-13 RX ORDER — PROCHLORPERAZINE EDISYLATE 5 MG/ML
5 INJECTION INTRAMUSCULAR; INTRAVENOUS EVERY 6 HOURS PRN
Status: DISCONTINUED | OUTPATIENT
Start: 2023-04-13 | End: 2023-04-18 | Stop reason: HOSPADM

## 2023-04-13 RX ORDER — ISOSORBIDE MONONITRATE 60 MG/1
120 TABLET, EXTENDED RELEASE ORAL DAILY
Status: DISCONTINUED | OUTPATIENT
Start: 2023-04-14 | End: 2023-04-18 | Stop reason: HOSPADM

## 2023-04-13 RX ADMIN — SODIUM CHLORIDE 1000 ML: 9 INJECTION, SOLUTION INTRAVENOUS at 11:04

## 2023-04-13 RX ADMIN — ATORVASTATIN CALCIUM 40 MG: 40 TABLET, FILM COATED ORAL at 05:04

## 2023-04-13 RX ADMIN — POLYETHYLENE GLYCOL 3350 17 G: 17 POWDER, FOR SOLUTION ORAL at 05:04

## 2023-04-13 RX ADMIN — OXYBUTYNIN CHLORIDE 10 MG: 5 TABLET, EXTENDED RELEASE ORAL at 05:04

## 2023-04-13 NOTE — SUBJECTIVE & OBJECTIVE
Past Medical History:   Diagnosis Date    Acute blood loss anemia 10/14/2019    Aneurysm, abdominal aortic     Coronary artery disease     Depression     Diabetes mellitus     HLD (hyperlipidemia)     Hypertension     Kidney stone     PAD (peripheral artery disease)     PAF (paroxysmal atrial fibrillation) 1/24/2023    Tobacco abuse        Past Surgical History:   Procedure Laterality Date    APPENDECTOMY      CORONARY STENT PLACEMENT      x 4    ESOPHAGOGASTRODUODENOSCOPY N/A 10/14/2019    Procedure: EGD (ESOPHAGOGASTRODUODENOSCOPY);  Surgeon: Carlos Mcneal III, MD;  Location: [facility] ENDO;  Service: Endoscopy;  Laterality: N/A;    ESOPHAGOGASTRODUODENOSCOPY N/A 10/15/2019    Procedure: EGD (ESOPHAGOGASTRODUODENOSCOPY);  Surgeon: Carlos Mcneal III, MD;  Location: [facility] ENDO;  Service: Endoscopy;  Laterality: N/A;    LEFT HEART CATHETERIZATION Left 10/11/2019    Procedure: CATHETERIZATION, HEART, LEFT;  Surgeon: Robe Gilbert MD;  Location: [facility] CATH LAB;  Service: Cardiology;  Laterality: Left;    LEFT HEART CATHETERIZATION Left 10/13/2019    Procedure: CATHETERIZATION, HEART, LEFT;  Surgeon: Robe Gilbert MD;  Location: [facility] CATH LAB;  Service: Cardiology;  Laterality: Left;    leg stent Left        Review of patient's allergies indicates:   Allergen Reactions    Metoprolol Other (See Comments)     Junctional rhythm         No current facility-administered medications on file prior to encounter.     Current Outpatient Medications on File Prior to Encounter   Medication Sig    aspirin (ECOTRIN) 81 MG EC tablet Take 1 tablet (81 mg total) by mouth once daily.    alcohol swabs (ALCOHOL PREP PADS) PadM Twice a day    amLODIPine (NORVASC) 5 MG tablet TAKE 1 TABLET BY MOUTH EVERY DAY    apixaban (ELIQUIS) 5 mg Tab Take 1 tablet (5 mg total) by mouth 2 (two) times daily.    atorvastatin (LIPITOR) 40 MG tablet Take 1 tablet (40 mg total) by mouth once daily.    blood sugar diagnostic Strp Test blood sugars twice a  "day    gabapentin (NEURONTIN) 100 MG capsule Take 2 capsules (200 mg total) by mouth 2 (two) times daily as needed.    isosorbide mononitrate (IMDUR) 120 MG 24 hr tablet TAKE 1 TABLET BY MOUTH ONCE DAILY    lancets (ACCU-CHEK SOFTCLIX LANCETS) Misc Test blood sugars twice a day    metFORMIN (GLUCOPHAGE) 500 MG tablet Take 1 tablet (500 mg total) by mouth daily with breakfast.    MYRBETRIQ 50 mg Tb24 TAKE 1 TABLET BY MOUTH EVERY DAY    ondansetron (ZOFRAN-ODT) 4 MG TbDL Take 1 tablet (4 mg total) by mouth every 6 (six) hours as needed.     Family History       Problem Relation (Age of Onset)    COPD Father    Diabetes Mother, Brother          Tobacco Use    Smoking status: Every Day     Types: Cigars     Passive exposure: Never    Smokeless tobacco: Current   Substance and Sexual Activity    Alcohol use: Not Currently    Drug use: Not Currently    Sexual activity: Not Currently     Review of Systems   Unable to perform ROS: Other (Patient does not have an official diagnosis of dementia but states "im feeling fine" to all questions.)   Objective:     Vital Signs (Most Recent):  Temp: 98.1 °F (36.7 °C) (04/13/23 1230)  Pulse: 78 (04/13/23 1230)  Resp: 16 (04/13/23 1230)  BP: 136/72 (04/13/23 1230)  SpO2: 98 % (04/13/23 1230) Vital Signs (24h Range):  Temp:  [97.8 °F (36.6 °C)-98.1 °F (36.7 °C)] 98.1 °F (36.7 °C)  Pulse:  [53-78] 78  Resp:  [15-18] 16  SpO2:  [96 %-98 %] 98 %  BP: (127-146)/(62-78) 136/72     Weight: 61 kg (134 lb 7.7 oz)  Body mass index is 19.3 kg/m².    Physical Exam  Vitals and nursing note reviewed.   Constitutional:       Appearance: Normal appearance.   HENT:      Head: Normocephalic and atraumatic.      Nose: Nose normal.      Mouth/Throat:      Mouth: Mucous membranes are moist.   Eyes:      Extraocular Movements: Extraocular movements intact.      Conjunctiva/sclera: Conjunctivae normal.   Cardiovascular:      Rate and Rhythm: Normal rate and regular rhythm.      Pulses: Normal pulses.      " Heart sounds: Normal heart sounds.   Pulmonary:      Effort: Pulmonary effort is normal.      Breath sounds: Normal breath sounds.   Abdominal:      General: Abdomen is flat. Bowel sounds are normal.      Palpations: Abdomen is soft.   Musculoskeletal:         General: Normal range of motion.      Cervical back: Normal range of motion and neck supple.   Skin:     General: Skin is warm.      Capillary Refill: Capillary refill takes less than 2 seconds.   Neurological:      Mental Status: He is alert. He is disoriented.      Comments: Pleasantly confused   Psychiatric:         Mood and Affect: Mood normal.         Behavior: Behavior normal.           Significant Labs: All pertinent labs within the past 24 hours have been reviewed.  Recent Lab Results         04/13/23  1157   04/13/23  1119   04/13/23  1016        Albumin     2.9       Alkaline Phosphatase     137       ALT     10       Anion Gap     9       Appearance, UA   Clear         AST     10       Bacteria, UA   Rare         Baso #     0.03       Basophil %     0.4       Bilirubin (UA)   Negative         BILIRUBIN TOTAL     0.6  Comment: For infants and newborns, interpretation of results should be based  on gestational age, weight and in agreement with clinical  observations.    Premature Infant recommended reference ranges:  Up to 24 hours.............<8.0 mg/dL  Up to 48 hours............<12.0 mg/dL  3-5 days..................<15.0 mg/dL  6-29 days.................<15.0 mg/dL         BNP     363  Comment: Values of less than 100 pg/ml are consistent with non-CHF populations.       BUN     26       Calcium     9.5       Chloride     105       CO2     24       Color, UA   Yellow         CPK     40       Creatinine     1.0       Differential Method     Automated       eGFR     >60       Eos #     0.0       Eosinophil %     0.3       Ferritin     68       Glucose     195       Glucose, UA   Trace         Gran # (ANC)     6.0       Gran %     81.0        Hematocrit     32.1       Hemoglobin     10.2       Hyaline Casts, UA   1         Immature Grans (Abs)     0.03  Comment: Mild elevation in immature granulocytes is non specific and   can be seen in a variety of conditions including stress response,   acute inflammation, trauma and pregnancy. Correlation with other   laboratory and clinical findings is essential.         Immature Granulocytes     0.4       Ketones, UA   Negative         Leukocytes, UA   Negative         Lymph #     0.6       Lymph %     7.8       MCH     26.8       MCHC     31.8       MCV     85       Microscopic Comment   SEE COMMENT  Comment: Other formed elements not mentioned in the report are not   present in the microscopic examination.            Mono #     0.7       Mono %     10.1       MPV     9.7       NITRITE UA   Negative         nRBC     0       Occult Blood Positive           Occult Blood UA   Trace         pH, UA   6.0         Platelets     272       Potassium     3.7       PROTEIN TOTAL     7.1       Protein, UA   3+  Comment: Recommend a 24 hour urine protein or a urine   protein/creatinine ratio if globulin induced proteinuria is  clinically suspected.           RBC     3.80       RBC, UA   1         RDW     14.6       Sodium     138       Specific Gravity, UA   1.020         Specimen UA   Urine, Clean Catch         Troponin I     0.022  Comment: The reference interval for Troponin I represents the 99th percentile   cutoff   for our facility and is consistent with 3rd generation assay   performance.         UROBILINOGEN UA   Negative         WBC, UA   0         WBC     7.35               Significant Imaging:   X-Ray Chest AP Portable   Final Result      There is no focal pulmonary infiltrate visualized.         Electronically signed by: Lobo Marques MD   Date:    04/13/2023   Time:    11:05      CT Head Without Contrast   Final Result      Chronic microvascular ischemic changes.      All CT scans at this facility use dose  modulation, iterative reconstruction, and/or weight based dosing when appropriate to reduce radiation dose to as low as reasonable achievable.         Electronically signed by: Grey Vieira MD   Date:    04/13/2023   Time:    11:12

## 2023-04-13 NOTE — HPI
Mr. Edwards is a 81 yo male with hx of PAD, Afib, HTN, HLD, DM, Depression and anemia presented to the ER because his wife feels has generalized weakness, lethargy and is more confused.  He does not have diagnosed dementia and family is not at bedside to give specifics of his confusion.  Head CT showed chronic microvascular ischemic changes. He was noted to have a drop in hbg from 13 to 10 over the last few months.  Family that was initially at bedside mentioned to the ER staff that he had melena.  Patient unable to tell me when his last BM was and if it was dark, but states he has not seen any blood in his stool.  GI was consulted and planning to scope the patient, but would like to speak to  family as patient has been on Eliquis but unclear as to the last dose.  GI has cleared patient to eat today and planning to possibly scope on Saturday.  Will continue to home Eliquis and repeat labs in AM.  Hospital medicine will admit to observation for possible  GI bleed

## 2023-04-13 NOTE — H&P (VIEW-ONLY)
[facility] - Emergency Dept.  Gastroenterology  Consult Note    Patient Name: Aquiles Kelsey  MRN: [id number]  Admission Date: 4/13/2023  Hospital Length of Stay: 0 days  Code Status: Full Code   Attending Provider: Leonie Dejesus MD   Consulting Provider: Hubert Downing PA-C  Primary Care Physician: Holger Thornton MD  Principal Problem:Melena    Inpatient consult to Gastroenterology  Consult performed by: Hubert Downing PA-C  Consult ordered by: Leonie Dejesus MD  Reason for consult: Possible GI bleed        Subjective:     HPI:  The patient presented to the ER for fatigue and weakness with worsening over the last week. The patient's wife also reported confusion for several weeks. Work up revealed a Hgb of 10.2. It was 13.4 in December. , MCV 85, RDW 14.6, BUN 26, creatinine 1.0, , UA unremarkable. CXR without acute abnormalities. CT head with chronic microvascular ischemic changes. The patient takes Eliquis bid and ASA 81 mg daily. Last Eliquis dose unknown. Dr. Collins is calling the wife to try and find out. The patient has a history of AVMs. His last EGD in 2019 showed bleeding with gastric AVMs noted.   The patient is currently alone in the room. He is unable to provide a full history. It has been obtained from the medical record and medical staff.       Past Medical History:   Diagnosis Date    Acute blood loss anemia 10/14/2019    Aneurysm, abdominal aortic     Coronary artery disease     Depression     Diabetes mellitus     HLD (hyperlipidemia)     Hypertension     Kidney stone     PAD (peripheral artery disease)     PAF (paroxysmal atrial fibrillation) 1/24/2023    Tobacco abuse        Past Surgical History:   Procedure Laterality Date    APPENDECTOMY      CORONARY STENT PLACEMENT      x 4    ESOPHAGOGASTRODUODENOSCOPY N/A 10/14/2019    Procedure: EGD (ESOPHAGOGASTRODUODENOSCOPY);  Surgeon: Carlos Mcneal III, MD;  Location: [facility];  Service: Endoscopy;   Laterality: N/A;    ESOPHAGOGASTRODUODENOSCOPY N/A 10/15/2019    Procedure: EGD (ESOPHAGOGASTRODUODENOSCOPY);  Surgeon: Carlos Mcneal III, MD;  Location: [facility] ENDO;  Service: Endoscopy;  Laterality: N/A;    LEFT HEART CATHETERIZATION Left 10/11/2019    Procedure: CATHETERIZATION, HEART, LEFT;  Surgeon: Robe Gilbert MD;  Location: [facility] CATH LAB;  Service: Cardiology;  Laterality: Left;    LEFT HEART CATHETERIZATION Left 10/13/2019    Procedure: CATHETERIZATION, HEART, LEFT;  Surgeon: Robe Gilbert MD;  Location: [facility] CATH LAB;  Service: Cardiology;  Laterality: Left;    leg stent Left        Review of patient's allergies indicates:   Allergen Reactions    Metoprolol Other (See Comments)     Junctional rhythm       Family History       Problem Relation (Age of Onset)    COPD Father    Diabetes Mother, Brother          Tobacco Use    Smoking status: Every Day     Types: Cigars     Passive exposure: Never    Smokeless tobacco: Current   Substance and Sexual Activity    Alcohol use: Not Currently    Drug use: Not Currently    Sexual activity: Not Currently     Review of Systems   Reason unable to perform ROS: Patient denies pain but unable to provide any other meaningful ROS.   Objective:     Vital Signs (Most Recent):  Temp: 97.8 °F (36.6 °C) (04/13/23 0957)  Pulse: 73 (04/13/23 1025)  Resp: 16 (04/13/23 1025)  BP: (!) 144/65 (04/13/23 1025)  SpO2: 98 % (04/13/23 1025)   Vital Signs (24h Range):  Temp:  [97.8 °F (36.6 °C)] 97.8 °F (36.6 °C)  Pulse:  [53-73] 73  Resp:  [15-18] 16  SpO2:  [96 %-98 %] 98 %  BP: (127-146)/(62-78) 144/65     Weight: 61 kg (134 lb 7.7 oz) (04/13/23 1013)  Body mass index is 19.3 kg/m².    No intake or output data in the 24 hours ending 04/13/23 1350    Lines/Drains/Airways       Peripheral Intravenous Line  Duration                  Peripheral IV - Single Lumen 04/13/23 1015 20 G Right Antecubital <1 day                    Physical Exam  Constitutional:       General: He is  not in acute distress.  HENT:      Head: Normocephalic and atraumatic.   Eyes:      Extraocular Movements: Extraocular movements intact.   Cardiovascular:      Rate and Rhythm: Normal rate. Rhythm irregular.      Heart sounds: Normal heart sounds. No murmur heard.  Pulmonary:      Effort: Pulmonary effort is normal. No respiratory distress.      Breath sounds: Normal breath sounds. No wheezing.   Abdominal:      General: Bowel sounds are normal. There is no distension.      Palpations: Abdomen is soft. There is no hepatomegaly or mass.      Tenderness: There is no abdominal tenderness.   Musculoskeletal:      Right lower leg: No edema.      Left lower leg: No edema.   Skin:     General: Skin is warm and dry.      Findings: No rash.   Neurological:      Mental Status: He is easily aroused. He is lethargic.      Cranial Nerves: No cranial nerve deficit.      Comments: The patient is able to provide his  but not location. He wakes sometimes when I talk and but mostly remains resting with his eyes closed.        Significant Labs:  CBC:   Recent Labs   Lab 23  1016   WBC 7.35   HGB 10.2*   HCT 32.1*        CMP:   Recent Labs   Lab 23  1016   *   CALCIUM 9.5   ALBUMIN 2.9*   PROT 7.1      K 3.7   CO2 24      BUN 26*   CREATININE 1.0   ALKPHOS 137*   ALT 10   AST 10   BILITOT 0.6     Coagulation: No results for input(s): PT, INR, APTT in the last 48 hours.    Significant Imaging:  Imaging results within the past 24 hours have been reviewed.    Assessment/Plan:     Cardiac/Vascular  PAF (paroxysmal atrial fibrillation)  Eliquis being held.     Endocrine  Type 2 diabetes mellitus, without long-term current use of insulin  Insulin sliding scale and blood glucose monitoring per protocol.     GI  * Melena  Patient with reported melena, decreased Hgb, increased BUN and history of AVMs currently on Eliquis.   Agree with holding Eliquis. Will consider EGD after held two days.   Start  Protonix 40 mg IV.   GI ok with starting a diet.    Monitor H/H and transfuse as indicated.         Thank you for your consult. I will follow-up with patient. Please contact us if you have any additional questions.    Hubert Downing PA-C  Gastroenterology  [facility] - Emergency Dept.

## 2023-04-13 NOTE — ASSESSMENT & PLAN NOTE
- Patient has a documented history from his PCP since 12/9/2022, but per family he has further declined.  - Likely has underlying undiagnosed dementia  - CT head: negative for acute finding.

## 2023-04-13 NOTE — HPI
The patient presented to the ER for fatigue and weakness with worsening over the last week. The patient's wife also reported confusion for several weeks. Work up revealed a Hgb of 10.2. It was 13.4 in December. , MCV 85, RDW 14.6, BUN 26, creatinine 1.0, , UA unremarkable. CXR without acute abnormalities. CT head with chronic microvascular ischemic changes. The patient takes Eliquis bid and ASA 81 mg daily. Last Eliquis dose unknown. Dr. Collins is calling the wife to try and find out. The patient has a history of AVMs. His last EGD in 2019 showed bleeding with gastric AVMs noted.   The patient is currently alone in the room. He is unable to provide a full history. It has been obtained from the medical record and medical staff.

## 2023-04-13 NOTE — ASSESSMENT & PLAN NOTE
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   Agree with holding Eliquis. Will consider EGD after held two days.   Start Protonix 40 mg IV.   GI ok with starting a diet.    Monitor H/H and transfuse as indicated.

## 2023-04-13 NOTE — ED PROVIDER NOTES
SCRIBE #1 NOTE: I, Malinda Ramos, am scribing for, and in the presence of, Emir Collins MD. I have scribed the entire note.      History      Chief Complaint   Patient presents with    Fatigue     Wife called for generalized weakness and lethargy since yesterday and confusion for months. Pt denies any complaints.        Review of patient's allergies indicates:   Allergen Reactions    Metoprolol Other (See Comments)     Junctional rhythm          HPI   HPI    4/13/2023, 9:48 AM   History obtained from the EMS      History of Present Illness: Aquiles Kelsey is a 82 y.o. male patient with a PMHx of aneurysm, CAD, DM, HLD, HTN, PAD, PAF, tobacco abuse, and acute blood loss anemia who presents to the Emergency Department for an evaluation of fatigue. Per EMS, the pt's wife called for them because the pt has been fatigued and has not wanted to get out of bed for the past week. Pt's has also been confused, generally weak, and has had an unsteady gait even with the use of his walker, per EMS.  Symptoms are constant and moderate in severity. No mitigating or exacerbating factors reported. No other associated sxs reported. Patient denies any fever, chills, CP, SOB, N/V/D, and all other sxs at this time. No prior Tx reported. No further complaints or concerns at this time.         Arrival mode: EMS    PCP: Holger Thornton MD       Past Medical History:  Past Medical History:   Diagnosis Date    Acute blood loss anemia 10/14/2019    Aneurysm, abdominal aortic     Coronary artery disease     Depression     Diabetes mellitus     HLD (hyperlipidemia)     Hypertension     Kidney stone     PAD (peripheral artery disease)     PAF (paroxysmal atrial fibrillation) 1/24/2023    Tobacco abuse        Past Surgical History:  Past Surgical History:   Procedure Laterality Date    APPENDECTOMY      CORONARY STENT PLACEMENT      x 4    ESOPHAGOGASTRODUODENOSCOPY N/A 10/14/2019    Procedure: EGD  (ESOPHAGOGASTRODUODENOSCOPY);  Surgeon: Carlos Mcneal III, MD;  Location: [facility] ENDO;  Service: Endoscopy;  Laterality: N/A;    ESOPHAGOGASTRODUODENOSCOPY N/A 10/15/2019    Procedure: EGD (ESOPHAGOGASTRODUODENOSCOPY);  Surgeon: Carlos Mcneal III, MD;  Location: [facility] ENDO;  Service: Endoscopy;  Laterality: N/A;    LEFT HEART CATHETERIZATION Left 10/11/2019    Procedure: CATHETERIZATION, HEART, LEFT;  Surgeon: Robe Gilbert MD;  Location: [facility] CATH LAB;  Service: Cardiology;  Laterality: Left;    LEFT HEART CATHETERIZATION Left 10/13/2019    Procedure: CATHETERIZATION, HEART, LEFT;  Surgeon: Robe Gilbert MD;  Location: [facility] CATH LAB;  Service: Cardiology;  Laterality: Left;    leg stent Left          Family History:  Family History   Problem Relation Age of Onset    Diabetes Mother     COPD Father     Diabetes Brother        Social History:  Social History     Tobacco Use    Smoking status: Every Day     Types: Cigars     Passive exposure: Never    Smokeless tobacco: Current   Substance and Sexual Activity    Alcohol use: Not Currently    Drug use: Not Currently    Sexual activity: Not Currently       ROS   Review of Systems   Constitutional:  Positive for fatigue. Negative for chills and fever.   HENT:  Negative for sore throat.    Respiratory:  Negative for shortness of breath.    Cardiovascular:  Negative for chest pain.   Gastrointestinal:  Negative for diarrhea, nausea and vomiting.   Genitourinary:  Negative for dysuria.   Musculoskeletal:  Positive for gait problem. Negative for back pain.   Skin:  Negative for rash.   Neurological:  Positive for weakness (generalized).   Hematological:  Does not bruise/bleed easily.   Psychiatric/Behavioral:  Positive for confusion.    All other systems reviewed and are negative.    Physical Exam      Initial Vitals [04/13/23 0957]   BP Pulse Resp Temp SpO2   136/62 64 18 97.8 °F (36.6 °C) 98 %      MAP       --          Physical Exam  Nursing Notes and Vital Signs  "Reviewed.  Constitutional: Patient is in no acute distress. Elderly. Frail.  Head: Atraumatic. Normocephalic.  Eyes: PERRL. EOM intact. Conjunctivae are not pale. No scleral icterus.  ENT: Mucous membranes are moist. Oropharynx is clear and symmetric.    Neck: Supple. Full ROM. No lymphadenopathy.  Cardiovascular: Regular rate. Regular rhythm. No murmurs, rubs, or gallops. Distal pulses are 2+ and symmetric.  Pulmonary/Chest: No respiratory distress. Clear to auscultation bilaterally. No wheezing or rales.  Abdominal: Soft and non-distended.  There is no tenderness.  No rebound, guarding, or rigidity.   Musculoskeletal: Moves all extremities. No obvious deformities. No edema.  Skin: Warm and dry.  Neurological:  Alert, awake, and appropriate.  Normal speech.  No acute focal neurological deficits are appreciated.  Psychiatric: Normal affect. Good eye contact. Appropriate in content.    ED Course    Procedures  ED Vital Signs:  Vitals:    04/13/23 0957 04/13/23 1000 04/13/23 1013 04/13/23 1017   BP: 136/62   (!) 146/66   Pulse: 64 70  (!) 56   Resp: 18   16   Temp: 97.8 °F (36.6 °C)      TempSrc: Oral      SpO2: 98%   98%   Weight:   61 kg (134 lb 7.7 oz)    Height: 5' 10" (1.778 m)       04/13/23 1020 04/13/23 1023 04/13/23 1025   BP: 127/78 (!) 144/65 (!) 144/65   Pulse: (!) 53 60 73   Resp: 16 15 16   Temp:      TempSrc:      SpO2: 97% 96% 98%   Weight:      Height:          Abnormal Lab Results:  Labs Reviewed   CBC W/ AUTO DIFFERENTIAL - Abnormal; Notable for the following components:       Result Value    RBC 3.80 (*)     Hemoglobin 10.2 (*)     Hematocrit 32.1 (*)     MCH 26.8 (*)     MCHC 31.8 (*)     RDW 14.6 (*)     Lymph # 0.6 (*)     Gran % 81.0 (*)     Lymph % 7.8 (*)     All other components within normal limits   COMPREHENSIVE METABOLIC PANEL - Abnormal; Notable for the following components:    Glucose 195 (*)     BUN 26 (*)     Albumin 2.9 (*)     Alkaline Phosphatase 137 (*)     All other components " within normal limits   URINALYSIS, REFLEX TO URINE CULTURE - Abnormal; Notable for the following components:    Protein, UA 3+ (*)     Glucose, UA Trace (*)     Occult Blood UA Trace (*)     All other components within normal limits    Narrative:     Specimen Source->Urine   B-TYPE NATRIURETIC PEPTIDE - Abnormal; Notable for the following components:     (*)     All other components within normal limits   OCCULT BLOOD X 1, STOOL - Abnormal; Notable for the following components:    Occult Blood Positive (*)     All other components within normal limits   CK   TROPONIN I   URINALYSIS MICROSCOPIC    Narrative:     Specimen Source->Urine        All Lab Results:  Results for orders placed or performed during the hospital encounter of 04/13/23   CBC Auto Differential   Result Value Ref Range    WBC 7.35 3.90 - 12.70 K/uL    RBC 3.80 (L) 4.60 - 6.20 M/uL    Hemoglobin 10.2 (L) 14.0 - 18.0 g/dL    Hematocrit 32.1 (L) 40.0 - 54.0 %    MCV 85 82 - 98 fL    MCH 26.8 (L) 27.0 - 31.0 pg    MCHC 31.8 (L) 32.0 - 36.0 g/dL    RDW 14.6 (H) 11.5 - 14.5 %    Platelets 272 150 - 450 K/uL    MPV 9.7 9.2 - 12.9 fL    Immature Granulocytes 0.4 0.0 - 0.5 %    Gran # (ANC) 6.0 1.8 - 7.7 K/uL    Immature Grans (Abs) 0.03 0.00 - 0.04 K/uL    Lymph # 0.6 (L) 1.0 - 4.8 K/uL    Mono # 0.7 0.3 - 1.0 K/uL    Eos # 0.0 0.0 - 0.5 K/uL    Baso # 0.03 0.00 - 0.20 K/uL    nRBC 0 0 /100 WBC    Gran % 81.0 (H) 38.0 - 73.0 %    Lymph % 7.8 (L) 18.0 - 48.0 %    Mono % 10.1 4.0 - 15.0 %    Eosinophil % 0.3 0.0 - 8.0 %    Basophil % 0.4 0.0 - 1.9 %    Differential Method Automated    Comprehensive Metabolic Panel   Result Value Ref Range    Sodium 138 136 - 145 mmol/L    Potassium 3.7 3.5 - 5.1 mmol/L    Chloride 105 95 - 110 mmol/L    CO2 24 23 - 29 mmol/L    Glucose 195 (H) 70 - 110 mg/dL    BUN 26 (H) 8 - 23 mg/dL    Creatinine 1.0 0.5 - 1.4 mg/dL    Calcium 9.5 8.7 - 10.5 mg/dL    Total Protein 7.1 6.0 - 8.4 g/dL    Albumin 2.9 (L) 3.5 - 5.2 g/dL     Total Bilirubin 0.6 0.1 - 1.0 mg/dL    Alkaline Phosphatase 137 (H) 55 - 135 U/L    AST 10 10 - 40 U/L    ALT 10 10 - 44 U/L    Anion Gap 9 8 - 16 mmol/L    eGFR >60 >60 mL/min/1.73 m^2   Urinalysis, Reflex to Urine Culture Urine, Clean Catch    Specimen: Urine   Result Value Ref Range    Specimen UA Urine, Clean Catch     Color, UA Yellow Yellow, Straw, Caren    Appearance, UA Clear Clear    pH, UA 6.0 5.0 - 8.0    Specific Gravity, UA 1.020 1.005 - 1.030    Protein, UA 3+ (A) Negative    Glucose, UA Trace (A) Negative    Ketones, UA Negative Negative    Bilirubin (UA) Negative Negative    Occult Blood UA Trace (A) Negative    Nitrite, UA Negative Negative    Urobilinogen, UA Negative <2.0 EU/dL    Leukocytes, UA Negative Negative   BNP   Result Value Ref Range     (H) 0 - 99 pg/mL   CK   Result Value Ref Range    CPK 40 20 - 200 U/L   Troponin I   Result Value Ref Range    Troponin I 0.022 0.000 - 0.026 ng/mL   Urinalysis Microscopic   Result Value Ref Range    RBC, UA 1 0 - 4 /hpf    WBC, UA 0 0 - 5 /hpf    Bacteria Rare None-Occ /hpf    Hyaline Casts, UA 1 0-1/lpf /lpf    Microscopic Comment SEE COMMENT    Occult blood x 1, stool    Specimen: Stool   Result Value Ref Range    Occult Blood Positive (A) Negative         Imaging Results:  Imaging Results              X-Ray Chest AP Portable (Final result)  Result time 04/13/23 11:05:17      Final result by KARLI Marques Sr., MD (04/13/23 11:05:17)                   Impression:      There is no focal pulmonary infiltrate visualized.      Electronically signed by: Lobo Marques MD  Date:    04/13/2023  Time:    11:05               Narrative:    EXAMINATION:  XR CHEST AP PORTABLE    CLINICAL HISTORY:  weakness;    COMPARISON:  12/09/2022    FINDINGS:  The size of the heart is normal.  There is no focal pulmonary infiltrate visualized.  There is no pneumothorax.  The costophrenic angles are sharp.                                       CT Head Without  Contrast (Final result)  Result time 04/13/23 11:12:11      Final result by Grey Vieira MD (04/13/23 11:12:11)                   Impression:      Chronic microvascular ischemic changes.    All CT scans at this facility use dose modulation, iterative reconstruction, and/or weight based dosing when appropriate to reduce radiation dose to as low as reasonable achievable.      Electronically signed by: Grey Vieira MD  Date:    04/13/2023  Time:    11:12               Narrative:    EXAMINATION:  CT HEAD WITHOUT CONTRAST    CLINICAL HISTORY:  Syncope, recurrent;    TECHNIQUE:  Low dose axial CT images obtained throughout the head without intravenous contrast. Sagittal and coronal reconstructions were performed.    COMPARISON:  None.    FINDINGS:  Intracranial compartment:    Sulcal widening suggesting age-related white matter volume loss. No extra-axial blood or fluid collections.    Chronic encephalomalacia right frontal lobe similar to 11/04/2022.  Bilateral mild periventricular white matter hypoattenuation as can be seen with chronic microangiopathic small-vessel disease.  No parenchymal mass, hemorrhage, edema or major vascular distribution infarct.    Skull/extracranial contents (limited evaluation): No fracture.  Minimal sinus mucosal thickening.  Mastoid air cells and paranasal sinuses are essentially clear.                                     The EKG was ordered, reviewed, and independently interpreted by the ED provider.  Interpretation time: 10:11  Rate: 65 BPM  Rhythm: Sinus rhythm with Premature atrial complexes with Aberrant conduction  Interpretation: Low voltage QRS. ST and T wave abnormality, consider inferior ischemia. No STEMI.           The Emergency Provider reviewed the vital signs and test results, which are outlined above.    ED Discussion     12:27 PM: Discussed case with Dr. Dejesus ([facility]). Dr. Dejesus agrees with current care and management of pt and accepts admission.   Admitting  Service: Hospital Medicine  Admitting Physician: Dr. Dejesus  Admit to: Obs    12:28 PM: Re-evaluated pt. I have discussed test results, shared treatment plan, and the need for admission with patient and family at bedside. Pt and family express understanding at this time and agree with all information. All questions answered. Pt and family have no further questions or concerns at this time. Pt is ready for admit.             ED Medication(s):  Medications   sodium chloride 0.9% bolus 1,000 mL 1,000 mL (1,000 mLs Intravenous New Bag 4/13/23 1119)       New Prescriptions    No medications on file             Medical Decision Making    Medical Decision Making:   History:   I obtained history from: someone other than patient.       <> Summary of History: History obtained completely from the Wife.  Initial Assessment:   Wife reports weakness and fatigue that have worsened over the last few days.  Reports melena and fatigue.  Denies hematemesis  Differential Diagnosis:   ANemia, melena, electrolyte abnormality  Clinical Tests:   Lab Tests: Ordered and Reviewed  Radiological Study: Ordered and Reviewed  Medical Tests: Ordered and Reviewed  ED Management:  Labs and imaging reviewed by me.  No acute findings except for worsening anemia over the last few months, hemoccult positive stool.  Considered admission, wife is agreeable.    Other:   I have discussed this case with another health care provider.       <> Summary of the Discussion: Case discussed with Dr. Dejesus () will place in observation.  Case discussed with Dr. Sanchez (GI)         Scribe Attestation:   Scribe #1: I performed the above scribed service and the documentation accurately describes the services I performed. I attest to the accuracy of the note.    Attending:   Physician Attestation Statement for Scribe #1: I, Emir Collins MD, personally performed the services described in this documentation, as scribed by Malinda Ramos, in my presence, and it is  both accurate and complete.          Clinical Impression       ICD-10-CM ICD-9-CM   1. Anemia, unspecified type  D64.9 285.9   2. Weakness  R53.1 780.79   3. Melena  K92.1 578.1   4. Fatigue, unspecified type  R53.83 780.79       Disposition:   Disposition: Placed in Observation  Condition: Fair       Emir Collins MD  04/13/23 6551

## 2023-04-13 NOTE — SUBJECTIVE & OBJECTIVE
Past Medical History:   Diagnosis Date    Acute blood loss anemia 10/14/2019    Aneurysm, abdominal aortic     Coronary artery disease     Depression     Diabetes mellitus     HLD (hyperlipidemia)     Hypertension     Kidney stone     PAD (peripheral artery disease)     PAF (paroxysmal atrial fibrillation) 1/24/2023    Tobacco abuse        Past Surgical History:   Procedure Laterality Date    APPENDECTOMY      CORONARY STENT PLACEMENT      x 4    ESOPHAGOGASTRODUODENOSCOPY N/A 10/14/2019    Procedure: EGD (ESOPHAGOGASTRODUODENOSCOPY);  Surgeon: Carlos Mcneal III, MD;  Location: [facility] ENDO;  Service: Endoscopy;  Laterality: N/A;    ESOPHAGOGASTRODUODENOSCOPY N/A 10/15/2019    Procedure: EGD (ESOPHAGOGASTRODUODENOSCOPY);  Surgeon: Carlos Mcneal III, MD;  Location: [facility] ENDO;  Service: Endoscopy;  Laterality: N/A;    LEFT HEART CATHETERIZATION Left 10/11/2019    Procedure: CATHETERIZATION, HEART, LEFT;  Surgeon: Robe Gilbert MD;  Location: [facility] CATH LAB;  Service: Cardiology;  Laterality: Left;    LEFT HEART CATHETERIZATION Left 10/13/2019    Procedure: CATHETERIZATION, HEART, LEFT;  Surgeon: Robe Gilbert MD;  Location: [facility] CATH LAB;  Service: Cardiology;  Laterality: Left;    leg stent Left        Review of patient's allergies indicates:   Allergen Reactions    Metoprolol Other (See Comments)     Junctional rhythm       Family History       Problem Relation (Age of Onset)    COPD Father    Diabetes Mother, Brother          Tobacco Use    Smoking status: Every Day     Types: Cigars     Passive exposure: Never    Smokeless tobacco: Current   Substance and Sexual Activity    Alcohol use: Not Currently    Drug use: Not Currently    Sexual activity: Not Currently     Review of Systems   Reason unable to perform ROS: Patient denies pain but unable to provide any other meaningful ROS.   Objective:     Vital Signs (Most Recent):  Temp: 97.8 °F (36.6 °C) (04/13/23 0957)  Pulse: 73 (04/13/23 1025)  Resp: 16  (23 1025)  BP: (!) 144/65 (23 1025)  SpO2: 98 % (23 1025)   Vital Signs (24h Range):  Temp:  [97.8 °F (36.6 °C)] 97.8 °F (36.6 °C)  Pulse:  [53-73] 73  Resp:  [15-18] 16  SpO2:  [96 %-98 %] 98 %  BP: (127-146)/(62-78) 144/65     Weight: 61 kg (134 lb 7.7 oz) (23 1013)  Body mass index is 19.3 kg/m².    No intake or output data in the 24 hours ending 23 1350    Lines/Drains/Airways       Peripheral Intravenous Line  Duration                  Peripheral IV - Single Lumen 23 1015 20 G Right Antecubital <1 day                    Physical Exam  Constitutional:       General: He is not in acute distress.  HENT:      Head: Normocephalic and atraumatic.   Eyes:      Extraocular Movements: Extraocular movements intact.   Cardiovascular:      Rate and Rhythm: Normal rate. Rhythm irregular.      Heart sounds: Normal heart sounds. No murmur heard.  Pulmonary:      Effort: Pulmonary effort is normal. No respiratory distress.      Breath sounds: Normal breath sounds. No wheezing.   Abdominal:      General: Bowel sounds are normal. There is no distension.      Palpations: Abdomen is soft. There is no hepatomegaly or mass.      Tenderness: There is no abdominal tenderness.   Musculoskeletal:      Right lower leg: No edema.      Left lower leg: No edema.   Skin:     General: Skin is warm and dry.      Findings: No rash.   Neurological:      Mental Status: He is easily aroused. He is lethargic.      Cranial Nerves: No cranial nerve deficit.      Comments: The patient is able to provide his  but not location. He wakes sometimes when I talk and but mostly remains resting with his eyes closed.        Significant Labs:  CBC:   Recent Labs   Lab 23  1016   WBC 7.35   HGB 10.2*   HCT 32.1*        CMP:   Recent Labs   Lab 23  1016   *   CALCIUM 9.5   ALBUMIN 2.9*   PROT 7.1      K 3.7   CO2 24      BUN 26*   CREATININE 1.0   ALKPHOS 137*   ALT 10   AST 10   BILITOT  0.6     Coagulation: No results for input(s): PT, INR, APTT in the last 48 hours.    Significant Imaging:  Imaging results within the past 24 hours have been reviewed.

## 2023-04-13 NOTE — CONSULTS
[facility] - Emergency Dept.  Gastroenterology  Consult Note    Patient Name: Aquiles Kelsey  MRN: [id number]  Admission Date: 4/13/2023  Hospital Length of Stay: 0 days  Code Status: Full Code   Attending Provider: Leonie Dejesus MD   Consulting Provider: Hubert Downing PA-C  Primary Care Physician: Holger Thornton MD  Principal Problem:Melena    Inpatient consult to Gastroenterology  Consult performed by: Hubert Downing PA-C  Consult ordered by: Leonie Dejesus MD  Reason for consult: Possible GI bleed        Subjective:     HPI:  The patient presented to the ER for fatigue and weakness with worsening over the last week. The patient's wife also reported confusion for several weeks. Work up revealed a Hgb of 10.2. It was 13.4 in December. , MCV 85, RDW 14.6, BUN 26, creatinine 1.0, , UA unremarkable. CXR without acute abnormalities. CT head with chronic microvascular ischemic changes. The patient takes Eliquis bid and ASA 81 mg daily. Last Eliquis dose unknown. Dr. Collins is calling the wife to try and find out. The patient has a history of AVMs. His last EGD in 2019 showed bleeding with gastric AVMs noted.   The patient is currently alone in the room. He is unable to provide a full history. It has been obtained from the medical record and medical staff.       Past Medical History:   Diagnosis Date    Acute blood loss anemia 10/14/2019    Aneurysm, abdominal aortic     Coronary artery disease     Depression     Diabetes mellitus     HLD (hyperlipidemia)     Hypertension     Kidney stone     PAD (peripheral artery disease)     PAF (paroxysmal atrial fibrillation) 1/24/2023    Tobacco abuse        Past Surgical History:   Procedure Laterality Date    APPENDECTOMY      CORONARY STENT PLACEMENT      x 4    ESOPHAGOGASTRODUODENOSCOPY N/A 10/14/2019    Procedure: EGD (ESOPHAGOGASTRODUODENOSCOPY);  Surgeon: Carlos Mcneal III, MD;  Location: [facility];  Service: Endoscopy;   Laterality: N/A;    ESOPHAGOGASTRODUODENOSCOPY N/A 10/15/2019    Procedure: EGD (ESOPHAGOGASTRODUODENOSCOPY);  Surgeon: Carlos Mcneal III, MD;  Location: [facility] ENDO;  Service: Endoscopy;  Laterality: N/A;    LEFT HEART CATHETERIZATION Left 10/11/2019    Procedure: CATHETERIZATION, HEART, LEFT;  Surgeon: Robe Gilbert MD;  Location: [facility] CATH LAB;  Service: Cardiology;  Laterality: Left;    LEFT HEART CATHETERIZATION Left 10/13/2019    Procedure: CATHETERIZATION, HEART, LEFT;  Surgeon: Robe Gilbert MD;  Location: [facility] CATH LAB;  Service: Cardiology;  Laterality: Left;    leg stent Left        Review of patient's allergies indicates:   Allergen Reactions    Metoprolol Other (See Comments)     Junctional rhythm       Family History       Problem Relation (Age of Onset)    COPD Father    Diabetes Mother, Brother          Tobacco Use    Smoking status: Every Day     Types: Cigars     Passive exposure: Never    Smokeless tobacco: Current   Substance and Sexual Activity    Alcohol use: Not Currently    Drug use: Not Currently    Sexual activity: Not Currently     Review of Systems   Reason unable to perform ROS: Patient denies pain but unable to provide any other meaningful ROS.   Objective:     Vital Signs (Most Recent):  Temp: 97.8 °F (36.6 °C) (04/13/23 0957)  Pulse: 73 (04/13/23 1025)  Resp: 16 (04/13/23 1025)  BP: (!) 144/65 (04/13/23 1025)  SpO2: 98 % (04/13/23 1025)   Vital Signs (24h Range):  Temp:  [97.8 °F (36.6 °C)] 97.8 °F (36.6 °C)  Pulse:  [53-73] 73  Resp:  [15-18] 16  SpO2:  [96 %-98 %] 98 %  BP: (127-146)/(62-78) 144/65     Weight: 61 kg (134 lb 7.7 oz) (04/13/23 1013)  Body mass index is 19.3 kg/m².    No intake or output data in the 24 hours ending 04/13/23 1350    Lines/Drains/Airways       Peripheral Intravenous Line  Duration                  Peripheral IV - Single Lumen 04/13/23 1015 20 G Right Antecubital <1 day                    Physical Exam  Constitutional:       General: He is  not in acute distress.  HENT:      Head: Normocephalic and atraumatic.   Eyes:      Extraocular Movements: Extraocular movements intact.   Cardiovascular:      Rate and Rhythm: Normal rate. Rhythm irregular.      Heart sounds: Normal heart sounds. No murmur heard.  Pulmonary:      Effort: Pulmonary effort is normal. No respiratory distress.      Breath sounds: Normal breath sounds. No wheezing.   Abdominal:      General: Bowel sounds are normal. There is no distension.      Palpations: Abdomen is soft. There is no hepatomegaly or mass.      Tenderness: There is no abdominal tenderness.   Musculoskeletal:      Right lower leg: No edema.      Left lower leg: No edema.   Skin:     General: Skin is warm and dry.      Findings: No rash.   Neurological:      Mental Status: He is easily aroused. He is lethargic.      Cranial Nerves: No cranial nerve deficit.      Comments: The patient is able to provide his  but not location. He wakes sometimes when I talk and but mostly remains resting with his eyes closed.        Significant Labs:  CBC:   Recent Labs   Lab 23  1016   WBC 7.35   HGB 10.2*   HCT 32.1*        CMP:   Recent Labs   Lab 23  1016   *   CALCIUM 9.5   ALBUMIN 2.9*   PROT 7.1      K 3.7   CO2 24      BUN 26*   CREATININE 1.0   ALKPHOS 137*   ALT 10   AST 10   BILITOT 0.6     Coagulation: No results for input(s): PT, INR, APTT in the last 48 hours.    Significant Imaging:  Imaging results within the past 24 hours have been reviewed.    Assessment/Plan:     Cardiac/Vascular  PAF (paroxysmal atrial fibrillation)  Eliquis being held.     Endocrine  Type 2 diabetes mellitus, without long-term current use of insulin  Insulin sliding scale and blood glucose monitoring per protocol.     GI  * Melena  Patient with reported melena, decreased Hgb, increased BUN and history of AVMs currently on Eliquis.   Agree with holding Eliquis. Will consider EGD after held two days.   Start  Protonix 40 mg IV.   GI ok with starting a diet.    Monitor H/H and transfuse as indicated.         Thank you for your consult. I will follow-up with patient. Please contact us if you have any additional questions.    Hubert Downing PA-C  Gastroenterology  [facility] - Emergency Dept.

## 2023-04-13 NOTE — PHARMACY MED REC
"Admission Medication History     The home medication history was taken by Alberto Gates.    You may go to "Admission" then "Reconcile Home Medications" tabs to review and/or act upon these items.     The home medication list has been updated by the Pharmacy department.   Please read ALL comments highlighted in yellow.   Please address this information as you see fit.    Feel free to contact us if you have any questions or require assistance.      Medications listed below were obtained from: Analytic software- [employer] and Medical records  (Not in a hospital admission)      Alberto Gatse  [id number]    Current Outpatient Medications on File Prior to Encounter   Medication Sig Dispense Refill Last Dose    aspirin (ECOTRIN) 81 MG EC tablet Take 1 tablet (81 mg total) by mouth once daily.  0     alcohol swabs (ALCOHOL PREP PADS) PadM Twice a day 400 each 3     amLODIPine (NORVASC) 5 MG tablet TAKE 1 TABLET BY MOUTH EVERY DAY 90 tablet 3     apixaban (ELIQUIS) 5 mg Tab Take 1 tablet (5 mg total) by mouth 2 (two) times daily. 60 tablet 12     atorvastatin (LIPITOR) 40 MG tablet Take 1 tablet (40 mg total) by mouth once daily. 90 tablet 3     blood sugar diagnostic Strp Test blood sugars twice a day 200 strip 6     gabapentin (NEURONTIN) 100 MG capsule Take 2 capsules (200 mg total) by mouth 2 (two) times daily as needed. 120 capsule 11     isosorbide mononitrate (IMDUR) 120 MG 24 hr tablet TAKE 1 TABLET BY MOUTH ONCE DAILY 90 tablet 3     lancets (ACCU-CHEK SOFTCLIX LANCETS) Misc Test blood sugars twice a day 200 each 6     metFORMIN (GLUCOPHAGE) 500 MG tablet Take 1 tablet (500 mg total) by mouth daily with breakfast. 90 tablet 3     MYRBETRIQ 50 mg Tb24 TAKE 1 TABLET BY MOUTH EVERY DAY 90 tablet 3     ondansetron (ZOFRAN-ODT) 4 MG TbDL Take 1 tablet (4 mg total) by mouth every 6 (six) hours as needed. 15 tablet 0                            .        "

## 2023-04-13 NOTE — ASSESSMENT & PLAN NOTE
Patient with Paroxysmal (<7 days) atrial fibrillation which is controlled currently with Calcium Channel Blocker. Patient is currently in sinus rhythm.[id number] Score: 4. Anticoagulation indicated. Anticoagulation done with Eliquis.    - Eliquis held due to possible GI bleed

## 2023-04-13 NOTE — ASSESSMENT & PLAN NOTE
- Hemeoccult +, Hbg dropped from 13-10 (2 months)  - GI consulted.  Case dw AMBER Gaspar.  Plan for possible scope on Saturday.  OK to feed today.  - Hold eliquis  - repeat CBC in AM

## 2023-04-13 NOTE — H&P
[facility] - Emergency Dept.  [facility] Medicine  History & Physical    Patient Name: Aquiles Kelsey  MRN: [id number]  Patient Class: OP- Observation  Admission Date: 4/13/2023  Attending Physician: Leonie Dejesus MD   Primary Care Provider: Holger Thornton MD         Patient information was obtained from past medical records and ER records.     Subjective:     Principal Problem:Melena    Chief Complaint:   Chief Complaint   Patient presents with    Fatigue     Wife called for generalized weakness and lethargy since yesterday and confusion for months. Pt denies any complaints.         HPI: Mr. Edwards is a 83 yo male with hx of PAD, Afib, HTN, HLD, DM, Depression and anemia presented to the ER because his wife feels has generalized weakness, lethargy and is more confused.  He does not have diagnosed dementia and family is not at bedside to give specifics of his confusion.  Head CT showed chronic microvascular ischemic changes. He was noted to have a drop in hbg from 13 to 10 over the last few months.  Family that was initially at bedside mentioned to the ER staff that he had melena.  Patient unable to tell me when his last BM was and if it was dark, but states he has not seen any blood in his stool.  GI was consulted and planning to scope the patient, but would like to speak to  family as patient has been on Eliquis but unclear as to the last dose.  GI has cleared patient to eat today and planning to possibly scope on Saturday.  Will continue to home Eliquis and repeat labs in AM.  Hospital medicine will admit to observation for possible  GI bleed      Past Medical History:   Diagnosis Date    Acute blood loss anemia 10/14/2019    Aneurysm, abdominal aortic     Coronary artery disease     Depression     Diabetes mellitus     HLD (hyperlipidemia)     Hypertension     Kidney stone     PAD (peripheral artery disease)     PAF (paroxysmal atrial fibrillation) 1/24/2023    Tobacco abuse        Past  Surgical History:   Procedure Laterality Date    APPENDECTOMY      CORONARY STENT PLACEMENT      x 4    ESOPHAGOGASTRODUODENOSCOPY N/A 10/14/2019    Procedure: EGD (ESOPHAGOGASTRODUODENOSCOPY);  Surgeon: Carlos Mcneal III, MD;  Location: [facility] ENDO;  Service: Endoscopy;  Laterality: N/A;    ESOPHAGOGASTRODUODENOSCOPY N/A 10/15/2019    Procedure: EGD (ESOPHAGOGASTRODUODENOSCOPY);  Surgeon: Carlos Mcneal III, MD;  Location: [facility] ENDO;  Service: Endoscopy;  Laterality: N/A;    LEFT HEART CATHETERIZATION Left 10/11/2019    Procedure: CATHETERIZATION, HEART, LEFT;  Surgeon: Robe Gilbert MD;  Location: [facility] CATH LAB;  Service: Cardiology;  Laterality: Left;    LEFT HEART CATHETERIZATION Left 10/13/2019    Procedure: CATHETERIZATION, HEART, LEFT;  Surgeon: Robe Gilbert MD;  Location: [facility] CATH LAB;  Service: Cardiology;  Laterality: Left;    leg stent Left        Review of patient's allergies indicates:   Allergen Reactions    Metoprolol Other (See Comments)     Junctional rhythm         No current facility-administered medications on file prior to encounter.     Current Outpatient Medications on File Prior to Encounter   Medication Sig    aspirin (ECOTRIN) 81 MG EC tablet Take 1 tablet (81 mg total) by mouth once daily.    alcohol swabs (ALCOHOL PREP PADS) PadM Twice a day    amLODIPine (NORVASC) 5 MG tablet TAKE 1 TABLET BY MOUTH EVERY DAY    apixaban (ELIQUIS) 5 mg Tab Take 1 tablet (5 mg total) by mouth 2 (two) times daily.    atorvastatin (LIPITOR) 40 MG tablet Take 1 tablet (40 mg total) by mouth once daily.    blood sugar diagnostic Strp Test blood sugars twice a day    gabapentin (NEURONTIN) 100 MG capsule Take 2 capsules (200 mg total) by mouth 2 (two) times daily as needed.    isosorbide mononitrate (IMDUR) 120 MG 24 hr tablet TAKE 1 TABLET BY MOUTH ONCE DAILY    lancets (ACCU-CHEK SOFTCLIX LANCETS) Misc Test blood sugars twice a day    metFORMIN (GLUCOPHAGE) 500 MG tablet Take 1  "tablet (500 mg total) by mouth daily with breakfast.    MYRBETRIQ 50 mg Tb24 TAKE 1 TABLET BY MOUTH EVERY DAY    ondansetron (ZOFRAN-ODT) 4 MG TbDL Take 1 tablet (4 mg total) by mouth every 6 (six) hours as needed.     Family History       Problem Relation (Age of Onset)    COPD Father    Diabetes Mother, Brother          Tobacco Use    Smoking status: Every Day     Types: Cigars     Passive exposure: Never    Smokeless tobacco: Current   Substance and Sexual Activity    Alcohol use: Not Currently    Drug use: Not Currently    Sexual activity: Not Currently     Review of Systems   Unable to perform ROS: Other (Patient does not have an official diagnosis of dementia but states "im feeling fine" to all questions.)   Objective:     Vital Signs (Most Recent):  Temp: 98.1 °F (36.7 °C) (04/13/23 1230)  Pulse: 78 (04/13/23 1230)  Resp: 16 (04/13/23 1230)  BP: 136/72 (04/13/23 1230)  SpO2: 98 % (04/13/23 1230) Vital Signs (24h Range):  Temp:  [97.8 °F (36.6 °C)-98.1 °F (36.7 °C)] 98.1 °F (36.7 °C)  Pulse:  [53-78] 78  Resp:  [15-18] 16  SpO2:  [96 %-98 %] 98 %  BP: (127-146)/(62-78) 136/72     Weight: 61 kg (134 lb 7.7 oz)  Body mass index is 19.3 kg/m².    Physical Exam  Vitals and nursing note reviewed.   Constitutional:       Appearance: Normal appearance.   HENT:      Head: Normocephalic and atraumatic.      Nose: Nose normal.      Mouth/Throat:      Mouth: Mucous membranes are moist.   Eyes:      Extraocular Movements: Extraocular movements intact.      Conjunctiva/sclera: Conjunctivae normal.   Cardiovascular:      Rate and Rhythm: Normal rate and regular rhythm.      Pulses: Normal pulses.      Heart sounds: Normal heart sounds.   Pulmonary:      Effort: Pulmonary effort is normal.      Breath sounds: Normal breath sounds.   Abdominal:      General: Abdomen is flat. Bowel sounds are normal.      Palpations: Abdomen is soft.   Musculoskeletal:         General: Normal range of motion.      Cervical back: Normal " range of motion and neck supple.   Skin:     General: Skin is warm.      Capillary Refill: Capillary refill takes less than 2 seconds.   Neurological:      Mental Status: He is alert. He is disoriented.      Comments: Pleasantly confused   Psychiatric:         Mood and Affect: Mood normal.         Behavior: Behavior normal.           Significant Labs: All pertinent labs within the past 24 hours have been reviewed.  Recent Lab Results         04/13/23  1157   04/13/23  1119   04/13/23  1016        Albumin     2.9       Alkaline Phosphatase     137       ALT     10       Anion Gap     9       Appearance, UA   Clear         AST     10       Bacteria, UA   Rare         Baso #     0.03       Basophil %     0.4       Bilirubin (UA)   Negative         BILIRUBIN TOTAL     0.6  Comment: For infants and newborns, interpretation of results should be based  on gestational age, weight and in agreement with clinical  observations.    Premature Infant recommended reference ranges:  Up to 24 hours.............<8.0 mg/dL  Up to 48 hours............<12.0 mg/dL  3-5 days..................<15.0 mg/dL  6-29 days.................<15.0 mg/dL         BNP     363  Comment: Values of less than 100 pg/ml are consistent with non-CHF populations.       BUN     26       Calcium     9.5       Chloride     105       CO2     24       Color, UA   Yellow         CPK     40       Creatinine     1.0       Differential Method     Automated       eGFR     >60       Eos #     0.0       Eosinophil %     0.3       Ferritin     68       Glucose     195       Glucose, UA   Trace         Gran # (ANC)     6.0       Gran %     81.0       Hematocrit     32.1       Hemoglobin     10.2       Hyaline Casts, UA   1         Immature Grans (Abs)     0.03  Comment: Mild elevation in immature granulocytes is non specific and   can be seen in a variety of conditions including stress response,   acute inflammation, trauma and pregnancy. Correlation with other   laboratory  and clinical findings is essential.         Immature Granulocytes     0.4       Ketones, UA   Negative         Leukocytes, UA   Negative         Lymph #     0.6       Lymph %     7.8       MCH     26.8       MCHC     31.8       MCV     85       Microscopic Comment   SEE COMMENT  Comment: Other formed elements not mentioned in the report are not   present in the microscopic examination.            Mono #     0.7       Mono %     10.1       MPV     9.7       NITRITE UA   Negative         nRBC     0       Occult Blood Positive           Occult Blood UA   Trace         pH, UA   6.0         Platelets     272       Potassium     3.7       PROTEIN TOTAL     7.1       Protein, UA   3+  Comment: Recommend a 24 hour urine protein or a urine   protein/creatinine ratio if globulin induced proteinuria is  clinically suspected.           RBC     3.80       RBC, UA   1         RDW     14.6       Sodium     138       Specific Gravity, UA   1.020         Specimen UA   Urine, Clean Catch         Troponin I     0.022  Comment: The reference interval for Troponin I represents the 99th percentile   cutoff   for our facility and is consistent with 3rd generation assay   performance.         UROBILINOGEN UA   Negative         WBC, UA   0         WBC     7.35               Significant Imaging:   X-Ray Chest AP Portable   Final Result      There is no focal pulmonary infiltrate visualized.         Electronically signed by: Lobo Marques MD   Date:    04/13/2023   Time:    11:05      CT Head Without Contrast   Final Result      Chronic microvascular ischemic changes.      All CT scans at this facility use dose modulation, iterative reconstruction, and/or weight based dosing when appropriate to reduce radiation dose to as low as reasonable achievable.         Electronically signed by: Grey Vieira MD   Date:    04/13/2023   Time:    11:12           Assessment/Plan:     * Melena  - Hemeoccult +, Hbg dropped from 13-10 (2 months)  - GI  consulted.  Case dw AMBER Gaspar.  Plan for possible scope on Saturday.  OK to feed today.  - Hold eliquis  - repeat CBC in AM      PAF (paroxysmal atrial fibrillation)  Patient with Paroxysmal (<7 days) atrial fibrillation which is controlled currently with Calcium Channel Blocker. Patient is currently in sinus rhythm.[id number] Score: 4. Anticoagulation indicated. Anticoagulation done with Eliquis.    - Eliquis held due to possible GI bleed    Memory deficit  - Patient has a documented history from his PCP since 12/9/2022, but per family he has further declined.  - Likely has underlying undiagnosed dementia  - CT head: negative for acute finding.      Type 2 diabetes mellitus, without long-term current use of insulin  Patient's FSGs are uncontrolled due to hyperglycemia on current medication regimen.  Last A1c reviewed-   Lab Results   Component Value Date    HGBA1C 6.5 (H) 11/04/2022     Most recent fingerstick glucose reviewed- No results for input(s): POCTGLUCOSE in the last 24 hours.  Current correctional scale  Medium  Maintain anti-hyperglycemic dose as follows-   Antihyperglycemics (From admission, onward)    Start     Stop Route Frequency Ordered    04/13/23 1553  insulin aspart U-100 pen 1-10 Units         -- SubQ Before meals & nightly PRN 04/13/23 1456        Hold Oral hypoglycemics while patient is in the hospital.  - SSI and Accuchecks  - Diabetic diet    Dyslipidemia  - continue home meds      Essential hypertension  - continue home meds      VTE Risk Mitigation (From admission, onward)         Ordered     IP VTE HIGH RISK PATIENT  Once         04/13/23 1452     Place sequential compression device  Until discontinued         04/13/23 1452                   On 04/13/2023, patient should be placed in hospital observation services under my care.        Leonie Dejesus MD  Department of Hospital Medicine  [facility] - Emergency Dept.

## 2023-04-13 NOTE — ASSESSMENT & PLAN NOTE
Patient's FSGs are uncontrolled due to hyperglycemia on current medication regimen.  Last A1c reviewed-   Lab Results   Component Value Date    HGBA1C 6.5 (H) 11/04/2022     Most recent fingerstick glucose reviewed- No results for input(s): POCTGLUCOSE in the last 24 hours.  Current correctional scale  Medium  Maintain anti-hyperglycemic dose as follows-   Antihyperglycemics (From admission, onward)    Start     Stop Route Frequency Ordered    04/13/23 1553  insulin aspart U-100 pen 1-10 Units         -- SubQ Before meals & nightly PRN 04/13/23 1456        Hold Oral hypoglycemics while patient is in the hospital.  - SSI and Accuchecks  - Diabetic diet

## 2023-04-14 PROBLEM — G93.40 ACUTE ENCEPHALOPATHY: Status: ACTIVE | Noted: 2023-04-14

## 2023-04-14 LAB
ANION GAP SERPL CALC-SCNC: 8 MMOL/L (ref 8–16)
BASOPHILS # BLD AUTO: 0.02 K/UL (ref 0–0.2)
BASOPHILS NFR BLD: 0.3 % (ref 0–1.9)
BUN SERPL-MCNC: 20 MG/DL (ref 8–23)
CALCIUM SERPL-MCNC: 8.9 MG/DL (ref 8.7–10.5)
CHLORIDE SERPL-SCNC: 103 MMOL/L (ref 95–110)
CO2 SERPL-SCNC: 24 MMOL/L (ref 23–29)
CREAT SERPL-MCNC: 0.8 MG/DL (ref 0.5–1.4)
DIFFERENTIAL METHOD: ABNORMAL
EOSINOPHIL # BLD AUTO: 0 K/UL (ref 0–0.5)
EOSINOPHIL NFR BLD: 0.4 % (ref 0–8)
ERYTHROCYTE [DISTWIDTH] IN BLOOD BY AUTOMATED COUNT: 14.3 % (ref 11.5–14.5)
EST. GFR  (NO RACE VARIABLE): >60 ML/MIN/1.73 M^2
ESTIMATED AVG GLUCOSE: 123 MG/DL (ref 68–131)
GLUCOSE SERPL-MCNC: 141 MG/DL (ref 70–110)
HBA1C MFR BLD: 5.9 % (ref 4–5.6)
HCT VFR BLD AUTO: 31 % (ref 40–54)
HCT VFR BLD AUTO: 32.9 % (ref 40–54)
HGB BLD-MCNC: 10.3 G/DL (ref 14–18)
HGB BLD-MCNC: 9.9 G/DL (ref 14–18)
IMM GRANULOCYTES # BLD AUTO: 0.04 K/UL (ref 0–0.04)
IMM GRANULOCYTES NFR BLD AUTO: 0.5 % (ref 0–0.5)
IRON SERPL-MCNC: 15 UG/DL (ref 45–160)
LYMPHOCYTES # BLD AUTO: 0.5 K/UL (ref 1–4.8)
LYMPHOCYTES NFR BLD: 7 % (ref 18–48)
MCH RBC QN AUTO: 26.8 PG (ref 27–31)
MCHC RBC AUTO-ENTMCNC: 31.9 G/DL (ref 32–36)
MCV RBC AUTO: 84 FL (ref 82–98)
MONOCYTES # BLD AUTO: 0.7 K/UL (ref 0.3–1)
MONOCYTES NFR BLD: 9.1 % (ref 4–15)
NEUTROPHILS # BLD AUTO: 6.4 K/UL (ref 1.8–7.7)
NEUTROPHILS NFR BLD: 82.7 % (ref 38–73)
NRBC BLD-RTO: 0 /100 WBC
PLATELET # BLD AUTO: 257 K/UL (ref 150–450)
PMV BLD AUTO: 10 FL (ref 9.2–12.9)
POCT GLUCOSE: 151 MG/DL (ref 70–110)
POCT GLUCOSE: 171 MG/DL (ref 70–110)
POCT GLUCOSE: 198 MG/DL (ref 70–110)
POCT GLUCOSE: 98 MG/DL (ref 70–110)
POTASSIUM SERPL-SCNC: 3.7 MMOL/L (ref 3.5–5.1)
RBC # BLD AUTO: 3.69 M/UL (ref 4.6–6.2)
SATURATED IRON: 5 % (ref 20–50)
SODIUM SERPL-SCNC: 135 MMOL/L (ref 136–145)
TOTAL IRON BINDING CAPACITY: 295 UG/DL (ref 250–450)
TRANSFERRIN SERPL-MCNC: 199 MG/DL (ref 200–375)
WBC # BLD AUTO: 7.68 K/UL (ref 3.9–12.7)

## 2023-04-14 PROCEDURE — 80048 BASIC METABOLIC PNL TOTAL CA: CPT | Mod: HCNC | Performed by: FAMILY MEDICINE

## 2023-04-14 PROCEDURE — G0378 HOSPITAL OBSERVATION PER HR: HCPCS | Mod: HCNC

## 2023-04-14 PROCEDURE — 97530 THERAPEUTIC ACTIVITIES: CPT | Mod: HCNC

## 2023-04-14 PROCEDURE — 85018 HEMOGLOBIN: CPT | Mod: HCNC | Performed by: NURSE PRACTITIONER

## 2023-04-14 PROCEDURE — 25000003 PHARM REV CODE 250: Mod: HCNC | Performed by: FAMILY MEDICINE

## 2023-04-14 PROCEDURE — 97166 OT EVAL MOD COMPLEX 45 MIN: CPT | Mod: HCNC

## 2023-04-14 PROCEDURE — 97162 PT EVAL MOD COMPLEX 30 MIN: CPT | Mod: HCNC

## 2023-04-14 PROCEDURE — 36415 COLL VENOUS BLD VENIPUNCTURE: CPT | Mod: HCNC | Performed by: FAMILY MEDICINE

## 2023-04-14 PROCEDURE — 63600175 PHARM REV CODE 636 W HCPCS: Mod: HCNC | Performed by: STUDENT IN AN ORGANIZED HEALTH CARE EDUCATION/TRAINING PROGRAM

## 2023-04-14 PROCEDURE — 99232 SBSQ HOSP IP/OBS MODERATE 35: CPT | Mod: HCNC,,, | Performed by: PHYSICIAN ASSISTANT

## 2023-04-14 PROCEDURE — 96374 THER/PROPH/DIAG INJ IV PUSH: CPT

## 2023-04-14 PROCEDURE — 85014 HEMATOCRIT: CPT | Mod: HCNC | Performed by: NURSE PRACTITIONER

## 2023-04-14 PROCEDURE — 36415 COLL VENOUS BLD VENIPUNCTURE: CPT | Mod: HCNC | Performed by: NURSE PRACTITIONER

## 2023-04-14 PROCEDURE — 85025 COMPLETE CBC W/AUTO DIFF WBC: CPT | Mod: HCNC | Performed by: FAMILY MEDICINE

## 2023-04-14 PROCEDURE — 99232 PR SUBSEQUENT HOSPITAL CARE,LEVL II: ICD-10-PCS | Mod: HCNC,,, | Performed by: PHYSICIAN ASSISTANT

## 2023-04-14 PROCEDURE — 96372 THER/PROPH/DIAG INJ SC/IM: CPT | Mod: 59 | Performed by: FAMILY MEDICINE

## 2023-04-14 PROCEDURE — 63600175 PHARM REV CODE 636 W HCPCS: Mod: HCNC | Performed by: FAMILY MEDICINE

## 2023-04-14 PROCEDURE — 25000003 PHARM REV CODE 250: Mod: HCNC | Performed by: PHYSICIAN ASSISTANT

## 2023-04-14 RX ORDER — PANTOPRAZOLE SODIUM 40 MG/1
40 TABLET, DELAYED RELEASE ORAL DAILY
Status: DISCONTINUED | OUTPATIENT
Start: 2023-04-14 | End: 2023-04-17

## 2023-04-14 RX ADMIN — ISOSORBIDE MONONITRATE 120 MG: 60 TABLET, EXTENDED RELEASE ORAL at 09:04

## 2023-04-14 RX ADMIN — LORAZEPAM 1 MG: 2 INJECTION INTRAMUSCULAR; INTRAVENOUS at 11:04

## 2023-04-14 RX ADMIN — SENNOSIDES AND DOCUSATE SODIUM 1 TABLET: 50; 8.6 TABLET ORAL at 09:04

## 2023-04-14 RX ADMIN — AMLODIPINE BESYLATE 5 MG: 5 TABLET ORAL at 09:04

## 2023-04-14 RX ADMIN — POLYETHYLENE GLYCOL 3350 17 G: 17 POWDER, FOR SOLUTION ORAL at 09:04

## 2023-04-14 RX ADMIN — OXYBUTYNIN CHLORIDE 10 MG: 5 TABLET, EXTENDED RELEASE ORAL at 09:04

## 2023-04-14 RX ADMIN — INSULIN ASPART 2 UNITS: 100 INJECTION, SOLUTION INTRAVENOUS; SUBCUTANEOUS at 12:04

## 2023-04-14 RX ADMIN — PANTOPRAZOLE SODIUM 40 MG: 40 TABLET, DELAYED RELEASE ORAL at 10:04

## 2023-04-14 RX ADMIN — INSULIN ASPART 1 UNITS: 100 INJECTION, SOLUTION INTRAVENOUS; SUBCUTANEOUS at 10:04

## 2023-04-14 RX ADMIN — ATORVASTATIN CALCIUM 40 MG: 40 TABLET, FILM COATED ORAL at 09:04

## 2023-04-14 RX ADMIN — INSULIN ASPART 2 UNITS: 100 INJECTION, SOLUTION INTRAVENOUS; SUBCUTANEOUS at 03:04

## 2023-04-14 NOTE — ASSESSMENT & PLAN NOTE
Patient with Paroxysmal (<7 days) atrial fibrillation which is controlled currently with Calcium Channel Blocker. Patient is currently in sinus rhythm.[id number] Score: 4. Anticoagulation indicated. Anticoagulation done with Eliquis.    - Eliquis held due to possible GI bleed

## 2023-04-14 NOTE — ASSESSMENT & PLAN NOTE
- Hemeoccult +, Hbg dropped from 13-10 (2 months)  - GI consulted  -H/H reviewed   -will monitor and transfuse as needed for Hgb < 7  - Hold eliquis  - repeat CBC in AM

## 2023-04-14 NOTE — PROGRESS NOTES
[facility] Medicine  Progress Note    Patient Name: Aquiles Kelsey  MRN: [id number]  Patient Class: OP- Observation   Admission Date: 4/13/2023  Length of Stay: 0 days  Attending Physician: Kyle Dejesus MD  Primary Care Provider: Holger Thornton MD        Subjective:     Principal Problem:Melena        HPI:  Mr. Edwards is a 83 yo male with hx of PAD, Afib, HTN, HLD, DM, Depression and anemia presented to the ER because his wife feels has generalized weakness, lethargy and is more confused.  He does not have diagnosed dementia and family is not at bedside to give specifics of his confusion.  Head CT showed chronic microvascular ischemic changes. He was noted to have a drop in hbg from 13 to 10 over the last few months.  Family that was initially at bedside mentioned to the ER staff that he had melena.  Patient unable to tell me when his last BM was and if it was dark, but states he has not seen any blood in his stool.  GI was consulted and planning to scope the patient, but would like to speak to  family as patient has been on Eliquis but unclear as to the last dose.  GI has cleared patient to eat today and planning to possibly scope on Saturday.  Will continue to home Eliquis and repeat labs in AM.  Hospital medicine will admit to observation for possible  GI bleed      Overview/Hospital Course:  Patient admitted to observation for further evaluation of melena and suspected GI bleed in the setting of Eliquis and AVMs.  Serial H&H results reviewed with downward trend.  Eliquis held.  GI consult with EGD planned on 4/17/23.  Acute encephalopathy noted with anxiolytics ordered as needed.  CT of head showed no acute abnormality.  Likely underlying dementia present per family.      Interval History:  Patient in bed upon exam with anxiety noted.  Patient oriented to person only.  Ativan x1 dose ordered.  H&H stable with decline.  Eliquis held.  EGD planned per GI.  PT/OT evaluation  completed with SNF recommended.    Review of Systems   Unable to perform ROS: Mental status change   Objective:     Vital Signs (Most Recent):  Temp: 97.2 °F (36.2 °C) (04/14/23 1142)  Pulse: (!) 59 (04/14/23 1142)  Resp: 20 (04/14/23 1142)  BP: (!) 117/59 (04/14/23 1142)  SpO2: 97 % (04/14/23 1142)   Vital Signs (24h Range):  Temp:  [97.2 °F (36.2 °C)-98.7 °F (37.1 °C)] 97.2 °F (36.2 °C)  Pulse:  [49-74] 59  Resp:  [16-20] 20  SpO2:  [92 %-97 %] 97 %  BP: (117-158)/(56-83) 117/59     Weight: 61.5 kg (135 lb 9.3 oz)  Body mass index is 19.45 kg/m².    Intake/Output Summary (Last 24 hours) at 4/14/2023 1305  Last data filed at 4/14/2023 1256  Gross per 24 hour   Intake 720 ml   Output 900 ml   Net -180 ml      Physical Exam  Vitals and nursing note reviewed.   Constitutional:       Appearance: Normal appearance.   HENT:      Head: Normocephalic and atraumatic.      Nose: Nose normal.      Mouth/Throat:      Mouth: Mucous membranes are moist.   Eyes:      Extraocular Movements: Extraocular movements intact.      Conjunctiva/sclera: Conjunctivae normal.   Cardiovascular:      Rate and Rhythm: Normal rate and regular rhythm.      Pulses: Normal pulses.      Heart sounds: Normal heart sounds.   Pulmonary:      Effort: Pulmonary effort is normal.      Breath sounds: Normal breath sounds.   Abdominal:      General: Abdomen is flat. Bowel sounds are normal.      Palpations: Abdomen is soft.   Musculoskeletal:         General: Normal range of motion.      Cervical back: Normal range of motion and neck supple.   Skin:     General: Skin is warm.      Capillary Refill: Capillary refill takes less than 2 seconds.   Neurological:      Mental Status: He is alert. He is disoriented.      Comments: Pleasantly confused   Psychiatric:         Mood and Affect: Mood is anxious.         Behavior: Behavior is agitated.         Cognition and Memory: Cognition is impaired.         Judgment: Judgment is impulsive.       Significant Labs: All  pertinent labs within the past 24 hours have been reviewed.  CBC:   Recent Labs   Lab 04/13/23  1016 04/14/23  0600   WBC 7.35 7.68   HGB 10.2* 9.9*   HCT 32.1* 31.0*    257     CMP:   Recent Labs   Lab 04/13/23  1016 04/14/23  0600    135*   K 3.7 3.7    103   CO2 24 24   * 141*   BUN 26* 20   CREATININE 1.0 0.8   CALCIUM 9.5 8.9   PROT 7.1  --    ALBUMIN 2.9*  --    BILITOT 0.6  --    ALKPHOS 137*  --    AST 10  --    ALT 10  --    ANIONGAP 9 8       Significant Imaging: I have reviewed all pertinent imaging results/findings within the past 24 hours.      Assessment/Plan:      * Melena  - Hemeoccult +, Hbg dropped from 13-10 (2 months)  - GI consulted  -H/H reviewed   -will monitor and transfuse as needed for Hgb < 7  - Hold eliquis  - repeat CBC in AM      Acute encephalopathy  -CT of head showed no acute abnormality.  -history of memory loss and suspected dementia reported per family  -anxiolytics as needed        PAF (paroxysmal atrial fibrillation)  Patient with Paroxysmal (<7 days) atrial fibrillation which is controlled currently with Calcium Channel Blocker. Patient is currently in sinus rhythm.[id number] Score: 4. Anticoagulation indicated. Anticoagulation done with Eliquis.    - Eliquis held due to possible GI bleed    Memory deficit  - Patient has a documented history from his PCP since 12/9/2022, but per family he has further declined.  - Likely has underlying undiagnosed dementia  - CT head: negative for acute finding.      Type 2 diabetes mellitus, without long-term current use of insulin  Patient's FSGs are uncontrolled due to hyperglycemia on current medication regimen.  Last A1c reviewed-   Lab Results   Component Value Date    HGBA1C 5.9 (H) 04/13/2023     Most recent fingerstick glucose reviewed-   Recent Labs   Lab 04/14/23  0425 04/14/23  1212   POCTGLUCOSE 98 171*     Current correctional scale  Medium  Maintain anti-hyperglycemic dose as follows-   Antihyperglycemics  (From admission, onward)    Start     Stop Route Frequency Ordered    04/13/23 1553  insulin aspart U-100 pen 1-10 Units         -- SubQ Before meals & nightly PRN 04/13/23 1456        Hold Oral hypoglycemics while patient is in the hospital.  - SSI and Accuchecks  - Diabetic diet    Dyslipidemia  - continue home meds      Essential hypertension  - continue home meds        VTE Risk Mitigation (From admission, onward)         Ordered     IP VTE HIGH RISK PATIENT  Once         04/13/23 1452     Place sequential compression device  Until discontinued         04/13/23 1452                Discharge Planning   ZEINA:      Code Status: Full Code   Is the patient medically ready for discharge?:     Reason for patient still in hospital (select all that apply): Patient trending condition, Laboratory test, Treatment and Consult recommendations                     Cynthia Giraldo NP  Department of Hospital Medicine   [facility]

## 2023-04-14 NOTE — PROGRESS NOTES
[facility]  Gastroenterology  Progress Note    Patient Name: Aquiles Kelsey  MRN: [id number]  Admission Date: 4/13/2023  Hospital Length of Stay: 0 days  Code Status: Full Code   Attending Provider: Kyle Dejesus MD  Consulting Provider: Hubert Downing PA-C  Primary Care Physician: Holger Thornton MD  Principal Problem: Melena      Subjective:     Interval History: The patient is more awake today. He ate his breakfast. Hgb 9.9. BUN normal. BP stable. No reported stools overnight.     Review of Systems   Constitutional:         See Interval History for daily ROS.    Objective:     Vital Signs (Most Recent):  Temp: 98 °F (36.7 °C) (04/14/23 0712)  Pulse: (!) 49 (04/14/23 0712)  Resp: 16 (04/14/23 0712)  BP: (!) 158/83 (04/14/23 0712)  SpO2: 97 % (04/14/23 0712)   Vital Signs (24h Range):  Temp:  [98 °F (36.7 °C)-98.7 °F (37.1 °C)] 98 °F (36.7 °C)  Pulse:  [49-78] 49  Resp:  [15-18] 16  SpO2:  [92 %-98 %] 97 %  BP: (121-158)/(56-83) 158/83     Weight: 61.5 kg (135 lb 9.3 oz) (04/13/23 2145)  Body mass index is 19.45 kg/m².      Intake/Output Summary (Last 24 hours) at 4/14/2023 1001  Last data filed at 4/14/2023 1000  Gross per 24 hour   Intake 1480 ml   Output 900 ml   Net 580 ml       Lines/Drains/Airways       Peripheral Intravenous Line  Duration                  Peripheral IV - Single Lumen 04/13/23 1015 20 G Right Antecubital <1 day                    Physical Exam  Constitutional:       General: He is awake. He is not in acute distress.     Appearance: He is well-developed.   HENT:      Head: Normocephalic and atraumatic.   Eyes:      Extraocular Movements: Extraocular movements intact.   Cardiovascular:      Rate and Rhythm: Normal rate and regular rhythm.   Pulmonary:      Effort: Pulmonary effort is normal. No respiratory distress.      Breath sounds: Normal breath sounds. No wheezing.   Abdominal:      General: Bowel sounds are normal. There is no distension.      Palpations:  Abdomen is soft.      Tenderness: There is no abdominal tenderness.   Neurological:      Cranial Nerves: No cranial nerve deficit.       Significant Labs:  CBC:   Recent Labs   Lab 04/13/23  1016 04/14/23  0600   WBC 7.35 7.68   HGB 10.2* 9.9*   HCT 32.1* 31.0*    257     CMP:   Recent Labs   Lab 04/13/23  1016 04/14/23  0600   * 141*   CALCIUM 9.5 8.9   ALBUMIN 2.9*  --    PROT 7.1  --     135*   K 3.7 3.7   CO2 24 24    103   BUN 26* 20   CREATININE 1.0 0.8   ALKPHOS 137*  --    ALT 10  --    AST 10  --    BILITOT 0.6  --      Coagulation: No results for input(s): PT, INR, APTT in the last 48 hours.      Significant Imaging:  Imaging results within the past 24 hours have been reviewed.    Assessment/Plan:     Cardiac/Vascular  PAF (paroxysmal atrial fibrillation)  Eliquis being held.     Endocrine  Type 2 diabetes mellitus, without long-term current use of insulin  Insulin sliding scale and blood glucose monitoring per protocol.     GI  * Melena  Patient with reported melena, decreased Hgb, increased BUN and history of AVMs currently on Eliquis.   Agree with holding Eliquis. Will consider EGD this hospitalization.   Continue Protonix 40 mg.   Monitor H/H and transfuse as indicated.         Thank you for your consult. I will follow-up with patient. Please contact us if you have any additional questions.    Hubert Downing PA-C  Gastroenterology  [facility]

## 2023-04-14 NOTE — PLAN OF CARE
Patient has remained confused throughout the shift. VSS. No obvious s/sx of distress noted. No bowel movements so far this shift. POC reviewed with the wife at beside due to patients confusion, verbalized understanding. POC reviewed with the patient, verbalized understanding. No further needs at this time, call light within reach bed alarm set. Continue POC as ordered, chart check completed and all orders reviewed.

## 2023-04-14 NOTE — PLAN OF CARE
SEE EVAL FOR DETAILS. PT DISPLAYED DEFICITS WITH ADL'S SKILLS; DECREASE B UE STRENGTH/ENDURANCE DEFICITS WITH FUNCTIONAL MOBILITY/ TRANSFERS. RECOMMENDATION:SNF

## 2023-04-14 NOTE — ASSESSMENT & PLAN NOTE
Patient's FSGs are uncontrolled due to hyperglycemia on current medication regimen.  Last A1c reviewed-   Lab Results   Component Value Date    HGBA1C 5.9 (H) 04/13/2023     Most recent fingerstick glucose reviewed-   Recent Labs   Lab 04/14/23  0425 04/14/23  1212   POCTGLUCOSE 98 171*     Current correctional scale  Medium  Maintain anti-hyperglycemic dose as follows-   Antihyperglycemics (From admission, onward)    Start     Stop Route Frequency Ordered    04/13/23 1553  insulin aspart U-100 pen 1-10 Units         -- SubQ Before meals & nightly PRN 04/13/23 1456        Hold Oral hypoglycemics while patient is in the hospital.  - SSI and Accuchecks  - Diabetic diet

## 2023-04-14 NOTE — SUBJECTIVE & OBJECTIVE
Interval History:  Patient in bed upon exam with anxiety noted.  Patient oriented to person only.  Ativan x1 dose ordered.  H&H stable with decline.  Eliquis held.  EGD planned for 4/17/23 per GI.     Review of Systems   Unable to perform ROS: Mental status change   Objective:     Vital Signs (Most Recent):  Temp: 97.2 °F (36.2 °C) (04/14/23 1142)  Pulse: (!) 59 (04/14/23 1142)  Resp: 20 (04/14/23 1142)  BP: (!) 117/59 (04/14/23 1142)  SpO2: 97 % (04/14/23 1142)   Vital Signs (24h Range):  Temp:  [97.2 °F (36.2 °C)-98.7 °F (37.1 °C)] 97.2 °F (36.2 °C)  Pulse:  [49-74] 59  Resp:  [16-20] 20  SpO2:  [92 %-97 %] 97 %  BP: (117-158)/(56-83) 117/59     Weight: 61.5 kg (135 lb 9.3 oz)  Body mass index is 19.45 kg/m².    Intake/Output Summary (Last 24 hours) at 4/14/2023 1305  Last data filed at 4/14/2023 1256  Gross per 24 hour   Intake 720 ml   Output 900 ml   Net -180 ml      Physical Exam  Vitals and nursing note reviewed.   Constitutional:       Appearance: Normal appearance.   HENT:      Head: Normocephalic and atraumatic.      Nose: Nose normal.      Mouth/Throat:      Mouth: Mucous membranes are moist.   Eyes:      Extraocular Movements: Extraocular movements intact.      Conjunctiva/sclera: Conjunctivae normal.   Cardiovascular:      Rate and Rhythm: Normal rate and regular rhythm.      Pulses: Normal pulses.      Heart sounds: Normal heart sounds.   Pulmonary:      Effort: Pulmonary effort is normal.      Breath sounds: Normal breath sounds.   Abdominal:      General: Abdomen is flat. Bowel sounds are normal.      Palpations: Abdomen is soft.   Musculoskeletal:         General: Normal range of motion.      Cervical back: Normal range of motion and neck supple.   Skin:     General: Skin is warm.      Capillary Refill: Capillary refill takes less than 2 seconds.   Neurological:      Mental Status: He is alert. He is disoriented.      Comments: Pleasantly confused   Psychiatric:         Mood and Affect: Mood is  anxious.         Behavior: Behavior is agitated.         Cognition and Memory: Cognition is impaired.         Judgment: Judgment is impulsive.       Significant Labs: All pertinent labs within the past 24 hours have been reviewed.  CBC:   Recent Labs   Lab 04/13/23  1016 04/14/23  0600   WBC 7.35 7.68   HGB 10.2* 9.9*   HCT 32.1* 31.0*    257     CMP:   Recent Labs   Lab 04/13/23  1016 04/14/23  0600    135*   K 3.7 3.7    103   CO2 24 24   * 141*   BUN 26* 20   CREATININE 1.0 0.8   CALCIUM 9.5 8.9   PROT 7.1  --    ALBUMIN 2.9*  --    BILITOT 0.6  --    ALKPHOS 137*  --    AST 10  --    ALT 10  --    ANIONGAP 9 8       Significant Imaging: I have reviewed all pertinent imaging results/findings within the past 24 hours.

## 2023-04-14 NOTE — ASSESSMENT & PLAN NOTE
-CT of head showed no acute abnormality.  -history of memory loss and suspected dementia reported per family  -anxiolytics as needed       No

## 2023-04-14 NOTE — SUBJECTIVE & OBJECTIVE
Subjective:     Interval History: The patient is more awake today. He ate his breakfast. Hgb 9.9. BUN normal. BP stable.     Review of Systems   Constitutional:         See Interval History for daily ROS.    Objective:     Vital Signs (Most Recent):  Temp: 98 °F (36.7 °C) (04/14/23 0712)  Pulse: (!) 49 (04/14/23 0712)  Resp: 16 (04/14/23 0712)  BP: (!) 158/83 (04/14/23 0712)  SpO2: 97 % (04/14/23 0712)   Vital Signs (24h Range):  Temp:  [98 °F (36.7 °C)-98.7 °F (37.1 °C)] 98 °F (36.7 °C)  Pulse:  [49-78] 49  Resp:  [15-18] 16  SpO2:  [92 %-98 %] 97 %  BP: (121-158)/(56-83) 158/83     Weight: 61.5 kg (135 lb 9.3 oz) (04/13/23 2145)  Body mass index is 19.45 kg/m².      Intake/Output Summary (Last 24 hours) at 4/14/2023 1001  Last data filed at 4/14/2023 1000  Gross per 24 hour   Intake 1480 ml   Output 900 ml   Net 580 ml       Lines/Drains/Airways       Peripheral Intravenous Line  Duration                  Peripheral IV - Single Lumen 04/13/23 1015 20 G Right Antecubital <1 day                    Physical Exam  Constitutional:       General: He is awake. He is not in acute distress.     Appearance: He is well-developed.   HENT:      Head: Normocephalic and atraumatic.   Eyes:      Extraocular Movements: Extraocular movements intact.   Cardiovascular:      Rate and Rhythm: Normal rate and regular rhythm.   Pulmonary:      Effort: Pulmonary effort is normal. No respiratory distress.      Breath sounds: Normal breath sounds. No wheezing.   Abdominal:      General: Bowel sounds are normal. There is no distension.      Palpations: Abdomen is soft.      Tenderness: There is no abdominal tenderness.   Neurological:      Cranial Nerves: No cranial nerve deficit.       Significant Labs:  CBC:   Recent Labs   Lab 04/13/23  1016 04/14/23  0600   WBC 7.35 7.68   HGB 10.2* 9.9*   HCT 32.1* 31.0*    257     CMP:   Recent Labs   Lab 04/13/23  1016 04/14/23  0600   * 141*   CALCIUM 9.5 8.9   ALBUMIN 2.9*  --     PROT 7.1  --     135*   K 3.7 3.7   CO2 24 24    103   BUN 26* 20   CREATININE 1.0 0.8   ALKPHOS 137*  --    ALT 10  --    AST 10  --    BILITOT 0.6  --      Coagulation: No results for input(s): PT, INR, APTT in the last 48 hours.      Significant Imaging:  Imaging results within the past 24 hours have been reviewed.

## 2023-04-14 NOTE — PLAN OF CARE
Pt in bed A/O on room air has no c/o pain. Meds reviewed, chart check completed.  Problem: Adult Inpatient Plan of Care  Goal: Plan of Care Review  Outcome: Ongoing, Progressing  Goal: Patient-Specific Goal (Individualized)  Outcome: Ongoing, Progressing  Goal: Optimal Comfort and Wellbeing  Outcome: Ongoing, Progressing  Goal: Readiness for Transition of Care  Outcome: Ongoing, Progressing

## 2023-04-14 NOTE — PLAN OF CARE
[facility]  Discharge Assessment    Primary Care Provider: Holger Thornton MD     Discharge Assessment (most recent)       BRIEF DISCHARGE ASSESSMENT - 04/14/23 1341          Discharge Planning    Assessment Type Discharge Planning Brief Assessment     Resource/Environmental Concerns none     Support Systems Spouse/significant other     Equipment Currently Used at Home walker, rolling;wheelchair;bedside commode;bath bench     Current Living Arrangements home     Patient/Family Anticipates Transition to home     Patient/Family Anticipated Services at Transition home health care     DME Needed Upon Discharge  none     Discharge Plan A Home Health     Discharge Plan B Home                     Modified independent with adls per wife.  Would like home health at discharge

## 2023-04-14 NOTE — PT/OT/SLP EVAL
Occupational Therapy Evaluation and Treatment    Name: Aquiles Kelsey  MRN: [id number]  Admitting Diagnosis: Melena  Recent Surgery: * No surgery found *      Recommendations:     Discharge Recommendations: nursing facility, skilled  Level of Assistance Recommended: 24 hours significant assistance  Discharge Equipment Recommendations: walker, rolling  Barriers to discharge:     Assessment:     Aquiles Kelsey is a 82 y.o. male with a medical diagnosis of Melena. He presents with performance deficits affecting function including weakness, impaired self care skills, impaired endurance, impaired functional mobility, gait instability, impaired balance.     Rehab Prognosis: Good; patient would benefit from acute OT services to address these deficits and reach maximum level of function.    Plan:     Patient to be seen 2 x/week to address the above listed problems via self-care/home management, therapeutic activities, therapeutic exercises  Plan of Care Expires: 04/28/23  Plan of Care Reviewed with: patient    Subjective     Chief Complaint: DEBILITY AND GENERALIZED WEAKNESS  Patient Comments/Goals:   Pain/Comfort:  Pain Rating 1: 0/10    Patients cultural, spiritual, Spiritism conflicts given the current situation:      Social History:  Living Environment: Patient lives with their spouse in a single story home with number of outside stair(s): 1  Prior Level of Function: Prior to admission, patient was modified independent  Roles and Routines: Patient was not driving and not working prior to admission.  Equipment Used at Home:   DME owned (not currently used): rolling walker      Objective:     Communicated with nurse and epic chart review prior to session. Patient found HOB elevated with   upon OT entry to room.    General Precautions: Standard, fall   Orthopedic Precautions: N/A   Braces: N/A    Respiratory Status: Room air    Occupational Performance    Gait belt applied - Yes    Bed Mobility:    Rolling/Turning to Left with minimum assistance  Rolling/Turning to Right with minimum assistance  Scooting to HOB in supine: minimum assistance  Scooting anteriorly to EOB to have both feet planted on floor: minimum assistance  Supine to sit from right side of bed with minimum assistance  Supine to sit from left side of bed with minimum assistance    Functional Mobility/Transfers:  Sit <> Stand Transfer with minimum assistance with rolling walker  Bed <> Chair Transfer using Step Transfer technique with minimum assistance with rolling walker  Functional Mobility: pt ambulated     Activities of Daily Living:  Grooming: maximal assistance  Upper Body Dressing: minimum assistance  Lower Body Dressing: minimum assistance    Cognitive/Visual Perceptual:  Cognitive/Psychosocial Skills:    -     Oriented to: Person, Place, Time, Situation  -     Follows Commands/attention: Follows multistep  commands  -     Communication: clear/fluent  -     Memory: No Deficits noted  -     Safety awareness/insight to disability: impaired    Physical Exam:  Balance:    -     Sitting: minimum assistance  -     Standing: minimum assistance    AMPAC 6 Click ADL:  AMPAC Total Score: 13    Treatment & Education:  Therapist provided facilitation and instruction of proper body mechanics, energy conservation, and fall prevention strategies during tasks listed above  Patient educated on role of OT, POC, and goals for therapy  Patient educated on importance of OOB activities with staff member assistance and sitting OOB majority of the day      Patient clear to stand pivot transfer with RN/PCT, assist mahin hsieh  .    Patient left HOB elevated with all lines intact, call button in reach, and RN notified.    GOALS:   Multidisciplinary Problems       Occupational Therapy Goals          Problem: Occupational Therapy    Goal Priority Disciplines Outcome Interventions   Occupational Therapy Goal     OT, PT/OT     Description: O.T. GOALS TO BE MET BY  4-28-23  WILL TOLERATE 1 SET X 15 REPS B UE ROM EXERCISE  SBA WITH LE DRESSING  SBA WITH TOILET TRANSFERS                       History:     Past Medical History:   Diagnosis Date    Acute blood loss anemia 10/14/2019    Aneurysm, abdominal aortic     Coronary artery disease     Depression     Diabetes mellitus     HLD (hyperlipidemia)     Hypertension     Kidney stone     PAD (peripheral artery disease)     PAF (paroxysmal atrial fibrillation) 1/24/2023    Tobacco abuse          Past Surgical History:   Procedure Laterality Date    APPENDECTOMY      CORONARY STENT PLACEMENT      x 4    ESOPHAGOGASTRODUODENOSCOPY N/A 10/14/2019    Procedure: EGD (ESOPHAGOGASTRODUODENOSCOPY);  Surgeon: Carlos Mcneal III, MD;  Location: [facility] ENDO;  Service: Endoscopy;  Laterality: N/A;    ESOPHAGOGASTRODUODENOSCOPY N/A 10/15/2019    Procedure: EGD (ESOPHAGOGASTRODUODENOSCOPY);  Surgeon: Carlos Mcneal III, MD;  Location: [facility] ENDO;  Service: Endoscopy;  Laterality: N/A;    LEFT HEART CATHETERIZATION Left 10/11/2019    Procedure: CATHETERIZATION, HEART, LEFT;  Surgeon: Robe Gilbert MD;  Location: [facility] CATH LAB;  Service: Cardiology;  Laterality: Left;    LEFT HEART CATHETERIZATION Left 10/13/2019    Procedure: CATHETERIZATION, HEART, LEFT;  Surgeon: Robe Gilbert MD;  Location: [facility] CATH LAB;  Service: Cardiology;  Laterality: Left;    leg stent Left        Time Tracking:     OT Date of Treatment: 04/14/23  OT Start Time: 0825  OT Stop Time: 0850  OT Total Time (min): 25 min    Billable Minutes: Evaluation 15 minutes and Therapeutic Activity 10 minutes    4/14/2023

## 2023-04-14 NOTE — PT/OT/SLP EVAL
Physical Therapy Evaluation    Patient Name:  Aquiles Kelsey   MRN:  [id number]    Recommendations:     Discharge Recommendations: nursing facility, skilled   Discharge Equipment Recommendations: none   Barriers to discharge: Decreased caregiver support    Assessment:     Aquiles Kelsey is a 82 y.o. male admitted with a medical diagnosis of Melena.  He presents with the following impairments/functional limitations: weakness, impaired endurance, impaired balance, gait instability, decreased lower extremity function, impaired self care skills, impaired cognition, impaired functional mobility, decreased ROM, decreased safety awareness .    Rehab Prognosis: Good; patient would benefit from acute skilled PT services to address these deficits and reach maximum level of function.    Recent Surgery: * No surgery found *      Plan:     During this hospitalization, patient to be seen 3 x/week to address the identified rehab impairments via gait training, therapeutic activities, therapeutic exercises and progress toward the following goals:    Plan of Care Expires:  04/28/23    Subjective     Chief Complaint: NONE   Patient/Family Comments/goals: NONE STATED   Pain/Comfort:  Pain Rating 1: 0/10  Pain Rating Post-Intervention 1: 0/10    Patients cultural, spiritual, Sikh conflicts given the current situation:      Living Environment:  PT LIVES AT HOME WITH WIFE. PT UNABLE TO REPORT PLOF  Prior to admission, patients level of function was UNKNOWN HOWEVER PT REPORTED AMBULATORY.  Equipment used at home: walker, rolling.  DME owned (not currently used): none.  Upon discharge, patient will have assistance from UNKNOWN.    Objective:     Communicated with NURSE MANUELITO JONES AND [facility] CHART REVIEW  prior to session.  Patient found supine with peripheral IV  upon PT entry to room.    General Precautions: Standard, fall  Orthopedic Precautions:N/A   Braces: N/A  Respiratory Status: Room air    Exams:  Cognitive  Exam:  Patient is oriented to Person    Functional Mobility:  Bed Mobility:     Rolling Right: minimum assistance  Supine to Sit: minimum assistance  Transfers:     Sit to Stand:  minimum assistance with rolling walker  Bed to Chair: minimum assistance with  rolling walker  using  Stand Pivot  Gait: PT GT TRAINED X40' WITH RW AND MIN A       AM-PAC 6 CLICK MOBILITY  Total Score:16       Treatment & Education:  PT RETURNED TO RM T/F TO EOB AND SCOOTED TO RIGHT SIDE. PT RETURNED SUP IN BED AS PT NO SAFE TO LEAVE UP IN CHAIR IN ROOM WITHOUT S AS PT IS A HIGH FALL RISK. PT SUP IN BED WITH MIN A AND SCOOTED TO HOB WITH MAX A. PT LEFT WITH HOB ELEVATED AND SET UP WITH BREAKFAST.     Patient left HOB elevated with call button in reach.    GOALS:   Multidisciplinary Problems       Physical Therapy Goals          Problem: Physical Therapy    Goal Priority Disciplines Outcome Goal Variances Interventions   Physical Therapy Goal     PT, PT/OT      Description: LT23  1. PT WILL COMPLETE BED MOBILITY WITH S  2. PT WILL T/F TO CHAIR WITH RW AND SBA.  3. PT WILL GT TRAIN X 100' WITH RW AND SBA                       History:     Past Medical History:   Diagnosis Date    Acute blood loss anemia 10/14/2019    Aneurysm, abdominal aortic     Coronary artery disease     Depression     Diabetes mellitus     HLD (hyperlipidemia)     Hypertension     Kidney stone     PAD (peripheral artery disease)     PAF (paroxysmal atrial fibrillation) 2023    Tobacco abuse        Past Surgical History:   Procedure Laterality Date    APPENDECTOMY      CORONARY STENT PLACEMENT      x 4    ESOPHAGOGASTRODUODENOSCOPY N/A 10/14/2019    Procedure: EGD (ESOPHAGOGASTRODUODENOSCOPY);  Surgeon: Carlos Mcneal III, MD;  Location: [facility];  Service: Endoscopy;  Laterality: N/A;    ESOPHAGOGASTRODUODENOSCOPY N/A 10/15/2019    Procedure: EGD (ESOPHAGOGASTRODUODENOSCOPY);  Surgeon: Carlos Mcneal III, MD;  Location: [facility];  Service: Endoscopy;   Laterality: N/A;    LEFT HEART CATHETERIZATION Left 10/11/2019    Procedure: CATHETERIZATION, HEART, LEFT;  Surgeon: Robe Gilbert MD;  Location: [facility] CATH LAB;  Service: Cardiology;  Laterality: Left;    LEFT HEART CATHETERIZATION Left 10/13/2019    Procedure: CATHETERIZATION, HEART, LEFT;  Surgeon: Robe Gilbert MD;  Location: [facility] CATH LAB;  Service: Cardiology;  Laterality: Left;    leg stent Left        Time Tracking:     PT Received On: 04/14/23  PT Start Time: 0853     PT Stop Time: 0920  PT Total Time (min): 27 min     Billable Minutes: Evaluation 17 and Therapeutic Activity 10      04/14/2023

## 2023-04-14 NOTE — ASSESSMENT & PLAN NOTE
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   Agree with holding Eliquis. Will consider EGD this hospitalization.   Continue Protonix 40 mg.   Monitor H/H and transfuse as indicated.

## 2023-04-14 NOTE — HOSPITAL COURSE
Patient admitted to observation for further evaluation of melena and suspected GI bleed in the setting of Eliquis and AVMs.  Serial H&H results reviewed with downward trend.  Eliquis held.  GI consult with EGD planned on 4/17/23.  Acute encephalopathy noted with anxiolytics ordered as needed.  CT of head showed no acute abnormality.  Likely underlying dementia present per family.Iron deficiency per labs, IV venofer ordered. On 4/16/23, H/H stable with EGD planned for tomorrow morning. Pt tolerating clear liquids.

## 2023-04-15 LAB
ALBUMIN SERPL BCP-MCNC: 2.6 G/DL (ref 3.5–5.2)
ALP SERPL-CCNC: 129 U/L (ref 55–135)
ALT SERPL W/O P-5'-P-CCNC: 10 U/L (ref 10–44)
ANION GAP SERPL CALC-SCNC: 9 MMOL/L (ref 8–16)
AST SERPL-CCNC: 12 U/L (ref 10–40)
BASOPHILS # BLD AUTO: 0.02 K/UL (ref 0–0.2)
BASOPHILS NFR BLD: 0.3 % (ref 0–1.9)
BILIRUB SERPL-MCNC: 0.6 MG/DL (ref 0.1–1)
BUN SERPL-MCNC: 22 MG/DL (ref 8–23)
CALCIUM SERPL-MCNC: 9 MG/DL (ref 8.7–10.5)
CHLORIDE SERPL-SCNC: 103 MMOL/L (ref 95–110)
CO2 SERPL-SCNC: 25 MMOL/L (ref 23–29)
CREAT SERPL-MCNC: 0.8 MG/DL (ref 0.5–1.4)
DIFFERENTIAL METHOD: ABNORMAL
EOSINOPHIL # BLD AUTO: 0.1 K/UL (ref 0–0.5)
EOSINOPHIL NFR BLD: 0.8 % (ref 0–8)
ERYTHROCYTE [DISTWIDTH] IN BLOOD BY AUTOMATED COUNT: 14.2 % (ref 11.5–14.5)
EST. GFR  (NO RACE VARIABLE): >60 ML/MIN/1.73 M^2
GLUCOSE SERPL-MCNC: 139 MG/DL (ref 70–110)
HCT VFR BLD AUTO: 29.3 % (ref 40–54)
HGB BLD-MCNC: 9.5 G/DL (ref 14–18)
IMM GRANULOCYTES # BLD AUTO: 0.02 K/UL (ref 0–0.04)
IMM GRANULOCYTES NFR BLD AUTO: 0.3 % (ref 0–0.5)
LYMPHOCYTES # BLD AUTO: 0.7 K/UL (ref 1–4.8)
LYMPHOCYTES NFR BLD: 11 % (ref 18–48)
MAGNESIUM SERPL-MCNC: 1.8 MG/DL (ref 1.6–2.6)
MCH RBC QN AUTO: 27.1 PG (ref 27–31)
MCHC RBC AUTO-ENTMCNC: 32.4 G/DL (ref 32–36)
MCV RBC AUTO: 84 FL (ref 82–98)
MONOCYTES # BLD AUTO: 0.6 K/UL (ref 0.3–1)
MONOCYTES NFR BLD: 9.6 % (ref 4–15)
NEUTROPHILS # BLD AUTO: 4.7 K/UL (ref 1.8–7.7)
NEUTROPHILS NFR BLD: 78 % (ref 38–73)
NRBC BLD-RTO: 0 /100 WBC
PLATELET # BLD AUTO: 278 K/UL (ref 150–450)
PMV BLD AUTO: 10.2 FL (ref 9.2–12.9)
POCT GLUCOSE: 129 MG/DL (ref 70–110)
POCT GLUCOSE: 145 MG/DL (ref 70–110)
POCT GLUCOSE: 149 MG/DL (ref 70–110)
POCT GLUCOSE: 167 MG/DL (ref 70–110)
POTASSIUM SERPL-SCNC: 3.6 MMOL/L (ref 3.5–5.1)
PROT SERPL-MCNC: 6.6 G/DL (ref 6–8.4)
RBC # BLD AUTO: 3.51 M/UL (ref 4.6–6.2)
SODIUM SERPL-SCNC: 137 MMOL/L (ref 136–145)
WBC # BLD AUTO: 6.07 K/UL (ref 3.9–12.7)

## 2023-04-15 PROCEDURE — 99499 UNLISTED E&M SERVICE: CPT | Mod: HCNC,,, | Performed by: INTERNAL MEDICINE

## 2023-04-15 PROCEDURE — 97116 GAIT TRAINING THERAPY: CPT | Mod: HCNC,CQ

## 2023-04-15 PROCEDURE — 36415 COLL VENOUS BLD VENIPUNCTURE: CPT | Mod: HCNC | Performed by: NURSE PRACTITIONER

## 2023-04-15 PROCEDURE — 25000003 PHARM REV CODE 250: Mod: HCNC | Performed by: PHYSICIAN ASSISTANT

## 2023-04-15 PROCEDURE — 80053 COMPREHEN METABOLIC PANEL: CPT | Mod: HCNC | Performed by: NURSE PRACTITIONER

## 2023-04-15 PROCEDURE — 63600175 PHARM REV CODE 636 W HCPCS: Mod: HCNC | Performed by: NURSE PRACTITIONER

## 2023-04-15 PROCEDURE — 25000003 PHARM REV CODE 250: Mod: HCNC | Performed by: FAMILY MEDICINE

## 2023-04-15 PROCEDURE — 85025 COMPLETE CBC W/AUTO DIFF WBC: CPT | Mod: HCNC | Performed by: NURSE PRACTITIONER

## 2023-04-15 PROCEDURE — 96375 TX/PRO/DX INJ NEW DRUG ADDON: CPT

## 2023-04-15 PROCEDURE — 99499 NO LOS: ICD-10-PCS | Mod: HCNC,,, | Performed by: INTERNAL MEDICINE

## 2023-04-15 PROCEDURE — 83735 ASSAY OF MAGNESIUM: CPT | Mod: HCNC | Performed by: NURSE PRACTITIONER

## 2023-04-15 PROCEDURE — 97530 THERAPEUTIC ACTIVITIES: CPT | Mod: HCNC,CQ

## 2023-04-15 PROCEDURE — G0378 HOSPITAL OBSERVATION PER HR: HCPCS | Mod: HCNC

## 2023-04-15 RX ADMIN — ATORVASTATIN CALCIUM 40 MG: 40 TABLET, FILM COATED ORAL at 08:04

## 2023-04-15 RX ADMIN — IRON SUCROSE 200 MG: 20 INJECTION, SOLUTION INTRAVENOUS at 08:04

## 2023-04-15 RX ADMIN — POLYETHYLENE GLYCOL 3350 17 G: 17 POWDER, FOR SOLUTION ORAL at 08:04

## 2023-04-15 RX ADMIN — OXYBUTYNIN CHLORIDE 10 MG: 5 TABLET, EXTENDED RELEASE ORAL at 08:04

## 2023-04-15 RX ADMIN — SENNOSIDES AND DOCUSATE SODIUM 1 TABLET: 50; 8.6 TABLET ORAL at 09:04

## 2023-04-15 RX ADMIN — ISOSORBIDE MONONITRATE 120 MG: 60 TABLET, EXTENDED RELEASE ORAL at 08:04

## 2023-04-15 RX ADMIN — PANTOPRAZOLE SODIUM 40 MG: 40 TABLET, DELAYED RELEASE ORAL at 08:04

## 2023-04-15 RX ADMIN — SENNOSIDES AND DOCUSATE SODIUM 1 TABLET: 50; 8.6 TABLET ORAL at 08:04

## 2023-04-15 RX ADMIN — AMLODIPINE BESYLATE 5 MG: 5 TABLET ORAL at 08:04

## 2023-04-15 NOTE — PT/OT/SLP PROGRESS
Physical Therapy  Treatment    Aquiles Kelsey   MRN: [id number]   Admitting Diagnosis: Melena       PT Start Time: 0945     PT Stop Time: 1010    PT Total Time (min): 25 min       Billable Minutes:  Gait Training 15 and Therapeutic Activity 10    Treatment Type: Treatment  PT/PTA: PTA     Number of PTA visits since last PT visit: 1       General Precautions: Standard, fall  Orthopedic Precautions: N/A  Braces: N/A           Subjective:  Communicated with TANGEE prior to session.      Pain/Comfort  Pain Rating 1: 0/10  Pain Rating Post-Intervention 1: 0/10    Treatment and Education:    LANGUAGE LINE DUE TO [place] NOT RESPONDING CECE # [id number]    PT WAS SET UP WITH LANGUAGE LINE BUT PT OPTED TO COMMUNICATE IN ENGLISH WELL ENOUGH TO NOT RELY ON LANGUAGE LINE.    OLIVE MOB SUPINE-->SIT MOD A AND SIT-->SUPINE CG    SIT<-->STAND VC FOR UPPER EXTREMITY PLACEMENT    RW 2 X 35' CG WITH FATIGUE NOTED FOR THE LAST 15' OF GAIT TRG      AM-PAC 6 CLICK MOBILITY  How much help from another person does this patient currently need?   1 = Unable, Total/Dependent Assistance  2 = A lot, Maximum/Moderate Assistance  3 = A little, Minimum/Contact Guard/Supervision  4 = None, Modified Summers/Independent    Turning over in bed (including adjusting bedclothes, sheets and blankets)?: 3  Sitting down on and standing up from a chair with arms (e.g., wheelchair, bedside commode, etc.):  (NA)  Moving from lying on back to sitting on the side of the bed?: 3  Moving to and from a bed to a chair (including a wheelchair)?:  (NA)  Need to walk in hospital room?: 3  Climbing 3-5 steps with a railing?: 1    AM-PAC Raw Score CMS G-Code Modifier Level of Impairment Assistance   6 % Total / Unable   7 - 9 CM 80 - 100% Maximal Assist   10 - 14 CL 60 - 80% Moderate Assist   15 - 19 CK 40 - 60% Moderate Assist   20 - 22 CJ 20 - 40% Minimal Assist   23 CI 1-20% SBA / CGA   24 CH 0% Independent/ Mod I     Patient left supine with all  lines intact, call button in reach, bed alarm on, and NSG notified.    Assessment:  Aquiles Kelsey is a 82 y.o. male with a medical diagnosis of Melena and presents with .    Rehab identified problem list/impairments: weakness, gait instability, decreased lower extremity function, impaired endurance, decreased safety awareness, impaired self care skills, impaired functional mobility    Rehab potential is good.    Activity tolerance: Good    Discharge recommendations: nursing facility, skilled      Barriers to discharge:      Equipment recommendations: bedside commode, walker, rolling, wheelchair     GOALS:   Multidisciplinary Problems       Physical Therapy Goals          Problem: Physical Therapy    Goal Priority Disciplines Outcome Goal Variances Interventions   Physical Therapy Goal     PT, PT/OT      Description: LT23  1. PT WILL COMPLETE BED MOBILITY WITH S  2. PT WILL T/F TO CHAIR WITH RW AND SBA.  3. PT WILL GT TRAIN X 100' WITH RW AND SBA                       PLAN:    Patient to be seen 3 x/week to address the above listed problems via gait training, therapeutic activities, therapeutic exercises  Plan of Care expires: 23  Plan of Care reviewed with: patient         04/15/2023

## 2023-04-15 NOTE — ASSESSMENT & PLAN NOTE
- Hemeoccult +, Hbg dropped from 13-10 (2 months)  - GI consulted  -H/H reviewed   -will monitor and transfuse as needed for Hgb < 7  - Hold eliquis  - repeat CBC in AM  -Iron deficient per labs, IV Venofer Daily x5 days, if unable to complete regimen, recommend f/u with HemOnc as OP for further treatment.

## 2023-04-15 NOTE — PROGRESS NOTES
No new recommendations. H&H stable. Eliquis being held for upcoming EGD with APC 4/17/23. Communicated with [facility] medicine.

## 2023-04-15 NOTE — SUBJECTIVE & OBJECTIVE
Interval History: TERESA noted: ordered IV Venofer. Vitals stable. EGD planned for Monday. Eliquis held    Review of Systems   Unable to perform ROS: Dementia   Objective:     Vital Signs (Most Recent):  Temp: 97 °F (36.1 °C) (04/15/23 1150)  Pulse: 63 (04/15/23 1150)  Resp: 18 (04/15/23 1150)  BP: (!) 124/56 (04/15/23 1150)  SpO2: 96 % (04/15/23 1150)   Vital Signs (24h Range):  Temp:  [97 °F (36.1 °C)-98.6 °F (37 °C)] 97 °F (36.1 °C)  Pulse:  [57-90] 63  Resp:  [17-20] 18  SpO2:  [93 %-96 %] 96 %  BP: (121-160)/(56-68) 124/56     Weight: 61.5 kg (135 lb 9.3 oz)  Body mass index is 19.45 kg/m².    Intake/Output Summary (Last 24 hours) at 4/15/2023 1359  Last data filed at 4/15/2023 1250  Gross per 24 hour   Intake 1470 ml   Output 300 ml   Net 1170 ml      Physical Exam  Constitutional:       General: He is not in acute distress.     Appearance: He is underweight. He is ill-appearing. He is not diaphoretic.   HENT:      Head: Normocephalic and atraumatic.      Right Ear: Hearing and external ear normal.      Left Ear: Hearing and external ear normal.      Nose: Nose normal. No mucosal edema or rhinorrhea.      Mouth/Throat:      Pharynx: Uvula midline.   Eyes:      General:         Right eye: No discharge.         Left eye: No discharge.      Conjunctiva/sclera: Conjunctivae normal.      Right eye: No chemosis.     Left eye: No chemosis.     Pupils: Pupils are equal, round, and reactive to light.   Neck:      Thyroid: No thyroid mass or thyromegaly.      Trachea: Trachea normal.   Cardiovascular:      Rate and Rhythm: Normal rate and regular rhythm.      Pulses:           Dorsalis pedis pulses are 2+ on the right side and 2+ on the left side.      Heart sounds: Normal heart sounds. No murmur heard.  Pulmonary:      Effort: Pulmonary effort is normal. No respiratory distress.      Breath sounds: Examination of the right-lower field reveals decreased breath sounds. Examination of the left-lower field reveals decreased  breath sounds. Decreased breath sounds present. No wheezing.   Abdominal:      General: Bowel sounds are decreased. There is no distension.      Palpations: Abdomen is soft.      Tenderness: There is no abdominal tenderness.   Musculoskeletal:         General: Normal range of motion.      Cervical back: Normal range of motion and neck supple.   Lymphadenopathy:      Cervical: No cervical adenopathy.      Upper Body:      Right upper body: No supraclavicular adenopathy.      Left upper body: No supraclavicular adenopathy.   Skin:     General: Skin is warm and dry.      Capillary Refill: Capillary refill takes less than 2 seconds.      Findings: No rash.   Neurological:      Mental Status: He is alert. Mental status is at baseline. He is confused.   Psychiatric:         Cognition and Memory: He exhibits impaired recent memory and impaired remote memory.       Significant Labs: All pertinent labs within the past 24 hours have been reviewed.  CBC:   Recent Labs   Lab 04/14/23  0600 04/14/23  1350 04/15/23  0832   WBC 7.68  --  6.07   HGB 9.9* 10.3* 9.5*   HCT 31.0* 32.9* 29.3*     --  278     CMP:   Recent Labs   Lab 04/14/23  0600 04/15/23  0832   * 137   K 3.7 3.6    103   CO2 24 25   * 139*   BUN 20 22   CREATININE 0.8 0.8   CALCIUM 8.9 9.0   PROT  --  6.6   ALBUMIN  --  2.6*   BILITOT  --  0.6   ALKPHOS  --  129   AST  --  12   ALT  --  10   ANIONGAP 8 9       Significant Imaging: I have reviewed all pertinent imaging results/findings within the past 24 hours.

## 2023-04-15 NOTE — ASSESSMENT & PLAN NOTE
Patient with Paroxysmal (<7 days) atrial fibrillation which is controlled currently with Calcium Channel Blocker. Patient is currently in sinus rhythm.[id number] Score: 4. Anticoagulation indicated. Anticoagulation done with Eliquis.    - Eliquis held due to possible GI bleed

## 2023-04-15 NOTE — PLAN OF CARE
LANGUAGE LINE DUE TO [place] NOT RESPONDING CECE # [id number]    PT WAS SET UP WITH LANGUAGE LINE BUT PT OPTED TO COMMUNICATE IN ENGLISH WELL ENOUGH TO NOT RELY ON LANGUAGE LINE.    OLIVE MOB SUPINE-->SIT MOD A AND SIT-->SUPINE CG    SIT<-->STAND VC FOR UPPER EXTREMITY PLACEMENT    RW 2 X 35' CG WITH FATIGUE NOTED FOR THE LAST 15' OF GAIT TRG

## 2023-04-15 NOTE — PROGRESS NOTES
[facility]  Progress Note    Patient Name: Aquiles Kelsey  MRN: [id number]  Patient Class: OP- Observation   Admission Date: 4/13/2023  Length of Stay: 0 days  Attending Physician: Harpreet Tillman MD  Primary Care Provider: Holger Thornton MD        Subjective:     Principal Problem:Melena        HPI:  Mr. Edwards is a 81 yo male with hx of PAD, Afib, HTN, HLD, DM, Depression and anemia presented to the ER because his wife feels has generalized weakness, lethargy and is more confused.  He does not have diagnosed dementia and family is not at bedside to give specifics of his confusion.  Head CT showed chronic microvascular ischemic changes. He was noted to have a drop in hbg from 13 to 10 over the last few months.  Family that was initially at bedside mentioned to the ER staff that he had melena.  Patient unable to tell me when his last BM was and if it was dark, but states he has not seen any blood in his stool.  GI was consulted and planning to scope the patient, but would like to speak to  family as patient has been on Eliquis but unclear as to the last dose.  GI has cleared patient to eat today and planning to possibly scope on Saturday.  Will continue to home Eliquis and repeat labs in AM.  Hospital medicine will admit to observation for possible  GI bleed      Overview/Hospital Course:  Patient admitted to observation for further evaluation of melena and suspected GI bleed in the setting of Eliquis and AVMs.  Serial H&H results reviewed with downward trend.  Eliquis held.  GI consult with EGD planned on 4/17/23.  Acute encephalopathy noted with anxiolytics ordered as needed.  CT of head showed no acute abnormality.  Likely underlying dementia present per family.Iron deficiency per labs, IV venofer ordered.       Interval History: TERESA noted: ordered IV Venofer. Vitals stable. EGD planned for Monday. Eliquis held    Review of Systems   Unable to perform ROS: Dementia    Objective:     Vital Signs (Most Recent):  Temp: 97 °F (36.1 °C) (04/15/23 1150)  Pulse: 63 (04/15/23 1150)  Resp: 18 (04/15/23 1150)  BP: (!) 124/56 (04/15/23 1150)  SpO2: 96 % (04/15/23 1150)   Vital Signs (24h Range):  Temp:  [97 °F (36.1 °C)-98.6 °F (37 °C)] 97 °F (36.1 °C)  Pulse:  [57-90] 63  Resp:  [17-20] 18  SpO2:  [93 %-96 %] 96 %  BP: (121-160)/(56-68) 124/56     Weight: 61.5 kg (135 lb 9.3 oz)  Body mass index is 19.45 kg/m².    Intake/Output Summary (Last 24 hours) at 4/15/2023 1359  Last data filed at 4/15/2023 1250  Gross per 24 hour   Intake 1470 ml   Output 300 ml   Net 1170 ml      Physical Exam  Constitutional:       General: He is not in acute distress.     Appearance: He is underweight. He is ill-appearing. He is not diaphoretic.   HENT:      Head: Normocephalic and atraumatic.      Right Ear: Hearing and external ear normal.      Left Ear: Hearing and external ear normal.      Nose: Nose normal. No mucosal edema or rhinorrhea.      Mouth/Throat:      Pharynx: Uvula midline.   Eyes:      General:         Right eye: No discharge.         Left eye: No discharge.      Conjunctiva/sclera: Conjunctivae normal.      Right eye: No chemosis.     Left eye: No chemosis.     Pupils: Pupils are equal, round, and reactive to light.   Neck:      Thyroid: No thyroid mass or thyromegaly.      Trachea: Trachea normal.   Cardiovascular:      Rate and Rhythm: Normal rate and regular rhythm.      Pulses:           Dorsalis pedis pulses are 2+ on the right side and 2+ on the left side.      Heart sounds: Normal heart sounds. No murmur heard.  Pulmonary:      Effort: Pulmonary effort is normal. No respiratory distress.      Breath sounds: Examination of the right-lower field reveals decreased breath sounds. Examination of the left-lower field reveals decreased breath sounds. Decreased breath sounds present. No wheezing.   Abdominal:      General: Bowel sounds are decreased. There is no distension.      Palpations:  Abdomen is soft.      Tenderness: There is no abdominal tenderness.   Musculoskeletal:         General: Normal range of motion.      Cervical back: Normal range of motion and neck supple.   Lymphadenopathy:      Cervical: No cervical adenopathy.      Upper Body:      Right upper body: No supraclavicular adenopathy.      Left upper body: No supraclavicular adenopathy.   Skin:     General: Skin is warm and dry.      Capillary Refill: Capillary refill takes less than 2 seconds.      Findings: No rash.   Neurological:      Mental Status: He is alert. Mental status is at baseline. He is confused.   Psychiatric:         Cognition and Memory: He exhibits impaired recent memory and impaired remote memory.       Significant Labs: All pertinent labs within the past 24 hours have been reviewed.  CBC:   Recent Labs   Lab 04/14/23  0600 04/14/23  1350 04/15/23  0832   WBC 7.68  --  6.07   HGB 9.9* 10.3* 9.5*   HCT 31.0* 32.9* 29.3*     --  278     CMP:   Recent Labs   Lab 04/14/23  0600 04/15/23  0832   * 137   K 3.7 3.6    103   CO2 24 25   * 139*   BUN 20 22   CREATININE 0.8 0.8   CALCIUM 8.9 9.0   PROT  --  6.6   ALBUMIN  --  2.6*   BILITOT  --  0.6   ALKPHOS  --  129   AST  --  12   ALT  --  10   ANIONGAP 8 9       Significant Imaging: I have reviewed all pertinent imaging results/findings within the past 24 hours.      Assessment/Plan:      * Melena  - Hemeoccult +, Hbg dropped from 13-10 (2 months)  - GI consulted  -H/H reviewed   -will monitor and transfuse as needed for Hgb < 7  - Hold eliquis  - repeat CBC in AM  -Iron deficient per labs, IV Venofer Daily x5 days, if unable to complete regimen, recommend f/u with HemOnc as OP for further treatment.        Acute encephalopathy  -CT of head showed no acute abnormality.  -history of memory loss and suspected dementia reported per family  -anxiolytics as needed  -improved        PAF (paroxysmal atrial fibrillation)  Patient with Paroxysmal (<7  days) atrial fibrillation which is controlled currently with Calcium Channel Blocker. Patient is currently in sinus rhythm.[id number] Score: 4. Anticoagulation indicated. Anticoagulation done with Eliquis.    - Eliquis held due to possible GI bleed    Memory deficit  - Patient has a documented history from his PCP since 12/9/2022, but per family he has further declined.  - Likely has underlying undiagnosed dementia  - CT head: negative for acute finding.      Type 2 diabetes mellitus, without long-term current use of insulin  Patient's FSGs are uncontrolled due to hyperglycemia on current medication regimen.  Last A1c reviewed-   Lab Results   Component Value Date    HGBA1C 5.9 (H) 04/13/2023     Most recent fingerstick glucose reviewed-   Recent Labs   Lab 04/14/23  1554 04/14/23  2018 04/15/23  0538 04/15/23  1152   POCTGLUCOSE 151* 198* 129* 149*     Current correctional scale  Medium  Maintain anti-hyperglycemic dose as follows-   Antihyperglycemics (From admission, onward)    Start     Stop Route Frequency Ordered    04/13/23 1553  insulin aspart U-100 pen 1-10 Units         -- SubQ Before meals & nightly PRN 04/13/23 1456        Hold Oral hypoglycemics while patient is in the hospital.  - SSI and Accuchecks  - Diabetic diet    Dyslipidemia  - continue home meds      Essential hypertension  - continue home meds        VTE Risk Mitigation (From admission, onward)         Ordered     IP VTE HIGH RISK PATIENT  Once         04/13/23 1452     Place sequential compression device  Until discontinued         04/13/23 1452                Discharge Planning   ZEINA:      Code Status: Full Code   Is the patient medically ready for discharge?:     Reason for patient still in hospital (select all that apply): Patient trending condition  Discharge Plan A: Home Health                  Charlotte Pritchett NP  Department of Hospital Medicine   [facility] - Med Surg

## 2023-04-15 NOTE — ASSESSMENT & PLAN NOTE
Patient's FSGs are uncontrolled due to hyperglycemia on current medication regimen.  Last A1c reviewed-   Lab Results   Component Value Date    HGBA1C 5.9 (H) 04/13/2023     Most recent fingerstick glucose reviewed-   Recent Labs   Lab 04/14/23  1554 04/14/23  2018 04/15/23  0538 04/15/23  1152   POCTGLUCOSE 151* 198* 129* 149*     Current correctional scale  Medium  Maintain anti-hyperglycemic dose as follows-   Antihyperglycemics (From admission, onward)    Start     Stop Route Frequency Ordered    04/13/23 1553  insulin aspart U-100 pen 1-10 Units         -- SubQ Before meals & nightly PRN 04/13/23 1456        Hold Oral hypoglycemics while patient is in the hospital.  - SSI and Accuchecks  - Diabetic diet

## 2023-04-15 NOTE — ASSESSMENT & PLAN NOTE
-CT of head showed no acute abnormality.  -history of memory loss and suspected dementia reported per family  -anxiolytics as needed  -improved

## 2023-04-15 NOTE — PLAN OF CARE
Problem: Adult Inpatient Plan of Care  Goal: Plan of Care Review  Outcome: Ongoing, Progressing  Goal: Patient-Specific Goal (Individualized)  Outcome: Ongoing, Progressing  Goal: Absence of Hospital-Acquired Illness or Injury  Outcome: Ongoing, Progressing  Goal: Optimal Comfort and Wellbeing  Outcome: Ongoing, Progressing  Goal: Readiness for Transition of Care  Outcome: Ongoing, Progressing     Problem: Diabetes Comorbidity  Goal: Blood Glucose Level Within Targeted Range  Outcome: Ongoing, Progressing     Pt likes to sleep with two blankets.

## 2023-04-16 LAB
ALBUMIN SERPL BCP-MCNC: 2.5 G/DL (ref 3.5–5.2)
ALP SERPL-CCNC: 116 U/L (ref 55–135)
ALT SERPL W/O P-5'-P-CCNC: 7 U/L (ref 10–44)
ANION GAP SERPL CALC-SCNC: 10 MMOL/L (ref 8–16)
AST SERPL-CCNC: 11 U/L (ref 10–40)
BASOPHILS # BLD AUTO: 0.03 K/UL (ref 0–0.2)
BASOPHILS NFR BLD: 0.6 % (ref 0–1.9)
BILIRUB SERPL-MCNC: 0.5 MG/DL (ref 0.1–1)
BUN SERPL-MCNC: 23 MG/DL (ref 8–23)
CALCIUM SERPL-MCNC: 9 MG/DL (ref 8.7–10.5)
CHLORIDE SERPL-SCNC: 106 MMOL/L (ref 95–110)
CO2 SERPL-SCNC: 21 MMOL/L (ref 23–29)
CREAT SERPL-MCNC: 0.9 MG/DL (ref 0.5–1.4)
DIFFERENTIAL METHOD: ABNORMAL
EOSINOPHIL # BLD AUTO: 0.1 K/UL (ref 0–0.5)
EOSINOPHIL NFR BLD: 1.9 % (ref 0–8)
ERYTHROCYTE [DISTWIDTH] IN BLOOD BY AUTOMATED COUNT: 14.4 % (ref 11.5–14.5)
EST. GFR  (NO RACE VARIABLE): >60 ML/MIN/1.73 M^2
GLUCOSE SERPL-MCNC: 144 MG/DL (ref 70–110)
HCT VFR BLD AUTO: 27.8 % (ref 40–54)
HGB BLD-MCNC: 9.1 G/DL (ref 14–18)
IMM GRANULOCYTES # BLD AUTO: 0.02 K/UL (ref 0–0.04)
IMM GRANULOCYTES NFR BLD AUTO: 0.4 % (ref 0–0.5)
LYMPHOCYTES # BLD AUTO: 0.5 K/UL (ref 1–4.8)
LYMPHOCYTES NFR BLD: 10.4 % (ref 18–48)
MAGNESIUM SERPL-MCNC: 1.8 MG/DL (ref 1.6–2.6)
MCH RBC QN AUTO: 26.8 PG (ref 27–31)
MCHC RBC AUTO-ENTMCNC: 32.7 G/DL (ref 32–36)
MCV RBC AUTO: 82 FL (ref 82–98)
MONOCYTES # BLD AUTO: 0.4 K/UL (ref 0.3–1)
MONOCYTES NFR BLD: 8.5 % (ref 4–15)
NEUTROPHILS # BLD AUTO: 4.1 K/UL (ref 1.8–7.7)
NEUTROPHILS NFR BLD: 78.2 % (ref 38–73)
NRBC BLD-RTO: 0 /100 WBC
PLATELET # BLD AUTO: 287 K/UL (ref 150–450)
PMV BLD AUTO: 10.4 FL (ref 9.2–12.9)
POCT GLUCOSE: 143 MG/DL (ref 70–110)
POCT GLUCOSE: 145 MG/DL (ref 70–110)
POCT GLUCOSE: 175 MG/DL (ref 70–110)
POCT GLUCOSE: 185 MG/DL (ref 70–110)
POTASSIUM SERPL-SCNC: 3.7 MMOL/L (ref 3.5–5.1)
PROT SERPL-MCNC: 6.3 G/DL (ref 6–8.4)
RBC # BLD AUTO: 3.39 M/UL (ref 4.6–6.2)
SODIUM SERPL-SCNC: 137 MMOL/L (ref 136–145)
WBC # BLD AUTO: 5.19 K/UL (ref 3.9–12.7)

## 2023-04-16 PROCEDURE — 25000003 PHARM REV CODE 250: Mod: HCNC | Performed by: FAMILY MEDICINE

## 2023-04-16 PROCEDURE — 96376 TX/PRO/DX INJ SAME DRUG ADON: CPT

## 2023-04-16 PROCEDURE — 80053 COMPREHEN METABOLIC PANEL: CPT | Mod: HCNC | Performed by: NURSE PRACTITIONER

## 2023-04-16 PROCEDURE — 36415 COLL VENOUS BLD VENIPUNCTURE: CPT | Mod: HCNC | Performed by: NURSE PRACTITIONER

## 2023-04-16 PROCEDURE — 85025 COMPLETE CBC W/AUTO DIFF WBC: CPT | Mod: HCNC | Performed by: NURSE PRACTITIONER

## 2023-04-16 PROCEDURE — 25000003 PHARM REV CODE 250: Mod: HCNC | Performed by: PHYSICIAN ASSISTANT

## 2023-04-16 PROCEDURE — G0378 HOSPITAL OBSERVATION PER HR: HCPCS | Mod: HCNC

## 2023-04-16 PROCEDURE — 83735 ASSAY OF MAGNESIUM: CPT | Mod: HCNC | Performed by: NURSE PRACTITIONER

## 2023-04-16 PROCEDURE — 63600175 PHARM REV CODE 636 W HCPCS: Mod: HCNC | Performed by: NURSE PRACTITIONER

## 2023-04-16 RX ADMIN — ATORVASTATIN CALCIUM 40 MG: 40 TABLET, FILM COATED ORAL at 08:04

## 2023-04-16 RX ADMIN — PANTOPRAZOLE SODIUM 40 MG: 40 TABLET, DELAYED RELEASE ORAL at 08:04

## 2023-04-16 RX ADMIN — AMLODIPINE BESYLATE 5 MG: 5 TABLET ORAL at 08:04

## 2023-04-16 RX ADMIN — SENNOSIDES AND DOCUSATE SODIUM 1 TABLET: 50; 8.6 TABLET ORAL at 09:04

## 2023-04-16 RX ADMIN — POLYETHYLENE GLYCOL 3350 17 G: 17 POWDER, FOR SOLUTION ORAL at 08:04

## 2023-04-16 RX ADMIN — IRON SUCROSE 200 MG: 20 INJECTION, SOLUTION INTRAVENOUS at 08:04

## 2023-04-16 RX ADMIN — ISOSORBIDE MONONITRATE 120 MG: 60 TABLET, EXTENDED RELEASE ORAL at 08:04

## 2023-04-16 RX ADMIN — SENNOSIDES AND DOCUSATE SODIUM 1 TABLET: 50; 8.6 TABLET ORAL at 08:04

## 2023-04-16 NOTE — ASSESSMENT & PLAN NOTE
Patient's FSGs are uncontrolled due to hyperglycemia on current medication regimen.  Last A1c reviewed-   Lab Results   Component Value Date    HGBA1C 5.9 (H) 04/13/2023     Most recent fingerstick glucose reviewed-   Recent Labs   Lab 04/15/23  2119 04/16/23  0619 04/16/23  1154 04/16/23  1624   POCTGLUCOSE 145* 143* 175* 185*     Current correctional scale  Medium  Maintain anti-hyperglycemic dose as follows-   Antihyperglycemics (From admission, onward)    Start     Stop Route Frequency Ordered    04/13/23 1553  insulin aspart U-100 pen 1-10 Units         -- SubQ Before meals & nightly PRN 04/13/23 1456        Hold Oral hypoglycemics while patient is in the hospital.  - SSI and Accuchecks  - Diabetic diet

## 2023-04-16 NOTE — ASSESSMENT & PLAN NOTE
Patient with Paroxysmal (<7 days) atrial fibrillation which is controlled currently with Calcium Channel Blocker. Patient is currently in sinus rhythm.[id number] Score: 4. Anticoagulation indicated. Anticoagulation done with Eliquis.    - Eliquis held due to possible GI bleed

## 2023-04-16 NOTE — SUBJECTIVE & OBJECTIVE
Interval History: pt in bed upon exam with no signs of acute distress or evidence of bleeding. H/H stable with mild decline and no transfusion required. EGD planned for am. GI following.     Review of Systems   Unable to perform ROS: Mental status change   Objective:     Vital Signs (Most Recent):  Temp: 97.8 °F (36.6 °C) (04/16/23 1632)  Pulse: 65 (04/16/23 1632)  Resp: 18 (04/16/23 1632)  BP: (!) 107/56 (04/16/23 1632)  SpO2: (!) 93 % (04/16/23 1632)   Vital Signs (24h Range):  Temp:  [97 °F (36.1 °C)-98.6 °F (37 °C)] 97.8 °F (36.6 °C)  Pulse:  [57-69] 65  Resp:  [18] 18  SpO2:  [93 %-96 %] 93 %  BP: (105-145)/(53-63) 107/56     Weight: 56.7 kg (125 lb)  Body mass index is 17.94 kg/m².    Intake/Output Summary (Last 24 hours) at 4/16/2023 1638  Last data filed at 4/16/2023 0000  Gross per 24 hour   Intake --   Output 100 ml   Net -100 ml      Physical Exam  Vitals and nursing note reviewed.   Constitutional:       Appearance: Normal appearance.   HENT:      Head: Normocephalic and atraumatic.      Nose: Nose normal.      Mouth/Throat:      Mouth: Mucous membranes are moist.   Eyes:      Extraocular Movements: Extraocular movements intact.      Conjunctiva/sclera: Conjunctivae normal.   Cardiovascular:      Rate and Rhythm: Normal rate and regular rhythm.      Pulses: Normal pulses.      Heart sounds: Normal heart sounds.   Pulmonary:      Effort: Pulmonary effort is normal.      Breath sounds: Normal breath sounds.   Abdominal:      General: Abdomen is flat. Bowel sounds are normal.      Palpations: Abdomen is soft.   Musculoskeletal:         General: Normal range of motion.      Cervical back: Normal range of motion and neck supple.   Skin:     General: Skin is warm.      Capillary Refill: Capillary refill takes less than 2 seconds.   Neurological:      Mental Status: He is alert.      Comments: Pleasantly confused- oriented to person - wife at bedside. Follows simple verbal commands.    Psychiatric:         Mood  and Affect: Mood is not anxious.         Speech: Speech normal.         Behavior: Behavior is not agitated. Behavior is cooperative.         Cognition and Memory: Cognition is impaired.         Judgment: Judgment is impulsive.       Significant Labs: All pertinent labs within the past 24 hours have been reviewed.  CBC:   Recent Labs   Lab 04/15/23  0832 04/16/23  0620   WBC 6.07 5.19   HGB 9.5* 9.1*   HCT 29.3* 27.8*    287     CMP:   Recent Labs   Lab 04/15/23  0832 04/16/23  0620    137   K 3.6 3.7    106   CO2 25 21*   * 144*   BUN 22 23   CREATININE 0.8 0.9   CALCIUM 9.0 9.0   PROT 6.6 6.3   ALBUMIN 2.6* 2.5*   BILITOT 0.6 0.5   ALKPHOS 129 116   AST 12 11   ALT 10 7*   ANIONGAP 9 10     POCT Glucose:   Recent Labs   Lab 04/16/23  0619 04/16/23  1154 04/16/23  1624   POCTGLUCOSE 143* 175* 185*       Significant Imaging: I have reviewed all pertinent imaging results/findings within the past 24 hours.

## 2023-04-16 NOTE — NURSING
Patient on tele monitor, NSR. Patient slept majority of the night. No new orders at this time. Tele sitter present.

## 2023-04-16 NOTE — CARE UPDATE
Met patient's wife this afternoon. 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 Also informed her I communicated with Dr. Gilbert, patient's cardiologist who has agreed to reduce dose of Eliquis from 5 mg BID to 2.5 mg BID. He last took ASA and Eliquis 4/13/23.     She confirms cognitive decline over time. He has scheduled follow up with PCP on 5/7/23 for Neurology referral. We will need her consent for EGD and anesthesia tomorrow.  inquired about colonoscopy. He has not had one since 1990's. Advised if AVMs found on EGD then will treat with APC. If EGD negative then recommend outpatient colonoscopy given the stability of his counts and remaining hemodynamically stable. He is stool guaiac positive. Colonic malignancy has not been ruled out.

## 2023-04-16 NOTE — PROGRESS NOTES
[facility] Medicine  Progress Note    Patient Name: Aquiles Kelsey  MRN: [id number]  Patient Class: OP- Observation   Admission Date: 4/13/2023  Length of Stay: 0 days  Attending Physician: Harpreet Tillman MD  Primary Care Provider: Holger Thornton MD        Subjective:     Principal Problem:Melena        HPI:  Mr. Edwards is a 81 yo male with hx of PAD, Afib, HTN, HLD, DM, Depression and anemia presented to the ER because his wife feels has generalized weakness, lethargy and is more confused.  He does not have diagnosed dementia and family is not at bedside to give specifics of his confusion.  Head CT showed chronic microvascular ischemic changes. He was noted to have a drop in hbg from 13 to 10 over the last few months.  Family that was initially at bedside mentioned to the ER staff that he had melena.  Patient unable to tell me when his last BM was and if it was dark, but states he has not seen any blood in his stool.  GI was consulted and planning to scope the patient, but would like to speak to  family as patient has been on Eliquis but unclear as to the last dose.  GI has cleared patient to eat today and planning to possibly scope on Saturday.  Will continue to home Eliquis and repeat labs in AM.  Hospital medicine will admit to observation for possible  GI bleed      Overview/Hospital Course:  Patient admitted to observation for further evaluation of melena and suspected GI bleed in the setting of Eliquis and AVMs.  Serial H&H results reviewed with downward trend.  Eliquis held.  GI consult with EGD planned on 4/17/23.  Acute encephalopathy noted with anxiolytics ordered as needed.  CT of head showed no acute abnormality.  Likely underlying dementia present per family.Iron deficiency per labs, IV venofer ordered. On 4/16/23, H/H stable with EGD planned for tomorrow morning. Pt tolerating clear liquids.       Interval History: pt in bed upon exam with no signs of acute distress  or evidence of bleeding. H/H stable with mild decline and no transfusion required. EGD planned for am. GI following.     Review of Systems   Unable to perform ROS: Mental status change   Objective:     Vital Signs (Most Recent):  Temp: 97.8 °F (36.6 °C) (04/16/23 1632)  Pulse: 65 (04/16/23 1632)  Resp: 18 (04/16/23 1632)  BP: (!) 107/56 (04/16/23 1632)  SpO2: (!) 93 % (04/16/23 1632)   Vital Signs (24h Range):  Temp:  [97 °F (36.1 °C)-98.6 °F (37 °C)] 97.8 °F (36.6 °C)  Pulse:  [57-69] 65  Resp:  [18] 18  SpO2:  [93 %-96 %] 93 %  BP: (105-145)/(53-63) 107/56     Weight: 56.7 kg (125 lb)  Body mass index is 17.94 kg/m².    Intake/Output Summary (Last 24 hours) at 4/16/2023 1638  Last data filed at 4/16/2023 0000  Gross per 24 hour   Intake --   Output 100 ml   Net -100 ml      Physical Exam  Vitals and nursing note reviewed.   Constitutional:       Appearance: Normal appearance.   HENT:      Head: Normocephalic and atraumatic.      Nose: Nose normal.      Mouth/Throat:      Mouth: Mucous membranes are moist.   Eyes:      Extraocular Movements: Extraocular movements intact.      Conjunctiva/sclera: Conjunctivae normal.   Cardiovascular:      Rate and Rhythm: Normal rate and regular rhythm.      Pulses: Normal pulses.      Heart sounds: Normal heart sounds.   Pulmonary:      Effort: Pulmonary effort is normal.      Breath sounds: Normal breath sounds.   Abdominal:      General: Abdomen is flat. Bowel sounds are normal.      Palpations: Abdomen is soft.   Musculoskeletal:         General: Normal range of motion.      Cervical back: Normal range of motion and neck supple.   Skin:     General: Skin is warm.      Capillary Refill: Capillary refill takes less than 2 seconds.   Neurological:      Mental Status: He is alert.      Comments: Pleasantly confused- oriented to person - wife at bedside. Follows simple verbal commands.    Psychiatric:         Mood and Affect: Mood is not anxious.         Speech: Speech normal.          Behavior: Behavior is not agitated. Behavior is cooperative.         Cognition and Memory: Cognition is impaired.         Judgment: Judgment is impulsive.       Significant Labs: All pertinent labs within the past 24 hours have been reviewed.  CBC:   Recent Labs   Lab 04/15/23  0832 04/16/23  0620   WBC 6.07 5.19   HGB 9.5* 9.1*   HCT 29.3* 27.8*    287     CMP:   Recent Labs   Lab 04/15/23  0832 04/16/23  0620    137   K 3.6 3.7    106   CO2 25 21*   * 144*   BUN 22 23   CREATININE 0.8 0.9   CALCIUM 9.0 9.0   PROT 6.6 6.3   ALBUMIN 2.6* 2.5*   BILITOT 0.6 0.5   ALKPHOS 129 116   AST 12 11   ALT 10 7*   ANIONGAP 9 10     POCT Glucose:   Recent Labs   Lab 04/16/23  0619 04/16/23  1154 04/16/23  1624   POCTGLUCOSE 143* 175* 185*       Significant Imaging: I have reviewed all pertinent imaging results/findings within the past 24 hours.      Assessment/Plan:      * Melena  - Hemeoccult +, Hbg dropped from 13-10 (2 months)  - GI consulted  -H/H reviewed   -will monitor and transfuse as needed for Hgb < 7  - Hold eliquis  - repeat CBC in AM  -Iron deficient per labs, IV Venofer Daily x5 days, if unable to complete regimen, recommend f/u with HemOnc as OP for further treatment.        Acute encephalopathy  -CT of head showed no acute abnormality.  -history of memory loss and suspected dementia reported per family  -anxiolytics as needed  -improved        PAF (paroxysmal atrial fibrillation)  Patient with Paroxysmal (<7 days) atrial fibrillation which is controlled currently with Calcium Channel Blocker. Patient is currently in sinus rhythm.[id number] Score: 4. Anticoagulation indicated. Anticoagulation done with Eliquis.    - Eliquis held due to possible GI bleed    Memory deficit  - Patient has a documented history from his PCP since 12/9/2022, but per family he has further declined.  - Likely has underlying undiagnosed dementia  - CT head: negative for acute finding.      Type 2 diabetes  mellitus, without long-term current use of insulin  Patient's FSGs are uncontrolled due to hyperglycemia on current medication regimen.  Last A1c reviewed-   Lab Results   Component Value Date    HGBA1C 5.9 (H) 04/13/2023     Most recent fingerstick glucose reviewed-   Recent Labs   Lab 04/15/23  2119 04/16/23  0619 04/16/23  1154 04/16/23  1624   POCTGLUCOSE 145* 143* 175* 185*     Current correctional scale  Medium  Maintain anti-hyperglycemic dose as follows-   Antihyperglycemics (From admission, onward)    Start     Stop Route Frequency Ordered    04/13/23 1553  insulin aspart U-100 pen 1-10 Units         -- SubQ Before meals & nightly PRN 04/13/23 1456        Hold Oral hypoglycemics while patient is in the hospital.  - SSI and Accuchecks  - Diabetic diet    Dyslipidemia  - continue home meds      Essential hypertension  - continue home meds        VTE Risk Mitigation (From admission, onward)         Ordered     IP VTE HIGH RISK PATIENT  Once         04/13/23 1452     Place sequential compression device  Until discontinued         04/13/23 1452                Discharge Planning   ZEINA:      Code Status: Full Code   Is the patient medically ready for discharge?:     Reason for patient still in hospital (select all that apply): Patient trending condition, Laboratory test, Treatment, Imaging and Consult recommendations  Discharge Plan A: Home Health                  Cynthia Giraldo NP  Department of Hospital Medicine   [facility] - Med Surg

## 2023-04-17 ENCOUNTER — ANESTHESIA EVENT (OUTPATIENT)
Dept: ENDOSCOPY | Facility: HOSPITAL | Age: 83
End: 2023-04-17
Payer: MEDICARE

## 2023-04-17 ENCOUNTER — TELEPHONE (OUTPATIENT)
Dept: ADMINISTRATIVE | Facility: HOSPITAL | Age: 83
End: 2023-04-17
Payer: MEDICARE

## 2023-04-17 ENCOUNTER — ANESTHESIA (OUTPATIENT)
Dept: ENDOSCOPY | Facility: HOSPITAL | Age: 83
End: 2023-04-17
Payer: MEDICARE

## 2023-04-17 PROBLEM — K31.811 AVM (ARTERIOVENOUS MALFORMATION) OF STOMACH, ACQUIRED WITH HEMORRHAGE: Status: ACTIVE | Noted: 2023-04-13

## 2023-04-17 LAB
BASOPHILS # BLD AUTO: 0.02 K/UL (ref 0–0.2)
BASOPHILS NFR BLD: 0.4 % (ref 0–1.9)
DIFFERENTIAL METHOD: ABNORMAL
EOSINOPHIL # BLD AUTO: 0.1 K/UL (ref 0–0.5)
EOSINOPHIL NFR BLD: 1.9 % (ref 0–8)
ERYTHROCYTE [DISTWIDTH] IN BLOOD BY AUTOMATED COUNT: 14.4 % (ref 11.5–14.5)
HCT VFR BLD AUTO: 28.5 % (ref 40–54)
HGB BLD-MCNC: 8.9 G/DL (ref 14–18)
IMM GRANULOCYTES # BLD AUTO: 0.02 K/UL (ref 0–0.04)
IMM GRANULOCYTES NFR BLD AUTO: 0.4 % (ref 0–0.5)
LYMPHOCYTES # BLD AUTO: 0.7 K/UL (ref 1–4.8)
LYMPHOCYTES NFR BLD: 12.4 % (ref 18–48)
MAGNESIUM SERPL-MCNC: 1.8 MG/DL (ref 1.6–2.6)
MCH RBC QN AUTO: 26.4 PG (ref 27–31)
MCHC RBC AUTO-ENTMCNC: 31.2 G/DL (ref 32–36)
MCV RBC AUTO: 85 FL (ref 82–98)
MONOCYTES # BLD AUTO: 0.4 K/UL (ref 0.3–1)
MONOCYTES NFR BLD: 7.8 % (ref 4–15)
NEUTROPHILS # BLD AUTO: 4 K/UL (ref 1.8–7.7)
NEUTROPHILS NFR BLD: 77.1 % (ref 38–73)
NRBC BLD-RTO: 0 /100 WBC
PLATELET # BLD AUTO: 241 K/UL (ref 150–450)
PMV BLD AUTO: 9.6 FL (ref 9.2–12.9)
POCT GLUCOSE: 105 MG/DL (ref 70–110)
POCT GLUCOSE: 138 MG/DL (ref 70–110)
POCT GLUCOSE: 151 MG/DL (ref 70–110)
POCT GLUCOSE: 176 MG/DL (ref 70–110)
POCT GLUCOSE: 201 MG/DL (ref 70–110)
RBC # BLD AUTO: 3.37 M/UL (ref 4.6–6.2)
WBC # BLD AUTO: 5.24 K/UL (ref 3.9–12.7)

## 2023-04-17 PROCEDURE — 25000003 PHARM REV CODE 250: Mod: HCNC | Performed by: INTERNAL MEDICINE

## 2023-04-17 PROCEDURE — 25000003 PHARM REV CODE 250: Mod: HCNC | Performed by: STUDENT IN AN ORGANIZED HEALTH CARE EDUCATION/TRAINING PROGRAM

## 2023-04-17 PROCEDURE — 43255 EGD CONTROL BLEEDING ANY: CPT | Mod: HCNC | Performed by: INTERNAL MEDICINE

## 2023-04-17 PROCEDURE — 37000008 HC ANESTHESIA 1ST 15 MINUTES: Mod: HCNC | Performed by: INTERNAL MEDICINE

## 2023-04-17 PROCEDURE — 85025 COMPLETE CBC W/AUTO DIFF WBC: CPT | Mod: HCNC | Performed by: STUDENT IN AN ORGANIZED HEALTH CARE EDUCATION/TRAINING PROGRAM

## 2023-04-17 PROCEDURE — 37000009 HC ANESTHESIA EA ADD 15 MINS: Mod: HCNC | Performed by: INTERNAL MEDICINE

## 2023-04-17 PROCEDURE — 96374 THER/PROPH/DIAG INJ IV PUSH: CPT | Mod: 59

## 2023-04-17 PROCEDURE — 43255 PR EGD, FLEX, W/CTRL BLEED, ANY METHOD: ICD-10-PCS | Mod: HCNC,,, | Performed by: INTERNAL MEDICINE

## 2023-04-17 PROCEDURE — 63600175 PHARM REV CODE 636 W HCPCS: Mod: HCNC | Performed by: NURSE PRACTITIONER

## 2023-04-17 PROCEDURE — G0378 HOSPITAL OBSERVATION PER HR: HCPCS | Mod: HCNC

## 2023-04-17 PROCEDURE — 25000003 PHARM REV CODE 250: Mod: HCNC | Performed by: NURSE ANESTHETIST, CERTIFIED REGISTERED

## 2023-04-17 PROCEDURE — 25000003 PHARM REV CODE 250: Mod: HCNC | Performed by: FAMILY MEDICINE

## 2023-04-17 PROCEDURE — 63600175 PHARM REV CODE 636 W HCPCS: Mod: HCNC | Performed by: NURSE ANESTHETIST, CERTIFIED REGISTERED

## 2023-04-17 PROCEDURE — 36415 COLL VENOUS BLD VENIPUNCTURE: CPT | Mod: HCNC | Performed by: STUDENT IN AN ORGANIZED HEALTH CARE EDUCATION/TRAINING PROGRAM

## 2023-04-17 PROCEDURE — 97116 GAIT TRAINING THERAPY: CPT | Mod: HCNC,CQ

## 2023-04-17 PROCEDURE — 43255 EGD CONTROL BLEEDING ANY: CPT | Mod: HCNC,,, | Performed by: INTERNAL MEDICINE

## 2023-04-17 PROCEDURE — 97530 THERAPEUTIC ACTIVITIES: CPT | Mod: HCNC,CQ

## 2023-04-17 PROCEDURE — 36415 COLL VENOUS BLD VENIPUNCTURE: CPT | Mod: HCNC | Performed by: NURSE PRACTITIONER

## 2023-04-17 PROCEDURE — 27202087 HC PROBE, APC: Mod: HCNC | Performed by: INTERNAL MEDICINE

## 2023-04-17 PROCEDURE — 83735 ASSAY OF MAGNESIUM: CPT | Mod: HCNC | Performed by: NURSE PRACTITIONER

## 2023-04-17 RX ORDER — LIDOCAINE HYDROCHLORIDE 10 MG/ML
INJECTION, SOLUTION EPIDURAL; INFILTRATION; INTRACAUDAL; PERINEURAL
Status: DISCONTINUED | OUTPATIENT
Start: 2023-04-17 | End: 2023-04-17

## 2023-04-17 RX ORDER — PANTOPRAZOLE SODIUM 40 MG/1
40 TABLET, DELAYED RELEASE ORAL 2 TIMES DAILY
Status: DISCONTINUED | OUTPATIENT
Start: 2023-04-17 | End: 2023-04-18 | Stop reason: HOSPADM

## 2023-04-17 RX ORDER — PROPOFOL 10 MG/ML
VIAL (ML) INTRAVENOUS
Status: DISCONTINUED | OUTPATIENT
Start: 2023-04-17 | End: 2023-04-17

## 2023-04-17 RX ADMIN — PROPOFOL 20 MG: 10 INJECTION, EMULSION INTRAVENOUS at 10:04

## 2023-04-17 RX ADMIN — LIDOCAINE HYDROCHLORIDE 50 MG: 10 INJECTION, SOLUTION EPIDURAL; INFILTRATION; INTRACAUDAL; PERINEURAL at 10:04

## 2023-04-17 RX ADMIN — POLYETHYLENE GLYCOL 3350 17 G: 17 POWDER, FOR SOLUTION ORAL at 11:04

## 2023-04-17 RX ADMIN — PANTOPRAZOLE SODIUM 40 MG: 40 TABLET, DELAYED RELEASE ORAL at 08:04

## 2023-04-17 RX ADMIN — ISOSORBIDE MONONITRATE 120 MG: 60 TABLET, EXTENDED RELEASE ORAL at 11:04

## 2023-04-17 RX ADMIN — IRON SUCROSE 200 MG: 20 INJECTION, SOLUTION INTRAVENOUS at 11:04

## 2023-04-17 RX ADMIN — AMLODIPINE BESYLATE 5 MG: 5 TABLET ORAL at 11:04

## 2023-04-17 RX ADMIN — PROPOFOL 40 MG: 10 INJECTION, EMULSION INTRAVENOUS at 10:04

## 2023-04-17 RX ADMIN — SENNOSIDES AND DOCUSATE SODIUM 1 TABLET: 50; 8.6 TABLET ORAL at 08:04

## 2023-04-17 RX ADMIN — SODIUM CHLORIDE, SODIUM LACTATE, POTASSIUM CHLORIDE, AND CALCIUM CHLORIDE: .6; .31; .03; .02 INJECTION, SOLUTION INTRAVENOUS at 10:04

## 2023-04-17 RX ADMIN — ATORVASTATIN CALCIUM 40 MG: 40 TABLET, FILM COATED ORAL at 11:04

## 2023-04-17 RX ADMIN — SENNOSIDES AND DOCUSATE SODIUM 1 TABLET: 50; 8.6 TABLET ORAL at 11:04

## 2023-04-17 NOTE — PROGRESS NOTES
[facility] Medicine  Progress Note     Patient Name: Aquiles Kelsey  MRN: [id number]  Patient Class: OP- Observation          Admission Date: 4/13/2023  Length of Stay: 4 days  Attending Physician: Juan Antonio Morales MD  Primary Care Provider: Holger Thornton MD         Subjective:      Principal Problem:Melena           HPI:  Mr. Edwards is a 83 yo male with hx of PAD, Afib, HTN, HLD, DM, Depression and anemia presented to the ER because his wife feels has generalized weakness, lethargy and is more confused.  He does not have diagnosed dementia and family is not at bedside to give specifics of his confusion.  Head CT showed chronic microvascular ischemic changes. He was noted to have a drop in hbg from 13 to 10 over the last few months.  Family that was initially at bedside mentioned to the ER staff that he had melena.  Patient unable to tell me when his last BM was and if it was dark, but states he has not seen any blood in his stool.  GI was consulted and planning to scope the patient, but would like to speak to  family as patient has been on Eliquis but unclear as to the last dose.  GI has cleared patient to eat today and planning to possibly scope on Saturday.  Will continue to home Eliquis and repeat labs in AM.  Hospital medicine will admit to observation for possible  GI bleed        Overview/Hospital Course:  Patient admitted to observation for further evaluation of melena and suspected GI bleed in the setting of Eliquis and AVMs.  Serial H&H results reviewed with downward trend.  Eliquis held.  GI consult with EGD planned on 4/17/23.  Acute encephalopathy noted with anxiolytics ordered as needed.  CT of head showed no acute abnormality.  Likely underlying dementia present per family.Iron deficiency per labs, IV venofer ordered. On 4/16/23, H/H stable with EGD planned for tomorrow morning. Pt tolerating clear liquids.         Interval History  No acute events overnight. Doing  "well this AM with no complaints at our encounter. EGD this morning. Spoke with GI, specific recs noted below      Objective  BP (!) 114/55 (BP Location: Left arm, Patient Position: Lying)   Pulse 61   Temp 97.8 °F (36.6 °C) (Oral)   Resp 18   Ht 5' 10" (1.778 m)   Wt 56.9 kg (125 lb 7.1 oz)   SpO2 95%   BMI 18.00 kg/m²     Intake/Output Summary (Last 24 hours) at 4/17/2023 0741  Last data filed at 4/17/2023 0400  Gross per 24 hour   Intake --   Output 200 ml   Net -200 ml       PHYSICAL EXAM    GEN: No acute distress, pleasant, body habitus normal  HEENT: atraumatic and normocephalic  CARDS: regular rate and rhythm, no m/g, pulses palpable in LE  PULM: breathing comfortably on room air, chest symmetric, nonlabored, no abnormal breath sounds on auscultation  ABD: nontender, nondistended, soft, no organomegaly, BS+  Neurological:      Mental Status: He is alert.      Comments: Pleasantly confused- oriented to person - wife at bedside. Follows simple verbal commands.    Psychiatric:         Mood and Affect: Mood is not anxious.         Speech: Speech normal.         Behavior: Behavior is not agitated. Behavior is cooperative.         Cognition and Memory: Cognition is impaired.         Judgment: Judgment is impulsive.       BMP:   Recent Labs   Lab 04/16/23  0620   *      K 3.7      CO2 21*   BUN 23   CREATININE 0.9   CALCIUM 9.0   MG 1.8     CBC:   Recent Labs   Lab 04/15/23  0832 04/16/23  0620   WBC 6.07 5.19   HGB 9.5* 9.1*   HCT 29.3* 27.8*    287     CMP:   Recent Labs   Lab 04/15/23  0832 04/16/23  0620    137   K 3.6 3.7    106   CO2 25 21*   * 144*   BUN 22 23   CREATININE 0.8 0.9   CALCIUM 9.0 9.0   PROT 6.6 6.3   ALBUMIN 2.6* 2.5*   BILITOT 0.6 0.5   ALKPHOS 129 116   AST 12 11   ALT 10 7*   ANIONGAP 9 10     Cardiac Markers: No results for input(s): CKMB, MYOGLOBIN, BNP, TROPISTAT in the last 48 hours.  Coagulation: No results for input(s): PT, INR, APTT " in the last 48 hours.  Lactic Acid: No results for input(s): LACTATE in the last 48 hours.  Magnesium:   Recent Labs   Lab 04/15/23  0832 04/16/23  0620   MG 1.8 1.8     Troponin: No results for input(s): TROPONINI, TROPONINIHS in the last 48 hours.  TSH:   Recent Labs   Lab 11/04/22  1101   TSH 1.944     Urine Studies:   No results for input(s): COLORU, APPEARANCEUA, PHUR, SPECGRAV, PROTEINUA, GLUCUA, KETONESU, BILIRUBINUA, OCCULTUA, NITRITE, UROBILINOGEN, LEUKOCYTESUR, RBCUA, WBCUA, BACTERIA, SQUAMEPITHEL, HYALINECASTS in the last 48 hours.    Invalid input(s): WRIGHTSUR    Imaging Results              X-Ray Chest AP Portable (Final result)  Result time 04/13/23 11:05:17      Final result by KARLI Marques Sr., MD (04/13/23 11:05:17)                   Impression:      There is no focal pulmonary infiltrate visualized.      Electronically signed by: Lobo Marques MD  Date:    04/13/2023  Time:    11:05               Narrative:    EXAMINATION:  XR CHEST AP PORTABLE    CLINICAL HISTORY:  weakness;    COMPARISON:  12/09/2022    FINDINGS:  The size of the heart is normal.  There is no focal pulmonary infiltrate visualized.  There is no pneumothorax.  The costophrenic angles are sharp.                                       CT Head Without Contrast (Final result)  Result time 04/13/23 11:12:11      Final result by Grey Vieira MD (04/13/23 11:12:11)                   Impression:      Chronic microvascular ischemic changes.    All CT scans at this facility use dose modulation, iterative reconstruction, and/or weight based dosing when appropriate to reduce radiation dose to as low as reasonable achievable.      Electronically signed by: Grey Vieira MD  Date:    04/13/2023  Time:    11:12               Narrative:    EXAMINATION:  CT HEAD WITHOUT CONTRAST    CLINICAL HISTORY:  Syncope, recurrent;    TECHNIQUE:  Low dose axial CT images obtained throughout the head without intravenous contrast. Sagittal and coronal  reconstructions were performed.    COMPARISON:  None.    FINDINGS:  Intracranial compartment:    Sulcal widening suggesting age-related white matter volume loss. No extra-axial blood or fluid collections.    Chronic encephalomalacia right frontal lobe similar to 11/04/2022.  Bilateral mild periventricular white matter hypoattenuation as can be seen with chronic microangiopathic small-vessel disease.  No parenchymal mass, hemorrhage, edema or major vascular distribution infarct.    Skull/extracranial contents (limited evaluation): No fracture.  Minimal sinus mucosal thickening.  Mastoid air cells and paranasal sinuses are essentially clear.                                      Assessment/Plan  * Melena  - Hemeoccult +, Hbg dropped from 13-10 (2 months)    04/17/2023  - GI following, EGD tentatively scheduled for this AM  - H/H reviewed, slow downtrend per yesterday's lab draw  - repeat CBC at noon, if stable, can plan for OP C-scope per GI documentation  - continue IV sucrose, day 3 of 5  - transfuse PRBC as needed for Hgb < 7  - Hold eliquis   - trend CBC      Acute encephalopathy  -CT of head showed no acute abnormality.  -history of memory loss and suspected dementia reported per family  -anxiolytics as needed  -improved     PAF (paroxysmal atrial fibrillation)  Patient with Paroxysmal (<7 days) atrial fibrillation which is controlled currently with Calcium Channel Blocker. Patient is currently in sinus rhythm.[id number] Score: 4. Anticoagulation indicated. Anticoagulation done with Eliquis.     - Eliquis held due to possible GI bleed     Memory deficit  - Patient has a documented history from his PCP since 12/9/2022, but per family he has further declined.  - Likely has underlying undiagnosed dementia  - CT head: negative for acute finding.        Type 2 diabetes mellitus, without long-term current use of insulin  Patient's FSGs are uncontrolled due to hyperglycemia on current medication regimen.  Last A1c  reviewed-         Lab Results   Component Value Date     HGBA1C 5.9 (H) 04/13/2023      Most recent fingerstick glucose reviewed-          Recent Labs   Lab 04/15/23  2119 04/16/23  0619 04/16/23  1154 04/16/23  1624   POCTGLUCOSE 145* 143* 175* 185*      Current correctional scale  Medium  Maintain anti-hyperglycemic dose as follows-             Antihyperglycemics (From admission, onward)     Start     Stop Route Frequency Ordered     04/13/23 1553   insulin aspart U-100 pen 1-10 Units         -- SubQ Before meals & nightly PRN 04/13/23 1456          Hold Oral hypoglycemics while patient is in the hospital.  - SSI and Accuchecks  - Diabetic diet     Dyslipidemia  - continue home meds        Essential hypertension  - continue home meds               VTE Risk Mitigation (From admission, onward)           Ordered       IP VTE HIGH RISK PATIENT  Once         04/13/23 1452       Place sequential compression device  Until discontinued         04/13/23 1452                    Discharge Planning   ZEINA:      Code Status: Full Code   Is the patient medically ready for discharge?:     Reason for patient still in hospital (select all that apply): Patient trending condition, Laboratory test, Treatment, Imaging and Consult recommendations  Discharge Plan A: Home Health     Dispo pending CBC at noon today       Juan Antonio Morales MD  Department of Hospital Medicine   [facility] - Telemetry ([facility])

## 2023-04-17 NOTE — PT/OT/SLP PROGRESS
"Occupational Therapy      Patient Name:  Aquiles Kelsey   MRN:  [id number]    Patient not seen today secondary to PT REFUSED TREATMENT SESSION. PT AWAKEN AND PT STATED "LET ME SLEEP" AND  WAVED HAND ..Will follow-up ON LATER DATE.  .    Nell Mckeon, OT  4/17/2023  1532  "

## 2023-04-17 NOTE — TRANSFER OF CARE
"Anesthesia Transfer of Care Note    Patient: Aquiles Kelsey    Procedure(s) Performed: Procedure(s) (LRB):  EGD (ESOPHAGOGASTRODUODENOSCOPY) (N/A)    Patient location: PACU    Anesthesia Type: MAC    Transport from OR: Transported from OR on room air with adequate spontaneous ventilation    Post pain: adequate analgesia    Post assessment: no apparent anesthetic complications    Post vital signs: stable    Level of consciousness: responds to stimulation    Nausea/Vomiting: no nausea/vomiting    Complications: none    Transfer of care protocol was followed      Last vitals:   Visit Vitals  /60 (BP Location: Left arm, Patient Position: Lying)   Pulse 65   Temp 36.7 °C (98.1 °F) (Temporal)   Resp 14   Ht 5' 10" (1.778 m)   Wt 56.9 kg (125 lb 7.1 oz)   SpO2 97%   BMI 18.00 kg/m²     "

## 2023-04-17 NOTE — INTERVAL H&P NOTE
Discussed with wife and consent from her.    The risks, benefits and alternatives of the procedure were discussed with the patient's wife in detail. This discussion was had in the presence of endoscopy staff. The risks include, risks of adverse reaction to sedation requiring the use of reversal agents, bleeding requiring blood transfusion, perforation requiring surgical intervention and technical failure. Other risks include aspiration leading to respiratory distress and respiratory failure resulting in endotracheal intubation and mechanical ventilation including death. If anesthesia is being utilized for this procedure, it is up to the anesthesiologist to determine airway safety including elective endotracheal intubation. Questions were answered, they agree to proceed. There was no language barriers.

## 2023-04-17 NOTE — PROVATION PATIENT INSTRUCTIONS
Discharge Summary/Instructions after an Endoscopic Procedure  Patient Name: Aquiles Kelsey  Patient MRN: [id number]  Patient   YOB: 1940 Monday, April 17, 2023 Nevaeh Mcconnell MD  Dear patient,  As a result of recent federal legislation (The Federal Cures Act), you may   receive lab or pathology results from your procedure in your [facility]   account before your physician is able to contact you. Your physician or   their representative will relay the results to you with their   recommendations at their soonest availability.  Thank you,  RESTRICTIONS:  During your procedure today, you received medications for sedation.  These   medications may affect your judgment, balance and coordination.  Therefore,   for 24 hours, you have the following restrictions:   - DO NOT drive a car, operate machinery, make legal/financial decisions,   sign important papers or drink alcohol.    ACTIVITY:  Today: no heavy lifting, straining or running due to procedural   sedation/anesthesia.  The following day: return to full activity including work.  DIET:  Eat and drink normally unless instructed otherwise.     TREATMENT FOR COMMON SIDE EFFECTS:  - Mild abdominal pain, nausea, belching, bloating or excessive gas:  rest,   eat lightly and use a heating pad.  - Sore Throat: treat with throat lozenges and/or gargle with warm salt   water.  - Because air was used during the procedure, expelling large amounts of air   from your rectum or belching is normal.  - If a bowel prep was taken, you may not have a bowel movement for 1-3 days.    This is normal.  SYMPTOMS TO WATCH FOR AND REPORT TO YOUR PHYSICIAN:  1. Abdominal pain or bloating, other than gas cramps.  2. Chest pain.  3. Back pain.  4. Signs of infection such as: chills or fever occurring within 24 hours   after the procedure.  5. Rectal bleeding, which would show as bright red, maroon, or black stools.   (A tablespoon of blood from the rectum is not serious, especially  if   hemorrhoids are present.)  6. Vomiting.  7. Weakness or dizziness.  GO DIRECTLY TO THE NEAREST EMERGENCY ROOM IF YOU HAVE ANY OF THE FOLLOWING:      Difficulty breathing              Chills and/or fever over 101 F   Persistent vomiting and/or vomiting blood   Severe abdominal pain   Severe chest pain   Black, tarry stools   Bleeding- more than one tablespoon   Any other symptom or condition that you feel may need urgent attention  Your doctor recommends these additional instructions:  If any biopsies were taken, your doctors clinic will contact you in 1 to 2   weeks with any results.  - [facility] to [facility].   - Resume previous diet.   - Continue present medications.   - Continue Pantoprazole at current dose.  - Discussed with Dr. Gilbert of Cardiology, Eliquis will be decreased from 5mg   twice a day to 2.5mg twice a day.  - Restart ASA in 3 days.   - The findings and recommendations were discussed with the patient's family.     - Communicated via secure chat with hospital medicine regarding findings.  For questions, problems or results please call your physician Nevaeh Mcconnell MD at Work:  [phone]  If you have any questions about the above instructions, call the GI   department at [phone] or call the endoscopy unit at [phone]   from 7am until 3 pm.  [facility] [place], EMERGENCY ROOM PHONE NUMBER:   [phone]  IF A COMPLICATION OR EMERGENCY SITUATION ARISES AND YOU ARE UNABLE TO REACH   YOUR PHYSICIAN - GO DIRECTLY TO THE EMERGENCY ROOM.  I have read or have had read to me these discharge instructions for my   procedure and have received a written copy.  I understand these   instructions and will follow-up with my physician if I have any questions.     __________________________________       _____________________________________  Nurse Signature                                          Patient/Designated   Responsible Party  Signature  MD Nevaeh Paz MD  4/17/2023 10:59:20 AM  This report has been verified and signed electronically.  Dear patient,  As a result of recent federal legislation (The Federal Cures Act), you may   receive lab or pathology results from your procedure in your [facility]   account before your physician is able to contact you. Your physician or   their representative will relay the results to you with their   recommendations at their soonest availability.  Thank you,  PROVATION

## 2023-04-17 NOTE — ANESTHESIA POSTPROCEDURE EVALUATION
Anesthesia Post Evaluation    Patient: Aquiles Kelsey    Procedure(s) Performed: Procedure(s) (LRB):  EGD (ESOPHAGOGASTRODUODENOSCOPY) (N/A)    Final Anesthesia Type: MAC      Patient location during evaluation: PACU  Patient participation: Yes- Able to Participate  Level of consciousness: awake and alert  Post-procedure vital signs: reviewed and stable  Pain management: adequate  Airway patency: patent    PONV status at discharge: No PONV  Anesthetic complications: no      Cardiovascular status: blood pressure returned to baseline  Respiratory status: unassisted and room air  Hydration status: euvolemic  Follow-up not needed.          Vitals Value Taken Time   /60 04/17/23 0907   Temp 36.7 °C (98.1 °F) 04/17/23 0907   Pulse 65 04/17/23 0907   Resp 14 04/17/23 0907   SpO2 97 % 04/17/23 0907         No case tracking events are documented in the log.      Pain/Carmen Score: No data recorded

## 2023-04-17 NOTE — PT/OT/SLP PROGRESS
"Physical Therapy  Treatment    Aquiles Kelsey   MRN: [id number]   Admitting Diagnosis: Melena    PT Received On: 04/17/23  PT Start Time: 0825     PT Stop Time: 0850    PT Total Time (min): 25 min       Billable Minutes:  Gait Training 10 and Therapeutic Activity 15    Treatment Type: Treatment  PT/PTA: PTA     Number of PTA visits since last PT visit: 2       General Precautions: Standard, fall, hearing impaired  Orthopedic Precautions: N/A  Braces: N/A  Respiratory Status: Room air         Subjective:  Communicated with patient's nurse, Aditya, and completed Epic chart review prior to session.  Patient agreed to PT session with encouragement from therapist.   "I'm hungry. When can I eat?"    Pain/Comfort  Pain Rating 1: 0/10  Pain Rating Post-Intervention 1: 0/10    Objective:   Patient found with: peripheral IV; AVASYS    Supine > sit EOB: Min A     Forward scoot towards EOB: SBA    STS from EOB > RW x2 trials: Min A     30ft w/ RW Min A (VC for safety w/ RW mgmt)    Stand pivot T/F to EOB w/ RW: Min A     Stand pivot T/F from EOB > chair w/ RW: Min A (VC for safety w/ RW mgmt)    Completed x10 reps AROM TE to BLE: LAQ, Hip Flex, AP   Intermittent cues given as needed to maintain correct form during repetitions    Educated patient on importance of increased tolerance to upright position and direct impact on CV endurance and strength. Patient encouraged to sit up in chair/ EOB, for a minimum of 2 consecutive hours, 3x per day. Encouraged patient to perform AROM TE to BLE throughout the day within all available planes of motion. Re enforced importance of utilizing call light to meet needs in room and not attempt to get up without staff assistance. Patient verbalized understanding and agreed to comply.      AM-PAC 6 CLICK MOBILITY  How much help from another person does this patient currently need?   1 = Unable, Total/Dependent Assistance  2 = A lot, Maximum/Moderate Assistance  3 = A little, Minimum/Contact " Guard/Supervision  4 = None, Modified [place]/Independent    Turning over in bed (including adjusting bedclothes, sheets and blankets)?: 3  Sitting down on and standing up from a chair with arms (e.g., wheelchair, bedside commode, etc.): 3  Moving from lying on back to sitting on the side of the bed?: 3  Moving to and from a bed to a chair (including a wheelchair)?: 3  Need to walk in hospital room?: 3  Climbing 3-5 steps with a railing?: 1  Basic Mobility Total Score: 16    AM-PAC Raw Score CMS G-Code Modifier Level of Impairment Assistance   6 % Total / Unable   7 - 9 CM 80 - 100% Maximal Assist   10 - 14 CL 60 - 80% Moderate Assist   15 - 19 CK 40 - 60% Moderate Assist   20 - 22 CJ 20 - 40% Minimal Assist   23 CI 1-20% SBA / CGA   24 CH 0% Independent/ Mod I     Patient left up in chair with call button in reach and AVASYS present.    Assessment:  Aquiles Kelsey is a 82 y.o. male with a medical diagnosis of Melena and presents with overall decline in functional mobility. Patient would continue to benefit from skilled PT to address functional limitations listed below in order to return to PLOF/decrease caregiver burden. Patient is progressing well towards goals established in PT POC.    Rehab identified problem list/impairments: weakness, impaired endurance, impaired self care skills, impaired functional mobility, gait instability, impaired balance, decreased coordination, decreased safety awareness, decreased ROM, impaired cardiopulmonary response to activity    Rehab potential is fair.    Activity tolerance: Fair    Discharge recommendations: nursing facility, skilled      Barriers to discharge:      Equipment recommendations: to be determined by next level of care     GOALS:   Multidisciplinary Problems       Physical Therapy Goals          Problem: Physical Therapy    Goal Priority Disciplines Outcome Goal Variances Interventions   Physical Therapy Goal     PT, PT/OT      Description:  LT23  1. PT WILL COMPLETE BED MOBILITY WITH S  2. PT WILL T/F TO CHAIR WITH RW AND SBA.  3. PT WILL GT TRAIN X 100' WITH RW AND SBA                       PLAN:    Patient to be seen 3 x/week to address the above listed problems via gait training, therapeutic activities, therapeutic exercises  Plan of Care expires: 23  Plan of Care reviewed with: patient         2023

## 2023-04-17 NOTE — PLAN OF CARE
[facility] - Med Surg  Discharge Reassessment    Primary Care Provider: Holger Thornton MD    Expected Discharge Date:     Reassessment (most recent)       Discharge Reassessment - 04/17/23 0853          Discharge Reassessment    Assessment Type Discharge Planning Reassessment     Did the patient's condition or plan change since previous assessment? No     Discharge Plan discussed with: Patient     Communicated ZEINA with patient/caregiver Date not available/Unable to determine     Discharge Plan A Home with family

## 2023-04-17 NOTE — PLAN OF CARE
Report called to Neelam. Patient ready for discharge. No distress noted. VSS. Spouse remains at bedside.

## 2023-04-17 NOTE — BRIEF OP NOTE
Inpatient Endoscopy Brief Operative Note      Admit Date: 4/13/2023    Procedure Date and Time:  4/17/2023 12:16 PM    Attending Physician: Juan Antonio Morales MD     Principal Admitting Diagnoses: AVM (arteriovenous malformation) of stomach, acquired with hemorrhage         Discharge Diagnosis: The primary encounter diagnosis was Anemia, unspecified type. Diagnoses of Weakness, Melena, Fatigue, unspecified type, PAF (paroxysmal atrial fibrillation), and Essential hypertension were also pertinent to this visit.     Discharged Condition: Stable    Indication for Admission: AVM (arteriovenous malformation) of stomach, acquired with hemorrhage     Procedure Performed: EGD with APC    SEE PROVATIONS REPORTS FOR DETAILS.    Pathology (if any):  Specimen (24h ago, onward)      None            Estimated Blood Loss: 2 ml.    Discussed with: patient and family.    Disposition: Return to [facility].    Recommendations:   Restart PO diet  Communicated with EDNA Negron. Patient to decrease Eliquis from 5mg BID to 2.5.mg BID. Discussed with wife who cares for patient  Restart ASA in 3 days  Continue PPI BID  F/U with PCP        Communicated with Dr. Shelley, of hospital medicine service regarding the above findings. Will sign off, call if questions.       Nevaeh Mcconnell MD  Inpatient Gastroenterology  [facility]

## 2023-04-17 NOTE — PLAN OF CARE
Transferred via stretcher to Room 558. Assisted patient to bed-able to ambulate with 2 person assist. Tolerated well. Call light within reach; bed in lowest position and locked. Spouse and patient agree to call for any complaints or assistance. Bedside update given to Neelam.

## 2023-04-17 NOTE — ANESTHESIA PREPROCEDURE EVALUATION
04/17/2023  Aquiles Kelsey is a 82 y.o., male.      Pre-op Assessment    I have reviewed the Patient Summary Reports.     I have reviewed the Nursing Notes. I have reviewed the NPO Status.   I have reviewed the Medications.     Review of Systems  Anesthesia Hx:  No problems with previous Anesthesia  Denies Family Hx of Anesthesia complications.   Denies Personal Hx of Anesthesia complications.   Social:  Smoker    Hematology/Oncology:     Oncology Normal    -- Anemia:   EENT/Dental:EENT/Dental Normal   Cardiovascular:   Hypertension Past MI CAD  CABG/stent Dysrhythmias atrial fibrillation Angina ECG has been reviewed.    Pulmonary:  Pulmonary Normal    Renal/:   renal calculi    Hepatic/GI:  Hepatic/GI Normal    Musculoskeletal:  Musculoskeletal Normal    Neurological:  Neurology Normal    Endocrine:   Diabetes, type 2    Dermatological:  Skin Normal    Psych:   Psychiatric History          Physical Exam  General: Cooperative, Alert and Oriented    Airway:  Mallampati: II   Mouth Opening: Normal  TM Distance: Normal  Tongue: Normal  Neck ROM: Normal ROM    Chest/Lungs:  Clear to auscultation, Normal Respiratory Rate    Heart:  Rate: Normal  Rhythm: Regular Rhythm        Anesthesia Plan  Type of Anesthesia, risks & benefits discussed:    Anesthesia Type: MAC  Intra-op Monitoring Plan: Standard ASA Monitors  Induction:  IV  Informed Consent: Informed consent signed with the Patient and all parties understand the risks and agree with anesthesia plan.  All questions answered.   ASA Score: 3  Day of Surgery Review of History & Physical: H&P Update referred to the surgeon/provider.I have interviewed and examined the patient. I have reviewed the patient's H&P dated: There are no significant changes.     Ready For Surgery From Anesthesia Perspective.     .

## 2023-04-17 NOTE — INTERVAL H&P NOTE
The patient has been examined and the H&P has been reviewed:    I concur with the findings and changes have been noted since the H&P was written: blood thinner last taken Thursday . H&H has slowly drifted down. No overt bleeding. Hx of AVMs in 2019. Treated with APC.     Anesthesia/Surgery risks, benefits and alternative options discussed and understood by patient/family.          Active Hospital Problems    Diagnosis  POA    *Melena [K92.1]  Yes    Acute encephalopathy [G93.40]  No    PAF (paroxysmal atrial fibrillation) [I48.0]  Yes    Memory deficit [R41.3]  Yes    Essential hypertension [I10]  Yes     Chronic    Dyslipidemia [E78.5]  Yes     Chronic    Type 2 diabetes mellitus, without long-term current use of insulin [E11.9]  Yes     Chronic      Resolved Hospital Problems   No resolved problems to display.

## 2023-04-18 VITALS
BODY MASS INDEX: 17.96 KG/M2 | HEIGHT: 70 IN | HEART RATE: 62 BPM | DIASTOLIC BLOOD PRESSURE: 59 MMHG | TEMPERATURE: 98 F | OXYGEN SATURATION: 94 % | WEIGHT: 125.44 LBS | RESPIRATION RATE: 18 BRPM | SYSTOLIC BLOOD PRESSURE: 116 MMHG

## 2023-04-18 LAB
ANION GAP SERPL CALC-SCNC: 8 MMOL/L (ref 8–16)
BASOPHILS # BLD AUTO: 0.03 K/UL (ref 0–0.2)
BASOPHILS NFR BLD: 0.5 % (ref 0–1.9)
BUN SERPL-MCNC: 17 MG/DL (ref 8–23)
CALCIUM SERPL-MCNC: 8.9 MG/DL (ref 8.7–10.5)
CHLORIDE SERPL-SCNC: 103 MMOL/L (ref 95–110)
CO2 SERPL-SCNC: 25 MMOL/L (ref 23–29)
CREAT SERPL-MCNC: 0.9 MG/DL (ref 0.5–1.4)
DIFFERENTIAL METHOD: ABNORMAL
EOSINOPHIL # BLD AUTO: 0.2 K/UL (ref 0–0.5)
EOSINOPHIL NFR BLD: 2.7 % (ref 0–8)
ERYTHROCYTE [DISTWIDTH] IN BLOOD BY AUTOMATED COUNT: 14.3 % (ref 11.5–14.5)
EST. GFR  (NO RACE VARIABLE): >60 ML/MIN/1.73 M^2
GLUCOSE SERPL-MCNC: 143 MG/DL (ref 70–110)
HCT VFR BLD AUTO: 28.7 % (ref 40–54)
HGB BLD-MCNC: 9 G/DL (ref 14–18)
IMM GRANULOCYTES # BLD AUTO: 0.02 K/UL (ref 0–0.04)
IMM GRANULOCYTES NFR BLD AUTO: 0.3 % (ref 0–0.5)
LYMPHOCYTES # BLD AUTO: 0.6 K/UL (ref 1–4.8)
LYMPHOCYTES NFR BLD: 10.5 % (ref 18–48)
MAGNESIUM SERPL-MCNC: 1.8 MG/DL (ref 1.6–2.6)
MCH RBC QN AUTO: 26.2 PG (ref 27–31)
MCHC RBC AUTO-ENTMCNC: 31.4 G/DL (ref 32–36)
MCV RBC AUTO: 84 FL (ref 82–98)
MONOCYTES # BLD AUTO: 0.5 K/UL (ref 0.3–1)
MONOCYTES NFR BLD: 8.1 % (ref 4–15)
NEUTROPHILS # BLD AUTO: 4.6 K/UL (ref 1.8–7.7)
NEUTROPHILS NFR BLD: 77.9 % (ref 38–73)
NRBC BLD-RTO: 0 /100 WBC
PLATELET # BLD AUTO: 236 K/UL (ref 150–450)
PMV BLD AUTO: 10 FL (ref 9.2–12.9)
POCT GLUCOSE: 147 MG/DL (ref 70–110)
POCT GLUCOSE: 152 MG/DL (ref 70–110)
POTASSIUM SERPL-SCNC: 3.8 MMOL/L (ref 3.5–5.1)
RBC # BLD AUTO: 3.43 M/UL (ref 4.6–6.2)
SODIUM SERPL-SCNC: 136 MMOL/L (ref 136–145)
WBC # BLD AUTO: 5.93 K/UL (ref 3.9–12.7)

## 2023-04-18 PROCEDURE — 85025 COMPLETE CBC W/AUTO DIFF WBC: CPT | Mod: HCNC | Performed by: INTERNAL MEDICINE

## 2023-04-18 PROCEDURE — 25000003 PHARM REV CODE 250: Mod: HCNC | Performed by: STUDENT IN AN ORGANIZED HEALTH CARE EDUCATION/TRAINING PROGRAM

## 2023-04-18 PROCEDURE — 97530 THERAPEUTIC ACTIVITIES: CPT | Mod: HCNC,CQ

## 2023-04-18 PROCEDURE — 36415 COLL VENOUS BLD VENIPUNCTURE: CPT | Mod: HCNC | Performed by: INTERNAL MEDICINE

## 2023-04-18 PROCEDURE — 97116 GAIT TRAINING THERAPY: CPT | Mod: HCNC,CQ

## 2023-04-18 PROCEDURE — 80048 BASIC METABOLIC PNL TOTAL CA: CPT | Mod: HCNC | Performed by: INTERNAL MEDICINE

## 2023-04-18 PROCEDURE — 25000003 PHARM REV CODE 250: Mod: HCNC | Performed by: INTERNAL MEDICINE

## 2023-04-18 PROCEDURE — G0378 HOSPITAL OBSERVATION PER HR: HCPCS | Mod: HCNC

## 2023-04-18 PROCEDURE — 83735 ASSAY OF MAGNESIUM: CPT | Mod: HCNC | Performed by: INTERNAL MEDICINE

## 2023-04-18 PROCEDURE — 96376 TX/PRO/DX INJ SAME DRUG ADON: CPT

## 2023-04-18 PROCEDURE — 63600175 PHARM REV CODE 636 W HCPCS: Mod: HCNC | Performed by: INTERNAL MEDICINE

## 2023-04-18 RX ORDER — PANTOPRAZOLE SODIUM 40 MG/1
40 TABLET, DELAYED RELEASE ORAL 2 TIMES DAILY
Qty: 60 TABLET | Refills: 11 | Status: SHIPPED | OUTPATIENT
Start: 2023-04-18 | End: 2024-05-01

## 2023-04-18 RX ADMIN — PANTOPRAZOLE SODIUM 40 MG: 40 TABLET, DELAYED RELEASE ORAL at 10:04

## 2023-04-18 RX ADMIN — IRON SUCROSE 200 MG: 20 INJECTION, SOLUTION INTRAVENOUS at 10:04

## 2023-04-18 RX ADMIN — AMLODIPINE BESYLATE 5 MG: 5 TABLET ORAL at 10:04

## 2023-04-18 RX ADMIN — ISOSORBIDE MONONITRATE 120 MG: 60 TABLET, EXTENDED RELEASE ORAL at 10:04

## 2023-04-18 RX ADMIN — SENNOSIDES AND DOCUSATE SODIUM 1 TABLET: 50; 8.6 TABLET ORAL at 10:04

## 2023-04-18 RX ADMIN — APIXABAN 2.5 MG: 2.5 TABLET, FILM COATED ORAL at 10:04

## 2023-04-18 RX ADMIN — POLYETHYLENE GLYCOL 3350 17 G: 17 POWDER, FOR SOLUTION ORAL at 10:04

## 2023-04-18 RX ADMIN — ATORVASTATIN CALCIUM 40 MG: 40 TABLET, FILM COATED ORAL at 10:04

## 2023-04-18 NOTE — PLAN OF CARE
04/18/23 1214   Post-Acute Status   Post-Acute Authorization Home Health   Home Health Status Referrals Sent   Discharge Plan   Discharge Plan A Home Health     SW consulted with MD who stated pt is good enough to go home with HH. Pending acceptance for [facility].

## 2023-04-18 NOTE — PT/OT/SLP PROGRESS
"Physical Therapy  Treatment    Aquiles Kelsey   MRN: [id number]   Admitting Diagnosis: AVM (arteriovenous malformation) of stomach, acquired with hemorrhage    PT Received On: 04/18/23  PT Start Time: 0810     PT Stop Time: 0840    PT Total Time (min): 30 min       Billable Minutes:  Gait Training 10 and Therapeutic Activity 20    Treatment Type: Treatment  PT/PTA: PTA     Number of PTA visits since last PT visit: 3       General Precautions: Standard, fall, hearing impaired  Orthopedic Precautions: N/A  Braces: N/A  Respiratory Status: Room air         Subjective:  Communicated with patient's nurse, Latia, and completed Epic chart review prior to session.  Patient agreed to PT session with encouragement from therapist.   "I have to do this before I eat?" "I'm hungry."    Pain/Comfort  Pain Rating 1: 0/10  Pain Rating Post-Intervention 1: 0/10    Objective:   Patient found with: peripheral IV, telemetry, Other (comments) (AVASYS)    Supine > sit EOB: Min-Mod A (VC for sequencing of task; patient repeatedly stating "I can't")    Forward scoot towards EOB: SBA    STS from EOB > RW: Min A (VC for hand placement)    40ft w/ RW Min A (VC for safety w/ RW mgmt and to remain within MISA of AD)    Stand pivot T/F to chair w/ RW: Min A (VC for safety w/ RW mgmt and sequencing of task)    Completed x15 reps AROM TE to BLE: LAQ, Hip Flex, AP   Intermittent cues given as needed to maintain correct form during repetitions    Educated patient on importance of increased tolerance to upright position and direct impact on CV endurance and strength. Patient encouraged to sit up in chair/ EOB, for a minimum of 2 consecutive hours, 3x per day. Encouraged patient to perform AROM TE to BLE throughout the day within all available planes of motion. Re enforced importance of utilizing call light to meet needs in room and not attempt to get up without staff assistance. Patient verbalized understanding and agreed to comply.  "       AM-[facility] 6 CLICK MOBILITY  How much help from another person does this patient currently need?   1 = Unable, Total/Dependent Assistance  2 = A lot, Maximum/Moderate Assistance  3 = A little, Minimum/Contact Guard/Supervision  4 = None, Modified [place]/Independent    Turning over in bed (including adjusting bedclothes, sheets and blankets)?: 3  Sitting down on and standing up from a chair with arms (e.g., wheelchair, bedside commode, etc.): 3  Moving from lying on back to sitting on the side of the bed?: 3  Moving to and from a bed to a chair (including a wheelchair)?: 3  Need to walk in hospital room?: 3  Climbing 3-5 steps with a railing?: 1  Basic Mobility Total Score: 16    AM-PAC Raw Score CMS G-Code Modifier Level of Impairment Assistance   6 % Total / Unable   7 - 9 CM 80 - 100% Maximal Assist   10 - 14 CL 60 - 80% Moderate Assist   15 - 19 CK 40 - 60% Moderate Assist   20 - 22 CJ 20 - 40% Minimal Assist   23 CI 1-20% SBA / CGA   24 CH 0% Independent/ Mod I     Patient left up in chair with call button in reach and AVASYS present.    Assessment:  Aquiles Kelsye is a 82 y.o. male with a medical diagnosis of AVM (arteriovenous malformation) of stomach, acquired with hemorrhage and presents with overall decline in functional mobility. Patient would continue to benefit from skilled PT to address functional limitations listed below in order to return to PLOF/decrease caregiver burden. Patient with an improved response to encouragement during gait training today and was able to increase overall gait distance. Patient does struggle with bed mobility but unsure if this is due to physical weakness or lack of motivation/effort.    Rehab identified problem list/impairments: weakness, impaired endurance, impaired self care skills, impaired functional mobility, gait instability, impaired balance, impaired cognition, decreased coordination, decreased upper extremity function, decreased lower  extremity function, decreased safety awareness, decreased ROM, impaired coordination, impaired cardiopulmonary response to activity    Rehab potential is fair.    Activity tolerance: Fair    Discharge recommendations: nursing facility, skilled      Barriers to discharge:      Equipment recommendations: to be determined by next level of care     GOALS:   Multidisciplinary Problems       Physical Therapy Goals          Problem: Physical Therapy    Goal Priority Disciplines Outcome Goal Variances Interventions   Physical Therapy Goal     PT, PT/OT      Description: LT23  1. PT WILL COMPLETE BED MOBILITY WITH S  2. PT WILL T/F TO CHAIR WITH RW AND SBA.  3. PT WILL GT TRAIN X 100' WITH RW AND SBA                       PLAN:    Patient to be seen 3 x/week to address the above listed problems via gait training, therapeutic activities, therapeutic exercises  Plan of Care expires: 23  Plan of Care reviewed with: patient         2023

## 2023-04-18 NOTE — PLAN OF CARE
[facility] - Med Surg  Discharge Final Note    Primary Care Provider: Holger Thornton MD    Expected Discharge Date: 4/18/2023    Final Discharge Note (most recent)       Final Note - 04/18/23 1225          Final Note    Assessment Type Final Discharge Note     Anticipated Discharge Disposition Home-Health Care Svc        Post-Acute Status    Post-Acute Authorization Home Health     Home Health Status Set-up Complete/Auth obtained

## 2023-04-18 NOTE — PLAN OF CARE
Discussed poc with pt, pt verbalized understanding  Purposeful rounding every 2hours  VS wnl  Cardiac monitoring in use, pt is NSR  Fall precautions in place, remains injury free  Bed locked at lowest position  Call light within reach  Chart check complete

## 2023-04-18 NOTE — DISCHARGE SUMMARY
[facility] Medicine  Discharge Summary      Patient Name: Aquiles Kelsey  MRN: [id number]  AYESHA: [id number]  Patient Class: OP- Observation  Admission Date: 4/13/2023  Hospital Length of Stay: 0 days  Discharge Date and Time:  04/18/2023 12:23 PM  Attending Physician: Juan Antonio Morales MD   Discharging Provider: Juan Antonio Morales MD  Primary Care Provider: Holger Thornton MD    Primary Care Team: Networked reference to record PCT     HPI:   Mr. Edwards is a 83 yo male with hx of PAD, Afib, HTN, HLD, DM, Depression and anemia presented to the ER because his wife feels has generalized weakness, lethargy and is more confused.  He does not have diagnosed dementia and family is not at bedside to give specifics of his confusion.  Head CT showed chronic microvascular ischemic changes. He was noted to have a drop in hbg from 13 to 10 over the last few months.  Family that was initially at bedside mentioned to the ER staff that he had melena.  Patient unable to tell me when his last BM was and if it was dark, but states he has not seen any blood in his stool.  GI was consulted and planning to scope the patient, but would like to speak to  family as patient has been on Eliquis but unclear as to the last dose.  GI has cleared patient to eat today and planning to possibly scope on Saturday.  Will continue to home Eliquis and repeat labs in AM.  Hospital medicine will admit to observation for possible  GI bleed      Procedure(s) (LRB):  EGD (ESOPHAGOGASTRODUODENOSCOPY) (N/A)      Hospital Course:   Patient admitted to observation for further evaluation of melena and suspected GI bleed in the setting of Eliquis and AVMs.  Serial H&H results reviewed with downward trend.  Eliquis held.  GI consult with EGD planned on 4/17/23.  Acute encephalopathy noted with anxiolytics ordered as needed.  CT of head showed no acute abnormality.  Likely underlying dementia present per family.Iron deficiency per  "labs, IV venofer ordered. On 4/16/23, H/H stable with EGD planned for tomorrow morning. Pt tolerating clear liquids.      Discussed case with GI: EGD showed multiple gastric AVMs treated with APC. Communicated with Dr. Gilbert. Ok to decrease eliquis from 5mg BID to 2.5 mg BID. Continue PPI BID per GI recs. PT/OT evaluated, initially recommended SNF. Family insistent on being able to care for patient at home. On day of discharge, patient progressed with PT/OT, ambulating 40ft with RW with Min A. Still required some assistance with bed mobility, but wife at bedside assured me that her and her son will be able to manage bed transfers. Orders for PT/OT through Home Health was ordered on day of discharge. The above was communicated to patient and wife at bedside. They acknowledged understanding and agreed to the plan.     BP (!) 102/50 (BP Location: Right arm, Patient Position: Lying)   Pulse 68   Temp 98.2 °F (36.8 °C) (Oral)   Resp 18   Ht 5' 10" (1.778 m)   Wt 56.9 kg (125 lb 7.1 oz)   SpO2 (!) 94%   BMI 18.00 kg/m²       GEN: No acute distress, pleasant, body habitus normal  HEENT: atraumatic and normocephalic  CARDS: regular rate and rhythm, no m/g, pulses palpable in LE  PULM: breathing comfortably on room air, chest symmetric, nonlabored, no abnormal breath sounds on auscultation  ABD: nontender, nondistended, soft, no organomegaly, BS+  Neurological:      Mental Status: He is alert.      Comments: Pleasantly confused- oriented to person - wife at bedside. Follows simple verbal commands.    Psychiatric:         Mood and Affect: Mood is not anxious.         Speech: Speech normal.         Behavior: Behavior is not agitated. Behavior is cooperative.         Cognition and Memory: Cognition is impaired.         Judgment: Judgment is impulsive.     Goals of Care Treatment Preferences:  Code Status: Full Code      Consults:   Consults (From admission, onward)        Status Ordering Provider     Inpatient consult to " Social Work  Once        Provider:  (Not yet assigned)    Completed ALOK RENO     IP consult to case management  Once        Provider:  (Not yet assigned)    Completed TAMIKO GARCIA     Inpatient consult to Gastroenterology  Once        Provider:  (Not yet assigned)    Completed TAMIKO GARCIA          No new Assessment & Plan notes have been filed under this hospital service since the last note was generated.  Service: Hospital Medicine    Final Active Diagnoses:    Diagnosis Date Noted POA    PRINCIPAL PROBLEM:  AVM (arteriovenous malformation) of stomach, 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] 04/13/2023 Yes    Acute encephalopathy [G93.40] 04/14/2023 No    PAF (paroxysmal atrial fibrillation) [I48.0] 01/24/2023 Yes    Memory deficit [R41.3] 12/09/2022 Yes    Essential hypertension [I10] 06/12/2019 Yes     Chronic    Dyslipidemia [E78.5] 06/12/2019 Yes     Chronic    Type 2 diabetes mellitus, without long-term current use of insulin [E11.9] 06/12/2019 Yes     Chronic      Problems Resolved During this Admission:       Discharged Condition: good    Disposition: Home-Health Care [facility]    Follow Up:    Follow-up Information     Nevaeh Mcconnell MD. Schedule an appointment as soon as possible for a visit.    Specialty: Gastroenterology  Why: Routine follow up  Contact information:  [address] [facility] [address] [phone]             Holger Thornton MD. Schedule an appointment as soon as possible for a visit.    Specialty: Internal Medicine  Why: Routine hospital follow up  Contact information:  [address] [facility] [address] [phone]                           Patient Instructions:      Ambulatory referral/consult to Outpatient Case Management   Referral Priority: Routine Referral Type: Consultation   Referral Reason: Specialty Services Required   Number of Visits Requested: 1     Ambulatory referral/consult to Home Health   Standing Status: Future   Referral  Priority: Routine Referral Type: Home Health   Referral Reason: Specialty Services Required   Requested Specialty: Home Health Services   Number of Visits Requested: 1     Activity as tolerated       Significant Diagnostic Studies:   BMP: Recent Labs   Lab 04/18/23  0633   *      K 3.8      CO2 25   BUN 17   CREATININE 0.9   CALCIUM 8.9   MG 1.8     CBC:   Recent Labs   Lab 04/17/23  1207 04/18/23  0633   WBC 5.24 5.93   HGB 8.9* 9.0*   HCT 28.5* 28.7*    236     CMP:   Recent Labs   Lab 04/18/23  0633      K 3.8      CO2 25   *   BUN 17   CREATININE 0.9   CALCIUM 8.9   ANIONGAP 8     Cardiac Markers: No results for input(s): CKMB, MYOGLOBIN, BNP, TROPISTAT in the last 48 hours.  Coagulation: No results for input(s): PT, INR, APTT in the last 48 hours.  Lactic Acid: No results for input(s): LACTATE in the last 48 hours.  Magnesium:   Recent Labs   Lab 04/17/23  0702 04/18/23  0633   MG 1.8 1.8     Troponin: No results for input(s): TROPONINI, TROPONINIHS in the last 48 hours.  TSH:   Recent Labs   Lab 11/04/22  1101   TSH 1.944     Urine Studies:   No results for input(s): COLORU, APPEARANCEUA, PHUR, SPECGRAV, PROTEINUA, GLUCUA, KETONESU, BILIRUBINUA, OCCULTUA, NITRITE, UROBILINOGEN, LEUKOCYTESUR, RBCUA, WBCUA, BACTERIA, SQUAMEPITHEL, HYALINECASTS in the last 48 hours.    Invalid input(s): WRIGHTSUR      Pending Diagnostic Studies:     None         Medications:  Reconciled Home Medications:      Medication List      START taking these medications    pantoprazole 40 MG tablet  Commonly known as: PROTONIX  Take 1 tablet (40 mg total) by mouth 2 (two) times daily.        CONTINUE taking these medications    alcohol swabs Padm  Commonly known as: ALCOHOL PREP PADS  Twice a day     amLODIPine 5 MG tablet  Commonly known as: NORVASC  TAKE 1 TABLET BY MOUTH EVERY DAY     apixaban 5 mg Tab  Commonly known as: ELIQUIS  Take 1 tablet (5 mg total) by mouth 2 (two) times daily.      aspirin 81 MG EC tablet  Commonly known as: ECOTRIN  Take 1 tablet (81 mg total) by mouth once daily.     atorvastatin 40 MG tablet  Commonly known as: LIPITOR  Take 1 tablet (40 mg total) by mouth once daily.     blood sugar diagnostic Strp  Test blood sugars twice a day     gabapentin 100 MG capsule  Commonly known as: NEURONTIN  Take 2 capsules (200 mg total) by mouth 2 (two) times daily as needed.     isosorbide mononitrate 120 MG 24 hr tablet  Commonly known as: IMDUR  TAKE 1 TABLET BY MOUTH ONCE DAILY     lancets Misc  Commonly known as: ACCU-CHEK SOFTCLIX LANCETS  Test blood sugars twice a day     metFORMIN 500 MG tablet  Commonly known as: GLUCOPHAGE  Take 1 tablet (500 mg total) by mouth daily with breakfast.     MYRBETRIQ 50 mg Tb24  Generic drug: mirabegron  TAKE 1 TABLET BY MOUTH EVERY DAY     ondansetron 4 MG Tbdl  Commonly known as: ZOFRAN-ODT  Take 1 tablet (4 mg total) by mouth every 6 (six) hours as needed.            Indwelling Lines/Drains at time of discharge:   Lines/Drains/Airways     None                 Time spent on the discharge of patient: 35 minutes  of time spent on discharge including examining patient, providing discharge instructions, arranging follow-up and documentation.           Juan Antonio Morales MD  Department of Hospital Medicine  [facility] - Med Surg

## 2023-04-18 NOTE — PLAN OF CARE
04/18/23 1038   Post-Acute Status   Post-Acute Authorization Placement   Post-Acute Placement Status Referrals Sent   Discharge Plan   Discharge Plan A Skilled Nursing Facility     Pending accepting SNF and 142.

## 2023-05-04 ENCOUNTER — OUTPATIENT CASE MANAGEMENT (OUTPATIENT)
Dept: ADMINISTRATIVE | Facility: OTHER | Age: 83
End: 2023-05-04
Payer: MEDICARE

## 2023-05-04 NOTE — LETTER
Aquiles Kelsey  [address] [address]      Dear Aquiles Kelsey,     I work with [facility]'s Outpatient Care Management Department. We received a referral to call you to discuss your medical history. These services are free of charge and are offered to [facility] patients who have recently been discharged from any of our facilities or who have medical conditions that may require the skill of a nurse to assist with management.     I am a Registered Nurse who specializes in connecting patients with available medical and financial resources as well as addressing any educational needs that may be indicated.    I attempted to reach you by telephone, but I was unsuccessful. Please call our department so that we can go over some questions with you, regarding your health.    The Outpatient Care Management Department can be reached at [phone], from 8:00AM to 4:30 PM, on Monday thru Friday.     Additionally, [facility] also has a program where a nurse is available 24/7 to answer questions or provide medical advice, their number is [phone].      Thanks,        Leida Cardoso RN  Outpatient Care Management  Phone #: [phone]

## 2023-05-04 NOTE — PROGRESS NOTES
Outpatient Care Management  Patient Not Qualified    Patient: Aquiles Kelsey  MRN:  [id number]  Date of Service:  5/4/2023  Completed by:  Leida Cardoso RN    Chief Complaint   Patient presents with    Case Closure     Unable to reach       Patient Summary           Reason Not Qualified:  Unable to reach

## 2023-05-08 ENCOUNTER — OFFICE VISIT (OUTPATIENT)
Dept: FAMILY MEDICINE | Facility: CLINIC | Age: 83
End: 2023-05-08
Payer: MEDICARE

## 2023-05-08 ENCOUNTER — LAB VISIT (OUTPATIENT)
Dept: LAB | Facility: HOSPITAL | Age: 83
End: 2023-05-08
Attending: INTERNAL MEDICINE
Payer: MEDICARE

## 2023-05-08 VITALS
BODY MASS INDEX: 18.44 KG/M2 | DIASTOLIC BLOOD PRESSURE: 70 MMHG | TEMPERATURE: 98 F | HEART RATE: 54 BPM | RESPIRATION RATE: 18 BRPM | SYSTOLIC BLOOD PRESSURE: 124 MMHG | OXYGEN SATURATION: 100 % | WEIGHT: 128.5 LBS

## 2023-05-08 DIAGNOSIS — E78.5 DYSLIPIDEMIA: Chronic | ICD-10-CM

## 2023-05-08 DIAGNOSIS — I63.9 CEREBROVASCULAR ACCIDENT (CVA), UNSPECIFIED MECHANISM: ICD-10-CM

## 2023-05-08 DIAGNOSIS — Z79.01 ANTICOAGULATED: Primary | ICD-10-CM

## 2023-05-08 DIAGNOSIS — E11.9 TYPE 2 DIABETES MELLITUS WITHOUT COMPLICATION, WITHOUT LONG-TERM CURRENT USE OF INSULIN: Chronic | ICD-10-CM

## 2023-05-08 DIAGNOSIS — I25.118 CORONARY ARTERY DISEASE OF NATIVE ARTERY OF NATIVE HEART WITH STABLE ANGINA PECTORIS: Chronic | ICD-10-CM

## 2023-05-08 DIAGNOSIS — D64.9 ANEMIA, UNSPECIFIED TYPE: ICD-10-CM

## 2023-05-08 DIAGNOSIS — R41.3 MEMORY DEFICIT: ICD-10-CM

## 2023-05-08 DIAGNOSIS — I73.9 PAD (PERIPHERAL ARTERY DISEASE): Chronic | ICD-10-CM

## 2023-05-08 DIAGNOSIS — K31.811 AVM (ARTERIOVENOUS MALFORMATION) OF STOMACH, ACQUIRED WITH HEMORRHAGE: ICD-10-CM

## 2023-05-08 DIAGNOSIS — I10 ESSENTIAL HYPERTENSION: Chronic | ICD-10-CM

## 2023-05-08 DIAGNOSIS — R53.1 WEAKNESS: ICD-10-CM

## 2023-05-08 DIAGNOSIS — I48.0 PAF (PAROXYSMAL ATRIAL FIBRILLATION): ICD-10-CM

## 2023-05-08 DIAGNOSIS — Z98.61 HISTORY OF PTCA: ICD-10-CM

## 2023-05-08 PROCEDURE — 3288F FALL RISK ASSESSMENT DOCD: CPT | Mod: CPTII,S$GLB,, | Performed by: INTERNAL MEDICINE

## 2023-05-08 PROCEDURE — 3078F PR MOST RECENT DIASTOLIC BLOOD PRESSURE < 80 MM HG: ICD-10-PCS | Mod: CPTII,S$GLB,, | Performed by: INTERNAL MEDICINE

## 2023-05-08 PROCEDURE — 99215 OFFICE O/P EST HI 40 MIN: CPT | Mod: S$GLB,,, | Performed by: INTERNAL MEDICINE

## 2023-05-08 PROCEDURE — 1126F AMNT PAIN NOTED NONE PRSNT: CPT | Mod: CPTII,S$GLB,, | Performed by: INTERNAL MEDICINE

## 2023-05-08 PROCEDURE — 3288F PR FALLS RISK ASSESSMENT DOCUMENTED: ICD-10-PCS | Mod: CPTII,S$GLB,, | Performed by: INTERNAL MEDICINE

## 2023-05-08 PROCEDURE — 1159F MED LIST DOCD IN RCRD: CPT | Mod: CPTII,S$GLB,, | Performed by: INTERNAL MEDICINE

## 2023-05-08 PROCEDURE — 99999 PR PBB SHADOW E&M-EST. PATIENT-LVL V: ICD-10-PCS | Mod: PBBFAC,,, | Performed by: INTERNAL MEDICINE

## 2023-05-08 PROCEDURE — 99215 PR OFFICE/OUTPT VISIT, EST, LEVL V, 40-54 MIN: ICD-10-PCS | Mod: S$GLB,,, | Performed by: INTERNAL MEDICINE

## 2023-05-08 PROCEDURE — 3078F DIAST BP <80 MM HG: CPT | Mod: CPTII,S$GLB,, | Performed by: INTERNAL MEDICINE

## 2023-05-08 PROCEDURE — 1101F PT FALLS ASSESS-DOCD LE1/YR: CPT | Mod: CPTII,S$GLB,, | Performed by: INTERNAL MEDICINE

## 2023-05-08 PROCEDURE — 1101F PR PT FALLS ASSESS DOC 0-1 FALLS W/OUT INJ PAST YR: ICD-10-PCS | Mod: CPTII,S$GLB,, | Performed by: INTERNAL MEDICINE

## 2023-05-08 PROCEDURE — 3074F PR MOST RECENT SYSTOLIC BLOOD PRESSURE < 130 MM HG: ICD-10-PCS | Mod: CPTII,S$GLB,, | Performed by: INTERNAL MEDICINE

## 2023-05-08 PROCEDURE — 3074F SYST BP LT 130 MM HG: CPT | Mod: CPTII,S$GLB,, | Performed by: INTERNAL MEDICINE

## 2023-05-08 PROCEDURE — 1159F PR MEDICATION LIST DOCUMENTED IN MEDICAL RECORD: ICD-10-PCS | Mod: CPTII,S$GLB,, | Performed by: INTERNAL MEDICINE

## 2023-05-08 PROCEDURE — 1126F PR PAIN SEVERITY QUANTIFIED, NO PAIN PRESENT: ICD-10-PCS | Mod: CPTII,S$GLB,, | Performed by: INTERNAL MEDICINE

## 2023-05-08 PROCEDURE — 85025 COMPLETE CBC W/AUTO DIFF WBC: CPT | Performed by: INTERNAL MEDICINE

## 2023-05-08 PROCEDURE — 36415 COLL VENOUS BLD VENIPUNCTURE: CPT | Mod: PO | Performed by: INTERNAL MEDICINE

## 2023-05-08 PROCEDURE — 99999 PR PBB SHADOW E&M-EST. PATIENT-LVL V: CPT | Mod: PBBFAC,,, | Performed by: INTERNAL MEDICINE

## 2023-05-08 NOTE — PROGRESS NOTES
Subjective:       Patient ID: Aquiles Kelsey is a 82 y.o. male.    Chief Complaint: Follow-up (3m), Hypertension, Hyperlipidemia, Diabetes, Atrial Fibrillation, and Anticoagulation    Here with wife    Follow-up  Associated symptoms include arthralgias, fatigue, myalgias and weakness. Pertinent negatives include no abdominal pain, chest pain, chills, coughing, diaphoresis, fever, headaches, joint swelling, nausea, neck pain, numbness, rash, sore throat or vomiting.   Hypertension  Pertinent negatives include no chest pain, headaches, neck pain, palpitations or shortness of breath.   Hyperlipidemia  Associated symptoms include myalgias. Pertinent negatives include no chest pain or shortness of breath.   Diabetes  Hypoglycemia symptoms include confusion. Pertinent negatives for hypoglycemia include no dizziness, headaches, nervousness/anxiousness, pallor, seizures, speech difficulty or tremors. Associated symptoms include fatigue and weakness. Pertinent negatives for diabetes include no chest pain, no polydipsia, no polyphagia and no polyuria.   Atrial Fibrillation  Symptoms include weakness. Symptoms are negative for chest pain, dizziness, palpitations and shortness of breath. Past medical history includes atrial fibrillation and hyperlipidemia.   Past Medical History:   Diagnosis Date    Acute blood loss anemia 10/14/2019    Aneurysm, abdominal aortic     Coronary artery disease     Depression     Diabetes mellitus     HLD (hyperlipidemia)     Hypertension     Kidney stone     PAD (peripheral artery disease)     PAF (paroxysmal atrial fibrillation) 1/24/2023    Tobacco abuse      Past Surgical History:   Procedure Laterality Date    APPENDECTOMY      CORONARY STENT PLACEMENT      x 4    ESOPHAGOGASTRODUODENOSCOPY N/A 10/14/2019    Procedure: EGD (ESOPHAGOGASTRODUODENOSCOPY);  Surgeon: Carlos Mcneal III, MD;  Location: [facility];  Service: Endoscopy;  Laterality: N/A;    ESOPHAGOGASTRODUODENOSCOPY N/A  10/15/2019    Procedure: EGD (ESOPHAGOGASTRODUODENOSCOPY);  Surgeon: Carlos Mcneal III, MD;  Location: [facility] ENDO;  Service: Endoscopy;  Laterality: N/A;    ESOPHAGOGASTRODUODENOSCOPY N/A 4/17/2023    Procedure: EGD (ESOPHAGOGASTRODUODENOSCOPY);  Surgeon: Nevaeh Mcconnell MD;  Location: [facility] ENDO;  Service: Endoscopy;  Laterality: N/A;    LEFT HEART CATHETERIZATION Left 10/11/2019    Procedure: CATHETERIZATION, HEART, LEFT;  Surgeon: Robe Gilbert MD;  Location: [facility] CATH LAB;  Service: Cardiology;  Laterality: Left;    LEFT HEART CATHETERIZATION Left 10/13/2019    Procedure: CATHETERIZATION, HEART, LEFT;  Surgeon: Robe Gilbert MD;  Location: [facility] CATH LAB;  Service: Cardiology;  Laterality: Left;    leg stent Left      Family History   Problem Relation Age of Onset    Diabetes Mother     COPD Father     Diabetes Brother      Social History     Socioeconomic History    Marital status:    Tobacco Use    Smoking status: Every Day     Types: Cigars     Passive exposure: Never    Smokeless tobacco: Current   Substance and Sexual Activity    Alcohol use: Not Currently    Drug use: Not Currently    Sexual activity: Not Currently     Social Determinants of Health     Financial Resource Strain: Low Risk     Difficulty of Paying Living Expenses: Not hard at all   Food Insecurity: Unknown    Worried About Running Out of Food in the Last Year: Never true   Transportation Needs: No Transportation Needs    Lack of Transportation (Medical): No    Lack of Transportation (Non-Medical): No   Physical Activity: Inactive    Days of Exercise per Week: 0 days    Minutes of Exercise per Session: 0 min   Stress: No Stress Concern Present    Feeling of Stress : Only a little   Social Connections: Socially Isolated    Frequency of Communication with Friends and Family: Once a week    Frequency of Social Gatherings with Friends and Family: Once a week    Attends Hoahaoism Services: Never    Active Member of Clubs or  Organizations: No    Attends Club or Organization Meetings: Never    Marital Status:    Housing Stability: Unknown    Unable to Pay for Housing in the Last Year: No    Unstable Housing in the Last Year: No     Review of Systems   Constitutional:  Positive for activity change and fatigue. Negative for appetite change, chills, diaphoresis, fever and unexpected weight change.   HENT:  Negative for drooling, ear discharge, ear pain, facial swelling, hearing loss, mouth sores, nosebleeds, postnasal drip, rhinorrhea, sinus pressure, sneezing, sore throat, tinnitus, trouble swallowing and voice change.    Eyes:  Negative for photophobia, redness and visual disturbance.   Respiratory:  Negative for apnea, cough, choking, chest tightness, shortness of breath and wheezing.    Cardiovascular:  Negative for chest pain and palpitations.   Gastrointestinal:  Negative for abdominal distention, abdominal pain, anal bleeding, blood in stool, constipation, diarrhea, nausea and vomiting.   Endocrine: Negative for cold intolerance, heat intolerance, polydipsia, polyphagia and polyuria.   Genitourinary:  Negative for difficulty urinating, dysuria, enuresis, flank pain, frequency, genital sores, hematuria and urgency.   Musculoskeletal:  Positive for arthralgias, gait problem and myalgias. Negative for back pain, joint swelling, neck pain and neck stiffness.   Skin:  Negative for color change, pallor, rash and wound.   Allergic/Immunologic: Negative for food allergies and immunocompromised state.   Neurological:  Positive for weakness. Negative for dizziness, tremors, seizures, syncope, facial asymmetry, speech difficulty, light-headedness, numbness and headaches.   Hematological:  Negative for adenopathy. Does not bruise/bleed easily.   Psychiatric/Behavioral:  Positive for confusion. Negative for decreased concentration, dysphoric mood, hallucinations, self-injury, sleep disturbance and suicidal ideas. The patient is not  nervous/anxious and is not hyperactive.      Objective:      Physical Exam  Vitals and nursing note reviewed.   Constitutional:       General: He is not in acute distress.     Appearance: Normal appearance. He is well-developed. He is not diaphoretic.   HENT:      Head: Normocephalic and atraumatic.      Mouth/Throat:      Pharynx: No oropharyngeal exudate.   Eyes:      General: No scleral icterus.     Pupils: Pupils are equal, round, and reactive to light.   Neck:      Thyroid: No thyromegaly.      Vascular: No carotid bruit or JVD.      Trachea: No tracheal deviation.   Cardiovascular:      Rate and Rhythm: Normal rate and regular rhythm.      Heart sounds: Normal heart sounds.   Pulmonary:      Effort: Pulmonary effort is normal. No respiratory distress.      Breath sounds: Normal breath sounds. No wheezing or rales.   Chest:      Chest wall: No tenderness.   Abdominal:      General: Bowel sounds are normal. There is no distension.      Palpations: Abdomen is soft.      Tenderness: There is no abdominal tenderness. There is no guarding or rebound.   Musculoskeletal:         General: No tenderness. Normal range of motion.      Cervical back: Normal range of motion and neck supple.   Lymphadenopathy:      Cervical: No cervical adenopathy.   Skin:     General: Skin is warm and dry.      Coloration: Skin is not pale.      Findings: No erythema or rash.   Neurological:      Mental Status: He is alert.       CMP  Sodium   Date Value Ref Range Status   04/18/2023 136 136 - 145 mmol/L Final     Potassium   Date Value Ref Range Status   04/18/2023 3.8 3.5 - 5.1 mmol/L Final     Chloride   Date Value Ref Range Status   04/18/2023 103 95 - 110 mmol/L Final     CO2   Date Value Ref Range Status   04/18/2023 25 23 - 29 mmol/L Final     Glucose   Date Value Ref Range Status   04/18/2023 143 (H) 70 - 110 mg/dL Final     BUN   Date Value Ref Range Status   04/18/2023 17 8 - 23 mg/dL Final     Creatinine   Date Value Ref Range  Status   04/18/2023 0.9 0.5 - 1.4 mg/dL Final     Calcium   Date Value Ref Range Status   04/18/2023 8.9 8.7 - 10.5 mg/dL Final     Total Protein   Date Value Ref Range Status   04/16/2023 6.3 6.0 - 8.4 g/dL Final     Albumin   Date Value Ref Range Status   04/16/2023 2.5 (L) 3.5 - 5.2 g/dL Final     Total Bilirubin   Date Value Ref Range Status   04/16/2023 0.5 0.1 - 1.0 mg/dL Final     Comment:     For infants and newborns, interpretation of results should be based  on gestational age, weight and in agreement with clinical  observations.    Premature Infant recommended reference ranges:  Up to 24 hours.............<8.0 mg/dL  Up to 48 hours............<12.0 mg/dL  3-5 days..................<15.0 mg/dL  6-29 days.................<15.0 mg/dL       Alkaline Phosphatase   Date Value Ref Range Status   04/16/2023 116 55 - 135 U/L Final     AST   Date Value Ref Range Status   04/16/2023 11 10 - 40 U/L Final     ALT   Date Value Ref Range Status   04/16/2023 7 (L) 10 - 44 U/L Final     Anion Gap   Date Value Ref Range Status   04/18/2023 8 8 - 16 mmol/L Final     eGFR if    Date Value Ref Range Status   06/15/2022 >60 >60 mL/min/1.73 m^2 Final     eGFR if non    Date Value Ref Range Status   06/15/2022 >60 >60 mL/min/1.73 m^2 Final     Comment:     Calculation used to obtain the estimated glomerular filtration  rate (eGFR) is the CKD-EPI equation.        Lab Results   Component Value Date    WBC 5.93 04/18/2023    HGB 9.0 (L) 04/18/2023    HCT 28.7 (L) 04/18/2023    MCV 84 04/18/2023     04/18/2023     Lab Results   Component Value Date    CHOL 158 11/04/2022     Lab Results   Component Value Date    HDL 40 11/04/2022     Lab Results   Component Value Date    LDLCALC 96.8 11/04/2022     Lab Results   Component Value Date    TRIG 106 11/04/2022     Lab Results   Component Value Date    CHOLHDL 25.3 11/04/2022     Lab Results   Component Value Date    TSH 1.944 11/04/2022     Lab  Results   Component Value Date    HGBA1C 5.9 (H) 04/13/2023     Assessment:       1. Anticoagulated    2. AVM (arteriovenous malformation) of stomach, acquired with hemorrhage    3. Coronary artery disease of native artery of native heart with stable angina pectoris    4. Cerebrovascular accident (CVA), unspecified mechanism    5. Dyslipidemia    6. Essential hypertension    7. History of PTCA    8. Memory deficit    9. PAD (peripheral artery disease)    10. PAF (paroxysmal atrial fibrillation)    11. Type 2 diabetes mellitus without complication, without long-term current use of insulin    12. Weakness    13. Anemia, unspecified type        Plan:   Anticoagulated    AVM (arteriovenous malformation) of stomach, acquired with hemorrhage    Coronary artery disease of native artery of native heart with stable angina pectoris-------------------f/u cards-------------    Cerebrovascular accident (CVA), unspecified mechanism    Dyslipidemia    Essential hypertension    History of PTCA    Memory deficit  -     Ambulatory referral/consult to Neurology; Future; Expected date: 05/15/2023  -     Ambulatory referral/consult to Neurology; Future; Expected date: 05/15/2023    PAD (peripheral artery disease)    PAF (paroxysmal atrial fibrillation)    Type 2 diabetes mellitus without complication, without long-term current use of insulin    Weakness    Anemia, unspecified type  -     CBC Auto Differential; Future; Expected date: 05/08/2023    Other orders  -     apixaban (ELIQUIS) 2.5 mg Tab; Take 1 tablet (2.5 mg total) by mouth 2 (two) times daily.  Dispense: 60 tablet; Refill: 1    Stable---------------continue meds-------------as above-------------f/u 1 month-----go to er for worsening symptoms-

## 2023-05-09 LAB
BASOPHILS # BLD AUTO: 0.04 K/UL (ref 0–0.2)
BASOPHILS NFR BLD: 0.7 % (ref 0–1.9)
DIFFERENTIAL METHOD: ABNORMAL
EOSINOPHIL # BLD AUTO: 0.2 K/UL (ref 0–0.5)
EOSINOPHIL NFR BLD: 2.7 % (ref 0–8)
ERYTHROCYTE [DISTWIDTH] IN BLOOD BY AUTOMATED COUNT: 17.3 % (ref 11.5–14.5)
HCT VFR BLD AUTO: 36.6 % (ref 40–54)
HGB BLD-MCNC: 11 G/DL (ref 14–18)
IMM GRANULOCYTES # BLD AUTO: 0.02 K/UL (ref 0–0.04)
IMM GRANULOCYTES NFR BLD AUTO: 0.3 % (ref 0–0.5)
LYMPHOCYTES # BLD AUTO: 1.1 K/UL (ref 1–4.8)
LYMPHOCYTES NFR BLD: 18 % (ref 18–48)
MCH RBC QN AUTO: 27.3 PG (ref 27–31)
MCHC RBC AUTO-ENTMCNC: 30.1 G/DL (ref 32–36)
MCV RBC AUTO: 91 FL (ref 82–98)
MONOCYTES # BLD AUTO: 0.5 K/UL (ref 0.3–1)
MONOCYTES NFR BLD: 8.7 % (ref 4–15)
NEUTROPHILS # BLD AUTO: 4.1 K/UL (ref 1.8–7.7)
NEUTROPHILS NFR BLD: 69.6 % (ref 38–73)
NRBC BLD-RTO: 0 /100 WBC
PLATELET # BLD AUTO: 245 K/UL (ref 150–450)
PMV BLD AUTO: 10.6 FL (ref 9.2–12.9)
RBC # BLD AUTO: 4.03 M/UL (ref 4.6–6.2)
WBC # BLD AUTO: 5.83 K/UL (ref 3.9–12.7)

## 2023-05-17 ENCOUNTER — OFFICE VISIT (OUTPATIENT)
Dept: DERMATOLOGY | Facility: CLINIC | Age: 83
End: 2023-05-17
Payer: MEDICARE

## 2023-05-17 DIAGNOSIS — D48.5 NEOPLASM OF UNCERTAIN BEHAVIOR OF SKIN: Primary | ICD-10-CM

## 2023-05-17 PROCEDURE — 11102 TANGNTL BX SKIN SINGLE LES: CPT | Mod: ,,, | Performed by: STUDENT IN AN ORGANIZED HEALTH CARE EDUCATION/TRAINING PROGRAM

## 2023-05-17 PROCEDURE — 88305 TISSUE EXAM BY PATHOLOGIST: CPT | Mod: 26,,, | Performed by: STUDENT IN AN ORGANIZED HEALTH CARE EDUCATION/TRAINING PROGRAM

## 2023-05-17 PROCEDURE — 11102 PR TANGENTIAL BIOPSY, SKIN, SINGLE LESION: ICD-10-PCS | Mod: ,,, | Performed by: STUDENT IN AN ORGANIZED HEALTH CARE EDUCATION/TRAINING PROGRAM

## 2023-05-17 PROCEDURE — 88305 TISSUE EXAM BY PATHOLOGIST: ICD-10-PCS | Mod: 26,,, | Performed by: STUDENT IN AN ORGANIZED HEALTH CARE EDUCATION/TRAINING PROGRAM

## 2023-05-17 PROCEDURE — 1160F RVW MEDS BY RX/DR IN RCRD: CPT | Mod: CPTII,,, | Performed by: STUDENT IN AN ORGANIZED HEALTH CARE EDUCATION/TRAINING PROGRAM

## 2023-05-17 PROCEDURE — 1159F MED LIST DOCD IN RCRD: CPT | Mod: CPTII,,, | Performed by: STUDENT IN AN ORGANIZED HEALTH CARE EDUCATION/TRAINING PROGRAM

## 2023-05-17 PROCEDURE — 1159F PR MEDICATION LIST DOCUMENTED IN MEDICAL RECORD: ICD-10-PCS | Mod: CPTII,,, | Performed by: STUDENT IN AN ORGANIZED HEALTH CARE EDUCATION/TRAINING PROGRAM

## 2023-05-17 PROCEDURE — 1160F PR REVIEW ALL MEDS BY PRESCRIBER/CLIN PHARMACIST DOCUMENTED: ICD-10-PCS | Mod: CPTII,,, | Performed by: STUDENT IN AN ORGANIZED HEALTH CARE EDUCATION/TRAINING PROGRAM

## 2023-05-17 PROCEDURE — 3288F FALL RISK ASSESSMENT DOCD: CPT | Mod: CPTII,,, | Performed by: STUDENT IN AN ORGANIZED HEALTH CARE EDUCATION/TRAINING PROGRAM

## 2023-05-17 PROCEDURE — 1101F PT FALLS ASSESS-DOCD LE1/YR: CPT | Mod: CPTII,,, | Performed by: STUDENT IN AN ORGANIZED HEALTH CARE EDUCATION/TRAINING PROGRAM

## 2023-05-17 PROCEDURE — 88305 TISSUE EXAM BY PATHOLOGIST: CPT | Performed by: STUDENT IN AN ORGANIZED HEALTH CARE EDUCATION/TRAINING PROGRAM

## 2023-05-17 PROCEDURE — 99999 PR PBB SHADOW E&M-EST. PATIENT-LVL III: CPT | Mod: PBBFAC,,, | Performed by: STUDENT IN AN ORGANIZED HEALTH CARE EDUCATION/TRAINING PROGRAM

## 2023-05-17 PROCEDURE — 99213 PR OFFICE/OUTPT VISIT, EST, LEVL III, 20-29 MIN: ICD-10-PCS | Mod: 25,,, | Performed by: STUDENT IN AN ORGANIZED HEALTH CARE EDUCATION/TRAINING PROGRAM

## 2023-05-17 PROCEDURE — 1126F PR PAIN SEVERITY QUANTIFIED, NO PAIN PRESENT: ICD-10-PCS | Mod: CPTII,,, | Performed by: STUDENT IN AN ORGANIZED HEALTH CARE EDUCATION/TRAINING PROGRAM

## 2023-05-17 PROCEDURE — 99213 OFFICE O/P EST LOW 20 MIN: CPT | Mod: 25,,, | Performed by: STUDENT IN AN ORGANIZED HEALTH CARE EDUCATION/TRAINING PROGRAM

## 2023-05-17 PROCEDURE — 99999 PR PBB SHADOW E&M-EST. PATIENT-LVL III: ICD-10-PCS | Mod: PBBFAC,,, | Performed by: STUDENT IN AN ORGANIZED HEALTH CARE EDUCATION/TRAINING PROGRAM

## 2023-05-17 PROCEDURE — 3288F PR FALLS RISK ASSESSMENT DOCUMENTED: ICD-10-PCS | Mod: CPTII,,, | Performed by: STUDENT IN AN ORGANIZED HEALTH CARE EDUCATION/TRAINING PROGRAM

## 2023-05-17 PROCEDURE — 99213 OFFICE O/P EST LOW 20 MIN: CPT | Performed by: STUDENT IN AN ORGANIZED HEALTH CARE EDUCATION/TRAINING PROGRAM

## 2023-05-17 PROCEDURE — 1101F PR PT FALLS ASSESS DOC 0-1 FALLS W/OUT INJ PAST YR: ICD-10-PCS | Mod: CPTII,,, | Performed by: STUDENT IN AN ORGANIZED HEALTH CARE EDUCATION/TRAINING PROGRAM

## 2023-05-17 PROCEDURE — 1126F AMNT PAIN NOTED NONE PRSNT: CPT | Mod: CPTII,,, | Performed by: STUDENT IN AN ORGANIZED HEALTH CARE EDUCATION/TRAINING PROGRAM

## 2023-05-17 NOTE — PROGRESS NOTES
Subjective:       Patient ID:  Aquiles Kelsey is a 82 y.o. male who presents for   Chief Complaint   Patient presents with    Spot     Spot on left cheek under eye. Reports it is growing. If it is benign ok with getting cosmetically removed if not too expensive.      History of Present Illness: The patient presents with chief complaint of a persistent, growing plaque.  Location: left side of the nose  Duration: years  Signs/Symptoms: growing, crusted plaque that is often irritated and painful  Prior treatments: none      Spot      Review of Systems   Constitutional:  Negative for fever, chills and fatigue.   Skin:  Negative for itching, rash and dry skin.      Objective:    Physical Exam   Constitutional: He appears well-developed and well-nourished. No distress.   Neurological: He is alert and oriented to person, place, and time. He is not disoriented.   Psychiatric: He has a normal mood and affect.   Skin:   Areas Examined (abnormalities noted in diagram):   Head / Face Inspection Performed  Neck Inspection Performed            Diagram Legend     Erythematous scaling macule/papule c/w actinic keratosis       Vascular papule c/w angioma      Pigmented verrucoid papule/plaque c/w seborrheic keratosis      Yellow umbilicated papule c/w sebaceous hyperplasia      Irregularly shaped tan macule c/w lentigo     1-2 mm smooth white papules consistent with Milia      Movable subcutaneous cyst with punctum c/w epidermal inclusion cyst      Subcutaneous movable cyst c/w pilar cyst      Firm pink to brown papule c/w dermatofibroma      Pedunculated fleshy papule(s) c/w skin tag(s)      Evenly pigmented macule c/w junctional nevus     Mildly variegated pigmented, slightly irregular-bordered macule c/w mildly atypical nevus      Flesh colored to evenly pigmented papule c/w intradermal nevus       Pink pearly papule/plaque c/w basal cell carcinoma      Erythematous hyperkeratotic cursted plaque c/w SCC      Surgical  scar with no sign of skin cancer recurrence      Open and closed comedones      Inflammatory papules and pustules      Verrucoid papule consistent consistent with wart     Erythematous eczematous patches and plaques     Dystrophic onycholytic nail with subungual debris c/w onychomycosis     Umbilicated papule    Erythematous-base heme-crusted tan verrucoid plaque consistent with inflamed seborrheic keratosis     Erythematous Silvery Scaling Plaque c/w Psoriasis     See annotation      Assessment / Plan:      Pathology Orders:       Normal Orders This Visit    Specimen to Pathology, Dermatology     Questions:    Procedure Type: Dermatology and skin neoplasms    Number of Specimens: 1    ------------------------: -------------------------    Spec 1 Procedure: Biopsy    Spec 1 Clinical Impression: r/o ISK vs other    Spec 1 Source: left nose    Release to patient:           Neoplasm of uncertain behavior of skin  -     Specimen to Pathology, Dermatology    Shave biopsy procedure note:    Shave biopsy performed after verbal consent including risk of infection, scar, recurrence, need for additional treatment of site. Area prepped with alcohol, anesthetized with approximately 1.0cc of 1% lidocaine with epinephrine. Lesional tissue shaved with razor blade. Hemostasis achieved with application of aluminum chloride followed by hyfrecation. No complications. Dressing applied. Wound care explained.           Follow up if symptoms worsen or fail to improve.

## 2023-05-20 ENCOUNTER — EXTERNAL HOME HEALTH (OUTPATIENT)
Dept: HOME HEALTH SERVICES | Facility: HOSPITAL | Age: 83
End: 2023-05-20
Payer: MEDICARE

## 2023-05-24 LAB
FINAL PATHOLOGIC DIAGNOSIS: NORMAL
Lab: NORMAL

## 2023-05-30 ENCOUNTER — OFFICE VISIT (OUTPATIENT)
Dept: CARDIOLOGY | Facility: CLINIC | Age: 83
End: 2023-05-30
Payer: MEDICARE

## 2023-05-30 VITALS
OXYGEN SATURATION: 99 % | HEART RATE: 60 BPM | BODY MASS INDEX: 18.53 KG/M2 | WEIGHT: 129.44 LBS | HEIGHT: 70 IN | DIASTOLIC BLOOD PRESSURE: 60 MMHG | SYSTOLIC BLOOD PRESSURE: 124 MMHG

## 2023-05-30 DIAGNOSIS — Z79.01 ANTICOAGULATED: ICD-10-CM

## 2023-05-30 DIAGNOSIS — I51.7 LAE (LEFT ATRIAL ENLARGEMENT): ICD-10-CM

## 2023-05-30 DIAGNOSIS — Z98.61 S/P PTCA (PERCUTANEOUS TRANSLUMINAL CORONARY ANGIOPLASTY): ICD-10-CM

## 2023-05-30 DIAGNOSIS — I48.0 PAF (PAROXYSMAL ATRIAL FIBRILLATION): ICD-10-CM

## 2023-05-30 DIAGNOSIS — I49.1 PAC (PREMATURE ATRIAL CONTRACTION): ICD-10-CM

## 2023-05-30 DIAGNOSIS — Z98.890 S/P AAA REPAIR: ICD-10-CM

## 2023-05-30 DIAGNOSIS — R94.31 ABNORMAL ECG: ICD-10-CM

## 2023-05-30 DIAGNOSIS — I20.9 AP (ANGINA PECTORIS): ICD-10-CM

## 2023-05-30 DIAGNOSIS — E78.5 DYSLIPIDEMIA: Chronic | ICD-10-CM

## 2023-05-30 DIAGNOSIS — I35.1 NONRHEUMATIC AORTIC VALVE INSUFFICIENCY: ICD-10-CM

## 2023-05-30 DIAGNOSIS — E11.9 TYPE 2 DIABETES MELLITUS WITHOUT COMPLICATION, WITHOUT LONG-TERM CURRENT USE OF INSULIN: Chronic | ICD-10-CM

## 2023-05-30 DIAGNOSIS — I11.0 HYPERTENSIVE HEART DISEASE WITH HEART FAILURE: ICD-10-CM

## 2023-05-30 DIAGNOSIS — Z86.79 S/P AAA REPAIR: ICD-10-CM

## 2023-05-30 DIAGNOSIS — I73.9 PAD (PERIPHERAL ARTERY DISEASE): Chronic | ICD-10-CM

## 2023-05-30 DIAGNOSIS — K31.811 AVM (ARTERIOVENOUS MALFORMATION) OF STOMACH, ACQUIRED WITH HEMORRHAGE: ICD-10-CM

## 2023-05-30 DIAGNOSIS — I25.5 ISCHEMIC CARDIOMYOPATHY: ICD-10-CM

## 2023-05-30 DIAGNOSIS — Z87.19 HISTORY OF GI BLEED: ICD-10-CM

## 2023-05-30 DIAGNOSIS — I25.118 CORONARY ARTERY DISEASE OF NATIVE ARTERY OF NATIVE HEART WITH STABLE ANGINA PECTORIS: Primary | Chronic | ICD-10-CM

## 2023-05-30 DIAGNOSIS — Z98.61 HISTORY OF PTCA: ICD-10-CM

## 2023-05-30 DIAGNOSIS — I10 ESSENTIAL HYPERTENSION: Chronic | ICD-10-CM

## 2023-05-30 DIAGNOSIS — I51.7 LVH (LEFT VENTRICULAR HYPERTROPHY): ICD-10-CM

## 2023-05-30 DIAGNOSIS — I25.10 CAD, MULTIPLE VESSEL: ICD-10-CM

## 2023-05-30 DIAGNOSIS — I63.9 CEREBROVASCULAR ACCIDENT (CVA), UNSPECIFIED MECHANISM: ICD-10-CM

## 2023-05-30 DIAGNOSIS — I73.9 CLAUDICATION IN PERIPHERAL VASCULAR DISEASE: ICD-10-CM

## 2023-05-30 PROCEDURE — 1160F PR REVIEW ALL MEDS BY PRESCRIBER/CLIN PHARMACIST DOCUMENTED: ICD-10-PCS | Mod: CPTII,S$GLB,, | Performed by: INTERNAL MEDICINE

## 2023-05-30 PROCEDURE — 1159F PR MEDICATION LIST DOCUMENTED IN MEDICAL RECORD: ICD-10-PCS | Mod: CPTII,S$GLB,, | Performed by: INTERNAL MEDICINE

## 2023-05-30 PROCEDURE — 99999 PR PBB SHADOW E&M-EST. PATIENT-LVL II: ICD-10-PCS | Mod: PBBFAC,,, | Performed by: INTERNAL MEDICINE

## 2023-05-30 PROCEDURE — 3074F PR MOST RECENT SYSTOLIC BLOOD PRESSURE < 130 MM HG: ICD-10-PCS | Mod: CPTII,S$GLB,, | Performed by: INTERNAL MEDICINE

## 2023-05-30 PROCEDURE — 99999 PR PBB SHADOW E&M-EST. PATIENT-LVL II: CPT | Mod: PBBFAC,,, | Performed by: INTERNAL MEDICINE

## 2023-05-30 PROCEDURE — 3074F SYST BP LT 130 MM HG: CPT | Mod: CPTII,S$GLB,, | Performed by: INTERNAL MEDICINE

## 2023-05-30 PROCEDURE — 3078F DIAST BP <80 MM HG: CPT | Mod: CPTII,S$GLB,, | Performed by: INTERNAL MEDICINE

## 2023-05-30 PROCEDURE — 99214 OFFICE O/P EST MOD 30 MIN: CPT | Mod: S$GLB,,, | Performed by: INTERNAL MEDICINE

## 2023-05-30 PROCEDURE — 3078F PR MOST RECENT DIASTOLIC BLOOD PRESSURE < 80 MM HG: ICD-10-PCS | Mod: CPTII,S$GLB,, | Performed by: INTERNAL MEDICINE

## 2023-05-30 PROCEDURE — 1159F MED LIST DOCD IN RCRD: CPT | Mod: CPTII,S$GLB,, | Performed by: INTERNAL MEDICINE

## 2023-05-30 PROCEDURE — 1160F RVW MEDS BY RX/DR IN RCRD: CPT | Mod: CPTII,S$GLB,, | Performed by: INTERNAL MEDICINE

## 2023-05-30 PROCEDURE — 99214 PR OFFICE/OUTPT VISIT, EST, LEVL IV, 30-39 MIN: ICD-10-PCS | Mod: S$GLB,,, | Performed by: INTERNAL MEDICINE

## 2023-05-30 NOTE — PROGRESS NOTES
Subjective:    Patient ID:  Aquiles Kelsey is a 82 y.o. male who presents for evaluation of Hyperlipidemia, Hypertension, Atrial Fibrillation, Coronary Artery Disease, Peripheral Arterial Disease, and Congestive Heart Failure      HPI Pt presents for eval.  His current medical conditions include CAD (multiple PCI procedures), HTN, DM, LAE, LVH, PAD, AAA stent graft (approx 2013), hyperlipidemia, cigar use.  Past hx pertinent for following:  H/o L LE stents in [place].   First PCI 1998, total of 5 stents with last one in [place] 2013.  Echo 7/19 normal LVEF, DD,  LVH, mild AI.   Holter 7/19 NSR, fleeting NSVT, EAT, rare PVCs, occasional PACs.  B LE vasc studies 7/19 B LE PAD, L > R LE.   S/p C Oct 2019 for NSTEMI.  Pt had PCI KYLIE x one to mid LCX ISR, KYLIE x 2 to OM2, and PCI prox RCA KYLIE x 2, PTCA mid RCA ISR.  Failed PCI distal occluded RCA.  EF 45%.  Nonobstructive LAD disease, small diffusely diseased diagonal vessels.  He developed post PCI GI bleed and required PRBC and EGD showing angiodysplasia tx w cauterization.  ecg 10/14/19 junctional bradycardia 54 bpm, inferior infarct, inferolateral st-t abnl.  Echo 10/19 normal LV function, mild AI, LAE, LVH, inferolateral WMA.  Stress MPI 9/20 inferolateral scar with mild pato-infarct ischemia, EF 42%.  Echo 9/20 LVEF 40 - 45%, DD, LAE, LVH, WMA.  B LE arterial vasc studies 8/20:  Severe B LE PAD.   JOSELYN 8/20  R LE 0.76, L LE 0.62  Referred to vascular surgery for his PAD/AAA f/u.  He did see Dr. Mccarthy, [facility] surgery.  He was hospitalized 6/22 for TIA.  Patient had evidence of multiple lacunar/cerebellar infarcts on imaging, and mild carotid disease.  Was thought to be due to vascular disease.  Echo June 2022 normal EF, grade I DD, LVH, LAE.  S/p acute CVA Nov 2022.  Has been weaker, less talkative, off balance since his latest stroke.  Ecg 12/2/22 a fib noted, old inferior infarct.  A fib would be a new dx.  Now here.  S/p admission April 2023 w GI bleed.  Tx with  EGD, cauterization of AV malformations.  Eliquis resumed but at low dose.  CAD is stable.  No active angina.  Denies dyspnea.  No falls.  Poor appetite.   BP is stable.  Denies claudication sxs.  HGAIC at goal.  Lipids controlled on statin tx.  Lives w wife.  2 week Vital Holter Feb 2023: NSR, PAF noted.        Current Outpatient Medications:     alcohol swabs (ALCOHOL PREP PADS) PadM, Twice a day, Disp: 400 each, Rfl: 3    amLODIPine (NORVASC) 5 MG tablet, TAKE 1 TABLET BY MOUTH EVERY DAY, Disp: 90 tablet, Rfl: 3    apixaban (ELIQUIS) 2.5 mg Tab, Take 1 tablet (2.5 mg total) by mouth 2 (two) times daily., Disp: 60 tablet, Rfl: 1    aspirin (ECOTRIN) 81 MG EC tablet, Take 1 tablet (81 mg total) by mouth once daily., Disp: , Rfl: 0    atorvastatin (LIPITOR) 40 MG tablet, Take 1 tablet (40 mg total) by mouth once daily., Disp: 90 tablet, Rfl: 3    blood sugar diagnostic Strp, Test blood sugars twice a day, Disp: 200 strip, Rfl: 6    isosorbide mononitrate (IMDUR) 120 MG 24 hr tablet, TAKE 1 TABLET BY MOUTH ONCE DAILY, Disp: 90 tablet, Rfl: 3    lancets (ACCU-CHEK SOFTCLIX LANCETS) Misc, Test blood sugars twice a day, Disp: 200 each, Rfl: 6    metFORMIN (GLUCOPHAGE) 500 MG tablet, Take 1 tablet (500 mg total) by mouth daily with breakfast., Disp: 90 tablet, Rfl: 3    MYRBETRIQ 50 mg Tb24, TAKE 1 TABLET BY MOUTH EVERY DAY, Disp: 90 tablet, Rfl: 3    pantoprazole (PROTONIX) 40 MG tablet, Take 1 tablet (40 mg total) by mouth 2 (two) times daily., Disp: 60 tablet, Rfl: 11      Review of Systems   Constitutional: Positive for decreased appetite and weight loss.   HENT: Negative.     Eyes: Negative.    Cardiovascular: Negative.    Respiratory: Negative.     Endocrine: Negative.    Hematologic/Lymphatic: Negative.    Skin: Negative.    Musculoskeletal: Negative.    Gastrointestinal: Negative.    Genitourinary: Negative.    Neurological:  Positive for loss of balance and weakness.   Psychiatric/Behavioral:  Positive for memory  "loss.    Allergic/Immunologic: Negative.         /60 (BP Location: Left arm, Patient Position: Sitting, BP Method: Medium (Manual))   Pulse 60   Ht 5' 10" (1.778 m)   Wt 58.7 kg (129 lb 6.6 oz)   SpO2 99%   BMI 18.57 kg/m²     Wt Readings from Last 3 Encounters:   05/30/23 58.7 kg (129 lb 6.6 oz)   05/08/23 58.3 kg (128 lb 8.5 oz)   04/17/23 56.9 kg (125 lb 7.1 oz)     Temp Readings from Last 3 Encounters:   05/08/23 97.9 °F (36.6 °C)   04/18/23 98.4 °F (36.9 °C) (Oral)   01/16/23 98.1 °F (36.7 °C)     BP Readings from Last 3 Encounters:   05/30/23 124/60   05/08/23 124/70   04/18/23 (!) 116/59     Pulse Readings from Last 3 Encounters:   05/30/23 60   05/08/23 (!) 54   04/18/23 62       Objective:    Physical Exam  Vitals and nursing note reviewed.   Constitutional:       Appearance: He is well-developed.   HENT:      Head: Normocephalic.   Neck:      Thyroid: No thyromegaly.      Vascular: No carotid bruit or JVD.   Cardiovascular:      Rate and Rhythm: Normal rate and regular rhythm.      Pulses:           Radial pulses are 2+ on the right side and 2+ on the left side.      Heart sounds: S1 normal and S2 normal. Heart sounds not distant. No midsystolic click and no opening snap. No murmur heard.    No friction rub. No S3 or S4 sounds.   Pulmonary:      Effort: Pulmonary effort is normal.      Breath sounds: Normal breath sounds. No wheezing or rales.   Abdominal:      General: Bowel sounds are normal. There is no distension or abdominal bruit.      Palpations: Abdomen is soft. There is no mass.      Tenderness: There is no abdominal tenderness.   Musculoskeletal:      Cervical back: Neck supple.   Skin:     General: Skin is warm.   Neurological:      Mental Status: He is alert.     I have reviewed all pertinent labs and cardiac studies.      Chemistry        Component Value Date/Time     04/18/2023 0633    K 3.8 04/18/2023 0633     04/18/2023 0633    CO2 25 04/18/2023 0633    BUN 17 " 04/18/2023 0633    CREATININE 0.9 04/18/2023 0633     (H) 04/18/2023 0633        Component Value Date/Time    CALCIUM 8.9 04/18/2023 0633    ALKPHOS 116 04/16/2023 0620    AST 11 04/16/2023 0620    ALT 7 (L) 04/16/2023 0620    BILITOT 0.5 04/16/2023 0620    ESTGFRAFRICA >60 06/15/2022 0459    EGFRNONAA >60 06/15/2022 0459        Lab Results   Component Value Date    WBC 5.83 05/08/2023    HGB 11.0 (L) 05/08/2023    HCT 36.6 (L) 05/08/2023    MCV 91 05/08/2023     05/08/2023       Lab Results   Component Value Date    HGBA1C 5.9 (H) 04/13/2023     Lab Results   Component Value Date    CHOL 158 11/04/2022    CHOL 124 06/13/2022    CHOL 135 09/16/2021     Lab Results   Component Value Date    HDL 40 11/04/2022    HDL 34 (L) 06/13/2022    HDL 35 (L) 09/16/2021     Lab Results   Component Value Date    LDLCALC 96.8 11/04/2022    LDLCALC 69.2 06/13/2022    LDLCALC 65.0 09/16/2021     Lab Results   Component Value Date    TRIG 106 11/04/2022    TRIG 104 06/13/2022    TRIG 175 (H) 09/16/2021     Lab Results   Component Value Date    CHOLHDL 25.3 11/04/2022    CHOLHDL 27.4 06/13/2022    CHOLHDL 25.9 09/16/2021         Results for orders placed during the hospital encounter of 06/13/22    Echo    Interpretation Summary  · The left ventricle is normal in size with severe concentric hypertrophy and normal systolic function.  · Mild left atrial enlargement.  · The estimated ejection fraction is 55%.  · Grade II left ventricular diastolic dysfunction.  · Normal right ventricular size with normal right ventricular systolic function.        Assessment:       1. Coronary artery disease of native artery of native heart with stable angina pectoris    2. AP (angina pectoris)    3. Abnormal ECG    4. Anticoagulated    5. Essential hypertension    6. Dyslipidemia    7. Cerebrovascular accident (CVA), unspecified mechanism    8. Claudication in peripheral vascular disease    9. CAD, multiple vessel    10. AVM (arteriovenous  malformation) of stomach, acquired with hemorrhage    11. LVH (left ventricular hypertrophy)    12. LAE (left atrial enlargement)    13. Ischemic cardiomyopathy    14. Hypertensive heart disease with heart failure    15. History of PTCA    16. History of GI bleed    17. Nonrheumatic aortic valve insufficiency    18. S/P PTCA (percutaneous transluminal coronary angioplasty)    19. S/P AAA repair    20. PAF (paroxysmal atrial fibrillation)    21. PAD (peripheral artery disease)    22. PAC (premature atrial contraction)    23. Type 2 diabetes mellitus without complication, without long-term current use of insulin         Plan:             Stable CV conditions on current med tx.  PAF: S/p acute CVA Nov 2022.  Ecg Dec 2022 a fib noted. New dx.  Continue Eliquis 2.5 mg bid.  Patient advised of indications for above medications, risks/benefits and side effect profile.  CAD: Stable. Continue OMT for CAD.  Fall risk precautions strongly advised.  Reviewed all tests and above medical conditions with patient in detail and formulated treatment plan.  Continue optimal medical treatment for cardiovascular conditions.  Cardiac low salt diet advised.  Watch weight loss; f/u w PCP.  GI bleed: stable. S/p EGD tx. F/u w GI.  Continue PPI tx.  HTN: Need for BP control and HTN goals (if needed) were discussed and tx plan formulated.  Goal < 130/80.  Continue current HTN meds.  Lipids: Importance of optimal lipid control were discussed in detail as well as possible pharmacologic and lifestyle changes that may be needed.  Continue statin tx.  Smoking: smoking cessation advised.  DM: optimal HGAIC control advised.  PAD: F/u w Vasc Surgery as advised.  F/u in 6 months.      I have reviewed all pertinent labs and cardiac studies independently. Plans and recommendations have been formulated under my direct supervision. All questions answered and patient voiced understanding.     Complex visit reviewing chart/records, extensive CV conditions,  formulating tx/mgt plan.

## 2023-05-31 ENCOUNTER — TELEPHONE (OUTPATIENT)
Dept: DERMATOLOGY | Facility: CLINIC | Age: 83
End: 2023-05-31
Payer: MEDICARE

## 2023-05-31 NOTE — TELEPHONE ENCOUNTER
Called and notified patient wife of results. Verbalized understanding.   ----- Message from Wilmer Benjamin MD sent at 5/25/2023  6:23 AM CDT -----  Please call and inform the patient and wife that the lesion we removed from by his nose came back as a benign and normal growth called a seborrheic keratosis. There was no evidence of skin cancer and no further workup is needed. Thanks.

## 2023-06-15 ENCOUNTER — TELEPHONE (OUTPATIENT)
Dept: CARDIOLOGY | Facility: CLINIC | Age: 83
End: 2023-06-15
Payer: MEDICARE

## 2023-06-15 NOTE — TELEPHONE ENCOUNTER
Melody TIPTON Staff  Caller: Gayle/ Wife (Today, 10:19 AM)  Gayle stated the medication is too expensive and she needs something else sent in. Please call her at [phone].      Spoke to patient states that the medication has increased to almost $200 and she can no longer afford it wants to know if there is an alternative medication she can take in place of it.

## 2023-06-16 NOTE — TELEPHONE ENCOUNTER
Pt can price Xarelto 15 mg daily dosing at his pharmacy to see if it is less expensive.      Dr Gilbert

## 2023-06-16 NOTE — TELEPHONE ENCOUNTER
Pt can price Xarelto 15 mg daily dosing at his pharmacy to see if it is less expensive.      Dr Gilbert      Spoke to patient verbalized understanding

## 2023-06-16 NOTE — TELEPHONE ENCOUNTER
----- Message from Tsering Ramos MA sent at 6/16/2023  7:49 AM CDT -----  Medication is Eliquis, thanks       Please indicate what med the pt is referring to.     Thanks     You  Robe Gilbert MD 20 hours ago (11:44 AM)    AURELIO  Please advise, thanks     You 20 hours ago (11:44 AM)    AURELIO TIPTON Staff  Caller: Gayle/ Wife (Today, 10:19 AM)  Gayle stated the medication is too expensive and she needs something else sent in. Please call her at [phone].       Spoke to patient states that the medication has increased to almost $200 and she can no longer afford it wants to know if there is an alternative medication she can take in place of it.

## 2023-06-18 ENCOUNTER — TELEPHONE (OUTPATIENT)
Dept: ADMINISTRATIVE | Facility: HOSPITAL | Age: 83
End: 2023-06-18
Payer: MEDICARE

## 2023-06-29 ENCOUNTER — OFFICE VISIT (OUTPATIENT)
Dept: FAMILY MEDICINE | Facility: CLINIC | Age: 83
End: 2023-06-29
Payer: MEDICARE

## 2023-06-29 VITALS
OXYGEN SATURATION: 97 % | BODY MASS INDEX: 19.03 KG/M2 | HEART RATE: 52 BPM | RESPIRATION RATE: 18 BRPM | HEIGHT: 70 IN | DIASTOLIC BLOOD PRESSURE: 67 MMHG | TEMPERATURE: 97 F | WEIGHT: 132.94 LBS | SYSTOLIC BLOOD PRESSURE: 141 MMHG

## 2023-06-29 DIAGNOSIS — Z87.19 HISTORY OF GI BLEED: ICD-10-CM

## 2023-06-29 DIAGNOSIS — I73.9 PAD (PERIPHERAL ARTERY DISEASE): Chronic | ICD-10-CM

## 2023-06-29 DIAGNOSIS — E11.9 TYPE 2 DIABETES MELLITUS WITHOUT COMPLICATION, WITHOUT LONG-TERM CURRENT USE OF INSULIN: Chronic | ICD-10-CM

## 2023-06-29 DIAGNOSIS — Z23 NEED FOR PNEUMOCOCCAL 20-VALENT CONJUGATE VACCINATION: ICD-10-CM

## 2023-06-29 DIAGNOSIS — Z00.00 ENCOUNTER FOR PREVENTIVE HEALTH EXAMINATION: Primary | ICD-10-CM

## 2023-06-29 DIAGNOSIS — Z86.79 S/P AAA REPAIR: ICD-10-CM

## 2023-06-29 DIAGNOSIS — I10 ESSENTIAL HYPERTENSION: Chronic | ICD-10-CM

## 2023-06-29 DIAGNOSIS — I25.118 CORONARY ARTERY DISEASE OF NATIVE ARTERY OF NATIVE HEART WITH STABLE ANGINA PECTORIS: Chronic | ICD-10-CM

## 2023-06-29 DIAGNOSIS — Z87.891 HISTORY OF TOBACCO ABUSE: ICD-10-CM

## 2023-06-29 DIAGNOSIS — E78.5 DYSLIPIDEMIA: Chronic | ICD-10-CM

## 2023-06-29 DIAGNOSIS — Z98.890 S/P AAA REPAIR: ICD-10-CM

## 2023-06-29 DIAGNOSIS — F33.9 EPISODE OF RECURRENT MAJOR DEPRESSIVE DISORDER, UNSPECIFIED DEPRESSION EPISODE SEVERITY: ICD-10-CM

## 2023-06-29 DIAGNOSIS — R41.3 MEMORY DEFICIT: ICD-10-CM

## 2023-06-29 DIAGNOSIS — I63.9 CEREBROVASCULAR ACCIDENT (CVA), UNSPECIFIED MECHANISM: ICD-10-CM

## 2023-06-29 DIAGNOSIS — I48.0 PAF (PAROXYSMAL ATRIAL FIBRILLATION): ICD-10-CM

## 2023-06-29 DIAGNOSIS — C44.91 BASAL CELL CARCINOMA (BCC), UNSPECIFIED SITE: ICD-10-CM

## 2023-06-29 PROCEDURE — 1170F FXNL STATUS ASSESSED: CPT | Mod: CPTII,S$GLB,, | Performed by: NURSE PRACTITIONER

## 2023-06-29 PROCEDURE — 1159F PR MEDICATION LIST DOCUMENTED IN MEDICAL RECORD: ICD-10-PCS | Mod: CPTII,S$GLB,, | Performed by: NURSE PRACTITIONER

## 2023-06-29 PROCEDURE — 1159F MED LIST DOCD IN RCRD: CPT | Mod: CPTII,S$GLB,, | Performed by: NURSE PRACTITIONER

## 2023-06-29 PROCEDURE — G0439 PPPS, SUBSEQ VISIT: HCPCS | Mod: S$GLB,,, | Performed by: NURSE PRACTITIONER

## 2023-06-29 PROCEDURE — 99999 PR PBB SHADOW E&M-EST. PATIENT-LVL V: CPT | Mod: PBBFAC,,, | Performed by: NURSE PRACTITIONER

## 2023-06-29 PROCEDURE — G0009 ADMIN PNEUMOCOCCAL VACCINE: HCPCS | Mod: S$GLB,,, | Performed by: NURSE PRACTITIONER

## 2023-06-29 PROCEDURE — 3078F DIAST BP <80 MM HG: CPT | Mod: CPTII,S$GLB,, | Performed by: NURSE PRACTITIONER

## 2023-06-29 PROCEDURE — 3077F SYST BP >= 140 MM HG: CPT | Mod: CPTII,S$GLB,, | Performed by: NURSE PRACTITIONER

## 2023-06-29 PROCEDURE — 1126F AMNT PAIN NOTED NONE PRSNT: CPT | Mod: CPTII,S$GLB,, | Performed by: NURSE PRACTITIONER

## 2023-06-29 PROCEDURE — 1170F PR FUNCTIONAL STATUS ASSESSED: ICD-10-PCS | Mod: CPTII,S$GLB,, | Performed by: NURSE PRACTITIONER

## 2023-06-29 PROCEDURE — 1101F PT FALLS ASSESS-DOCD LE1/YR: CPT | Mod: CPTII,S$GLB,, | Performed by: NURSE PRACTITIONER

## 2023-06-29 PROCEDURE — 3078F PR MOST RECENT DIASTOLIC BLOOD PRESSURE < 80 MM HG: ICD-10-PCS | Mod: CPTII,S$GLB,, | Performed by: NURSE PRACTITIONER

## 2023-06-29 PROCEDURE — 90677 PNEUMOCOCCAL CONJUGATE VACCINE 20-VALENT: ICD-10-PCS | Mod: S$GLB,,, | Performed by: NURSE PRACTITIONER

## 2023-06-29 PROCEDURE — 3288F FALL RISK ASSESSMENT DOCD: CPT | Mod: CPTII,S$GLB,, | Performed by: NURSE PRACTITIONER

## 2023-06-29 PROCEDURE — 1101F PR PT FALLS ASSESS DOC 0-1 FALLS W/OUT INJ PAST YR: ICD-10-PCS | Mod: CPTII,S$GLB,, | Performed by: NURSE PRACTITIONER

## 2023-06-29 PROCEDURE — 99999 PR PBB SHADOW E&M-EST. PATIENT-LVL V: ICD-10-PCS | Mod: PBBFAC,,, | Performed by: NURSE PRACTITIONER

## 2023-06-29 PROCEDURE — G0439 PR MEDICARE ANNUAL WELLNESS SUBSEQUENT VISIT: ICD-10-PCS | Mod: S$GLB,,, | Performed by: NURSE PRACTITIONER

## 2023-06-29 PROCEDURE — 3288F PR FALLS RISK ASSESSMENT DOCUMENTED: ICD-10-PCS | Mod: CPTII,S$GLB,, | Performed by: NURSE PRACTITIONER

## 2023-06-29 PROCEDURE — 1126F PR PAIN SEVERITY QUANTIFIED, NO PAIN PRESENT: ICD-10-PCS | Mod: CPTII,S$GLB,, | Performed by: NURSE PRACTITIONER

## 2023-06-29 PROCEDURE — 1160F RVW MEDS BY RX/DR IN RCRD: CPT | Mod: CPTII,S$GLB,, | Performed by: NURSE PRACTITIONER

## 2023-06-29 PROCEDURE — G0009 PNEUMOCOCCAL CONJUGATE VACCINE 20-VALENT: ICD-10-PCS | Mod: S$GLB,,, | Performed by: NURSE PRACTITIONER

## 2023-06-29 PROCEDURE — 1160F PR REVIEW ALL MEDS BY PRESCRIBER/CLIN PHARMACIST DOCUMENTED: ICD-10-PCS | Mod: CPTII,S$GLB,, | Performed by: NURSE PRACTITIONER

## 2023-06-29 PROCEDURE — 3077F PR MOST RECENT SYSTOLIC BLOOD PRESSURE >= 140 MM HG: ICD-10-PCS | Mod: CPTII,S$GLB,, | Performed by: NURSE PRACTITIONER

## 2023-06-29 PROCEDURE — 90677 PCV20 VACCINE IM: CPT | Mod: S$GLB,,, | Performed by: NURSE PRACTITIONER

## 2023-06-29 NOTE — PROGRESS NOTES
"  Aquiles Kelsey presented for a  Medicare AWV and comprehensive Health Risk Assessment today. The following components were reviewed and updated:  Patient accompanied by wife, who was present throughout the visit and aided in information gathering.        Medical history  Family History  Social history  Allergies and Current Medications  Health Risk Assessment  Health Maintenance  Care Team         ** See Completed Assessments for Annual Wellness Visit within the encounter summary.**         The following assessments were completed:  Living Situation  CAGE  Depression Screening  Timed Get Up and Go  Whisper Test  Cognitive Function Screening  Nutrition Screening  ADL Screening  PAQ Screening        Vitals:    06/29/23 1002   BP: (!) 141/67   Pulse: (!) 52   Resp: 18   Temp: 97.3 °F (36.3 °C)   SpO2: 97%   Weight: 60.3 kg (132 lb 15 oz)   Height: 5' 10" (1.778 m)     Body mass index is 19.07 kg/m².  Physical Exam  Vitals and nursing note reviewed.   Constitutional:       General: He is not in acute distress.     Appearance: He is well-developed.   HENT:      Head: Normocephalic and atraumatic.   Eyes:      Pupils: Pupils are equal, round, and reactive to light.   Neck:      Vascular: No carotid bruit.   Cardiovascular:      Rate and Rhythm: Bradycardia present. Rhythm irregular.      Pulses: Normal pulses.      Heart sounds: No murmur heard.    No gallop.   Pulmonary:      Effort: Pulmonary effort is normal.      Breath sounds: Normal breath sounds.   Abdominal:      General: Bowel sounds are normal. There is no distension.      Palpations: Abdomen is soft.      Tenderness: There is no abdominal tenderness.   Musculoskeletal:         General: Normal range of motion.   Skin:     General: Skin is warm and dry.   Neurological:      Motor: Weakness present. No abnormal muscle tone.      Gait: Gait abnormal.   Psychiatric:         Behavior: Behavior normal.         Cognition and Memory: Cognition is impaired. " Memory is impaired.     Current Outpatient Medications   Medication Instructions    alcohol swabs (ALCOHOL PREP PADS) PadM Twice a day    amLODIPine (NORVASC) 5 MG tablet TAKE 1 TABLET BY MOUTH EVERY DAY    apixaban (ELIQUIS) 2.5 mg, Oral, 2 times daily    aspirin (ECOTRIN) 81 mg, Oral, Daily    atorvastatin (LIPITOR) 40 mg, Oral, Daily    blood sugar diagnostic Strp Test blood sugars twice a day    isosorbide mononitrate (IMDUR) 120 MG 24 hr tablet TAKE 1 TABLET BY MOUTH ONCE DAILY    lancets (ACCU-CHEK SOFTCLIX LANCETS) Misc Test blood sugars twice a day    metFORMIN (GLUCOPHAGE) 500 mg, Oral, With breakfast    MYRBETRIQ 50 mg Tb24 TAKE 1 TABLET BY MOUTH EVERY DAY    pantoprazole (PROTONIX) 40 mg, Oral, 2 times daily             Diagnoses and health risks identified today and associated recommendations/orders:    1. Encounter for preventive health examination  Review for Opioid Screening: Patient does not have rx for Opioids.  Review for Substance Use Disorders: Patient does not use substance.    2. Memory deficit  Chronic and stable on Myrebetriq oriented x 1 only.  No acute changes per wife .  Followed by neuro Md    3. Cerebrovascular accident (CVA), unspecified mechanism  Dx 11/2023 with new CVA- hx of TIA- memory loss worsen after CVA   Blood pressure and chol     followed by PCP/card/ neuro Md    4. Coronary artery disease of native artery of native heart with stable angina pectoris  S/P PTCA stents  Chronic/ Monitored/Stable on Lipitor/ Imdur,ASA and Norvasc  as directed.  Followed by card md    5. Essential hypertension  Chronic/ Monitored/Stable on Norvasc and Imdur  as directed.  Followed by PCP    6. PAD (peripheral artery disease)  Chronic/ Monitored/Stable on  Lipitor/ASA as directed.  Followed by card     7. Type 2 diabetes mellitus without complication, without long-term current use of insulin  Chronic/ Monitored/Stable on diet modification  as directed.  Followed by PCP     8. Episode of recurrent  major depressive disorder, unspecified depression episode severity  Chronic/ Monitored/Stable on   Myrebetriq directed.  Followed by PCP neuro md    9. Dyslipidemia  Chronic/ Monitored/Stable on  Lipitor/ asa as directed.  Followed by card     10. Basal cell carcinoma (BCC), unspecified site    9/2022 pot on left cheek under eye biopsy   Followed by  derm Md     11. Need for pneumococcal 20-valent conjugate vaccination  (In Office Administered) Pneumococcal Conjugate Vaccine (20 Valent) (IM) given per protocol    12. S/P AAA repair  AAA stent graft (approx 2013  Chronic/ Monitored/Stable   Followed by card- has not seen card in over a yr ago    13. History of tobacco abuse  Pt stopped smoking 12/2023    14. History of GI bleed   April 2023 w GI bleed.  Tx with EGD, cauterization of AV malformations.  Eliquis resumed but at low dose. Followed by PCP    PAF (paroxysmal atrial fibrillation)  Chronic/ Monitored/Stable on  Elquis as directed.  Followed by card    Provided Aquiles with a 5-10 year written screening schedule and personal prevention plan. Recommendations were developed using the USPSTF age appropriate recommendations. Education, counseling, and referrals were provided as needed. After Visit Summary printed and given to patient which includes a list of additional screenings\tests needed.    Follow up in about 1 year (around 6/29/2024) for Follow up with PCP.    Graciela Gates NP

## 2023-06-29 NOTE — PATIENT INSTRUCTIONS
Counseling and Referral of Other Preventative  (Italic type indicates deductible and co-insurance are waived)    Patient Name: Aquiles Kelsey  Today's Date: 6/29/2023    Health Maintenance       Date Due Completion Date    Diabetes Urine Screening Never done ---    Pneumococcal Vaccines (Age 65+) (1 - PCV) Never done ---    TETANUS VACCINE Never done ---    Shingles Vaccine (1 of 2) Never done ---    COVID-19 Vaccine (4 - Pfizer series) 01/28/2022 12/3/2021    Hemoglobin A1c 10/13/2023 4/13/2023    Lipid Panel 11/04/2023 11/4/2022    Eye Exam 04/06/2024 4/6/2023        No orders of the defined types were placed in this encounter.      The following information is provided to all patients.  This information is to help you find resources for any of the problems found today that may be affecting your health:                Living healthy guide: www.[facility].[place]      Understanding Diabetes: www.diabetes.org      Eating healthy: www.cdc.gov/healthyweight      [facility] home safety checklist: www.cdc.gov/steadi/patient.html      Agency on Aging: www.goea.[place].[facility]      Alcoholics anonymous (AA): www.aa.org      Physical Activity: www.nancie.nih.gov/ke2ssdu      Tobacco use: www.quitwithusla.org

## 2023-07-13 RX ORDER — APIXABAN 2.5 MG/1
TABLET, FILM COATED ORAL
Qty: 60 TABLET | Refills: 6 | Status: SHIPPED | OUTPATIENT
Start: 2023-07-13 | End: 2024-02-07

## 2023-07-24 PROBLEM — K31.811 AVM (ARTERIOVENOUS MALFORMATION) OF STOMACH, ACQUIRED WITH HEMORRHAGE: Status: RESOLVED | Noted: 2023-04-13 | Resolved: 2023-07-24

## 2023-08-01 ENCOUNTER — LAB VISIT (OUTPATIENT)
Dept: LAB | Facility: HOSPITAL | Age: 83
End: 2023-08-01
Attending: INTERNAL MEDICINE
Payer: MEDICARE

## 2023-08-01 ENCOUNTER — OFFICE VISIT (OUTPATIENT)
Dept: FAMILY MEDICINE | Facility: CLINIC | Age: 83
End: 2023-08-01
Payer: MEDICARE

## 2023-08-01 VITALS
WEIGHT: 134.69 LBS | BODY MASS INDEX: 19.28 KG/M2 | HEART RATE: 65 BPM | OXYGEN SATURATION: 96 % | DIASTOLIC BLOOD PRESSURE: 70 MMHG | HEIGHT: 70 IN | SYSTOLIC BLOOD PRESSURE: 126 MMHG | TEMPERATURE: 97 F

## 2023-08-01 DIAGNOSIS — D64.9 ANEMIA, UNSPECIFIED TYPE: ICD-10-CM

## 2023-08-01 DIAGNOSIS — E78.5 DYSLIPIDEMIA: Chronic | ICD-10-CM

## 2023-08-01 DIAGNOSIS — I25.118 CORONARY ARTERY DISEASE OF NATIVE ARTERY OF NATIVE HEART WITH STABLE ANGINA PECTORIS: Chronic | ICD-10-CM

## 2023-08-01 DIAGNOSIS — F03.90 DEMENTIA, UNSPECIFIED DEMENTIA SEVERITY, UNSPECIFIED DEMENTIA TYPE, UNSPECIFIED WHETHER BEHAVIORAL, PSYCHOTIC, OR MOOD DISTURBANCE OR ANXIETY: ICD-10-CM

## 2023-08-01 DIAGNOSIS — E53.8 FOLATE DEFICIENCY: ICD-10-CM

## 2023-08-01 DIAGNOSIS — I63.9 CEREBROVASCULAR ACCIDENT (CVA), UNSPECIFIED MECHANISM: ICD-10-CM

## 2023-08-01 DIAGNOSIS — I10 ESSENTIAL HYPERTENSION: Chronic | ICD-10-CM

## 2023-08-01 DIAGNOSIS — Z79.01 ANTICOAGULATED: ICD-10-CM

## 2023-08-01 DIAGNOSIS — E11.9 TYPE 2 DIABETES MELLITUS WITHOUT COMPLICATION, WITHOUT LONG-TERM CURRENT USE OF INSULIN: Chronic | ICD-10-CM

## 2023-08-01 DIAGNOSIS — I48.0 PAF (PAROXYSMAL ATRIAL FIBRILLATION): ICD-10-CM

## 2023-08-01 DIAGNOSIS — R53.1 WEAKNESS: ICD-10-CM

## 2023-08-01 DIAGNOSIS — Z87.19 HISTORY OF GI BLEED: Primary | ICD-10-CM

## 2023-08-01 DIAGNOSIS — E53.8 B12 DEFICIENCY: ICD-10-CM

## 2023-08-01 LAB
BASOPHILS # BLD AUTO: 0.03 K/UL (ref 0–0.2)
BASOPHILS NFR BLD: 0.6 % (ref 0–1.9)
DIFFERENTIAL METHOD: ABNORMAL
EOSINOPHIL # BLD AUTO: 0.2 K/UL (ref 0–0.5)
EOSINOPHIL NFR BLD: 2.9 % (ref 0–8)
ERYTHROCYTE [DISTWIDTH] IN BLOOD BY AUTOMATED COUNT: 15.5 % (ref 11.5–14.5)
ESTIMATED AVG GLUCOSE: 146 MG/DL (ref 68–131)
HBA1C MFR BLD: 6.7 % (ref 4–5.6)
HCT VFR BLD AUTO: 40.2 % (ref 40–54)
HGB BLD-MCNC: 12.6 G/DL (ref 14–18)
IMM GRANULOCYTES # BLD AUTO: 0.01 K/UL (ref 0–0.04)
IMM GRANULOCYTES NFR BLD AUTO: 0.2 % (ref 0–0.5)
LYMPHOCYTES # BLD AUTO: 1.1 K/UL (ref 1–4.8)
LYMPHOCYTES NFR BLD: 20.6 % (ref 18–48)
MCH RBC QN AUTO: 28.3 PG (ref 27–31)
MCHC RBC AUTO-ENTMCNC: 31.3 G/DL (ref 32–36)
MCV RBC AUTO: 90 FL (ref 82–98)
MONOCYTES # BLD AUTO: 0.4 K/UL (ref 0.3–1)
MONOCYTES NFR BLD: 7.9 % (ref 4–15)
NEUTROPHILS # BLD AUTO: 3.5 K/UL (ref 1.8–7.7)
NEUTROPHILS NFR BLD: 67.8 % (ref 38–73)
NRBC BLD-RTO: 0 /100 WBC
PLATELET # BLD AUTO: 200 K/UL (ref 150–450)
PMV BLD AUTO: 11.9 FL (ref 9.2–12.9)
RBC # BLD AUTO: 4.45 M/UL (ref 4.6–6.2)
WBC # BLD AUTO: 5.19 K/UL (ref 3.9–12.7)

## 2023-08-01 PROCEDURE — 3074F SYST BP LT 130 MM HG: CPT | Mod: HCNC,CPTII,S$GLB, | Performed by: INTERNAL MEDICINE

## 2023-08-01 PROCEDURE — 1101F PR PT FALLS ASSESS DOC 0-1 FALLS W/OUT INJ PAST YR: ICD-10-PCS | Mod: HCNC,CPTII,S$GLB, | Performed by: INTERNAL MEDICINE

## 2023-08-01 PROCEDURE — 85025 COMPLETE CBC W/AUTO DIFF WBC: CPT | Mod: HCNC | Performed by: INTERNAL MEDICINE

## 2023-08-01 PROCEDURE — 36415 COLL VENOUS BLD VENIPUNCTURE: CPT | Mod: HCNC,PO | Performed by: INTERNAL MEDICINE

## 2023-08-01 PROCEDURE — 99999 PR PBB SHADOW E&M-EST. PATIENT-LVL V: CPT | Mod: PBBFAC,HCNC,, | Performed by: INTERNAL MEDICINE

## 2023-08-01 PROCEDURE — 1159F MED LIST DOCD IN RCRD: CPT | Mod: HCNC,CPTII,S$GLB, | Performed by: INTERNAL MEDICINE

## 2023-08-01 PROCEDURE — 1126F AMNT PAIN NOTED NONE PRSNT: CPT | Mod: HCNC,CPTII,S$GLB, | Performed by: INTERNAL MEDICINE

## 2023-08-01 PROCEDURE — 83036 HEMOGLOBIN GLYCOSYLATED A1C: CPT | Mod: HCNC | Performed by: INTERNAL MEDICINE

## 2023-08-01 PROCEDURE — 82607 VITAMIN B-12: CPT | Mod: HCNC | Performed by: INTERNAL MEDICINE

## 2023-08-01 PROCEDURE — 1126F PR PAIN SEVERITY QUANTIFIED, NO PAIN PRESENT: ICD-10-PCS | Mod: HCNC,CPTII,S$GLB, | Performed by: INTERNAL MEDICINE

## 2023-08-01 PROCEDURE — 99215 OFFICE O/P EST HI 40 MIN: CPT | Mod: HCNC,S$GLB,, | Performed by: INTERNAL MEDICINE

## 2023-08-01 PROCEDURE — 99999 PR PBB SHADOW E&M-EST. PATIENT-LVL V: ICD-10-PCS | Mod: PBBFAC,HCNC,, | Performed by: INTERNAL MEDICINE

## 2023-08-01 PROCEDURE — 3288F PR FALLS RISK ASSESSMENT DOCUMENTED: ICD-10-PCS | Mod: HCNC,CPTII,S$GLB, | Performed by: INTERNAL MEDICINE

## 2023-08-01 PROCEDURE — 99215 PR OFFICE/OUTPT VISIT, EST, LEVL V, 40-54 MIN: ICD-10-PCS | Mod: HCNC,S$GLB,, | Performed by: INTERNAL MEDICINE

## 2023-08-01 PROCEDURE — 3078F PR MOST RECENT DIASTOLIC BLOOD PRESSURE < 80 MM HG: ICD-10-PCS | Mod: HCNC,CPTII,S$GLB, | Performed by: INTERNAL MEDICINE

## 2023-08-01 PROCEDURE — 3288F FALL RISK ASSESSMENT DOCD: CPT | Mod: HCNC,CPTII,S$GLB, | Performed by: INTERNAL MEDICINE

## 2023-08-01 PROCEDURE — 82746 ASSAY OF FOLIC ACID SERUM: CPT | Mod: HCNC | Performed by: INTERNAL MEDICINE

## 2023-08-01 PROCEDURE — 1159F PR MEDICATION LIST DOCUMENTED IN MEDICAL RECORD: ICD-10-PCS | Mod: HCNC,CPTII,S$GLB, | Performed by: INTERNAL MEDICINE

## 2023-08-01 PROCEDURE — 1101F PT FALLS ASSESS-DOCD LE1/YR: CPT | Mod: HCNC,CPTII,S$GLB, | Performed by: INTERNAL MEDICINE

## 2023-08-01 PROCEDURE — 3078F DIAST BP <80 MM HG: CPT | Mod: HCNC,CPTII,S$GLB, | Performed by: INTERNAL MEDICINE

## 2023-08-01 PROCEDURE — 3074F PR MOST RECENT SYSTOLIC BLOOD PRESSURE < 130 MM HG: ICD-10-PCS | Mod: HCNC,CPTII,S$GLB, | Performed by: INTERNAL MEDICINE

## 2023-08-01 RX ORDER — MEMANTINE HYDROCHLORIDE 10 MG/1
10 TABLET ORAL 2 TIMES DAILY
COMMUNITY
Start: 2023-06-16 | End: 2023-12-08 | Stop reason: SDUPTHER

## 2023-08-01 NOTE — PROGRESS NOTES
Subjective:       Patient ID: Aquiles Kelsey is a 82 y.o. male.    Chief Complaint: Follow-up (4 week ), Hypertension, Hyperlipidemia, Diabetes, Atrial Fibrillation, Anticoagulation, and Dementia    Follow-up  Associated symptoms include arthralgias, fatigue, myalgias and weakness. Pertinent negatives include no abdominal pain, chest pain, chills, coughing, diaphoresis, fever, headaches, joint swelling, nausea, neck pain, numbness, rash, sore throat or vomiting.   Hypertension  Pertinent negatives include no chest pain, headaches, neck pain, palpitations or shortness of breath.   Hyperlipidemia  Associated symptoms include myalgias. Pertinent negatives include no chest pain or shortness of breath.   Diabetes  Hypoglycemia symptoms include confusion. Pertinent negatives for hypoglycemia include no dizziness, headaches, nervousness/anxiousness, pallor, seizures, speech difficulty or tremors. Associated symptoms include fatigue and weakness. Pertinent negatives for diabetes include no chest pain, no polydipsia, no polyphagia and no polyuria.   Atrial Fibrillation  Symptoms include weakness. Symptoms are negative for chest pain, dizziness, palpitations and shortness of breath. Past medical history includes atrial fibrillation and hyperlipidemia.     Past Medical History:   Diagnosis Date    Acute blood loss anemia 10/14/2019    Aneurysm, abdominal aortic     BCC (basal cell carcinoma of skin) 6/29/2023    Coronary artery disease     Depression     Diabetes mellitus     HLD (hyperlipidemia)     Hypertension     Kidney stone     PAD (peripheral artery disease)     PAF (paroxysmal atrial fibrillation) 1/24/2023    Tobacco abuse      Past Surgical History:   Procedure Laterality Date    APPENDECTOMY      CORONARY STENT PLACEMENT      x 4    ESOPHAGOGASTRODUODENOSCOPY N/A 10/14/2019    Procedure: EGD (ESOPHAGOGASTRODUODENOSCOPY);  Surgeon: Carlos Mcneal III, MD;  Location: [facility];  Service: Endoscopy;   Laterality: N/A;    ESOPHAGOGASTRODUODENOSCOPY N/A 10/15/2019    Procedure: EGD (ESOPHAGOGASTRODUODENOSCOPY);  Surgeon: Carlos Mcneal III, MD;  Location: [facility] ENDO;  Service: Endoscopy;  Laterality: N/A;    ESOPHAGOGASTRODUODENOSCOPY N/A 4/17/2023    Procedure: EGD (ESOPHAGOGASTRODUODENOSCOPY);  Surgeon: Nevaeh Mcconnell MD;  Location: [facility] ENDO;  Service: Endoscopy;  Laterality: N/A;    LEFT HEART CATHETERIZATION Left 10/11/2019    Procedure: CATHETERIZATION, HEART, LEFT;  Surgeon: Robe Gilbert MD;  Location: [facility] CATH LAB;  Service: Cardiology;  Laterality: Left;    LEFT HEART CATHETERIZATION Left 10/13/2019    Procedure: CATHETERIZATION, HEART, LEFT;  Surgeon: Robe Gilbert MD;  Location: [facility] CATH LAB;  Service: Cardiology;  Laterality: Left;    leg stent Left      Family History   Problem Relation Age of Onset    Diabetes Mother     COPD Father     Diabetes Brother      Social History     Socioeconomic History    Marital status:    Tobacco Use    Smoking status: Former     Current packs/day: 0.00     Types: Cigars     Passive exposure: Never    Smokeless tobacco: Current   Substance and Sexual Activity    Alcohol use: Not Currently    Drug use: Not Currently    Sexual activity: Not Currently     Social Determinants of Health     Financial Resource Strain: Low Risk  (6/29/2023)    Overall Financial Resource Strain (CARDIA)     Difficulty of Paying Living Expenses: Not hard at all   Food Insecurity: Unknown (6/29/2023)    Hunger Vital Sign     Worried About Running Out of Food in the Last Year: Never true   Transportation Needs: No Transportation Needs (6/29/2023)    PRAPARE - Transportation     Lack of Transportation (Medical): No     Lack of Transportation (Non-Medical): No   Physical Activity: Inactive (6/29/2023)    Exercise Vital Sign     Days of Exercise per Week: 0 days     Minutes of Exercise per Session: 0 min   Stress: No Stress Concern Present (6/29/2023)    [employer] [place] [employer]  [employer] - Occupational Stress Questionnaire     Feeling of Stress : Only a little   Social Connections: Socially Isolated (6/29/2023)    Social Connection and Isolation Panel [NHANES]     Frequency of Communication with Friends and Family: Once a week     Frequency of Social Gatherings with Friends and Family: Once a week     Attends Sabianism Services: Never     Active Member of Clubs or Organizations: No     Attends Club or Organization Meetings: Never     Marital Status:    Housing Stability: Unknown (6/29/2023)    Housing Stability Vital Sign     Unable to Pay for Housing in the Last Year: No     Unstable Housing in the Last Year: No     Review of Systems   Constitutional:  Positive for fatigue. Negative for activity change, appetite change, chills, diaphoresis, fever and unexpected weight change.   HENT:  Negative for drooling, ear discharge, ear pain, facial swelling, hearing loss, mouth sores, nosebleeds, postnasal drip, rhinorrhea, sinus pressure, sneezing, sore throat, tinnitus, trouble swallowing and voice change.    Eyes:  Negative for photophobia, redness and visual disturbance.   Respiratory:  Negative for apnea, cough, choking, chest tightness, shortness of breath and wheezing.    Cardiovascular:  Negative for chest pain, palpitations and leg swelling.   Gastrointestinal:  Negative for abdominal distention, abdominal pain, anal bleeding, blood in stool, constipation, diarrhea, nausea and vomiting.   Endocrine: Negative for cold intolerance, heat intolerance, polydipsia, polyphagia and polyuria.   Genitourinary:  Negative for difficulty urinating, dysuria, enuresis, flank pain, frequency, genital sores, hematuria and urgency.   Musculoskeletal:  Positive for arthralgias, gait problem and myalgias. Negative for back pain, joint swelling, neck pain and neck stiffness.   Skin:  Negative for color change, pallor, rash and wound.   Allergic/Immunologic: Negative for food allergies and  immunocompromised state.   Neurological:  Positive for weakness. Negative for dizziness, tremors, seizures, syncope, facial asymmetry, speech difficulty, light-headedness, numbness and headaches.   Hematological:  Negative for adenopathy. Does not bruise/bleed easily.   Psychiatric/Behavioral:  Positive for confusion. Negative for agitation, behavioral problems, decreased concentration, dysphoric mood, hallucinations, self-injury, sleep disturbance and suicidal ideas. The patient is not nervous/anxious and is not hyperactive.        Objective:      Physical Exam  Vitals and nursing note reviewed.   Constitutional:       General: He is not in acute distress.     Appearance: Normal appearance. He is well-developed. He is not diaphoretic.   HENT:      Head: Normocephalic and atraumatic.      Mouth/Throat:      Pharynx: No oropharyngeal exudate.   Eyes:      General: No scleral icterus.     Pupils: Pupils are equal, round, and reactive to light.   Neck:      Thyroid: No thyromegaly.      Vascular: No carotid bruit or JVD.      Trachea: No tracheal deviation.   Cardiovascular:      Rate and Rhythm: Normal rate and regular rhythm.      Heart sounds: Normal heart sounds.   Pulmonary:      Effort: Pulmonary effort is normal. No respiratory distress.      Breath sounds: Normal breath sounds. No wheezing or rales.   Chest:      Chest wall: No tenderness.   Abdominal:      General: Bowel sounds are normal. There is no distension.      Palpations: Abdomen is soft.      Tenderness: There is no abdominal tenderness. There is no guarding or rebound.   Musculoskeletal:         General: No tenderness. Normal range of motion.      Cervical back: Normal range of motion and neck supple.   Lymphadenopathy:      Cervical: No cervical adenopathy.   Skin:     General: Skin is warm and dry.      Coloration: Skin is not pale.      Findings: No erythema or rash.   Neurological:      Mental Status: He is alert and oriented to person, place,  and time.   Psychiatric:         Behavior: Behavior normal.         Thought Content: Thought content normal.         Judgment: Judgment normal.         CMP  Sodium   Date Value Ref Range Status   04/18/2023 136 136 - 145 mmol/L Final     Potassium   Date Value Ref Range Status   04/18/2023 3.8 3.5 - 5.1 mmol/L Final     Chloride   Date Value Ref Range Status   04/18/2023 103 95 - 110 mmol/L Final     CO2   Date Value Ref Range Status   04/18/2023 25 23 - 29 mmol/L Final     Glucose   Date Value Ref Range Status   04/18/2023 143 (H) 70 - 110 mg/dL Final     BUN   Date Value Ref Range Status   04/18/2023 17 8 - 23 mg/dL Final     Creatinine   Date Value Ref Range Status   04/18/2023 0.9 0.5 - 1.4 mg/dL Final     Calcium   Date Value Ref Range Status   04/18/2023 8.9 8.7 - 10.5 mg/dL Final     Total Protein   Date Value Ref Range Status   04/16/2023 6.3 6.0 - 8.4 g/dL Final     Albumin   Date Value Ref Range Status   04/16/2023 2.5 (L) 3.5 - 5.2 g/dL Final     Total Bilirubin   Date Value Ref Range Status   04/16/2023 0.5 0.1 - 1.0 mg/dL Final     Comment:     For infants and newborns, interpretation of results should be based  on gestational age, weight and in agreement with clinical  observations.    Premature Infant recommended reference ranges:  Up to 24 hours.............<8.0 mg/dL  Up to 48 hours............<12.0 mg/dL  3-5 days..................<15.0 mg/dL  6-29 days.................<15.0 mg/dL       Alkaline Phosphatase   Date Value Ref Range Status   04/16/2023 116 55 - 135 U/L Final     AST   Date Value Ref Range Status   04/16/2023 11 10 - 40 U/L Final     ALT   Date Value Ref Range Status   04/16/2023 7 (L) 10 - 44 U/L Final     Anion Gap   Date Value Ref Range Status   04/18/2023 8 8 - 16 mmol/L Final     eGFR if    Date Value Ref Range Status   06/15/2022 >60 >60 mL/min/1.73 m^2 Final     eGFR if non    Date Value Ref Range Status   06/15/2022 >60 >60 mL/min/1.73 m^2 Final      Comment:     Calculation used to obtain the estimated glomerular filtration  rate (eGFR) is the CKD-EPI equation.        Lab Results   Component Value Date    WBC 5.83 05/08/2023    HGB 11.0 (L) 05/08/2023    HCT 36.6 (L) 05/08/2023    MCV 91 05/08/2023     05/08/2023     Lab Results   Component Value Date    CHOL 158 11/04/2022     Lab Results   Component Value Date    HDL 40 11/04/2022     Lab Results   Component Value Date    LDLCALC 96.8 11/04/2022     Lab Results   Component Value Date    TRIG 106 11/04/2022     Lab Results   Component Value Date    CHOLHDL 25.3 11/04/2022     Lab Results   Component Value Date    TSH 1.944 11/04/2022     Lab Results   Component Value Date    HGBA1C 5.9 (H) 04/13/2023     Assessment:       1. History of GI bleed    2. B12 deficiency    3. Folate deficiency    4. Dementia, unspecified dementia severity, unspecified dementia type, unspecified whether behavioral, psychotic, or mood disturbance or anxiety    5. Essential hypertension    6. Dyslipidemia    7. Cerebrovascular accident (CVA), unspecified mechanism    8. Coronary artery disease of native artery of native heart with stable angina pectoris    9. Anemia, unspecified type    10. Anticoagulated    11. Type 2 diabetes mellitus without complication, without long-term current use of insulin    12. Weakness    13. PAF (paroxysmal atrial fibrillation)        Plan:   History of GI bleed    B12 deficiency  -     Vitamin B12; Future; Expected date: 08/01/2023    Folate deficiency  -     Folate; Future; Expected date: 08/01/2023    Dementia, unspecified dementia severity, unspecified dementia type, unspecified whether behavioral, psychotic, or mood disturbance or anxiety-----------------sees neurology dr mills-------------    Essential hypertension    Dyslipidemia    Cerebrovascular accident (CVA), unspecified mechanism    Coronary artery disease of native artery of native heart with stable angina pectoris    Anemia,  unspecified type  -     CBC Auto Differential; Future; Expected date: 08/01/2023    Anticoagulated    Type 2 diabetes mellitus without complication, without long-term current use of insulin  -     Hemoglobin A1C; Future; Expected date: 08/01/2023    Weakness---------H.H. with P.T.------    PAF (paroxysmal atrial fibrillation)      Stable-----------------continue meds-----------------as above--------------f/u 3 months----------

## 2023-08-02 LAB
FOLATE SERPL-MCNC: >40 NG/ML (ref 4–24)
VIT B12 SERPL-MCNC: >2000 PG/ML (ref 210–950)

## 2023-08-07 PROBLEM — I63.9 CVA (CEREBRAL VASCULAR ACCIDENT): Status: RESOLVED | Noted: 2023-05-08 | Resolved: 2023-08-07

## 2023-08-15 ENCOUNTER — DOCUMENT SCAN (OUTPATIENT)
Dept: HOME HEALTH SERVICES | Facility: HOSPITAL | Age: 83
End: 2023-08-15
Payer: MEDICARE

## 2023-08-25 ENCOUNTER — TELEPHONE (OUTPATIENT)
Dept: FAMILY MEDICINE | Facility: CLINIC | Age: 83
End: 2023-08-25
Payer: MEDICARE

## 2023-08-25 NOTE — TELEPHONE ENCOUNTER
----- Message from Jonathan Montelongo sent at 8/25/2023 11:21 AM CDT -----  Contact: Gayle/ wife  Patients wife is calling to speak with the nurse regarding a request from his dentist. Reports the dentist office is needing this information so that his 2 upper molars can be extracted. Please give patients wife a call at [phone]. The extraction is scheduled for Monday, August 28.

## 2023-08-25 NOTE — TELEPHONE ENCOUNTER
Spoke to [facility] and told the the form was faxed back last week stating that he needed clearance from Dr. STELLA Gilbert cardiology she stated the never received they will send to cardiologist

## 2023-08-25 NOTE — TELEPHONE ENCOUNTER
Spoke with wife told her papers were faxed back to [facility] to get clearance from cardiologist sometime ago.  She will call them back to see if they received it back

## 2023-08-25 NOTE — TELEPHONE ENCOUNTER
----- Message from Raheem Jackson sent at 8/25/2023  7:44 AM CDT -----  Contact: Daniella from [facility]  Is calling to follow up on medical clearance for pt and states they need it back today for the pt's appt and can be reached at [phone]/thanks/dbw   Fax

## 2023-08-29 ENCOUNTER — TELEPHONE (OUTPATIENT)
Dept: CARDIOLOGY | Facility: CLINIC | Age: 83
End: 2023-08-29
Payer: MEDICARE

## 2023-08-29 NOTE — TELEPHONE ENCOUNTER
Angelo Andre Staff  Caller: Daniella/[facility] (Today,  8:36 AM)  Daniella is needing a call back in regards to the patients clearance paperwork that was faxed over. Please confirm and give her a call back at [phone]        [facility] is requesting clearance for this patient to have 4 extractions, localized anesthetic any medications restrictions,     Fax: [phone]

## 2023-08-29 NOTE — TELEPHONE ENCOUNTER
Hold Eliquis x 2 days for dental work and may proceed at moderate CV risk.    Restart Eliquis asap after procedure.    Dr Vladimir Andre, Angelo TIPTON Staff  Caller: Daniella/[facility] (Today,  8:36 AM)  Daniella is needing a call back in regards to the patients clearance paperwork that was faxed over. Please confirm and give her a call back at [phone]          [facility] is requesting clearance for this patient to have 4 extractions, localized anesthetic any medications restrictions,      Fax: [phone]

## 2023-08-29 NOTE — TELEPHONE ENCOUNTER
Clearance faxed       Hold Eliquis x 2 days for dental work and may proceed at moderate CV risk.    Restart Eliquis asap after procedure.    Angelo Byrd Staff  Caller: Daniella/[facility] (Today,  8:36 AM)  Daniella is needing a call back in regards to the patients clearance paperwork that was faxed over. Please confirm and give her a call back at [phone]          [facility] is requesting clearance for this patient to have 4 extractions, localized anesthetic any medications restrictions,      Fax: [phone]

## 2023-10-04 NOTE — ASSESSMENT & PLAN NOTE
- BP elevated  - Continue home meds  - Monitor and adjust as needed     10/12:  bp slightly elevated  Will continue to monitor   Increasing lisinopril to 20mg daily     10/14:  BP controlled      declines

## 2023-10-29 DIAGNOSIS — I20.9 AP (ANGINA PECTORIS): ICD-10-CM

## 2023-10-30 RX ORDER — ISOSORBIDE MONONITRATE 120 MG/1
120 TABLET, EXTENDED RELEASE ORAL
Qty: 90 TABLET | Refills: 3 | Status: SHIPPED | OUTPATIENT
Start: 2023-10-30

## 2023-12-06 NOTE — PROGRESS NOTES
Subjective:      Patient ID: Aquiles Kelsey is a 83 y.o. male.    Chief Complaint:  Memory loss.     History of Present Illness  HPI 83 years old right handed  Male with PMHx of PAF / Encephalopathy / HTN / CAD / NSTEMI / Dementia and others medical issues   Came with Wife for the evaluation of Memory loss.     Started: about 2 years ago.   Describes: short term memory.   Timing: constant.   Frequency: daily.   Pain:  None.   Location: CNS.   Family: No Dementia.   Medications: Namenda / ASA / Eliquis.   Worsen: stress.   Alleviated: Namenda - has made a different as per Wife.   Associated symptoms: difficulties to keep conversation / Helps with ADL's / words findings difficulties / mild irritable at times.   Triggers: stress.   Prodrome symptoms: none.    CVA x 2 - last one a year ago.   Neurologist saw Him - Namenda.   Sleep well.  No Visual Hallucinations / no Behavioral issues.     Review of Systems  Review of Systems   Allergic/Immunologic:        Metoprolol.    Neurological:         Memory difficulties.    All other systems reviewed and are negative.    Objective:     Neurologic Exam     Mental Status   Disoriented to area and number. Oriented to country, city and street.   Disoriented to date. Oriented to year, month, day and season.   Registration: recalls 3 of 3 objects. Recall at 5 minutes: recalls 2 of 3 objects. Follows 3 step commands.   Attention: normal. Concentration: normal.   Speech: speech is normal   Level of consciousness: alert  Knowledge: good. Able to perform simple calculations.   Able to name object. Able to read. Able to repeat. Able to write. Normal comprehension.     Cranial Nerves     CN II   Visual fields full to confrontation.     CN III, IV, VI   Pupils are equal, round, and reactive to light.  Extraocular motions are normal.   Upgaze: normal  Downgaze: normal  Conjugate gaze: present  Vestibulo-ocular reflex: present    CN V   Facial sensation intact.     CN VII    Facial expression full, symmetric.     CN VIII   CN VIII normal.     CN IX, X   CN IX normal.   CN X normal.     CN XI   CN XI normal.     CN XII   CN XII normal.     Motor Exam   Muscle bulk: normal  Overall muscle tone: normal    Strength   Strength 5/5 throughout.   Right neck flexion: 5/5  Left neck flexion: 5/5  Right neck extension: 5/5  Left neck extension: 5/5  Right deltoid: 5/5  Left deltoid: 5/5  Right biceps: 5/5  Left biceps: 5/5  Right triceps: 5/5  Left triceps: 5/5  Right wrist flexion: 5/5  Left wrist flexion: 5/5  Right wrist extension: 5/5  Left wrist extension: 5/5  Right interossei: 5/5  Left interossei: 5/5  Right abdominals: 5/5  Left abdominals: 5/5  Right iliopsoas: 5/5  Left iliopsoas: 5/5  Right quadriceps: 5/5  Left quadriceps: 5/5  Right hamstrin/5  Left hamstrin/5  Right glutei: 5/5  Left glutei: 5/5  Right anterior tibial: 5/5  Left anterior tibial: 5/5  Right posterior tibial: 5/5  Left posterior tibial: 5/5  Right peroneal: 5/5  Left peroneal: 5/5  Right gastroc: 5/5  Left gastroc: 5/5    Sensory Exam   Light touch normal.   Vibration normal.   Proprioception normal.   Pinprick normal.   Graphesthesia: normal  Stereognosis: normal    Gait, Coordination, and Reflexes     Gait  Gait: normal    Coordination   Romberg: negative  Finger to nose coordination: normal  Heel to shin coordination: normal  Tandem walking coordination: normal    Tremor   Resting tremor: absent  Intention tremor: absent  Action tremor: absent    Reflexes   Right brachioradialis: 2+  Left brachioradialis: 2+  Right biceps: 2+  Left biceps: 2+  Right triceps: 2+  Left triceps: 2+  Right patellar: 2+  Left patellar: 2+  Right achilles: 2+  Left achilles: 2+  Right : 2+  Left : 2+  Right plantar: normal  Left plantar: normal  Right Roberts: absent  Left Roberts: absent  Right ankle clonus: absent  Left ankle clonus: absent  Right pendular knee jerk: absent  Left pendular knee jerk:  absent      Physical Exam  Vitals and nursing note reviewed.   Constitutional:       Appearance: Normal appearance. He is normal weight.   HENT:      Head: Normocephalic and atraumatic.      Right Ear: Tympanic membrane normal.      Left Ear: Tympanic membrane normal.      Nose: Nose normal.      Mouth/Throat:      Mouth: Mucous membranes are moist.      Pharynx: Oropharynx is clear.   Eyes:      Extraocular Movements: Extraocular movements intact and EOM normal.      Conjunctiva/sclera: Conjunctivae normal.      Pupils: Pupils are equal, round, and reactive to light.   Cardiovascular:      Rate and Rhythm: Normal rate and regular rhythm.      Pulses: Normal pulses.      Heart sounds: Normal heart sounds.   Pulmonary:      Effort: Pulmonary effort is normal.      Breath sounds: Normal breath sounds.   Abdominal:      General: Abdomen is flat. Bowel sounds are normal.      Palpations: Abdomen is soft.   Genitourinary:     Comments: Deferred.   Musculoskeletal:         General: Normal range of motion.      Cervical back: Normal range of motion and neck supple.   Skin:     General: Skin is warm and dry.      Capillary Refill: Capillary refill takes less than 2 seconds.   Neurological:      Mental Status: He is alert. Mental status is at baseline.      Motor: Motor strength is normal.     Coordination: Finger-Nose-Finger Test, Heel to Shin Test and Romberg Test normal.      Gait: Gait is intact. Tandem walk normal.      Deep Tendon Reflexes:      Reflex Scores:       Tricep reflexes are 2+ on the right side and 2+ on the left side.       Bicep reflexes are 2+ on the right side and 2+ on the left side.       Brachioradialis reflexes are 2+ on the right side and 2+ on the left side.       Patellar reflexes are 2+ on the right side and 2+ on the left side.       Achilles reflexes are 2+ on the right side and 2+ on the left side.  Psychiatric:         Mood and Affect: Mood normal.         Speech: Speech normal.          Behavior: Behavior normal.         Thought Content: Thought content normal.         Judgment: Judgment normal.      MMSE: 23/ 30 - missed 3 out of 3 in 5 minutes.     Assessment:   Patient Neurological Assessment is remarkable for short term memory lost / cognitive wise for the most part seems ok.   - CVA.  - Aneurysm, Brain.    - Dementia, Vascular.      Plan:   Has had 2 CVA - most probable cause of his Memory lost.   Still Alzheimer is in the mix.      Continue Namenda.   Discussed Aricept - Wife wants to do imagines and come back to discuss more medications.   Last MRI / CTA - 2 years ago / Small Brain Aneurysm present.     CTA Head and Neck.   MRI Brain.     Labs: Vit B 12 / Folate / CBC / TSH - done 08/ 2023 - non significant abnormalities.     Personally reviewed CT Scan Head - done 04/ 2023 - Chronic microvascular ischemic changes. B rain Atrophy.      Please do not hesitate to contact me with any updates, questions or concerns.    Mason Birmingham MD.  General Neurologist.

## 2023-12-07 DIAGNOSIS — E78.5 DYSLIPIDEMIA: ICD-10-CM

## 2023-12-07 RX ORDER — ATORVASTATIN CALCIUM 40 MG/1
40 TABLET, FILM COATED ORAL
Qty: 90 TABLET | Refills: 0 | Status: SHIPPED | OUTPATIENT
Start: 2023-12-07 | End: 2024-03-08

## 2023-12-07 NOTE — TELEPHONE ENCOUNTER
Refill Routing Note   Medication(s) are not appropriate for processing by [facility] for the following reason(s):        Required labs outdated    [facility] action(s):  Defer     Requires labs : Yes             Appointments  past 12m or future 3m with PCP    Date Provider   Last Visit   8/1/2023 Holger Thornton MD   Next Visit   Visit date not found Holger Thornton MD   ED visits in past 90 days: 0        Note composed:10:54 AM 12/07/2023

## 2023-12-07 NOTE — TELEPHONE ENCOUNTER
Care Due:                  Date            Visit Type   Department     Provider  --------------------------------------------------------------------------------                                EP -                              PRIMARY      [facility]  Last Visit: 08-      CARE ([facility])   MEDICINE       Holger Thornton  Next Visit: None Scheduled  None         None Found                                                            Last  Test          Frequency    Reason                     Performed    Due Date  --------------------------------------------------------------------------------    HBA1C.......  6 months...  metFORMIN................  08- 01-    Lipid Panel.  12 months..  atorvastatin.............  11-   10-    [facility] Embedded Care Due Messages. Reference number: [id number].   12/07/2023 12:47:45 AM CST

## 2023-12-08 ENCOUNTER — LAB VISIT (OUTPATIENT)
Dept: LAB | Facility: HOSPITAL | Age: 83
End: 2023-12-08
Attending: PSYCHIATRY & NEUROLOGY
Payer: MEDICARE

## 2023-12-08 ENCOUNTER — OFFICE VISIT (OUTPATIENT)
Dept: NEUROLOGY | Facility: CLINIC | Age: 83
End: 2023-12-08
Payer: MEDICARE

## 2023-12-08 VITALS
DIASTOLIC BLOOD PRESSURE: 73 MMHG | HEART RATE: 79 BPM | SYSTOLIC BLOOD PRESSURE: 138 MMHG | WEIGHT: 143.5 LBS | HEIGHT: 70 IN | BODY MASS INDEX: 20.54 KG/M2

## 2023-12-08 DIAGNOSIS — R41.3 OTHER AMNESIA: ICD-10-CM

## 2023-12-08 DIAGNOSIS — I72.9 ANEURYSM: ICD-10-CM

## 2023-12-08 DIAGNOSIS — R41.3 MEMORY DEFICIT: Primary | ICD-10-CM

## 2023-12-08 DIAGNOSIS — F03.90 DEMENTIA, UNSPECIFIED DEMENTIA SEVERITY, UNSPECIFIED DEMENTIA TYPE, UNSPECIFIED WHETHER BEHAVIORAL, PSYCHOTIC, OR MOOD DISTURBANCE OR ANXIETY: ICD-10-CM

## 2023-12-08 DIAGNOSIS — R41.3 MEMORY DEFICIT: ICD-10-CM

## 2023-12-08 LAB
CREAT SERPL-MCNC: 1.1 MG/DL (ref 0.5–1.4)
EST. GFR  (NO RACE VARIABLE): >60 ML/MIN/1.73 M^2

## 2023-12-08 PROCEDURE — 82565 ASSAY OF CREATININE: CPT | Mod: HCNC | Performed by: PSYCHIATRY & NEUROLOGY

## 2023-12-08 PROCEDURE — 99214 OFFICE O/P EST MOD 30 MIN: CPT | Mod: HCNC,S$GLB,, | Performed by: PSYCHIATRY & NEUROLOGY

## 2023-12-08 PROCEDURE — 1159F PR MEDICATION LIST DOCUMENTED IN MEDICAL RECORD: ICD-10-PCS | Mod: HCNC,CPTII,S$GLB, | Performed by: PSYCHIATRY & NEUROLOGY

## 2023-12-08 PROCEDURE — 1160F RVW MEDS BY RX/DR IN RCRD: CPT | Mod: HCNC,CPTII,S$GLB, | Performed by: PSYCHIATRY & NEUROLOGY

## 2023-12-08 PROCEDURE — 1160F PR REVIEW ALL MEDS BY PRESCRIBER/CLIN PHARMACIST DOCUMENTED: ICD-10-PCS | Mod: HCNC,CPTII,S$GLB, | Performed by: PSYCHIATRY & NEUROLOGY

## 2023-12-08 PROCEDURE — 99999 PR PBB SHADOW E&M-EST. PATIENT-LVL V: ICD-10-PCS | Mod: PBBFAC,HCNC,, | Performed by: PSYCHIATRY & NEUROLOGY

## 2023-12-08 PROCEDURE — 3075F PR MOST RECENT SYSTOLIC BLOOD PRESS GE 130-139MM HG: ICD-10-PCS | Mod: HCNC,CPTII,S$GLB, | Performed by: PSYCHIATRY & NEUROLOGY

## 2023-12-08 PROCEDURE — 3288F FALL RISK ASSESSMENT DOCD: CPT | Mod: HCNC,CPTII,S$GLB, | Performed by: PSYCHIATRY & NEUROLOGY

## 2023-12-08 PROCEDURE — 1159F MED LIST DOCD IN RCRD: CPT | Mod: HCNC,CPTII,S$GLB, | Performed by: PSYCHIATRY & NEUROLOGY

## 2023-12-08 PROCEDURE — 1126F PR PAIN SEVERITY QUANTIFIED, NO PAIN PRESENT: ICD-10-PCS | Mod: HCNC,CPTII,S$GLB, | Performed by: PSYCHIATRY & NEUROLOGY

## 2023-12-08 PROCEDURE — 3078F PR MOST RECENT DIASTOLIC BLOOD PRESSURE < 80 MM HG: ICD-10-PCS | Mod: HCNC,CPTII,S$GLB, | Performed by: PSYCHIATRY & NEUROLOGY

## 2023-12-08 PROCEDURE — 36415 COLL VENOUS BLD VENIPUNCTURE: CPT | Mod: HCNC | Performed by: PSYCHIATRY & NEUROLOGY

## 2023-12-08 PROCEDURE — 99214 PR OFFICE/OUTPT VISIT, EST, LEVL IV, 30-39 MIN: ICD-10-PCS | Mod: HCNC,S$GLB,, | Performed by: PSYCHIATRY & NEUROLOGY

## 2023-12-08 PROCEDURE — 1126F AMNT PAIN NOTED NONE PRSNT: CPT | Mod: HCNC,CPTII,S$GLB, | Performed by: PSYCHIATRY & NEUROLOGY

## 2023-12-08 PROCEDURE — 1101F PR PT FALLS ASSESS DOC 0-1 FALLS W/OUT INJ PAST YR: ICD-10-PCS | Mod: HCNC,CPTII,S$GLB, | Performed by: PSYCHIATRY & NEUROLOGY

## 2023-12-08 PROCEDURE — 3288F PR FALLS RISK ASSESSMENT DOCUMENTED: ICD-10-PCS | Mod: HCNC,CPTII,S$GLB, | Performed by: PSYCHIATRY & NEUROLOGY

## 2023-12-08 PROCEDURE — 3078F DIAST BP <80 MM HG: CPT | Mod: HCNC,CPTII,S$GLB, | Performed by: PSYCHIATRY & NEUROLOGY

## 2023-12-08 PROCEDURE — 1101F PT FALLS ASSESS-DOCD LE1/YR: CPT | Mod: HCNC,CPTII,S$GLB, | Performed by: PSYCHIATRY & NEUROLOGY

## 2023-12-08 PROCEDURE — 3075F SYST BP GE 130 - 139MM HG: CPT | Mod: HCNC,CPTII,S$GLB, | Performed by: PSYCHIATRY & NEUROLOGY

## 2023-12-08 PROCEDURE — 99999 PR PBB SHADOW E&M-EST. PATIENT-LVL V: CPT | Mod: PBBFAC,HCNC,, | Performed by: PSYCHIATRY & NEUROLOGY

## 2023-12-08 RX ORDER — MEMANTINE HYDROCHLORIDE 10 MG/1
10 TABLET ORAL 2 TIMES DAILY
Qty: 60 TABLET | Refills: 3 | Status: SHIPPED | OUTPATIENT
Start: 2023-12-08 | End: 2024-04-01 | Stop reason: SDUPTHER

## 2023-12-12 ENCOUNTER — HOSPITAL ENCOUNTER (OUTPATIENT)
Dept: RADIOLOGY | Facility: HOSPITAL | Age: 83
Discharge: HOME | End: 2023-12-12
Attending: PSYCHIATRY & NEUROLOGY
Payer: MEDICARE

## 2023-12-12 DIAGNOSIS — R41.3 MEMORY DEFICIT: ICD-10-CM

## 2023-12-12 DIAGNOSIS — R41.3 OTHER AMNESIA: ICD-10-CM

## 2023-12-12 PROCEDURE — 70551 MRI BRAIN STEM W/O DYE: CPT | Mod: TC,HCNC

## 2023-12-12 PROCEDURE — 70551 MRI BRAIN WITHOUT CONTRAST: ICD-10-PCS | Mod: 26,HCNC,, | Performed by: RADIOLOGY

## 2023-12-12 PROCEDURE — 70551 MRI BRAIN STEM W/O DYE: CPT | Mod: 26,HCNC,, | Performed by: RADIOLOGY

## 2023-12-19 ENCOUNTER — HOSPITAL ENCOUNTER (EMERGENCY)
Facility: HOSPITAL | Age: 83
Discharge: HOME | End: 2023-12-19
Attending: EMERGENCY MEDICINE
Payer: MEDICARE

## 2023-12-19 VITALS
TEMPERATURE: 98 F | OXYGEN SATURATION: 96 % | HEIGHT: 70 IN | BODY MASS INDEX: 20.33 KG/M2 | RESPIRATION RATE: 16 BRPM | WEIGHT: 142 LBS | DIASTOLIC BLOOD PRESSURE: 66 MMHG | SYSTOLIC BLOOD PRESSURE: 150 MMHG | HEART RATE: 53 BPM

## 2023-12-19 DIAGNOSIS — R00.1 BRADYCARDIA: ICD-10-CM

## 2023-12-19 DIAGNOSIS — R53.1 WEAKNESS: ICD-10-CM

## 2023-12-19 LAB
ALBUMIN SERPL BCP-MCNC: 2.7 G/DL (ref 3.5–5.2)
ALP SERPL-CCNC: 90 U/L (ref 55–135)
ALT SERPL W/O P-5'-P-CCNC: 11 U/L (ref 10–44)
ANION GAP SERPL CALC-SCNC: 10 MMOL/L (ref 8–16)
AST SERPL-CCNC: 11 U/L (ref 10–40)
BACTERIA #/AREA URNS HPF: ABNORMAL /HPF
BASOPHILS # BLD AUTO: 0.02 K/UL (ref 0–0.2)
BASOPHILS NFR BLD: 0.4 % (ref 0–1.9)
BILIRUB SERPL-MCNC: 0.6 MG/DL (ref 0.1–1)
BILIRUB UR QL STRIP: NEGATIVE
BNP SERPL-MCNC: 140 PG/ML (ref 0–99)
BUN SERPL-MCNC: 29 MG/DL (ref 8–23)
CALCIUM SERPL-MCNC: 7.8 MG/DL (ref 8.7–10.5)
CHLORIDE SERPL-SCNC: 113 MMOL/L (ref 95–110)
CLARITY UR: CLEAR
CO2 SERPL-SCNC: 19 MMOL/L (ref 23–29)
COLOR UR: YELLOW
CREAT SERPL-MCNC: 0.9 MG/DL (ref 0.5–1.4)
DIFFERENTIAL METHOD: ABNORMAL
EOSINOPHIL # BLD AUTO: 0.1 K/UL (ref 0–0.5)
EOSINOPHIL NFR BLD: 2.3 % (ref 0–8)
ERYTHROCYTE [DISTWIDTH] IN BLOOD BY AUTOMATED COUNT: 14.1 % (ref 11.5–14.5)
EST. GFR  (NO RACE VARIABLE): >60 ML/MIN/1.73 M^2
GLUCOSE SERPL-MCNC: 165 MG/DL (ref 70–110)
GLUCOSE UR QL STRIP: NEGATIVE
GRAN CASTS #/AREA URNS LPF: 1 /LPF
HCT VFR BLD AUTO: 37.2 % (ref 40–54)
HGB BLD-MCNC: 12.6 G/DL (ref 14–18)
HGB UR QL STRIP: ABNORMAL
HYALINE CASTS #/AREA URNS LPF: 1 /LPF
IMM GRANULOCYTES # BLD AUTO: 0.03 K/UL (ref 0–0.04)
IMM GRANULOCYTES NFR BLD AUTO: 0.6 % (ref 0–0.5)
INFLUENZA A, MOLECULAR: NEGATIVE
INFLUENZA B, MOLECULAR: NEGATIVE
KETONES UR QL STRIP: NEGATIVE
LACTATE SERPL-SCNC: 1.6 MMOL/L (ref 0.5–2.2)
LEUKOCYTE ESTERASE UR QL STRIP: ABNORMAL
LIPASE SERPL-CCNC: 35 U/L (ref 4–60)
LYMPHOCYTES # BLD AUTO: 0.8 K/UL (ref 1–4.8)
LYMPHOCYTES NFR BLD: 15.2 % (ref 18–48)
MCH RBC QN AUTO: 29.6 PG (ref 27–31)
MCHC RBC AUTO-ENTMCNC: 33.9 G/DL (ref 32–36)
MCV RBC AUTO: 88 FL (ref 82–98)
MICROSCOPIC COMMENT: ABNORMAL
MONOCYTES # BLD AUTO: 0.3 K/UL (ref 0.3–1)
MONOCYTES NFR BLD: 6.5 % (ref 4–15)
NEUTROPHILS # BLD AUTO: 4 K/UL (ref 1.8–7.7)
NEUTROPHILS NFR BLD: 75 % (ref 38–73)
NITRITE UR QL STRIP: NEGATIVE
NRBC BLD-RTO: 0 /100 WBC
PH UR STRIP: 6 [PH] (ref 5–8)
PLATELET # BLD AUTO: 187 K/UL (ref 150–450)
PMV BLD AUTO: 10.6 FL (ref 9.2–12.9)
POTASSIUM SERPL-SCNC: 3.9 MMOL/L (ref 3.5–5.1)
PROT SERPL-MCNC: 5.9 G/DL (ref 6–8.4)
PROT UR QL STRIP: ABNORMAL
RBC # BLD AUTO: 4.25 M/UL (ref 4.6–6.2)
RBC #/AREA URNS HPF: 21 /HPF (ref 0–4)
SARS-COV-2 RDRP RESP QL NAA+PROBE: NEGATIVE
SODIUM SERPL-SCNC: 142 MMOL/L (ref 136–145)
SP GR UR STRIP: 1.02 (ref 1–1.03)
SPECIMEN SOURCE: NORMAL
TROPONIN I SERPL DL<=0.01 NG/ML-MCNC: 0.02 NG/ML (ref 0–0.03)
URN SPEC COLLECT METH UR: ABNORMAL
UROBILINOGEN UR STRIP-ACNC: ABNORMAL EU/DL
WBC # BLD AUTO: 5.26 K/UL (ref 3.9–12.7)
WBC #/AREA URNS HPF: 4 /HPF (ref 0–5)

## 2023-12-19 PROCEDURE — 87502 INFLUENZA DNA AMP PROBE: CPT | Mod: HCNC | Performed by: EMERGENCY MEDICINE

## 2023-12-19 PROCEDURE — 93010 ELECTROCARDIOGRAM REPORT: CPT | Mod: HCNC,,, | Performed by: INTERNAL MEDICINE

## 2023-12-19 PROCEDURE — 25000003 PHARM REV CODE 250: Mod: HCNC | Performed by: EMERGENCY MEDICINE

## 2023-12-19 PROCEDURE — 96360 HYDRATION IV INFUSION INIT: CPT | Mod: HCNC

## 2023-12-19 PROCEDURE — 93010 EKG 12-LEAD: ICD-10-PCS | Mod: HCNC,,, | Performed by: INTERNAL MEDICINE

## 2023-12-19 PROCEDURE — 99285 EMERGENCY DEPT VISIT HI MDM: CPT | Mod: 25,HCNC

## 2023-12-19 PROCEDURE — 83690 ASSAY OF LIPASE: CPT | Mod: HCNC | Performed by: EMERGENCY MEDICINE

## 2023-12-19 PROCEDURE — 81000 URINALYSIS NONAUTO W/SCOPE: CPT | Mod: HCNC | Performed by: EMERGENCY MEDICINE

## 2023-12-19 PROCEDURE — U0002 COVID-19 LAB TEST NON-CDC: HCPCS | Mod: HCNC | Performed by: EMERGENCY MEDICINE

## 2023-12-19 PROCEDURE — 84484 ASSAY OF TROPONIN QUANT: CPT | Mod: HCNC | Performed by: EMERGENCY MEDICINE

## 2023-12-19 PROCEDURE — 83880 ASSAY OF NATRIURETIC PEPTIDE: CPT | Mod: HCNC | Performed by: EMERGENCY MEDICINE

## 2023-12-19 PROCEDURE — 80053 COMPREHEN METABOLIC PANEL: CPT | Mod: HCNC | Performed by: EMERGENCY MEDICINE

## 2023-12-19 PROCEDURE — 85025 COMPLETE CBC W/AUTO DIFF WBC: CPT | Mod: HCNC | Performed by: EMERGENCY MEDICINE

## 2023-12-19 PROCEDURE — 93005 ELECTROCARDIOGRAM TRACING: CPT | Mod: HCNC

## 2023-12-19 PROCEDURE — 83605 ASSAY OF LACTIC ACID: CPT | Mod: HCNC | Performed by: EMERGENCY MEDICINE

## 2023-12-19 RX ADMIN — SODIUM CHLORIDE 500 ML: 0.9 INJECTION, SOLUTION INTRAVENOUS at 10:12

## 2023-12-20 ENCOUNTER — PATIENT OUTREACH (OUTPATIENT)
Dept: EMERGENCY MEDICINE | Facility: HOSPITAL | Age: 83
End: 2023-12-20

## 2023-12-20 NOTE — ED PROVIDER NOTES
"SCRIBE #1 NOTE: I, Bonnie Louis, am scribing for, and in the presence of, Merary Keen MD. I have scribed the entire note.       History     Chief Complaint   Patient presents with    Nausea     Pt refusing to eat dinner, hx dementia. Doesn't communicate well.      Review of patient's allergies indicates:   Allergen Reactions    Metoprolol Other (See Comments)     Junctional rhythm           History of Present Illness     HPI    12/19/2023, 9:06 PM  History obtained from the patient  Additional history obtained from an independent historian: patient's wife at bedside      History of Present Illness: Aquiles Yates is a 83 y.o. male patient with a PMHx of dementia, CAD, peripheral artery disease, paroxysmal atrial fibrillation, HLD, and HTN who presents to the Emergency Department via [facility] for evaluation of fatigue which onset today. The patient's wife reports that the patient has been sleeping a lot today. She woke him up for dinner and he said that he did not want to eat, which is atypical for him. She reports a Hx of MI and states that he is a "high risk heart patient". She additionally reports a low HR of 54. The patient reports no complaints at this time. He states that he is hungry and is requesting something to eat. Patient denies any fever, CP, SOB, cough, N/V/D, constipation, and all other sxs at this time. No prior Tx reported. No further complaints or concerns at this time.       Arrival mode: [facility]    PCP: Holger Thornton MD        Past Medical History:  Past Medical History:   Diagnosis Date    Acute blood loss anemia 10/14/2019    Aneurysm, abdominal aortic     BCC (basal cell carcinoma of skin) 6/29/2023    Coronary artery disease     Depression     Diabetes mellitus     HLD (hyperlipidemia)     Hypertension     Kidney stone     PAD (peripheral artery disease)     PAF (paroxysmal atrial fibrillation) 1/24/2023    Tobacco abuse        Past Surgical History:  Past Surgical " History:   Procedure Laterality Date    APPENDECTOMY      CORONARY STENT PLACEMENT      x 4    ESOPHAGOGASTRODUODENOSCOPY N/A 10/14/2019    Procedure: EGD (ESOPHAGOGASTRODUODENOSCOPY);  Surgeon: Carlos Mcneal III, MD;  Location: [facility];  Service: Endoscopy;  Laterality: N/A;    ESOPHAGOGASTRODUODENOSCOPY N/A 10/15/2019    Procedure: EGD (ESOPHAGOGASTRODUODENOSCOPY);  Surgeon: Carlos Mcneal III, MD;  Location: [facility];  Service: Endoscopy;  Laterality: N/A;    ESOPHAGOGASTRODUODENOSCOPY N/A 4/17/2023    Procedure: EGD (ESOPHAGOGASTRODUODENOSCOPY);  Surgeon: Nevaeh Mcconnell MD;  Location: [facility];  Service: Endoscopy;  Laterality: N/A;    LEFT HEART CATHETERIZATION Left 10/11/2019    Procedure: CATHETERIZATION, HEART, LEFT;  Surgeon: Robe Gilbert MD;  Location: [facility] CATH LAB;  Service: Cardiology;  Laterality: Left;    LEFT HEART CATHETERIZATION Left 10/13/2019    Procedure: CATHETERIZATION, HEART, LEFT;  Surgeon: Robe Gilbert MD;  Location: [facility] CATH LAB;  Service: Cardiology;  Laterality: Left;    leg stent Left          Family History:  Family History   Problem Relation Age of Onset    Diabetes Mother     COPD Father     Diabetes Brother        Social History:  Social History     Tobacco Use    Smoking status: Former     Types: Cigars     Passive exposure: Never    Smokeless tobacco: Current   Substance and Sexual Activity    Alcohol use: Not Currently    Drug use: Not Currently    Sexual activity: Not Currently     Partners: Female        Review of Systems     Review of Systems   Constitutional:  Positive for fatigue. Negative for fever.   HENT:  Negative for sore throat.    Respiratory:  Negative for cough and shortness of breath.    Cardiovascular:  Negative for chest pain.   Gastrointestinal:  Negative for constipation, diarrhea, nausea and vomiting.   Genitourinary:  Negative for dysuria.   Musculoskeletal:  Negative for back pain.   Skin:  Negative for rash.   Neurological:  Negative for  "weakness.   Hematological:  Does not bruise/bleed easily.   All other systems reviewed and are negative.     Physical Exam     Initial Vitals [12/19/23 1958]   BP Pulse Resp Temp SpO2   (!) 156/57 (!) 54 20 98.4 °F (36.9 °C) 97 %      MAP       --          Physical Exam  Nursing Notes and Vital Signs Reviewed.  Constitutional: Patient is in no acute distress. Well-developed and well-nourished.  Head: Atraumatic. Normocephalic.  Eyes: PERRL. EOM intact. Conjunctivae are not pale. No scleral icterus.  ENT: Mucous membranes are moist. Oropharynx is clear and symmetric.    Neck: Supple. Full ROM. No lymphadenopathy.  Cardiovascular: Bradycardic. Regular rhythm. No murmurs, rubs, or gallops. Distal pulses are 2+ and symmetric.  Pulmonary/Chest: No respiratory distress. Clear to auscultation bilaterally. No wheezing or rales.  Abdominal: Soft and non-distended.  There is no tenderness.  No rebound, guarding, or rigidity. Good bowel sounds.  Genitourinary: No CVA tenderness  Musculoskeletal: Moves all extremities. No obvious deformities. No edema. No calf tenderness.  Skin: Warm and dry.  Neurological:  Alert, awake, and appropriate.  Normal speech.  No acute focal neurological deficits are appreciated.  Psychiatric: Normal affect. Good eye contact. Appropriate in content.     ED Course   Procedures  ED Vital Signs:  Vitals:    12/19/23 1958 12/19/23 2033 12/19/23 2039 12/19/23 2133   BP: (!) 156/57 (!) 153/69  (!) 185/74   Pulse: (!) 54 (!) 52  (!) 53   Resp: 20 19  20   Temp: 98.4 °F (36.9 °C)      TempSrc: Oral      SpO2: 97% 95%  95%   Weight:   64.4 kg (142 lb)    Height: 5' 10" (1.778 m)       12/19/23 2303 12/19/23 2343   BP: (!) 150/66    Pulse: (!) 52 (!) 53   Resp: 16    Temp:     TempSrc:     SpO2:  96%   Weight:     Height:         Abnormal Lab Results:  Labs Reviewed   CBC W/ AUTO DIFFERENTIAL - Abnormal; Notable for the following components:       Result Value    RBC 4.25 (*)     Hemoglobin 12.6 (*)     " Hematocrit 37.2 (*)     Immature Granulocytes 0.6 (*)     Lymph # 0.8 (*)     Gran % 75.0 (*)     Lymph % 15.2 (*)     All other components within normal limits   COMPREHENSIVE METABOLIC PANEL - Abnormal; Notable for the following components:    Chloride 113 (*)     CO2 19 (*)     Glucose 165 (*)     BUN 29 (*)     Calcium 7.8 (*)     Total Protein 5.9 (*)     Albumin 2.7 (*)     All other components within normal limits   B-TYPE NATRIURETIC PEPTIDE - Abnormal; Notable for the following components:     (*)     All other components within normal limits   URINALYSIS, REFLEX TO URINE CULTURE - Abnormal; Notable for the following components:    Protein, UA 2+ (*)     Occult Blood UA 1+ (*)     Urobilinogen, UA 2.0-3.0 (*)     Leukocytes, UA Trace (*)     All other components within normal limits    Narrative:     Specimen Source->Urine   URINALYSIS MICROSCOPIC - Abnormal; Notable for the following components:    RBC, UA 21 (*)     Granular Casts, UA 1 (*)     All other components within normal limits    Narrative:     Specimen Source->Urine   INFLUENZA A & B BY MOLECULAR   LACTIC ACID, PLASMA   TROPONIN I   LIPASE   SARS-COV-2 RNA AMPLIFICATION, QUAL        All Lab Results:  Results for orders placed or performed during the hospital encounter of 12/19/23   Influenza A & B by Molecular    Specimen: Nasopharyngeal Swab   Result Value Ref Range    Influenza A, Molecular Negative Negative    Influenza B, Molecular Negative Negative    Flu A & B Source NP    CBC auto differential   Result Value Ref Range    WBC 5.26 3.90 - 12.70 K/uL    RBC 4.25 (L) 4.60 - 6.20 M/uL    Hemoglobin 12.6 (L) 14.0 - 18.0 g/dL    Hematocrit 37.2 (L) 40.0 - 54.0 %    MCV 88 82 - 98 fL    MCH 29.6 27.0 - 31.0 pg    MCHC 33.9 32.0 - 36.0 g/dL    RDW 14.1 11.5 - 14.5 %    Platelets 187 150 - 450 K/uL    MPV 10.6 9.2 - 12.9 fL    Immature Granulocytes 0.6 (H) 0.0 - 0.5 %    Gran # (ANC) 4.0 1.8 - 7.7 K/uL    Immature Grans (Abs) 0.03 0.00 -  0.04 K/uL    Lymph # 0.8 (L) 1.0 - 4.8 K/uL    Mono # 0.3 0.3 - 1.0 K/uL    Eos # 0.1 0.0 - 0.5 K/uL    Baso # 0.02 0.00 - 0.20 K/uL    nRBC 0 0 /100 WBC    Gran % 75.0 (H) 38.0 - 73.0 %    Lymph % 15.2 (L) 18.0 - 48.0 %    Mono % 6.5 4.0 - 15.0 %    Eosinophil % 2.3 0.0 - 8.0 %    Basophil % 0.4 0.0 - 1.9 %    Differential Method Automated    Comprehensive metabolic panel   Result Value Ref Range    Sodium 142 136 - 145 mmol/L    Potassium 3.9 3.5 - 5.1 mmol/L    Chloride 113 (H) 95 - 110 mmol/L    CO2 19 (L) 23 - 29 mmol/L    Glucose 165 (H) 70 - 110 mg/dL    BUN 29 (H) 8 - 23 mg/dL    Creatinine 0.9 0.5 - 1.4 mg/dL    Calcium 7.8 (L) 8.7 - 10.5 mg/dL    Total Protein 5.9 (L) 6.0 - 8.4 g/dL    Albumin 2.7 (L) 3.5 - 5.2 g/dL    Total Bilirubin 0.6 0.1 - 1.0 mg/dL    Alkaline Phosphatase 90 55 - 135 U/L    AST 11 10 - 40 U/L    ALT 11 10 - 44 U/L    eGFR >60 >60 mL/min/1.73 m^2    Anion Gap 10 8 - 16 mmol/L   Brain natriuretic peptide   Result Value Ref Range     (H) 0 - 99 pg/mL   Lactic acid, plasma   Result Value Ref Range    Lactate (Lactic Acid) 1.6 0.5 - 2.2 mmol/L   Troponin I   Result Value Ref Range    Troponin I 0.019 0.000 - 0.026 ng/mL   Urinalysis, Reflex to Urine Culture Urine, Clean Catch    Specimen: Urine   Result Value Ref Range    Specimen UA Urine, Clean Catch     Color, UA Yellow Yellow, Straw, Caren    Appearance, UA Clear Clear    pH, UA 6.0 5.0 - 8.0    Specific Gravity, UA 1.025 1.005 - 1.030    Protein, UA 2+ (A) Negative    Glucose, UA Negative Negative    Ketones, UA Negative Negative    Bilirubin (UA) Negative Negative    Occult Blood UA 1+ (A) Negative    Nitrite, UA Negative Negative    Urobilinogen, UA 2.0-3.0 (A) <2.0 EU/dL    Leukocytes, UA Trace (A) Negative   Lipase   Result Value Ref Range    Lipase 35 4 - 60 U/L   COVID-19 Rapid Screening   Result Value Ref Range    SARS-CoV-2 RNA, Amplification, Qual Negative Negative   Urinalysis Microscopic   Result Value Ref Range     RBC, UA 21 (H) 0 - 4 /hpf    WBC, UA 4 0 - 5 /hpf    Bacteria Rare None-Occ /hpf    Hyaline Casts, UA 1 0-1/lpf /lpf    Granular Casts, UA 1 (A) None /lpf    Microscopic Comment SEE COMMENT          Imaging Results:  Imaging Results              X-Ray Chest AP Portable (Final result)  Result time 12/19/23 21:17:32      Final result by Irwin Oglesby MD (12/19/23 21:17:32)                   Impression:      No acute abnormality.      Electronically signed by: Irwin Oglesby  Date:    12/19/2023  Time:    21:17               Narrative:    EXAMINATION:  XR CHEST AP PORTABLE    CLINICAL HISTORY:  Weakness    TECHNIQUE:  Single frontal view of the chest was performed.    COMPARISON:  None    FINDINGS:  The lungs are clear, with normal appearance of pulmonary vasculature and no pleural effusion or pneumothorax.    Cardiomegaly with tortuous aorta.  Senescent changes.    Bones are intact.                                       The EKG was ordered, reviewed, and independently interpreted by the ED provider.  Interpretation time: 20:38  Rate: 53 BPM  Rhythm: Junctional rhythm  Interpretation: Inferior infarct, age undetermined. Possible anterior infarct, age undetermined. No STEMI.           The Emergency Provider reviewed the vital signs and test results, which are outlined above.     ED Discussion     10:40 PM: Reassessed pt at this time. Discussed with the patient and his wife all pertinent ED information and results. Discussed pt dx and plan of tx. Gave the patient and his wife all f/u and return to the ED instructions. All questions and concerns were addressed at this time. The patient and his wife express understanding of information and instructions, and is comfortable with plan to discharge. Pt is stable for discharge.    I discussed with patient and/or family/caretaker that evaluation in the ED does not suggest any emergent or life threatening medical conditions requiring immediate intervention beyond what was  provided in the ED, and I believe patient is safe for discharge.  Regardless, an unremarkable evaluation in the ED does not preclude the development or presence of a serious of life threatening condition. As such, patient was instructed to return immediately for any worsening or change in current symptoms.         Medical Decision Making  Amount and/or Complexity of Data Reviewed  Independent Historian: spouse  Labs: ordered. Decision-making details documented in ED Course.  Radiology: ordered. Decision-making details documented in ED Course.  ECG/medicine tests: ordered and independent interpretation performed. Decision-making details documented in ED Course.                ED Medication(s):  Medications   sodium chloride 0.9% bolus 500 mL 500 mL (0 mLs Intravenous Stopped 12/19/23 4072)       Discharge Medication List as of 12/19/2023 10:40 PM           Follow-up Information       Holger Thornton MD In 2 days.    Specialty: Internal Medicine  Contact information:  [address] [facility] [id number] [phone]               Critical access hospital - Emergency Dept..    Specialty: Emergency Medicine  Why: As needed, If symptoms worsen  Contact information:  [address] [facility] [address] [phone]                               Scribe Attestation:   Scribe #1: I performed the above scribed service and the documentation accurately describes the services I performed. I attest to the accuracy of the note.     Attending:   Physician Attestation Statement for Scribe #1: I, Merary Keen MD, personally performed the services described in this documentation, as scribed by Bonnie Louis, in my presence, and it is both accurate and complete.           Clinical Impression       ICD-10-CM ICD-9-CM   1. Bradycardia  R00.1 427.89   2. Weakness  R53.1 780.79       Disposition:   Disposition: Discharged  Condition: Stable         Merary Keen MD  12/20/23 2039

## 2023-12-27 ENCOUNTER — HOSPITAL ENCOUNTER (OUTPATIENT)
Dept: RADIOLOGY | Facility: HOSPITAL | Age: 83
Discharge: HOME | End: 2023-12-27
Attending: PSYCHIATRY & NEUROLOGY
Payer: MEDICARE

## 2023-12-27 DIAGNOSIS — I72.9 ANEURYSM: ICD-10-CM

## 2023-12-27 PROCEDURE — 70498 CT ANGIOGRAPHY NECK: CPT | Mod: 26,HCNC,, | Performed by: RADIOLOGY

## 2023-12-27 PROCEDURE — 70496 CT ANGIOGRAPHY HEAD: CPT | Mod: TC,HCNC

## 2023-12-27 PROCEDURE — 70496 CT ANGIOGRAPHY HEAD: CPT | Mod: 26,HCNC,, | Performed by: RADIOLOGY

## 2023-12-27 PROCEDURE — 25500020 PHARM REV CODE 255: Mod: HCNC | Performed by: PSYCHIATRY & NEUROLOGY

## 2023-12-27 PROCEDURE — 70498 CTA HEAD AND NECK (XPD): ICD-10-PCS | Mod: 26,HCNC,, | Performed by: RADIOLOGY

## 2023-12-27 PROCEDURE — 70496 CTA HEAD AND NECK (XPD): ICD-10-PCS | Mod: 26,HCNC,, | Performed by: RADIOLOGY

## 2023-12-27 RX ADMIN — IOHEXOL 100 ML: 350 INJECTION, SOLUTION INTRAVENOUS at 04:12

## 2024-01-08 NOTE — TELEPHONE ENCOUNTER
No care due was identified.  [facility] Embedded Care Due Messages. Reference number: [id number].   1/08/2024 12:13:08 AM CST

## 2024-01-09 RX ORDER — METFORMIN HYDROCHLORIDE 500 MG/1
500 TABLET ORAL
Qty: 90 TABLET | Refills: 3 | Status: SHIPPED | OUTPATIENT
Start: 2024-01-09

## 2024-01-11 ENCOUNTER — OFFICE VISIT (OUTPATIENT)
Dept: DERMATOLOGY | Facility: CLINIC | Age: 84
End: 2024-01-11
Payer: MEDICARE

## 2024-01-11 DIAGNOSIS — L30.4 INTERTRIGO: Primary | ICD-10-CM

## 2024-01-11 PROCEDURE — 99214 OFFICE O/P EST MOD 30 MIN: CPT | Mod: HCNC,S$GLB,, | Performed by: STUDENT IN AN ORGANIZED HEALTH CARE EDUCATION/TRAINING PROGRAM

## 2024-01-11 PROCEDURE — 99999 PR PBB SHADOW E&M-EST. PATIENT-LVL II: CPT | Mod: PBBFAC,HCNC,, | Performed by: STUDENT IN AN ORGANIZED HEALTH CARE EDUCATION/TRAINING PROGRAM

## 2024-01-11 PROCEDURE — 1160F RVW MEDS BY RX/DR IN RCRD: CPT | Mod: HCNC,CPTII,S$GLB, | Performed by: STUDENT IN AN ORGANIZED HEALTH CARE EDUCATION/TRAINING PROGRAM

## 2024-01-11 PROCEDURE — 1159F MED LIST DOCD IN RCRD: CPT | Mod: HCNC,CPTII,S$GLB, | Performed by: STUDENT IN AN ORGANIZED HEALTH CARE EDUCATION/TRAINING PROGRAM

## 2024-01-11 PROCEDURE — 1101F PT FALLS ASSESS-DOCD LE1/YR: CPT | Mod: HCNC,CPTII,S$GLB, | Performed by: STUDENT IN AN ORGANIZED HEALTH CARE EDUCATION/TRAINING PROGRAM

## 2024-01-11 PROCEDURE — 3288F FALL RISK ASSESSMENT DOCD: CPT | Mod: HCNC,CPTII,S$GLB, | Performed by: STUDENT IN AN ORGANIZED HEALTH CARE EDUCATION/TRAINING PROGRAM

## 2024-01-11 PROCEDURE — 1126F AMNT PAIN NOTED NONE PRSNT: CPT | Mod: HCNC,CPTII,S$GLB, | Performed by: STUDENT IN AN ORGANIZED HEALTH CARE EDUCATION/TRAINING PROGRAM

## 2024-01-11 RX ORDER — TRIAMCINOLONE ACETONIDE 0.25 MG/G
CREAM TOPICAL
Qty: 80 G | Refills: 1 | Status: SHIPPED | OUTPATIENT
Start: 2024-01-11

## 2024-01-11 RX ORDER — KETOCONAZOLE 20 MG/G
CREAM TOPICAL 2 TIMES DAILY
Qty: 60 G | Refills: 1 | Status: SHIPPED | OUTPATIENT
Start: 2024-01-11

## 2024-01-11 NOTE — PROGRESS NOTES
Subjective:       Patient ID:  Aquiles Yates is a 83 y.o. male who presents for   Chief Complaint   Patient presents with    Rash     Groin area (creases)     History of Present Illness: The patient presents with chief complaint of persistent rash.  Location: groin, mostly on the scrotum and inner thigh on the right side  Duration: ongoing for several weeks  Signs/Symptoms: Caregiver reports that the area is very red, with skin breakdown and appears to be very bothersome to the patient with burning  Prior treatments: they have tried baby powder and neosporin with no improvement.       Rash        Review of Systems   Constitutional:  Negative for fever and chills.   Skin:  Positive for rash.        Objective:    Physical Exam   Constitutional: He appears well-developed and well-nourished. No distress.   Neurological: He is alert and oriented to person, place, and time. He is not disoriented.   Psychiatric: He has a normal mood and affect.   Skin:   Areas Examined (abnormalities noted in diagram):   Head / Face Inspection Performed  Neck Inspection Performed  Genitals / Buttocks / Groin Inspection Performed              Diagram Legend     Erythematous scaling macule/papule c/w actinic keratosis       Vascular papule c/w angioma      Pigmented verrucoid papule/plaque c/w seborrheic keratosis      Yellow umbilicated papule c/w sebaceous hyperplasia      Irregularly shaped tan macule c/w lentigo     1-2 mm smooth white papules consistent with Milia      Movable subcutaneous cyst with punctum c/w epidermal inclusion cyst      Subcutaneous movable cyst c/w pilar cyst      Firm pink to brown papule c/w dermatofibroma      Pedunculated fleshy papule(s) c/w skin tag(s)      Evenly pigmented macule c/w junctional nevus     Mildly variegated pigmented, slightly irregular-bordered macule c/w mildly atypical nevus      Flesh colored to evenly pigmented papule c/w intradermal nevus       Pink pearly papule/plaque c/w  basal cell carcinoma      Erythematous hyperkeratotic cursted plaque c/w SCC      Surgical scar with no sign of skin cancer recurrence      Open and closed comedones      Inflammatory papules and pustules      Verrucoid papule consistent consistent with wart     Erythematous eczematous patches and plaques     Dystrophic onycholytic nail with subungual debris c/w onychomycosis     Umbilicated papule    Erythematous-base heme-crusted tan verrucoid plaque consistent with inflamed seborrheic keratosis     Erythematous Silvery Scaling Plaque c/w Psoriasis     See annotation      Assessment / Plan:        Intertrigo  -     ketoconazole (NIZORAL) 2 % cream; Apply topically 2 (two) times daily.  Dispense: 60 g; Refill: 1  -     triamcinolone acetonide 0.025% (KENALOG) 0.025 % cream; AAA bid  Dispense: 80 g; Refill: 1  -     Start zeasorb AF powder to help with prevention.            Follow up in about 6 weeks (around 2/22/2024).

## 2024-01-26 DIAGNOSIS — I10 ESSENTIAL HYPERTENSION: ICD-10-CM

## 2024-01-26 RX ORDER — AMLODIPINE BESYLATE 5 MG/1
TABLET ORAL
Qty: 90 TABLET | Refills: 3 | OUTPATIENT
Start: 2024-01-26 | End: 2024-05-22

## 2024-02-07 RX ORDER — APIXABAN 2.5 MG/1
TABLET, FILM COATED ORAL
Qty: 60 TABLET | Refills: 6 | Status: SHIPPED | OUTPATIENT
Start: 2024-02-07

## 2024-02-19 ENCOUNTER — TELEPHONE (OUTPATIENT)
Dept: NEUROLOGY | Facility: CLINIC | Age: 84
End: 2024-02-19
Payer: MEDICARE

## 2024-02-19 NOTE — TELEPHONE ENCOUNTER
Left patient a voicemail on trying to reschedule appointment on 02/23 due to Dr. Birmingham being out.

## 2024-03-08 DIAGNOSIS — E78.5 DYSLIPIDEMIA: ICD-10-CM

## 2024-03-08 RX ORDER — ATORVASTATIN CALCIUM 40 MG/1
40 TABLET, FILM COATED ORAL
Qty: 90 TABLET | Refills: 0 | Status: SHIPPED | OUTPATIENT
Start: 2024-03-08 | End: 2024-06-05

## 2024-03-08 NOTE — TELEPHONE ENCOUNTER
Care Due:                  Date            Visit Type   Department     Provider  --------------------------------------------------------------------------------                                EP -                              PRIMARY      [facility]  Last Visit: 08-      CARE ([facility])   MEDICINE       Holger Thornton  Next Visit: None Scheduled  None         None Found                                                            Last  Test          Frequency    Reason                     Performed    Due Date  --------------------------------------------------------------------------------    HBA1C.......  6 months...  metFORMIN................  08- 01-    Lipid Panel.  12 months..  atorvastatin.............  11-   10-    [employer] Embedded Care Due Messages. Reference number: [id number].   3/08/2024 12:18:36 AM CST

## 2024-03-21 ENCOUNTER — OFFICE VISIT (OUTPATIENT)
Dept: DERMATOLOGY | Facility: CLINIC | Age: 84
End: 2024-03-21
Payer: MEDICARE

## 2024-03-21 DIAGNOSIS — L82.1 SEBORRHEIC KERATOSES: ICD-10-CM

## 2024-03-21 DIAGNOSIS — L30.4 INTERTRIGO: Primary | ICD-10-CM

## 2024-03-21 PROCEDURE — 1101F PT FALLS ASSESS-DOCD LE1/YR: CPT | Mod: HCNC,CPTII,S$GLB, | Performed by: STUDENT IN AN ORGANIZED HEALTH CARE EDUCATION/TRAINING PROGRAM

## 2024-03-21 PROCEDURE — G2211 COMPLEX E/M VISIT ADD ON: HCPCS | Mod: HCNC,S$GLB,, | Performed by: STUDENT IN AN ORGANIZED HEALTH CARE EDUCATION/TRAINING PROGRAM

## 2024-03-21 PROCEDURE — 1159F MED LIST DOCD IN RCRD: CPT | Mod: HCNC,CPTII,S$GLB, | Performed by: STUDENT IN AN ORGANIZED HEALTH CARE EDUCATION/TRAINING PROGRAM

## 2024-03-21 PROCEDURE — 99213 OFFICE O/P EST LOW 20 MIN: CPT | Mod: HCNC,S$GLB,, | Performed by: STUDENT IN AN ORGANIZED HEALTH CARE EDUCATION/TRAINING PROGRAM

## 2024-03-21 PROCEDURE — 3288F FALL RISK ASSESSMENT DOCD: CPT | Mod: HCNC,CPTII,S$GLB, | Performed by: STUDENT IN AN ORGANIZED HEALTH CARE EDUCATION/TRAINING PROGRAM

## 2024-03-21 PROCEDURE — 99999 PR PBB SHADOW E&M-EST. PATIENT-LVL II: CPT | Mod: PBBFAC,HCNC,, | Performed by: STUDENT IN AN ORGANIZED HEALTH CARE EDUCATION/TRAINING PROGRAM

## 2024-03-21 PROCEDURE — 1160F RVW MEDS BY RX/DR IN RCRD: CPT | Mod: HCNC,CPTII,S$GLB, | Performed by: STUDENT IN AN ORGANIZED HEALTH CARE EDUCATION/TRAINING PROGRAM

## 2024-03-21 PROCEDURE — 1126F AMNT PAIN NOTED NONE PRSNT: CPT | Mod: HCNC,CPTII,S$GLB, | Performed by: STUDENT IN AN ORGANIZED HEALTH CARE EDUCATION/TRAINING PROGRAM

## 2024-03-21 NOTE — PROGRESS NOTES
Subjective:       Patient ID:  Aquiles Yates is a 83 y.o. male who presents for   Chief Complaint   Patient presents with    Rash     Rash is gone     History of Present Illness: The patient presents for follow up of intertrigo in the groin, last seen on ___ where he was started on ketoconazole cream and TAC cream. Family reports much improvement in symptoms with clear skin. Did start to get some irritation from using the medications too long, but they have since stopped, and everythign has completely cleared. No further redness, pain, itching or other symptoms. Does have some scattered darkened colored lesions.       Rash        Review of Systems   Constitutional:  Negative for fever and chills.   Skin:  Negative for itching, rash and dry skin.        Objective:    Physical Exam   Constitutional: He appears well-developed and well-nourished. No distress.   Neurological: He is alert and oriented to person, place, and time. He is not disoriented.   Psychiatric: He has a normal mood and affect.   Skin:   Areas Examined (abnormalities noted in diagram):   Head / Face Inspection Performed  Neck Inspection Performed  Genitals / Buttocks / Groin Inspection Performed                   Diagram Legend     Erythematous scaling macule/papule c/w actinic keratosis       Vascular papule c/w angioma      Pigmented verrucoid papule/plaque c/w seborrheic keratosis      Yellow umbilicated papule c/w sebaceous hyperplasia      Irregularly shaped tan macule c/w lentigo     1-2 mm smooth white papules consistent with Milia      Movable subcutaneous cyst with punctum c/w epidermal inclusion cyst      Subcutaneous movable cyst c/w pilar cyst      Firm pink to brown papule c/w dermatofibroma      Pedunculated fleshy papule(s) c/w skin tag(s)      Evenly pigmented macule c/w junctional nevus     Mildly variegated pigmented, slightly irregular-bordered macule c/w mildly atypical nevus      Flesh colored to evenly pigmented  papule c/w intradermal nevus       Pink pearly papule/plaque c/w basal cell carcinoma      Erythematous hyperkeratotic cursted plaque c/w SCC      Surgical scar with no sign of skin cancer recurrence      Open and closed comedones      Inflammatory papules and pustules      Verrucoid papule consistent consistent with wart     Erythematous eczematous patches and plaques     Dystrophic onycholytic nail with subungual debris c/w onychomycosis     Umbilicated papule    Erythematous-base heme-crusted tan verrucoid plaque consistent with inflamed seborrheic keratosis     Erythematous Silvery Scaling Plaque c/w Psoriasis     See annotation      Assessment / Plan:        Intertrigo - much improvement with clear skin and no recent flares. Continue ketoconazole and TAC as needed.     Seborrheic keratoses  These are benign inherited growths without a malignant potential. Reassurance given to patient. No treatment is necessary.              Follow up in about 1 year (around 3/21/2025).

## 2024-03-27 ENCOUNTER — OFFICE VISIT (OUTPATIENT)
Dept: FAMILY MEDICINE | Facility: CLINIC | Age: 84
End: 2024-03-27
Payer: MEDICARE

## 2024-03-27 VITALS
HEART RATE: 58 BPM | HEIGHT: 70 IN | WEIGHT: 147.25 LBS | BODY MASS INDEX: 21.08 KG/M2 | OXYGEN SATURATION: 97 % | TEMPERATURE: 97 F | RESPIRATION RATE: 18 BRPM | DIASTOLIC BLOOD PRESSURE: 44 MMHG | SYSTOLIC BLOOD PRESSURE: 108 MMHG

## 2024-03-27 DIAGNOSIS — Z86.79 S/P AAA REPAIR: ICD-10-CM

## 2024-03-27 DIAGNOSIS — I73.9 PAD (PERIPHERAL ARTERY DISEASE): Chronic | ICD-10-CM

## 2024-03-27 DIAGNOSIS — E53.8 B12 DEFICIENCY: ICD-10-CM

## 2024-03-27 DIAGNOSIS — I10 ESSENTIAL HYPERTENSION: Chronic | ICD-10-CM

## 2024-03-27 DIAGNOSIS — E78.5 DYSLIPIDEMIA: Chronic | ICD-10-CM

## 2024-03-27 DIAGNOSIS — R26.9 ABNORMALITY OF GAIT AND MOBILITY: ICD-10-CM

## 2024-03-27 DIAGNOSIS — Z00.00 ENCOUNTER FOR PREVENTIVE HEALTH EXAMINATION: Primary | ICD-10-CM

## 2024-03-27 DIAGNOSIS — I11.0 HYPERTENSIVE HEART DISEASE WITH HEART FAILURE: ICD-10-CM

## 2024-03-27 DIAGNOSIS — F03.90 DEMENTIA, UNSPECIFIED DEMENTIA SEVERITY, UNSPECIFIED DEMENTIA TYPE, UNSPECIFIED WHETHER BEHAVIORAL, PSYCHOTIC, OR MOOD DISTURBANCE OR ANXIETY: ICD-10-CM

## 2024-03-27 DIAGNOSIS — E11.69 TYPE 2 DIABETES MELLITUS WITH OTHER SPECIFIED COMPLICATION, UNSPECIFIED WHETHER LONG TERM INSULIN USE: ICD-10-CM

## 2024-03-27 DIAGNOSIS — I48.0 PAF (PAROXYSMAL ATRIAL FIBRILLATION): ICD-10-CM

## 2024-03-27 DIAGNOSIS — I25.118 CORONARY ARTERY DISEASE OF NATIVE ARTERY OF NATIVE HEART WITH STABLE ANGINA PECTORIS: ICD-10-CM

## 2024-03-27 DIAGNOSIS — F33.9 EPISODE OF RECURRENT MAJOR DEPRESSIVE DISORDER, UNSPECIFIED DEPRESSION EPISODE SEVERITY: ICD-10-CM

## 2024-03-27 DIAGNOSIS — J43.2 CENTRILOBULAR EMPHYSEMA: ICD-10-CM

## 2024-03-27 DIAGNOSIS — Z98.890 S/P AAA REPAIR: ICD-10-CM

## 2024-03-27 DIAGNOSIS — I20.9 AP (ANGINA PECTORIS): ICD-10-CM

## 2024-03-27 PROCEDURE — 1170F FXNL STATUS ASSESSED: CPT | Mod: HCNC,CPTII,S$GLB, | Performed by: NURSE PRACTITIONER

## 2024-03-27 PROCEDURE — 1159F MED LIST DOCD IN RCRD: CPT | Mod: HCNC,CPTII,S$GLB, | Performed by: NURSE PRACTITIONER

## 2024-03-27 PROCEDURE — 3288F FALL RISK ASSESSMENT DOCD: CPT | Mod: HCNC,CPTII,S$GLB, | Performed by: NURSE PRACTITIONER

## 2024-03-27 PROCEDURE — 99999 PR PBB SHADOW E&M-EST. PATIENT-LVL IV: CPT | Mod: PBBFAC,HCNC,, | Performed by: NURSE PRACTITIONER

## 2024-03-27 PROCEDURE — 3078F DIAST BP <80 MM HG: CPT | Mod: HCNC,CPTII,S$GLB, | Performed by: NURSE PRACTITIONER

## 2024-03-27 PROCEDURE — 3074F SYST BP LT 130 MM HG: CPT | Mod: HCNC,CPTII,S$GLB, | Performed by: NURSE PRACTITIONER

## 2024-03-27 PROCEDURE — G0439 PPPS, SUBSEQ VISIT: HCPCS | Mod: HCNC,S$GLB,, | Performed by: NURSE PRACTITIONER

## 2024-03-27 PROCEDURE — 1101F PT FALLS ASSESS-DOCD LE1/YR: CPT | Mod: HCNC,CPTII,S$GLB, | Performed by: NURSE PRACTITIONER

## 2024-03-27 PROCEDURE — 1160F RVW MEDS BY RX/DR IN RCRD: CPT | Mod: HCNC,CPTII,S$GLB, | Performed by: NURSE PRACTITIONER

## 2024-03-27 NOTE — PROGRESS NOTES
"  Aquiles Yates presented for a  Medicare AWV and comprehensive Health Risk Assessment today. The following components were reviewed and updated:    Medical history  Family History  Social history  Allergies and Current Medications  Health Risk Assessment  Health Maintenance  Care Team         ** See Completed Assessments for Annual Wellness Visit within the encounter summary.**         The following assessments were completed:  Living Situation  CAGE  Depression Screening  Timed Get Up and Go  Whisper Test  Cognitive Function Screening  Nutrition Screening  ADL Screening  PAQ Screening      Opioid documentation:      Patient {does/does not have a current opioid prescription:88791}     Vitals:    03/27/24 1105   BP: (!) 108/44   Pulse: (!) 58   Resp: 18   Temp: 97.2 °F (36.2 °C)   SpO2: 97%   Weight: 66.8 kg (147 lb 4.3 oz)   Height: 5' 10" (1.778 m)     Body mass index is 21.13 kg/m².  Physical Exam          Diagnoses and health risks identified today and associated recommendations/orders:    1. Encounter for preventive health examination  ***    2. Dementia, unspecified dementia severity, unspecified dementia type, unspecified whether behavioral, psychotic, or mood disturbance or anxiety  ***    3. Coronary artery disease of native artery of native heart with stable angina pectoris  ***    4. AP (angina pectoris)  ***    5. Essential hypertension  ***    6. PAF (paroxysmal atrial fibrillation)  ***    7. Hypertensive heart disease with heart failure  ***    8. Type 2 diabetes mellitus with other specified complication, unspecified whether long term insulin use  ***    9. Centrilobular emphysema  ***    10. Episode of recurrent major depressive disorder, unspecified depression episode severity  ***    11. Dyslipidemia  ***    12. S/P AAA repair  ***    13. PAD (peripheral artery disease)  ***    14. B12 deficiency  ***    15. Abnormality of gait and mobility  ***      Provided Aquiles with a 5-10 year " written screening schedule and personal prevention plan. Recommendations were developed using the USPSTF age appropriate recommendations. Education, counseling, and referrals were provided as needed. After Visit Summary printed and given to patient which includes a list of additional screenings\tests needed.    Follow up in about 1 year (around 3/27/2025) for Follow up with PCP.    Graciela Gates NP

## 2024-03-27 NOTE — PATIENT INSTRUCTIONS
Counseling and Referral of Other Preventative  (Italic type indicates deductible and co-insurance are waived)    Patient Name: Aquiles Yates  Today's Date: 3/27/2024    Health Maintenance       Date Due Completion Date    Diabetes Urine Screening Never done ---    TETANUS VACCINE Never done ---    Shingles Vaccine (1 of 2) Never done ---    RSV Vaccine (Age 60+ and Pregnant patients) (1 - 1-dose 60+ series) Never done ---    Influenza Vaccine (1) 09/01/2023 3/13/2023 (Declined)    Override on 3/13/2023: Declined (Declined for season)    COVID-19 Vaccine (4 - 2023-24 season) 09/01/2023 12/3/2021    Lipid Panel 11/04/2023 11/4/2022    Hemoglobin A1c 02/01/2024 8/1/2023    Eye Exam 02/07/2024 2/7/2023        No orders of the defined types were placed in this encounter.      The following information is provided to all patients.  This information is to help you find resources for any of the problems found today that may be affecting your health:                  Living healthy guide: www.Count includes the [facility].[place].gov      Understanding Diabetes: www.diabetes.org      Eating healthy: www.cdc.gov/healthyweight      CDC home safety checklist: www.cdc.gov/steadi/patient.html      Agency on Aging: www.goea.[place].[facility]      Alcoholics anonymous (AA): www.aa.org      Physical Activity: www.nancie.nih.gov/mc8nrtw      Tobacco use: www.quitwithusla.org

## 2024-03-27 NOTE — PROGRESS NOTES
"  Aquiles Yates presented for a follow-up Medicare AWV today. The following components were reviewed and updated:  Patient accompanied by  WIFE , who was present throughout the visit and aided in information gathering.        Medical history  Family History  Social history  Allergies and Current Medications  Health Risk Assessment  Health Maintenance  Care Team    **See Completed Assessments for Annual Wellness visit with in the encounter summary    The following assessments were completed:  Depression Screening  Cognitive function Screening  Timed Get Up Test  Whisper Test      Opioid documentation:      Patient does not have a current opioid prescription.          Vitals:    03/27/24 1105   BP: (!) 108/44   Pulse: (!) 58   Resp: 18   Temp: 97.2 °F (36.2 °C)   SpO2: 97%   Weight: 66.8 kg (147 lb 4.3 oz)   Height: 5' 10" (1.778 m)     Body mass index is 21.13 kg/m².       Physical Exam  Constitutional:       General: He is awake.      Appearance: He is underweight.      Comments: But keeps eyes closed   HENT:      Head: Normocephalic.   Cardiovascular:      Rate and Rhythm: Normal rate. Rhythm irregular.   Pulmonary:      Effort: Pulmonary effort is normal.      Breath sounds: Normal breath sounds.   Neurological:      Mental Status: He is alert. Mental status is at baseline.      Motor: Weakness present.      Gait: Gait abnormal.   Psychiatric:         Mood and Affect: Mood normal. Affect is flat.         Speech: Speech is delayed.         Behavior: Behavior is slowed.         Cognition and Memory: Memory is impaired.       Current Outpatient Medications   Medication Instructions    alcohol swabs (ALCOHOL PREP PADS) PadM Twice a day    amLODIPine (NORVASC) 5 MG tablet TAKE 1 TABLET BY MOUTH EVERY DAY    aspirin (ECOTRIN) 81 mg, Oral, Daily    atorvastatin (LIPITOR) 40 mg, Oral    blood sugar diagnostic Strp Test blood sugars twice a day    ELIQUIS 2.5 mg Tab TAKE 1 TABLET BY MOUTH TWICE A DAY    isosorbide " mononitrate (IMDUR) 120 mg, Oral    ketoconazole (NIZORAL) 2 % cream Topical (Top), 2 times daily    lancets (ACCU-CHEK SOFTCLIX LANCETS) Misc Test blood sugars twice a day    memantine (NAMENDA) 10 mg, Oral, 2 times daily    metFORMIN (GLUCOPHAGE) 500 mg, Oral    MYRBETRIQ 50 mg Tb24 TAKE 1 TABLET BY MOUTH EVERY DAY    pantoprazole (PROTONIX) 40 mg, Oral, 2 times daily    triamcinolone acetonide 0.025% (KENALOG) 0.025 % cream AAA bid           Diagnoses and health risks identified today and associated recommendations/orders:  1. Encounter for preventive health examination    2. Dementia, unspecified dementia severity, unspecified dementia type, unspecified whether behavioral, psychotic, or mood disturbance or anxiety  Chronic and Stable on Namenda. Continue current treatment plan as previously prescribed with your PCP    3. Coronary artery disease of native artery of native heart with stable angina pectoris  Chronic and Stable on Norvasc and Imdur   Continue current treatment plan as previously prescribed with your card m    4. AP (angina pectoris)  Chronic and Stable on Imdur. Denies CP    Continue current treatment plan as previously prescribed with your card    5. Essential hypertension  Chronic and Stable Norvasc and Imdur.   Continue current treatment plan as previously prescribed with your PCP    6. PAF (paroxysmal atrial fibrillation)  Chronic and Stable on Elquis   . Continue current treatment plan as previously prescribed with your card    7. Hypertensive heart disease with heart failure  Chronic and Stable on Norvasc and Imdur  Continue current treatment plan as previously prescribed with your card md    8. Type 2 diabetes mellitus with other specified complication, unspecified whether long term insulin use  Chronic and Stable on Metformin and dietContinue current treatment plan as previously prescribed with your PCP    9. Centrilobular emphysema  STABLE- NO MEDS , NO FLARE UP . FOLLOWED BY PCP    10.  Episode of recurrent major depressive disorder, unspecified depression episode severity  Chronic and Stable on Maybe. Continue current treatment plan as previously prescribed with your PCP    11. Dyslipidemia  Chronic and Stable on Lipitor  and diet diet. Continue current treatment plan as previously prescribed with your PCP    12. S/P AAA repair  Chronic and Stable. Followed by card    13. PAD (peripheral artery disease)  Chronic and Stable on Lipitor. Continue current treatment plan as previously prescribed with your card    14. B 12 deficiency  Chronic and Stable on B12. Continue current treatment plan as previously prescribed with your PCP    15. Abnormality of gait and mobility  Chronic and Stable with walker. Continue current treatment plan as previously prescribed with your PCP  Povided Aquiels with a 5-10 year written screening schedule and personal prevention plan. Recommendations were developed using the USPSTF age appropriate recommendations. Education, counseling, and referrals were provided as needed.  After Visit Summary printed and given to patient which includes a list of additional screenings\tests needed.    Follow up in about 1 year (around 3/27/2025) for Follow up with PCP.      Graciela Gates NP

## 2024-03-29 NOTE — PROGRESS NOTES
"Subjective:       Patient ID: Aquiles Yates is a 83 y.o. male.    Chief Complaint: No chief complaint on file.    HPI    The patient presented on 12/ 2023 for evaluation of Memory difficulties.  New issues: none.  Stable according to caregiver - one time got up " that wanted to go to work " /  Sometime " irritable " / confusing night and morning.   Good appetite / sleeping about 8 to 10 hours.   No visual hallucinations.     Review of Systems   Neurological:         Memory difficulties.    All other systems reviewed and are negative.            Current Outpatient Medications:     alcohol swabs (ALCOHOL PREP PADS) PadM, Twice a day, Disp: 400 each, Rfl: 3    amLODIPine (NORVASC) 5 MG tablet, TAKE 1 TABLET BY MOUTH EVERY DAY, Disp: 90 tablet, Rfl: 3    aspirin (ECOTRIN) 81 MG EC tablet, Take 1 tablet (81 mg total) by mouth once daily., Disp: , Rfl: 0    atorvastatin (LIPITOR) 40 MG tablet, TAKE 1 TABLET BY MOUTH EVERY DAY, Disp: 90 tablet, Rfl: 0    blood sugar diagnostic Strp, Test blood sugars twice a day, Disp: 200 strip, Rfl: 6    ELIQUIS 2.5 mg Tab, TAKE 1 TABLET BY MOUTH TWICE A DAY, Disp: 60 tablet, Rfl: 6    isosorbide mononitrate (IMDUR) 120 MG 24 hr tablet, TAKE 1 TABLET BY MOUTH EVERY DAY, Disp: 90 tablet, Rfl: 3    ketoconazole (NIZORAL) 2 % cream, Apply topically 2 (two) times daily., Disp: 60 g, Rfl: 1    lancets (ACCU-CHEK SOFTCLIX LANCETS) Misc, Test blood sugars twice a day, Disp: 200 each, Rfl: 6    memantine (NAMENDA) 10 MG Tab, Take 1 tablet (10 mg total) by mouth 2 (two) times daily., Disp: 60 tablet, Rfl: 3    metFORMIN (GLUCOPHAGE) 500 MG tablet, TAKE 1 TABLET BY MOUTH EVERY DAY WITH BREAKFAST, Disp: 90 tablet, Rfl: 3    MYRBETRIQ 50 mg Tb24, TAKE 1 TABLET BY MOUTH EVERY DAY, Disp: 90 tablet, Rfl: 3    pantoprazole (PROTONIX) 40 MG tablet, Take 1 tablet (40 mg total) by mouth 2 (two) times daily., Disp: 60 tablet, Rfl: 11    triamcinolone acetonide 0.025% (KENALOG) 0.025 % cream, AAA " bid, Disp: 80 g, Rfl: 1    Past Medical History:   Diagnosis Date    Acute blood loss anemia 10/14/2019    Aneurysm, abdominal aortic     BCC (basal cell carcinoma of skin) 6/29/2023    Coronary artery disease     Depression     Diabetes mellitus     HLD (hyperlipidemia)     Hypertension     Kidney stone     PAD (peripheral artery disease)     PAF (paroxysmal atrial fibrillation) 1/24/2023    Tobacco abuse        Past Surgical History:   Procedure Laterality Date    APPENDECTOMY      CORONARY STENT PLACEMENT      x 4    ESOPHAGOGASTRODUODENOSCOPY N/A 10/14/2019    Procedure: EGD (ESOPHAGOGASTRODUODENOSCOPY);  Surgeon: Carlos Mcneal III, MD;  Location: [facility];  Service: Endoscopy;  Laterality: N/A;    ESOPHAGOGASTRODUODENOSCOPY N/A 10/15/2019    Procedure: EGD (ESOPHAGOGASTRODUODENOSCOPY);  Surgeon: Carlos Mcneal III, MD;  Location: [facility];  Service: Endoscopy;  Laterality: N/A;    ESOPHAGOGASTRODUODENOSCOPY N/A 4/17/2023    Procedure: EGD (ESOPHAGOGASTRODUODENOSCOPY);  Surgeon: Nevaeh Mcconnell MD;  Location: [facility];  Service: Endoscopy;  Laterality: N/A;    LEFT HEART CATHETERIZATION Left 10/11/2019    Procedure: CATHETERIZATION, HEART, LEFT;  Surgeon: Robe Gilbert MD;  Location: [facility] CATH LAB;  Service: Cardiology;  Laterality: Left;    LEFT HEART CATHETERIZATION Left 10/13/2019    Procedure: CATHETERIZATION, HEART, LEFT;  Surgeon: Robe Gilbert MD;  Location: [facility] CATH LAB;  Service: Cardiology;  Laterality: Left;    leg stent Left        Social History     Socioeconomic History    Marital status:    Tobacco Use    Smoking status: Former     Types: Cigars     Passive exposure: Never    Smokeless tobacco: Current   Substance and Sexual Activity    Alcohol use: Not Currently    Drug use: Not Currently    Sexual activity: Not Currently     Partners: Female     Social Determinants of Health     Financial Resource Strain: Low Risk  (3/27/2024)    Overall Financial Resource Strain (CARDIA)      Difficulty of Paying Living Expenses: Not hard at all   Food Insecurity: Unknown (3/27/2024)    Hunger Vital Sign     Worried About Running Out of Food in the Last Year: Never true   Transportation Needs: No Transportation Needs (3/27/2024)    PRAPARE - Transportation     Lack of Transportation (Medical): No     Lack of Transportation (Non-Medical): No   Physical Activity: Inactive (3/27/2024)    Exercise Vital Sign     Days of Exercise per Week: 0 days     Minutes of Exercise per Session: 0 min   Stress: No Stress Concern Present (3/27/2024)    [place] [employer] - Occupational Stress Questionnaire     Feeling of Stress : Only a little   Social Connections: Socially Isolated (3/27/2024)    Social Connection and Isolation Panel [NHANES]     Frequency of Communication with Friends and Family: Once a week     Frequency of Social Gatherings with Friends and Family: Once a week     Attends [employer]ism Services: Never     Active Member of Clubs or Organizations: No     Attends Club or Organization Meetings: Never     Marital Status:    Housing Stability: Unknown (3/27/2024)    Housing Stability Vital Sign     Unable to Pay for Housing in the Last Year: No     Unstable Housing in the Last Year: No     Past/Current Medical/Surgical History, Past/Current Social History, Past/Current Family History and Past/Current Medications were reviewed in detail.    Objective:     VITAL SIGNS WERE REVIEWED      GENERAL APPEARANCE:     The patient looks comfortable.    BMI    No signs of respiratory distress.    Normal breathing pattern.    No dysmorphic features    Normal eye contact.       GENERAL MEDICAL EXAM:    HEENT:  Head is atraumatic normocephalic.     FUNDOSCOPIC (OPHTHALMOSCOPIC) EXAMINATION showed no disc edema (papilledema).      NECK: No JVD. No visible lesions or goiters.     CHEST-CARDIOPULMONARY: No cyanosis. No tachypnea. Normal respiratory effort.    [id number]-[id number]:  No jaundice. No stomas or lesions. No visible hernias. No catheters.     SKIN, HAIR, NAILS: No pathognomonic skin rash.No neurofibromatosis. No visible lesions.No stigmata of autoimmune disease. No clubbing.    LIMBS: No varicose veins. No visible swelling.    MUSCULOSKELETAL: No visible deformities.No visible lesions.     Neurologic Exam     Mental Status   Disoriented to area and street. Oriented to city.   Disoriented to time. Disoriented to year, month, date, day and season.   Registration: recalls 1 of 3 objects. Recall of objects at 5 minutes: Missed 3 out of 3 in 3 minutes. Follows 2 step commands.   Attention: normal. Concentration: decreased.   Speech: speech is normal   Knowledge: consistent with education. Able to perform simple calculations.   Able to name object. Able to repeat. Normal comprehension.     Cranial Nerves   Cranial nerves II through XII intact.     Motor Exam   Muscle bulk: normal  Overall muscle tone: normal    Strength   Strength 5/5 throughout.     Sensory Exam   Light touch normal.   Vibration normal.     Gait, Coordination, and Reflexes     Gait  Gait: normal    Reflexes   Right brachioradialis: 2+  Left brachioradialis: 2+  Right biceps: 2+  Left biceps: 2+  Right triceps: 2+  Left triceps: 2+  Right patellar: 2+  Left patellar: 2+  Right achilles: 2+  Left achilles: 2+  Right : 2+  Left : 2+  Right plantar: normal  Left plantar: normal        Lab Results   Component Value Date    WBC 5.26 12/19/2023    HGB 12.6 (L) 12/19/2023    HCT 37.2 (L) 12/19/2023    MCV 88 12/19/2023     12/19/2023       Sodium   Date Value Ref Range Status   12/19/2023 142 136 - 145 mmol/L Final     Potassium   Date Value Ref Range Status   12/19/2023 3.9 3.5 - 5.1 mmol/L Final     Chloride   Date Value Ref Range Status   12/19/2023 113 (H) 95 - 110 mmol/L Final     CO2   Date Value Ref Range Status   12/19/2023 19 (L) 23 - 29 mmol/L Final     Glucose   Date Value Ref Range Status   12/19/2023  165 (H) 70 - 110 mg/dL Final     BUN   Date Value Ref Range Status   12/19/2023 29 (H) 8 - 23 mg/dL Final     Creatinine   Date Value Ref Range Status   12/19/2023 0.9 0.5 - 1.4 mg/dL Final     Calcium   Date Value Ref Range Status   12/19/2023 7.8 (L) 8.7 - 10.5 mg/dL Final     Total Protein   Date Value Ref Range Status   12/19/2023 5.9 (L) 6.0 - 8.4 g/dL Final     Albumin   Date Value Ref Range Status   12/19/2023 2.7 (L) 3.5 - 5.2 g/dL Final     Total Bilirubin   Date Value Ref Range Status   12/19/2023 0.6 0.1 - 1.0 mg/dL Final     Comment:     For infants and newborns, interpretation of results should be based  on gestational age, weight and in agreement with clinical  observations.    Premature Infant recommended reference ranges:  Up to 24 hours.............<8.0 mg/dL  Up to 48 hours............<12.0 mg/dL  3-5 days..................<15.0 mg/dL  6-29 days.................<15.0 mg/dL       Alkaline Phosphatase   Date Value Ref Range Status   12/19/2023 90 55 - 135 U/L Final     AST   Date Value Ref Range Status   12/19/2023 11 10 - 40 U/L Final     ALT   Date Value Ref Range Status   12/19/2023 11 10 - 44 U/L Final     Anion Gap   Date Value Ref Range Status   12/19/2023 10 8 - 16 mmol/L Final     eGFR if    Date Value Ref Range Status   06/15/2022 >60 >60 mL/min/1.73 m^2 Final     eGFR if non    Date Value Ref Range Status   06/15/2022 >60 >60 mL/min/1.73 m^2 Final     Comment:     Calculation used to obtain the estimated glomerular filtration  rate (eGFR) is the CKD-EPI equation.          Lab Results   Component Value Date    TSDHGCIF13 >2000 (H) 08/01/2023       Lab Results   Component Value Date    TSH 1.944 11/04/2022       No results found in the last 24 hours.    No results found in the last 24 hours.      LABORATORY EVALUATION      RADIOLOGY EVALUATION       NEUROPHYSIOLOGY EVALUATION       PATHOLOGY EVALUATION        NEUROCOGNITIVE AND NEUROPSYCHOLOGY EVALUATION        Reviewed the neuroimaging independently     MMSE: 23/ 30 - missed 3 out of 3 in 5 minutes.      Assessment:   Patient Neurological Assessment is remarkable for short term memory lost / cognitive wise for the most part seems ok.   - CVA.  - Aneurysm, Brain.    - Dementia, Vascular.    - Alzheimer's, Dementia.      Plan:   Has had 2 CVA - most probable cause of his Memory lost.   Alzheimer - Medial temporal lobe atrophy score is 2.5 on the right and 1 on the left.   Discussed imagines with Patient and caregiver.    Continue Namenda.   Add Aricept.   Discussed Aricept - Wife wants to do imagines and come back to discuss more medications.   Last MRI / CTA - 2 years ago / Small Brain Aneurysm present.      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  Atherosclerosis and mild-to-moderate grade stenosis right cavernous carotid artery.  Unchanged bilateral parotid nodules, likely intraparotid lymph nodes.    Personally reviewed MRI Brain - done 12/ 2023 -  No acute intracranial abnormality.   Similar appearance of multifocal remote infarcts as detailed in the body of the report.   Minor chronic microvascular ischemic changes.   Medial temporal lobe atrophy score.     Labs: Vit B 12 / Folate / CBC / TSH - done 08/ 2023 - non significant abnormalities.      Personally reviewed CT Scan Head - done 04/ 2023 - Chronic microvascular ischemic changes. B rain Atrophy.       MEDICAL/SURGICAL COMORBIDITIES     All relevant medical comorbidities noted and managed by primary care physician and medical care team.        HEALTHY LIFESTYLE AND PREVENTATIVE CARE    The patient to adhere to the age-appropriate health maintenance guidelines including screening tests and vaccinations.   The patient to adhere to  healthy lifestyle, optimal weight, exercise, healthy diet, good sleep hygiene and avoiding drugs including smoking, alcohol and recreational  drugs.    No follow-ups on file.    Mason Tompkins MD    General Neurology.

## 2024-04-01 ENCOUNTER — OFFICE VISIT (OUTPATIENT)
Dept: NEUROLOGY | Facility: CLINIC | Age: 84
End: 2024-04-01
Payer: MEDICARE

## 2024-04-01 VITALS
DIASTOLIC BLOOD PRESSURE: 68 MMHG | RESPIRATION RATE: 16 BRPM | OXYGEN SATURATION: 99 % | SYSTOLIC BLOOD PRESSURE: 147 MMHG | WEIGHT: 147.69 LBS | HEART RATE: 56 BPM | HEIGHT: 70 IN | BODY MASS INDEX: 21.14 KG/M2

## 2024-04-01 DIAGNOSIS — G30.9 MODERATE ALZHEIMER'S DEMENTIA WITH OTHER BEHAVIORAL DISTURBANCE, UNSPECIFIED TIMING OF DEMENTIA ONSET: Primary | ICD-10-CM

## 2024-04-01 DIAGNOSIS — R41.3 MEMORY DEFICIT: ICD-10-CM

## 2024-04-01 DIAGNOSIS — F02.B18 MODERATE ALZHEIMER'S DEMENTIA WITH OTHER BEHAVIORAL DISTURBANCE, UNSPECIFIED TIMING OF DEMENTIA ONSET: Primary | ICD-10-CM

## 2024-04-01 PROCEDURE — 3077F SYST BP >= 140 MM HG: CPT | Mod: HCNC,CPTII,S$GLB, | Performed by: PSYCHIATRY & NEUROLOGY

## 2024-04-01 PROCEDURE — 99213 OFFICE O/P EST LOW 20 MIN: CPT | Mod: HCNC,S$GLB,, | Performed by: PSYCHIATRY & NEUROLOGY

## 2024-04-01 PROCEDURE — 1160F RVW MEDS BY RX/DR IN RCRD: CPT | Mod: HCNC,CPTII,S$GLB, | Performed by: PSYCHIATRY & NEUROLOGY

## 2024-04-01 PROCEDURE — 1126F AMNT PAIN NOTED NONE PRSNT: CPT | Mod: HCNC,CPTII,S$GLB, | Performed by: PSYCHIATRY & NEUROLOGY

## 2024-04-01 PROCEDURE — 3288F FALL RISK ASSESSMENT DOCD: CPT | Mod: HCNC,CPTII,S$GLB, | Performed by: PSYCHIATRY & NEUROLOGY

## 2024-04-01 PROCEDURE — 99999 PR PBB SHADOW E&M-EST. PATIENT-LVL III: CPT | Mod: PBBFAC,HCNC,, | Performed by: PSYCHIATRY & NEUROLOGY

## 2024-04-01 PROCEDURE — 1159F MED LIST DOCD IN RCRD: CPT | Mod: HCNC,CPTII,S$GLB, | Performed by: PSYCHIATRY & NEUROLOGY

## 2024-04-01 PROCEDURE — 3078F DIAST BP <80 MM HG: CPT | Mod: HCNC,CPTII,S$GLB, | Performed by: PSYCHIATRY & NEUROLOGY

## 2024-04-01 PROCEDURE — 1101F PT FALLS ASSESS-DOCD LE1/YR: CPT | Mod: HCNC,CPTII,S$GLB, | Performed by: PSYCHIATRY & NEUROLOGY

## 2024-04-01 RX ORDER — MEMANTINE HYDROCHLORIDE 10 MG/1
10 TABLET ORAL 2 TIMES DAILY
Qty: 60 TABLET | Refills: 6 | Status: SHIPPED | OUTPATIENT
Start: 2024-04-01

## 2024-04-01 RX ORDER — DONEPEZIL HYDROCHLORIDE 5 MG/1
5 TABLET, FILM COATED ORAL NIGHTLY
Qty: 30 TABLET | Refills: 6 | Status: SHIPPED | OUTPATIENT
Start: 2024-04-01 | End: 2024-05-08 | Stop reason: RX

## 2024-04-01 RX ORDER — GABAPENTIN 100 MG/1
200 CAPSULE ORAL 2 TIMES DAILY PRN
COMMUNITY
Start: 2023-12-28 | End: 2024-05-07 | Stop reason: SDUPTHER

## 2024-04-06 ENCOUNTER — HOSPITAL ENCOUNTER (EMERGENCY)
Facility: HOSPITAL | Age: 84
Discharge: HOME | End: 2024-04-06
Attending: EMERGENCY MEDICINE
Payer: MEDICARE

## 2024-04-06 VITALS
HEIGHT: 70 IN | TEMPERATURE: 98 F | RESPIRATION RATE: 16 BRPM | WEIGHT: 147.69 LBS | OXYGEN SATURATION: 96 % | BODY MASS INDEX: 21.14 KG/M2 | DIASTOLIC BLOOD PRESSURE: 68 MMHG | SYSTOLIC BLOOD PRESSURE: 132 MMHG | HEART RATE: 60 BPM

## 2024-04-06 DIAGNOSIS — K61.0 PERIANAL ABSCESS: Primary | ICD-10-CM

## 2024-04-06 PROCEDURE — 99283 EMERGENCY DEPT VISIT LOW MDM: CPT | Mod: 25,HCNC

## 2024-04-06 PROCEDURE — 25000003 PHARM REV CODE 250: Mod: HCNC | Performed by: EMERGENCY MEDICINE

## 2024-04-06 PROCEDURE — 46050 I&D PERIANAL ABSCESS SUPFC: CPT | Mod: HCNC

## 2024-04-06 RX ORDER — AMOXICILLIN AND CLAVULANATE POTASSIUM 875; 125 MG/1; MG/1
1 TABLET, FILM COATED ORAL
Status: COMPLETED | OUTPATIENT
Start: 2024-04-06 | End: 2024-04-06

## 2024-04-06 RX ORDER — LIDOCAINE HYDROCHLORIDE 10 MG/ML
10 INJECTION, SOLUTION EPIDURAL; INFILTRATION; INTRACAUDAL; PERINEURAL
Status: COMPLETED | OUTPATIENT
Start: 2024-04-06 | End: 2024-04-06

## 2024-04-06 RX ORDER — AMOXICILLIN AND CLAVULANATE POTASSIUM 875; 125 MG/1; MG/1
1 TABLET, FILM COATED ORAL 2 TIMES DAILY
Qty: 10 TABLET | Refills: 0 | Status: SHIPPED | OUTPATIENT
Start: 2024-04-06 | End: 2024-04-11

## 2024-04-06 RX ADMIN — AMOXICILLIN AND CLAVULANATE POTASSIUM 1 TABLET: 875; 125 TABLET, FILM COATED ORAL at 02:04

## 2024-04-06 RX ADMIN — Medication: at 12:04

## 2024-04-06 RX ADMIN — LIDOCAINE HYDROCHLORIDE 100 MG: 10 INJECTION, SOLUTION EPIDURAL; INFILTRATION; INTRACAUDAL at 12:04

## 2024-04-06 NOTE — ED PROVIDER NOTES
SCRIBE #1 NOTE: I, Crystal Pak, am scribing for, and in the presence of, Grey Delcid MD. I have scribed the entire note.       History     Chief Complaint   Patient presents with    Abscess     Pts wife reports abscess to R buttock near anus x 3 days     Review of patient's allergies indicates:   Allergen Reactions    Metoprolol Other (See Comments)     Junctional rhythm           History of Present Illness     HPI    4/6/2024, 12:04 PM  History obtained from the family.      History of Present Illness: Aquiles Yates is a 83 y.o. male patient with a PMHx of CAD, AAA, kidney stone, DM, HTN, PAD, depression, HLD, tobacco abuse, acute blood loss anemia, PAF, and BCC who presents to the Emergency Department for evaluation of abscess to R buttock near anus which onset gradually 2 days ago. Family member reports she noticed the abscess and is concerned about the color change. Symptoms are constant and moderate in severity. No mitigating or exacerbating factors reported. Associated sxs include a color change of the abscess, and abscess pain. Patient denies any N/V/D, CP, SOB, weakness, fever and all other sxs at this time. No prior Tx reported. No further complaints or concerns at this time.       Arrival mode: Personal vehicle    PCP: Holger Thornton MD        Past Medical History:  Past Medical History:   Diagnosis Date    Acute blood loss anemia 10/14/2019    Aneurysm, abdominal aortic     BCC (basal cell carcinoma of skin) 6/29/2023    Coronary artery disease     Depression     Diabetes mellitus     HLD (hyperlipidemia)     Hypertension     Kidney stone     PAD (peripheral artery disease)     PAF (paroxysmal atrial fibrillation) 1/24/2023    Tobacco abuse        Past Surgical History:  Past Surgical History:   Procedure Laterality Date    APPENDECTOMY      CORONARY STENT PLACEMENT      x 4    ESOPHAGOGASTRODUODENOSCOPY N/A 10/14/2019    Procedure: EGD (ESOPHAGOGASTRODUODENOSCOPY);  Surgeon:  Carlos Mcneal III, MD;  Location: [facility];  Service: Endoscopy;  Laterality: N/A;    ESOPHAGOGASTRODUODENOSCOPY N/A 10/15/2019    Procedure: EGD (ESOPHAGOGASTRODUODENOSCOPY);  Surgeon: Carlos Mcneal III, MD;  Location: [facility] ENDO;  Service: Endoscopy;  Laterality: N/A;    ESOPHAGOGASTRODUODENOSCOPY N/A 4/17/2023    Procedure: EGD (ESOPHAGOGASTRODUODENOSCOPY);  Surgeon: Nevaeh Mcconnell MD;  Location: [facility] ENDO;  Service: Endoscopy;  Laterality: N/A;    LEFT HEART CATHETERIZATION Left 10/11/2019    Procedure: CATHETERIZATION, HEART, LEFT;  Surgeon: Robe Gilbert MD;  Location: [facility] CATH LAB;  Service: Cardiology;  Laterality: Left;    LEFT HEART CATHETERIZATION Left 10/13/2019    Procedure: CATHETERIZATION, HEART, LEFT;  Surgeon: Robe Gilbert MD;  Location: [facility] CATH LAB;  Service: Cardiology;  Laterality: Left;    leg stent Left          Family History:  Family History   Problem Relation Age of Onset    Diabetes Mother     COPD Father     Diabetes Brother        Social History:  Social History     Tobacco Use    Smoking status: Former     Types: Cigars     Passive exposure: Never    Smokeless tobacco: Current   Substance and Sexual Activity    Alcohol use: Not Currently    Drug use: Not Currently    Sexual activity: Not Currently     Partners: Female        Review of Systems     Review of Systems   Constitutional:  Negative for fever.   HENT:  Negative for sore throat.    Respiratory:  Negative for shortness of breath.    Cardiovascular:  Negative for chest pain.   Gastrointestinal:  Negative for diarrhea, nausea and vomiting.   Genitourinary:  Negative for dysuria.   Musculoskeletal:  Negative for back pain.   Skin:  Positive for color change (Abcess is red today). Negative for rash.        (+) Perianal abscess to L buttock   Neurological:  Negative for weakness.   Hematological:  Does not bruise/bleed easily.   All other systems reviewed and are negative.       Physical Exam     Initial Vitals  [04/06/24 1146]   BP Pulse Resp Temp SpO2   136/64 61 18 97.8 °F (36.6 °C) 95 %      MAP       --          Physical Exam  Nursing Notes and Vital Signs Reviewed.  Constitutional: Patient is in no acute distress. Well-developed and well-nourished.  Head: Atraumatic. Normocephalic.  Eyes: PERRL. EOM intact. Conjunctivae are not pale. No scleral icterus.  ENT: Mucous membranes are moist.    Neck: Supple. Full ROM.  Cardiovascular: Regular rate. Regular rhythm. No murmurs, rubs, or gallops. Distal pulses are 2+ and symmetric.  Pulmonary/Chest: No respiratory distress. Clear to auscultation bilaterally. No wheezing or rales.  Abdominal: Soft and non-distended.  There is no tenderness.  No rebound, guarding, or rigidity.  Musculoskeletal: Moves all extremities. No obvious deformities. No edema.  Skin: Warm and dry. Quarter-sized perianal abscess to L buttock.  Neurological:  Alert, awake, and appropriate.  Normal speech.  No acute focal neurological deficits are appreciated.  Psychiatric: Normal affect. Good eye contact. Appropriate in content.     ED Course   I & D - Incision and Drainage    Date/Time: 4/6/2024 1:51 PM  Location procedure was performed: [facility] EMERGENCY DEPARTMENT    Performed by: Grey Delcid MD  Authorized by: Grey Delcid MD  Consent Done: Yes  Consent: Verbal consent obtained.  Risks and benefits: risks, benefits and alternatives were discussed  Consent given by: patient (family)  Type: abscess  Body area: anogenital  Location details: perianal  Anesthesia: local infiltration    Anesthesia:  Local Anesthetic: LET (lido,epi,tetracaine) and lidocaine 1% without epinephrine  Anesthetic total (ml): 2.    Patient sedated: no  Scalpel size: 11  Incision type: single straight  Complexity: simple  Drainage: pus  Drainage amount: moderate  Packing material: none  Complications: No  Specimens: No  Implants: No  Patient tolerance: Patient tolerated the procedure well with no immediate  "complications        ED Vital Signs:  Vitals:    04/06/24 1146   BP: 136/64   Pulse: 61   Resp: 18   Temp: 97.8 °F (36.6 °C)   TempSrc: Oral   SpO2: 95%   Weight: 67 kg (147 lb 11.3 oz)   Height: 5' 10" (1.778 m)       Abnormal Lab Results:  Labs Reviewed - No data to display     All Lab Results:  None    Imaging Results:  Imaging Results    None                The Emergency Provider reviewed the vital signs and test results, which are outlined above.     ED Discussion     1:56 PM: Reassessed pt at this time. Discussed with pt all pertinent ED information and results. Discussed pt dx and plan of tx. Gave pt all f/u and return to the ED instructions. All questions and concerns were addressed at this time. Pt expresses understanding of information and instructions, and is comfortable with plan to discharge. Pt is stable for discharge.    I discussed with patient and/or family/caretaker that evaluation in the ED does not suggest any emergent or life threatening medical conditions requiring immediate intervention beyond what was provided in the ED, and I believe patient is safe for discharge.  Regardless, an unremarkable evaluation in the ED does not preclude the development or presence of a serious of life threatening condition. As such, patient was instructed to return immediately for any worsening or change in current symptoms.         Medical Decision Making  83-year-old male presenting with a small perianal abscess.  I&D performed here in the emergency department and patient started on Augmentin    Amount and/or Complexity of Data Reviewed  Independent Historian:      Details: Majority of history is provided by family at bedside given patient's history of Alzheimer's  External Data Reviewed: notes.    Risk  OTC drugs.  Prescription drug management.                ED Medication(s):  Medications   amoxicillin-clavulanate 875-125mg per tablet 1 tablet (has no administration in time range)   LETS " (LIDOcaine-TETRAcaine-EPINEPHrine) gel solution ( Topical (Top) Given 4/6/24 1230)   LIDOcaine (PF) 10 mg/ml (1%) injection 100 mg (100 mg Infiltration Given by Other 4/6/24 1231)       New Prescriptions    AMOXICILLIN-CLAVULANATE 875-125MG (AUGMENTIN) 875-125 MG PER TABLET    Take 1 tablet by mouth 2 (two) times daily. for 5 days        Follow-up Information       Holger Thornton MD In 2 days.    Specialty: Internal Medicine  Contact information:  [address] [facility] [address] [phone]               [facility]    Specialty: Emergency Medicine  Why: As needed, If symptoms worsen  Contact information:  [address] [facility] [address] [phone]                               Scribe Attestation:   Scribe #1: I performed the above scribed service and the documentation accurately describes the services I performed. I attest to the accuracy of the note.     Attending:   Physician Attestation Statement for Scribe #1: I, Grey Delcid MD, personally performed the services described in this documentation, as scribed by Crystal Pak, in my presence, and it is both accurate and complete.           Clinical Impression       ICD-10-CM ICD-9-CM   1. Perianal abscess  K61.0 566       Disposition:   Disposition: Discharged  Condition: Stable         Grey Delcid MD  04/06/24 3650

## 2024-04-09 NOTE — PROGRESS NOTES
Savanah Edwards  ED Navigator  Emergency Department    Project: [facility] ED Navigator  Role: Community Health Worker    Date: 04/09/2024  Patient Name: Aquiles Yates  MRN: [id number]  PCP: Holger Thornton MD    Assessment:     Aquiles Yates is a 83 y.o. male who has presented to ED for Perianal abscess. Patient has visited the ED 1 times in the past 3 months. Patient did not contact PCP.     ED Navigator Initial Assessment    ED Navigator Enrollment Documentation  Consent to Services  Does patient consent to completing the assessment?: Yes  Contact  Method of Initial Contact: Phone  Transportation  Does the patient have issues with Transportation?: No  Does the patient have transportation to and from healthcare appointments?: Yes  Insurance Coverage  Do you have coverage/adequate coverage?: Yes  Type/kind of coverage: [employer]  Is patient able to afford co-pays/deductibles?: Yes  Is patient able to afford HME or supplies?: Yes  Does patient have an established [facility] PCP?: Yes  Able to access?: Yes  Does the patient have a lack of adequate coverage?: No  Specialist Appointment  Did the patient come to the ED to see a specialist?: No  Does the patient have a pending specialist referral?: No  Does the patient have a specialist appointment made?: No  PCP Follow Up Appointment  Has the patient had an appointment with a primary care provider in the past year?: Yes  Approximate date: 3/27/24  Provider: Graciela Gates NP  Does the patient have a follow up appontment with a PCP?: No  When was the last time you saw your PCP?: 3/27/24  Why does the patient not have a follow up scheduled?: Other (see comments) (Comment: Wife declined appt assistance/says pt is doing well)  Medications  Is patient able to afford medication?: Yes  Is patient unable to get medication due to lack of transportation?: No  Psychological  Food  Communication/Education  Does the patient have limited English  proficiency/English not primary language?: No  Does patient have low literacy and/or low health literacy?: Yes  Does patient have concerns with care?: No  Does patient have dissatisfaction with care?: No  Other Financial Concerns  Other Social Barriers/Concerns  Does the patient have any additional barriers or concerns?: Other (see comments) (Comment: Chronic Conditions)  Primary Barrier  Barriers identified: Cognitive barrier (health literacy, language and communication, etc.)  Root Cause of ED Utilization: Chronic Conditions  Plan to address Chronic Conditions: Encourage patient to contact PCP/specialist for follow up per ED discharge instructions  Next steps: Provided Education  Additional Documentation: Pt was seen in the ED on 4/6/24 for Perianal abscess. I spoke with pt's wife, Gayle/Caregiver, for Post ED visit follow up navigation. Pt's wife states that she had not contacted pcp to schedule a Post ED visit 7-day follow up appt and declined scheduling assistance at this time. Pt's wife states pt is doing well and does not need an appt at this time. Pt does not have transportation issues and has no additional needs at this time.    Savanah Edwards           Social History     Socioeconomic History    Marital status:    Tobacco Use    Smoking status: Former     Types: Cigars     Passive exposure: Never    Smokeless tobacco: Current   Substance and Sexual Activity    Alcohol use: Not Currently    Drug use: Not Currently    Sexual activity: Not Currently     Partners: Female     Social Determinants of Health     Financial Resource Strain: Low Risk  (3/27/2024)    Overall Financial Resource Strain (CARDIA)     Difficulty of Paying Living Expenses: Not hard at all   Food Insecurity: Unknown (3/27/2024)    Hunger Vital Sign     Worried About Running Out of Food in the Last Year: Never true   Transportation Needs: No Transportation Needs (3/27/2024)    PRAPARE - Transportation     Lack of Transportation  (Medical): No     Lack of Transportation (Non-Medical): No   Physical Activity: Inactive (3/27/2024)    Exercise Vital Sign     Days of Exercise per Week: 0 days     Minutes of Exercise per Session: 0 min   Stress: No Stress Concern Present (3/27/2024)    [employer] [place] [employer] - Occupational Stress Questionnaire     Feeling of Stress : Only a little   Social Connections: Socially Isolated (3/27/2024)    Social Connection and Isolation Panel [NHANES]     Frequency of Communication with Friends and Family: Once a week     Frequency of Social Gatherings with Friends and Family: Once a week     Attends Orthodoxy Services: Never     Active Member of Clubs or Organizations: No     Attends Club or Organization Meetings: Never     Marital Status:    Housing Stability: Unknown (3/27/2024)    Housing Stability Vital Sign     Unable to Pay for Housing in the Last Year: No     Unstable Housing in the Last Year: No       Plan:   Pt was seen in the ED on 4/6/24 for Perianal abscess. I spoke with pt's wife, Gayle/Caregiver, for Post ED visit follow up navigation. Pt's wife states that she had not contacted pcp to schedule a Post ED visit 7-day follow up appt and declined scheduling assistance at this time. Pt's wife states pt is doing well and does not need an appt at this time. Pt does not have transportation issues and has no additional needs at this time.    Savanah Edwards    Appointment made with:

## 2024-04-23 ENCOUNTER — TELEPHONE (OUTPATIENT)
Dept: FAMILY MEDICINE | Facility: CLINIC | Age: 84
End: 2024-04-23
Payer: MEDICARE

## 2024-04-23 DIAGNOSIS — R53.1 WEAKNESS: Primary | ICD-10-CM

## 2024-04-23 NOTE — TELEPHONE ENCOUNTER
Called and spoke with pt. Wife. Pt. Wife request physical therapy for pt. With [facility]. States he is not moving and as much and she wants to see if this will help.

## 2024-04-23 NOTE — TELEPHONE ENCOUNTER
----- Message from Anisha Ramos sent at 4/23/2024 11:56 AM CDT -----  Contact: Sarah/ Spouse  Sarah is calling to speak to the nurse regarding requesting a order for physical therapy, please give her a call at [phone]    Thanks  LJ

## 2024-04-23 NOTE — TELEPHONE ENCOUNTER
----- Message from Lidya Quesadatyler sent at 4/23/2024  4:00 PM CDT -----  Contact: pt's wife/Gayle Araujo is calling again and states that they don't want to go out for PT, she needs an referral for home health PT.  Please put in referral for home health physical therapy.  Thanks/mpd

## 2024-05-01 PROCEDURE — G0180 MD CERTIFICATION HHA PATIENT: HCPCS | Mod: ,,, | Performed by: INTERNAL MEDICINE

## 2024-05-07 ENCOUNTER — OFFICE VISIT (OUTPATIENT)
Dept: FAMILY MEDICINE | Facility: CLINIC | Age: 84
End: 2024-05-07
Payer: MEDICARE

## 2024-05-07 ENCOUNTER — LAB VISIT (OUTPATIENT)
Dept: LAB | Facility: HOSPITAL | Age: 84
End: 2024-05-07
Attending: INTERNAL MEDICINE
Payer: MEDICARE

## 2024-05-07 VITALS
HEIGHT: 70 IN | WEIGHT: 144.06 LBS | OXYGEN SATURATION: 96 % | BODY MASS INDEX: 20.62 KG/M2 | RESPIRATION RATE: 18 BRPM | TEMPERATURE: 97 F | HEART RATE: 77 BPM | SYSTOLIC BLOOD PRESSURE: 128 MMHG | DIASTOLIC BLOOD PRESSURE: 70 MMHG

## 2024-05-07 DIAGNOSIS — D64.9 ANEMIA, UNSPECIFIED TYPE: ICD-10-CM

## 2024-05-07 DIAGNOSIS — I73.9 PAD (PERIPHERAL ARTERY DISEASE): Chronic | ICD-10-CM

## 2024-05-07 DIAGNOSIS — I10 ESSENTIAL HYPERTENSION: Chronic | ICD-10-CM

## 2024-05-07 DIAGNOSIS — I25.118 CORONARY ARTERY DISEASE OF NATIVE ARTERY OF NATIVE HEART WITH STABLE ANGINA PECTORIS: Primary | Chronic | ICD-10-CM

## 2024-05-07 DIAGNOSIS — I63.9 CEREBROVASCULAR ACCIDENT (CVA), UNSPECIFIED MECHANISM: ICD-10-CM

## 2024-05-07 DIAGNOSIS — F03.90 DEMENTIA, UNSPECIFIED DEMENTIA SEVERITY, UNSPECIFIED DEMENTIA TYPE, UNSPECIFIED WHETHER BEHAVIORAL, PSYCHOTIC, OR MOOD DISTURBANCE OR ANXIETY: ICD-10-CM

## 2024-05-07 DIAGNOSIS — K11.8 NODULE OF PAROTID GLAND: ICD-10-CM

## 2024-05-07 DIAGNOSIS — E78.5 DYSLIPIDEMIA: Chronic | ICD-10-CM

## 2024-05-07 DIAGNOSIS — Z98.61 S/P PTCA (PERCUTANEOUS TRANSLUMINAL CORONARY ANGIOPLASTY): ICD-10-CM

## 2024-05-07 DIAGNOSIS — E11.69 TYPE 2 DIABETES MELLITUS WITH OTHER SPECIFIED COMPLICATION, UNSPECIFIED WHETHER LONG TERM INSULIN USE: ICD-10-CM

## 2024-05-07 DIAGNOSIS — Z87.19 HISTORY OF GI BLEED: ICD-10-CM

## 2024-05-07 DIAGNOSIS — Z79.01 ANTICOAGULATED: ICD-10-CM

## 2024-05-07 DIAGNOSIS — I48.0 PAF (PAROXYSMAL ATRIAL FIBRILLATION): ICD-10-CM

## 2024-05-07 LAB
ALBUMIN SERPL BCP-MCNC: 3.4 G/DL (ref 3.5–5.2)
ALP SERPL-CCNC: 100 U/L (ref 55–135)
ALT SERPL W/O P-5'-P-CCNC: 9 U/L (ref 10–44)
ANION GAP SERPL CALC-SCNC: 13 MMOL/L (ref 8–16)
AST SERPL-CCNC: 11 U/L (ref 10–40)
BASOPHILS # BLD AUTO: 0.05 K/UL (ref 0–0.2)
BASOPHILS NFR BLD: 0.8 % (ref 0–1.9)
BILIRUB SERPL-MCNC: 0.6 MG/DL (ref 0.1–1)
BUN SERPL-MCNC: 29 MG/DL (ref 8–23)
CALCIUM SERPL-MCNC: 9.9 MG/DL (ref 8.7–10.5)
CHLORIDE SERPL-SCNC: 105 MMOL/L (ref 95–110)
CHOLEST SERPL-MCNC: 150 MG/DL (ref 120–199)
CHOLEST/HDLC SERPL: 4.2 {RATIO} (ref 2–5)
CO2 SERPL-SCNC: 22 MMOL/L (ref 23–29)
CREAT SERPL-MCNC: 1.3 MG/DL (ref 0.5–1.4)
DIFFERENTIAL METHOD BLD: ABNORMAL
EOSINOPHIL # BLD AUTO: 0.2 K/UL (ref 0–0.5)
EOSINOPHIL NFR BLD: 2.7 % (ref 0–8)
ERYTHROCYTE [DISTWIDTH] IN BLOOD BY AUTOMATED COUNT: 14.6 % (ref 11.5–14.5)
EST. GFR  (NO RACE VARIABLE): 54.2 ML/MIN/1.73 M^2
ESTIMATED AVG GLUCOSE: 154 MG/DL (ref 68–131)
GLUCOSE SERPL-MCNC: 130 MG/DL (ref 70–110)
HBA1C MFR BLD: 7 % (ref 4–5.6)
HCT VFR BLD AUTO: 41.2 % (ref 40–54)
HDLC SERPL-MCNC: 36 MG/DL (ref 40–75)
HDLC SERPL: 24 % (ref 20–50)
HGB BLD-MCNC: 13.1 G/DL (ref 14–18)
IMM GRANULOCYTES # BLD AUTO: 0.02 K/UL (ref 0–0.04)
IMM GRANULOCYTES NFR BLD AUTO: 0.3 % (ref 0–0.5)
LDLC SERPL CALC-MCNC: 94.2 MG/DL (ref 63–159)
LYMPHOCYTES # BLD AUTO: 1.2 K/UL (ref 1–4.8)
LYMPHOCYTES NFR BLD: 20.9 % (ref 18–48)
MCH RBC QN AUTO: 28.2 PG (ref 27–31)
MCHC RBC AUTO-ENTMCNC: 31.8 G/DL (ref 32–36)
MCV RBC AUTO: 89 FL (ref 82–98)
MONOCYTES # BLD AUTO: 0.5 K/UL (ref 0.3–1)
MONOCYTES NFR BLD: 8.8 % (ref 4–15)
NEUTROPHILS # BLD AUTO: 3.9 K/UL (ref 1.8–7.7)
NEUTROPHILS NFR BLD: 66.5 % (ref 38–73)
NONHDLC SERPL-MCNC: 114 MG/DL
NRBC BLD-RTO: 0 /100 WBC
PLATELET # BLD AUTO: 293 K/UL (ref 150–450)
PMV BLD AUTO: 10.7 FL (ref 9.2–12.9)
POTASSIUM SERPL-SCNC: 3.8 MMOL/L (ref 3.5–5.1)
PROT SERPL-MCNC: 7.9 G/DL (ref 6–8.4)
RBC # BLD AUTO: 4.65 M/UL (ref 4.6–6.2)
SODIUM SERPL-SCNC: 140 MMOL/L (ref 136–145)
TRIGL SERPL-MCNC: 99 MG/DL (ref 30–150)
WBC # BLD AUTO: 5.92 K/UL (ref 3.9–12.7)

## 2024-05-07 PROCEDURE — 83036 HEMOGLOBIN GLYCOSYLATED A1C: CPT | Mod: HCNC | Performed by: INTERNAL MEDICINE

## 2024-05-07 PROCEDURE — 3074F SYST BP LT 130 MM HG: CPT | Mod: HCNC,CPTII,S$GLB, | Performed by: INTERNAL MEDICINE

## 2024-05-07 PROCEDURE — 3078F DIAST BP <80 MM HG: CPT | Mod: HCNC,CPTII,S$GLB, | Performed by: INTERNAL MEDICINE

## 2024-05-07 PROCEDURE — 85025 COMPLETE CBC W/AUTO DIFF WBC: CPT | Mod: HCNC | Performed by: INTERNAL MEDICINE

## 2024-05-07 PROCEDURE — 36415 COLL VENOUS BLD VENIPUNCTURE: CPT | Mod: HCNC,PO | Performed by: INTERNAL MEDICINE

## 2024-05-07 PROCEDURE — 99215 OFFICE O/P EST HI 40 MIN: CPT | Mod: HCNC,S$GLB,, | Performed by: INTERNAL MEDICINE

## 2024-05-07 PROCEDURE — 1126F AMNT PAIN NOTED NONE PRSNT: CPT | Mod: HCNC,CPTII,S$GLB, | Performed by: INTERNAL MEDICINE

## 2024-05-07 PROCEDURE — 1159F MED LIST DOCD IN RCRD: CPT | Mod: HCNC,CPTII,S$GLB, | Performed by: INTERNAL MEDICINE

## 2024-05-07 PROCEDURE — 80053 COMPREHEN METABOLIC PANEL: CPT | Mod: HCNC | Performed by: INTERNAL MEDICINE

## 2024-05-07 PROCEDURE — 99999 PR PBB SHADOW E&M-EST. PATIENT-LVL V: CPT | Mod: PBBFAC,HCNC,, | Performed by: INTERNAL MEDICINE

## 2024-05-07 PROCEDURE — 80061 LIPID PANEL: CPT | Mod: HCNC | Performed by: INTERNAL MEDICINE

## 2024-05-07 RX ORDER — GABAPENTIN 100 MG/1
100 CAPSULE ORAL 2 TIMES DAILY PRN
Qty: 60 CAPSULE | Refills: 3 | Status: SHIPPED | OUTPATIENT
Start: 2024-05-07

## 2024-05-07 RX ORDER — PANTOPRAZOLE SODIUM 40 MG/1
40 TABLET, DELAYED RELEASE ORAL DAILY
Qty: 90 TABLET | Refills: 3 | Status: SHIPPED | OUTPATIENT
Start: 2024-05-07 | End: 2025-05-07

## 2024-05-07 NOTE — PROGRESS NOTES
Subjective:       Patient ID: Aquiles Yates is a 83 y.o. male.    Chief Complaint: Follow-up (Home health note), Hypertension, Hyperlipidemia, Diabetes, Coronary Artery Disease, Atrial Fibrillation, Anticoagulation, and Dementia    Here with caregiver-    Follow-up  Associated symptoms include arthralgias, myalgias and weakness. Pertinent negatives include no abdominal pain, chest pain, chills, coughing, diaphoresis, fatigue, fever, headaches, joint swelling, nausea, neck pain, numbness, rash, sore throat or vomiting.   Hypertension  Pertinent negatives include no chest pain, headaches, neck pain, palpitations or shortness of breath.   Hyperlipidemia  Associated symptoms include myalgias. Pertinent negatives include no chest pain or shortness of breath.   Diabetes  Hypoglycemia symptoms include confusion. Pertinent negatives for hypoglycemia include no dizziness, headaches, nervousness/anxiousness, pallor, seizures, speech difficulty or tremors. Associated symptoms include weakness. Pertinent negatives for diabetes include no chest pain, no fatigue, no polydipsia, no polyphagia and no polyuria.   Coronary Artery Disease  Pertinent negatives include no chest pain, chest tightness, dizziness, palpitations or shortness of breath. Risk factors include hyperlipidemia.   Atrial Fibrillation  Symptoms include weakness. Symptoms are negative for chest pain, dizziness, palpitations and shortness of breath. Past medical history includes atrial fibrillation, CAD and hyperlipidemia.     Past Medical History:   Diagnosis Date    Acute blood loss anemia 10/14/2019    Aneurysm, abdominal aortic     BCC (basal cell carcinoma of skin) 6/29/2023    Coronary artery disease     Depression     Diabetes mellitus     HLD (hyperlipidemia)     Hypertension     Kidney stone     PAD (peripheral artery disease)     PAF (paroxysmal atrial fibrillation) 1/24/2023    Tobacco abuse      Past Surgical History:   Procedure Laterality Date     APPENDECTOMY      CORONARY STENT PLACEMENT      x 4    ESOPHAGOGASTRODUODENOSCOPY N/A 10/14/2019    Procedure: EGD (ESOPHAGOGASTRODUODENOSCOPY);  Surgeon: Carlos Mcneal III, MD;  Location: [facility] ENDO;  Service: Endoscopy;  Laterality: N/A;    ESOPHAGOGASTRODUODENOSCOPY N/A 10/15/2019    Procedure: EGD (ESOPHAGOGASTRODUODENOSCOPY);  Surgeon: Carlos Mcneal III, MD;  Location: [facility] ENDO;  Service: Endoscopy;  Laterality: N/A;    ESOPHAGOGASTRODUODENOSCOPY N/A 4/17/2023    Procedure: EGD (ESOPHAGOGASTRODUODENOSCOPY);  Surgeon: Nevaeh Mcconnell MD;  Location: [facility] ENDO;  Service: Endoscopy;  Laterality: N/A;    LEFT HEART CATHETERIZATION Left 10/11/2019    Procedure: CATHETERIZATION, HEART, LEFT;  Surgeon: Robe Gilbert MD;  Location: [facility] CATH LAB;  Service: Cardiology;  Laterality: Left;    LEFT HEART CATHETERIZATION Left 10/13/2019    Procedure: CATHETERIZATION, HEART, LEFT;  Surgeon: Robe Gilbert MD;  Location: [facility] CATH LAB;  Service: Cardiology;  Laterality: Left;    leg stent Left      Family History   Problem Relation Name Age of Onset    Diabetes Mother      COPD Father      Diabetes Brother       Social History     Socioeconomic History    Marital status:    Tobacco Use    Smoking status: Former     Types: Cigars     Passive exposure: Never    Smokeless tobacco: Current   Substance and Sexual Activity    Alcohol use: Not Currently    Drug use: Not Currently    Sexual activity: Not Currently     Partners: Female     Social Determinants of Health     Financial Resource Strain: Low Risk  (3/27/2024)    Overall Financial Resource Strain (CARDIA)     Difficulty of Paying Living Expenses: Not hard at all   Food Insecurity: Unknown (3/27/2024)    Hunger Vital Sign     Worried About Running Out of Food in the Last Year: Never true   Transportation Needs: No Transportation Needs (3/27/2024)    PRAPARE - Transportation     Lack of Transportation (Medical): No     Lack of Transportation  (Non-Medical): No   Physical Activity: Inactive (3/27/2024)    Exercise Vital Sign     Days of Exercise per Week: 0 days     Minutes of Exercise per Session: 0 min   Stress: No Stress Concern Present (3/27/2024)    [employer] - Occupational Stress Questionnaire     Feeling of Stress : Only a little   Housing Stability: Unknown (3/27/2024)    Housing Stability Vital Sign     Unable to Pay for Housing in the Last Year: No     Unstable Housing in the Last Year: No     Review of Systems   Constitutional:  Negative for activity change, appetite change, chills, diaphoresis, fatigue, fever and unexpected weight change.   HENT:  Negative for drooling, ear discharge, ear pain, facial swelling, hearing loss, mouth sores, nosebleeds, postnasal drip, rhinorrhea, sinus pressure, sneezing, sore throat, tinnitus, trouble swallowing and voice change.    Eyes:  Negative for photophobia, redness and visual disturbance.   Respiratory:  Negative for apnea, cough, choking, chest tightness, shortness of breath and wheezing.    Cardiovascular:  Negative for chest pain and palpitations.   Gastrointestinal:  Negative for abdominal distention, abdominal pain, anal bleeding, blood in stool, constipation, diarrhea, nausea and vomiting.   Endocrine: Negative for cold intolerance, heat intolerance, polydipsia, polyphagia and polyuria.   Genitourinary:  Negative for difficulty urinating, dysuria, enuresis, flank pain, frequency, genital sores, hematuria and urgency.   Musculoskeletal:  Positive for arthralgias, gait problem and myalgias. Negative for back pain, joint swelling, neck pain and neck stiffness.   Skin:  Negative for color change, pallor, rash and wound.   Allergic/Immunologic: Negative for food allergies and immunocompromised state.   Neurological:  Positive for weakness. Negative for dizziness, tremors, seizures, syncope, facial asymmetry, speech difficulty, light-headedness, numbness and headaches.    Hematological:  Negative for adenopathy. Does not bruise/bleed easily.   Psychiatric/Behavioral:  Positive for confusion and decreased concentration. Negative for agitation, behavioral problems, dysphoric mood, hallucinations, self-injury, sleep disturbance and suicidal ideas. The patient is not nervous/anxious and is not hyperactive.        Objective:      Physical Exam  Vitals and nursing note reviewed.   Constitutional:       General: He is not in acute distress.     Appearance: He is well-developed. He is not diaphoretic.   HENT:      Head: Normocephalic and atraumatic.      Mouth/Throat:      Pharynx: No oropharyngeal exudate.   Eyes:      General: No scleral icterus.     Pupils: Pupils are equal, round, and reactive to light.   Neck:      Thyroid: No thyromegaly.      Vascular: No carotid bruit or JVD.      Trachea: No tracheal deviation.   Cardiovascular:      Rate and Rhythm: Normal rate and regular rhythm.      Heart sounds: Normal heart sounds.   Pulmonary:      Effort: Pulmonary effort is normal. No respiratory distress.      Breath sounds: Normal breath sounds. No wheezing or rales.   Chest:      Chest wall: No tenderness.   Abdominal:      General: Bowel sounds are normal. There is no distension.      Palpations: Abdomen is soft.      Tenderness: There is no abdominal tenderness. There is no guarding or rebound.   Musculoskeletal:         General: No tenderness. Normal range of motion.      Cervical back: Normal range of motion and neck supple.   Lymphadenopathy:      Cervical: No cervical adenopathy.   Skin:     General: Skin is warm and dry.      Coloration: Skin is not pale.      Findings: No erythema or rash.   Neurological:      Mental Status: He is alert.         CMP  Sodium   Date Value Ref Range Status   12/19/2023 142 136 - 145 mmol/L Final     Potassium   Date Value Ref Range Status   12/19/2023 3.9 3.5 - 5.1 mmol/L Final     Chloride   Date Value Ref Range Status   12/19/2023 113 (H) 95 -  110 mmol/L Final     CO2   Date Value Ref Range Status   12/19/2023 19 (L) 23 - 29 mmol/L Final     Glucose   Date Value Ref Range Status   12/19/2023 165 (H) 70 - 110 mg/dL Final     BUN   Date Value Ref Range Status   12/19/2023 29 (H) 8 - 23 mg/dL Final     Creatinine   Date Value Ref Range Status   12/19/2023 0.9 0.5 - 1.4 mg/dL Final     Calcium   Date Value Ref Range Status   12/19/2023 7.8 (L) 8.7 - 10.5 mg/dL Final     Total Protein   Date Value Ref Range Status   12/19/2023 5.9 (L) 6.0 - 8.4 g/dL Final     Albumin   Date Value Ref Range Status   12/19/2023 2.7 (L) 3.5 - 5.2 g/dL Final     Total Bilirubin   Date Value Ref Range Status   12/19/2023 0.6 0.1 - 1.0 mg/dL Final     Comment:     For infants and newborns, interpretation of results should be based  on gestational age, weight and in agreement with clinical  observations.    Premature Infant recommended reference ranges:  Up to 24 hours.............<8.0 mg/dL  Up to 48 hours............<12.0 mg/dL  3-5 days..................<15.0 mg/dL  6-29 days.................<15.0 mg/dL       Alkaline Phosphatase   Date Value Ref Range Status   12/19/2023 90 55 - 135 U/L Final     AST   Date Value Ref Range Status   12/19/2023 11 10 - 40 U/L Final     ALT   Date Value Ref Range Status   12/19/2023 11 10 - 44 U/L Final     Anion Gap   Date Value Ref Range Status   12/19/2023 10 8 - 16 mmol/L Final     eGFR if    Date Value Ref Range Status   06/15/2022 >60 >60 mL/min/1.73 m^2 Final     eGFR if non    Date Value Ref Range Status   06/15/2022 >60 >60 mL/min/1.73 m^2 Final     Comment:     Calculation used to obtain the estimated glomerular filtration  rate (eGFR) is the CKD-EPI equation.        Lab Results   Component Value Date    WBC 5.26 12/19/2023    HGB 12.6 (L) 12/19/2023    HCT 37.2 (L) 12/19/2023    MCV 88 12/19/2023     12/19/2023     Lab Results   Component Value Date    CHOL 158 11/04/2022     Lab Results   Component  Value Date    HDL 40 11/04/2022     Lab Results   Component Value Date    LDLCALC 96.8 11/04/2022     Lab Results   Component Value Date    TRIG 106 11/04/2022     Lab Results   Component Value Date    CHOLHDL 25.3 11/04/2022     Lab Results   Component Value Date    TSH 1.944 11/04/2022     Lab Results   Component Value Date    HGBA1C 6.7 (H) 08/01/2023     Assessment:       1. Coronary artery disease of native artery of native heart with stable angina pectoris    2. S/P PTCA (percutaneous transluminal coronary angioplasty)    3. PAD (peripheral artery disease)    4. Essential hypertension    5. Dyslipidemia    6. Type 2 diabetes mellitus with other specified complication, unspecified whether long term insulin use    7. History of GI bleed    8. PAF (paroxysmal atrial fibrillation)    9. Anticoagulated    10. Anemia, unspecified type    11. Dementia, unspecified dementia severity, unspecified dementia type, unspecified whether behavioral, psychotic, or mood disturbance or anxiety    12. Cerebrovascular accident (CVA), unspecified mechanism    13. Nodule of parotid gland        Plan:   Coronary artery disease of native artery of native heart with stable angina pectoris-----------------f/u cards-----------------  -     Ambulatory referral/consult to Cardiology; Future; Expected date: 05/14/2024    S/P PTCA (percutaneous transluminal coronary angioplasty)    PAD (peripheral artery disease)    Essential hypertension---------------stable-  -     Comprehensive Metabolic Panel; Future; Expected date: 05/07/2024  -     CBC Auto Differential; Future; Expected date: 05/07/2024    Dyslipidemia------statin---------  -     Lipid Panel; Future; Expected date: 05/07/2024    Type 2 diabetes mellitus with other specified complication, unspecified whether long term insulin use------metformin--------  -     Hemoglobin A1C; Future; Expected date: 05/07/2024    History of GI bleed    PAF (paroxysmal atrial fibrillation)  -      Ambulatory referral/consult to Cardiology; Future; Expected date: 05/14/2024    Anticoagulated  -     Ambulatory referral/consult to Cardiology; Future; Expected date: 05/14/2024    Anemia, unspecified type-----stable-----------    Dementia, unspecified dementia severity, unspecified dementia type, unspecified whether behavioral, psychotic, or mood disturbance or anxiety----------sees neurology----------    Cerebrovascular accident (CVA), unspecified mechanism    Nodule of parotid gland-----ent consult-  -     Ambulatory referral/consult to ENT; Future; Expected date: 05/14/2024    Other orders  -     pantoprazole (PROTONIX) 40 MG tablet; Take 1 tablet (40 mg total) by mouth once daily.  Dispense: 90 tablet; Refill: 3  -     gabapentin (NEURONTIN) 100 MG capsule; Take 1 capsule (100 mg total) by mouth 2 (two) times daily as needed. PRN  Dispense: 60 capsule; Refill: 3      Continue meds-------------as above--------------------f/u 6 months-

## 2024-05-08 DIAGNOSIS — G30.9 MODERATE ALZHEIMER'S DEMENTIA WITH OTHER BEHAVIORAL DISTURBANCE, UNSPECIFIED TIMING OF DEMENTIA ONSET: Primary | ICD-10-CM

## 2024-05-08 DIAGNOSIS — F02.B18 MODERATE ALZHEIMER'S DEMENTIA WITH OTHER BEHAVIORAL DISTURBANCE, UNSPECIFIED TIMING OF DEMENTIA ONSET: Primary | ICD-10-CM

## 2024-05-08 RX ORDER — DONEPEZIL HYDROCHLORIDE 5 MG/1
5 TABLET, FILM COATED ORAL NIGHTLY
Qty: 30 TABLET | Refills: 5 | Status: SHIPPED | OUTPATIENT
Start: 2024-05-08 | End: 2024-11-04

## 2024-05-20 ENCOUNTER — OFFICE VISIT (OUTPATIENT)
Dept: CARDIOLOGY | Facility: CLINIC | Age: 84
End: 2024-05-20
Payer: MEDICARE

## 2024-05-20 VITALS
DIASTOLIC BLOOD PRESSURE: 70 MMHG | BODY MASS INDEX: 20.7 KG/M2 | WEIGHT: 144.63 LBS | OXYGEN SATURATION: 95 % | SYSTOLIC BLOOD PRESSURE: 120 MMHG | HEART RATE: 59 BPM | HEIGHT: 70 IN

## 2024-05-20 DIAGNOSIS — I25.10 CAD, MULTIPLE VESSEL: Primary | ICD-10-CM

## 2024-05-20 DIAGNOSIS — I25.5 ISCHEMIC CARDIOMYOPATHY: ICD-10-CM

## 2024-05-20 DIAGNOSIS — I11.0 HYPERTENSIVE HEART DISEASE WITH HEART FAILURE: ICD-10-CM

## 2024-05-20 DIAGNOSIS — Z86.73 HISTORY OF CVA (CEREBROVASCULAR ACCIDENT): ICD-10-CM

## 2024-05-20 DIAGNOSIS — I48.0 PAF (PAROXYSMAL ATRIAL FIBRILLATION): ICD-10-CM

## 2024-05-20 DIAGNOSIS — Z86.79 S/P AAA REPAIR: ICD-10-CM

## 2024-05-20 DIAGNOSIS — Z98.61 S/P PTCA (PERCUTANEOUS TRANSLUMINAL CORONARY ANGIOPLASTY): ICD-10-CM

## 2024-05-20 DIAGNOSIS — E11.69 TYPE 2 DIABETES MELLITUS WITH OTHER SPECIFIED COMPLICATION, UNSPECIFIED WHETHER LONG TERM INSULIN USE: ICD-10-CM

## 2024-05-20 DIAGNOSIS — Z98.61 HISTORY OF PTCA: ICD-10-CM

## 2024-05-20 DIAGNOSIS — I51.7 LVH (LEFT VENTRICULAR HYPERTROPHY): ICD-10-CM

## 2024-05-20 DIAGNOSIS — I25.118 CORONARY ARTERY DISEASE OF NATIVE ARTERY OF NATIVE HEART WITH STABLE ANGINA PECTORIS: Chronic | ICD-10-CM

## 2024-05-20 DIAGNOSIS — I50.32 CHRONIC DIASTOLIC HEART FAILURE: ICD-10-CM

## 2024-05-20 DIAGNOSIS — Z87.891 HISTORY OF TOBACCO ABUSE: ICD-10-CM

## 2024-05-20 DIAGNOSIS — I20.9 AP (ANGINA PECTORIS): ICD-10-CM

## 2024-05-20 DIAGNOSIS — Z98.890 S/P AAA REPAIR: ICD-10-CM

## 2024-05-20 DIAGNOSIS — Z79.01 ANTICOAGULATED: ICD-10-CM

## 2024-05-20 DIAGNOSIS — I35.1 NONRHEUMATIC AORTIC VALVE INSUFFICIENCY: ICD-10-CM

## 2024-05-20 DIAGNOSIS — R94.31 ABNORMAL ECG: ICD-10-CM

## 2024-05-20 DIAGNOSIS — I73.9 CLAUDICATION IN PERIPHERAL VASCULAR DISEASE: ICD-10-CM

## 2024-05-20 DIAGNOSIS — Z87.19 HISTORY OF GI BLEED: ICD-10-CM

## 2024-05-20 DIAGNOSIS — I51.7 LAE (LEFT ATRIAL ENLARGEMENT): ICD-10-CM

## 2024-05-20 DIAGNOSIS — I10 ESSENTIAL HYPERTENSION: Chronic | ICD-10-CM

## 2024-05-20 DIAGNOSIS — I73.9 PAD (PERIPHERAL ARTERY DISEASE): Chronic | ICD-10-CM

## 2024-05-20 DIAGNOSIS — I49.1 PAC (PREMATURE ATRIAL CONTRACTION): ICD-10-CM

## 2024-05-20 DIAGNOSIS — E78.5 DYSLIPIDEMIA: Chronic | ICD-10-CM

## 2024-05-20 PROCEDURE — 99214 OFFICE O/P EST MOD 30 MIN: CPT | Mod: HCNC,S$GLB,, | Performed by: INTERNAL MEDICINE

## 2024-05-20 PROCEDURE — 1159F MED LIST DOCD IN RCRD: CPT | Mod: HCNC,CPTII,S$GLB, | Performed by: INTERNAL MEDICINE

## 2024-05-20 PROCEDURE — 3078F DIAST BP <80 MM HG: CPT | Mod: HCNC,CPTII,S$GLB, | Performed by: INTERNAL MEDICINE

## 2024-05-20 PROCEDURE — 3074F SYST BP LT 130 MM HG: CPT | Mod: HCNC,CPTII,S$GLB, | Performed by: INTERNAL MEDICINE

## 2024-05-20 PROCEDURE — 99999 PR PBB SHADOW E&M-EST. PATIENT-LVL III: CPT | Mod: PBBFAC,HCNC,, | Performed by: INTERNAL MEDICINE

## 2024-05-20 PROCEDURE — 3288F FALL RISK ASSESSMENT DOCD: CPT | Mod: HCNC,CPTII,S$GLB, | Performed by: INTERNAL MEDICINE

## 2024-05-20 PROCEDURE — 1101F PT FALLS ASSESS-DOCD LE1/YR: CPT | Mod: HCNC,CPTII,S$GLB, | Performed by: INTERNAL MEDICINE

## 2024-05-20 PROCEDURE — 1160F RVW MEDS BY RX/DR IN RCRD: CPT | Mod: HCNC,CPTII,S$GLB, | Performed by: INTERNAL MEDICINE

## 2024-05-20 PROCEDURE — 1126F AMNT PAIN NOTED NONE PRSNT: CPT | Mod: HCNC,CPTII,S$GLB, | Performed by: INTERNAL MEDICINE

## 2024-05-20 NOTE — PROGRESS NOTES
Subjective:    Patient ID:  Aquiles Yates is a 83 y.o. male who presents for evaluation of Hypertension, Hyperlipidemia, Coronary Artery Disease, Atrial Fibrillation, and Peripheral Arterial Disease      HPI Pt presents for eval.  His current medical conditions include CAD (multiple PCI procedures), chronic diastolic CHF, h/o CVA, a fib,  HTN, DM, LAE, LVH, PAD, AAA stent graft (approx 2013), dementia, hyperlipidemia, cigar use.  Past hx pertinent for following:  H/o L LE stents in [place].   First PCI 1998, total of 5 stents with last one in [place] 2013.  Echo 7/19 normal LVEF, DD,  LVH, mild AI.   B LE vasc studies 7/19 B LE PAD, L > R LE.   S/p C Oct 2019 for NSTEMI.  Pt had PCI KYLIE x one to mid LCX ISR, KYLIE x 2 to OM2, and PCI prox RCA KYLIE x 2, PTCA mid RCA ISR.  Failed PCI distal occluded RCA.  EF 45%.  Nonobstructive LAD disease, small diffusely diseased diagonal vessels.  He developed post PCI GI bleed and required PRBC and EGD showing angiodysplasia tx w cauterization.  Echo 10/19 normal LV function, mild AI, LAE, LVH, inferolateral WMA.  Stress MPI 9/20 inferolateral scar with mild pato-infarct ischemia, EF 42%.  Echo 9/20 LVEF 40 - 45%, DD, LAE, LVH, WMA.  B LE arterial vasc studies 8/20:  Severe B LE PAD.   JOSELYN 8/20  R LE 0.76, L LE 0.62  Referred to vascular surgery for his PAD/AAA f/u.  He did see Dr. Mccarthy, [facility] surgery.  He was hospitalized 6/22 for TIA.  Patient had evidence of multiple lacunar/cerebellar infarcts on imaging, and mild carotid disease.  Was thought to be due to vascular disease.  Echo June 2022 normal EF, grade I DD, LVH, LAE.  S/p acute CVA Nov 2022.  Has been weaker, less talkative, off balance since his latest stroke.  Ecg 12/2/22 a fib noted, old inferior infarct.  A fib would be a new dx.  2 week Vital Holter Feb 2023: NSR, PAF noted.  S/p admission April 2023 w GI bleed.  Tx with EGD, cauterization of AV malformations.  Eliquis resumed but at low dose.  Now here.  Pt  last seen 1 year ago.  No longer smoking.  Pt wife is with pt.  No acute CV sxs noted.  Chart/labs reviewed.   CAD is stable.  No active angina.  Denies dyspnea.  No falls.  Weight has rebounded since last visit.  BP is stable.  Denies claudication sxs.  On statin tx.  Limited on walking.  In wheelchair coming in for visit.          Current Outpatient Medications:     alcohol swabs (ALCOHOL PREP PADS) PadM, Twice a day, Disp: 400 each, Rfl: 3    amLODIPine (NORVASC) 5 MG tablet, TAKE 1 TABLET BY MOUTH EVERY DAY, Disp: 90 tablet, Rfl: 3    aspirin (ECOTRIN) 81 MG EC tablet, Take 1 tablet (81 mg total) by mouth once daily., Disp: , Rfl: 0    atorvastatin (LIPITOR) 40 MG tablet, TAKE 1 TABLET BY MOUTH EVERY DAY, Disp: 90 tablet, Rfl: 0    blood sugar diagnostic Strp, Test blood sugars twice a day, Disp: 200 strip, Rfl: 6    donepeziL (ARICEPT) 5 MG tablet, Take 1 tablet (5 mg total) by mouth every evening., Disp: 30 tablet, Rfl: 5    ELIQUIS 2.5 mg Tab, TAKE 1 TABLET BY MOUTH TWICE A DAY, Disp: 60 tablet, Rfl: 6    gabapentin (NEURONTIN) 100 MG capsule, Take 1 capsule (100 mg total) by mouth 2 (two) times daily as needed. PRN, Disp: 60 capsule, Rfl: 3    isosorbide mononitrate (IMDUR) 120 MG 24 hr tablet, TAKE 1 TABLET BY MOUTH EVERY DAY, Disp: 90 tablet, Rfl: 3    lancets (ACCU-CHEK SOFTCLIX LANCETS) Misc, Test blood sugars twice a day, Disp: 200 each, Rfl: 6    memantine (NAMENDA) 10 MG Tab, Take 1 tablet (10 mg total) by mouth 2 (two) times daily., Disp: 60 tablet, Rfl: 6    metFORMIN (GLUCOPHAGE) 500 MG tablet, TAKE 1 TABLET BY MOUTH EVERY DAY WITH BREAKFAST, Disp: 90 tablet, Rfl: 3    pantoprazole (PROTONIX) 40 MG tablet, Take 1 tablet (40 mg total) by mouth once daily., Disp: 90 tablet, Rfl: 3      Review of Systems   Constitutional: Positive for decreased appetite and weight gain. Negative for weight loss.   HENT: Negative.     Eyes: Negative.    Cardiovascular: Negative.    Respiratory: Negative.     Endocrine:  "Negative.    Hematologic/Lymphatic: Negative.    Skin: Negative.    Musculoskeletal: Negative.    Gastrointestinal: Negative.    Genitourinary: Negative.    Neurological:  Positive for loss of balance and weakness.   Psychiatric/Behavioral:  Positive for memory loss.    Allergic/Immunologic: Negative.           /70 (BP Location: Left arm, Patient Position: Sitting, BP Method: Large (Manual))   Pulse (!) 59   Ht 5' 10" (1.778 m)   Wt 65.6 kg (144 lb 10 oz)   SpO2 95%   BMI 20.75 kg/m²     Wt Readings from Last 3 Encounters:   05/20/24 65.6 kg (144 lb 10 oz)   05/07/24 65.4 kg (144 lb 1.1 oz)   04/06/24 67 kg (147 lb 11.3 oz)     Temp Readings from Last 3 Encounters:   05/07/24 97.2 °F (36.2 °C)   04/06/24 98 °F (36.7 °C) (Oral)   03/27/24 97.2 °F (36.2 °C)     BP Readings from Last 3 Encounters:   05/20/24 120/70   05/07/24 128/70   04/06/24 132/68     Pulse Readings from Last 3 Encounters:   05/20/24 (!) 59   05/07/24 77   04/06/24 60       Objective:    Physical Exam  Vitals and nursing note reviewed.   Constitutional:       Appearance: He is well-developed.   HENT:      Head: Normocephalic.   Neck:      Thyroid: No thyromegaly.      Vascular: No carotid bruit or JVD.   Cardiovascular:      Rate and Rhythm: Normal rate. Rhythm irregularly irregular.      Pulses:           Radial pulses are 2+ on the right side and 2+ on the left side.      Heart sounds: S1 normal and S2 normal. Heart sounds not distant. No midsystolic click and no opening snap. No murmur heard.     No friction rub. No S3 or S4 sounds.   Pulmonary:      Effort: Pulmonary effort is normal.      Breath sounds: Normal breath sounds. No wheezing or rales.   Abdominal:      General: Bowel sounds are normal. There is no distension or abdominal bruit.      Palpations: Abdomen is soft. There is no mass.      Tenderness: There is no abdominal tenderness.   Musculoskeletal:      Cervical back: Neck supple.   Skin:     General: Skin is warm. "   Neurological:      Mental Status: He is alert.       I have reviewed all pertinent labs and cardiac studies.      Chemistry        Component Value Date/Time     05/07/2024 1150    K 3.8 05/07/2024 1150     05/07/2024 1150    CO2 22 (L) 05/07/2024 1150    BUN 29 (H) 05/07/2024 1150    CREATININE 1.3 05/07/2024 1150     (H) 05/07/2024 1150        Component Value Date/Time    CALCIUM 9.9 05/07/2024 1150    ALKPHOS 100 05/07/2024 1150    AST 11 05/07/2024 1150    ALT 9 (L) 05/07/2024 1150    BILITOT 0.6 05/07/2024 1150    ESTGFRAFRICA >60 06/15/2022 0459    EGFRNONAA >60 06/15/2022 0459        Lab Results   Component Value Date    WBC 5.92 05/07/2024    HGB 13.1 (L) 05/07/2024    HCT 41.2 05/07/2024    MCV 89 05/07/2024     05/07/2024       Lab Results   Component Value Date    HGBA1C 7.0 (H) 05/07/2024     Lab Results   Component Value Date    CHOL 150 05/07/2024    CHOL 158 11/04/2022    CHOL 124 06/13/2022     Lab Results   Component Value Date    HDL 36 (L) 05/07/2024    HDL 40 11/04/2022    HDL 34 (L) 06/13/2022     Lab Results   Component Value Date    LDLCALC 94.2 05/07/2024    LDLCALC 96.8 11/04/2022    LDLCALC 69.2 06/13/2022     Lab Results   Component Value Date    TRIG 99 05/07/2024    TRIG 106 11/04/2022    TRIG 104 06/13/2022     Lab Results   Component Value Date    CHOLHDL 24.0 05/07/2024    CHOLHDL 25.3 11/04/2022    CHOLHDL 27.4 06/13/2022         Results for orders placed during the hospital encounter of 06/13/22    Echo    Interpretation Summary  · The left ventricle is normal in size with severe concentric hypertrophy and normal systolic function.  · Mild left atrial enlargement.  · The estimated ejection fraction is 55%.  · Grade II left ventricular diastolic dysfunction.  · Normal right ventricular size with normal right ventricular systolic function.        Assessment:       1. CAD, multiple vessel    2. Abnormal ECG    3. AP (angina pectoris)    4. Claudication in  peripheral vascular disease    5. History of tobacco abuse/stopped 12/2023    6. History of PTCA    7. History of GI bleed    8. Essential hypertension    9. Dyslipidemia    10. Nonrheumatic aortic valve insufficiency    11. LAE (left atrial enlargement)    12. Ischemic cardiomyopathy    13. LVH (left ventricular hypertrophy)    14. Hypertensive heart disease with heart failure    15. S/P AAA repair    16. S/P PTCA (percutaneous transluminal coronary angioplasty)    17. PAF (paroxysmal atrial fibrillation)    18. PAD (peripheral artery disease)    19. PAC (premature atrial contraction)    20. History of CVA (cerebrovascular accident)    21. Type 2 diabetes mellitus with other specified complication, unspecified whether long term insulin use    22. Chronic diastolic heart failure    23. Coronary artery disease of native artery of native heart with stable angina pectoris    24. Anticoagulated         Plan:             Stable CV conditions on current med tx.  PAF: S/p acute CVA Nov 2022.  Ecg Dec 2022 a fib noted. New dx.  Continue Eliquis 2.5 mg bid.  Patient advised of indications for above medications, risks/benefits and side effect profile.  CAD: Stable. Continue OMT for CAD.  CHF: Compensated.  Fall risk precautions strongly advised.  Reviewed all tests and above medical conditions with patient in detail and formulated treatment plan.  Continue optimal medical treatment for cardiovascular conditions.  Cardiac low salt diet advised.  Watch weight loss; f/u w PCP.  GI bleed: stable. S/p EGD tx. F/u w GI.  Continue PPI tx.  HTN: Need for BP control and HTN goals (if needed) were discussed and tx plan formulated.  Goal < 130/80.  Continue current HTN meds.  Lipids: Importance of optimal lipid control were discussed in detail as well as possible pharmacologic and lifestyle changes that may be needed.  Continue statin tx.  Smoking: continued smoking cessation advised.  DM: optimal HGAIC control advised.  PAD: F/u w Vasc  Surgery as advised.      F/u in 1 year, sooner if needed.      I have reviewed all pertinent labs and cardiac studies independently. Plans and recommendations have been formulated under my direct supervision. All questions answered and patient voiced understanding.

## 2024-05-21 ENCOUNTER — HOSPITAL ENCOUNTER (EMERGENCY)
Facility: HOSPITAL | Age: 84
Discharge: HOME | End: 2024-05-22
Attending: EMERGENCY MEDICINE
Payer: MEDICARE

## 2024-05-21 DIAGNOSIS — R00.1 BRADYCARDIA: ICD-10-CM

## 2024-05-21 DIAGNOSIS — R53.1 WEAKNESS: ICD-10-CM

## 2024-05-21 LAB
ALBUMIN SERPL BCP-MCNC: 3.4 G/DL (ref 3.5–5.2)
ALP SERPL-CCNC: 126 U/L (ref 55–135)
ALT SERPL W/O P-5'-P-CCNC: 54 U/L (ref 10–44)
ANION GAP SERPL CALC-SCNC: 11 MMOL/L (ref 8–16)
AST SERPL-CCNC: 88 U/L (ref 10–40)
BACTERIA #/AREA URNS HPF: NORMAL /HPF
BASOPHILS # BLD AUTO: 0.02 K/UL (ref 0–0.2)
BASOPHILS NFR BLD: 0.4 % (ref 0–1.9)
BILIRUB SERPL-MCNC: 1.7 MG/DL (ref 0.1–1)
BILIRUB UR QL STRIP: NEGATIVE
BNP SERPL-MCNC: 269 PG/ML (ref 0–99)
BUN SERPL-MCNC: 30 MG/DL (ref 8–23)
CALCIUM SERPL-MCNC: 9.2 MG/DL (ref 8.7–10.5)
CHLORIDE SERPL-SCNC: 106 MMOL/L (ref 95–110)
CLARITY UR: CLEAR
CO2 SERPL-SCNC: 23 MMOL/L (ref 23–29)
COLOR UR: YELLOW
CREAT SERPL-MCNC: 1.3 MG/DL (ref 0.5–1.4)
DIFFERENTIAL METHOD BLD: ABNORMAL
EOSINOPHIL # BLD AUTO: 0 K/UL (ref 0–0.5)
EOSINOPHIL NFR BLD: 0.6 % (ref 0–8)
ERYTHROCYTE [DISTWIDTH] IN BLOOD BY AUTOMATED COUNT: 15 % (ref 11.5–14.5)
EST. GFR  (NO RACE VARIABLE): 54 ML/MIN/1.73 M^2
GLUCOSE SERPL-MCNC: 202 MG/DL (ref 70–110)
GLUCOSE UR QL STRIP: ABNORMAL
HCT VFR BLD AUTO: 38.8 % (ref 40–54)
HGB BLD-MCNC: 12.4 G/DL (ref 14–18)
HGB UR QL STRIP: NEGATIVE
HYALINE CASTS #/AREA URNS LPF: 1 /LPF
IMM GRANULOCYTES # BLD AUTO: 0.02 K/UL (ref 0–0.04)
IMM GRANULOCYTES NFR BLD AUTO: 0.4 % (ref 0–0.5)
KETONES UR QL STRIP: NEGATIVE
LEUKOCYTE ESTERASE UR QL STRIP: NEGATIVE
LYMPHOCYTES # BLD AUTO: 0.4 K/UL (ref 1–4.8)
LYMPHOCYTES NFR BLD: 7.3 % (ref 18–48)
MCH RBC QN AUTO: 27.6 PG (ref 27–31)
MCHC RBC AUTO-ENTMCNC: 32 G/DL (ref 32–36)
MCV RBC AUTO: 86 FL (ref 82–98)
MICROSCOPIC COMMENT: NORMAL
MONOCYTES # BLD AUTO: 0.2 K/UL (ref 0.3–1)
MONOCYTES NFR BLD: 3.3 % (ref 4–15)
NEUTROPHILS # BLD AUTO: 4.8 K/UL (ref 1.8–7.7)
NEUTROPHILS NFR BLD: 88 % (ref 38–73)
NITRITE UR QL STRIP: NEGATIVE
NRBC BLD-RTO: 0 /100 WBC
PH UR STRIP: 7 [PH] (ref 5–8)
PLATELET # BLD AUTO: 181 K/UL (ref 150–450)
PMV BLD AUTO: 10.7 FL (ref 9.2–12.9)
POTASSIUM SERPL-SCNC: 4.3 MMOL/L (ref 3.5–5.1)
PROT SERPL-MCNC: 7.1 G/DL (ref 6–8.4)
PROT UR QL STRIP: ABNORMAL
RBC # BLD AUTO: 4.49 M/UL (ref 4.6–6.2)
RBC #/AREA URNS HPF: 1 /HPF (ref 0–4)
SODIUM SERPL-SCNC: 140 MMOL/L (ref 136–145)
SP GR UR STRIP: 1.02 (ref 1–1.03)
TROPONIN I SERPL DL<=0.01 NG/ML-MCNC: 0.02 NG/ML (ref 0–0.03)
TSH SERPL DL<=0.005 MIU/L-ACNC: 1.49 UIU/ML (ref 0.4–4)
URN SPEC COLLECT METH UR: ABNORMAL
UROBILINOGEN UR STRIP-ACNC: >=8 EU/DL
WBC # BLD AUTO: 5.45 K/UL (ref 3.9–12.7)
WBC #/AREA URNS HPF: 0 /HPF (ref 0–5)

## 2024-05-21 PROCEDURE — 80053 COMPREHEN METABOLIC PANEL: CPT | Mod: HCNC | Performed by: EMERGENCY MEDICINE

## 2024-05-21 PROCEDURE — 93010 ELECTROCARDIOGRAM REPORT: CPT | Mod: HCNC,,, | Performed by: INTERNAL MEDICINE

## 2024-05-21 PROCEDURE — 99285 EMERGENCY DEPT VISIT HI MDM: CPT | Mod: 25,HCNC

## 2024-05-21 PROCEDURE — 93005 ELECTROCARDIOGRAM TRACING: CPT | Mod: HCNC

## 2024-05-21 PROCEDURE — 83880 ASSAY OF NATRIURETIC PEPTIDE: CPT | Mod: HCNC | Performed by: EMERGENCY MEDICINE

## 2024-05-21 PROCEDURE — 84443 ASSAY THYROID STIM HORMONE: CPT | Mod: HCNC | Performed by: EMERGENCY MEDICINE

## 2024-05-21 PROCEDURE — 84484 ASSAY OF TROPONIN QUANT: CPT | Mod: HCNC | Performed by: EMERGENCY MEDICINE

## 2024-05-21 PROCEDURE — 81000 URINALYSIS NONAUTO W/SCOPE: CPT | Mod: HCNC | Performed by: EMERGENCY MEDICINE

## 2024-05-21 PROCEDURE — 85025 COMPLETE CBC W/AUTO DIFF WBC: CPT | Mod: HCNC | Performed by: EMERGENCY MEDICINE

## 2024-05-21 RX ORDER — LIDOCAINE HYDROCHLORIDE 10 MG/ML
1 INJECTION INFILTRATION; PERINEURAL
Status: DISCONTINUED | OUTPATIENT
Start: 2024-05-21 | End: 2024-05-21

## 2024-05-22 VITALS
TEMPERATURE: 98 F | HEART RATE: 41 BPM | BODY MASS INDEX: 20.04 KG/M2 | OXYGEN SATURATION: 93 % | RESPIRATION RATE: 32 BRPM | SYSTOLIC BLOOD PRESSURE: 163 MMHG | DIASTOLIC BLOOD PRESSURE: 70 MMHG | WEIGHT: 140 LBS | HEIGHT: 70 IN

## 2024-05-22 LAB
OHS QRS DURATION: 98 MS
OHS QTC CALCULATION: 441 MS
TROPONIN I SERPL DL<=0.01 NG/ML-MCNC: 0.01 NG/ML (ref 0–0.03)

## 2024-05-22 RX ORDER — VALSARTAN 160 MG/1
160 TABLET ORAL DAILY
Qty: 90 TABLET | Refills: 3 | Status: SHIPPED | OUTPATIENT
Start: 2024-05-22 | End: 2025-05-22

## 2024-05-22 NOTE — ED PROVIDER NOTES
"SCRIBE #1 NOTE: I, Bonnie Louis, am scribing for, and in the presence of, Merary Keen MD. I have scribed the entire note.       History     Chief Complaint   Patient presents with    Bradycardia     Wife reports pt had a sudden onset of generalized weakness after getting up, when she checked his vitals BP was normal but HR was 34. Upon EMS arrival started vomiting, 12 Atropine given.      Review of patient's allergies indicates:   Allergen Reactions    Metoprolol Other (See Comments)     Junctional rhythm           History of Present Illness     HPI    5/21/2024, 9:32 PM  History obtained from the patient  Majority of the history was obtained from an independent historian: patient's wife at bedside      History of Present Illness: Aquiles Yates is a 83 y.o. male patient with a PMHx of Alzheimer's dementia, abdominal aortic aneurysm, chronic diastolic heart failure, CAD, PAD, PAF, DM, HLD, HTN, and tobacco abuse who presents to the Emergency Department via [facility] for evaluation of constant, moderate bradycardia which onset today. The patient's wife reports a low HR of 34 bpm this afternoon. HR at time of evaluation 40s-50s. She additionally reports generalized weakness, decreased appetite, N/V, and states that the patient has been pale and cold to touch today. The patient reports abdominal "discomfort". The patient's wife states that he started taking Donepezil 1-2 weeks ago. The patient's medications also include Amlodipine, Aspirin and Metformin  No mitigating or exacerbating factors reported. The patient and his wife deny any fever, diarrhea, SOB, melena, hematochezia, and all other sxs at this time. No prior Tx reported. No further complaints or concerns at this time.       Arrival mode: [facility]    PCP: Holger Thortnon MD        Past Medical History:  Past Medical History:   Diagnosis Date    Acute blood loss anemia 10/14/2019    Aneurysm, abdominal aortic     BCC (basal cell carcinoma of " skin) 06/29/2023    Chronic diastolic heart failure 05/20/2024    Coronary artery disease     Depression     Diabetes mellitus     HLD (hyperlipidemia)     Hypertension     Kidney stone     PAD (peripheral artery disease)     PAF (paroxysmal atrial fibrillation) 01/24/2023    Tobacco abuse        Past Surgical History:  Past Surgical History:   Procedure Laterality Date    APPENDECTOMY      CORONARY STENT PLACEMENT      x 4    ESOPHAGOGASTRODUODENOSCOPY N/A 10/14/2019    Procedure: EGD (ESOPHAGOGASTRODUODENOSCOPY);  Surgeon: Carlos Mcneal III, MD;  Location: [facility];  Service: Endoscopy;  Laterality: N/A;    ESOPHAGOGASTRODUODENOSCOPY N/A 10/15/2019    Procedure: EGD (ESOPHAGOGASTRODUODENOSCOPY);  Surgeon: Carlos Mcneal III, MD;  Location: [facility];  Service: Endoscopy;  Laterality: N/A;    ESOPHAGOGASTRODUODENOSCOPY N/A 4/17/2023    Procedure: EGD (ESOPHAGOGASTRODUODENOSCOPY);  Surgeon: Nevaeh Mcconnell MD;  Location: [facility];  Service: Endoscopy;  Laterality: N/A;    LEFT HEART CATHETERIZATION Left 10/11/2019    Procedure: CATHETERIZATION, HEART, LEFT;  Surgeon: Robe Gilbert MD;  Location: [facility] CATH LAB;  Service: Cardiology;  Laterality: Left;    LEFT HEART CATHETERIZATION Left 10/13/2019    Procedure: CATHETERIZATION, HEART, LEFT;  Surgeon: Robe Gilbert MD;  Location: [facility] CATH LAB;  Service: Cardiology;  Laterality: Left;    leg stent Left          Family History:  Family History   Problem Relation Name Age of Onset    Diabetes Mother      COPD Father      Diabetes Brother         Social History:  Social History     Tobacco Use    Smoking status: Former     Types: Cigars     Passive exposure: Never    Smokeless tobacco: Current   Substance and Sexual Activity    Alcohol use: Not Currently    Drug use: Not Currently    Sexual activity: Not Currently     Partners: Female        Review of Systems     Review of Systems   Constitutional:  Positive for appetite change (decreased). Negative for  fever.        [+] pale   HENT:  Negative for sore throat.    Respiratory:  Negative for shortness of breath.    Cardiovascular:  Negative for chest pain.        [+] bradycardia   Gastrointestinal:  Positive for abdominal pain, nausea and vomiting. Negative for blood in stool and diarrhea.   Genitourinary:  Negative for dysuria.   Musculoskeletal:  Negative for back pain.   Skin:  Negative for rash.   Neurological:  Positive for weakness (generalized).   Hematological:  Does not bruise/bleed easily.   All other systems reviewed and are negative.     Physical Exam     Initial Vitals [05/21/24 2035]   BP Pulse Resp Temp SpO2   (!) 160/74 67 18 98.4 °F (36.9 °C) 96 %      MAP       --          Physical Exam  Nursing Notes and Vital Signs Reviewed.  Constitutional: Patient is in no acute distress. Well-developed and well-nourished.  Head: Atraumatic. Normocephalic.  Eyes: PERRL. EOM intact. Conjunctivae are not pale. No scleral icterus.  ENT: Mucous membranes are moist. Oropharynx is clear and symmetric.    Neck: Supple. Full ROM. No lymphadenopathy.  Cardiovascular: Bradycardic. Regular rhythm. No murmurs, rubs, or gallops. Distal pulses are 2+ and symmetric.  Pulmonary/Chest: No respiratory distress. Clear to auscultation bilaterally. No wheezing or rales.  Abdominal: Soft and non-distended.  There is no tenderness.  No rebound, guarding, or rigidity. Good bowel sounds.  Genitourinary: No CVA tenderness  Musculoskeletal: Moves all extremities. No obvious deformities. No edema. No calf tenderness.  Skin: Warm and dry.  Neurological:  Alert, awake, and appropriate. Oriented to person and place. Confused to time. Normal speech.  No acute focal neurological deficits are appreciated.  Psychiatric: Normal affect. Good eye contact. Appropriate in content.     ED Course   Procedures  ED Vital Signs:  Vitals:    05/21/24 2035 05/21/24 2047 05/21/24 2100 05/21/24 2105   BP: (!) 160/74 (!) 169/74 (!) 175/79    Pulse: 67 (!) 53  "(!) 57 (!) 53   Resp: 18 20 (!) 25    Temp: 98.4 °F (36.9 °C)      TempSrc: Oral      SpO2: 96% 95% (!) 93%    Weight:  63.5 kg (140 lb)     Height: 5' 10" (1.778 m)       05/21/24 2200 05/21/24 2300 05/22/24 0000 05/22/24 0005   BP: (!) 141/65 (!) 150/67 (!) 163/70    Pulse: (!) 47 (!) 43 (!) 42 (!) 42   Resp: (!) 25 20 20 (!) 30   Temp:       TempSrc:       SpO2: 95% 95% (!) 94% (!) 94%   Weight:       Height:        05/22/24 0015   BP:    Pulse: (!) 41   Resp: (!) 32   Temp:    TempSrc:    SpO2: (!) 93%   Weight:    Height:        Abnormal Lab Results:  Labs Reviewed   CBC W/ AUTO DIFFERENTIAL - Abnormal; Notable for the following components:       Result Value    RBC 4.49 (*)     Hemoglobin 12.4 (*)     Hematocrit 38.8 (*)     RDW 15.0 (*)     Lymph # 0.4 (*)     Mono # 0.2 (*)     Gran % 88.0 (*)     Lymph % 7.3 (*)     Mono % 3.3 (*)     All other components within normal limits   COMPREHENSIVE METABOLIC PANEL - Abnormal; Notable for the following components:    Glucose 202 (*)     BUN 30 (*)     Albumin 3.4 (*)     Total Bilirubin 1.7 (*)     AST 88 (*)     ALT 54 (*)     eGFR 54 (*)     All other components within normal limits   B-TYPE NATRIURETIC PEPTIDE - Abnormal; Notable for the following components:     (*)     All other components within normal limits   URINALYSIS, REFLEX TO URINE CULTURE - Abnormal; Notable for the following components:    Protein, UA 2+ (*)     Glucose, UA Trace (*)     Urobilinogen, UA >=8.0 (*)     All other components within normal limits    Narrative:     Specimen Source->Urine   TROPONIN I   TSH   URINALYSIS MICROSCOPIC    Narrative:     Specimen Source->Urine   TROPONIN I        All Lab Results:  Results for orders placed or performed during the hospital encounter of 05/21/24   CBC auto differential   Result Value Ref Range    WBC 5.45 3.90 - 12.70 K/uL    RBC 4.49 (L) 4.60 - 6.20 M/uL    Hemoglobin 12.4 (L) 14.0 - 18.0 g/dL    Hematocrit 38.8 (L) 40.0 - 54.0 %    MCV " 86 82 - 98 fL    MCH 27.6 27.0 - 31.0 pg    MCHC 32.0 32.0 - 36.0 g/dL    RDW 15.0 (H) 11.5 - 14.5 %    Platelets 181 150 - 450 K/uL    MPV 10.7 9.2 - 12.9 fL    Immature Granulocytes 0.4 0.0 - 0.5 %    Gran # (ANC) 4.8 1.8 - 7.7 K/uL    Immature Grans (Abs) 0.02 0.00 - 0.04 K/uL    Lymph # 0.4 (L) 1.0 - 4.8 K/uL    Mono # 0.2 (L) 0.3 - 1.0 K/uL    Eos # 0.0 0.0 - 0.5 K/uL    Baso # 0.02 0.00 - 0.20 K/uL    nRBC 0 0 /100 WBC    Gran % 88.0 (H) 38.0 - 73.0 %    Lymph % 7.3 (L) 18.0 - 48.0 %    Mono % 3.3 (L) 4.0 - 15.0 %    Eosinophil % 0.6 0.0 - 8.0 %    Basophil % 0.4 0.0 - 1.9 %    Differential Method Automated    Comprehensive metabolic panel   Result Value Ref Range    Sodium 140 136 - 145 mmol/L    Potassium 4.3 3.5 - 5.1 mmol/L    Chloride 106 95 - 110 mmol/L    CO2 23 23 - 29 mmol/L    Glucose 202 (H) 70 - 110 mg/dL    BUN 30 (H) 8 - 23 mg/dL    Creatinine 1.3 0.5 - 1.4 mg/dL    Calcium 9.2 8.7 - 10.5 mg/dL    Total Protein 7.1 6.0 - 8.4 g/dL    Albumin 3.4 (L) 3.5 - 5.2 g/dL    Total Bilirubin 1.7 (H) 0.1 - 1.0 mg/dL    Alkaline Phosphatase 126 55 - 135 U/L    AST 88 (H) 10 - 40 U/L    ALT 54 (H) 10 - 44 U/L    eGFR 54 (A) >60 mL/min/1.73 m^2    Anion Gap 11 8 - 16 mmol/L   Brain natriuretic peptide   Result Value Ref Range     (H) 0 - 99 pg/mL   Troponin I   Result Value Ref Range    Troponin I 0.023 0.000 - 0.026 ng/mL   TSH   Result Value Ref Range    TSH 1.493 0.400 - 4.000 uIU/mL   Urinalysis, Reflex to Urine Culture Urine, Clean Catch    Specimen: Urine   Result Value Ref Range    Specimen UA Urine, Clean Catch     Color, UA Yellow Yellow, Straw, Caren    Appearance, UA Clear Clear    pH, UA 7.0 5.0 - 8.0    Specific Gravity, UA 1.020 1.005 - 1.030    Protein, UA 2+ (A) Negative    Glucose, UA Trace (A) Negative    Ketones, UA Negative Negative    Bilirubin (UA) Negative Negative    Occult Blood UA Negative Negative    Nitrite, UA Negative Negative    Urobilinogen, UA >=8.0 (A) <2.0 EU/dL     Leukocytes, UA Negative Negative   Urinalysis Microscopic   Result Value Ref Range    RBC, UA 1 0 - 4 /hpf    WBC, UA 0 0 - 5 /hpf    Bacteria None None-Occ /hpf    Hyaline Casts, UA 1 0-1/lpf /lpf    Microscopic Comment SEE COMMENT    Troponin I   Result Value Ref Range    Troponin I 0.015 0.000 - 0.026 ng/mL   EKG 12-lead   Result Value Ref Range    QRS Duration 98 ms    [facility]HS QTC Calculation 441 ms       Imaging Results:  Imaging Results              X-Ray Chest AP Portable (Final result)  Result time 05/21/24 21:32:30      Final result by Irwin Oglesby MD (05/21/24 21:32:30)                   Impression:      No acute abnormality.      Electronically signed by: Irwin Oglesby  Date:    05/21/2024  Time:    21:32               Narrative:    EXAMINATION:  XR CHEST AP PORTABLE    CLINICAL HISTORY:  Weakness    TECHNIQUE:  Single frontal view of the chest was performed.    COMPARISON:  None    FINDINGS:  The lungs are clear, with normal appearance of pulmonary vasculature and no pleural effusion or pneumothorax.    The cardiac silhouette is normal in size. The hilar and mediastinal contours are unremarkable.  Senescent changes.    Bones are intact.                                       The EKG was ordered, reviewed, and independently interpreted by the ED provider.  Interpretation time: 20:57  Rate: 56 BPM  Rhythm: sinus bradycardia  Interpretation: Minimal voltage criteria for LVH, may be normal variant. Inferior infarct, age undetermined. T wave abnormality, consider lateral ischemia. No STEMI.         The Emergency Provider reviewed the vital signs and test results, which are outlined above.     ED Discussion     12:24 AM: Reassessed pt at this time. Pt is requesting to be discharged. Discussed with pt all pertinent ED information and results. Discussed pt dx and plan of tx. Gave pt all f/u and return to the ED instructions. All questions and concerns were addressed at this time. Pt expresses understanding of  information and instructions, and is comfortable with plan to discharge. Pt is stable for discharge.    I discussed with patient and/or family/caretaker that evaluation in the ED does not suggest any emergent or life threatening medical conditions requiring immediate intervention beyond what was provided in the ED, and I believe patient is safe for discharge.  Regardless, an unremarkable evaluation in the ED does not preclude the development or presence of a serious of life threatening condition. As such, patient was instructed to return immediately for any worsening or change in current symptoms.        Medical Decision Making  Amount and/or Complexity of Data Reviewed  Independent Historian: spouse  Labs: ordered. Decision-making details documented in ED Course.  Radiology: ordered. Decision-making details documented in ED Course.  ECG/medicine tests: ordered and independent interpretation performed. Decision-making details documented in ED Course.    Risk  Prescription drug management.                ED Medication(s):  Medications - No data to display    Discharge Medication List as of 5/22/2024 12:24 AM        START taking these medications    Details   valsartan (DIOVAN) 160 MG tablet Take 1 tablet (160 mg total) by mouth once daily., Starting Wed 5/22/2024, Until Thu 5/22/2025, Print              Follow-up Information       Robe Gilbert MD In 1 day.    Specialties: Interventional Cardiology, Cardiology  Contact information:  [address]  [place] [address] [phone]               [facility] - Emergency Dept..    Specialty: Emergency Medicine  Why: As needed, If symptoms worsen  Contact information:  [address] [facility] [id number] [phone]                               Scribe Attestation:   Scribe #1: I performed the above scribed service and the documentation accurately describes the services I performed. I attest to the accuracy of the note.     Attending:    Physician Attestation Statement for Scribe #1: I, Merary Keen MD, personally performed the services described in this documentation, as scribed by Bonnie Louis, in my presence, and it is both accurate and complete.           Clinical Impression       ICD-10-CM ICD-9-CM   1. Bradycardia  R00.1 427.89   2. Weakness  R53.1 780.79       Disposition:   Disposition: Discharged  Condition: Stable         Merary Keen MD  05/23/24 0601

## 2024-05-23 ENCOUNTER — PATIENT OUTREACH (OUTPATIENT)
Dept: EMERGENCY MEDICINE | Facility: HOSPITAL | Age: 84
End: 2024-05-23
Payer: MEDICARE

## 2024-05-24 ENCOUNTER — DOCUMENT SCAN (OUTPATIENT)
Dept: HOME HEALTH SERVICES | Facility: HOSPITAL | Age: 84
End: 2024-05-24
Payer: MEDICARE

## 2024-05-30 ENCOUNTER — EXTERNAL HOME HEALTH (OUTPATIENT)
Dept: HOME HEALTH SERVICES | Facility: HOSPITAL | Age: 84
End: 2024-05-30
Payer: MEDICARE

## 2024-05-30 ENCOUNTER — OFFICE VISIT (OUTPATIENT)
Dept: OTOLARYNGOLOGY | Facility: CLINIC | Age: 84
End: 2024-05-30
Payer: MEDICARE

## 2024-05-30 VITALS — WEIGHT: 140 LBS | BODY MASS INDEX: 20.09 KG/M2

## 2024-05-30 DIAGNOSIS — H90.3 SENSORINEURAL HEARING LOSS (SNHL) OF BOTH EARS: Primary | ICD-10-CM

## 2024-05-30 DIAGNOSIS — K11.8 NODULE OF PAROTID GLAND: ICD-10-CM

## 2024-05-30 PROCEDURE — 1159F MED LIST DOCD IN RCRD: CPT | Mod: HCNC,CPTII,S$GLB, | Performed by: ORTHOPAEDIC SURGERY

## 2024-05-30 PROCEDURE — 1101F PT FALLS ASSESS-DOCD LE1/YR: CPT | Mod: HCNC,CPTII,S$GLB, | Performed by: ORTHOPAEDIC SURGERY

## 2024-05-30 PROCEDURE — 99999 PR PBB SHADOW E&M-EST. PATIENT-LVL III: CPT | Mod: PBBFAC,HCNC,, | Performed by: ORTHOPAEDIC SURGERY

## 2024-05-30 PROCEDURE — 3288F FALL RISK ASSESSMENT DOCD: CPT | Mod: HCNC,CPTII,S$GLB, | Performed by: ORTHOPAEDIC SURGERY

## 2024-05-30 PROCEDURE — 99203 OFFICE O/P NEW LOW 30 MIN: CPT | Mod: HCNC,S$GLB,, | Performed by: ORTHOPAEDIC SURGERY

## 2024-05-30 NOTE — PROGRESS NOTES
Subjective:      Patient ID: Aquiles Yates is a 83 y.o. male.    Chief Complaint: parotid gland (Parotid gland, trouble hearing.)    Patient is an 83 year old gentleman seen today for evaluation of his parotid gland.  He has recently seen his PCP and is here for further evaluation.  He and his family member accompanying him deny any swelling or masses in the area.  No overlying erythema, no tenderness.  He does have significant hearing loss, last audiogram in 2019.          Review of Systems   HENT:  Positive for hearing loss. Negative for ear pain.        Objective:       Physical Exam  Constitutional:       General: He is not in acute distress.     Appearance: He is well-developed.   HENT:      Head: Normocephalic and atraumatic.      Jaw: No trismus.      Right Ear: Tympanic membrane, ear canal and external ear normal. Decreased hearing noted.      Left Ear: Tympanic membrane, ear canal and external ear normal. Decreased hearing noted.      Nose: Nose normal. No nasal deformity, septal deviation, mucosal edema or rhinorrhea.      Mouth/Throat:      Dentition: Abnormal dentition. Dental caries present.      Pharynx: Uvula midline. No oropharyngeal exudate or uvula swelling.      Comments: No masses palpated or appreciated in either parotid gland, no lymphadenopathy, no tenderness, clear saliva from parotid duct  Eyes:      General: No scleral icterus.     Conjunctiva/sclera: Conjunctivae normal.      Right eye: Right conjunctiva is not injected. No chemosis.     Left eye: Left conjunctiva is not injected. No chemosis.     Pupils: Pupils are equal, round, and reactive to light.   Neck:      Thyroid: No thyroid mass or thyromegaly.      Trachea: Trachea and phonation normal. No tracheal tenderness or tracheal deviation.   Pulmonary:      Effort: Pulmonary effort is normal. No accessory muscle usage or respiratory distress.      Breath sounds: No stridor.   Lymphadenopathy:      Head:      Right side of  head: No submental, submandibular, preauricular or posterior auricular adenopathy.      Left side of head: No submental, submandibular, preauricular or posterior auricular adenopathy.      Cervical: No cervical adenopathy.      Right cervical: No superficial or deep cervical adenopathy.     Left cervical: No superficial or deep cervical adenopathy.   Skin:     General: Skin is warm and dry.      Findings: No erythema or rash.   Neurological:      Mental Status: He is alert and oriented to person, place, and time.      Cranial Nerves: No cranial nerve deficit.   Psychiatric:         Behavior: Behavior normal.         Thought Content: Thought content normal.     CTA NECK:  reviewed images, overall reported normal parotids.  On further review may have a small stone in the left parotid with some dilation distal    Assessment:       1. Sensorineural hearing loss (SNHL) of both ears    2. Nodule of parotid gland        Plan:     Sensorineural hearing loss (SNHL) of both ears    Nodule of parotid gland  -     Ambulatory referral/consult to ENT    Reassured patient and family member that exam today is reassuring, no sign of any large parotid mass and overall normal exam in 2023 (though possibly with a small stone in the left parotid gland).  Discussed conservative measures for management of parotitis including fluids, sialogogues, massage and warm compresses.    Family member is also concerned about hearing loss, audiogram at his convenience.

## 2024-05-31 ENCOUNTER — TELEPHONE (OUTPATIENT)
Dept: OTOLARYNGOLOGY | Facility: CLINIC | Age: 84
End: 2024-05-31
Payer: MEDICARE

## 2024-05-31 DIAGNOSIS — H90.3 SENSORINEURAL HEARING LOSS (SNHL) OF BOTH EARS: Primary | ICD-10-CM

## 2024-06-05 DIAGNOSIS — E78.5 DYSLIPIDEMIA: ICD-10-CM

## 2024-06-05 RX ORDER — ATORVASTATIN CALCIUM 40 MG/1
40 TABLET, FILM COATED ORAL
Qty: 90 TABLET | Refills: 0 | Status: SHIPPED | OUTPATIENT
Start: 2024-06-05

## 2024-06-05 NOTE — TELEPHONE ENCOUNTER
No care due was identified.  [facility] Embedded Care Due Messages. Reference number: [id number].   6/05/2024 12:22:21 AM CDT

## 2024-06-06 ENCOUNTER — DOCUMENT SCAN (OUTPATIENT)
Dept: HOME HEALTH SERVICES | Facility: HOSPITAL | Age: 84
End: 2024-06-06
Payer: MEDICARE

## 2024-06-15 ENCOUNTER — DOCUMENT SCAN (OUTPATIENT)
Dept: HOME HEALTH SERVICES | Facility: HOSPITAL | Age: 84
End: 2024-06-15
Payer: MEDICARE

## 2024-06-24 ENCOUNTER — DOCUMENT SCAN (OUTPATIENT)
Dept: HOME HEALTH SERVICES | Facility: HOSPITAL | Age: 84
End: 2024-06-24
Payer: MEDICARE

## 2024-07-02 ENCOUNTER — CLINICAL SUPPORT (OUTPATIENT)
Dept: AUDIOLOGY | Facility: CLINIC | Age: 84
End: 2024-07-02
Payer: MEDICARE

## 2024-07-02 ENCOUNTER — OFFICE VISIT (OUTPATIENT)
Dept: OTOLARYNGOLOGY | Facility: CLINIC | Age: 84
End: 2024-07-02
Payer: MEDICARE

## 2024-07-02 VITALS — HEIGHT: 70 IN | BODY MASS INDEX: 20.09 KG/M2

## 2024-07-02 DIAGNOSIS — H90.3 SENSORINEURAL HEARING LOSS (SNHL) OF BOTH EARS: ICD-10-CM

## 2024-07-02 DIAGNOSIS — H61.23 IMPACTED CERUMEN OF BOTH EARS: Primary | ICD-10-CM

## 2024-07-02 DIAGNOSIS — H90.3 HEARING LOSS, SENSORINEURAL, ASYMMETRICAL: Primary | ICD-10-CM

## 2024-07-02 PROCEDURE — 99999 PR PBB SHADOW E&M-EST. PATIENT-LVL III: CPT | Mod: PBBFAC,HCNC,, | Performed by: PHYSICIAN ASSISTANT

## 2024-07-02 PROCEDURE — 92567 TYMPANOMETRY: CPT | Mod: HCNC,S$GLB,, | Performed by: AUDIOLOGIST

## 2024-07-02 PROCEDURE — 92557 COMPREHENSIVE HEARING TEST: CPT | Mod: HCNC,S$GLB,, | Performed by: AUDIOLOGIST

## 2024-07-02 NOTE — PROGRESS NOTES
"Subjective:   Patient ID: Aquiles Yates is a 83 y.o. male.    Chief Complaint: Hearing Loss (Audio review.)    HPI  Review of patient's allergies indicates:   Allergen Reactions    Metoprolol Other (See Comments)     Junctional rhythm             Review of Systems   Constitutional:  Negative for fatigue, fever and unexpected weight change.   HENT:  Positive for hearing loss. Negative for congestion, ear discharge, ear pain, facial swelling, nosebleeds, postnasal drip, rhinorrhea, sinus pressure, sneezing, sore throat, tinnitus, trouble swallowing and voice change.    Eyes:  Negative for discharge, redness and itching.   Respiratory:  Negative for cough, choking, shortness of breath and wheezing.    Cardiovascular:  Negative for chest pain and palpitations.   Gastrointestinal:  Negative for abdominal pain.        No reflux.   Musculoskeletal:  Negative for neck pain.   Neurological:  Negative for dizziness, facial asymmetry, light-headedness and headaches.   Hematological:  Negative for adenopathy. Does not bruise/bleed easily.   Psychiatric/Behavioral:  Negative for agitation, behavioral problems, confusion and decreased concentration.          Objective:   Ht 5' 10" (1.778 m)   BMI 20.09 kg/m²     Physical Exam  Constitutional:       General: He is not in acute distress.     Appearance: He is well-developed.   HENT:      Head: Normocephalic and atraumatic.      Jaw: No trismus.      Right Ear: Tympanic membrane, ear canal and external ear normal. Decreased hearing noted. There is impacted cerumen.      Left Ear: Tympanic membrane, ear canal and external ear normal. Decreased hearing noted. There is impacted cerumen.      Nose: Nose normal. No nasal deformity, septal deviation, mucosal edema or rhinorrhea.      Mouth/Throat:      Dentition: Normal dentition.      Pharynx: Uvula midline. No oropharyngeal exudate or uvula swelling.   Eyes:      General: No scleral icterus.     Conjunctiva/sclera: " Conjunctivae normal.      Right eye: Right conjunctiva is not injected. No chemosis.     Left eye: Left conjunctiva is not injected. No chemosis.     Pupils: Pupils are equal, round, and reactive to light.   Neck:      Thyroid: No thyroid mass or thyromegaly.      Trachea: Trachea and phonation normal. No tracheal tenderness or tracheal deviation.   Pulmonary:      Effort: Pulmonary effort is normal. No accessory muscle usage or respiratory distress.      Breath sounds: No stridor.   Lymphadenopathy:      Head:      Right side of head: No submental, submandibular, preauricular or posterior auricular adenopathy.      Left side of head: No submental, submandibular, preauricular or posterior auricular adenopathy.      Cervical: No cervical adenopathy.      Right cervical: No superficial or deep cervical adenopathy.     Left cervical: No superficial or deep cervical adenopathy.   Skin:     General: Skin is warm and dry.      Findings: No erythema or rash.   Neurological:      Mental Status: He is alert and oriented to person, place, and time.      Cranial Nerves: No cranial nerve deficit.   Psychiatric:         Behavior: Behavior normal.         Thought Content: Thought content normal.      Procedure Note    CHIEF COMPLAINT:  Cerumen Impaction    Description:  The patient was seated in an exam chair.  An ear speculum was placed in the right EAC and was examined under the microscope.  Suction and/or loop curettes were used to remove a large cerumen impaction.  The tympanic membrane was visualized and was normal in appearance.  The procedure was repeated on the left side in a similar fashion.  The TM was intact and normal on this side as well.  The patient tolerated the procedure well.     Imaging :              Assessment:     1. Impacted cerumen of both ears    2. Sensorineural hearing loss (SNHL) of both ears        Plan:     Impacted cerumen of both ears    Sensorineural hearing loss (SNHL) of both ears       Cerumen  impaction:  Removed today without difficulty.  I would recommend the use of a wax softening drop, either over the counter Debrox or mineral oil, on a weekly basis.  I also instructed the patient to avoid Qtips.     SNHL: he is a candidate for HA. Will call insurance company to check for benefits. Audiogram provided to patient today.

## 2024-07-02 NOTE — PROGRESS NOTES
Aquiles Yates was seen 07/02/2024 for an audiological evaluation. Patient complains of bilateral gradual hearing loss. His last audiogram was in 2019 and he suspects a decrease since then. He tried hearing aids about 3-4 years ago but did not wear them consistently and then lost them.     Results reveal a moderate-to-severe sensorineural hearing loss 250-8000 Hz for the right ear, and  mild-to-profound sensorineural hearing loss 250-8000 Hz for the left ear.   Speech Reception Thresholds were  55 dBHL for the right ear and 50 dBHL for the left ear.   Word recognition scores could not be obtained due to language barrier.  Tympanograms were Type A, normal for the right ear and Type A, normal for the left ear.    Patient was counseled on the above findings.    Recommendations:  Follow-up with ENT, as scheduled for ear cleaning  Repeat audiological evaluation in 1-2 years to monitor hearing, or sooner if needed.  Consider a hearing aid consultation. Patient provided with clinic hearing aid information packet and a copy of audiogram.   Wear hearing protection devices when around loud noise.

## 2024-07-08 ENCOUNTER — DOCUMENT SCAN (OUTPATIENT)
Dept: HOME HEALTH SERVICES | Facility: HOSPITAL | Age: 84
End: 2024-07-08
Payer: MEDICARE

## 2024-08-12 PROBLEM — I63.9 CVA (CEREBRAL VASCULAR ACCIDENT): Status: RESOLVED | Noted: 2024-05-07 | Resolved: 2024-08-12

## 2024-09-05 DIAGNOSIS — E78.5 DYSLIPIDEMIA: ICD-10-CM

## 2024-09-05 RX ORDER — ATORVASTATIN CALCIUM 40 MG/1
40 TABLET, FILM COATED ORAL
Qty: 90 TABLET | Refills: 0 | Status: SHIPPED | OUTPATIENT
Start: 2024-09-05

## 2024-09-05 NOTE — TELEPHONE ENCOUNTER
Care Due:                  Date            Visit Type   Department     Provider  --------------------------------------------------------------------------------                                EP -                              PRIMARY      [facility]  Last Visit: 05-      CARE ([facility])   LALO Thornton                              EP -                              PRIMARY      [facility] FAMILY  Next Visit: 11-      CARE ([facility])   LALO Thornton                                                            Last  Test          Frequency    Reason                     Performed    Due Date  --------------------------------------------------------------------------------    HBA1C.......  6 months...  metFORMIN................  05- 11-    [facility] Embedded Care Due Messages. Reference number: [id number].   9/05/2024 12:29:19 AM CDT

## 2024-09-12 RX ORDER — APIXABAN 2.5 MG/1
TABLET, FILM COATED ORAL
Qty: 60 TABLET | Refills: 6 | Status: SHIPPED | OUTPATIENT
Start: 2024-09-12

## 2024-09-12 NOTE — TELEPHONE ENCOUNTER
Refill Routing Note   Medication(s) are not appropriate for processing by [facility] for the following reason(s):        Outside of protocol    [facility] action(s):  Route               Appointments  past 12m or future 3m with PCP    Date Provider   Last Visit   5/7/2024 Holger Thornton MD   Next Visit   11/7/2024 Holger Thornton MD   ED visits in past 90 days: 0        Note composed:10:49 AM 09/12/2024

## 2024-09-30 RX ORDER — METFORMIN HYDROCHLORIDE 500 MG/1
500 TABLET ORAL
Qty: 90 TABLET | Refills: 3 | Status: SHIPPED | OUTPATIENT
Start: 2024-09-30

## 2024-09-30 NOTE — TELEPHONE ENCOUNTER
No care due was identified.  [facility] Embedded Care Due Messages. Reference number: [id number].   9/30/2024 3:05:16 PM CDT

## 2024-10-01 ENCOUNTER — OFFICE VISIT (OUTPATIENT)
Dept: NEUROLOGY | Facility: CLINIC | Age: 84
End: 2024-10-01
Payer: MEDICARE

## 2024-10-01 VITALS
BODY MASS INDEX: 20.21 KG/M2 | WEIGHT: 141.13 LBS | HEART RATE: 45 BPM | DIASTOLIC BLOOD PRESSURE: 65 MMHG | HEIGHT: 70 IN | SYSTOLIC BLOOD PRESSURE: 128 MMHG

## 2024-10-01 DIAGNOSIS — R41.3 MEMORY DEFICIT: ICD-10-CM

## 2024-10-01 DIAGNOSIS — G30.9 MODERATE ALZHEIMER'S DEMENTIA WITH OTHER BEHAVIORAL DISTURBANCE, UNSPECIFIED TIMING OF DEMENTIA ONSET: Primary | ICD-10-CM

## 2024-10-01 DIAGNOSIS — E11.00 TYPE 2 DIABETES MELLITUS WITH HYPEROSMOLARITY WITHOUT COMA, WITHOUT LONG-TERM CURRENT USE OF INSULIN: Primary | ICD-10-CM

## 2024-10-01 DIAGNOSIS — F02.B18 MODERATE ALZHEIMER'S DEMENTIA WITH OTHER BEHAVIORAL DISTURBANCE, UNSPECIFIED TIMING OF DEMENTIA ONSET: Primary | ICD-10-CM

## 2024-10-01 PROCEDURE — 3074F SYST BP LT 130 MM HG: CPT | Mod: HCNC,CPTII,S$GLB, | Performed by: PSYCHIATRY & NEUROLOGY

## 2024-10-01 PROCEDURE — 1160F RVW MEDS BY RX/DR IN RCRD: CPT | Mod: HCNC,CPTII,S$GLB, | Performed by: PSYCHIATRY & NEUROLOGY

## 2024-10-01 PROCEDURE — 99999 PR PBB SHADOW E&M-EST. PATIENT-LVL III: CPT | Mod: PBBFAC,HCNC,, | Performed by: PSYCHIATRY & NEUROLOGY

## 2024-10-01 PROCEDURE — 1159F MED LIST DOCD IN RCRD: CPT | Mod: HCNC,CPTII,S$GLB, | Performed by: PSYCHIATRY & NEUROLOGY

## 2024-10-01 PROCEDURE — 3288F FALL RISK ASSESSMENT DOCD: CPT | Mod: HCNC,CPTII,S$GLB, | Performed by: PSYCHIATRY & NEUROLOGY

## 2024-10-01 PROCEDURE — 3078F DIAST BP <80 MM HG: CPT | Mod: HCNC,CPTII,S$GLB, | Performed by: PSYCHIATRY & NEUROLOGY

## 2024-10-01 PROCEDURE — 99213 OFFICE O/P EST LOW 20 MIN: CPT | Mod: HCNC,S$GLB,, | Performed by: PSYCHIATRY & NEUROLOGY

## 2024-10-01 PROCEDURE — 1101F PT FALLS ASSESS-DOCD LE1/YR: CPT | Mod: HCNC,CPTII,S$GLB, | Performed by: PSYCHIATRY & NEUROLOGY

## 2024-10-01 RX ORDER — DONEPEZIL HYDROCHLORIDE 5 MG/1
5 TABLET, FILM COATED ORAL NIGHTLY
Qty: 30 TABLET | Refills: 5 | Status: SHIPPED | OUTPATIENT
Start: 2024-10-01 | End: 2025-03-30

## 2024-10-01 RX ORDER — MEMANTINE HYDROCHLORIDE 10 MG/1
10 TABLET ORAL 2 TIMES DAILY
Qty: 60 TABLET | Refills: 6 | Status: SHIPPED | OUTPATIENT
Start: 2024-10-01

## 2024-10-01 NOTE — PROGRESS NOTES
Subjective:       Patient ID: Aquiles Yates is a 84 y.o. male.    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   Came with Caregiver.   New issues: none.   Memory: progressing according to Caregiver.   Sleepin to 10 hours.   Eating: stable.       Review of Systems   Neurological:         Memory difficulties.    All other systems reviewed and are negative.            Current Outpatient Medications:     alcohol swabs (ALCOHOL PREP PADS) PadM, Twice a day, Disp: 400 each, Rfl: 3    aspirin (ECOTRIN) 81 MG EC tablet, Take 1 tablet (81 mg total) by mouth once daily., Disp: , Rfl: 0    atorvastatin (LIPITOR) 40 MG tablet, TAKE 1 TABLET BY MOUTH EVERY DAY, Disp: 90 tablet, Rfl: 0    blood sugar diagnostic Strp, Test blood sugars twice a day, Disp: 200 strip, Rfl: 6    donepeziL (ARICEPT) 5 MG tablet, Take 1 tablet (5 mg total) by mouth every evening., Disp: 30 tablet, Rfl: 5    ELIQUIS 2.5 mg Tab, TAKE 1 TABLET BY MOUTH TWICE A DAY, Disp: 60 tablet, Rfl: 6    gabapentin (NEURONTIN) 100 MG capsule, Take 1 capsule (100 mg total) by mouth 2 (two) times daily as needed. PRN, Disp: 60 capsule, Rfl: 3    isosorbide mononitrate (IMDUR) 120 MG 24 hr tablet, TAKE 1 TABLET BY MOUTH EVERY DAY, Disp: 90 tablet, Rfl: 3    lancets (ACCU-CHEK SOFTCLIX LANCETS) Misc, Test blood sugars twice a day, Disp: 200 each, Rfl: 6    memantine (NAMENDA) 10 MG Tab, Take 1 tablet (10 mg total) by mouth 2 (two) times daily., Disp: 60 tablet, Rfl: 6    metFORMIN (GLUCOPHAGE) 500 MG tablet, TAKE 1 TABLET BY MOUTH EVERY DAY WITH BREAKFAST, Disp: 90 tablet, Rfl: 3    pantoprazole (PROTONIX) 40 MG tablet, Take 1 tablet (40 mg total) by mouth once daily., Disp: 90 tablet, Rfl: 3    valsartan (DIOVAN) 160 MG tablet, Take 1 tablet (160 mg total) by mouth once daily., Disp: 90 tablet, Rfl: 3    Past Medical History:   Diagnosis Date     Acute blood loss anemia 10/14/2019    Aneurysm, abdominal aortic     BCC (basal cell carcinoma of skin) 06/29/2023    Chronic diastolic heart failure 05/20/2024    Coronary artery disease     Depression     Diabetes mellitus     HLD (hyperlipidemia)     Hypertension     Kidney stone     PAD (peripheral artery disease)     PAF (paroxysmal atrial fibrillation) 01/24/2023    Tobacco abuse        Past Surgical History:   Procedure Laterality Date    APPENDECTOMY      CORONARY STENT PLACEMENT      x 4    ESOPHAGOGASTRODUODENOSCOPY N/A 10/14/2019    Procedure: EGD (ESOPHAGOGASTRODUODENOSCOPY);  Surgeon: Carlos Mcneal III, MD;  Location: [facility];  Service: Endoscopy;  Laterality: N/A;    ESOPHAGOGASTRODUODENOSCOPY N/A 10/15/2019    Procedure: EGD (ESOPHAGOGASTRODUODENOSCOPY);  Surgeon: Carlos Mcneal III, MD;  Location: [facility];  Service: Endoscopy;  Laterality: N/A;    ESOPHAGOGASTRODUODENOSCOPY N/A 4/17/2023    Procedure: EGD (ESOPHAGOGASTRODUODENOSCOPY);  Surgeon: Nevaeh Mcconnell MD;  Location: [facility];  Service: Endoscopy;  Laterality: N/A;    LEFT HEART CATHETERIZATION Left 10/11/2019    Procedure: CATHETERIZATION, HEART, LEFT;  Surgeon: Robe Gilbert MD;  Location: [facility] CATH LAB;  Service: Cardiology;  Laterality: Left;    LEFT HEART CATHETERIZATION Left 10/13/2019    Procedure: CATHETERIZATION, HEART, LEFT;  Surgeon: Robe Gilbert MD;  Location: [facility] CATH LAB;  Service: Cardiology;  Laterality: Left;    leg stent Left        Social History     Socioeconomic History    Marital status:    Tobacco Use    Smoking status: Former     Types: Cigars     Passive exposure: Never    Smokeless tobacco: Current   Substance and Sexual Activity    Alcohol use: Not Currently    Drug use: Not Currently    Sexual activity: Not Currently     Partners: Female     Social Drivers of Health     Financial Resource Strain: Low Risk  (3/27/2024)    Overall Financial Resource Strain (CARDIA)     Difficulty of Paying  Living Expenses: Not hard at all   Food Insecurity: Unknown (3/27/2024)    Hunger Vital Sign     Worried About Running Out of Food in the Last Year: Never true   Transportation Needs: No Transportation Needs (3/27/2024)    PRAPARE - Transportation     Lack of Transportation (Medical): No     Lack of Transportation (Non-Medical): No   Physical Activity: Inactive (3/27/2024)    Exercise Vital Sign     Days of Exercise per Week: 0 days     Minutes of Exercise per Session: 0 min   Stress: No Stress Concern Present (3/27/2024)    [employer] - Occupational Stress Questionnaire     Feeling of Stress : Only a little   Housing Stability: Unknown (3/27/2024)    Housing Stability Vital Sign     Unable to Pay for Housing in the Last Year: No     Unstable Housing in the Last Year: No       Past/Current Medical/Surgical History, Past/Current Social History, Past/Current Family History and Past/Current Medications were reviewed in detail.    Objective:     VITAL SIGNS WERE REVIEWED    GENERAL APPEARANCE:     The patient looks comfortable.    BMI    No signs of respiratory distress.    Normal breathing pattern.    No dysmorphic features    Normal eye contact.       GENERAL MEDICAL EXAM:    HEENT:  Head is atraumatic normocephalic.     FUNDOSCOPIC (OPHTHALMOSCOPIC) EXAMINATION showed no disc edema (papilledema).      NECK: No JVD. No visible lesions or goiters.     CHEST-CARDIOPULMONARY: No cyanosis. No tachypnea. Normal respiratory effort.    [id number]: No jaundice. No stomas or lesions. No visible hernias. No catheters.     SKIN, HAIR, NAILS: No pathognomonic skin rash.No neurofibromatosis. No visible lesions.No stigmata of autoimmune disease. No clubbing.    LIMBS: No varicose veins. No visible swelling.    MUSCULOSKELETAL: No visible deformities.No visible lesions.    Neurological Exam  Mental Status  Alert. Oriented only to person. Recalls 0 of 3 objects immediately.  Patient is nonverbal. Expressive aphasia present. Unable to perform serial calculations. Fund of knowledge is abnormal.    Cranial Nerves  CN II: Visual acuity is normal. Visual fields full to confrontation.  CN III, IV, VI: Extraocular movements intact bilaterally. Normal lids and orbits bilaterally. Pupils equal round and reactive to light bilaterally.  CN V: Facial sensation is normal.  CN VII: Full and symmetric facial movement.  CN VIII: Hearing is normal.  CN IX, X: Palate elevates symmetrically. Normal gag reflex.  CN XI: Shoulder shrug strength is normal.  CN XII: Tongue midline without atrophy or fasciculations.    Motor  Normal muscle bulk throughout. No fasciculations present. Normal muscle tone. No abnormal involuntary movements. Strength is 5/5 throughout all four extremities.    Sensory  Sensation is intact to light touch, pinprick, vibration and proprioception in all four extremities.    Reflexes                                            Right                      Left  Brachioradialis                    2+                         2+  Biceps                                 2+                         2+  Triceps                                2+                         2+  Finger flex                           2+                         2+  Hamstring                            2+                         2+  Patellar                                2+                         2+  Achilles                                2+                         2+    Right pathological reflexes: Valorie's absent. Ankle clonus absent.  Left pathological reflexes: Valorie's absent. Ankle clonus absent.    Coordination    Finger-to-nose, rapid alternating movements and heel-to-shin normal bilaterally without dysmetria.    Gait  Casual gait is normal including stance, stride, and arm swing.  Walker. .        Lab Results   Component Value Date    WBC 5.45 05/21/2024    HGB 12.4 (L) 05/21/2024    HCT 38.8 (L) 05/21/2024     MCV 86 05/21/2024     05/21/2024       Sodium   Date Value Ref Range Status   05/21/2024 140 136 - 145 mmol/L Final     Potassium   Date Value Ref Range Status   05/21/2024 4.3 3.5 - 5.1 mmol/L Final     Chloride   Date Value Ref Range Status   05/21/2024 106 95 - 110 mmol/L Final     CO2   Date Value Ref Range Status   05/21/2024 23 23 - 29 mmol/L Final     Glucose   Date Value Ref Range Status   05/21/2024 202 (H) 70 - 110 mg/dL Final     BUN   Date Value Ref Range Status   05/21/2024 30 (H) 8 - 23 mg/dL Final     Creatinine   Date Value Ref Range Status   05/21/2024 1.3 0.5 - 1.4 mg/dL Final     Calcium   Date Value Ref Range Status   05/21/2024 9.2 8.7 - 10.5 mg/dL Final     Total Protein   Date Value Ref Range Status   05/21/2024 7.1 6.0 - 8.4 g/dL Final     Albumin   Date Value Ref Range Status   05/21/2024 3.4 (L) 3.5 - 5.2 g/dL Final     Total Bilirubin   Date Value Ref Range Status   05/21/2024 1.7 (H) 0.1 - 1.0 mg/dL Final     Comment:     For infants and newborns, interpretation of results should be based  on gestational age, weight and in agreement with clinical  observations.    Premature Infant recommended reference ranges:  Up to 24 hours.............<8.0 mg/dL  Up to 48 hours............<12.0 mg/dL  3-5 days..................<15.0 mg/dL  6-29 days.................<15.0 mg/dL       Alkaline Phosphatase   Date Value Ref Range Status   05/21/2024 126 55 - 135 U/L Final     AST   Date Value Ref Range Status   05/21/2024 88 (H) 10 - 40 U/L Final     ALT   Date Value Ref Range Status   05/21/2024 54 (H) 10 - 44 U/L Final     Anion Gap   Date Value Ref Range Status   05/21/2024 11 8 - 16 mmol/L Final     eGFR if    Date Value Ref Range Status   06/15/2022 >60 >60 mL/min/1.73 m^2 Final     eGFR if non    Date Value Ref Range Status   06/15/2022 >60 >60 mL/min/1.73 m^2 Final     Comment:     Calculation used to obtain the estimated glomerular filtration  rate (eGFR) is  the CKD-EPI equation.          Lab Results   Component Value Date    ISZEVSKH09 >2000 (H) 08/01/2023       Lab Results   Component Value Date    TSH 1.493 05/21/2024     No results found in the last 24 hours.    LABORATORY EVALUATION    RADIOLOGY EVALUATION     NEUROPHYSIOLOGY EVALUATION     PATHOLOGY EVALUATION      NEUROCOGNITIVE AND NEUROPSYCHOLOGY EVALUATION     Reviewed the neuroimaging independently     MMSE: 23/ 30 - missed 3 out of 3 in 5 minutes.        10/01/ 2024:   MRUIEL COGNITIVE ASSESSMENT (MOCA) TOTAL SCORE:  Unable to do it due to severe cognitive impairment.     Assessment:   Patient Neurological Assessment is remarkable for short term memory lost / cognitive wise for the most part seems ok.   - CVA.  - Aneurysm, Brain.    - Dementia, Vascular.    - Alzheimer's, Dementia.      Plan:   Patient continue progressive deterioration cognitive function.     Takes - Ensure 1 to 2 times a day.     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   Alzheimer - Medial temporal lobe atrophy score is 2.5 on the right and 1 on the left.   Discussed imagines with Patient and caregiver.     Continue Namenda / Aricept.   Discussed Aricept - Wife wants to do imagines and come back to discuss more medications.   Last MRI / CTA - 2 years ago / Small Brain Aneurysm present.      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  Atherosclerosis and mild-to-moderate grade stenosis right cavernous carotid artery.  Unchanged bilateral parotid nodules, likely intraparotid lymph nodes.     Personally reviewed MRI Brain - done 12/ 2023 -  No acute intracranial abnormality.   Similar appearance of multifocal remote infarcts as detailed in the body of the report.   Minor chronic microvascular ischemic changes.   Medial temporal lobe atrophy score.     Labs: Vit B 12 / Folate / CBC / TSH - done 08/ 2023 - non significant abnormalities.       Personally reviewed CT Scan Head - done 04/ 2023 - Chronic microvascular ischemic changes. B rain Atrophy.     MEDICAL/SURGICAL COMORBIDITIES     All relevant medical comorbidities noted and managed by primary care physician and medical care team.      HEALTHY LIFESTYLE AND PREVENTATIVE CARE    The patient to adhere to the age-appropriate health maintenance guidelines including screening tests and vaccinations.   The patient to adhere to  healthy lifestyle, optimal weight, exercise, healthy diet,   good sleep hygiene and avoiding drugs including smoking, alcohol and recreational drugs.    I spent a total of 25 minutes on the day of the visit.This includes face to face time and non-face to face time preparing to see the patient (eg, review of tests),   obtaining and/or reviewing separately obtained history, documenting clinical information in the electronic or other health record,   independently interpreting results and communicating results to the patient/family/caregiver, or care coordinator.    Please do not hesitate to contact me with any updates, questions or concerns.    Mason Tompkins MD  General Neurology.

## 2024-10-05 ENCOUNTER — HOSPITAL ENCOUNTER (INPATIENT)
Facility: HOSPITAL | Age: 84
LOS: 12 days | Discharge: HOME | DRG: 417 | End: 2024-10-18
Attending: EMERGENCY MEDICINE | Admitting: HOSPITALIST
Payer: MEDICARE

## 2024-10-05 DIAGNOSIS — R79.89 ABNORMAL LFTS: ICD-10-CM

## 2024-10-05 DIAGNOSIS — R00.1 SYMPTOMATIC BRADYCARDIA: ICD-10-CM

## 2024-10-05 DIAGNOSIS — R53.1 WEAKNESS: ICD-10-CM

## 2024-10-05 DIAGNOSIS — K80.71 CALCULUS OF GALLBLADDER AND BILE DUCT WITH OBSTRUCTION WITHOUT CHOLECYSTITIS: Primary | ICD-10-CM

## 2024-10-05 DIAGNOSIS — F03.90 DEMENTIA, UNSPECIFIED DEMENTIA SEVERITY, UNSPECIFIED DEMENTIA TYPE, UNSPECIFIED WHETHER BEHAVIORAL, PSYCHOTIC, OR MOOD DISTURBANCE OR ANXIETY: ICD-10-CM

## 2024-10-05 DIAGNOSIS — R07.9 CHEST PAIN: ICD-10-CM

## 2024-10-05 DIAGNOSIS — K80.51 CALCULUS OF BILE DUCT WITHOUT CHOLECYSTITIS WITH OBSTRUCTION: ICD-10-CM

## 2024-10-05 DIAGNOSIS — R00.1 BRADYCARDIA: ICD-10-CM

## 2024-10-05 DIAGNOSIS — I48.0 PAROXYSMAL ATRIAL FIBRILLATION: ICD-10-CM

## 2024-10-05 LAB
ALBUMIN SERPL BCP-MCNC: 3.7 G/DL (ref 3.5–5.2)
ALP SERPL-CCNC: 387 U/L (ref 55–135)
ALT SERPL W/O P-5'-P-CCNC: 179 U/L (ref 10–44)
ANION GAP SERPL CALC-SCNC: 14 MMOL/L (ref 8–16)
AST SERPL-CCNC: 242 U/L (ref 10–40)
BACTERIA #/AREA URNS HPF: NORMAL /HPF
BASOPHILS # BLD AUTO: 0.01 K/UL (ref 0–0.2)
BASOPHILS NFR BLD: 0.2 % (ref 0–1.9)
BILIRUB SERPL-MCNC: 2.6 MG/DL (ref 0.1–1)
BILIRUB UR QL STRIP: NEGATIVE
BNP SERPL-MCNC: 323 PG/ML (ref 0–99)
BUN SERPL-MCNC: 24 MG/DL (ref 8–23)
CALCIUM SERPL-MCNC: 9.8 MG/DL (ref 8.7–10.5)
CHLORIDE SERPL-SCNC: 104 MMOL/L (ref 95–110)
CLARITY UR: CLEAR
CO2 SERPL-SCNC: 22 MMOL/L (ref 23–29)
COLOR UR: YELLOW
CREAT SERPL-MCNC: 1.2 MG/DL (ref 0.5–1.4)
DIFFERENTIAL METHOD BLD: ABNORMAL
EOSINOPHIL # BLD AUTO: 0.1 K/UL (ref 0–0.5)
EOSINOPHIL NFR BLD: 1 % (ref 0–8)
ERYTHROCYTE [DISTWIDTH] IN BLOOD BY AUTOMATED COUNT: 14.1 % (ref 11.5–14.5)
EST. GFR  (NO RACE VARIABLE): 59 ML/MIN/1.73 M^2
GLUCOSE SERPL-MCNC: 202 MG/DL (ref 70–110)
GLUCOSE UR QL STRIP: NEGATIVE
HCT VFR BLD AUTO: 43.2 % (ref 40–54)
HGB BLD-MCNC: 14.3 G/DL (ref 14–18)
HGB UR QL STRIP: NEGATIVE
HYALINE CASTS #/AREA URNS LPF: 1 /LPF
IMM GRANULOCYTES # BLD AUTO: 0.02 K/UL (ref 0–0.04)
IMM GRANULOCYTES NFR BLD AUTO: 0.3 % (ref 0–0.5)
INFLUENZA A, MOLECULAR: NEGATIVE
INFLUENZA B, MOLECULAR: NEGATIVE
KETONES UR QL STRIP: NEGATIVE
LACTATE SERPL-SCNC: 2.8 MMOL/L (ref 0.5–2.2)
LEUKOCYTE ESTERASE UR QL STRIP: NEGATIVE
LYMPHOCYTES # BLD AUTO: 0.2 K/UL (ref 1–4.8)
LYMPHOCYTES NFR BLD: 3.8 % (ref 18–48)
MCH RBC QN AUTO: 29.4 PG (ref 27–31)
MCHC RBC AUTO-ENTMCNC: 33.1 G/DL (ref 32–36)
MCV RBC AUTO: 89 FL (ref 82–98)
MICROSCOPIC COMMENT: NORMAL
MONOCYTES # BLD AUTO: 0.3 K/UL (ref 0.3–1)
MONOCYTES NFR BLD: 5.5 % (ref 4–15)
NEUTROPHILS # BLD AUTO: 5.4 K/UL (ref 1.8–7.7)
NEUTROPHILS NFR BLD: 89.2 % (ref 38–73)
NITRITE UR QL STRIP: NEGATIVE
NRBC BLD-RTO: 0 /100 WBC
PH UR STRIP: 8 [PH] (ref 5–8)
PLATELET # BLD AUTO: 124 K/UL (ref 150–450)
PMV BLD AUTO: 11.8 FL (ref 9.2–12.9)
POCT GLUCOSE: 200 MG/DL (ref 70–110)
POTASSIUM SERPL-SCNC: 4.8 MMOL/L (ref 3.5–5.1)
PROT SERPL-MCNC: 7.8 G/DL (ref 6–8.4)
PROT UR QL STRIP: ABNORMAL
RBC # BLD AUTO: 4.86 M/UL (ref 4.6–6.2)
RBC #/AREA URNS HPF: 3 /HPF (ref 0–4)
SARS-COV-2 RDRP RESP QL NAA+PROBE: NEGATIVE
SODIUM SERPL-SCNC: 140 MMOL/L (ref 136–145)
SP GR UR STRIP: 1.02 (ref 1–1.03)
SPECIMEN SOURCE: NORMAL
TROPONIN I SERPL DL<=0.01 NG/ML-MCNC: 0.02 NG/ML (ref 0–0.03)
URN SPEC COLLECT METH UR: ABNORMAL
UROBILINOGEN UR STRIP-ACNC: ABNORMAL EU/DL
WBC # BLD AUTO: 6.02 K/UL (ref 3.9–12.7)
WBC #/AREA URNS HPF: 5 /HPF (ref 0–5)

## 2024-10-05 PROCEDURE — 93010 ELECTROCARDIOGRAM REPORT: CPT | Mod: HCNC,,, | Performed by: INTERNAL MEDICINE

## 2024-10-05 PROCEDURE — 81000 URINALYSIS NONAUTO W/SCOPE: CPT | Mod: HCNC | Performed by: EMERGENCY MEDICINE

## 2024-10-05 PROCEDURE — 82962 GLUCOSE BLOOD TEST: CPT | Mod: HCNC

## 2024-10-05 PROCEDURE — 99285 EMERGENCY DEPT VISIT HI MDM: CPT | Mod: 25,HCNC

## 2024-10-05 PROCEDURE — 87502 INFLUENZA DNA AMP PROBE: CPT | Mod: HCNC | Performed by: EMERGENCY MEDICINE

## 2024-10-05 PROCEDURE — 83880 ASSAY OF NATRIURETIC PEPTIDE: CPT | Mod: HCNC | Performed by: EMERGENCY MEDICINE

## 2024-10-05 PROCEDURE — 80053 COMPREHEN METABOLIC PANEL: CPT | Mod: HCNC | Performed by: EMERGENCY MEDICINE

## 2024-10-05 PROCEDURE — U0002 COVID-19 LAB TEST NON-CDC: HCPCS | Mod: HCNC | Performed by: EMERGENCY MEDICINE

## 2024-10-05 PROCEDURE — 84484 ASSAY OF TROPONIN QUANT: CPT | Mod: HCNC | Performed by: EMERGENCY MEDICINE

## 2024-10-05 PROCEDURE — 83605 ASSAY OF LACTIC ACID: CPT | Mod: HCNC | Performed by: EMERGENCY MEDICINE

## 2024-10-05 PROCEDURE — 85025 COMPLETE CBC W/AUTO DIFF WBC: CPT | Mod: HCNC | Performed by: EMERGENCY MEDICINE

## 2024-10-05 PROCEDURE — 93005 ELECTROCARDIOGRAM TRACING: CPT | Mod: HCNC

## 2024-10-05 NOTE — Clinical Note
Pt recovering in cath lab. Vitals checked Q5min. Temp pacer set at 76bpm, 20 output and 2 sensitivity, VVI.

## 2024-10-05 NOTE — Clinical Note
The catheter was inserted in the right ventricle. The transvenous pacing lead was inserted into the RV and was placed and capture was confirmed. The pacer was paced at 76 BPM 20 mA 2 mV.

## 2024-10-06 PROBLEM — K80.50 CHOLEDOCHOLITHIASIS: Status: ACTIVE | Noted: 2024-10-06

## 2024-10-06 PROBLEM — K80.51 CALCULUS OF BILE DUCT WITHOUT CHOLECYSTITIS WITH OBSTRUCTION: Status: ACTIVE | Noted: 2024-10-06

## 2024-10-06 LAB
ALBUMIN SERPL BCP-MCNC: 3 G/DL (ref 3.5–5.2)
ALBUMIN SERPL BCP-MCNC: 3 G/DL (ref 3.5–5.2)
ALP SERPL-CCNC: 301 U/L (ref 55–135)
ALP SERPL-CCNC: 301 U/L (ref 55–135)
ALT SERPL W/O P-5'-P-CCNC: 217 U/L (ref 10–44)
ALT SERPL W/O P-5'-P-CCNC: 217 U/L (ref 10–44)
ANION GAP SERPL CALC-SCNC: 12 MMOL/L (ref 8–16)
ANION GAP SERPL CALC-SCNC: 12 MMOL/L (ref 8–16)
AST SERPL-CCNC: 218 U/L (ref 10–40)
AST SERPL-CCNC: 218 U/L (ref 10–40)
BASOPHILS # BLD AUTO: 0.01 K/UL (ref 0–0.2)
BASOPHILS # BLD AUTO: 0.01 K/UL (ref 0–0.2)
BASOPHILS NFR BLD: 0.2 % (ref 0–1.9)
BASOPHILS NFR BLD: 0.2 % (ref 0–1.9)
BILIRUB DIRECT SERPL-MCNC: 2.7 MG/DL (ref 0.1–0.3)
BILIRUB SERPL-MCNC: 3.8 MG/DL (ref 0.1–1)
BILIRUB SERPL-MCNC: 3.8 MG/DL (ref 0.1–1)
BUN SERPL-MCNC: 30 MG/DL (ref 8–23)
BUN SERPL-MCNC: 30 MG/DL (ref 8–23)
CALCIUM SERPL-MCNC: 9.2 MG/DL (ref 8.7–10.5)
CALCIUM SERPL-MCNC: 9.2 MG/DL (ref 8.7–10.5)
CHLORIDE SERPL-SCNC: 105 MMOL/L (ref 95–110)
CHLORIDE SERPL-SCNC: 105 MMOL/L (ref 95–110)
CO2 SERPL-SCNC: 23 MMOL/L (ref 23–29)
CO2 SERPL-SCNC: 23 MMOL/L (ref 23–29)
CREAT SERPL-MCNC: 1.4 MG/DL (ref 0.5–1.4)
CREAT SERPL-MCNC: 1.4 MG/DL (ref 0.5–1.4)
DIFFERENTIAL METHOD BLD: ABNORMAL
DIFFERENTIAL METHOD BLD: ABNORMAL
EOSINOPHIL # BLD AUTO: 0 K/UL (ref 0–0.5)
EOSINOPHIL # BLD AUTO: 0 K/UL (ref 0–0.5)
EOSINOPHIL NFR BLD: 0.2 % (ref 0–8)
EOSINOPHIL NFR BLD: 0.2 % (ref 0–8)
ERYTHROCYTE [DISTWIDTH] IN BLOOD BY AUTOMATED COUNT: 14.2 % (ref 11.5–14.5)
ERYTHROCYTE [DISTWIDTH] IN BLOOD BY AUTOMATED COUNT: 14.2 % (ref 11.5–14.5)
EST. GFR  (NO RACE VARIABLE): 49 ML/MIN/1.73 M^2
EST. GFR  (NO RACE VARIABLE): 49 ML/MIN/1.73 M^2
GLUCOSE SERPL-MCNC: 171 MG/DL (ref 70–110)
GLUCOSE SERPL-MCNC: 171 MG/DL (ref 70–110)
HCT VFR BLD AUTO: 33.6 % (ref 40–54)
HCT VFR BLD AUTO: 37.8 % (ref 40–54)
HCT VFR BLD AUTO: 37.8 % (ref 40–54)
HGB BLD-MCNC: 11.3 G/DL (ref 14–18)
HGB BLD-MCNC: 12.5 G/DL (ref 14–18)
HGB BLD-MCNC: 12.5 G/DL (ref 14–18)
IMM GRANULOCYTES # BLD AUTO: 0.03 K/UL (ref 0–0.04)
IMM GRANULOCYTES # BLD AUTO: 0.03 K/UL (ref 0–0.04)
IMM GRANULOCYTES NFR BLD AUTO: 0.5 % (ref 0–0.5)
IMM GRANULOCYTES NFR BLD AUTO: 0.5 % (ref 0–0.5)
INR PPP: 1.1 (ref 0.8–1.2)
LACTATE SERPL-SCNC: 1.2 MMOL/L (ref 0.5–2.2)
LACTATE SERPL-SCNC: 2.3 MMOL/L (ref 0.5–2.2)
LYMPHOCYTES # BLD AUTO: 0.3 K/UL (ref 1–4.8)
LYMPHOCYTES # BLD AUTO: 0.3 K/UL (ref 1–4.8)
LYMPHOCYTES NFR BLD: 4 % (ref 18–48)
LYMPHOCYTES NFR BLD: 4 % (ref 18–48)
MCH RBC QN AUTO: 29.3 PG (ref 27–31)
MCH RBC QN AUTO: 29.3 PG (ref 27–31)
MCHC RBC AUTO-ENTMCNC: 33.1 G/DL (ref 32–36)
MCHC RBC AUTO-ENTMCNC: 33.1 G/DL (ref 32–36)
MCV RBC AUTO: 89 FL (ref 82–98)
MCV RBC AUTO: 89 FL (ref 82–98)
MONOCYTES # BLD AUTO: 0.3 K/UL (ref 0.3–1)
MONOCYTES # BLD AUTO: 0.3 K/UL (ref 0.3–1)
MONOCYTES NFR BLD: 5.1 % (ref 4–15)
MONOCYTES NFR BLD: 5.1 % (ref 4–15)
NEUTROPHILS # BLD AUTO: 5.7 K/UL (ref 1.8–7.7)
NEUTROPHILS # BLD AUTO: 5.7 K/UL (ref 1.8–7.7)
NEUTROPHILS NFR BLD: 90 % (ref 38–73)
NEUTROPHILS NFR BLD: 90 % (ref 38–73)
NRBC BLD-RTO: 0 /100 WBC
NRBC BLD-RTO: 0 /100 WBC
OHS QRS DURATION: 86 MS
OHS QTC CALCULATION: 393 MS
PLATELET # BLD AUTO: 122 K/UL (ref 150–450)
PLATELET # BLD AUTO: 122 K/UL (ref 150–450)
PMV BLD AUTO: 11.4 FL (ref 9.2–12.9)
PMV BLD AUTO: 11.4 FL (ref 9.2–12.9)
POTASSIUM SERPL-SCNC: 4.3 MMOL/L (ref 3.5–5.1)
POTASSIUM SERPL-SCNC: 4.3 MMOL/L (ref 3.5–5.1)
PROT SERPL-MCNC: 6.3 G/DL (ref 6–8.4)
PROT SERPL-MCNC: 6.3 G/DL (ref 6–8.4)
PROTHROMBIN TIME: 12.8 SEC (ref 9–12.5)
RBC # BLD AUTO: 4.27 M/UL (ref 4.6–6.2)
RBC # BLD AUTO: 4.27 M/UL (ref 4.6–6.2)
SODIUM SERPL-SCNC: 140 MMOL/L (ref 136–145)
SODIUM SERPL-SCNC: 140 MMOL/L (ref 136–145)
WBC # BLD AUTO: 6.28 K/UL (ref 3.9–12.7)
WBC # BLD AUTO: 6.28 K/UL (ref 3.9–12.7)

## 2024-10-06 PROCEDURE — 11000001 HC ACUTE MED/SURG PRIVATE ROOM: Mod: HCNC

## 2024-10-06 PROCEDURE — 96365 THER/PROPH/DIAG IV INF INIT: CPT | Mod: HCNC

## 2024-10-06 PROCEDURE — 85018 HEMOGLOBIN: CPT | Mod: HCNC | Performed by: FAMILY MEDICINE

## 2024-10-06 PROCEDURE — 99223 1ST HOSP IP/OBS HIGH 75: CPT | Mod: HCNC,,, | Performed by: INTERNAL MEDICINE

## 2024-10-06 PROCEDURE — 63600175 PHARM REV CODE 636 W HCPCS: Mod: HCNC | Performed by: NURSE PRACTITIONER

## 2024-10-06 PROCEDURE — 25000003 PHARM REV CODE 250: Mod: HCNC | Performed by: NURSE PRACTITIONER

## 2024-10-06 PROCEDURE — 85025 COMPLETE CBC W/AUTO DIFF WBC: CPT | Mod: HCNC | Performed by: HOSPITALIST

## 2024-10-06 PROCEDURE — 80053 COMPREHEN METABOLIC PANEL: CPT | Mod: HCNC | Performed by: HOSPITALIST

## 2024-10-06 PROCEDURE — 25000003 PHARM REV CODE 250: Mod: HCNC | Performed by: EMERGENCY MEDICINE

## 2024-10-06 PROCEDURE — 25000003 PHARM REV CODE 250: Mod: HCNC | Performed by: HOSPITALIST

## 2024-10-06 PROCEDURE — 63600175 PHARM REV CODE 636 W HCPCS: Mod: HCNC | Performed by: HOSPITALIST

## 2024-10-06 PROCEDURE — 83605 ASSAY OF LACTIC ACID: CPT | Mod: HCNC | Performed by: NURSE PRACTITIONER

## 2024-10-06 PROCEDURE — 36415 COLL VENOUS BLD VENIPUNCTURE: CPT | Mod: HCNC | Performed by: NURSE PRACTITIONER

## 2024-10-06 PROCEDURE — 85014 HEMATOCRIT: CPT | Mod: HCNC | Performed by: FAMILY MEDICINE

## 2024-10-06 PROCEDURE — 82248 BILIRUBIN DIRECT: CPT | Mod: HCNC | Performed by: HOSPITALIST

## 2024-10-06 PROCEDURE — 63600175 PHARM REV CODE 636 W HCPCS: Mod: HCNC | Performed by: EMERGENCY MEDICINE

## 2024-10-06 PROCEDURE — 36415 COLL VENOUS BLD VENIPUNCTURE: CPT | Mod: HCNC | Performed by: HOSPITALIST

## 2024-10-06 PROCEDURE — 85610 PROTHROMBIN TIME: CPT | Mod: HCNC | Performed by: FAMILY MEDICINE

## 2024-10-06 PROCEDURE — 83605 ASSAY OF LACTIC ACID: CPT | Mod: 91,HCNC | Performed by: FAMILY MEDICINE

## 2024-10-06 PROCEDURE — 36415 COLL VENOUS BLD VENIPUNCTURE: CPT | Mod: HCNC,XB | Performed by: FAMILY MEDICINE

## 2024-10-06 RX ORDER — ALUMINUM HYDROXIDE, MAGNESIUM HYDROXIDE, AND SIMETHICONE 1200; 120; 1200 MG/30ML; MG/30ML; MG/30ML
30 SUSPENSION ORAL 4 TIMES DAILY PRN
Status: DISCONTINUED | OUTPATIENT
Start: 2024-10-06 | End: 2024-10-18 | Stop reason: HOSPADM

## 2024-10-06 RX ORDER — PROMETHAZINE HYDROCHLORIDE 25 MG/1
25 TABLET ORAL EVERY 6 HOURS PRN
Status: DISCONTINUED | OUTPATIENT
Start: 2024-10-06 | End: 2024-10-18 | Stop reason: HOSPADM

## 2024-10-06 RX ORDER — PANTOPRAZOLE SODIUM 40 MG/1
40 TABLET, DELAYED RELEASE ORAL DAILY
Status: DISCONTINUED | OUTPATIENT
Start: 2024-10-06 | End: 2024-10-06

## 2024-10-06 RX ORDER — ACETAMINOPHEN 325 MG/1
650 TABLET ORAL EVERY 8 HOURS PRN
Status: DISCONTINUED | OUTPATIENT
Start: 2024-10-06 | End: 2024-10-18 | Stop reason: HOSPADM

## 2024-10-06 RX ORDER — SODIUM CHLORIDE 0.9 % (FLUSH) 0.9 %
10 SYRINGE (ML) INJECTION EVERY 12 HOURS PRN
Status: DISCONTINUED | OUTPATIENT
Start: 2024-10-06 | End: 2024-10-18 | Stop reason: HOSPADM

## 2024-10-06 RX ORDER — VALSARTAN 160 MG/1
160 TABLET ORAL DAILY
Status: DISCONTINUED | OUTPATIENT
Start: 2024-10-06 | End: 2024-10-06

## 2024-10-06 RX ORDER — ACETAMINOPHEN 650 MG/1
650 SUPPOSITORY RECTAL EVERY 6 HOURS PRN
Status: DISCONTINUED | OUTPATIENT
Start: 2024-10-06 | End: 2024-10-16

## 2024-10-06 RX ORDER — NALOXONE HCL 0.4 MG/ML
0.02 VIAL (ML) INJECTION
Status: DISCONTINUED | OUTPATIENT
Start: 2024-10-06 | End: 2024-10-18 | Stop reason: HOSPADM

## 2024-10-06 RX ORDER — MEMANTINE HYDROCHLORIDE 10 MG/1
10 TABLET ORAL 2 TIMES DAILY
Status: DISCONTINUED | OUTPATIENT
Start: 2024-10-06 | End: 2024-10-09

## 2024-10-06 RX ORDER — IBUPROFEN 200 MG
16 TABLET ORAL
Status: DISCONTINUED | OUTPATIENT
Start: 2024-10-06 | End: 2024-10-18 | Stop reason: HOSPADM

## 2024-10-06 RX ORDER — IPRATROPIUM BROMIDE AND ALBUTEROL SULFATE 2.5; .5 MG/3ML; MG/3ML
3 SOLUTION RESPIRATORY (INHALATION) EVERY 6 HOURS PRN
Status: DISCONTINUED | OUTPATIENT
Start: 2024-10-06 | End: 2024-10-18 | Stop reason: HOSPADM

## 2024-10-06 RX ORDER — ONDANSETRON HYDROCHLORIDE 2 MG/ML
4 INJECTION, SOLUTION INTRAVENOUS EVERY 8 HOURS PRN
Status: DISCONTINUED | OUTPATIENT
Start: 2024-10-06 | End: 2024-10-18 | Stop reason: HOSPADM

## 2024-10-06 RX ORDER — ISOSORBIDE MONONITRATE 60 MG/1
120 TABLET, EXTENDED RELEASE ORAL DAILY
Status: DISCONTINUED | OUTPATIENT
Start: 2024-10-06 | End: 2024-10-18 | Stop reason: HOSPADM

## 2024-10-06 RX ORDER — GLUCAGON 1 MG
1 KIT INJECTION
Status: DISCONTINUED | OUTPATIENT
Start: 2024-10-06 | End: 2024-10-18 | Stop reason: HOSPADM

## 2024-10-06 RX ORDER — DONEPEZIL HYDROCHLORIDE 5 MG/1
5 TABLET, FILM COATED ORAL NIGHTLY
Status: DISCONTINUED | OUTPATIENT
Start: 2024-10-06 | End: 2024-10-09

## 2024-10-06 RX ORDER — PANTOPRAZOLE SODIUM 40 MG/1
40 TABLET, DELAYED RELEASE ORAL DAILY
Status: DISCONTINUED | OUTPATIENT
Start: 2024-10-06 | End: 2024-10-18 | Stop reason: HOSPADM

## 2024-10-06 RX ORDER — HYDROCODONE BITARTRATE AND ACETAMINOPHEN 5; 325 MG/1; MG/1
1 TABLET ORAL EVERY 6 HOURS PRN
Status: DISCONTINUED | OUTPATIENT
Start: 2024-10-06 | End: 2024-10-18 | Stop reason: HOSPADM

## 2024-10-06 RX ORDER — SODIUM CHLORIDE, SODIUM LACTATE, POTASSIUM CHLORIDE, CALCIUM CHLORIDE 600; 310; 30; 20 MG/100ML; MG/100ML; MG/100ML; MG/100ML
INJECTION, SOLUTION INTRAVENOUS CONTINUOUS
Status: DISCONTINUED | OUTPATIENT
Start: 2024-10-06 | End: 2024-10-08

## 2024-10-06 RX ORDER — INSULIN ASPART 100 [IU]/ML
0-5 INJECTION, SOLUTION INTRAVENOUS; SUBCUTANEOUS
Status: DISCONTINUED | OUTPATIENT
Start: 2024-10-06 | End: 2024-10-18 | Stop reason: HOSPADM

## 2024-10-06 RX ORDER — AMOXICILLIN 250 MG
1 CAPSULE ORAL 2 TIMES DAILY PRN
Status: DISCONTINUED | OUTPATIENT
Start: 2024-10-06 | End: 2024-10-18 | Stop reason: HOSPADM

## 2024-10-06 RX ORDER — GABAPENTIN 100 MG/1
100 CAPSULE ORAL 2 TIMES DAILY PRN
Status: DISCONTINUED | OUTPATIENT
Start: 2024-10-06 | End: 2024-10-18 | Stop reason: HOSPADM

## 2024-10-06 RX ORDER — IBUPROFEN 200 MG
24 TABLET ORAL
Status: DISCONTINUED | OUTPATIENT
Start: 2024-10-06 | End: 2024-10-18 | Stop reason: HOSPADM

## 2024-10-06 RX ORDER — MORPHINE SULFATE 4 MG/ML
2 INJECTION, SOLUTION INTRAMUSCULAR; INTRAVENOUS EVERY 4 HOURS PRN
Status: DISCONTINUED | OUTPATIENT
Start: 2024-10-06 | End: 2024-10-06

## 2024-10-06 RX ORDER — TALC
6 POWDER (GRAM) TOPICAL NIGHTLY PRN
Status: DISCONTINUED | OUTPATIENT
Start: 2024-10-06 | End: 2024-10-06

## 2024-10-06 RX ADMIN — PIPERACILLIN SODIUM AND TAZOBACTAM SODIUM 4.5 G: 4; .5 INJECTION, POWDER, FOR SOLUTION INTRAVENOUS at 08:10

## 2024-10-06 RX ADMIN — MEMANTINE 10 MG: 10 TABLET ORAL at 08:10

## 2024-10-06 RX ADMIN — PROMETHAZINE HYDROCHLORIDE 25 MG: 25 TABLET ORAL at 08:10

## 2024-10-06 RX ADMIN — VALSARTAN 160 MG: 160 TABLET, FILM COATED ORAL at 08:10

## 2024-10-06 RX ADMIN — PANTOPRAZOLE SODIUM 40 MG: 40 TABLET, DELAYED RELEASE ORAL at 08:10

## 2024-10-06 RX ADMIN — PIPERACILLIN SODIUM AND TAZOBACTAM SODIUM 4.5 G: 4; .5 INJECTION, POWDER, FOR SOLUTION INTRAVENOUS at 05:10

## 2024-10-06 RX ADMIN — DONEPEZIL HYDROCHLORIDE 5 MG: 5 TABLET, FILM COATED ORAL at 08:10

## 2024-10-06 RX ADMIN — PIPERACILLIN SODIUM AND TAZOBACTAM SODIUM 4.5 G: 4; .5 INJECTION, POWDER, FOR SOLUTION INTRAVENOUS at 12:10

## 2024-10-06 RX ADMIN — ISOSORBIDE MONONITRATE 120 MG: 60 TABLET, EXTENDED RELEASE ORAL at 08:10

## 2024-10-06 RX ADMIN — ONDANSETRON 4 MG: 2 INJECTION INTRAMUSCULAR; INTRAVENOUS at 02:10

## 2024-10-06 RX ADMIN — SODIUM CHLORIDE, POTASSIUM CHLORIDE, SODIUM LACTATE AND CALCIUM CHLORIDE: 600; 310; 30; 20 INJECTION, SOLUTION INTRAVENOUS at 02:10

## 2024-10-06 RX ADMIN — PIPERACILLIN SODIUM AND TAZOBACTAM SODIUM 4.5 G: 4; .5 INJECTION, POWDER, FOR SOLUTION INTRAVENOUS at 11:10

## 2024-10-06 RX ADMIN — ONDANSETRON 4 MG: 2 INJECTION INTRAMUSCULAR; INTRAVENOUS at 08:10

## 2024-10-06 NOTE — ASSESSMENT & PLAN NOTE
Chronic, controlled.  Latest blood pressure and vitals reviewed-   Temp:  [98.1 °F (36.7 °C)]   Pulse:  [51-56]   Resp:  [18-20]   BP: (137-168)/(67-74)   SpO2:  [96 %-100 %] .   Home meds for hypertension were reviewed and noted below.   Hypertension Medications               isosorbide mononitrate (IMDUR) 120 MG 24 hr tablet TAKE 1 TABLET BY MOUTH EVERY DAY    valsartan (DIOVAN) 160 MG tablet Take 1 tablet (160 mg total) by mouth once daily.            While in the hospital, will manage blood pressure as follows; Continue home antihypertensive regimen    Will utilize p.r.n. blood pressure medication only if patient's blood pressure greater than  180/110 and he develops symptoms such as worsening chest pain or shortness of breath.

## 2024-10-06 NOTE — ASSESSMENT & PLAN NOTE
GI consulted. Recommends MRCP. Initiated on Zosyn.  Plan:  -NPO  -Hold anticoagulants  -IVFs  -Continue Zosyn  -Analgesics prn  -Repeat labs in am  -GI consult in am

## 2024-10-06 NOTE — H&P
"  [facility] - Emergency Dept.  [facility] Medicine  History & Physical    Patient Name: Aquiles Yates  MRN: [id number]  Patient Class: IP- Inpatient  Admission Date: 10/5/2024  Attending Physician: Pierce Augustin MD   Primary Care Provider: Holger Thornton MD         Patient information was obtained from patient, spouse/SO, past medical records, and ER records.     Subjective:     Principal Problem:Choledocholithiasis    Chief Complaint:   Chief Complaint   Patient presents with    Shaking     EMS reports wife called 911 due to pt shaking and having "mottled" skin all over which is new. Pt has hx of Alzheimer's and at baseline GCS of 14.  Pt currently denies any complaints         HPI: Aquiles Yates is a 84 y.o. male with a PMH  has a past medical history of Acute blood loss anemia (10/14/2019), Alzheimer's dementia, Aneurysm, abdominal aortic, BCC (basal cell carcinoma of skin) (06/29/2023), Chronic diastolic heart failure (05/20/2024), Coronary artery disease, Depression, Diabetes mellitus, HLD (hyperlipidemia), Hypertension, Kidney stone, PAD (peripheral artery disease), PAF (paroxysmal atrial fibrillation) (01/24/2023), and Tobacco abuse.presented to the Emergency Department for evaluation of tremors and mottled skin per wife. Wife called the paramedics when she noticed patient shaking and patient's mottled skin today. Pt has a history of Alzheimer's and paramedics report a GCS of 14. Upon interview, pt denies any pain and is oriented to self. No further complaints or concerns at this time.       ER workup revealed CBC to be unremarkable.  CMP revealed CBG of 202 mg/dL, , total bili of 2.6, and AST/ALT of 242/179.  .  Troponin negative.  Lactic acid 2.8.  Flu COVID negative.  UA unremarkable.  CTA abdomen and pelvis without contrast revealed no acute findings.  Chest x-ray negative for acute cardiopulmonary findings.  Ultrasound limited revealed cholelithiasis without " definitive sonographic evidence of acute cholecystitis.  Mild intrahepatic biliary duct dilation.  Vital signs stable. EKG revealed sinus Nicholas with occasional PVCs with a ventricular rate of 56 beats per minute and a QT/QTC of 408/393.  Patient received 4.5 g Zosyn in ED. GI consulted.  In agreement with MRCP and will see us consult in a.m..  Patient in agreement with treatment plan.  Patient will be admitted under inpatient status.    PCP: Holger Thornton      Past Medical History:   Diagnosis Date    Acute blood loss anemia 10/14/2019    Alzheimer's dementia     Aneurysm, abdominal aortic     BCC (basal cell carcinoma of skin) 06/29/2023    Chronic diastolic heart failure 05/20/2024    Coronary artery disease     Depression     Diabetes mellitus     HLD (hyperlipidemia)     Hypertension     Kidney stone     PAD (peripheral artery disease)     PAF (paroxysmal atrial fibrillation) 01/24/2023    Tobacco abuse        Past Surgical History:   Procedure Laterality Date    APPENDECTOMY      CORONARY STENT PLACEMENT      x 4    ESOPHAGOGASTRODUODENOSCOPY N/A 10/14/2019    Procedure: EGD (ESOPHAGOGASTRODUODENOSCOPY);  Surgeon: Carlos Mcneal III, MD;  Location: [facility];  Service: Endoscopy;  Laterality: N/A;    ESOPHAGOGASTRODUODENOSCOPY N/A 10/15/2019    Procedure: EGD (ESOPHAGOGASTRODUODENOSCOPY);  Surgeon: Carlos Mcneal III, MD;  Location: [facility];  Service: Endoscopy;  Laterality: N/A;    ESOPHAGOGASTRODUODENOSCOPY N/A 4/17/2023    Procedure: EGD (ESOPHAGOGASTRODUODENOSCOPY);  Surgeon: Nevaeh Mcconnell MD;  Location: [facility] ENDO;  Service: Endoscopy;  Laterality: N/A;    LEFT HEART CATHETERIZATION Left 10/11/2019    Procedure: CATHETERIZATION, HEART, LEFT;  Surgeon: Robe Gilbert MD;  Location: [facility] CATH LAB;  Service: Cardiology;  Laterality: Left;    LEFT HEART CATHETERIZATION Left 10/13/2019    Procedure: CATHETERIZATION, HEART, LEFT;  Surgeon: Robe Gilbert MD;  Location: [facility] CATH LAB;   Service: Cardiology;  Laterality: Left;    leg stent Left        Review of patient's allergies indicates:   Allergen Reactions    Metoprolol Other (See Comments)     Junctional rhythm         No current facility-administered medications on file prior to encounter.     Current Outpatient Medications on File Prior to Encounter   Medication Sig    alcohol swabs (ALCOHOL PREP PADS) PadM Twice a day    aspirin (ECOTRIN) 81 MG EC tablet Take 1 tablet (81 mg total) by mouth once daily.    atorvastatin (LIPITOR) 40 MG tablet TAKE 1 TABLET BY MOUTH EVERY DAY    blood sugar diagnostic Strp Test blood sugars twice a day    donepeziL (ARICEPT) 5 MG tablet Take 1 tablet (5 mg total) by mouth every evening.    ELIQUIS 2.5 mg Tab TAKE 1 TABLET BY MOUTH TWICE A DAY    gabapentin (NEURONTIN) 100 MG capsule Take 1 capsule (100 mg total) by mouth 2 (two) times daily as needed. PRN    isosorbide mononitrate (IMDUR) 120 MG 24 hr tablet TAKE 1 TABLET BY MOUTH EVERY DAY    lancets (ACCU-CHEK SOFTCLIX LANCETS) Misc Test blood sugars twice a day    memantine (NAMENDA) 10 MG Tab Take 1 tablet (10 mg total) by mouth 2 (two) times daily.    metFORMIN (GLUCOPHAGE) 500 MG tablet TAKE 1 TABLET BY MOUTH EVERY DAY WITH BREAKFAST    pantoprazole (PROTONIX) 40 MG tablet Take 1 tablet (40 mg total) by mouth once daily.    valsartan (DIOVAN) 160 MG tablet Take 1 tablet (160 mg total) by mouth once daily.     Family History       Problem Relation (Age of Onset)    COPD Father    Diabetes Mother, Brother          Tobacco Use    Smoking status: Former     Types: Cigars     Passive exposure: Never    Smokeless tobacco: Current   Substance and Sexual Activity    Alcohol use: Not Currently    Drug use: Not Currently    Sexual activity: Not Currently     Partners: Female     Review of Systems   Unable to perform ROS: Dementia     Objective:     Vital Signs (Most Recent):  Temp: 98.1 °F (36.7 °C) (10/05/24 2004)  Pulse: (!) 56 (10/05/24 2215)  Resp: 20  (10/05/24 2215)  BP: (!) 167/74 (10/05/24 2215)  SpO2: 96 % (10/05/24 2215) Vital Signs (24h Range):  Temp:  [98.1 °F (36.7 °C)] 98.1 °F (36.7 °C)  Pulse:  [51-56] 56  Resp:  [18-20] 20  SpO2:  [96 %-100 %] 96 %  BP: (137-167)/(67-74) 167/74     Weight: 66.7 kg (147 lb 0.8 oz)  Body mass index is 21.1 kg/m².     Physical Exam  Vitals and nursing note reviewed.   Constitutional:       General: He is awake. He is not in acute distress.     Appearance: Normal appearance. He is well-developed and well-groomed. He is not ill-appearing, toxic-appearing or diaphoretic.      Comments: Elderly appearing male in no acute distress   HENT:      Head: Normocephalic and atraumatic.   Eyes:      Extraocular Movements: Extraocular movements intact.      Conjunctiva/sclera: Conjunctivae normal.   Cardiovascular:      Rate and Rhythm: Normal rate and regular rhythm.      Pulses: Normal pulses.      Heart sounds: Normal heart sounds. No murmur heard.  Pulmonary:      Effort: Pulmonary effort is normal.      Breath sounds: Normal breath sounds. No decreased breath sounds, wheezing, rhonchi or rales.   Abdominal:      General: Bowel sounds are normal.      Palpations: Abdomen is soft.      Tenderness: There is no abdominal tenderness. There is no guarding or rebound.      Comments: Grimacing upon palpation of the abdomen   Musculoskeletal:      Cervical back: Normal range of motion and neck supple.      Right lower leg: No edema.      Left lower leg: No edema.      Comments: Moves all extremities   Skin:     Capillary Refill: Capillary refill takes less than 2 seconds.      Coloration: Skin is mottled.   Neurological:      Mental Status: He is alert. Mental status is at baseline. He is confused.      GCS: GCS eye subscore is 4. GCS verbal subscore is 4. GCS motor subscore is 6.   Psychiatric:      Comments: History of dementia at baseline per wife at bedside              LABS:  Recent Results (from the past 24 hours)   COVID-19 Rapid  Screening    Collection Time: 10/05/24  8:36 PM   Result Value Ref Range    SARS-CoV-2 RNA, Amplification, Qual Negative Negative   Influenza A & B by Molecular    Collection Time: 10/05/24  8:36 PM    Specimen: Nasopharyngeal Swab   Result Value Ref Range    Influenza A, Molecular Negative Negative    Influenza B, Molecular Negative Negative    Flu A & B Source Nasal swab    CBC auto differential    Collection Time: 10/05/24  8:48 PM   Result Value Ref Range    WBC 6.02 3.90 - 12.70 K/uL    RBC 4.86 4.60 - 6.20 M/uL    Hemoglobin 14.3 14.0 - 18.0 g/dL    Hematocrit 43.2 40.0 - 54.0 %    MCV 89 82 - 98 fL    MCH 29.4 27.0 - 31.0 pg    MCHC 33.1 32.0 - 36.0 g/dL    RDW 14.1 11.5 - 14.5 %    Platelets 124 (L) 150 - 450 K/uL    MPV 11.8 9.2 - 12.9 fL    Immature Granulocytes 0.3 0.0 - 0.5 %    Gran # (ANC) 5.4 1.8 - 7.7 K/uL    Immature Grans (Abs) 0.02 0.00 - 0.04 K/uL    Lymph # 0.2 (L) 1.0 - 4.8 K/uL    Mono # 0.3 0.3 - 1.0 K/uL    Eos # 0.1 0.0 - 0.5 K/uL    Baso # 0.01 0.00 - 0.20 K/uL    nRBC 0 0 /100 WBC    Gran % 89.2 (H) 38.0 - 73.0 %    Lymph % 3.8 (L) 18.0 - 48.0 %    Mono % 5.5 4.0 - 15.0 %    Eosinophil % 1.0 0.0 - 8.0 %    Basophil % 0.2 0.0 - 1.9 %    Differential Method Automated    Comprehensive metabolic panel    Collection Time: 10/05/24  8:48 PM   Result Value Ref Range    Sodium 140 136 - 145 mmol/L    Potassium 4.8 3.5 - 5.1 mmol/L    Chloride 104 95 - 110 mmol/L    CO2 22 (L) 23 - 29 mmol/L    Glucose 202 (H) 70 - 110 mg/dL    BUN 24 (H) 8 - 23 mg/dL    Creatinine 1.2 0.5 - 1.4 mg/dL    Calcium 9.8 8.7 - 10.5 mg/dL    Total Protein 7.8 6.0 - 8.4 g/dL    Albumin 3.7 3.5 - 5.2 g/dL    Total Bilirubin 2.6 (H) 0.1 - 1.0 mg/dL    Alkaline Phosphatase 387 (H) 55 - 135 U/L     (H) 10 - 40 U/L     (H) 10 - 44 U/L    eGFR 59 (A) >60 mL/min/1.73 m^2    Anion Gap 14 8 - 16 mmol/L   Troponin I    Collection Time: 10/05/24  8:48 PM   Result Value Ref Range    Troponin I 0.018 0.000 - 0.026 ng/mL    Lactic acid, plasma    Collection Time: 10/05/24  8:48 PM   Result Value Ref Range    Lactate (Lactic Acid) 2.8 (H) 0.5 - 2.2 mmol/L   Brain natriuretic peptide    Collection Time: 10/05/24  8:48 PM   Result Value Ref Range     (H) 0 - 99 pg/mL   POCT glucose    Collection Time: 10/05/24  8:52 PM   Result Value Ref Range    POCT Glucose 200 (H) 70 - 110 mg/dL   Urinalysis, Reflex to Urine Culture Urine, Clean Catch    Collection Time: 10/05/24  9:08 PM    Specimen: Urine, Clean Catch   Result Value Ref Range    Specimen UA Urine, Clean Catch     Color, UA Yellow Yellow, Straw, Caren    Appearance, UA Clear Clear    pH, UA 8.0 5.0 - 8.0    Specific Gravity, UA 1.020 1.005 - 1.030    Protein, UA 3+ (A) Negative    Glucose, UA Negative Negative    Ketones, UA Negative Negative    Bilirubin (UA) Negative Negative    Occult Blood UA Negative Negative    Nitrite, UA Negative Negative    Urobilinogen, UA 2.0-3.0 (A) <2.0 EU/dL    Leukocytes, UA Negative Negative   Urinalysis Microscopic    Collection Time: 10/05/24  9:08 PM   Result Value Ref Range    RBC, UA 3 0 - 4 /hpf    WBC, UA 5 0 - 5 /hpf    Bacteria None None-Occ /hpf    Hyaline Casts, UA 1 0-1/lpf /lpf    Microscopic Comment SEE COMMENT        RADIOLOGY  US Abdomen Limited    Result Date: 10/5/2024  EXAMINATION: US ABDOMEN LIMITED CLINICAL HISTORY: evaluate gallbladder; TECHNIQUE: Limited ultrasound of the right upper quadrant of the abdomen (including pancreas, liver, gallbladder, common bile duct, and spleen) was performed. COMPARISON: CT 10/05/2024 FINDINGS: Liver: No focal abnormality of the visualized hepatic parenchyma. Gallbladder: There are multiple calculi identified in the gallbladder lumen measuring up to 1.4 cm.  No significant gallbladder wall thickening or gallbladder wall hyperemia appreciated.  The technologist reports a negative sonographic Weinstein sign. Biliary system: The extrahepatic common bile duct is difficult to visualize.  The  visualized portion measures 0.6 cm.  There is mild intra hepatic biliary ductal dilatation. Pancreas: Obscured by bowel gas. Knee: No evidence of hydronephrosis. Miscellaneous: No upper abdominal ascites.     Cholelithiasis without definite sonographic evidence of acute cholecystitis. Borderline prominent extrahepatic common bile duct, noting the extrahepatic common duct is poorly visualized in its entirety.  Additionally, there is mild intrahepatic biliary ductal dilatation.  Correlation with appropriate lab values advised if there is concern for choledocholithiasis. Electronically signed by: Jerel Boswell MD Date:    10/05/2024 Time:    23:26    CT Abdomen Pelvis  Without Contrast    Result Date: 10/5/2024  EXAMINATION: CT ABDOMEN PELVIS WITHOUT CONTRAST CLINICAL HISTORY: Abdominal pain, acute, nonlocalized; TECHNIQUE: Low dose axial images, sagittal and coronal reformations were obtained from the lung bases to the pubic symphysis.  Contrast was not administered. COMPARISON: October 2009 FINDINGS: Left basilar pulmonary nodule similar size Unenhanced spleen pancreas and liver stable. Kidneys without hydronephrosis or adverse finding unenhanced Aortic aneurysm excluded by endovascular aorto bi-iliac stent graft similar size and morphology as visualized unenhanced No obstructive or inflammatory bowel findings Urinary bladder decompressed and prostate gland calcification     No acute finding unenhanced imaging Electronically signed by: Shavonne Chappell Date:    10/05/2024 Time:    22:47    X-Ray Chest AP Portable    Result Date: 10/5/2024  EXAMINATION: XR CHEST AP PORTABLE CLINICAL HISTORY: weakness; TECHNIQUE: Single frontal portable view of the chest was performed. COMPARISON: 05/21/2024 FINDINGS: No pulmonary opacity or pleural effusion identified slight portable exam with artifact     No active or adverse finding degraded imaging with artifact Electronically signed by: Shavonne Chappell Date:    10/05/2024  Time:    21:14      EKG    MICROBIOLOGY    MDM     Amount and/or Complexity of Data Reviewed  Clinical lab tests: reviewed  Tests in the radiology section of CPT®: reviewed  Tests in the medicine section of CPT®: reviewed  Discussion of test results with the performing providers: yes  Decide to obtain previous medical records or to obtain history from someone other than the patient: yes  Obtain history from someone other than the patient: yes  Review and summarize past medical records: yes  Discuss the patient with other providers: yes  Independent visualization of images, tracings, or specimens: yes        Assessment/Plan:     * Choledocholithiasis  GI consulted. Recommends MRCP. Initiated on Zosyn.  Plan:  -NPO  -Hold anticoagulants  -IVFs  -Continue Zosyn  -Analgesics prn  -Repeat labs in am  -GI consult in am      Coronary artery disease of native artery of native heart with stable angina pectoris  Patient with known CAD s/p stent placement, which is controlled Will continue  eliquis, ASA, and Statin and monitor for S/Sx of angina/ACS. Continue to monitor on telemetry.     Essential hypertension  Chronic, controlled.  Latest blood pressure and vitals reviewed-   Temp:  [98.1 °F (36.7 °C)]   Pulse:  [51-56]   Resp:  [18-20]   BP: (137-168)/(67-74)   SpO2:  [96 %-100 %] .   Home meds for hypertension were reviewed and noted below.   Hypertension Medications               isosorbide mononitrate (IMDUR) 120 MG 24 hr tablet TAKE 1 TABLET BY MOUTH EVERY DAY    valsartan (DIOVAN) 160 MG tablet Take 1 tablet (160 mg total) by mouth once daily.            While in the hospital, will manage blood pressure as follows; Continue home antihypertensive regimen    Will utilize p.r.n. blood pressure medication only if patient's blood pressure greater than  180/110 and he develops symptoms such as worsening chest pain or shortness of breath.      Type 2 diabetes mellitus with other specified complication, unspecified whether long  term insulin use  Patient's FSGs are uncontrolled due to hyperglycemia on current medication regimen.  Last A1c reviewed-   Lab Results   Component Value Date    HGBA1C 7.0 (H) 05/07/2024     Most recent fingerstick glucose reviewed-   Recent Labs   Lab 10/05/24  2052   POCTGLUCOSE 200*     Current correctional scale  Low  Maintain anti-hyperglycemic dose as follows-   Antihyperglycemics (From admission, onward)      Start     Stop Route Frequency Ordered    10/06/24 0146  insulin aspart U-100 pen 0-5 Units         -- SubQ Before meals & nightly PRN 10/06/24 0048          Plan:  -SSI  -A1c  -Accu-checks  -Hold oral hypoglycemics while patient is in the hospital  -Continue home long-acting insulin at 25-50% decrease, titrate up as needed  -Hypoglycemic protocol          Dyslipidemia  Patient is chronically on statin.will not continue for now. Last Lipid Panel:   Lab Results   Component Value Date    CHOL 150 05/07/2024    HDL 36 (L) 05/07/2024    LDLCALC 94.2 05/07/2024    TRIG 99 05/07/2024    CHOLHDL 24.0 05/07/2024     Plan:  -Hold home medication due to elevated LFTs  -low fat/low calorie diet        Dementia  Patient with dementia with likely etiology of alzheimer's dementia. Dementia is moderate. The patient does not have signs of behavioral disturbance. Home dementia medications are Held or Continued: continued.. Continue non-pharmacologic interventions to prevent delirium (No VS between 11PM-5AM, activity during day, opening blinds, providing glasses/hearing aids, and up in chair during daytime). Will avoid narcotics and benzos unless absolutely necessary. PRN anti-psychotics are not prescribed to avoid self harm behaviors.    PAF (paroxysmal atrial fibrillation)  Patient has paroxysmal (<7 days) atrial fibrillation. Patient is currently in  sinus Nicholas . [id number] Score: 4. The patients heart rate in the last 24 hours is as follows:  Pulse  Min: 51  Max: 61     Antiarrhythmics       Anticoagulants        Plan  - Replete lytes with a goal of K>4, Mg >2  - Patient is anticoagulated, see medications listed above.  - Patient's afib is currently controlled    Chronic diastolic heart failure  Patient is identified as having Diastolic (HFpEF) heart failure that is Chronic. CHF is currently controlled. Latest ECHO performed and demonstrates- Results for orders placed during the hospital encounter of 06/13/22    Echo    Interpretation Summary  · The left ventricle is normal in size with severe concentric hypertrophy and normal systolic function.  · Mild left atrial enlargement.  · The estimated ejection fraction is 55%.  · Grade II left ventricular diastolic dysfunction.  · Normal right ventricular size with normal right ventricular systolic function.  . Continue ACE/ARB Nitrate/Vasodilator and monitor clinical status closely. Monitor on telemetry. Patient is off CHF pathway.  Monitor strict Is&Os and daily weights.  Place on fluid restriction of 2 L. Continue to stress to patient importance of self efficacy and  on diet for CHF. Last BNP reviewed- and noted below   Recent Labs   Lab 10/05/24  2048   *   .            Recurrent major depressive disorder  Chronic. Stable. Not in acute exacerbation and currently denies endorsing any suicidal/homicidal ideations.   Plan:  -Continue to monitor      Pain in both lower extremities  Compliant with gabapentin.    Plan:   -continue gabapentin        VTE Risk Mitigation (From admission, onward)           Ordered     Reason for No Pharmacological VTE Prophylaxis  Once        Question:  Reasons:  Answer:  Physician Provided (leave comment)  Comment:  Pending potential surgical intervention    10/06/24 0048     IP VTE HIGH RISK PATIENT  Once         10/06/24 0048     Place sequential compression device  Until discontinued         10/06/24 0048                     //Core Measures   -DVT proph: SCDs, withholding anticoagulation pending surgical intervention   -Code status  Full    -Surrogate:spouse     Components of this note were documented using a voice recognition system and are subject to errors not corrected at the time the document was proof read. Please contact the author for any clarifications.       Max Augustin NP  Department of Hospital Medicine  [facility] - Emergency Dept.

## 2024-10-06 NOTE — PLAN OF CARE
[facility] - Med Surg  Initial Discharge Assessment       Primary Care Provider: Holger Thornton MD    Admission Diagnosis: Weakness [R53.1]  Chest pain [R07.9]  Calculus of gallbladder and bile duct with obstruction without cholecystitis [K80.71]  Dementia, unspecified dementia severity, unspecified dementia type, unspecified whether behavioral, psychotic, or mood disturbance or anxiety [F03.90]    Admission Date: 10/5/2024  Expected Discharge Date:     Transition of Care Barriers: None    Payor: [employer] MEDICARE / Plan: [employer] MEDICARE HMO / Product Type: Capitation /     Extended Emergency Contact Information  Primary Emergency Contact: Gayle Woods  Home Phone: [phone]  Mobile Phone: [phone]  Relation: Spouse  Secondary Emergency Contact: Pete Yates  Mobile Phone: [phone]  Relation: Son  Preferred language: English   needed? No    Discharge Plan A: Home with family         [id number] - [place] - [address] AT [facility]  [address] [place] [address]  Phone: [phone] Fax: [phone]      Initial Assessment (most recent)       Adult Discharge Assessment - 10/06/24 1208          Discharge Assessment    Assessment Type Discharge Planning Assessment     Confirmed/corrected address, phone number and insurance Yes     Confirmed Demographics Correct on Facesheet     Source of Information family     Does patient/caregiver understand observation status Yes     Communicated ZEINA with patient/caregiver Date not available/Unable to determine     People in Home spouse     Do you expect to return to your current living situation? Yes     Do you have help at home or someone to help you manage your care at home? Yes     Who are your caregiver(s) and their phone number(s)? Gayle Woods, spouse, [phone]     Prior to hospitilization cognitive status: Alert/Oriented     Current cognitive status: Alert/Oriented      Equipment Currently Used at Home hospital bed;walker, standard     Readmission within 30 days? No     Patient currently being followed by outpatient case management? No     Do you currently have service(s) that help you manage your care at home? No     Do you take prescription medications? Yes     Do you have prescription coverage? Yes     Do you have any problems affording any of your prescribed medications? No     Is the patient taking medications as prescribed? yes     Who is going to help you get home at discharge? Gayle Woods, spouse, [phone]     How do you get to doctors appointments? family or friend will provide     Are you on dialysis? No     Do you take coumadin? No     Discharge Plan A Home with family     DME Needed Upon Discharge  none     Discharge Plan discussed with: Spouse/sig other     Name(s) and Number(s) Gayle Woods, spouse, [phone]     Transition of Care Barriers None

## 2024-10-06 NOTE — ASSESSMENT & PLAN NOTE
Patient's FSGs are uncontrolled due to hyperglycemia on current medication regimen.  Last A1c reviewed-   Lab Results   Component Value Date    HGBA1C 7.0 (H) 05/07/2024     Most recent fingerstick glucose reviewed-   Recent Labs   Lab 10/05/24  2052   POCTGLUCOSE 200*     Current correctional scale  Low  Maintain anti-hyperglycemic dose as follows-   Antihyperglycemics (From admission, onward)      Start     Stop Route Frequency Ordered    10/06/24 0146  insulin aspart U-100 pen 0-5 Units         -- SubQ Before meals & nightly PRN 10/06/24 0048          Plan:  -SSI  -A1c  -Accu-checks  -Hold oral hypoglycemics while patient is in the hospital  -Continue home long-acting insulin at 25-50% decrease, titrate up as needed  -Hypoglycemic protocol

## 2024-10-06 NOTE — ASSESSMENT & PLAN NOTE
Patient with dementia with likely etiology of alzheimer's dementia. Dementia is moderate. The patient does not have signs of behavioral disturbance. Home dementia medications are Held or Continued: continued.. Continue non-pharmacologic interventions to prevent delirium (No VS between 11PM-5AM, activity during day, opening blinds, providing glasses/hearing aids, and up in chair during daytime). Will avoid narcotics and benzos unless absolutely necessary. PRN anti-psychotics are not prescribed to avoid self harm behaviors.

## 2024-10-06 NOTE — ASSESSMENT & PLAN NOTE
S/p multiple PCI procedures  Stopped tobacco 2 years ago  On ASA  Bradycardic on exam and on EKG  Valsartan and Imdur

## 2024-10-06 NOTE — HPI
85 y/o  male with DMII, CAD with multiple PCI procedures, chronic diastolic CHF, afib with h/o CVA,  HTN, DM, LAE, LVH, PAD, AAA stent graft (approx 2013), dementia, hyperlipidemia and AVMs of the GI tract resulting in symptomatic anemia and hospitalization in 04/2023. Management of the gastrointestinal AVMs were endoscopic management with APC. His Eliquis was also decreased from 5mg BID to 2.5mg BID. Since this time, he has not required repeat treatment for AVMs and labs showed resolution of his anemia with H&H 14.2/45.2 on admission.     History of present illness provided by wife Beatrice. She reports for last two days patient has not been his usual self. Though he denies abdominal pain he refused to eat his favorite foods and wanted to only sleep. After being placed in bed he exhibited rigors and complained of being cold. Despite multiple blankets he will not stop shaking. She contacted EMS for further assistance. Once evaluated by EMS, they recommended he be evaluated by the local ED. He presented to [facility] via ED yesterday evening. Labs on presentation showed abnormal liver enzymes with , , , Tbili 2.6. Lactic acid level was also elevated at 2.3. CT abdomen was unrevealing however US abdomen showed multiple caculi in the gallbladder without cholecystitis and mild intrahepatic duct dilatation. Patient was admitted for suspected choledocholithiasis and placed on IV antibiotics. He has remained afebrile, hemodynamically stable and no leukocytosis.     Patient seen this morning. Repeat labs show , , , TB 3.8 (DB 2.7). Lactic acid level normalized at 1.2. He is on Zosyn and tolerating ice chips though he has been mostly sleeping since admission. Wife reports he last took Eliquis yesterday morning (10/5/24, AM dose). Also takes ASA.

## 2024-10-06 NOTE — SUBJECTIVE & OBJECTIVE
Past Medical History:   Diagnosis Date    Acute blood loss anemia 10/14/2019    Alzheimer's dementia     Aneurysm, abdominal aortic     BCC (basal cell carcinoma of skin) 06/29/2023    Chronic diastolic heart failure 05/20/2024    Coronary artery disease     Depression     Diabetes mellitus     HLD (hyperlipidemia)     Hypertension     Kidney stone     PAD (peripheral artery disease)     PAF (paroxysmal atrial fibrillation) 01/24/2023    Tobacco abuse        Past Surgical History:   Procedure Laterality Date    APPENDECTOMY      CORONARY STENT PLACEMENT      x 4    ESOPHAGOGASTRODUODENOSCOPY N/A 10/14/2019    Procedure: EGD (ESOPHAGOGASTRODUODENOSCOPY);  Surgeon: Carlos Mcneal III, MD;  Location: [facility];  Service: Endoscopy;  Laterality: N/A;    ESOPHAGOGASTRODUODENOSCOPY N/A 10/15/2019    Procedure: EGD (ESOPHAGOGASTRODUODENOSCOPY);  Surgeon: Carlos Mcneal III, MD;  Location: [facility];  Service: Endoscopy;  Laterality: N/A;    ESOPHAGOGASTRODUODENOSCOPY N/A 4/17/2023    Procedure: EGD (ESOPHAGOGASTRODUODENOSCOPY);  Surgeon: Nevaeh Mcconnell MD;  Location: [facility];  Service: Endoscopy;  Laterality: N/A;    LEFT HEART CATHETERIZATION Left 10/11/2019    Procedure: CATHETERIZATION, HEART, LEFT;  Surgeon: Robe Gilbert MD;  Location: [facility] CATH LAB;  Service: Cardiology;  Laterality: Left;    LEFT HEART CATHETERIZATION Left 10/13/2019    Procedure: CATHETERIZATION, HEART, LEFT;  Surgeon: Robe Gilbert MD;  Location: [facility] CATH LAB;  Service: Cardiology;  Laterality: Left;    leg stent Left        Review of patient's allergies indicates:   Allergen Reactions    Metoprolol Other (See Comments)     Junctional rhythm       Family History       Problem Relation (Age of Onset)    COPD Father    Diabetes Mother, Brother          Tobacco Use    Smoking status: Former     Types: Cigars     Passive exposure: Never    Smokeless tobacco: Current   Substance and Sexual Activity    Alcohol use: Not Currently    Drug  use: Not Currently    Sexual activity: Not Currently     Partners: Female     Review of Systems   Unable to perform ROS: Dementia   All other systems reviewed and are negative.    Objective:     Vital Signs (Most Recent):  Temp: 98.7 °F (37.1 °C) (10/06/24 1620)  Pulse: (!) 56 (10/06/24 1620)  Resp: 18 (10/06/24 1620)  BP: (!) 74/53 (10/06/24 1620)  SpO2: 97 % (10/06/24 1620) Vital Signs (24h Range):  Temp:  [97.8 °F (36.6 °C)-98.7 °F (37.1 °C)] 98.7 °F (37.1 °C)  Pulse:  [43-61] 56  Resp:  [14-20] 18  SpO2:  [94 %-100 %] 97 %  BP: ()/(44-74) 74/53     Weight: 66.7 kg (147 lb 0.8 oz) (10/06/24 0307)  Body mass index is 21.1 kg/m².      Intake/Output Summary (Last 24 hours) at 10/6/2024 1642  Last data filed at 10/6/2024 0337  Gross per 24 hour   Intake 164.4 ml   Output --   Net 164.4 ml       Lines/Drains/Airways       Peripheral Intravenous Line  Duration                  Peripheral IV - Single Lumen 10/05/24 2058 22 G Anterior;Distal;Left Forearm <1 day                     Physical Exam  Vitals and nursing note reviewed.   Constitutional:       Appearance: He is ill-appearing.   Eyes:      General: Scleral icterus present.   Cardiovascular:      Rate and Rhythm: Bradycardia present.      Heart sounds: Heart sounds are distant.   Abdominal:      General: Abdomen is scaphoid. There is no distension.      Palpations: Abdomen is soft.      Tenderness: There is no abdominal tenderness. There is no guarding or rebound.   Skin:     Coloration: Skin is jaundiced.   Neurological:      Mental Status: He is alert.   Psychiatric:         Attention and Perception: He is inattentive.         Mood and Affect: Affect is flat.         Speech: He is noncommunicative.         Behavior: Behavior is slowed and withdrawn.         Cognition and Memory: Cognition is impaired. Memory is impaired.      Significant Labs:  CBC:   Recent Labs   Lab 10/05/24  2048 10/06/24  0622   WBC 6.02 6.28  6.28   HGB 14.3 12.5*  12.5*   HCT 43.2  "37.8*  37.8*   * 122*  122*     CMP:   Recent Labs   Lab 10/06/24  0622   *  171*   CALCIUM 9.2  9.2   ALBUMIN 3.0*  3.0*   PROT 6.3  6.3     140   K 4.3  4.3   CO2 23  23     105   BUN 30*  30*   CREATININE 1.4  1.4   ALKPHOS 301*  301*   *  217*   *  218*   BILITOT 3.8*  3.8*     Coagulation:   Recent Labs   Lab 10/06/24  0919   INR 1.1     Lipase: No results for input(s): "LIPASE" in the last 48 hours.    Significant Imaging:  Imaging results within the past 24 hours have been reviewed.  "

## 2024-10-06 NOTE — CARE UPDATE
Evaluated patient at bedside    Ros unable to be obtained due to dementia     Head lesion  Cachetic   Sleeping   Arouses to touch  No respiratory distress on nasal cannula  Dry skin  Jaundice   Bruising   No lower extremity edema   Moribund     Awaiting on mrcp   Npo   Lactic acidosis resolved   Empiric antibiotic(s)

## 2024-10-06 NOTE — ASSESSMENT & PLAN NOTE
Patient is identified as having Diastolic (HFpEF) heart failure that is Chronic. CHF is currently controlled. Latest ECHO performed and demonstrates- Results for orders placed during the hospital encounter of 06/13/22    Echo    Interpretation Summary  · The left ventricle is normal in size with severe concentric hypertrophy and normal systolic function.  · Mild left atrial enlargement.  · The estimated ejection fraction is 55%.  · Grade II left ventricular diastolic dysfunction.  · Normal right ventricular size with normal right ventricular systolic function.  . Continue ACE/ARB Nitrate/Vasodilator and monitor clinical status closely. Monitor on telemetry. Patient is off CHF pathway.  Monitor strict Is&Os and daily weights.  Place on fluid restriction of 2 L. Continue to stress to patient importance of self efficacy and  on diet for CHF. Last BNP reviewed- and noted below   Recent Labs   Lab 10/05/24  2048   *   .

## 2024-10-06 NOTE — ASSESSMENT & PLAN NOTE
LFTs consistent with biliary obstruction  IV Zosyn  US shows gallstones and mild intrahepatic dilation   MRCP pending

## 2024-10-06 NOTE — SUBJECTIVE & OBJECTIVE
Past Medical History:   Diagnosis Date    Acute blood loss anemia 10/14/2019    Alzheimer's dementia     Aneurysm, abdominal aortic     BCC (basal cell carcinoma of skin) 06/29/2023    Chronic diastolic heart failure 05/20/2024    Coronary artery disease     Depression     Diabetes mellitus     HLD (hyperlipidemia)     Hypertension     Kidney stone     PAD (peripheral artery disease)     PAF (paroxysmal atrial fibrillation) 01/24/2023    Tobacco abuse        Past Surgical History:   Procedure Laterality Date    APPENDECTOMY      CORONARY STENT PLACEMENT      x 4    ESOPHAGOGASTRODUODENOSCOPY N/A 10/14/2019    Procedure: EGD (ESOPHAGOGASTRODUODENOSCOPY);  Surgeon: Carlos Mcneal III, MD;  Location: [facility];  Service: Endoscopy;  Laterality: N/A;    ESOPHAGOGASTRODUODENOSCOPY N/A 10/15/2019    Procedure: EGD (ESOPHAGOGASTRODUODENOSCOPY);  Surgeon: Carlos Mcneal III, MD;  Location: [facility];  Service: Endoscopy;  Laterality: N/A;    ESOPHAGOGASTRODUODENOSCOPY N/A 4/17/2023    Procedure: EGD (ESOPHAGOGASTRODUODENOSCOPY);  Surgeon: Nevaeh Mcconnell MD;  Location: [facility];  Service: Endoscopy;  Laterality: N/A;    LEFT HEART CATHETERIZATION Left 10/11/2019    Procedure: CATHETERIZATION, HEART, LEFT;  Surgeon: Robe Gilbert MD;  Location: [facility] CATH LAB;  Service: Cardiology;  Laterality: Left;    LEFT HEART CATHETERIZATION Left 10/13/2019    Procedure: CATHETERIZATION, HEART, LEFT;  Surgeon: Robe Gilbert MD;  Location: [facility] CATH LAB;  Service: Cardiology;  Laterality: Left;    leg stent Left        Review of patient's allergies indicates:   Allergen Reactions    Metoprolol Other (See Comments)     Junctional rhythm         No current facility-administered medications on file prior to encounter.     Current Outpatient Medications on File Prior to Encounter   Medication Sig    alcohol swabs (ALCOHOL PREP PADS) PadM Twice a day    aspirin (ECOTRIN) 81 MG EC tablet Take 1 tablet (81 mg total) by mouth once  daily.    atorvastatin (LIPITOR) 40 MG tablet TAKE 1 TABLET BY MOUTH EVERY DAY    blood sugar diagnostic Strp Test blood sugars twice a day    donepeziL (ARICEPT) 5 MG tablet Take 1 tablet (5 mg total) by mouth every evening.    ELIQUIS 2.5 mg Tab TAKE 1 TABLET BY MOUTH TWICE A DAY    gabapentin (NEURONTIN) 100 MG capsule Take 1 capsule (100 mg total) by mouth 2 (two) times daily as needed. PRN    isosorbide mononitrate (IMDUR) 120 MG 24 hr tablet TAKE 1 TABLET BY MOUTH EVERY DAY    lancets (ACCU-CHEK SOFTCLIX LANCETS) Misc Test blood sugars twice a day    memantine (NAMENDA) 10 MG Tab Take 1 tablet (10 mg total) by mouth 2 (two) times daily.    metFORMIN (GLUCOPHAGE) 500 MG tablet TAKE 1 TABLET BY MOUTH EVERY DAY WITH BREAKFAST    pantoprazole (PROTONIX) 40 MG tablet Take 1 tablet (40 mg total) by mouth once daily.    valsartan (DIOVAN) 160 MG tablet Take 1 tablet (160 mg total) by mouth once daily.     Family History       Problem Relation (Age of Onset)    COPD Father    Diabetes Mother, Brother          Tobacco Use    Smoking status: Former     Types: Cigars     Passive exposure: Never    Smokeless tobacco: Current   Substance and Sexual Activity    Alcohol use: Not Currently    Drug use: Not Currently    Sexual activity: Not Currently     Partners: Female     Review of Systems   Unable to perform ROS: Dementia     Objective:     Vital Signs (Most Recent):  Temp: 98.1 °F (36.7 °C) (10/05/24 2004)  Pulse: (!) 56 (10/05/24 2215)  Resp: 20 (10/05/24 2215)  BP: (!) 167/74 (10/05/24 2215)  SpO2: 96 % (10/05/24 2215) Vital Signs (24h Range):  Temp:  [98.1 °F (36.7 °C)] 98.1 °F (36.7 °C)  Pulse:  [51-56] 56  Resp:  [18-20] 20  SpO2:  [96 %-100 %] 96 %  BP: (137-167)/(67-74) 167/74     Weight: 66.7 kg (147 lb 0.8 oz)  Body mass index is 21.1 kg/m².     Physical Exam  Vitals and nursing note reviewed.   Constitutional:       General: He is awake. He is not in acute distress.     Appearance: Normal appearance. He is  well-developed and well-groomed. He is not ill-appearing, toxic-appearing or diaphoretic.      Comments: Elderly appearing male in no acute distress   HENT:      Head: Normocephalic and atraumatic.   Eyes:      Extraocular Movements: Extraocular movements intact.      Conjunctiva/sclera: Conjunctivae normal.   Cardiovascular:      Rate and Rhythm: Normal rate and regular rhythm.      Pulses: Normal pulses.      Heart sounds: Normal heart sounds. No murmur heard.  Pulmonary:      Effort: Pulmonary effort is normal.      Breath sounds: Normal breath sounds. No decreased breath sounds, wheezing, rhonchi or rales.   Abdominal:      General: Bowel sounds are normal.      Palpations: Abdomen is soft.      Tenderness: There is no abdominal tenderness. There is no guarding or rebound.      Comments: Grimacing upon palpation of the abdomen   Musculoskeletal:      Cervical back: Normal range of motion and neck supple.      Right lower leg: No edema.      Left lower leg: No edema.      Comments: Moves all extremities   Skin:     Capillary Refill: Capillary refill takes less than 2 seconds.      Coloration: Skin is mottled.   Neurological:      Mental Status: He is alert. Mental status is at baseline. He is confused.      GCS: GCS eye subscore is 4. GCS verbal subscore is 4. GCS motor subscore is 6.   Psychiatric:      Comments: History of dementia at baseline per wife at bedside              LABS:  Recent Results (from the past 24 hours)   COVID-19 Rapid Screening    Collection Time: 10/05/24  8:36 PM   Result Value Ref Range    SARS-CoV-2 RNA, Amplification, Qual Negative Negative   Influenza A & B by Molecular    Collection Time: 10/05/24  8:36 PM    Specimen: Nasopharyngeal Swab   Result Value Ref Range    Influenza A, Molecular Negative Negative    Influenza B, Molecular Negative Negative    Flu A & B Source Nasal swab    CBC auto differential    Collection Time: 10/05/24  8:48 PM   Result Value Ref Range    WBC 6.02 3.90  - 12.70 K/uL    RBC 4.86 4.60 - 6.20 M/uL    Hemoglobin 14.3 14.0 - 18.0 g/dL    Hematocrit 43.2 40.0 - 54.0 %    MCV 89 82 - 98 fL    MCH 29.4 27.0 - 31.0 pg    MCHC 33.1 32.0 - 36.0 g/dL    RDW 14.1 11.5 - 14.5 %    Platelets 124 (L) 150 - 450 K/uL    MPV 11.8 9.2 - 12.9 fL    Immature Granulocytes 0.3 0.0 - 0.5 %    Gran # (ANC) 5.4 1.8 - 7.7 K/uL    Immature Grans (Abs) 0.02 0.00 - 0.04 K/uL    Lymph # 0.2 (L) 1.0 - 4.8 K/uL    Mono # 0.3 0.3 - 1.0 K/uL    Eos # 0.1 0.0 - 0.5 K/uL    Baso # 0.01 0.00 - 0.20 K/uL    nRBC 0 0 /100 WBC    Gran % 89.2 (H) 38.0 - 73.0 %    Lymph % 3.8 (L) 18.0 - 48.0 %    Mono % 5.5 4.0 - 15.0 %    Eosinophil % 1.0 0.0 - 8.0 %    Basophil % 0.2 0.0 - 1.9 %    Differential Method Automated    Comprehensive metabolic panel    Collection Time: 10/05/24  8:48 PM   Result Value Ref Range    Sodium 140 136 - 145 mmol/L    Potassium 4.8 3.5 - 5.1 mmol/L    Chloride 104 95 - 110 mmol/L    CO2 22 (L) 23 - 29 mmol/L    Glucose 202 (H) 70 - 110 mg/dL    BUN 24 (H) 8 - 23 mg/dL    Creatinine 1.2 0.5 - 1.4 mg/dL    Calcium 9.8 8.7 - 10.5 mg/dL    Total Protein 7.8 6.0 - 8.4 g/dL    Albumin 3.7 3.5 - 5.2 g/dL    Total Bilirubin 2.6 (H) 0.1 - 1.0 mg/dL    Alkaline Phosphatase 387 (H) 55 - 135 U/L     (H) 10 - 40 U/L     (H) 10 - 44 U/L    eGFR 59 (A) >60 mL/min/1.73 m^2    Anion Gap 14 8 - 16 mmol/L   Troponin I    Collection Time: 10/05/24  8:48 PM   Result Value Ref Range    Troponin I 0.018 0.000 - 0.026 ng/mL   Lactic acid, plasma    Collection Time: 10/05/24  8:48 PM   Result Value Ref Range    Lactate (Lactic Acid) 2.8 (H) 0.5 - 2.2 mmol/L   Brain natriuretic peptide    Collection Time: 10/05/24  8:48 PM   Result Value Ref Range     (H) 0 - 99 pg/mL   POCT glucose    Collection Time: 10/05/24  8:52 PM   Result Value Ref Range    POCT Glucose 200 (H) 70 - 110 mg/dL   Urinalysis, Reflex to Urine Culture Urine, Clean Catch    Collection Time: 10/05/24  9:08 PM    Specimen:  Urine, Clean Catch   Result Value Ref Range    Specimen UA Urine, Clean Catch     Color, UA Yellow Yellow, Straw, Caren    Appearance, UA Clear Clear    pH, UA 8.0 5.0 - 8.0    Specific Gravity, UA 1.020 1.005 - 1.030    Protein, UA 3+ (A) Negative    Glucose, UA Negative Negative    Ketones, UA Negative Negative    Bilirubin (UA) Negative Negative    Occult Blood UA Negative Negative    Nitrite, UA Negative Negative    Urobilinogen, UA 2.0-3.0 (A) <2.0 EU/dL    Leukocytes, UA Negative Negative   Urinalysis Microscopic    Collection Time: 10/05/24  9:08 PM   Result Value Ref Range    RBC, UA 3 0 - 4 /hpf    WBC, UA 5 0 - 5 /hpf    Bacteria None None-Occ /hpf    Hyaline Casts, UA 1 0-1/lpf /lpf    Microscopic Comment SEE COMMENT        RADIOLOGY  US Abdomen Limited    Result Date: 10/5/2024  EXAMINATION: US ABDOMEN LIMITED CLINICAL HISTORY: evaluate gallbladder; TECHNIQUE: Limited ultrasound of the right upper quadrant of the abdomen (including pancreas, liver, gallbladder, common bile duct, and spleen) was performed. COMPARISON: CT 10/05/2024 FINDINGS: Liver: No focal abnormality of the visualized hepatic parenchyma. Gallbladder: There are multiple calculi identified in the gallbladder lumen measuring up to 1.4 cm.  No significant gallbladder wall thickening or gallbladder wall hyperemia appreciated.  The technologist reports a negative sonographic Weinstein sign. Biliary system: The extrahepatic common bile duct is difficult to visualize.  The visualized portion measures 0.6 cm.  There is mild intra hepatic biliary ductal dilatation. Pancreas: Obscured by bowel gas. Knee: No evidence of hydronephrosis. Miscellaneous: No upper abdominal ascites.     Cholelithiasis without definite sonographic evidence of acute cholecystitis. Borderline prominent extrahepatic common bile duct, noting the extrahepatic common duct is poorly visualized in its entirety.  Additionally, there is mild intrahepatic biliary ductal dilatation.   Correlation with appropriate lab values advised if there is concern for choledocholithiasis. Electronically signed by: Jerel Boswell MD Date:    10/05/2024 Time:    23:26    CT Abdomen Pelvis  Without Contrast    Result Date: 10/5/2024  EXAMINATION: CT ABDOMEN PELVIS WITHOUT CONTRAST CLINICAL HISTORY: Abdominal pain, acute, nonlocalized; TECHNIQUE: Low dose axial images, sagittal and coronal reformations were obtained from the lung bases to the pubic symphysis.  Contrast was not administered. COMPARISON: October 2009 FINDINGS: Left basilar pulmonary nodule similar size Unenhanced spleen pancreas and liver stable. Kidneys without hydronephrosis or adverse finding unenhanced Aortic aneurysm excluded by endovascular aorto bi-iliac stent graft similar size and morphology as visualized unenhanced No obstructive or inflammatory bowel findings Urinary bladder decompressed and prostate gland calcification     No acute finding unenhanced imaging Electronically signed by: Shavonne Chappell Date:    10/05/2024 Time:    22:47    X-Ray Chest AP Portable    Result Date: 10/5/2024  EXAMINATION: XR CHEST AP PORTABLE CLINICAL HISTORY: weakness; TECHNIQUE: Single frontal portable view of the chest was performed. COMPARISON: 05/21/2024 FINDINGS: No pulmonary opacity or pleural effusion identified slight portable exam with artifact     No active or adverse finding degraded imaging with artifact Electronically signed by: Shavonne Chappell Date:    10/05/2024 Time:    21:14      EKG    MICROBIOLOGY    MDM     Amount and/or Complexity of Data Reviewed  Clinical lab tests: reviewed  Tests in the radiology section of CPT®: reviewed  Tests in the medicine section of CPT®: reviewed  Discussion of test results with the performing providers: yes  Decide to obtain previous medical records or to obtain history from someone other than the patient: yes  Obtain history from someone other than the patient: yes  Review and summarize past medical  records: yes  Discuss the patient with other providers: yes  Independent visualization of images, tracings, or specimens: yes

## 2024-10-06 NOTE — ASSESSMENT & PLAN NOTE
Patient is chronically on statin.will not continue for now. Last Lipid Panel:   Lab Results   Component Value Date    CHOL 150 05/07/2024    HDL 36 (L) 05/07/2024    LDLCALC 94.2 05/07/2024    TRIG 99 05/07/2024    CHOLHDL 24.0 05/07/2024     Plan:  -Hold home medication due to elevated LFTs  -low fat/low calorie diet

## 2024-10-06 NOTE — ASSESSMENT & PLAN NOTE
Patient has paroxysmal (<7 days) atrial fibrillation. Patient is currently in  sinus Nicholas . [id number] Score: 4. The patients heart rate in the last 24 hours is as follows:  Pulse  Min: 51  Max: 61     Antiarrhythmics       Anticoagulants       Plan  - Replete lytes with a goal of K>4, Mg >2  - Patient is anticoagulated, see medications listed above.  - Patient's afib is currently controlled   Tranexamic Acid Pregnancy And Lactation Text: It is unknown if this medication is safe during pregnancy or breast feeding.

## 2024-10-06 NOTE — CONSULTS
[facility] Surg  Gastroenterology  Consult Note    Patient Name: Aquiles Yates  MRN: [id number]  Admission Date: 10/5/2024  Hospital Length of Stay: 0 days  Code Status: Full Code   Attending Provider: Harpreet Tillman MD   Consulting Provider: Nevaeh Mcconnell MD  Primary Care Physician: Holger Thornton MD  Principal Problem:Calculus of bile duct without cholecystitis with obstruction    Inpatient consult to Gastroenterology  Consult performed by: Nevaeh Mcconnell MD  Consult ordered by: Max Augustin NP  Reason for consult: suspected choledocholithiasis        Subjective:     HPI:  85 y/o  male with DMII, CAD with multiple PCI procedures, chronic diastolic CHF, afib with h/o CVA,  HTN, DM, LAE, LVH, PAD, AAA stent graft (approx 2013), dementia, hyperlipidemia and AVMs of the GI tract resulting in symptomatic anemia and hospitalization in 04/2023. Management of the gastrointestinal AVMs were endoscopic management with APC. His Eliquis was also decreased from 5mg BID to 2.5mg BID. Since this time, he has not required repeat treatment for AVMs and labs showed resolution of his anemia with H&H 14.2/45.2 on admission.     History of present illness provided by wife Beatrice. She reports for last two days patient has not been his usual self. Though he denies abdominal pain he refused to eat his favorite foods and wanted to only sleep. After being placed in bed he exhibited rigors and complained of being cold. Despite multiple blankets he will not stop shaking. She contacted EMS for further assistance. Once evaluated by EMS, they recommended he be evaluated by the local ED. He presented to [facility] via ED yesterday evening. Labs on presentation showed abnormal liver enzymes with , , , Tbili 2.6. Lactic acid level was also elevated at 2.3. CT abdomen was unrevealing however US abdomen showed multiple caculi in the gallbladder without cholecystitis and mild intrahepatic  duct dilatation. Patient was admitted for suspected choledocholithiasis and placed on IV antibiotics. He has remained afebrile, hemodynamically stable and no leukocytosis.     Patient seen this morning. Repeat labs show , , , TB 3.8 (DB 2.7). Lactic acid level normalized at 1.2. He is on Zosyn and tolerating ice chips though he has been mostly sleeping since admission. Wife reports he last took Eliquis yesterday morning (10/5/24, AM dose). Also takes ASA.     Past Medical History:   Diagnosis Date    Acute blood loss anemia 10/14/2019    Alzheimer's dementia     Aneurysm, abdominal aortic     BCC (basal cell carcinoma of skin) 06/29/2023    Chronic diastolic heart failure 05/20/2024    Coronary artery disease     Depression     Diabetes mellitus     HLD (hyperlipidemia)     Hypertension     Kidney stone     PAD (peripheral artery disease)     PAF (paroxysmal atrial fibrillation) 01/24/2023    Tobacco abuse        Past Surgical History:   Procedure Laterality Date    APPENDECTOMY      CORONARY STENT PLACEMENT      x 4    ESOPHAGOGASTRODUODENOSCOPY N/A 10/14/2019    Procedure: EGD (ESOPHAGOGASTRODUODENOSCOPY);  Surgeon: Carlos Mcneal III, MD;  Location: [facility];  Service: Endoscopy;  Laterality: N/A;    ESOPHAGOGASTRODUODENOSCOPY N/A 10/15/2019    Procedure: EGD (ESOPHAGOGASTRODUODENOSCOPY);  Surgeon: Carlos Mcneal III, MD;  Location: [facility];  Service: Endoscopy;  Laterality: N/A;    ESOPHAGOGASTRODUODENOSCOPY N/A 4/17/2023    Procedure: EGD (ESOPHAGOGASTRODUODENOSCOPY);  Surgeon: Nevaeh Mcconnell MD;  Location: [facility];  Service: Endoscopy;  Laterality: N/A;    LEFT HEART CATHETERIZATION Left 10/11/2019    Procedure: CATHETERIZATION, HEART, LEFT;  Surgeon: Robe Gilbert MD;  Location: [facility] CATH LAB;  Service: Cardiology;  Laterality: Left;    LEFT HEART CATHETERIZATION Left 10/13/2019    Procedure: CATHETERIZATION, HEART, LEFT;  Surgeon: Robe Gilbert MD;  Location: [facility] CATH  LAB;  Service: Cardiology;  Laterality: Left;    leg stent Left        Review of patient's allergies indicates:   Allergen Reactions    Metoprolol Other (See Comments)     Junctional rhythm       Family History       Problem Relation (Age of Onset)    COPD Father    Diabetes Mother, Brother          Tobacco Use    Smoking status: Former     Types: Cigars     Passive exposure: Never    Smokeless tobacco: Current   Substance and Sexual Activity    Alcohol use: Not Currently    Drug use: Not Currently    Sexual activity: Not Currently     Partners: Female     Review of Systems   Unable to perform ROS: Dementia   All other systems reviewed and are negative.    Objective:     Vital Signs (Most Recent):  Temp: 98.7 °F (37.1 °C) (10/06/24 1620)  Pulse: (!) 56 (10/06/24 1620)  Resp: 18 (10/06/24 1620)  BP: (!) 74/53 (10/06/24 1620)  SpO2: 97 % (10/06/24 1620) Vital Signs (24h Range):  Temp:  [97.8 °F (36.6 °C)-98.7 °F (37.1 °C)] 98.7 °F (37.1 °C)  Pulse:  [43-61] 56  Resp:  [14-20] 18  SpO2:  [94 %-100 %] 97 %  BP: ()/(44-74) 74/53     Weight: 66.7 kg (147 lb 0.8 oz) (10/06/24 0307)  Body mass index is 21.1 kg/m².      Intake/Output Summary (Last 24 hours) at 10/6/2024 1642  Last data filed at 10/6/2024 0337  Gross per 24 hour   Intake 164.4 ml   Output --   Net 164.4 ml       Lines/Drains/Airways       Peripheral Intravenous Line  Duration                  Peripheral IV - Single Lumen 10/05/24 2058 22 G Anterior;Distal;Left Forearm <1 day                     Physical Exam  Vitals and nursing note reviewed.   Constitutional:       Appearance: He is ill-appearing.   Eyes:      General: Scleral icterus present.   Cardiovascular:      Rate and Rhythm: Bradycardia present.      Heart sounds: Heart sounds are distant.   Abdominal:      General: Abdomen is scaphoid. There is no distension.      Palpations: Abdomen is soft.      Tenderness: There is no abdominal tenderness. There is no guarding or rebound.   Skin:      "Coloration: Skin is jaundiced.   Neurological:      Mental Status: He is alert.   Psychiatric:         Attention and Perception: He is inattentive.         Mood and Affect: Affect is flat.         Speech: He is noncommunicative.         Behavior: Behavior is slowed and withdrawn.         Cognition and Memory: Cognition is impaired. Memory is impaired.      Significant Labs:  CBC:   Recent Labs   Lab 10/05/24  2048 10/06/24  0622   WBC 6.02 6.28  6.28   HGB 14.3 12.5*  12.5*   HCT 43.2 37.8*  37.8*   * 122*  122*     CMP:   Recent Labs   Lab 10/06/24  0622   *  171*   CALCIUM 9.2  9.2   ALBUMIN 3.0*  3.0*   PROT 6.3  6.3     140   K 4.3  4.3   CO2 23  23     105   BUN 30*  30*   CREATININE 1.4  1.4   ALKPHOS 301*  301*   *  217*   *  218*   BILITOT 3.8*  3.8*     Coagulation:   Recent Labs   Lab 10/06/24  0919   INR 1.1     Lipase: No results for input(s): "LIPASE" in the last 48 hours.    Significant Imaging:  Imaging results within the past 24 hours have been reviewed.  Assessment/Plan:     Cardiac/Vascular  PAF (paroxysmal atrial fibrillation)  On Eliquis till 10/5/24, AM dose given  Hx CVA  Bradycardic    Coronary artery disease of native artery of native heart with stable angina pectoris  S/p multiple PCI procedures  Stopped tobacco 2 years ago  On ASA  Bradycardic on exam and on EKG  Valsartan and Imdur    GI  * Calculus of bile duct without cholecystitis with obstruction  LFTs consistent with biliary obstruction  IV Zosyn  US shows gallstones and mild intrahepatic dilation   MRCP pending    AVM (arteriovenous malformation) of stomach, acquired with hemorrhage  Hx if symptomatic anemia in April 2023   EGD 4/17/23 revealed seven gastric AVMs s/p APC  Eliquis also decreased from 5mg BID to 2.5mg BID  No further bleeding episodes  H&H normal on admission      Recommendations:  Agree with MRCP  Hold Eliquis  Will contact advance GI at [facility] " once MRCP results available.      Thank you for your consult. Discussed with patient's wife Gayle in detail. Given educational material on biliary tree and discussed Eliquis needs to be held. She expressed understanding. Patient will likely need ERCP and inpatient lap harjinder. Contact surgery service in AM. Communicated recommendations with hospital medicine service. I will follow-up with patient. Please contact us if you have any additional questions.      Nevaeh Mcconnell MD  Gastroenterology  [facility]

## 2024-10-06 NOTE — ED PROVIDER NOTES
"SCRIBE #1 NOTE: I, Me-Kristopher Louis, am scribing for, and in the presence of, Alphonso Perdomo Jr., MD. I have scribed the entire note.       History     Chief Complaint   Patient presents with    Shaking     EMS reports wife called 911 due to pt shaking and having "mottled" skin all over which is new. Pt has hx of Alzheimer's and at baseline GCS of 14.  Pt currently denies any complaints      Review of patient's allergies indicates:   Allergen Reactions    Metoprolol Other (See Comments)     Junctional rhythm           History of Present Illness     HPI    10/5/2024, 8:17 PM  History obtained from the patient and paramedic report  HPI and ROS limited; pt has a history of alzheimer's and dementia      History of Present Illness: Aquiles Yates is a 84 y.o. male patient with a PMHx of acute blood loss anemia, aneurysm, BCC, chronic diastolic heart failure, CAD, depression, DM, HLD, HTN, kidney stone, PAD, and PAF who presents to the Emergency Department for evaluation of tremors and mottled skin per wife. Wife called the paramedics when she noticed patient shaking and patient's mottled skin today. Pt has a history of Alzheimer's and paramedics report a GCS of 14. Upon interview, pt denies any pain and is oriented to self. No further complaints or concerns at this time.       Arrival mode: Personal vehicle  PCP: Holger Thornton MD        Past Medical History:  Past Medical History:   Diagnosis Date    Acute blood loss anemia 10/14/2019    Aneurysm, abdominal aortic     BCC (basal cell carcinoma of skin) 06/29/2023    Chronic diastolic heart failure 05/20/2024    Coronary artery disease     Depression     Diabetes mellitus     HLD (hyperlipidemia)     Hypertension     Kidney stone     PAD (peripheral artery disease)     PAF (paroxysmal atrial fibrillation) 01/24/2023    Tobacco abuse        Past Surgical History:  Past Surgical History:   Procedure Laterality Date    APPENDECTOMY      CORONARY STENT " PLACEMENT      x 4    ESOPHAGOGASTRODUODENOSCOPY N/A 10/14/2019    Procedure: EGD (ESOPHAGOGASTRODUODENOSCOPY);  Surgeon: Carlos Mcneal III, MD;  Location: [facility] ENDO;  Service: Endoscopy;  Laterality: N/A;    ESOPHAGOGASTRODUODENOSCOPY N/A 10/15/2019    Procedure: EGD (ESOPHAGOGASTRODUODENOSCOPY);  Surgeon: Carlos Mcneal III, MD;  Location: [facility] ENDO;  Service: Endoscopy;  Laterality: N/A;    ESOPHAGOGASTRODUODENOSCOPY N/A 4/17/2023    Procedure: EGD (ESOPHAGOGASTRODUODENOSCOPY);  Surgeon: Nevaeh Mcconnell MD;  Location: [facility] ENDO;  Service: Endoscopy;  Laterality: N/A;    LEFT HEART CATHETERIZATION Left 10/11/2019    Procedure: CATHETERIZATION, HEART, LEFT;  Surgeon: Robe Gilbert MD;  Location: [facility] CATH LAB;  Service: Cardiology;  Laterality: Left;    LEFT HEART CATHETERIZATION Left 10/13/2019    Procedure: CATHETERIZATION, HEART, LEFT;  Surgeon: Robe Gilbert MD;  Location: [facility] CATH LAB;  Service: Cardiology;  Laterality: Left;    leg stent Left          Family History:  Family History   Problem Relation Name Age of Onset    Diabetes Mother      COPD Father      Diabetes Brother         Social History:  Social History     Tobacco Use    Smoking status: Former     Types: Cigars     Passive exposure: Never    Smokeless tobacco: Current   Substance and Sexual Activity    Alcohol use: Not Currently    Drug use: Not Currently    Sexual activity: Not Currently     Partners: Female        Review of Systems     Review of Systems   Reason unable to perform ROS: Alzheimer's and dementia.   Skin:  Positive for color change (mottled, pale skin).   Neurological:  Positive for tremors.        Physical Exam     Initial Vitals [10/05/24 2004]   BP Pulse Resp Temp SpO2   137/67 (!) 51 18 98.1 °F (36.7 °C) 100 %      MAP       --          Physical Exam  Nursing Notes and Vital Signs Reviewed.  Constitutional: Patient is in no acute distress. Well-developed and well-nourished.  Head: Atraumatic. Normocephalic.  Eyes:  PERRL. EOM intact. Conjunctivae are not pale. No scleral icterus.  ENT: Mucous membranes are moist.   Neck: Supple. Full ROM. No lymphadenopathy.  Cardiovascular: Bradycardic. Regular rhythm. No murmurs, rubs, or gallops. Distal pulses are 2+ and symmetric.  Pulmonary/Chest: No respiratory distress. Clear to auscultation bilaterally. No wheezing or rales.  Abdominal: Soft and non-distended. Patient grimaces with palpation of the abdomen.   Genitourinary: No CVA tenderness  Musculoskeletal: Moves all extremities. No obvious deformities. No edema. No calf tenderness.  Skin: Mottled skin.   Neurological:  Alert, awake, and appropriate. No acute focal neurological deficits are appreciated. Patient is shaking.   Psychiatric: Patient is demented. Pt is slightly agitated per wife.     ED Course   Procedures  ED Vital Signs:  Vitals:    10/05/24 2004 10/05/24 2215 10/06/24 0022   BP: 137/67 (!) 167/74    Pulse: (!) 51 (!) 56    Resp: 18 20    Temp: 98.1 °F (36.7 °C)     TempSrc: Oral     SpO2: 100% 96%    Weight:   66.7 kg (147 lb 0.8 oz)       Abnormal Lab Results:  Labs Reviewed   CBC W/ AUTO DIFFERENTIAL - Abnormal       Result Value    WBC 6.02      RBC 4.86      Hemoglobin 14.3      Hematocrit 43.2      MCV 89      MCH 29.4      MCHC 33.1      RDW 14.1      Platelets 124 (*)     MPV 11.8      Immature Granulocytes 0.3      Gran # (ANC) 5.4      Immature Grans (Abs) 0.02      Lymph # 0.2 (*)     Mono # 0.3      Eos # 0.1      Baso # 0.01      nRBC 0      Gran % 89.2 (*)     Lymph % 3.8 (*)     Mono % 5.5      Eosinophil % 1.0      Basophil % 0.2      Differential Method Automated     COMPREHENSIVE METABOLIC PANEL - Abnormal    Sodium 140      Potassium 4.8      Chloride 104      CO2 22 (*)     Glucose 202 (*)     BUN 24 (*)     Creatinine 1.2      Calcium 9.8      Total Protein 7.8      Albumin 3.7      Total Bilirubin 2.6 (*)     Alkaline Phosphatase 387 (*)      (*)      (*)     eGFR 59 (*)     Anion Gap  14     URINALYSIS, REFLEX TO URINE CULTURE - Abnormal    Specimen UA Urine, Clean Catch      Color, UA Yellow      Appearance, UA Clear      pH, UA 8.0      Specific Gravity, UA 1.020      Protein, UA 3+ (*)     Glucose, UA Negative      Ketones, UA Negative      Bilirubin (UA) Negative      Occult Blood UA Negative      Nitrite, UA Negative      Urobilinogen, UA 2.0-3.0 (*)     Leukocytes, UA Negative      Narrative:     Specimen Source->Urine   LACTIC ACID, PLASMA - Abnormal    Lactate (Lactic Acid) 2.8 (*)    B-TYPE NATRIURETIC PEPTIDE - Abnormal     (*)    POCT GLUCOSE - Abnormal    POCT Glucose 200 (*)    INFLUENZA A & B BY MOLECULAR    Influenza A, Molecular Negative      Influenza B, Molecular Negative      Flu A & B Source Nasal swab     TROPONIN I    Troponin I 0.018     SARS-COV-2 RNA AMPLIFICATION, QUAL    SARS-CoV-2 RNA, Amplification, Qual Negative     URINALYSIS MICROSCOPIC    RBC, UA 3      WBC, UA 5      Bacteria None      Hyaline Casts, UA 1      Microscopic Comment SEE COMMENT      Narrative:     Specimen Source->Urine   POCT GLUCOSE MONITORING CONTINUOUS        All Lab Results:  Results for orders placed or performed during the hospital encounter of 10/05/24   Influenza A & B by Molecular    Collection Time: 10/05/24  8:36 PM    Specimen: Nasopharyngeal Swab   Result Value Ref Range    Influenza A, Molecular Negative Negative    Influenza B, Molecular Negative Negative    Flu A & B Source Nasal swab    COVID-19 Rapid Screening    Collection Time: 10/05/24  8:36 PM   Result Value Ref Range    SARS-CoV-2 RNA, Amplification, Qual Negative Negative   CBC auto differential    Collection Time: 10/05/24  8:48 PM   Result Value Ref Range    WBC 6.02 3.90 - 12.70 K/uL    RBC 4.86 4.60 - 6.20 M/uL    Hemoglobin 14.3 14.0 - 18.0 g/dL    Hematocrit 43.2 40.0 - 54.0 %    MCV 89 82 - 98 fL    MCH 29.4 27.0 - 31.0 pg    MCHC 33.1 32.0 - 36.0 g/dL    RDW 14.1 11.5 - 14.5 %    Platelets 124 (L) 150 - 450  K/uL    MPV 11.8 9.2 - 12.9 fL    Immature Granulocytes 0.3 0.0 - 0.5 %    Gran # (ANC) 5.4 1.8 - 7.7 K/uL    Immature Grans (Abs) 0.02 0.00 - 0.04 K/uL    Lymph # 0.2 (L) 1.0 - 4.8 K/uL    Mono # 0.3 0.3 - 1.0 K/uL    Eos # 0.1 0.0 - 0.5 K/uL    Baso # 0.01 0.00 - 0.20 K/uL    nRBC 0 0 /100 WBC    Gran % 89.2 (H) 38.0 - 73.0 %    Lymph % 3.8 (L) 18.0 - 48.0 %    Mono % 5.5 4.0 - 15.0 %    Eosinophil % 1.0 0.0 - 8.0 %    Basophil % 0.2 0.0 - 1.9 %    Differential Method Automated    Comprehensive metabolic panel    Collection Time: 10/05/24  8:48 PM   Result Value Ref Range    Sodium 140 136 - 145 mmol/L    Potassium 4.8 3.5 - 5.1 mmol/L    Chloride 104 95 - 110 mmol/L    CO2 22 (L) 23 - 29 mmol/L    Glucose 202 (H) 70 - 110 mg/dL    BUN 24 (H) 8 - 23 mg/dL    Creatinine 1.2 0.5 - 1.4 mg/dL    Calcium 9.8 8.7 - 10.5 mg/dL    Total Protein 7.8 6.0 - 8.4 g/dL    Albumin 3.7 3.5 - 5.2 g/dL    Total Bilirubin 2.6 (H) 0.1 - 1.0 mg/dL    Alkaline Phosphatase 387 (H) 55 - 135 U/L     (H) 10 - 40 U/L     (H) 10 - 44 U/L    eGFR 59 (A) >60 mL/min/1.73 m^2    Anion Gap 14 8 - 16 mmol/L   Troponin I    Collection Time: 10/05/24  8:48 PM   Result Value Ref Range    Troponin I 0.018 0.000 - 0.026 ng/mL   Lactic acid, plasma    Collection Time: 10/05/24  8:48 PM   Result Value Ref Range    Lactate (Lactic Acid) 2.8 (H) 0.5 - 2.2 mmol/L   Brain natriuretic peptide    Collection Time: 10/05/24  8:48 PM   Result Value Ref Range     (H) 0 - 99 pg/mL   POCT glucose    Collection Time: 10/05/24  8:52 PM   Result Value Ref Range    POCT Glucose 200 (H) 70 - 110 mg/dL   Urinalysis, Reflex to Urine Culture Urine, Clean Catch    Collection Time: 10/05/24  9:08 PM    Specimen: Urine, Clean Catch   Result Value Ref Range    Specimen UA Urine, Clean Catch     Color, UA Yellow Yellow, Straw, Caren    Appearance, UA Clear Clear    pH, UA 8.0 5.0 - 8.0    Specific Gravity, UA 1.020 1.005 - 1.030    Protein, UA 3+ (A)  Negative    Glucose, UA Negative Negative    Ketones, UA Negative Negative    Bilirubin (UA) Negative Negative    Occult Blood UA Negative Negative    Nitrite, UA Negative Negative    Urobilinogen, UA 2.0-3.0 (A) <2.0 EU/dL    Leukocytes, UA Negative Negative   Urinalysis Microscopic    Collection Time: 10/05/24  9:08 PM   Result Value Ref Range    RBC, UA 3 0 - 4 /hpf    WBC, UA 5 0 - 5 /hpf    Bacteria None None-Occ /hpf    Hyaline Casts, UA 1 0-1/lpf /lpf    Microscopic Comment SEE COMMENT        Imaging Results:  Imaging Results              US Abdomen Limited (Final result)  Result time 10/05/24 23:26:19      Final result by Jerel Boswell MD (10/05/24 23:26:19)                   Impression:      Cholelithiasis without definite sonographic evidence of acute cholecystitis.    Borderline prominent extrahepatic common bile duct, noting the extrahepatic common duct is poorly visualized in its entirety.  Additionally, there is mild intrahepatic biliary ductal dilatation.  Correlation with appropriate lab values advised if there is concern for choledocholithiasis.      Electronically signed by: Jerel Boswell MD  Date:    10/05/2024  Time:    23:26               Narrative:    EXAMINATION:  US ABDOMEN LIMITED    CLINICAL HISTORY:  evaluate gallbladder;    TECHNIQUE:  Limited ultrasound of the right upper quadrant of the abdomen (including pancreas, liver, gallbladder, common bile duct, and spleen) was performed.    COMPARISON:  CT 10/05/2024    FINDINGS:  Liver: No focal abnormality of the visualized hepatic parenchyma.    Gallbladder: There are multiple calculi identified in the gallbladder lumen measuring up to 1.4 cm.  No significant gallbladder wall thickening or gallbladder wall hyperemia appreciated.  The technologist reports a negative sonographic Weinstein sign.    Biliary system: The extrahepatic common bile duct is difficult to visualize.  The visualized portion measures 0.6 cm.  There is mild intra  hepatic biliary ductal dilatation.    Pancreas: Obscured by bowel gas.    Knee: No evidence of hydronephrosis.    Miscellaneous: No upper abdominal ascites.                                       CT Abdomen Pelvis  Without Contrast (Final result)  Result time 10/05/24 22:47:17      Final result by Shavonne Chappell MD (10/05/24 22:47:17)                   Impression:      No acute finding unenhanced imaging      Electronically signed by: Shavonne Chappell  Date:    10/05/2024  Time:    22:47               Narrative:    EXAMINATION:  CT ABDOMEN PELVIS WITHOUT CONTRAST    CLINICAL HISTORY:  Abdominal pain, acute, nonlocalized;    TECHNIQUE:  Low dose axial images, sagittal and coronal reformations were obtained from the lung bases to the pubic symphysis.  Contrast was not administered.    COMPARISON:  October 2009    FINDINGS:  Left basilar pulmonary nodule similar size    Unenhanced spleen pancreas and liver stable.    Kidneys without hydronephrosis or adverse finding unenhanced    Aortic aneurysm excluded by endovascular aorto bi-iliac stent graft similar size and morphology as visualized unenhanced    No obstructive or inflammatory bowel findings    Urinary bladder decompressed and prostate gland calcification                                       X-Ray Chest AP Portable (Final result)  Result time 10/05/24 21:14:30      Final result by Shavonne Chappell MD (10/05/24 21:14:30)                   Impression:      No active or adverse finding degraded imaging with artifact      Electronically signed by: Shavonne Chappell  Date:    10/05/2024  Time:    21:14               Narrative:    EXAMINATION:  XR CHEST AP PORTABLE    CLINICAL HISTORY:  weakness;    TECHNIQUE:  Single frontal portable view of the chest was performed.    COMPARISON:  05/21/2024    FINDINGS:  No pulmonary opacity or pleural effusion identified slight portable exam with artifact                                       The EKG was ordered,  reviewed, and independently interpreted by the ED provider.  Interpretation time: 20:48  Rate: 55 BPM  Rhythm:  Undetermined rhythm  Interpretation: Inferior infarct, age undetermined. ST & T wave abnormality, consider lateral ischemia. No STEMI.    The EKG was ordered, reviewed, and independently interpreted by the ED provider.  Interpretation time: 20:49  Rate: 56 BPM  Rhythm:  Sinus bradycardia with occasional Premature ventricular complexes   Interpretation: Inferior infarct (cited on or before 04-NOV-2022). ST and T wave abnormality, consider lateral ischemia. No STEMI.        The Emergency Provider reviewed the vital signs and test results, which are outlined above.     ED Discussion     12:14 AM: Discussed pt's case with Dr. Nevaeh Mcconnell MD (Gastroenterology) who agrees with plan to admit patient HM and perform MRCP. Dr. Mcconnell recommends to start antibiotics.     12:16 AM: Re-evaluated patient. Patient is tachycardic at this time. Patient's wife, patient's primary caregiver, is at bedside. She noticed patient become more confused and noticed patient's mottled skin today. She also reports dark colored urine earlier today. Discussed with wife all pertinent ED information. Wife expresses understanding and is agreeable with plan to admit patient.     12:24 AM: Discussed case with Dr. Augustin ([facility]). Dr. Augustin agrees with current care and management of pt and accepts admission.   Admitting Service: Hospital Medicine  Admitting Physician: Dr. Augustin  Admit to: Inpatient Med Tele    12:25 AM: Re-evaluated pt. I have discussed test results, shared treatment plan, and the need for admission with patient and family at bedside. Pt and family express understanding at this time and agree with all information. All questions answered. Pt and family have no further questions or concerns at this time. Pt is ready for admit.     Medical Decision Making  83 y/o male here via EMS and wife noted pt in  distress mottled with nausea and confusion at home . Pt with history of alzheimers dementia histoyr obtained primartily for pts wife at bedside and from prior chart review. Pt found to have elaveted alk phos and bilitubin with  elevated liver enzyems gallbladder us / ct abdomen and pelvis wihtoput contrast showed  gallstones with dilated of intra  extrahepatic bile ducts pt admitted for further treatment and evaluation of choledocholithiasis . Case discussed via secure chat with GI MD on call as well as hospital medicine     Amount and/or Complexity of Data Reviewed  Labs: ordered. Decision-making details documented in ED Course.  Radiology: ordered and independent interpretation performed. Decision-making details documented in ED Course.  ECG/medicine tests: ordered and independent interpretation performed. Decision-making details documented in ED Course.    Risk  Decision regarding hospitalization.       Additional MDM:   Abdominal Pain:   CT scan done: Abd/Pelvis w/o contrast. The CT is not normal.   US done: Gallbladder. The US is not normal and confirms the diagnosis.   The lab was independently reviewed and is not normal.   Re-evaluation of symptoms: Unchanged. Consultants: Internal Medicine and Gastroenterology. Disposition: Admitted for treatment. The patient's condition was felt to be potentially life-threatening.           ED Medication(s):  Medications   piperacillin-tazobactam (ZOSYN) 4.5 g in D5W 100 mL IVPB (MB+) (4.5 g Intravenous New Bag 10/6/24 0019)       New Prescriptions    No medications on file               Scribe Attestation:   Scribe #1: I performed the above scribed service and the documentation accurately describes the services I performed. I attest to the accuracy of the note.     Attending:   Physician Attestation Statement for Scribe #1: I, Alphonso Perdomo Jr., MD, personally performed the services described in this documentation, as scribed by Stephen Louis, in my presence, and it is  both accurate and complete.           Clinical Impression       ICD-10-CM ICD-9-CM   1. Calculus of gallbladder and bile duct with obstruction without cholecystitis  K80.71 574.91   2. Weakness  R53.1 780.79   3. Dementia, unspecified dementia severity, unspecified dementia type, unspecified whether behavioral, psychotic, or mood disturbance or anxiety  F03.90 294.20       Disposition:   Disposition: Admitted  Condition: Serious         Alphonso Perdomo Jr., MD  10/06/24 4319

## 2024-10-06 NOTE — ASSESSMENT & PLAN NOTE
Patient with known CAD s/p stent placement, which is controlled Will continue  eliquis, ASA, and Statin and monitor for S/Sx of angina/ACS. Continue to monitor on telemetry.

## 2024-10-06 NOTE — ASSESSMENT & PLAN NOTE
Chronic. Stable. Not in acute exacerbation and currently denies endorsing any suicidal/homicidal ideations.   Plan:  -Continue to monitor

## 2024-10-06 NOTE — HPI
Aquiles Yates is a 84 y.o. male with a PMH  has a past medical history of Acute blood loss anemia (10/14/2019), Alzheimer's dementia, Aneurysm, abdominal aortic, BCC (basal cell carcinoma of skin) (06/29/2023), Chronic diastolic heart failure (05/20/2024), Coronary artery disease, Depression, Diabetes mellitus, HLD (hyperlipidemia), Hypertension, Kidney stone, PAD (peripheral artery disease), PAF (paroxysmal atrial fibrillation) (01/24/2023), and Tobacco abuse.presented to the Emergency Department for evaluation of tremors and mottled skin per wife. Wife called the paramedics when she noticed patient shaking and patient's mottled skin today. Pt has a history of Alzheimer's and paramedics report a GCS of 14. Upon interview, pt denies any pain and is oriented to self. No further complaints or concerns at this time.       ER workup revealed CBC to be unremarkable.  CMP revealed CBG of 202 mg/dL, , total bili of 2.6, and AST/ALT of 242/179.  .  Troponin negative.  Lactic acid 2.8.  Flu COVID negative.  UA unremarkable.  CTA abdomen and pelvis without contrast revealed no acute findings.  Chest x-ray negative for acute cardiopulmonary findings.  Ultrasound limited revealed cholelithiasis without definitive sonographic evidence of acute cholecystitis.  Mild intrahepatic biliary duct dilation.  Vital signs stable. EKG revealed sinus Nicholas with occasional PVCs with a ventricular rate of 56 beats per minute and a QT/QTC of 408/393.  Patient received 4.5 g Zosyn in ED. GI consulted.  In agreement with MRCP and will see us consult in a.m..  Patient in agreement with treatment plan.  Patient will be admitted under inpatient status.    PCP: Holger Thornton

## 2024-10-06 NOTE — ASSESSMENT & PLAN NOTE
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

## 2024-10-07 LAB
ABO + RH BLD: NORMAL
ALBUMIN SERPL BCP-MCNC: 2.7 G/DL (ref 3.5–5.2)
ALP SERPL-CCNC: 215 U/L (ref 55–135)
ALT SERPL W/O P-5'-P-CCNC: 152 U/L (ref 10–44)
ANION GAP SERPL CALC-SCNC: 11 MMOL/L (ref 8–16)
AST SERPL-CCNC: 110 U/L (ref 10–40)
BASOPHILS # BLD AUTO: 0.02 K/UL (ref 0–0.2)
BASOPHILS NFR BLD: 0.4 % (ref 0–1.9)
BILIRUB SERPL-MCNC: 3.6 MG/DL (ref 0.1–1)
BLD GP AB SCN CELLS X3 SERPL QL: NORMAL
BUN SERPL-MCNC: 39 MG/DL (ref 8–23)
CALCIUM SERPL-MCNC: 8.7 MG/DL (ref 8.7–10.5)
CHLORIDE SERPL-SCNC: 107 MMOL/L (ref 95–110)
CO2 SERPL-SCNC: 22 MMOL/L (ref 23–29)
CREAT SERPL-MCNC: 2.5 MG/DL (ref 0.5–1.4)
DIFFERENTIAL METHOD BLD: ABNORMAL
EOSINOPHIL # BLD AUTO: 0.2 K/UL (ref 0–0.5)
EOSINOPHIL NFR BLD: 4.2 % (ref 0–8)
ERYTHROCYTE [DISTWIDTH] IN BLOOD BY AUTOMATED COUNT: 14.3 % (ref 11.5–14.5)
EST. GFR  (NO RACE VARIABLE): 25 ML/MIN/1.73 M^2
GLUCOSE SERPL-MCNC: 131 MG/DL (ref 70–110)
HCT VFR BLD AUTO: 32.8 % (ref 40–54)
HGB BLD-MCNC: 10.9 G/DL (ref 14–18)
IMM GRANULOCYTES # BLD AUTO: 0.02 K/UL (ref 0–0.04)
IMM GRANULOCYTES NFR BLD AUTO: 0.4 % (ref 0–0.5)
LYMPHOCYTES # BLD AUTO: 0.2 K/UL (ref 1–4.8)
LYMPHOCYTES NFR BLD: 5.4 % (ref 18–48)
MCH RBC QN AUTO: 29.5 PG (ref 27–31)
MCHC RBC AUTO-ENTMCNC: 33.2 G/DL (ref 32–36)
MCV RBC AUTO: 89 FL (ref 82–98)
MONOCYTES # BLD AUTO: 0.3 K/UL (ref 0.3–1)
MONOCYTES NFR BLD: 6.3 % (ref 4–15)
NEUTROPHILS # BLD AUTO: 3.7 K/UL (ref 1.8–7.7)
NEUTROPHILS NFR BLD: 83.3 % (ref 38–73)
NRBC BLD-RTO: 0 /100 WBC
PLATELET # BLD AUTO: 99 K/UL (ref 150–450)
PMV BLD AUTO: 12.1 FL (ref 9.2–12.9)
POTASSIUM SERPL-SCNC: 4.3 MMOL/L (ref 3.5–5.1)
PROT SERPL-MCNC: 5.9 G/DL (ref 6–8.4)
RBC # BLD AUTO: 3.7 M/UL (ref 4.6–6.2)
SODIUM SERPL-SCNC: 140 MMOL/L (ref 136–145)
SPECIMEN OUTDATE: NORMAL
WBC # BLD AUTO: 4.48 K/UL (ref 3.9–12.7)

## 2024-10-07 PROCEDURE — 93010 ELECTROCARDIOGRAM REPORT: CPT | Mod: HCNC,,, | Performed by: STUDENT IN AN ORGANIZED HEALTH CARE EDUCATION/TRAINING PROGRAM

## 2024-10-07 PROCEDURE — 86850 RBC ANTIBODY SCREEN: CPT | Mod: HCNC | Performed by: HOSPITALIST

## 2024-10-07 PROCEDURE — 63600175 PHARM REV CODE 636 W HCPCS: Mod: HCNC | Performed by: NURSE PRACTITIONER

## 2024-10-07 PROCEDURE — 85025 COMPLETE CBC W/AUTO DIFF WBC: CPT | Mod: HCNC | Performed by: FAMILY MEDICINE

## 2024-10-07 PROCEDURE — 11000001 HC ACUTE MED/SURG PRIVATE ROOM: Mod: HCNC

## 2024-10-07 PROCEDURE — 36415 COLL VENOUS BLD VENIPUNCTURE: CPT | Mod: HCNC | Performed by: HOSPITALIST

## 2024-10-07 PROCEDURE — 86901 BLOOD TYPING SEROLOGIC RH(D): CPT | Mod: HCNC | Performed by: HOSPITALIST

## 2024-10-07 PROCEDURE — 99232 SBSQ HOSP IP/OBS MODERATE 35: CPT | Mod: HCNC,,, | Performed by: INTERNAL MEDICINE

## 2024-10-07 PROCEDURE — 80053 COMPREHEN METABOLIC PANEL: CPT | Mod: HCNC | Performed by: FAMILY MEDICINE

## 2024-10-07 PROCEDURE — 93005 ELECTROCARDIOGRAM TRACING: CPT | Mod: HCNC

## 2024-10-07 PROCEDURE — 63600175 PHARM REV CODE 636 W HCPCS: Mod: HCNC | Performed by: INTERNAL MEDICINE

## 2024-10-07 PROCEDURE — 25000003 PHARM REV CODE 250: Mod: HCNC | Performed by: NURSE PRACTITIONER

## 2024-10-07 RX ADMIN — PIPERACILLIN SODIUM AND TAZOBACTAM SODIUM 4.5 G: 4; .5 INJECTION, POWDER, FOR SOLUTION INTRAVENOUS at 03:10

## 2024-10-07 RX ADMIN — MEMANTINE 10 MG: 10 TABLET ORAL at 08:10

## 2024-10-07 RX ADMIN — ISOSORBIDE MONONITRATE 120 MG: 60 TABLET, EXTENDED RELEASE ORAL at 08:10

## 2024-10-07 RX ADMIN — SODIUM CHLORIDE, POTASSIUM CHLORIDE, SODIUM LACTATE AND CALCIUM CHLORIDE: 600; 310; 30; 20 INJECTION, SOLUTION INTRAVENOUS at 08:10

## 2024-10-07 RX ADMIN — PANTOPRAZOLE SODIUM 40 MG: 40 TABLET, DELAYED RELEASE ORAL at 08:10

## 2024-10-07 RX ADMIN — PIPERACILLIN SODIUM AND TAZOBACTAM SODIUM 4.5 G: 4; .5 INJECTION, POWDER, FOR SOLUTION INTRAVENOUS at 08:10

## 2024-10-07 RX ADMIN — PIPERACILLIN SODIUM AND TAZOBACTAM SODIUM 4.5 G: 4; .5 INJECTION, POWDER, FOR SOLUTION INTRAVENOUS at 11:10

## 2024-10-07 RX ADMIN — DONEPEZIL HYDROCHLORIDE 5 MG: 5 TABLET, FILM COATED ORAL at 08:10

## 2024-10-07 NOTE — PLAN OF CARE
Problem: Adult Inpatient Plan of Care  Goal: Patient-Specific Goal (Individualized)  10/7/2024 0505 by Beatriz Chatterjee, RN  Flowsheets (Taken 10/7/2024 0505)  Anxieties, Fears or Concerns: comfort  10/7/2024 0505 by Beatriz Chatterjee, RN  Outcome: Progressing

## 2024-10-07 NOTE — ASSESSMENT & PLAN NOTE
Chronic, controlled.  Latest blood pressure and vitals reviewed-   Temp:  [97.9 °F (36.6 °C)-98.8 °F (37.1 °C)]   Pulse:  []   Resp:  [14-18]   BP: ()/(46-56)   SpO2:  [77 %-98 %] .   Home meds for hypertension were reviewed and noted below.   Hypertension Medications               isosorbide mononitrate (IMDUR) 120 MG 24 hr tablet TAKE 1 TABLET BY MOUTH EVERY DAY    valsartan (DIOVAN) 160 MG tablet Take 1 tablet (160 mg total) by mouth once daily.            While in the hospital, will manage blood pressure as follows, hold valsartan    Will utilize p.r.n. blood pressure medication only if patient's blood pressure greater than  180/110 and he develops symptoms such as worsening chest pain or shortness of breath.

## 2024-10-07 NOTE — SUBJECTIVE & OBJECTIVE
Interval History:  Patient seen and examined at bedside.  He was able to answer simple questions.  Discussed with GI with plans for ERCP in a.m..    Review of Systems   Constitutional:  Positive for activity change and appetite change.   Respiratory:  Negative for shortness of breath.    Cardiovascular:  Negative for chest pain.   Neurological:  Positive for weakness.   Psychiatric/Behavioral:  Positive for confusion.    All other systems reviewed and are negative.    Objective:     Vital Signs (Most Recent):  Temp: 98.4 °F (36.9 °C) (10/07/24 1631)  Pulse: (!) 41 (10/07/24 1632)  Resp: 16 (10/07/24 1632)  BP: (!) 103/55 (10/07/24 1632)  SpO2: 95 % (10/07/24 1631) Vital Signs (24h Range):  Temp:  [97.9 °F (36.6 °C)-98.8 °F (37.1 °C)] 98.4 °F (36.9 °C)  Pulse:  [] 41  Resp:  [14-18] 16  SpO2:  [77 %-98 %] 95 %  BP: ()/(46-56) 103/55     Weight: 66.7 kg (147 lb 0.8 oz)  Body mass index is 21.1 kg/m².  No intake or output data in the 24 hours ending 10/07/24 1757      Physical Exam  Vitals and nursing note reviewed.   Constitutional:       Appearance: He is ill-appearing.   Eyes:      General: Scleral icterus present.   Cardiovascular:      Rate and Rhythm: Regular rhythm. Bradycardia present.      Heart sounds: Heart sounds are distant.   Pulmonary:      Effort: Pulmonary effort is normal.      Breath sounds: Normal breath sounds.   Abdominal:      General: Abdomen is scaphoid. Bowel sounds are normal. There is no distension.      Palpations: Abdomen is soft.      Tenderness: There is no abdominal tenderness. There is no guarding or rebound.   Skin:     Coloration: Skin is jaundiced.   Neurological:      Mental Status: He is alert. He is disoriented.      Motor: Weakness present.   Psychiatric:         Attention and Perception: He is inattentive.         Mood and Affect: Affect is flat.         Behavior: Behavior is slowed and withdrawn.         Cognition and Memory: Cognition is impaired. Memory is  impaired.             Significant Labs: All pertinent labs within the past 24 hours have been reviewed.  CBC:   Recent Labs   Lab 10/05/24  2048 10/06/24  0622 10/06/24  1829 10/07/24  0502   WBC 6.02 6.28  6.28  --  4.48   HGB 14.3 12.5*  12.5* 11.3* 10.9*   HCT 43.2 37.8*  37.8* 33.6* 32.8*   * 122*  122*  --  99*     CMP:   Recent Labs   Lab 10/05/24  2048 10/06/24  0622 10/07/24  0502    140  140 140   K 4.8 4.3  4.3 4.3    105  105 107   CO2 22* 23  23 22*   * 171*  171* 131*   BUN 24* 30*  30* 39*   CREATININE 1.2 1.4  1.4 2.5*   CALCIUM 9.8 9.2  9.2 8.7   PROT 7.8 6.3  6.3 5.9*   ALBUMIN 3.7 3.0*  3.0* 2.7*   BILITOT 2.6* 3.8*  3.8* 3.6*   ALKPHOS 387* 301*  301* 215*   * 218*  218* 110*   * 217*  217* 152*   ANIONGAP 14 12  12 11       Significant Imaging: I have reviewed all pertinent imaging results/findings within the past 24 hours.

## 2024-10-07 NOTE — PROGRESS NOTES
[facility]  Gastroenterology  Progress Note    Patient Name: Aquiles Yates  MRN: [id number]  Admission Date: 10/5/2024  Hospital Length of Stay: 1 days  Code Status: Full Code   Attending Provider: Mar Granados MD  Consulting Provider: Hubert Downing PA-C  Primary Care Physician: Holger Thornton MD  Principal Problem: Calculus of bile duct without cholecystitis with obstruction        Subjective:     Interval History: Patient lethargic. Opens eyes briefly when I shake him. No complaints. LFTs down slightly. Waiting on MRCP.     Review of Systems   Constitutional:         See Interval History for daily ROS.      Objective:     Vital Signs (Most Recent):  Temp: 98.3 °F (36.8 °C) (10/07/24 1201)  Pulse: (!) 44 (10/07/24 1201)  Resp: 16 (10/07/24 1201)  BP: (!) 129/56 (10/07/24 1201)  SpO2: 96 % (10/07/24 1201) Vital Signs (24h Range):  Temp:  [97.9 °F (36.6 °C)-98.8 °F (37.1 °C)] 98.3 °F (36.8 °C)  Pulse:  [] 44  Resp:  [14-18] 16  SpO2:  [77 %-98 %] 96 %  BP: ()/(47-56) 129/56     Weight: 66.7 kg (147 lb 0.8 oz) (10/06/24 0307)  Body mass index is 21.1 kg/m².    No intake or output data in the 24 hours ending 10/07/24 1300    Lines/Drains/Airways       Peripheral Intravenous Line  Duration                  Peripheral IV - Single Lumen 10/07/24 0657 24 G Left;Posterior Forearm <1 day                     Physical Exam  Constitutional:       General: He is not in acute distress.  HENT:      Head: Normocephalic and atraumatic.   Eyes:      Extraocular Movements: Extraocular movements intact.   Cardiovascular:      Rate and Rhythm: Normal rate and regular rhythm.   Pulmonary:      Effort: Pulmonary effort is normal. No respiratory distress.   Abdominal:      General: Bowel sounds are normal. There is no distension.      Palpations: Abdomen is soft.      Tenderness: There is no abdominal tenderness.   Neurological:      Mental Status: He is lethargic.          Significant  Labs:  CBC:   Recent Labs   Lab 10/05/24  2048 10/06/24  0622 10/06/24  1829 10/07/24  0502   WBC 6.02 6.28  6.28  --  4.48   HGB 14.3 12.5*  12.5* 11.3* 10.9*   HCT 43.2 37.8*  37.8* 33.6* 32.8*   * 122*  122*  --  99*     CMP:   Recent Labs   Lab 10/07/24  0502   *   CALCIUM 8.7   ALBUMIN 2.7*   PROT 5.9*      K 4.3   CO2 22*      BUN 39*   CREATININE 2.5*   ALKPHOS 215*   *   *   BILITOT 3.6*     Coagulation:   Recent Labs   Lab 10/06/24  0919   INR 1.1         Significant Imaging:  Imaging results within the past 24 hours have been reviewed.  Assessment/Plan:     GI  * Calculus of bile duct without cholecystitis with obstruction  LFTs consistent with biliary obstruction  IV Zosyn  US shows gallstones and mild intrahepatic dilation   MRCP pending        Thank you for your consult. I will follow-up with patient. Please contact us if you have any additional questions.    Hubert Downing PA-C  Gastroenterology  [facility]

## 2024-10-07 NOTE — PROGRESS NOTES
[facility]  Progress Note    Patient Name: Aquiles Yates  MRN: [id number]  Patient Class: IP- Inpatient   Admission Date: 10/5/2024  Length of Stay: 1 days  Attending Physician: Mar Granados MD  Primary Care Provider: Holger Thornton MD        Subjective:     Principal Problem:Calculus of bile duct without cholecystitis with obstruction        HPI:  qAuiles Yates is a 84 y.o. male with a PMH  has a past medical history of Acute blood loss anemia (10/14/2019), Alzheimer's dementia, Aneurysm, abdominal aortic, BCC (basal cell carcinoma of skin) (06/29/2023), Chronic diastolic heart failure (05/20/2024), Coronary artery disease, Depression, Diabetes mellitus, HLD (hyperlipidemia), Hypertension, Kidney stone, PAD (peripheral artery disease), PAF (paroxysmal atrial fibrillation) (01/24/2023), and Tobacco abuse.presented to the Emergency Department for evaluation of tremors and mottled skin per wife. Wife called the paramedics when she noticed patient shaking and patient's mottled skin today. Pt has a history of Alzheimer's and paramedics report a GCS of 14. Upon interview, pt denies any pain and is oriented to self. No further complaints or concerns at this time.       ER workup revealed CBC to be unremarkable.  CMP revealed CBG of 202 mg/dL, , total bili of 2.6, and AST/ALT of 242/179.  .  Troponin negative.  Lactic acid 2.8.  Flu COVID negative.  UA unremarkable.  CTA abdomen and pelvis without contrast revealed no acute findings.  Chest x-ray negative for acute cardiopulmonary findings.  Ultrasound limited revealed cholelithiasis without definitive sonographic evidence of acute cholecystitis.  Mild intrahepatic biliary duct dilation.  Vital signs stable. EKG revealed sinus Nicholas with occasional PVCs with a ventricular rate of 56 beats per minute and a QT/QTC of 408/393.  Patient received 4.5 g Zosyn in ED. GI consulted.  In agreement with  MRCP and will see us consult in a.m..  Patient in agreement with treatment plan.  Patient will be admitted under inpatient status.    PCP: Holger Thornton      Overview/Hospital Course:  Patient is an 84-year-old male with a history of anemia, Alzheimer's dementia, aneurysm, chronic diastolic failure, coronary artery disease diabetes mellitus, hypertension, PAF who presented emergency department with tremors.  Patient's wife said she was he was shaking and had mottled skin.  Patient denied any complaints.  In the emergency department workup revealed glucose of 202, alk-phos of 387, total bili of 2.6, AST of 242 ALT of 179, BNP of 323, lactic acid of 2.4.  CT scan of abdomen pelvis without contrast revealed no acute findings, ultrasound revealed cholelithiasis with ductal dilatation.  Patient was admitted with diagnosis of acute cholecystitis choledocholithiasis.  He was started on Zosyn.  GI was consulted MRCP was ordered with plans for ERCP in [place].    Interval History:  Patient seen and examined at bedside.  He was able to answer simple questions.  Discussed with GI with plans for ERCP in a.m..    Review of Systems   Constitutional:  Positive for activity change and appetite change.   Respiratory:  Negative for shortness of breath.    Cardiovascular:  Negative for chest pain.   Neurological:  Positive for weakness.   Psychiatric/Behavioral:  Positive for confusion.    All other systems reviewed and are negative.    Objective:     Vital Signs (Most Recent):  Temp: 98.4 °F (36.9 °C) (10/07/24 1631)  Pulse: (!) 41 (10/07/24 1632)  Resp: 16 (10/07/24 1632)  BP: (!) 103/55 (10/07/24 1632)  SpO2: 95 % (10/07/24 1631) Vital Signs (24h Range):  Temp:  [97.9 °F (36.6 °C)-98.8 °F (37.1 °C)] 98.4 °F (36.9 °C)  Pulse:  [] 41  Resp:  [14-18] 16  SpO2:  [77 %-98 %] 95 %  BP: ()/(46-56) 103/55     Weight: 66.7 kg (147 lb 0.8 oz)  Body mass index is 21.1 kg/m².  No intake or output data in the 24 hours  ending 10/07/24 1757      Physical Exam  Vitals and nursing note reviewed.   Constitutional:       Appearance: He is ill-appearing.   Eyes:      General: Scleral icterus present.   Cardiovascular:      Rate and Rhythm: Regular rhythm. Bradycardia present.      Heart sounds: Heart sounds are distant.   Pulmonary:      Effort: Pulmonary effort is normal.      Breath sounds: Normal breath sounds.   Abdominal:      General: Abdomen is scaphoid. Bowel sounds are normal. There is no distension.      Palpations: Abdomen is soft.      Tenderness: There is no abdominal tenderness. There is no guarding or rebound.   Skin:     Coloration: Skin is jaundiced.   Neurological:      Mental Status: He is alert. He is disoriented.      Motor: Weakness present.   Psychiatric:         Attention and Perception: He is inattentive.         Mood and Affect: Affect is flat.         Behavior: Behavior is slowed and withdrawn.         Cognition and Memory: Cognition is impaired. Memory is impaired.             Significant Labs: All pertinent labs within the past 24 hours have been reviewed.  CBC:   Recent Labs   Lab 10/05/24  2048 10/06/24  0622 10/06/24  1829 10/07/24  0502   WBC 6.02 6.28  6.28  --  4.48   HGB 14.3 12.5*  12.5* 11.3* 10.9*   HCT 43.2 37.8*  37.8* 33.6* 32.8*   * 122*  122*  --  99*     CMP:   Recent Labs   Lab 10/05/24  2048 10/06/24  0622 10/07/24  0502    140  140 140   K 4.8 4.3  4.3 4.3    105  105 107   CO2 22* 23  23 22*   * 171*  171* 131*   BUN 24* 30*  30* 39*   CREATININE 1.2 1.4  1.4 2.5*   CALCIUM 9.8 9.2  9.2 8.7   PROT 7.8 6.3  6.3 5.9*   ALBUMIN 3.7 3.0*  3.0* 2.7*   BILITOT 2.6* 3.8*  3.8* 3.6*   ALKPHOS 387* 301*  301* 215*   * 218*  218* 110*   * 217*  217* 152*   ANIONGAP 14 12  12 11       Significant Imaging: I have reviewed all pertinent imaging results/findings within the past 24 hours.    Assessment/Plan:      * Calculus of bile duct without  cholecystitis with obstruction  GI consulted. Recommends MRCP. Initiated on Zosyn.  Plan:  -NPO  -Hold anticoagulants  -IVFs  -Continue Zosyn  -Analgesics prn  -Repeat labs in am  -GI consult appreciated      Chronic diastolic heart failure  Patient is identified as having Diastolic (HFpEF) heart failure that is Chronic. CHF is currently controlled. Latest ECHO performed and demonstrates- Results for orders placed during the hospital encounter of 06/13/22    Echo    Interpretation Summary  · The left ventricle is normal in size with severe concentric hypertrophy and normal systolic function.  · Mild left atrial enlargement.  · The estimated ejection fraction is 55%.  · Grade II left ventricular diastolic dysfunction.  · Normal right ventricular size with normal right ventricular systolic function.  .   monitor clinical status closely. Monitor on telemetry. Patient is off CHF pathway.  Monitor strict Is&Os and daily weights.  Place on fluid restriction of 2 L. Continue to stress to patient importance of self efficacy and  on diet for CHF. Last BNP reviewed- and noted below   Recent Labs   Lab 10/05/24  2048   *     .            Dementia  Patient with dementia with likely etiology of alzheimer's dementia. Dementia is moderate. The patient does not have signs of behavioral disturbance. Home dementia medications are Held or Continued: continued.. Continue non-pharmacologic interventions to prevent delirium (No VS between 11PM-5AM, activity during day, opening blinds, providing glasses/hearing aids, and up in chair during daytime). Will avoid narcotics and benzos unless absolutely necessary. PRN anti-psychotics are not prescribed to avoid self harm behaviors.    PAF (paroxysmal atrial fibrillation)  Patient has paroxysmal (<7 days) atrial fibrillation. Patient is currently in  sinus Nicholas . [id number] Score: 4. The patients heart rate in the last 24 hours is as follows:  Pulse  Min: 39  Max: 126  "    Antiarrhythmics       Anticoagulants       Plan  - Replete lytes with a goal of K>4, Mg >2  - Patient is anticoagulated, see medications listed above.  - Patient's afib is currently controlled    Pain in both lower extremities        Recurrent major depressive disorder  Chronic. Stable. Not in acute exacerbation and currently denies endorsing any suicidal/homicidal ideations.   Plan:  -Continue to monitor      Type 2 diabetes mellitus with other specified complication, unspecified whether long term insulin use  Patient's FSGs are uncontrolled due to hyperglycemia on current medication regimen.  Last A1c reviewed-   Lab Results   Component Value Date    HGBA1C 7.0 (H) 05/07/2024     Most recent fingerstick glucose reviewed-   No results for input(s): "POCTGLUCOSE" in the last 24 hours.    Current correctional scale  Low  Maintain anti-hyperglycemic dose as follows-   Antihyperglycemics (From admission, onward)      Start     Stop Route Frequency Ordered    10/06/24 0146  insulin aspart U-100 pen 0-5 Units         -- SubQ Before meals & nightly PRN 10/06/24 0048          Plan:  -SSI  -A1c  -Accu-checks  -Hold oral hypoglycemics while patient is in the hospital  -Continue home long-acting insulin at 25-50% decrease, titrate up as needed  -Hypoglycemic protocol          Dyslipidemia  Patient is chronically on statin.will not continue for now. Last Lipid Panel:   Lab Results   Component Value Date    CHOL 150 05/07/2024    HDL 36 (L) 05/07/2024    LDLCALC 94.2 05/07/2024    TRIG 99 05/07/2024    CHOLHDL 24.0 05/07/2024     Plan:  -Hold home medication due to elevated LFTs  -low fat/low calorie diet        Essential hypertension  Chronic, controlled.  Latest blood pressure and vitals reviewed-   Temp:  [97.9 °F (36.6 °C)-98.8 °F (37.1 °C)]   Pulse:  []   Resp:  [14-18]   BP: ()/(46-56)   SpO2:  [77 %-98 %] .   Home meds for hypertension were reviewed and noted below.   Hypertension Medications            "    isosorbide mononitrate (IMDUR) 120 MG 24 hr tablet TAKE 1 TABLET BY MOUTH EVERY DAY    valsartan (DIOVAN) 160 MG tablet Take 1 tablet (160 mg total) by mouth once daily.            While in the hospital, will manage blood pressure as follows, hold valsartan    Will utilize p.r.n. blood pressure medication only if patient's blood pressure greater than  180/110 and he develops symptoms such as worsening chest pain or shortness of breath.      Coronary artery disease of native artery of native heart with stable angina pectoris  Patient with known CAD s/p stent placement, which is controlled. and monitor for S/Sx of angina/ACS. Continue to monitor on telemetry.       VTE Risk Mitigation (From admission, onward)           Ordered     Reason for No Pharmacological VTE Prophylaxis  Once        Question:  Reasons:  Answer:  Physician Provided (leave comment)  Comment:  Pending potential surgical intervention    10/06/24 0048     IP VTE HIGH RISK PATIENT  Once         10/06/24 0048     Place sequential compression device  Until discontinued         10/06/24 0048                    Discharge Planning   ZEINA:      Code Status: Full Code   Is the patient medically ready for discharge?:     Reason for patient still in hospital (select all that apply): Patient trending condition, Laboratory test, Treatment, Consult recommendations, and Pending disposition  Discharge Plan A: Home with family                  Mar Cheney MD  Department of Hospital Medicine   [facility]

## 2024-10-07 NOTE — HOSPITAL COURSE
Patient is an 84-year-old male with a history of anemia, Alzheimer's dementia, aneurysm, chronic diastolic failure, coronary artery disease diabetes mellitus, hypertension, PAF who presented emergency department with tremors.  Patient's wife said she was he was shaking and had mottled skin.  Patient denied any complaints.  In the emergency department workup revealed glucose of 202, alk-phos of 387, total bili of 2.6, AST of 242 ALT of 179, BNP of 323, lactic acid of 2.4.  CT scan of abdomen pelvis without contrast revealed no acute findings, ultrasound revealed cholelithiasis with ductal dilatation.  Patient was admitted with diagnosis of acute cholecystitis choledocholithiasis.  He was started on Zosyn.  GI was consulted MRCP was ordered with plans for ERCP in [place].  Patient's heart rate was in the 40s yesterday EKG revealed sinus bradycardia.  He went to [place] today for ERCP however heart rate was less than 40 therefore he was sent back for cardiology evaluation.  Patient denies any pain.    Cardiology saw patient with recommendations to stop his Aricept as it was make be contributing to his bradycardia.  Patient was tolerating diet and LFTs have improved.    Status post ERCP:                        - The major papilla appeared normal.                          - The common bile duct was dilated.                          - Choledocholithiasis was found. Complete removal                          was accomplished by biliary sphincterotomy and                          balloon extraction.                          - A biliary sphincterotomy was performed.                          - The biliary tree was swept.                          - One biliary stent was placed into the common                          bile duct.     Patient was scheduled for cholecystectomy 10/11 however heart rate was in the 40s.  Cardiology evaluated and  TVP while in OR. Underwent cholecystectomy 10/15, Patient monitored in ICU  overnight. pacing wires pulled 10/16. Patient tolerating diet and working with therapy. Patient seen and examined, stable for discharge. Outpatient follow-up with surgery.

## 2024-10-07 NOTE — ASSESSMENT & PLAN NOTE
"Patient's FSGs are uncontrolled due to hyperglycemia on current medication regimen.  Last A1c reviewed-   Lab Results   Component Value Date    HGBA1C 7.0 (H) 05/07/2024     Most recent fingerstick glucose reviewed-   No results for input(s): "POCTGLUCOSE" in the last 24 hours.    Current correctional scale  Low  Maintain anti-hyperglycemic dose as follows-   Antihyperglycemics (From admission, onward)    Start     Stop Route Frequency Ordered    10/06/24 0146  insulin aspart U-100 pen 0-5 Units         -- SubQ Before meals & nightly PRN 10/06/24 0048        Plan:  -SSI  -A1c  -Accu-checks  -Hold oral hypoglycemics while patient is in the hospital  -Continue home long-acting insulin at 25-50% decrease, titrate up as needed  -Hypoglycemic protocol        "

## 2024-10-07 NOTE — NURSING
At 2222-- BP at 2101 was 87/51 with map of 63, asymptomatic. Is on cont fluids LR 75. Provider notified and order to increase fluids and elevate legs. Orders implemented.     At 0409 -- Pt sinus sarita. Most recent BP at 0353 was 80/47 with 59 MAP. Provider notified. Stat labs and type and screen ordered. Orders implemented

## 2024-10-07 NOTE — ASSESSMENT & PLAN NOTE
Patient has paroxysmal (<7 days) atrial fibrillation. Patient is currently in  sinus Nicholas . [id number] Score: 4. The patients heart rate in the last 24 hours is as follows:  Pulse  Min: 39  Max: 126     Antiarrhythmics       Anticoagulants       Plan  - Replete lytes with a goal of K>4, Mg >2  - Patient is anticoagulated, see medications listed above.  - Patient's afib is currently controlled

## 2024-10-07 NOTE — SUBJECTIVE & OBJECTIVE
Subjective:     Interval History: Patient lethargic. Opens eyes briefly when I shake him. No complaints. LFTs down slightly. Waiting on MRCP.     Review of Systems   Constitutional:         See Interval History for daily ROS.      Objective:     Vital Signs (Most Recent):  Temp: 98.3 °F (36.8 °C) (10/07/24 1201)  Pulse: (!) 44 (10/07/24 1201)  Resp: 16 (10/07/24 1201)  BP: (!) 129/56 (10/07/24 1201)  SpO2: 96 % (10/07/24 1201) Vital Signs (24h Range):  Temp:  [97.9 °F (36.6 °C)-98.8 °F (37.1 °C)] 98.3 °F (36.8 °C)  Pulse:  [] 44  Resp:  [14-18] 16  SpO2:  [77 %-98 %] 96 %  BP: ()/(47-56) 129/56     Weight: 66.7 kg (147 lb 0.8 oz) (10/06/24 0307)  Body mass index is 21.1 kg/m².    No intake or output data in the 24 hours ending 10/07/24 1300    Lines/Drains/Airways       Peripheral Intravenous Line  Duration                  Peripheral IV - Single Lumen 10/07/24 0657 24 G Left;Posterior Forearm <1 day                     Physical Exam  Constitutional:       General: He is not in acute distress.  HENT:      Head: Normocephalic and atraumatic.   Eyes:      Extraocular Movements: Extraocular movements intact.   Cardiovascular:      Rate and Rhythm: Normal rate and regular rhythm.   Pulmonary:      Effort: Pulmonary effort is normal. No respiratory distress.   Abdominal:      General: Bowel sounds are normal. There is no distension.      Palpations: Abdomen is soft.      Tenderness: There is no abdominal tenderness.   Neurological:      Mental Status: He is lethargic.          Significant Labs:  CBC:   Recent Labs   Lab 10/05/24  2048 10/06/24  0622 10/06/24  1829 10/07/24  0502   WBC 6.02 6.28  6.28  --  4.48   HGB 14.3 12.5*  12.5* 11.3* 10.9*   HCT 43.2 37.8*  37.8* 33.6* 32.8*   * 122*  122*  --  99*     CMP:   Recent Labs   Lab 10/07/24  0502   *   CALCIUM 8.7   ALBUMIN 2.7*   PROT 5.9*      K 4.3   CO2 22*      BUN 39*   CREATININE 2.5*   ALKPHOS 215*   *   AST  110*   BILITOT 3.6*     Coagulation:   Recent Labs   Lab 10/06/24  0919   INR 1.1         Significant Imaging:  Imaging results within the past 24 hours have been reviewed.

## 2024-10-07 NOTE — ASSESSMENT & PLAN NOTE
Patient is identified as having Diastolic (HFpEF) heart failure that is Chronic. CHF is currently controlled. Latest ECHO performed and demonstrates- Results for orders placed during the hospital encounter of 06/13/22    Echo    Interpretation Summary  · The left ventricle is normal in size with severe concentric hypertrophy and normal systolic function.  · Mild left atrial enlargement.  · The estimated ejection fraction is 55%.  · Grade II left ventricular diastolic dysfunction.  · Normal right ventricular size with normal right ventricular systolic function.  .   monitor clinical status closely. Monitor on telemetry. Patient is off CHF pathway.  Monitor strict Is&Os and daily weights.  Place on fluid restriction of 2 L. Continue to stress to patient importance of self efficacy and  on diet for CHF. Last BNP reviewed- and noted below   Recent Labs   Lab 10/05/24  2048   *     .

## 2024-10-07 NOTE — ASSESSMENT & PLAN NOTE
GI consulted. Recommends MRCP. Initiated on Zosyn.  Plan:  -NPO  -Hold anticoagulants  -IVFs  -Continue Zosyn  -Analgesics prn  -Repeat labs in am  -GI consult appreciated

## 2024-10-07 NOTE — ASSESSMENT & PLAN NOTE
Patient with known CAD s/p stent placement, which is controlled. and monitor for S/Sx of angina/ACS. Continue to monitor on telemetry.

## 2024-10-08 LAB
ALBUMIN SERPL BCP-MCNC: 2.7 G/DL (ref 3.5–5.2)
ALP SERPL-CCNC: 174 U/L (ref 55–135)
ALT SERPL W/O P-5'-P-CCNC: 112 U/L (ref 10–44)
ANION GAP SERPL CALC-SCNC: 11 MMOL/L (ref 8–16)
AST SERPL-CCNC: 65 U/L (ref 10–40)
BASOPHILS # BLD AUTO: 0.03 K/UL (ref 0–0.2)
BASOPHILS NFR BLD: 0.7 % (ref 0–1.9)
BILIRUB SERPL-MCNC: 1.9 MG/DL (ref 0.1–1)
BUN SERPL-MCNC: 34 MG/DL (ref 8–23)
CALCIUM SERPL-MCNC: 8.7 MG/DL (ref 8.7–10.5)
CHLORIDE SERPL-SCNC: 107 MMOL/L (ref 95–110)
CO2 SERPL-SCNC: 22 MMOL/L (ref 23–29)
CREAT SERPL-MCNC: 2 MG/DL (ref 0.5–1.4)
DIFFERENTIAL METHOD BLD: ABNORMAL
EOSINOPHIL # BLD AUTO: 0.3 K/UL (ref 0–0.5)
EOSINOPHIL NFR BLD: 7.5 % (ref 0–8)
ERYTHROCYTE [DISTWIDTH] IN BLOOD BY AUTOMATED COUNT: 14.1 % (ref 11.5–14.5)
EST. GFR  (NO RACE VARIABLE): 32 ML/MIN/1.73 M^2
GLUCOSE SERPL-MCNC: 114 MG/DL (ref 70–110)
HCT VFR BLD AUTO: 35 % (ref 40–54)
HGB BLD-MCNC: 11.4 G/DL (ref 14–18)
IMM GRANULOCYTES # BLD AUTO: 0.02 K/UL (ref 0–0.04)
IMM GRANULOCYTES NFR BLD AUTO: 0.5 % (ref 0–0.5)
LYMPHOCYTES # BLD AUTO: 0.3 K/UL (ref 1–4.8)
LYMPHOCYTES NFR BLD: 8 % (ref 18–48)
MCH RBC QN AUTO: 29.4 PG (ref 27–31)
MCHC RBC AUTO-ENTMCNC: 32.6 G/DL (ref 32–36)
MCV RBC AUTO: 90 FL (ref 82–98)
MONOCYTES # BLD AUTO: 0.3 K/UL (ref 0.3–1)
MONOCYTES NFR BLD: 8.2 % (ref 4–15)
NEUTROPHILS # BLD AUTO: 3 K/UL (ref 1.8–7.7)
NEUTROPHILS NFR BLD: 75.1 % (ref 38–73)
NRBC BLD-RTO: 0 /100 WBC
PLATELET # BLD AUTO: 88 K/UL (ref 150–450)
PMV BLD AUTO: 11.8 FL (ref 9.2–12.9)
POTASSIUM SERPL-SCNC: 3.9 MMOL/L (ref 3.5–5.1)
PROT SERPL-MCNC: 5.8 G/DL (ref 6–8.4)
RBC # BLD AUTO: 3.88 M/UL (ref 4.6–6.2)
SODIUM SERPL-SCNC: 140 MMOL/L (ref 136–145)
WBC # BLD AUTO: 4.02 K/UL (ref 3.9–12.7)

## 2024-10-08 PROCEDURE — 25000003 PHARM REV CODE 250: Mod: HCNC | Performed by: NURSE PRACTITIONER

## 2024-10-08 PROCEDURE — 11000001 HC ACUTE MED/SURG PRIVATE ROOM: Mod: HCNC

## 2024-10-08 PROCEDURE — 85025 COMPLETE CBC W/AUTO DIFF WBC: CPT | Mod: HCNC | Performed by: FAMILY MEDICINE

## 2024-10-08 PROCEDURE — 94761 N-INVAS EAR/PLS OXIMETRY MLT: CPT | Mod: HCNC

## 2024-10-08 PROCEDURE — 99223 1ST HOSP IP/OBS HIGH 75: CPT | Mod: HCNC,,,

## 2024-10-08 PROCEDURE — 25000003 PHARM REV CODE 250: Mod: HCNC | Performed by: INTERNAL MEDICINE

## 2024-10-08 PROCEDURE — 27000221 HC OXYGEN, UP TO 24 HOURS: Mod: HCNC

## 2024-10-08 PROCEDURE — 99900035 HC TECH TIME PER 15 MIN (STAT): Mod: HCNC

## 2024-10-08 PROCEDURE — 80053 COMPREHEN METABOLIC PANEL: CPT | Mod: HCNC | Performed by: FAMILY MEDICINE

## 2024-10-08 PROCEDURE — 36415 COLL VENOUS BLD VENIPUNCTURE: CPT | Mod: HCNC | Performed by: FAMILY MEDICINE

## 2024-10-08 PROCEDURE — 63600175 PHARM REV CODE 636 W HCPCS: Mod: HCNC | Performed by: INTERNAL MEDICINE

## 2024-10-08 RX ADMIN — ISOSORBIDE MONONITRATE 120 MG: 60 TABLET, EXTENDED RELEASE ORAL at 08:10

## 2024-10-08 RX ADMIN — PANTOPRAZOLE SODIUM 40 MG: 40 TABLET, DELAYED RELEASE ORAL at 08:10

## 2024-10-08 RX ADMIN — MEMANTINE 10 MG: 10 TABLET ORAL at 08:10

## 2024-10-08 RX ADMIN — PIPERACILLIN SODIUM AND TAZOBACTAM SODIUM 4.5 G: 4; .5 INJECTION, POWDER, FOR SOLUTION INTRAVENOUS at 04:10

## 2024-10-08 RX ADMIN — DONEPEZIL HYDROCHLORIDE 5 MG: 5 TABLET, FILM COATED ORAL at 08:10

## 2024-10-08 NOTE — HPI
85 y/o male with past medical history significant for DMII, CAD with multiple PCI procedures, chronic diastolic CHF, afib with h/o CVA,  HTN, DM, LAE, LVH, PAD, AAA stent graft (approx 2013), dementia, hyperlipidemia who presented to the ED approximately 2 days ago for evaluation after his wife was concerned about tremors and the appearance of his skin. He is a poor historian secondary to dementia. CT abdomen pelvis unremarkable. U/S abdomen showing cholelithiasis without evidence of cholecystitis, but an MRCP was obtained secondary to elevated LFTs and Tbili. MRCP with significant choledocholithiasis. Patient was transported to [place] earlier today to attempt ERCP, but he became significant bradycardic and the procedure was aborted. His LFTs are trending down today. He reports being hungry at the time of exam, and his wife denies any past abdominal surgical history.

## 2024-10-08 NOTE — PROGRESS NOTES
[facility] Medicine  Progress Note    Patient Name: Aquiles Yates  MRN: [id number]  Patient Class: IP- Inpatient   Admission Date: 10/5/2024  Length of Stay: 2 days  Attending Physician: Mar Granados MD  Primary Care Provider: Holger Thornton MD        Subjective:     Principal Problem:Calculus of bile duct without cholecystitis with obstruction        HPI:  Aquiles Yates is a 84 y.o. male with a PMH  has a past medical history of Acute blood loss anemia (10/14/2019), Alzheimer's dementia, Aneurysm, abdominal aortic, BCC (basal cell carcinoma of skin) (06/29/2023), Chronic diastolic heart failure (05/20/2024), Coronary artery disease, Depression, Diabetes mellitus, HLD (hyperlipidemia), Hypertension, Kidney stone, PAD (peripheral artery disease), PAF (paroxysmal atrial fibrillation) (01/24/2023), and Tobacco abuse.presented to the Emergency Department for evaluation of tremors and mottled skin per wife. Wife called the paramedics when she noticed patient shaking and patient's mottled skin today. Pt has a history of Alzheimer's and paramedics report a GCS of 14. Upon interview, pt denies any pain and is oriented to self. No further complaints or concerns at this time.       ER workup revealed CBC to be unremarkable.  CMP revealed CBG of 202 mg/dL, , total bili of 2.6, and AST/ALT of 242/179.  .  Troponin negative.  Lactic acid 2.8.  Flu COVID negative.  UA unremarkable.  CTA abdomen and pelvis without contrast revealed no acute findings.  Chest x-ray negative for acute cardiopulmonary findings.  Ultrasound limited revealed cholelithiasis without definitive sonographic evidence of acute cholecystitis.  Mild intrahepatic biliary duct dilation.  Vital signs stable. EKG revealed sinus Nicholas with occasional PVCs with a ventricular rate of 56 beats per minute and a QT/QTC of 408/393.  Patient received 4.5 g Zosyn in ED. GI consulted.  In agreement with  MRCP and will see us consult in a.m..  Patient in agreement with treatment plan.  Patient will be admitted under inpatient status.    PCP: Holger Thornton      Overview/Hospital Course:  Patient is an 84-year-old male with a history of anemia, Alzheimer's dementia, aneurysm, chronic diastolic failure, coronary artery disease diabetes mellitus, hypertension, PAF who presented emergency department with tremors.  Patient's wife said she was he was shaking and had mottled skin.  Patient denied any complaints.  In the emergency department workup revealed glucose of 202, alk-phos of 387, total bili of 2.6, AST of 242 ALT of 179, BNP of 323, lactic acid of 2.4.  CT scan of abdomen pelvis without contrast revealed no acute findings, ultrasound revealed cholelithiasis with ductal dilatation.  Patient was admitted with diagnosis of acute cholecystitis choledocholithiasis.  He was started on Zosyn.  GI was consulted MRCP was ordered with plans for ERCP in [place].  Patient's heart rate was in the 40s yesterday EKG revealed sinus bradycardia.  He went to [place] today for ERCP however heart rate was less than 40 therefore he was sent back for cardiology evaluation.  Patient denies any pain.    Interval History:  Patient seen and examined after returned from [place].  He denies any pain in his requesting something to eat.    Review of Systems   Constitutional:  Positive for appetite change (Hungry). Negative for fatigue.   Respiratory:  Negative for shortness of breath.    Cardiovascular:  Negative for chest pain, palpitations and leg swelling.   Gastrointestinal:  Positive for abdominal pain.   Neurological:  Positive for weakness.   All other systems reviewed and are negative.    Objective:     Vital Signs (Most Recent):  Temp: 97.5 °F (36.4 °C) (10/08/24 1418)  Pulse: (!) 37 (10/08/24 1418)  Resp: 18 (10/08/24 1418)  BP: (!) 153/67 (10/08/24 1418)  SpO2: (!) 91 % (10/08/24 1418) Vital Signs (24h  Range):  Temp:  [97.3 °F (36.3 °C)-98.4 °F (36.9 °C)] 97.5 °F (36.4 °C)  Pulse:  [34-48] 37  Resp:  [14-18] 18  SpO2:  [91 %-98 %] 91 %  BP: ()/(46-78) 153/67     Weight: 66.7 kg (147 lb 0.8 oz)  Body mass index is 21.1 kg/m².  No intake or output data in the 24 hours ending 10/08/24 1458      Physical Exam  Vitals and nursing note reviewed.   Constitutional:       Appearance: He is ill-appearing.   Eyes:      General: Scleral icterus present.   Cardiovascular:      Rate and Rhythm: Regular rhythm. Bradycardia present.      Heart sounds: Heart sounds are distant.   Pulmonary:      Effort: Pulmonary effort is normal.      Breath sounds: Normal breath sounds.   Abdominal:      General: Abdomen is scaphoid. Bowel sounds are normal. There is no distension.      Palpations: Abdomen is soft.      Tenderness: There is no abdominal tenderness. There is no guarding or rebound.   Neurological:      Mental Status: He is alert.      Motor: Weakness present.   Psychiatric:         Attention and Perception: He is inattentive.         Mood and Affect: Affect is flat.         Behavior: Behavior is slowed and withdrawn.         Cognition and Memory: Cognition is impaired. Memory is impaired.             Significant Labs: All pertinent labs within the past 24 hours have been reviewed.  CBC:   Recent Labs   Lab 10/06/24  1829 10/07/24  0502 10/08/24  0504   WBC  --  4.48 4.02   HGB 11.3* 10.9* 11.4*   HCT 33.6* 32.8* 35.0*   PLT  --  99* 88*     CMP:   Recent Labs   Lab 10/07/24  0502 10/08/24  0504    140   K 4.3 3.9    107   CO2 22* 22*   * 114*   BUN 39* 34*   CREATININE 2.5* 2.0*   CALCIUM 8.7 8.7   PROT 5.9* 5.8*   ALBUMIN 2.7* 2.7*   BILITOT 3.6* 1.9*   ALKPHOS 215* 174*   * 65*   * 112*   ANIONGAP 11 11         Significant Imaging: I have reviewed all pertinent imaging results/findings within the past 24 hours.    Assessment/Plan:      * Calculus of bile duct without cholecystitis with  obstruction  GI consulted. Recommends MRCP. Initiated on Zosyn.  Patient went to [place] for ERCP however due to bradycardia patient was returned cardiology consult requested.  Plan:  -clear liquids advance as tolerated  -Hold anticoagulants  -IVFs  -Continue Zosyn  -Analgesics prn  -Repeat labs in am  -GI consult appreciated      Chronic diastolic heart failure  Patient is identified as having Diastolic (HFpEF) heart failure that is Chronic. CHF is currently controlled. Latest ECHO performed and demonstrates- Results for orders placed during the hospital encounter of 06/13/22    Echo    Interpretation Summary  · The left ventricle is normal in size with severe concentric hypertrophy and normal systolic function.  · Mild left atrial enlargement.  · The estimated ejection fraction is 55%.  · Grade II left ventricular diastolic dysfunction.  · Normal right ventricular size with normal right ventricular systolic function.  .   monitor clinical status closely. Monitor on telemetry. Patient is off CHF pathway.  Monitor strict Is&Os and daily weights.  Place on fluid restriction of 2 L. Continue to stress to patient importance of self efficacy and  on diet for CHF. Last BNP reviewed- and noted below   Recent Labs   Lab 10/05/24  2048   *     .            Dementia  Patient with dementia with likely etiology of alzheimer's dementia. Dementia is moderate. The patient does not have signs of behavioral disturbance. Home dementia medications are Held or Continued: continued.. Continue non-pharmacologic interventions to prevent delirium (No VS between 11PM-5AM, activity during day, opening blinds, providing glasses/hearing aids, and up in chair during daytime). Will avoid narcotics and benzos unless absolutely necessary. PRN anti-psychotics are not prescribed to avoid self harm behaviors.    AVM (arteriovenous malformation) of stomach, acquired with hemorrhage  Patient's hemorrhage is due to gastrointestinal  bleed, patient does not have a propensity for bleeding.. Patients most recent Hgb, Hct, platelets, and INR are listed below.  Recent Labs     10/06/24  0622 10/06/24  0919 10/06/24  1829 10/07/24  0502 10/08/24  0504   HGB 12.5*  12.5*  --  11.3* 10.9* 11.4*   HCT 37.8*  37.8*  --  33.6* 32.8* 35.0*   *  122*  --   --  99* 88*   INR  --  1.1  --   --   --      Plan  - Will trend hemoglobin/hematocrit Daily  - Will monitor and correct any coagulation defects  - Will transfuse if Hgb is <7g/dl (<8g/dl in cases of active ACS) or if patient has rapid bleeding leading to hemodynamic instability  -     PAF (paroxysmal atrial fibrillation)  Patient has paroxysmal (<7 days) atrial fibrillation. Patient is currently in  sinus Nicholas . [id number] Score: 4. The patients heart rate in the last 24 hours is as follows:  Pulse  Min: 34  Max: 43     Antiarrhythmics   None    Anticoagulants   No anticoagulants secondary to need procedure    Plan  - Replete lytes with a goal of K>4, Mg >2    - Patient's afib is currently controlled    Pain in both lower extremities        Recurrent major depressive disorder  Chronic. Stable. Not in acute exacerbation and currently denies endorsing any suicidal/homicidal ideations.   Plan:  -Continue to monitor      Type 2 diabetes mellitus with other specified complication, unspecified whether long term insulin use  Patient's FSGs are uncontrolled due to hyperglycemia on current medication regimen.  Last A1c reviewed-   Lab Results   Component Value Date    HGBA1C 7.0 (H) 05/07/2024     Most recent fingerstick glucose reviewed-   Recent Labs   Lab 10/08/24  1039   POCTGLUCOSE 104       Current correctional scale  Low  Maintain anti-hyperglycemic dose as follows-   Antihyperglycemics (From admission, onward)      Start     Stop Route Frequency Ordered    10/06/24 0146  insulin aspart U-100 pen 0-5 Units         -- SubQ Before meals & nightly PRN 10/06/24 0048           Plan:  -SSI  -A1c  -Accu-checks  -Hold oral hypoglycemics while patient is in the hospital  -Continue home long-acting insulin at 25-50% decrease, titrate up as needed  -Hypoglycemic protocol          Dyslipidemia  Patient is chronically on statin.will not continue for now. Last Lipid Panel:   Lab Results   Component Value Date    CHOL 150 05/07/2024    HDL 36 (L) 05/07/2024    LDLCALC 94.2 05/07/2024    TRIG 99 05/07/2024    CHOLHDL 24.0 05/07/2024     Plan:  -Hold home medication due to elevated LFTs  -low fat/low calorie diet        Essential hypertension  Chronic, controlled.  Latest blood pressure and vitals reviewed-   Temp:  [97.3 °F (36.3 °C)-98.4 °F (36.9 °C)]   Pulse:  [34-43]   Resp:  [14-18]   BP: ()/(46-78)   SpO2:  [91 %-98 %] .   Home meds for hypertension were reviewed and noted below.   Hypertension Medications               isosorbide mononitrate (IMDUR) 120 MG 24 hr tablet TAKE 1 TABLET BY MOUTH EVERY DAY    valsartan (DIOVAN) 160 MG tablet Take 1 tablet (160 mg total) by mouth once daily.            While in the hospital, will manage blood pressure as follows, hold valsartan    Will utilize p.r.n. blood pressure medication only if patient's blood pressure greater than  180/110 and he develops symptoms such as worsening chest pain or shortness of breath.      Coronary artery disease of native artery of native heart with stable angina pectoris  Patient with known CAD s/p stent placement, which is controlled. and monitor for S/Sx of angina/ACS. Continue to monitor on telemetry.       VTE Risk Mitigation (From admission, onward)           Ordered     Reason for No Pharmacological VTE Prophylaxis  Once        Question:  Reasons:  Answer:  Physician Provided (leave comment)  Comment:  Pending potential surgical intervention    10/06/24 0048     IP VTE HIGH RISK PATIENT  Once         10/06/24 0048     Place sequential compression device  Until discontinued         10/06/24 0048                     Discharge Planning   ZEINA:      Code Status: Prior   Is the patient medically ready for discharge?:     Reason for patient still in hospital (select all that apply): Patient new problem and Patient trending condition  Discharge Plan A: Home with family                  Mar Cheney MD  Department of Hospital Medicine   [facility] - Med Surg

## 2024-10-08 NOTE — ASSESSMENT & PLAN NOTE
Patient's hemorrhage is due to gastrointestinal bleed, patient does not have a propensity for bleeding.. Patients most recent Hgb, Hct, platelets, and INR are listed below.  Recent Labs     10/06/24  0622 10/06/24  0919 10/06/24  1829 10/07/24  0502 10/08/24  0504   HGB 12.5*  12.5*  --  11.3* 10.9* 11.4*   HCT 37.8*  37.8*  --  33.6* 32.8* 35.0*   *  122*  --   --  99* 88*   INR  --  1.1  --   --   --      Plan  - Will trend hemoglobin/hematocrit Daily  - Will monitor and correct any coagulation defects  - Will transfuse if Hgb is <7g/dl (<8g/dl in cases of active ACS) or if patient has rapid bleeding leading to hemodynamic instability  -

## 2024-10-08 NOTE — ASSESSMENT & PLAN NOTE
Chronic, controlled.  Latest blood pressure and vitals reviewed-   Temp:  [97.3 °F (36.3 °C)-98.4 °F (36.9 °C)]   Pulse:  [34-43]   Resp:  [14-18]   BP: ()/(46-78)   SpO2:  [91 %-98 %] .   Home meds for hypertension were reviewed and noted below.   Hypertension Medications               isosorbide mononitrate (IMDUR) 120 MG 24 hr tablet TAKE 1 TABLET BY MOUTH EVERY DAY    valsartan (DIOVAN) 160 MG tablet Take 1 tablet (160 mg total) by mouth once daily.            While in the hospital, will manage blood pressure as follows, hold valsartan    Will utilize p.r.n. blood pressure medication only if patient's blood pressure greater than  180/110 and he develops symptoms such as worsening chest pain or shortness of breath.

## 2024-10-08 NOTE — ASSESSMENT & PLAN NOTE
Currently with significant bradycardic, prohibitive of procedures    Awaiting cardiology recommendations prior to  ERCP and subsequent cholecystectomy

## 2024-10-08 NOTE — ASSESSMENT & PLAN NOTE
Patient has paroxysmal (<7 days) atrial fibrillation. Patient is currently in  sinus Nicholas . [id number] Score: 4. The patients heart rate in the last 24 hours is as follows:  Pulse  Min: 34  Max: 43     Antiarrhythmics   None    Anticoagulants   No anticoagulants secondary to need procedure    Plan  - Replete lytes with a goal of K>4, Mg >2    - Patient's afib is currently controlled

## 2024-10-08 NOTE — NURSING
Patient returned to unit from MRI via wheelchair. Telemetry monitoring restarted and verified by monitor room. PT SB with no s/s. No new skin injuries noted. Patient placed on O2 and IVFs restarted.

## 2024-10-08 NOTE — CONSULTS
[facility] Surg  General Surgery  Consult Note    Patient Name: Aquiles Yates  MRN: [id number]  Code Status: Prior  Admission Date: 10/5/2024  Hospital Length of Stay: 2 days  Attending Physician: Mar Granados MD  Primary Care Provider: Holger Thornton MD    Patient information was obtained from relative(s), past medical records, ER records, and primary team.     Inpatient consult to General Surgery  Consult performed by: Jacquelyn Sun PA-C  Consult ordered by: Mar Granados MD  Reason for consult: Choledocholithiasis  Assessment/Recommendations: Medical optimization with cardiology  ERCP  Preventative cholecystectomy at some point in the future        Subjective:     Principal Problem: Calculus of bile duct without cholecystitis with obstruction    History of Present Illness: 85 y/o male with past medical history significant for DMII, CAD with multiple PCI procedures, chronic diastolic CHF, afib with h/o CVA,  HTN, DM, LAE, LVH, PAD, AAA stent graft (approx 2013), dementia, hyperlipidemia who presented to the ED approximately 2 days ago for evaluation after his wife was concerned about tremors and the appearance of his skin. He is a poor historian secondary to dementia. CT abdomen pelvis unremarkable. U/S abdomen showing cholelithiasis without evidence of cholecystitis, but an MRCP was obtained secondary to elevated LFTs and Tbili. MRCP with significant choledocholithiasis. Patient was transported to [place] earlier today to attempt ERCP, but he became significant bradycardic and the procedure was aborted. His LFTs are trending down today. He reports being hungry at the time of exam, and his wife denies any past abdominal surgical history.    No current facility-administered medications on file prior to encounter.     Current Outpatient Medications on File Prior to Encounter   Medication Sig    alcohol swabs (ALCOHOL PREP PADS) PadM Twice a day    aspirin (ECOTRIN) 81  MG EC tablet Take 1 tablet (81 mg total) by mouth once daily.    blood sugar diagnostic Strp Test blood sugars twice a day    donepeziL (ARICEPT) 5 MG tablet Take 1 tablet (5 mg total) by mouth every evening.    gabapentin (NEURONTIN) 100 MG capsule Take 1 capsule (100 mg total) by mouth 2 (two) times daily as needed. PRN    isosorbide mononitrate (IMDUR) 120 MG 24 hr tablet TAKE 1 TABLET BY MOUTH EVERY DAY    lancets (ACCU-CHEK SOFTCLIX LANCETS) Misc Test blood sugars twice a day    memantine (NAMENDA) 10 MG Tab Take 1 tablet (10 mg total) by mouth 2 (two) times daily.    metFORMIN (GLUCOPHAGE) 500 MG tablet TAKE 1 TABLET BY MOUTH EVERY DAY WITH BREAKFAST    pantoprazole (PROTONIX) 40 MG tablet Take 1 tablet (40 mg total) by mouth once daily.    valsartan (DIOVAN) 160 MG tablet Take 1 tablet (160 mg total) by mouth once daily.       Review of patient's allergies indicates:   Allergen Reactions    Metoprolol Other (See Comments)     Junctional rhythm         Past Medical History:   Diagnosis Date    Acute blood loss anemia 10/14/2019    Alzheimer's dementia     Aneurysm, abdominal aortic     BCC (basal cell carcinoma of skin) 06/29/2023    Chronic diastolic heart failure 05/20/2024    Coronary artery disease     Depression     Diabetes mellitus     HLD (hyperlipidemia)     Hypertension     Kidney stone     PAD (peripheral artery disease)     PAF (paroxysmal atrial fibrillation) 01/24/2023    Tobacco abuse      Past Surgical History:   Procedure Laterality Date    APPENDECTOMY      CORONARY STENT PLACEMENT      x 4    ESOPHAGOGASTRODUODENOSCOPY N/A 10/14/2019    Procedure: EGD (ESOPHAGOGASTRODUODENOSCOPY);  Surgeon: Carlos Mcneal III, MD;  Location: [facility] ENDO;  Service: Endoscopy;  Laterality: N/A;    ESOPHAGOGASTRODUODENOSCOPY N/A 10/15/2019    Procedure: EGD (ESOPHAGOGASTRODUODENOSCOPY);  Surgeon: Carlos Mcneal III, MD;  Location: [facility] ENDO;  Service: Endoscopy;  Laterality: N/A;    ESOPHAGOGASTRODUODENOSCOPY  N/A 4/17/2023    Procedure: EGD (ESOPHAGOGASTRODUODENOSCOPY);  Surgeon: Nevaeh Mcconnell MD;  Location: [facility] ENDO;  Service: Endoscopy;  Laterality: N/A;    LEFT HEART CATHETERIZATION Left 10/11/2019    Procedure: CATHETERIZATION, HEART, LEFT;  Surgeon: Robe Gilbert MD;  Location: [facility] CATH LAB;  Service: Cardiology;  Laterality: Left;    LEFT HEART CATHETERIZATION Left 10/13/2019    Procedure: CATHETERIZATION, HEART, LEFT;  Surgeon: Robe Gilbert MD;  Location: [facility] CATH LAB;  Service: Cardiology;  Laterality: Left;    leg stent Left      Family History       Problem Relation (Age of Onset)    COPD Father    Diabetes Mother, Brother          Tobacco Use    Smoking status: Former     Types: Cigars     Passive exposure: Never    Smokeless tobacco: Current   Substance and Sexual Activity    Alcohol use: Not Currently    Drug use: Not Currently    Sexual activity: Not Currently     Partners: Female     Review of Systems   Reason unable to perform ROS: Dementia.     Objective:     Vital Signs (Most Recent):  Temp: 97.7 °F (36.5 °C) (10/08/24 1609)  Pulse: (!) 47 (10/08/24 1609)  Resp: 18 (10/08/24 1609)  BP: (!) 148/67 (10/08/24 1609)  SpO2: 99 % (10/08/24 1609) Vital Signs (24h Range):  Temp:  [97.3 °F (36.3 °C)-98.4 °F (36.9 °C)] 97.7 °F (36.5 °C)  Pulse:  [34-47] 47  Resp:  [14-18] 18  SpO2:  [91 %-99 %] 99 %  BP: (118-153)/(57-78) 148/67     Weight: 66.7 kg (147 lb 0.8 oz)  Body mass index is 21.1 kg/m².     Physical Exam  Vitals reviewed.   Constitutional:       Appearance: He is well-developed. He is ill-appearing (Frail). He is not toxic-appearing.   HENT:      Head: Normocephalic and atraumatic.      Right Ear: External ear normal.      Left Ear: External ear normal.      Nose: Nose normal.      Mouth/Throat:      Mouth: Mucous membranes are moist.   Eyes:      General: No scleral icterus.     Extraocular Movements: Extraocular movements intact.      Conjunctiva/sclera: Conjunctivae normal.    Cardiovascular:      Rate and Rhythm: Bradycardia present.   Pulmonary:      Effort: Pulmonary effort is normal. No respiratory distress.   Abdominal:      Comments: No previous surgical scars. Abdomen soft and non-distended, non-tender on exam   Musculoskeletal:      Cervical back: Normal range of motion and neck supple.   Skin:     General: Skin is warm and dry.   Neurological:      Mental Status: He is alert.      Comments: Pleasantly confused            I have reviewed all pertinent lab results within the past 24 hours.  CBC:   Recent Labs   Lab 10/08/24  0504   WBC 4.02   RBC 3.88*   HGB 11.4*   HCT 35.0*   PLT 88*   MCV 90   MCH 29.4   MCHC 32.6     CMP:   Recent Labs   Lab 10/08/24  0504   *   CALCIUM 8.7   ALBUMIN 2.7*   PROT 5.8*      K 3.9   CO2 22*      BUN 34*   CREATININE 2.0*   ALKPHOS 174*   *   AST 65*   BILITOT 1.9*       Significant Diagnostics:  I have reviewed all pertinent imaging results/findings within the past 24 hours.    CT abdomen and US reviewed as above  Assessment/Plan:     * Calculus of bile duct without cholecystitis with obstruction  LFTs trending down, but patient with significant amount of stones in the CBD on MRCP    Discussed that cholecystectomy is generally recommended after resolution of choledocholithiasis to prevent future episodes.   Patient will need medical optimization with cardiology regarding bradycardia prior to undergoing anesthesia for ERCP and cholecystectomy  Remainder of care per primary team. Surgery will continue to follow.    Chronic diastolic heart failure  Currently with significant bradycardic, prohibitive of procedures    Awaiting cardiology recommendations prior to  ERCP and subsequent cholecystectomy    Type 2 diabetes mellitus with other specified complication, unspecified whether long term insulin use  Management per medicine    Dyslipidemia  Management per medicine    Essential hypertension  Management per  medicine    Coronary artery disease of native artery of native heart with stable angina pectoris  Management per medicine/cardiology      VTE Risk Mitigation (From admission, onward)           Ordered     Reason for No Pharmacological VTE Prophylaxis  Once        Question:  Reasons:  Answer:  Physician Provided (leave comment)  Comment:  Pending potential surgical intervention    10/06/24 0048     IP VTE HIGH RISK PATIENT  Once         10/06/24 0048     Place sequential compression device  Until discontinued         10/06/24 0048                    Thank you for your consult. I will follow-up with patient. Please contact us if you have any additional questions.    Jacquelyn Sun PA-C  General Surgery  [facility] - Med S[facility]

## 2024-10-08 NOTE — SUBJECTIVE & OBJECTIVE
Interval History:  Patient seen and examined after returned from [place].  He denies any pain in his requesting something to eat.    Review of Systems   Constitutional:  Positive for appetite change (Hungry). Negative for fatigue.   Respiratory:  Negative for shortness of breath.    Cardiovascular:  Negative for chest pain, palpitations and leg swelling.   Gastrointestinal:  Positive for abdominal pain.   Neurological:  Positive for weakness.   All other systems reviewed and are negative.    Objective:     Vital Signs (Most Recent):  Temp: 97.5 °F (36.4 °C) (10/08/24 1418)  Pulse: (!) 37 (10/08/24 1418)  Resp: 18 (10/08/24 1418)  BP: (!) 153/67 (10/08/24 1418)  SpO2: (!) 91 % (10/08/24 1418) Vital Signs (24h Range):  Temp:  [97.3 °F (36.3 °C)-98.4 °F (36.9 °C)] 97.5 °F (36.4 °C)  Pulse:  [34-48] 37  Resp:  [14-18] 18  SpO2:  [91 %-98 %] 91 %  BP: ()/(46-78) 153/67     Weight: 66.7 kg (147 lb 0.8 oz)  Body mass index is 21.1 kg/m².  No intake or output data in the 24 hours ending 10/08/24 1458      Physical Exam  Vitals and nursing note reviewed.   Constitutional:       Appearance: He is ill-appearing.   Eyes:      General: Scleral icterus present.   Cardiovascular:      Rate and Rhythm: Regular rhythm. Bradycardia present.      Heart sounds: Heart sounds are distant.   Pulmonary:      Effort: Pulmonary effort is normal.      Breath sounds: Normal breath sounds.   Abdominal:      General: Abdomen is scaphoid. Bowel sounds are normal. There is no distension.      Palpations: Abdomen is soft.      Tenderness: There is no abdominal tenderness. There is no guarding or rebound.   Neurological:      Mental Status: He is alert.      Motor: Weakness present.   Psychiatric:         Attention and Perception: He is inattentive.         Mood and Affect: Affect is flat.         Behavior: Behavior is slowed and withdrawn.         Cognition and Memory: Cognition is impaired. Memory is impaired.             Significant  Labs: All pertinent labs within the past 24 hours have been reviewed.  CBC:   Recent Labs   Lab 10/06/24  1829 10/07/24  0502 10/08/24  0504   WBC  --  4.48 4.02   HGB 11.3* 10.9* 11.4*   HCT 33.6* 32.8* 35.0*   PLT  --  99* 88*     CMP:   Recent Labs   Lab 10/07/24  0502 10/08/24  0504    140   K 4.3 3.9    107   CO2 22* 22*   * 114*   BUN 39* 34*   CREATININE 2.5* 2.0*   CALCIUM 8.7 8.7   PROT 5.9* 5.8*   ALBUMIN 2.7* 2.7*   BILITOT 3.6* 1.9*   ALKPHOS 215* 174*   * 65*   * 112*   ANIONGAP 11 11         Significant Imaging: I have reviewed all pertinent imaging results/findings within the past 24 hours.

## 2024-10-08 NOTE — PLAN OF CARE
Discussed poc with pt, pt verbalized understanding  Purposeful rounding every 2hours  VS wnl  Cardiac monitoring in use, pt is FLOR[employer], tele monitor #[id number]  Fall precautions in place, remains injury free  Pain and nausea under control with PRN meds  IVFs  Accurate I&Os  Abx given as prescribed  Bed locked at lowest position  Call light within reach  Chart check complete  Will cont with POC    Problem: Adult Inpatient Plan of Care  Goal: Plan of Care Review  Outcome: Progressing  Goal: Patient-Specific Goal (Individualized)  Outcome: Progressing  Goal: Absence of Hospital-Acquired Illness or Injury  Outcome: Progressing  Goal: Optimal Comfort and Wellbeing  Outcome: Progressing  Goal: Readiness for Transition of Care  Outcome: Progressing     Problem: Diabetes Comorbidity  Goal: Blood Glucose Level Within Targeted Range  Outcome: Progressing     Problem: Fall Injury Risk  Goal: Absence of Fall and Fall-Related Injury  Outcome: Progressing     Problem: Skin Injury Risk Increased  Goal: Skin Health and Integrity  Outcome: Progressing     Problem: Wound  Goal: Optimal Coping  Outcome: Progressing  Goal: Optimal Functional Ability  Outcome: Progressing  Goal: Absence of Infection Signs and Symptoms  Outcome: Progressing  Goal: Improved Oral Intake  Outcome: Progressing  Goal: Optimal Pain Control and Function  Outcome: Progressing  Goal: Skin Health and Integrity  Outcome: Progressing  Goal: Optimal Wound Healing  Outcome: Progressing

## 2024-10-08 NOTE — SUBJECTIVE & OBJECTIVE
No current facility-administered medications on file prior to encounter.     Current Outpatient Medications on File Prior to Encounter   Medication Sig    alcohol swabs (ALCOHOL PREP PADS) PadM Twice a day    aspirin (ECOTRIN) 81 MG EC tablet Take 1 tablet (81 mg total) by mouth once daily.    blood sugar diagnostic Strp Test blood sugars twice a day    donepeziL (ARICEPT) 5 MG tablet Take 1 tablet (5 mg total) by mouth every evening.    gabapentin (NEURONTIN) 100 MG capsule Take 1 capsule (100 mg total) by mouth 2 (two) times daily as needed. PRN    isosorbide mononitrate (IMDUR) 120 MG 24 hr tablet TAKE 1 TABLET BY MOUTH EVERY DAY    lancets (ACCU-CHEK SOFTCLIX LANCETS) Misc Test blood sugars twice a day    memantine (NAMENDA) 10 MG Tab Take 1 tablet (10 mg total) by mouth 2 (two) times daily.    metFORMIN (GLUCOPHAGE) 500 MG tablet TAKE 1 TABLET BY MOUTH EVERY DAY WITH BREAKFAST    pantoprazole (PROTONIX) 40 MG tablet Take 1 tablet (40 mg total) by mouth once daily.    valsartan (DIOVAN) 160 MG tablet Take 1 tablet (160 mg total) by mouth once daily.       Review of patient's allergies indicates:   Allergen Reactions    Metoprolol Other (See Comments)     Junctional rhythm         Past Medical History:   Diagnosis Date    Acute blood loss anemia 10/14/2019    Alzheimer's dementia     Aneurysm, abdominal aortic     BCC (basal cell carcinoma of skin) 06/29/2023    Chronic diastolic heart failure 05/20/2024    Coronary artery disease     Depression     Diabetes mellitus     HLD (hyperlipidemia)     Hypertension     Kidney stone     PAD (peripheral artery disease)     PAF (paroxysmal atrial fibrillation) 01/24/2023    Tobacco abuse      Past Surgical History:   Procedure Laterality Date    APPENDECTOMY      CORONARY STENT PLACEMENT      x 4    ESOPHAGOGASTRODUODENOSCOPY N/A 10/14/2019    Procedure: EGD (ESOPHAGOGASTRODUODENOSCOPY);  Surgeon: Carlos Mcneal III, MD;  Location: [facility];  Service: Endoscopy;   Laterality: N/A;    ESOPHAGOGASTRODUODENOSCOPY N/A 10/15/2019    Procedure: EGD (ESOPHAGOGASTRODUODENOSCOPY);  Surgeon: Carlos Mcneal III, MD;  Location: [facility] ENDO;  Service: Endoscopy;  Laterality: N/A;    ESOPHAGOGASTRODUODENOSCOPY N/A 4/17/2023    Procedure: EGD (ESOPHAGOGASTRODUODENOSCOPY);  Surgeon: Nevaeh Mcconnell MD;  Location: [facility] ENDO;  Service: Endoscopy;  Laterality: N/A;    LEFT HEART CATHETERIZATION Left 10/11/2019    Procedure: CATHETERIZATION, HEART, LEFT;  Surgeon: Robe Gilbert MD;  Location: [facility] CATH LAB;  Service: Cardiology;  Laterality: Left;    LEFT HEART CATHETERIZATION Left 10/13/2019    Procedure: CATHETERIZATION, HEART, LEFT;  Surgeon: Robe Gilbert MD;  Location: [facility] CATH LAB;  Service: Cardiology;  Laterality: Left;    leg stent Left      Family History       Problem Relation (Age of Onset)    COPD Father    Diabetes Mother, Brother          Tobacco Use    Smoking status: Former     Types: Cigars     Passive exposure: Never    Smokeless tobacco: Current   Substance and Sexual Activity    Alcohol use: Not Currently    Drug use: Not Currently    Sexual activity: Not Currently     Partners: Female     Review of Systems   Reason unable to perform ROS: Dementia.     Objective:     Vital Signs (Most Recent):  Temp: 97.7 °F (36.5 °C) (10/08/24 1609)  Pulse: (!) 47 (10/08/24 1609)  Resp: 18 (10/08/24 1609)  BP: (!) 148/67 (10/08/24 1609)  SpO2: 99 % (10/08/24 1609) Vital Signs (24h Range):  Temp:  [97.3 °F (36.3 °C)-98.4 °F (36.9 °C)] 97.7 °F (36.5 °C)  Pulse:  [34-47] 47  Resp:  [14-18] 18  SpO2:  [91 %-99 %] 99 %  BP: (118-153)/(57-78) 148/67     Weight: 66.7 kg (147 lb 0.8 oz)  Body mass index is 21.1 kg/m².     Physical Exam  Vitals reviewed.   Constitutional:       Appearance: He is well-developed. He is ill-appearing (Frail). He is not toxic-appearing.   HENT:      Head: Normocephalic and atraumatic.      Right Ear: External ear normal.      Left Ear: External ear normal.       Nose: Nose normal.      Mouth/Throat:      Mouth: Mucous membranes are moist.   Eyes:      General: No scleral icterus.     Extraocular Movements: Extraocular movements intact.      Conjunctiva/sclera: Conjunctivae normal.   Cardiovascular:      Rate and Rhythm: Bradycardia present.   Pulmonary:      Effort: Pulmonary effort is normal. No respiratory distress.   Abdominal:      Comments: No previous surgical scars. Abdomen soft and non-distended, non-tender on exam   Musculoskeletal:      Cervical back: Normal range of motion and neck supple.   Skin:     General: Skin is warm and dry.   Neurological:      Mental Status: He is alert.      Comments: Pleasantly confused            I have reviewed all pertinent lab results within the past 24 hours.  CBC:   Recent Labs   Lab 10/08/24  0504   WBC 4.02   RBC 3.88*   HGB 11.4*   HCT 35.0*   PLT 88*   MCV 90   MCH 29.4   MCHC 32.6     CMP:   Recent Labs   Lab 10/08/24  0504   *   CALCIUM 8.7   ALBUMIN 2.7*   PROT 5.8*      K 3.9   CO2 22*      BUN 34*   CREATININE 2.0*   ALKPHOS 174*   *   AST 65*   BILITOT 1.9*       Significant Diagnostics:  I have reviewed all pertinent imaging results/findings within the past 24 hours.    CT abdomen and US reviewed as above

## 2024-10-08 NOTE — ASSESSMENT & PLAN NOTE
GI consulted. Recommends MRCP. Initiated on Zosyn.  Patient went to [place] for ERCP however due to bradycardia patient was returned cardiology consult requested.  Plan:  -clear liquids advance as tolerated  -Hold anticoagulants  -IVFs  -Continue Zosyn  -Analgesics prn  -Repeat labs in am  -GI consult appreciated

## 2024-10-08 NOTE — ASSESSMENT & PLAN NOTE
Patient's FSGs are uncontrolled due to hyperglycemia on current medication regimen.  Last A1c reviewed-   Lab Results   Component Value Date    HGBA1C 7.0 (H) 05/07/2024     Most recent fingerstick glucose reviewed-   Recent Labs   Lab 10/08/24  1039   POCTGLUCOSE 104       Current correctional scale  Low  Maintain anti-hyperglycemic dose as follows-   Antihyperglycemics (From admission, onward)    Start     Stop Route Frequency Ordered    10/06/24 0146  insulin aspart U-100 pen 0-5 Units         -- SubQ Before meals & nightly PRN 10/06/24 0048        Plan:  -SSI  -A1c  -Accu-checks  -Hold oral hypoglycemics while patient is in the hospital  -Continue home long-acting insulin at 25-50% decrease, titrate up as needed  -Hypoglycemic protocol

## 2024-10-08 NOTE — PLAN OF CARE
Discussed poc with pt, verbalized understanding  [id number], disoriented to time  VSS    Purposeful rounding Q2H  IVFs continued  Cardiac monitoring in place    No harm or injuries this shift  No pain  Abx given    Chart check complete

## 2024-10-08 NOTE — ASSESSMENT & PLAN NOTE
LFTs trending down, but patient with significant amount of stones in the CBD on MRCP    Discussed that cholecystectomy is generally recommended after resolution of choledocholithiasis to prevent future episodes.   Patient will need medical optimization with cardiology regarding bradycardia prior to undergoing anesthesia for ERCP and cholecystectomy  Remainder of care per primary team. Surgery will continue to follow.

## 2024-10-09 PROBLEM — M79.604 PAIN IN BOTH LOWER EXTREMITIES: Status: RESOLVED | Noted: 2021-09-22 | Resolved: 2024-10-09

## 2024-10-09 PROBLEM — M79.605 PAIN IN BOTH LOWER EXTREMITIES: Status: RESOLVED | Noted: 2021-09-22 | Resolved: 2024-10-09

## 2024-10-09 PROBLEM — F33.9 RECURRENT MAJOR DEPRESSIVE DISORDER: Status: RESOLVED | Noted: 2019-06-12 | Resolved: 2024-10-09

## 2024-10-09 LAB
ALBUMIN SERPL BCP-MCNC: 2.8 G/DL (ref 3.5–5.2)
ALP SERPL-CCNC: 159 U/L (ref 55–135)
ALT SERPL W/O P-5'-P-CCNC: 79 U/L (ref 10–44)
ANION GAP SERPL CALC-SCNC: 10 MMOL/L (ref 8–16)
AORTIC ROOT ANNULUS: 3.75 CM
ASCENDING AORTA: 3.51 CM
AST SERPL-CCNC: 37 U/L (ref 10–40)
AV INDEX (PROSTH): 0.52
AV MEAN GRADIENT: 8 MMHG
AV PEAK GRADIENT: 17.6 MMHG
AV REGURGITATION PRESSURE HALF TIME: 943.28 MS
AV VALVE AREA BY VELOCITY RATIO: 1.4 CM²
AV VALVE AREA: 2 CM²
AV VELOCITY RATIO: 0.38
BASOPHILS # BLD AUTO: 0.02 K/UL (ref 0–0.2)
BASOPHILS NFR BLD: 0.6 % (ref 0–1.9)
BILIRUB SERPL-MCNC: 1.4 MG/DL (ref 0.1–1)
BSA FOR ECHO PROCEDURE: 1.78 M2
BUN SERPL-MCNC: 25 MG/DL (ref 8–23)
CALCIUM SERPL-MCNC: 8.6 MG/DL (ref 8.7–10.5)
CHLORIDE SERPL-SCNC: 107 MMOL/L (ref 95–110)
CO2 SERPL-SCNC: 25 MMOL/L (ref 23–29)
CREAT SERPL-MCNC: 1.7 MG/DL (ref 0.5–1.4)
CV ECHO LV RWT: 0.51 CM
DIFFERENTIAL METHOD BLD: ABNORMAL
DOP CALC AO PEAK VEL: 2.1 M/S
DOP CALC AO VTI: 42.4 CM
DOP CALC LVOT AREA: 3.8 CM2
DOP CALC LVOT DIAMETER: 2.2 CM
DOP CALC LVOT PEAK VEL: 0.8 M/S
DOP CALC LVOT STROKE VOLUME: 83.6 CM3
DOP CALC RVOT PEAK VEL: 0.6 M/S
DOP CALC RVOT VTI: 15.9 CM
DOP CALCLVOT PEAK VEL VTI: 22 CM
E WAVE DECELERATION TIME: 293.33 MSEC
E/A RATIO: 1.14
E/E' RATIO: 12.27 M/S
ECHO LV POSTERIOR WALL: 1.2 CM (ref 0.6–1.1)
EJECTION FRACTION: 60 %
EOSINOPHIL # BLD AUTO: 0.2 K/UL (ref 0–0.5)
EOSINOPHIL NFR BLD: 6.4 % (ref 0–8)
ERYTHROCYTE [DISTWIDTH] IN BLOOD BY AUTOMATED COUNT: 13.9 % (ref 11.5–14.5)
EST. GFR  (NO RACE VARIABLE): 39 ML/MIN/1.73 M^2
FRACTIONAL SHORTENING: 27.7 % (ref 28–44)
GLUCOSE SERPL-MCNC: 107 MG/DL (ref 70–110)
HCT VFR BLD AUTO: 36.3 % (ref 40–54)
HGB BLD-MCNC: 11.9 G/DL (ref 14–18)
IMM GRANULOCYTES # BLD AUTO: 0.01 K/UL (ref 0–0.04)
IMM GRANULOCYTES NFR BLD AUTO: 0.3 % (ref 0–0.5)
INTERVENTRICULAR SEPTUM: 1.2 CM (ref 0.6–1.1)
IVC DIAMETER: 1.16 CM
IVRT: 106.57 MSEC
LA MAJOR: 6.65 CM
LA MINOR: 4.69 CM
LA WIDTH: 4.3 CM
LEFT ATRIUM SIZE: 4.57 CM
LEFT ATRIUM VOLUME INDEX: 51 ML/M2
LEFT ATRIUM VOLUME: 91.88 CM3
LEFT INTERNAL DIMENSION IN SYSTOLE: 3.4 CM (ref 2.1–4)
LEFT VENTRICLE DIASTOLIC VOLUME INDEX: 56.02 ML/M2
LEFT VENTRICLE DIASTOLIC VOLUME: 100.84 ML
LEFT VENTRICLE MASS INDEX: 117.8 G/M2
LEFT VENTRICLE SYSTOLIC VOLUME INDEX: 26.4 ML/M2
LEFT VENTRICLE SYSTOLIC VOLUME: 47.5 ML
LEFT VENTRICULAR INTERNAL DIMENSION IN DIASTOLE: 4.7 CM (ref 3.5–6)
LEFT VENTRICULAR MASS: 212 G
LV LATERAL E/E' RATIO: 10.22 M/S
LV SEPTAL E/E' RATIO: 15.33 M/S
LVED V (TEICH): 100.84 ML
LVES V (TEICH): 47.5 ML
LVOT MG: 1.09 MMHG
LVOT MV: 0.47 CM/S
LYMPHOCYTES # BLD AUTO: 0.6 K/UL (ref 1–4.8)
LYMPHOCYTES NFR BLD: 15.2 % (ref 18–48)
MAGNESIUM SERPL-MCNC: 2 MG/DL (ref 1.6–2.6)
MCH RBC QN AUTO: 29.3 PG (ref 27–31)
MCHC RBC AUTO-ENTMCNC: 32.8 G/DL (ref 32–36)
MCV RBC AUTO: 89 FL (ref 82–98)
MONOCYTES # BLD AUTO: 0.4 K/UL (ref 0.3–1)
MONOCYTES NFR BLD: 11.1 % (ref 4–15)
MV PEAK A VEL: 0.81 M/S
MV PEAK E VEL: 0.92 M/S
NEUTROPHILS # BLD AUTO: 2.4 K/UL (ref 1.8–7.7)
NEUTROPHILS NFR BLD: 66.4 % (ref 38–73)
NRBC BLD-RTO: 0 /100 WBC
OHS QRS DURATION: 90 MS
OHS QTC CALCULATION: 424 MS
PISA AR MAX VEL: 4.07 M/S
PISA MRMAX VEL: 5.59 M/S
PISA TR MAX VEL: 2.66 M/S
PLATELET # BLD AUTO: 94 K/UL (ref 150–450)
PMV BLD AUTO: 11.3 FL (ref 9.2–12.9)
POCT GLUCOSE: 203 MG/DL (ref 70–110)
POTASSIUM SERPL-SCNC: 4.1 MMOL/L (ref 3.5–5.1)
PROT SERPL-MCNC: 6.1 G/DL (ref 6–8.4)
PV MEAN GRADIENT: 1 MMHG
RA MAJOR: 6.04 CM
RA PRESSURE ESTIMATED: 3 MMHG
RA WIDTH: 2.5 CM
RBC # BLD AUTO: 4.06 M/UL (ref 4.6–6.2)
RV TB RVSP: 6 MMHG
SODIUM SERPL-SCNC: 142 MMOL/L (ref 136–145)
STJ: 3.27 CM
TDI LATERAL: 0.09 M/S
TDI SEPTAL: 0.06 M/S
TDI: 0.08 M/S
TR MAX PG: 28 MMHG
TRICUSPID ANNULAR PLANE SYSTOLIC EXCURSION: 2.09 CM
TV REST PULMONARY ARTERY PRESSURE: 31 MMHG
WBC # BLD AUTO: 3.61 K/UL (ref 3.9–12.7)
Z-SCORE OF LEFT VENTRICULAR DIMENSION IN END DIASTOLE: -0.57
Z-SCORE OF LEFT VENTRICULAR DIMENSION IN END SYSTOLE: 0.79

## 2024-10-09 PROCEDURE — 99231 SBSQ HOSP IP/OBS SF/LOW 25: CPT | Mod: HCNC,,, | Performed by: PHYSICIAN ASSISTANT

## 2024-10-09 PROCEDURE — 27000221 HC OXYGEN, UP TO 24 HOURS: Mod: HCNC

## 2024-10-09 PROCEDURE — 99232 SBSQ HOSP IP/OBS MODERATE 35: CPT | Mod: HCNC,,,

## 2024-10-09 PROCEDURE — 83735 ASSAY OF MAGNESIUM: CPT | Mod: HCNC | Performed by: INTERNAL MEDICINE

## 2024-10-09 PROCEDURE — 63600175 PHARM REV CODE 636 W HCPCS: Mod: HCNC | Performed by: INTERNAL MEDICINE

## 2024-10-09 PROCEDURE — 25000003 PHARM REV CODE 250: Mod: HCNC | Performed by: INTERNAL MEDICINE

## 2024-10-09 PROCEDURE — 99223 1ST HOSP IP/OBS HIGH 75: CPT | Mod: 25,HCNC,, | Performed by: STUDENT IN AN ORGANIZED HEALTH CARE EDUCATION/TRAINING PROGRAM

## 2024-10-09 PROCEDURE — 94761 N-INVAS EAR/PLS OXIMETRY MLT: CPT | Mod: HCNC

## 2024-10-09 PROCEDURE — 85025 COMPLETE CBC W/AUTO DIFF WBC: CPT | Mod: HCNC | Performed by: INTERNAL MEDICINE

## 2024-10-09 PROCEDURE — 36415 COLL VENOUS BLD VENIPUNCTURE: CPT | Mod: HCNC | Performed by: INTERNAL MEDICINE

## 2024-10-09 PROCEDURE — 80053 COMPREHEN METABOLIC PANEL: CPT | Mod: HCNC | Performed by: INTERNAL MEDICINE

## 2024-10-09 PROCEDURE — 99900035 HC TECH TIME PER 15 MIN (STAT): Mod: HCNC

## 2024-10-09 PROCEDURE — 11000001 HC ACUTE MED/SURG PRIVATE ROOM: Mod: HCNC

## 2024-10-09 RX ADMIN — ISOSORBIDE MONONITRATE 120 MG: 60 TABLET, EXTENDED RELEASE ORAL at 08:10

## 2024-10-09 RX ADMIN — INSULIN ASPART 2 UNITS: 100 INJECTION, SOLUTION INTRAVENOUS; SUBCUTANEOUS at 05:10

## 2024-10-09 RX ADMIN — MEMANTINE 10 MG: 10 TABLET ORAL at 08:10

## 2024-10-09 RX ADMIN — PIPERACILLIN SODIUM AND TAZOBACTAM SODIUM 4.5 G: 4; .5 INJECTION, POWDER, FOR SOLUTION INTRAVENOUS at 08:10

## 2024-10-09 RX ADMIN — PIPERACILLIN SODIUM AND TAZOBACTAM SODIUM 4.5 G: 4; .5 INJECTION, POWDER, FOR SOLUTION INTRAVENOUS at 03:10

## 2024-10-09 RX ADMIN — PIPERACILLIN SODIUM AND TAZOBACTAM SODIUM 4.5 G: 4; .5 INJECTION, POWDER, FOR SOLUTION INTRAVENOUS at 12:10

## 2024-10-09 RX ADMIN — PANTOPRAZOLE SODIUM 40 MG: 40 TABLET, DELAYED RELEASE ORAL at 08:10

## 2024-10-09 NOTE — PROGRESS NOTES
ERCP was planned yesterday but had to be cancelled due to bradycardia. He continued to have bradycardia through the night. Cardiology consulted this morning and evaluating. LFTs continue to trend down. He may have passed a stone, once cleared by Cardiology, could consider repeat imaging vs EUS. Will follow peripherally. Contact GI once HR improves and cleared by Cardiology.  Hubert Downing PA-C.

## 2024-10-09 NOTE — ASSESSMENT & PLAN NOTE
Patient with dementia with likely etiology of alzheimer's dementia. Dementia is moderate. The patient does not have signs of behavioral disturbance. Home dementia medications are held.  Continue non-pharmacologic interventions to prevent delirium (No VS between 11PM-5AM, activity during day, opening blinds, providing glasses/hearing aids, and up in chair during daytime). Will avoid narcotics and benzos unless absolutely necessary. PRN anti-psychotics are not prescribed to avoid self harm behaviors.

## 2024-10-09 NOTE — ASSESSMENT & PLAN NOTE
LFTs consistent with biliary obstruction  IV Zosyn  US shows gallstones and mild intrahepatic dilation   MRCP confirmed choledocholithiasis  ERCP cancelled upon arrival due to bradycardia  Bradycardia addressed by CV  Abdominal pain free  Seen by CV

## 2024-10-09 NOTE — ASSESSMENT & PLAN NOTE
Followed by Dr. Gilbert  First PCI 1998, total of 5 stents with last one in [place] 2013   [facility] Oct 2019 for NSTEMI.  Pt had PCI KYLIE x one to mid LCX ISR, KYLIE x 2 to OM2, and PCI prox RCA KYLIE x 2, PTCA mid RCA ISR.  Failed PCI distal occluded RCA.  EF 45%.  Nonobstructive LAD disease, small diffusely diseased diagonal vessels.    Cont Imdur

## 2024-10-09 NOTE — ASSESSMENT & PLAN NOTE
Chronic, controlled.  Latest blood pressure and vitals reviewed-   Temp:  [97.4 °F (36.3 °C)-98.3 °F (36.8 °C)]   Pulse:  [37-55]   Resp:  [16-20]   BP: (102-153)/(52-70)   SpO2:  [91 %-99 %] .   Home meds for hypertension were reviewed and noted below.   Hypertension Medications               isosorbide mononitrate (IMDUR) 120 MG 24 hr tablet TAKE 1 TABLET BY MOUTH EVERY DAY    valsartan (DIOVAN) 160 MG tablet Take 1 tablet (160 mg total) by mouth once daily.            While in the hospital, will manage blood pressure as follows, hold valsartan    Will utilize p.r.n. blood pressure medication only if patient's blood pressure greater than  180/110 and he develops symptoms such as worsening chest pain or shortness of breath.

## 2024-10-09 NOTE — PROGRESS NOTES
[facility]  Gastroenterology  Progress Note    Patient Name: Aquiles Yates  MRN: [id number]  Admission Date: 10/5/2024  Hospital Length of Stay: 3 days  Code Status: Prior   Attending Provider: Mar Granados MD  Consulting Provider: Nevaeh Mcconnell MD  Primary Care Physician: Holger Thornton MD  Principal Problem: Calculus of bile duct without cholecystitis with obstruction        Subjective:     Interval History: seen this afternoon. His wife Beatrice is at bedside. Discussed now that heart rate has improved, we can proceed with ERCP. LFTs are trending down but MRCP/CT scan show CBD full of stones. Unlikely he passed all stones. He remains on Zosyn and tolerating full liquids.    Patient more awake and interactive. Denies abdominal pain. Discussed why we are unable to advance his diet until AFTER ERCP. He is tentatively schedule for lap harjinder for Friday. He expressed understanding. Questions answered.     Review of Systems  Objective:     Vital Signs (Most Recent):  Temp: 98 °F (36.7 °C) (10/09/24 1637)  Pulse: (!) 43 (10/09/24 1637)  Resp: 17 (10/09/24 1637)  BP: 130/60 (10/09/24 1637)  SpO2: 97 % (10/09/24 1637) Vital Signs (24h Range):  Temp:  [97.4 °F (36.3 °C)-98.3 °F (36.8 °C)] 98 °F (36.7 °C)  Pulse:  [39-52] 43  Resp:  [16-20] 17  SpO2:  [94 %-98 %] 97 %  BP: (102-153)/(52-70) 130/60     Weight: 64 kg (141 lb) (10/09/24 1239)  Body mass index is 20.23 kg/m².    No intake or output data in the 24 hours ending 10/09/24 1828    Lines/Drains/Airways       Peripheral Intravenous Line  Duration                  Peripheral IV - Single Lumen 10/07/24 0657 24 G Left;Posterior Forearm 2 days                     Physical Exam  Vitals reviewed.   Constitutional:       Appearance: He is underweight. He is ill-appearing.   Abdominal:      General: Abdomen is scaphoid. Bowel sounds are normal. There is no distension.      Tenderness: There is no abdominal tenderness. There is no guarding  "or rebound.   Neurological:      Mental Status: He is alert.   Psychiatric:         Behavior: Behavior is cooperative.        Significant Labs:  CBC:   Recent Labs   Lab 10/08/24  0504 10/09/24  0530   WBC 4.02 3.61*   HGB 11.4* 11.9*   HCT 35.0* 36.3*   PLT 88* 94*     CMP:   Recent Labs   Lab 10/09/24  0530      CALCIUM 8.6*   ALBUMIN 2.8*   PROT 6.1      K 4.1   CO2 25      BUN 25*   CREATININE 1.7*   ALKPHOS 159*   ALT 79*   AST 37   BILITOT 1.4*     Coagulation: No results for input(s): "PT", "INR", "APTT" in the last 48 hours.      Significant Imaging:  Imaging results within the past 24 hours have been reviewed.  Assessment/Plan:     Cardiac/Vascular  Bradycardia  Asymptomatic  HR 30 pre-ERCP, procedure cancelled 10/8/24  CV following    PAF (paroxysmal atrial fibrillation)  On Eliquis till 10/5/24, AM dose given  Hx CVA  Bradycardic, asymptomatic    GI  * Calculus of bile duct without cholecystitis with obstruction  LFTs consistent with biliary obstruction  IV Zosyn  US shows gallstones and mild intrahepatic dilation   MRCP confirmed choledocholithiasis  ERCP cancelled upon arrival due to bradycardia  Bradycardia addressed by CV  Abdominal pain free  Seen by CV      Recommendations:  Full liquids till MN  NPO at MN  ERCP at [facility] tomorrow 10/9/24  Lap harjinder 10/10/24       Communicated with advance GI, HM and surgery services today to coordinate care. I will follow-up with patient. Please contact us if you have any additional questions.      Nevaeh Mcconnell MD  Gastroenterology  [facility]  "

## 2024-10-09 NOTE — SUBJECTIVE & OBJECTIVE
Past Medical History:   Diagnosis Date    Acute blood loss anemia 10/14/2019    Alzheimer's dementia     Aneurysm, abdominal aortic     BCC (basal cell carcinoma of skin) 06/29/2023    Chronic diastolic heart failure 05/20/2024    Coronary artery disease     Depression     Diabetes mellitus     HLD (hyperlipidemia)     Hypertension     Kidney stone     PAD (peripheral artery disease)     PAF (paroxysmal atrial fibrillation) 01/24/2023    Tobacco abuse        Past Surgical History:   Procedure Laterality Date    APPENDECTOMY      CORONARY STENT PLACEMENT      x 4    ESOPHAGOGASTRODUODENOSCOPY N/A 10/14/2019    Procedure: EGD (ESOPHAGOGASTRODUODENOSCOPY);  Surgeon: Carlos Mcneal III, MD;  Location: [facility];  Service: Endoscopy;  Laterality: N/A;    ESOPHAGOGASTRODUODENOSCOPY N/A 10/15/2019    Procedure: EGD (ESOPHAGOGASTRODUODENOSCOPY);  Surgeon: Carlos Mcneal III, MD;  Location: [facility];  Service: Endoscopy;  Laterality: N/A;    ESOPHAGOGASTRODUODENOSCOPY N/A 4/17/2023    Procedure: EGD (ESOPHAGOGASTRODUODENOSCOPY);  Surgeon: Nevaeh Mcconnell MD;  Location: [facility];  Service: Endoscopy;  Laterality: N/A;    LEFT HEART CATHETERIZATION Left 10/11/2019    Procedure: CATHETERIZATION, HEART, LEFT;  Surgeon: Robe Gilbert MD;  Location: [facility] CATH LAB;  Service: Cardiology;  Laterality: Left;    LEFT HEART CATHETERIZATION Left 10/13/2019    Procedure: CATHETERIZATION, HEART, LEFT;  Surgeon: Robe Gilbert MD;  Location: [facility] CATH LAB;  Service: Cardiology;  Laterality: Left;    leg stent Left        Review of patient's allergies indicates:   Allergen Reactions    Metoprolol Other (See Comments)     Junctional rhythm         No current facility-administered medications on file prior to encounter.     Current Outpatient Medications on File Prior to Encounter   Medication Sig    alcohol swabs (ALCOHOL PREP PADS) PadM Twice a day    aspirin (ECOTRIN) 81 MG EC tablet Take 1 tablet (81 mg total) by mouth once  daily.    blood sugar diagnostic Strp Test blood sugars twice a day    donepeziL (ARICEPT) 5 MG tablet Take 1 tablet (5 mg total) by mouth every evening.    gabapentin (NEURONTIN) 100 MG capsule Take 1 capsule (100 mg total) by mouth 2 (two) times daily as needed. PRN    isosorbide mononitrate (IMDUR) 120 MG 24 hr tablet TAKE 1 TABLET BY MOUTH EVERY DAY    lancets (ACCU-CHEK SOFTCLIX LANCETS) Misc Test blood sugars twice a day    memantine (NAMENDA) 10 MG Tab Take 1 tablet (10 mg total) by mouth 2 (two) times daily.    metFORMIN (GLUCOPHAGE) 500 MG tablet TAKE 1 TABLET BY MOUTH EVERY DAY WITH BREAKFAST    pantoprazole (PROTONIX) 40 MG tablet Take 1 tablet (40 mg total) by mouth once daily.    valsartan (DIOVAN) 160 MG tablet Take 1 tablet (160 mg total) by mouth once daily.     Family History       Problem Relation (Age of Onset)    COPD Father    Diabetes Mother, Brother          Tobacco Use    Smoking status: Former     Types: Cigars     Passive exposure: Never    Smokeless tobacco: Current   Substance and Sexual Activity    Alcohol use: Not Currently    Drug use: Not Currently    Sexual activity: Not Currently     Partners: Female     Review of Systems   Reason unable to perform ROS: AMS.     Objective:     Vital Signs (Most Recent):  Temp: 97.6 °F (36.4 °C) (10/09/24 0718)  Pulse: (!) 43 (10/09/24 0718)  Resp: 19 (10/09/24 0718)  BP: (!) 153/70 (10/09/24 0718)  SpO2: (!) 94 % (10/09/24 0833) Vital Signs (24h Range):  Temp:  [97.3 °F (36.3 °C)-98.3 °F (36.8 °C)] 97.6 °F (36.4 °C)  Pulse:  [37-55] 43  Resp:  [16-20] 19  SpO2:  [91 %-99 %] 94 %  BP: (102-153)/(52-70) 153/70     Weight: 66.7 kg (147 lb 0.8 oz)  Body mass index is 21.1 kg/m².    SpO2: (!) 94 %       No intake or output data in the 24 hours ending 10/09/24 0925    Lines/Drains/Airways       Peripheral Intravenous Line  Duration                  Peripheral IV - Single Lumen 10/07/24 0657 24 G Left;Posterior Forearm 2 days                     Physical  "Exam  Vitals and nursing note reviewed.   Constitutional:       Appearance: Normal appearance.   HENT:      Head: Normocephalic and atraumatic.   Eyes:      General:         Right eye: No discharge.         Left eye: No discharge.      Pupils: Pupils are equal, round, and reactive to light.   Cardiovascular:      Rate and Rhythm: Regular rhythm. Bradycardia present.      Heart sounds: S1 normal and S2 normal. No murmur heard.     No friction rub.   Pulmonary:      Effort: Pulmonary effort is normal. No respiratory distress.      Breath sounds: Normal breath sounds. No rales.   Abdominal:      Palpations: Abdomen is soft.      Tenderness: There is no abdominal tenderness.   Musculoskeletal:      Cervical back: Neck supple.      Right lower leg: No edema.      Left lower leg: No edema.   Skin:     General: Skin is warm and dry.   Neurological:      Mental Status: He is alert. Mental status is at baseline.   Psychiatric:         Mood and Affect: Mood normal.          Significant Labs: BMP:   Recent Labs   Lab 10/08/24  0504 10/09/24  0530   * 107    142   K 3.9 4.1    107   CO2 22* 25   BUN 34* 25*   CREATININE 2.0* 1.7*   CALCIUM 8.7 8.6*   MG  --  2.0   , CMP   Recent Labs   Lab 10/08/24  0504 10/09/24  0530    142   K 3.9 4.1    107   CO2 22* 25   * 107   BUN 34* 25*   CREATININE 2.0* 1.7*   CALCIUM 8.7 8.6*   PROT 5.8* 6.1   ALBUMIN 2.7* 2.8*   BILITOT 1.9* 1.4*   ALKPHOS 174* 159*   AST 65* 37   * 79*   ANIONGAP 11 10   , CBC   Recent Labs   Lab 10/08/24  0504 10/09/24  0530   WBC 4.02 3.61*   HGB 11.4* 11.9*   HCT 35.0* 36.3*   PLT 88* 94*   , INR No results for input(s): "INR", "PROTIME" in the last 48 hours., Lipid Panel No results for input(s): "CHOL", "HDL", "LDLCALC", "TRIG", "CHOLHDL" in the last 48 hours., Troponin No results for input(s): "TROPONINI" in the last 48 hours., and All pertinent lab results from the last 24 hours have been reviewed.    Significant " Imaging: Cardiac Cath: reviewed, Echocardiogram: Transthoracic echo (TTE) complete (Cupid Only):   Results for orders placed or performed during the hospital encounter of 06/13/22   Echo   Result Value Ref Range    BSA 1.78 m2    TDI SEPTAL 0.07 m/s    LV LATERAL E/E' RATIO 10.33 m/s    LV SEPTAL E/E' RATIO 13.29 m/s    LA WIDTH 4.30 cm    IVC diameter 2.17 cm    Left Ventricular Outflow Tract Mean Velocity 0.38896744153 cm/s    Left Ventricular Outflow Tract Mean Gradient 1.21 mmHg    TDI LATERAL 0.09 m/s    LVIDd 4.08 3.5 - 6.0 cm    IVS 1.72 (A) 0.6 - 1.1 cm    PW 1.79 (A) 0.6 - 1.1 cm    Ao root annulus 3.38 cm    LVIDs 3.08 2.1 - 4.0 cm    FS 25 28 - 44 %    LA Vol 73.29 cm3    Sinus 3.71 cm    STJ 2.63 cm    Ascending aorta 3.51 cm    LV mass 307.88 g    LA size 3.70 cm    TAPSE 1.81 cm    Left Ventricle Relative Wall Thickness 0.88 cm    AV regurgitation pressure 1/2 time 969.245348286789129 ms    AV mean gradient 7 mmHg    AV valve area 2.78 cm2    AV Velocity Ratio 0.44     AV index (prosthetic) 0.48     MV valve area p 1/2 method 2.93 cm2    E/A ratio 1.19     Mean e' 0.08 m/s    E wave deceleration time 258.800287643242527 msec    IVRT 79.508497130499861 msec    LVOT diameter 2.72 cm    LVOT area 5.8 cm2    LVOT peak americo 0.83 m/s    LVOT peak VTI 22.10 cm    Ao peak americo 1.89 m/s    Ao VTI 46.1 cm    RVOT peak americo 0.64 m/s    RVOT peak VTI 19.0 cm    Mr max americo 5.35 m/s    LVOT stroke volume 128.35 cm3    AV peak gradient 14 mmHg    PV mean gradient 1.11 mmHg    E/E' ratio 11.63 m/s    MV Peak E Americo 0.93 m/s    AR Max Americo 3.02 m/s    TR Max Americo 3.16 m/s    MV stenosis pressure 1/2 time 75.624330892668056 ms    MV Peak A Americo 0.78 m/s    LV Systolic Volume 37.21 mL    LV Systolic Volume Index 20.7 mL/m2    LV Diastolic Volume 73.29 mL    LV Diastolic Volume Index 40.72 mL/m2    LURDES 40.7 mL/m2    LV Mass Index 171 g/m2    RA Major Axis 5.20 cm    Left Atrium Minor Axis 5.35 cm    Left Atrium Major Axis 5.49  cm    Triscuspid Valve Regurgitation Peak Gradient 40 mmHg    RA Width 3.44 cm    EF 55 %    Narrative    · The left ventricle is normal in size with severe concentric hypertrophy   and normal systolic function.  · Mild left atrial enlargement.  · The estimated ejection fraction is 55%.  · Grade II left ventricular diastolic dysfunction.  · Normal right ventricular size with normal right ventricular systolic   function.      , EKG: reviewed, Stress Test: reviewed, and X-Ray: CXR: X-Ray Chest 1 View (CXR): No results found for this visit on 10/05/24.

## 2024-10-09 NOTE — SUBJECTIVE & OBJECTIVE
Subjective:     Interval History: seen this afternoon. His wife Beatrice is at bedside. Discussed now that heart rate has improved, we can proceed with ERCP. LFTs are trending down but MRCP/CT scan show CBD full of stones. Unlikely he passed all stones. He remains on Zosyn and tolerating full liquids.    Patient more awake and interactive. Denies abdominal pain. Discussed why we are unable to advance his diet until AFTER ERCP. He is tentatively schedule for lap harjinder for Friday. He expressed understanding. Questions answered.     Review of Systems  Objective:     Vital Signs (Most Recent):  Temp: 98 °F (36.7 °C) (10/09/24 1637)  Pulse: (!) 43 (10/09/24 1637)  Resp: 17 (10/09/24 1637)  BP: 130/60 (10/09/24 1637)  SpO2: 97 % (10/09/24 1637) Vital Signs (24h Range):  Temp:  [97.4 °F (36.3 °C)-98.3 °F (36.8 °C)] 98 °F (36.7 °C)  Pulse:  [39-52] 43  Resp:  [16-20] 17  SpO2:  [94 %-98 %] 97 %  BP: (102-153)/(52-70) 130/60     Weight: 64 kg (141 lb) (10/09/24 1239)  Body mass index is 20.23 kg/m².    No intake or output data in the 24 hours ending 10/09/24 1828    Lines/Drains/Airways       Peripheral Intravenous Line  Duration                  Peripheral IV - Single Lumen 10/07/24 0657 24 G Left;Posterior Forearm 2 days                     Physical Exam  Vitals reviewed.   Constitutional:       Appearance: He is underweight. He is ill-appearing.   Abdominal:      General: Abdomen is scaphoid. Bowel sounds are normal. There is no distension.      Tenderness: There is no abdominal tenderness. There is no guarding or rebound.   Neurological:      Mental Status: He is alert.   Psychiatric:         Behavior: Behavior is cooperative.        Significant Labs:  CBC:   Recent Labs   Lab 10/08/24  0504 10/09/24  0530   WBC 4.02 3.61*   HGB 11.4* 11.9*   HCT 35.0* 36.3*   PLT 88* 94*     CMP:   Recent Labs   Lab 10/09/24  0530      CALCIUM 8.6*   ALBUMIN 2.8*   PROT 6.1      K 4.1   CO2 25      BUN 25*  "  CREATININE 1.7*   ALKPHOS 159*   ALT 79*   AST 37   BILITOT 1.4*     Coagulation: No results for input(s): "PT", "INR", "APTT" in the last 48 hours.      Significant Imaging:  Imaging results within the past 24 hours have been reviewed.  "

## 2024-10-09 NOTE — PROGRESS NOTES
[facility] Medicine  Progress Note    Patient Name: Aquiles Yates  MRN: [id number]  Patient Class: IP- Inpatient   Admission Date: 10/5/2024  Length of Stay: 3 days  Attending Physician: Mar Granados MD  Primary Care Provider: Holger Thornton MD        Subjective:     Principal Problem:Calculus of bile duct without cholecystitis with obstruction        HPI:  Aquiles Yates is a 84 y.o. male with a PMH  has a past medical history of Acute blood loss anemia (10/14/2019), Alzheimer's dementia, Aneurysm, abdominal aortic, BCC (basal cell carcinoma of skin) (06/29/2023), Chronic diastolic heart failure (05/20/2024), Coronary artery disease, Depression, Diabetes mellitus, HLD (hyperlipidemia), Hypertension, Kidney stone, PAD (peripheral artery disease), PAF (paroxysmal atrial fibrillation) (01/24/2023), and Tobacco abuse.presented to the Emergency Department for evaluation of tremors and mottled skin per wife. Wife called the paramedics when she noticed patient shaking and patient's mottled skin today. Pt has a history of Alzheimer's and paramedics report a GCS of 14. Upon interview, pt denies any pain and is oriented to self. No further complaints or concerns at this time.       ER workup revealed CBC to be unremarkable.  CMP revealed CBG of 202 mg/dL, , total bili of 2.6, and AST/ALT of 242/179.  .  Troponin negative.  Lactic acid 2.8.  Flu COVID negative.  UA unremarkable.  CTA abdomen and pelvis without contrast revealed no acute findings.  Chest x-ray negative for acute cardiopulmonary findings.  Ultrasound limited revealed cholelithiasis without definitive sonographic evidence of acute cholecystitis.  Mild intrahepatic biliary duct dilation.  Vital signs stable. EKG revealed sinus Nicholas with occasional PVCs with a ventricular rate of 56 beats per minute and a QT/QTC of 408/393.  Patient received 4.5 g Zosyn in ED. GI consulted.  In agreement with  MRCP and will see us consult in a.m..  Patient in agreement with treatment plan.  Patient will be admitted under inpatient status.    PCP: Holger Thornton      Overview/Hospital Course:  Patient is an 84-year-old male with a history of anemia, Alzheimer's dementia, aneurysm, chronic diastolic failure, coronary artery disease diabetes mellitus, hypertension, PAF who presented emergency department with tremors.  Patient's wife said she was he was shaking and had mottled skin.  Patient denied any complaints.  In the emergency department workup revealed glucose of 202, alk-phos of 387, total bili of 2.6, AST of 242 ALT of 179, BNP of 323, lactic acid of 2.4.  CT scan of abdomen pelvis without contrast revealed no acute findings, ultrasound revealed cholelithiasis with ductal dilatation.  Patient was admitted with diagnosis of acute cholecystitis choledocholithiasis.  He was started on Zosyn.  GI was consulted MRCP was ordered with plans for ERCP in [place].  Patient's heart rate was in the 40s yesterday EKG revealed sinus bradycardia.  He went to [place] today for ERCP however heart rate was less than 40 therefore he was sent back for cardiology evaluation.  Patient denies any pain.    Cardiology saw patient with recommendations to stop his Aricept as it was make be contributing to his bradycardia.  Patient was tolerating diet and LFTs have improved.    Interval History:  Patient seen and examined at bedside.  Feeling better.  No abdominal pain.    Review of Systems   Constitutional:  Positive for appetite change (Hungry). Negative for fatigue.   Respiratory:  Negative for shortness of breath.    Cardiovascular:  Negative for chest pain, palpitations and leg swelling.   Gastrointestinal:  Negative for abdominal pain.   Neurological:  Positive for weakness.   All other systems reviewed and are negative.    Objective:     Vital Signs (Most Recent):  Temp: 97.5 °F (36.4 °C) (10/09/24 1239)  Pulse: (!) 44  (10/09/24 1239)  Resp: 16 (10/09/24 1239)  BP: (!) 132/56 (10/09/24 1239)  SpO2: 96 % (10/09/24 1239) Vital Signs (24h Range):  Temp:  [97.4 °F (36.3 °C)-98.3 °F (36.8 °C)] 97.5 °F (36.4 °C)  Pulse:  [37-55] 44  Resp:  [16-20] 16  SpO2:  [91 %-99 %] 96 %  BP: (102-153)/(52-70) 132/56     Weight: 66.7 kg (147 lb 0.8 oz)  Body mass index is 21.1 kg/m².  No intake or output data in the 24 hours ending 10/09/24 1313      Physical Exam  Vitals and nursing note reviewed.   Constitutional:       Appearance: He is ill-appearing.   Cardiovascular:      Rate and Rhythm: Regular rhythm. Bradycardia present.      Heart sounds: Heart sounds are distant.   Pulmonary:      Effort: Pulmonary effort is normal.      Breath sounds: Normal breath sounds.   Abdominal:      General: Abdomen is scaphoid. Bowel sounds are normal. There is no distension.      Palpations: Abdomen is soft.      Tenderness: There is no abdominal tenderness. There is no guarding or rebound.   Neurological:      Mental Status: He is alert.      Motor: Weakness present.   Psychiatric:         Attention and Perception: He is inattentive.         Mood and Affect: Affect is flat.         Cognition and Memory: Cognition is impaired. Memory is impaired.             Significant Labs: All pertinent labs within the past 24 hours have been reviewed.  CBC:   Recent Labs   Lab 10/08/24  0504 10/09/24  0530   WBC 4.02 3.61*   HGB 11.4* 11.9*   HCT 35.0* 36.3*   PLT 88* 94*     CMP:   Recent Labs   Lab 10/08/24  0504 10/09/24  0530    142   K 3.9 4.1    107   CO2 22* 25   * 107   BUN 34* 25*   CREATININE 2.0* 1.7*   CALCIUM 8.7 8.6*   PROT 5.8* 6.1   ALBUMIN 2.7* 2.8*   BILITOT 1.9* 1.4*   ALKPHOS 174* 159*   AST 65* 37   * 79*   ANIONGAP 11 10       Significant Imaging: I have reviewed all pertinent imaging results/findings within the past 24 hours.    Assessment/Plan:      * Calculus of bile duct without cholecystitis with obstruction  GI  consulted. Recommends MRCP. Initiated on Zosyn.  Patient went to [place] for ERCP however due to bradycardia patient was returned cardiology consult requested.  Plan:  -clear liquids advance as tolerated  -Hold anticoagulants  -IVFs  -Continue Zosyn  -Analgesics prn  -Repeat labs in am  -GI consult appreciated      Chronic diastolic heart failure  Patient is identified as having Diastolic (HFpEF) heart failure that is Chronic. CHF is currently controlled. Latest ECHO performed and demonstrates- Results for orders placed during the hospital encounter of 06/13/22    Echo    Interpretation Summary  · The left ventricle is normal in size with severe concentric hypertrophy and normal systolic function.  · Mild left atrial enlargement.  · The estimated ejection fraction is 55%.  · Grade II left ventricular diastolic dysfunction.  · Normal right ventricular size with normal right ventricular systolic function.  .   monitor clinical status closely. Monitor on telemetry. Patient is off CHF pathway.  Monitor strict Is&Os and daily weights.  Place on fluid restriction of 2 L. Continue to stress to patient importance of self efficacy and  on diet for CHF. Last BNP reviewed- and noted below   Recent Labs   Lab 10/05/24  2048   *     .            Dementia  Patient with dementia with likely etiology of alzheimer's dementia. Dementia is moderate. The patient does not have signs of behavioral disturbance. Home dementia medications are held.  Continue non-pharmacologic interventions to prevent delirium (No VS between 11PM-5AM, activity during day, opening blinds, providing glasses/hearing aids, and up in chair during daytime). Will avoid narcotics and benzos unless absolutely necessary. PRN anti-psychotics are not prescribed to avoid self harm behaviors.    AVM (arteriovenous malformation) of stomach, acquired with hemorrhage  Patient's hemorrhage is due to gastrointestinal bleed, patient does not have a propensity  for bleeding.. Patients most recent Hgb, Hct, platelets, and INR are listed below.  Recent Labs     10/07/24  0502 10/08/24  0504 10/09/24  0530   HGB 10.9* 11.4* 11.9*   HCT 32.8* 35.0* 36.3*   PLT 99* 88* 94*       Plan  - Will trend hemoglobin/hematocrit Daily  - Will monitor and correct any coagulation defects  - Will transfuse if Hgb is <7g/dl (<8g/dl in cases of active ACS) or if patient has rapid bleeding leading to hemodynamic instability  - STABLE    PAF (paroxysmal atrial fibrillation)  Patient has paroxysmal (<7 days) atrial fibrillation. Patient is currently in  sinus Nicholas . [id number] Score: 4. The patients heart rate in the last 24 hours is as follows:  Pulse  Min: 37  Max: 55     Antiarrhythmics   None    Anticoagulants   No anticoagulants secondary to need procedure    Plan  - Replete lytes with a goal of K>4, Mg >2    - Patient's afib is currently controlled    Bradycardia  Seen by Cardiology who recommended stopping his Aricept.  This has been done heart rate has improved.      Type 2 diabetes mellitus with other specified complication, unspecified whether long term insulin use  Patient's FSGs are uncontrolled due to hyperglycemia on current medication regimen.  Last A1c reviewed-   Lab Results   Component Value Date    HGBA1C 7.0 (H) 05/07/2024     Most recent fingerstick glucose reviewed-   Recent Labs   Lab 10/08/24  1039   POCTGLUCOSE 104       Current correctional scale  Low  Maintain anti-hyperglycemic dose as follows-   Antihyperglycemics (From admission, onward)      Start     Stop Route Frequency Ordered    10/06/24 0146  insulin aspart U-100 pen 0-5 Units         -- SubQ Before meals & nightly PRN 10/06/24 0048          Plan:  -SSI  -A1c  -Accu-checks  -Hold oral hypoglycemics while patient is in the hospital  -Continue home long-acting insulin at 25-50% decrease, titrate up as needed  -Hypoglycemic protocol          Dyslipidemia  Patient is chronically on statin.will not continue for  now. Last Lipid Panel:   Lab Results   Component Value Date    CHOL 150 05/07/2024    HDL 36 (L) 05/07/2024    LDLCALC 94.2 05/07/2024    TRIG 99 05/07/2024    CHOLHDL 24.0 05/07/2024     Plan:  -Hold home medication due to elevated LFTs  -low fat/low calorie diet        Essential hypertension  Chronic, controlled.  Latest blood pressure and vitals reviewed-   Temp:  [97.4 °F (36.3 °C)-98.3 °F (36.8 °C)]   Pulse:  [37-55]   Resp:  [16-20]   BP: (102-153)/(52-70)   SpO2:  [91 %-99 %] .   Home meds for hypertension were reviewed and noted below.   Hypertension Medications               isosorbide mononitrate (IMDUR) 120 MG 24 hr tablet TAKE 1 TABLET BY MOUTH EVERY DAY    valsartan (DIOVAN) 160 MG tablet Take 1 tablet (160 mg total) by mouth once daily.            While in the hospital, will manage blood pressure as follows, hold valsartan    Will utilize p.r.n. blood pressure medication only if patient's blood pressure greater than  180/110 and he develops symptoms such as worsening chest pain or shortness of breath.      Coronary artery disease of native artery of native heart with stable angina pectoris  Patient with known CAD s/p stent placement, which is controlled. and monitor for S/Sx of angina/ACS. Continue to monitor on telemetry.       VTE Risk Mitigation (From admission, onward)           Ordered     Reason for No Pharmacological VTE Prophylaxis  Once        Question:  Reasons:  Answer:  Physician Provided (leave comment)  Comment:  Pending potential surgical intervention    10/06/24 0048     IP VTE HIGH RISK PATIENT  Once         10/06/24 0048     Place sequential compression device  Until discontinued         10/06/24 0048                    Discharge Planning   ZEINA:      Code Status: Prior   Is the patient medically ready for discharge?:     Reason for patient still in hospital (select all that apply): Patient trending condition, Laboratory test, Treatment, and Consult recommendations  Discharge Plan A:  Home with family                  Mar Cheney MD  Department of Hospital Medicine   [facility] - Med Surg

## 2024-10-09 NOTE — ASSESSMENT & PLAN NOTE
GI consulted. Recommends MRCP. Initiated on Zosyn.  Patient went to [place] for ERCP however due to bradycardia patient was returned cardiology consult requested.  Plan:  -clear liquids advance as tolerated  -Hold anticoagulants  -IVFs  -Continue Zosyn  -Analgesics prn  -Repeat labs in am  -GI consult appreciated

## 2024-10-09 NOTE — PLAN OF CARE
10/09/24 0832   Rounds   Attendance Provider;;Charge nurse;Physical therapist   Discharge Plan A Home with family   Why the patient remains in the hospital Requires continued medical care   Transition of Care Barriers None

## 2024-10-09 NOTE — HPI
Aquiles Yates is a 84 y.o. male with a PMH  has a past medical history of Acute blood loss anemia (10/14/2019), Alzheimer's dementia, Aneurysm, abdominal aortic, BCC (basal cell carcinoma of skin) (06/29/2023), Chronic diastolic heart failure (05/20/2024), Coronary artery disease, Depression, Diabetes mellitus, HLD (hyperlipidemia), Hypertension, Kidney stone, PAD (peripheral artery disease), PAF (paroxysmal atrial fibrillation) (01/24/2023), and Tobacco abuse.presented to the Emergency Department for evaluation of tremors and mottled skin per wife. Wife called the paramedics when she noticed patient shaking and patient's mottled skin today. Pt has a history of Alzheimer's and paramedics report a GCS of 14. Upon interview, pt denies any pain and is oriented to self. No further complaints or concerns at this time.      ER workup revealed CBC to be unremarkable.  CMP revealed CBG of 202 mg/dL, , total bili of 2.6, and AST/ALT of 242/179.  .  Troponin negative.  Lactic acid 2.8.  Flu COVID negative.  UA unremarkable.  CTA abdomen and pelvis without contrast revealed no acute findings.  Chest x-ray negative for acute cardiopulmonary findings.  Ultrasound limited revealed cholelithiasis without definitive sonographic evidence of acute cholecystitis.  Mild intrahepatic biliary duct dilation.  Vital signs stable. EKG revealed sinus Nicholas with occasional PVCs with a ventricular rate of 56 beats per minute and a QT/QTC of 408/393.  Patient received 4.5 g Zosyn in ED. GI consulted.  In agreement with MRCP and will see us consult in a.m..  Patient in agreement with treatment plan.  Patient will be admitted under inpatient status    Cardiology consulted, pt needing possible cholecystectomy and sx is requesting clearance given on going bradycardia. Pt seen and examined today, tele reviewed, HR 30-40s. Confused on exam, h/o dementia.  Labs reviewed, Chart reviewed. Echo in 22' with nml EF, repeat  pending

## 2024-10-09 NOTE — SUBJECTIVE & OBJECTIVE
Interval History:  Patient seen and examined at bedside.  Feeling better.  No abdominal pain.    Review of Systems   Constitutional:  Positive for appetite change (Hungry). Negative for fatigue.   Respiratory:  Negative for shortness of breath.    Cardiovascular:  Negative for chest pain, palpitations and leg swelling.   Gastrointestinal:  Negative for abdominal pain.   Neurological:  Positive for weakness.   All other systems reviewed and are negative.    Objective:     Vital Signs (Most Recent):  Temp: 97.5 °F (36.4 °C) (10/09/24 1239)  Pulse: (!) 44 (10/09/24 1239)  Resp: 16 (10/09/24 1239)  BP: (!) 132/56 (10/09/24 1239)  SpO2: 96 % (10/09/24 1239) Vital Signs (24h Range):  Temp:  [97.4 °F (36.3 °C)-98.3 °F (36.8 °C)] 97.5 °F (36.4 °C)  Pulse:  [37-55] 44  Resp:  [16-20] 16  SpO2:  [91 %-99 %] 96 %  BP: (102-153)/(52-70) 132/56     Weight: 66.7 kg (147 lb 0.8 oz)  Body mass index is 21.1 kg/m².  No intake or output data in the 24 hours ending 10/09/24 1313      Physical Exam  Vitals and nursing note reviewed.   Constitutional:       Appearance: He is ill-appearing.   Cardiovascular:      Rate and Rhythm: Regular rhythm. Bradycardia present.      Heart sounds: Heart sounds are distant.   Pulmonary:      Effort: Pulmonary effort is normal.      Breath sounds: Normal breath sounds.   Abdominal:      General: Abdomen is scaphoid. Bowel sounds are normal. There is no distension.      Palpations: Abdomen is soft.      Tenderness: There is no abdominal tenderness. There is no guarding or rebound.   Neurological:      Mental Status: He is alert.      Motor: Weakness present.   Psychiatric:         Attention and Perception: He is inattentive.         Mood and Affect: Affect is flat.         Cognition and Memory: Cognition is impaired. Memory is impaired.             Significant Labs: All pertinent labs within the past 24 hours have been reviewed.  CBC:   Recent Labs   Lab 10/08/24  0504 10/09/24  0530   WBC 4.02 3.61*    HGB 11.4* 11.9*   HCT 35.0* 36.3*   PLT 88* 94*     CMP:   Recent Labs   Lab 10/08/24  0504 10/09/24  0530    142   K 3.9 4.1    107   CO2 22* 25   * 107   BUN 34* 25*   CREATININE 2.0* 1.7*   CALCIUM 8.7 8.6*   PROT 5.8* 6.1   ALBUMIN 2.7* 2.8*   BILITOT 1.9* 1.4*   ALKPHOS 174* 159*   AST 65* 37   * 79*   ANIONGAP 11 10       Significant Imaging: I have reviewed all pertinent imaging results/findings within the past 24 hours.

## 2024-10-09 NOTE — ASSESSMENT & PLAN NOTE
Patient has paroxysmal (<7 days) atrial fibrillation. Patient is currently in  sinus Nicholas . [id number] Score: 4. The patients heart rate in the last 24 hours is as follows:  Pulse  Min: 37  Max: 55     Antiarrhythmics   None    Anticoagulants   No anticoagulants secondary to need procedure    Plan  - Replete lytes with a goal of K>4, Mg >2    - Patient's afib is currently controlled

## 2024-10-09 NOTE — NURSING
Patient remained bradycardic throughout shift. HR was a little higher than previous night shift. Ranged between high 30s to mid 50s. Patient nonsymptomatic

## 2024-10-09 NOTE — PLAN OF CARE
Discussed poc with pt, verbalized understanding  AAOx4     Purposeful rounding Q2H  IVFs continued  Cardiac monitoring in place     No harm or injuries this shift  No pain  Abx given     Chart check complete  Questions answered in regards to ERCP possibility

## 2024-10-09 NOTE — CARE UPDATE
ERCP cancelled this afternoon after HR 30 once patient arrived at [facility]. Patient asymptomatic. Discussed with Dr. Granados. Labs were trending down according to this morning's labs. Will recheck LFTs in AM. Patient has asked to eat. Diet order placed by  service.

## 2024-10-09 NOTE — ASSESSMENT & PLAN NOTE
Patient's hemorrhage is due to gastrointestinal bleed, patient does not have a propensity for bleeding.. Patients most recent Hgb, Hct, platelets, and INR are listed below.  Recent Labs     10/07/24  0502 10/08/24  0504 10/09/24  0530   HGB 10.9* 11.4* 11.9*   HCT 32.8* 35.0* 36.3*   PLT 99* 88* 94*       Plan  - Will trend hemoglobin/hematocrit Daily  - Will monitor and correct any coagulation defects  - Will transfuse if Hgb is <7g/dl (<8g/dl in cases of active ACS) or if patient has rapid bleeding leading to hemodynamic instability  - STABLE

## 2024-10-09 NOTE — H&P (VIEW-ONLY)
[facility] S[facility]  Cardiology  Consult Note    Patient Name: Aquiles Yates  MRN: [id number]  Admission Date: 10/5/2024  Hospital Length of Stay: 3 days  Code Status: Prior   Attending Provider: Mar Granados MD   Consulting Provider: Aurelia Deal NP  Primary Care Physician: Holger Thornton MD  Principal Problem:Calculus of bile duct without cholecystitis with obstruction    Patient information was obtained from patient and ER records.     Inpatient consult to Cardiology  Consult performed by: Aurelia Deal NP  Consult ordered by: Mar Granados MD        Subjective:     Chief Complaint:  tremors     HPI:   Aquiles Yates is a 84 y.o. male with a PMH  has a past medical history of Acute blood loss anemia (10/14/2019), Alzheimer's dementia, Aneurysm, abdominal aortic, BCC (basal cell carcinoma of skin) (06/29/2023), Chronic diastolic heart failure (05/20/2024), Coronary artery disease, Depression, Diabetes mellitus, HLD (hyperlipidemia), Hypertension, Kidney stone, PAD (peripheral artery disease), PAF (paroxysmal atrial fibrillation) (01/24/2023), and Tobacco abuse.presented to the Emergency Department for evaluation of tremors and mottled skin per wife. Wife called the paramedics when she noticed patient shaking and patient's mottled skin today. Pt has a history of Alzheimer's and paramedics report a GCS of 14. Upon interview, pt denies any pain and is oriented to self. No further complaints or concerns at this time.      ER workup revealed CBC to be unremarkable.  CMP revealed CBG of 202 mg/dL, , total bili of 2.6, and AST/ALT of 242/179.  .  Troponin negative.  Lactic acid 2.8.  Flu COVID negative.  UA unremarkable.  CTA abdomen and pelvis without contrast revealed no acute findings.  Chest x-ray negative for acute cardiopulmonary findings.  Ultrasound limited revealed cholelithiasis without definitive sonographic evidence of acute  cholecystitis.  Mild intrahepatic biliary duct dilation.  Vital signs stable. EKG revealed sinus Nicholas with occasional PVCs with a ventricular rate of 56 beats per minute and a QT/QTC of 408/393.  Patient received 4.5 g Zosyn in ED. GI consulted.  In agreement with MRCP and will see us consult in a.m..  Patient in agreement with treatment plan.  Patient will be admitted under inpatient status    Cardiology consulted, pt needing possible cholecystectomy and sx is requesting clearance given on going bradycardia. Pt seen and examined today, tele reviewed, HR 30-40s. Confused on exam, h/o dementia.  Labs reviewed, Chart reviewed. Echo in 22' with nml EF, repeat pending    Past Medical History:   Diagnosis Date    Acute blood loss anemia 10/14/2019    Alzheimer's dementia     Aneurysm, abdominal aortic     BCC (basal cell carcinoma of skin) 06/29/2023    Chronic diastolic heart failure 05/20/2024    Coronary artery disease     Depression     Diabetes mellitus     HLD (hyperlipidemia)     Hypertension     Kidney stone     PAD (peripheral artery disease)     PAF (paroxysmal atrial fibrillation) 01/24/2023    Tobacco abuse        Past Surgical History:   Procedure Laterality Date    APPENDECTOMY      CORONARY STENT PLACEMENT      x 4    ESOPHAGOGASTRODUODENOSCOPY N/A 10/14/2019    Procedure: EGD (ESOPHAGOGASTRODUODENOSCOPY);  Surgeon: Carlos Mcneal III, MD;  Location: [facility];  Service: Endoscopy;  Laterality: N/A;    ESOPHAGOGASTRODUODENOSCOPY N/A 10/15/2019    Procedure: EGD (ESOPHAGOGASTRODUODENOSCOPY);  Surgeon: Carlos Mcneal III, MD;  Location: [facility] ENDO;  Service: Endoscopy;  Laterality: N/A;    ESOPHAGOGASTRODUODENOSCOPY N/A 4/17/2023    Procedure: EGD (ESOPHAGOGASTRODUODENOSCOPY);  Surgeon: Nevaeh Mcconnell MD;  Location: [facility] ENDO;  Service: Endoscopy;  Laterality: N/A;    LEFT HEART CATHETERIZATION Left 10/11/2019    Procedure: CATHETERIZATION, HEART, LEFT;  Surgeon: Robe Gilbert MD;  Location: [facility]  CATH LAB;  Service: Cardiology;  Laterality: Left;    LEFT HEART CATHETERIZATION Left 10/13/2019    Procedure: CATHETERIZATION, HEART, LEFT;  Surgeon: Robe Gilbert MD;  Location: [facility] CATH LAB;  Service: Cardiology;  Laterality: Left;    leg stent Left        Review of patient's allergies indicates:   Allergen Reactions    Metoprolol Other (See Comments)     Junctional rhythm         No current facility-administered medications on file prior to encounter.     Current Outpatient Medications on File Prior to Encounter   Medication Sig    alcohol swabs (ALCOHOL PREP PADS) PadM Twice a day    aspirin (ECOTRIN) 81 MG EC tablet Take 1 tablet (81 mg total) by mouth once daily.    blood sugar diagnostic Strp Test blood sugars twice a day    donepeziL (ARICEPT) 5 MG tablet Take 1 tablet (5 mg total) by mouth every evening.    gabapentin (NEURONTIN) 100 MG capsule Take 1 capsule (100 mg total) by mouth 2 (two) times daily as needed. PRN    isosorbide mononitrate (IMDUR) 120 MG 24 hr tablet TAKE 1 TABLET BY MOUTH EVERY DAY    lancets (ACCU-CHEK SOFTCLIX LANCETS) Misc Test blood sugars twice a day    memantine (NAMENDA) 10 MG Tab Take 1 tablet (10 mg total) by mouth 2 (two) times daily.    metFORMIN (GLUCOPHAGE) 500 MG tablet TAKE 1 TABLET BY MOUTH EVERY DAY WITH BREAKFAST    pantoprazole (PROTONIX) 40 MG tablet Take 1 tablet (40 mg total) by mouth once daily.    valsartan (DIOVAN) 160 MG tablet Take 1 tablet (160 mg total) by mouth once daily.     Family History       Problem Relation (Age of Onset)    COPD Father    Diabetes Mother, Brother          Tobacco Use    Smoking status: Former     Types: Cigars     Passive exposure: Never    Smokeless tobacco: Current   Substance and Sexual Activity    Alcohol use: Not Currently    Drug use: Not Currently    Sexual activity: Not Currently     Partners: Female     Review of Systems   Reason unable to perform ROS: AMS.     Objective:     Vital Signs (Most Recent):  Temp: 97.6 °F  (36.4 °C) (10/09/24 0718)  Pulse: (!) 43 (10/09/24 0718)  Resp: 19 (10/09/24 0718)  BP: (!) 153/70 (10/09/24 0718)  SpO2: (!) 94 % (10/09/24 0833) Vital Signs (24h Range):  Temp:  [97.3 °F (36.3 °C)-98.3 °F (36.8 °C)] 97.6 °F (36.4 °C)  Pulse:  [37-55] 43  Resp:  [16-20] 19  SpO2:  [91 %-99 %] 94 %  BP: (102-153)/(52-70) 153/70     Weight: 66.7 kg (147 lb 0.8 oz)  Body mass index is 21.1 kg/m².    SpO2: (!) 94 %       No intake or output data in the 24 hours ending 10/09/24 0925    Lines/Drains/Airways       Peripheral Intravenous Line  Duration                  Peripheral IV - Single Lumen 10/07/24 0657 24 G Left;Posterior Forearm 2 days                     Physical Exam  Vitals and nursing note reviewed.   Constitutional:       Appearance: Normal appearance.   HENT:      Head: Normocephalic and atraumatic.   Eyes:      General:         Right eye: No discharge.         Left eye: No discharge.      Pupils: Pupils are equal, round, and reactive to light.   Cardiovascular:      Rate and Rhythm: Regular rhythm. Bradycardia present.      Heart sounds: S1 normal and S2 normal. No murmur heard.     No friction rub.   Pulmonary:      Effort: Pulmonary effort is normal. No respiratory distress.      Breath sounds: Normal breath sounds. No rales.   Abdominal:      Palpations: Abdomen is soft.      Tenderness: There is no abdominal tenderness.   Musculoskeletal:      Cervical back: Neck supple.      Right lower leg: No edema.      Left lower leg: No edema.   Skin:     General: Skin is warm and dry.   Neurological:      Mental Status: He is alert. Mental status is at baseline.   Psychiatric:         Mood and Affect: Mood normal.          Significant Labs: BMP:   Recent Labs   Lab 10/08/24  0504 10/09/24  0530   * 107    142   K 3.9 4.1    107   CO2 22* 25   BUN 34* 25*   CREATININE 2.0* 1.7*   CALCIUM 8.7 8.6*   MG  --  2.0   , CMP   Recent Labs   Lab 10/08/24  0504 10/09/24  0530    142   K 3.9 4.1  "   107   CO2 22* 25   * 107   BUN 34* 25*   CREATININE 2.0* 1.7*   CALCIUM 8.7 8.6*   PROT 5.8* 6.1   ALBUMIN 2.7* 2.8*   BILITOT 1.9* 1.4*   ALKPHOS 174* 159*   AST 65* 37   * 79*   ANIONGAP 11 10   , CBC   Recent Labs   Lab 10/08/24  0504 10/09/24  0530   WBC 4.02 3.61*   HGB 11.4* 11.9*   HCT 35.0* 36.3*   PLT 88* 94*   , INR No results for input(s): "INR", "PROTIME" in the last 48 hours., Lipid Panel No results for input(s): "CHOL", "HDL", "LDLCALC", "TRIG", "CHOLHDL" in the last 48 hours., Troponin No results for input(s): "TROPONINI" in the last 48 hours., and All pertinent lab results from the last 24 hours have been reviewed.    Significant Imaging: Cardiac Cath: reviewed, Echocardiogram: Transthoracic echo (TTE) complete (Cupid Only):   Results for orders placed or performed during the hospital encounter of 06/13/22   Echo   Result Value Ref Range    BSA 1.78 m2    TDI SEPTAL 0.07 m/s    LV LATERAL E/E' RATIO 10.33 m/s    LV SEPTAL E/E' RATIO 13.29 m/s    LA WIDTH 4.30 cm    IVC diameter 2.17 cm    Left Ventricular Outflow Tract Mean Velocity 0.37432881433 cm/s    Left Ventricular Outflow Tract Mean Gradient 1.21 mmHg    TDI LATERAL 0.09 m/s    LVIDd 4.08 3.5 - 6.0 cm    IVS 1.72 (A) 0.6 - 1.1 cm    PW 1.79 (A) 0.6 - 1.1 cm    Ao root annulus 3.38 cm    LVIDs 3.08 2.1 - 4.0 cm    FS 25 28 - 44 %    LA Vol 73.29 cm3    Sinus 3.71 cm    STJ 2.63 cm    Ascending aorta 3.51 cm    LV mass 307.88 g    LA size 3.70 cm    TAPSE 1.81 cm    Left Ventricle Relative Wall Thickness 0.88 cm    AV regurgitation pressure 1/2 time 969.752052767460125 ms    AV mean gradient 7 mmHg    AV valve area 2.78 cm2    AV Velocity Ratio 0.44     AV index (prosthetic) 0.48     MV valve area p 1/2 method 2.93 cm2    E/A ratio 1.19     Mean e' 0.08 m/s    E wave deceleration time 258.922401358899210 msec    IVRT 79.984768580861530 msec    LVOT diameter 2.72 cm    LVOT area 5.8 cm2    LVOT peak anel 0.83 m/s    LVOT " peak VTI 22.10 cm    Ao peak americo 1.89 m/s    Ao VTI 46.1 cm    RVOT peak americo 0.64 m/s    RVOT peak VTI 19.0 cm    Mr max americo 5.35 m/s    LVOT stroke volume 128.35 cm3    AV peak gradient 14 mmHg    PV mean gradient 1.11 mmHg    E/E' ratio 11.63 m/s    MV Peak E Americo 0.93 m/s    AR Max Americo 3.02 m/s    TR Max Americo 3.16 m/s    MV stenosis pressure 1/2 time 75.135586108172356 ms    MV Peak A Americo 0.78 m/s    LV Systolic Volume 37.21 mL    LV Systolic Volume Index 20.7 mL/m2    LV Diastolic Volume 73.29 mL    LV Diastolic Volume Index 40.72 mL/m2    LURDES 40.7 mL/m2    LV Mass Index 171 g/m2    RA Major Axis 5.20 cm    Left Atrium Minor Axis 5.35 cm    Left Atrium Major Axis 5.49 cm    Triscuspid Valve Regurgitation Peak Gradient 40 mmHg    RA Width 3.44 cm    EF 55 %    Narrative    · The left ventricle is normal in size with severe concentric hypertrophy   and normal systolic function.  · Mild left atrial enlargement.  · The estimated ejection fraction is 55%.  · Grade II left ventricular diastolic dysfunction.  · Normal right ventricular size with normal right ventricular systolic   function.      , EKG: reviewed, Stress Test: reviewed, and X-Ray: CXR: X-Ray Chest 1 View (CXR): No results found for this visit on 10/05/24.  Assessment and Plan:     Chronic diastolic heart failure    Echo in 22' with nml EF  GDMT diuresis as needed  BB held given bradycardia  ARB held Crt 2.5    PAF (paroxysmal atrial fibrillation)  Low dose eliquis held  Hold AV bria blocking agents given bradycardia    Bradycardia  Bradycardia on tele 30-40s  H/o of some bradycardia  Also is on Aricept for dementia  Recommend holding    Dyslipidemia  LFTs elevated no statin    Essential hypertension  Titrate medications    Coronary artery disease of native artery of native heart with stable angina pectoris  Followed by Dr. Gilbert  First PCI 1998, total of 5 stents with last one in [place] 2013   [facility] Oct 2019 for NSTEMI.  Pt had PCI KYLIE x one to mid LCX  ISR, KYLIE x 2 to OM2, and PCI prox RCA KYLIE x 2, PTCA mid RCA ISR.  Failed PCI distal occluded RCA.  EF 45%.  Nonobstructive LAD disease, small diffusely diseased diagonal vessels.    Cont Imdur        VTE Risk Mitigation (From admission, onward)           Ordered     Reason for No Pharmacological VTE Prophylaxis  Once        Question:  Reasons:  Answer:  Physician Provided (leave comment)  Comment:  Pending potential surgical intervention    10/06/24 0048     IP VTE HIGH RISK PATIENT  Once         10/06/24 0048     Place sequential compression device  Until discontinued         10/06/24 0048                    Thank you for your consult. I will follow-up with patient. Please contact us if you have any additional questions.    Aurelia Deal, NP  Cardiology   [facility] - Med Surg

## 2024-10-09 NOTE — ASSESSMENT & PLAN NOTE
Seen by Cardiology who recommended stopping his Aricept.  This has been done heart rate has improved.

## 2024-10-09 NOTE — SUBJECTIVE & OBJECTIVE
Interval History: Denies abdominal pain, nausea, and vomiting. Tolerating liquids. Tbili continues to downtrend. Still awaiting cardiology input.     Medications:  Continuous Infusions:  Scheduled Meds:   donepeziL  5 mg Oral QHS    isosorbide mononitrate  120 mg Oral Daily    memantine  10 mg Oral BID    pantoprazole  40 mg Oral Daily    piperacillin-tazobactam (Zosyn) IV (PEDS and ADULTS) (extended infusion is not appropriate)  4.5 g Intravenous Q8H     PRN Meds:  Current Facility-Administered Medications:     acetaminophen, 650 mg, Rectal, Q6H PRN    acetaminophen, 650 mg, Oral, Q8H PRN    albuterol-ipratropium, 3 mL, Nebulization, Q6H PRN    aluminum-magnesium hydroxide-simethicone, 30 mL, Oral, QID PRN    dextrose 10%, 12.5 g, Intravenous, PRN    dextrose 10%, 25 g, Intravenous, PRN    gabapentin, 100 mg, Oral, BID PRN    glucagon (human recombinant), 1 mg, Intramuscular, PRN    glucose, 16 g, Oral, PRN    glucose, 24 g, Oral, PRN    HYDROcodone-acetaminophen, 1 tablet, Oral, Q6H PRN    insulin aspart U-100, 0-5 Units, Subcutaneous, QID (AC + HS) PRN    naloxone, 0.02 mg, Intravenous, PRN    ondansetron, 4 mg, Intravenous, Q8H PRN    promethazine, 25 mg, Oral, Q6H PRN    senna-docusate 8.6-50 mg, 1 tablet, Oral, BID PRN    sodium chloride 0.9%, 10 mL, Intravenous, Q12H PRN     Review of patient's allergies indicates:   Allergen Reactions    Metoprolol Other (See Comments)     Junctional rhythm       Objective:     Vital Signs (Most Recent):  Temp: 97.6 °F (36.4 °C) (10/09/24 0718)  Pulse: (!) 43 (10/09/24 0718)  Resp: 19 (10/09/24 0718)  BP: (!) 153/70 (10/09/24 0718)  SpO2: (!) 94 % (10/09/24 0833) Vital Signs (24h Range):  Temp:  [97.3 °F (36.3 °C)-98.3 °F (36.8 °C)] 97.6 °F (36.4 °C)  Pulse:  [37-55] 43  Resp:  [16-20] 19  SpO2:  [91 %-99 %] 94 %  BP: (102-153)/(52-70) 153/70     Weight: 66.7 kg (147 lb 0.8 oz)  Body mass index is 21.1 kg/m².    Intake/Output - Last 3 Shifts         10/07 0700  10/08 0659  10/08 0700  10/09 0659 10/09 0700  10/10 0659           Urine Occurrence  1 x     Stool Occurrence  1 x              Physical Exam  Vitals and nursing note reviewed.   Constitutional:       General: He is not in acute distress.     Appearance: He is well-developed. He is not toxic-appearing.   HENT:      Head: Normocephalic and atraumatic.      Right Ear: External ear normal.      Left Ear: External ear normal.      Nose: Nose normal.      Mouth/Throat:      Mouth: Mucous membranes are moist.   Eyes:      General: No scleral icterus.     Extraocular Movements: Extraocular movements intact.      Conjunctiva/sclera: Conjunctivae normal.   Cardiovascular:      Rate and Rhythm: Bradycardia present.   Pulmonary:      Effort: Pulmonary effort is normal. No respiratory distress.   Abdominal:      Comments: Abdomen soft NT and non-distended on exam today   Musculoskeletal:      Cervical back: Normal range of motion and neck supple.   Skin:     General: Skin is warm and dry.   Neurological:      Mental Status: He is alert and oriented to person, place, and time.   Psychiatric:         Behavior: Behavior normal.          Significant Labs:  I have reviewed all pertinent lab results within the past 24 hours.  CBC:   Recent Labs   Lab 10/09/24  0530   WBC 3.61*   RBC 4.06*   HGB 11.9*   HCT 36.3*   PLT 94*   MCV 89   MCH 29.3   MCHC 32.8     CMP:   Recent Labs   Lab 10/09/24  0530      CALCIUM 8.6*   ALBUMIN 2.8*   PROT 6.1      K 4.1   CO2 25      BUN 25*   CREATININE 1.7*   ALKPHOS 159*   ALT 79*   AST 37   BILITOT 1.4*       Significant Diagnostics:  I have reviewed all pertinent imaging results/findings within the past 24 hours.  No new pertinent imaging

## 2024-10-09 NOTE — NURSING
"Pt remains free of falls/injury. Safety precautions maintained. Pt denies pain/discomfort. IV abx given.  Full liquid diet tolerated. Pt informed of NPO status to began at midnight for his morning procedure.  Pt asked if he wants to sit up in chair "he says he is tired maybe tomorrow."    VSS. No S/S of distress noted at this time. Bed alarm set.12 hour chart check complete. Will continue to monitor.  "

## 2024-10-09 NOTE — ASSESSMENT & PLAN NOTE
Bradycardia on tele 30-40s  H/o of some bradycardia  Also is on Aricept for dementia  Recommend holding

## 2024-10-09 NOTE — CONSULTS
[facility] Surg  Cardiology  Consult Note    Patient Name: Aquiles Yates  MRN: [id number]  Admission Date: 10/5/2024  Hospital Length of Stay: 3 days  Code Status: Prior   Attending Provider: Mar Granados MD   Consulting Provider: Aurelia Deal NP  Primary Care Physician: Holger Thornton MD  Principal Problem:Calculus of bile duct without cholecystitis with obstruction    Patient information was obtained from patient and ER records.     Inpatient consult to Cardiology  Consult performed by: Aurelia Deal NP  Consult ordered by: Mar Granados MD        Subjective:     Chief Complaint:  tremors     HPI:   Aquiles Yates is a 84 y.o. male with a PMH  has a past medical history of Acute blood loss anemia (10/14/2019), Alzheimer's dementia, Aneurysm, abdominal aortic, BCC (basal cell carcinoma of skin) (06/29/2023), Chronic diastolic heart failure (05/20/2024), Coronary artery disease, Depression, Diabetes mellitus, HLD (hyperlipidemia), Hypertension, Kidney stone, PAD (peripheral artery disease), PAF (paroxysmal atrial fibrillation) (01/24/2023), and Tobacco abuse.presented to the Emergency Department for evaluation of tremors and mottled skin per wife. Wife called the paramedics when she noticed patient shaking and patient's mottled skin today. Pt has a history of Alzheimer's and paramedics report a GCS of 14. Upon interview, pt denies any pain and is oriented to self. No further complaints or concerns at this time.      ER workup revealed CBC to be unremarkable.  CMP revealed CBG of 202 mg/dL, , total bili of 2.6, and AST/ALT of 242/179.  .  Troponin negative.  Lactic acid 2.8.  Flu COVID negative.  UA unremarkable.  CTA abdomen and pelvis without contrast revealed no acute findings.  Chest x-ray negative for acute cardiopulmonary findings.  Ultrasound limited revealed cholelithiasis without definitive sonographic evidence of acute  cholecystitis.  Mild intrahepatic biliary duct dilation.  Vital signs stable. EKG revealed sinus Nicholas with occasional PVCs with a ventricular rate of 56 beats per minute and a QT/QTC of 408/393.  Patient received 4.5 g Zosyn in ED. GI consulted.  In agreement with MRCP and will see us consult in a.m..  Patient in agreement with treatment plan.  Patient will be admitted under inpatient status    Cardiology consulted, pt needing possible cholecystectomy and sx is requesting clearance given on going bradycardia. Pt seen and examined today, tele reviewed, HR 30-40s. Confused on exam, h/o dementia.  Labs reviewed, Chart reviewed. Echo in 22' with nml EF, repeat pending    Past Medical History:   Diagnosis Date    Acute blood loss anemia 10/14/2019    Alzheimer's dementia     Aneurysm, abdominal aortic     BCC (basal cell carcinoma of skin) 06/29/2023    Chronic diastolic heart failure 05/20/2024    Coronary artery disease     Depression     Diabetes mellitus     HLD (hyperlipidemia)     Hypertension     Kidney stone     PAD (peripheral artery disease)     PAF (paroxysmal atrial fibrillation) 01/24/2023    Tobacco abuse        Past Surgical History:   Procedure Laterality Date    APPENDECTOMY      CORONARY STENT PLACEMENT      x 4    ESOPHAGOGASTRODUODENOSCOPY N/A 10/14/2019    Procedure: EGD (ESOPHAGOGASTRODUODENOSCOPY);  Surgeon: Carlos Mcneal III, MD;  Location: [facility];  Service: Endoscopy;  Laterality: N/A;    ESOPHAGOGASTRODUODENOSCOPY N/A 10/15/2019    Procedure: EGD (ESOPHAGOGASTRODUODENOSCOPY);  Surgeon: Carlos Mcneal III, MD;  Location: [facility] ENDO;  Service: Endoscopy;  Laterality: N/A;    ESOPHAGOGASTRODUODENOSCOPY N/A 4/17/2023    Procedure: EGD (ESOPHAGOGASTRODUODENOSCOPY);  Surgeon: Nevaeh Mcconnell MD;  Location: [facility] ENDO;  Service: Endoscopy;  Laterality: N/A;    LEFT HEART CATHETERIZATION Left 10/11/2019    Procedure: CATHETERIZATION, HEART, LEFT;  Surgeon: Robe Gilbret MD;  Location: [facility]  CATH LAB;  Service: Cardiology;  Laterality: Left;    LEFT HEART CATHETERIZATION Left 10/13/2019    Procedure: CATHETERIZATION, HEART, LEFT;  Surgeon: Robe Gilbert MD;  Location: [facility] CATH LAB;  Service: Cardiology;  Laterality: Left;    leg stent Left        Review of patient's allergies indicates:   Allergen Reactions    Metoprolol Other (See Comments)     Junctional rhythm         No current facility-administered medications on file prior to encounter.     Current Outpatient Medications on File Prior to Encounter   Medication Sig    alcohol swabs (ALCOHOL PREP PADS) PadM Twice a day    aspirin (ECOTRIN) 81 MG EC tablet Take 1 tablet (81 mg total) by mouth once daily.    blood sugar diagnostic Strp Test blood sugars twice a day    donepeziL (ARICEPT) 5 MG tablet Take 1 tablet (5 mg total) by mouth every evening.    gabapentin (NEURONTIN) 100 MG capsule Take 1 capsule (100 mg total) by mouth 2 (two) times daily as needed. PRN    isosorbide mononitrate (IMDUR) 120 MG 24 hr tablet TAKE 1 TABLET BY MOUTH EVERY DAY    lancets (ACCU-CHEK SOFTCLIX LANCETS) Misc Test blood sugars twice a day    memantine (NAMENDA) 10 MG Tab Take 1 tablet (10 mg total) by mouth 2 (two) times daily.    metFORMIN (GLUCOPHAGE) 500 MG tablet TAKE 1 TABLET BY MOUTH EVERY DAY WITH BREAKFAST    pantoprazole (PROTONIX) 40 MG tablet Take 1 tablet (40 mg total) by mouth once daily.    valsartan (DIOVAN) 160 MG tablet Take 1 tablet (160 mg total) by mouth once daily.     Family History       Problem Relation (Age of Onset)    COPD Father    Diabetes Mother, Brother          Tobacco Use    Smoking status: Former     Types: Cigars     Passive exposure: Never    Smokeless tobacco: Current   Substance and Sexual Activity    Alcohol use: Not Currently    Drug use: Not Currently    Sexual activity: Not Currently     Partners: Female     Review of Systems   Reason unable to perform ROS: AMS.     Objective:     Vital Signs (Most Recent):  Temp: 97.6 °F  (36.4 °C) (10/09/24 0718)  Pulse: (!) 43 (10/09/24 0718)  Resp: 19 (10/09/24 0718)  BP: (!) 153/70 (10/09/24 0718)  SpO2: (!) 94 % (10/09/24 0833) Vital Signs (24h Range):  Temp:  [97.3 °F (36.3 °C)-98.3 °F (36.8 °C)] 97.6 °F (36.4 °C)  Pulse:  [37-55] 43  Resp:  [16-20] 19  SpO2:  [91 %-99 %] 94 %  BP: (102-153)/(52-70) 153/70     Weight: 66.7 kg (147 lb 0.8 oz)  Body mass index is 21.1 kg/m².    SpO2: (!) 94 %       No intake or output data in the 24 hours ending 10/09/24 0925    Lines/Drains/Airways       Peripheral Intravenous Line  Duration                  Peripheral IV - Single Lumen 10/07/24 0657 24 G Left;Posterior Forearm 2 days                     Physical Exam  Vitals and nursing note reviewed.   Constitutional:       Appearance: Normal appearance.   HENT:      Head: Normocephalic and atraumatic.   Eyes:      General:         Right eye: No discharge.         Left eye: No discharge.      Pupils: Pupils are equal, round, and reactive to light.   Cardiovascular:      Rate and Rhythm: Regular rhythm. Bradycardia present.      Heart sounds: S1 normal and S2 normal. No murmur heard.     No friction rub.   Pulmonary:      Effort: Pulmonary effort is normal. No respiratory distress.      Breath sounds: Normal breath sounds. No rales.   Abdominal:      Palpations: Abdomen is soft.      Tenderness: There is no abdominal tenderness.   Musculoskeletal:      Cervical back: Neck supple.      Right lower leg: No edema.      Left lower leg: No edema.   Skin:     General: Skin is warm and dry.   Neurological:      Mental Status: He is alert. Mental status is at baseline.   Psychiatric:         Mood and Affect: Mood normal.          Significant Labs: BMP:   Recent Labs   Lab 10/08/24  0504 10/09/24  0530   * 107    142   K 3.9 4.1    107   CO2 22* 25   BUN 34* 25*   CREATININE 2.0* 1.7*   CALCIUM 8.7 8.6*   MG  --  2.0   , CMP   Recent Labs   Lab 10/08/24  0504 10/09/24  0530    142   K 3.9 4.1  "   107   CO2 22* 25   * 107   BUN 34* 25*   CREATININE 2.0* 1.7*   CALCIUM 8.7 8.6*   PROT 5.8* 6.1   ALBUMIN 2.7* 2.8*   BILITOT 1.9* 1.4*   ALKPHOS 174* 159*   AST 65* 37   * 79*   ANIONGAP 11 10   , CBC   Recent Labs   Lab 10/08/24  0504 10/09/24  0530   WBC 4.02 3.61*   HGB 11.4* 11.9*   HCT 35.0* 36.3*   PLT 88* 94*   , INR No results for input(s): "INR", "PROTIME" in the last 48 hours., Lipid Panel No results for input(s): "CHOL", "HDL", "LDLCALC", "TRIG", "CHOLHDL" in the last 48 hours., Troponin No results for input(s): "TROPONINI" in the last 48 hours., and All pertinent lab results from the last 24 hours have been reviewed.    Significant Imaging: Cardiac Cath: reviewed, Echocardiogram: Transthoracic echo (TTE) complete (Cupid Only):   Results for orders placed or performed during the hospital encounter of 06/13/22   Echo   Result Value Ref Range    BSA 1.78 m2    TDI SEPTAL 0.07 m/s    LV LATERAL E/E' RATIO 10.33 m/s    LV SEPTAL E/E' RATIO 13.29 m/s    LA WIDTH 4.30 cm    IVC diameter 2.17 cm    Left Ventricular Outflow Tract Mean Velocity 0.73525988230 cm/s    Left Ventricular Outflow Tract Mean Gradient 1.21 mmHg    TDI LATERAL 0.09 m/s    LVIDd 4.08 3.5 - 6.0 cm    IVS 1.72 (A) 0.6 - 1.1 cm    PW 1.79 (A) 0.6 - 1.1 cm    Ao root annulus 3.38 cm    LVIDs 3.08 2.1 - 4.0 cm    FS 25 28 - 44 %    LA Vol 73.29 cm3    Sinus 3.71 cm    STJ 2.63 cm    Ascending aorta 3.51 cm    LV mass 307.88 g    LA size 3.70 cm    TAPSE 1.81 cm    Left Ventricle Relative Wall Thickness 0.88 cm    AV regurgitation pressure 1/2 time 969.189595586617778 ms    AV mean gradient 7 mmHg    AV valve area 2.78 cm2    AV Velocity Ratio 0.44     AV index (prosthetic) 0.48     MV valve area p 1/2 method 2.93 cm2    E/A ratio 1.19     Mean e' 0.08 m/s    E wave deceleration time 258.341191384625359 msec    IVRT 79.307320042656539 msec    LVOT diameter 2.72 cm    LVOT area 5.8 cm2    LVOT peak anel 0.83 m/s    LVOT " peak VTI 22.10 cm    Ao peak americo 1.89 m/s    Ao VTI 46.1 cm    RVOT peak americo 0.64 m/s    RVOT peak VTI 19.0 cm    Mr max americo 5.35 m/s    LVOT stroke volume 128.35 cm3    AV peak gradient 14 mmHg    PV mean gradient 1.11 mmHg    E/E' ratio 11.63 m/s    MV Peak E Americo 0.93 m/s    AR Max Americo 3.02 m/s    TR Max Americo 3.16 m/s    MV stenosis pressure 1/2 time 75.908065862136099 ms    MV Peak A Americo 0.78 m/s    LV Systolic Volume 37.21 mL    LV Systolic Volume Index 20.7 mL/m2    LV Diastolic Volume 73.29 mL    LV Diastolic Volume Index 40.72 mL/m2    LURDES 40.7 mL/m2    LV Mass Index 171 g/m2    RA Major Axis 5.20 cm    Left Atrium Minor Axis 5.35 cm    Left Atrium Major Axis 5.49 cm    Triscuspid Valve Regurgitation Peak Gradient 40 mmHg    RA Width 3.44 cm    EF 55 %    Narrative    · The left ventricle is normal in size with severe concentric hypertrophy   and normal systolic function.  · Mild left atrial enlargement.  · The estimated ejection fraction is 55%.  · Grade II left ventricular diastolic dysfunction.  · Normal right ventricular size with normal right ventricular systolic   function.      , EKG: reviewed, Stress Test: reviewed, and X-Ray: CXR: X-Ray Chest 1 View (CXR): No results found for this visit on 10/05/24.  Assessment and Plan:     Chronic diastolic heart failure    Echo in 22' with nml EF  GDMT diuresis as needed  BB held given bradycardia  ARB held Crt 2.5    PAF (paroxysmal atrial fibrillation)  Low dose eliquis held  Hold AV bria blocking agents given bradycardia    Bradycardia  Bradycardia on tele 30-40s  H/o of some bradycardia  Also is on Aricept for dementia  Recommend holding    Dyslipidemia  LFTs elevated no statin    Essential hypertension  Titrate medications    Coronary artery disease of native artery of native heart with stable angina pectoris  Followed by Dr. Gilbert  First PCI 1998, total of 5 stents with last one in [place] 2013   [facility] Oct 2019 for NSTEMI.  Pt had PCI KYLIE x one to mid LCX  ISR, KYLIE x 2 to OM2, and PCI prox RCA KYLIE x 2, PTCA mid RCA ISR.  Failed PCI distal occluded RCA.  EF 45%.  Nonobstructive LAD disease, small diffusely diseased diagonal vessels.    Cont Imdur        VTE Risk Mitigation (From admission, onward)           Ordered     Reason for No Pharmacological VTE Prophylaxis  Once        Question:  Reasons:  Answer:  Physician Provided (leave comment)  Comment:  Pending potential surgical intervention    10/06/24 0048     IP VTE HIGH RISK PATIENT  Once         10/06/24 0048     Place sequential compression device  Until discontinued         10/06/24 0048                    Thank you for your consult. I will follow-up with patient. Please contact us if you have any additional questions.    Aurelia Deal, NP  Cardiology   [facility] - Med Surg

## 2024-10-10 ENCOUNTER — ANESTHESIA (OUTPATIENT)
Dept: ENDOSCOPY | Facility: HOSPITAL | Age: 84
End: 2024-10-10
Payer: MEDICARE

## 2024-10-10 ENCOUNTER — HOSPITAL ENCOUNTER (OUTPATIENT)
Facility: HOSPITAL | Age: 84
Discharge: HOME | End: 2024-10-10
Attending: INTERNAL MEDICINE | Admitting: INTERNAL MEDICINE
Payer: MEDICARE

## 2024-10-10 ENCOUNTER — ANESTHESIA EVENT (OUTPATIENT)
Dept: ENDOSCOPY | Facility: HOSPITAL | Age: 84
End: 2024-10-10
Payer: MEDICARE

## 2024-10-10 VITALS
BODY MASS INDEX: 20.19 KG/M2 | WEIGHT: 141 LBS | TEMPERATURE: 98 F | DIASTOLIC BLOOD PRESSURE: 80 MMHG | SYSTOLIC BLOOD PRESSURE: 193 MMHG | RESPIRATION RATE: 20 BRPM | HEART RATE: 32 BPM | OXYGEN SATURATION: 95 % | HEIGHT: 70 IN

## 2024-10-10 DIAGNOSIS — K80.50 CHOLEDOCHOLITHIASIS: ICD-10-CM

## 2024-10-10 DIAGNOSIS — R00.1 BRADYCARDIA: ICD-10-CM

## 2024-10-10 DIAGNOSIS — K80.51 CALCULUS OF BILE DUCT WITHOUT CHOLECYSTITIS WITH OBSTRUCTION: Primary | ICD-10-CM

## 2024-10-10 DIAGNOSIS — K80.50 CHOLEDOCHOLITHIASIS: Primary | ICD-10-CM

## 2024-10-10 LAB
ALBUMIN SERPL BCP-MCNC: 2.8 G/DL (ref 3.5–5.2)
ALP SERPL-CCNC: 163 U/L (ref 55–135)
ALT SERPL W/O P-5'-P-CCNC: 63 U/L (ref 10–44)
ANION GAP SERPL CALC-SCNC: 8 MMOL/L (ref 8–16)
AST SERPL-CCNC: 33 U/L (ref 10–40)
BASOPHILS # BLD AUTO: 0.02 K/UL (ref 0–0.2)
BASOPHILS NFR BLD: 0.5 % (ref 0–1.9)
BILIRUB SERPL-MCNC: 1.3 MG/DL (ref 0.1–1)
BUN SERPL-MCNC: 15 MG/DL (ref 8–23)
CALCIUM SERPL-MCNC: 8.6 MG/DL (ref 8.7–10.5)
CHLORIDE SERPL-SCNC: 106 MMOL/L (ref 95–110)
CO2 SERPL-SCNC: 27 MMOL/L (ref 23–29)
CREAT SERPL-MCNC: 1.4 MG/DL (ref 0.5–1.4)
DIFFERENTIAL METHOD BLD: ABNORMAL
EOSINOPHIL # BLD AUTO: 0.2 K/UL (ref 0–0.5)
EOSINOPHIL NFR BLD: 6.2 % (ref 0–8)
ERYTHROCYTE [DISTWIDTH] IN BLOOD BY AUTOMATED COUNT: 13.9 % (ref 11.5–14.5)
EST. GFR  (NO RACE VARIABLE): 49 ML/MIN/1.73 M^2
GLUCOSE SERPL-MCNC: 115 MG/DL (ref 70–110)
HCT VFR BLD AUTO: 34.9 % (ref 40–54)
HGB BLD-MCNC: 11.5 G/DL (ref 14–18)
IMM GRANULOCYTES # BLD AUTO: 0.02 K/UL (ref 0–0.04)
IMM GRANULOCYTES NFR BLD AUTO: 0.5 % (ref 0–0.5)
LYMPHOCYTES # BLD AUTO: 0.6 K/UL (ref 1–4.8)
LYMPHOCYTES NFR BLD: 15.9 % (ref 18–48)
MCH RBC QN AUTO: 29.1 PG (ref 27–31)
MCHC RBC AUTO-ENTMCNC: 33 G/DL (ref 32–36)
MCV RBC AUTO: 88 FL (ref 82–98)
MONOCYTES # BLD AUTO: 0.4 K/UL (ref 0.3–1)
MONOCYTES NFR BLD: 10.5 % (ref 4–15)
NEUTROPHILS # BLD AUTO: 2.6 K/UL (ref 1.8–7.7)
NEUTROPHILS NFR BLD: 66.4 % (ref 38–73)
NRBC BLD-RTO: 0 /100 WBC
OHS QRS DURATION: 100 MS
OHS QTC CALCULATION: 434 MS
PLATELET # BLD AUTO: 97 K/UL (ref 150–450)
PMV BLD AUTO: 11.8 FL (ref 9.2–12.9)
POCT GLUCOSE: 129 MG/DL (ref 70–110)
POTASSIUM SERPL-SCNC: 3.6 MMOL/L (ref 3.5–5.1)
PROT SERPL-MCNC: 6 G/DL (ref 6–8.4)
RBC # BLD AUTO: 3.95 M/UL (ref 4.6–6.2)
SODIUM SERPL-SCNC: 141 MMOL/L (ref 136–145)
WBC # BLD AUTO: 3.89 K/UL (ref 3.9–12.7)

## 2024-10-10 PROCEDURE — 43274 ERCP DUCT STENT PLACEMENT: CPT | Mod: HCNC,,, | Performed by: INTERNAL MEDICINE

## 2024-10-10 PROCEDURE — 74328 X-RAY BILE DUCT ENDOSCOPY: CPT | Mod: 26,HCNC,, | Performed by: INTERNAL MEDICINE

## 2024-10-10 PROCEDURE — C2617 STENT, NON-COR, TEM W/O DEL: HCPCS | Mod: HCNC | Performed by: INTERNAL MEDICINE

## 2024-10-10 PROCEDURE — 82962 GLUCOSE BLOOD TEST: CPT | Mod: HCNC | Performed by: INTERNAL MEDICINE

## 2024-10-10 PROCEDURE — 25000003 PHARM REV CODE 250: Mod: HCNC

## 2024-10-10 PROCEDURE — 25000003 PHARM REV CODE 250: Mod: HCNC | Performed by: INTERNAL MEDICINE

## 2024-10-10 PROCEDURE — 0F798DZ DILATION OF COMMON BILE DUCT WITH INTRALUMINAL DEVICE, VIA NATURAL OR ARTIFICIAL OPENING ENDOSCOPIC: ICD-10-PCS | Performed by: INTERNAL MEDICINE

## 2024-10-10 PROCEDURE — 93005 ELECTROCARDIOGRAM TRACING: CPT | Mod: HCNC

## 2024-10-10 PROCEDURE — 25500020 PHARM REV CODE 255: Mod: HCNC | Performed by: INTERNAL MEDICINE

## 2024-10-10 PROCEDURE — 37000009 HC ANESTHESIA EA ADD 15 MINS: Mod: HCNC | Performed by: INTERNAL MEDICINE

## 2024-10-10 PROCEDURE — 37000008 HC ANESTHESIA 1ST 15 MINUTES: Mod: HCNC | Performed by: INTERNAL MEDICINE

## 2024-10-10 PROCEDURE — 43264 ERCP REMOVE DUCT CALCULI: CPT | Mod: 59,HCNC | Performed by: INTERNAL MEDICINE

## 2024-10-10 PROCEDURE — 27201674 HC SPHINCTERTOME: Mod: HCNC | Performed by: INTERNAL MEDICINE

## 2024-10-10 PROCEDURE — 80053 COMPREHEN METABOLIC PANEL: CPT | Mod: HCNC | Performed by: INTERNAL MEDICINE

## 2024-10-10 PROCEDURE — 63600175 PHARM REV CODE 636 W HCPCS: Mod: HCNC | Performed by: INTERNAL MEDICINE

## 2024-10-10 PROCEDURE — 11000001 HC ACUTE MED/SURG PRIVATE ROOM: Mod: HCNC

## 2024-10-10 PROCEDURE — 27202125 HC BALLOON, EXTRACTION (ANY): Mod: HCNC | Performed by: INTERNAL MEDICINE

## 2024-10-10 PROCEDURE — 0FC98ZZ EXTIRPATION OF MATTER FROM COMMON BILE DUCT, VIA NATURAL OR ARTIFICIAL OPENING ENDOSCOPIC: ICD-10-PCS | Performed by: INTERNAL MEDICINE

## 2024-10-10 PROCEDURE — 63600175 PHARM REV CODE 636 W HCPCS: Mod: HCNC

## 2024-10-10 PROCEDURE — 93010 ELECTROCARDIOGRAM REPORT: CPT | Mod: HCNC,,, | Performed by: INTERNAL MEDICINE

## 2024-10-10 PROCEDURE — 85025 COMPLETE CBC W/AUTO DIFF WBC: CPT | Mod: HCNC | Performed by: INTERNAL MEDICINE

## 2024-10-10 PROCEDURE — 27202127 HC STENT INTRODUCER: Mod: HCNC | Performed by: INTERNAL MEDICINE

## 2024-10-10 PROCEDURE — C1769 GUIDE WIRE: HCPCS | Mod: HCNC | Performed by: INTERNAL MEDICINE

## 2024-10-10 PROCEDURE — 36415 COLL VENOUS BLD VENIPUNCTURE: CPT | Mod: HCNC | Performed by: INTERNAL MEDICINE

## 2024-10-10 PROCEDURE — 43264 ERCP REMOVE DUCT CALCULI: CPT | Mod: 59,HCNC,, | Performed by: INTERNAL MEDICINE

## 2024-10-10 PROCEDURE — 74328 X-RAY BILE DUCT ENDOSCOPY: CPT | Mod: TC,HCNC | Performed by: INTERNAL MEDICINE

## 2024-10-10 PROCEDURE — 43274 ERCP DUCT STENT PLACEMENT: CPT | Mod: HCNC | Performed by: INTERNAL MEDICINE

## 2024-10-10 RX ORDER — SODIUM CHLORIDE, SODIUM LACTATE, POTASSIUM CHLORIDE, CALCIUM CHLORIDE 600; 310; 30; 20 MG/100ML; MG/100ML; MG/100ML; MG/100ML
INJECTION, SOLUTION INTRAVENOUS CONTINUOUS
Status: DISCONTINUED | OUTPATIENT
Start: 2024-10-11 | End: 2024-10-12

## 2024-10-10 RX ORDER — PROPOFOL 10 MG/ML
VIAL (ML) INTRAVENOUS
Status: DISCONTINUED | OUTPATIENT
Start: 2024-10-10 | End: 2024-10-10

## 2024-10-10 RX ORDER — INDOMETHACIN 50 MG/1
SUPPOSITORY RECTAL
Status: COMPLETED | OUTPATIENT
Start: 2024-10-10 | End: 2024-10-10

## 2024-10-10 RX ORDER — HALOPERIDOL 5 MG/ML
0.5 INJECTION INTRAMUSCULAR EVERY 10 MIN PRN
Status: DISCONTINUED | OUTPATIENT
Start: 2024-10-10 | End: 2024-10-10 | Stop reason: HOSPADM

## 2024-10-10 RX ORDER — LIDOCAINE HYDROCHLORIDE 20 MG/ML
INJECTION INTRAVENOUS
Status: DISCONTINUED | OUTPATIENT
Start: 2024-10-10 | End: 2024-10-10

## 2024-10-10 RX ORDER — SODIUM CHLORIDE 0.9 % (FLUSH) 0.9 %
10 SYRINGE (ML) INJECTION
Status: DISCONTINUED | OUTPATIENT
Start: 2024-10-10 | End: 2024-10-10 | Stop reason: HOSPADM

## 2024-10-10 RX ORDER — ONDANSETRON HYDROCHLORIDE 2 MG/ML
4 INJECTION, SOLUTION INTRAVENOUS DAILY PRN
Status: DISCONTINUED | OUTPATIENT
Start: 2024-10-10 | End: 2024-10-10 | Stop reason: HOSPADM

## 2024-10-10 RX ORDER — ONDANSETRON HYDROCHLORIDE 2 MG/ML
INJECTION, SOLUTION INTRAVENOUS
Status: DISCONTINUED | OUTPATIENT
Start: 2024-10-10 | End: 2024-10-10

## 2024-10-10 RX ORDER — EPHEDRINE SULFATE 50 MG/ML
INJECTION, SOLUTION INTRAVENOUS
Status: DISCONTINUED | OUTPATIENT
Start: 2024-10-10 | End: 2024-10-10

## 2024-10-10 RX ADMIN — PROPOFOL 150 MCG/KG/MIN: 10 INJECTION, EMULSION INTRAVENOUS at 12:10

## 2024-10-10 RX ADMIN — PROPOFOL 20 MG: 10 INJECTION, EMULSION INTRAVENOUS at 12:10

## 2024-10-10 RX ADMIN — SODIUM CHLORIDE: 9 INJECTION, SOLUTION INTRAVENOUS at 11:10

## 2024-10-10 RX ADMIN — EPHEDRINE SULFATE 5 MG: 50 INJECTION INTRAVENOUS at 12:10

## 2024-10-10 RX ADMIN — ONDANSETRON 4 MG: 2 INJECTION INTRAMUSCULAR; INTRAVENOUS at 12:10

## 2024-10-10 RX ADMIN — LIDOCAINE HYDROCHLORIDE 50 MG: 20 INJECTION INTRAVENOUS at 12:10

## 2024-10-10 RX ADMIN — PIPERACILLIN SODIUM AND TAZOBACTAM SODIUM 4.5 G: 4; .5 INJECTION, POWDER, FOR SOLUTION INTRAVENOUS at 12:10

## 2024-10-10 NOTE — ASSESSMENT & PLAN NOTE
Patient is on Zosyn  Completed ERCP with stone removal [facility] 10/10 without complication  Scheduled for lap harjinder in a.m..

## 2024-10-10 NOTE — PROGRESS NOTES
Patient not seen today as he is on leave of absence for ERCP at [facility].  Tentatively planning for cholecystectomy tomorrow.  Okay for clear liquids and NPO after midnight in anticipation of surgery tomorrow.

## 2024-10-10 NOTE — PLAN OF CARE
Gela HEDRICK notified of HTN in post op- pt is asymptomatic and resting comfortably. Gela HEDRICK states he will write an anesthesia release for patient, no orders to treat BP at this time.

## 2024-10-10 NOTE — ASSESSMENT & PLAN NOTE
Patient's FSGs are uncontrolled due to hyperglycemia on current medication regimen.  Last A1c reviewed-   Lab Results   Component Value Date    HGBA1C 7.0 (H) 05/07/2024     Most recent fingerstick glucose reviewed-   Recent Labs   Lab 10/10/24  1051 10/10/24  1311   POCTGLUCOSE 129* 157*       Current correctional scale  Low  Maintain anti-hyperglycemic dose as follows-   Antihyperglycemics (From admission, onward)      Start     Stop Route Frequency Ordered    10/06/24 0146  insulin aspart U-100 pen 0-5 Units         -- SubQ Before meals & nightly PRN 10/06/24 0048          Plan:  -SSI  -A1c  -Accu-checks  -Hold oral hypoglycemics while patient is in the hospital  -Hypoglycemic protocol

## 2024-10-10 NOTE — SUBJECTIVE & OBJECTIVE
Interval History:  Patient seen and examined after ERCP.  He is without complaints except asking for food stating he is hungry.    Review of Systems   Constitutional:  Positive for appetite change (Hungry). Negative for fatigue.   Respiratory:  Negative for shortness of breath.    Cardiovascular:  Negative for chest pain, palpitations and leg swelling.   Gastrointestinal:  Negative for abdominal pain.   Neurological:  Positive for weakness.   All other systems reviewed and are negative.    Objective:     Vital Signs (Most Recent):  Temp: 98.1 °F (36.7 °C) (10/10/24 0718)  Pulse: (!) 45 (10/10/24 0800)  Resp: 20 (10/10/24 0718)  BP: (!) 140/64 (10/10/24 0718)  SpO2: (!) 92 % (10/10/24 0718) Vital Signs (24h Range):  Temp:  [97.4 °F (36.3 °C)-98.1 °F (36.7 °C)] 98 °F (36.7 °C)  Pulse:  [29-53] 32  Resp:  [16-40] 20  SpO2:  [92 %-100 %] 95 %  BP: (111-196)/(59-82) 193/80     Weight: 64 kg (141 lb)  Body mass index is 20.23 kg/m².    Intake/Output Summary (Last 24 hours) at 10/10/2024 1822  Last data filed at 10/10/2024 1254  Gross per 24 hour   Intake 1469.03 ml   Output 200 ml   Net 1269.03 ml         Physical Exam  Vitals and nursing note reviewed.   Constitutional:       Appearance: He is ill-appearing.   Cardiovascular:      Rate and Rhythm: Regular rhythm. Bradycardia present.      Heart sounds: Heart sounds are distant.   Pulmonary:      Effort: Pulmonary effort is normal.      Breath sounds: Normal breath sounds.   Abdominal:      General: Abdomen is scaphoid. Bowel sounds are normal. There is no distension.      Palpations: Abdomen is soft.      Tenderness: There is no abdominal tenderness. There is no guarding or rebound.   Neurological:      Mental Status: He is alert.      Motor: Weakness present.   Psychiatric:         Cognition and Memory: Cognition is impaired. Memory is impaired.             Significant Labs: All pertinent labs within the past 24 hours have been reviewed.  CBC:   Recent Labs   Lab  10/09/24  0530 10/10/24  0517   WBC 3.61* 3.89*   HGB 11.9* 11.5*   HCT 36.3* 34.9*   PLT 94* 97*     CMP:   Recent Labs   Lab 10/09/24  0530 10/10/24  0517    141   K 4.1 3.6    106   CO2 25 27    115*   BUN 25* 15   CREATININE 1.7* 1.4   CALCIUM 8.6* 8.6*   PROT 6.1 6.0   ALBUMIN 2.8* 2.8*   BILITOT 1.4* 1.3*   ALKPHOS 159* 163*   AST 37 33   ALT 79* 63*   ANIONGAP 10 8       Significant Imaging: I have reviewed all pertinent imaging results/findings within the past 24 hours.

## 2024-10-10 NOTE — PROVATION PATIENT INSTRUCTIONS
Discharge Summary/Instructions after an Endoscopic Procedure  Patient Name: Aquiles Yates  Patient MRN: [id number]  Patient   YOB: 1940  Thursday, October 10, 2024  Morgan Hong MD  Dear patient,  As a result of recent federal legislation (The Federal Cures Act), you may   receive lab or pathology results from your procedure in your [facility]   account before your physician is able to contact you. Your physician or   their representative will relay the results to you with their   recommendations at their soonest availability.  Thank you,  RESTRICTIONS:  During your procedure today, you received medications for sedation.  These   medications may affect your judgment, balance and coordination.  Therefore,   for 24 hours, you have the following restrictions:   - DO NOT drive a car, operate machinery, make legal/financial decisions,   sign important papers or drink alcohol.    ACTIVITY:  Today: no heavy lifting, straining or running due to procedural   sedation/anesthesia.  The following day: return to full activity including work.  DIET:  Eat and drink normally unless instructed otherwise.     TREATMENT FOR COMMON SIDE EFFECTS:  - Mild abdominal pain, nausea, belching, bloating or excessive gas:  rest,   eat lightly and use a heating pad.  - Sore Throat: treat with throat lozenges and/or gargle with warm salt   water.  - Because air was used during the procedure, expelling large amounts of air   from your rectum or belching is normal.  - If a bowel prep was taken, you may not have a bowel movement for 1-3 days.    This is normal.  SYMPTOMS TO WATCH FOR AND REPORT TO YOUR PHYSICIAN:  1. Abdominal pain or bloating, other than gas cramps.  2. Chest pain.  3. Back pain.  4. Signs of infection such as: chills or fever occurring within 24 hours   after the procedure.  5. Rectal bleeding, which would show as bright red, maroon, or black stools.   (A tablespoon of blood from the rectum is not serious,  especially if   hemorrhoids are present.)  6. Vomiting.  7. Weakness or dizziness.  GO DIRECTLY TO THE NEAREST EMERGENCY ROOM IF YOU HAVE ANY OF THE FOLLOWING:      Difficulty breathing              Chills and/or fever over 101 F   Persistent vomiting and/or vomiting blood   Severe abdominal pain   Severe chest pain   Black, tarry stools   Bleeding- more than one tablespoon   Any other symptom or condition that you feel may need urgent attention  Your doctor recommends these additional instructions:  If any biopsies were taken, your doctors clinic will contact you in 1 to 2   weeks with any results.  - Avoid aspirin and nonsteroidal anti-inflammatory medicines for 2 weeks.   - Transfer patient to another hospital.   - Watch for pancreatitis, bleeding, perforation, and cholangitis.   - Repeat ERCP in 8 weeks to remove stent.  For questions, problems or results please call your physician - Morgan Hong MD at Work:  [phone].  [facility], EMERGENCY ROOM PHONE NUMBER: [phone]  IF A COMPLICATION OR EMERGENCY SITUATION ARISES AND YOU ARE UNABLE TO REACH   YOUR PHYSICIAN - GO DIRECTLY TO THE EMERGENCY ROOM.  Morgan Hong MD  10/10/2024 2:13:46 PM  This report has been verified and signed electronically.  Dear patient,  As a result of recent federal legislation (The Federal Cures Act), you may   receive lab or pathology results from your procedure in your [facility]   account before your physician is able to contact you. Your physician or   their representative will relay the results to you with their   recommendations at their soonest availability.  Thank you,  PROVATION

## 2024-10-10 NOTE — H&P
History & Physical -   Gastroenterology      SUBJECTIVE:     Procedure: ERCP    Chief Complaint/Indication for Procedure: Choledocholithiasis    History of Present Illness:  Patient is a 84 y.o. male presents with choledocholithiasis on imaging here for ERCP.   PTA Medications   Medication Sig    alcohol swabs (ALCOHOL PREP PADS) PadM Twice a day    aspirin (ECOTRIN) 81 MG EC tablet Take 1 tablet (81 mg total) by mouth once daily.    blood sugar diagnostic Strp Test blood sugars twice a day    donepeziL (ARICEPT) 5 MG tablet Take 1 tablet (5 mg total) by mouth every evening.    gabapentin (NEURONTIN) 100 MG capsule Take 1 capsule (100 mg total) by mouth 2 (two) times daily as needed. PRN    isosorbide mononitrate (IMDUR) 120 MG 24 hr tablet TAKE 1 TABLET BY MOUTH EVERY DAY    lancets (ACCU-CHEK SOFTCLIX LANCETS) Misc Test blood sugars twice a day    memantine (NAMENDA) 10 MG Tab Take 1 tablet (10 mg total) by mouth 2 (two) times daily.    metFORMIN (GLUCOPHAGE) 500 MG tablet TAKE 1 TABLET BY MOUTH EVERY DAY WITH BREAKFAST    pantoprazole (PROTONIX) 40 MG tablet Take 1 tablet (40 mg total) by mouth once daily.    valsartan (DIOVAN) 160 MG tablet Take 1 tablet (160 mg total) by mouth once daily.       Review of patient's allergies indicates:   Allergen Reactions    Metoprolol Other (See Comments)     Junctional rhythm          Past Medical History:   Diagnosis Date    Acute blood loss anemia 10/14/2019    Alzheimer's dementia     Aneurysm, abdominal aortic     BCC (basal cell carcinoma of skin) 06/29/2023    Chronic diastolic heart failure 05/20/2024    Coronary artery disease     Depression     Diabetes mellitus     HLD (hyperlipidemia)     Hypertension     Kidney stone     PAD (peripheral artery disease)     PAF (paroxysmal atrial fibrillation) 01/24/2023    Tobacco abuse      Past Surgical History:   Procedure Laterality Date    APPENDECTOMY      CORONARY STENT PLACEMENT      x 4    ESOPHAGOGASTRODUODENOSCOPY N/A  10/14/2019    Procedure: EGD (ESOPHAGOGASTRODUODENOSCOPY);  Surgeon: Carlos Mcneal III, MD;  Location: [facility] ENDO;  Service: Endoscopy;  Laterality: N/A;    ESOPHAGOGASTRODUODENOSCOPY N/A 10/15/2019    Procedure: EGD (ESOPHAGOGASTRODUODENOSCOPY);  Surgeon: Carlos Mcneal III, MD;  Location: [facility] ENDO;  Service: Endoscopy;  Laterality: N/A;    ESOPHAGOGASTRODUODENOSCOPY N/A 4/17/2023    Procedure: EGD (ESOPHAGOGASTRODUODENOSCOPY);  Surgeon: Nevaeh Mcconnell MD;  Location: [facility] ENDO;  Service: Endoscopy;  Laterality: N/A;    LEFT HEART CATHETERIZATION Left 10/11/2019    Procedure: CATHETERIZATION, HEART, LEFT;  Surgeon: Robe Gilbert MD;  Location: [facility] CATH LAB;  Service: Cardiology;  Laterality: Left;    LEFT HEART CATHETERIZATION Left 10/13/2019    Procedure: CATHETERIZATION, HEART, LEFT;  Surgeon: Robe Gilbert MD;  Location: [facility] CATH LAB;  Service: Cardiology;  Laterality: Left;    leg stent Left      Family History   Problem Relation Name Age of Onset    Diabetes Mother      COPD Father      Diabetes Brother       Social History     Tobacco Use    Smoking status: Former     Types: Cigars     Passive exposure: Never    Smokeless tobacco: Current   Substance Use Topics    Alcohol use: Not Currently    Drug use: Not Currently       Review of Systems:  Constitutional: no fever or chills  Respiratory: no cough or shortness of breath  Cardiovascular: no chest pain or palpitations    OBJECTIVE:     Vital Signs (Most Recent)  Temp: 97.7 °F (36.5 °C) (10/10/24 1052)  Pulse: (!) 40 (10/10/24 1052)  Resp: 17 (10/10/24 1052)  BP: (!) 146/65 (10/10/24 1052)  SpO2: 95 % (10/10/24 1052)    Physical Exam:  General: well developed, well nourished  Lungs:  normal respiratory effort  Heart: regular rate, S1, S2 normal    Laboratory  CBC:   Recent Labs   Lab 10/10/24  0517   WBC 3.89*   RBC 3.95*   HGB 11.5*   HCT 34.9*   PLT 97*   MCV 88   MCH 29.1   MCHC 33.0     CMP:   Recent Labs   Lab 10/10/24  0517   *    CALCIUM 8.6*   ALBUMIN 2.8*   PROT 6.0      K 3.6   CO2 27      BUN 15   CREATININE 1.4   ALKPHOS 163*   ALT 63*   AST 33   BILITOT 1.3*     Coagulation:   Recent Labs   Lab 10/06/24  0919   LABPROT 12.8*   INR 1.1       Diagnostic Results:      ASSESSMENT/PLAN:     Choledocholithiasis    Plan: ERCP    Anesthesia Plan: MAC    ASA Grade: ASA 3 - Patient with moderate systemic disease with functional limitations    The impression and plan was discussed in detail with the patient's family. All questions have been answered and the patient's wife voices understanding of our plan at this point. The risk of the procedure was discussed in detail which includes but not limited to bleeding, infection, perforation in some cases requiring surgery with its spectrum of complications.

## 2024-10-10 NOTE — ANESTHESIA PREPROCEDURE EVALUATION
10/10/2024  Aquiles Yates is a 84 y.o., male.    Patient previously canceled for bradycardia and evaluated by cardiology. Bradycardia persists. /60. Asymptomatic. Will proceed today. Consent obtained by wife Gayle.    10/9/24    Left Ventricle: The left ventricle is normal in size. Normal wall thickness. There is concentric hypertrophy. Normal wall motion. Ejection fraction is approximately 60%. There is indeterminate diastolic function.    Right Ventricle: Normal right ventricular cavity size. Wall thickness is normal. Right ventricle wall motion  is normal. Systolic function is normal.    Left Atrium: Left atrium is severely dilated.    Aortic Valve: There is mild aortic regurgitation.    Mitral Valve: There is mild regurgitation.    Tricuspid Valve: There is mild regurgitation.    Pulmonary Artery: The estimated pulmonary artery systolic pressure is 31 mmHg.    IVC/SVC: Normal venous pressure at 3 mmHg.    Pre-op Assessment    I have reviewed the Patient Summary Reports.     I have reviewed the Nursing Notes. I have reviewed the NPO Status.   I have reviewed the Medications.     Review of Systems  Anesthesia Hx:  No problems with previous Anesthesia             Denies Family Hx of Anesthesia complications.    Denies Personal Hx of Anesthesia complications.                    Social:  Smoker       Hematology/Oncology:    Oncology Normal    -- Anemia:                                  EENT/Dental:  EENT/Dental Normal           Cardiovascular:     Hypertension  Past MI CAD   CABG/stent Dysrhythmias atrial fibrillation Angina        ECG has been reviewed.                          Pulmonary:  Pulmonary Normal                       Renal/:   renal calculi               Hepatic/GI:  Hepatic/GI Normal                 Musculoskeletal:  Musculoskeletal Normal                 Neurological:  Neurology Normal                                      Endocrine:  Diabetes, type 2           Dermatological:  Skin Normal    Psych:  Psychiatric History                  Physical Exam    Airway:  Mallampati: II   Mouth Opening: Normal  TM Distance: Normal  Tongue: Normal  Neck ROM: Normal ROM    Dental:  Intact        Anesthesia Plan  Type of Anesthesia, risks & benefits discussed:    Anesthesia Type: Gen Natural Airway, Gen ETT  Intra-op Monitoring Plan: Standard ASA Monitors  Induction:  IV  Informed Consent: Informed consent signed with the Patient representative and all parties understand the risks and agree with anesthesia plan.  All questions answered.   ASA Score: 3    Ready For Surgery From Anesthesia Perspective.     .    Patient's procedure is cancelled. Heart rate is 34-37. Patient is not on beta blockers. Recommend cardiology evaluation. Procedure is not urgent.

## 2024-10-10 NOTE — TRANSFER OF CARE
"Anesthesia Transfer of Care Note    Patient: Aquiles Yates    Procedure(s) Performed: Procedure(s) (LRB):  ERCP (ENDOSCOPIC RETROGRADE CHOLANGIOPANCREATOGRAPHY) (N/A)    Patient location: [facility]    Anesthesia Type: general    Transport from OR: Transported from OR on 6-10 L/min O2 by face mask with adequate spontaneous ventilation    Post pain: adequate analgesia    Post assessment: no apparent anesthetic complications    Post vital signs: stable    Level of consciousness: responds to stimulation    Nausea/Vomiting: vomiting    Complications: none    Transfer of care protocol was followed      Last vitals: Visit Vitals  BP (!) 146/65 (BP Location: Right arm, Patient Position: Lying)   Pulse (!) 40   Temp 36.5 °C (97.7 °F) (Temporal)   Resp 17   Ht 5' 10" (1.778 m)   Wt 64 kg (141 lb)   SpO2 95%   BMI 20.23 kg/m²     "

## 2024-10-10 NOTE — ANESTHESIA POSTPROCEDURE EVALUATION
Anesthesia Post Evaluation    Patient: Aquiles Yates    Procedure(s) Performed: Procedure(s) (LRB):  ERCP (ENDOSCOPIC RETROGRADE CHOLANGIOPANCREATOGRAPHY) (N/A)    Final Anesthesia Type: general      Patient location during evaluation: PACU  Patient participation: Yes- Able to Participate  Level of consciousness: awake and alert and oriented  Post-procedure vital signs: reviewed and stable  Pain management: adequate  Airway patency: patent    PONV status at discharge: No PONV  Anesthetic complications: no      Cardiovascular status: blood pressure returned to baseline, bradycardic and hemodynamically stable  Respiratory status: unassisted, room air and spontaneous ventilation  Hydration status: euvolemic  Follow-up not needed.              Vitals Value Taken Time   /77 10/10/24 1318   Temp 37 10/10/24 1324   Pulse 32 10/10/24 1324   Resp 21 10/10/24 1324   SpO2 99 % 10/10/24 1324   Vitals shown include unfiled device data.      No case tracking events are documented in the log.      Pain/Carmen Score: Carmen Score: 8 (10/10/2024  1:15 PM)

## 2024-10-10 NOTE — ANESTHESIA RELEASE NOTE
"Anesthesia Release from PACU Note    Patient: Aquiles Yates    Procedure(s) Performed: Procedure(s) (LRB):  ERCP (ENDOSCOPIC RETROGRADE CHOLANGIOPANCREATOGRAPHY) (N/A)    Anesthesia type: general    Post pain: Adequate analgesia    Post assessment: no apparent anesthetic complications    Last Vitals: Visit Vitals  BP (!) 190/76   Pulse (!) 31   Temp 36.5 °C (97.7 °F) (Temporal)   Resp 19   Ht 5' 10" (1.778 m)   Wt 64 kg (141 lb)   SpO2 96%   BMI 20.23 kg/m²       Post vital signs: stable    Level of consciousness: awake, alert , and oriented    Nausea/Vomiting: no nausea/no vomiting    Complications: none    Airway Patency: patent    Respiratory: unassisted, spontaneous ventilation, room air    Cardiovascular: stable, blood pressure at baseline, hypertensive, and bradycardic Asymptomatic bradycardia c/w baseline. 12 lead confirmed sinus sarita, no new AV block    Hydration: euvolemic  "

## 2024-10-10 NOTE — ASSESSMENT & PLAN NOTE
Chronic, controlled.  Latest blood pressure and vitals reviewed-   Temp:  [97.4 °F (36.3 °C)-98.1 °F (36.7 °C)]   Pulse:  [29-53]   Resp:  [16-40]   BP: (111-196)/(59-82)   SpO2:  [92 %-100 %] .   Home meds for hypertension were reviewed and noted below.   Hypertension Medications               isosorbide mononitrate (IMDUR) 120 MG 24 hr tablet TAKE 1 TABLET BY MOUTH EVERY DAY    valsartan (DIOVAN) 160 MG tablet Take 1 tablet (160 mg total) by mouth once daily.            While in the hospital, will manage blood pressure as follows, hold valsartan    Will utilize p.r.n. blood pressure medication only if patient's blood pressure greater than  180/110 and he develops symptoms such as worsening chest pain or shortness of breath.

## 2024-10-10 NOTE — PROGRESS NOTES
[facility] Medicine  Progress Note    Patient Name: Aquiles Yates  MRN: [id number]  Patient Class: IP- Inpatient   Admission Date: 10/5/2024  Length of Stay: 4 days  Attending Physician: Mar Granados MD  Primary Care Provider: Holger Thornton MD        Subjective:     Principal Problem:Calculus of bile duct without cholecystitis with obstruction        HPI:  Aquiles Yates is a 84 y.o. male with a PMH  has a past medical history of Acute blood loss anemia (10/14/2019), Alzheimer's dementia, Aneurysm, abdominal aortic, BCC (basal cell carcinoma of skin) (06/29/2023), Chronic diastolic heart failure (05/20/2024), Coronary artery disease, Depression, Diabetes mellitus, HLD (hyperlipidemia), Hypertension, Kidney stone, PAD (peripheral artery disease), PAF (paroxysmal atrial fibrillation) (01/24/2023), and Tobacco abuse.presented to the Emergency Department for evaluation of tremors and mottled skin per wife. Wife called the paramedics when she noticed patient shaking and patient's mottled skin today. Pt has a history of Alzheimer's and paramedics report a GCS of 14. Upon interview, pt denies any pain and is oriented to self. No further complaints or concerns at this time.       ER workup revealed CBC to be unremarkable.  CMP revealed CBG of 202 mg/dL, , total bili of 2.6, and AST/ALT of 242/179.  .  Troponin negative.  Lactic acid 2.8.  Flu COVID negative.  UA unremarkable.  CTA abdomen and pelvis without contrast revealed no acute findings.  Chest x-ray negative for acute cardiopulmonary findings.  Ultrasound limited revealed cholelithiasis without definitive sonographic evidence of acute cholecystitis.  Mild intrahepatic biliary duct dilation.  Vital signs stable. EKG revealed sinus Nicholas with occasional PVCs with a ventricular rate of 56 beats per minute and a QT/QTC of 408/393.  Patient received 4.5 g Zosyn in ED. GI consulted.  In agreement with  MRCP and will see us consult in a.m..  Patient in agreement with treatment plan.  Patient will be admitted under inpatient status.    PCP: Holger Thornton      Overview/Hospital Course:  Patient is an 84-year-old male with a history of anemia, Alzheimer's dementia, aneurysm, chronic diastolic failure, coronary artery disease diabetes mellitus, hypertension, PAF who presented emergency department with tremors.  Patient's wife said she was he was shaking and had mottled skin.  Patient denied any complaints.  In the emergency department workup revealed glucose of 202, alk-phos of 387, total bili of 2.6, AST of 242 ALT of 179, BNP of 323, lactic acid of 2.4.  CT scan of abdomen pelvis without contrast revealed no acute findings, ultrasound revealed cholelithiasis with ductal dilatation.  Patient was admitted with diagnosis of acute cholecystitis choledocholithiasis.  He was started on Zosyn.  GI was consulted MRCP was ordered with plans for ERCP in [place].  Patient's heart rate was in the 40s yesterday EKG revealed sinus bradycardia.  He went to [place] today for ERCP however heart rate was less than 40 therefore he was sent back for cardiology evaluation.  Patient denies any pain.    Cardiology saw patient with recommendations to stop his Aricept as it was make be contributing to his bradycardia.  Patient was tolerating diet and LFTs have improved.    Status post ERCP:                        - The major papilla appeared normal.                          - The common bile duct was dilated.                          - Choledocholithiasis was found. Complete removal                          was accomplished by biliary sphincterotomy and                          balloon extraction.                          - A biliary sphincterotomy was performed.                          - The biliary tree was swept.                          - One biliary stent was placed into the common                          bile duct.      Interval History:  Patient seen and examined after ERCP.  He is without complaints except asking for food stating he is hungry.    Review of Systems   Constitutional:  Positive for appetite change (Hungry). Negative for fatigue.   Respiratory:  Negative for shortness of breath.    Cardiovascular:  Negative for chest pain, palpitations and leg swelling.   Gastrointestinal:  Negative for abdominal pain.   Neurological:  Positive for weakness.   All other systems reviewed and are negative.    Objective:     Vital Signs (Most Recent):  Temp: 98.1 °F (36.7 °C) (10/10/24 0718)  Pulse: (!) 45 (10/10/24 0800)  Resp: 20 (10/10/24 0718)  BP: (!) 140/64 (10/10/24 0718)  SpO2: (!) 92 % (10/10/24 0718) Vital Signs (24h Range):  Temp:  [97.4 °F (36.3 °C)-98.1 °F (36.7 °C)] 98 °F (36.7 °C)  Pulse:  [29-53] 32  Resp:  [16-40] 20  SpO2:  [92 %-100 %] 95 %  BP: (111-196)/(59-82) 193/80     Weight: 64 kg (141 lb)  Body mass index is 20.23 kg/m².    Intake/Output Summary (Last 24 hours) at 10/10/2024 1822  Last data filed at 10/10/2024 1254  Gross per 24 hour   Intake 1469.03 ml   Output 200 ml   Net 1269.03 ml         Physical Exam  Vitals and nursing note reviewed.   Constitutional:       Appearance: He is ill-appearing.   Cardiovascular:      Rate and Rhythm: Regular rhythm. Bradycardia present.      Heart sounds: Heart sounds are distant.   Pulmonary:      Effort: Pulmonary effort is normal.      Breath sounds: Normal breath sounds.   Abdominal:      General: Abdomen is scaphoid. Bowel sounds are normal. There is no distension.      Palpations: Abdomen is soft.      Tenderness: There is no abdominal tenderness. There is no guarding or rebound.   Neurological:      Mental Status: He is alert.      Motor: Weakness present.   Psychiatric:         Cognition and Memory: Cognition is impaired. Memory is impaired.             Significant Labs: All pertinent labs within the past 24 hours have been reviewed.  CBC:   Recent Labs   Lab  10/09/24  0530 10/10/24  0517   WBC 3.61* 3.89*   HGB 11.9* 11.5*   HCT 36.3* 34.9*   PLT 94* 97*     CMP:   Recent Labs   Lab 10/09/24  0530 10/10/24  0517    141   K 4.1 3.6    106   CO2 25 27    115*   BUN 25* 15   CREATININE 1.7* 1.4   CALCIUM 8.6* 8.6*   PROT 6.1 6.0   ALBUMIN 2.8* 2.8*   BILITOT 1.4* 1.3*   ALKPHOS 159* 163*   AST 37 33   ALT 79* 63*   ANIONGAP 10 8       Significant Imaging: I have reviewed all pertinent imaging results/findings within the past 24 hours.    Assessment/Plan:      * Calculus of bile duct without cholecystitis with obstruction  Patient is on Zosyn  [facility]  Scheduled for lap harjinder in a.m..      Chronic diastolic heart failure  Patient is identified as having Diastolic (HFpEF) heart failure that is Chronic. CHF is currently controlled. Latest ECHO performed and demonstrates- Results for orders placed during the hospital encounter of 06/13/22    Echo    Interpretation Summary  · The left ventricle is normal in size with severe concentric hypertrophy and normal systolic function.  · Mild left atrial enlargement.  · The estimated ejection fraction is 55%.  · Grade II left ventricular diastolic dysfunction.  · Normal right ventricular size with normal right ventricular systolic function.  .   monitor clinical status closely. Monitor on telemetry. Patient is off CHF pathway.  Monitor strict Is&Os and daily weights.  Place on fluid restriction of 2 L. Continue to stress to patient importance of self efficacy and  on diet for CHF. Last BNP reviewed- and noted below   Recent Labs   Lab 10/05/24  2048   *     .            Dementia  Patient with dementia with likely etiology of alzheimer's dementia. Dementia is moderate. The patient does not have signs of behavioral disturbance. Home dementia medications are held.  Continue non-pharmacologic interventions to prevent delirium (No VS  between 11PM-5AM, activity during day, opening blinds, providing glasses/hearing aids, and up in chair during daytime). Will avoid narcotics and benzos unless absolutely necessary. PRN anti-psychotics are not prescribed to avoid self harm behaviors.    AVM (arteriovenous malformation) of stomach, acquired with hemorrhage  Patient's hemorrhage is due to gastrointestinal bleed, patient does not have a propensity for bleeding.. Patients most recent Hgb, Hct, platelets, and INR are listed below.  Recent Labs     10/07/24  0502 10/08/24  0504 10/09/24  0530   HGB 10.9* 11.4* 11.9*   HCT 32.8* 35.0* 36.3*   PLT 99* 88* 94*       Plan  - Will trend hemoglobin/hematocrit Daily  - Will monitor and correct any coagulation defects  - Will transfuse if Hgb is <7g/dl (<8g/dl in cases of active ACS) or if patient has rapid bleeding leading to hemodynamic instability  - STABLE    PAF (paroxysmal atrial fibrillation)  Patient has paroxysmal (<7 days) atrial fibrillation. Patient is currently in  sinus Nicholas . [id number] Score: 4. The patients heart rate in the last 24 hours is as follows:  Pulse  Min: 29  Max: 53     Antiarrhythmics   None    Anticoagulants   No anticoagulants secondary to need procedure    Plan  - Replete lytes with a goal of K>4, Mg >2    - Patient's afib is currently controlled    Bradycardia  Seen by Cardiology who recommended stopping his Aricept.  This has been done heart rate has improved.      Type 2 diabetes mellitus with other specified complication, unspecified whether long term insulin use  Patient's FSGs are uncontrolled due to hyperglycemia on current medication regimen.  Last A1c reviewed-   Lab Results   Component Value Date    HGBA1C 7.0 (H) 05/07/2024     Most recent fingerstick glucose reviewed-   Recent Labs   Lab 10/10/24  1051 10/10/24  1311   POCTGLUCOSE 129* 157*       Current correctional scale  Low  Maintain anti-hyperglycemic dose as follows-   Antihyperglycemics (From admission, onward)       Start     Stop Route Frequency Ordered    10/06/24 0146  insulin aspart U-100 pen 0-5 Units         -- SubQ Before meals & nightly PRN 10/06/24 0048          Plan:  -SSI  -A1c  -Accu-checks  -Hold oral hypoglycemics while patient is in the hospital  -Hypoglycemic protocol          Dyslipidemia  Patient is chronically on statin.will not continue for now. Last Lipid Panel:   Lab Results   Component Value Date    CHOL 150 05/07/2024    HDL 36 (L) 05/07/2024    LDLCALC 94.2 05/07/2024    TRIG 99 05/07/2024    CHOLHDL 24.0 05/07/2024     Plan:  -Hold home medication due to elevated LFTs  -low fat/low calorie diet        Essential hypertension  Chronic, controlled.  Latest blood pressure and vitals reviewed-   Temp:  [97.4 °F (36.3 °C)-98.1 °F (36.7 °C)]   Pulse:  [29-53]   Resp:  [16-40]   BP: (111-196)/(59-82)   SpO2:  [92 %-100 %] .   Home meds for hypertension were reviewed and noted below.   Hypertension Medications               isosorbide mononitrate (IMDUR) 120 MG 24 hr tablet TAKE 1 TABLET BY MOUTH EVERY DAY    valsartan (DIOVAN) 160 MG tablet Take 1 tablet (160 mg total) by mouth once daily.            While in the hospital, will manage blood pressure as follows, hold valsartan    Will utilize p.r.n. blood pressure medication only if patient's blood pressure greater than  180/110 and he develops symptoms such as worsening chest pain or shortness of breath.      Coronary artery disease of native artery of native heart with stable angina pectoris  Patient with known CAD s/p stent placement, which is controlled. and monitor for S/Sx of angina/ACS. Continue to monitor on telemetry.       VTE Risk Mitigation (From admission, onward)           Ordered     Reason for No Pharmacological VTE Prophylaxis  Once        Question:  Reasons:  Answer:  Physician Provided (leave comment)  Comment:  Pending potential surgical intervention    10/06/24 0048     IP VTE HIGH RISK PATIENT  Once         10/06/24 0048     Place  sequential compression device  Until discontinued         10/06/24 0048                    Discharge Planning   ZEINA:      Code Status: Prior   Is the patient medically ready for discharge?:     Reason for patient still in hospital (select all that apply): Treatment  Discharge Plan A: Home with family                  Mar Cheney MD  Department of Hospital Medicine   [place] [facility] Surg

## 2024-10-10 NOTE — ASSESSMENT & PLAN NOTE
Patient has paroxysmal (<7 days) atrial fibrillation. Patient is currently in  sinus Nicholas . [id number] Score: 4. The patients heart rate in the last 24 hours is as follows:  Pulse  Min: 29  Max: 53     Antiarrhythmics   None    Anticoagulants   No anticoagulants secondary to need procedure    Plan  - Replete lytes with a goal of K>4, Mg >2    - Patient's afib is currently controlled

## 2024-10-11 ENCOUNTER — ANESTHESIA (OUTPATIENT)
Dept: SURGERY | Facility: HOSPITAL | Age: 84
End: 2024-10-11
Payer: MEDICARE

## 2024-10-11 ENCOUNTER — ANESTHESIA EVENT (OUTPATIENT)
Dept: SURGERY | Facility: HOSPITAL | Age: 84
End: 2024-10-11
Payer: MEDICARE

## 2024-10-11 LAB
ABO + RH BLD: NORMAL
ALBUMIN SERPL BCP-MCNC: 2.8 G/DL (ref 3.5–5.2)
ALP SERPL-CCNC: 153 U/L (ref 55–135)
ALT SERPL W/O P-5'-P-CCNC: 66 U/L (ref 10–44)
ANION GAP SERPL CALC-SCNC: 10 MMOL/L (ref 8–16)
AST SERPL-CCNC: 43 U/L (ref 10–40)
BASOPHILS # BLD AUTO: 0.02 K/UL (ref 0–0.2)
BASOPHILS NFR BLD: 0.3 % (ref 0–1.9)
BILIRUB SERPL-MCNC: 1.4 MG/DL (ref 0.1–1)
BLD GP AB SCN CELLS X3 SERPL QL: NORMAL
BUN SERPL-MCNC: 17 MG/DL (ref 8–23)
CALCIUM SERPL-MCNC: 8.7 MG/DL (ref 8.7–10.5)
CHLORIDE SERPL-SCNC: 106 MMOL/L (ref 95–110)
CO2 SERPL-SCNC: 24 MMOL/L (ref 23–29)
CREAT SERPL-MCNC: 1.2 MG/DL (ref 0.5–1.4)
DIFFERENTIAL METHOD BLD: ABNORMAL
EOSINOPHIL # BLD AUTO: 0 K/UL (ref 0–0.5)
EOSINOPHIL NFR BLD: 0.3 % (ref 0–8)
ERYTHROCYTE [DISTWIDTH] IN BLOOD BY AUTOMATED COUNT: 13.9 % (ref 11.5–14.5)
EST. GFR  (NO RACE VARIABLE): 59 ML/MIN/1.73 M^2
GLUCOSE SERPL-MCNC: 144 MG/DL (ref 70–110)
HCT VFR BLD AUTO: 37.4 % (ref 40–54)
HGB BLD-MCNC: 12.4 G/DL (ref 14–18)
IMM GRANULOCYTES # BLD AUTO: 0.03 K/UL (ref 0–0.04)
IMM GRANULOCYTES NFR BLD AUTO: 0.5 % (ref 0–0.5)
LIPASE SERPL-CCNC: >1000 U/L (ref 4–60)
LYMPHOCYTES # BLD AUTO: 0.7 K/UL (ref 1–4.8)
LYMPHOCYTES NFR BLD: 10.4 % (ref 18–48)
MAGNESIUM SERPL-MCNC: 1.9 MG/DL (ref 1.6–2.6)
MCH RBC QN AUTO: 29.5 PG (ref 27–31)
MCHC RBC AUTO-ENTMCNC: 33.2 G/DL (ref 32–36)
MCV RBC AUTO: 89 FL (ref 82–98)
MONOCYTES # BLD AUTO: 0.4 K/UL (ref 0.3–1)
MONOCYTES NFR BLD: 6 % (ref 4–15)
NEUTROPHILS # BLD AUTO: 5.2 K/UL (ref 1.8–7.7)
NEUTROPHILS NFR BLD: 82.5 % (ref 38–73)
NRBC BLD-RTO: 0 /100 WBC
PLATELET # BLD AUTO: 109 K/UL (ref 150–450)
PMV BLD AUTO: 11.3 FL (ref 9.2–12.9)
POTASSIUM SERPL-SCNC: 3.8 MMOL/L (ref 3.5–5.1)
PROT SERPL-MCNC: 6.2 G/DL (ref 6–8.4)
RBC # BLD AUTO: 4.2 M/UL (ref 4.6–6.2)
SODIUM SERPL-SCNC: 140 MMOL/L (ref 136–145)
SPECIMEN OUTDATE: NORMAL
WBC # BLD AUTO: 6.34 K/UL (ref 3.9–12.7)

## 2024-10-11 PROCEDURE — 93010 ELECTROCARDIOGRAM REPORT: CPT | Mod: HCNC,,, | Performed by: STUDENT IN AN ORGANIZED HEALTH CARE EDUCATION/TRAINING PROGRAM

## 2024-10-11 PROCEDURE — 36415 COLL VENOUS BLD VENIPUNCTURE: CPT | Mod: HCNC | Performed by: NURSE PRACTITIONER

## 2024-10-11 PROCEDURE — 97116 GAIT TRAINING THERAPY: CPT | Mod: HCNC

## 2024-10-11 PROCEDURE — 83735 ASSAY OF MAGNESIUM: CPT | Mod: HCNC | Performed by: INTERNAL MEDICINE

## 2024-10-11 PROCEDURE — 93005 ELECTROCARDIOGRAM TRACING: CPT | Mod: HCNC

## 2024-10-11 PROCEDURE — 86850 RBC ANTIBODY SCREEN: CPT | Mod: HCNC | Performed by: NURSE PRACTITIONER

## 2024-10-11 PROCEDURE — 86900 BLOOD TYPING SEROLOGIC ABO: CPT | Mod: HCNC | Performed by: NURSE PRACTITIONER

## 2024-10-11 PROCEDURE — 97162 PT EVAL MOD COMPLEX 30 MIN: CPT | Mod: HCNC

## 2024-10-11 PROCEDURE — 80053 COMPREHEN METABOLIC PANEL: CPT | Mod: HCNC | Performed by: INTERNAL MEDICINE

## 2024-10-11 PROCEDURE — 85025 COMPLETE CBC W/AUTO DIFF WBC: CPT | Mod: HCNC | Performed by: INTERNAL MEDICINE

## 2024-10-11 PROCEDURE — 86901 BLOOD TYPING SEROLOGIC RH(D): CPT | Mod: HCNC | Performed by: NURSE PRACTITIONER

## 2024-10-11 PROCEDURE — 99233 SBSQ HOSP IP/OBS HIGH 50: CPT | Mod: HCNC,,,

## 2024-10-11 PROCEDURE — 99232 SBSQ HOSP IP/OBS MODERATE 35: CPT | Mod: HCNC,,, | Performed by: PHYSICIAN ASSISTANT

## 2024-10-11 PROCEDURE — 11000001 HC ACUTE MED/SURG PRIVATE ROOM: Mod: HCNC

## 2024-10-11 PROCEDURE — 83690 ASSAY OF LIPASE: CPT | Mod: HCNC | Performed by: INTERNAL MEDICINE

## 2024-10-11 PROCEDURE — 63600175 PHARM REV CODE 636 W HCPCS: Mod: HCNC | Performed by: INTERNAL MEDICINE

## 2024-10-11 PROCEDURE — 25000003 PHARM REV CODE 250: Mod: HCNC | Performed by: INTERNAL MEDICINE

## 2024-10-11 RX ADMIN — PIPERACILLIN SODIUM AND TAZOBACTAM SODIUM 4.5 G: 4; .5 INJECTION, POWDER, FOR SOLUTION INTRAVENOUS at 09:10

## 2024-10-11 RX ADMIN — SODIUM CHLORIDE, SODIUM LACTATE, POTASSIUM CHLORIDE, AND CALCIUM CHLORIDE: 600; 310; 30; 20 INJECTION, SOLUTION INTRAVENOUS at 12:10

## 2024-10-11 RX ADMIN — PIPERACILLIN SODIUM AND TAZOBACTAM SODIUM 4.5 G: 4; .5 INJECTION, POWDER, FOR SOLUTION INTRAVENOUS at 12:10

## 2024-10-11 RX ADMIN — PIPERACILLIN SODIUM AND TAZOBACTAM SODIUM 4.5 G: 4; .5 INJECTION, POWDER, FOR SOLUTION INTRAVENOUS at 04:10

## 2024-10-11 RX ADMIN — PANTOPRAZOLE SODIUM 40 MG: 40 TABLET, DELAYED RELEASE ORAL at 09:10

## 2024-10-11 RX ADMIN — ISOSORBIDE MONONITRATE 120 MG: 60 TABLET, EXTENDED RELEASE ORAL at 09:10

## 2024-10-11 NOTE — PROGRESS NOTES
[facility]  General Surgery  Progress Note    Subjective:     History of Present Illness:  85 y/o male with past medical history significant for DMII, CAD with multiple PCI procedures, chronic diastolic CHF, afib with h/o CVA,  HTN, DM, LAE, LVH, PAD, AAA stent graft (approx 2013), dementia, hyperlipidemia who presented to the ED approximately 2 days ago for evaluation after his wife was concerned about tremors and the appearance of his skin. He is a poor historian secondary to dementia. CT abdomen pelvis unremarkable. U/S abdomen showing cholelithiasis without evidence of cholecystitis, but an MRCP was obtained secondary to elevated LFTs and Tbili. MRCP with significant choledocholithiasis. Patient was transported to [place] earlier today to attempt ERCP, but he became significant bradycardic and the procedure was aborted. His LFTs are trending down today. He reports being hungry at the time of exam, and his wife denies any past abdominal surgical history.    Post-Op Info:  Procedure(s) (LRB):  XI ROBOTIC CHOLECYSTECTOMY (N/A)         Interval History: ERCP yesterday with clearing of CBD and stent placement. Will delayed cholecystectomy secondary to persistent bradycardia and post ERCP pancreatitis    Medications:  Continuous Infusions:   lactated ringers   Intravenous Continuous 75 mL/hr at 10/11/24 0000 New Bag at 10/11/24 0000     Scheduled Meds:   isosorbide mononitrate  120 mg Oral Daily    pantoprazole  40 mg Oral Daily    piperacillin-tazobactam (Zosyn) IV (PEDS and ADULTS) (extended infusion is not appropriate)  4.5 g Intravenous Q8H     PRN Meds:  Current Facility-Administered Medications:     acetaminophen, 650 mg, Rectal, Q6H PRN    acetaminophen, 650 mg, Oral, Q8H PRN    albuterol-ipratropium, 3 mL, Nebulization, Q6H PRN    aluminum-magnesium hydroxide-simethicone, 30 mL, Oral, QID PRN    dextrose 10%, 12.5 g, Intravenous, PRN    dextrose 10%, 25 g, Intravenous, PRN    gabapentin, 100 mg,  Oral, BID PRN    glucagon (human recombinant), 1 mg, Intramuscular, PRN    glucose, 16 g, Oral, PRN    glucose, 24 g, Oral, PRN    HYDROcodone-acetaminophen, 1 tablet, Oral, Q6H PRN    insulin aspart U-100, 0-5 Units, Subcutaneous, QID (AC + HS) PRN    naloxone, 0.02 mg, Intravenous, PRN    ondansetron, 4 mg, Intravenous, Q8H PRN    promethazine, 25 mg, Oral, Q6H PRN    senna-docusate 8.6-50 mg, 1 tablet, Oral, BID PRN    sodium chloride 0.9%, 10 mL, Intravenous, Q12H PRN     Review of patient's allergies indicates:   Allergen Reactions    Metoprolol Other (See Comments)     Junctional rhythm       Objective:     Vital Signs (Most Recent):  Temp: 98.1 °F (36.7 °C) (10/11/24 0814)  Pulse: (!) 37 (10/11/24 0823)  Resp: 18 (10/11/24 0814)  BP: (!) 180/77 (10/11/24 0814)  SpO2: 95 % (10/11/24 0814) Vital Signs (24h Range):  Temp:  [97.7 °F (36.5 °C)-98.1 °F (36.7 °C)] 98.1 °F (36.7 °C)  Pulse:  [29-51] 37  Resp:  [16-40] 18  SpO2:  [92 %-100 %] 95 %  BP: (143-196)/(63-86) 180/77     Weight: 64 kg (141 lb)  Body mass index is 20.23 kg/m².    Intake/Output - Last 3 Shifts         10/09 0700  10/10 0659 10/10 0700  10/11 0659 10/11 0700  10/12 0659    IV Piggyback  1569     Total Intake(mL/kg)  1569 (24.5)     Urine (mL/kg/hr) 200 (0.1)      Total Output 200      Net -200 +1569            Urine Occurrence 3 x 2 x     Stool Occurrence 1 x 1 x              Physical Exam  Vitals reviewed.   Constitutional:       General: He is not in acute distress.     Appearance: He is well-developed. He is not toxic-appearing.   HENT:      Head: Normocephalic and atraumatic.      Right Ear: External ear normal.      Left Ear: External ear normal.      Nose: Nose normal.      Mouth/Throat:      Mouth: Mucous membranes are moist.   Eyes:      General: No scleral icterus.     Extraocular Movements: Extraocular movements intact.      Conjunctiva/sclera: Conjunctivae normal.   Cardiovascular:      Rate and Rhythm: Regular rhythm. Bradycardia  present.   Pulmonary:      Effort: Pulmonary effort is normal. No respiratory distress.   Abdominal:      Comments: Abdomen soft, non-tender, and non-distended.    Musculoskeletal:      Cervical back: Normal range of motion and neck supple.   Skin:     General: Skin is warm and dry.      Coloration: Skin is not jaundiced.   Neurological:      Mental Status: He is alert and oriented to person, place, and time.   Psychiatric:         Behavior: Behavior normal.          Significant Labs:  I have reviewed all pertinent lab results within the past 24 hours.  CBC:   Recent Labs   Lab 10/11/24  0611   WBC 6.34   RBC 4.20*   HGB 12.4*   HCT 37.4*   *   MCV 89   MCH 29.5   MCHC 33.2     CMP:   Recent Labs   Lab 10/11/24  0611   *   CALCIUM 8.7   ALBUMIN 2.8*   PROT 6.2      K 3.8   CO2 24      BUN 17   CREATININE 1.2   ALKPHOS 153*   ALT 66*   AST 43*   BILITOT 1.4*       Significant Diagnostics:  I have reviewed all pertinent imaging results/findings within the past 24 hours.  ERCP from yesterday reviewed as above  Assessment/Plan:     * Calculus of bile duct without cholecystitis with obstruction  ERCP yesterday with clearing of choledocholithiasis and stent placement, now with concern for post ERCP pancreatitis with lipase >1000    Discussed that cholecystectomy is generally recommended after resolution of choledocholithiasis to prevent future episodes.   Patient will need medical optimization with cardiology regarding persistent bradycardia prior to undergoing anesthesia for cholecystectomy  IVF and bowel rest for potential post ERCP pancreatitis.  Remainder of care per primary team. Surgery will continue to follow. Could potentially perform cholecystectomy as an outpatient later as he needs further cardiac workup before general anesthetic.    Chronic diastolic heart failure  Currently with significant bradycardic, prohibitive of procedures    Awaiting cardiology recommendations prior to  ERCP  and subsequent cholecystectomy    Bradycardia  Continued work-up/treatment per cardiology and medicine    Type 2 diabetes mellitus with other specified complication, unspecified whether long term insulin use  Management per medicine    Dyslipidemia  Management per medicine    Essential hypertension  Management per medicine    Coronary artery disease of native artery of native heart with stable angina pectoris  Management per medicine/cardiology        Jacquelyn Sun PA-C  General Surgery  [facility]

## 2024-10-11 NOTE — ASSESSMENT & PLAN NOTE
ERCP yesterday with clearing of choledocholithiasis and stent placement, now with concern for post ERCP pancreatitis with lipase >1000    Discussed that cholecystectomy is generally recommended after resolution of choledocholithiasis to prevent future episodes.   Patient will need medical optimization with cardiology regarding persistent bradycardia prior to undergoing anesthesia for cholecystectomy  IVF and bowel rest for potential post ERCP pancreatitis.  Remainder of care per primary team. Surgery will continue to follow. Could potentially perform cholecystectomy as an outpatient later as he needs further cardiac workup before general anesthetic.

## 2024-10-11 NOTE — PT/OT/SLP PROGRESS
"Occupational Therapy      Patient Name:  Aquiles Yates   MRN:  [id number]    Patient not seen today secondary to Patient unwilling to participate." Leave me alone." Will follow-up  on later date.    10/11/2024  Nell Mckeon, OT  1145  "

## 2024-10-11 NOTE — PROGRESS NOTES
[facility] Medicine  Progress Note    Patient Name: Aquiles Yates  MRN: [id number]  Patient Class: IP- Inpatient   Admission Date: 10/5/2024  Length of Stay: 5 days  Attending Physician: Mar Granados MD  Primary Care Provider: Holger Thornton MD        Subjective:     Principal Problem:Calculus of bile duct without cholecystitis with obstruction        HPI:  Aquiles Yates is a 84 y.o. male with a PMH  has a past medical history of Acute blood loss anemia (10/14/2019), Alzheimer's dementia, Aneurysm, abdominal aortic, BCC (basal cell carcinoma of skin) (06/29/2023), Chronic diastolic heart failure (05/20/2024), Coronary artery disease, Depression, Diabetes mellitus, HLD (hyperlipidemia), Hypertension, Kidney stone, PAD (peripheral artery disease), PAF (paroxysmal atrial fibrillation) (01/24/2023), and Tobacco abuse.presented to the Emergency Department for evaluation of tremors and mottled skin per wife. Wife called the paramedics when she noticed patient shaking and patient's mottled skin today. Pt has a history of Alzheimer's and paramedics report a GCS of 14. Upon interview, pt denies any pain and is oriented to self. No further complaints or concerns at this time.       ER workup revealed CBC to be unremarkable.  CMP revealed CBG of 202 mg/dL, , total bili of 2.6, and AST/ALT of 242/179.  .  Troponin negative.  Lactic acid 2.8.  Flu COVID negative.  UA unremarkable.  CTA abdomen and pelvis without contrast revealed no acute findings.  Chest x-ray negative for acute cardiopulmonary findings.  Ultrasound limited revealed cholelithiasis without definitive sonographic evidence of acute cholecystitis.  Mild intrahepatic biliary duct dilation.  Vital signs stable. EKG revealed sinus Nicholas with occasional PVCs with a ventricular rate of 56 beats per minute and a QT/QTC of 408/393.  Patient received 4.5 g Zosyn in ED. GI consulted.  In agreement with  MRCP and will see us consult in a.m..  Patient in agreement with treatment plan.  Patient will be admitted under inpatient status.    PCP: Holger Thornton      Overview/Hospital Course:  Patient is an 84-year-old male with a history of anemia, Alzheimer's dementia, aneurysm, chronic diastolic failure, coronary artery disease diabetes mellitus, hypertension, PAF who presented emergency department with tremors.  Patient's wife said she was he was shaking and had mottled skin.  Patient denied any complaints.  In the emergency department workup revealed glucose of 202, alk-phos of 387, total bili of 2.6, AST of 242 ALT of 179, BNP of 323, lactic acid of 2.4.  CT scan of abdomen pelvis without contrast revealed no acute findings, ultrasound revealed cholelithiasis with ductal dilatation.  Patient was admitted with diagnosis of acute cholecystitis choledocholithiasis.  He was started on Zosyn.  GI was consulted MRCP was ordered with plans for ERCP in [place].  Patient's heart rate was in the 40s yesterday EKG revealed sinus bradycardia.  He went to [place] today for ERCP however heart rate was less than 40 therefore he was sent back for cardiology evaluation.  Patient denies any pain.    Cardiology saw patient with recommendations to stop his Aricept as it was make be contributing to his bradycardia, and may take 2 weeks to washout.  Patient was tolerating diet and LFTs have improved.    Status post ERCP:                        - The major papilla appeared normal.                          - The common bile duct was dilated.                          - Choledocholithiasis was found. Complete removal                          was accomplished by biliary sphincterotomy and                          balloon extraction.                          - A biliary sphincterotomy was performed.                          - The biliary tree was swept.                          - One biliary stent was placed into the common                           bile duct.     Patient was scheduled for cholecystectomy 10/11 however heart rate was in the 40s.  Dr. Taylor reported anesthesia felt was not safe for him to have surgery therefore it has been put off.    Interval History:  Patient is angry about being NPO.  He denies any complaints.    Review of Systems   Constitutional:  Positive for appetite change (Hungry). Negative for fatigue.   Respiratory:  Negative for shortness of breath.    Cardiovascular:  Negative for chest pain, palpitations and leg swelling.   Gastrointestinal:  Negative for abdominal pain.   Neurological:  Positive for weakness.   All other systems reviewed and are negative.    Objective:     Vital Signs (Most Recent):  Temp: 97.6 °F (36.4 °C) (10/11/24 1608)  Pulse: 88 (10/11/24 1608)  Resp: 20 (10/11/24 1608)  BP: (!) 122/57 (10/11/24 1608)  SpO2: (!) 92 % (10/11/24 1608) Vital Signs (24h Range):  Temp:  [97.6 °F (36.4 °C)-98.1 °F (36.7 °C)] 97.6 °F (36.4 °C)  Pulse:  [34-88] 88  Resp:  [16-20] 20  SpO2:  [92 %-97 %] 92 %  BP: (122-191)/(57-86) 122/57     Weight: 64 kg (141 lb)  Body mass index is 20.23 kg/m².    Intake/Output Summary (Last 24 hours) at 10/11/2024 1625  Last data filed at 10/11/2024 0644  Gross per 24 hour   Intake 100 ml   Output --   Net 100 ml         Physical Exam  Vitals and nursing note reviewed.   Constitutional:       Appearance: He is ill-appearing.   Cardiovascular:      Rate and Rhythm: Regular rhythm. Bradycardia present.      Heart sounds: Heart sounds are distant.   Pulmonary:      Effort: Pulmonary effort is normal.      Breath sounds: Normal breath sounds.   Abdominal:      General: Abdomen is scaphoid. Bowel sounds are normal. There is no distension.      Palpations: Abdomen is soft.      Tenderness: There is no abdominal tenderness. There is no guarding or rebound.   Neurological:      Mental Status: He is alert.      Motor: Weakness present.   Psychiatric:         Cognition and Memory:  Cognition is impaired. Memory is impaired.             Significant Labs: All pertinent labs within the past 24 hours have been reviewed.  CBC:   Recent Labs   Lab 10/10/24  0517 10/11/24  0611   WBC 3.89* 6.34   HGB 11.5* 12.4*   HCT 34.9* 37.4*   PLT 97* 109*     CMP:   Recent Labs   Lab 10/10/24  0517 10/11/24  0611    140   K 3.6 3.8    106   CO2 27 24   * 144*   BUN 15 17   CREATININE 1.4 1.2   CALCIUM 8.6* 8.7   PROT 6.0 6.2   ALBUMIN 2.8* 2.8*   BILITOT 1.3* 1.4*   ALKPHOS 163* 153*   AST 33 43*   ALT 63* 66*   ANIONGAP 8 10       Significant Imaging: I have reviewed all pertinent imaging results/findings within the past 24 hours.    Assessment/Plan:      * Calculus of bile duct without cholecystitis with obstruction  Patient is on Zosyn  [facility] [place] [facility]  Was scheduled for robotic cholecystectomy 10/11 however given bradycardia this has been postponed.  Post ERCP pancreatitis.  Lipase greater than 1000.  We will continue with hydration, start liquid diet, repeat lipase a.m..  After discussing with Dr. Taylor he will likely be discharged home to follow up outpatient after bradycardia and pancreatitis have resolved.      Chronic diastolic heart failure  Patient is identified as having Diastolic (HFpEF) heart failure that is Chronic. CHF is currently controlled. Latest ECHO performed and demonstrates- Results for orders placed during the hospital encounter of 06/13/22    Echo    Interpretation Summary  · The left ventricle is normal in size with severe concentric hypertrophy and normal systolic function.  · Mild left atrial enlargement.  · The estimated ejection fraction is 55%.  · Grade II left ventricular diastolic dysfunction.  · Normal right ventricular size with normal right ventricular systolic function.  .   monitor clinical status closely. Monitor on telemetry. Patient is off CHF pathway.  Monitor strict Is&Os and daily  weights.  Place on fluid restriction of 2 L. Continue to stress to patient importance of self efficacy and  on diet for CHF. Last BNP reviewed- and noted below   Recent Labs   Lab 10/05/24  2048   *     .            Dementia  Patient with dementia with likely etiology of alzheimer's dementia. Dementia is moderate. The patient does not have signs of behavioral disturbance. Home dementia medications are held.  Continue non-pharmacologic interventions to prevent delirium (No VS between 11PM-5AM, activity during day, opening blinds, providing glasses/hearing aids, and up in chair during daytime). Will avoid narcotics and benzos unless absolutely necessary. PRN anti-psychotics are not prescribed to avoid self harm behaviors.    AVM (arteriovenous malformation) of stomach, acquired with hemorrhage  Patient's hemorrhage is due to gastrointestinal bleed, patient does not have a propensity for bleeding.. Patients most recent Hgb, Hct, platelets, and INR are listed below.  Recent Labs     10/09/24  0530 10/10/24  0517 10/11/24  0611   HGB 11.9* 11.5* 12.4*   HCT 36.3* 34.9* 37.4*   PLT 94* 97* 109*       Plan  - Will trend hemoglobin/hematocrit Daily  - Will monitor and correct any coagulation defects  - Will transfuse if Hgb is <7g/dl (<8g/dl in cases of active ACS) or if patient has rapid bleeding leading to hemodynamic instability  - STABLE    PAF (paroxysmal atrial fibrillation)  Patient has paroxysmal (<7 days) atrial fibrillation. Patient is currently in  sinus Nicholas . [id number] Score: 4. The patients heart rate in the last 24 hours is as follows:  Pulse  Min: 34  Max: 88     Antiarrhythmics   None    Anticoagulants   No anticoagulants secondary to need procedure    Plan  - Replete lytes with a goal of K>4, Mg >2    - Patient's afib is currently controlled    Bradycardia  Seen by Cardiology who recommended stopping his Aricept.  This has been done heart rate has improved slightly however anesthesia would  like further cardiac evaluation prior to surgery.      Type 2 diabetes mellitus with other specified complication, unspecified whether long term insulin use  Patient's FSGs are uncontrolled due to hyperglycemia on current medication regimen.  Last A1c reviewed-   Lab Results   Component Value Date    HGBA1C 7.0 (H) 05/07/2024         Current correctional scale  Low  Maintain anti-hyperglycemic dose as follows-   Antihyperglycemics (From admission, onward)      Start     Stop Route Frequency Ordered    10/06/24 0146  insulin aspart U-100 pen 0-5 Units         -- SubQ Before meals & nightly PRN 10/06/24 0048          Plan:  -SSI  -A1c  -Accu-checks  -Hold oral hypoglycemics while patient is in the hospital  -Hypoglycemic protocol          Dyslipidemia  Patient is chronically on statin.will not continue for now. Last Lipid Panel:   Lab Results   Component Value Date    CHOL 150 05/07/2024    HDL 36 (L) 05/07/2024    LDLCALC 94.2 05/07/2024    TRIG 99 05/07/2024    CHOLHDL 24.0 05/07/2024     Plan:  -Hold home medication due to elevated LFTs  -low fat/low calorie diet        Essential hypertension  Chronic, controlled.  Latest blood pressure and vitals reviewed-   Temp:  [97.6 °F (36.4 °C)-98.1 °F (36.7 °C)]   Pulse:  [34-88]   Resp:  [16-20]   BP: (122-191)/(57-86)   SpO2:  [92 %-97 %] .   Home meds for hypertension were reviewed and noted below.   Hypertension Medications               isosorbide mononitrate (IMDUR) 120 MG 24 hr tablet TAKE 1 TABLET BY MOUTH EVERY DAY    valsartan (DIOVAN) 160 MG tablet Take 1 tablet (160 mg total) by mouth once daily.            While in the hospital, will manage blood pressure as follows, hold valsartan    Will utilize p.r.n. blood pressure medication only if patient's blood pressure greater than  180/110 and he develops symptoms such as worsening chest pain or shortness of breath.      Coronary artery disease of native artery of native heart with stable angina pectoris  Patient  with known CAD s/p stent placement, which is controlled. and monitor for S/Sx of angina/ACS. Continue to monitor on telemetry.       VTE Risk Mitigation (From admission, onward)           Ordered     Reason for No Pharmacological VTE Prophylaxis  Once        Question:  Reasons:  Answer:  Physician Provided (leave comment)  Comment:  Pending potential surgical intervention    10/06/24 0048     IP VTE HIGH RISK PATIENT  Once         10/06/24 0048     Place sequential compression device  Until discontinued         10/06/24 0048                    Discharge Planning   ZEINA:      Code Status: Full Code   Is the patient medically ready for discharge?:     Reason for patient still in hospital (select all that apply): Patient trending condition and Consult recommendations  Discharge Plan A: Home with family                  Mar Cheney MD  Department of Hospital Medicine   [facility]

## 2024-10-11 NOTE — SUBJECTIVE & OBJECTIVE
Subjective:     Interval History: Patient s/p ERCP with stone removal. Lipase elevated but patient denies nausea, vomiting or abdominal pain.     Review of Systems   Constitutional:         See Interval History for daily ROS.      Objective:     Vital Signs (Most Recent):  Temp: 98.1 °F (36.7 °C) (10/11/24 0814)  Pulse: (!) 37 (10/11/24 0823)  Resp: 18 (10/11/24 0814)  BP: (!) 180/77 (10/11/24 0814)  SpO2: 95 % (10/11/24 0814) Vital Signs (24h Range):  Temp:  [97.7 °F (36.5 °C)-98.1 °F (36.7 °C)] 98.1 °F (36.7 °C)  Pulse:  [29-51] 37  Resp:  [16-40] 18  SpO2:  [92 %-100 %] 95 %  BP: (143-196)/(63-86) 180/77     Weight: 64 kg (141 lb) (10/09/24 1239)  Body mass index is 20.23 kg/m².      Intake/Output Summary (Last 24 hours) at 10/11/2024 0925  Last data filed at 10/11/2024 0644  Gross per 24 hour   Intake 500 ml   Output --   Net 500 ml       Lines/Drains/Airways       Peripheral Intravenous Line  Duration                  Peripheral IV - Single Lumen 10/11/24 0049 22 G Anterior;Distal;Right Upper Arm <1 day         Peripheral IV - Single Lumen 10/11/24 0050 20 G Anterior;Left Upper Arm <1 day                     Physical Exam  Constitutional:       General: He is not in acute distress.     Appearance: Normal appearance. He is well-developed.   HENT:      Head: Normocephalic and atraumatic.   Eyes:      Extraocular Movements: Extraocular movements intact.   Cardiovascular:      Rate and Rhythm: Normal rate and regular rhythm.   Pulmonary:      Effort: Pulmonary effort is normal. No respiratory distress.      Breath sounds: Normal breath sounds. No wheezing.   Abdominal:      General: Bowel sounds are normal. There is no distension.      Palpations: Abdomen is soft.      Tenderness: There is no abdominal tenderness.   Neurological:      Mental Status: He is alert.      Cranial Nerves: No cranial nerve deficit.   Psychiatric:         Behavior: Behavior normal.          Significant Labs:  CBC:   Recent Labs   Lab  "10/10/24  0517 10/11/24  0611   WBC 3.89* 6.34   HGB 11.5* 12.4*   HCT 34.9* 37.4*   PLT 97* 109*     CMP:   Recent Labs   Lab 10/11/24  0611   *   CALCIUM 8.7   ALBUMIN 2.8*   PROT 6.2      K 3.8   CO2 24      BUN 17   CREATININE 1.2   ALKPHOS 153*   ALT 66*   AST 43*   BILITOT 1.4*     Coagulation: No results for input(s): "PT", "INR", "APTT" in the last 48 hours.      Significant Imaging:  Imaging results within the past 24 hours have been reviewed.  "

## 2024-10-11 NOTE — ASSESSMENT & PLAN NOTE
Seen by Cardiology who recommended stopping his Aricept.  This has been done heart rate has improved slightly however anesthesia would like further cardiac evaluation prior to surgery.

## 2024-10-11 NOTE — SUBJECTIVE & OBJECTIVE
Interval History: ERCP yesterday with clearing of CBD and stent placement. Will delayed cholecystectomy secondary to persistent bradycardia and post ERCP pancreatitis    Medications:  Continuous Infusions:   lactated ringers   Intravenous Continuous 75 mL/hr at 10/11/24 0000 New Bag at 10/11/24 0000     Scheduled Meds:   isosorbide mononitrate  120 mg Oral Daily    pantoprazole  40 mg Oral Daily    piperacillin-tazobactam (Zosyn) IV (PEDS and ADULTS) (extended infusion is not appropriate)  4.5 g Intravenous Q8H     PRN Meds:  Current Facility-Administered Medications:     acetaminophen, 650 mg, Rectal, Q6H PRN    acetaminophen, 650 mg, Oral, Q8H PRN    albuterol-ipratropium, 3 mL, Nebulization, Q6H PRN    aluminum-magnesium hydroxide-simethicone, 30 mL, Oral, QID PRN    dextrose 10%, 12.5 g, Intravenous, PRN    dextrose 10%, 25 g, Intravenous, PRN    gabapentin, 100 mg, Oral, BID PRN    glucagon (human recombinant), 1 mg, Intramuscular, PRN    glucose, 16 g, Oral, PRN    glucose, 24 g, Oral, PRN    HYDROcodone-acetaminophen, 1 tablet, Oral, Q6H PRN    insulin aspart U-100, 0-5 Units, Subcutaneous, QID (AC + HS) PRN    naloxone, 0.02 mg, Intravenous, PRN    ondansetron, 4 mg, Intravenous, Q8H PRN    promethazine, 25 mg, Oral, Q6H PRN    senna-docusate 8.6-50 mg, 1 tablet, Oral, BID PRN    sodium chloride 0.9%, 10 mL, Intravenous, Q12H PRN     Review of patient's allergies indicates:   Allergen Reactions    Metoprolol Other (See Comments)     Junctional rhythm       Objective:     Vital Signs (Most Recent):  Temp: 98.1 °F (36.7 °C) (10/11/24 0814)  Pulse: (!) 37 (10/11/24 0823)  Resp: 18 (10/11/24 0814)  BP: (!) 180/77 (10/11/24 0814)  SpO2: 95 % (10/11/24 0814) Vital Signs (24h Range):  Temp:  [97.7 °F (36.5 °C)-98.1 °F (36.7 °C)] 98.1 °F (36.7 °C)  Pulse:  [29-51] 37  Resp:  [16-40] 18  SpO2:  [92 %-100 %] 95 %  BP: (143-196)/(63-86) 180/77     Weight: 64 kg (141 lb)  Body mass index is 20.23 kg/m².    Intake/Output  - Last 3 Shifts         10/09 0700  10/10 0659 10/10 0700  10/11 0659 10/11 0700  10/12 0659    IV Piggyback  1569     Total Intake(mL/kg)  1569 (24.5)     Urine (mL/kg/hr) 200 (0.1)      Total Output 200      Net -200 +1569            Urine Occurrence 3 x 2 x     Stool Occurrence 1 x 1 x              Physical Exam  Vitals reviewed.   Constitutional:       General: He is not in acute distress.     Appearance: He is well-developed. He is not toxic-appearing.   HENT:      Head: Normocephalic and atraumatic.      Right Ear: External ear normal.      Left Ear: External ear normal.      Nose: Nose normal.      Mouth/Throat:      Mouth: Mucous membranes are moist.   Eyes:      General: No scleral icterus.     Extraocular Movements: Extraocular movements intact.      Conjunctiva/sclera: Conjunctivae normal.   Cardiovascular:      Rate and Rhythm: Regular rhythm. Bradycardia present.   Pulmonary:      Effort: Pulmonary effort is normal. No respiratory distress.   Abdominal:      Comments: Abdomen soft, non-tender, and non-distended.    Musculoskeletal:      Cervical back: Normal range of motion and neck supple.   Skin:     General: Skin is warm and dry.      Coloration: Skin is not jaundiced.   Neurological:      Mental Status: He is alert and oriented to person, place, and time.   Psychiatric:         Behavior: Behavior normal.          Significant Labs:  I have reviewed all pertinent lab results within the past 24 hours.  CBC:   Recent Labs   Lab 10/11/24  0611   WBC 6.34   RBC 4.20*   HGB 12.4*   HCT 37.4*   *   MCV 89   MCH 29.5   MCHC 33.2     CMP:   Recent Labs   Lab 10/11/24  0611   *   CALCIUM 8.7   ALBUMIN 2.8*   PROT 6.2      K 3.8   CO2 24      BUN 17   CREATININE 1.2   ALKPHOS 153*   ALT 66*   AST 43*   BILITOT 1.4*       Significant Diagnostics:  I have reviewed all pertinent imaging results/findings within the past 24 hours.  ERCP from yesterday reviewed as above

## 2024-10-11 NOTE — ASSESSMENT & PLAN NOTE
Chronic, controlled.  Latest blood pressure and vitals reviewed-   Temp:  [97.6 °F (36.4 °C)-98.1 °F (36.7 °C)]   Pulse:  [34-88]   Resp:  [16-20]   BP: (122-191)/(57-86)   SpO2:  [92 %-97 %] .   Home meds for hypertension were reviewed and noted below.   Hypertension Medications               isosorbide mononitrate (IMDUR) 120 MG 24 hr tablet TAKE 1 TABLET BY MOUTH EVERY DAY    valsartan (DIOVAN) 160 MG tablet Take 1 tablet (160 mg total) by mouth once daily.            While in the hospital, will manage blood pressure as follows, hold valsartan    Will utilize p.r.n. blood pressure medication only if patient's blood pressure greater than  180/110 and he develops symptoms such as worsening chest pain or shortness of breath.

## 2024-10-11 NOTE — ASSESSMENT & PLAN NOTE
Patient's hemorrhage is due to gastrointestinal bleed, patient does not have a propensity for bleeding.. Patients most recent Hgb, Hct, platelets, and INR are listed below.  Recent Labs     10/09/24  0530 10/10/24  0517 10/11/24  0611   HGB 11.9* 11.5* 12.4*   HCT 36.3* 34.9* 37.4*   PLT 94* 97* 109*       Plan  - Will trend hemoglobin/hematocrit Daily  - Will monitor and correct any coagulation defects  - Will transfuse if Hgb is <7g/dl (<8g/dl in cases of active ACS) or if patient has rapid bleeding leading to hemodynamic instability  - STABLE

## 2024-10-11 NOTE — ASSESSMENT & PLAN NOTE
Patient is on Zosyn  Completed ERCP with stone removal [facility] 10/10 without complication  Was scheduled for robotic cholecystectomy 10/11 however given bradycardia this has been postponed.

## 2024-10-11 NOTE — PROGRESS NOTES
[facility] Surg  Gastroenterology  Progress Note    Patient Name: Aquiles Yates  MRN: [id number]  Admission Date: 10/5/2024  Hospital Length of Stay: 5 days  Code Status: Full Code   Attending Provider: Mar Granados MD  Consulting Provider: Hubert Downing PA-C  Primary Care Physician: Holger Thornton MD  Principal Problem: Calculus of bile duct without cholecystitis with obstruction        Subjective:     Interval History: Patient s/p ERCP with stone removal. Lipase elevated but patient denies nausea, vomiting or abdominal pain.     Review of Systems   Constitutional:         See Interval History for daily ROS.      Objective:     Vital Signs (Most Recent):  Temp: 98.1 °F (36.7 °C) (10/11/24 0814)  Pulse: (!) 37 (10/11/24 0823)  Resp: 18 (10/11/24 0814)  BP: (!) 180/77 (10/11/24 0814)  SpO2: 95 % (10/11/24 0814) Vital Signs (24h Range):  Temp:  [97.7 °F (36.5 °C)-98.1 °F (36.7 °C)] 98.1 °F (36.7 °C)  Pulse:  [29-51] 37  Resp:  [16-40] 18  SpO2:  [92 %-100 %] 95 %  BP: (143-196)/(63-86) 180/77     Weight: 64 kg (141 lb) (10/09/24 1239)  Body mass index is 20.23 kg/m².      Intake/Output Summary (Last 24 hours) at 10/11/2024 0925  Last data filed at 10/11/2024 0644  Gross per 24 hour   Intake 500 ml   Output --   Net 500 ml       Lines/Drains/Airways       Peripheral Intravenous Line  Duration                  Peripheral IV - Single Lumen 10/11/24 0049 22 G Anterior;Distal;Right Upper Arm <1 day         Peripheral IV - Single Lumen 10/11/24 0050 20 G Anterior;Left Upper Arm <1 day                     Physical Exam  Constitutional:       General: He is not in acute distress.     Appearance: Normal appearance. He is well-developed.   HENT:      Head: Normocephalic and atraumatic.   Eyes:      Extraocular Movements: Extraocular movements intact.   Cardiovascular:      Rate and Rhythm: Normal rate and regular rhythm.   Pulmonary:      Effort: Pulmonary effort is normal. No respiratory  "distress.      Breath sounds: Normal breath sounds. No wheezing.   Abdominal:      General: Bowel sounds are normal. There is no distension.      Palpations: Abdomen is soft.      Tenderness: There is no abdominal tenderness.   Neurological:      Mental Status: He is alert.      Cranial Nerves: No cranial nerve deficit.   Psychiatric:         Behavior: Behavior normal.          Significant Labs:  CBC:   Recent Labs   Lab 10/10/24  0517 10/11/24  0611   WBC 3.89* 6.34   HGB 11.5* 12.4*   HCT 34.9* 37.4*   PLT 97* 109*     CMP:   Recent Labs   Lab 10/11/24  0611   *   CALCIUM 8.7   ALBUMIN 2.8*   PROT 6.2      K 3.8   CO2 24      BUN 17   CREATININE 1.2   ALKPHOS 153*   ALT 66*   AST 43*   BILITOT 1.4*     Coagulation: No results for input(s): "PT", "INR", "APTT" in the last 48 hours.      Significant Imaging:  Imaging results within the past 24 hours have been reviewed.  Assessment/Plan:     GI  * Calculus of bile duct without cholecystitis with obstruction  S/p ERCP with stone extraction and stent placement.   Lipase check for some reason and elevated but patient asymptomatic and abdominal exam is benign. No pain meds given recently.   Continue IV hydration.   Ok for clear liquids given benign exam.   Cholecystectomy timing per Gen surg.   [facility] to arrange stent removal in a few weeks.   Will follow peripherally. Contact GI for symptoms.       Thank you for your consult.  Will follow peripherally.     Hubert Downing PA-C  Gastroenterology  [facility]  "

## 2024-10-11 NOTE — ASSESSMENT & PLAN NOTE
S/p ERCP with stone extraction and stent placement.   Lipase check for some reason and elevated but patient asymptomatic and abdominal exam is benign. No pain meds given recently.   Continue IV hydration.   Ok for clear liquids given benign exam.   Cholecystectomy timing per Gen surg.   Will follow peripherally. Contact GI for symptoms.    No done

## 2024-10-11 NOTE — PT/OT/SLP EVAL
Physical Therapy Evaluation    Patient Name:  Aquiles Yates   MRN:  [id number]    Recommendations:     Discharge Recommendations: Low Intensity Therapy   Discharge Equipment Recommendations: to be determined by next level of care   Barriers to discharge: None    Assessment:     Aquiles Yates is a 84 y.o. male admitted with a medical diagnosis of Calculus of bile duct without cholecystitis with obstruction.  He presents with the following impairments/functional limitations: weakness, impaired endurance, impaired self care skills, impaired functional mobility, gait instability, impaired balance, decreased lower extremity function .    Rehab Prognosis: Good; patient would benefit from acute skilled PT services to address these deficits and reach maximum level of function.    Recent Surgery: Procedure(s) (LRB):  XI ROBOTIC CHOLECYSTECTOMY (N/A)      Plan:     During this hospitalization, patient to be seen 3 x/week to address the identified rehab impairments via gait training, therapeutic activities, therapeutic exercises and progress toward the following goals:    Plan of Care Expires:  10/25/24    Subjective     Chief Complaint: NONE   Patient/Family Comments/goals: NONE STATED   Pain/Comfort:  Pain Rating 1: 0/10  Pain Rating Post-Intervention 1: 0/10    Patients cultural, spiritual, [place] conflicts given the current situation:      Living Environment:   PT  REPORTED LIVING AT HOME WITH WIFE IN A ONE STORY HOME   Prior to admission, patients level of function was MOD I WITH RW AND DOESN'T DRIVE. 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  Equipment used at home: walker, rolling.  DME owned (not currently used): none.  Upon discharge, patient will have assistance from WIFE ? / UNKOWN.    Objective:     Communicated with NURSE RAOUL AND EPIC CHART REVIEW  prior to session.  Patient found supine with peripheral IV, telemetry  upon PT entry to room.    General Precautions: Standard, fall, hearing  "impaired  Orthopedic Precautions:N/A   Braces: N/A  Respiratory Status: Room air    Exams:  Postural Exam:  Patient presented with the following abnormalities:    -       Rounded shoulders  -       Forward head  RLE ROM: WFL  RLE Strength: LIMITED  LLE ROM: WFL  LLE Strength: LIMITED    Functional Mobility:  Bed Mobility:     Rolling Left:  stand by assistance  Scooting: stand by assistance  Supine to Sit: stand by assistance  Transfers:     Sit to Stand:  contact guard assistance with rolling walker  Bed to Chair: contact guard assistance with  rolling walker  using  Stand Pivot  Gait: PT GT TRAINED X 20' WITH RW AND CGA       AM-PAC 6 CLICK MOBILITY  Total Score:18       Treatment & Education:  GT. BELT AND  SOCKS DONNED PRIOR TO OOB MOBILITY.  PT [place] AND NEEDED INC CUES FOR UNDERSTANDING. PT ASKING FOR WC WITH GT ASSESSMENT. P.T. ASSIST PT BACK TO BED D/T REQUESTING TO SIT DOWN. PT RETURNED SUP IN BED AND SCOOTED TO HOB WITH B UE SUPPORT AND BRIDGING WITH S. PT EDUCATED ON ROLE OF P.T. AND ACUTE CARE. PT EDUCATED ON "CALL DON'T FALL", ENCOURAGED TO CALL FOR ASSISTANCE WITH ALL NEEDS FOR OOB MOBILITY.      Patient left HOB elevated with call button in reach and bed alarm on.    GOALS:   Multidisciplinary Problems       Physical Therapy Goals          Problem: Physical Therapy    Goal Priority Disciplines Outcome Interventions   Physical Therapy Goal     PT, PT/OT     Description: LTG: 10/25/24  1. PT WILL COMPLETE BED MOBILITY IND  2. PT WILL STAND T/F TO CHAIR WITH RW AND SBA  3. PT WILL GT TRAIN X 150' WITH RW AND SBA TO PROGRESS GT.  4. PT WILL INC AMPAC SCORE BY 2 POINTS TO PROGRESS GROSS FUNC MOBILITY.                          History:     Past Medical History:   Diagnosis Date    Acute blood loss anemia 10/14/2019    Alzheimer's dementia     Aneurysm, abdominal aortic     BCC (basal cell carcinoma of skin) 06/29/2023    Chronic diastolic heart failure 05/20/2024    Coronary artery disease     Depression  "    Diabetes mellitus     HLD (hyperlipidemia)     Hypertension     Kidney stone     PAD (peripheral artery disease)     PAF (paroxysmal atrial fibrillation) 01/24/2023    Tobacco abuse        Past Surgical History:   Procedure Laterality Date    APPENDECTOMY      CORONARY STENT PLACEMENT      x 4    ERCP N/A 10/10/2024    Procedure: ERCP (ENDOSCOPIC RETROGRADE CHOLANGIOPANCREATOGRAPHY);  Surgeon: Morgan Hong MD;  Location: [address]);  Service: Endoscopy;  Laterality: N/A;    ESOPHAGOGASTRODUODENOSCOPY N/A 10/14/2019    Procedure: EGD (ESOPHAGOGASTRODUODENOSCOPY);  Surgeon: Carlos Mcneal III, MD;  Location: [facility];  Service: Endoscopy;  Laterality: N/A;    ESOPHAGOGASTRODUODENOSCOPY N/A 10/15/2019    Procedure: EGD (ESOPHAGOGASTRODUODENOSCOPY);  Surgeon: Carlos Mcneal III, MD;  Location: [facility];  Service: Endoscopy;  Laterality: N/A;    ESOPHAGOGASTRODUODENOSCOPY N/A 4/17/2023    Procedure: EGD (ESOPHAGOGASTRODUODENOSCOPY);  Surgeon: Nevaeh Mcconnell MD;  Location: [facility];  Service: Endoscopy;  Laterality: N/A;    LEFT HEART CATHETERIZATION Left 10/11/2019    Procedure: CATHETERIZATION, HEART, LEFT;  Surgeon: Robe Gilbert MD;  Location: [facility] CATH LAB;  Service: Cardiology;  Laterality: Left;    LEFT HEART CATHETERIZATION Left 10/13/2019    Procedure: CATHETERIZATION, HEART, LEFT;  Surgeon: Robe Gilbert MD;  Location: [facility] CATH LAB;  Service: Cardiology;  Laterality: Left;    leg stent Left        Time Tracking:     PT Received On: 10/11/24  PT Start Time: 0730     PT Stop Time: 0753  PT Total Time (min): 23 min     Billable Minutes: Evaluation 13 and Gait Training 10      10/11/2024

## 2024-10-11 NOTE — ASSESSMENT & PLAN NOTE
Patient has paroxysmal (<7 days) atrial fibrillation. Patient is currently in  sinus Nicholas . [id number] Score: 4. The patients heart rate in the last 24 hours is as follows:  Pulse  Min: 34  Max: 88     Antiarrhythmics   None    Anticoagulants   No anticoagulants secondary to need procedure    Plan  - Replete lytes with a goal of K>4, Mg >2    - Patient's afib is currently controlled

## 2024-10-11 NOTE — SUBJECTIVE & OBJECTIVE
Interval History:  Patient is angry about being NPO.  He denies any complaints.    Review of Systems   Constitutional:  Positive for appetite change (Hungry). Negative for fatigue.   Respiratory:  Negative for shortness of breath.    Cardiovascular:  Negative for chest pain, palpitations and leg swelling.   Gastrointestinal:  Negative for abdominal pain.   Neurological:  Positive for weakness.   All other systems reviewed and are negative.    Objective:     Vital Signs (Most Recent):  Temp: 97.6 °F (36.4 °C) (10/11/24 1608)  Pulse: 88 (10/11/24 1608)  Resp: 20 (10/11/24 1608)  BP: (!) 122/57 (10/11/24 1608)  SpO2: (!) 92 % (10/11/24 1608) Vital Signs (24h Range):  Temp:  [97.6 °F (36.4 °C)-98.1 °F (36.7 °C)] 97.6 °F (36.4 °C)  Pulse:  [34-88] 88  Resp:  [16-20] 20  SpO2:  [92 %-97 %] 92 %  BP: (122-191)/(57-86) 122/57     Weight: 64 kg (141 lb)  Body mass index is 20.23 kg/m².    Intake/Output Summary (Last 24 hours) at 10/11/2024 1625  Last data filed at 10/11/2024 0644  Gross per 24 hour   Intake 100 ml   Output --   Net 100 ml         Physical Exam  Vitals and nursing note reviewed.   Constitutional:       Appearance: He is ill-appearing.   Cardiovascular:      Rate and Rhythm: Regular rhythm. Bradycardia present.      Heart sounds: Heart sounds are distant.   Pulmonary:      Effort: Pulmonary effort is normal.      Breath sounds: Normal breath sounds.   Abdominal:      General: Abdomen is scaphoid. Bowel sounds are normal. There is no distension.      Palpations: Abdomen is soft.      Tenderness: There is no abdominal tenderness. There is no guarding or rebound.   Neurological:      Mental Status: He is alert.      Motor: Weakness present.   Psychiatric:         Cognition and Memory: Cognition is impaired. Memory is impaired.             Significant Labs: All pertinent labs within the past 24 hours have been reviewed.  CBC:   Recent Labs   Lab 10/10/24  0517 10/11/24  0611   WBC 3.89* 6.34   HGB 11.5* 12.4*    HCT 34.9* 37.4*   PLT 97* 109*     CMP:   Recent Labs   Lab 10/10/24  0517 10/11/24  0611    140   K 3.6 3.8    106   CO2 27 24   * 144*   BUN 15 17   CREATININE 1.4 1.2   CALCIUM 8.6* 8.7   PROT 6.0 6.2   ALBUMIN 2.8* 2.8*   BILITOT 1.3* 1.4*   ALKPHOS 163* 153*   AST 33 43*   ALT 63* 66*   ANIONGAP 8 10       Significant Imaging: I have reviewed all pertinent imaging results/findings within the past 24 hours.

## 2024-10-11 NOTE — NURSING
Patient on tele monitor, sinus Nicholas. Patient NPO for surgery in morning. Patient AAX2 and calm. Patient in no distress. All safety precautions in place.

## 2024-10-11 NOTE — ASSESSMENT & PLAN NOTE
Patient's FSGs are uncontrolled due to hyperglycemia on current medication regimen.  Last A1c reviewed-   Lab Results   Component Value Date    HGBA1C 7.0 (H) 05/07/2024         Current correctional scale  Low  Maintain anti-hyperglycemic dose as follows-   Antihyperglycemics (From admission, onward)      Start     Stop Route Frequency Ordered    10/06/24 0146  insulin aspart U-100 pen 0-5 Units         -- SubQ Before meals & nightly PRN 10/06/24 0048          Plan:  -SSI  -A1c  -Accu-checks  -Hold oral hypoglycemics while patient is in the hospital  -Hypoglycemic protocol

## 2024-10-12 LAB
ALBUMIN SERPL BCP-MCNC: 2.7 G/DL (ref 3.5–5.2)
ALP SERPL-CCNC: 129 U/L (ref 55–135)
ALT SERPL W/O P-5'-P-CCNC: 51 U/L (ref 10–44)
ANION GAP SERPL CALC-SCNC: 12 MMOL/L (ref 8–16)
AST SERPL-CCNC: 38 U/L (ref 10–40)
BASOPHILS # BLD AUTO: 0.02 K/UL (ref 0–0.2)
BASOPHILS NFR BLD: 0.4 % (ref 0–1.9)
BILIRUB DIRECT SERPL-MCNC: 0.7 MG/DL (ref 0.1–0.3)
BILIRUB SERPL-MCNC: 1.3 MG/DL (ref 0.1–1)
BUN SERPL-MCNC: 18 MG/DL (ref 8–23)
CALCIUM SERPL-MCNC: 8.6 MG/DL (ref 8.7–10.5)
CHLORIDE SERPL-SCNC: 107 MMOL/L (ref 95–110)
CO2 SERPL-SCNC: 23 MMOL/L (ref 23–29)
CREAT SERPL-MCNC: 1.2 MG/DL (ref 0.5–1.4)
DIFFERENTIAL METHOD BLD: ABNORMAL
EOSINOPHIL # BLD AUTO: 0.2 K/UL (ref 0–0.5)
EOSINOPHIL NFR BLD: 3.9 % (ref 0–8)
ERYTHROCYTE [DISTWIDTH] IN BLOOD BY AUTOMATED COUNT: 14 % (ref 11.5–14.5)
EST. GFR  (NO RACE VARIABLE): 59 ML/MIN/1.73 M^2
GLUCOSE SERPL-MCNC: 109 MG/DL (ref 70–110)
HCT VFR BLD AUTO: 35.1 % (ref 40–54)
HGB BLD-MCNC: 11.6 G/DL (ref 14–18)
IMM GRANULOCYTES # BLD AUTO: 0.02 K/UL (ref 0–0.04)
IMM GRANULOCYTES NFR BLD AUTO: 0.4 % (ref 0–0.5)
LIPASE SERPL-CCNC: 563 U/L (ref 4–60)
LYMPHOCYTES # BLD AUTO: 0.5 K/UL (ref 1–4.8)
LYMPHOCYTES NFR BLD: 10.4 % (ref 18–48)
MCH RBC QN AUTO: 29.3 PG (ref 27–31)
MCHC RBC AUTO-ENTMCNC: 33 G/DL (ref 32–36)
MCV RBC AUTO: 89 FL (ref 82–98)
MONOCYTES # BLD AUTO: 0.3 K/UL (ref 0.3–1)
MONOCYTES NFR BLD: 6.3 % (ref 4–15)
NEUTROPHILS # BLD AUTO: 4 K/UL (ref 1.8–7.7)
NEUTROPHILS NFR BLD: 78.6 % (ref 38–73)
NRBC BLD-RTO: 0 /100 WBC
PLATELET # BLD AUTO: 98 K/UL (ref 150–450)
PMV BLD AUTO: 11 FL (ref 9.2–12.9)
POTASSIUM SERPL-SCNC: 3.6 MMOL/L (ref 3.5–5.1)
PROT SERPL-MCNC: 5.9 G/DL (ref 6–8.4)
RBC # BLD AUTO: 3.96 M/UL (ref 4.6–6.2)
SODIUM SERPL-SCNC: 142 MMOL/L (ref 136–145)
WBC # BLD AUTO: 5.11 K/UL (ref 3.9–12.7)

## 2024-10-12 PROCEDURE — 36415 COLL VENOUS BLD VENIPUNCTURE: CPT | Mod: HCNC | Performed by: INTERNAL MEDICINE

## 2024-10-12 PROCEDURE — 99233 SBSQ HOSP IP/OBS HIGH 50: CPT | Mod: HCNC,,, | Performed by: SURGERY

## 2024-10-12 PROCEDURE — 80076 HEPATIC FUNCTION PANEL: CPT | Mod: HCNC | Performed by: INTERNAL MEDICINE

## 2024-10-12 PROCEDURE — 63600175 PHARM REV CODE 636 W HCPCS: Mod: HCNC | Performed by: INTERNAL MEDICINE

## 2024-10-12 PROCEDURE — 85025 COMPLETE CBC W/AUTO DIFF WBC: CPT | Mod: HCNC | Performed by: INTERNAL MEDICINE

## 2024-10-12 PROCEDURE — 25000003 PHARM REV CODE 250: Mod: HCNC | Performed by: INTERNAL MEDICINE

## 2024-10-12 PROCEDURE — 11000001 HC ACUTE MED/SURG PRIVATE ROOM: Mod: HCNC

## 2024-10-12 PROCEDURE — 97116 GAIT TRAINING THERAPY: CPT | Mod: HCNC

## 2024-10-12 PROCEDURE — 97110 THERAPEUTIC EXERCISES: CPT | Mod: HCNC

## 2024-10-12 PROCEDURE — 80048 BASIC METABOLIC PNL TOTAL CA: CPT | Mod: HCNC | Performed by: INTERNAL MEDICINE

## 2024-10-12 PROCEDURE — 83690 ASSAY OF LIPASE: CPT | Mod: HCNC | Performed by: INTERNAL MEDICINE

## 2024-10-12 RX ADMIN — PIPERACILLIN SODIUM AND TAZOBACTAM SODIUM 4.5 G: 4; .5 INJECTION, POWDER, FOR SOLUTION INTRAVENOUS at 08:10

## 2024-10-12 RX ADMIN — PIPERACILLIN SODIUM AND TAZOBACTAM SODIUM 4.5 G: 4; .5 INJECTION, POWDER, FOR SOLUTION INTRAVENOUS at 05:10

## 2024-10-12 RX ADMIN — PIPERACILLIN SODIUM AND TAZOBACTAM SODIUM 4.5 G: 4; .5 INJECTION, POWDER, FOR SOLUTION INTRAVENOUS at 12:10

## 2024-10-12 RX ADMIN — PANTOPRAZOLE SODIUM 40 MG: 40 TABLET, DELAYED RELEASE ORAL at 08:10

## 2024-10-12 RX ADMIN — ISOSORBIDE MONONITRATE 120 MG: 60 TABLET, EXTENDED RELEASE ORAL at 08:10

## 2024-10-12 NOTE — PT/OT/SLP PROGRESS
Physical Therapy Treatment    Patient Name:  Aquiles Yates   MRN:  [id number]    Recommendations:     Discharge Recommendations: Low Intensity Therapy (TBD- pending home environment)  Discharge Equipment Recommendations: to be determined by next level of care  Barriers to discharge:  unsure. Pt poor historian    Assessment:     Aquiles Yates is a 84 y.o. male admitted with a medical diagnosis of Calculus of bile duct without cholecystitis with obstruction.  SBA with amb 40 ft. Demos knee flexed gait with B knee extension contracture but no LOB noted: .    Rehab Prognosis: Good; patient would benefit from acute skilled PT services to address these deficits and reach maximum level of function.    Recent Surgery: Procedure(s) (LRB):  XI ROBOTIC CHOLECYSTECTOMY (N/A)      Plan:     During this hospitalization, patient to be seen 3 x/week to address the identified rehab impairments via gait training, therapeutic activities, therapeutic exercises and progress toward the following goals:    Plan of Care Expires:  10/25/24    Subjective     Chief Complaint: none  Patient/Family Comments/goals: none stated  Pain/Comfort:  Pain Rating 1: 0/10      Objective:     Communicated with Nurse Kyree and Epic prior to session.  Patient found supine with   upon PT entry to room.     General Precautions: Standard, fall, hearing impaired  Orthopedic Precautions: N/A  Braces: N/A  Respiratory Status: Room air     Functional Mobility:  Bed Mobility:     Supine to Sit: supervision  Transfers:     Sit to Stand:  supervision with no AD  Gait: 40 ft with RW and SBA      AM-PAC 6 CLICK MOBILITY  Turning over in bed (including adjusting bedclothes, sheets and blankets)?: 4  Sitting down on and standing up from a chair with arms (e.g., wheelchair, bedside commode, etc.): 4  Moving from lying on back to sitting on the side of the bed?: 4  Moving to and from a bed to a chair (including a wheelchair)?: 4  Need to walk in hospital  room?: 4  Climbing 3-5 steps with a railing?: 3  Basic Mobility Total Score: 23       Treatment & Education:  Gait trained in upright posture and improved stride length and safety with RW. Performed sit to stand x 5 reps. LE exercises- marching, LAQ, and heel/toe raise    Patient left supine with all lines intact, call button in reach, and bed alarm on..    GOALS:   Multidisciplinary Problems       Physical Therapy Goals          Problem: Physical Therapy    Goal Priority Disciplines Outcome Interventions   Physical Therapy Goal     PT, PT/OT     Description: LTG: 10/25/24  1. PT WILL COMPLETE BED MOBILITY IND  2. PT WILL STAND T/F TO CHAIR WITH RW AND SBA  3. PT WILL GT TRAIN X 150' WITH RW AND SBA TO PROGRESS GT.  4. PT WILL INC AMPAC SCORE BY 2 POINTS TO PROGRESS GROSS FUNC MOBILITY.                          Time Tracking:     PT Received On: 10/12/24  PT Start Time: 0800     PT Stop Time: 0825  PT Total Time (min): 25 min     Billable Minutes: Gait Training 15 and Therapeutic Exercise 10    Treatment Type: Treatment  PT/PTA: PT     Number of PTA visits since last PT visit: 0     10/12/2024

## 2024-10-12 NOTE — ASSESSMENT & PLAN NOTE
Patient is chronically on statin.will not continue for now. Last Lipid Panel:   Lab Results   Component Value Date    CHOL 150 05/07/2024    HDL 36 (L) 05/07/2024    LDLCALC 94.2 05/07/2024    TRIG 99 05/07/2024    CHOLHDL 24.0 05/07/2024     -Hold home medication due to elevated LFTs

## 2024-10-12 NOTE — PROGRESS NOTES
[facility] Surg  General Surgery  Progress Note    Subjective:     History of Present Illness:  83 y/o male with past medical history significant for DMII, CAD with multiple PCI procedures, chronic diastolic CHF, afib with h/o CVA,  HTN, DM, LAE, LVH, PAD, AAA stent graft (approx 2013), dementia, hyperlipidemia who presented to the ED approximately 2 days ago for evaluation after his wife was concerned about tremors and the appearance of his skin. He is a poor historian secondary to dementia. CT abdomen pelvis unremarkable. U/S abdomen showing cholelithiasis without evidence of cholecystitis, but an MRCP was obtained secondary to elevated LFTs and Tbili. MRCP with significant choledocholithiasis. Patient was transported to [place] earlier today to attempt ERCP, but he became significant bradycardic and the procedure was aborted. His LFTs are trending down today. He reports being hungry at the time of exam, and his wife denies any past abdominal surgical history.    Post-Op Info:  Procedure(s) (LRB):  XI ROBOTIC CHOLECYSTECTOMY (N/A)         Interval History:  Patient with persistent asymptomatic bradycardia.  Junctional rhythm on EKG    Medications:  Continuous Infusions:  Scheduled Meds:   isosorbide mononitrate  120 mg Oral Daily    pantoprazole  40 mg Oral Daily    piperacillin-tazobactam (Zosyn) IV (PEDS and ADULTS) (extended infusion is not appropriate)  4.5 g Intravenous Q8H     PRN Meds:  Current Facility-Administered Medications:     acetaminophen, 650 mg, Rectal, Q6H PRN    acetaminophen, 650 mg, Oral, Q8H PRN    albuterol-ipratropium, 3 mL, Nebulization, Q6H PRN    aluminum-magnesium hydroxide-simethicone, 30 mL, Oral, QID PRN    dextrose 10%, 12.5 g, Intravenous, PRN    dextrose 10%, 25 g, Intravenous, PRN    gabapentin, 100 mg, Oral, BID PRN    glucagon (human recombinant), 1 mg, Intramuscular, PRN    glucose, 16 g, Oral, PRN    glucose, 24 g, Oral, PRN    HYDROcodone-acetaminophen, 1 tablet, Oral,  Q6H PRN    insulin aspart U-100, 0-5 Units, Subcutaneous, QID (AC + HS) PRN    naloxone, 0.02 mg, Intravenous, PRN    ondansetron, 4 mg, Intravenous, Q8H PRN    promethazine, 25 mg, Oral, Q6H PRN    senna-docusate 8.6-50 mg, 1 tablet, Oral, BID PRN    sodium chloride 0.9%, 10 mL, Intravenous, Q12H PRN     Review of patient's allergies indicates:   Allergen Reactions    Metoprolol Other (See Comments)     Junctional rhythm       Objective:     Vital Signs (Most Recent):  Temp: 97.6 °F (36.4 °C) (10/12/24 1221)  Pulse: (!) 39 (10/12/24 1221)  Resp: 20 (10/12/24 1221)  BP: (!) 149/66 (10/12/24 1221)  SpO2: (!) 91 % (10/12/24 1221) Vital Signs (24h Range):  Temp:  [97.6 °F (36.4 °C)-98.3 °F (36.8 °C)] 97.6 °F (36.4 °C)  Pulse:  [30-88] 39  Resp:  [18-20] 20  SpO2:  [91 %-99 %] 91 %  BP: (108-149)/(53-85) 149/66     Weight: 64 kg (141 lb)  Body mass index is 20.23 kg/m².    Intake/Output - Last 3 Shifts         10/10 0700  10/11 0659 10/11 0700  10/12 0659 10/12 0700  10/13 0659    IV Piggyback 1569      Total Intake(mL/kg) 1569 (24.5)      Urine (mL/kg/hr)       Total Output       Net +1569             Urine Occurrence 2 x 3 x     Stool Occurrence 1 x               Physical Exam  Constitutional:       General: He is not in acute distress.     Appearance: He is well-developed. Ill appearance: chronically and acutely.   HENT:      Head: Normocephalic and atraumatic.   Eyes:      General: No scleral icterus.     Pupils: Pupils are equal, round, and reactive to light.   Neck:      Thyroid: No thyromegaly.      Vascular: No JVD.      Trachea: No tracheal deviation.   Cardiovascular:      Rate and Rhythm: Bradycardia present.      Heart sounds: Normal heart sounds.   Pulmonary:      Effort: Pulmonary effort is normal.      Breath sounds: Normal breath sounds.   Abdominal:      General: Abdomen is flat. Bowel sounds are normal. There is no distension.      Palpations: Abdomen is soft. There is no mass.      Tenderness: There  is no abdominal tenderness. There is no guarding or rebound.   Musculoskeletal:         General: Normal range of motion.      Cervical back: Normal range of motion and neck supple.   Lymphadenopathy:      Cervical: No cervical adenopathy.   Skin:     General: Skin is warm and dry.   Neurological:      Mental Status: He is alert and oriented to person, place, and time.   Psychiatric:         Mood and Affect: Mood normal.         Behavior: Behavior normal.         Thought Content: Thought content normal.         Judgment: Judgment normal.          Significant Labs:  I have reviewed all pertinent lab results within the past 24 hours.  CBC:   Recent Labs   Lab 10/12/24  0554   WBC 5.11   RBC 3.96*   HGB 11.6*   HCT 35.1*   PLT 98*   MCV 89   MCH 29.3   MCHC 33.0     BMP:   Recent Labs   Lab 10/11/24  0611 10/12/24  0554   * 109    142   K 3.8 3.6    107   CO2 24 23   BUN 17 18   CREATININE 1.2 1.2   CALCIUM 8.7 8.6*   MG 1.9  --      CMP:   Recent Labs   Lab 10/12/24  0554      CALCIUM 8.6*   ALBUMIN 2.7*   PROT 5.9*      K 3.6   CO2 23      BUN 18   CREATININE 1.2   ALKPHOS 129   ALT 51*   AST 38   BILITOT 1.3*     Lipase 563    Significant Diagnostics:  I have reviewed all pertinent imaging results/findings within the past 24 hours.  No new  Assessment/Plan:     * Calculus of bile duct without cholecystitis with obstruction  ERCP yesterday with clearing of choledocholithiasis and stent placement, now with concern for post ERCP pancreatitis with lipase >1000    Discussed that cholecystectomy is generally recommended after resolution of choledocholithiasis to prevent future episodes.   Patient will need medical optimization with cardiology regarding persistent bradycardia prior to undergoing anesthesia for cholecystectomy  IVF and bowel rest for potential post ERCP pancreatitis.  Remainder of care per primary team. Surgery will continue to follow. Could potentially perform  cholecystectomy as an outpatient later as he needs further cardiac workup before general anesthetic.    Further workup for bradycardia per Cardiology and Medicine    Monitor lipase    Chronic diastolic heart failure  Currently with significant bradycardic, prohibitive of procedures    Awaiting cardiology recommendations prior to  ERCP and subsequent cholecystectomy    Bradycardia  Continued work-up/treatment per cardiology and medicine    Bradycardia needs to be improved prior to surgical intervention, cholecystectomy    Type 2 diabetes mellitus with other specified complication, unspecified whether long term insulin use  Management per medicine    Dyslipidemia  Management per medicine    Essential hypertension  Management per medicine    Coronary artery disease of native artery of native heart with stable angina pectoris  Management per medicine/cardiology        Carlos Mcqueen MD  General Surgery  [facility] S[facility]

## 2024-10-12 NOTE — PLAN OF CARE
Cardiac monitoring in use, pt remain bradycardic  Fall precautions in place, remains injury free  Pt denies c/o abdominal pain, N/V    Accurate I&Os. Remains on liquid diet. Tolerating well  Abx given as prescribed  Bed locked at lowest position  Call light within reach    Chart check complete  Will cont with POC

## 2024-10-12 NOTE — SUBJECTIVE & OBJECTIVE
Interval History: f/u bradycardia. Denies pain    Review of Systems  Objective:     Vital Signs (Most Recent):  Temp: 98 °F (36.7 °C) (10/12/24 0801)  Pulse: (!) 53 (10/12/24 0801)  Resp: 20 (10/12/24 0801)  BP: 136/85 (10/12/24 0801)  SpO2: 99 % (10/12/24 0801) Vital Signs (24h Range):  Temp:  [97.6 °F (36.4 °C)-98.3 °F (36.8 °C)] 98 °F (36.7 °C)  Pulse:  [30-88] 53  Resp:  [18-20] 20  SpO2:  [92 %-99 %] 99 %  BP: (108-141)/(53-85) 136/85     Weight: 64 kg (141 lb)  Body mass index is 20.23 kg/m².  No intake or output data in the 24 hours ending 10/12/24 1141      Physical Exam  HENT:      Head: Normocephalic and atraumatic.   Cardiovascular:      Rate and Rhythm: Regular rhythm. Bradycardia present.      Heart sounds: No murmur heard.  Pulmonary:      Effort: Pulmonary effort is normal. No respiratory distress.      Breath sounds: Normal breath sounds. No wheezing.   Abdominal:      General: Bowel sounds are normal. There is no distension.      Palpations: Abdomen is soft.      Tenderness: There is no abdominal tenderness.   Musculoskeletal:         General: No swelling.   Skin:     General: Skin is warm and dry.   Neurological:      Mental Status: He is alert. Mental status is at baseline.             Significant Labs: All pertinent labs within the past 24 hours have been reviewed.  Recent Lab Results         10/12/24  0554        Albumin 2.7              ALT 51       Anion Gap 12       AST 38       Baso # 0.02       Basophil % 0.4       Bilirubin Direct 0.7       BILIRUBIN TOTAL 1.3  Comment: For infants and newborns, interpretation of results should be based  on gestational age, weight and in agreement with clinical  observations.    Premature Infant recommended reference ranges:  Up to 24 hours.............<8.0 mg/dL  Up to 48 hours............<12.0 mg/dL  3-5 days..................<15.0 mg/dL  6-29 days.................<15.0 mg/dL         BUN 18       Calcium 8.6       Chloride 107       CO2 23        Creatinine 1.2       Differential Method Automated       eGFR 59       Eos # 0.2       Eos % 3.9       Glucose 109       Gran # (ANC) 4.0       Gran % 78.6       Hematocrit 35.1       Hemoglobin 11.6       Immature Grans (Abs) 0.02  Comment: Mild elevation in immature granulocytes is non specific and   can be seen in a variety of conditions including stress response,   acute inflammation, trauma and pregnancy. Correlation with other   laboratory and clinical findings is essential.         Immature Granulocytes 0.4       Lipase 563       Lymph # 0.5       Lymph % 10.4       MCH 29.3       MCHC 33.0       MCV 89       Mono # 0.3       Mono % 6.3       MPV 11.0       nRBC 0       Platelet Count 98       Potassium 3.6       PROTEIN TOTAL 5.9       RBC 3.96       RDW 14.0       Sodium 142       WBC 5.11               Significant Imaging: I have reviewed all pertinent imaging results/findings within the past 24 hours.

## 2024-10-12 NOTE — HOSPITAL COURSE
10/12/2024.  Patient denies abdominal pain.  He was last EKG showed a junctional rhythm.  Surgery is on hold due to bradycardia.  Patient was states he was heart rate is low because he was not eating enough    10/13/2024:  No abdominal pain.  Lipase is improving.  Heart rate still ranging from 39-59.  You will need a cholecystectomy at some point.  Common bile duct stent in place

## 2024-10-12 NOTE — NURSING
Notified Dr. Ryan about patient's heart rate sustaining in the low 30's. Patient in no distress. No orders at this time

## 2024-10-12 NOTE — ASSESSMENT & PLAN NOTE
Chronic, controlled.  Latest blood pressure and vitals reviewed-   Temp:  [97.6 °F (36.4 °C)-98.3 °F (36.8 °C)]   Pulse:  [30-88]   Resp:  [18-20]   BP: (108-141)/(53-85)   SpO2:  [92 %-99 %] .   Home meds for hypertension were reviewed and noted below.   Hypertension Medications               isosorbide mononitrate (IMDUR) 120 MG 24 hr tablet TAKE 1 TABLET BY MOUTH EVERY DAY    valsartan (DIOVAN) 160 MG tablet Take 1 tablet (160 mg total) by mouth once daily.            While in the hospital, will manage blood pressure as follows, hold valsartan    Will utilize p.r.n. blood pressure medication only if patient's blood pressure greater than  180/110 and he develops symptoms such as worsening chest pain or shortness of breath.

## 2024-10-12 NOTE — ASSESSMENT & PLAN NOTE
Patient has paroxysmal (<7 days) atrial fibrillation. Patient is currently in  sinus Nicholas . [id number] Score: 4. The patients heart rate in the last 24 hours is as follows:  Pulse  Min: 30  Max: 88     Antiarrhythmics   None    Anticoagulants   No anticoagulants secondary to need procedure    Plan  - Replete lytes with a goal of K>4, Mg >2

## 2024-10-12 NOTE — ASSESSMENT & PLAN NOTE
ERCP yesterday with clearing of choledocholithiasis and stent placement, now with concern for post ERCP pancreatitis with lipase >1000    Discussed that cholecystectomy is generally recommended after resolution of choledocholithiasis to prevent future episodes.   Patient will need medical optimization with cardiology regarding persistent bradycardia prior to undergoing anesthesia for cholecystectomy  IVF and bowel rest for potential post ERCP pancreatitis.  Remainder of care per primary team. Surgery will continue to follow. Could potentially perform cholecystectomy as an outpatient later as he needs further cardiac workup before general anesthetic.    Further workup for bradycardia per Cardiology and Medicine    Monitor lipase

## 2024-10-12 NOTE — ASSESSMENT & PLAN NOTE
Patient's hemorrhage is due to gastrointestinal bleed, patient does not have a propensity for bleeding.. Patients most recent Hgb, Hct, platelets, and INR are listed below.  Recent Labs     10/10/24  0517 10/11/24  0611 10/12/24  0554   HGB 11.5* 12.4* 11.6*   HCT 34.9* 37.4* 35.1*   PLT 97* 109* 98*       Plan  - Will trend hemoglobin/hematocrit Daily  - Will monitor and correct any coagulation defects  - Will transfuse if Hgb is <7g/dl (<8g/dl in cases of active ACS) or if patient has rapid bleeding leading to hemodynamic instability  - STABLE

## 2024-10-12 NOTE — PLAN OF CARE
Patient s/p ERCP on 10/10 with stone removal and stent placement. Lipase trending down. Patient asymptomatic. Can advance diet as tolerated. Cholecystectomy timing per surgery. Patient will need repeat ERCP in 8 weeks for stent removal. [facility] will arrange. No further inpatient GI recs at this time. Will sign off. Please contact with any questions/concerns.

## 2024-10-12 NOTE — ASSESSMENT & PLAN NOTE
Continued work-up/treatment per cardiology and medicine    Bradycardia needs to be improved prior to surgical intervention, cholecystectomy

## 2024-10-12 NOTE — ASSESSMENT & PLAN NOTE
Seen by Cardiology who recommended stopping his Aricept.  However anesthesia would like further cardiac evaluation prior to surgery.

## 2024-10-12 NOTE — SUBJECTIVE & OBJECTIVE
Interval History:  Patient with persistent asymptomatic bradycardia.  Junctional rhythm on EKG    Medications:  Continuous Infusions:  Scheduled Meds:   isosorbide mononitrate  120 mg Oral Daily    pantoprazole  40 mg Oral Daily    piperacillin-tazobactam (Zosyn) IV (PEDS and ADULTS) (extended infusion is not appropriate)  4.5 g Intravenous Q8H     PRN Meds:  Current Facility-Administered Medications:     acetaminophen, 650 mg, Rectal, Q6H PRN    acetaminophen, 650 mg, Oral, Q8H PRN    albuterol-ipratropium, 3 mL, Nebulization, Q6H PRN    aluminum-magnesium hydroxide-simethicone, 30 mL, Oral, QID PRN    dextrose 10%, 12.5 g, Intravenous, PRN    dextrose 10%, 25 g, Intravenous, PRN    gabapentin, 100 mg, Oral, BID PRN    glucagon (human recombinant), 1 mg, Intramuscular, PRN    glucose, 16 g, Oral, PRN    glucose, 24 g, Oral, PRN    HYDROcodone-acetaminophen, 1 tablet, Oral, Q6H PRN    insulin aspart U-100, 0-5 Units, Subcutaneous, QID (AC + HS) PRN    naloxone, 0.02 mg, Intravenous, PRN    ondansetron, 4 mg, Intravenous, Q8H PRN    promethazine, 25 mg, Oral, Q6H PRN    senna-docusate 8.6-50 mg, 1 tablet, Oral, BID PRN    sodium chloride 0.9%, 10 mL, Intravenous, Q12H PRN     Review of patient's allergies indicates:   Allergen Reactions    Metoprolol Other (See Comments)     Junctional rhythm       Objective:     Vital Signs (Most Recent):  Temp: 97.6 °F (36.4 °C) (10/12/24 1221)  Pulse: (!) 39 (10/12/24 1221)  Resp: 20 (10/12/24 1221)  BP: (!) 149/66 (10/12/24 1221)  SpO2: (!) 91 % (10/12/24 1221) Vital Signs (24h Range):  Temp:  [97.6 °F (36.4 °C)-98.3 °F (36.8 °C)] 97.6 °F (36.4 °C)  Pulse:  [30-88] 39  Resp:  [18-20] 20  SpO2:  [91 %-99 %] 91 %  BP: (108-149)/(53-85) 149/66     Weight: 64 kg (141 lb)  Body mass index is 20.23 kg/m².    Intake/Output - Last 3 Shifts         10/10 0700  10/11 0659 10/11 0700  10/12 0659 10/12 0700  10/13 0659    IV Piggyback 1569      Total Intake(mL/kg) 1569 (24.5)      Urine  (mL/kg/hr)       Total Output       Net +1569             Urine Occurrence 2 x 3 x     Stool Occurrence 1 x               Physical Exam  Constitutional:       General: He is not in acute distress.     Appearance: He is well-developed. Ill appearance: chronically and acutely.   HENT:      Head: Normocephalic and atraumatic.   Eyes:      General: No scleral icterus.     Pupils: Pupils are equal, round, and reactive to light.   Neck:      Thyroid: No thyromegaly.      Vascular: No JVD.      Trachea: No tracheal deviation.   Cardiovascular:      Rate and Rhythm: Bradycardia present.      Heart sounds: Normal heart sounds.   Pulmonary:      Effort: Pulmonary effort is normal.      Breath sounds: Normal breath sounds.   Abdominal:      General: Abdomen is flat. Bowel sounds are normal. There is no distension.      Palpations: Abdomen is soft. There is no mass.      Tenderness: There is no abdominal tenderness. There is no guarding or rebound.   Musculoskeletal:         General: Normal range of motion.      Cervical back: Normal range of motion and neck supple.   Lymphadenopathy:      Cervical: No cervical adenopathy.   Skin:     General: Skin is warm and dry.   Neurological:      Mental Status: He is alert and oriented to person, place, and time.   Psychiatric:         Mood and Affect: Mood normal.         Behavior: Behavior normal.         Thought Content: Thought content normal.         Judgment: Judgment normal.          Significant Labs:  I have reviewed all pertinent lab results within the past 24 hours.  CBC:   Recent Labs   Lab 10/12/24  0554   WBC 5.11   RBC 3.96*   HGB 11.6*   HCT 35.1*   PLT 98*   MCV 89   MCH 29.3   MCHC 33.0     BMP:   Recent Labs   Lab 10/11/24  0611 10/12/24  0554   * 109    142   K 3.8 3.6    107   CO2 24 23   BUN 17 18   CREATININE 1.2 1.2   CALCIUM 8.7 8.6*   MG 1.9  --      CMP:   Recent Labs   Lab 10/12/24  0554      CALCIUM 8.6*   ALBUMIN 2.7*   PROT 5.9*   NA  142   K 3.6   CO2 23      BUN 18   CREATININE 1.2   ALKPHOS 129   ALT 51*   AST 38   BILITOT 1.3*     Lipase 563    Significant Diagnostics:  I have reviewed all pertinent imaging results/findings within the past 24 hours.  No new

## 2024-10-12 NOTE — ASSESSMENT & PLAN NOTE
"Patient's FSGs are controlled on current medication regimen.  Last A1c reviewed-   Lab Results   Component Value Date    HGBA1C 7.0 (H) 05/07/2024     Most recent fingerstick glucose reviewed- No results for input(s): "POCTGLUCOSE" in the last 24 hours.  Current correctional scale  Low  Maintain anti-hyperglycemic dose as follows-   Antihyperglycemics (From admission, onward)      Start     Stop Route Frequency Ordered    10/06/24 0146  insulin aspart U-100 pen 0-5 Units         -- SubQ Before meals & nightly PRN 10/06/24 0048          Hold Oral hypoglycemics while patient is in the hospital.    "

## 2024-10-12 NOTE — PROGRESS NOTES
[facility] Medicine  Progress Note    Patient Name: Aquiles Yates  MRN: [id number]  Patient Class: IP- Inpatient   Admission Date: 10/5/2024  Length of Stay: 6 days  Attending Physician: Jackie Ramos MD  Primary Care Provider: Holger Thornton MD        Subjective:     Principal Problem:Calculus of bile duct without cholecystitis with obstruction        HPI:  Aquiles Yates is a 84 y.o. male with a PMH  has a past medical history of Acute blood loss anemia (10/14/2019), Alzheimer's dementia, Aneurysm, abdominal aortic, BCC (basal cell carcinoma of skin) (06/29/2023), Chronic diastolic heart failure (05/20/2024), Coronary artery disease, Depression, Diabetes mellitus, HLD (hyperlipidemia), Hypertension, Kidney stone, PAD (peripheral artery disease), PAF (paroxysmal atrial fibrillation) (01/24/2023), and Tobacco abuse.presented to the Emergency Department for evaluation of tremors and mottled skin per wife. Wife called the paramedics when she noticed patient shaking and patient's mottled skin today. Pt has a history of Alzheimer's and paramedics report a GCS of 14. Upon interview, pt denies any pain and is oriented to self. No further complaints or concerns at this time.       ER workup revealed CBC to be unremarkable.  CMP revealed CBG of 202 mg/dL, , total bili of 2.6, and AST/ALT of 242/179.  .  Troponin negative.  Lactic acid 2.8.  Flu COVID negative.  UA unremarkable.  CTA abdomen and pelvis without contrast revealed no acute findings.  Chest x-ray negative for acute cardiopulmonary findings.  Ultrasound limited revealed cholelithiasis without definitive sonographic evidence of acute cholecystitis.  Mild intrahepatic biliary duct dilation.  Vital signs stable. EKG revealed sinus Nicholas with occasional PVCs with a ventricular rate of 56 beats per minute and a QT/QTC of 408/393.  Patient received 4.5 g Zosyn in ED. GI consulted.  In agreement with  MRCP and will see us consult in a.m..  Patient in agreement with treatment plan.  Patient will be admitted under inpatient status.    PCP: Holger Thornton      Overview/Hospital Course:  Patient is an 84-year-old male with a history of anemia, Alzheimer's dementia, aneurysm, chronic diastolic failure, coronary artery disease diabetes mellitus, hypertension, PAF who presented emergency department with tremors.  Patient's wife said she was he was shaking and had mottled skin.  Patient denied any complaints.  In the emergency department workup revealed glucose of 202, alk-phos of 387, total bili of 2.6, AST of 242 ALT of 179, BNP of 323, lactic acid of 2.4.  CT scan of abdomen pelvis without contrast revealed no acute findings, ultrasound revealed cholelithiasis with ductal dilatation.  Patient was admitted with diagnosis of acute cholecystitis choledocholithiasis.  He was started on Zosyn.  GI was consulted MRCP was ordered with plans for ERCP in [place].  Patient's heart rate was in the 40s yesterday EKG revealed sinus bradycardia.  He went to [place] today for ERCP however heart rate was less than 40 therefore he was sent back for cardiology evaluation.  Patient denies any pain.    Cardiology saw patient with recommendations to stop his Aricept as it was make be contributing to his bradycardia.  Patient was tolerating diet and LFTs have improved.    Status post ERCP:                        - The major papilla appeared normal.                          - The common bile duct was dilated.                          - Choledocholithiasis was found. Complete removal                          was accomplished by biliary sphincterotomy and                          balloon extraction.                          - A biliary sphincterotomy was performed.                          - The biliary tree was swept.                          - One biliary stent was placed into the common                          bile duct.      Patient was scheduled for cholecystectomy 10/11 however heart rate was in the 40s.  Dr. Isaacs reported anesthesia felt was not safe for him to have surgery therefore it has been put off.    Interval History: f/u bradycardia. Denies pain    Review of Systems  Objective:     Vital Signs (Most Recent):  Temp: 98 °F (36.7 °C) (10/12/24 0801)  Pulse: (!) 53 (10/12/24 0801)  Resp: 20 (10/12/24 0801)  BP: 136/85 (10/12/24 0801)  SpO2: 99 % (10/12/24 0801) Vital Signs (24h Range):  Temp:  [97.6 °F (36.4 °C)-98.3 °F (36.8 °C)] 98 °F (36.7 °C)  Pulse:  [30-88] 53  Resp:  [18-20] 20  SpO2:  [92 %-99 %] 99 %  BP: (108-141)/(53-85) 136/85     Weight: 64 kg (141 lb)  Body mass index is 20.23 kg/m².  No intake or output data in the 24 hours ending 10/12/24 1141      Physical Exam  HENT:      Head: Normocephalic and atraumatic.   Cardiovascular:      Rate and Rhythm: Regular rhythm. Bradycardia present.      Heart sounds: No murmur heard.  Pulmonary:      Effort: Pulmonary effort is normal. No respiratory distress.      Breath sounds: Normal breath sounds. No wheezing.   Abdominal:      General: Bowel sounds are normal. There is no distension.      Palpations: Abdomen is soft.      Tenderness: There is no abdominal tenderness.   Musculoskeletal:         General: No swelling.   Skin:     General: Skin is warm and dry.   Neurological:      Mental Status: He is alert. Mental status is at baseline.             Significant Labs: All pertinent labs within the past 24 hours have been reviewed.  Recent Lab Results         10/12/24  0554        Albumin 2.7              ALT 51       Anion Gap 12       AST 38       Baso # 0.02       Basophil % 0.4       Bilirubin Direct 0.7       BILIRUBIN TOTAL 1.3  Comment: For infants and newborns, interpretation of results should be based  on gestational age, weight and in agreement with clinical  observations.    Premature Infant recommended reference ranges:  Up to 24 hours.............<8.0  mg/dL  Up to 48 hours............<12.0 mg/dL  3-5 days..................<15.0 mg/dL  6-29 days.................<15.0 mg/dL         BUN 18       Calcium 8.6       Chloride 107       CO2 23       Creatinine 1.2       Differential Method Automated       eGFR 59       Eos # 0.2       Eos % 3.9       Glucose 109       Gran # (ANC) 4.0       Gran % 78.6       Hematocrit 35.1       Hemoglobin 11.6       Immature Grans (Abs) 0.02  Comment: Mild elevation in immature granulocytes is non specific and   can be seen in a variety of conditions including stress response,   acute inflammation, trauma and pregnancy. Correlation with other   laboratory and clinical findings is essential.         Immature Granulocytes 0.4       Lipase 563       Lymph # 0.5       Lymph % 10.4       MCH 29.3       MCHC 33.0       MCV 89       Mono # 0.3       Mono % 6.3       MPV 11.0       nRBC 0       Platelet Count 98       Potassium 3.6       PROTEIN TOTAL 5.9       RBC 3.96       RDW 14.0       Sodium 142       WBC 5.11               Significant Imaging: I have reviewed all pertinent imaging results/findings within the past 24 hours.    Assessment/Plan:      * Calculus of bile duct without cholecystitis with obstruction  Patient is on Zosyn  Completed ERCP with stone removal [facility] 10/10 without complication  Was scheduled for robotic cholecystectomy 10/11 however given bradycardia this has been postponed.      Chronic diastolic heart failure  Patient is identified as having Diastolic (HFpEF) heart failure that is Chronic. CHF is currently controlled. Latest ECHO performed and demonstrates- Results for orders placed during the hospital encounter of 06/13/22    Echo    Interpretation Summary  · The left ventricle is normal in size with severe concentric hypertrophy and normal systolic function.  · Mild left atrial enlargement.  · The estimated ejection fraction is 55%.  · Grade II left ventricular diastolic dysfunction.  · Normal right  ventricular size with normal right ventricular systolic function.  .   monitor clinical status closely. Monitor on telemetry. Patient is off CHF pathway.  Monitor strict Is&Os and daily weights.  Place on fluid restriction of 2 L. Continue to stress to patient importance of self efficacy and  on diet for CHF. Last BNP reviewed- and noted below   Recent Labs   Lab 10/05/24  2048   *     .            Dementia  Patient with dementia with likely etiology of alzheimer's dementia. Dementia is moderate. The patient does not have signs of behavioral disturbance. Home dementia medications are held.  Continue non-pharmacologic interventions to prevent delirium (No VS between 11PM-5AM, activity during day, opening blinds, providing glasses/hearing aids, and up in chair during daytime). Will avoid narcotics and benzos unless absolutely necessary. PRN anti-psychotics are not prescribed to avoid self harm behaviors.    AVM (arteriovenous malformation) of stomach, acquired with hemorrhage  Patient's hemorrhage is due to gastrointestinal bleed, patient does not have a propensity for bleeding.. Patients most recent Hgb, Hct, platelets, and INR are listed below.  Recent Labs     10/10/24  0517 10/11/24  0611 10/12/24  0554   HGB 11.5* 12.4* 11.6*   HCT 34.9* 37.4* 35.1*   PLT 97* 109* 98*       Plan  - Will trend hemoglobin/hematocrit Daily  - Will monitor and correct any coagulation defects  - Will transfuse if Hgb is <7g/dl (<8g/dl in cases of active ACS) or if patient has rapid bleeding leading to hemodynamic instability  - STABLE    PAF (paroxysmal atrial fibrillation)  Patient has paroxysmal (<7 days) atrial fibrillation. Patient is currently in  sinus Nicholas . [id number] Score: 4. The patients heart rate in the last 24 hours is as follows:  Pulse  Min: 30  Max: 88     Antiarrhythmics   None    Anticoagulants   No anticoagulants secondary to need procedure    Plan  - Replete lytes with a goal of K>4, Mg  ">2      Bradycardia  Seen by Cardiology who recommended stopping his Aricept.  However anesthesia would like further cardiac evaluation prior to surgery.      Type 2 diabetes mellitus with other specified complication, unspecified whether long term insulin use  Patient's FSGs are controlled on current medication regimen.  Last A1c reviewed-   Lab Results   Component Value Date    HGBA1C 7.0 (H) 05/07/2024     Most recent fingerstick glucose reviewed- No results for input(s): "POCTGLUCOSE" in the last 24 hours.  Current correctional scale  Low  Maintain anti-hyperglycemic dose as follows-   Antihyperglycemics (From admission, onward)      Start     Stop Route Frequency Ordered    10/06/24 0146  insulin aspart U-100 pen 0-5 Units         -- SubQ Before meals & nightly PRN 10/06/24 0048          Hold Oral hypoglycemics while patient is in the hospital.      Dyslipidemia  Patient is chronically on statin.will not continue for now. Last Lipid Panel:   Lab Results   Component Value Date    CHOL 150 05/07/2024    HDL 36 (L) 05/07/2024    LDLCALC 94.2 05/07/2024    TRIG 99 05/07/2024    CHOLHDL 24.0 05/07/2024     -Hold home medication due to elevated LFTs          Essential hypertension  Chronic, controlled.  Latest blood pressure and vitals reviewed-   Temp:  [97.6 °F (36.4 °C)-98.3 °F (36.8 °C)]   Pulse:  [30-88]   Resp:  [18-20]   BP: (108-141)/(53-85)   SpO2:  [92 %-99 %] .   Home meds for hypertension were reviewed and noted below.   Hypertension Medications               isosorbide mononitrate (IMDUR) 120 MG 24 hr tablet TAKE 1 TABLET BY MOUTH EVERY DAY    valsartan (DIOVAN) 160 MG tablet Take 1 tablet (160 mg total) by mouth once daily.            While in the hospital, will manage blood pressure as follows, hold valsartan    Will utilize p.r.n. blood pressure medication only if patient's blood pressure greater than  180/110 and he develops symptoms such as worsening chest pain or shortness of breath.      Coronary " artery disease of native artery of native heart with stable angina pectoris  Patient with known CAD s/p stent placement, which is controlled. and monitor for S/Sx of angina/ACS. Continue to monitor on telemetry.       VTE Risk Mitigation (From admission, onward)           Ordered     Reason for No Pharmacological VTE Prophylaxis  Once        Question:  Reasons:  Answer:  Physician Provided (leave comment)  Comment:  Pending potential surgical intervention    10/06/24 0048     IP VTE HIGH RISK PATIENT  Once         10/06/24 0048     Place sequential compression device  Until discontinued         10/06/24 0048                    Discharge Planning   ZEINA:      Code Status: Full Code   Is the patient medically ready for discharge?:     Reason for patient still in hospital (select all that apply): Patient trending condition, Treatment, and Consult recommendations  Discharge Plan A: Home with family                  Jackie Ramos MD  Department of Hospital Medicine   [facility]

## 2024-10-13 LAB
ALBUMIN SERPL BCP-MCNC: 2.7 G/DL (ref 3.5–5.2)
ALP SERPL-CCNC: 131 U/L (ref 55–135)
ALT SERPL W/O P-5'-P-CCNC: 51 U/L (ref 10–44)
ANION GAP SERPL CALC-SCNC: 11 MMOL/L (ref 8–16)
AST SERPL-CCNC: 45 U/L (ref 10–40)
BASOPHILS # BLD AUTO: 0.02 K/UL (ref 0–0.2)
BASOPHILS NFR BLD: 0.4 % (ref 0–1.9)
BILIRUB SERPL-MCNC: 1.1 MG/DL (ref 0.1–1)
BUN SERPL-MCNC: 13 MG/DL (ref 8–23)
CALCIUM SERPL-MCNC: 8.4 MG/DL (ref 8.7–10.5)
CHLORIDE SERPL-SCNC: 107 MMOL/L (ref 95–110)
CO2 SERPL-SCNC: 22 MMOL/L (ref 23–29)
CREAT SERPL-MCNC: 1.2 MG/DL (ref 0.5–1.4)
DIFFERENTIAL METHOD BLD: ABNORMAL
EOSINOPHIL # BLD AUTO: 0.2 K/UL (ref 0–0.5)
EOSINOPHIL NFR BLD: 4.6 % (ref 0–8)
ERYTHROCYTE [DISTWIDTH] IN BLOOD BY AUTOMATED COUNT: 13.9 % (ref 11.5–14.5)
EST. GFR  (NO RACE VARIABLE): 59 ML/MIN/1.73 M^2
GLUCOSE SERPL-MCNC: 128 MG/DL (ref 70–110)
HCT VFR BLD AUTO: 34.7 % (ref 40–54)
HGB BLD-MCNC: 11.5 G/DL (ref 14–18)
IMM GRANULOCYTES # BLD AUTO: 0.03 K/UL (ref 0–0.04)
IMM GRANULOCYTES NFR BLD AUTO: 0.6 % (ref 0–0.5)
LIPASE SERPL-CCNC: 256 U/L (ref 4–60)
LYMPHOCYTES # BLD AUTO: 0.5 K/UL (ref 1–4.8)
LYMPHOCYTES NFR BLD: 10.6 % (ref 18–48)
MCH RBC QN AUTO: 29 PG (ref 27–31)
MCHC RBC AUTO-ENTMCNC: 33.1 G/DL (ref 32–36)
MCV RBC AUTO: 88 FL (ref 82–98)
MONOCYTES # BLD AUTO: 0.3 K/UL (ref 0.3–1)
MONOCYTES NFR BLD: 6.8 % (ref 4–15)
NEUTROPHILS # BLD AUTO: 3.8 K/UL (ref 1.8–7.7)
NEUTROPHILS NFR BLD: 77 % (ref 38–73)
NRBC BLD-RTO: 0 /100 WBC
OHS QRS DURATION: 86 MS
OHS QTC CALCULATION: 429 MS
PLATELET # BLD AUTO: 124 K/UL (ref 150–450)
PMV BLD AUTO: 11.3 FL (ref 9.2–12.9)
POCT GLUCOSE: 189 MG/DL (ref 70–110)
POTASSIUM SERPL-SCNC: 3.4 MMOL/L (ref 3.5–5.1)
PROT SERPL-MCNC: 5.9 G/DL (ref 6–8.4)
RBC # BLD AUTO: 3.96 M/UL (ref 4.6–6.2)
SODIUM SERPL-SCNC: 140 MMOL/L (ref 136–145)
WBC # BLD AUTO: 4.98 K/UL (ref 3.9–12.7)

## 2024-10-13 PROCEDURE — 85025 COMPLETE CBC W/AUTO DIFF WBC: CPT | Mod: HCNC | Performed by: SURGERY

## 2024-10-13 PROCEDURE — 36415 COLL VENOUS BLD VENIPUNCTURE: CPT | Mod: HCNC | Performed by: SURGERY

## 2024-10-13 PROCEDURE — 83690 ASSAY OF LIPASE: CPT | Mod: HCNC | Performed by: SURGERY

## 2024-10-13 PROCEDURE — 11000001 HC ACUTE MED/SURG PRIVATE ROOM: Mod: HCNC

## 2024-10-13 PROCEDURE — 99233 SBSQ HOSP IP/OBS HIGH 50: CPT | Mod: HCNC,,, | Performed by: SURGERY

## 2024-10-13 PROCEDURE — 25000003 PHARM REV CODE 250: Mod: HCNC | Performed by: INTERNAL MEDICINE

## 2024-10-13 PROCEDURE — 80053 COMPREHEN METABOLIC PANEL: CPT | Mod: HCNC | Performed by: SURGERY

## 2024-10-13 PROCEDURE — 63600175 PHARM REV CODE 636 W HCPCS: Mod: HCNC | Performed by: INTERNAL MEDICINE

## 2024-10-13 RX ADMIN — PIPERACILLIN SODIUM AND TAZOBACTAM SODIUM 4.5 G: 4; .5 INJECTION, POWDER, FOR SOLUTION INTRAVENOUS at 09:10

## 2024-10-13 RX ADMIN — PIPERACILLIN SODIUM AND TAZOBACTAM SODIUM 4.5 G: 4; .5 INJECTION, POWDER, FOR SOLUTION INTRAVENOUS at 11:10

## 2024-10-13 RX ADMIN — PIPERACILLIN SODIUM AND TAZOBACTAM SODIUM 4.5 G: 4; .5 INJECTION, POWDER, FOR SOLUTION INTRAVENOUS at 12:10

## 2024-10-13 RX ADMIN — PIPERACILLIN SODIUM AND TAZOBACTAM SODIUM 4.5 G: 4; .5 INJECTION, POWDER, FOR SOLUTION INTRAVENOUS at 03:10

## 2024-10-13 RX ADMIN — ISOSORBIDE MONONITRATE 120 MG: 60 TABLET, EXTENDED RELEASE ORAL at 09:10

## 2024-10-13 RX ADMIN — PANTOPRAZOLE SODIUM 40 MG: 40 TABLET, DELAYED RELEASE ORAL at 09:10

## 2024-10-13 NOTE — ASSESSMENT & PLAN NOTE
Chronic, controlled.  Latest blood pressure and vitals reviewed-   Temp:  [97.6 °F (36.4 °C)-98.6 °F (37 °C)]   Pulse:  [39-59]   Resp:  [17-20]   BP: (109-178)/(58-72)   SpO2:  [90 %-96 %] .   Home meds for hypertension were reviewed and noted below.   Hypertension Medications               isosorbide mononitrate (IMDUR) 120 MG 24 hr tablet TAKE 1 TABLET BY MOUTH EVERY DAY    valsartan (DIOVAN) 160 MG tablet Take 1 tablet (160 mg total) by mouth once daily.            While in the hospital, will manage blood pressure as follows, hold valsartan    Will utilize p.r.n. blood pressure medication only if patient's blood pressure greater than  180/110 and he develops symptoms such as worsening chest pain or shortness of breath.

## 2024-10-13 NOTE — PROGRESS NOTES
[facility]  General Surgery  Progress Note    Subjective:     History of Present Illness:  85 y/o male with past medical history significant for DMII, CAD with multiple PCI procedures, chronic diastolic CHF, afib with h/o CVA,  HTN, DM, LAE, LVH, PAD, AAA stent graft (approx 2013), dementia, hyperlipidemia who presented to the ED approximately 2 days ago for evaluation after his wife was concerned about tremors and the appearance of his skin. He is a poor historian secondary to dementia. CT abdomen pelvis unremarkable. U/S abdomen showing cholelithiasis without evidence of cholecystitis, but an MRCP was obtained secondary to elevated LFTs and Tbili. MRCP with significant choledocholithiasis. Patient was transported to [place] earlier today to attempt ERCP, but he became significant bradycardic and the procedure was aborted. His LFTs are trending down today. He reports being hungry at the time of exam, and his wife denies any past abdominal surgical history.    Post-Op Info:  Procedure(s) (LRB):  XI ROBOTIC CHOLECYSTECTOMY (N/A)         Interval History:  Patient denies any abdominal pain.  Heart rate it was still significantly bradycardic.        Medications:  Continuous Infusions:  Scheduled Meds:   isosorbide mononitrate  120 mg Oral Daily    pantoprazole  40 mg Oral Daily    piperacillin-tazobactam (Zosyn) IV (PEDS and ADULTS) (extended infusion is not appropriate)  4.5 g Intravenous Q8H     PRN Meds:  Current Facility-Administered Medications:     acetaminophen, 650 mg, Rectal, Q6H PRN    acetaminophen, 650 mg, Oral, Q8H PRN    albuterol-ipratropium, 3 mL, Nebulization, Q6H PRN    aluminum-magnesium hydroxide-simethicone, 30 mL, Oral, QID PRN    dextrose 10%, 12.5 g, Intravenous, PRN    dextrose 10%, 25 g, Intravenous, PRN    gabapentin, 100 mg, Oral, BID PRN    glucagon (human recombinant), 1 mg, Intramuscular, PRN    glucose, 16 g, Oral, PRN    glucose, 24 g, Oral, PRN    HYDROcodone-acetaminophen,  1 tablet, Oral, Q6H PRN    insulin aspart U-100, 0-5 Units, Subcutaneous, QID (AC + HS) PRN    naloxone, 0.02 mg, Intravenous, PRN    ondansetron, 4 mg, Intravenous, Q8H PRN    promethazine, 25 mg, Oral, Q6H PRN    senna-docusate 8.6-50 mg, 1 tablet, Oral, BID PRN    sodium chloride 0.9%, 10 mL, Intravenous, Q12H PRN     Review of patient's allergies indicates:   Allergen Reactions    Metoprolol Other (See Comments)     Junctional rhythm       Objective:     Vital Signs (Most Recent):  Temp: 97.7 °F (36.5 °C) (10/13/24 0733)  Pulse: (!) 43 (10/13/24 0858)  Resp: 17 (10/13/24 0733)  BP: (!) 155/70 (10/13/24 0733)  SpO2: 95 % (10/13/24 0733) Vital Signs (24h Range):  Temp:  [97.6 °F (36.4 °C)-98.6 °F (37 °C)] 97.7 °F (36.5 °C)  Pulse:  [39-59] 43  Resp:  [17-20] 17  SpO2:  [90 %-96 %] 95 %  BP: (109-178)/(58-72) 155/70     Weight: 64 kg (141 lb)  Body mass index is 20.23 kg/m².    Intake/Output - Last 3 Shifts         10/11 0700  10/12 0659 10/12 0700  10/13 0659 10/13 0700  10/14 0659    P.O.  900     IV Piggyback  132.6     Total Intake(mL/kg)  1032.6 (16.1)     Net  +1032.6            Urine Occurrence 3 x 5 x     Stool Occurrence  1 x              Physical Exam  Constitutional:       General: He is not in acute distress.     Appearance: He is well-developed. Ill appearance: Chronically.   HENT:      Head: Normocephalic and atraumatic.   Eyes:      General: No scleral icterus.     Pupils: Pupils are equal, round, and reactive to light.   Neck:      Thyroid: No thyromegaly.      Vascular: No JVD.      Trachea: No tracheal deviation.   Cardiovascular:      Rate and Rhythm: Regular rhythm. Bradycardia present.      Heart sounds: Normal heart sounds.   Pulmonary:      Effort: Pulmonary effort is normal.      Breath sounds: Normal breath sounds.   Abdominal:      General: Abdomen is flat. Bowel sounds are normal. There is no distension.      Palpations: Abdomen is soft. There is no mass.      Tenderness: There is no  abdominal tenderness. There is no guarding or rebound.   Musculoskeletal:         General: Normal range of motion.      Cervical back: Normal range of motion and neck supple.   Lymphadenopathy:      Cervical: No cervical adenopathy.   Skin:     General: Skin is warm and dry.   Neurological:      Mental Status: He is alert and oriented to person, place, and time.   Psychiatric:         Behavior: Behavior normal.         Thought Content: Thought content normal.         Judgment: Judgment normal.          Significant Labs:  I have reviewed all pertinent lab results within the past 24 hours.  CBC:   Recent Labs   Lab 10/13/24  0546   WBC 4.98   RBC 3.96*   HGB 11.5*   HCT 34.7*   *   MCV 88   MCH 29.0   MCHC 33.1     BMP:   Recent Labs   Lab 10/11/24  0611 10/12/24  0554 10/13/24  0546   *   < > 128*      < > 140   K 3.8   < > 3.4*      < > 107   CO2 24   < > 22*   BUN 17   < > 13   CREATININE 1.2   < > 1.2   CALCIUM 8.7   < > 8.4*   MG 1.9  --   --     < > = values in this interval not displayed.     CMP:   Recent Labs   Lab 10/13/24  0546   *   CALCIUM 8.4*   ALBUMIN 2.7*   PROT 5.9*      K 3.4*   CO2 22*      BUN 13   CREATININE 1.2   ALKPHOS 131   ALT 51*   AST 45*   BILITOT 1.1*     Lipase 256    Significant Diagnostics:  I have reviewed all pertinent imaging results/findings within the past 24 hours.  No new  Assessment/Plan:     * Calculus of bile duct without cholecystitis with obstruction  ERCP yesterday with clearing of choledocholithiasis and stent placement, now with concern for post ERCP pancreatitis with lipase now 256    Discussed that cholecystectomy is generally recommended after resolution of choledocholithiasis to prevent future episodes.   Patient will need medical optimization with cardiology regarding persistent bradycardia prior to undergoing anesthesia for cholecystectomy  IVF and bowel rest for potential post ERCP pancreatitis.  Remainder of care per  primary team. Surgery will continue to follow. Could potentially perform cholecystectomy as an outpatient later as he needs further cardiac workup before general anesthetic.    Further workup for bradycardia per Cardiology and Medicine    Check lipase again in the morning    Chronic diastolic heart failure  Currently with significant bradycardic, prohibitive of procedures    Awaiting cardiology recommendations prior to  ERCP and subsequent cholecystectomy    Bradycardia  Continued work-up/treatment per cardiology and medicine    Bradycardia needs to be improved prior to surgical intervention, cholecystectomy    Type 2 diabetes mellitus with other specified complication, unspecified whether long term insulin use  Management per medicine    Dyslipidemia  Management per medicine    Essential hypertension  Management per medicine    Coronary artery disease of native artery of native heart with stable angina pectoris  Management per medicine/cardiology        Carlos Mcqueen MD  General Surgery  [facility]

## 2024-10-13 NOTE — ASSESSMENT & PLAN NOTE
Patient's hemorrhage is due to gastrointestinal bleed, patient does not have a propensity for bleeding.. Patients most recent Hgb, Hct, platelets, and INR are listed below.  Recent Labs     10/11/24  0611 10/12/24  0554 10/13/24  0546   HGB 12.4* 11.6* 11.5*   HCT 37.4* 35.1* 34.7*   * 98* 124*       Plan  - Will trend hemoglobin/hematocrit Daily  - Will monitor and correct any coagulation defects  - Will transfuse if Hgb is <7g/dl (<8g/dl in cases of active ACS) or if patient has rapid bleeding leading to hemodynamic instability  - STABLE

## 2024-10-13 NOTE — SUBJECTIVE & OBJECTIVE
Interval History: f/u bradycardia felt to be related to medication. Noted to have some readings in the 50s asymptomatic. Tolerating full liquids.     Review of Systems  Objective:     Vital Signs (Most Recent):  Temp: 97.7 °F (36.5 °C) (10/13/24 0733)  Pulse: (!) 43 (10/13/24 0858)  Resp: 17 (10/13/24 0733)  BP: (!) 155/70 (10/13/24 0733)  SpO2: 95 % (10/13/24 0733) Vital Signs (24h Range):  Temp:  [97.6 °F (36.4 °C)-98.6 °F (37 °C)] 97.7 °F (36.5 °C)  Pulse:  [39-59] 43  Resp:  [17-20] 17  SpO2:  [90 %-96 %] 95 %  BP: (109-178)/(58-72) 155/70     Weight: 64 kg (141 lb)  Body mass index is 20.23 kg/m².    Intake/Output Summary (Last 24 hours) at 10/13/2024 0901  Last data filed at 10/12/2024 1830  Gross per 24 hour   Intake 832.57 ml   Output --   Net 832.57 ml         Physical Exam  Constitutional:       General: He is sleeping.   HENT:      Head: Normocephalic and atraumatic.   Cardiovascular:      Rate and Rhythm: Normal rate and regular rhythm.      Heart sounds: No murmur heard.  Pulmonary:      Effort: Pulmonary effort is normal. No respiratory distress.      Breath sounds: Normal breath sounds. No wheezing.   Abdominal:      General: Bowel sounds are normal. There is no distension.      Palpations: Abdomen is soft.      Tenderness: There is no abdominal tenderness.   Musculoskeletal:         General: No swelling.   Skin:     General: Skin is warm and dry.   Neurological:      Mental Status: He is easily aroused.             Significant Labs: All pertinent labs within the past 24 hours have been reviewed.  Recent Lab Results         10/13/24  0546        Albumin 2.7              ALT 51       Anion Gap 11       AST 45       Baso # 0.02       Basophil % 0.4       BILIRUBIN TOTAL 1.1  Comment: For infants and newborns, interpretation of results should be based  on gestational age, weight and in agreement with clinical  observations.    Premature Infant recommended reference ranges:  Up to 24  hours.............<8.0 mg/dL  Up to 48 hours............<12.0 mg/dL  3-5 days..................<15.0 mg/dL  6-29 days.................<15.0 mg/dL         BUN 13       Calcium 8.4       Chloride 107       CO2 22       Creatinine 1.2       Differential Method Automated       eGFR 59       Eos # 0.2       Eos % 4.6       Glucose 128       Gran # (ANC) 3.8       Gran % 77.0       Hematocrit 34.7       Hemoglobin 11.5       Immature Grans (Abs) 0.03  Comment: Mild elevation in immature granulocytes is non specific and   can be seen in a variety of conditions including stress response,   acute inflammation, trauma and pregnancy. Correlation with other   laboratory and clinical findings is essential.         Immature Granulocytes 0.6       Lipase 256       Lymph # 0.5       Lymph % 10.6       MCH 29.0       MCHC 33.1       MCV 88       Mono # 0.3       Mono % 6.8       MPV 11.3       nRBC 0       Platelet Count 124       Potassium 3.4       PROTEIN TOTAL 5.9       RBC 3.96       RDW 13.9       Sodium 140       WBC 4.98               Significant Imaging: I have reviewed all pertinent imaging results/findings within the past 24 hours.

## 2024-10-13 NOTE — SUBJECTIVE & OBJECTIVE
Interval History:  Patient denies any abdominal pain.  Heart rate it was still significantly bradycardic.        Medications:  Continuous Infusions:  Scheduled Meds:   isosorbide mononitrate  120 mg Oral Daily    pantoprazole  40 mg Oral Daily    piperacillin-tazobactam (Zosyn) IV (PEDS and ADULTS) (extended infusion is not appropriate)  4.5 g Intravenous Q8H     PRN Meds:  Current Facility-Administered Medications:     acetaminophen, 650 mg, Rectal, Q6H PRN    acetaminophen, 650 mg, Oral, Q8H PRN    albuterol-ipratropium, 3 mL, Nebulization, Q6H PRN    aluminum-magnesium hydroxide-simethicone, 30 mL, Oral, QID PRN    dextrose 10%, 12.5 g, Intravenous, PRN    dextrose 10%, 25 g, Intravenous, PRN    gabapentin, 100 mg, Oral, BID PRN    glucagon (human recombinant), 1 mg, Intramuscular, PRN    glucose, 16 g, Oral, PRN    glucose, 24 g, Oral, PRN    HYDROcodone-acetaminophen, 1 tablet, Oral, Q6H PRN    insulin aspart U-100, 0-5 Units, Subcutaneous, QID (AC + HS) PRN    naloxone, 0.02 mg, Intravenous, PRN    ondansetron, 4 mg, Intravenous, Q8H PRN    promethazine, 25 mg, Oral, Q6H PRN    senna-docusate 8.6-50 mg, 1 tablet, Oral, BID PRN    sodium chloride 0.9%, 10 mL, Intravenous, Q12H PRN     Review of patient's allergies indicates:   Allergen Reactions    Metoprolol Other (See Comments)     Junctional rhythm       Objective:     Vital Signs (Most Recent):  Temp: 97.7 °F (36.5 °C) (10/13/24 0733)  Pulse: (!) 43 (10/13/24 0858)  Resp: 17 (10/13/24 0733)  BP: (!) 155/70 (10/13/24 0733)  SpO2: 95 % (10/13/24 0733) Vital Signs (24h Range):  Temp:  [97.6 °F (36.4 °C)-98.6 °F (37 °C)] 97.7 °F (36.5 °C)  Pulse:  [39-59] 43  Resp:  [17-20] 17  SpO2:  [90 %-96 %] 95 %  BP: (109-178)/(58-72) 155/70     Weight: 64 kg (141 lb)  Body mass index is 20.23 kg/m².    Intake/Output - Last 3 Shifts         10/11 0700  10/12 0659 10/12 0700  10/13 0659 10/13 0700  10/14 0659    P.O.  900     IV Piggyback  132.6     Total Intake(mL/kg)   1032.6 (16.1)     Net  +1032.6            Urine Occurrence 3 x 5 x     Stool Occurrence  1 x              Physical Exam  Constitutional:       General: He is not in acute distress.     Appearance: He is well-developed. Ill appearance: Chronically.   HENT:      Head: Normocephalic and atraumatic.   Eyes:      General: No scleral icterus.     Pupils: Pupils are equal, round, and reactive to light.   Neck:      Thyroid: No thyromegaly.      Vascular: No JVD.      Trachea: No tracheal deviation.   Cardiovascular:      Rate and Rhythm: Regular rhythm. Bradycardia present.      Heart sounds: Normal heart sounds.   Pulmonary:      Effort: Pulmonary effort is normal.      Breath sounds: Normal breath sounds.   Abdominal:      General: Abdomen is flat. Bowel sounds are normal. There is no distension.      Palpations: Abdomen is soft. There is no mass.      Tenderness: There is no abdominal tenderness. There is no guarding or rebound.   Musculoskeletal:         General: Normal range of motion.      Cervical back: Normal range of motion and neck supple.   Lymphadenopathy:      Cervical: No cervical adenopathy.   Skin:     General: Skin is warm and dry.   Neurological:      Mental Status: He is alert and oriented to person, place, and time.   Psychiatric:         Behavior: Behavior normal.         Thought Content: Thought content normal.         Judgment: Judgment normal.          Significant Labs:  I have reviewed all pertinent lab results within the past 24 hours.  CBC:   Recent Labs   Lab 10/13/24  0546   WBC 4.98   RBC 3.96*   HGB 11.5*   HCT 34.7*   *   MCV 88   MCH 29.0   MCHC 33.1     BMP:   Recent Labs   Lab 10/11/24  0611 10/12/24  0554 10/13/24  0546   *   < > 128*      < > 140   K 3.8   < > 3.4*      < > 107   CO2 24   < > 22*   BUN 17   < > 13   CREATININE 1.2   < > 1.2   CALCIUM 8.7   < > 8.4*   MG 1.9  --   --     < > = values in this interval not displayed.     CMP:   Recent Labs   Lab  10/13/24  0546   *   CALCIUM 8.4*   ALBUMIN 2.7*   PROT 5.9*      K 3.4*   CO2 22*      BUN 13   CREATININE 1.2   ALKPHOS 131   ALT 51*   AST 45*   BILITOT 1.1*     Lipase 256    Significant Diagnostics:  I have reviewed all pertinent imaging results/findings within the past 24 hours.  No new

## 2024-10-13 NOTE — ASSESSMENT & PLAN NOTE
Patient has paroxysmal (<7 days) atrial fibrillation. Patient is currently in  sinus Nicholas . [id number] Score: 4. The patients heart rate in the last 24 hours is as follows:  Pulse  Min: 39  Max: 59     Antiarrhythmics   None    Anticoagulants   No anticoagulants secondary to need procedure    Plan  - Replete lytes with a goal of K>4, Mg >2

## 2024-10-13 NOTE — PROGRESS NOTES
[facility] Medicine  Progress Note    Patient Name: Aquiles Yates  MRN: [id number]  Patient Class: IP- Inpatient   Admission Date: 10/5/2024  Length of Stay: 7 days  Attending Physician: Jackie Ramos MD  Primary Care Provider: Holger Thornton MD        Subjective:     Principal Problem:Calculus of bile duct without cholecystitis with obstruction        HPI:  Aquiles Yates is a 84 y.o. male with a PMH  has a past medical history of Acute blood loss anemia (10/14/2019), Alzheimer's dementia, Aneurysm, abdominal aortic, BCC (basal cell carcinoma of skin) (06/29/2023), Chronic diastolic heart failure (05/20/2024), Coronary artery disease, Depression, Diabetes mellitus, HLD (hyperlipidemia), Hypertension, Kidney stone, PAD (peripheral artery disease), PAF (paroxysmal atrial fibrillation) (01/24/2023), and Tobacco abuse.presented to the Emergency Department for evaluation of tremors and mottled skin per wife. Wife called the paramedics when she noticed patient shaking and patient's mottled skin today. Pt has a history of Alzheimer's and paramedics report a GCS of 14. Upon interview, pt denies any pain and is oriented to self. No further complaints or concerns at this time.       ER workup revealed CBC to be unremarkable.  CMP revealed CBG of 202 mg/dL, , total bili of 2.6, and AST/ALT of 242/179.  .  Troponin negative.  Lactic acid 2.8.  Flu COVID negative.  UA unremarkable.  CTA abdomen and pelvis without contrast revealed no acute findings.  Chest x-ray negative for acute cardiopulmonary findings.  Ultrasound limited revealed cholelithiasis without definitive sonographic evidence of acute cholecystitis.  Mild intrahepatic biliary duct dilation.  Vital signs stable. EKG revealed sinus Nicholas with occasional PVCs with a ventricular rate of 56 beats per minute and a QT/QTC of 408/393.  Patient received 4.5 g Zosyn in ED. GI consulted.  In agreement with  MRCP and will see us consult in a.m..  Patient in agreement with treatment plan.  Patient will be admitted under inpatient status.    PCP: Holger Thornton      Overview/Hospital Course:  Patient is an 84-year-old male with a history of anemia, Alzheimer's dementia, aneurysm, chronic diastolic failure, coronary artery disease diabetes mellitus, hypertension, PAF who presented emergency department with tremors.  Patient's wife said she was he was shaking and had mottled skin.  Patient denied any complaints.  In the emergency department workup revealed glucose of 202, alk-phos of 387, total bili of 2.6, AST of 242 ALT of 179, BNP of 323, lactic acid of 2.4.  CT scan of abdomen pelvis without contrast revealed no acute findings, ultrasound revealed cholelithiasis with ductal dilatation.  Patient was admitted with diagnosis of acute cholecystitis choledocholithiasis.  He was started on Zosyn.  GI was consulted MRCP was ordered with plans for ERCP in [place].  Patient's heart rate was in the 40s yesterday EKG revealed sinus bradycardia.  He went to [place] today for ERCP however heart rate was less than 40 therefore he was sent back for cardiology evaluation.  Patient denies any pain.    Cardiology saw patient with recommendations to stop his Aricept as it was make be contributing to his bradycardia.  Patient was tolerating diet and LFTs have improved.    Status post ERCP:                        - The major papilla appeared normal.                          - The common bile duct was dilated.                          - Choledocholithiasis was found. Complete removal                          was accomplished by biliary sphincterotomy and                          balloon extraction.                          - A biliary sphincterotomy was performed.                          - The biliary tree was swept.                          - One biliary stent was placed into the common                          bile duct.      Patient was scheduled for cholecystectomy 10/11 however heart rate was in the 40s.  Dr. Isaacs reported anesthesia felt was not safe for him to have surgery therefore it has been put off.    Interval History: f/u bradycardia felt to be related to medication. Noted to have some readings in the 50s asymptomatic. Tolerating full liquids.     Review of Systems  Objective:     Vital Signs (Most Recent):  Temp: 97.7 °F (36.5 °C) (10/13/24 0733)  Pulse: (!) 43 (10/13/24 0858)  Resp: 17 (10/13/24 0733)  BP: (!) 155/70 (10/13/24 0733)  SpO2: 95 % (10/13/24 0733) Vital Signs (24h Range):  Temp:  [97.6 °F (36.4 °C)-98.6 °F (37 °C)] 97.7 °F (36.5 °C)  Pulse:  [39-59] 43  Resp:  [17-20] 17  SpO2:  [90 %-96 %] 95 %  BP: (109-178)/(58-72) 155/70     Weight: 64 kg (141 lb)  Body mass index is 20.23 kg/m².    Intake/Output Summary (Last 24 hours) at 10/13/2024 0901  Last data filed at 10/12/2024 1830  Gross per 24 hour   Intake 832.57 ml   Output --   Net 832.57 ml         Physical Exam  Constitutional:       General: He is sleeping.   HENT:      Head: Normocephalic and atraumatic.   Cardiovascular:      Rate and Rhythm: Normal rate and regular rhythm.      Heart sounds: No murmur heard.  Pulmonary:      Effort: Pulmonary effort is normal. No respiratory distress.      Breath sounds: Normal breath sounds. No wheezing.   Abdominal:      General: Bowel sounds are normal. There is no distension.      Palpations: Abdomen is soft.      Tenderness: There is no abdominal tenderness.   Musculoskeletal:         General: No swelling.   Skin:     General: Skin is warm and dry.   Neurological:      Mental Status: He is easily aroused.             Significant Labs: All pertinent labs within the past 24 hours have been reviewed.  Recent Lab Results         10/13/24  0546        Albumin 2.7              ALT 51       Anion Gap 11       AST 45       Baso # 0.02       Basophil % 0.4       BILIRUBIN TOTAL 1.1  Comment: For infants and  newborns, interpretation of results should be based  on gestational age, weight and in agreement with clinical  observations.    Premature Infant recommended reference ranges:  Up to 24 hours.............<8.0 mg/dL  Up to 48 hours............<12.0 mg/dL  3-5 days..................<15.0 mg/dL  6-29 days.................<15.0 mg/dL         BUN 13       Calcium 8.4       Chloride 107       CO2 22       Creatinine 1.2       Differential Method Automated       eGFR 59       Eos # 0.2       Eos % 4.6       Glucose 128       Gran # (ANC) 3.8       Gran % 77.0       Hematocrit 34.7       Hemoglobin 11.5       Immature Grans (Abs) 0.03  Comment: Mild elevation in immature granulocytes is non specific and   can be seen in a variety of conditions including stress response,   acute inflammation, trauma and pregnancy. Correlation with other   laboratory and clinical findings is essential.         Immature Granulocytes 0.6       Lipase 256       Lymph # 0.5       Lymph % 10.6       MCH 29.0       MCHC 33.1       MCV 88       Mono # 0.3       Mono % 6.8       MPV 11.3       nRBC 0       Platelet Count 124       Potassium 3.4       PROTEIN TOTAL 5.9       RBC 3.96       RDW 13.9       Sodium 140       WBC 4.98               Significant Imaging: I have reviewed all pertinent imaging results/findings within the past 24 hours.    Assessment/Plan:      * Calculus of bile duct without cholecystitis with obstruction  Patient is on Zosyn  Completed ERCP with stone removal [facility] 10/10 without complication  Was scheduled for robotic cholecystectomy 10/11 however given bradycardia this has been postponed.      Chronic diastolic heart failure  Patient is identified as having Diastolic (HFpEF) heart failure that is Chronic. CHF is currently controlled. Latest ECHO performed and demonstrates- Results for orders placed during the hospital encounter of 06/13/22    Echo    Interpretation Summary  · The left ventricle is normal in  size with severe concentric hypertrophy and normal systolic function.  · Mild left atrial enlargement.  · The estimated ejection fraction is 55%.  · Grade II left ventricular diastolic dysfunction.  · Normal right ventricular size with normal right ventricular systolic function.  .   monitor clinical status closely. Monitor on telemetry. Patient is off CHF pathway.  Monitor strict Is&Os and daily weights.  Place on fluid restriction of 2 L. Continue to stress to patient importance of self efficacy and  on diet for CHF. Last BNP reviewed- and noted below   Recent Labs   Lab 10/05/24  2048   *     .            Dementia  Patient with dementia with likely etiology of alzheimer's dementia. Dementia is moderate. The patient does not have signs of behavioral disturbance. Home dementia medications are held.  Continue non-pharmacologic interventions to prevent delirium (No VS between 11PM-5AM, activity during day, opening blinds, providing glasses/hearing aids, and up in chair during daytime). Will avoid narcotics and benzos unless absolutely necessary. PRN anti-psychotics are not prescribed to avoid self harm behaviors.    AVM (arteriovenous malformation) of stomach, acquired with hemorrhage  Patient's hemorrhage is due to gastrointestinal bleed, patient does not have a propensity for bleeding.. Patients most recent Hgb, Hct, platelets, and INR are listed below.  Recent Labs     10/11/24  0611 10/12/24  0554 10/13/24  0546   HGB 12.4* 11.6* 11.5*   HCT 37.4* 35.1* 34.7*   * 98* 124*       Plan  - Will trend hemoglobin/hematocrit Daily  - Will monitor and correct any coagulation defects  - Will transfuse if Hgb is <7g/dl (<8g/dl in cases of active ACS) or if patient has rapid bleeding leading to hemodynamic instability  - STABLE    PAF (paroxysmal atrial fibrillation)  Patient has paroxysmal (<7 days) atrial fibrillation. Patient is currently in  sinus Nicholas . [id number] Score: 4. The patients heart rate  "in the last 24 hours is as follows:  Pulse  Min: 39  Max: 59     Antiarrhythmics   None    Anticoagulants   No anticoagulants secondary to need procedure    Plan  - Replete lytes with a goal of K>4, Mg >2      Bradycardia  Seen by Cardiology who recommended stopping his Aricept.  However anesthesia would like further cardiac evaluation prior to surgery.      Type 2 diabetes mellitus with other specified complication, unspecified whether long term insulin use  Patient's FSGs are controlled on current medication regimen.  Last A1c reviewed-   Lab Results   Component Value Date    HGBA1C 7.0 (H) 05/07/2024     Most recent fingerstick glucose reviewed- No results for input(s): "POCTGLUCOSE" in the last 24 hours.  Current correctional scale  Low  Maintain anti-hyperglycemic dose as follows-   Antihyperglycemics (From admission, onward)      Start     Stop Route Frequency Ordered    10/06/24 0146  insulin aspart U-100 pen 0-5 Units         -- SubQ Before meals & nightly PRN 10/06/24 0048          Hold Oral hypoglycemics while patient is in the hospital.      Dyslipidemia  Patient is chronically on statin.will not continue for now. Last Lipid Panel:   Lab Results   Component Value Date    CHOL 150 05/07/2024    HDL 36 (L) 05/07/2024    LDLCALC 94.2 05/07/2024    TRIG 99 05/07/2024    CHOLHDL 24.0 05/07/2024     -Hold home medication due to elevated LFTs          Essential hypertension  Chronic, controlled.  Latest blood pressure and vitals reviewed-   Temp:  [97.6 °F (36.4 °C)-98.6 °F (37 °C)]   Pulse:  [39-59]   Resp:  [17-20]   BP: (109-178)/(58-72)   SpO2:  [90 %-96 %] .   Home meds for hypertension were reviewed and noted below.   Hypertension Medications               isosorbide mononitrate (IMDUR) 120 MG 24 hr tablet TAKE 1 TABLET BY MOUTH EVERY DAY    valsartan (DIOVAN) 160 MG tablet Take 1 tablet (160 mg total) by mouth once daily.            While in the hospital, will manage blood pressure as follows, hold " valsartan    Will utilize p.r.n. blood pressure medication only if patient's blood pressure greater than  180/110 and he develops symptoms such as worsening chest pain or shortness of breath.      Coronary artery disease of native artery of native heart with stable angina pectoris  Patient with known CAD s/p stent placement, which is controlled. and monitor for S/Sx of angina/ACS. Continue to monitor on telemetry.       VTE Risk Mitigation (From admission, onward)           Ordered     Reason for No Pharmacological VTE Prophylaxis  Once        Question:  Reasons:  Answer:  Physician Provided (leave comment)  Comment:  Pending potential surgical intervention    10/06/24 0048     IP VTE HIGH RISK PATIENT  Once         10/06/24 0048     Place sequential compression device  Until discontinued         10/06/24 0048                    Discharge Planning   ZEINA:      Code Status: Full Code   Is the patient medically ready for discharge?:     Reason for patient still in hospital (select all that apply): Patient trending condition, Treatment, and Consult recommendations  Discharge Plan A: Home with family                  Jackie Ramos MD  Department of Hospital Medicine   [facility] - Med Surg

## 2024-10-13 NOTE — ASSESSMENT & PLAN NOTE
ERCP yesterday with clearing of choledocholithiasis and stent placement, now with concern for post ERCP pancreatitis with lipase now 256    Discussed that cholecystectomy is generally recommended after resolution of choledocholithiasis to prevent future episodes.   Patient will need medical optimization with cardiology regarding persistent bradycardia prior to undergoing anesthesia for cholecystectomy  IVF and bowel rest for potential post ERCP pancreatitis.  Remainder of care per primary team. Surgery will continue to follow. Could potentially perform cholecystectomy as an outpatient later as he needs further cardiac workup before general anesthetic.    Further workup for bradycardia per Cardiology and Medicine    Check lipase again in the morning

## 2024-10-14 LAB
ALBUMIN SERPL BCP-MCNC: 2.6 G/DL (ref 3.5–5.2)
ALP SERPL-CCNC: 128 U/L (ref 55–135)
ALT SERPL W/O P-5'-P-CCNC: 55 U/L (ref 10–44)
ANION GAP SERPL CALC-SCNC: 10 MMOL/L (ref 8–16)
AST SERPL-CCNC: 51 U/L (ref 10–40)
BASOPHILS # BLD AUTO: 0.02 K/UL (ref 0–0.2)
BASOPHILS NFR BLD: 0.4 % (ref 0–1.9)
BILIRUB SERPL-MCNC: 1.2 MG/DL (ref 0.1–1)
BUN SERPL-MCNC: 10 MG/DL (ref 8–23)
CALCIUM SERPL-MCNC: 8.4 MG/DL (ref 8.7–10.5)
CHLORIDE SERPL-SCNC: 106 MMOL/L (ref 95–110)
CO2 SERPL-SCNC: 23 MMOL/L (ref 23–29)
CREAT SERPL-MCNC: 1.2 MG/DL (ref 0.5–1.4)
DIFFERENTIAL METHOD BLD: ABNORMAL
EOSINOPHIL # BLD AUTO: 0.2 K/UL (ref 0–0.5)
EOSINOPHIL NFR BLD: 4.1 % (ref 0–8)
ERYTHROCYTE [DISTWIDTH] IN BLOOD BY AUTOMATED COUNT: 14 % (ref 11.5–14.5)
EST. GFR  (NO RACE VARIABLE): 59 ML/MIN/1.73 M^2
GLUCOSE SERPL-MCNC: 131 MG/DL (ref 70–110)
HCT VFR BLD AUTO: 35.1 % (ref 40–54)
HGB BLD-MCNC: 11.7 G/DL (ref 14–18)
IMM GRANULOCYTES # BLD AUTO: 0.03 K/UL (ref 0–0.04)
IMM GRANULOCYTES NFR BLD AUTO: 0.6 % (ref 0–0.5)
LIPASE SERPL-CCNC: 114 U/L (ref 4–60)
LYMPHOCYTES # BLD AUTO: 0.5 K/UL (ref 1–4.8)
LYMPHOCYTES NFR BLD: 10.9 % (ref 18–48)
MCH RBC QN AUTO: 29.2 PG (ref 27–31)
MCHC RBC AUTO-ENTMCNC: 33.3 G/DL (ref 32–36)
MCV RBC AUTO: 88 FL (ref 82–98)
MONOCYTES # BLD AUTO: 0.4 K/UL (ref 0.3–1)
MONOCYTES NFR BLD: 8.6 % (ref 4–15)
NEUTROPHILS # BLD AUTO: 3.7 K/UL (ref 1.8–7.7)
NEUTROPHILS NFR BLD: 75.4 % (ref 38–73)
NRBC BLD-RTO: 0 /100 WBC
PLATELET # BLD AUTO: 134 K/UL (ref 150–450)
PMV BLD AUTO: 10.8 FL (ref 9.2–12.9)
POCT GLUCOSE: 137 MG/DL (ref 70–110)
POCT GLUCOSE: 161 MG/DL (ref 70–110)
POTASSIUM SERPL-SCNC: 3.1 MMOL/L (ref 3.5–5.1)
PROT SERPL-MCNC: 5.9 G/DL (ref 6–8.4)
RBC # BLD AUTO: 4.01 M/UL (ref 4.6–6.2)
SODIUM SERPL-SCNC: 139 MMOL/L (ref 136–145)
WBC # BLD AUTO: 4.88 K/UL (ref 3.9–12.7)

## 2024-10-14 PROCEDURE — 80053 COMPREHEN METABOLIC PANEL: CPT | Mod: HCNC | Performed by: INTERNAL MEDICINE

## 2024-10-14 PROCEDURE — 83690 ASSAY OF LIPASE: CPT | Mod: HCNC | Performed by: SURGERY

## 2024-10-14 PROCEDURE — 93010 ELECTROCARDIOGRAM REPORT: CPT | Mod: HCNC,XU,76, | Performed by: INTERNAL MEDICINE

## 2024-10-14 PROCEDURE — 93005 ELECTROCARDIOGRAM TRACING: CPT | Mod: HCNC

## 2024-10-14 PROCEDURE — 63600175 PHARM REV CODE 636 W HCPCS: Mod: HCNC | Performed by: INTERNAL MEDICINE

## 2024-10-14 PROCEDURE — 97110 THERAPEUTIC EXERCISES: CPT | Mod: HCNC

## 2024-10-14 PROCEDURE — 99232 SBSQ HOSP IP/OBS MODERATE 35: CPT | Mod: 57,HCNC,, | Performed by: SURGERY

## 2024-10-14 PROCEDURE — 25000003 PHARM REV CODE 250: Mod: HCNC | Performed by: INTERNAL MEDICINE

## 2024-10-14 PROCEDURE — 36415 COLL VENOUS BLD VENIPUNCTURE: CPT | Mod: HCNC | Performed by: SURGERY

## 2024-10-14 PROCEDURE — 99233 SBSQ HOSP IP/OBS HIGH 50: CPT | Mod: HCNC,,, | Performed by: INTERNAL MEDICINE

## 2024-10-14 PROCEDURE — 11000001 HC ACUTE MED/SURG PRIVATE ROOM: Mod: HCNC

## 2024-10-14 PROCEDURE — 97116 GAIT TRAINING THERAPY: CPT | Mod: HCNC

## 2024-10-14 PROCEDURE — 85025 COMPLETE CBC W/AUTO DIFF WBC: CPT | Mod: HCNC | Performed by: INTERNAL MEDICINE

## 2024-10-14 RX ORDER — POTASSIUM CHLORIDE 20 MEQ/1
40 TABLET, EXTENDED RELEASE ORAL ONCE
Status: COMPLETED | OUTPATIENT
Start: 2024-10-14 | End: 2024-10-14

## 2024-10-14 RX ADMIN — ISOSORBIDE MONONITRATE 120 MG: 60 TABLET, EXTENDED RELEASE ORAL at 08:10

## 2024-10-14 RX ADMIN — PIPERACILLIN SODIUM AND TAZOBACTAM SODIUM 4.5 G: 4; .5 INJECTION, POWDER, FOR SOLUTION INTRAVENOUS at 11:10

## 2024-10-14 RX ADMIN — PANTOPRAZOLE SODIUM 40 MG: 40 TABLET, DELAYED RELEASE ORAL at 08:10

## 2024-10-14 RX ADMIN — POTASSIUM CHLORIDE 40 MEQ: 1500 TABLET, EXTENDED RELEASE ORAL at 11:10

## 2024-10-14 RX ADMIN — PIPERACILLIN SODIUM AND TAZOBACTAM SODIUM 4.5 G: 4; .5 INJECTION, POWDER, FOR SOLUTION INTRAVENOUS at 08:10

## 2024-10-14 RX ADMIN — PIPERACILLIN SODIUM AND TAZOBACTAM SODIUM 4.5 G: 4; .5 INJECTION, POWDER, FOR SOLUTION INTRAVENOUS at 03:10

## 2024-10-14 NOTE — ASSESSMENT & PLAN NOTE
Patient has paroxysmal (<7 days) atrial fibrillation. Patient is currently in  sinus Nicholas . [id number] Score: 4. The patients heart rate in the last 24 hours is as follows:  Pulse  Min: 47  Max: 72     Antiarrhythmics   None    Anticoagulants   No anticoagulants secondary to need procedure    Plan  - Replete lytes with a goal of K>4, Mg >2

## 2024-10-14 NOTE — SUBJECTIVE & OBJECTIVE
Interval History: f/u bradycardia patient asking about breakfast on rounds. No acute issues overnight    Review of Systems  Objective:     Vital Signs (Most Recent):  Temp: 97.6 °F (36.4 °C) (10/14/24 0730)  Pulse: (!) 49 (10/14/24 0730)  Resp: 16 (10/14/24 0730)  BP: 136/75 (10/14/24 0730)  SpO2: 95 % (10/14/24 0730) Vital Signs (24h Range):  Temp:  [97.6 °F (36.4 °C)-98.4 °F (36.9 °C)] 97.6 °F (36.4 °C)  Pulse:  [47-72] 49  Resp:  [16-18] 16  SpO2:  [90 %-95 %] 95 %  BP: (117-137)/(57-75) 136/75     Weight: 64 kg (141 lb)  Body mass index is 20.23 kg/m².    Intake/Output Summary (Last 24 hours) at 10/14/2024 0936  Last data filed at 10/13/2024 1650  Gross per 24 hour   Intake 287.77 ml   Output --   Net 287.77 ml         Physical Exam  HENT:      Head: Normocephalic and atraumatic.   Cardiovascular:      Rate and Rhythm: Regular rhythm. Bradycardia present.      Heart sounds: No murmur heard.  Pulmonary:      Effort: Pulmonary effort is normal. No respiratory distress.      Breath sounds: Normal breath sounds. No wheezing.   Abdominal:      General: Bowel sounds are normal. There is no distension.      Palpations: Abdomen is soft.      Tenderness: There is no abdominal tenderness.   Musculoskeletal:         General: No swelling.   Skin:     General: Skin is warm and dry.   Neurological:      Mental Status: He is alert and oriented to person, place, and time. Mental status is at baseline.             Significant Labs: All pertinent labs within the past 24 hours have been reviewed.  Recent Lab Results         10/14/24  0600   10/14/24  0550   10/13/24  2046        Albumin 2.6                      ALT 55           Anion Gap 10           AST 51           Baso # 0.02           Basophil % 0.4           BILIRUBIN TOTAL 1.2  Comment: For infants and newborns, interpretation of results should be based  on gestational age, weight and in agreement with clinical  observations.    Premature Infant recommended reference  ranges:  Up to 24 hours.............<8.0 mg/dL  Up to 48 hours............<12.0 mg/dL  3-5 days..................<15.0 mg/dL  6-29 days.................<15.0 mg/dL             BUN 10           Calcium 8.4           Chloride 106           CO2 23           Creatinine 1.2           Differential Method Automated           eGFR 59           Eos # 0.2           Eos % 4.1           Glucose 131           Gran # (ANC) 3.7           Gran % 75.4           Hematocrit 35.1           Hemoglobin 11.7           Immature Grans (Abs) 0.03  Comment: Mild elevation in immature granulocytes is non specific and   can be seen in a variety of conditions including stress response,   acute inflammation, trauma and pregnancy. Correlation with other   laboratory and clinical findings is essential.             Immature Granulocytes 0.6           Lipase 114           Lymph # 0.5           Lymph % 10.9           MCH 29.2           MCHC 33.3           MCV 88           Mono # 0.4           Mono % 8.6           MPV 10.8           nRBC 0           Platelet Count 134           POCT Glucose   137   189       Potassium 3.1           PROTEIN TOTAL 5.9           RBC 4.01           RDW 14.0           Sodium 139           WBC 4.88                   Significant Imaging: I have reviewed all pertinent imaging results/findings within the past 24 hours.

## 2024-10-14 NOTE — PROGRESS NOTES
[facility]  General Surgery  Progress Note    Subjective:     History of Present Illness:  83 y/o male with past medical history significant for DMII, CAD with multiple PCI procedures, chronic diastolic CHF, afib with h/o CVA,  HTN, DM, LAE, LVH, PAD, AAA stent graft (approx 2013), dementia, hyperlipidemia who presented to the ED approximately 2 days ago for evaluation after his wife was concerned about tremors and the appearance of his skin. He is a poor historian secondary to dementia. CT abdomen pelvis unremarkable. U/S abdomen showing cholelithiasis without evidence of cholecystitis, but an MRCP was obtained secondary to elevated LFTs and Tbili. MRCP with significant choledocholithiasis. Patient was transported to [place] earlier today to attempt ERCP, but he became significant bradycardic and the procedure was aborted. His LFTs are trending down today. He reports being hungry at the time of exam, and his wife denies any past abdominal surgical history.    Post-Op Info:  Procedure(s) (LRB):  XI ROBOTIC CHOLECYSTECTOMY (N/A)         Interval History:  Tolerating diet, denies any abdominal pain, overall still bradycardic      Medications:  Continuous Infusions:  Scheduled Meds:   isosorbide mononitrate  120 mg Oral Daily    pantoprazole  40 mg Oral Daily    piperacillin-tazobactam (Zosyn) IV (PEDS and ADULTS) (extended infusion is not appropriate)  4.5 g Intravenous Q8H     PRN Meds:  Current Facility-Administered Medications:     acetaminophen, 650 mg, Rectal, Q6H PRN    acetaminophen, 650 mg, Oral, Q8H PRN    albuterol-ipratropium, 3 mL, Nebulization, Q6H PRN    aluminum-magnesium hydroxide-simethicone, 30 mL, Oral, QID PRN    dextrose 10%, 12.5 g, Intravenous, PRN    dextrose 10%, 25 g, Intravenous, PRN    gabapentin, 100 mg, Oral, BID PRN    glucagon (human recombinant), 1 mg, Intramuscular, PRN    glucose, 16 g, Oral, PRN    glucose, 24 g, Oral, PRN    HYDROcodone-acetaminophen, 1 tablet, Oral, Q6H  PRN    insulin aspart U-100, 0-5 Units, Subcutaneous, QID (AC + HS) PRN    naloxone, 0.02 mg, Intravenous, PRN    ondansetron, 4 mg, Intravenous, Q8H PRN    promethazine, 25 mg, Oral, Q6H PRN    senna-docusate 8.6-50 mg, 1 tablet, Oral, BID PRN    sodium chloride 0.9%, 10 mL, Intravenous, Q12H PRN     Review of patient's allergies indicates:   Allergen Reactions    Metoprolol Other (See Comments)     Junctional rhythm       Objective:     Vital Signs (Most Recent):  Temp: 97.6 °F (36.4 °C) (10/14/24 0730)  Pulse: (!) 49 (10/14/24 0730)  Resp: 16 (10/14/24 0730)  BP: 136/75 (10/14/24 0730)  SpO2: 95 % (10/14/24 0730) Vital Signs (24h Range):  Temp:  [97.6 °F (36.4 °C)-98.4 °F (36.9 °C)] 97.6 °F (36.4 °C)  Pulse:  [43-72] 49  Resp:  [16-18] 16  SpO2:  [90 %-95 %] 95 %  BP: (117-137)/(57-75) 136/75     Weight: 64 kg (141 lb)  Body mass index is 20.23 kg/m².    Intake/Output - Last 3 Shifts         10/12 0700  10/13 0659 10/13 0700  10/14 0659 10/14 0700  10/15 0659    P.O. 900      IV Piggyback 132.6 287.8     Total Intake(mL/kg) 1032.6 (16.1) 287.8 (4.5)     Net +1032.6 +287.8            Urine Occurrence 5 x      Stool Occurrence 1 x               Physical Exam  Constitutional:       General: He is not in acute distress.     Appearance: He is well-developed. Ill appearance: Chronically.   HENT:      Head: Normocephalic and atraumatic.   Eyes:      General: No scleral icterus.     Pupils: Pupils are equal, round, and reactive to light.   Neck:      Thyroid: No thyromegaly.      Vascular: No JVD.      Trachea: No tracheal deviation.   Cardiovascular:      Rate and Rhythm: Regular rhythm. Bradycardia present.      Heart sounds: Normal heart sounds.   Pulmonary:      Effort: Pulmonary effort is normal.      Breath sounds: Normal breath sounds.   Abdominal:      General: Abdomen is flat. Bowel sounds are normal. There is no distension.      Palpations: Abdomen is soft. There is no mass.      Tenderness: There is no  abdominal tenderness. There is no guarding or rebound.   Musculoskeletal:         General: Normal range of motion.      Cervical back: Normal range of motion and neck supple.   Lymphadenopathy:      Cervical: No cervical adenopathy.   Skin:     General: Skin is warm and dry.   Neurological:      Mental Status: He is alert and oriented to person, place, and time.   Psychiatric:         Behavior: Behavior normal.         Thought Content: Thought content normal.         Judgment: Judgment normal.          Significant Labs:  I have reviewed all pertinent lab results within the past 24 hours.  CBC:   Recent Labs   Lab 10/14/24  0600   WBC 4.88   RBC 4.01*   HGB 11.7*   HCT 35.1*   *   MCV 88   MCH 29.2   MCHC 33.3     BMP:   Recent Labs   Lab 10/11/24  0611 10/12/24  0554 10/14/24  0600   *   < > 131*      < > 139   K 3.8   < > 3.1*      < > 106   CO2 24   < > 23   BUN 17   < > 10   CREATININE 1.2   < > 1.2   CALCIUM 8.7   < > 8.4*   MG 1.9  --   --     < > = values in this interval not displayed.     CMP:   Recent Labs   Lab 10/14/24  0600   *   CALCIUM 8.4*   ALBUMIN 2.6*   PROT 5.9*      K 3.1*   CO2 23      BUN 10   CREATININE 1.2   ALKPHOS 128   ALT 55*   AST 51*   BILITOT 1.2*     Lipase 256    Significant Diagnostics:  I have reviewed all pertinent imaging results/findings within the past 24 hours.  No new  Assessment/Plan:     * Calculus of bile duct without cholecystitis with obstruction  ERCP with clearing of choledocholithiasis and stent placement and post ERCP pancreatitis    Discussed that cholecystectomy is generally recommended after resolution of choledocholithiasis to prevent future episodes.   Patient will need medical optimization with cardiology regarding persistent bradycardia prior to undergoing anesthesia for cholecystectomy  Okay from surgical standpoint for short term outpatient follow-up and scheduling cholecystectomy once stable from a cardiac  standpoint.    Chronic diastolic heart failure  Currently with significant bradycardic, prohibitive of procedures    Need to improvement and bradycardia prior to cholecystectomy defer to cardiology     Bradycardia  Continued work-up/treatment per cardiology and medicine    Bradycardia needs to be improved prior to surgical intervention, cholecystectomy    Type 2 diabetes mellitus with other specified complication, unspecified whether long term insulin use  Management per medicine    Dyslipidemia  Management per medicine    Essential hypertension  Management per medicine    Coronary artery disease of native artery of native heart with stable angina pectoris  Management per medicine/cardiology        Fred Taylor MD  General Surgery  [facility]

## 2024-10-14 NOTE — ASSESSMENT & PLAN NOTE
Currently with significant bradycardic, prohibitive of procedures    Need to improvement and bradycardia prior to cholecystectomy defer to cardiology

## 2024-10-14 NOTE — PLAN OF CARE
Plan of care discussed with pt. Pt verbalized understanding. Free from injury. No s/s of distress noted. Vitals stable. IV abx. Meds as ordered. No c/o pain. Diet tolerated. Tele monitor in place. Q2 hour rounding. No complaints. Will continue to monitor pt. 12 hour chart review.

## 2024-10-14 NOTE — PT/OT/SLP PROGRESS
"Physical Therapy  Treatment    Aquiles Yates   MRN: [id number]   Admitting Diagnosis: Calculus of bile duct without cholecystitis with obstruction    PT Received On: 10/14/24  PT Start Time: 0835     PT Stop Time: 0900    PT Total Time (min): 25 min       Billable Minutes:  Gait Training 15 and Therapeutic Exercise 10    Treatment Type: Treatment  PT/PTA: PT     Number of PTA visits since last PT visit: 0       General Precautions: Standard, fall, hearing impaired  Orthopedic Precautions: N/A  Braces: N/A  Respiratory Status: Room air         Subjective:  Communicated with NURSE RANDALL AND EPIC CHART REVIEW  prior to session.   PT AGREED TO TX     Pain/Comfort  Pain Rating 1: 0/10  Pain Rating Post-Intervention 1: 0/10    Objective:   Patient found with: peripheral IV, telemetry    Functional Mobility:  PT MET IN RM SUP>SIT EOB WITH MIN A. PT SCOOTED TO EOB AND GT. BELT AND  SOCKS DONNED PRIOR TO OOB MOBILITY.  PT STOOD WITH RW AND GT TRAINED X 80' WITH STEP TO GT AND SLOW PACE. PT RETURNED TO TM T/F TO CHAIR WITH CGA.   Treatment and Education:  PT COMPLETED AP ,TKE AND MIP 2X10 REPS FOR LE ROM AND STRENGTHENING. PT EDUCATED ON "CALL DON'T FALL", ENCOURAGED TO CALL FOR ASSISTANCE WITH ALL NEEDS FOR OOB MOBILITY.       AM-PAC 6 CLICK MOBILITY  How much help from another person does this patient currently need?   1 = Unable, Total/Dependent Assistance  2 = A lot, Maximum/Moderate Assistance  3 = A little, Minimum/Contact Guard/Supervision  4 = None, Modified King/Independent    Turning over in bed (including adjusting bedclothes, sheets and blankets)?: 3  Sitting down on and standing up from a chair with arms (e.g., wheelchair, bedside commode, etc.): 3  Moving from lying on back to sitting on the side of the bed?: 3  Moving to and from a bed to a chair (including a wheelchair)?: 3  Need to walk in hospital room?: 3  Climbing 3-5 steps with a railing?: 1  Basic Mobility Total Score: 16    AM-PAC " Raw Score CMS G-Code Modifier Level of Impairment Assistance   6 % Total / Unable   7 - 9 CM 80 - 100% Maximal Assist   10 - 14 CL 60 - 80% Moderate Assist   15 - 19 CK 40 - 60% Moderate Assist   20 - 22 CJ 20 - 40% Minimal Assist   23 CI 1-20% SBA / CGA   24 CH 0% Independent/ Mod I     Patient left up in chair with call button in reach.    Assessment:  PT PROGRESSING WITH GT AND SAURABH TX WELL.     Rehab identified problem list/impairments: weakness, impaired endurance, impaired self care skills, gait instability, impaired balance, impaired cognition, decreased lower extremity function, decreased safety awareness, impaired functional mobility    Rehab potential is good.    Activity tolerance: Fair    Discharge recommendations: Low Intensity Therapy      Barriers to discharge:      Equipment recommendations: to be determined by next level of care     GOALS:   Multidisciplinary Problems       Physical Therapy Goals          Problem: Physical Therapy    Goal Priority Disciplines Outcome Interventions   Physical Therapy Goal     PT, PT/OT Progressing    Description: LTG: 10/25/24  1. PT WILL COMPLETE BED MOBILITY IND  2. PT WILL STAND T/F TO CHAIR WITH RW AND SBA  3. PT WILL GT TRAIN X 150' WITH RW AND SBA TO PROGRESS GT.  4. PT WILL INC AMPAC SCORE BY 2 POINTS TO PROGRESS GROSS FUNC MOBILITY.                          PLAN:    Patient to be seen 3 x/week to address the above listed problems via gait training, therapeutic activities, therapeutic exercises  Plan of Care expires: 10/25/24  Plan of Care reviewed with: patient         10/14/2024

## 2024-10-14 NOTE — ASSESSMENT & PLAN NOTE
ERCP with clearing of choledocholithiasis and stent placement and post ERCP pancreatitis    Discussed that cholecystectomy is generally recommended after resolution of choledocholithiasis to prevent future episodes.   Patient will need medical optimization with cardiology regarding persistent bradycardia prior to undergoing anesthesia for cholecystectomy  Okay from surgical standpoint for short term outpatient follow-up and scheduling cholecystectomy once stable from a cardiac standpoint.

## 2024-10-14 NOTE — ASSESSMENT & PLAN NOTE
Chronic, controlled.  Latest blood pressure and vitals reviewed-   Temp:  [97.6 °F (36.4 °C)-98.4 °F (36.9 °C)]   Pulse:  [47-72]   Resp:  [16-18]   BP: (117-137)/(57-75)   SpO2:  [90 %-95 %] .   Home meds for hypertension were reviewed and noted below.   Hypertension Medications               isosorbide mononitrate (IMDUR) 120 MG 24 hr tablet TAKE 1 TABLET BY MOUTH EVERY DAY    valsartan (DIOVAN) 160 MG tablet Take 1 tablet (160 mg total) by mouth once daily.            While in the hospital, will manage blood pressure as follows, hold valsartan    Will utilize p.r.n. blood pressure medication only if patient's blood pressure greater than  180/110 and he develops symptoms such as worsening chest pain or shortness of breath.

## 2024-10-14 NOTE — ASSESSMENT & PLAN NOTE
Bradycardia on tele 30-40s  H/o of some bradycardia  Also is on Aricept for dementia  Recommend holding    10/14/24  -ERCP aborted yesterday due to bradycardia  -EKG/rhythm strips reviewed-junctional rhythm noted, reviewed with EP, will plan on TVP prior to surgical intervention  -Avoid AV bria agents

## 2024-10-14 NOTE — PROGRESS NOTES
[facility]  Progress Note    Patient Name: Aquiles Yates  MRN: [id number]  Patient Class: IP- Inpatient   Admission Date: 10/5/2024  Length of Stay: 8 days  Attending Physician: Jackie Ramos MD  Primary Care Provider: Holger Thornton MD        Subjective:     Principal Problem:Calculus of bile duct without cholecystitis with obstruction        HPI:  Aquiles Yates is a 84 y.o. male with a PMH  has a past medical history of Acute blood loss anemia (10/14/2019), Alzheimer's dementia, Aneurysm, abdominal aortic, BCC (basal cell carcinoma of skin) (06/29/2023), Chronic diastolic heart failure (05/20/2024), Coronary artery disease, Depression, Diabetes mellitus, HLD (hyperlipidemia), Hypertension, Kidney stone, PAD (peripheral artery disease), PAF (paroxysmal atrial fibrillation) (01/24/2023), and Tobacco abuse.presented to the Emergency Department for evaluation of tremors and mottled skin per wife. Wife called the paramedics when she noticed patient shaking and patient's mottled skin today. Pt has a history of Alzheimer's and paramedics report a GCS of 14. Upon interview, pt denies any pain and is oriented to self. No further complaints or concerns at this time.       ER workup revealed CBC to be unremarkable.  CMP revealed CBG of 202 mg/dL, , total bili of 2.6, and AST/ALT of 242/179.  .  Troponin negative.  Lactic acid 2.8.  Flu COVID negative.  UA unremarkable.  CTA abdomen and pelvis without contrast revealed no acute findings.  Chest x-ray negative for acute cardiopulmonary findings.  Ultrasound limited revealed cholelithiasis without definitive sonographic evidence of acute cholecystitis.  Mild intrahepatic biliary duct dilation.  Vital signs stable. EKG revealed sinus Nicholas with occasional PVCs with a ventricular rate of 56 beats per minute and a QT/QTC of 408/393.  Patient received 4.5 g Zosyn in ED. GI consulted.  In agreement with  MRCP and will see us consult in a.m..  Patient in agreement with treatment plan.  Patient will be admitted under inpatient status.    PCP: Holger Thornton      Overview/Hospital Course:  Patient is an 84-year-old male with a history of anemia, Alzheimer's dementia, aneurysm, chronic diastolic failure, coronary artery disease diabetes mellitus, hypertension, PAF who presented emergency department with tremors.  Patient's wife said she was he was shaking and had mottled skin.  Patient denied any complaints.  In the emergency department workup revealed glucose of 202, alk-phos of 387, total bili of 2.6, AST of 242 ALT of 179, BNP of 323, lactic acid of 2.4.  CT scan of abdomen pelvis without contrast revealed no acute findings, ultrasound revealed cholelithiasis with ductal dilatation.  Patient was admitted with diagnosis of acute cholecystitis choledocholithiasis.  He was started on Zosyn.  GI was consulted MRCP was ordered with plans for ERCP in [place].  Patient's heart rate was in the 40s yesterday EKG revealed sinus bradycardia.  He went to [place] today for ERCP however heart rate was less than 40 therefore he was sent back for cardiology evaluation.  Patient denies any pain.    Cardiology saw patient with recommendations to stop his Aricept as it was make be contributing to his bradycardia.  Patient was tolerating diet and LFTs have improved.    Status post ERCP:                        - The major papilla appeared normal.                          - The common bile duct was dilated.                          - Choledocholithiasis was found. Complete removal                          was accomplished by biliary sphincterotomy and                          balloon extraction.                          - A biliary sphincterotomy was performed.                          - The biliary tree was swept.                          - One biliary stent was placed into the common                          bile duct.      Patient was scheduled for cholecystectomy 10/11 however heart rate was in the 40s.  Dr. Isaacs reported anesthesia felt was not safe for him to have surgery therefore it has been put off.    Interval History: f/u bradycardia patient asking about breakfast on rounds. No acute issues overnight    Review of Systems  Objective:     Vital Signs (Most Recent):  Temp: 97.6 °F (36.4 °C) (10/14/24 0730)  Pulse: (!) 49 (10/14/24 0730)  Resp: 16 (10/14/24 0730)  BP: 136/75 (10/14/24 0730)  SpO2: 95 % (10/14/24 0730) Vital Signs (24h Range):  Temp:  [97.6 °F (36.4 °C)-98.4 °F (36.9 °C)] 97.6 °F (36.4 °C)  Pulse:  [47-72] 49  Resp:  [16-18] 16  SpO2:  [90 %-95 %] 95 %  BP: (117-137)/(57-75) 136/75     Weight: 64 kg (141 lb)  Body mass index is 20.23 kg/m².    Intake/Output Summary (Last 24 hours) at 10/14/2024 0936  Last data filed at 10/13/2024 1650  Gross per 24 hour   Intake 287.77 ml   Output --   Net 287.77 ml         Physical Exam  HENT:      Head: Normocephalic and atraumatic.   Cardiovascular:      Rate and Rhythm: Regular rhythm. Bradycardia present.      Heart sounds: No murmur heard.  Pulmonary:      Effort: Pulmonary effort is normal. No respiratory distress.      Breath sounds: Normal breath sounds. No wheezing.   Abdominal:      General: Bowel sounds are normal. There is no distension.      Palpations: Abdomen is soft.      Tenderness: There is no abdominal tenderness.   Musculoskeletal:         General: No swelling.   Skin:     General: Skin is warm and dry.   Neurological:      Mental Status: He is alert and oriented to person, place, and time. Mental status is at baseline.             Significant Labs: All pertinent labs within the past 24 hours have been reviewed.  Recent Lab Results         10/14/24  0600   10/14/24  0550   10/13/24  2046        Albumin 2.6                      ALT 55           Anion Gap 10           AST 51           Baso # 0.02           Basophil % 0.4           BILIRUBIN TOTAL  1.2  Comment: For infants and newborns, interpretation of results should be based  on gestational age, weight and in agreement with clinical  observations.    Premature Infant recommended reference ranges:  Up to 24 hours.............<8.0 mg/dL  Up to 48 hours............<12.0 mg/dL  3-5 days..................<15.0 mg/dL  6-29 days.................<15.0 mg/dL             BUN 10           Calcium 8.4           Chloride 106           CO2 23           Creatinine 1.2           Differential Method Automated           eGFR 59           Eos # 0.2           Eos % 4.1           Glucose 131           Gran # (ANC) 3.7           Gran % 75.4           Hematocrit 35.1           Hemoglobin 11.7           Immature Grans (Abs) 0.03  Comment: Mild elevation in immature granulocytes is non specific and   can be seen in a variety of conditions including stress response,   acute inflammation, trauma and pregnancy. Correlation with other   laboratory and clinical findings is essential.             Immature Granulocytes 0.6           Lipase 114           Lymph # 0.5           Lymph % 10.9           MCH 29.2           MCHC 33.3           MCV 88           Mono # 0.4           Mono % 8.6           MPV 10.8           nRBC 0           Platelet Count 134           POCT Glucose   137   189       Potassium 3.1           PROTEIN TOTAL 5.9           RBC 4.01           RDW 14.0           Sodium 139           WBC 4.88                   Significant Imaging: I have reviewed all pertinent imaging results/findings within the past 24 hours.    Assessment/Plan:      * Calculus of bile duct without cholecystitis with obstruction  Patient is on Zosyn  Completed ERCP with stone removal [facility] 10/10 without complication  Was scheduled for robotic cholecystectomy 10/11 however given bradycardia this has been postponed.      Chronic diastolic heart failure  Patient is identified as having Diastolic (HFpEF) heart failure that is Chronic. CHF is  currently controlled. Latest ECHO performed and demonstrates- Results for orders placed during the hospital encounter of 06/13/22    Echo    Interpretation Summary  · The left ventricle is normal in size with severe concentric hypertrophy and normal systolic function.  · Mild left atrial enlargement.  · The estimated ejection fraction is 55%.  · Grade II left ventricular diastolic dysfunction.  · Normal right ventricular size with normal right ventricular systolic function.  .   monitor clinical status closely. Monitor on telemetry. Patient is off CHF pathway.  Monitor strict Is&Os and daily weights.  Place on fluid restriction of 2 L. Continue to stress to patient importance of self efficacy and  on diet for CHF. Last BNP reviewed- and noted below   Recent Labs   Lab 10/05/24  2048   *     .            Dementia  Patient with dementia with likely etiology of alzheimer's dementia. Dementia is moderate. The patient does not have signs of behavioral disturbance. Home dementia medications are held.  Continue non-pharmacologic interventions to prevent delirium (No VS between 11PM-5AM, activity during day, opening blinds, providing glasses/hearing aids, and up in chair during daytime). Will avoid narcotics and benzos unless absolutely necessary. PRN anti-psychotics are not prescribed to avoid self harm behaviors.    AVM (arteriovenous malformation) of stomach, acquired with hemorrhage  Patient's hemorrhage is due to gastrointestinal bleed, patient does not have a propensity for bleeding.. Patients most recent Hgb, Hct, platelets, and INR are listed below.  Recent Labs     10/12/24  0554 10/13/24  0546 10/14/24  0600   HGB 11.6* 11.5* 11.7*   HCT 35.1* 34.7* 35.1*   PLT 98* 124* 134*       Plan  - Will trend hemoglobin/hematocrit Daily  - Will monitor and correct any coagulation defects  - Will transfuse if Hgb is <7g/dl (<8g/dl in cases of active ACS) or if patient has rapid bleeding leading to hemodynamic  "instability  - STABLE    PAF (paroxysmal atrial fibrillation)  Patient has paroxysmal (<7 days) atrial fibrillation. Patient is currently in  sinus Nicholas . [id number] Score: 4. The patients heart rate in the last 24 hours is as follows:  Pulse  Min: 47  Max: 72     Antiarrhythmics   None    Anticoagulants   No anticoagulants secondary to need procedure    Plan  - Replete lytes with a goal of K>4, Mg >2      Bradycardia  Seen by Cardiology who recommended stopping his Aricept.    Awaiting further recommendations    Type 2 diabetes mellitus with other specified complication, unspecified whether long term insulin use  Patient's FSGs are controlled on current medication regimen.  Last A1c reviewed-   Lab Results   Component Value Date    HGBA1C 7.0 (H) 05/07/2024     Most recent fingerstick glucose reviewed- No results for input(s): "POCTGLUCOSE" in the last 24 hours.  Current correctional scale  Low  Maintain anti-hyperglycemic dose as follows-   Antihyperglycemics (From admission, onward)      Start     Stop Route Frequency Ordered    10/06/24 0146  insulin aspart U-100 pen 0-5 Units         -- SubQ Before meals & nightly PRN 10/06/24 0048          Hold Oral hypoglycemics while patient is in the hospital.      Dyslipidemia  Patient is chronically on statin.will not continue for now. Last Lipid Panel:   Lab Results   Component Value Date    CHOL 150 05/07/2024    HDL 36 (L) 05/07/2024    LDLCALC 94.2 05/07/2024    TRIG 99 05/07/2024    CHOLHDL 24.0 05/07/2024     -Hold home medication due to elevated LFTs          Essential hypertension  Chronic, controlled.  Latest blood pressure and vitals reviewed-   Temp:  [97.6 °F (36.4 °C)-98.4 °F (36.9 °C)]   Pulse:  [47-72]   Resp:  [16-18]   BP: (117-137)/(57-75)   SpO2:  [90 %-95 %] .   Home meds for hypertension were reviewed and noted below.   Hypertension Medications               isosorbide mononitrate (IMDUR) 120 MG 24 hr tablet TAKE 1 TABLET BY MOUTH EVERY DAY    " valsartan (DIOVAN) 160 MG tablet Take 1 tablet (160 mg total) by mouth once daily.            While in the hospital, will manage blood pressure as follows, hold valsartan    Will utilize p.r.n. blood pressure medication only if patient's blood pressure greater than  180/110 and he develops symptoms such as worsening chest pain or shortness of breath.      Coronary artery disease of native artery of native heart with stable angina pectoris  Patient with known CAD s/p stent placement, which is controlled. and monitor for S/Sx of angina/ACS. Continue to monitor on telemetry.       VTE Risk Mitigation (From admission, onward)           Ordered     Reason for No Pharmacological VTE Prophylaxis  Once        Question:  Reasons:  Answer:  Physician Provided (leave comment)  Comment:  Pending potential surgical intervention    10/06/24 0048     IP VTE HIGH RISK PATIENT  Once         10/06/24 0048     Place sequential compression device  Until discontinued         10/06/24 0048                    Discharge Planning   ZEINA:      Code Status: Full Code   Is the patient medically ready for discharge?:     Reason for patient still in hospital (select all that apply): Patient trending condition, Treatment, and Consult recommendations  Discharge Plan A: Home with family                  Jackie Ramos MD  Department of Hospital Medicine   [facility] - Med Surg

## 2024-10-14 NOTE — SUBJECTIVE & OBJECTIVE
Interval History:  Tolerating diet, denies any abdominal pain, overall still bradycardic      Medications:  Continuous Infusions:  Scheduled Meds:   isosorbide mononitrate  120 mg Oral Daily    pantoprazole  40 mg Oral Daily    piperacillin-tazobactam (Zosyn) IV (PEDS and ADULTS) (extended infusion is not appropriate)  4.5 g Intravenous Q8H     PRN Meds:  Current Facility-Administered Medications:     acetaminophen, 650 mg, Rectal, Q6H PRN    acetaminophen, 650 mg, Oral, Q8H PRN    albuterol-ipratropium, 3 mL, Nebulization, Q6H PRN    aluminum-magnesium hydroxide-simethicone, 30 mL, Oral, QID PRN    dextrose 10%, 12.5 g, Intravenous, PRN    dextrose 10%, 25 g, Intravenous, PRN    gabapentin, 100 mg, Oral, BID PRN    glucagon (human recombinant), 1 mg, Intramuscular, PRN    glucose, 16 g, Oral, PRN    glucose, 24 g, Oral, PRN    HYDROcodone-acetaminophen, 1 tablet, Oral, Q6H PRN    insulin aspart U-100, 0-5 Units, Subcutaneous, QID (AC + HS) PRN    naloxone, 0.02 mg, Intravenous, PRN    ondansetron, 4 mg, Intravenous, Q8H PRN    promethazine, 25 mg, Oral, Q6H PRN    senna-docusate 8.6-50 mg, 1 tablet, Oral, BID PRN    sodium chloride 0.9%, 10 mL, Intravenous, Q12H PRN     Review of patient's allergies indicates:   Allergen Reactions    Metoprolol Other (See Comments)     Junctional rhythm       Objective:     Vital Signs (Most Recent):  Temp: 97.6 °F (36.4 °C) (10/14/24 0730)  Pulse: (!) 49 (10/14/24 0730)  Resp: 16 (10/14/24 0730)  BP: 136/75 (10/14/24 0730)  SpO2: 95 % (10/14/24 0730) Vital Signs (24h Range):  Temp:  [97.6 °F (36.4 °C)-98.4 °F (36.9 °C)] 97.6 °F (36.4 °C)  Pulse:  [43-72] 49  Resp:  [16-18] 16  SpO2:  [90 %-95 %] 95 %  BP: (117-137)/(57-75) 136/75     Weight: 64 kg (141 lb)  Body mass index is 20.23 kg/m².    Intake/Output - Last 3 Shifts         10/12 0700  10/13 0659 10/13 0700  10/14 0659 10/14 0700  10/15 0659    P.O. 900      IV Piggyback 132.6 287.8     Total Intake(mL/kg) 1032.6 (16.1) 287.8  (4.5)     Net +1032.6 +287.8            Urine Occurrence 5 x      Stool Occurrence 1 x               Physical Exam  Constitutional:       General: He is not in acute distress.     Appearance: He is well-developed. Ill appearance: Chronically.   HENT:      Head: Normocephalic and atraumatic.   Eyes:      General: No scleral icterus.     Pupils: Pupils are equal, round, and reactive to light.   Neck:      Thyroid: No thyromegaly.      Vascular: No JVD.      Trachea: No tracheal deviation.   Cardiovascular:      Rate and Rhythm: Regular rhythm. Bradycardia present.      Heart sounds: Normal heart sounds.   Pulmonary:      Effort: Pulmonary effort is normal.      Breath sounds: Normal breath sounds.   Abdominal:      General: Abdomen is flat. Bowel sounds are normal. There is no distension.      Palpations: Abdomen is soft. There is no mass.      Tenderness: There is no abdominal tenderness. There is no guarding or rebound.   Musculoskeletal:         General: Normal range of motion.      Cervical back: Normal range of motion and neck supple.   Lymphadenopathy:      Cervical: No cervical adenopathy.   Skin:     General: Skin is warm and dry.   Neurological:      Mental Status: He is alert and oriented to person, place, and time.   Psychiatric:         Behavior: Behavior normal.         Thought Content: Thought content normal.         Judgment: Judgment normal.          Significant Labs:  I have reviewed all pertinent lab results within the past 24 hours.  CBC:   Recent Labs   Lab 10/14/24  0600   WBC 4.88   RBC 4.01*   HGB 11.7*   HCT 35.1*   *   MCV 88   MCH 29.2   MCHC 33.3     BMP:   Recent Labs   Lab 10/11/24  0611 10/12/24  0554 10/14/24  0600   *   < > 131*      < > 139   K 3.8   < > 3.1*      < > 106   CO2 24   < > 23   BUN 17   < > 10   CREATININE 1.2   < > 1.2   CALCIUM 8.7   < > 8.4*   MG 1.9  --   --     < > = values in this interval not displayed.     CMP:   Recent Labs   Lab  10/14/24  0600   *   CALCIUM 8.4*   ALBUMIN 2.6*   PROT 5.9*      K 3.1*   CO2 23      BUN 10   CREATININE 1.2   ALKPHOS 128   ALT 55*   AST 51*   BILITOT 1.2*     Lipase 256    Significant Diagnostics:  I have reviewed all pertinent imaging results/findings within the past 24 hours.  No new

## 2024-10-14 NOTE — ASSESSMENT & PLAN NOTE
Echo in 22' with nml EF  GDMT diuresis as needed  BB held given bradycardia  ARB held Crt 2.5    10/14/24  -Diurese prn

## 2024-10-14 NOTE — SUBJECTIVE & OBJECTIVE
Review of Systems   Reason unable to perform ROS: baseline dementia.   Constitutional: Positive for malaise/fatigue.     Objective:     Vital Signs (Most Recent):  Temp: 97.7 °F (36.5 °C) (10/14/24 1235)  Pulse: (!) 58 (10/14/24 1310)  Resp: 16 (10/14/24 1235)  BP: (!) 149/68 (10/14/24 1235)  SpO2: (!) 92 % (10/14/24 1235) Vital Signs (24h Range):  Temp:  [97.6 °F (36.4 °C)-98.4 °F (36.9 °C)] 97.7 °F (36.5 °C)  Pulse:  [42-72] 58  Resp:  [16-18] 16  SpO2:  [91 %-95 %] 92 %  BP: (121-149)/(68-75) 149/68     Weight: 64 kg (141 lb)  Body mass index is 20.23 kg/m².     SpO2: (!) 92 %         Intake/Output Summary (Last 24 hours) at 10/14/2024 1430  Last data filed at 10/13/2024 1650  Gross per 24 hour   Intake 287.77 ml   Output --   Net 287.77 ml       Lines/Drains/Airways       Peripheral Intravenous Line  Duration                  Peripheral IV - Single Lumen 10/11/24 0050 20 G Anterior;Left Upper Arm 3 days                       Physical Exam  Vitals and nursing note reviewed.   Constitutional:       Appearance: Normal appearance.   HENT:      Head: Normocephalic and atraumatic.   Eyes:      Pupils: Pupils are equal, round, and reactive to light.   Cardiovascular:      Rate and Rhythm: Regular rhythm. Bradycardia present.      Heart sounds: S1 normal and S2 normal. No murmur heard.  Pulmonary:      Effort: Pulmonary effort is normal.   Abdominal:      Palpations: Abdomen is soft.   Musculoskeletal:      Right lower leg: No edema.      Left lower leg: No edema.   Neurological:      Comments: Awake, oriented, responded to questions appropriately   Psychiatric:         Mood and Affect: Mood normal.            Significant Labs: CMP   Recent Labs   Lab 10/13/24  0546 10/14/24  0600    139   K 3.4* 3.1*    106   CO2 22* 23   * 131*   BUN 13 10   CREATININE 1.2 1.2   CALCIUM 8.4* 8.4*   PROT 5.9* 5.9*   ALBUMIN 2.7* 2.6*   BILITOT 1.1* 1.2*   ALKPHOS 131 128   AST 45* 51*   ALT 51* 55*   ANIONGAP 11  "10   , CBC   Recent Labs   Lab 10/13/24  0546 10/14/24  0600   WBC 4.98 4.88   HGB 11.5* 11.7*   HCT 34.7* 35.1*   * 134*   , Troponin No results for input(s): "TROPONINI" in the last 48 hours., and All pertinent lab results from the last 24 hours have been reviewed.    Significant Imaging: Echocardiogram: Transthoracic echo (TTE) complete (Cupid Only):   Results for orders placed or performed during the hospital encounter of 10/05/24   Echo   Result Value Ref Range    BSA 1.78 m2    LVOT stroke volume 83.6 cm3    LVIDd 4.7 3.5 - 6.0 cm    LV Systolic Volume 47.50 mL    LV Systolic Volume Index 26.4 mL/m2    LVIDs 3.4 2.1 - 4.0 cm    LV Diastolic Volume 100.84 mL    LV Diastolic Volume Index 56.02 mL/m2    Left Ventricular End Systolic Volume by Teichholz Method 47.50 mL    Left Ventricular End Diastolic Volume by Teichholz Method 100.84 mL    IVS 1.2 (A) 0.6 - 1.1 cm    LVOT diameter 2.2 cm    LVOT area 3.8 cm2    FS 27.7 (A) 28 - 44 %    Left Ventricle Relative Wall Thickness 0.51 cm    PW 1.2 (A) 0.6 - 1.1 cm    LV mass 212.0 g    LV Mass Index 117.8 g/m2    MV Peak E Americo 0.92 m/s    TDI LATERAL 0.09 m/s    TDI SEPTAL 0.06 m/s    E/E' ratio 12.27 m/s    MV Peak A Americo 0.81 m/s    TR Max Americo 2.66 m/s    E/A ratio 1.14     IVRT 106.57 msec    E wave deceleration time 293.33 msec    LV SEPTAL E/E' RATIO 15.33 m/s    LV LATERAL E/E' RATIO 10.22 m/s    LVOT peak americo 0.8 m/s    Left Ventricular Outflow Tract Mean Velocity 0.47 cm/s    Left Ventricular Outflow Tract Mean Gradient 1.09 mmHg    RVOT peak VTI 15.9 cm    TAPSE 2.09 cm    LA size 4.57 cm    Left Atrium Minor Axis 4.69 cm    Left Atrium Major Axis 6.65 cm    RA Major Axis 6.04 cm    AV regurgitation pressure 1/2 time [id number] ms    AR Max Americo 4.07 m/s    AV mean gradient 8.0 mmHg    AV peak gradient 17.6 mmHg    Ao peak americo 2.1 m/s    Ao VTI 42.4 cm    LVOT peak VTI 22.0 cm    AV valve area 2.0 cm²    AV Velocity Ratio 0.38     AV index " (prosthetic) 0.52     JUDAH by Velocity Ratio 1.4 cm²    Mr max anel 5.59 m/s    Triscuspid Valve Regurgitation Peak Gradient 28 mmHg    PV mean gradient 1 mmHg    RVOT peak anel 0.60 m/s    Ao root annulus 3.75 cm    STJ 3.27 cm    Ascending aorta 3.51 cm    IVC diameter 1.16 cm    Mean e' 0.08 m/s    ZLVIDS 0.79     ZLVIDD -0.57     LURDES 51.0 mL/m2    LA Vol 91.88 cm3    LA WIDTH 4.3 cm    RA Width 2.5 cm    EF 60 %    TV resting pulmonary artery pressure 31 mmHg    RV TB RVSP 6 mmHg    Est. RA pres 3 mmHg    Narrative      Left Ventricle: The left ventricle is normal in size. Normal wall   thickness. There is concentric hypertrophy. Normal wall motion. Ejection   fraction is approximately 60%. There is indeterminate diastolic function.    Right Ventricle: Normal right ventricular cavity size. Wall thickness   is normal. Right ventricle wall motion  is normal. Systolic function is   normal.    Left Atrium: Left atrium is severely dilated.    Aortic Valve: There is mild aortic regurgitation.    Mitral Valve: There is mild regurgitation.    Tricuspid Valve: There is mild regurgitation.    Pulmonary Artery: The estimated pulmonary artery systolic pressure is   31 mmHg.    IVC/SVC: Normal venous pressure at 3 mmHg.      and EKG: Reviewed

## 2024-10-14 NOTE — PLAN OF CARE
10/14/24 0837   Rounds   Attendance Provider;;Charge nurse;Physical therapist   Discharge Plan A Home with family   Why the patient remains in the hospital Requires continued medical care   Transition of Care Barriers None

## 2024-10-14 NOTE — ASSESSMENT & PLAN NOTE
Followed by Dr. Gilbert  First PCI 1998, total of 5 stents with last one in [place] 2013   [facility] Oct 2019 for NSTEMI.  Pt had PCI KYLIE x one to mid LCX ISR, KYLIE x 2 to OM2, and PCI prox RCA KYLIE x 2, PTCA mid RCA ISR.  Failed PCI distal occluded RCA.  EF 45%.  Nonobstructive LAD disease, small diffusely diseased diagonal vessels.    Cont Imdur

## 2024-10-14 NOTE — HOSPITAL COURSE
10/14/24-Cardiology asked to re-evaluate patient. Sent to [facility] yesterday for ERCP but procedure aborted due to bradycardia (HR in 40's). Patient seen and examined today, sitting up in bedside chair. Feels ok. No CV complaints. States he's hungry. Remains bradycardic, EKG reviewed-junctional rhythm noted. EP consulted for rec's, will proceed with TVP prior to surgery.     10/15/24-Patient seen and examined today with wife at bedside. Sleeping during majority of exam. No acute CV issues overnight. TVP planned today by Dr. Kirk with cholecystectomy planned afterward. Labs stable.    10/16/24-Patient seen and examined today, s/p lap harjinder yesterday. Feels ok. Complains of mild abd pain. States he is hungry. No CV complaints. TVP removed at bedside by Dr. Kirk, no complications.

## 2024-10-14 NOTE — PROGRESS NOTES
[place] [facility] Surg  Cardiology  Progress Note    Patient Name: Aquiles Yates  MRN: [id number]  Admission Date: 10/5/2024  Hospital Length of Stay: 8 days  Code Status: Full Code   Attending Physician: Jackie Ramos MD   Primary Care Physician: Holger Thornton MD  Expected Discharge Date:   Principal Problem:Calculus of bile duct without cholecystitis with obstruction    Subjective:   HPI:  Aquiles Yates is a 84 y.o. male with a PMH  has a past medical history of Acute blood loss anemia (10/14/2019), Alzheimer's dementia, Aneurysm, abdominal aortic, BCC (basal cell carcinoma of skin) (06/29/2023), Chronic diastolic heart failure (05/20/2024), Coronary artery disease, Depression, Diabetes mellitus, HLD (hyperlipidemia), Hypertension, Kidney stone, PAD (peripheral artery disease), PAF (paroxysmal atrial fibrillation) (01/24/2023), and Tobacco abuse.presented to the Emergency Department for evaluation of tremors and mottled skin per wife. Wife called the paramedics when she noticed patient shaking and patient's mottled skin today. Pt has a history of Alzheimer's and paramedics report a GCS of 14. Upon interview, pt denies any pain and is oriented to self. No further complaints or concerns at this time.      ER workup revealed CBC to be unremarkable.  CMP revealed CBG of 202 mg/dL, , total bili of 2.6, and AST/ALT of 242/179.  .  Troponin negative.  Lactic acid 2.8.  Flu COVID negative.  UA unremarkable.  CTA abdomen and pelvis without contrast revealed no acute findings.  Chest x-ray negative for acute cardiopulmonary findings.  Ultrasound limited revealed cholelithiasis without definitive sonographic evidence of acute cholecystitis.  Mild intrahepatic biliary duct dilation.  Vital signs stable. EKG revealed sinus Nicholas with occasional PVCs with a ventricular rate of 56 beats per minute and a QT/QTC of 408/393.  Patient received 4.5 g Zosyn in ED. GI consulted.  In  agreement with MRCP and will see us consult in a.m..  Patient in agreement with treatment plan.  Patient will be admitted under inpatient status     Cardiology consulted, pt needing possible cholecystectomy and sx is requesting clearance given on going bradycardia. Pt seen and examined today, tele reviewed, HR 30-40s. Confused on exam, h/o dementia.  Labs reviewed, Chart reviewed. Echo in 22' with nml EF, repeat pending    Hospital Course:   10/14/24-Cardiology asked to re-evaluate patient given persistent bradycardia. Patient seen and examined today, sitting up in bedside chair. Feels ok. No CV complaints. States he's hungry. HR in 40-50 range. EKG reviewed-junctional rhythm noted. EP consulted for rec's, will proceed with TVP prior to cholecystectomy.        Review of Systems   Reason unable to perform ROS: baseline dementia.   Constitutional: Positive for malaise/fatigue.     Objective:     Vital Signs (Most Recent):  Temp: 97.7 °F (36.5 °C) (10/14/24 1235)  Pulse: (!) 58 (10/14/24 1310)  Resp: 16 (10/14/24 1235)  BP: (!) 149/68 (10/14/24 1235)  SpO2: (!) 92 % (10/14/24 1235) Vital Signs (24h Range):  Temp:  [97.6 °F (36.4 °C)-98.4 °F (36.9 °C)] 97.7 °F (36.5 °C)  Pulse:  [42-72] 58  Resp:  [16-18] 16  SpO2:  [91 %-95 %] 92 %  BP: (121-149)/(68-75) 149/68     Weight: 64 kg (141 lb)  Body mass index is 20.23 kg/m².     SpO2: (!) 92 %         Intake/Output Summary (Last 24 hours) at 10/14/2024 1430  Last data filed at 10/13/2024 1650  Gross per 24 hour   Intake 287.77 ml   Output --   Net 287.77 ml       Lines/Drains/Airways       Peripheral Intravenous Line  Duration                  Peripheral IV - Single Lumen 10/11/24 0050 20 G Anterior;Left Upper Arm 3 days                       Physical Exam  Vitals and nursing note reviewed.   Constitutional:       Appearance: Normal appearance.   HENT:      Head: Normocephalic and atraumatic.   Eyes:      Pupils: Pupils are equal, round, and reactive to light.  "  Cardiovascular:      Rate and Rhythm: Regular rhythm. Bradycardia present.      Heart sounds: S1 normal and S2 normal. No murmur heard.  Pulmonary:      Effort: Pulmonary effort is normal.   Abdominal:      Palpations: Abdomen is soft.   Musculoskeletal:      Right lower leg: No edema.      Left lower leg: No edema.   Neurological:      Comments: Awake, oriented, responded to questions appropriately   Psychiatric:         Mood and Affect: Mood normal.            Significant Labs: CMP   Recent Labs   Lab 10/13/24  0546 10/14/24  0600    139   K 3.4* 3.1*    106   CO2 22* 23   * 131*   BUN 13 10   CREATININE 1.2 1.2   CALCIUM 8.4* 8.4*   PROT 5.9* 5.9*   ALBUMIN 2.7* 2.6*   BILITOT 1.1* 1.2*   ALKPHOS 131 128   AST 45* 51*   ALT 51* 55*   ANIONGAP 11 10   , CBC   Recent Labs   Lab 10/13/24  0546 10/14/24  0600   WBC 4.98 4.88   HGB 11.5* 11.7*   HCT 34.7* 35.1*   * 134*   , Troponin No results for input(s): "TROPONINI" in the last 48 hours., and All pertinent lab results from the last 24 hours have been reviewed.    Significant Imaging: Echocardiogram: Transthoracic echo (TTE) complete (Cupid Only):   Results for orders placed or performed during the hospital encounter of 10/05/24   Echo   Result Value Ref Range    BSA 1.78 m2    LVOT stroke volume 83.6 cm3    LVIDd 4.7 3.5 - 6.0 cm    LV Systolic Volume 47.50 mL    LV Systolic Volume Index 26.4 mL/m2    LVIDs 3.4 2.1 - 4.0 cm    LV Diastolic Volume 100.84 mL    LV Diastolic Volume Index 56.02 mL/m2    Left Ventricular End Systolic Volume by Teichholz Method 47.50 mL    Left Ventricular End Diastolic Volume by Teichholz Method 100.84 mL    IVS 1.2 (A) 0.6 - 1.1 cm    LVOT diameter 2.2 cm    LVOT area 3.8 cm2    FS 27.7 (A) 28 - 44 %    Left Ventricle Relative Wall Thickness 0.51 cm    PW 1.2 (A) 0.6 - 1.1 cm    LV mass 212.0 g    LV Mass Index 117.8 g/m2    MV Peak E Americo 0.92 m/s    TDI LATERAL 0.09 m/s    TDI SEPTAL 0.06 m/s    E/E' ratio " 12.27 m/s    MV Peak A Americo 0.81 m/s    TR Max Americo 2.66 m/s    E/A ratio 1.14     IVRT 106.57 msec    E wave deceleration time 293.33 msec    LV SEPTAL E/E' RATIO 15.33 m/s    LV LATERAL E/E' RATIO 10.22 m/s    LVOT peak americo 0.8 m/s    Left Ventricular Outflow Tract Mean Velocity 0.47 cm/s    Left Ventricular Outflow Tract Mean Gradient 1.09 mmHg    RVOT peak VTI 15.9 cm    TAPSE 2.09 cm    LA size 4.57 cm    Left Atrium Minor Axis 4.69 cm    Left Atrium Major Axis 6.65 cm    RA Major Axis 6.04 cm    AV regurgitation pressure 1/2 time 943.263743464812151 ms    AR Max Americo 4.07 m/s    AV mean gradient 8.0 mmHg    AV peak gradient 17.6 mmHg    Ao peak americo 2.1 m/s    Ao VTI 42.4 cm    LVOT peak VTI 22.0 cm    AV valve area 2.0 cm²    AV Velocity Ratio 0.38     AV index (prosthetic) 0.52     JUDAH by Velocity Ratio 1.4 cm²    Mr max americo 5.59 m/s    Triscuspid Valve Regurgitation Peak Gradient 28 mmHg    PV mean gradient 1 mmHg    RVOT peak americo 0.60 m/s    Ao root annulus 3.75 cm    STJ 3.27 cm    Ascending aorta 3.51 cm    IVC diameter 1.16 cm    Mean e' 0.08 m/s    ZLVIDS 0.79     ZLVIDD -0.57     LURDES 51.0 mL/m2    LA Vol 91.88 cm3    LA WIDTH 4.3 cm    RA Width 2.5 cm    EF 60 %    TV resting pulmonary artery pressure 31 mmHg    RV TB RVSP 6 mmHg    Est. RA pres 3 mmHg    Narrative      Left Ventricle: The left ventricle is normal in size. Normal wall   thickness. There is concentric hypertrophy. Normal wall motion. Ejection   fraction is approximately 60%. There is indeterminate diastolic function.    Right Ventricle: Normal right ventricular cavity size. Wall thickness   is normal. Right ventricle wall motion  is normal. Systolic function is   normal.    Left Atrium: Left atrium is severely dilated.    Aortic Valve: There is mild aortic regurgitation.    Mitral Valve: There is mild regurgitation.    Tricuspid Valve: There is mild regurgitation.    Pulmonary Artery: The estimated pulmonary artery systolic pressure is    31 mmHg.    IVC/SVC: Normal venous pressure at 3 mmHg.      and EKG: Reviewed  Assessment and Plan:   Patient in need of cholecystectomy. Persistent bradycardia/junctional rhythm noted. Discussed with EP, will proceed with TVP prior to general surgery intervention.    * Calculus of bile duct without cholecystitis with obstruction  -General surgery on board, discussed case  -Will plan on TVP prior to surgical intervention    Chronic diastolic heart failure    Echo in 22' with nml EF  GDMT diuresis as needed  BB held given bradycardia  ARB held Crt 2.5    10/14/24  -Diurese prn    PAF (paroxysmal atrial fibrillation)  -Low dose eliquis held  -Hold AV bria blocking agents given bradycardia    Bradycardia  Bradycardia on tele 30-40s  H/o of some bradycardia  Also is on Aricept for dementia  Recommend holding    10/14/24  -ERCP aborted yesterday due to bradycardia  -EKG/rhythm strips reviewed-junctional rhythm noted, reviewed with EP, will plan on TVP prior to surgical intervention  -Avoid AV bria agents    Dyslipidemia  LFTs elevated no statin    Essential hypertension  -Titrate medications    Coronary artery disease of native artery of native heart with stable angina pectoris  Followed by Dr. Gilbert  First PCI 1998, total of 5 stents with last one in [place] 2013   [facility] Oct 2019 for NSTEMI.  Pt had PCI KYLIE x one to mid LCX ISR, KYLIE x 2 to OM2, and PCI prox RCA KYLIE x 2, PTCA mid RCA ISR.  Failed PCI distal occluded RCA.  EF 45%.  Nonobstructive LAD disease, small diffusely diseased diagonal vessels.    Cont Imdur        VTE Risk Mitigation (From admission, onward)           Ordered     Reason for No Pharmacological VTE Prophylaxis  Once        Question:  Reasons:  Answer:  Physician Provided (leave comment)  Comment:  Pending potential surgical intervention    10/06/24 0048     IP VTE HIGH RISK PATIENT  Once         10/06/24 0048     Place sequential compression device  Until discontinued         10/06/24 0048                     Samreen Arzate PA-C  Cardiology  [facility] - Med Surg

## 2024-10-14 NOTE — ASSESSMENT & PLAN NOTE
Patient's hemorrhage is due to gastrointestinal bleed, patient does not have a propensity for bleeding.. Patients most recent Hgb, Hct, platelets, and INR are listed below.  Recent Labs     10/12/24  0554 10/13/24  0546 10/14/24  0600   HGB 11.6* 11.5* 11.7*   HCT 35.1* 34.7* 35.1*   PLT 98* 124* 134*       Plan  - Will trend hemoglobin/hematocrit Daily  - Will monitor and correct any coagulation defects  - Will transfuse if Hgb is <7g/dl (<8g/dl in cases of active ACS) or if patient has rapid bleeding leading to hemodynamic instability  - STABLE

## 2024-10-15 LAB
ANION GAP SERPL CALC-SCNC: 10 MMOL/L (ref 8–16)
BASOPHILS # BLD AUTO: 0.03 K/UL (ref 0–0.2)
BASOPHILS NFR BLD: 0.6 % (ref 0–1.9)
BUN SERPL-MCNC: 12 MG/DL (ref 8–23)
CALCIUM SERPL-MCNC: 8.8 MG/DL (ref 8.7–10.5)
CHLORIDE SERPL-SCNC: 109 MMOL/L (ref 95–110)
CO2 SERPL-SCNC: 23 MMOL/L (ref 23–29)
CREAT SERPL-MCNC: 1.3 MG/DL (ref 0.5–1.4)
DIFFERENTIAL METHOD BLD: ABNORMAL
EOSINOPHIL # BLD AUTO: 0.2 K/UL (ref 0–0.5)
EOSINOPHIL NFR BLD: 4.6 % (ref 0–8)
ERYTHROCYTE [DISTWIDTH] IN BLOOD BY AUTOMATED COUNT: 14.5 % (ref 11.5–14.5)
EST. GFR  (NO RACE VARIABLE): 54 ML/MIN/1.73 M^2
GLUCOSE SERPL-MCNC: 129 MG/DL (ref 70–110)
HCT VFR BLD AUTO: 38 % (ref 40–54)
HGB BLD-MCNC: 12.6 G/DL (ref 14–18)
IMM GRANULOCYTES # BLD AUTO: 0.02 K/UL (ref 0–0.04)
IMM GRANULOCYTES NFR BLD AUTO: 0.4 % (ref 0–0.5)
LYMPHOCYTES # BLD AUTO: 0.5 K/UL (ref 1–4.8)
LYMPHOCYTES NFR BLD: 9.6 % (ref 18–48)
MCH RBC QN AUTO: 29.6 PG (ref 27–31)
MCHC RBC AUTO-ENTMCNC: 33.2 G/DL (ref 32–36)
MCV RBC AUTO: 89 FL (ref 82–98)
MONOCYTES # BLD AUTO: 0.3 K/UL (ref 0.3–1)
MONOCYTES NFR BLD: 6.4 % (ref 4–15)
NEUTROPHILS # BLD AUTO: 3.9 K/UL (ref 1.8–7.7)
NEUTROPHILS NFR BLD: 78.4 % (ref 38–73)
NRBC BLD-RTO: 0 /100 WBC
OHS QRS DURATION: 102 MS
OHS QRS DURATION: 80 MS
OHS QTC CALCULATION: 422 MS
OHS QTC CALCULATION: 435 MS
PLATELET # BLD AUTO: 145 K/UL (ref 150–450)
PMV BLD AUTO: 11 FL (ref 9.2–12.9)
POCT GLUCOSE: 143 MG/DL (ref 70–110)
POCT GLUCOSE: 152 MG/DL (ref 70–110)
POCT GLUCOSE: 152 MG/DL (ref 70–110)
POCT GLUCOSE: 185 MG/DL (ref 70–110)
POCT GLUCOSE: 193 MG/DL (ref 70–110)
POTASSIUM SERPL-SCNC: 4 MMOL/L (ref 3.5–5.1)
RBC # BLD AUTO: 4.26 M/UL (ref 4.6–6.2)
SODIUM SERPL-SCNC: 142 MMOL/L (ref 136–145)
WBC # BLD AUTO: 4.98 K/UL (ref 3.9–12.7)

## 2024-10-15 PROCEDURE — 33210 INSERT ELECTRD/PM CATH SNGL: CPT | Mod: HCNC | Performed by: INTERNAL MEDICINE

## 2024-10-15 PROCEDURE — 63600175 PHARM REV CODE 636 W HCPCS: Mod: HCNC | Performed by: SURGERY

## 2024-10-15 PROCEDURE — 25000003 PHARM REV CODE 250: Mod: HCNC | Performed by: INTERNAL MEDICINE

## 2024-10-15 PROCEDURE — 25000003 PHARM REV CODE 250: Mod: HCNC | Performed by: NURSE ANESTHETIST, CERTIFIED REGISTERED

## 2024-10-15 PROCEDURE — 63600175 PHARM REV CODE 636 W HCPCS: Mod: HCNC | Performed by: NURSE ANESTHETIST, CERTIFIED REGISTERED

## 2024-10-15 PROCEDURE — 47562 LAPAROSCOPIC CHOLECYSTECTOMY: CPT | Mod: HCNC,,, | Performed by: SURGERY

## 2024-10-15 PROCEDURE — 99232 SBSQ HOSP IP/OBS MODERATE 35: CPT | Mod: HCNC,,, | Performed by: INTERNAL MEDICINE

## 2024-10-15 PROCEDURE — 63600175 PHARM REV CODE 636 W HCPCS: Mod: HCNC | Performed by: INTERNAL MEDICINE

## 2024-10-15 PROCEDURE — 97116 GAIT TRAINING THERAPY: CPT | Mod: HCNC

## 2024-10-15 PROCEDURE — 36000711: Mod: HCNC | Performed by: SURGERY

## 2024-10-15 PROCEDURE — C1894 INTRO/SHEATH, NON-LASER: HCPCS | Mod: HCNC | Performed by: INTERNAL MEDICINE

## 2024-10-15 PROCEDURE — 27201423 OPTIME MED/SURG SUP & DEVICES STERILE SUPPLY: Mod: HCNC | Performed by: SURGERY

## 2024-10-15 PROCEDURE — 80048 BASIC METABOLIC PNL TOTAL CA: CPT | Mod: HCNC | Performed by: PHYSICIAN ASSISTANT

## 2024-10-15 PROCEDURE — 37000008 HC ANESTHESIA 1ST 15 MINUTES: Mod: HCNC | Performed by: SURGERY

## 2024-10-15 PROCEDURE — 25000003 PHARM REV CODE 250: Mod: HCNC | Performed by: NURSE PRACTITIONER

## 2024-10-15 PROCEDURE — 88304 TISSUE EXAM BY PATHOLOGIST: CPT | Mod: 26,HCNC,, | Performed by: PATHOLOGY

## 2024-10-15 PROCEDURE — 36000710: Mod: HCNC | Performed by: SURGERY

## 2024-10-15 PROCEDURE — 37000009 HC ANESTHESIA EA ADD 15 MINS: Mod: HCNC | Performed by: SURGERY

## 2024-10-15 PROCEDURE — 85025 COMPLETE CBC W/AUTO DIFF WBC: CPT | Mod: HCNC | Performed by: PHYSICIAN ASSISTANT

## 2024-10-15 PROCEDURE — 20000000 HC ICU ROOM: Mod: HCNC

## 2024-10-15 PROCEDURE — 36415 COLL VENOUS BLD VENIPUNCTURE: CPT | Mod: HCNC | Performed by: PHYSICIAN ASSISTANT

## 2024-10-15 PROCEDURE — 88304 TISSUE EXAM BY PATHOLOGIST: CPT | Mod: HCNC | Performed by: PATHOLOGY

## 2024-10-15 PROCEDURE — 8E0W4CZ ROBOTIC ASSISTED PROCEDURE OF TRUNK REGION, PERCUTANEOUS ENDOSCOPIC APPROACH: ICD-10-PCS | Performed by: SURGERY

## 2024-10-15 PROCEDURE — 33210 INSERT ELECTRD/PM CATH SNGL: CPT | Mod: HCNC,,, | Performed by: INTERNAL MEDICINE

## 2024-10-15 PROCEDURE — 27200667 HC PACEMAKER, TEMPORARY MONITORING, PER SHIFT: Mod: HCNC

## 2024-10-15 PROCEDURE — 0FT44ZZ RESECTION OF GALLBLADDER, PERCUTANEOUS ENDOSCOPIC APPROACH: ICD-10-PCS | Performed by: SURGERY

## 2024-10-15 PROCEDURE — 5A1223Z PERFORMANCE OF CARDIAC PACING, CONTINUOUS: ICD-10-PCS | Performed by: INTERNAL MEDICINE

## 2024-10-15 PROCEDURE — 47562 LAPAROSCOPIC CHOLECYSTECTOMY: CPT | Mod: AS,HCNC,,

## 2024-10-15 PROCEDURE — 97110 THERAPEUTIC EXERCISES: CPT | Mod: HCNC

## 2024-10-15 PROCEDURE — 99152 MOD SED SAME PHYS/QHP 5/>YRS: CPT | Mod: HCNC,,, | Performed by: INTERNAL MEDICINE

## 2024-10-15 PROCEDURE — C1779 LEAD, PMKR, TRANSVENOUS VDD: HCPCS | Mod: HCNC | Performed by: INTERNAL MEDICINE

## 2024-10-15 PROCEDURE — 71000033 HC RECOVERY, INTIAL HOUR: Mod: HCNC | Performed by: SURGERY

## 2024-10-15 PROCEDURE — 99152 MOD SED SAME PHYS/QHP 5/>YRS: CPT | Mod: HCNC | Performed by: INTERNAL MEDICINE

## 2024-10-15 RX ORDER — INDOCYANINE GREEN AND WATER 25 MG
KIT INJECTION
Status: DISCONTINUED | OUTPATIENT
Start: 2024-10-15 | End: 2024-10-15

## 2024-10-15 RX ORDER — SUCCINYLCHOLINE CHLORIDE 20 MG/ML
INJECTION INTRAMUSCULAR; INTRAVENOUS
Status: DISCONTINUED | OUTPATIENT
Start: 2024-10-15 | End: 2024-10-15

## 2024-10-15 RX ORDER — SODIUM CHLORIDE 9 MG/ML
INJECTION, SOLUTION INTRAVENOUS
Status: DISCONTINUED | OUTPATIENT
Start: 2024-10-15 | End: 2024-10-17

## 2024-10-15 RX ORDER — LIDOCAINE HYDROCHLORIDE 20 MG/ML
INJECTION INTRAVENOUS
Status: DISCONTINUED | OUTPATIENT
Start: 2024-10-15 | End: 2024-10-15

## 2024-10-15 RX ORDER — ROCURONIUM BROMIDE 10 MG/ML
INJECTION, SOLUTION INTRAVENOUS
Status: DISCONTINUED | OUTPATIENT
Start: 2024-10-15 | End: 2024-10-15

## 2024-10-15 RX ORDER — PHENYLEPHRINE HYDROCHLORIDE 10 MG/ML
INJECTION INTRAVENOUS
Status: DISCONTINUED | OUTPATIENT
Start: 2024-10-15 | End: 2024-10-15

## 2024-10-15 RX ORDER — BUPIVACAINE HYDROCHLORIDE 2.5 MG/ML
INJECTION, SOLUTION EPIDURAL; INFILTRATION; INTRACAUDAL
Status: DISCONTINUED | OUTPATIENT
Start: 2024-10-15 | End: 2024-10-15 | Stop reason: HOSPADM

## 2024-10-15 RX ORDER — ACETAMINOPHEN 325 MG/1
650 TABLET ORAL EVERY 4 HOURS PRN
Status: DISCONTINUED | OUTPATIENT
Start: 2024-10-15 | End: 2024-10-16 | Stop reason: SDUPTHER

## 2024-10-15 RX ORDER — MUPIROCIN 20 MG/G
OINTMENT TOPICAL 2 TIMES DAILY
Status: DISCONTINUED | OUTPATIENT
Start: 2024-10-15 | End: 2024-10-18 | Stop reason: HOSPADM

## 2024-10-15 RX ORDER — ONDANSETRON HYDROCHLORIDE 2 MG/ML
INJECTION, SOLUTION INTRAVENOUS
Status: DISCONTINUED | OUTPATIENT
Start: 2024-10-15 | End: 2024-10-15

## 2024-10-15 RX ORDER — PROPOFOL 10 MG/ML
VIAL (ML) INTRAVENOUS
Status: DISCONTINUED | OUTPATIENT
Start: 2024-10-15 | End: 2024-10-15

## 2024-10-15 RX ORDER — ONDANSETRON 8 MG/1
8 TABLET, ORALLY DISINTEGRATING ORAL EVERY 8 HOURS PRN
Status: DISCONTINUED | OUTPATIENT
Start: 2024-10-15 | End: 2024-10-18 | Stop reason: HOSPADM

## 2024-10-15 RX ORDER — CHLORHEXIDINE GLUCONATE ORAL RINSE 1.2 MG/ML
15 SOLUTION DENTAL 2 TIMES DAILY
Status: DISCONTINUED | OUTPATIENT
Start: 2024-10-15 | End: 2024-10-17

## 2024-10-15 RX ORDER — DIPHENHYDRAMINE HYDROCHLORIDE 50 MG/ML
INJECTION INTRAMUSCULAR; INTRAVENOUS
Status: DISCONTINUED | OUTPATIENT
Start: 2024-10-15 | End: 2024-10-15 | Stop reason: HOSPADM

## 2024-10-15 RX ORDER — LIDOCAINE HYDROCHLORIDE 20 MG/ML
INJECTION, SOLUTION INFILTRATION; PERINEURAL
Status: DISCONTINUED | OUTPATIENT
Start: 2024-10-15 | End: 2024-10-15 | Stop reason: HOSPADM

## 2024-10-15 RX ORDER — FENTANYL CITRATE 50 UG/ML
INJECTION, SOLUTION INTRAMUSCULAR; INTRAVENOUS
Status: DISCONTINUED | OUTPATIENT
Start: 2024-10-15 | End: 2024-10-15 | Stop reason: HOSPADM

## 2024-10-15 RX ORDER — LIDOCAINE HYDROCHLORIDE 10 MG/ML
INJECTION, SOLUTION EPIDURAL; INFILTRATION; INTRACAUDAL; PERINEURAL
Status: DISCONTINUED | OUTPATIENT
Start: 2024-10-15 | End: 2024-10-15 | Stop reason: HOSPADM

## 2024-10-15 RX ADMIN — ONDANSETRON 4 MG: 2 INJECTION INTRAMUSCULAR; INTRAVENOUS at 02:10

## 2024-10-15 RX ADMIN — PIPERACILLIN SODIUM AND TAZOBACTAM SODIUM 4.5 G: 4; .5 INJECTION, POWDER, FOR SOLUTION INTRAVENOUS at 08:10

## 2024-10-15 RX ADMIN — MUPIROCIN: 20 OINTMENT TOPICAL at 09:10

## 2024-10-15 RX ADMIN — LIDOCAINE HYDROCHLORIDE 50 MG: 20 INJECTION INTRAVENOUS at 02:10

## 2024-10-15 RX ADMIN — SUGAMMADEX 50 MG: 100 INJECTION, SOLUTION INTRAVENOUS at 03:10

## 2024-10-15 RX ADMIN — ROCURONIUM BROMIDE 10 MG: 10 INJECTION, SOLUTION INTRAVENOUS at 03:10

## 2024-10-15 RX ADMIN — PROPOFOL 40 MG: 10 INJECTION, EMULSION INTRAVENOUS at 02:10

## 2024-10-15 RX ADMIN — PIPERACILLIN SODIUM AND TAZOBACTAM SODIUM 4.5 G: 4; .5 INJECTION, POWDER, FOR SOLUTION INTRAVENOUS at 05:10

## 2024-10-15 RX ADMIN — ISOSORBIDE MONONITRATE 120 MG: 60 TABLET, EXTENDED RELEASE ORAL at 08:10

## 2024-10-15 RX ADMIN — PANTOPRAZOLE SODIUM 40 MG: 40 TABLET, DELAYED RELEASE ORAL at 08:10

## 2024-10-15 RX ADMIN — ROCURONIUM BROMIDE 25 MG: 10 INJECTION, SOLUTION INTRAVENOUS at 02:10

## 2024-10-15 RX ADMIN — ROCURONIUM BROMIDE 5 MG: 10 INJECTION, SOLUTION INTRAVENOUS at 02:10

## 2024-10-15 RX ADMIN — SUGAMMADEX 100 MG: 100 INJECTION, SOLUTION INTRAVENOUS at 04:10

## 2024-10-15 RX ADMIN — SUCCINYLCHOLINE CHLORIDE 80 MG: 20 INJECTION, SOLUTION INTRAMUSCULAR; INTRAVENOUS; PARENTERAL at 02:10

## 2024-10-15 RX ADMIN — SODIUM CHLORIDE: 9 INJECTION, SOLUTION INTRAVENOUS at 02:10

## 2024-10-15 RX ADMIN — INDOCYANINE GREEN AND WATER 2.5 MG: KIT at 03:10

## 2024-10-15 RX ADMIN — PHENYLEPHRINE HYDROCHLORIDE 100 MCG: 10 INJECTION INTRAVENOUS at 03:10

## 2024-10-15 RX ADMIN — CHLORHEXIDINE GLUCONATE 0.12% ORAL RINSE 15 ML: 1.2 LIQUID ORAL at 09:10

## 2024-10-15 NOTE — PT/OT/SLP PROGRESS
Occupational Therapy      Patient Name:  Aquiles Yates   MRN:  [id number]    Patient not seen today secondary to Unarousable (pt heavily sedated oriented to self onle. nurse in agreement to attempt eval on later date). X 3rd attempt. Will follow-up on later date .    10/15/2024  Nell Mckeon, OT  [phone]

## 2024-10-15 NOTE — ANESTHESIA PROCEDURE NOTES
Intubation    Date/Time: 10/15/2024 2:30 PM    Performed by: Reyes Baker CRNA  Authorized by: Robel Rubalcava II, MD    Intubation:     Induction:  Intravenous    Intubated:  Postinduction    Mask Ventilation:  Easy mask    Attempts:  1    Attempted By:  CRNA    Method of Intubation:  Direct    Blade:  Claros 2    Laryngeal View Grade: Grade I - full view of cords      Difficult Airway Encountered?: No      Complications:  None    Airway Device:  Oral endotracheal tube    Airway Device Size:  7.5    Style/Cuff Inflation:  Cuffed (inflated to minimal occlusive pressure)    Tube secured:  22    Secured at:  The lips    Placement Verified By:  Capnometry    Complicating Factors:  None    Findings Post-Intubation:  BS equal bilateral

## 2024-10-15 NOTE — OP NOTE
[facility]  Surgery Department  Operative Note    SUMMARY     Date of Procedure: 10/15/2024     Procedure: Procedure(s) (LRB):  DV5 ROBOTIC CHOLECYSTECTOMY (N/A)     Surgeons and Role:     * Fred Taylor MD - Primary    Assistant: AMBER Ellis      Pre-Operative Diagnosis: Calculus of gallbladder and bile duct with obstruction without cholecystitis [K80.71]    Post-Operative Diagnosis: Post-Op Diagnosis Codes:     * Calculus of gallbladder and bile duct with obstruction without cholecystitis [K80.71]    Anesthesia: General    Operative Findings (including complications, if any):  Robotic cholecystectomy    Description of Technical Procedures: chronic cholecystitis    Significant Surgical Tasks Conducted by the Assistant(s), if Applicable:  Robotic assist and closure    Estimated Blood Loss (EBL): 10cc           Implants: * No implants in log *    Specimens:   Specimen (24h ago, onward)       Start     Ordered    10/15/24 1555  Specimen to Pathology, Surgery General Surgery  Once        Comments: Pre-op Diagnosis: Calculus of gallbladder and bile duct with obstruction without cholecystitis [K80.71]Procedure(s):DV5 ROBOTIC CHOLECYSTECTOMY Number of specimens: 1Name of specimens: 1. Gallbladder with Stones (PERM)     References:    Click here for ordering Quick Tip   Question Answer Comment   Procedure Type: General Surgery    Specimen Class: Routine/Screening    Release to patient Immediate        10/15/24 1555                            Condition: Good    Disposition: PACU - hemodynamically stable.    Procedure in detail:   The patient was brought to the OR and underwent general anesthesia. The abdomen was prepped and draped in usual sterile fashion.  An incision was made just above the umbilicus.  Fascia grasped and elevated.  A Veress needle was inserted and insufflation obtained to 15 mm Hg. An 8 mm robotic Optiview port was placed at this site. The laparoscope was inserted. There was no  evidence of a vascular or enteric injury.     Additional trocars were placed as follows under visualization. An 8 mm trocar was placed in the anterior axillary line of the right mid abdomen. An 8 mm trocar was placed in the midclavicular line of the right midabdomen. An 8 mm robotic trocar was placed in the midclavicular line in the left midabdomen      The patient was placed in reverse Trendelenburg position and rolled to the left. The patient was docked to the XIHAi robot.             The fundus of the gallbladder was retracted cephalad. The gallbladder infundibulum was retracted laterally.       The gallbladder was noted to have chronic cholecystitis.     Through meticulous dissection the cystic duct was dissected circumferentially.  There was dense scarring around the neck of the gallbladder.  We proceeded with dissection in a dome down approach from the fundus of the gallbladder      The left lateral port site was upsized to a 12mm port. The gallbladder was divided at the neck using a robotic stapler with blue load.     The 12 mm operating arm of the robot was then removed and an Endo Catch bag was inserted. The gallbladder was placed in Endo Catch bag. The patient was then undocked from the da [place] operating robot. The gallbladder was extracted.     The 12 mm port site fascia was closed with a 0 Vicryl suture.     The trocars were removed under direct vision and no bleeding was noted. The abdomen was then desufflated. Marcaine was infiltrated. All incisions were closed with 4-0 Monocryl in a subcuticular manner. Dermaflex was applied to the incision sites

## 2024-10-15 NOTE — ASSESSMENT & PLAN NOTE
Chronic, controlled.  Latest blood pressure and vitals reviewed-   Temp:  [98 °F (36.7 °C)-98.3 °F (36.8 °C)]   Pulse:  [44-80]   Resp:  [17-18]   BP: (114-148)/(57-77)   SpO2:  [94 %-99 %] .   Home meds for hypertension were reviewed and noted below.   Hypertension Medications               isosorbide mononitrate (IMDUR) 120 MG 24 hr tablet TAKE 1 TABLET BY MOUTH EVERY DAY    valsartan (DIOVAN) 160 MG tablet Take 1 tablet (160 mg total) by mouth once daily.            While in the hospital, will manage blood pressure as follows, hold valsartan    Will utilize p.r.n. blood pressure medication only if patient's blood pressure greater than  180/110 and he develops symptoms such as worsening chest pain or shortness of breath.

## 2024-10-15 NOTE — ASSESSMENT & PLAN NOTE
Followed by Dr. Gilbert  First PCI 1998, total of 5 stents with last one in [place] 2013   [facility] Oct 2019 for NSTEMI.  Pt had PCI KYLIE x one to mid LCX ISR, KYLIE x 2 to OM2, and PCI prox RCA KYLIE x 2, PTCA mid RCA ISR.  Failed PCI distal occluded RCA.  EF 45%.  Nonobstructive LAD disease, small diffusely diseased diagonal vessels.    Cont Imdur

## 2024-10-15 NOTE — PROGRESS NOTES
[facility] Medicine  Progress Note    Patient Name: Aquiles Ytaes  MRN: [id number]  Patient Class: IP- Inpatient   Admission Date: 10/5/2024  Length of Stay: 9 days  Attending Physician: Jackie Ramos MD  Primary Care Provider: Holger Thornton MD        Subjective:     Principal Problem:Calculus of bile duct without cholecystitis with obstruction        HPI:  Aquiles Yates is a 84 y.o. male with a PMH  has a past medical history of Acute blood loss anemia (10/14/2019), Alzheimer's dementia, Aneurysm, abdominal aortic, BCC (basal cell carcinoma of skin) (06/29/2023), Chronic diastolic heart failure (05/20/2024), Coronary artery disease, Depression, Diabetes mellitus, HLD (hyperlipidemia), Hypertension, Kidney stone, PAD (peripheral artery disease), PAF (paroxysmal atrial fibrillation) (01/24/2023), and Tobacco abuse.presented to the Emergency Department for evaluation of tremors and mottled skin per wife. Wife called the paramedics when she noticed patient shaking and patient's mottled skin today. Pt has a history of Alzheimer's and paramedics report a GCS of 14. Upon interview, pt denies any pain and is oriented to self. No further complaints or concerns at this time.       ER workup revealed CBC to be unremarkable.  CMP revealed CBG of 202 mg/dL, , total bili of 2.6, and AST/ALT of 242/179.  .  Troponin negative.  Lactic acid 2.8.  Flu COVID negative.  UA unremarkable.  CTA abdomen and pelvis without contrast revealed no acute findings.  Chest x-ray negative for acute cardiopulmonary findings.  Ultrasound limited revealed cholelithiasis without definitive sonographic evidence of acute cholecystitis.  Mild intrahepatic biliary duct dilation.  Vital signs stable. EKG revealed sinus Nicholas with occasional PVCs with a ventricular rate of 56 beats per minute and a QT/QTC of 408/393.  Patient received 4.5 g Zosyn in ED. GI consulted.  In  agreement with MRCP and will see us consult in a.m..  Patient in agreement with treatment plan.  Patient will be admitted under inpatient status.    PCP: Holger Thornton      Overview/Hospital Course:  Patient is an 84-year-old male with a history of anemia, Alzheimer's dementia, aneurysm, chronic diastolic failure, coronary artery disease diabetes mellitus, hypertension, PAF who presented emergency department with tremors.  Patient's wife said she was he was shaking and had mottled skin.  Patient denied any complaints.  In the emergency department workup revealed glucose of 202, alk-phos of 387, total bili of 2.6, AST of 242 ALT of 179, BNP of 323, lactic acid of 2.4.  CT scan of abdomen pelvis without contrast revealed no acute findings, ultrasound revealed cholelithiasis with ductal dilatation.  Patient was admitted with diagnosis of acute cholecystitis choledocholithiasis.  He was started on Zosyn.  GI was consulted MRCP was ordered with plans for ERCP in [place].  Patient's heart rate was in the 40s yesterday EKG revealed sinus bradycardia.  He went to [place] today for ERCP however heart rate was less than 40 therefore he was sent back for cardiology evaluation.  Patient denies any pain.    Cardiology saw patient with recommendations to stop his Aricept as it was make be contributing to his bradycardia.  Patient was tolerating diet and LFTs have improved.    Status post ERCP:                        - The major papilla appeared normal.                          - The common bile duct was dilated.                          - Choledocholithiasis was found. Complete removal                          was accomplished by biliary sphincterotomy and                          balloon extraction.                          - A biliary sphincterotomy was performed.                          - The biliary tree was swept.                          - One biliary stent was placed into the common                           bile duct.     Patient was scheduled for cholecystectomy 10/11 however heart rate was in the 40s.  Cardiology evaluated and plan TVP while in OR. Plan cholecystectomy 10/15    Interval History: f/u cholecystitis plan TVP while in OR, patient asking about food. Discussed nothing to eat yet, can eat after surgery    Review of Systems  Objective:     Vital Signs (Most Recent):  Temp: 98.2 °F (36.8 °C) (10/15/24 0746)  Pulse: 79 (10/15/24 1400)  Resp: 18 (10/15/24 1400)  BP: 137/70 (10/15/24 1400)  SpO2: 95 % (10/15/24 1400) Vital Signs (24h Range):  Temp:  [98 °F (36.7 °C)-98.3 °F (36.8 °C)] 98.2 °F (36.8 °C)  Pulse:  [44-80] 79  Resp:  [17-18] 18  SpO2:  [94 %-99 %] 95 %  BP: (114-148)/(57-77) 137/70     Weight: 64 kg (141 lb)  Body mass index is 20.23 kg/m².  No intake or output data in the 24 hours ending 10/15/24 1451      Physical Exam  HENT:      Head: Normocephalic and atraumatic.   Cardiovascular:      Rate and Rhythm: Normal rate and regular rhythm.      Heart sounds: No murmur heard.  Pulmonary:      Effort: Pulmonary effort is normal. No respiratory distress.      Breath sounds: Normal breath sounds. No wheezing.   Abdominal:      General: Bowel sounds are normal. There is no distension.      Palpations: Abdomen is soft.      Tenderness: There is no abdominal tenderness.   Musculoskeletal:         General: No swelling.   Skin:     General: Skin is warm and dry.   Neurological:      Mental Status: He is alert and oriented to person, place, and time. Mental status is at baseline.             Significant Labs: All pertinent labs within the past 24 hours have been reviewed.  Recent Lab Results         10/15/24  0822   10/15/24  0532   10/14/24  2125   10/14/24  1651        Anion Gap 10             Baso # 0.03             Basophil % 0.6             BUN 12             Calcium 8.8             Chloride 109             CO2 23             Creatinine 1.3             Differential Method Automated             eGFR  54             Eos # 0.2             Eos % 4.6             Glucose 129             Gran # (ANC) 3.9             Gran % 78.4             Hematocrit 38.0             Hemoglobin 12.6             Immature Grans (Abs) 0.02  Comment: Mild elevation in immature granulocytes is non specific and   can be seen in a variety of conditions including stress response,   acute inflammation, trauma and pregnancy. Correlation with other   laboratory and clinical findings is essential.               Immature Granulocytes 0.4             Lymph # 0.5             Lymph % 9.6             MCH 29.6             MCHC 33.2             MCV 89             Mono # 0.3             Mono % 6.4             MPV 11.0             nRBC 0             Platelet Count 145             POCT Glucose   143   161   193       Potassium 4.0             RBC 4.26             RDW 14.5             Sodium 142             WBC 4.98                     Significant Imaging: I have reviewed all pertinent imaging results/findings within the past 24 hours.    Assessment/Plan:      * Calculus of bile duct without cholecystitis with obstruction  Patient is on Zosyn  Completed ERCP with stone removal [facility] 10/10 without complication  Cholecystectomy today.      Chronic diastolic heart failure  Patient is identified as having Diastolic (HFpEF) heart failure that is Chronic. CHF is currently controlled. Latest ECHO performed and demonstrates- Results for orders placed during the hospital encounter of 06/13/22    Echo    Interpretation Summary  · The left ventricle is normal in size with severe concentric hypertrophy and normal systolic function.  · Mild left atrial enlargement.  · The estimated ejection fraction is 55%.  · Grade II left ventricular diastolic dysfunction.  · Normal right ventricular size with normal right ventricular systolic function.  .   monitor clinical status closely. Monitor on telemetry. Patient is off CHF pathway.  Monitor strict Is&Os and daily  weights.  Place on fluid restriction of 2 L. Continue to stress to patient importance of self efficacy and  on diet for CHF. Last BNP reviewed- and noted below   Recent Labs   Lab 10/05/24  2048   *     .            Dementia  Patient with dementia with likely etiology of alzheimer's dementia. Dementia is moderate. The patient does not have signs of behavioral disturbance. Home dementia medications are held.  Continue non-pharmacologic interventions to prevent delirium (No VS between 11PM-5AM, activity during day, opening blinds, providing glasses/hearing aids, and up in chair during daytime). Will avoid narcotics and benzos unless absolutely necessary. PRN anti-psychotics are not prescribed to avoid self harm behaviors.    AVM (arteriovenous malformation) of stomach, acquired with hemorrhage  Patient's hemorrhage is due to gastrointestinal bleed, patient does not have a propensity for bleeding.. Patients most recent Hgb, Hct, platelets, and INR are listed below.  Recent Labs     10/13/24  0546 10/14/24  0600 10/15/24  0822   HGB 11.5* 11.7* 12.6*   HCT 34.7* 35.1* 38.0*   * 134* 145*       Plan  - Will trend hemoglobin/hematocrit Daily  - Will monitor and correct any coagulation defects  - Will transfuse if Hgb is <7g/dl (<8g/dl in cases of active ACS) or if patient has rapid bleeding leading to hemodynamic instability  - STABLE    PAF (paroxysmal atrial fibrillation)  Patient has paroxysmal (<7 days) atrial fibrillation. Patient is currently in  sinus Nicholas . [id number] Score: 4. The patients heart rate in the last 24 hours is as follows:  Pulse  Min: 47  Max: 72     Antiarrhythmics   None    Anticoagulants   No anticoagulants secondary to need procedure    Plan  - Replete lytes with a goal of K>4, Mg >2      Bradycardia  Seen by Cardiology who recommended stopping his Aricept.    Awaiting further recommendations    Type 2 diabetes mellitus with other specified complication, unspecified whether  "long term insulin use  Patient's FSGs are controlled on current medication regimen.  Last A1c reviewed-   Lab Results   Component Value Date    HGBA1C 7.0 (H) 05/07/2024     Most recent fingerstick glucose reviewed- No results for input(s): "POCTGLUCOSE" in the last 24 hours.  Current correctional scale  Low  Maintain anti-hyperglycemic dose as follows-   Antihyperglycemics (From admission, onward)      Start     Stop Route Frequency Ordered    10/06/24 0146  insulin aspart U-100 pen 0-5 Units         -- SubQ Before meals & nightly PRN 10/06/24 0048          Hold Oral hypoglycemics while patient is in the hospital.      Dyslipidemia  Patient is chronically on statin.will not continue for now. Last Lipid Panel:   Lab Results   Component Value Date    CHOL 150 05/07/2024    HDL 36 (L) 05/07/2024    LDLCALC 94.2 05/07/2024    TRIG 99 05/07/2024    CHOLHDL 24.0 05/07/2024     -Hold home medication due to elevated LFTs          Essential hypertension  Chronic, controlled.  Latest blood pressure and vitals reviewed-   Temp:  [98 °F (36.7 °C)-98.3 °F (36.8 °C)]   Pulse:  [44-80]   Resp:  [17-18]   BP: (114-148)/(57-77)   SpO2:  [94 %-99 %] .   Home meds for hypertension were reviewed and noted below.   Hypertension Medications               isosorbide mononitrate (IMDUR) 120 MG 24 hr tablet TAKE 1 TABLET BY MOUTH EVERY DAY    valsartan (DIOVAN) 160 MG tablet Take 1 tablet (160 mg total) by mouth once daily.            While in the hospital, will manage blood pressure as follows, hold valsartan    Will utilize p.r.n. blood pressure medication only if patient's blood pressure greater than  180/110 and he develops symptoms such as worsening chest pain or shortness of breath.      Coronary artery disease of native artery of native heart with stable angina pectoris  Patient with known CAD s/p stent placement, which is controlled. and monitor for S/Sx of angina/ACS. Continue to monitor on telemetry.       VTE Risk Mitigation " (From admission, onward)           Ordered     Reason for No Pharmacological VTE Prophylaxis  Once        Question:  Reasons:  Answer:  Physician Provided (leave comment)  Comment:  Pending potential surgical intervention    10/06/24 0048     IP VTE HIGH RISK PATIENT  Once         10/06/24 0048     Place sequential compression device  Until discontinued         10/06/24 0048                    Discharge Planning   ZEINA:      Code Status: Full Code   Is the patient medically ready for discharge?:     Reason for patient still in hospital (select all that apply): Patient trending condition and Treatment  Discharge Plan A: Home with family                  Jackie Ramos MD  Department of Hospital Medicine   [facility] - Surgery ([facility])

## 2024-10-15 NOTE — ASSESSMENT & PLAN NOTE
Patient's hemorrhage is due to gastrointestinal bleed, patient does not have a propensity for bleeding.. Patients most recent Hgb, Hct, platelets, and INR are listed below.  Recent Labs     10/13/24  0546 10/14/24  0600 10/15/24  0822   HGB 11.5* 11.7* 12.6*   HCT 34.7* 35.1* 38.0*   * 134* 145*       Plan  - Will trend hemoglobin/hematocrit Daily  - Will monitor and correct any coagulation defects  - Will transfuse if Hgb is <7g/dl (<8g/dl in cases of active ACS) or if patient has rapid bleeding leading to hemodynamic instability  - STABLE

## 2024-10-15 NOTE — PLAN OF CARE
Pt arrived to ICU from PACU with TVP in place, site is CDI and pacer spikes noted on monitor. 4 Lap sites noted on abdomen all of which are open to air and CDI. Blanchable redness noted on all bony prominences, foam padding placed. External urine catheter placed. Pt only oriented to self at this time. On 3L NC. POC reviewed with pt, no family at bedside. Call light within reach. Fall and safety precautions in place.

## 2024-10-15 NOTE — ASSESSMENT & PLAN NOTE
- s/p lap harjinder 10/15 with general surgery  - zosyn  - OOB, IS when able  - pain control  - diet recs per surgery team

## 2024-10-15 NOTE — CONSULTS
[facility] - Intensive Care (Hospital)  Critical Care Medicine  Consult Note    Patient Name: Aquiles Yates  MRN: [id number]  Admission Date: 10/5/2024  Hospital Length of Stay: 9 days  Code Status: Full Code  Attending Physician: Fred Taylor MD   Primary Care Provider: Holger Thornton MD   Principal Problem: Calculus of bile duct without cholecystitis with obstruction    Consults  Subjective:     HPI:  84 year old male with a known past medical history of anemia, Alzheimer's, chronic diastolic heart failure, CAD, depression, diabetes, hyperlipidemia, hypertension, PID, paroxysmal AFib, and tobacco abuse that presented to the ER on 10/5 for complaints of tremors and mottled skin reported per the patient's wife.  Baseline GCS of 14.  Workup in the ER with elevated liver enzymes, , lactic 2.8.  Imaging was unremarkable at that time.  Ultrasound of the abdomen revealed cholelithiasis without definite evidence of acute cholecystitis with a mild intrahepatic biliary duct dilation.  Patient was noted to be bradycardic.  He was given Zosyn in the ER.  He was admitted under Hospital Medicine to the hospital.  During this hospitalization patient has been seen by multiple services including GI, surgery, and Cardiology.  Patient was taken to the OR on 10/15 for placement of transvenous pacemaker secondary to bradycardia followed by laparoscopic cholecystectomy with General surgery.  Postop patient transferred to the ICU for further care management with close monitoring giving TVP.    At the time of my exam in the ICU, patient is sleeping, but awakens easily.  He was in no acute distress on 3 L nasal cannula which has been to 2 L of my exam.  No family at bedside.  He denies any acute complaints.  Vital signs stable.    Hospital/ICU Course:  No notes on file    Past Medical History:   Diagnosis Date    Acute blood loss anemia 10/14/2019    Alzheimer's dementia     Aneurysm, abdominal aortic     BCC  (basal cell carcinoma of skin) 06/29/2023    Chronic diastolic heart failure 05/20/2024    Coronary artery disease     Depression     Diabetes mellitus     HLD (hyperlipidemia)     Hypertension     Kidney stone     PAD (peripheral artery disease)     PAF (paroxysmal atrial fibrillation) 01/24/2023    Tobacco abuse        Past Surgical History:   Procedure Laterality Date    APPENDECTOMY      CORONARY STENT PLACEMENT      x 4    ERCP N/A 10/10/2024    Procedure: ERCP (ENDOSCOPIC RETROGRADE CHOLANGIOPANCREATOGRAPHY);  Surgeon: Morgan Hong MD;  Location: [facility] ([address] [place] [address]);  Service: Endoscopy;  Laterality: N/A;    ESOPHAGOGASTRODUODENOSCOPY N/A 10/14/2019    Procedure: EGD (ESOPHAGOGASTRODUODENOSCOPY);  Surgeon: Carlos Mcneal III, MD;  Location: [facility];  Service: Endoscopy;  Laterality: N/A;    ESOPHAGOGASTRODUODENOSCOPY N/A 10/15/2019    Procedure: EGD (ESOPHAGOGASTRODUODENOSCOPY);  Surgeon: Carlos Mcneal III, MD;  Location: [facility];  Service: Endoscopy;  Laterality: N/A;    ESOPHAGOGASTRODUODENOSCOPY N/A 4/17/2023    Procedure: EGD (ESOPHAGOGASTRODUODENOSCOPY);  Surgeon: Nevaeh Mcconnell MD;  Location: [facility];  Service: Endoscopy;  Laterality: N/A;    LEFT HEART CATHETERIZATION Left 10/11/2019    Procedure: CATHETERIZATION, HEART, LEFT;  Surgeon: Robe Gilbert MD;  Location: [facility] CATH LAB;  Service: Cardiology;  Laterality: Left;    LEFT HEART CATHETERIZATION Left 10/13/2019    Procedure: CATHETERIZATION, HEART, LEFT;  Surgeon: Robe Gilbert MD;  Location: [facility] CATH LAB;  Service: Cardiology;  Laterality: Left;    leg stent Left        Review of patient's allergies indicates:   Allergen Reactions    Metoprolol Other (See Comments)     Junctional rhythm         Family History       Problem Relation (Age of Onset)    COPD Father    Diabetes Mother, Brother          Tobacco Use    Smoking status: Former     Types: Cigars     Passive exposure: Never    Smokeless tobacco: Current   Substance  and Sexual Activity    Alcohol use: Not Currently    Drug use: Not Currently    Sexual activity: Not Currently     Partners: Female         Review of Systems   Unable to perform ROS: Dementia     Objective:     Vital Signs (Most Recent):  Temp: 97.2 °F (36.2 °C) (10/15/24 1610)  Pulse: 75 (10/15/24 1700)  Resp: (!) 23 (10/15/24 1700)  BP: (!) 152/79 (10/15/24 1700)  SpO2: (!) 94 % (10/15/24 1700) Vital Signs (24h Range):  Temp:  [97.2 °F (36.2 °C)-98.3 °F (36.8 °C)] 97.2 °F (36.2 °C)  Pulse:  [44-86] 75  Resp:  [13-36] 23  SpO2:  [93 %-99 %] 94 %  BP: (111-152)/(57-79) 152/79     Weight: 64 kg (141 lb)  Body mass index is 20.23 kg/m².      Intake/Output Summary (Last 24 hours) at 10/15/2024 1810  Last data filed at 10/15/2024 1700  Gross per 24 hour   Intake 1405.54 ml   Output --   Net 1405.54 ml        Physical Exam  Vitals reviewed.   Constitutional:       General: He is sleeping. He is not in acute distress.     Appearance: He is well-developed.   Cardiovascular:      Rate and Rhythm: Normal rate.      Comments: Based on bedside monitor with rate in 70s  Pulmonary:      Effort: Pulmonary effort is normal.      Breath sounds: Normal breath sounds.      Comments: On 2 L nasal cannula  Abdominal:      General: There is no distension.      Palpations: Abdomen is soft.      Comments: Laparoscopic sites open to air, clean and dry   Musculoskeletal:      Right lower leg: No edema.      Left lower leg: No edema.      Comments: VO   Skin:     General: Skin is warm and dry.   Neurological:      General: No focal deficit present.      Mental Status: He is easily aroused.   Psychiatric:      Comments: Calm and cooperative           Vents:  Oxygen Concentration (%): 98 (10/15/24 1620)    Lines/Drains/Airways       Line  Duration                  Pacer Wires 10/15/24 1300 <1 day              Peripheral Intravenous Line  Duration                  Peripheral IV - Single Lumen 10/11/24 0050 20 G Anterior;Left Upper Arm 4 days  "                   Significant Labs:    CBC/Anemia Profile:  Recent Labs   Lab 10/14/24  0600 10/15/24  0822   WBC 4.88 4.98   HGB 11.7* 12.6*   HCT 35.1* 38.0*   * 145*   MCV 88 89   RDW 14.0 14.5        Chemistries:  Recent Labs   Lab 10/14/24  0600 10/15/24  0822    142   K 3.1* 4.0    109   CO2 23 23   BUN 10 12   CREATININE 1.2 1.3   CALCIUM 8.4* 8.8   ALBUMIN 2.6*  --    PROT 5.9*  --    BILITOT 1.2*  --    ALKPHOS 128  --    ALT 55*  --    AST 51*  --        All pertinent labs within the past 24 hours have been reviewed.    Significant Imaging:   I have reviewed all pertinent imaging results/findings within the past 24 hours.    ABG  No results for input(s): "PH", "PO2", "PCO2", "HCO3", "BE" in the last 168 hours.  Assessment/Plan:     Neuro  Dementia  - affecting medical decision making and complicating the above   - supportive care     Cardiac/Vascular  Chronic diastolic heart failure  - not in acute exac  - cards following  - strict I&Os    PAF (paroxysmal atrial fibrillation)  - telemetry  - TVP in place with rate in the 70s and paced on the monitor    Bradycardia  - s/p TVP placement on 10/15 per cards  - plans for removal post-op per card's note  - telemetry     Dyslipidemia  - home meds as appropriate     Essential hypertension  - mgmt per cards  - monitor hemodynamics     Coronary artery disease of native artery of native heart with stable angina pectoris  - mgmt per cards  - telemetry    Endocrine  Type 2 diabetes mellitus with other specified complication, unspecified whether long term insulin use  - SSI, monitor trends and adjust meds as needed    GI  * Calculus of bile duct without cholecystitis with obstruction  - s/p lap harjinder 10/15 with general surgery  - zosyn  - OOB, IS when able  - pain control  - diet recs per surgery team     Prophylaxis Measures:  GI ppx: Pantoprazole  VTE ppx: SCDs  Glucose control: Insulin subcutaneous    Code Status: Full Code    Thank you for your " consult. I will follow-up with patient. Please contact us if you have any additional questions.     Robbin Barrios NP  Critical Care Medicine  [facility] - Intensive Care ([facility])

## 2024-10-15 NOTE — SUBJECTIVE & OBJECTIVE
"  Review of Systems   Unable to perform ROS: Dementia     Objective:     Vital Signs (Most Recent):  Temp: 98.2 °F (36.8 °C) (10/15/24 0746)  Pulse: 75 (10/15/24 1330)  Resp: 18 (10/15/24 1330)  BP: (!) 143/77 (10/15/24 1330)  SpO2: 99 % (10/15/24 1330) Vital Signs (24h Range):  Temp:  [98 °F (36.7 °C)-98.3 °F (36.8 °C)] 98.2 °F (36.8 °C)  Pulse:  [44-75] 75  Resp:  [17-18] 18  SpO2:  [94 %-99 %] 99 %  BP: (114-148)/(57-77) 143/77     Weight: 64 kg (141 lb)  Body mass index is 20.23 kg/m².     SpO2: 99 %       No intake or output data in the 24 hours ending 10/15/24 1341    Lines/Drains/Airways       Line  Duration                  Pacer Wires 10/15/24 1300 <1 day              Peripheral Intravenous Line  Duration                  Peripheral IV - Single Lumen 10/11/24 0050 20 G Anterior;Left Upper Arm 4 days                       Physical Exam  Vitals and nursing note reviewed.   Constitutional:       Appearance: Normal appearance.   HENT:      Head: Normocephalic and atraumatic.   Cardiovascular:      Rate and Rhythm: Regular rhythm. Bradycardia present.      Heart sounds: S1 normal and S2 normal.   Pulmonary:      Effort: Pulmonary effort is normal.   Abdominal:      Palpations: Abdomen is soft.   Neurological:      Comments: Sleeping            Significant Labs: CMP   Recent Labs   Lab 10/14/24  0600 10/15/24  0822    142   K 3.1* 4.0    109   CO2 23 23   * 129*   BUN 10 12   CREATININE 1.2 1.3   CALCIUM 8.4* 8.8   PROT 5.9*  --    ALBUMIN 2.6*  --    BILITOT 1.2*  --    ALKPHOS 128  --    AST 51*  --    ALT 55*  --    ANIONGAP 10 10   , CBC   Recent Labs   Lab 10/14/24  0600 10/15/24  0822   WBC 4.88 4.98   HGB 11.7* 12.6*   HCT 35.1* 38.0*   * 145*   , Troponin No results for input(s): "TROPONINI" in the last 48 hours., and All pertinent lab results from the last 24 hours have been reviewed.    Significant Imaging: Echocardiogram: Transthoracic echo (TTE) complete (Cupid Only): "   Results for orders placed or performed during the hospital encounter of 10/05/24   Echo   Result Value Ref Range    BSA 1.78 m2    LVOT stroke volume 83.6 cm3    LVIDd 4.7 3.5 - 6.0 cm    LV Systolic Volume 47.50 mL    LV Systolic Volume Index 26.4 mL/m2    LVIDs 3.4 2.1 - 4.0 cm    LV Diastolic Volume 100.84 mL    LV Diastolic Volume Index 56.02 mL/m2    Left Ventricular End Systolic Volume by Teichholz Method 47.50 mL    Left Ventricular End Diastolic Volume by Teichholz Method 100.84 mL    IVS 1.2 (A) 0.6 - 1.1 cm    LVOT diameter 2.2 cm    LVOT area 3.8 cm2    FS 27.7 (A) 28 - 44 %    Left Ventricle Relative Wall Thickness 0.51 cm    PW 1.2 (A) 0.6 - 1.1 cm    LV mass 212.0 g    LV Mass Index 117.8 g/m2    MV Peak E Americo 0.92 m/s    TDI LATERAL 0.09 m/s    TDI SEPTAL 0.06 m/s    E/E' ratio 12.27 m/s    MV Peak A Americo 0.81 m/s    TR Max Americo 2.66 m/s    E/A ratio 1.14     IVRT 106.57 msec    E wave deceleration time 293.33 msec    LV SEPTAL E/E' RATIO 15.33 m/s    LV LATERAL E/E' RATIO 10.22 m/s    LVOT peak americo 0.8 m/s    Left Ventricular Outflow Tract Mean Velocity 0.47 cm/s    Left Ventricular Outflow Tract Mean Gradient 1.09 mmHg    RVOT peak VTI 15.9 cm    TAPSE 2.09 cm    LA size 4.57 cm    Left Atrium Minor Axis 4.69 cm    Left Atrium Major Axis 6.65 cm    RA Major Axis 6.04 cm    AV regurgitation pressure 1/2 time [id number] ms    AR Max Americo 4.07 m/s    AV mean gradient 8.0 mmHg    AV peak gradient 17.6 mmHg    Ao peak americo 2.1 m/s    Ao VTI 42.4 cm    LVOT peak VTI 22.0 cm    AV valve area 2.0 cm²    AV Velocity Ratio 0.38     AV index (prosthetic) 0.52     JUDAH by Velocity Ratio 1.4 cm²    Mr max americo 5.59 m/s    Triscuspid Valve Regurgitation Peak Gradient 28 mmHg    PV mean gradient 1 mmHg    RVOT peak americo 0.60 m/s    Ao root annulus 3.75 cm    STJ 3.27 cm    Ascending aorta 3.51 cm    IVC diameter 1.16 cm    Mean e' 0.08 m/s    ZLVIDS 0.79     ZLVIDD -0.57     LURDES 51.0 mL/m2    LA Vol 91.88 cm3     LA WIDTH 4.3 cm    RA Width 2.5 cm    EF 60 %    TV resting pulmonary artery pressure 31 mmHg    RV TB RVSP 6 mmHg    Est. RA pres 3 mmHg    Narrative      Left Ventricle: The left ventricle is normal in size. Normal wall   thickness. There is concentric hypertrophy. Normal wall motion. Ejection   fraction is approximately 60%. There is indeterminate diastolic function.    Right Ventricle: Normal right ventricular cavity size. Wall thickness   is normal. Right ventricle wall motion  is normal. Systolic function is   normal.    Left Atrium: Left atrium is severely dilated.    Aortic Valve: There is mild aortic regurgitation.    Mitral Valve: There is mild regurgitation.    Tricuspid Valve: There is mild regurgitation.    Pulmonary Artery: The estimated pulmonary artery systolic pressure is   31 mmHg.    IVC/SVC: Normal venous pressure at 3 mmHg.      and EKG: Reviewed

## 2024-10-15 NOTE — OP NOTE
INPATIENT Operative Note         SUMMARY     Surgery Date: 10/15/2024     Surgeons and Role:     * Demarcus Kirk MD - Primary    ASSISTANT:none    Pre-op Diagnosis:  Symptomatic bradycardia [R00.1]  Paroxysmal atrial fibrillation [I48.0]      Post-op Diagnosis:  Symptomatic bradycardia [R00.1]  Paroxysmal atrial fibrillation [I48.0]    Procedure(s) (LRB):  Insertion, Pacemaker, Temporary Transvenous (N/A)    COMPLICATION:none    Anesthesia: RN IV Sedation    Findings/Key Components:  Successful placement of transvenous pacer via rt ij.    Estimated Blood Loss: < 50 ML.         SPECIMEN: NONE    Devices/Prostetics: None    PLAN:   Proceed with surgery.   Remove pacer post op.

## 2024-10-15 NOTE — SUBJECTIVE & OBJECTIVE
Interval History: f/u cholecystitis plan TVP while in OR, patient asking about food. Discussed nothing to eat yet, can eat after surgery    Review of Systems  Objective:     Vital Signs (Most Recent):  Temp: 98.2 °F (36.8 °C) (10/15/24 0746)  Pulse: 79 (10/15/24 1400)  Resp: 18 (10/15/24 1400)  BP: 137/70 (10/15/24 1400)  SpO2: 95 % (10/15/24 1400) Vital Signs (24h Range):  Temp:  [98 °F (36.7 °C)-98.3 °F (36.8 °C)] 98.2 °F (36.8 °C)  Pulse:  [44-80] 79  Resp:  [17-18] 18  SpO2:  [94 %-99 %] 95 %  BP: (114-148)/(57-77) 137/70     Weight: 64 kg (141 lb)  Body mass index is 20.23 kg/m².  No intake or output data in the 24 hours ending 10/15/24 1451      Physical Exam  HENT:      Head: Normocephalic and atraumatic.   Cardiovascular:      Rate and Rhythm: Normal rate and regular rhythm.      Heart sounds: No murmur heard.  Pulmonary:      Effort: Pulmonary effort is normal. No respiratory distress.      Breath sounds: Normal breath sounds. No wheezing.   Abdominal:      General: Bowel sounds are normal. There is no distension.      Palpations: Abdomen is soft.      Tenderness: There is no abdominal tenderness.   Musculoskeletal:         General: No swelling.   Skin:     General: Skin is warm and dry.   Neurological:      Mental Status: He is alert and oriented to person, place, and time. Mental status is at baseline.             Significant Labs: All pertinent labs within the past 24 hours have been reviewed.  Recent Lab Results         10/15/24  0822   10/15/24  0532   10/14/24  2125   10/14/24  1651        Anion Gap 10             Baso # 0.03             Basophil % 0.6             BUN 12             Calcium 8.8             Chloride 109             CO2 23             Creatinine 1.3             Differential Method Automated             eGFR 54             Eos # 0.2             Eos % 4.6             Glucose 129             Gran # (ANC) 3.9             Gran % 78.4             Hematocrit 38.0             Hemoglobin 12.6              Immature Grans (Abs) 0.02  Comment: Mild elevation in immature granulocytes is non specific and   can be seen in a variety of conditions including stress response,   acute inflammation, trauma and pregnancy. Correlation with other   laboratory and clinical findings is essential.               Immature Granulocytes 0.4             Lymph # 0.5             Lymph % 9.6             MCH 29.6             MCHC 33.2             MCV 89             Mono # 0.3             Mono % 6.4             MPV 11.0             nRBC 0             Platelet Count 145             POCT Glucose   143   161   193       Potassium 4.0             RBC 4.26             RDW 14.5             Sodium 142             WBC 4.98                     Significant Imaging: I have reviewed all pertinent imaging results/findings within the past 24 hours.

## 2024-10-15 NOTE — TRANSFER OF CARE
"Anesthesia Transfer of Care Note    Patient: Aquiles Yates    Procedure(s) Performed: Procedure(s) (LRB):  DV5 ROBOTIC CHOLECYSTECTOMY (N/A)    Patient location: PACU    Anesthesia Type: MAC    Transport from OR: Transported from OR on room air with adequate spontaneous ventilation    Post pain: adequate analgesia    Post assessment: no apparent anesthetic complications    Post vital signs: stable    Level of consciousness: responds to stimulation    Nausea/Vomiting: no nausea/vomiting    Complications: none    Transfer of care protocol was followed      Last vitals: Visit Vitals  /61 (BP Location: Right arm, Patient Position: Lying)   Pulse 76   Temp 36.2 °C (97.2 °F) (Skin)   Resp 16   Ht 5' 10" (1.778 m)   Wt 64 kg (141 lb)   SpO2 (!) 94%   BMI 20.23 kg/m²     "

## 2024-10-15 NOTE — ANESTHESIA POSTPROCEDURE EVALUATION
Anesthesia Post Evaluation    Patient: Aquiles Yates    Procedure(s) Performed: Procedure(s) (LRB):  DV5 ROBOTIC CHOLECYSTECTOMY (N/A)    Final Anesthesia Type: general      Patient location during evaluation: PACU  Patient participation: Yes- Able to Participate  Level of consciousness: awake and alert  Post-procedure vital signs: reviewed and stable  Pain management: adequate  Airway patency: patent  LEONARDO mitigation strategies: Verification of full reversal of neuromuscular block  PONV status at discharge: No PONV  Anesthetic complications: no      Cardiovascular status: hemodynamically stable  Respiratory status: spontaneous ventilation  Hydration status: euvolemic  Follow-up not needed.              Vitals Value Taken Time   /77 10/15/24 1802   Temp 36.2 °C (97.2 °F) 10/15/24 1610   Pulse 76 10/15/24 1812   Resp 16 10/15/24 1812   SpO2 96 % 10/15/24 1812   Vitals shown include unfiled device data.      No case tracking events are documented in the log.      Pain/Carmen Score: Carmen Score: 8 (10/15/2024  4:45 PM)

## 2024-10-15 NOTE — SUBJECTIVE & OBJECTIVE
Past Medical History:   Diagnosis Date    Acute blood loss anemia 10/14/2019    Alzheimer's dementia     Aneurysm, abdominal aortic     BCC (basal cell carcinoma of skin) 06/29/2023    Chronic diastolic heart failure 05/20/2024    Coronary artery disease     Depression     Diabetes mellitus     HLD (hyperlipidemia)     Hypertension     Kidney stone     PAD (peripheral artery disease)     PAF (paroxysmal atrial fibrillation) 01/24/2023    Tobacco abuse        Past Surgical History:   Procedure Laterality Date    APPENDECTOMY      CORONARY STENT PLACEMENT      x 4    ERCP N/A 10/10/2024    Procedure: ERCP (ENDOSCOPIC RETROGRADE CHOLANGIOPANCREATOGRAPHY);  Surgeon: Morgan Hong MD;  [facility]  Service: Endoscopy;  Laterality: N/A;    ESOPHAGOGASTRODUODENOSCOPY N/A 10/14/2019    Procedure: EGD (ESOPHAGOGASTRODUODENOSCOPY);  Surgeon: Carlos Mcneal III, MD;  Location: [facility];  Service: Endoscopy;  Laterality: N/A;    ESOPHAGOGASTRODUODENOSCOPY N/A 10/15/2019    Procedure: EGD (ESOPHAGOGASTRODUODENOSCOPY);  Surgeon: Carlos Mcneal III, MD;  Location: [facility];  Service: Endoscopy;  Laterality: N/A;    ESOPHAGOGASTRODUODENOSCOPY N/A 4/17/2023    Procedure: EGD (ESOPHAGOGASTRODUODENOSCOPY);  Surgeon: Nevaeh Mcconnell MD;  Location: [facility];  Service: Endoscopy;  Laterality: N/A;    LEFT HEART CATHETERIZATION Left 10/11/2019    Procedure: CATHETERIZATION, HEART, LEFT;  Surgeon: Robe Gilbert MD;  Location: [facility] CATH LAB;  Service: Cardiology;  Laterality: Left;    LEFT HEART CATHETERIZATION Left 10/13/2019    Procedure: CATHETERIZATION, HEART, LEFT;  Surgeon: Robe Gilbert MD;  Location: [facility] CATH LAB;  Service: Cardiology;  Laterality: Left;    leg stent Left        Review of patient's allergies indicates:   Allergen Reactions    Metoprolol Other (See Comments)     Junctional rhythm         Family History       Problem Relation (Age of Onset)    COPD Father    Diabetes Mother,  Brother          Tobacco Use    Smoking status: Former     Types: Cigars     Passive exposure: Never    Smokeless tobacco: Current   Substance and Sexual Activity    Alcohol use: Not Currently    Drug use: Not Currently    Sexual activity: Not Currently     Partners: Female         Review of Systems   Unable to perform ROS: Dementia     Objective:     Vital Signs (Most Recent):  Temp: 97.2 °F (36.2 °C) (10/15/24 1610)  Pulse: 75 (10/15/24 1700)  Resp: (!) 23 (10/15/24 1700)  BP: (!) 152/79 (10/15/24 1700)  SpO2: (!) 94 % (10/15/24 1700) Vital Signs (24h Range):  Temp:  [97.2 °F (36.2 °C)-98.3 °F (36.8 °C)] 97.2 °F (36.2 °C)  Pulse:  [44-86] 75  Resp:  [13-36] 23  SpO2:  [93 %-99 %] 94 %  BP: (111-152)/(57-79) 152/79     Weight: 64 kg (141 lb)  Body mass index is 20.23 kg/m².      Intake/Output Summary (Last 24 hours) at 10/15/2024 1810  Last data filed at 10/15/2024 1700  Gross per 24 hour   Intake 1405.54 ml   Output --   Net 1405.54 ml        Physical Exam  Vitals reviewed.   Constitutional:       General: He is sleeping. He is not in acute distress.     Appearance: He is well-developed.   Cardiovascular:      Rate and Rhythm: Normal rate.      Comments: Based on bedside monitor with rate in 70s  Pulmonary:      Effort: Pulmonary effort is normal.      Breath sounds: Normal breath sounds.      Comments: On 2 L nasal cannula  Abdominal:      General: There is no distension.      Palpations: Abdomen is soft.      Comments: Laparoscopic sites open to air, clean and dry   Musculoskeletal:      Right lower leg: No edema.      Left lower leg: No edema.      Comments: VO   Skin:     General: Skin is warm and dry.   Neurological:      General: No focal deficit present.      Mental Status: He is easily aroused.   Psychiatric:      Comments: Calm and cooperative           Vents:  Oxygen Concentration (%): 98 (10/15/24 1620)    Lines/Drains/Airways       Line  Duration                  Pacer Wires 10/15/24 1300 <1 day               Peripheral Intravenous Line  Duration                  Peripheral IV - Single Lumen 10/11/24 0050 20 G Anterior;Left Upper Arm 4 days                    Significant Labs:    CBC/Anemia Profile:  Recent Labs   Lab 10/14/24  0600 10/15/24  0822   WBC 4.88 4.98   HGB 11.7* 12.6*   HCT 35.1* 38.0*   * 145*   MCV 88 89   RDW 14.0 14.5        Chemistries:  Recent Labs   Lab 10/14/24  0600 10/15/24  0822    142   K 3.1* 4.0    109   CO2 23 23   BUN 10 12   CREATININE 1.2 1.3   CALCIUM 8.4* 8.8   ALBUMIN 2.6*  --    PROT 5.9*  --    BILITOT 1.2*  --    ALKPHOS 128  --    ALT 55*  --    AST 51*  --        All pertinent labs within the past 24 hours have been reviewed.    Significant Imaging:   I have reviewed all pertinent imaging results/findings within the past 24 hours.

## 2024-10-15 NOTE — PT/OT/SLP PROGRESS
Occupational Therapy      Patient Name:  Aquiles Yates   MRN:  [id number]    Patient not seen today secondary to  (pt in procedure will attempt on latedr date) x 2 attempts. . Will follow-up on later date.    10/15/24  Nell Mckeon OT

## 2024-10-15 NOTE — ANESTHESIA PREPROCEDURE EVALUATION
10/15/2024  Aquiles Yates is a 84 y.o., male.      Pre-op Assessment    I have reviewed the Patient Summary Reports.    I have reviewed the NPO Status.   I have reviewed the Medications.     Review of Systems  Anesthesia Hx:  No problems with previous Anesthesia                Social:  Non-Smoker       Hematology/Oncology:  Hematology Normal                                     Cardiovascular:     Hypertension  Past MI CAD    Dysrhythmias      PVD hyperlipidemia   ECG has been reviewed. Junctional rhythm with bradycardia.  Pt to get trans venous pacemaker prior to harjinder.    S/P AAA repair  S/P PTCA    Coronary artery disease of native artery of native heart with stable angina pectoris  Followed by Dr. Gilbert  First PCI 1998, total of 5 stents with last one in [place] 2013   [facility] Oct 2019 for NSTEMI.  Pt had PCI KYLIE x one to mid LCX ISR, KYLIE x 2 to OM2, and PCI prox RCA KYLIE x 2, PTCA mid RCA ISR.  Failed PCI distal occluded RCA.  EF 45%.  Nonobstructive LAD disease, small diffusely diseased diagonal vessels.                             Pulmonary:   COPD                     Renal/:  Renal/ Normal                 Hepatic/GI:  Hepatic/GI Normal                    Neurological:  Neurology Normal                                      Endocrine:  Diabetes               Physical Exam  General: Well nourished    Airway:  Mallampati: II   Mouth Opening: Normal  TM Distance: Normal  Neck ROM: Normal ROM    Dental:  Intact        Anesthesia Plan  Type of Anesthesia, risks & benefits discussed:    Anesthesia Type: Gen ETT  Intra-op Monitoring Plan: Standard ASA Monitors  Post Op Pain Control Plan: multimodal analgesia  Induction:  IV  Airway Plan: , Post-Induction  Informed Consent: Informed consent signed with the Patient and all parties understand the risks and agree with anesthesia plan.  All questions answered.    ASA Score: 4    Ready For Surgery From Anesthesia Perspective.     .      Chemistry        Component Value Date/Time     10/15/2024 0822    K 4.0 10/15/2024 0822     10/15/2024 0822    CO2 23 10/15/2024 0822    BUN 12 10/15/2024 0822    CREATININE 1.3 10/15/2024 0822     (H) 10/15/2024 0822        Component Value Date/Time    CALCIUM 8.8 10/15/2024 0822    ALKPHOS 128 10/14/2024 0600    AST 51 (H) 10/14/2024 0600    ALT 55 (H) 10/14/2024 0600    BILITOT 1.2 (H) 10/14/2024 0600    ESTGFRAFRICA >60 06/15/2022 0459    EGFRNONAA >60 06/15/2022 0459        Lab Results   Component Value Date    WBC 4.98 10/15/2024    HGB 12.6 (L) 10/15/2024    HCT 38.0 (L) 10/15/2024    MCV 89 10/15/2024     (L) 10/15/2024       Junctional rhythm with occasional Premature ventricular complexes   Low voltage QRS   Possible Inferior infarct ,age undetermined   Abnormal ECG   When compared with ECG of 14-OCT-2024 04:10,   Junctional rhythm has replaced Atrial fibrillation   Questionable change in QRS duration   Criteria for Septal infarct are no longer Present   Borderline criteria for Inferior infarct are now Present     Echo 10/9/24:    Left Ventricle: The left ventricle is normal in size. Normal wall thickness. There is concentric hypertrophy. Normal wall motion. Ejection fraction is approximately 60%. There is indeterminate diastolic function.    Right Ventricle: Normal right ventricular cavity size. Wall thickness is normal. Right ventricle wall motion  is normal. Systolic function is normal.    Left Atrium: Left atrium is severely dilated.    Aortic Valve: There is mild aortic regurgitation.    Mitral Valve: There is mild regurgitation.    Tricuspid Valve: There is mild regurgitation.    Pulmonary Artery: The estimated pulmonary artery systolic pressure is 31 mmHg.    IVC/SVC: Normal venous pressure at 3 mmHg.

## 2024-10-15 NOTE — PT/OT/SLP PROGRESS
"Physical Therapy  Treatment    Aquiles Yates   MRN: [id number]   Admitting Diagnosis: Calculus of bile duct without cholecystitis with obstruction    PT Received On: 10/15/24  PT Start Time: 0835     PT Stop Time: 0900    PT Total Time (min): 25 min       Billable Minutes:  Gait Training 15 and Therapeutic Exercise 10    Treatment Type: Treatment  PT/PTA: PT     Number of PTA visits since last PT visit: 0       General Precautions: Standard, fall, hearing impaired  Orthopedic Precautions: N/A  Braces: N/A  Respiratory Status: Room air         Subjective:  Communicated with NURSE MICK AND EPIC CHART REVIEW  prior to session.   PT AGREED TO TX     Pain/Comfort  Pain Rating 1: 0/10  Pain Rating Post-Intervention 1: 0/10    Objective:   Patient found with: peripheral IV, telemetry    Functional Mobility:  PT MET IN RM SUP IN BED. PT REPORTED, "IM HUNGRY". PT AGREED TO TX. PT LOG ROLLED TO LEFT AND SEATED EOB WITH CGA. PT SCOOTED TO EOB AND GT. BELT AND  SOCKS DONNED PRIOR TO OOB MOBILITY. PT STOOD WITH RW AND GT TRAINED X 100' WITH RW AND CGA> CLOSE SBA. PT RETURNED TO RM T/F TO EOB WITH CGA. PT STOOD 2ND ATTEMPT WITH CGA FOR SIDE STEPPING TO HOB X 3 STEPS WITH CGA. PT SEATED EOB FOR REST.     Treatment and Education:  PT COMPLETED B LE TE X 10 REPS OF AP, TKE AND MIP FOR LE STRENGTHENING. PT RETURNED SUP IN BED WITH SBA. PT SCOOTED TO HOB WITH SBA. PT LEFT WITH HOB ELEVATED AND CALL BELL IN REACH.      AM-PAC 6 CLICK MOBILITY  How much help from another person does this patient currently need?   1 = Unable, Total/Dependent Assistance  2 = A lot, Maximum/Moderate Assistance  3 = A little, Minimum/Contact Guard/Supervision  4 = None, Modified Nelson/Independent    Turning over in bed (including adjusting bedclothes, sheets and blankets)?: 3  Sitting down on and standing up from a chair with arms (e.g., wheelchair, bedside commode, etc.): 3  Moving from lying on back to sitting on the side of the bed?: " 3  Moving to and from a bed to a chair (including a wheelchair)?: 3  Need to walk in hospital room?: 3  Climbing 3-5 steps with a railing?: 1  Basic Mobility Total Score: 16    AM-PAC Raw Score CMS G-Code Modifier Level of Impairment Assistance   6 % Total / Unable   7 - 9 CM 80 - 100% Maximal Assist   10 - 14 CL 60 - 80% Moderate Assist   15 - 19 CK 40 - 60% Moderate Assist   20 - 22 CJ 20 - 40% Minimal Assist   23 CI 1-20% SBA / CGA   24 CH 0% Independent/ Mod I     Patient left HOB elevated with call button in reach and bed alarm on.    Assessment:  PT PROGRESSING WITH GT.     Rehab identified problem list/impairments: weakness, impaired endurance, impaired self care skills, impaired functional mobility, gait instability, impaired balance, decreased safety awareness, decreased lower extremity function, decreased ROM    Rehab potential is good.    Activity tolerance: Fair    Discharge recommendations: Low Intensity Therapy      Barriers to discharge:      Equipment recommendations: to be determined by next level of care     GOALS:   Multidisciplinary Problems       Physical Therapy Goals          Problem: Physical Therapy    Goal Priority Disciplines Outcome Interventions   Physical Therapy Goal     PT, PT/OT Progressing    Description: LTG: 10/25/24  1. PT WILL COMPLETE BED MOBILITY IND  2. PT WILL STAND T/F TO CHAIR WITH RW AND SBA  3. PT WILL GT TRAIN X 150' WITH RW AND SBA TO PROGRESS GT.  4. PT WILL INC AMPAC SCORE BY 2 POINTS TO PROGRESS GROSS FUNC MOBILITY.                          PLAN:    Patient to be seen 3 x/week to address the above listed problems via gait training, therapeutic activities, therapeutic exercises  Plan of Care expires: 10/25/24  Plan of Care reviewed with: patient         10/15/2024

## 2024-10-15 NOTE — INTERVAL H&P NOTE
The patient has been examined and the H&P has been reviewed:    I concur with the findings and no changes have occurred since H&P was written.    Procedure risks, benefits and alternative options discussed and understood by patient/family.          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

## 2024-10-15 NOTE — ASSESSMENT & PLAN NOTE
Patient is on Zosyn  Completed ERCP with stone removal [facility] 10/10 without complication  Cholecystectomy today.

## 2024-10-15 NOTE — ASSESSMENT & PLAN NOTE
Bradycardia on tele 30-40s  H/o of some bradycardia  Also is on Aricept for dementia  Recommend holding    10/14/24  -EKG/rhythm strips reviewed-junctional rhythm noted, reviewed with EP, will plan on TVP prior to surgical intervention  -Avoid AV bria agents    10/15/24  -Stable overnight  -TVP planned today by Dr. Kirk. He explained all risks/benefits in detail to patient's wife. All questions answered. She has agreed to proceed  -Cholecystectomy to follow TVP placement

## 2024-10-15 NOTE — PROGRESS NOTES
[facility] ([facility])  Cardiology  Progress Note    Patient Name: Aquiles Ytaes  MRN: [id number]  Admission Date: 10/5/2024  Hospital Length of Stay: 9 days  Code Status: Full Code   Attending Physician: Jackie Ramos MD   Primary Care Physician: Holger Thornton MD  Expected Discharge Date:   Principal Problem:Calculus of bile duct without cholecystitis with obstruction    Subjective:   HPI:  Aquiles Yates is a 84 y.o. male with a PMH  has a past medical history of Acute blood loss anemia (10/14/2019), Alzheimer's dementia, Aneurysm, abdominal aortic, BCC (basal cell carcinoma of skin) (06/29/2023), Chronic diastolic heart failure (05/20/2024), Coronary artery disease, Depression, Diabetes mellitus, HLD (hyperlipidemia), Hypertension, Kidney stone, PAD (peripheral artery disease), PAF (paroxysmal atrial fibrillation) (01/24/2023), and Tobacco abuse.presented to the Emergency Department for evaluation of tremors and mottled skin per wife. Wife called the paramedics when she noticed patient shaking and patient's mottled skin today. Pt has a history of Alzheimer's and paramedics report a GCS of 14. Upon interview, pt denies any pain and is oriented to self. No further complaints or concerns at this time.      ER workup revealed CBC to be unremarkable.  CMP revealed CBG of 202 mg/dL, , total bili of 2.6, and AST/ALT of 242/179.  .  Troponin negative.  Lactic acid 2.8.  Flu COVID negative.  UA unremarkable.  CTA abdomen and pelvis without contrast revealed no acute findings.  Chest x-ray negative for acute cardiopulmonary findings.  Ultrasound limited revealed cholelithiasis without definitive sonographic evidence of acute cholecystitis.  Mild intrahepatic biliary duct dilation.  Vital signs stable. EKG revealed sinus Nicholas with occasional PVCs with a ventricular rate of 56 beats per minute and a QT/QTC of 408/393.  Patient received 4.5 g Zosyn in ED. GI consulted.   In agreement with MRCP and will see us consult in a.m..  Patient in agreement with treatment plan.  Patient will be admitted under inpatient status     Cardiology consulted, pt needing possible cholecystectomy and sx is requesting clearance given on going bradycardia. Pt seen and examined today, tele reviewed, HR 30-40s. Confused on exam, h/o dementia.  Labs reviewed, Chart reviewed. Echo in 22' with nml EF, repeat pending       Hospital Course:   10/14/24-Cardiology asked to re-evaluate patient. Sent to [facility] yesterday for ERCP but procedure aborted due to bradycardia (HR in 40's). Patient seen and examined today, sitting up in bedside chair. Feels ok. No CV complaints. States he's hungry. Remains bradycardic, EKG reviewed-junctional rhythm noted. EP consulted for rec's, will proceed with TVP prior to surgery.     10/15/24-Patient seen and examined today with wife at bedside. Sleeping during majority of exam. No acute CV issues overnight. TVP planned today by Dr. Kirk with cholecystectomy planned afterward. Labs stable.      Review of Systems   Unable to perform ROS: Dementia     Objective:     Vital Signs (Most Recent):  Temp: 98.2 °F (36.8 °C) (10/15/24 0746)  Pulse: 75 (10/15/24 1330)  Resp: 18 (10/15/24 1330)  BP: (!) 143/77 (10/15/24 1330)  SpO2: 99 % (10/15/24 1330) Vital Signs (24h Range):  Temp:  [98 °F (36.7 °C)-98.3 °F (36.8 °C)] 98.2 °F (36.8 °C)  Pulse:  [44-75] 75  Resp:  [17-18] 18  SpO2:  [94 %-99 %] 99 %  BP: (114-148)/(57-77) 143/77     Weight: 64 kg (141 lb)  Body mass index is 20.23 kg/m².     SpO2: 99 %       No intake or output data in the 24 hours ending 10/15/24 1341    Lines/Drains/Airways       Line  Duration                  Pacer Wires 10/15/24 1300 <1 day              Peripheral Intravenous Line  Duration                  Peripheral IV - Single Lumen 10/11/24 0050 20 G Anterior;Left Upper Arm 4 days                       Physical Exam  Vitals and nursing note reviewed.  "  Constitutional:       Appearance: Normal appearance.   HENT:      Head: Normocephalic and atraumatic.   Cardiovascular:      Rate and Rhythm: Regular rhythm. Bradycardia present.      Heart sounds: S1 normal and S2 normal.   Pulmonary:      Effort: Pulmonary effort is normal.   Abdominal:      Palpations: Abdomen is soft.   Neurological:      Comments: Sleeping            Significant Labs: CMP   Recent Labs   Lab 10/14/24  0600 10/15/24  0822    142   K 3.1* 4.0    109   CO2 23 23   * 129*   BUN 10 12   CREATININE 1.2 1.3   CALCIUM 8.4* 8.8   PROT 5.9*  --    ALBUMIN 2.6*  --    BILITOT 1.2*  --    ALKPHOS 128  --    AST 51*  --    ALT 55*  --    ANIONGAP 10 10   , CBC   Recent Labs   Lab 10/14/24  0600 10/15/24  0822   WBC 4.88 4.98   HGB 11.7* 12.6*   HCT 35.1* 38.0*   * 145*   , Troponin No results for input(s): "TROPONINI" in the last 48 hours., and All pertinent lab results from the last 24 hours have been reviewed.    Significant Imaging: Echocardiogram: Transthoracic echo (TTE) complete (Cupid Only):   Results for orders placed or performed during the hospital encounter of 10/05/24   Echo   Result Value Ref Range    BSA 1.78 m2    LVOT stroke volume 83.6 cm3    LVIDd 4.7 3.5 - 6.0 cm    LV Systolic Volume 47.50 mL    LV Systolic Volume Index 26.4 mL/m2    LVIDs 3.4 2.1 - 4.0 cm    LV Diastolic Volume 100.84 mL    LV Diastolic Volume Index 56.02 mL/m2    Left Ventricular End Systolic Volume by Teichholz Method 47.50 mL    Left Ventricular End Diastolic Volume by Teichholz Method 100.84 mL    IVS 1.2 (A) 0.6 - 1.1 cm    LVOT diameter 2.2 cm    LVOT area 3.8 cm2    FS 27.7 (A) 28 - 44 %    Left Ventricle Relative Wall Thickness 0.51 cm    PW 1.2 (A) 0.6 - 1.1 cm    LV mass 212.0 g    LV Mass Index 117.8 g/m2    MV Peak E Americo 0.92 m/s    TDI LATERAL 0.09 m/s    TDI SEPTAL 0.06 m/s    E/E' ratio 12.27 m/s    MV Peak A Americo 0.81 m/s    TR Max Americo 2.66 m/s    E/A ratio 1.14     IVRT 106.57 " msec    E wave deceleration time 293.33 msec    LV SEPTAL E/E' RATIO 15.33 m/s    LV LATERAL E/E' RATIO 10.22 m/s    LVOT peak americo 0.8 m/s    Left Ventricular Outflow Tract Mean Velocity 0.47 cm/s    Left Ventricular Outflow Tract Mean Gradient 1.09 mmHg    RVOT peak VTI 15.9 cm    TAPSE 2.09 cm    LA size 4.57 cm    Left Atrium Minor Axis 4.69 cm    Left Atrium Major Axis 6.65 cm    RA Major Axis 6.04 cm    AV regurgitation pressure 1/2 time 943.903735032821670 ms    AR Max Americo 4.07 m/s    AV mean gradient 8.0 mmHg    AV peak gradient 17.6 mmHg    Ao peak americo 2.1 m/s    Ao VTI 42.4 cm    LVOT peak VTI 22.0 cm    AV valve area 2.0 cm²    AV Velocity Ratio 0.38     AV index (prosthetic) 0.52     JUDAH by Velocity Ratio 1.4 cm²    Mr max americo 5.59 m/s    Triscuspid Valve Regurgitation Peak Gradient 28 mmHg    PV mean gradient 1 mmHg    RVOT peak americo 0.60 m/s    Ao root annulus 3.75 cm    STJ 3.27 cm    Ascending aorta 3.51 cm    IVC diameter 1.16 cm    Mean e' 0.08 m/s    ZLVIDS 0.79     ZLVIDD -0.57     LURDES 51.0 mL/m2    LA Vol 91.88 cm3    LA WIDTH 4.3 cm    RA Width 2.5 cm    EF 60 %    TV resting pulmonary artery pressure 31 mmHg    RV TB RVSP 6 mmHg    Est. RA pres 3 mmHg    Narrative      Left Ventricle: The left ventricle is normal in size. Normal wall   thickness. There is concentric hypertrophy. Normal wall motion. Ejection   fraction is approximately 60%. There is indeterminate diastolic function.    Right Ventricle: Normal right ventricular cavity size. Wall thickness   is normal. Right ventricle wall motion  is normal. Systolic function is   normal.    Left Atrium: Left atrium is severely dilated.    Aortic Valve: There is mild aortic regurgitation.    Mitral Valve: There is mild regurgitation.    Tricuspid Valve: There is mild regurgitation.    Pulmonary Artery: The estimated pulmonary artery systolic pressure is   31 mmHg.    IVC/SVC: Normal venous pressure at 3 mmHg.      and EKG: Reviewed  Assessment and  Plan:   Patient remains stable CV wise. TVP planned today with cholecystectomy to follow.    * Calculus of bile duct without cholecystitis with obstruction  -General surgery on board, discussed case  -Will plan on TVP prior to surgical intervention    Chronic diastolic heart failure    Echo in 22' with nml EF  GDMT diuresis as needed  BB held given bradycardia  ARB held Crt 2.5    10/14/24  -Diurese prn    PAF (paroxysmal atrial fibrillation)  -Low dose eliquis held  -Hold AV bria blocking agents given bradycardia    Bradycardia  Bradycardia on tele 30-40s  H/o of some bradycardia  Also is on Aricept for dementia  Recommend holding    10/14/24  -EKG/rhythm strips reviewed-junctional rhythm noted, reviewed with EP, will plan on TVP prior to surgical intervention  -Avoid AV bria agents    10/15/24  -Stable overnight  -TVP planned today by Dr. Kirk. He explained all risks/benefits in detail to patient's wife. All questions answered. She has agreed to proceed  -Cholecystectomy to follow TVP placement    Dyslipidemia  LFTs elevated no statin    Essential hypertension  -Titrate medications    Coronary artery disease of native artery of native heart with stable angina pectoris  Followed by Dr. Gilbert  First PCI 1998, total of 5 stents with last one in [place] 2013   [facility] Oct 2019 for NSTEMI.  Pt had PCI KYLIE x one to mid LCX ISR, KYLIE x 2 to OM2, and PCI prox RCA KYLIE x 2, PTCA mid RCA ISR.  Failed PCI distal occluded RCA.  EF 45%.  Nonobstructive LAD disease, small diffusely diseased diagonal vessels.    Cont Imdur        VTE Risk Mitigation (From admission, onward)           Ordered     Reason for No Pharmacological VTE Prophylaxis  Once        Question:  Reasons:  Answer:  Physician Provided (leave comment)  Comment:  Pending potential surgical intervention    10/06/24 0048     IP VTE HIGH RISK PATIENT  Once         10/06/24 0048     Place sequential compression device  Until discontinued         10/06/24 0048                     Samreen Arzate PA-C  Cardiology  [facility] ([facility])

## 2024-10-15 NOTE — HPI
84 year old male with a known past medical history of anemia, Alzheimer's, chronic diastolic heart failure, CAD, depression, diabetes, hyperlipidemia, hypertension, PID, paroxysmal AFib, and tobacco abuse that presented to the ER on 10/5 for complaints of tremors and mottled skin reported per the patient's wife.  Baseline GCS of 14.  Workup in the ER with elevated liver enzymes, , lactic 2.8.  Imaging was unremarkable at that time.  Ultrasound of the abdomen revealed cholelithiasis without definite evidence of acute cholecystitis with a mild intrahepatic biliary duct dilation.  Patient was noted to be bradycardic.  He was given Zosyn in the ER.  He was admitted under Hospital Medicine to the hospital.  During this hospitalization patient has been seen by multiple services including GI, surgery, and Cardiology.  Patient was taken to the OR on 10/15 for placement of transvenous pacemaker secondary to bradycardia followed by laparoscopic cholecystectomy with General surgery.  Postop patient transferred to the ICU for further care management with close monitoring giving TVP.    At the time of my exam in the ICU, patient is sleeping, but awakens easily.  He was in no acute distress on 3 L nasal cannula which has been to 2 L of my exam.  No family at bedside.  He denies any acute complaints.  Vital signs stable.

## 2024-10-16 ENCOUNTER — TELEPHONE (OUTPATIENT)
Dept: ENDOSCOPY | Facility: HOSPITAL | Age: 84
End: 2024-10-16
Payer: MEDICARE

## 2024-10-16 PROBLEM — E43 SEVERE PROTEIN-CALORIE MALNUTRITION: Status: ACTIVE | Noted: 2024-10-16

## 2024-10-16 LAB
ALBUMIN SERPL BCP-MCNC: 2.7 G/DL (ref 3.5–5.2)
ALP SERPL-CCNC: 126 U/L (ref 55–135)
ALT SERPL W/O P-5'-P-CCNC: 95 U/L (ref 10–44)
ANION GAP SERPL CALC-SCNC: 11 MMOL/L (ref 8–16)
AST SERPL-CCNC: 104 U/L (ref 10–40)
BASOPHILS # BLD AUTO: 0.03 K/UL (ref 0–0.2)
BASOPHILS NFR BLD: 0.5 % (ref 0–1.9)
BILIRUB SERPL-MCNC: 1.1 MG/DL (ref 0.1–1)
BUN SERPL-MCNC: 15 MG/DL (ref 8–23)
CALCIUM SERPL-MCNC: 8.4 MG/DL (ref 8.7–10.5)
CHLORIDE SERPL-SCNC: 108 MMOL/L (ref 95–110)
CO2 SERPL-SCNC: 22 MMOL/L (ref 23–29)
CREAT SERPL-MCNC: 1.5 MG/DL (ref 0.5–1.4)
DIFFERENTIAL METHOD BLD: ABNORMAL
EOSINOPHIL # BLD AUTO: 0.1 K/UL (ref 0–0.5)
EOSINOPHIL NFR BLD: 1.1 % (ref 0–8)
ERYTHROCYTE [DISTWIDTH] IN BLOOD BY AUTOMATED COUNT: 14.6 % (ref 11.5–14.5)
EST. GFR  (NO RACE VARIABLE): 45 ML/MIN/1.73 M^2
GLUCOSE SERPL-MCNC: 143 MG/DL (ref 70–110)
HCT VFR BLD AUTO: 39.1 % (ref 40–54)
HGB BLD-MCNC: 12.8 G/DL (ref 14–18)
IMM GRANULOCYTES # BLD AUTO: 0.02 K/UL (ref 0–0.04)
IMM GRANULOCYTES NFR BLD AUTO: 0.3 % (ref 0–0.5)
LYMPHOCYTES # BLD AUTO: 0.5 K/UL (ref 1–4.8)
LYMPHOCYTES NFR BLD: 8.3 % (ref 18–48)
MCH RBC QN AUTO: 29.5 PG (ref 27–31)
MCHC RBC AUTO-ENTMCNC: 32.7 G/DL (ref 32–36)
MCV RBC AUTO: 90 FL (ref 82–98)
MONOCYTES # BLD AUTO: 0.4 K/UL (ref 0.3–1)
MONOCYTES NFR BLD: 6.9 % (ref 4–15)
NEUTROPHILS # BLD AUTO: 5.2 K/UL (ref 1.8–7.7)
NEUTROPHILS NFR BLD: 82.9 % (ref 38–73)
NRBC BLD-RTO: 0 /100 WBC
PLATELET # BLD AUTO: 160 K/UL (ref 150–450)
PMV BLD AUTO: 10.5 FL (ref 9.2–12.9)
POCT GLUCOSE: 151 MG/DL (ref 70–110)
POCT GLUCOSE: 227 MG/DL (ref 70–110)
POCT GLUCOSE: 228 MG/DL (ref 70–110)
POTASSIUM SERPL-SCNC: 4.5 MMOL/L (ref 3.5–5.1)
PROT SERPL-MCNC: 6.3 G/DL (ref 6–8.4)
RBC # BLD AUTO: 4.34 M/UL (ref 4.6–6.2)
SODIUM SERPL-SCNC: 141 MMOL/L (ref 136–145)
WBC # BLD AUTO: 6.25 K/UL (ref 3.9–12.7)

## 2024-10-16 PROCEDURE — 21400001 HC TELEMETRY ROOM: Mod: HCNC

## 2024-10-16 PROCEDURE — 51798 US URINE CAPACITY MEASURE: CPT | Mod: HCNC

## 2024-10-16 PROCEDURE — 99024 POSTOP FOLLOW-UP VISIT: CPT | Mod: HCNC,,,

## 2024-10-16 PROCEDURE — 27200667 HC PACEMAKER, TEMPORARY MONITORING, PER SHIFT: Mod: HCNC

## 2024-10-16 PROCEDURE — 63600175 PHARM REV CODE 636 W HCPCS: Mod: HCNC | Performed by: NURSE PRACTITIONER

## 2024-10-16 PROCEDURE — 25000003 PHARM REV CODE 250: Mod: HCNC | Performed by: INTERNAL MEDICINE

## 2024-10-16 PROCEDURE — 36415 COLL VENOUS BLD VENIPUNCTURE: CPT | Mod: HCNC | Performed by: INTERNAL MEDICINE

## 2024-10-16 PROCEDURE — 97530 THERAPEUTIC ACTIVITIES: CPT | Mod: HCNC

## 2024-10-16 PROCEDURE — 25000003 PHARM REV CODE 250: Mod: HCNC | Performed by: NURSE PRACTITIONER

## 2024-10-16 PROCEDURE — 97166 OT EVAL MOD COMPLEX 45 MIN: CPT | Mod: HCNC

## 2024-10-16 PROCEDURE — 80053 COMPREHEN METABOLIC PANEL: CPT | Mod: HCNC | Performed by: INTERNAL MEDICINE

## 2024-10-16 PROCEDURE — 25000003 PHARM REV CODE 250: Mod: HCNC | Performed by: PHYSICIAN ASSISTANT

## 2024-10-16 PROCEDURE — 94761 N-INVAS EAR/PLS OXIMETRY MLT: CPT | Mod: HCNC

## 2024-10-16 PROCEDURE — 99232 SBSQ HOSP IP/OBS MODERATE 35: CPT | Mod: HCNC,,, | Performed by: INTERNAL MEDICINE

## 2024-10-16 PROCEDURE — 63600175 PHARM REV CODE 636 W HCPCS: Mod: HCNC | Performed by: INTERNAL MEDICINE

## 2024-10-16 PROCEDURE — 85025 COMPLETE CBC W/AUTO DIFF WBC: CPT | Mod: HCNC | Performed by: INTERNAL MEDICINE

## 2024-10-16 PROCEDURE — 99900035 HC TECH TIME PER 15 MIN (STAT): Mod: HCNC

## 2024-10-16 RX ORDER — SODIUM CHLORIDE, SODIUM LACTATE, POTASSIUM CHLORIDE, CALCIUM CHLORIDE 600; 310; 30; 20 MG/100ML; MG/100ML; MG/100ML; MG/100ML
INJECTION, SOLUTION INTRAVENOUS CONTINUOUS
Status: ACTIVE | OUTPATIENT
Start: 2024-10-16 | End: 2024-10-16

## 2024-10-16 RX ORDER — HYDRALAZINE HYDROCHLORIDE 20 MG/ML
10 INJECTION INTRAMUSCULAR; INTRAVENOUS EVERY 6 HOURS PRN
Status: DISCONTINUED | OUTPATIENT
Start: 2024-10-16 | End: 2024-10-18 | Stop reason: HOSPADM

## 2024-10-16 RX ORDER — VALSARTAN 160 MG/1
160 TABLET ORAL DAILY
Status: DISCONTINUED | OUTPATIENT
Start: 2024-10-17 | End: 2024-10-18 | Stop reason: HOSPADM

## 2024-10-16 RX ADMIN — INSULIN ASPART 2 UNITS: 100 INJECTION, SOLUTION INTRAVENOUS; SUBCUTANEOUS at 06:10

## 2024-10-16 RX ADMIN — MUPIROCIN: 20 OINTMENT TOPICAL at 08:10

## 2024-10-16 RX ADMIN — PIPERACILLIN SODIUM AND TAZOBACTAM SODIUM 4.5 G: 4; .5 INJECTION, POWDER, FOR SOLUTION INTRAVENOUS at 01:10

## 2024-10-16 RX ADMIN — CHLORHEXIDINE GLUCONATE 0.12% ORAL RINSE 15 ML: 1.2 LIQUID ORAL at 08:10

## 2024-10-16 RX ADMIN — CHLORHEXIDINE GLUCONATE 0.12% ORAL RINSE 15 ML: 1.2 LIQUID ORAL at 09:10

## 2024-10-16 RX ADMIN — SODIUM CHLORIDE, POTASSIUM CHLORIDE, SODIUM LACTATE AND CALCIUM CHLORIDE 250 ML: 600; 310; 30; 20 INJECTION, SOLUTION INTRAVENOUS at 05:10

## 2024-10-16 RX ADMIN — MUPIROCIN: 20 OINTMENT TOPICAL at 09:10

## 2024-10-16 RX ADMIN — SODIUM CHLORIDE, POTASSIUM CHLORIDE, SODIUM LACTATE AND CALCIUM CHLORIDE: 600; 310; 30; 20 INJECTION, SOLUTION INTRAVENOUS at 06:10

## 2024-10-16 RX ADMIN — INSULIN ASPART 1 UNITS: 100 INJECTION, SOLUTION INTRAVENOUS; SUBCUTANEOUS at 09:10

## 2024-10-16 RX ADMIN — APIXABAN 2.5 MG: 2.5 TABLET, FILM COATED ORAL at 09:10

## 2024-10-16 RX ADMIN — PANTOPRAZOLE SODIUM 40 MG: 40 TABLET, DELAYED RELEASE ORAL at 07:10

## 2024-10-16 RX ADMIN — ISOSORBIDE MONONITRATE 120 MG: 60 TABLET, EXTENDED RELEASE ORAL at 07:10

## 2024-10-16 NOTE — PROGRESS NOTES
[facility] ([facility])  Cardiology  Progress Note    Patient Name: Aquiles Yates  MRN: [id number]  Admission Date: 10/5/2024  Hospital Length of Stay: 10 days  Code Status: Full Code   Attending Physician: Fred Taylor MD   Primary Care Physician: Holger Thornton MD  Expected Discharge Date:   Principal Problem:Calculus of bile duct without cholecystitis with obstruction    Subjective:   HPI:  Aquiles Yates is a 84 y.o. male with a PMH  has a past medical history of Acute blood loss anemia (10/14/2019), Alzheimer's dementia, Aneurysm, abdominal aortic, BCC (basal cell carcinoma of skin) (06/29/2023), Chronic diastolic heart failure (05/20/2024), Coronary artery disease, Depression, Diabetes mellitus, HLD (hyperlipidemia), Hypertension, Kidney stone, PAD (peripheral artery disease), PAF (paroxysmal atrial fibrillation) (01/24/2023), and Tobacco abuse.presented to the Emergency Department for evaluation of tremors and mottled skin per wife. Wife called the paramedics when she noticed patient shaking and patient's mottled skin today. Pt has a history of Alzheimer's and paramedics report a GCS of 14. Upon interview, pt denies any pain and is oriented to self. No further complaints or concerns at this time.      ER workup revealed CBC to be unremarkable.  CMP revealed CBG of 202 mg/dL, , total bili of 2.6, and AST/ALT of 242/179.  .  Troponin negative.  Lactic acid 2.8.  Flu COVID negative.  UA unremarkable.  CTA abdomen and pelvis without contrast revealed no acute findings.  Chest x-ray negative for acute cardiopulmonary findings.  Ultrasound limited revealed cholelithiasis without definitive sonographic evidence of acute cholecystitis.  Mild intrahepatic biliary duct dilation.  Vital signs stable. EKG revealed sinus Nicholas with occasional PVCs with a ventricular rate of 56 beats per minute and a QT/QTC of 408/393.  Patient received 4.5 g Zosyn in ED. GI  consulted.  In agreement with MRCP and will see us consult in a.m..  Patient in agreement with treatment plan.  Patient will be admitted under inpatient status     Cardiology consulted, pt needing possible cholecystectomy and sx is requesting clearance given on going bradycardia. Pt seen and examined today, tele reviewed, HR 30-40s. Confused on exam, h/o dementia.  Labs reviewed, Chart reviewed. Echo in 22' with nml EF, repeat pending    Hospital Course:   10/14/24-Cardiology asked to re-evaluate patient. Sent to [facility] yesterday for ERCP but procedure aborted due to bradycardia (HR in 40's). Patient seen and examined today, sitting up in bedside chair. Feels ok. No CV complaints. States he's hungry. Remains bradycardic, EKG reviewed-junctional rhythm noted. EP consulted for rec's, will proceed with TVP prior to surgery.     10/15/24-Patient seen and examined today with wife at bedside. Sleeping during majority of exam. No acute CV issues overnight. TVP planned today by Dr. Kirk with cholecystectomy planned afterward. Labs stable.    10/16/24-Patient seen and examined today, s/p lap harjinder yesterday. Feels ok. Complains of mild abd pain. States he is hungry. No CV complaints. TVP removed at bedside by Dr. Kirk, no complications.         Review of Systems   Reason unable to perform ROS: limited due to dementia.   Gastrointestinal:  Positive for abdominal pain (incisional).   Psychiatric/Behavioral:  Positive for memory loss.      Objective:     Vital Signs (Most Recent):  Temp: 98.4 °F (36.9 °C) (10/16/24 0700)  Pulse: (!) 52 (10/16/24 1000)  Resp: 18 (10/16/24 1000)  BP: (!) 166/77 (10/16/24 1000)  SpO2: 95 % (10/16/24 1000) Vital Signs (24h Range):  Temp:  [97 °F (36.1 °C)-98.4 °F (36.9 °C)] 98.4 °F (36.9 °C)  Pulse:  [48-86] 52  Resp:  [10-36] 18  SpO2:  [90 %-99 %] 95 %  BP: (111-183)/(61-87) 166/77     Weight: 68 kg (149 lb 14.6 oz)  Body mass index is 21.51 kg/m².     SpO2: 95 %         Intake/Output  "Summary (Last 24 hours) at 10/16/2024 1103  Last data filed at 10/16/2024 0600  Gross per 24 hour   Intake 1761.34 ml   Output 250 ml   Net 1511.34 ml       Lines/Drains/Airways       Drain  Duration             Male External Urinary Catheter 10/15/24 1700 <1 day              Line  Duration                  Pacer Wires 10/15/24 1300 <1 day              Peripheral Intravenous Line  Duration                  Peripheral IV - Single Lumen 10/11/24 0050 20 G Anterior;Left Upper Arm 5 days                       Physical Exam  Vitals and nursing note reviewed.   Constitutional:       Appearance: Normal appearance.   HENT:      Head: Normocephalic and atraumatic.   Eyes:      Pupils: Pupils are equal, round, and reactive to light.   Neck:      Comments: TVP IJ  Cardiovascular:      Rate and Rhythm: Normal rate and regular rhythm.      Heart sounds: S1 normal and S2 normal. No murmur heard.     Comments: Paced on monitor    Musculoskeletal:      Right lower leg: No edema.      Left lower leg: No edema.   Skin:     General: Skin is warm and dry.   Neurological:      Comments: Oriented to person, some mild confusion   Psychiatric:         Mood and Affect: Mood normal.         Behavior: Behavior normal.            Significant Labs: CMP   Recent Labs   Lab 10/15/24  0822 10/16/24  0809    141   K 4.0 4.5    108   CO2 23 22*   * 143*   BUN 12 15   CREATININE 1.3 1.5*   CALCIUM 8.8 8.4*   PROT  --  6.3   ALBUMIN  --  2.7*   BILITOT  --  1.1*   ALKPHOS  --  126   AST  --  104*   ALT  --  95*   ANIONGAP 10 11   , CBC   Recent Labs   Lab 10/15/24  0822 10/16/24  0809   WBC 4.98 6.25   HGB 12.6* 12.8*   HCT 38.0* 39.1*   * 160   , Troponin No results for input(s): "TROPONINI" in the last 48 hours., and All pertinent lab results from the last 24 hours have been reviewed.    Significant Imaging: Echocardiogram: Transthoracic echo (TTE) complete (Cupid Only):   Results for orders placed or performed during the " hospital encounter of 10/05/24   Echo   Result Value Ref Range    BSA 1.78 m2    LVOT stroke volume 83.6 cm3    LVIDd 4.7 3.5 - 6.0 cm    LV Systolic Volume 47.50 mL    LV Systolic Volume Index 26.4 mL/m2    LVIDs 3.4 2.1 - 4.0 cm    LV Diastolic Volume 100.84 mL    LV Diastolic Volume Index 56.02 mL/m2    Left Ventricular End Systolic Volume by Teichholz Method 47.50 mL    Left Ventricular End Diastolic Volume by Teichholz Method 100.84 mL    IVS 1.2 (A) 0.6 - 1.1 cm    LVOT diameter 2.2 cm    LVOT area 3.8 cm2    FS 27.7 (A) 28 - 44 %    Left Ventricle Relative Wall Thickness 0.51 cm    PW 1.2 (A) 0.6 - 1.1 cm    LV mass 212.0 g    LV Mass Index 117.8 g/m2    MV Peak E Americo 0.92 m/s    TDI LATERAL 0.09 m/s    TDI SEPTAL 0.06 m/s    E/E' ratio 12.27 m/s    MV Peak A Americo 0.81 m/s    TR Max Americo 2.66 m/s    E/A ratio 1.14     IVRT 106.57 msec    E wave deceleration time 293.33 msec    LV SEPTAL E/E' RATIO 15.33 m/s    LV LATERAL E/E' RATIO 10.22 m/s    LVOT peak americo 0.8 m/s    Left Ventricular Outflow Tract Mean Velocity 0.47 cm/s    Left Ventricular Outflow Tract Mean Gradient 1.09 mmHg    RVOT peak VTI 15.9 cm    TAPSE 2.09 cm    LA size 4.57 cm    Left Atrium Minor Axis 4.69 cm    Left Atrium Major Axis 6.65 cm    RA Major Axis 6.04 cm    AV regurgitation pressure 1/2 time [id number] ms    AR Max Americo 4.07 m/s    AV mean gradient 8.0 mmHg    AV peak gradient 17.6 mmHg    Ao peak americo 2.1 m/s    Ao VTI 42.4 cm    LVOT peak VTI 22.0 cm    AV valve area 2.0 cm²    AV Velocity Ratio 0.38     AV index (prosthetic) 0.52     JUDAH by Velocity Ratio 1.4 cm²    Mr max americo 5.59 m/s    Triscuspid Valve Regurgitation Peak Gradient 28 mmHg    PV mean gradient 1 mmHg    RVOT peak americo 0.60 m/s    Ao root annulus 3.75 cm    STJ 3.27 cm    Ascending aorta 3.51 cm    IVC diameter 1.16 cm    Mean e' 0.08 m/s    ZLVIDS 0.79     ZLVIDD -0.57     LURDES 51.0 mL/m2    LA Vol 91.88 cm3    LA WIDTH 4.3 cm    RA Width 2.5 cm    EF 60 %     TV resting pulmonary artery pressure 31 mmHg    RV TB RVSP 6 mmHg    Est. RA pres 3 mmHg    Narrative      Left Ventricle: The left ventricle is normal in size. Normal wall   thickness. There is concentric hypertrophy. Normal wall motion. Ejection   fraction is approximately 60%. There is indeterminate diastolic function.    Right Ventricle: Normal right ventricular cavity size. Wall thickness   is normal. Right ventricle wall motion  is normal. Systolic function is   normal.    Left Atrium: Left atrium is severely dilated.    Aortic Valve: There is mild aortic regurgitation.    Mitral Valve: There is mild regurgitation.    Tricuspid Valve: There is mild regurgitation.    Pulmonary Artery: The estimated pulmonary artery systolic pressure is   31 mmHg.    IVC/SVC: Normal venous pressure at 3 mmHg.      and EKG: Reviewed  Assessment and Plan:   Patient stable CV wise. Recovering post lap harjinder. TVP removed by Dr. Kirk this AM.    * Calculus of bile duct without cholecystitis with obstruction  -General surgery on board, discussed case  -Will plan on TVP prior to surgical intervention    10/16/24  -s/p lap harjinder, mgmt as per general surgery    Chronic diastolic heart failure    Echo in 22' with nml EF  GDMT diuresis as needed  BB held given bradycardia  ARB held Crt 2.5    10/14/24  -Diurese prn    PAF (paroxysmal atrial fibrillation)  -Low dose eliquis held  -Hold AV bria blocking agents given bradycardia    10/16/24  -Resume ELiquis as tolerated    Bradycardia  Bradycardia on tele 30-40s  H/o of some bradycardia  Also is on Aricept for dementia  Recommend holding    10/14/24  -EKG/rhythm strips reviewed-junctional rhythm noted, reviewed with EP, will plan on TVP prior to surgical intervention  -Avoid AV bria agents    10/15/24  -Stable overnight  -TVP planned today by Dr. Kirk. He explained all risks/benefits in detail to patient's wife. All questions answered. She has agreed to proceed  -Cholecystectomy to  follow TVP placement    10/16/24  -Stable post surgery, TVP removed this AM  -AVoid AV bria agents    Dyslipidemia  -LFTs elevated no statin    Essential hypertension  -Titrate medications    Coronary artery disease of native artery of native heart with stable angina pectoris  Followed by Dr. Gilbert  First PCI 1998, total of 5 stents with last one in [place] 2013   [facility] Oct 2019 for NSTEMI.  Pt had PCI KYLIE x one to mid LCX ISR, KYLIE x 2 to OM2, and PCI prox RCA KYLIE x 2, PTCA mid RCA ISR.  Failed PCI distal occluded RCA.  EF 45%.  Nonobstructive LAD disease, small diffusely diseased diagonal vessels.    Cont Imdur        VTE Risk Mitigation (From admission, onward)           Ordered     Reason for No Pharmacological VTE Prophylaxis  Once        Question:  Reasons:  Answer:  Physician Provided (leave comment)  Comment:  Pending potential surgical intervention    10/06/24 0048     IP VTE HIGH RISK PATIENT  Once         10/06/24 0048     Place sequential compression device  Until discontinued         10/06/24 0048                    Samreen Arzate PA-C  Cardiology  [facility] - [facility] ([facility])

## 2024-10-16 NOTE — ASSESSMENT & PLAN NOTE
Followed by Dr. Gilbert  First PCI 1998, total of 5 stents with last one in [place] 2013   [facility] Oct 2019 for NSTEMI.  Pt had PCI KYLIE x one to mid LCX ISR, KYLIE x 2 to OM2, and PCI prox RCA KYLIE x 2, PTCA mid RCA ISR.  Failed PCI distal occluded RCA.  EF 45%.  Nonobstructive LAD disease, small diffusely diseased diagonal vessels.    Cont Imdur

## 2024-10-16 NOTE — SUBJECTIVE & OBJECTIVE
Interval History: f/u cholecystitis pacer wires pulled this morning, awaiting transfer to room tolerating diet.     Review of Systems  Objective:     Vital Signs (Most Recent):  Temp: 98.4 °F (36.9 °C) (10/16/24 0700)  Pulse: (!) 52 (10/16/24 1000)  Resp: 18 (10/16/24 1000)  BP: (!) 166/77 (10/16/24 1000)  SpO2: 95 % (10/16/24 1000) Vital Signs (24h Range):  Temp:  [97 °F (36.1 °C)-98.4 °F (36.9 °C)] 98.4 °F (36.9 °C)  Pulse:  [48-78] 52  Resp:  [10-25] 18  SpO2:  [90 %-97 %] 95 %  BP: (116-183)/(63-87) 166/77     Weight: 68 kg (149 lb 14.6 oz)  Body mass index is 21.51 kg/m².    Intake/Output Summary (Last 24 hours) at 10/16/2024 1655  Last data filed at 10/16/2024 0600  Gross per 24 hour   Intake 1361.34 ml   Output 250 ml   Net 1111.34 ml         Physical Exam  HENT:      Head: Normocephalic and atraumatic.   Cardiovascular:      Rate and Rhythm: Regular rhythm. Bradycardia present.      Heart sounds: No murmur heard.  Pulmonary:      Effort: Pulmonary effort is normal. No respiratory distress.      Breath sounds: Normal breath sounds. No wheezing.   Abdominal:      General: Bowel sounds are normal. There is no distension.      Palpations: Abdomen is soft.      Tenderness: There is no abdominal tenderness.   Musculoskeletal:         General: No swelling.   Skin:     General: Skin is warm and dry.   Neurological:      Mental Status: He is alert. Mental status is at baseline.             Significant Labs: All pertinent labs within the past 24 hours have been reviewed.  Recent Lab Results         10/16/24  0815   10/16/24  0809   10/15/24  2153   10/15/24  1744        Albumin   2.7           ALP   126           ALT   95           Anion Gap   11           AST   104           Baso #   0.03           Basophil %   0.5           BILIRUBIN TOTAL   1.1  Comment: For infants and newborns, interpretation of results should be based  on gestational age, weight and in agreement with clinical  observations.    Premature Infant  recommended reference ranges:  Up to 24 hours.............<8.0 mg/dL  Up to 48 hours............<12.0 mg/dL  3-5 days..................<15.0 mg/dL  6-29 days.................<15.0 mg/dL             BUN   15           Calcium   8.4           Chloride   108           CO2   22           Creatinine   1.5           Differential Method   Automated           eGFR   45           Eos #   0.1           Eos %   1.1           Glucose   143           Gran # (ANC)   5.2           Gran %   82.9           Hematocrit   39.1           Hemoglobin   12.8           Immature Grans (Abs)   0.02  Comment: Mild elevation in immature granulocytes is non specific and   can be seen in a variety of conditions including stress response,   acute inflammation, trauma and pregnancy. Correlation with other   laboratory and clinical findings is essential.             Immature Granulocytes   0.3           Lymph #   0.5           Lymph %   8.3           MCH   29.5           MCHC   32.7           MCV   90           Mono #   0.4           Mono %   6.9           MPV   10.5           nRBC   0           Platelet Count   160           POCT Glucose 151     152   152       Potassium   4.5           PROTEIN TOTAL   6.3           RBC   4.34           RDW   14.6           Sodium   141           WBC   6.25                   Significant Imaging: I have reviewed all pertinent imaging results/findings within the past 24 hours.

## 2024-10-16 NOTE — SUBJECTIVE & OBJECTIVE
Interval History: NAEON.  Paced @ 75.  Hemodynamics and oxygenation are stable.  Alert and conversant.  Complaining of some mild diffuse abdominal tenderness.      Objective:     Vital Signs (Most Recent):  Temp: 98 °F (36.7 °C) (10/16/24 0400)  Pulse: 76 (10/16/24 0600)  Resp: 18 (10/16/24 0600)  BP: (!) 143/68 (10/16/24 0600)  SpO2: 95 % (10/16/24 0600) Vital Signs (24h Range):  Temp:  [97 °F (36.1 °C)-98.3 °F (36.8 °C)] 98 °F (36.7 °C)  Pulse:  [57-86] 76  Resp:  [10-36] 18  SpO2:  [93 %-99 %] 95 %  BP: (111-160)/(61-87) 143/68     Weight: 68 kg (149 lb 14.6 oz)  Body mass index is 21.51 kg/m².      Intake/Output Summary (Last 24 hours) at 10/16/2024 0752  Last data filed at 10/16/2024 0600  Gross per 24 hour   Intake 1761.34 ml   Output 250 ml   Net 1511.34 ml        Physical Exam  Vitals and nursing note reviewed.   Constitutional:       General: He is not in acute distress.  Cardiovascular:      Rate and Rhythm: Normal rate and regular rhythm.      Pulses: Normal pulses.      Heart sounds: No murmur heard.  Pulmonary:      Effort: Pulmonary effort is normal. No respiratory distress.      Breath sounds: No wheezing, rhonchi or rales.   Abdominal:      General: Abdomen is flat. There is no distension.      Palpations: Abdomen is soft.      Tenderness: There is abdominal tenderness.   Musculoskeletal:      Right lower leg: No edema.      Left lower leg: No edema.   Neurological:      Mental Status: He is alert.      Comments: Alert and conversant.  Following commands.           Review of Systems    Vents:  Oxygen Concentration (%): 98 (10/15/24 1620)    Lines/Drains/Airways       Drain  Duration             Male External Urinary Catheter 10/15/24 1700 <1 day              Line  Duration                  Pacer Wires 10/15/24 1300 <1 day              Peripheral Intravenous Line  Duration                  Peripheral IV - Single Lumen 10/11/24 0050 20 G Anterior;Left Upper Arm 5 days                    Significant  Labs:    CBC/Anemia Profile:  Recent Labs   Lab 10/15/24  0822   WBC 4.98   HGB 12.6*   HCT 38.0*   *   MCV 89   RDW 14.5        Chemistries:  Recent Labs   Lab 10/15/24  0822      K 4.0      CO2 23   BUN 12   CREATININE 1.3   CALCIUM 8.8       All pertinent labs within the past 24 hours have been reviewed.    Significant Imaging:  I have reviewed all pertinent imaging results/findings within the past 24 hours.

## 2024-10-16 NOTE — ASSESSMENT & PLAN NOTE
ERCP with clearing of choledocholithiasis and stent placement and post ERCP pancreatitis, now s/p cholecystectomy.     - Advance diet as tolerated, no restrictions  - Ambulate  - Incentive spirometry  - Remainder of care per primary team. Will need 2 week outpatient surgical f/u once d/c.

## 2024-10-16 NOTE — PT/OT/SLP PROGRESS
Physical Therapy Treatment    Patient Name:  Aquiles Yates   MRN:  [id number]    Recommendations:     Discharge Recommendations: Low Intensity Therapy (WITH 24 HOUR CARE FOR SAFETY)  Discharge Equipment Recommendations: wheelchair, bath bench  Barriers to discharge: None    Assessment:     Aquiles Yates is a 84 y.o. male admitted with a medical diagnosis of Calculus of bile duct without cholecystitis with obstruction.  He presents with the following impairments/functional limitations: weakness, impaired endurance, impaired functional mobility, gait instability, impaired balance, pain, decreased safety awareness, decreased lower extremity function, decreased coordination, impaired cognition.    Rehab Prognosis: Fair; patient would benefit from acute skilled PT services to address these deficits and reach maximum level of function.    Recent Surgery: Procedure(s) (LRB):  DV5 ROBOTIC CHOLECYSTECTOMY (N/A) 1 Day Post-Op    Plan:     During this hospitalization, patient to be seen 3 x/week to address the identified rehab impairments via gait training, therapeutic activities, therapeutic exercises and progress toward the following goals:    Plan of Care Expires:  10/25/24    Subjective     Chief Complaint: AGITATED REQUESTING FOOD-NOTIFIED NURSE  Patient/Family Comments/goals:   Pain/Comfort:  Pain Rating 1: 0/10      Objective:     Communicated with NURSE KRISHNA prior to session.  Patient found supine with telemetry, pulse ox (continuous), blood pressure cuff, PureWick, pressure relief boots, peripheral IV, SCD upon PT entry to room.     General Precautions: Standard, fall, hearing impaired  Orthopedic Precautions: N/A  Braces: N/A  Respiratory Status: Room air     Functional Mobility:  Bed Mobility:     Rolling Right: minimum assistance  Scooting: minimum assistance  Supine to Sit: minimum assistance  Transfers:     Sit to Stand:  minimum assistance with no AD and hand-held assist  Bed to Chair:  "minimum assistance with  no AD and hand-held assist  using  Step Transfer  Gait: PT AMB APPROX. 5' WITH HHA/ANNA TO TF FROM BED TO CHAIR, CUES FOR UPRIGHT POSTURE  Balance: FAIR SITTING BALANCE, POOR+ DYNAMIC BALANCE DURING GAIT  PT INCONTINENT OF BOWEL IN BED, REQUIRED TOTAL ASSIST FOR CLEANING IN SIDELYING  PT INCONTINENT OF BOWEL IN STANDING, REQUIRED TOTAL ASSIST FOR CLEANING IN STANDING-ANNA FOR STEADYING  PT EDUCATED IN AND PERFORMED SEATED BLE THEREX X 10 REPS: HIP FLEX/EXT, LAQ, AP'S-CUES TO STAY ON TASK    AM-PAC 6 CLICK MOBILITY  Turning over in bed (including adjusting bedclothes, sheets and blankets)?: 3  Sitting down on and standing up from a chair with arms (e.g., wheelchair, bedside commode, etc.): 3  Moving from lying on back to sitting on the side of the bed?: 3  Moving to and from a bed to a chair (including a wheelchair)?: 3  Need to walk in hospital room?: 1  Climbing 3-5 steps with a railing?: 1  Basic Mobility Total Score: 14     Treatment & Education:  PT EDUCATION:  - ROLE OF P.T. AND POC IN ACUTE CARE HOSPITAL SETTING  - ENCOURAGED TO INCREASE TIME OOB IN CHAIR TO TOLERANCE   - TO CONTINUE THERAPUETIC EXERCISES THROUGHOUT THE DAY TO INCREASE ACTIVITY TOLERANCE AND DECREASE RISK FOR PNEUMONIA AND BLOOD CLOTS: HIP FLEX/EXT, HIP ABD/ADD, QUAD SET, HEEL SLIDE, AP  - RISK FOR FALLS DUE TO GENERALIZED WEAKNESS, EDUCATED ON "CALL DON'T FALL", ENCOURAGED TO CALL FOR ASSISTANCE WITH ALL NEEDS SUCH AS BED<>CHAIR TRANSFERS OR TRIPS TO BATHROOM, PT AGREEABLE TO SAFETY PRECAUTIONS    Patient left up in chair with all lines intact, call button in reach, chair alarm on, and NURSE notified..    GOALS:   Multidisciplinary Problems       Physical Therapy Goals          Problem: Physical Therapy    Goal Priority Disciplines Outcome Interventions   Physical Therapy Goal     PT, PT/OT Progressing    Description: LTG: 10/25/24  1. PT WILL COMPLETE BED MOBILITY IND  2. PT WILL STAND T/F TO CHAIR WITH RW AND " SBA  3. PT WILL GT TRAIN X 150' WITH RW AND SBA TO PROGRESS GT.  4. PT WILL INC AMPAC SCORE BY 2 POINTS TO PROGRESS GROSS FUNC MOBILITY.                          Time Tracking:     PT Received On: 10/16/24  PT Start Time: 1100     PT Stop Time: 1125  PT Total Time (min): 25 min     Billable Minutes: Therapeutic Activity 25    Treatment Type: Treatment  PT/PTA: PT     Number of PTA visits since last PT visit: 0     10/16/2024

## 2024-10-16 NOTE — SUBJECTIVE & OBJECTIVE
Interval History: POD 1 s/p robotic cholecystectomy. Complains of mild abdominal pain and states he is hungry. Remains rate controlled with TVP.     Medications:  Continuous Infusions:   0.9% NaCl    Continuous PRN   Stopped at 10/15/24 1701    lactated ringers   Intravenous Continuous 75 mL/hr at 10/16/24 0620 New Bag at 10/16/24 0620     Scheduled Meds:   chlorhexidine  15 mL Mouth/Throat BID    isosorbide mononitrate  120 mg Oral Daily    mupirocin   Nasal BID    pantoprazole  40 mg Oral Daily    piperacillin-tazobactam (Zosyn) IV (PEDS and ADULTS) (extended infusion is not appropriate)  4.5 g Intravenous Q8H     PRN Meds:  Current Facility-Administered Medications:     0.9% NaCl, , , Continuous PRN    acetaminophen, 650 mg, Rectal, Q6H PRN    acetaminophen, 650 mg, Oral, Q8H PRN    acetaminophen, 650 mg, Oral, Q4H PRN    albuterol-ipratropium, 3 mL, Nebulization, Q6H PRN    aluminum-magnesium hydroxide-simethicone, 30 mL, Oral, QID PRN    dextrose 10%, 12.5 g, Intravenous, PRN    dextrose 10%, 25 g, Intravenous, PRN    gabapentin, 100 mg, Oral, BID PRN    glucagon (human recombinant), 1 mg, Intramuscular, PRN    glucose, 16 g, Oral, PRN    glucose, 24 g, Oral, PRN    HYDROcodone-acetaminophen, 1 tablet, Oral, Q6H PRN    insulin aspart U-100, 0-5 Units, Subcutaneous, QID (AC + HS) PRN    naloxone, 0.02 mg, Intravenous, PRN    ondansetron, 8 mg, Oral, Q8H PRN    ondansetron, 4 mg, Intravenous, Q8H PRN    promethazine, 25 mg, Oral, Q6H PRN    senna-docusate 8.6-50 mg, 1 tablet, Oral, BID PRN    sodium chloride 0.9%, 10 mL, Intravenous, Q12H PRN     Review of patient's allergies indicates:   Allergen Reactions    Metoprolol Other (See Comments)     Junctional rhythm       Objective:     Vital Signs (Most Recent):  Temp: 98 °F (36.7 °C) (10/16/24 0400)  Pulse: 76 (10/16/24 0600)  Resp: 18 (10/16/24 0600)  BP: (!) 143/68 (10/16/24 0600)  SpO2: 95 % (10/16/24 0600) Vital Signs (24h Range):  Temp:  [97 °F (36.1 °C)-98.3  °F (36.8 °C)] 98 °F (36.7 °C)  Pulse:  [74-86] 76  Resp:  [10-36] 18  SpO2:  [93 %-99 %] 95 %  BP: (111-160)/(61-87) 143/68     Weight: 68 kg (149 lb 14.6 oz)  Body mass index is 21.51 kg/m².    Intake/Output - Last 3 Shifts         10/14 0700  10/15 0659 10/15 0700  10/16 0659 10/16 0700  10/17 0659    I.V. (mL/kg)  443.8 (6.5)     IV Piggyback  1317.5     Total Intake(mL/kg)  1761.3 (25.9)     Urine (mL/kg/hr)  250 (0.2)     Stool  0     Total Output  250     Net  +1511.3            Urine Occurrence 2 x 1 x     Stool Occurrence  0 x              Physical Exam  Vitals and nursing note reviewed.   Constitutional:       General: He is not in acute distress.     Appearance: He is well-developed. He is not toxic-appearing.   HENT:      Head: Normocephalic and atraumatic.      Right Ear: External ear normal.      Left Ear: External ear normal.      Nose: Nose normal.      Mouth/Throat:      Mouth: Mucous membranes are moist.   Eyes:      Conjunctiva/sclera: Conjunctivae normal.   Cardiovascular:      Rate and Rhythm: Normal rate.   Pulmonary:      Effort: Pulmonary effort is normal. No respiratory distress.   Abdominal:      Comments: Abdomen soft and non-distended with minimal and expected TTP. Incisions CDI, no SOI   Musculoskeletal:      Cervical back: Normal range of motion and neck supple.   Skin:     General: Skin is warm and dry.   Neurological:      Mental Status: He is alert.   Psychiatric:         Behavior: Behavior normal.          Significant Labs:  I have reviewed all pertinent lab results within the past 24 hours.  CBC:   Recent Labs   Lab 10/16/24  0809   WBC 6.25   RBC 4.34*   HGB 12.8*   HCT 39.1*      MCV 90   MCH 29.5   MCHC 32.7     CMP:   Recent Labs   Lab 10/14/24  0600 10/15/24  0822   * 129*   CALCIUM 8.4* 8.8   ALBUMIN 2.6*  --    PROT 5.9*  --     142   K 3.1* 4.0   CO2 23 23    109   BUN 10 12   CREATININE 1.2 1.3   ALKPHOS 128  --    ALT 55*  --    AST 51*  --     BILITOT 1.2*  --        Significant Diagnostics:  I have reviewed all pertinent imaging results/findings within the past 24 hours.  No new pertinent imaging

## 2024-10-16 NOTE — PROGRESS NOTES
[facility] - Intensive Care ([facility])  Adult Nutrition  Progress Note    SUMMARY       Recommendations    Recommendation/Intervention:   1. Recommend a Cardiac, Diabetic 2000 calorie, Soft and bite-sized (IDDSI Level 6) diet   2. Recommend Suplena TID to assist filling nutritional gaps   3. Recommend Radhames BID for wound healing   4. Recommend bowel regimen if warranted (no BM x 4 days)   5. Encourage PO and supplement intake, recommend feeding assistance if warranted   6. Weigh twice weekly    Goals:   1. Pt's diet will be advanced and to appropriate and safest diet texture within 24 hrs   2. Pt will tolerate and consume >75% EEN and EPN prior to RD follow up   3. Pt will tolerate and consume Radhames BID prior to RD follow up   4. Pt will have BM prior to RD follow up  Nutrition Goal Status: new  Communication of RD Recs: other (comment) (POC, sticky note)    Assessment and Plan    Endocrine  Severe protein-calorie malnutrition  Malnutrition Type:  Context: acute on chronic illness  Level: severe    Related to (etiology):   Physiological causes increasing nutrient needs d/t illness   Alteration in GI tract structure/function   Psychological causes     Signs and Symptoms (as evidenced by):   BMI < 22 (older adult)  Underweight with loss of fat and muscle  Unable or unwilling to eat sufficient energy/protein to maintain a healthy weight     Malnutrition Characteristic Summary:  Energy Intake (Malnutrition): less than or equal to 50% for greater than or equal to 5 days  Subcutaneous Fat (Malnutrition): severe depletion  Muscle Mass (Malnutrition): severe depletion    Interventions/Recommendations (treatment strategy):  1. Decreased sodium and fat, carbohydrate, IDDSI Soft and bite-sized level 6 (blue) texture modified diet  2. Commercial beverage medical food supplement therapy   3. Amino acids medical food supplement therapy  4. Management of nutrition related prescription medication  5. Feeding assistance management   6.  "Collaboration with nutrition professional with other providers     Nutrition Diagnosis Status:   New         Malnutrition Assessment (10/16/24);  Malnutrition Context: acute illness or injury, chronic illness  Malnutrition Level: severe  Skin (Micronutrient): bruised, wounds unhealed (Justin score = 13 (moderate risk)  Teeth (Micronutrient): other (see comments) (missing teeth)       Energy Intake (Malnutrition): less than or equal to 50% for greater than or equal to 5 days  Subcutaneous Fat (Malnutrition): severe depletion  Muscle Mass (Malnutrition): severe depletion   Orbital Region (Subcutaneous Fat Loss): severe depletion (Dark circles; Hallow look; Depression; Loose skin)   Temple Region (Muscle Loss): severe depletion (Hollow, scooping depression)                 Reason for Assessment    Reason For Assessment: length of stay  Diagnosis:  (Calculus of bile duct without cholecystitis with obstruction)  General Information Comments:   10/16/24: 84 y.o. Male admitted for Calculus of bile duct without cholecystitis with obstruction. PMH: CAD, HTN, DLD, T2DM, Bradycardia, PAF, AVM of stomach, Dementia, CHF. Put currently NPO x 2 days. H&P noted that the pt presented to the ED via EMS  for evaluation of tremors and mottled skin per wife, noted US limited revealed cholelithiasis w/out definitive sonographic evidence of acute cholecystitis, mild intrahepatic biliary duct dilation. EMR noted POD 1 s/p robotic cholecystectomy performed 10/15/24, TVP removed today at bedside, c/o mild abd pain and that he is hungry. Discussed pt during rounds, MD reported pacer was placed for surgery. Spoke to RN, stated diet was ok'd to advance, pt consumed 100% of lunch, RN reported assisted pt by cutting up his food d/t "pt missing teeth except at few at the bottom", confirmed pt tolerated well, no swallowing difficulties, no N/V/D or abd pain after eating, recommended Soft and bite-sized (IDDSI Level 6) diet, RN stated she will " "update diet in orders. Visited pt at bedside, pt sleeping, visual NFPE performed, severe malnutrition noted. Reviewed chart: LBM 10/12 (x 4 days no BM); Skin: bruised, incision abd WDL; Altered skin: R elbow skin tear/ upper back both partial thickness tissue losses, clean/dry/intact; Justin score: 13 (moderate risk); Edema: none. Labs, meds, weight reviewed. Note labs 1016/24: Cr (H), eGFR 45. Weight charted 4/6/24 147 lbs, 10/16/24 149 lbs (BMI 21.51, Underweight for age), +2 lb wt gain x 6 months, weight stable. RD will continue to follow and monitor pt's nutritional status during admit.    Nutrition Discharge Planning: Cardiac, Diabetic diet + Radhames BID + Suplena as warranted    Nutrition Risk Screen    Nutrition Risk Screen: difficulty chewing/swallowing, large or nonhealing wound, burn or pressure injury    Nutrition Related Social Determinants of Health: SDOH: Unable to assess at this time.     Nutrition/Diet History    Spiritual, Cultural Beliefs, Christianity Practices, Values that Affect Care: no  Food Allergies: NKFA  Factors Affecting Nutritional Intake: NPO, chewing difficulties/inability to chew food    Anthropometrics    Temp: 98.4 °F (36.9 °C)  Height Method: Estimated  Height: 5' 10" (177.8 cm)  Height (inches): 70 in  Weight Method: Bed Scale  Weight: 68 kg (149 lb 14.6 oz)  Weight (lb): 149.91 lb  Ideal Body Weight (IBW), Male: 166 lb  % Ideal Body Weight, Male (lb): 90.31 %  BMI (Calculated): 21.5  BMI Grade: 18.5-24.9 - normal (Underweight for age)     Wt Readings from Last 15 Encounters:   10/16/24 68 kg (149 lb 14.6 oz)   10/10/24 64 kg (141 lb)   10/01/24 64 kg (141 lb 1.5 oz)   05/30/24 63.5 kg (139 lb 15.9 oz)   05/21/24 63.5 kg (140 lb)   05/20/24 65.6 kg (144 lb 10 oz)   05/07/24 65.4 kg (144 lb 1.1 oz)   04/06/24 67 kg (147 lb 11.3 oz)   04/01/24 67 kg (147 lb 11.3 oz)   03/27/24 66.8 kg (147 lb 4.3 oz)   12/19/23 64.4 kg (142 lb)   12/08/23 65.1 kg (143 lb 8.3 oz)   08/01/23 61.1 kg (134 " lb 11.2 oz)   06/29/23 60.3 kg (132 lb 15 oz)   05/30/23 58.7 kg (129 lb 6.6 oz)     Lab/Procedures/Meds    Pertinent Labs Reviewed: reviewed  Pertinent Labs Comments: Calcium (L), Albumin (L), Glu (H), Cr (H), Total bilirubin (H), AST (H), ALT (H), eGFR 45  Pertinent Medications Reviewed: reviewed  Pertinent Medications Comments: pantoprazole, isosorbide mononitrate    BMP  Lab Results   Component Value Date     10/16/2024    K 4.5 10/16/2024     10/16/2024    CO2 22 (L) 10/16/2024    BUN 15 10/16/2024    CREATININE 1.5 (H) 10/16/2024    CALCIUM 8.4 (L) 10/16/2024    ANIONGAP 11 10/16/2024    EGFRNORACEVR 45 (A) 10/16/2024     Lab Results   Component Value Date    CALCIUM 8.4 (L) 10/16/2024    PHOS 3.4 11/05/2022     Lab Results   Component Value Date    ALBUMIN 2.7 (L) 10/16/2024     Lab Results   Component Value Date    ALT 95 (H) 10/16/2024     (H) 10/16/2024    ALKPHOS 126 10/16/2024    BILITOT 1.1 (H) 10/16/2024     Recent Labs   Lab 10/16/24  0815   POCTGLUCOSE 151*     Lab Results   Component Value Date    HGBA1C 7.0 (H) 05/07/2024     Lab Results   Component Value Date    WBC 6.25 10/16/2024    HGB 12.8 (L) 10/16/2024    HCT 39.1 (L) 10/16/2024    MCV 90 10/16/2024     10/16/2024       Scheduled Meds:   apixaban  2.5 mg Oral BID    chlorhexidine  15 mL Mouth/Throat BID    isosorbide mononitrate  120 mg Oral Daily    mupirocin   Nasal BID    pantoprazole  40 mg Oral Daily     Continuous Infusions:   0.9% NaCl    Continuous PRN   Stopped at 10/15/24 1701     PRN Meds:.  Current Facility-Administered Medications:     0.9% NaCl, , , Continuous PRN    acetaminophen, 650 mg, Oral, Q8H PRN    albuterol-ipratropium, 3 mL, Nebulization, Q6H PRN    aluminum-magnesium hydroxide-simethicone, 30 mL, Oral, QID PRN    dextrose 10%, 12.5 g, Intravenous, PRN    dextrose 10%, 25 g, Intravenous, PRN    gabapentin, 100 mg, Oral, BID PRN    glucagon (human recombinant), 1 mg, Intramuscular, PRN     glucose, 16 g, Oral, PRN    glucose, 24 g, Oral, PRN    HYDROcodone-acetaminophen, 1 tablet, Oral, Q6H PRN    influenza (adjuvanted), 0.5 mL, Intramuscular, Prior to discharge    insulin aspart U-100, 0-5 Units, Subcutaneous, QID (AC + HS) PRN    naloxone, 0.02 mg, Intravenous, PRN    ondansetron, 8 mg, Oral, Q8H PRN    ondansetron, 4 mg, Intravenous, Q8H PRN    promethazine, 25 mg, Oral, Q6H PRN    senna-docusate 8.6-50 mg, 1 tablet, Oral, BID PRN    sodium chloride 0.9%, 10 mL, Intravenous, Q12H PRN      Physical Findings/Assessment         Estimated/Assessed Needs    Weight Used For Calorie Calculations: 68 kg (149 lb 14.6 oz)  Energy Calorie Requirements (kcal): 4563-7822 kcals (30-35 kcals/kg ABW (Underweight vs CHF vs Diabetes)  Energy Need Method: Kcal/kg  Protein Requirements:  g (1.2-1.5 g/kg ABW (Underweight vs Diabetic w/ wounds)  Weight Used For Protein Calculations: 68 kg (149 lb 14.6 oz)  Fluid Requirements (mL): 1-2 L/day (CHF)  Estimated Fluid Requirement Method: other (see comments)  RDA Method (mL): 2040  CHO Requirement: 255-298 g (7866-8048 kcals/8)      Nutrition Prescription Ordered    Current Diet Order: NPO    Evaluation of Received Nutrient/Fluid Intake  I/O: (Net since admit):   10/16/24: +4365.1 mL    Energy Calories Required: not meeting needs  Protein Required: not meeting needs  Fluid Required: meeting needs  Total Fluid Intake (mL): 1761.3  Tolerance: tolerating  % Intake of Estimated Energy Needs: 25 - 50 %  % Meal Intake: 100%    Nutrition Risk    Level of Risk/Frequency of Follow-up: high (F/u x 2 weekly)     Monitor and Evaluation    Food and Nutrient Intake: energy intake, food and beverage intake  Food and Nutrient Adminstration: diet order  Knowledge/Beliefs/Attitudes: food and nutrition knowledge/skill, beliefs and attitudes  Anthropometric Measurements: weight, weight change, body mass index  Biochemical Data, Medical Tests and Procedures: electrolyte and renal panel,  glucose/endocrine profile  Nutrition-Focused Physical Findings: overall appearance     Nutrition Follow-Up    RD Follow-up?: Yes  MARY Mast, RDN, LDN

## 2024-10-16 NOTE — ASSESSMENT & PLAN NOTE
Chronic, controlled.  Latest blood pressure and vitals reviewed-   Temp:  [97 °F (36.1 °C)-98.4 °F (36.9 °C)]   Pulse:  [48-78]   Resp:  [10-25]   BP: (116-183)/(63-87)   SpO2:  [90 %-97 %] .   Home meds for hypertension were reviewed and noted below.   Hypertension Medications               isosorbide mononitrate (IMDUR) 120 MG 24 hr tablet TAKE 1 TABLET BY MOUTH EVERY DAY    valsartan (DIOVAN) 160 MG tablet Take 1 tablet (160 mg total) by mouth once daily.            While in the hospital, will manage blood pressure as follows, resume valsartan    Will utilize p.r.n. blood pressure medication only if patient's blood pressure greater than  180/110 and he develops symptoms such as worsening chest pain or shortness of breath.

## 2024-10-16 NOTE — SUBJECTIVE & OBJECTIVE
Review of Systems   Reason unable to perform ROS: limited due to dementia.   Gastrointestinal:  Positive for abdominal pain (incisional).   Psychiatric/Behavioral:  Positive for memory loss.      Objective:     Vital Signs (Most Recent):  Temp: 98.4 °F (36.9 °C) (10/16/24 0700)  Pulse: (!) 52 (10/16/24 1000)  Resp: 18 (10/16/24 1000)  BP: (!) 166/77 (10/16/24 1000)  SpO2: 95 % (10/16/24 1000) Vital Signs (24h Range):  Temp:  [97 °F (36.1 °C)-98.4 °F (36.9 °C)] 98.4 °F (36.9 °C)  Pulse:  [48-86] 52  Resp:  [10-36] 18  SpO2:  [90 %-99 %] 95 %  BP: (111-183)/(61-87) 166/77     Weight: 68 kg (149 lb 14.6 oz)  Body mass index is 21.51 kg/m².     SpO2: 95 %         Intake/Output Summary (Last 24 hours) at 10/16/2024 1103  Last data filed at 10/16/2024 0600  Gross per 24 hour   Intake 1761.34 ml   Output 250 ml   Net 1511.34 ml       Lines/Drains/Airways       Drain  Duration             Male External Urinary Catheter 10/15/24 1700 <1 day              Line  Duration                  Pacer Wires 10/15/24 1300 <1 day              Peripheral Intravenous Line  Duration                  Peripheral IV - Single Lumen 10/11/24 0050 20 G Anterior;Left Upper Arm 5 days                       Physical Exam  Vitals and nursing note reviewed.   Constitutional:       Appearance: Normal appearance.   HENT:      Head: Normocephalic and atraumatic.   Eyes:      Pupils: Pupils are equal, round, and reactive to light.   Neck:      Comments: TVP IJ  Cardiovascular:      Rate and Rhythm: Normal rate and regular rhythm.      Heart sounds: S1 normal and S2 normal. No murmur heard.     Comments: Paced on monitor    Musculoskeletal:      Right lower leg: No edema.      Left lower leg: No edema.   Skin:     General: Skin is warm and dry.   Neurological:      Comments: Oriented to person, some mild confusion   Psychiatric:         Mood and Affect: Mood normal.         Behavior: Behavior normal.            Significant Labs: CMP   Recent Labs   Lab  "10/15/24  0822 10/16/24  0809    141   K 4.0 4.5    108   CO2 23 22*   * 143*   BUN 12 15   CREATININE 1.3 1.5*   CALCIUM 8.8 8.4*   PROT  --  6.3   ALBUMIN  --  2.7*   BILITOT  --  1.1*   ALKPHOS  --  126   AST  --  104*   ALT  --  95*   ANIONGAP 10 11   , CBC   Recent Labs   Lab 10/15/24  0822 10/16/24  0809   WBC 4.98 6.25   HGB 12.6* 12.8*   HCT 38.0* 39.1*   * 160   , Troponin No results for input(s): "TROPONINI" in the last 48 hours., and All pertinent lab results from the last 24 hours have been reviewed.    Significant Imaging: Echocardiogram: Transthoracic echo (TTE) complete ([facility]upid Only):   Results for orders placed or performed during the hospital encounter of 10/05/24   Echo   Result Value Ref Range    BSA 1.78 m2    LVOT stroke volume 83.6 cm3    LVIDd 4.7 3.5 - 6.0 cm    LV Systolic Volume 47.50 mL    LV Systolic Volume Index 26.4 mL/m2    LVIDs 3.4 2.1 - 4.0 cm    LV Diastolic Volume 100.84 mL    LV Diastolic Volume Index 56.02 mL/m2    Left Ventricular End Systolic Volume by Teichholz Method 47.50 mL    Left Ventricular End Diastolic Volume by Teichholz Method 100.84 mL    IVS 1.2 (A) 0.6 - 1.1 cm    LVOT diameter 2.2 cm    LVOT area 3.8 cm2    FS 27.7 (A) 28 - 44 %    Left Ventricle Relative Wall Thickness 0.51 cm    PW 1.2 (A) 0.6 - 1.1 cm    LV mass 212.0 g    LV Mass Index 117.8 g/m2    MV Peak E Americo 0.92 m/s    TDI LATERAL 0.09 m/s    TDI SEPTAL 0.06 m/s    E/E' ratio 12.27 m/s    MV Peak A Americo 0.81 m/s    TR Max Americo 2.66 m/s    E/A ratio 1.14     IVRT 106.57 msec    E wave deceleration time 293.33 msec    LV SEPTAL E/E' RATIO 15.33 m/s    LV LATERAL E/E' RATIO 10.22 m/s    LVOT peak americo 0.8 m/s    Left Ventricular Outflow Tract Mean Velocity 0.47 cm/s    Left Ventricular Outflow Tract Mean Gradient 1.09 mmHg    RVOT peak VTI 15.9 cm    TAPSE 2.09 cm    LA size 4.57 cm    Left Atrium Minor Axis 4.69 cm    Left Atrium Major Axis 6.65 cm    RA Major Axis 6.04 cm    AV " regurgitation pressure 1/2 time [id number] ms    AR Max Americo 4.07 m/s    AV mean gradient 8.0 mmHg    AV peak gradient 17.6 mmHg    Ao peak americo 2.1 m/s    Ao VTI 42.4 cm    LVOT peak VTI 22.0 cm    AV valve area 2.0 cm²    AV Velocity Ratio 0.38     AV index (prosthetic) 0.52     JUDAH by Velocity Ratio 1.4 cm²    Mr max americo 5.59 m/s    Triscuspid Valve Regurgitation Peak Gradient 28 mmHg    PV mean gradient 1 mmHg    RVOT peak americo 0.60 m/s    Ao root annulus 3.75 cm    STJ 3.27 cm    Ascending aorta 3.51 cm    IVC diameter 1.16 cm    Mean e' 0.08 m/s    ZLVIDS 0.79     ZLVIDD -0.57     LURDES 51.0 mL/m2    LA Vol 91.88 cm3    LA WIDTH 4.3 cm    RA Width 2.5 cm    EF 60 %    TV resting pulmonary artery pressure 31 mmHg    RV TB RVSP 6 mmHg    Est. RA pres 3 mmHg    Narrative      Left Ventricle: The left ventricle is normal in size. Normal wall   thickness. There is concentric hypertrophy. Normal wall motion. Ejection   fraction is approximately 60%. There is indeterminate diastolic function.    Right Ventricle: Normal right ventricular cavity size. Wall thickness   is normal. Right ventricle wall motion  is normal. Systolic function is   normal.    Left Atrium: Left atrium is severely dilated.    Aortic Valve: There is mild aortic regurgitation.    Mitral Valve: There is mild regurgitation.    Tricuspid Valve: There is mild regurgitation.    Pulmonary Artery: The estimated pulmonary artery systolic pressure is   31 mmHg.    IVC/SVC: Normal venous pressure at 3 mmHg.      and EKG: Reviewed

## 2024-10-16 NOTE — HOSPITAL COURSE
10/15 - admitted to ICU post-op TVP placement and lap harjinder  10/16 - NAEON.  Paced @ 75.  Complains of some mild abdominal pain this morning.  Alert and conversant.  Hemodynamics and oxygenation are stable.

## 2024-10-16 NOTE — ASSESSMENT & PLAN NOTE
-Low dose eliquis held  -Hold AV bria blocking agents given bradycardia    10/16/24  -Resume ELiquis as tolerated

## 2024-10-16 NOTE — ASSESSMENT & PLAN NOTE
- s/p TVP placement on 10/15 per cards  - plans for removal post-op per card's note; likely today  - cardiac monitoring

## 2024-10-16 NOTE — PROGRESS NOTES
[facility] ([facility])  General Surgery  Progress Note    Subjective:     History of Present Illness:  83 y/o male with past medical history significant for DMII, CAD with multiple PCI procedures, chronic diastolic CHF, afib with h/o CVA,  HTN, DM, LAE, LVH, PAD, AAA stent graft (approx 2013), dementia, hyperlipidemia who presented to the ED approximately 2 days ago for evaluation after his wife was concerned about tremors and the appearance of his skin. He is a poor historian secondary to dementia. CT abdomen pelvis unremarkable. U/S abdomen showing cholelithiasis without evidence of cholecystitis, but an MRCP was obtained secondary to elevated LFTs and Tbili. MRCP with significant choledocholithiasis. Patient was transported to [place] earlier today to attempt ERCP, but he became significant bradycardic and the procedure was aborted. His LFTs are trending down today. He reports being hungry at the time of exam, and his wife denies any past abdominal surgical history.    Post-Op Info:  Procedure(s) (LRB):  DV5 ROBOTIC CHOLECYSTECTOMY (N/A)   1 Day Post-Op     Interval History: POD 1 s/p robotic cholecystectomy. Complains of mild abdominal pain and states he is hungry. Remains rate controlled with TVP.     Medications:  Continuous Infusions:   0.9% NaCl    Continuous PRN   Stopped at 10/15/24 1701    lactated ringers   Intravenous Continuous 75 mL/hr at 10/16/24 0620 New Bag at 10/16/24 0620     Scheduled Meds:   chlorhexidine  15 mL Mouth/Throat BID    isosorbide mononitrate  120 mg Oral Daily    mupirocin   Nasal BID    pantoprazole  40 mg Oral Daily    piperacillin-tazobactam (Zosyn) IV (PEDS and ADULTS) (extended infusion is not appropriate)  4.5 g Intravenous Q8H     PRN Meds:  Current Facility-Administered Medications:     0.9% NaCl, , , Continuous PRN    acetaminophen, 650 mg, Rectal, Q6H PRN    acetaminophen, 650 mg, Oral, Q8H PRN    acetaminophen, 650 mg, Oral, Q4H PRN     albuterol-ipratropium, 3 mL, Nebulization, Q6H PRN    aluminum-magnesium hydroxide-simethicone, 30 mL, Oral, QID PRN    dextrose 10%, 12.5 g, Intravenous, PRN    dextrose 10%, 25 g, Intravenous, PRN    gabapentin, 100 mg, Oral, BID PRN    glucagon (human recombinant), 1 mg, Intramuscular, PRN    glucose, 16 g, Oral, PRN    glucose, 24 g, Oral, PRN    HYDROcodone-acetaminophen, 1 tablet, Oral, Q6H PRN    insulin aspart U-100, 0-5 Units, Subcutaneous, QID (AC + HS) PRN    naloxone, 0.02 mg, Intravenous, PRN    ondansetron, 8 mg, Oral, Q8H PRN    ondansetron, 4 mg, Intravenous, Q8H PRN    promethazine, 25 mg, Oral, Q6H PRN    senna-docusate 8.6-50 mg, 1 tablet, Oral, BID PRN    sodium chloride 0.9%, 10 mL, Intravenous, Q12H PRN     Review of patient's allergies indicates:   Allergen Reactions    Metoprolol Other (See Comments)     Junctional rhythm       Objective:     Vital Signs (Most Recent):  Temp: 98 °F (36.7 °C) (10/16/24 0400)  Pulse: 76 (10/16/24 0600)  Resp: 18 (10/16/24 0600)  BP: (!) 143/68 (10/16/24 0600)  SpO2: 95 % (10/16/24 0600) Vital Signs (24h Range):  Temp:  [97 °F (36.1 °C)-98.3 °F (36.8 °C)] 98 °F (36.7 °C)  Pulse:  [74-86] 76  Resp:  [10-36] 18  SpO2:  [93 %-99 %] 95 %  BP: (111-160)/(61-87) 143/68     Weight: 68 kg (149 lb 14.6 oz)  Body mass index is 21.51 kg/m².    Intake/Output - Last 3 Shifts         10/14 0700  10/15 0659 10/15 0700  10/16 0659 10/16 0700  10/17 0659    I.V. (mL/kg)  443.8 (6.5)     IV Piggyback  1317.5     Total Intake(mL/kg)  1761.3 (25.9)     Urine (mL/kg/hr)  250 (0.2)     Stool  0     Total Output  250     Net  +1511.3            Urine Occurrence 2 x 1 x     Stool Occurrence  0 x              Physical Exam  Vitals and nursing note reviewed.   Constitutional:       General: He is not in acute distress.     Appearance: He is well-developed. He is not toxic-appearing.   HENT:      Head: Normocephalic and atraumatic.      Right Ear: External ear normal.      Left Ear:  External ear normal.      Nose: Nose normal.      Mouth/Throat:      Mouth: Mucous membranes are moist.   Eyes:      Conjunctiva/sclera: Conjunctivae normal.   Cardiovascular:      Rate and Rhythm: Normal rate.   Pulmonary:      Effort: Pulmonary effort is normal. No respiratory distress.   Abdominal:      Comments: Abdomen soft and non-distended with minimal and expected TTP. Incisions CDI, no SOI   Musculoskeletal:      Cervical back: Normal range of motion and neck supple.   Skin:     General: Skin is warm and dry.   Neurological:      Mental Status: He is alert.   Psychiatric:         Behavior: Behavior normal.          Significant Labs:  I have reviewed all pertinent lab results within the past 24 hours.  CBC:   Recent Labs   Lab 10/16/24  0809   WBC 6.25   RBC 4.34*   HGB 12.8*   HCT 39.1*      MCV 90   MCH 29.5   MCHC 32.7     CMP:   Recent Labs   Lab 10/14/24  0600 10/15/24  0822   * 129*   CALCIUM 8.4* 8.8   ALBUMIN 2.6*  --    PROT 5.9*  --     142   K 3.1* 4.0   CO2 23 23    109   BUN 10 12   CREATININE 1.2 1.3   ALKPHOS 128  --    ALT 55*  --    AST 51*  --    BILITOT 1.2*  --        Significant Diagnostics:  I have reviewed all pertinent imaging results/findings within the past 24 hours.  No new pertinent imaging  Assessment/Plan:     * Calculus of bile duct without cholecystitis with obstruction  ERCP with clearing of choledocholithiasis and stent placement and post ERCP pancreatitis, now s/p cholecystectomy.     - Advance diet as tolerated, no restrictions  - Ambulate  - Incentive spirometry  - Remainder of care per primary team. Will need 2 week outpatient surgical f/u once d/c.     Chronic diastolic heart failure  Currently with significant bradycardic, prohibitive of procedures    Need to improvement and bradycardia prior to cholecystectomy defer to cardiology     Bradycardia  TVP placed pre-operatively yesterday, remains in place with HR in the 70s  Further recs/treatment  per cardiology    Type 2 diabetes mellitus with other specified complication, unspecified whether long term insulin use  Management per medicine    Dyslipidemia  Management per medicine    Essential hypertension  Management per medicine    Coronary artery disease of native artery of native heart with stable angina pectoris  Management per medicine/cardiology        Jacquelyn Sun PA-C  General Surgery  [facility] - Int[facility] ([facility])

## 2024-10-16 NOTE — TELEPHONE ENCOUNTER
Patient is currently admitted into hospital. Will contact to schedule endoscopy procedure once discharged.

## 2024-10-16 NOTE — ASSESSMENT & PLAN NOTE
Bradycardia on tele 30-40s  H/o of some bradycardia  Also is on Aricept for dementia  Recommend holding    10/14/24  -EKG/rhythm strips reviewed-junctional rhythm noted, reviewed with EP, will plan on TVP prior to surgical intervention  -Avoid AV bria agents    10/15/24  -Stable overnight  -TVP planned today by Dr. Kirk. He explained all risks/benefits in detail to patient's wife. All questions answered. She has agreed to proceed  -Cholecystectomy to follow TVP placement    10/16/24  -Stable post surgery, TVP removed this AM  -AVoid AV bria agents

## 2024-10-16 NOTE — ASSESSMENT & PLAN NOTE
Patient's FSGs are controlled on current medication regimen.  Last A1c reviewed-   Lab Results   Component Value Date    HGBA1C 7.0 (H) 05/07/2024     Most recent fingerstick glucose reviewed-   Recent Labs   Lab 10/15/24  1744 10/15/24  2153 10/16/24  0815   POCTGLUCOSE 152* 152* 151*     Current correctional scale  Low  Maintain anti-hyperglycemic dose as follows-   Antihyperglycemics (From admission, onward)    Start     Stop Route Frequency Ordered    10/06/24 0146  insulin aspart U-100 pen 0-5 Units         -- SubQ Before meals & nightly PRN 10/06/24 0048        Hold Oral hypoglycemics while patient is in the hospital.

## 2024-10-16 NOTE — ASSESSMENT & PLAN NOTE
Completed ERCP with stone removal [facility] 10/10 without complication  Cholecystectomy 10/15  Tolerating diet

## 2024-10-16 NOTE — PROGRESS NOTES
[facility] - Int[facility] ([facility] Medicine  Progress Note    Patient Name: Aquiles Yates  MRN: [id number]  Patient Class: IP- Inpatient   Admission Date: 10/5/2024  Length of Stay: 10 days  Attending Physician: Fred Taylor MD  Primary Care Provider: Holger Thornton MD        Subjective:     Principal Problem:Calculus of bile duct without cholecystitis with obstruction        HPI:  Aquiles Yates is a 84 y.o. male with a PMH  has a past medical history of Acute blood loss anemia (10/14/2019), Alzheimer's dementia, Aneurysm, abdominal aortic, BCC (basal cell carcinoma of skin) (06/29/2023), Chronic diastolic heart failure (05/20/2024), Coronary artery disease, Depression, Diabetes mellitus, HLD (hyperlipidemia), Hypertension, Kidney stone, PAD (peripheral artery disease), PAF (paroxysmal atrial fibrillation) (01/24/2023), and Tobacco abuse.presented to the Emergency Department for evaluation of tremors and mottled skin per wife. Wife called the paramedics when she noticed patient shaking and patient's mottled skin today. Pt has a history of Alzheimer's and paramedics report a GCS of 14. Upon interview, pt denies any pain and is oriented to self. No further complaints or concerns at this time.       ER workup revealed CBC to be unremarkable.  CMP revealed CBG of 202 mg/dL, , total bili of 2.6, and AST/ALT of 242/179.  .  Troponin negative.  Lactic acid 2.8.  Flu COVID negative.  UA unremarkable.  CTA abdomen and pelvis without contrast revealed no acute findings.  Chest x-ray negative for acute cardiopulmonary findings.  Ultrasound limited revealed cholelithiasis without definitive sonographic evidence of acute cholecystitis.  Mild intrahepatic biliary duct dilation.  Vital signs stable. EKG revealed sinus Nicholas with occasional PVCs with a ventricular rate of 56 beats per minute and a QT/QTC of 408/393.  Patient received 4.5 g Zosyn in ED. GI consulted.  In  agreement with MRCP and will see us consult in a.m..  Patient in agreement with treatment plan.  Patient will be admitted under inpatient status.    PCP: Holger Thornton      Overview/Hospital Course:  Patient is an 84-year-old male with a history of anemia, Alzheimer's dementia, aneurysm, chronic diastolic failure, coronary artery disease diabetes mellitus, hypertension, PAF who presented emergency department with tremors.  Patient's wife said she was he was shaking and had mottled skin.  Patient denied any complaints.  In the emergency department workup revealed glucose of 202, alk-phos of 387, total bili of 2.6, AST of 242 ALT of 179, BNP of 323, lactic acid of 2.4.  CT scan of abdomen pelvis without contrast revealed no acute findings, ultrasound revealed cholelithiasis with ductal dilatation.  Patient was admitted with diagnosis of acute cholecystitis choledocholithiasis.  He was started on Zosyn.  GI was consulted MRCP was ordered with plans for ERCP in [place].  Patient's heart rate was in the 40s yesterday EKG revealed sinus bradycardia.  He went to [place] today for ERCP however heart rate was less than 40 therefore he was sent back for cardiology evaluation.  Patient denies any pain.    Cardiology saw patient with recommendations to stop his Aricept as it was make be contributing to his bradycardia.  Patient was tolerating diet and LFTs have improved.    Status post ERCP:                        - The major papilla appeared normal.                          - The common bile duct was dilated.                          - Choledocholithiasis was found. Complete removal                          was accomplished by biliary sphincterotomy and                          balloon extraction.                          - A biliary sphincterotomy was performed.                          - The biliary tree was swept.                          - One biliary stent was placed into the common                           bile duct.     Patient was scheduled for cholecystectomy 10/11 however heart rate was in the 40s.  Cardiology evaluated and plan TVP while in OR. Underwent cholecystectomy 10/15, Patient monitored in ICU overnight. pacing wires pulled 10/16    Interval History: f/u cholecystitis pacer wires pulled this morning, awaiting transfer to room tolerating diet.     Review of Systems  Objective:     Vital Signs (Most Recent):  Temp: 98.4 °F (36.9 °C) (10/16/24 0700)  Pulse: (!) 52 (10/16/24 1000)  Resp: 18 (10/16/24 1000)  BP: (!) 166/77 (10/16/24 1000)  SpO2: 95 % (10/16/24 1000) Vital Signs (24h Range):  Temp:  [97 °F (36.1 °C)-98.4 °F (36.9 °C)] 98.4 °F (36.9 °C)  Pulse:  [48-78] 52  Resp:  [10-25] 18  SpO2:  [90 %-97 %] 95 %  BP: (116-183)/(63-87) 166/77     Weight: 68 kg (149 lb 14.6 oz)  Body mass index is 21.51 kg/m².    Intake/Output Summary (Last 24 hours) at 10/16/2024 1655  Last data filed at 10/16/2024 0600  Gross per 24 hour   Intake 1361.34 ml   Output 250 ml   Net 1111.34 ml         Physical Exam  HENT:      Head: Normocephalic and atraumatic.   Cardiovascular:      Rate and Rhythm: Regular rhythm. Bradycardia present.      Heart sounds: No murmur heard.  Pulmonary:      Effort: Pulmonary effort is normal. No respiratory distress.      Breath sounds: Normal breath sounds. No wheezing.   Abdominal:      General: Bowel sounds are normal. There is no distension.      Palpations: Abdomen is soft.      Tenderness: There is no abdominal tenderness.   Musculoskeletal:         General: No swelling.   Skin:     General: Skin is warm and dry.   Neurological:      Mental Status: He is alert. Mental status is at baseline.             Significant Labs: All pertinent labs within the past 24 hours have been reviewed.  Recent Lab Results         10/16/24  0815   10/16/24  0809   10/15/24  2153   10/15/24  1744        Albumin   2.7           ALP   126           ALT   95           Anion Gap   11           AST   104            Baso #   0.03           Basophil %   0.5           BILIRUBIN TOTAL   1.1  Comment: For infants and newborns, interpretation of results should be based  on gestational age, weight and in agreement with clinical  observations.    Premature Infant recommended reference ranges:  Up to 24 hours.............<8.0 mg/dL  Up to 48 hours............<12.0 mg/dL  3-5 days..................<15.0 mg/dL  6-29 days.................<15.0 mg/dL             BUN   15           Calcium   8.4           Chloride   108           CO2   22           Creatinine   1.5           Differential Method   Automated           eGFR   45           Eos #   0.1           Eos %   1.1           Glucose   143           Gran # (ANC)   5.2           Gran %   82.9           Hematocrit   39.1           Hemoglobin   12.8           Immature Grans (Abs)   0.02  Comment: Mild elevation in immature granulocytes is non specific and   can be seen in a variety of conditions including stress response,   acute inflammation, trauma and pregnancy. Correlation with other   laboratory and clinical findings is essential.             Immature Granulocytes   0.3           Lymph #   0.5           Lymph %   8.3           MCH   29.5           MCHC   32.7           MCV   90           Mono #   0.4           Mono %   6.9           MPV   10.5           nRBC   0           Platelet Count   160           POCT Glucose 151     152   152       Potassium   4.5           PROTEIN TOTAL   6.3           RBC   4.34           RDW   14.6           Sodium   141           WBC   6.25                   Significant Imaging: I have reviewed all pertinent imaging results/findings within the past 24 hours.    Assessment/Plan:      * Calculus of bile duct without cholecystitis with obstruction  Completed ERCP with stone removal [facility] 10/10 without complication  Cholecystectomy 10/15  Tolerating diet    Chronic diastolic heart failure  Patient is identified as having Diastolic (HFpEF) heart  failure that is Chronic. CHF is currently controlled. Latest ECHO performed and demonstrates- Results for orders placed during the hospital encounter of 06/13/22    Echo    Interpretation Summary  · The left ventricle is normal in size with severe concentric hypertrophy and normal systolic function.  · Mild left atrial enlargement.  · The estimated ejection fraction is 55%.  · Grade II left ventricular diastolic dysfunction.  · Normal right ventricular size with normal right ventricular systolic function.  .   monitor clinical status closely. Monitor on telemetry. Patient is off CHF pathway.  Monitor strict Is&Os and daily weights.  Place on fluid restriction of 2 L. Continue to stress to patient importance of self efficacy and  on diet for CHF. Last BNP reviewed- and noted below   Recent Labs   Lab 10/05/24  2048   *     .            Dementia  Patient with dementia with likely etiology of alzheimer's dementia. Dementia is moderate. The patient does not have signs of behavioral disturbance. Home dementia medications are held.  Continue non-pharmacologic interventions to prevent delirium (No VS between 11PM-5AM, activity during day, opening blinds, providing glasses/hearing aids, and up in chair during daytime). Will avoid narcotics and benzos unless absolutely necessary. PRN anti-psychotics are not prescribed to avoid self harm behaviors.    AVM (arteriovenous malformation) of stomach, acquired with hemorrhage  Patient's hemorrhage is due to gastrointestinal bleed, patient does not have a propensity for bleeding.. Patients most recent Hgb, Hct, platelets, and INR are listed below.  Recent Labs     10/14/24  0600 10/15/24  0822 10/16/24  0809   HGB 11.7* 12.6* 12.8*   HCT 35.1* 38.0* 39.1*   * 145* 160       Plan  - Will trend hemoglobin/hematocrit Daily  - Will monitor and correct any coagulation defects  - Will transfuse if Hgb is <7g/dl (<8g/dl in cases of active ACS) or if patient has rapid  bleeding leading to hemodynamic instability  - STABLE    PAF (paroxysmal atrial fibrillation)  Patient has paroxysmal (<7 days) atrial fibrillation. Patient is currently in  sinus Nicholas . [id number] Score: 4. The patients heart rate in the last 24 hours is as follows:  Pulse  Min: 47  Max: 72     Antiarrhythmics   None    Anticoagulants   No anticoagulants secondary to need procedure    Plan  - Replete lytes with a goal of K>4, Mg >2      Bradycardia  Seen by Cardiology who recommended stopping his Aricept.    Awaiting further recommendations    Type 2 diabetes mellitus with other specified complication, unspecified whether long term insulin use  Patient's FSGs are controlled on current medication regimen.  Last A1c reviewed-   Lab Results   Component Value Date    HGBA1C 7.0 (H) 05/07/2024     Most recent fingerstick glucose reviewed-   Recent Labs   Lab 10/15/24  1744 10/15/24  2153 10/16/24  0815   POCTGLUCOSE 152* 152* 151*     Current correctional scale  Low  Maintain anti-hyperglycemic dose as follows-   Antihyperglycemics (From admission, onward)      Start     Stop Route Frequency Ordered    10/06/24 0146  insulin aspart U-100 pen 0-5 Units         -- SubQ Before meals & nightly PRN 10/06/24 0048          Hold Oral hypoglycemics while patient is in the hospital.      Dyslipidemia  Patient is chronically on statin.will not continue for now. Last Lipid Panel:   Lab Results   Component Value Date    CHOL 150 05/07/2024    HDL 36 (L) 05/07/2024    LDLCALC 94.2 05/07/2024    TRIG 99 05/07/2024    CHOLHDL 24.0 05/07/2024     -Hold home medication due to elevated LFTs          Essential hypertension  Chronic, controlled.  Latest blood pressure and vitals reviewed-   Temp:  [97 °F (36.1 °C)-98.4 °F (36.9 °C)]   Pulse:  [48-78]   Resp:  [10-25]   BP: (116-183)/(63-87)   SpO2:  [90 %-97 %] .   Home meds for hypertension were reviewed and noted below.   Hypertension Medications               isosorbide mononitrate  (IMDUR) 120 MG 24 hr tablet TAKE 1 TABLET BY MOUTH EVERY DAY    valsartan (DIOVAN) 160 MG tablet Take 1 tablet (160 mg total) by mouth once daily.            While in the hospital, will manage blood pressure as follows, resume valsartan    Will utilize p.r.n. blood pressure medication only if patient's blood pressure greater than  180/110 and he develops symptoms such as worsening chest pain or shortness of breath.      Coronary artery disease of native artery of native heart with stable angina pectoris  Patient with known CAD s/p stent placement, which is controlled. and monitor for S/Sx of angina/ACS. Continue to monitor on telemetry.       VTE Risk Mitigation (From admission, onward)           Ordered     apixaban tablet 2.5 mg  2 times daily         10/16/24 1341     Reason for No Pharmacological VTE Prophylaxis  Once        Question:  Reasons:  Answer:  Physician Provided (leave comment)  Comment:  Pending potential surgical intervention    10/06/24 0048     IP VTE HIGH RISK PATIENT  Once         10/06/24 0048     Place sequential compression device  Until discontinued         10/06/24 0048                    Discharge Planning   ZEINA:      Code Status: Full Code   Is the patient medically ready for discharge?:     Reason for patient still in hospital (select all that apply): Patient trending condition and Treatment  Discharge Plan A: Home with family                  Jackie Ramos MD  Department of Hospital Medicine   [facility] - Int[facility] ([facility])

## 2024-10-16 NOTE — PROGRESS NOTES
[facility] - Intensive Care ([facility])  Critical Care Medicine  Progress Note    Patient Name: Aquiles Yates  MRN: [id number]  Admission Date: 10/5/2024  Hospital Length of Stay: 10 days  Code Status: Full Code  Attending Provider: Fred Taylor MD  Primary Care Provider: Holger Thornton MD   Principal Problem: Calculus of bile duct without cholecystitis with obstruction    Subjective:     HPI:  84 year old male with a known past medical history of anemia, Alzheimer's, chronic diastolic heart failure, CAD, depression, diabetes, hyperlipidemia, hypertension, PID, paroxysmal AFib, and tobacco abuse that presented to the ER on 10/5 for complaints of tremors and mottled skin reported per the patient's wife.  Baseline GCS of 14.  Workup in the ER with elevated liver enzymes, , lactic 2.8.  Imaging was unremarkable at that time.  Ultrasound of the abdomen revealed cholelithiasis without definite evidence of acute cholecystitis with a mild intrahepatic biliary duct dilation.  Patient was noted to be bradycardic.  He was given Zosyn in the ER.  He was admitted under Hospital Medicine to the hospital.  During this hospitalization patient has been seen by multiple services including GI, surgery, and Cardiology.  Patient was taken to the OR on 10/15 for placement of transvenous pacemaker secondary to bradycardia followed by laparoscopic cholecystectomy with General surgery.  Postop patient transferred to the ICU for further care management with close monitoring giving TVP.    At the time of my exam in the ICU, patient is sleeping, but awakens easily.  He was in no acute distress on 3 L nasal cannula which has been to 2 L of my exam.  No family at bedside.  He denies any acute complaints.  Vital signs stable.    Hospital/ICU Course:  10/15 - admitted to ICU post-op TVP placement and lap harjinder  10/16 - NAEON.  Paced @ 75.  Complains of some mild abdominal pain this morning.  Alert and conversant.   Hemodynamics and oxygenation are stable.    Interval History: ANGIE.  Paced @ 75.  Hemodynamics and oxygenation are stable.  Alert and conversant.  Complaining of some mild diffuse abdominal tenderness.      Objective:     Vital Signs (Most Recent):  Temp: 98 °F (36.7 °C) (10/16/24 0400)  Pulse: 76 (10/16/24 0600)  Resp: 18 (10/16/24 0600)  BP: (!) 143/68 (10/16/24 0600)  SpO2: 95 % (10/16/24 0600) Vital Signs (24h Range):  Temp:  [97 °F (36.1 °C)-98.3 °F (36.8 °C)] 98 °F (36.7 °C)  Pulse:  [57-86] 76  Resp:  [10-36] 18  SpO2:  [93 %-99 %] 95 %  BP: (111-160)/(61-87) 143/68     Weight: 68 kg (149 lb 14.6 oz)  Body mass index is 21.51 kg/m².      Intake/Output Summary (Last 24 hours) at 10/16/2024 0752  Last data filed at 10/16/2024 0600  Gross per 24 hour   Intake 1761.34 ml   Output 250 ml   Net 1511.34 ml        Physical Exam  Vitals and nursing note reviewed.   Constitutional:       General: He is not in acute distress.  Cardiovascular:      Rate and Rhythm: Normal rate and regular rhythm.      Pulses: Normal pulses.      Heart sounds: No murmur heard.  Pulmonary:      Effort: Pulmonary effort is normal. No respiratory distress.      Breath sounds: No wheezing, rhonchi or rales.   Abdominal:      General: Abdomen is flat. There is no distension.      Palpations: Abdomen is soft.      Tenderness: There is abdominal tenderness.   Musculoskeletal:      Right lower leg: No edema.      Left lower leg: No edema.   Neurological:      Mental Status: He is alert.      Comments: Alert and conversant.  Following commands.           Review of Systems    Vents:  Oxygen Concentration (%): 98 (10/15/24 1620)    Lines/Drains/Airways       Drain  Duration             Male External Urinary Catheter 10/15/24 1700 <1 day              Line  Duration                  Pacer Wires 10/15/24 1300 <1 day              Peripheral Intravenous Line  Duration                  Peripheral IV - Single Lumen 10/11/24 0050 20 G Anterior;Left Upper  "Arm 5 days                    Significant Labs:    CBC/Anemia Profile:  Recent Labs   Lab 10/15/24  0822   WBC 4.98   HGB 12.6*   HCT 38.0*   *   MCV 89   RDW 14.5        Chemistries:  Recent Labs   Lab 10/15/24  0822      K 4.0      CO2 23   BUN 12   CREATININE 1.3   CALCIUM 8.8       All pertinent labs within the past 24 hours have been reviewed.    Significant Imaging:  I have reviewed all pertinent imaging results/findings within the past 24 hours.    ABG  No results for input(s): "PH", "PO2", "PCO2", "HCO3", "BE" in the last 168 hours.  Assessment/Plan:     Neuro  Dementia  - affecting medical decision making and complicating the above   - supportive care     Cardiac/Vascular  Chronic diastolic heart failure  - not in acute exac  - cards following  - strict I&Os    PAF (paroxysmal atrial fibrillation)  - telemetry  - TVP in place with rate in the mid-70s and paced on the monitor    Bradycardia  - s/p TVP placement on 10/15 per cards  - plans for removal post-op per card's note; likely today  - cardiac monitoring    Dyslipidemia  - home meds as appropriate     Essential hypertension  - mgmt per cards  - monitor hemodynamics     Coronary artery disease of native artery of native heart with stable angina pectoris  - mgmt per cards  - telemetry    Endocrine  Type 2 diabetes mellitus with other specified complication, unspecified whether long term insulin use  - SSI, monitor trends and adjust meds as needed    GI  * Calculus of bile duct without cholecystitis with obstruction  - s/p lap harjinder 10/15 with general surgery  - zosyn  - OOB, IS when able  - pain control  - diet recs per surgery team       Critical Care Time: 32 minutes  Critical secondary to high risk monitoring      Critical care was time spent personally by me on the following activities: development of treatment plan with patient or surrogate and bedside caregivers, discussions with consultants, evaluation of patient's response to " treatment, examination of patient, ordering and performing treatments and interventions, ordering and review of laboratory studies, ordering and review of radiographic studies, pulse oximetry, re-evaluation of patient's condition. This critical care time did not overlap with that of any other provider or involve time for any procedures.     Grey Gilbert MD  Critical Care Medicine  [facility] - Intensive Care [facility])

## 2024-10-16 NOTE — PLAN OF CARE
FAIR TOLERANCE TO TX., ANNA FOR BED MOBILITY AND TF.  P.T. DC REC: LOW INTENSITY THERAPY WITH 24 HOUR CARE FOR SAFETY

## 2024-10-16 NOTE — ASSESSMENT & PLAN NOTE
TVP placed pre-operatively yesterday, remains in place with HR in the 70s  Further recs/treatment per cardiology

## 2024-10-16 NOTE — PT/OT/SLP EVAL
"Occupational Therapy Evaluation and Treatment    Name: Aquiles Yates  MRN: [id number]  Admitting Diagnosis: Calculus of bile duct without cholecystitis with obstruction  Recent Surgery: Procedure(s) (LRB):  DV5 ROBOTIC CHOLECYSTECTOMY (N/A) 1 Day Post-Op    Recommendations:     Discharge Recommendations: Low Intensity Therapy (24/7 SPV and A)  Level of Assistance Recommended: 24 hours light assistance and 24 hours supervision  Discharge Equipment Recommendations: wheelchair, bath bench  Barriers to discharge: None    Assessment:     Aquiles Yates is a 84 y.o. male with a medical diagnosis of Calculus of bile duct without cholecystitis with obstruction. He presents with performance deficits affecting function including weakness, impaired endurance, impaired self care skills, impaired functional mobility, impaired balance, pain, impaired cardiopulmonary response to activity, decreased safety awareness, impaired cognition, decreased lower extremity function, decreased upper extremity function, impaired fine motor.    Rehab Prognosis: Poor; patient would benefit from acute OT services to address these deficits and reach maximum level of function.    Gross rehabilitation limited due to baseline cognitive deficits.    Plan:     Patient to be seen 2 x/week to address the above listed problems via self-care/home management, therapeutic activities, therapeutic exercises  Plan of Care Expires: 10/30/24  Plan of Care Reviewed with: patient    Subjective     Chief Complaint: Reported "F-O-O-D!" - perseverating on eating throughout session. Educated on rationale for NPO with poor reception and comprehension of education  Patient Comments/Goals: none reported  Pain/Comfort:  Pain Rating 1:  (no nonverbal indicators of pain throughout)  Pain Addressed 1:  (activity pacing)    Social History:  Patient unable to report. Information below from PT evaluation on 10/11/24:    PT  REPORTED LIVING AT HOME WITH WIFE IN " [facility]   Prior to admission, patients level of function was MOD I WITH RW AND DOESN'T DRIVE. 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  Equipment used at home: walker, rolling.  DME owned (not currently used): none.  Upon discharge, patient will have assistance from WIFE ? / UNKOWN.    Objective:     Communicated with nurse, Julianne, prior to session. Patient found supine with blood pressure cuff, pulse ox (continuous), telemetry, peripheral IV, wound vac upon OT entry to room.    General Precautions: Standard, hearing impaired, fall   Orthopedic Precautions: N/A   Braces: N/A    Respiratory Status: Room air    Occupational Performance    Bed Mobility:   Supine to sit from right side of bed with minimum assistance  V/c for technique and to remain actively engaged in completion    Functional Mobility/Transfers:  Sit <> Stand Transfer with minimum assistance with hand-held assist  Bed <> Chair Transfer using Step Transfer technique with minimum assistance with hand-held assist  Functional Mobility: Patient walked ~5ft to bedside chair with min HHA.  Provided with v/c for technique with transfers to increase safety and independence with completion- very limited reception to education due to cognitive deficits and hyper fixation on eating    Activities of Daily Living:  Upper Body Dressing: maximal assistance  Lower Body Dressing: total assistance    Cognitive/Visual Perceptual:  Cognitive/Psychosocial Skills:    -     Oriented to: Person  -     Follows Commands/attention: Easily distracted and inconsistently following 1 step commands    Physical Exam:  Balance:    -     Sitting: contact guard assistance  -     Standing: minimum assistance  Upper Extremity Range of Motion:     -       Right Upper Extremity: WFL  -       Left Upper Extremity: WFL  Upper Extremity Strength:    -       Right Upper Extremity: Deficits: grossly 3+/5  -       Left Upper Extremity: Deficits: grossly 3+/5   Strength:    -       Right  Upper Extremity: Deficits: poor  -       Left Upper Extremity: Deficits: poor  Functional assessment as patient unable to follow commands to complete.    AMPA 6 Click ADL:  AMPAC Total Score: 14    Treatment & Education:  Therapist provided facilitation and instruction of proper body mechanics, energy conservation, and fall prevention strategies during tasks listed above  Patient educated on role of OT, POC, and goals for therapy  Patient educated on importance of OOB activities with staff member assistance and sitting OOB majority of the day  Will require reinforcement of all education    Patient left up in chair with all lines intact, call button in reach, RN notified, and chair alarm on.    GOALS:   Multidisciplinary Problems       Occupational Therapy Goals          Problem: Occupational Therapy    Goal Priority Disciplines Outcome Interventions   Occupational Therapy Goal     OT, PT/OT     Description: Goals to be met by: 10/30/24     Patient will increase functional independence with ADLs by performing:    Toileting from BSC with Contact Guard Assistance for hygiene and clothing management.   Toilet transfer to bedside commode with Contact Guard Assistance.  Increased functional strength in B UE grossly by 1/2 MM grade.                         History:     Past Medical History:   Diagnosis Date    Acute blood loss anemia 10/14/2019    Alzheimer's dementia     Aneurysm, abdominal aortic     BCC (basal cell carcinoma of skin) 06/29/2023    Chronic diastolic heart failure 05/20/2024    Coronary artery disease     Depression     Diabetes mellitus     HLD (hyperlipidemia)     Hypertension     Kidney stone     PAD (peripheral artery disease)     PAF (paroxysmal atrial fibrillation) 01/24/2023    Tobacco abuse          Past Surgical History:   Procedure Laterality Date    APPENDECTOMY      CORONARY STENT PLACEMENT      x 4    DV5 ROBOTIC CHOLECYSTECTOMY N/A 10/15/2024    Procedure: DV5 ROBOTIC CHOLECYSTECTOMY;   Surgeon: Fred Taylor MD;  Location: [facility] OR;  Service: General;  Laterality: N/A;    ERCP N/A 10/10/2024    Procedure: ERCP (ENDOSCOPIC RETROGRADE CHOLANGIOPANCREATOGRAPHY);  Surgeon: Morgan Hong MD;  [facility]  Service: Endoscopy;  Laterality: N/A;    ESOPHAGOGASTRODUODENOSCOPY N/A 10/14/2019    Procedure: EGD (ESOPHAGOGASTRODUODENOSCOPY);  Surgeon: Carlos Mcneal III, MD;  Location: [facility] ENDO;  Service: Endoscopy;  Laterality: N/A;    ESOPHAGOGASTRODUODENOSCOPY N/A 10/15/2019    Procedure: EGD (ESOPHAGOGASTRODUODENOSCOPY);  Surgeon: Carlos Mcneal III, MD;  Location: [facility];  Service: Endoscopy;  Laterality: N/A;    ESOPHAGOGASTRODUODENOSCOPY N/A 4/17/2023    Procedure: EGD (ESOPHAGOGASTRODUODENOSCOPY);  Surgeon: Nevaeh Mcconnell MD;  Location: [facility] ENDO;  Service: Endoscopy;  Laterality: N/A;    INSERTION, PACEMAKER, TEMPORARY TRANSVENOUS N/A 10/15/2024    Procedure: Insertion, Pacemaker, Temporary Transvenous;  Surgeon: Demarcus Kirk MD;  Location: [facility] CATH LAB;  Service: Cardiology;  Laterality: N/A;    LEFT HEART CATHETERIZATION Left 10/11/2019    Procedure: CATHETERIZATION, HEART, LEFT;  Surgeon: Robe Gilbert MD;  Location: [facility] CATH LAB;  Service: Cardiology;  Laterality: Left;    LEFT HEART CATHETERIZATION Left 10/13/2019    Procedure: CATHETERIZATION, HEART, LEFT;  Surgeon: Robe Gilbert MD;  Location: [facility] CATH LAB;  Service: Cardiology;  Laterality: Left;    leg stent Left        Time Tracking:     OT Date of Treatment: 10/16/24  OT Start Time: 1045  OT Stop Time: 1110  OT Total Time (min): 25 min    Billable Minutes: Evaluation 15 and Therapeutic Activity 10    Sruthi Bojorquez OT  10/16/2024

## 2024-10-16 NOTE — ASSESSMENT & PLAN NOTE
Malnutrition Type:  Context: acute on chronic illness  Level: severe    Related to (etiology):   Physiological causes increasing nutrient needs d/t illness   Alteration in GI tract structure/function   Psychological causes     Signs and Symptoms (as evidenced by):   BMI < 22 (older adult)  Underweight with loss of fat and muscle  Unable or unwilling to eat sufficient energy/protein to maintain a healthy weight     Malnutrition Characteristic Summary:  Energy Intake (Malnutrition): less than or equal to 50% for greater than or equal to 5 days  Subcutaneous Fat (Malnutrition): severe depletion  Muscle Mass (Malnutrition): severe depletion    Interventions/Recommendations (treatment strategy):  1. Decreased sodium and fat, carbohydrate, IDDSI Soft and bite-sized level 6 (blue) texture modified diet  2. Commercial beverage medical food supplement therapy   3. Amino acids medical food supplement therapy  4. Management of nutrition related prescription medication  5. Feeding assistance management   6. Collaboration with nutrition professional with other providers     Nutrition Diagnosis Status:   New

## 2024-10-16 NOTE — PLAN OF CARE
Problem: Adult Inpatient Plan of Care  Goal: Plan of Care Review  Outcome: Progressing   Pt afebrile overnight. BP stable. Remain on room air O2 sats 96%. HR v paced at 75. With frequent PVCs. Urine output 150 overnight. No BM. Started LR @ 75cc/hr. Pt drowsy, oriented to person only. Bed alarm set. Call light within reach. Pt verbalized understanding to call when needing assistance.

## 2024-10-16 NOTE — PLAN OF CARE
Nutrition Recommendations/Interventions for malnutrtion 10/16/24:   1. Recommend a Cardiac, Diabetic 2000 calorie, Soft and bite-sized (IDDSI Level 6) diet   2. Recommend Suplena TID to assist filling nutritional gaps   3. Recommend Radhames BID for wound healing   4. Recommend bowel regimen if warranted (no BM x 4 days)   5. Encourage PO and supplement intake, recommend feeding assistance if warranted   6. Weigh twice weekly  7. Collaboration by nutrition professional with other providers     Goals:   1. Pt's diet will be advanced and to appropriate and safest diet texture within 24 hrs   2. Pt will tolerate and consume >75% EEN and EPN prior to RD follow up   3. Pt will tolerate and consume Radhames BID prior to RD follow up   4. Pt will have BM prior to RD follow up    MARY Mast, RDN, LDN

## 2024-10-16 NOTE — ASSESSMENT & PLAN NOTE
-General surgery on board, discussed case  -Will plan on TVP prior to surgical intervention    10/16/24  -s/p lap harjinder, mgmt as per general surgery

## 2024-10-16 NOTE — ASSESSMENT & PLAN NOTE
Patient's hemorrhage is due to gastrointestinal bleed, patient does not have a propensity for bleeding.. Patients most recent Hgb, Hct, platelets, and INR are listed below.  Recent Labs     10/14/24  0600 10/15/24  0822 10/16/24  0809   HGB 11.7* 12.6* 12.8*   HCT 35.1* 38.0* 39.1*   * 145* 160       Plan  - Will trend hemoglobin/hematocrit Daily  - Will monitor and correct any coagulation defects  - Will transfuse if Hgb is <7g/dl (<8g/dl in cases of active ACS) or if patient has rapid bleeding leading to hemodynamic instability  - STABLE

## 2024-10-16 NOTE — PLAN OF CARE
OT ronn completed. Sup>sit min A, sit>stand min A, functional mobility x5ft with min HHA, step>pivot to bedside chair with min HHA. Recommending low intensity intervention with 24/7 SPV and A at d/c.

## 2024-10-17 ENCOUNTER — OUTPATIENT CASE MANAGEMENT (OUTPATIENT)
Dept: ADMINISTRATIVE | Facility: OTHER | Age: 84
End: 2024-10-17
Payer: MEDICARE

## 2024-10-17 LAB
ALBUMIN SERPL BCP-MCNC: 2.5 G/DL (ref 3.5–5.2)
ALP SERPL-CCNC: 108 U/L (ref 55–135)
ALT SERPL W/O P-5'-P-CCNC: 98 U/L (ref 10–44)
ANION GAP SERPL CALC-SCNC: 9 MMOL/L (ref 8–16)
AST SERPL-CCNC: 101 U/L (ref 10–40)
BASOPHILS # BLD AUTO: 0.04 K/UL (ref 0–0.2)
BASOPHILS NFR BLD: 0.7 % (ref 0–1.9)
BILIRUB SERPL-MCNC: 0.8 MG/DL (ref 0.1–1)
BUN SERPL-MCNC: 16 MG/DL (ref 8–23)
CALCIUM SERPL-MCNC: 7.9 MG/DL (ref 8.7–10.5)
CHLORIDE SERPL-SCNC: 110 MMOL/L (ref 95–110)
CO2 SERPL-SCNC: 21 MMOL/L (ref 23–29)
CREAT SERPL-MCNC: 1.3 MG/DL (ref 0.5–1.4)
DIFFERENTIAL METHOD BLD: ABNORMAL
EOSINOPHIL # BLD AUTO: 0.2 K/UL (ref 0–0.5)
EOSINOPHIL NFR BLD: 3.9 % (ref 0–8)
ERYTHROCYTE [DISTWIDTH] IN BLOOD BY AUTOMATED COUNT: 14.6 % (ref 11.5–14.5)
EST. GFR  (NO RACE VARIABLE): 54 ML/MIN/1.73 M^2
GLUCOSE SERPL-MCNC: 139 MG/DL (ref 70–110)
GLUCOSE SERPL-MCNC: 149 MG/DL (ref 70–110)
HCT VFR BLD AUTO: 34.4 % (ref 40–54)
HGB BLD-MCNC: 11.2 G/DL (ref 14–18)
IMM GRANULOCYTES # BLD AUTO: 0.02 K/UL (ref 0–0.04)
IMM GRANULOCYTES NFR BLD AUTO: 0.3 % (ref 0–0.5)
LYMPHOCYTES # BLD AUTO: 0.8 K/UL (ref 1–4.8)
LYMPHOCYTES NFR BLD: 12.8 % (ref 18–48)
MCH RBC QN AUTO: 29 PG (ref 27–31)
MCHC RBC AUTO-ENTMCNC: 32.6 G/DL (ref 32–36)
MCV RBC AUTO: 89 FL (ref 82–98)
MONOCYTES # BLD AUTO: 0.6 K/UL (ref 0.3–1)
MONOCYTES NFR BLD: 10 % (ref 4–15)
NEUTROPHILS # BLD AUTO: 4.4 K/UL (ref 1.8–7.7)
NEUTROPHILS NFR BLD: 72.3 % (ref 38–73)
NRBC BLD-RTO: 0 /100 WBC
PLATELET # BLD AUTO: 167 K/UL (ref 150–450)
PMV BLD AUTO: 10.7 FL (ref 9.2–12.9)
POCT GLUCOSE: 149 MG/DL (ref 70–110)
POCT GLUCOSE: 165 MG/DL (ref 70–110)
POCT GLUCOSE: 170 MG/DL (ref 70–110)
POTASSIUM SERPL-SCNC: 3.8 MMOL/L (ref 3.5–5.1)
PROT SERPL-MCNC: 5.8 G/DL (ref 6–8.4)
RBC # BLD AUTO: 3.86 M/UL (ref 4.6–6.2)
SODIUM SERPL-SCNC: 140 MMOL/L (ref 136–145)
WBC # BLD AUTO: 6.1 K/UL (ref 3.9–12.7)

## 2024-10-17 PROCEDURE — 99024 POSTOP FOLLOW-UP VISIT: CPT | Mod: HCNC,,,

## 2024-10-17 PROCEDURE — 80053 COMPREHEN METABOLIC PANEL: CPT | Mod: HCNC | Performed by: INTERNAL MEDICINE

## 2024-10-17 PROCEDURE — 85025 COMPLETE CBC W/AUTO DIFF WBC: CPT | Mod: HCNC | Performed by: INTERNAL MEDICINE

## 2024-10-17 PROCEDURE — 36415 COLL VENOUS BLD VENIPUNCTURE: CPT | Mod: HCNC | Performed by: INTERNAL MEDICINE

## 2024-10-17 PROCEDURE — 25000003 PHARM REV CODE 250: Mod: HCNC | Performed by: NURSE PRACTITIONER

## 2024-10-17 PROCEDURE — 21400001 HC TELEMETRY ROOM: Mod: HCNC

## 2024-10-17 PROCEDURE — 25000003 PHARM REV CODE 250: Mod: HCNC | Performed by: INTERNAL MEDICINE

## 2024-10-17 PROCEDURE — 25000003 PHARM REV CODE 250: Mod: HCNC | Performed by: PHYSICIAN ASSISTANT

## 2024-10-17 RX ADMIN — VALSARTAN 160 MG: 160 TABLET, FILM COATED ORAL at 08:10

## 2024-10-17 RX ADMIN — APIXABAN 2.5 MG: 2.5 TABLET, FILM COATED ORAL at 08:10

## 2024-10-17 RX ADMIN — MUPIROCIN: 20 OINTMENT TOPICAL at 09:10

## 2024-10-17 RX ADMIN — ISOSORBIDE MONONITRATE 120 MG: 60 TABLET, EXTENDED RELEASE ORAL at 08:10

## 2024-10-17 RX ADMIN — APIXABAN 2.5 MG: 2.5 TABLET, FILM COATED ORAL at 09:10

## 2024-10-17 RX ADMIN — PANTOPRAZOLE SODIUM 40 MG: 40 TABLET, DELAYED RELEASE ORAL at 08:10

## 2024-10-17 RX ADMIN — MUPIROCIN: 20 OINTMENT TOPICAL at 08:10

## 2024-10-17 RX ADMIN — CHLORHEXIDINE GLUCONATE 0.12% ORAL RINSE 15 ML: 1.2 LIQUID ORAL at 08:10

## 2024-10-17 NOTE — ASSESSMENT & PLAN NOTE
Patient's hemorrhage is due to gastrointestinal bleed, patient does not have a propensity for bleeding.. Patients most recent Hgb, Hct, platelets, and INR are listed below.  Recent Labs     10/15/24  0822 10/16/24  0809 10/17/24  0525   HGB 12.6* 12.8* 11.2*   HCT 38.0* 39.1* 34.4*   * 160 167       Plan  - Will trend hemoglobin/hematocrit Daily  - Will monitor and correct any coagulation defects  - Will transfuse if Hgb is <7g/dl (<8g/dl in cases of active ACS) or if patient has rapid bleeding leading to hemodynamic instability  - STABLE

## 2024-10-17 NOTE — SUBJECTIVE & OBJECTIVE
Interval History: POD 2. Tolerating regular diet. Minimal abdominal pain and no nausea. Stable for discharge from surgical standpoint once medically optimized.     Medications:  Continuous Infusions:   0.9% NaCl    Continuous PRN   Stopped at 10/15/24 1701     Scheduled Meds:   apixaban  2.5 mg Oral BID    chlorhexidine  15 mL Mouth/Throat BID    isosorbide mononitrate  120 mg Oral Daily    mupirocin   Nasal BID    pantoprazole  40 mg Oral Daily    valsartan  160 mg Oral Daily     PRN Meds:  Current Facility-Administered Medications:     0.9% NaCl, , , Continuous PRN    acetaminophen, 650 mg, Oral, Q8H PRN    albuterol-ipratropium, 3 mL, Nebulization, Q6H PRN    aluminum-magnesium hydroxide-simethicone, 30 mL, Oral, QID PRN    dextrose 10%, 12.5 g, Intravenous, PRN    dextrose 10%, 25 g, Intravenous, PRN    gabapentin, 100 mg, Oral, BID PRN    glucagon (human recombinant), 1 mg, Intramuscular, PRN    glucose, 16 g, Oral, PRN    glucose, 24 g, Oral, PRN    hydrALAZINE, 10 mg, Intravenous, Q6H PRN    HYDROcodone-acetaminophen, 1 tablet, Oral, Q6H PRN    influenza (adjuvanted), 0.5 mL, Intramuscular, Prior to discharge    insulin aspart U-100, 0-5 Units, Subcutaneous, QID (AC + HS) PRN    naloxone, 0.02 mg, Intravenous, PRN    ondansetron, 8 mg, Oral, Q8H PRN    ondansetron, 4 mg, Intravenous, Q8H PRN    promethazine, 25 mg, Oral, Q6H PRN    senna-docusate 8.6-50 mg, 1 tablet, Oral, BID PRN    sodium chloride 0.9%, 10 mL, Intravenous, Q12H PRN     Review of patient's allergies indicates:   Allergen Reactions    Metoprolol Other (See Comments)     Junctional rhythm       Objective:     Vital Signs (Most Recent):  Temp: 97.8 °F (36.6 °C) (10/17/24 0842)  Pulse: (!) 48 (10/17/24 0842)  Resp: 20 (10/17/24 0842)  BP: (!) 185/81 (10/17/24 0842)  SpO2: (!) 93 % (10/17/24 0842) Vital Signs (24h Range):  Temp:  [97.4 °F (36.3 °C)-98.6 °F (37 °C)] 97.8 °F (36.6 °C)  Pulse:  [42-57] 48  Resp:  [16-28] 20  SpO2:  [92 %-100 %] 93  %  BP: ()/() 185/81     Weight: 68 kg (149 lb 14.6 oz)  Body mass index is 21.51 kg/m².    Intake/Output - Last 3 Shifts         10/15 0700  10/16 0659 10/16 0700  10/17 0659 10/17 0700  10/18 0659    I.V. (mL/kg) 443.8 (6.5)      IV Piggyback 1317.5      Total Intake(mL/kg) 1761.3 (25.9)      Urine (mL/kg/hr) 250 (0.2)      Stool 0      Total Output 250      Net +1511.3             Urine Occurrence 1 x      Stool Occurrence 0 x 1 x              Physical Exam  Vitals reviewed.   Constitutional:       General: He is not in acute distress.     Appearance: He is well-developed. He is not toxic-appearing.   HENT:      Head: Normocephalic and atraumatic.      Right Ear: External ear normal.      Left Ear: External ear normal.      Nose: Nose normal.      Mouth/Throat:      Mouth: Mucous membranes are moist.   Eyes:      Extraocular Movements: Extraocular movements intact.      Conjunctiva/sclera: Conjunctivae normal.   Cardiovascular:      Rate and Rhythm: Normal rate.   Pulmonary:      Effort: Pulmonary effort is normal. No respiratory distress.   Abdominal:      Comments: Abdomen is soft and nondistended with minimal unexpected pato-incisional tenderness to palpation.  Incisions are clean dry and intact, healing well without signs of infection.   Musculoskeletal:      Cervical back: Normal range of motion and neck supple.   Skin:     General: Skin is warm and dry.   Neurological:      Mental Status: He is alert and oriented to person, place, and time.   Psychiatric:         Behavior: Behavior normal.          Significant Labs:  I have reviewed all pertinent lab results within the past 24 hours.  CBC:   Recent Labs   Lab 10/17/24  0525   WBC 6.10   RBC 3.86*   HGB 11.2*   HCT 34.4*      MCV 89   MCH 29.0   MCHC 32.6     CMP:   Recent Labs   Lab 10/17/24  0525   *   CALCIUM 7.9*   ALBUMIN 2.5*   PROT 5.8*      K 3.8   CO2 21*      BUN 16   CREATININE 1.3   ALKPHOS 108   ALT 98*   AST  101*   BILITOT 0.8       Significant Diagnostics:  I have reviewed all pertinent imaging results/findings within the past 24 hours.  No new pertinent imaging

## 2024-10-17 NOTE — PROGRESS NOTES
19/17/24 - Mr Woods was referred to [facility] on 10/7/24, but has remained inpt since referral. Will monitor chart and attempt to contact when d/c'd home. Lidya Carl RN

## 2024-10-17 NOTE — SUBJECTIVE & OBJECTIVE
Interval History: f/u cholecystitis no acute issues overnight. Patient asking for food. Informed him breakfast was at the bedside    Review of Systems  Objective:     Vital Signs (Most Recent):  Temp: 97.8 °F (36.6 °C) (10/17/24 0842)  Pulse: (!) 48 (10/17/24 0842)  Resp: 20 (10/17/24 0842)  BP: (!) 185/81 (10/17/24 0842)  SpO2: (!) 93 % (10/17/24 0842) Vital Signs (24h Range):  Temp:  [97.4 °F (36.3 °C)-98.6 °F (37 °C)] 97.8 °F (36.6 °C)  Pulse:  [42-57] 48  Resp:  [16-35] 20  SpO2:  [92 %-100 %] 93 %  BP: ()/() 185/81     Weight: 68 kg (149 lb 14.6 oz)  Body mass index is 21.51 kg/m².  No intake or output data in the 24 hours ending 10/17/24 1027      Physical Exam  HENT:      Head: Normocephalic and atraumatic.   Cardiovascular:      Rate and Rhythm: Regular rhythm. Bradycardia present.      Heart sounds: No murmur heard.  Pulmonary:      Effort: Pulmonary effort is normal. No respiratory distress.      Breath sounds: Normal breath sounds. No wheezing.   Abdominal:      General: Bowel sounds are normal. There is no distension.      Palpations: Abdomen is soft.      Tenderness: There is no abdominal tenderness.   Musculoskeletal:         General: No swelling.   Skin:     General: Skin is warm and dry.   Neurological:      Mental Status: He is alert and oriented to person, place, and time. Mental status is at baseline.             Significant Labs: All pertinent labs within the past 24 hours have been reviewed.  Recent Lab Results  (Last 5 results in the past 24 hours)        10/17/24  0540   10/17/24  0525   10/17/24  0521   10/16/24  2154   10/16/24  1658        Albumin   2.5             ALP   108             ALT   98             Anion Gap   9             AST   101             Baso #   0.04             Basophil %   0.7             BILIRUBIN TOTAL   0.8  Comment: For infants and newborns, interpretation of results should be based  on gestational age, weight and in agreement with  clinical  observations.    Premature Infant recommended reference ranges:  Up to 24 hours.............<8.0 mg/dL  Up to 48 hours............<12.0 mg/dL  3-5 days..................<15.0 mg/dL  6-29 days.................<15.0 mg/dL               BUN   16             Calcium   7.9             Chloride   110             CO2   21             Creatinine   1.3             Differential Method   Automated             eGFR   54             Eos #   0.2             Eos %   3.9             Glucose   139             Gran # (ANC)   4.4             Gran %   72.3             Hematocrit   34.4             Hemoglobin   11.2             Immature Grans (Abs)   0.02  Comment: Mild elevation in immature granulocytes is non specific and   can be seen in a variety of conditions including stress response,   acute inflammation, trauma and pregnancy. Correlation with other   laboratory and clinical findings is essential.               Immature Granulocytes   0.3             Lymph #   0.8             Lymph %   12.8             MCH   29.0             MCHC   32.6             MCV   89             Mono #   0.6             Mono %   10.0             MPV   10.7             nRBC   0             Platelet Count   167             POC Glucose 149               POCT Glucose     149   227   228       Potassium   3.8             PROTEIN TOTAL   5.8             RBC   3.86             RDW   14.6             Sodium   140             WBC   6.10                                    Significant Imaging: I have reviewed all pertinent imaging results/findings within the past 24 hours.

## 2024-10-17 NOTE — PROGRESS NOTES
Report called to RANDA Garza. Pt's wife eBatrice updated on pt to be transferred to Med-Surg Rm [id number]; Beatrice RED..

## 2024-10-17 NOTE — ASSESSMENT & PLAN NOTE
ERCP with clearing of choledocholithiasis and stent placement and post ERCP pancreatitis, now s/p cholecystectomy.     - Advance diet as tolerated, no restrictions  - Ambulate  - Incentive spirometry  - Remainder of care per primary team. Will need 2 week outpatient surgical f/u once d/c.   - Surgery will sign off. Call with any questions.

## 2024-10-17 NOTE — ASSESSMENT & PLAN NOTE
Patient has paroxysmal (<7 days) atrial fibrillation. Patient is currently in  sinus [place] . [id number] Score: 4. The patients heart rate in the last 24 hours is as follows:  Pulse  Min: 42  Max: 57     Antiarrhythmics   None    Anticoagulants  apixaban tablet 2.5 mg, 2 times daily, Oral    Plan  - Replete lytes with a goal of K>4, Mg >2

## 2024-10-17 NOTE — ASSESSMENT & PLAN NOTE
Chronic, controlled.  Latest blood pressure and vitals reviewed-   Temp:  [97.4 °F (36.3 °C)-98.6 °F (37 °C)]   Pulse:  [42-57]   Resp:  [16-35]   BP: ()/()   SpO2:  [92 %-100 %] .   Home meds for hypertension were reviewed and noted below.   Hypertension Medications               isosorbide mononitrate (IMDUR) 120 MG 24 hr tablet TAKE 1 TABLET BY MOUTH EVERY DAY    valsartan (DIOVAN) 160 MG tablet Take 1 tablet (160 mg total) by mouth once daily.            While in the hospital, will manage blood pressure as follows, resumed valsartan    Will utilize p.r.n. blood pressure medication only if patient's blood pressure greater than  180/110 and he develops symptoms such as worsening chest pain or shortness of breath.

## 2024-10-17 NOTE — ASSESSMENT & PLAN NOTE
Patient's FSGs are controlled on current medication regimen.  Last A1c reviewed-   Lab Results   Component Value Date    HGBA1C 7.0 (H) 05/07/2024     Most recent fingerstick glucose reviewed-   Recent Labs   Lab 10/16/24  1658 10/16/24  2154 10/17/24  0521   POCTGLUCOSE 228* 227* 149*       Current correctional scale  Low  Maintain anti-hyperglycemic dose as follows-   Antihyperglycemics (From admission, onward)    Start     Stop Route Frequency Ordered    10/06/24 0146  insulin aspart U-100 pen 0-5 Units         -- SubQ Before meals & nightly PRN 10/06/24 0048        Hold Oral hypoglycemics while patient is in the hospital.

## 2024-10-17 NOTE — NURSING TRANSFER
Nursing Transfer Note      10/16/2024   10:23 PM    Med/Surg CN and another floor nurse brought pt to the floor lying in the bed. Pt transported with his personal blanket and cell phone.     Pt given a bed bath. Gown changed.     Tele box # [id number] placed on pt.

## 2024-10-17 NOTE — PROGRESS NOTES
[facility]  General Surgery  Progress Note    Subjective:     History of Present Illness:  85 y/o male with past medical history significant for DMII, CAD with multiple PCI procedures, chronic diastolic CHF, afib with h/o CVA,  HTN, DM, LAE, LVH, PAD, AAA stent graft (approx 2013), dementia, hyperlipidemia who presented to the ED approximately 2 days ago for evaluation after his wife was concerned about tremors and the appearance of his skin. He is a poor historian secondary to dementia. CT abdomen pelvis unremarkable. U/S abdomen showing cholelithiasis without evidence of cholecystitis, but an MRCP was obtained secondary to elevated LFTs and Tbili. MRCP with significant choledocholithiasis. Patient was transported to Center earlier today to attempt ERCP, but he became significant bradycardic and the procedure was aborted. His LFTs are trending down today. He reports being hungry at the time of exam, and his wife denies any past abdominal surgical history.    Post-Op Info:  Procedure(s) (LRB):  DV5 ROBOTIC CHOLECYSTECTOMY (N/A)   2 Days Post-Op     Interval History: POD 2. Tolerating regular diet. Minimal abdominal pain and no nausea. Stable for discharge from surgical standpoint once medically optimized.     Medications:  Continuous Infusions:   0.9% NaCl    Continuous PRN   Stopped at 10/15/24 1701     Scheduled Meds:   apixaban  2.5 mg Oral BID    chlorhexidine  15 mL Mouth/Throat BID    isosorbide mononitrate  120 mg Oral Daily    mupirocin   Nasal BID    pantoprazole  40 mg Oral Daily    valsartan  160 mg Oral Daily     PRN Meds:  Current Facility-Administered Medications:     0.9% NaCl, , , Continuous PRN    acetaminophen, 650 mg, Oral, Q8H PRN    albuterol-ipratropium, 3 mL, Nebulization, Q6H PRN    aluminum-magnesium hydroxide-simethicone, 30 mL, Oral, QID PRN    dextrose 10%, 12.5 g, Intravenous, PRN    dextrose 10%, 25 g, Intravenous, PRN    gabapentin, 100 mg, Oral, BID PRN    glucagon (human  recombinant), 1 mg, Intramuscular, PRN    glucose, 16 g, Oral, PRN    glucose, 24 g, Oral, PRN    hydrALAZINE, 10 mg, Intravenous, Q6H PRN    HYDROcodone-acetaminophen, 1 tablet, Oral, Q6H PRN    influenza (adjuvanted), 0.5 mL, Intramuscular, Prior to discharge    insulin aspart U-100, 0-5 Units, Subcutaneous, QID (AC + HS) PRN    naloxone, 0.02 mg, Intravenous, PRN    ondansetron, 8 mg, Oral, Q8H PRN    ondansetron, 4 mg, Intravenous, Q8H PRN    promethazine, 25 mg, Oral, Q6H PRN    senna-docusate 8.6-50 mg, 1 tablet, Oral, BID PRN    sodium chloride 0.9%, 10 mL, Intravenous, Q12H PRN     Review of patient's allergies indicates:   Allergen Reactions    Metoprolol Other (See Comments)     Junctional rhythm       Objective:     Vital Signs (Most Recent):  Temp: 97.8 °F (36.6 °C) (10/17/24 0842)  Pulse: (!) 48 (10/17/24 0842)  Resp: 20 (10/17/24 0842)  BP: (!) 185/81 (10/17/24 0842)  SpO2: (!) 93 % (10/17/24 0842) Vital Signs (24h Range):  Temp:  [97.4 °F (36.3 °C)-98.6 °F (37 °C)] 97.8 °F (36.6 °C)  Pulse:  [42-57] 48  Resp:  [16-28] 20  SpO2:  [92 %-100 %] 93 %  BP: ()/() 185/81     Weight: 68 kg (149 lb 14.6 oz)  Body mass index is 21.51 kg/m².    Intake/Output - Last 3 Shifts         10/15 0700  10/16 0659 10/16 0700  10/17 0659 10/17 0700  10/18 0659    I.V. (mL/kg) 443.8 (6.5)      IV Piggyback 1317.5      Total Intake(mL/kg) 1761.3 (25.9)      Urine (mL/kg/hr) 250 (0.2)      Stool 0      Total Output 250      Net +1511.3             Urine Occurrence 1 x      Stool Occurrence 0 x 1 x              Physical Exam  Vitals reviewed.   Constitutional:       General: He is not in acute distress.     Appearance: He is well-developed. He is not toxic-appearing.   HENT:      Head: Normocephalic and atraumatic.      Right Ear: External ear normal.      Left Ear: External ear normal.      Nose: Nose normal.      Mouth/Throat:      Mouth: Mucous membranes are moist.   Eyes:      Extraocular Movements: Extraocular  movements intact.      Conjunctiva/sclera: Conjunctivae normal.   Cardiovascular:      Rate and Rhythm: Normal rate.   Pulmonary:      Effort: Pulmonary effort is normal. No respiratory distress.   Abdominal:      Comments: Abdomen is soft and nondistended with minimal unexpected pato-incisional tenderness to palpation.  Incisions are clean dry and intact, healing well without signs of infection.   Musculoskeletal:      Cervical back: Normal range of motion and neck supple.   Skin:     General: Skin is warm and dry.   Neurological:      Mental Status: He is alert and oriented to person, place, and time.   Psychiatric:         Behavior: Behavior normal.          Significant Labs:  I have reviewed all pertinent lab results within the past 24 hours.  CBC:   Recent Labs   Lab 10/17/24  0525   WBC 6.10   RBC 3.86*   HGB 11.2*   HCT 34.4*      MCV 89   MCH 29.0   MCHC 32.6     CMP:   Recent Labs   Lab 10/17/24  0525   *   CALCIUM 7.9*   ALBUMIN 2.5*   PROT 5.8*      K 3.8   CO2 21*      BUN 16   CREATININE 1.3   ALKPHOS 108   ALT 98*   *   BILITOT 0.8       Significant Diagnostics:  I have reviewed all pertinent imaging results/findings within the past 24 hours.  No new pertinent imaging   Assessment/Plan:     * Calculus of bile duct without cholecystitis with obstruction  ERCP with clearing of choledocholithiasis and stent placement and post ERCP pancreatitis, now s/p cholecystectomy.     - Advance diet as tolerated, no restrictions  - Ambulate  - Incentive spirometry  - Remainder of care per primary team. Will need 2 week outpatient surgical f/u once d/c.   - Surgery will sign off. Call with any questions.     Chronic diastolic heart failure  Currently with significant bradycardic, prohibitive of procedures    Need to improvement and bradycardia prior to cholecystectomy defer to cardiology     Bradycardia  TVP placed pre-operatively yesterday, remains in place with HR in the 70s  Further  recs/treatment per cardiology    Type 2 diabetes mellitus with other specified complication, unspecified whether long term insulin use  Management per medicine    Dyslipidemia  Management per medicine    Essential hypertension  Management per medicine    Coronary artery disease of native artery of native heart with stable angina pectoris  Management per medicine/cardiology        Jacquelyn Sun PA-C  General Surgery  [facility] - Med S[facility]

## 2024-10-17 NOTE — NURSING
Monitor room called to report that pt had an 8 beat run of vtach and hr was in the 100s.    Checked on pt, he is sleeping peacefully.

## 2024-10-17 NOTE — ASSESSMENT & PLAN NOTE
Nutrition consulted. Most recent weight and BMI monitored-     Measurements:  Wt Readings from Last 1 Encounters:   10/16/24 68 kg (149 lb 14.6 oz)   Body mass index is 21.51 kg/m².    Patient has been screened and assessed by RD.    Malnutrition Type:  Context: acute illness or injury, chronic illness  Level: severe    Malnutrition Characteristic Summary:  Energy Intake (Malnutrition): less than or equal to 50% for greater than or equal to 5 days  Subcutaneous Fat (Malnutrition): severe depletion  Muscle Mass (Malnutrition): severe depletion    Interventions/Recommendations (treatment strategy):  1. Decreased sodium and fat, carbohydrate, IDDSI Soft and bite-sized level 6 (blue) texture modified diet  2. Commercial beverage medical food supplement therapy   3. Amino acids medical food supplement therapy  4. Management of nutrition related prescription medication  5. Feeding assistance management   6. Collaboration with nutrition professional with other providers

## 2024-10-17 NOTE — PROGRESS NOTES
[facility] [place] [facility]  Progress Note    Patient Name: Aquiles Yates  MRN: [id number]  Patient Class: IP- Inpatient   Admission Date: 10/5/2024  Length of Stay: 11 days  Attending Physician: Jackie Ramos MD  Primary Care Provider: Holger Thornton MD        Subjective:     Principal Problem:Calculus of bile duct without cholecystitis with obstruction        HPI:  Aquiles Yates is a 84 y.o. male with a PMH  has a past medical history of Acute blood loss anemia (10/14/2019), Alzheimer's dementia, Aneurysm, abdominal aortic, BCC (basal cell carcinoma of skin) (06/29/2023), Chronic diastolic heart failure (05/20/2024), Coronary artery disease, Depression, Diabetes mellitus, HLD (hyperlipidemia), Hypertension, Kidney stone, PAD (peripheral artery disease), PAF (paroxysmal atrial fibrillation) (01/24/2023), and Tobacco abuse.presented to the Emergency Department for evaluation of tremors and mottled skin per wife. Wife called the paramedics when she noticed patient shaking and patient's mottled skin today. Pt has a history of Alzheimer's and paramedics report a GCS of 14. Upon interview, pt denies any pain and is oriented to self. No further complaints or concerns at this time.       ER workup revealed CBC to be unremarkable.  CMP revealed CBG of 202 mg/dL, , total bili of 2.6, and AST/ALT of 242/179.  .  Troponin negative.  Lactic acid 2.8.  Flu COVID negative.  UA unremarkable.  CTA abdomen and pelvis without contrast revealed no acute findings.  Chest x-ray negative for acute cardiopulmonary findings.  Ultrasound limited revealed cholelithiasis without definitive sonographic evidence of acute cholecystitis.  Mild intrahepatic biliary duct dilation.  Vital signs stable. EKG revealed sinus Nicholas with occasional PVCs with a ventricular rate of 56 beats per minute and a QT/QTC of 408/393.  Patient received 4.5 g Zosyn in ED. GI consulted.  In agreement with  MRCP and will see us consult in a.m..  Patient in agreement with treatment plan.  Patient will be admitted under inpatient status.    PCP: Holger Thornton      Overview/Hospital Course:  Patient is an 84-year-old male with a history of anemia, Alzheimer's dementia, aneurysm, chronic diastolic failure, coronary artery disease diabetes mellitus, hypertension, PAF who presented emergency department with tremors.  Patient's wife said she was he was shaking and had mottled skin.  Patient denied any complaints.  In the emergency department workup revealed glucose of 202, alk-phos of 387, total bili of 2.6, AST of 242 ALT of 179, BNP of 323, lactic acid of 2.4.  CT scan of abdomen pelvis without contrast revealed no acute findings, ultrasound revealed cholelithiasis with ductal dilatation.  Patient was admitted with diagnosis of acute cholecystitis choledocholithiasis.  He was started on Zosyn.  GI was consulted MRCP was ordered with plans for ERCP in [place].  Patient's heart rate was in the 40s yesterday EKG revealed sinus bradycardia.  He went to [place] today for ERCP however heart rate was less than 40 therefore he was sent back for cardiology evaluation.  Patient denies any pain.    Cardiology saw patient with recommendations to stop his Aricept as it was make be contributing to his bradycardia.  Patient was tolerating diet and LFTs have improved.    Status post ERCP:                        - The major papilla appeared normal.                          - The common bile duct was dilated.                          - Choledocholithiasis was found. Complete removal                          was accomplished by biliary sphincterotomy and                          balloon extraction.                          - A biliary sphincterotomy was performed.                          - The biliary tree was swept.                          - One biliary stent was placed into the common                          bile duct.      Patient was scheduled for cholecystectomy 10/11 however heart rate was in the 40s.  Cardiology evaluated and plan TVP while in OR. Underwent cholecystectomy 10/15, Patient monitored in ICU overnight. pacing wires pulled 10/16. Patient tolerating diet and working with therapy.    Interval History: f/u cholecystitis no acute issues overnight. Patient asking for food. Informed him breakfast was at the bedside    Review of Systems  Objective:     Vital Signs (Most Recent):  Temp: 97.8 °F (36.6 °C) (10/17/24 0842)  Pulse: (!) 48 (10/17/24 0842)  Resp: 20 (10/17/24 0842)  BP: (!) 185/81 (10/17/24 0842)  SpO2: (!) 93 % (10/17/24 0842) Vital Signs (24h Range):  Temp:  [97.4 °F (36.3 °C)-98.6 °F (37 °C)] 97.8 °F (36.6 °C)  Pulse:  [42-57] 48  Resp:  [16-35] 20  SpO2:  [92 %-100 %] 93 %  BP: ()/() 185/81     Weight: 68 kg (149 lb 14.6 oz)  Body mass index is 21.51 kg/m².  No intake or output data in the 24 hours ending 10/17/24 1027      Physical Exam  HENT:      Head: Normocephalic and atraumatic.   Cardiovascular:      Rate and Rhythm: Regular rhythm. Bradycardia present.      Heart sounds: No murmur heard.  Pulmonary:      Effort: Pulmonary effort is normal. No respiratory distress.      Breath sounds: Normal breath sounds. No wheezing.   Abdominal:      General: Bowel sounds are normal. There is no distension.      Palpations: Abdomen is soft.      Tenderness: There is no abdominal tenderness.   Musculoskeletal:         General: No swelling.   Skin:     General: Skin is warm and dry.   Neurological:      Mental Status: He is alert and oriented to person, place, and time. Mental status is at baseline.             Significant Labs: All pertinent labs within the past 24 hours have been reviewed.  Recent Lab Results  (Last 5 results in the past 24 hours)        10/17/24  0540   10/17/24  0525   10/17/24  0521   10/16/24  2154   10/16/24  1658        Albumin   2.5             ALP   108             ALT   98              Anion Gap   9             AST   101             Baso #   0.04             Basophil %   0.7             BILIRUBIN TOTAL   0.8  Comment: For infants and newborns, interpretation of results should be based  on gestational age, weight and in agreement with clinical  observations.    Premature Infant recommended reference ranges:  Up to 24 hours.............<8.0 mg/dL  Up to 48 hours............<12.0 mg/dL  3-5 days..................<15.0 mg/dL  6-29 days.................<15.0 mg/dL               BUN   16             Calcium   7.9             Chloride   110             CO2   21             Creatinine   1.3             Differential Method   Automated             eGFR   54             Eos #   0.2             Eos %   3.9             Glucose   139             Gran # (ANC)   4.4             Gran %   72.3             Hematocrit   34.4             Hemoglobin   11.2             Immature Grans (Abs)   0.02  Comment: Mild elevation in immature granulocytes is non specific and   can be seen in a variety of conditions including stress response,   acute inflammation, trauma and pregnancy. Correlation with other   laboratory and clinical findings is essential.               Immature Granulocytes   0.3             Lymph #   0.8             Lymph %   12.8             MCH   29.0             MCHC   32.6             MCV   89             Mono #   0.6             Mono %   10.0             MPV   10.7             nRBC   0             Platelet Count   167             POC Glucose 149               POCT Glucose     149   227   228       Potassium   3.8             PROTEIN TOTAL   5.8             RBC   3.86             RDW   14.6             Sodium   140             WBC   6.10                                    Significant Imaging: I have reviewed all pertinent imaging results/findings within the past 24 hours.    Assessment/Plan:      * Calculus of bile duct without cholecystitis with obstruction  Completed ERCP with stone removal [facility] 10/10 without complication  Cholecystectomy 10/15  Tolerating diet    Severe protein-calorie malnutrition  Nutrition consulted. Most recent weight and BMI monitored-     Measurements:  Wt Readings from Last 1 Encounters:   10/16/24 68 kg (149 lb 14.6 oz)   Body mass index is 21.51 kg/m².    Patient has been screened and assessed by RD.    Malnutrition Type:  Context: acute illness or injury, chronic illness  Level: severe    Malnutrition Characteristic Summary:  Energy Intake (Malnutrition): less than or equal to 50% for greater than or equal to 5 days  Subcutaneous Fat (Malnutrition): severe depletion  Muscle Mass (Malnutrition): severe depletion    Interventions/Recommendations (treatment strategy):  1. Decreased sodium and fat, carbohydrate, IDDSI Soft and bite-sized level 6 (blue) texture modified diet  2. Commercial beverage medical food supplement therapy   3. Amino acids medical food supplement therapy  4. Management of nutrition related prescription medication  5. Feeding assistance management   6. Collaboration with nutrition professional with other providers      Chronic diastolic heart failure  Patient is identified as having Diastolic (HFpEF) heart failure that is Chronic. CHF is currently controlled. Latest ECHO performed and demonstrates- Results for orders placed during the hospital encounter of 06/13/22    Echo    Interpretation Summary  · The left ventricle is normal in size with severe concentric hypertrophy and normal systolic function.  · Mild left atrial enlargement.  · The estimated ejection fraction is 55%.  · Grade II left ventricular diastolic dysfunction.  · Normal right ventricular size with normal right ventricular systolic function.  .   monitor clinical status closely. Monitor on telemetry. Patient is off CHF pathway.  Monitor strict Is&Os and daily weights.  Place on fluid restriction of 2 L. Continue to stress to patient importance of self efficacy and  on diet for  CHF. Last BNP reviewed- and noted below   Recent Labs   Lab 10/05/24  2048   *     .            Dementia  Patient with dementia with likely etiology of alzheimer's dementia. Dementia is moderate. The patient does not have signs of behavioral disturbance. Home dementia medications are held.  Continue non-pharmacologic interventions to prevent delirium (No VS between 11PM-5AM, activity during day, opening blinds, providing glasses/hearing aids, and up in chair during daytime). Will avoid narcotics and benzos unless absolutely necessary. PRN anti-psychotics are not prescribed to avoid self harm behaviors.    AVM (arteriovenous malformation) of stomach, acquired with hemorrhage  Patient's hemorrhage is due to gastrointestinal bleed, patient does not have a propensity for bleeding.. Patients most recent Hgb, Hct, platelets, and INR are listed below.  Recent Labs     10/15/24  0822 10/16/24  0809 10/17/24  0525   HGB 12.6* 12.8* 11.2*   HCT 38.0* 39.1* 34.4*   * 160 167       Plan  - Will trend hemoglobin/hematocrit Daily  - Will monitor and correct any coagulation defects  - Will transfuse if Hgb is <7g/dl (<8g/dl in cases of active ACS) or if patient has rapid bleeding leading to hemodynamic instability  - STABLE    PAF (paroxysmal atrial fibrillation)  Patient has paroxysmal (<7 days) atrial fibrillation. Patient is currently in  sinus Nicholas . [id number] Score: 4. The patients heart rate in the last 24 hours is as follows:  Pulse  Min: 42  Max: 57     Antiarrhythmics   None    Anticoagulants  apixaban tablet 2.5 mg, 2 times daily, Oral    Plan  - Replete lytes with a goal of K>4, Mg >2      Bradycardia  Seen by Cardiology who recommended stopping his Aricept.    Awaiting further recommendations    Type 2 diabetes mellitus with other specified complication, unspecified whether long term insulin use  Patient's FSGs are controlled on current medication regimen.  Last A1c reviewed-   Lab Results   Component  Value Date    HGBA1C 7.0 (H) 05/07/2024     Most recent fingerstick glucose reviewed-   Recent Labs   Lab 10/16/24  1658 10/16/24  2154 10/17/24  0521   POCTGLUCOSE 228* 227* 149*       Current correctional scale  Low  Maintain anti-hyperglycemic dose as follows-   Antihyperglycemics (From admission, onward)      Start     Stop Route Frequency Ordered    10/06/24 0146  insulin aspart U-100 pen 0-5 Units         -- SubQ Before meals & nightly PRN 10/06/24 0048          Hold Oral hypoglycemics while patient is in the hospital.      Dyslipidemia  Patient is chronically on statin.will not continue for now. Last Lipid Panel:   Lab Results   Component Value Date    CHOL 150 05/07/2024    HDL 36 (L) 05/07/2024    LDLCALC 94.2 05/07/2024    TRIG 99 05/07/2024    CHOLHDL 24.0 05/07/2024     -Hold home medication due to elevated LFTs          Essential hypertension  Chronic, controlled.  Latest blood pressure and vitals reviewed-   Temp:  [97.4 °F (36.3 °C)-98.6 °F (37 °C)]   Pulse:  [42-57]   Resp:  [16-35]   BP: ()/()   SpO2:  [92 %-100 %] .   Home meds for hypertension were reviewed and noted below.   Hypertension Medications               isosorbide mononitrate (IMDUR) 120 MG 24 hr tablet TAKE 1 TABLET BY MOUTH EVERY DAY    valsartan (DIOVAN) 160 MG tablet Take 1 tablet (160 mg total) by mouth once daily.            While in the hospital, will manage blood pressure as follows, resumed valsartan    Will utilize p.r.n. blood pressure medication only if patient's blood pressure greater than  180/110 and he develops symptoms such as worsening chest pain or shortness of breath.      Coronary artery disease of native artery of native heart with stable angina pectoris  Patient with known CAD s/p stent placement, which is controlled. and monitor for S/Sx of angina/ACS. Continue to monitor on telemetry.       VTE Risk Mitigation (From admission, onward)           Ordered     apixaban tablet 2.5 mg  2 times daily          10/16/24 1341     Reason for No Pharmacological VTE Prophylaxis  Once        Question:  Reasons:  Answer:  Physician Provided (leave comment)  Comment:  Pending potential surgical intervention    10/06/24 0048     IP VTE HIGH RISK PATIENT  Once         10/06/24 0048     Place sequential compression device  Until discontinued         10/06/24 0048                    Discharge Planning   ZEINA:      Code Status: Full Code   Is the patient medically ready for discharge?:     Reason for patient still in hospital (select all that apply): Patient trending condition and Treatment  Discharge Plan A: Home with family                  Jackie Ramos MD  Department of Hospital Medicine   [facility]

## 2024-10-17 NOTE — PLAN OF CARE
10/17/24 1301   Rounds   Attendance Provider;;Charge nurse;Physical therapist   Discharge Plan A Home with family   Why the patient remains in the hospital Requires continued medical care   Transition of Care Barriers None       Assessment/Plan:      * Calculus of bile duct without cholecystitis with obstruction  Completed ERCP with stone removal [facility] 10/10 without complication  Cholecystectomy 10/15  Tolerating diet     Severe protein-calorie malnutrition  Nutrition consulted. Most recent weight and BMI monitored-      Measurements:      Wt Readings from Last 1 Encounters:   10/16/24 68 kg (149 lb 14.6 oz)   Body mass index is 21.51 kg/m².     Patient has been screened and assessed by RD.     Malnutrition Type:  Context: acute illness or injury, chronic illness  Level: severe     Malnutrition Characteristic Summary:  Energy Intake (Malnutrition): less than or equal to 50% for greater than or equal to 5 days  Subcutaneous Fat (Malnutrition): severe depletion  Muscle Mass (Malnutrition): severe depletion     Interventions/Recommendations (treatment strategy):  1. Decreased sodium and fat, carbohydrate, IDDSI Soft and bite-sized level 6 (blue) texture modified diet  2. Commercial beverage medical food supplement therapy   3. Amino acids medical food supplement therapy  4. Management of nutrition related prescription medication  5. Feeding assistance management   6. Collaboration with nutrition professional with other providers        Chronic diastolic heart failure  Patient is identified as having Diastolic (HFpEF) heart failure that is Chronic. CHF is currently controlled. Latest ECHO performed and demonstrates- Results for orders placed during the hospital encounter of 06/13/22     Echo     Interpretation Summary  · The left ventricle is normal in size with severe concentric hypertrophy and normal systolic function.  · Mild left atrial enlargement.  · The estimated ejection fraction is  55%.  · Grade II left ventricular diastolic dysfunction.  · Normal right ventricular size with normal right ventricular systolic function.  .   monitor clinical status closely. Monitor on telemetry. Patient is off CHF pathway.  Monitor strict Is&Os and daily weights.  Place on fluid restriction of 2 L. Continue to stress to patient importance of self efficacy and  on diet for CHF. Last BNP reviewed- and noted below       Recent Labs   Lab 10/05/24  2048   *      .                 Dementia  Patient with dementia with likely etiology of alzheimer's dementia. Dementia is moderate. The patient does not have signs of behavioral disturbance. Home dementia medications are held.  Continue non-pharmacologic interventions to prevent delirium (No VS between 11PM-5AM, activity during day, opening blinds, providing glasses/hearing aids, and up in chair during daytime). Will avoid narcotics and benzos unless absolutely necessary. PRN anti-psychotics are not prescribed to avoid self harm behaviors.     AVM (arteriovenous malformation) of stomach, acquired with hemorrhage  Patient's hemorrhage is due to gastrointestinal bleed, patient does not have a propensity for bleeding.. Patients most recent Hgb, Hct, platelets, and INR are listed below.        Recent Labs     10/15/24  0822 10/16/24  0809 10/17/24  0525   HGB 12.6* 12.8* 11.2*   HCT 38.0* 39.1* 34.4*   * 160 167         Plan  - Will trend hemoglobin/hematocrit Daily  - Will monitor and correct any coagulation defects  - Will transfuse if Hgb is <7g/dl (<8g/dl in cases of active ACS) or if patient has rapid bleeding leading to hemodynamic instability  - STABLE     PAF (paroxysmal atrial fibrillation)  Patient has paroxysmal (<7 days) atrial fibrillation. Patient is currently in  sinus Nicholas . [id number] Score: 4. The patients heart rate in the last 24 hours is as follows:  Pulse  Min: 42  Max: 57      Antiarrhythmics   None     Anticoagulants  apixaban  tablet 2.5 mg, 2 times daily, Oral     Plan  - Replete lytes with a goal of K>4, Mg >2        Bradycardia  Seen by Cardiology who recommended stopping his Aricept.    Awaiting further recommendations     Type 2 diabetes mellitus with other specified complication, unspecified whether long term insulin use  Patient's FSGs are controlled on current medication regimen.  Last A1c reviewed-         Lab Results   Component Value Date     HGBA1C 7.0 (H) 05/07/2024      Most recent fingerstick glucose reviewed-         Recent Labs   Lab 10/16/24  1658 10/16/24  2154 10/17/24  0521   POCTGLUCOSE 228* 227* 149*         Current correctional scale  Low  Maintain anti-hyperglycemic dose as follows-   Antihyperglycemics (From admission, onward)        Start     Stop Route Frequency Ordered     10/06/24 0146   insulin aspart U-100 pen 0-5 Units         -- SubQ Before meals & nightly PRN 10/06/24 0048             Hold Oral hypoglycemics while patient is in the hospital.        Dyslipidemia  Patient is chronically on statin.will not continue for now. Last Lipid Panel:         Lab Results   Component Value Date     CHOL 150 05/07/2024     HDL 36 (L) 05/07/2024     LDLCALC 94.2 05/07/2024     TRIG 99 05/07/2024     CHOLHDL 24.0 05/07/2024      -Hold home medication due to elevated LFTs              Essential hypertension  Chronic, controlled.  Latest blood pressure and vitals reviewed-   Temp:  [97.4 °F (36.3 °C)-98.6 °F (37 °C)]   Pulse:  [42-57]   Resp:  [16-35]   BP: ()/()   SpO2:  [92 %-100 %] .   Home meds for hypertension were reviewed and noted below.   Hypertension Medications                    isosorbide mononitrate (IMDUR) 120 MG 24 hr tablet TAKE 1 TABLET BY MOUTH EVERY DAY     valsartan (DIOVAN) 160 MG tablet Take 1 tablet (160 mg total) by mouth once daily.                While in the hospital, will manage blood pressure as follows, resumed valsartan     Will utilize p.r.n. blood pressure medication only if  patient's blood pressure greater than  180/110 and he develops symptoms such as worsening chest pain or shortness of breath.        Coronary artery disease of native artery of native heart with stable angina pectoris  Patient with known CAD s/p stent placement, which is controlled. and monitor for S/Sx of angina/ACS. Continue to monitor on telemetry.         VTE Risk Mitigation (From admission, onward)              Ordered       apixaban tablet 2.5 mg  2 times daily         10/16/24 1341       Reason for No Pharmacological VTE Prophylaxis  Once        Question:  Reasons:  Answer:  Physician Provided (leave comment)  Comment:  Pending potential surgical intervention    10/06/24 0048       IP VTE HIGH RISK PATIENT  Once         10/06/24 0048       Place sequential compression device  Until discontinued         10/06/24 0048                          Discharge Planning   ZEINA:      Code Status: Full Code   Is the patient medically ready for discharge?:     Reason for patient still in hospital (select all that apply): Patient trending condition and Treatment  Discharge Plan A: Home with family

## 2024-10-17 NOTE — PHYSICIAN QUERY
Please clarify the nutritional diagnosis associated with the clinical findings (include all that apply):  Severe protein calorie malnutrition

## 2024-10-18 ENCOUNTER — TELEPHONE (OUTPATIENT)
Dept: ENDOSCOPY | Facility: HOSPITAL | Age: 84
End: 2024-10-18
Payer: MEDICARE

## 2024-10-18 VITALS
HEIGHT: 70 IN | SYSTOLIC BLOOD PRESSURE: 135 MMHG | WEIGHT: 149.94 LBS | OXYGEN SATURATION: 93 % | DIASTOLIC BLOOD PRESSURE: 68 MMHG | HEART RATE: 50 BPM | TEMPERATURE: 98 F | RESPIRATION RATE: 19 BRPM | BODY MASS INDEX: 21.47 KG/M2

## 2024-10-18 LAB
ALBUMIN SERPL BCP-MCNC: 2.6 G/DL (ref 3.5–5.2)
ALP SERPL-CCNC: 115 U/L (ref 40–150)
ALT SERPL W/O P-5'-P-CCNC: 75 U/L (ref 10–44)
ANION GAP SERPL CALC-SCNC: 8 MMOL/L (ref 8–16)
AST SERPL-CCNC: 61 U/L (ref 10–40)
BASOPHILS # BLD AUTO: 0.03 K/UL (ref 0–0.2)
BASOPHILS NFR BLD: 0.6 % (ref 0–1.9)
BILIRUB SERPL-MCNC: 0.7 MG/DL (ref 0.1–1)
BUN SERPL-MCNC: 16 MG/DL (ref 8–23)
CALCIUM SERPL-MCNC: 8.3 MG/DL (ref 8.7–10.5)
CHLORIDE SERPL-SCNC: 110 MMOL/L (ref 95–110)
CO2 SERPL-SCNC: 21 MMOL/L (ref 23–29)
CREAT SERPL-MCNC: 1.1 MG/DL (ref 0.5–1.4)
DIFFERENTIAL METHOD BLD: ABNORMAL
EOSINOPHIL # BLD AUTO: 0.2 K/UL (ref 0–0.5)
EOSINOPHIL NFR BLD: 4.7 % (ref 0–8)
ERYTHROCYTE [DISTWIDTH] IN BLOOD BY AUTOMATED COUNT: 14.6 % (ref 11.5–14.5)
EST. GFR  (NO RACE VARIABLE): >60 ML/MIN/1.73 M^2
FINAL PATHOLOGIC DIAGNOSIS: NORMAL
GLUCOSE SERPL-MCNC: 133 MG/DL (ref 70–110)
GLUCOSE SERPL-MCNC: 144 MG/DL (ref 70–110)
GROSS: NORMAL
HCT VFR BLD AUTO: 35.7 % (ref 40–54)
HGB BLD-MCNC: 11.8 G/DL (ref 14–18)
IMM GRANULOCYTES # BLD AUTO: 0.02 K/UL (ref 0–0.04)
IMM GRANULOCYTES NFR BLD AUTO: 0.4 % (ref 0–0.5)
LYMPHOCYTES # BLD AUTO: 1.2 K/UL (ref 1–4.8)
LYMPHOCYTES NFR BLD: 23.2 % (ref 18–48)
Lab: NORMAL
MCH RBC QN AUTO: 29.6 PG (ref 27–31)
MCHC RBC AUTO-ENTMCNC: 33.1 G/DL (ref 32–36)
MCV RBC AUTO: 90 FL (ref 82–98)
MONOCYTES # BLD AUTO: 0.5 K/UL (ref 0.3–1)
MONOCYTES NFR BLD: 9.9 % (ref 4–15)
NEUTROPHILS # BLD AUTO: 3.1 K/UL (ref 1.8–7.7)
NEUTROPHILS NFR BLD: 61.2 % (ref 38–73)
NRBC BLD-RTO: 0 /100 WBC
PLATELET # BLD AUTO: 174 K/UL (ref 150–450)
PMV BLD AUTO: 10.5 FL (ref 9.2–12.9)
POCT GLUCOSE: 144 MG/DL (ref 70–110)
POTASSIUM SERPL-SCNC: 3.6 MMOL/L (ref 3.5–5.1)
PROT SERPL-MCNC: 6 G/DL (ref 6–8.4)
RBC # BLD AUTO: 3.99 M/UL (ref 4.6–6.2)
SODIUM SERPL-SCNC: 139 MMOL/L (ref 136–145)
WBC # BLD AUTO: 5.13 K/UL (ref 3.9–12.7)

## 2024-10-18 PROCEDURE — 25000003 PHARM REV CODE 250: Mod: HCNC | Performed by: INTERNAL MEDICINE

## 2024-10-18 PROCEDURE — 36415 COLL VENOUS BLD VENIPUNCTURE: CPT | Mod: HCNC | Performed by: INTERNAL MEDICINE

## 2024-10-18 PROCEDURE — 85025 COMPLETE CBC W/AUTO DIFF WBC: CPT | Mod: HCNC | Performed by: INTERNAL MEDICINE

## 2024-10-18 PROCEDURE — 80053 COMPREHEN METABOLIC PANEL: CPT | Mod: HCNC | Performed by: INTERNAL MEDICINE

## 2024-10-18 RX ADMIN — ISOSORBIDE MONONITRATE 120 MG: 60 TABLET, EXTENDED RELEASE ORAL at 08:10

## 2024-10-18 RX ADMIN — APIXABAN 2.5 MG: 2.5 TABLET, FILM COATED ORAL at 08:10

## 2024-10-18 RX ADMIN — MUPIROCIN: 20 OINTMENT TOPICAL at 08:10

## 2024-10-18 RX ADMIN — VALSARTAN 160 MG: 160 TABLET, FILM COATED ORAL at 08:10

## 2024-10-18 RX ADMIN — PANTOPRAZOLE SODIUM 40 MG: 40 TABLET, DELAYED RELEASE ORAL at 08:10

## 2024-10-18 NOTE — PLAN OF CARE
Discussed poc with pt    Purposeful rounding    VS wnl    Cardiac monitoring in use tele monitor # [id number]    Blood glucose monitoring     Fall precautions in place, remains injury free    Pt denies c/o anything    Accurate I&Os    Bed locked at lowest position    Call light within reach    Chart check continued    Will cont with POC

## 2024-10-18 NOTE — PLAN OF CARE
[facility]  Discharge Final Note    Primary Care Provider: Holger Thornton MD    Expected Discharge Date: 10/18/2024    Final Discharge Note (most recent)       Final Note - 10/18/24 0853          Final Note    Assessment Type Final Discharge Note     Anticipated Discharge Disposition Home or Self Care     Hospital Resources/Appts/Education Provided Appointment suggestion unavailable        Post-Acute Status    Discharge Delays None known at this time                   Contact Info       Holger Thornton MD   Specialty: Internal Medicine   Relationship: PCP - General    [address] [place] [address]  [place] [address]   Phone: [phone]       Next Steps: Follow up    Fred Taylor MD   Specialty: General Surgery, Bariatrics    [address] [place] [address] [address] [place] [address]   Phone: [phone]       Next Steps: Follow up          No available PCP appt within 14 days. Sw requested appt scheduling assistance from [facility]ation Team.     Patient has no d/c needs at this time. Sw to follow up, as needed, for d/c planning purposes.

## 2024-10-18 NOTE — PT/OT/SLP PROGRESS
Physical Therapy      Patient Name:  Aquiles Yates   MRN:  [id number]    13:35   Unable to see patient at this time as he had already D/C from facility. No need for follow up.

## 2024-10-19 ENCOUNTER — TELEPHONE (OUTPATIENT)
Dept: ENDOSCOPY | Facility: HOSPITAL | Age: 84
End: 2024-10-19
Payer: MEDICARE

## 2024-10-19 DIAGNOSIS — R93.2 ABNORMAL FINDINGS ON DIAGNOSTIC IMAGING OF LIVER AND BILIARY TRACT: ICD-10-CM

## 2024-10-19 DIAGNOSIS — Z46.89 ENCOUNTER FOR REMOVAL OF BILIARY STENT: Primary | ICD-10-CM

## 2024-10-19 NOTE — TELEPHONE ENCOUNTER
Dear Provider,    Patient has a scheduled procedure ERCP  on 12/2/24 and is currently taking a blood thinner. In order to ensure patient safety, we would like to confirm that the patient can place their blood thinner medication on hold for the procedure. Can he/she discontinue Eliquis (apixaban) for a minimum of 2   days prior to the procedure?     Thank you for your prompt reply.    [facility] Scheduling

## 2024-10-19 NOTE — TELEPHONE ENCOUNTER
Spoke to pt spouse to schedule procedure(s) ERCP        Physician to perform procedure(s) Dr. JADA Dejesus  Date of Procedure (s) 12/2/24  Arrival Time 10:00 AM  Time of Procedure(s) 11:00 AM   Location of Procedure(s) [place] 2nd Floor  Type of Rx Prep sent to patient: N/A  Instructions provided to patient via Postal Mail    Patient was informed on the following information and verbalized understanding. Screening questionnaire reviewed with patient and complete. If procedure requires anesthesia, a responsible adult needs to be present to accompany the patient home, patient cannot drive after receiving anesthesia. Appointment details are tentative, especially check-in time. Patient will receive a prep-op call 7 days prior to confirm check-in time for procedure. If applicable the patient should contact their pharmacy to verify Rx for procedure prep is ready for pick-up. Patient was advised to call the scheduling department at [phone] if pharmacy states no Rx is available. Patient was advised to call the endoscopy scheduling department if any questions or concerns arise.       Endoscopy Scheduling Department      ERCP Prep Instructions    Date of procedure: 12/2/24 Arrive at: 10:00 AM    Location of Department:   [facility] - [address], [place] [address]   Take the Elevators to 2nd Floor Endoscopy Procedural Area    How to prep:      Day Before Procedure 12/1/24     You may have a light evening meal.   No solid food after 7:00 pm.   Continue drinking clear liquids.      Day of the Procedure 12/2/24     You may have water/clear liquids until 2 hours before your procedure or as directed by the scheduling nurse 9:00 AM.   See below for list.    What You CANNOT do:   Do not drink milk or anything colored red.  Do not drink alcohol.  No gum chewing or candy morning of procedure    Liquids That Are OK to Drink:   Water  Sports drinks (Gatorade, Power-Aid)  Coffee or tea (no cream or nondairy  creamer)  Clear juices without pulp (apple, white grape)  Gelatin desserts (no fruit or toppings)  Clear soda (sprite, coke, ginger ale)  Chicken broth (until 12 midnight the night before procedure)            IMPORTANT INFORMATION TO KNOW BEFORE YOUR PROCEDURE    [facility] 2nd Floor    If your procedure requires the administration of anesthesia, it is necessary for a responsible adult to drive you home. (Medical Transportation, Uber, Lyft, Taxi, etc. may ONLY be used if a responsible adult is present to accompany you home.  The responsible adult CAN'T be the  of the service).      person must be available to return to pick you up within 15 minutes of being notified of discharge.       Please bring a picture ID, insurance card, & copayment      Take Medications as directed below:      1) Stop taking Eliquis (apixaban) (prior to the procedure) on:  11/30/24         If you begin taking any blood thinning medications or injectable weight loss/diabetes medications (other than insulin) , please contact the endoscopy scheduling department listed below as soon as possible.    If you are diabetic see the attached instruction sheet regarding your medication.     If you take HEART, BLOOD PRESSURE, SEIZURE, PAIN, LUNG (including inhalers/nebulizers), ANTI-REJECTION (transplant patients), or PSYCHIATRIC medications, please take at your regular times with a sip of water or as directed by the scheduling nurse.     Important contact information:    Endoscopy [phone] Hours of operation Monday-Friday 8:00-4:30pm.    Questions about insurance or financial obligations call [phone] or [phone].    If you have questions regarding the prep or need to reschedule, please call [phone]. After hours questions requiring immediate assistance, contact [facility] nurse line at [phone] or [phone].   NOTE:     On occasion, unforeseen circumstances may cause a  delay in your procedure start time. We respect your time and appreciate your patience during these circumstances.      Comments: n/a

## 2024-10-21 ENCOUNTER — PATIENT OUTREACH (OUTPATIENT)
Dept: ADMINISTRATIVE | Facility: CLINIC | Age: 84
End: 2024-10-21
Payer: MEDICARE

## 2024-10-21 ENCOUNTER — TELEPHONE (OUTPATIENT)
Dept: CARDIOLOGY | Facility: CLINIC | Age: 84
End: 2024-10-21
Payer: MEDICARE

## 2024-10-21 NOTE — PROGRESS NOTES
C3 nurse spoke with Aquiles Yates,, patient's spouse, Gayle, for a TCC post hospital discharge follow up call. The patient has a scheduled HOSFU appointment with Holger Thornton MD  on 10/28/2024 @ 10:30 AM.

## 2024-10-21 NOTE — TELEPHONE ENCOUNTER
Please schedule f/u appt w AMBER Hernandez, who recently saw pt in hospital last week for reassessment and clearance.    Thanks    Dr Gilbert      Dear Provider,     Patient has a scheduled procedure ERCP  on 12/2/24 and is currently taking a blood thinner. In order to ensure patient safety, we would like to confirm that the patient can place their blood thinner medication on hold for the procedure. Can he/she discontinue Eliquis (apixaban) for a minimum of 2   days prior to the procedure?     Thank you for your prompt reply.     [facility] Endoscopy Scheduling

## 2024-10-22 NOTE — DISCHARGE SUMMARY
[facility] Medicine  Discharge Summary      Patient Name: Aquiles Yates  MRN: [id number]  AYESHA: [id number]  Patient Class: IP- Inpatient  Admission Date: 10/5/2024  Hospital Length of Stay: 12 days  Discharge Date and Time: 10/18/2024 11:32 AM  Attending Physician: No att. providers found   Discharging Provider: Jackie Ramos MD  Primary Care Provider: Holger Thornton MD    Primary Care Team: Networked reference to record PCT     HPI:   Aquiles Yates is a 84 y.o. male with a PMH  has a past medical history of Acute blood loss anemia (10/14/2019), Alzheimer's dementia, Aneurysm, abdominal aortic, BCC (basal cell carcinoma of skin) (06/29/2023), Chronic diastolic heart failure (05/20/2024), Coronary artery disease, Depression, Diabetes mellitus, HLD (hyperlipidemia), Hypertension, Kidney stone, PAD (peripheral artery disease), PAF (paroxysmal atrial fibrillation) (01/24/2023), and Tobacco abuse.presented to the Emergency Department for evaluation of tremors and mottled skin per wife. Wife called the paramedics when she noticed patient shaking and patient's mottled skin today. Pt has a history of Alzheimer's and paramedics report a GCS of 14. Upon interview, pt denies any pain and is oriented to self. No further complaints or concerns at this time.       ER workup revealed CBC to be unremarkable.  CMP revealed CBG of 202 mg/dL, , total bili of 2.6, and AST/ALT of 242/179.  .  Troponin negative.  Lactic acid 2.8.  Flu COVID negative.  UA unremarkable.  CTA abdomen and pelvis without contrast revealed no acute findings.  Chest x-ray negative for acute cardiopulmonary findings.  Ultrasound limited revealed cholelithiasis without definitive sonographic evidence of acute cholecystitis.  Mild intrahepatic biliary duct dilation.  Vital signs stable. EKG revealed sinus Nicholas with occasional PVCs with a ventricular rate of 56 beats per minute and a QT/QTC of  408/393.  Patient received 4.5 g Zosyn in ED. GI consulted.  In agreement with MRCP and will see us consult in a.m..  Patient in agreement with treatment plan.  Patient will be admitted under inpatient status.    PCP: Holger Thornton      Procedure(s) (LRB):  DV5 ROBOTIC CHOLECYSTECTOMY (N/A)      Hospital Course:   Patient is an 84-year-old male with a history of anemia, Alzheimer's dementia, aneurysm, chronic diastolic failure, coronary artery disease diabetes mellitus, hypertension, PAF who presented emergency department with tremors.  Patient's wife said she was he was shaking and had mottled skin.  Patient denied any complaints.  In the emergency department workup revealed glucose of 202, alk-phos of 387, total bili of 2.6, AST of 242 ALT of 179, BNP of 323, lactic acid of 2.4.  CT scan of abdomen pelvis without contrast revealed no acute findings, ultrasound revealed cholelithiasis with ductal dilatation.  Patient was admitted with diagnosis of acute cholecystitis choledocholithiasis.  He was started on Zosyn.  GI was consulted MRCP was ordered with plans for ERCP in [place].  Patient's heart rate was in the 40s yesterday EKG revealed sinus bradycardia.  He went to [place] today for ERCP however heart rate was less than 40 therefore he was sent back for cardiology evaluation.  Patient denies any pain.    Cardiology saw patient with recommendations to stop his Aricept as it was make be contributing to his bradycardia.  Patient was tolerating diet and LFTs have improved.    Status post ERCP:                        - The major papilla appeared normal.                          - The common bile duct was dilated.                          - Choledocholithiasis was found. Complete removal                          was accomplished by biliary sphincterotomy and                          balloon extraction.                          - A biliary sphincterotomy was performed.                          - The  biliary tree was swept.                          - One biliary stent was placed into the common                          bile duct.     Patient was scheduled for cholecystectomy 10/11 however heart rate was in the 40s.  Cardiology evaluated and  TVP while in OR. Underwent cholecystectomy 10/15, Patient monitored in ICU overnight. pacing wires pulled 10/16. Patient tolerating diet and working with therapy. Patient seen and examined, stable for discharge. Outpatient follow-up with surgery.     Goals of Care Treatment Preferences:  Code Status: Full Code      SDOH Screening:  The patient was screened for utility difficulties, food insecurity, transport difficulties, housing insecurity, and interpersonal safety and there were no concerns identified this admission.     Consults:   Consults (From admission, onward)          Status Ordering Provider     Inpatient consult to Cardiology  Once        Provider:  Raul Angel MD    Completed TEJINDER STONE.     Inpatient consult to General Surgery  Once        Provider:  Fred Taylor MD    Completed TEJINDER STONE     Inpatient consult to Gastroenterology  Once        Provider:  Nevaeh Mcconnell MD    Completed YOVANI STANTON                Final Active Diagnoses:    Diagnosis Date Noted POA    PRINCIPAL PROBLEM:  Calculus of bile duct without cholecystitis with obstruction [K80.51] 10/06/2024 Yes    Severe protein-calorie malnutrition [E43] 10/16/2024 Yes    Chronic diastolic heart failure [I50.32] 05/20/2024 Yes    Dementia [F03.90] 08/01/2023 Yes    AVM (arteriovenous malformation) of stomach, 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] 04/13/2023 Yes    PAF (paroxysmal atrial fibrillation) [I48.0] 01/24/2023 Yes    Bradycardia [R00.1] 10/12/2019 Yes    Coronary artery disease of native artery of native heart with stable angina pectoris [I25.118] 06/12/2019 Yes    Essential hypertension [I10] 06/12/2019 Yes     Chronic    Dyslipidemia [E78.5] 06/12/2019  Yes     Chronic    Type 2 diabetes mellitus with other specified complication, unspecified whether long term insulin use [E11.69] 06/12/2019 Yes      Problems Resolved During this Admission:    Diagnosis Date Noted Date Resolved POA    Pain in both lower extremities [M79.604, M79.605] 09/22/2021 10/09/2024 Yes    Recurrent major depressive disorder [F33.9] 06/12/2019 10/09/2024 Yes       Discharged Condition: stable    Disposition: Home or Self Care    Follow Up:   Follow-up Information       Holger Thornton MD Follow up.    Specialty: Internal Medicine  Contact information:  [address] [place] [address]  [phone]               Fred Taylor MD Follow up.    Specialties: General Surgery, Bariatrics  Contact information:  [address] [place] [address] [phone]                           Patient Instructions:      MRI MRCP Abdomen W WO Contrast 3D WO Independent WS XPD   Standing Status: Future Standing Exp. Date: 10/06/25     Order Specific Question Answer Comments   Does the patient have or ever had a pacemaker or a defibrillator (Note: Some facilities may not be able to schedule an MRI for patients with pacemakers and defibrillators. You should contact your local radiology dept to determine if this is the case.)? No    Does the patient have an aneurysm or surgical clip, pump, nerve/brain stimulator, middle/inner ear prosthesis, or other metal implant or foreign object (bullet, shrapnel)? If they have a card related to their implant, ask them to bring it. Issues related to the implant may cause the MRI to be delayed. No    Will the patient require po anxiolysis or sedation? No    May the Radiologist modify the order per protocol to meet the clinical needs of the patient? Yes    Does the patient have on a skin patch for medication with aluminized backing? No      Ambulatory referral/consult to Outpatient Case Management   Referral Priority: Routine Referral Type:  Consultation   Referral Reason: Specialty Services Required   Number of Visits Requested: 1     Diet diabetic     Activity as tolerated       Significant Diagnostic Studies: Radiology:   Imaging Results              US Abdomen Limited (Final result)  Result time 10/05/24 23:26:19      Final result by Jerel Boswell MD (10/05/24 23:26:19)                   Impression:      Cholelithiasis without definite sonographic evidence of acute cholecystitis.    Borderline prominent extrahepatic common bile duct, noting the extrahepatic common duct is poorly visualized in its entirety.  Additionally, there is mild intrahepatic biliary ductal dilatation.  Correlation with appropriate lab values advised if there is concern for choledocholithiasis.      Electronically signed by: Jerel Boswell MD  Date:    10/05/2024  Time:    23:26               Narrative:    EXAMINATION:  US ABDOMEN LIMITED    CLINICAL HISTORY:  evaluate gallbladder;    TECHNIQUE:  Limited ultrasound of the right upper quadrant of the abdomen (including pancreas, liver, gallbladder, common bile duct, and spleen) was performed.    COMPARISON:  CT 10/05/2024    FINDINGS:  Liver: No focal abnormality of the visualized hepatic parenchyma.    Gallbladder: There are multiple calculi identified in the gallbladder lumen measuring up to 1.4 cm.  No significant gallbladder wall thickening or gallbladder wall hyperemia appreciated.  The technologist reports a negative sonographic Weinstein sign.    Biliary system: The extrahepatic common bile duct is difficult to visualize.  The visualized portion measures 0.6 cm.  There is mild intra hepatic biliary ductal dilatation.    Pancreas: Obscured by bowel gas.    Knee: No evidence of hydronephrosis.    Miscellaneous: No upper abdominal ascites.                                       CT Abdomen Pelvis  Without Contrast (Final result)  Result time 10/05/24 22:47:17      Final result by Shavonne Chappell MD (10/05/24 22:47:17)                    Impression:      No acute finding unenhanced imaging      Electronically signed by: Shavonne Chappell  Date:    10/05/2024  Time:    22:47               Narrative:    EXAMINATION:  CT ABDOMEN PELVIS WITHOUT CONTRAST    CLINICAL HISTORY:  Abdominal pain, acute, nonlocalized;    TECHNIQUE:  Low dose axial images, sagittal and coronal reformations were obtained from the lung bases to the pubic symphysis.  Contrast was not administered.    COMPARISON:  October 2009    FINDINGS:  Left basilar pulmonary nodule similar size    Unenhanced spleen pancreas and liver stable.    Kidneys without hydronephrosis or adverse finding unenhanced    Aortic aneurysm excluded by endovascular aorto bi-iliac stent graft similar size and morphology as visualized unenhanced    No obstructive or inflammatory bowel findings    Urinary bladder decompressed and prostate gland calcification                                       X-Ray Chest AP Portable (Final result)  Result time 10/05/24 21:14:30      Final result by Shavonne Chappell MD (10/05/24 21:14:30)                   Impression:      No active or adverse finding degraded imaging with artifact      Electronically signed by: Shavonne Chappell  Date:    10/05/2024  Time:    21:14               Narrative:    EXAMINATION:  XR CHEST AP PORTABLE    CLINICAL HISTORY:  weakness;    TECHNIQUE:  Single frontal portable view of the chest was performed.    COMPARISON:  05/21/2024    FINDINGS:  No pulmonary opacity or pleural effusion identified slight portable exam with artifact                                        Pending Diagnostic Studies:       None           Medications:  Reconciled Home Medications:      Medication List        CONTINUE taking these medications      alcohol swabs Padm  Commonly known as: ALCOHOL PREP PADS  Twice a day     aspirin 81 MG EC tablet  Commonly known as: ECOTRIN  Take 1 tablet (81 mg total) by mouth once daily.     blood sugar diagnostic  Strp  Test blood sugars twice a day     gabapentin 100 MG capsule  Commonly known as: NEURONTIN  Take 1 capsule (100 mg total) by mouth 2 (two) times daily as needed. PRN     isosorbide mononitrate 120 MG 24 hr tablet  Commonly known as: IMDUR  TAKE 1 TABLET BY MOUTH EVERY DAY     lancets Misc  Commonly known as: ACCU-CHEK SOFTCLIX LANCETS  Test blood sugars twice a day     memantine 10 MG Tab  Commonly known as: NAMENDA  Take 1 tablet (10 mg total) by mouth 2 (two) times daily.     metFORMIN 500 MG tablet  Commonly known as: GLUCOPHAGE  TAKE 1 TABLET BY MOUTH EVERY DAY WITH BREAKFAST     pantoprazole 40 MG tablet  Commonly known as: PROTONIX  Take 1 tablet (40 mg total) by mouth once daily.     valsartan 160 MG tablet  Commonly known as: DIOVAN  Take 1 tablet (160 mg total) by mouth once daily.            STOP taking these medications      donepeziL 5 MG tablet  Commonly known as: ARICEPT              Indwelling Lines/Drains at time of discharge:   Lines/Drains/Airways       None                   Time spent on the discharge of patient: 31 minutes         Jackie Ramos MD  Department of Hospital Medicine  [facility] - Med Surg

## 2024-10-23 ENCOUNTER — OUTPATIENT CASE MANAGEMENT (OUTPATIENT)
Dept: ADMINISTRATIVE | Facility: OTHER | Age: 84
End: 2024-10-23
Payer: MEDICARE

## 2024-10-23 NOTE — PROGRESS NOTES
Outpatient Care Management  Patient Does Not Consent    Patient: Aquiles Yates  MRN:  [id number]  Date of Service:  10/23/2024  Completed by:  Lidya Carl RN    Chief Complaint   Patient presents with    Nursing Assessment    Case Closure       Patient Summary           Consent Received:  Decline

## 2024-10-27 DIAGNOSIS — I20.9 AP (ANGINA PECTORIS): ICD-10-CM

## 2024-10-28 ENCOUNTER — OFFICE VISIT (OUTPATIENT)
Dept: FAMILY MEDICINE | Facility: CLINIC | Age: 84
End: 2024-10-28
Payer: MEDICARE

## 2024-10-28 ENCOUNTER — LAB VISIT (OUTPATIENT)
Dept: LAB | Facility: HOSPITAL | Age: 84
End: 2024-10-28
Attending: INTERNAL MEDICINE
Payer: MEDICARE

## 2024-10-28 VITALS
HEART RATE: 70 BPM | DIASTOLIC BLOOD PRESSURE: 68 MMHG | HEIGHT: 70 IN | BODY MASS INDEX: 19.16 KG/M2 | RESPIRATION RATE: 18 BRPM | OXYGEN SATURATION: 99 % | SYSTOLIC BLOOD PRESSURE: 118 MMHG | WEIGHT: 133.81 LBS | TEMPERATURE: 98 F

## 2024-10-28 DIAGNOSIS — Z90.49 HX OF CHOLECYSTECTOMY: ICD-10-CM

## 2024-10-28 DIAGNOSIS — F03.90 DEMENTIA, UNSPECIFIED DEMENTIA SEVERITY, UNSPECIFIED DEMENTIA TYPE, UNSPECIFIED WHETHER BEHAVIORAL, PSYCHOTIC, OR MOOD DISTURBANCE OR ANXIETY: ICD-10-CM

## 2024-10-28 DIAGNOSIS — E11.69 TYPE 2 DIABETES MELLITUS WITH OTHER SPECIFIED COMPLICATION, UNSPECIFIED WHETHER LONG TERM INSULIN USE: ICD-10-CM

## 2024-10-28 DIAGNOSIS — K80.51 CALCULUS OF BILE DUCT WITHOUT CHOLECYSTITIS WITH OBSTRUCTION: Primary | ICD-10-CM

## 2024-10-28 DIAGNOSIS — Z79.01 ANTICOAGULATED: ICD-10-CM

## 2024-10-28 DIAGNOSIS — I48.0 PAF (PAROXYSMAL ATRIAL FIBRILLATION): ICD-10-CM

## 2024-10-28 DIAGNOSIS — E78.5 DYSLIPIDEMIA: Chronic | ICD-10-CM

## 2024-10-28 DIAGNOSIS — I10 ESSENTIAL HYPERTENSION: Chronic | ICD-10-CM

## 2024-10-28 DIAGNOSIS — I25.118 CORONARY ARTERY DISEASE OF NATIVE ARTERY OF NATIVE HEART WITH STABLE ANGINA PECTORIS: ICD-10-CM

## 2024-10-28 PROCEDURE — 1126F AMNT PAIN NOTED NONE PRSNT: CPT | Mod: HCNC,CPTII,S$GLB, | Performed by: INTERNAL MEDICINE

## 2024-10-28 PROCEDURE — 3078F DIAST BP <80 MM HG: CPT | Mod: HCNC,CPTII,S$GLB, | Performed by: INTERNAL MEDICINE

## 2024-10-28 PROCEDURE — 36415 COLL VENOUS BLD VENIPUNCTURE: CPT | Mod: HCNC,PO | Performed by: INTERNAL MEDICINE

## 2024-10-28 PROCEDURE — 99214 OFFICE O/P EST MOD 30 MIN: CPT | Mod: HCNC,S$GLB,, | Performed by: INTERNAL MEDICINE

## 2024-10-28 PROCEDURE — 3074F SYST BP LT 130 MM HG: CPT | Mod: HCNC,CPTII,S$GLB, | Performed by: INTERNAL MEDICINE

## 2024-10-28 PROCEDURE — 83036 HEMOGLOBIN GLYCOSYLATED A1C: CPT | Mod: HCNC | Performed by: INTERNAL MEDICINE

## 2024-10-28 PROCEDURE — 1159F MED LIST DOCD IN RCRD: CPT | Mod: HCNC,CPTII,S$GLB, | Performed by: INTERNAL MEDICINE

## 2024-10-28 PROCEDURE — 99999 PR PBB SHADOW E&M-EST. PATIENT-LVL IV: CPT | Mod: PBBFAC,HCNC,, | Performed by: INTERNAL MEDICINE

## 2024-10-28 PROCEDURE — 1111F DSCHRG MED/CURRENT MED MERGE: CPT | Mod: HCNC,CPTII,S$GLB, | Performed by: INTERNAL MEDICINE

## 2024-10-28 PROCEDURE — 1101F PT FALLS ASSESS-DOCD LE1/YR: CPT | Mod: HCNC,CPTII,S$GLB, | Performed by: INTERNAL MEDICINE

## 2024-10-28 PROCEDURE — 3288F FALL RISK ASSESSMENT DOCD: CPT | Mod: HCNC,CPTII,S$GLB, | Performed by: INTERNAL MEDICINE

## 2024-10-28 RX ORDER — APIXABAN 2.5 MG/1
2.5 TABLET, FILM COATED ORAL 2 TIMES DAILY
COMMUNITY
Start: 2024-10-23

## 2024-10-28 RX ORDER — ISOSORBIDE MONONITRATE 120 MG/1
120 TABLET, EXTENDED RELEASE ORAL
Qty: 90 TABLET | Refills: 3 | Status: SHIPPED | OUTPATIENT
Start: 2024-10-28

## 2024-10-29 ENCOUNTER — TELEPHONE (OUTPATIENT)
Dept: FAMILY MEDICINE | Facility: CLINIC | Age: 84
End: 2024-10-29
Payer: MEDICARE

## 2024-10-29 LAB
ESTIMATED AVG GLUCOSE: 134 MG/DL (ref 68–131)
HBA1C MFR BLD: 6.3 % (ref 4–5.6)

## 2024-11-06 ENCOUNTER — HOSPITAL ENCOUNTER (OUTPATIENT)
Dept: CARDIOLOGY | Facility: HOSPITAL | Age: 84
Discharge: HOME | End: 2024-11-06
Payer: MEDICARE

## 2024-11-06 ENCOUNTER — OFFICE VISIT (OUTPATIENT)
Dept: CARDIOLOGY | Facility: CLINIC | Age: 84
End: 2024-11-06
Payer: MEDICARE

## 2024-11-06 VITALS
SYSTOLIC BLOOD PRESSURE: 120 MMHG | HEART RATE: 60 BPM | OXYGEN SATURATION: 99 % | HEIGHT: 70 IN | DIASTOLIC BLOOD PRESSURE: 70 MMHG | WEIGHT: 136.88 LBS | BODY MASS INDEX: 19.6 KG/M2

## 2024-11-06 DIAGNOSIS — Z86.73 HISTORY OF CVA (CEREBROVASCULAR ACCIDENT): ICD-10-CM

## 2024-11-06 DIAGNOSIS — I50.32 CHRONIC DIASTOLIC HEART FAILURE: ICD-10-CM

## 2024-11-06 DIAGNOSIS — Z86.79 S/P AAA REPAIR: ICD-10-CM

## 2024-11-06 DIAGNOSIS — I48.0 PAF (PAROXYSMAL ATRIAL FIBRILLATION): ICD-10-CM

## 2024-11-06 DIAGNOSIS — I10 ESSENTIAL HYPERTENSION: Chronic | ICD-10-CM

## 2024-11-06 DIAGNOSIS — Z01.818 PRE-OP EVALUATION: ICD-10-CM

## 2024-11-06 DIAGNOSIS — R00.1 BRADYCARDIA: Primary | ICD-10-CM

## 2024-11-06 DIAGNOSIS — I11.0 HYPERTENSIVE HEART DISEASE WITH HEART FAILURE: ICD-10-CM

## 2024-11-06 DIAGNOSIS — E78.5 DYSLIPIDEMIA: Chronic | ICD-10-CM

## 2024-11-06 DIAGNOSIS — Z98.890 S/P AAA REPAIR: ICD-10-CM

## 2024-11-06 DIAGNOSIS — I73.9 PAD (PERIPHERAL ARTERY DISEASE): Chronic | ICD-10-CM

## 2024-11-06 DIAGNOSIS — I51.7 LAE (LEFT ATRIAL ENLARGEMENT): ICD-10-CM

## 2024-11-06 DIAGNOSIS — Z87.891 HISTORY OF TOBACCO ABUSE: ICD-10-CM

## 2024-11-06 DIAGNOSIS — I20.9 AP (ANGINA PECTORIS): ICD-10-CM

## 2024-11-06 DIAGNOSIS — I35.1 NONRHEUMATIC AORTIC VALVE INSUFFICIENCY: ICD-10-CM

## 2024-11-06 DIAGNOSIS — Z87.19 HISTORY OF GI BLEED: ICD-10-CM

## 2024-11-06 DIAGNOSIS — I25.10 CAD, MULTIPLE VESSEL: ICD-10-CM

## 2024-11-06 DIAGNOSIS — Z79.01 ANTICOAGULATED: ICD-10-CM

## 2024-11-06 DIAGNOSIS — R41.3 MEMORY DEFICIT: ICD-10-CM

## 2024-11-06 DIAGNOSIS — I25.118 CORONARY ARTERY DISEASE OF NATIVE ARTERY OF NATIVE HEART WITH STABLE ANGINA PECTORIS: ICD-10-CM

## 2024-11-06 PROCEDURE — 99999 PR PBB SHADOW E&M-EST. PATIENT-LVL IV: CPT | Mod: PBBFAC,HCNC,, | Performed by: PHYSICIAN ASSISTANT

## 2024-11-06 PROCEDURE — 93005 ELECTROCARDIOGRAM TRACING: CPT | Mod: HCNC

## 2024-11-06 PROCEDURE — 93010 ELECTROCARDIOGRAM REPORT: CPT | Mod: HCNC,,, | Performed by: INTERNAL MEDICINE

## 2024-11-06 RX ORDER — DONEPEZIL HYDROCHLORIDE 5 MG/1
5 TABLET, FILM COATED ORAL
COMMUNITY
Start: 2024-10-31 | End: 2024-11-06

## 2024-11-07 LAB
OHS QRS DURATION: 88 MS
OHS QTC CALCULATION: 432 MS

## 2024-11-08 ENCOUNTER — TELEPHONE (OUTPATIENT)
Dept: CARDIOLOGY | Facility: CLINIC | Age: 84
End: 2024-11-08
Payer: MEDICARE

## 2024-11-23 ENCOUNTER — HOSPITAL ENCOUNTER (INPATIENT)
Facility: HOSPITAL | Age: 84
LOS: 4 days | Discharge: ANOTHER HOSPITAL | DRG: 309 | End: 2024-11-27
Attending: EMERGENCY MEDICINE | Admitting: INTERNAL MEDICINE
Payer: MEDICARE

## 2024-11-23 DIAGNOSIS — R53.1 WEAKNESS: ICD-10-CM

## 2024-11-23 DIAGNOSIS — R55 SYNCOPE: ICD-10-CM

## 2024-11-23 DIAGNOSIS — I50.32 CHRONIC DIASTOLIC HEART FAILURE: ICD-10-CM

## 2024-11-23 DIAGNOSIS — E11.9 TYPE 2 DIABETES MELLITUS WITHOUT COMPLICATION, WITHOUT LONG-TERM CURRENT USE OF INSULIN: Primary | ICD-10-CM

## 2024-11-23 LAB
ALBUMIN SERPL BCP-MCNC: 3.3 G/DL (ref 3.5–5.2)
ALP SERPL-CCNC: 81 U/L (ref 40–150)
ALT SERPL W/O P-5'-P-CCNC: 6 U/L (ref 10–44)
ANION GAP SERPL CALC-SCNC: 10 MMOL/L (ref 8–16)
AORTIC ROOT ANNULUS: 3.6 CM
APTT PPP: 31.1 SEC (ref 21–32)
AST SERPL-CCNC: 9 U/L (ref 10–40)
AV INDEX (PROSTH): 0.51
AV MEAN GRADIENT: 5.6 MMHG
AV PEAK GRADIENT: 10.2 MMHG
AV REGURGITATION PRESSURE HALF TIME: 1020.6 MS
AV VELOCITY RATIO: 0.5
BACTERIA #/AREA URNS HPF: NORMAL /HPF
BASOPHILS # BLD AUTO: 0.03 K/UL (ref 0–0.2)
BASOPHILS NFR BLD: 0.7 % (ref 0–1.9)
BILIRUB SERPL-MCNC: 0.7 MG/DL (ref 0.1–1)
BILIRUB UR QL STRIP: NEGATIVE
BNP SERPL-MCNC: 209 PG/ML (ref 0–99)
BSA FOR ECHO PROCEDURE: 1.74 M2
BUN SERPL-MCNC: 29 MG/DL (ref 8–23)
CALCIUM SERPL-MCNC: 9.1 MG/DL (ref 8.7–10.5)
CHLORIDE SERPL-SCNC: 107 MMOL/L (ref 95–110)
CK SERPL-CCNC: 14 U/L (ref 20–200)
CLARITY UR: CLEAR
CO2 SERPL-SCNC: 23 MMOL/L (ref 23–29)
COLOR UR: YELLOW
CREAT SERPL-MCNC: 1.4 MG/DL (ref 0.5–1.4)
CV ECHO LV RWT: 0.65 CM
DIFFERENTIAL METHOD BLD: ABNORMAL
DOP CALC AO PEAK VEL: 1.6 M/S
DOP CALC AO VTI: 36.8 CM
DOP CALC LVOT PEAK VEL: 0.8 M/S
DOP CALCLVOT PEAK VEL VTI: 18.8 CM
E WAVE DECELERATION TIME: 237.4 MSEC
E/A RATIO: 1.08
E/E' RATIO: 10.25 M/S
ECHO LV POSTERIOR WALL: 1.4 CM (ref 0.6–1.1)
EOSINOPHIL # BLD AUTO: 0.3 K/UL (ref 0–0.5)
EOSINOPHIL NFR BLD: 7.3 % (ref 0–8)
ERYTHROCYTE [DISTWIDTH] IN BLOOD BY AUTOMATED COUNT: 14.3 % (ref 11.5–14.5)
EST. GFR  (NO RACE VARIABLE): 49 ML/MIN/1.73 M^2
FRACTIONAL SHORTENING: 30.2 % (ref 28–44)
GLUCOSE SERPL-MCNC: 214 MG/DL (ref 70–110)
GLUCOSE UR QL STRIP: ABNORMAL
HCT VFR BLD AUTO: 36.7 % (ref 40–54)
HGB BLD-MCNC: 11.9 G/DL (ref 14–18)
HGB UR QL STRIP: ABNORMAL
HYALINE CASTS #/AREA URNS LPF: 0 /LPF
IMM GRANULOCYTES # BLD AUTO: 0.01 K/UL (ref 0–0.04)
IMM GRANULOCYTES NFR BLD AUTO: 0.2 % (ref 0–0.5)
INR PPP: 1.2 (ref 0.8–1.2)
INTERVENTRICULAR SEPTUM: 1.5 CM (ref 0.6–1.1)
IVRT: 95.15 MSEC
KETONES UR QL STRIP: NEGATIVE
LA MAJOR: 5.05 CM
LA MINOR: 5.22 CM
LA WIDTH: 3.5 CM
LEFT ATRIUM SIZE: 4.34 CM
LEFT ATRIUM VOLUME INDEX: 37.4 ML/M2
LEFT ATRIUM VOLUME: 66.28 CM3
LEFT INTERNAL DIMENSION IN SYSTOLE: 3 CM (ref 2.1–4)
LEFT VENTRICLE DIASTOLIC VOLUME INDEX: 47.29 ML/M2
LEFT VENTRICLE DIASTOLIC VOLUME: 83.71 ML
LEFT VENTRICLE MASS INDEX: 138.4 G/M2
LEFT VENTRICLE SYSTOLIC VOLUME INDEX: 19.8 ML/M2
LEFT VENTRICLE SYSTOLIC VOLUME: 35.02 ML
LEFT VENTRICULAR INTERNAL DIMENSION IN DIASTOLE: 4.3 CM (ref 3.5–6)
LEFT VENTRICULAR MASS: 245 G
LEUKOCYTE ESTERASE UR QL STRIP: ABNORMAL
LV LATERAL E/E' RATIO: 7.45 M/S
LV SEPTAL E/E' RATIO: 16.4 M/S
LVED V (TEICH): 83.71 ML
LVES V (TEICH): 35.02 ML
LVOT MG: 1.76 MMHG
LVOT MV: 0.65 CM/S
LYMPHOCYTES # BLD AUTO: 0.8 K/UL (ref 1–4.8)
LYMPHOCYTES NFR BLD: 18.3 % (ref 18–48)
MAGNESIUM SERPL-MCNC: 1.9 MG/DL (ref 1.6–2.6)
MCH RBC QN AUTO: 29.2 PG (ref 27–31)
MCHC RBC AUTO-ENTMCNC: 32.4 G/DL (ref 32–36)
MCV RBC AUTO: 90 FL (ref 82–98)
MICROSCOPIC COMMENT: NORMAL
MONOCYTES # BLD AUTO: 0.3 K/UL (ref 0.3–1)
MONOCYTES NFR BLD: 6.7 % (ref 4–15)
MV PEAK A VEL: 0.76 M/S
MV PEAK E VEL: 0.82 M/S
MV STENOSIS PRESSURE HALF TIME: 68.85 MS
MV VALVE AREA P 1/2 METHOD: 3.2 CM2
NEUTROPHILS # BLD AUTO: 3 K/UL (ref 1.8–7.7)
NEUTROPHILS NFR BLD: 66.8 % (ref 38–73)
NITRITE UR QL STRIP: NEGATIVE
NRBC BLD-RTO: 0 /100 WBC
PH UR STRIP: 6 [PH] (ref 5–8)
PISA AR MAX VEL: 3.09 M/S
PISA MRMAX VEL: 3.88 M/S
PISA TR MAX VEL: 2.24 M/S
PLATELET # BLD AUTO: 165 K/UL (ref 150–450)
PMV BLD AUTO: 10.4 FL (ref 9.2–12.9)
POCT GLUCOSE: 145 MG/DL (ref 70–110)
POCT GLUCOSE: 149 MG/DL (ref 70–110)
POTASSIUM SERPL-SCNC: 4 MMOL/L (ref 3.5–5.1)
PROT SERPL-MCNC: 6.7 G/DL (ref 6–8.4)
PROT UR QL STRIP: ABNORMAL
PROTHROMBIN TIME: 12.7 SEC (ref 9–12.5)
RA MAJOR: 4.04 CM
RA PRESSURE ESTIMATED: 3 MMHG
RA WIDTH: 2.4 CM
RBC # BLD AUTO: 4.08 M/UL (ref 4.6–6.2)
RBC #/AREA URNS HPF: 2 /HPF (ref 0–4)
RV TB RVSP: 5 MMHG
SODIUM SERPL-SCNC: 140 MMOL/L (ref 136–145)
SP GR UR STRIP: 1.02 (ref 1–1.03)
TDI LATERAL: 0.11 M/S
TDI SEPTAL: 0.05 M/S
TDI: 0.08 M/S
TR MAX PG: 20 MMHG
TRICUSPID ANNULAR PLANE SYSTOLIC EXCURSION: 1.92 CM
TROPONIN I SERPL DL<=0.01 NG/ML-MCNC: 0.01 NG/ML (ref 0–0.03)
TSH SERPL DL<=0.005 MIU/L-ACNC: 0.73 UIU/ML (ref 0.4–4)
TV REST PULMONARY ARTERY PRESSURE: 23 MMHG
URN SPEC COLLECT METH UR: ABNORMAL
UROBILINOGEN UR STRIP-ACNC: NEGATIVE EU/DL
WBC # BLD AUTO: 4.49 K/UL (ref 3.9–12.7)
WBC #/AREA URNS HPF: 4 /HPF (ref 0–5)
Z-SCORE OF LEFT VENTRICULAR DIMENSION IN END DIASTOLE: -1.29
Z-SCORE OF LEFT VENTRICULAR DIMENSION IN END SYSTOLE: -0.07

## 2024-11-23 PROCEDURE — 80053 COMPREHEN METABOLIC PANEL: CPT | Mod: HCNC | Performed by: EMERGENCY MEDICINE

## 2024-11-23 PROCEDURE — 93010 ELECTROCARDIOGRAM REPORT: CPT | Mod: HCNC,,, | Performed by: INTERNAL MEDICINE

## 2024-11-23 PROCEDURE — 85730 THROMBOPLASTIN TIME PARTIAL: CPT | Mod: HCNC | Performed by: EMERGENCY MEDICINE

## 2024-11-23 PROCEDURE — 63600175 PHARM REV CODE 636 W HCPCS: Mod: JZ,JG,HCNC | Performed by: EMERGENCY MEDICINE

## 2024-11-23 PROCEDURE — 99285 EMERGENCY DEPT VISIT HI MDM: CPT | Mod: 25

## 2024-11-23 PROCEDURE — 84484 ASSAY OF TROPONIN QUANT: CPT | Mod: HCNC | Performed by: EMERGENCY MEDICINE

## 2024-11-23 PROCEDURE — 96374 THER/PROPH/DIAG INJ IV PUSH: CPT

## 2024-11-23 PROCEDURE — 83880 ASSAY OF NATRIURETIC PEPTIDE: CPT | Mod: HCNC | Performed by: EMERGENCY MEDICINE

## 2024-11-23 PROCEDURE — 81000 URINALYSIS NONAUTO W/SCOPE: CPT | Mod: HCNC | Performed by: EMERGENCY MEDICINE

## 2024-11-23 PROCEDURE — 85025 COMPLETE CBC W/AUTO DIFF WBC: CPT | Mod: HCNC | Performed by: EMERGENCY MEDICINE

## 2024-11-23 PROCEDURE — 99223 1ST HOSP IP/OBS HIGH 75: CPT | Mod: HCNC,,, | Performed by: STUDENT IN AN ORGANIZED HEALTH CARE EDUCATION/TRAINING PROGRAM

## 2024-11-23 PROCEDURE — 20000000 HC ICU ROOM: Mod: HCNC

## 2024-11-23 PROCEDURE — 84443 ASSAY THYROID STIM HORMONE: CPT | Mod: HCNC | Performed by: EMERGENCY MEDICINE

## 2024-11-23 PROCEDURE — 93005 ELECTROCARDIOGRAM TRACING: CPT | Mod: HCNC

## 2024-11-23 PROCEDURE — 85610 PROTHROMBIN TIME: CPT | Mod: HCNC | Performed by: EMERGENCY MEDICINE

## 2024-11-23 PROCEDURE — 83735 ASSAY OF MAGNESIUM: CPT | Mod: HCNC | Performed by: EMERGENCY MEDICINE

## 2024-11-23 PROCEDURE — 25000003 PHARM REV CODE 250: Mod: HCNC

## 2024-11-23 PROCEDURE — 82550 ASSAY OF CK (CPK): CPT | Mod: HCNC | Performed by: EMERGENCY MEDICINE

## 2024-11-23 RX ORDER — SODIUM CHLORIDE 0.9 % (FLUSH) 0.9 %
10 SYRINGE (ML) INJECTION
Status: DISCONTINUED | OUTPATIENT
Start: 2024-11-23 | End: 2024-11-27 | Stop reason: HOSPADM

## 2024-11-23 RX ORDER — GLUCAGON 1 MG
5 KIT INJECTION
Status: COMPLETED | OUTPATIENT
Start: 2024-11-23 | End: 2024-11-23

## 2024-11-23 RX ORDER — FAMOTIDINE 10 MG/ML
20 INJECTION INTRAVENOUS DAILY
Status: DISCONTINUED | OUTPATIENT
Start: 2024-11-23 | End: 2024-11-25

## 2024-11-23 RX ORDER — INSULIN ASPART 100 [IU]/ML
0-5 INJECTION, SOLUTION INTRAVENOUS; SUBCUTANEOUS EVERY 6 HOURS PRN
Status: DISCONTINUED | OUTPATIENT
Start: 2024-11-23 | End: 2024-11-27

## 2024-11-23 RX ORDER — GLUCAGON 1 MG
1 KIT INJECTION
Status: DISCONTINUED | OUTPATIENT
Start: 2024-11-23 | End: 2024-11-27

## 2024-11-23 RX ADMIN — FAMOTIDINE 20 MG: 10 INJECTION, SOLUTION INTRAVENOUS at 01:11

## 2024-11-23 RX ADMIN — GLUCAGON 5 MG: 1 INJECTION, POWDER, LYOPHILIZED, FOR SOLUTION INTRAMUSCULAR; INTRAVENOUS at 10:11

## 2024-11-23 NOTE — ASSESSMENT & PLAN NOTE
- Echo reviewed from October; EF 60% but with intermediate diastolic function  - Cards following  - Monitor fluid status, I&Os, daily weights

## 2024-11-23 NOTE — ED PROVIDER NOTES
SCRIBE #1 NOTE: I, Dea Gomezbella, am scribing for, and in the presence of, Emir Collins MD. I have scribed the entire note.       History     Chief Complaint   Patient presents with    Loss of Consciousness     Syncopal episode this morning while using the restroom. He is bradycardic with HR of 40. He denies any CP, SOB, dizziness or weakness at this time. Pt is awake and alert, GCS 14. Hx of dementia. .      Review of patient's allergies indicates:   Allergen Reactions    Metoprolol Other (See Comments)     Junctional rhythm           History of Present Illness     HPI    11/23/2024, 10:24 AM  History obtained from the  paramedics and patient      History of Present Illness: Aquiles Yates is a 84 y.o. male patient with a PMHx of CAD, abdominal aortic aneurysm, kidney stone, DM, HTN, PAD, depression, HLD, tobacco abuse, acute blood loss anemia, PAF, BCC, chronic diastolic heart failure, alzheimer's dementia who presents to the Emergency Department for evaluation after loss of consciousness just PTA. Per paramedics, pt was using the restroom when he began to feel light-headed before losing consciousness. He is bradycardic with his heart rate ranging between 20-40. No mitigating or exacerbating factors reported. No associated sxs reported. Patient denies any weakness, facial asymmetry, CP, SOB, dizziness, N/V/D, and all other sxs at this time. No prior Tx reported. No further complaints or concerns at this time.       Arrival mode: ambulance services     PCP: Holger Thornton MD        Past Medical History:  Past Medical History:   Diagnosis Date    Acute blood loss anemia 10/14/2019    Alzheimer's dementia     Aneurysm, abdominal aortic     BCC (basal cell carcinoma of skin) 06/29/2023    Chronic diastolic heart failure 05/20/2024    Coronary artery disease     Depression     Diabetes mellitus     HLD (hyperlipidemia)     Hypertension     Kidney stone     PAD (peripheral artery  disease)     PAF (paroxysmal atrial fibrillation) 01/24/2023    Tobacco abuse        Past Surgical History:  Past Surgical History:   Procedure Laterality Date    APPENDECTOMY      CORONARY STENT PLACEMENT      x 4    DV5 ROBOTIC CHOLECYSTECTOMY N/A 10/15/2024    Procedure: DV5 ROBOTIC CHOLECYSTECTOMY;  Surgeon: Fred Taylor MD;  Location: [facility] OR;  Service: General;  Laterality: N/A;    ERCP N/A 10/10/2024    Procedure: ERCP (ENDOSCOPIC RETROGRADE CHOLANGIOPANCREATOGRAPHY);  Surgeon: Morgan Hong MD;  [facility]  Service: Endoscopy;  Laterality: N/A;    ESOPHAGOGASTRODUODENOSCOPY N/A 10/14/2019    Procedure: EGD (ESOPHAGOGASTRODUODENOSCOPY);  Surgeon: Carlos Mcneal III, MD;  Location: [facility];  Service: Endoscopy;  Laterality: N/A;    ESOPHAGOGASTRODUODENOSCOPY N/A 10/15/2019    Procedure: EGD (ESOPHAGOGASTRODUODENOSCOPY);  Surgeon: Carlos Mcneal III, MD;  Location: [facility];  Service: Endoscopy;  Laterality: N/A;    ESOPHAGOGASTRODUODENOSCOPY N/A 4/17/2023    Procedure: EGD (ESOPHAGOGASTRODUODENOSCOPY);  Surgeon: Nevaeh Mcconnell MD;  Location: [facility];  Service: Endoscopy;  Laterality: N/A;    INSERTION, PACEMAKER, TEMPORARY TRANSVENOUS N/A 10/15/2024    Procedure: Insertion, Pacemaker, Temporary Transvenous;  Surgeon: Demarcus Kirk MD;  Location: [facility] CATH LAB;  Service: Cardiology;  Laterality: N/A;    LEFT HEART CATHETERIZATION Left 10/11/2019    Procedure: CATHETERIZATION, HEART, LEFT;  Surgeon: Robe Gilbert MD;  Location: [facility] CATH LAB;  Service: Cardiology;  Laterality: Left;    LEFT HEART CATHETERIZATION Left 10/13/2019    Procedure: CATHETERIZATION, HEART, LEFT;  Surgeon: Robe Gilbert MD;  Location: [facility] CATH LAB;  Service: Cardiology;  Laterality: Left;    leg stent Left          Family History:  Family History   Problem Relation Name Age of Onset    Diabetes Mother      COPD Father      Diabetes Brother         Social History:  Social History      Tobacco Use    Smoking status: Former     Types: Cigars     Passive exposure: Never    Smokeless tobacco: Current   Substance and Sexual Activity    Alcohol use: Not Currently    Drug use: Not Currently    Sexual activity: Not Currently     Partners: Female        Review of Systems     Review of Systems   Constitutional:  Negative for chills and fever.   HENT:  Negative for congestion and sore throat.    Respiratory:  Negative for shortness of breath.    Cardiovascular:  Negative for chest pain.   Gastrointestinal:  Negative for abdominal pain, diarrhea, nausea and vomiting.   Genitourinary:  Negative for dysuria.   Musculoskeletal:  Negative for back pain.   Skin:  Negative for rash.   Neurological:  Positive for syncope and light-headedness. Negative for dizziness, facial asymmetry and weakness.   Hematological:  Does not bruise/bleed easily.   All other systems reviewed and are negative.     Physical Exam     Initial Vitals [11/23/24 1019]   BP Pulse Resp Temp SpO2   (!) 145/72 (!) 40 20 97.7 °F (36.5 °C) 96 %      MAP       --          Physical Exam  Nursing Notes and Vital Signs Reviewed.  Constitutional: Patient is in no apparent distress. Pt is elderly and appears frail.  Head: Atraumatic. Normocephalic.  Eyes: PERRL. EOM intact. Conjunctivae are not pale. No scleral icterus.  ENT: Mucous membranes are moist. Oropharynx is clear and symmetric.    Neck: Supple. Full ROM. No lymphadenopathy.  Cardiovascular: Bradycardic. Regular rhythm. No murmurs, rubs, or gallops. Distal pulses are 2+ and symmetric.  Pulmonary/Chest: No respiratory distress. Clear to auscultation bilaterally. No wheezing or rales.  Abdominal: Soft and non-distended.  There is no tenderness.  No rebound, guarding, or rigidity. Good bowel sounds.  Genitourinary: No CVA tenderness  Musculoskeletal: Moves all extremities. No obvious deformities. No edema. No calf tenderness.  Skin: Warm and dry.  Neurological:  Alert, awake, and appropriate.   Normal speech.  No acute focal neurological deficits are appreciated.  Psychiatric: Normal affect. Good eye contact. Appropriate in content.     ED Course   Critical Care    Date/Time: 11/23/2024 11:45 AM    Performed by: Emir Collins MD  Authorized by: Emir Collins MD  Direct patient critical care time: 15 minutes  Additional history critical care time: 15 minutes  Ordering / reviewing critical care time: 8 minutes  Documentation critical care time: 8 minutes  Consulting other physicians critical care time: 9 minutes  Other critical care time: 5 minutes  Total critical care time (exclusive of procedural time) : 60 minutes  Critical care time was exclusive of separately billable procedures and treating other patients and teaching time.  Critical care was necessary to treat or prevent imminent or life-threatening deterioration of the following conditions: Bradycardia.  Critical care was time spent personally by me on the following activities: blood draw for specimens, development of treatment plan with patient or surrogate, discussions with consultants, interpretation of cardiac output measurements, evaluation of patient's response to treatment, examination of patient, obtaining history from patient or surrogate, ordering and performing treatments and interventions, ordering and review of laboratory studies, ordering and review of radiographic studies, re-evaluation of patient's condition, pulse oximetry and review of old charts.        ED Vital Signs:  Vitals:    11/23/24 1019 11/23/24 1118 11/23/24 1120 11/23/24 1147   BP: (!) 145/72 (!) 164/72     Pulse: (!) 40 (!) 47     Resp: 20 14     Temp: 97.7 °F (36.5 °C)      TempSrc: Oral      SpO2: 96%  97% 97%   Weight:  61.5 kg (135 lb 9.3 oz)      11/23/24 1148   BP: (!) 151/67   Pulse: (!) 44   Resp: 12   Temp:    TempSrc:    SpO2:    Weight:        Abnormal Lab Results:  Labs Reviewed   CBC W/ AUTO DIFFERENTIAL - Abnormal       Result Value    WBC 4.49       RBC 4.08 (*)     Hemoglobin 11.9 (*)     Hematocrit 36.7 (*)     MCV 90      MCH 29.2      MCHC 32.4      RDW 14.3      Platelets 165      MPV 10.4      Immature Granulocytes 0.2      Gran # (ANC) 3.0      Immature Grans (Abs) 0.01      Lymph # 0.8 (*)     Mono # 0.3      Eos # 0.3      Baso # 0.03      nRBC 0      Gran % 66.8      Lymph % 18.3      Mono % 6.7      Eosinophil % 7.3      Basophil % 0.7      Differential Method Automated     COMPREHENSIVE METABOLIC PANEL - Abnormal    Sodium 140      Potassium 4.0      Chloride 107      CO2 23      Glucose 214 (*)     BUN 29 (*)     Creatinine 1.4      Calcium 9.1      Total Protein 6.7      Albumin 3.3 (*)     Total Bilirubin 0.7      Alkaline Phosphatase 81      AST 9 (*)     ALT 6 (*)     eGFR 49 (*)     Anion Gap 10     URINALYSIS, REFLEX TO URINE CULTURE - Abnormal    Specimen UA Urine, Catheterized      Color, UA Yellow      Appearance, UA Clear      pH, UA 6.0      Specific Gravity, UA 1.020      Protein, UA 1+ (*)     Glucose, UA Trace (*)     Ketones, UA Negative      Bilirubin (UA) Negative      Occult Blood UA Trace (*)     Nitrite, UA Negative      Urobilinogen, UA Negative      Leukocytes, UA Trace (*)     Narrative:     Specimen Source->Urine   B-TYPE NATRIURETIC PEPTIDE - Abnormal     (*)    CK - Abnormal    CPK 14 (*)    PROTIME-INR - Abnormal    Prothrombin Time 12.7 (*)     INR 1.2     TROPONIN I    Troponin I 0.014     APTT    aPTT 31.1     URINALYSIS MICROSCOPIC    RBC, UA 2      WBC, UA 4      Bacteria None      Hyaline Casts, UA 0      Microscopic Comment SEE COMMENT      Narrative:     Specimen Source->Urine        All Lab Results:  Results for orders placed or performed during the hospital encounter of 11/23/24   CBC Auto Differential    Collection Time: 11/23/24 10:34 AM   Result Value Ref Range    WBC 4.49 3.90 - 12.70 K/uL    RBC 4.08 (L) 4.60 - 6.20 M/uL    Hemoglobin 11.9 (L) 14.0 - 18.0 g/dL    Hematocrit 36.7 (L) 40.0 - 54.0 %     MCV 90 82 - 98 fL    MCH 29.2 27.0 - 31.0 pg    MCHC 32.4 32.0 - 36.0 g/dL    RDW 14.3 11.5 - 14.5 %    Platelets 165 150 - 450 K/uL    MPV 10.4 9.2 - 12.9 fL    Immature Granulocytes 0.2 0.0 - 0.5 %    Gran # (ANC) 3.0 1.8 - 7.7 K/uL    Immature Grans (Abs) 0.01 0.00 - 0.04 K/uL    Lymph # 0.8 (L) 1.0 - 4.8 K/uL    Mono # 0.3 0.3 - 1.0 K/uL    Eos # 0.3 0.0 - 0.5 K/uL    Baso # 0.03 0.00 - 0.20 K/uL    nRBC 0 0 /100 WBC    Gran % 66.8 38.0 - 73.0 %    Lymph % 18.3 18.0 - 48.0 %    Mono % 6.7 4.0 - 15.0 %    Eosinophil % 7.3 0.0 - 8.0 %    Basophil % 0.7 0.0 - 1.9 %    Differential Method Automated    Comprehensive Metabolic Panel    Collection Time: 11/23/24 10:34 AM   Result Value Ref Range    Sodium 140 136 - 145 mmol/L    Potassium 4.0 3.5 - 5.1 mmol/L    Chloride 107 95 - 110 mmol/L    CO2 23 23 - 29 mmol/L    Glucose 214 (H) 70 - 110 mg/dL    BUN 29 (H) 8 - 23 mg/dL    Creatinine 1.4 0.5 - 1.4 mg/dL    Calcium 9.1 8.7 - 10.5 mg/dL    Total Protein 6.7 6.0 - 8.4 g/dL    Albumin 3.3 (L) 3.5 - 5.2 g/dL    Total Bilirubin 0.7 0.1 - 1.0 mg/dL    Alkaline Phosphatase 81 40 - 150 U/L    AST 9 (L) 10 - 40 U/L    ALT 6 (L) 10 - 44 U/L    eGFR 49 (A) >60 mL/min/1.73 m^2    Anion Gap 10 8 - 16 mmol/L   BNP    Collection Time: 11/23/24 10:34 AM   Result Value Ref Range     (H) 0 - 99 pg/mL   CK    Collection Time: 11/23/24 10:34 AM   Result Value Ref Range    CPK 14 (L) 20 - 200 U/L   Troponin I    Collection Time: 11/23/24 10:34 AM   Result Value Ref Range    Troponin I 0.014 0.000 - 0.026 ng/mL   Protime-INR    Collection Time: 11/23/24 10:34 AM   Result Value Ref Range    Prothrombin Time 12.7 (H) 9.0 - 12.5 sec    INR 1.2 0.8 - 1.2   APTT    Collection Time: 11/23/24 10:34 AM   Result Value Ref Range    aPTT 31.1 21.0 - 32.0 sec   Urinalysis, Reflex to Urine Culture Urine, Catheterized    Collection Time: 11/23/24 10:48 AM    Specimen: Urine   Result Value Ref Range    Specimen UA Urine, Catheterized      Color, UA Yellow Yellow, Straw, Caren    Appearance, UA Clear Clear    pH, UA 6.0 5.0 - 8.0    Specific Gravity, UA 1.020 1.005 - 1.030    Protein, UA 1+ (A) Negative    Glucose, UA Trace (A) Negative    Ketones, UA Negative Negative    Bilirubin (UA) Negative Negative    Occult Blood UA Trace (A) Negative    Nitrite, UA Negative Negative    Urobilinogen, UA Negative <2.0 EU/dL    Leukocytes, UA Trace (A) Negative   Urinalysis Microscopic    Collection Time: 11/23/24 10:48 AM   Result Value Ref Range    RBC, UA 2 0 - 4 /hpf    WBC, UA 4 0 - 5 /hpf    Bacteria None None-Occ /hpf    Hyaline Casts, UA 0 0-1/lpf /lpf    Microscopic Comment SEE COMMENT          Imaging Results:  Imaging Results              CT Head Without Contrast (Final result)  Result time 11/23/24 11:17:06      Final result by Rl Ronquillo MD (11/23/24 11:17:06)                   Impression:      No definite acute abnormality.    Indeterminate left parotid gland lesion.  Further evaluation if not previously characterized.    All CT scans at this facility are performed  using dose modulation techniques as appropriate to performed exam including the following:  automated exposure control; adjustment of mA and/or kV according to the patients size (this includes techniques or standardized protocols for targeted exams where dose is matched to indication/reason for exam: i.e. extremities or head);  iterative reconstruction technique.      Electronically signed by: Rl Ronquillo  Date:    11/23/2024  Time:    11:17               Narrative:    EXAMINATION:  CT HEAD WITHOUT CONTRAST    CLINICAL HISTORY:  Mental status change, unknown cause;    TECHNIQUE:  Low dose axial CT images obtained throughout the head without intravenous contrast. Sagittal and coronal reconstructions were performed.    COMPARISON:  Multiple    FINDINGS:  Intracranial compartment:    Ventricles and sulci are normal in size for age without evidence of hydrocephalus. No extra-axial  blood or fluid collections.    Moderate right-sided encephalomalacia.  Smaller scattered areas of encephalomalacia elsewhere.  Moderate microvascular ischemic change.  Remote bilateral basal ganglia lacunar infarcts.  No parenchymal mass, hemorrhage, edema or major vascular distribution infarct.    Skull/extracranial contents (limited evaluation): No fracture. Mastoid air cells and paranasal sinuses are essentially clear.  Indeterminate left parotid Shanique hyperdense lesion measuring 1.4 cm only partially visualized.                                       X-Ray Chest AP Portable (Final result)  Result time 11/23/24 10:55:33      Final result by Rl Ronquillo MD (11/23/24 10:55:33)                   Impression:      No acute abnormality.      Electronically signed by: Rl Ronquillo  Date:    11/23/2024  Time:    10:55               Narrative:    EXAMINATION:  XR CHEST AP PORTABLE    CLINICAL HISTORY:  weakness;    TECHNIQUE:  Single frontal view of the chest was performed.    COMPARISON:  Multiple    FINDINGS:  The lungs are clear, with normal appearance of pulmonary vasculature and no pleural effusion or pneumothorax.    The cardiac silhouette is normal in size. The hilar and mediastinal contours are unremarkable.    Bones are intact.                                       The EKG was ordered, reviewed, and independently interpreted by the ED provider.  Interpretation time: 10:22  Rate: 46 BPM  Rhythm:  Junctional Rhythm with occasional premature ventricular complexes  Interpretation: T wave abnormality, consider lateral ischemia. No STEMI.           The Emergency Provider reviewed the vital signs and test results, which are outlined above.     ED Discussion       11:46 AM: Discussed pt's case with Dr. Angel (cardiology) who recommends admission due to symptomatic bradycardia, placing a transutaneous pacer, and atropine at bedside.    12:00 PM: Discussed pt's case with Dr. Gilbert (pulmonary medicine) who  recommends not pacing the pt as long as he is stable and his blood pressure is maintained, but he suggests keeping the pads on him as a precausion.     12:03 PM: Discussed case with Grey Gilbert MD (pulmonary medicine). Dr. Gilbert agrees with current care and management of pt and accepts admission.   Admitting Service: Critical Care Medicine  Admitting Physician: Dr. Gilbert  Admit to: Inpatient, Intensive Care    12:03 PM: Re-evaluated pt. I have discussed test results, shared treatment plan, and the need for admission with patient and family at bedside. Pt and family express understanding at this time and agree with all information. All questions answered. Pt and family have no further questions or concerns at this time. Pt is ready for admit.           Medical Decision Making  Ddx: Symptomatic bradycardia, weakness    Amount and/or Complexity of Data Reviewed  Labs: ordered. Decision-making details documented in ED Course.  Radiology: ordered. Decision-making details documented in ED Course.  ECG/medicine tests: ordered and independent interpretation performed. Decision-making details documented in ED Course.    Risk  Prescription drug management.  Decision regarding hospitalization.    Critical Care  Total time providing critical care: 60 minutes                ED Medication(s):  Medications   glucagon (human recombinant) injection 5 mg (5 mg Intravenous Given 11/23/24 1054)       New Prescriptions    No medications on file               Scribe Attestation:   Scribe #1: I performed the above scribed service and the documentation accurately describes the services I performed. I attest to the accuracy of the note.     Attending:   Physician Attestation Statement for Scribe #1: I, Emir Collins MD, personally performed the services described in this documentation, as scribed by Dea Carnes, in my presence, and it is both accurate and complete.           Clinical Impression       ICD-10-CM  ICD-9-CM   1. Syncope  R55 780.2   2. Weakness  R53.1 780.79       Disposition:   Disposition: Admitted  Condition: Emir Baum MD  11/23/24 1233

## 2024-11-23 NOTE — ASSESSMENT & PLAN NOTE
"- Patient's FSGs are controlled on current medication regimen.  - Last A1c reviewed:  Lab Results   Component Value Date    HGBA1C 6.3 (H) 10/28/2024   - Most recent fingerstick glucose reviewed- No results for input(s): "POCTGLUCOSE" in the last 24 hours.  - Current correctional scale:  Low  - Maintain anti-hyperglycemic dose as follows:  Antihyperglycemics (From admission, onward)      Start     Stop Route Frequency Ordered    11/23/24 1405  insulin aspart U-100 pen 0-5 Units         -- SubQ Every 6 hours PRN 11/23/24 1305        - Hold Oral hypoglycemics while patient is in the hospital.  "

## 2024-11-23 NOTE — CONSULTS
Aquiles Yates is a 84 y.o. male patient with a PMHx of CAD, diastolic HF, HTN, HLD, Afib on eliquis, DM Type 2, PVD, Alzheimer's disease who presents to the ED earlier today for evaluation of generalized weakness and syncopal episode at home which onset gradually suddenly PTA. Pt states he is unsure what happened but was oriented to person and place when asked. Per wife, pt was getting up from the toilet in the bathroom and was uneasy/unbalanced on his feet and had disarticulated speech. Pt's wife states pt has been feeling unbalanced for 2-3 days now. Pt's wife states these sxs worried her about a potential stroke, as these sxs are similar when he had a previous stroke. Pt's wife notes pt was given pills for constipation yesterday. Pt's wife also notes pt was experiencing hallucinations yesterday as well. Pt's wife states she called EMS, where the heart rate was ranging 20-40s and briefly loss consciousness on their arrival. Pt denies any CP, SOB, fever, or chills. Pt normally ambulates around with a walker, per wife. Pt was in a-fib. . Troponin 0.014.    ECHO    Left Ventricle: The left ventricle is normal in size. Normal wall thickness. There is concentric hypertrophy. Normal wall motion. There is normal systolic function with a visually estimated ejection fraction of 55 - 60%. There is indeterminate diastolic function.    Right Ventricle: Systolic function is normal.    Left Atrium: Left atrium is mildly dilated.    Pulmonary Artery: The estimated pulmonary artery systolic pressure is 23 mmHg.    IVC/SVC: Normal venous pressure at 3 mmHg.    EKG  Junctional rhythm with occasional Premature ventricular complexes  Inferior infarct ,age undetermined  T wave abnormality, consider lateral ischemia

## 2024-11-23 NOTE — ASSESSMENT & PLAN NOTE
- Patient has paroxysmal (<7 days) atrial fibrillation. Patient is currently in Junctional bradycardia [id number] Score: 4. The patients heart rate in the last 24 hours is as follows:  Pulse  Min: 40  Max: 47   - Replete lytes with a goal of K>4, Mg >2  - Restart eliquis once able  - Cards following

## 2024-11-23 NOTE — HPI
Mr. Woods is an 84-year-old male with past medical history of CAD, diastolic HF, HTN, HLD, Afib on eliquis, DM2, PVD, Alzheimer's disease who presented to ED earlier today for generalized weakness and syncopal episode at home. Per wife, patient was getting up from the toilet in the bathroom and was uneasy on his feet. She also reports disarticulated speech. She then called EMS and on their arrival HR was ranging from 20-40. Patient briefly lost consciousness. No other neuro deficits were noted at the time including but not limited to slurred speech, facial asymmetry, motor weakness, etc. Patient denied any chest pain, shortness of breath, blurred vision, headache, nausea, vomiting, diarrhea. His wife does mention that he has had mental/physical decline over the past year but has rapidly progressed within the past two weeks. Has had reduced appetite and does not get around the house like he used to. Of note, he recently was hospitalized in October and had cholecystectomy. Sarah-operatively became bradycardic and had TVP placed with improvement in HR. Did follow up with cardiology and his BB + Aricept were held due to bradycardic episode. EP felt PPM not warranted at this time.     In the ED, patient AAOx3 but with HR in 40s, periodically dropping into 30s. CArdiology was consulted. Transcutaneous pads were placed. BP has remained stable. Patient with no complaints on my examination. Lab workup significant for , CPK 14. Trop wnl. Both CT head and CXR with no acute findings. Lytes wnl. Transcutaneous pacing performed in ED with no response, HR still fluctuating between upper 30s/lower 40s. CC consulted for admission. Echo from 10/9 was reviewed with EF 60%, intermediate diastolic function.

## 2024-11-23 NOTE — HPI
Aquiles Yates is a 84 y.o. male patient with a PMHx of CAD, diastolic HF, HTN, HLD, Afib on eliquis, DM Type 2, PVD, Alzheimer's disease who presents to the ED earlier today for evaluation of generalized weakness and syncopal episode at home which onset gradually suddenly PTA.  Per wife, patient did not lose consciousness, per EMS, patient had a brief loss of consciousness.  Pt states he is unsure what happened but was oriented to person and place when asked. Per wife, pt was getting up from the toilet in the bathroom and was uneasy/unbalanced on his feet and had disarticulated speech. Pt's wife states pt has been feeling unbalanced for 2-3 days now. Pt's wife states these sxs worried her about a potential stroke, as these sxs are similar when he had a previous stroke. Pt's wife notes pt was given pills for constipation yesterday. Pt's wife also notes pt was experiencing hallucinations yesterday as well. Pt's wife states she called EMS, where the heart rate was ranging 20-40s. Pt denies any CP, SOB, fever, or chills. Pt normally ambulates around with a walker, per wife. Pt was in a-fib. . Troponin 0.014.     ECHO    Left Ventricle: The left ventricle is normal in size. Normal wall thickness. There is concentric hypertrophy. Normal wall motion. There is normal systolic function with a visually estimated ejection fraction of 55 - 60%. There is indeterminate diastolic function.    Right Ventricle: Systolic function is normal.    Left Atrium: Left atrium is mildly dilated.    Pulmonary Artery: The estimated pulmonary artery systolic pressure is 23 mmHg.    IVC/SVC: Normal venous pressure at 3 mmHg.     EKG  Atrial fibrillation, Premature ventricular complexes  Inferior infarct ,age undetermined  T wave abnormality, consider lateral ischemia

## 2024-11-23 NOTE — ASSESSMENT & PLAN NOTE
- Patient with known CAD s/p stent placement, which is controlled Will continue ASA and Statin and monitor for S/Sx of angina/ACS. Continue to monitor on telemetry.   - Cards following  - Monitor for anginal symptoms

## 2024-11-23 NOTE — SUBJECTIVE & OBJECTIVE
Past Medical History:   Diagnosis Date    Acute blood loss anemia 10/14/2019    Alzheimer's dementia     Aneurysm, abdominal aortic     BCC (basal cell carcinoma of skin) 06/29/2023    Chronic diastolic heart failure 05/20/2024    Coronary artery disease     Depression     Diabetes mellitus     HLD (hyperlipidemia)     Hypertension     Kidney stone     PAD (peripheral artery disease)     PAF (paroxysmal atrial fibrillation) 01/24/2023    Tobacco abuse      Past Surgical History:   Procedure Laterality Date    APPENDECTOMY      CORONARY STENT PLACEMENT      x 4    DV5 ROBOTIC CHOLECYSTECTOMY N/A 10/15/2024    Procedure: DV5 ROBOTIC CHOLECYSTECTOMY;  Surgeon: Fred Taylor MD;  Location: [facility] OR;  Service: General;  Laterality: N/A;    ERCP N/A 10/10/2024    Procedure: ERCP (ENDOSCOPIC RETROGRADE CHOLANGIOPANCREATOGRAPHY);  Surgeon: Morgan Hong MD;  Location: [address]);  Service: Endoscopy;  Laterality: N/A;    ESOPHAGOGASTRODUODENOSCOPY N/A 10/14/2019    Procedure: EGD (ESOPHAGOGASTRODUODENOSCOPY);  Surgeon: Carlos Mcneal III, MD;  Location: [facility];  Service: Endoscopy;  Laterality: N/A;    ESOPHAGOGASTRODUODENOSCOPY N/A 10/15/2019    Procedure: EGD (ESOPHAGOGASTRODUODENOSCOPY);  Surgeon: Carlos Mcneal III, MD;  Location: [facility];  Service: Endoscopy;  Laterality: N/A;    ESOPHAGOGASTRODUODENOSCOPY N/A 4/17/2023    Procedure: EGD (ESOPHAGOGASTRODUODENOSCOPY);  Surgeon: Nevaeh Mcconnell MD;  Location: [facility];  Service: Endoscopy;  Laterality: N/A;    INSERTION, PACEMAKER, TEMPORARY TRANSVENOUS N/A 10/15/2024    Procedure: Insertion, Pacemaker, Temporary Transvenous;  Surgeon: Demarcus Kirk MD;  Location: [facility] CATH LAB;  Service: Cardiology;  Laterality: N/A;    LEFT HEART CATHETERIZATION Left 10/11/2019    Procedure: CATHETERIZATION, HEART, LEFT;  Surgeon: Robe Gilbert MD;  Location: [facility] CATH LAB;  Service: Cardiology;  Laterality: Left;    LEFT HEART CATHETERIZATION Left  10/13/2019    Procedure: CATHETERIZATION, HEART, LEFT;  Surgeon: Robe Gilbert MD;  Location: [facility] CATH LAB;  Service: Cardiology;  Laterality: Left;    leg stent Left      Review of patient's allergies indicates:   Allergen Reactions    Metoprolol Other (See Comments)     Junctional rhythm       Family History       Problem Relation (Age of Onset)    COPD Father    Diabetes Mother, Brother          Tobacco Use    Smoking status: Former     Types: Cigars     Passive exposure: Never    Smokeless tobacco: Current   Substance and Sexual Activity    Alcohol use: Not Currently    Drug use: Not Currently    Sexual activity: Not Currently     Partners: Female       Review of Systems   Constitutional:  Negative for chills, fatigue and fever.   Respiratory:  Negative for cough, shortness of breath and wheezing.    Cardiovascular:  Negative for chest pain.   Gastrointestinal:  Negative for abdominal pain, diarrhea, nausea and vomiting.   Genitourinary:  Negative for dysuria.   Neurological:  Positive for weakness. Negative for dizziness and headaches.   Psychiatric/Behavioral:  Positive for confusion. Negative for agitation.      Objective:     Vital Signs (Most Recent):  Temp: 97.7 °F (36.5 °C) (11/23/24 1019)  Pulse: (!) 44 (11/23/24 1148)  Resp: 12 (11/23/24 1148)  BP: (!) 151/67 (11/23/24 1148)  SpO2: 97 % (11/23/24 1147) Vital Signs (24h Range):  Temp:  [97.7 °F (36.5 °C)] 97.7 °F (36.5 °C)  Pulse:  [40-47] 44  Resp:  [12-20] 12  SpO2:  [96 %-97 %] 97 %  BP: (145-164)/(67-72) 151/67     Weight: 61.5 kg (135 lb 9.3 oz)  Body mass index is 19.45 kg/m².    No intake or output data in the 24 hours ending 11/23/24 1254     Physical Exam  Constitutional:       General: He is not in acute distress.  HENT:      Head: Normocephalic.   Eyes:      Conjunctiva/sclera: Conjunctivae normal.      Pupils: Pupils are equal, round, and reactive to light.   Cardiovascular:      Rate and Rhythm: Regular rhythm. Bradycardia present.       Pulses: Normal pulses.   Pulmonary:      Effort: No respiratory distress.      Breath sounds: No wheezing, rhonchi or rales.      Comments: On RA  Abdominal:      General: There is no distension.      Palpations: Abdomen is soft.      Tenderness: There is no abdominal tenderness.   Musculoskeletal:      Cervical back: Neck supple.   Neurological:      General: No focal deficit present.      Mental Status: He is alert and oriented to person, place, and time. Mental status is at baseline.      GCS: GCS eye subscore is 4. GCS verbal subscore is 5. GCS motor subscore is 6.      Cranial Nerves: Cranial nerves 2-12 are intact. No cranial nerve deficit.      Sensory: Sensation is intact.      Motor: Motor function is intact.   Psychiatric:         Behavior: Behavior normal.       Lines/Drains/Airways       Peripheral Intravenous Line  Duration                  Peripheral IV - Single Lumen 11/23/24 18 G Right Antecubital <1 day                  Significant Labs:    CBC/Anemia Profile:  Recent Labs   Lab 11/23/24  1034   WBC 4.49   HGB 11.9*   HCT 36.7*      MCV 90   RDW 14.3     Chemistries:  Recent Labs   Lab 11/23/24  1034      K 4.0      CO2 23   BUN 29*   CREATININE 1.4   CALCIUM 9.1   ALBUMIN 3.3*   PROT 6.7   BILITOT 0.7   ALKPHOS 81   ALT 6*   AST 9*     Coagulation:   Recent Labs   Lab 11/23/24  1034   INR 1.2   APTT 31.1     Troponin:   Recent Labs   Lab 11/23/24  1034   TROPONINI 0.014     Significant Imaging:   Imaging Results              CT Head Without Contrast (Final result)  Result time 11/23/24 11:17:06      Final result by Rl Ronquillo MD (11/23/24 11:17:06)                   Impression:      No definite acute abnormality.    Indeterminate left parotid gland lesion.  Further evaluation if not previously characterized.    All CT scans at this facility are performed  using dose modulation techniques as appropriate to performed exam including the following:  automated exposure  control; adjustment of mA and/or kV according to the patients size (this includes techniques or standardized protocols for targeted exams where dose is matched to indication/reason for exam: i.e. extremities or head);  iterative reconstruction technique.      Electronically signed by: Rl Ronquillo  Date:    11/23/2024  Time:    11:17               Narrative:    EXAMINATION:  CT HEAD WITHOUT CONTRAST    CLINICAL HISTORY:  Mental status change, unknown cause;    TECHNIQUE:  Low dose axial CT images obtained throughout the head without intravenous contrast. Sagittal and coronal reconstructions were performed.    COMPARISON:  Multiple    FINDINGS:  Intracranial compartment:    Ventricles and sulci are normal in size for age without evidence of hydrocephalus. No extra-axial blood or fluid collections.    Moderate right-sided encephalomalacia.  Smaller scattered areas of encephalomalacia elsewhere.  Moderate microvascular ischemic change.  Remote bilateral basal ganglia lacunar infarcts.  No parenchymal mass, hemorrhage, edema or major vascular distribution infarct.    Skull/extracranial contents (limited evaluation): No fracture. Mastoid air cells and paranasal sinuses are essentially clear.  Indeterminate left parotid [place] hyperdense lesion measuring 1.4 cm only partially visualized.                                       X-Ray Chest AP Portable (Final result)  Result time 11/23/24 10:55:33      Final result by Rl Ronquillo MD (11/23/24 10:55:33)                   Impression:      No acute abnormality.      Electronically signed by: Rl Ronquillo  Date:    11/23/2024  Time:    10:55               Narrative:    EXAMINATION:  XR CHEST AP PORTABLE    CLINICAL HISTORY:  weakness;    TECHNIQUE:  Single frontal view of the chest was performed.    COMPARISON:  Multiple    FINDINGS:  The lungs are clear, with normal appearance of pulmonary vasculature and no pleural effusion or pneumothorax.    The cardiac silhouette  is normal in size. The hilar and mediastinal contours are unremarkable.    Bones are intact.

## 2024-11-23 NOTE — ASSESSMENT & PLAN NOTE
- Syncopal episode earlier today  - HR ranging from 20-40s via EMS and on admission   - Hx bradycardia with previous admission requiring TVP; EP felt PPM was not warranted  - Cardiology consulted  - Transcutaneous pads are in place; atropine prn   - Will admit to ICU for close observation  - BP stable, lytes wnl, glucose 184 on arrival  - Will check tsh   - Patient with no complaints at this time  - Plan per cards

## 2024-11-23 NOTE — H&P
[facility] - Emergency Dept.  Critical Care Medicine  History & Physical    Patient Name: Aquiles Yates  MRN: [id number]  Admission Date: 11/23/2024  Hospital Length of Stay: 0 days  Code Status: Full Code  Attending Physician: Grey Gilbert MD   Primary Care Provider: Holger Thornton MD   Principal Problem: Symptomatic bradycardia    Subjective:     HPI:  Mr. Woods is an 84-year-old male with past medical history of CAD, diastolic HF, HTN, HLD, Afib on eliquis, DM2, PVD, Alzheimer's disease who presented to ED earlier today for generalized weakness and syncopal episode at home. Per wife, patient was getting up from the toilet in the bathroom and was uneasy on his feet. She also reports disarticulated speech. She then called EMS and on their arrival HR was ranging from 20-40. Patient briefly lost consciousness. No other neuro deficits were noted at the time including but not limited to slurred speech, facial asymmetry, motor weakness, etc. Patient denied any chest pain, shortness of breath, blurred vision, headache, nausea, vomiting, diarrhea. His wife does mention that he has had mental/physical decline over the past year but has rapidly progressed within the past two weeks. Has had reduced appetite and does not get around the house like he used to. Of note, he recently was hospitalized in October and had cholecystectomy. Sarah-operatively became bradycardic and had TVP placed with improvement in HR. Did follow up with cardiology and his BB + Aricept were held due to bradycardic episode. EP felt PPM not warranted at this time.     In the ED, patient AAOx3 but with HR in 40s, periodically dropping into 30s. CArdiology was consulted. Transcutaneous pads were placed. BP has remained stable. Patient with no complaints on my examination. Lab workup significant for , CPK 14. Trop wnl. Both CT head and CXR with no acute findings. Lytes wnl. Transcutaneous pacing performed in ED with no  response, HR still fluctuating between upper 30s/lower 40s. CC consulted for admission. Echo from 10/9 was reviewed with EF 60%, intermediate diastolic function.     Past Medical History:   Diagnosis Date    Acute blood loss anemia 10/14/2019    Alzheimer's dementia     Aneurysm, abdominal aortic     BCC (basal cell carcinoma of skin) 06/29/2023    Chronic diastolic heart failure 05/20/2024    Coronary artery disease     Depression     Diabetes mellitus     HLD (hyperlipidemia)     Hypertension     Kidney stone     PAD (peripheral artery disease)     PAF (paroxysmal atrial fibrillation) 01/24/2023    Tobacco abuse      Past Surgical History:   Procedure Laterality Date    APPENDECTOMY      CORONARY STENT PLACEMENT      x 4    DV5 ROBOTIC CHOLECYSTECTOMY N/A 10/15/2024    Procedure: DV5 ROBOTIC CHOLECYSTECTOMY;  Surgeon: Fred Taylor MD;  Location: [facility] OR;  Service: General;  Laterality: N/A;    ERCP N/A 10/10/2024    Procedure: ERCP (ENDOSCOPIC RETROGRADE CHOLANGIOPANCREATOGRAPHY);  Surgeon: Morgan Hong MD;  Location: [facility] ([address] [place] [address]);  Service: Endoscopy;  Laterality: N/A;    ESOPHAGOGASTRODUODENOSCOPY N/A 10/14/2019    Procedure: EGD (ESOPHAGOGASTRODUODENOSCOPY);  Surgeon: Carlos Mcneal III, MD;  Location: [facility];  Service: Endoscopy;  Laterality: N/A;    ESOPHAGOGASTRODUODENOSCOPY N/A 10/15/2019    Procedure: EGD (ESOPHAGOGASTRODUODENOSCOPY);  Surgeon: Carlos Mcneal III, MD;  Location: [facility];  Service: Endoscopy;  Laterality: N/A;    ESOPHAGOGASTRODUODENOSCOPY N/A 4/17/2023    Procedure: EGD (ESOPHAGOGASTRODUODENOSCOPY);  Surgeon: Nevaeh Mcconnell MD;  Location: [facility] ENDO;  Service: Endoscopy;  Laterality: N/A;    INSERTION, PACEMAKER, TEMPORARY TRANSVENOUS N/A 10/15/2024    Procedure: Insertion, Pacemaker, Temporary Transvenous;  Surgeon: Demarcus Kirk MD;  Location: [facility] CATH LAB;  Service: Cardiology;  Laterality: N/A;    LEFT HEART CATHETERIZATION Left 10/11/2019    Procedure:  CATHETERIZATION, HEART, LEFT;  Surgeon: Robe Gilbert MD;  Location: [facility] CATH LAB;  Service: Cardiology;  Laterality: Left;    LEFT HEART CATHETERIZATION Left 10/13/2019    Procedure: CATHETERIZATION, HEART, LEFT;  Surgeon: Robe Gilbert MD;  Location: [facility] CATH LAB;  Service: Cardiology;  Laterality: Left;    leg stent Left      Review of patient's allergies indicates:   Allergen Reactions    Metoprolol Other (See Comments)     Junctional rhythm       Family History       Problem Relation (Age of Onset)    COPD Father    Diabetes Mother, Brother          Tobacco Use    Smoking status: Former     Types: Cigars     Passive exposure: Never    Smokeless tobacco: Current   Substance and Sexual Activity    Alcohol use: Not Currently    Drug use: Not Currently    Sexual activity: Not Currently     Partners: Female       Review of Systems   Constitutional:  Negative for chills, fatigue and fever.   Respiratory:  Negative for cough, shortness of breath and wheezing.    Cardiovascular:  Negative for chest pain.   Gastrointestinal:  Negative for abdominal pain, diarrhea, nausea and vomiting.   Genitourinary:  Negative for dysuria.   Neurological:  Positive for weakness. Negative for dizziness and headaches.   Psychiatric/Behavioral:  Positive for confusion. Negative for agitation.      Objective:     Vital Signs (Most Recent):  Temp: 97.7 °F (36.5 °C) (11/23/24 1019)  Pulse: (!) 44 (11/23/24 1148)  Resp: 12 (11/23/24 1148)  BP: (!) 151/67 (11/23/24 1148)  SpO2: 97 % (11/23/24 1147) Vital Signs (24h Range):  Temp:  [97.7 °F (36.5 °C)] 97.7 °F (36.5 °C)  Pulse:  [40-47] 44  Resp:  [12-20] 12  SpO2:  [96 %-97 %] 97 %  BP: (145-164)/(67-72) 151/67     Weight: 61.5 kg (135 lb 9.3 oz)  Body mass index is 19.45 kg/m².    No intake or output data in the 24 hours ending 11/23/24 1254     Physical Exam  Constitutional:       General: He is not in acute distress.  HENT:      Head: Normocephalic.   Eyes:      Conjunctiva/sclera:  Conjunctivae normal.      Pupils: Pupils are equal, round, and reactive to light.   Cardiovascular:      Rate and Rhythm: Regular rhythm. Bradycardia present.      Pulses: Normal pulses.   Pulmonary:      Effort: No respiratory distress.      Breath sounds: No wheezing, rhonchi or rales.      Comments: On RA  Abdominal:      General: There is no distension.      Palpations: Abdomen is soft.      Tenderness: There is no abdominal tenderness.   Musculoskeletal:      Cervical back: Neck supple.   Neurological:      General: No focal deficit present.      Mental Status: He is alert and oriented to person, place, and time. Mental status is at baseline.      GCS: GCS eye subscore is 4. GCS verbal subscore is 5. GCS motor subscore is 6.      Cranial Nerves: Cranial nerves 2-12 are intact. No cranial nerve deficit.      Sensory: Sensation is intact.      Motor: Motor function is intact.   Psychiatric:         Behavior: Behavior normal.       Lines/Drains/Airways       Peripheral Intravenous Line  Duration                  Peripheral IV - Single Lumen 11/23/24 18 G Right Antecubital <1 day                  Significant Labs:    CBC/Anemia Profile:  Recent Labs   Lab 11/23/24  1034   WBC 4.49   HGB 11.9*   HCT 36.7*      MCV 90   RDW 14.3     Chemistries:  Recent Labs   Lab 11/23/24  1034      K 4.0      CO2 23   BUN 29*   CREATININE 1.4   CALCIUM 9.1   ALBUMIN 3.3*   PROT 6.7   BILITOT 0.7   ALKPHOS 81   ALT 6*   AST 9*     Coagulation:   Recent Labs   Lab 11/23/24  1034   INR 1.2   APTT 31.1     Troponin:   Recent Labs   Lab 11/23/24  1034   TROPONINI 0.014     Significant Imaging:   Imaging Results              CT Head Without Contrast (Final result)  Result time 11/23/24 11:17:06      Final result by Rl Ronquillo MD (11/23/24 11:17:06)                   Impression:      No definite acute abnormality.    Indeterminate left parotid gland lesion.  Further evaluation if not previously  characterized.    All CT scans at this facility are performed  using dose modulation techniques as appropriate to performed exam including the following:  automated exposure control; adjustment of mA and/or kV according to the patients size (this includes techniques or standardized protocols for targeted exams where dose is matched to indication/reason for exam: i.e. extremities or head);  iterative reconstruction technique.      Electronically signed by: Rl Ronquillo  Date:    11/23/2024  Time:    11:17               Narrative:    EXAMINATION:  CT HEAD WITHOUT CONTRAST    CLINICAL HISTORY:  Mental status change, unknown cause;    TECHNIQUE:  Low dose axial CT images obtained throughout the head without intravenous contrast. Sagittal and coronal reconstructions were performed.    COMPARISON:  Multiple    FINDINGS:  Intracranial compartment:    Ventricles and sulci are normal in size for age without evidence of hydrocephalus. No extra-axial blood or fluid collections.    Moderate right-sided encephalomalacia.  Smaller scattered areas of encephalomalacia elsewhere.  Moderate microvascular ischemic change.  Remote bilateral basal ganglia lacunar infarcts.  No parenchymal mass, hemorrhage, edema or major vascular distribution infarct.    Skull/extracranial contents (limited evaluation): No fracture. Mastoid air cells and paranasal sinuses are essentially clear.  Indeterminate left parotid Shanique hyperdense lesion measuring 1.4 cm only partially visualized.                                       X-Ray Chest AP Portable (Final result)  Result time 11/23/24 10:55:33      Final result by Rl Ronquillo MD (11/23/24 10:55:33)                   Impression:      No acute abnormality.      Electronically signed by: Rl Ronquillo  Date:    11/23/2024  Time:    10:55               Narrative:    EXAMINATION:  XR CHEST AP PORTABLE    CLINICAL HISTORY:  weakness;    TECHNIQUE:  Single frontal view of the chest was  performed.    COMPARISON:  Multiple    FINDINGS:  The lungs are clear, with normal appearance of pulmonary vasculature and no pleural effusion or pneumothorax.    The cardiac silhouette is normal in size. The hilar and mediastinal contours are unremarkable.    Bones are intact.                                     Assessment/Plan:     Neuro  Dementia  - Patient with dementia with likely etiology of alzheimer's dementia. Dementia is moderate. The patient does not have signs of behavioral disturbance. Home dementia medications are Held or Continued: held..   - Continue non-pharmacologic interventions to prevent delirium (No VS between 11PM-5AM, activity during day, opening blinds, providing glasses/hearing aids, and up in chair during daytime). Will avoid narcotics and benzos unless absolutely necessary. PRN anti-psychotics are not prescribed to avoid self harm behaviors.  - Not an issue at this time, continue supportive care    Cardiac/Vascular  Chronic diastolic heart failure  - Echo reviewed from October; EF 60% but with intermediate diastolic function  - Cards following  - Monitor fluid status, I&Os, daily weights    PAF (paroxysmal atrial fibrillation)  - Patient has paroxysmal (<7 days) atrial fibrillation. Patient is currently in Junctional bradycardia [id number] Score: 4. The patients heart rate in the last 24 hours is as follows:  Pulse  Min: 40  Max: 47   - Replete lytes with a goal of K>4, Mg >2  - Restart eliquis once able  - Cards following    CAD, multiple vessel  - Patient with known CAD s/p stent placement, which is controlled Will continue ASA and Statin and monitor for S/Sx of angina/ACS. Continue to monitor on telemetry.   - Cards following  - Monitor for anginal symptoms    Bradycardia  - Syncopal episode earlier today  - HR ranging from 20-40s via EMS and on admission   - Hx bradycardia with previous admission requiring TVP; EP felt PPM was not warranted  - Cardiology consulted  - Transcutaneous  "pads are in place; atropine prn   - Will admit to ICU for close observation  - BP stable, lytes wnl, glucose 184 on arrival  - Will check tsh   - Patient with no complaints at this time  - Plan per cards    Dyslipidemia  - Statin once able    Essential hypertension  - Patients blood pressure range in the last 24 hours was: BP  Min: 145/72  Max: 164/72.  - Holding home meds at this time  - Avoid any AVN blocking agents  - BP stable    Endocrine  Type 2 diabetes mellitus  - Patient's FSGs are controlled on current medication regimen.  - Last A1c reviewed:  Lab Results   Component Value Date    HGBA1C 6.3 (H) 10/28/2024   - Most recent fingerstick glucose reviewed- No results for input(s): "POCTGLUCOSE" in the last 24 hours.  - Current correctional scale:  Low  - Maintain anti-hyperglycemic dose as follows:  Antihyperglycemics (From admission, onward)      Start     Stop Route Frequency Ordered    11/23/24 1405  insulin aspart U-100 pen 0-5 Units         -- SubQ Every 6 hours PRN 11/23/24 1305        - Hold Oral hypoglycemics while patient is in the hospital.       Critical Care Daily Checklist:    A: Awake: RASS Goal/Actual Goal:  N/a  Actual:  N/a   B: Spontaneous Breathing Trial Performed?  N/a   C: SAT & SBT Coordinated?  N/a                      D: Delirium: CAM-ICU  N/a   E: Early Mobility Performed? Yes   F: Feeding Goal:    Status:     Current Diet Order   No orders of the defined types were placed in this encounter.      AS: Analgesia/Sedation None   T: Thromboembolic Prophylaxis SCDs   H: HOB > 300 Yes   U: Stress Ulcer Prophylaxis (if needed) IV Pepcid   G: Glucose Control Low intensity SS coverage   B: Bowel Function  Monitor   I: Indwelling Catheter (Lines & Malik) Necessity PIVS   D: De-escalation of Antimicrobials/Pharmacotherapies None    Plan for the day/ETD ICU admission    Code Status:  Family/Goals of Care: Full Code       Critical Care Time: 45 minutes  Critical secondary to Patient has a condition " that poses threat to life and bodily function: Symptomatic bradycardia    Critical care was time spent personally by me on the following activities: development of treatment plan with patient or surrogate and bedside caregivers, discussions with consultants, evaluation of patient's response to treatment, examination of patient, ordering and performing treatments and interventions, ordering and review of laboratory studies, ordering and review of radiographic studies, pulse oximetry, re-evaluation of patient's condition. This critical care time did not overlap with that of any other provider or involve time for any procedures.     Kartik Barraza PA-C  Critical Care Medicine  [facility] - Emergency Dept.

## 2024-11-23 NOTE — ASSESSMENT & PLAN NOTE
- Patient with dementia with likely etiology of alzheimer's dementia. Dementia is moderate. The patient does not have signs of behavioral disturbance. Home dementia medications are Held or Continued: held..   - Continue non-pharmacologic interventions to prevent delirium (No VS between 11PM-5AM, activity during day, opening blinds, providing glasses/hearing aids, and up in chair during daytime). Will avoid narcotics and benzos unless absolutely necessary. PRN anti-psychotics are not prescribed to avoid self harm behaviors.  - Not an issue at this time, continue supportive care

## 2024-11-23 NOTE — ASSESSMENT & PLAN NOTE
- Patients blood pressure range in the last 24 hours was: BP  Min: 145/72  Max: 164/72.  - Holding home meds at this time  - Avoid any AVN blocking agents  - BP stable

## 2024-11-24 LAB
ANION GAP SERPL CALC-SCNC: 7 MMOL/L (ref 8–16)
BASOPHILS # BLD AUTO: 0.04 K/UL (ref 0–0.2)
BASOPHILS NFR BLD: 0.8 % (ref 0–1.9)
BUN SERPL-MCNC: 30 MG/DL (ref 8–23)
CALCIUM SERPL-MCNC: 9.1 MG/DL (ref 8.7–10.5)
CHLORIDE SERPL-SCNC: 106 MMOL/L (ref 95–110)
CO2 SERPL-SCNC: 26 MMOL/L (ref 23–29)
CREAT SERPL-MCNC: 1.4 MG/DL (ref 0.5–1.4)
DIFFERENTIAL METHOD BLD: ABNORMAL
EOSINOPHIL # BLD AUTO: 0.4 K/UL (ref 0–0.5)
EOSINOPHIL NFR BLD: 8 % (ref 0–8)
ERYTHROCYTE [DISTWIDTH] IN BLOOD BY AUTOMATED COUNT: 14.3 % (ref 11.5–14.5)
EST. GFR  (NO RACE VARIABLE): 49 ML/MIN/1.73 M^2
GLUCOSE SERPL-MCNC: 115 MG/DL (ref 70–110)
HCT VFR BLD AUTO: 38.4 % (ref 40–54)
HGB BLD-MCNC: 12.4 G/DL (ref 14–18)
IMM GRANULOCYTES # BLD AUTO: 0.01 K/UL (ref 0–0.04)
IMM GRANULOCYTES NFR BLD AUTO: 0.2 % (ref 0–0.5)
LYMPHOCYTES # BLD AUTO: 1.2 K/UL (ref 1–4.8)
LYMPHOCYTES NFR BLD: 23.1 % (ref 18–48)
MAGNESIUM SERPL-MCNC: 1.9 MG/DL (ref 1.6–2.6)
MCH RBC QN AUTO: 28.9 PG (ref 27–31)
MCHC RBC AUTO-ENTMCNC: 32.3 G/DL (ref 32–36)
MCV RBC AUTO: 90 FL (ref 82–98)
MONOCYTES # BLD AUTO: 0.4 K/UL (ref 0.3–1)
MONOCYTES NFR BLD: 7.2 % (ref 4–15)
NEUTROPHILS # BLD AUTO: 3.1 K/UL (ref 1.8–7.7)
NEUTROPHILS NFR BLD: 60.7 % (ref 38–73)
NRBC BLD-RTO: 0 /100 WBC
PHOSPHATE SERPL-MCNC: 2.8 MG/DL (ref 2.7–4.5)
PLATELET # BLD AUTO: 166 K/UL (ref 150–450)
PMV BLD AUTO: 10.6 FL (ref 9.2–12.9)
POCT GLUCOSE: 110 MG/DL (ref 70–110)
POCT GLUCOSE: 129 MG/DL (ref 70–110)
POCT GLUCOSE: 133 MG/DL (ref 70–110)
POCT GLUCOSE: 166 MG/DL (ref 70–110)
POCT GLUCOSE: 181 MG/DL (ref 70–110)
POTASSIUM SERPL-SCNC: 5.3 MMOL/L (ref 3.5–5.1)
RBC # BLD AUTO: 4.29 M/UL (ref 4.6–6.2)
SODIUM SERPL-SCNC: 139 MMOL/L (ref 136–145)
WBC # BLD AUTO: 5.15 K/UL (ref 3.9–12.7)

## 2024-11-24 PROCEDURE — 25000003 PHARM REV CODE 250: Mod: HCNC | Performed by: INTERNAL MEDICINE

## 2024-11-24 PROCEDURE — 99233 SBSQ HOSP IP/OBS HIGH 50: CPT | Mod: HCNC,,, | Performed by: STUDENT IN AN ORGANIZED HEALTH CARE EDUCATION/TRAINING PROGRAM

## 2024-11-24 PROCEDURE — 85025 COMPLETE CBC W/AUTO DIFF WBC: CPT | Mod: HCNC

## 2024-11-24 PROCEDURE — 25000003 PHARM REV CODE 250: Mod: HCNC

## 2024-11-24 PROCEDURE — 20000000 HC ICU ROOM: Mod: HCNC

## 2024-11-24 PROCEDURE — 25000003 PHARM REV CODE 250: Mod: HCNC | Performed by: STUDENT IN AN ORGANIZED HEALTH CARE EDUCATION/TRAINING PROGRAM

## 2024-11-24 PROCEDURE — 36415 COLL VENOUS BLD VENIPUNCTURE: CPT | Mod: HCNC

## 2024-11-24 PROCEDURE — 80048 BASIC METABOLIC PNL TOTAL CA: CPT | Mod: HCNC

## 2024-11-24 PROCEDURE — 83735 ASSAY OF MAGNESIUM: CPT | Mod: HCNC

## 2024-11-24 PROCEDURE — 84100 ASSAY OF PHOSPHORUS: CPT | Mod: HCNC

## 2024-11-24 RX ORDER — LANOLIN ALCOHOL/MO/W.PET/CERES
400 CREAM (GRAM) TOPICAL ONCE
Status: COMPLETED | OUTPATIENT
Start: 2024-11-24 | End: 2024-11-24

## 2024-11-24 RX ORDER — MUPIROCIN 20 MG/G
OINTMENT TOPICAL 2 TIMES DAILY
Status: DISCONTINUED | OUTPATIENT
Start: 2024-11-24 | End: 2024-11-27 | Stop reason: HOSPADM

## 2024-11-24 RX ORDER — ISOSORBIDE MONONITRATE 60 MG/1
60 TABLET, EXTENDED RELEASE ORAL DAILY
Status: DISCONTINUED | OUTPATIENT
Start: 2024-11-24 | End: 2024-11-27 | Stop reason: HOSPADM

## 2024-11-24 RX ORDER — ENOXAPARIN SODIUM 100 MG/ML
40 INJECTION SUBCUTANEOUS EVERY 24 HOURS
Status: DISCONTINUED | OUTPATIENT
Start: 2024-11-24 | End: 2024-11-25

## 2024-11-24 RX ADMIN — Medication 400 MG: at 10:11

## 2024-11-24 RX ADMIN — FAMOTIDINE 20 MG: 10 INJECTION, SOLUTION INTRAVENOUS at 10:11

## 2024-11-24 RX ADMIN — MUPIROCIN: 20 OINTMENT TOPICAL at 10:11

## 2024-11-24 RX ADMIN — MUPIROCIN: 20 OINTMENT TOPICAL at 09:11

## 2024-11-24 RX ADMIN — ISOSORBIDE MONONITRATE 60 MG: 60 TABLET, EXTENDED RELEASE ORAL at 11:11

## 2024-11-24 NOTE — ASSESSMENT & PLAN NOTE
Slow ventricular response, currently in AFib   Holding Eliquis for PPM  No history of BB or CCB at home

## 2024-11-24 NOTE — ASSESSMENT & PLAN NOTE
- Patients blood pressure range in the last 24 hours was: BP  Min: 111/53  Max: 164/72.  - Holding home meds at this time  - Avoid any AVN blocking agents  - BP stable

## 2024-11-24 NOTE — HOSPITAL COURSE
11/24/2024: No complaints. Sitting up in bed eating breakfast on exam. HR has been stable in 40s. Did drop into 20s briefly yesterday during bowel movement. Glucose controlled. Repeat echo with EF 55-60%. Plan for PPM Monday.     11/25/2024: Doing well this am. No complaints. HR consistently in 40s but will drop down into 30s when sleeping.     11/26/2024: Continued bradycardia, otherwise stable. No plans for PPM at this time per Cards, will need OP cardiac event monitor. Stable to SD to hosp med.

## 2024-11-24 NOTE — PROGRESS NOTES
[facility] - Intensive Care ([facility])  Critical Care Medicine  Progress Note    Patient Name: Aquiles Yates  MRN: [id number]  Admission Date: 11/23/2024  Hospital Length of Stay: 1 days  Code Status: Full Code  Attending Provider: Grey Gilbert MD  Primary Care Provider: Holger Thornton MD   Principal Problem: Symptomatic bradycardia    Subjective:     HPI:  Mr. Woods is an 84-year-old male with past medical history of CAD, diastolic HF, HTN, HLD, Afib on eliquis, DM2, PVD, Alzheimer's disease who presented to ED earlier today for generalized weakness and syncopal episode at home. Per wife, patient was getting up from the toilet in the bathroom and was uneasy on his feet. She also reports disarticulated speech. She then called EMS and on their arrival HR was ranging from 20-40. Patient briefly lost consciousness. No other neuro deficits were noted at the time including but not limited to slurred speech, facial asymmetry, motor weakness, etc. Patient denied any chest pain, shortness of breath, blurred vision, headache, nausea, vomiting, diarrhea. His wife does mention that he has had mental/physical decline over the past year but has rapidly progressed within the past two weeks. Has had reduced appetite and does not get around the house like he used to. Of note, he recently was hospitalized in October and had cholecystectomy. Sarah-operatively became bradycardic and had TVP placed with improvement in HR. Did follow up with cardiology and his BB + Aricept were held due to bradycardic episode. EP felt PPM not warranted at this time.     In the ED, patient AAOx3 but with HR in 40s, periodically dropping into 30s. CArdiology was consulted. Transcutaneous pads were placed. BP has remained stable. Patient with no complaints on my examination. Lab workup significant for , CPK 14. Trop wnl. Both CT head and CXR with no acute findings. Lytes wnl. Transcutaneous pacing performed in ED with no  response, HR still fluctuating between upper 30s/lower 40s. CC consulted for admission. Echo from 10/9 was reviewed with EF 60%, intermediate diastolic function.     Hospital/ICU Course:  11/24/2024: No complaints. Sitting up in bed eating breakfast on exam. HR has been stable in 40s. Did drop into 20s briefly yesterday during bowel movement. Glucose controlled. Repeat echo with EF 55-60%. Plan for PPM Monday.     Objective:     Vital Signs (Most Recent):  Temp: 97.9 °F (36.6 °C) (11/24/24 0301)  Pulse: (!) 43 (11/24/24 0601)  Resp: 17 (11/24/24 0601)  BP: 136/63 (11/24/24 0601)  SpO2: 100 % (11/24/24 0601) Vital Signs (24h Range):  Temp:  [97.7 °F (36.5 °C)-98 °F (36.7 °C)] 97.9 °F (36.6 °C)  Pulse:  [40-53] 43  Resp:  [12-24] 17  SpO2:  [95 %-100 %] 100 %  BP: (111-164)/(53-72) 136/63     Weight: 61.2 kg (135 lb)  Body mass index is 19.37 kg/m².  Intake/Output Summary (Last 24 hours) at 11/24/2024 0815  Last data filed at 11/24/2024 0501  Gross per 24 hour   Intake --   Output 200 ml   Net -200 ml     Physical Exam  Constitutional:       General: He is not in acute distress.  HENT:      Head: Normocephalic.      Mouth/Throat:      Mouth: Mucous membranes are moist.   Eyes:      Conjunctiva/sclera: Conjunctivae normal.      Pupils: Pupils are equal, round, and reactive to light.   Cardiovascular:      Rate and Rhythm: Regular rhythm. Bradycardia present.   Pulmonary:      Effort: Pulmonary effort is normal.      Breath sounds: No wheezing or rales.      Comments: On RA  Abdominal:      General: There is no distension.      Palpations: Abdomen is soft.      Tenderness: There is no abdominal tenderness.   Musculoskeletal:         General: No swelling.   Skin:     General: Skin is warm.      Coloration: Skin is not jaundiced.      Findings: No bruising.   Neurological:      General: No focal deficit present.      Mental Status: He is alert and oriented to person, place, and time.   Psychiatric:         Mood and  Affect: Mood normal.     Review of Systems   Constitutional:  Negative for fatigue.   Respiratory:  Negative for chest tightness and shortness of breath.    Cardiovascular:  Negative for chest pain.   Gastrointestinal:  Negative for abdominal pain and nausea.   Genitourinary:  Negative for dysuria.   Neurological:  Negative for dizziness, syncope, weakness and headaches.   Psychiatric/Behavioral:  Negative for agitation and confusion.      Lines/Drains/Airways       Peripheral Intravenous Line  Duration                  Peripheral IV - Single Lumen 11/23/24 1352 22 G Left;Posterior Hand <1 day                  Significant Labs:    CBC/Anemia Profile:  Recent Labs   Lab 11/23/24  1034 11/24/24  0427   WBC 4.49 5.15   HGB 11.9* 12.4*   HCT 36.7* 38.4*    166   MCV 90 90   RDW 14.3 14.3     Chemistries:  Recent Labs   Lab 11/23/24  1034 11/24/24  0427    139   K 4.0 5.3*    106   CO2 23 26   BUN 29* 30*   CREATININE 1.4 1.4   CALCIUM 9.1 9.1   ALBUMIN 3.3*  --    PROT 6.7  --    BILITOT 0.7  --    ALKPHOS 81  --    ALT 6*  --    AST 9*  --    MG 1.9 1.9   PHOS  --  2.8     Significant Imaging:  I have reviewed all pertinent imaging results/findings within the past 24 hours.  I have reviewed and interpreted all pertinent imaging results/findings within the past 24 hours.    Assessment/Plan:     Neuro  Dementia  - Patient with dementia with likely etiology of alzheimer's dementia. Dementia is moderate. The patient does not have signs of behavioral disturbance. Home dementia medications are Held or Continued: held..   - Continue non-pharmacologic interventions to prevent delirium (No VS between 11PM-5AM, activity during day, opening blinds, providing glasses/hearing aids, and up in chair during daytime). Will avoid narcotics and benzos unless absolutely necessary. PRN anti-psychotics are not prescribed to avoid self harm behaviors.  - Not an issue at this time, continue supportive  care    Cardiac/Vascular  * Symptomatic bradycardia  - Syncopal episode earlier today  - HR ranging from 20-40s via EMS and on admission   - Hx bradycardia with previous admission requiring TVP; EP felt PPM was not warranted  - Cardiology consulted  - Transcutaneous pads are in place; atropine prn   - Will admit to ICU for close observation  - BP stable, lytes wnl, glucose 184 on arrival  - Will check tsh   - Patient with no complaints at this time  - Plan per cards  - 11/24: HR stable in 40s. Plan for PPM Monday. Atropine prn.     Chronic diastolic heart failure  - Echo reviewed from October; EF 60% but with intermediate diastolic function  - Cards following  - Monitor fluid status, I&Os, daily weights  - Repeat echo with no changes    PAF (paroxysmal atrial fibrillation)  - Patient has paroxysmal (<7 days) atrial fibrillation. Patient is currently in Junctional bradycardia [id number] Score: 4. The patients heart rate in the last 24 hours is as follows:  Pulse  Min: 40  Max: 53   - Replete lytes with a goal of K>4, Mg >2  - Restart eliquis once able; holding, PPM tomorrow  - Cards following    CAD, multiple vessel  - Patient with known CAD s/p stent placement, which is controlled Will continue ASA and Statin and monitor for S/Sx of angina/ACS. Continue to monitor on telemetry.   - Cards following  - Monitor for anginal symptoms  - Trop negative    Dyslipidemia  - Statin once able    Essential hypertension  - Patients blood pressure range in the last 24 hours was: BP  Min: 111/53  Max: 164/72.  - Holding home meds at this time  - Avoid any AVN blocking agents  - BP stable    Endocrine  Type 2 diabetes mellitus  - Patient's FSGs are controlled on current medication regimen.  - Last A1c reviewed:  Lab Results   Component Value Date    HGBA1C 6.3 (H) 10/28/2024   - Most recent fingerstick glucose reviewed-   Recent Labs   Lab 11/23/24  1326 11/23/24  1841 11/24/24  0039 11/24/24  0605   POCTGLUCOSE 145* 149* 129* 110      - Current correctional scale:  Low  - Maintain anti-hyperglycemic dose as follows:  Antihyperglycemics (From admission, onward)      Start     Stop Route Frequency Ordered    11/23/24 1405  insulin aspart U-100 pen 0-5 Units         -- SubQ Every 6 hours PRN 11/23/24 1305        - Hold Oral hypoglycemics while patient is in the hospital.  - Glucose controlled     Critical Care Daily Checklist:    A: Awake: RASS Goal/Actual Goal:  N/a  Actual:  N/a   B: Spontaneous Breathing Trial Performed?  N/a   C: SAT & SBT Coordinated?  N/a                      D: Delirium: CAM-ICU Overall CAM-ICU: Negative   E: Early Mobility Performed? Yes   F: Feeding Goal:    Status:     Current Diet Order   Procedures    Diet diabetic 2000 Calories (up to 75 gm per meal); Standard Tray     Order Specific Question:   Total calories / carbs:     Answer:   2000 Calories (up to 75 gm per meal)     Order Specific Question:   Tray type:     Answer:   Standard Tray      AS: Analgesia/Sedation None   T: Thromboembolic Prophylaxis SCDs   H: HOB > 300 Yes   U: Stress Ulcer Prophylaxis (if needed) Pepcid   G: Glucose Control SS coverage, controlled   B: Bowel Function Stool Occurrence: 1   I: Indwelling Catheter (Lines & Malik) Necessity PIVs   D: De-escalation of Antimicrobials/Pharmacotherapies None    Plan for the day/ETD Monitor HR    Code Status:  Family/Goals of Care: Full Code       Critical Care Time: 36 minutes  Critical secondary to Patient has a condition that poses threat to life and bodily function: Symptomatic bradycardia     Critical care was time spent personally by me on the following activities: development of treatment plan with patient or surrogate and bedside caregivers, discussions with consultants, evaluation of patient's response to treatment, examination of patient, ordering and performing treatments and interventions, ordering and review of laboratory studies, ordering and review of radiographic studies, pulse oximetry,  re-evaluation of patient's condition. This critical care time did not overlap with that of any other provider or involve time for any procedures.     Kartik Barraza PA-C  Critical Care Medicine  [facility] - [facility] ([facility])

## 2024-11-24 NOTE — SUBJECTIVE & OBJECTIVE
Objective:     Vital Signs (Most Recent):  Temp: 97.9 °F (36.6 °C) (11/24/24 0301)  Pulse: (!) 43 (11/24/24 0601)  Resp: 17 (11/24/24 0601)  BP: 136/63 (11/24/24 0601)  SpO2: 100 % (11/24/24 0601) Vital Signs (24h Range):  Temp:  [97.7 °F (36.5 °C)-98 °F (36.7 °C)] 97.9 °F (36.6 °C)  Pulse:  [40-53] 43  Resp:  [12-24] 17  SpO2:  [95 %-100 %] 100 %  BP: (111-164)/(53-72) 136/63     Weight: 61.2 kg (135 lb)  Body mass index is 19.37 kg/m².  Intake/Output Summary (Last 24 hours) at 11/24/2024 0815  Last data filed at 11/24/2024 0501  Gross per 24 hour   Intake --   Output 200 ml   Net -200 ml     Physical Exam  Constitutional:       General: He is not in acute distress.  HENT:      Head: Normocephalic.      Mouth/Throat:      Mouth: Mucous membranes are moist.   Eyes:      Conjunctiva/sclera: Conjunctivae normal.      Pupils: Pupils are equal, round, and reactive to light.   Cardiovascular:      Rate and Rhythm: Regular rhythm. Bradycardia present.   Pulmonary:      Effort: Pulmonary effort is normal.      Breath sounds: No wheezing or rales.      Comments: On RA  Abdominal:      General: There is no distension.      Palpations: Abdomen is soft.      Tenderness: There is no abdominal tenderness.   Musculoskeletal:         General: No swelling.   Skin:     General: Skin is warm.      Coloration: Skin is not jaundiced.      Findings: No bruising.   Neurological:      General: No focal deficit present.      Mental Status: He is alert and oriented to person, place, and time.   Psychiatric:         Mood and Affect: Mood normal.     Review of Systems   Constitutional:  Negative for fatigue.   Respiratory:  Negative for chest tightness and shortness of breath.    Cardiovascular:  Negative for chest pain.   Gastrointestinal:  Negative for abdominal pain and nausea.   Genitourinary:  Negative for dysuria.   Neurological:  Negative for dizziness, syncope, weakness and headaches.   Psychiatric/Behavioral:  Negative for  agitation and confusion.      Lines/Drains/Airways       Peripheral Intravenous Line  Duration                  Peripheral IV - Single Lumen 11/23/24 1352 22 G Left;Posterior Hand <1 day                  Significant Labs:    CBC/Anemia Profile:  Recent Labs   Lab 11/23/24  1034 11/24/24  0427   WBC 4.49 5.15   HGB 11.9* 12.4*   HCT 36.7* 38.4*    166   MCV 90 90   RDW 14.3 14.3     Chemistries:  Recent Labs   Lab 11/23/24  1034 11/24/24  0427    139   K 4.0 5.3*    106   CO2 23 26   BUN 29* 30*   CREATININE 1.4 1.4   CALCIUM 9.1 9.1   ALBUMIN 3.3*  --    PROT 6.7  --    BILITOT 0.7  --    ALKPHOS 81  --    ALT 6*  --    AST 9*  --    MG 1.9 1.9   PHOS  --  2.8     Significant Imaging:  I have reviewed all pertinent imaging results/findings within the past 24 hours.  I have reviewed and interpreted all pertinent imaging results/findings within the past 24 hours.

## 2024-11-24 NOTE — CONSULTS
[facility] ([facility])  Cardiology  Consult Note    Patient Name: Aquiles Yates  MRN: [id number]  Admission Date: 11/23/2024  Hospital Length of Stay: 0 days  Code Status: Full Code   Attending Provider: Grey Gilbert MD   Consulting Provider: Raul Angel MD  Primary Care Physician: Holger Thornton MD  Principal Problem:Symptomatic bradycardia    Patient information was obtained from patient, spouse/SO, and ER records.     Inpatient consult to Cardiology  Consult performed by: Raul Angel MD  Consult ordered by: Grey Gilbert MD  Reason for consult: Symptomatic bradycardia        Subjective:     Chief Complaint:  Near-syncope     HPI:   Aquiles Yates is a 84 y.o. male patient with a PMHx of CAD, diastolic HF, HTN, HLD, Afib on eliquis, DM Type 2, PVD, Alzheimer's disease who presents to the ED earlier today for evaluation of generalized weakness and syncopal episode at home which onset gradually suddenly PTA.  Per wife, patient did not lose consciousness, per EMS, patient had a brief loss of consciousness.  Pt states he is unsure what happened but was oriented to person and place when asked. Per wife, pt was getting up from the toilet in the bathroom and was uneasy/unbalanced on his feet and had disarticulated speech. Pt's wife states pt has been feeling unbalanced for 2-3 days now. Pt's wife states these sxs worried her about a potential stroke, as these sxs are similar when he had a previous stroke. Pt's wife notes pt was given pills for constipation yesterday. Pt's wife also notes pt was experiencing hallucinations yesterday as well. Pt's wife states she called EMS, where the heart rate was ranging 20-40s. Pt denies any CP, SOB, fever, or chills. Pt normally ambulates around with a walker, per wife. Pt was in a-fib. . Troponin 0.014.     ECHO    Left Ventricle: The left ventricle is normal in size. Normal wall thickness. There is  concentric hypertrophy. Normal wall motion. There is normal systolic function with a visually estimated ejection fraction of 55 - 60%. There is indeterminate diastolic function.    Right Ventricle: Systolic function is normal.    Left Atrium: Left atrium is mildly dilated.    Pulmonary Artery: The estimated pulmonary artery systolic pressure is 23 mmHg.    IVC/SVC: Normal venous pressure at 3 mmHg.     EKG  Atrial fibrillation, Premature ventricular complexes  Inferior infarct ,age undetermined  T wave abnormality, consider lateral ischemia     Past Medical History:   Diagnosis Date    Acute blood loss anemia 10/14/2019    Alzheimer's dementia     Aneurysm, abdominal aortic     BCC (basal cell carcinoma of skin) 06/29/2023    Chronic diastolic heart failure 05/20/2024    Coronary artery disease     Depression     Diabetes mellitus     HLD (hyperlipidemia)     Hypertension     Kidney stone     PAD (peripheral artery disease)     PAF (paroxysmal atrial fibrillation) 01/24/2023    Tobacco abuse        Past Surgical History:   Procedure Laterality Date    APPENDECTOMY      CORONARY STENT PLACEMENT      x 4    DV5 ROBOTIC CHOLECYSTECTOMY N/A 10/15/2024    Procedure: DV5 ROBOTIC CHOLECYSTECTOMY;  Surgeon: Fred Taylor MD;  Location: [facility];  Service: General;  Laterality: N/A;    ERCP N/A 10/10/2024    Procedure: ERCP (ENDOSCOPIC RETROGRADE CHOLANGIOPANCREATOGRAPHY);  Surgeon: Mrogan Hong MD;  Location: [address]);  Service: Endoscopy;  Laterality: N/A;    ESOPHAGOGASTRODUODENOSCOPY N/A 10/14/2019    Procedure: EGD (ESOPHAGOGASTRODUODENOSCOPY);  Surgeon: Carlos Mcneal III, MD;  Location: [facility];  Service: Endoscopy;  Laterality: N/A;    ESOPHAGOGASTRODUODENOSCOPY N/A 10/15/2019    Procedure: EGD (ESOPHAGOGASTRODUODENOSCOPY);  Surgeon: Carlos Mcneal III, MD;  Location: [facility];  Service: Endoscopy;  Laterality: N/A;    ESOPHAGOGASTRODUODENOSCOPY N/A 4/17/2023    Procedure: EGD  (ESOPHAGOGASTRODUODENOSCOPY);  Surgeon: Nevaeh Mcconnell MD;  Location: [facility] ENDO;  Service: Endoscopy;  Laterality: N/A;    INSERTION, PACEMAKER, TEMPORARY TRANSVENOUS N/A 10/15/2024    Procedure: Insertion, Pacemaker, Temporary Transvenous;  Surgeon: Demarcus Kirk MD;  Location: [facility] CATH LAB;  Service: Cardiology;  Laterality: N/A;    LEFT HEART CATHETERIZATION Left 10/11/2019    Procedure: CATHETERIZATION, HEART, LEFT;  Surgeon: Robe Gilbert MD;  Location: [facility] CATH LAB;  Service: Cardiology;  Laterality: Left;    LEFT HEART CATHETERIZATION Left 10/13/2019    Procedure: CATHETERIZATION, HEART, LEFT;  Surgeon: Robe Gilbert MD;  Location: [facility] CATH LAB;  Service: Cardiology;  Laterality: Left;    leg stent Left        Review of patient's allergies indicates:   Allergen Reactions    Metoprolol Other (See Comments)     Junctional rhythm         No current facility-administered medications on file prior to encounter.     Current Outpatient Medications on File Prior to Encounter   Medication Sig    alcohol swabs (ALCOHOL PREP PADS) PadM Twice a day    aspirin (ECOTRIN) 81 MG EC tablet Take 1 tablet (81 mg total) by mouth once daily.    blood sugar diagnostic Strp Test blood sugars twice a day    ELIQUIS 2.5 mg Tab Take 2.5 mg by mouth 2 (two) times daily.    gabapentin (NEURONTIN) 100 MG capsule Take 1 capsule (100 mg total) by mouth 2 (two) times daily as needed. PRN    isosorbide mononitrate (IMDUR) 120 MG 24 hr tablet TAKE 1 TABLET BY MOUTH EVERY DAY    lancets (ACCU-CHEK SOFTCLIX LANCETS) Misc Test blood sugars twice a day    memantine (NAMENDA) 10 MG Tab Take 1 tablet (10 mg total) by mouth 2 (two) times daily.    metFORMIN (GLUCOPHAGE) 500 MG tablet TAKE 1 TABLET BY MOUTH EVERY DAY WITH BREAKFAST    pantoprazole (PROTONIX) 40 MG tablet Take 1 tablet (40 mg total) by mouth once daily.    valsartan (DIOVAN) 160 MG tablet Take 1 tablet (160 mg total) by mouth once daily.     Family History       Problem  Relation (Age of Onset)    COPD Father    Diabetes Mother, Brother          Tobacco Use    Smoking status: Former     Types: Cigars     Passive exposure: Never    Smokeless tobacco: Current   Substance and Sexual Activity    Alcohol use: Not Currently    Drug use: Not Currently    Sexual activity: Not Currently     Partners: Female     Review of Systems   Constitutional: Negative for diaphoresis, malaise/fatigue, weight gain and weight loss.   HENT:  Negative for congestion and nosebleeds.    Cardiovascular:  Positive for near-syncope. Negative for chest pain, claudication, cyanosis, dyspnea on exertion, leg swelling, orthopnea, palpitations, paroxysmal nocturnal dyspnea and syncope.   Respiratory:  Negative for cough, hemoptysis, shortness of breath, sleep disturbances due to breathing, snoring, sputum production and wheezing.    Hematologic/Lymphatic: Negative for bleeding problem. Does not bruise/bleed easily.   Skin:  Negative for rash.   Musculoskeletal:  Negative for arthritis, back pain, falls, joint pain, muscle cramps and muscle weakness.   Gastrointestinal:  Negative for abdominal pain, constipation, diarrhea, heartburn, hematemesis, hematochezia, melena, nausea and vomiting.   Genitourinary:  Negative for dysuria, hematuria and nocturia.   Neurological:  Positive for excessive daytime sleepiness, light-headedness, loss of balance and weakness. Negative for dizziness, headaches, numbness and vertigo.     Objective:     Vital Signs (Most Recent):  Temp: 97.9 °F (36.6 °C) (11/23/24 1505)  Pulse: (!) 52 (11/23/24 1800)  Resp: 19 (11/23/24 1800)  BP: (!) 147/67 (11/23/24 1800)  SpO2: 100 % (11/23/24 1800) Vital Signs (24h Range):  Temp:  [97.7 °F (36.5 °C)-97.9 °F (36.6 °C)] 97.9 °F (36.6 °C)  Pulse:  [40-52] 52  Resp:  [12-22] 19  SpO2:  [95 %-100 %] 100 %  BP: (133-164)/(61-72) 147/67     Weight: 61.2 kg (135 lb)  Body mass index is 19.37 kg/m².    SpO2: 100 %       No intake or output data in the 24 hours  ending 11/23/24 1845    Lines/Drains/Airways       Drain  Duration             Male External Urinary Catheter 11/23/24 1630 <1 day              Peripheral Intravenous Line  Duration                  Peripheral IV - Single Lumen 11/23/24 1352 22 G Left;Posterior Hand <1 day                     Physical Exam  Vitals and nursing note reviewed.   Constitutional:       Appearance: Normal appearance. He is well-developed.   HENT:      Head: Normocephalic.      Mouth/Throat:      Mouth: Mucous membranes are moist.   Neck:      Vascular: No carotid bruit or JVD.   Cardiovascular:      Rate and Rhythm: Normal rate. Rhythm irregular.      Pulses: Normal pulses.      Heart sounds: Normal heart sounds. No murmur heard.     No friction rub.   Pulmonary:      Effort: Pulmonary effort is normal. No respiratory distress.      Breath sounds: Normal breath sounds. No wheezing or rales.   Abdominal:      General: Bowel sounds are normal. There is no distension.      Palpations: Abdomen is soft.      Tenderness: There is no abdominal tenderness. There is no guarding.   Musculoskeletal:         General: No swelling or tenderness.      Cervical back: Neck supple. No tenderness.      Right lower leg: No edema.      Left lower leg: No edema.   Skin:     General: Skin is warm and dry.      Capillary Refill: Capillary refill takes less than 2 seconds.      Findings: No rash.   Neurological:      General: No focal deficit present.      Mental Status: He is alert and oriented to person, place, and time.   Psychiatric:         Mood and Affect: Mood normal.         Behavior: Behavior normal.         Thought Content: Thought content normal.          Significant Labs: BMP:   Recent Labs   Lab 11/23/24  1034   *      K 4.0      CO2 23   BUN 29*   CREATININE 1.4   CALCIUM 9.1   MG 1.9   , CMP   Recent Labs   Lab 11/23/24  1034      K 4.0      CO2 23   *   BUN 29*   CREATININE 1.4   CALCIUM 9.1   PROT 6.7  "  ALBUMIN 3.3*   BILITOT 0.7   ALKPHOS 81   AST 9*   ALT 6*   ANIONGAP 10   , CBC   Recent Labs   Lab 11/23/24  1034   WBC 4.49   HGB 11.9*   HCT 36.7*      , INR   Recent Labs   Lab 11/23/24  1034   INR 1.2   , Lipid Panel No results for input(s): "CHOL", "HDL", "LDLCALC", "TRIG", "CHOLHDL" in the last 48 hours., and Troponin   Recent Labs   Lab 11/23/24  1034   TROPONINI 0.014       Significant Imaging: Echocardiogram: 2D echo with color flow doppler:   Results for orders placed or performed during the hospital encounter of 10/11/19   2D echo with color flow doppler   Result Value Ref Range    EF + QEF 55 55 - 65    Mitral Valve Regurgitation MILD     Aortic Valve Regurgitation MILD     Est. PA Systolic Pressure 33.25     Narrative    Date of Procedure: 10/11/2019        TEST DESCRIPTION   Technical Quality: This is a technically challenging study. There is poor endocardial definition.     Aorta: The aortic root is normal in size, measuring 3.3 cm at sinotubular junction and 3.5 cm at Sinuses of Valsalva. The proximal ascending aorta is normal in size, measuring 3.0 cm across.     Left Atrium: The left atrial volume index is moderately enlarged, measuring 45.51 cc/m2.     Left Ventricle: The left ventricle is normal in size, with an end-diastolic diameter of 4.2 cm, and an end-systolic diameter of 2.6 cm. Septal wall thickness is mildly increased, with the septum measuring 1.4 cm and the posterior wall measuring 1.1 cm   across. Relative wall thickness was increased at 0.52, and the LV mass index was increased at 116.3 g/m2 consistent with concentric left ventricular hypertrophy. The following segments were mildly hypokinetic: mid inferolateral wall.  Left ventricular systolic function appears normal. Visually estimated ejection fraction is 55-60%. The LV Doppler derived stroke volume equals 78.0 ccs.     Diastolic indices: E wave velocity 0.9 m/s, E/A ratio 1.2,  msec., E/e' ratio(avg) 13. Diastolic " function is indeterminate.     Right Atrium: The right atrium is normal in size, measuring 5.2 cm in length and 2.7 cm in width in the apical view.     Right Ventricle: The right ventricle is normal in size. Global right ventricular systolic function appears normal. Tricuspid annular plane systolic excursion (TAPSE) is 2.0 cm. The estimated PA systolic pressure is 33 mmHg.     Aortic Valve:  The aortic valve is mildly sclerotic with normal leaflet mobility. The mean gradient obtained across the aortic valve is 5 mmHg. Additionally, there is mild aortic regurgitation. There is a pressure half time of 747.0 msec.     Mitral Valve:  The mitral valve is normal in structure. The pressure half time is 54 msec. The calculated mitral valve area is 4.07 cm2. There is mild mitral regurgitation.     Tricuspid Valve:  The tricuspid valve is normal in structure.     Pulmonary Valve:  The pulmonic valve is not well seen.     IVC: IVC is normal in size and collapses > 50% with a sniff, suggesting normal right atrial pressure of 3 mmHg.     Intracavitary: There is no evidence of pericardial effusion, intracavity mass, thrombi, or vegetation.         CONCLUSIONS     1 - Normal left ventricular systolic function (EF 55-60%).     2 - Indeterminate LV diastolic function.     3 - Normal right ventricular systolic function .     4 - Mild aortic regurgitation.     5 - Moderate left atrial enlargement.     6 - Wall motion abnormalities.     7 - Concentric hypertrophy.     8 - The estimated PA systolic pressure is 33 mmHg.             This document has been electronically    SIGNED BY: Robe Gilbert MD On: 10/11/2019 16:04    and Transthoracic echo (TTE) complete (Cupid Only):   Results for orders placed or performed during the hospital encounter of 11/23/24   Echo   Result Value Ref Range    BSA 1.74 m2    LVIDd 4.3 3.5 - 6.0 cm    LV Systolic Volume 35.02 mL    LV Systolic Volume Index 19.8 mL/m2    LVIDs 3.0 2.1 - 4.0 cm    LV Diastolic  Volume 83.71 mL    LV Diastolic Volume Index 47.29 mL/m2    Left Ventricular End Systolic Volume by Teichholz Method 35.02 mL    Left Ventricular End Diastolic Volume by Teichholz Method 83.71 mL    IVS 1.5 (A) 0.6 - 1.1 cm    FS 30.2 28 - 44 %    Left Ventricle Relative Wall Thickness 0.65 cm    PW 1.4 (A) 0.6 - 1.1 cm    LV mass 245.0 g    LV Mass Index 138.4 g/m2    MV Peak E Americo 0.82 m/s    TDI LATERAL 0.11 m/s    TDI SEPTAL 0.05 m/s    E/E' ratio 10.25 m/s    MV Peak A Americo 0.76 m/s    TR Max Americo 2.24 m/s    E/A ratio 1.08     IVRT 95.15 msec    E wave deceleration time 237.40 msec    LV SEPTAL E/E' RATIO 16.40 m/s    LV LATERAL E/E' RATIO 7.45 m/s    LVOT peak americo 0.8 m/s    Left Ventricular Outflow Tract Mean Velocity 0.65 cm/s    Left Ventricular Outflow Tract Mean Gradient 1.76 mmHg    TAPSE 1.92 cm    LA size 4.34 cm    Left Atrium Minor Axis 5.22 cm    Left Atrium Major Axis 5.05 cm    RA Major Axis 4.04 cm    AV regurgitation pressure 1/2 time 1,[id number] ms    AR Max Americo 3.09 m/s    AV mean gradient 5.6 mmHg    AV peak gradient 10.2 mmHg    Ao peak americo 1.6 m/s    Ao VTI 36.8 cm    LVOT peak VTI 18.8 cm    AV Velocity Ratio 0.50     AV index (prosthetic) 0.51     Mr max americo 3.88 m/s    MV stenosis pressure 1/2 time 68.85 ms    MV valve area p 1/2 method 3.20 cm2    Triscuspid Valve Regurgitation Peak Gradient 20 mmHg    Ao root annulus 3.60 cm    Mean e' 0.08 m/s    ZLVIDS -0.07     ZLVIDD -1.29     LURDES 37.4 mL/m2    LA Vol 66.28 cm3    LA WIDTH 3.5 cm    RA Width 2.4 cm    TV resting pulmonary artery pressure 23 mmHg    RV TB RVSP 5 mmHg    Est. RA pres 3 mmHg    Narrative      Left Ventricle: The left ventricle is normal in size. Normal wall   thickness. There is concentric hypertrophy. Normal wall motion. There is   normal systolic function with a visually estimated ejection fraction of 55   - 60%. There is indeterminate diastolic function.    Right Ventricle: Systolic function is normal.     Left Atrium: Left atrium is mildly dilated.    Pulmonary Artery: The estimated pulmonary artery systolic pressure is   23 mmHg.    IVC/SVC: Normal venous pressure at 3 mmHg.       Assessment and Plan:     * Symptomatic bradycardia  History of bradycardia with atrial fibrillation in 40s-50s  Upon contact with EMS, heart rates 20s to 40s.  Now stable in the 40s  Required TVP recently prior to ERCP  No hypotension   Transcutaneous pacer pads applied   Atropine at bedside to use as needed  Monitor in ICU  Repeat echo with normal EF  Will plan for permanent pacemaker on Monday 11/25/2024   Discussed with Interventional Cardiology  Continue to hold Eliquis, no bridging needed    Type 2 diabetes mellitus  Management per ICU/Hospital Medicine    Chronic diastolic heart failure  Currently compensated   Continue home medications    PAF (paroxysmal atrial fibrillation)  Slow ventricular response, currently in AFib   Holding Eliquis for PPM  No history of BB or CCB at home    CAD, multiple vessel  Stable   Denies chest pain   Negative troponin    Essential hypertension  Elevated on admission   Titrate medications           VTE Risk Mitigation (From admission, onward)           Ordered     IP VTE HIGH RISK PATIENT  Once         11/23/24 1233     Place sequential compression device  Until discontinued         11/23/24 1233                    Thank you for your consult. I will follow-up with patient. Please contact us if you have any additional questions.    Raul Angel MD  Cardiology   [facility]ensive [facility] ([facility])

## 2024-11-24 NOTE — ASSESSMENT & PLAN NOTE
- Patient with known CAD s/p stent placement, which is controlled Will continue ASA and Statin and monitor for S/Sx of angina/ACS. Continue to monitor on telemetry.   - Cards following  - Monitor for anginal symptoms  - Trop negative

## 2024-11-24 NOTE — PLAN OF CARE
[facility] (Hospital)  Initial Discharge Assessment       Primary Care Provider: Holger Thornton MD    Admission Diagnosis: Syncope [R55]  Weakness [R53.1]    Admission Date: 11/23/2024  Expected Discharge Date:          Payor: [employer] MANAGED MEDICARE / Plan: [employer] MEDICARE HMO / Product Type: Capitation /     Extended Emergency Contact Information  Primary Emergency Contact: Gayle Woods  Home Phone: [phone]  Mobile Phone: [phone]  Relation: Spouse  Secondary Emergency Contact: Pete Yates  Mobile Phone: [phone]  Relation: Son  Preferred language: English   needed? No    Discharge Plan A: Home  Discharge Plan B: Home with family      [id number] - [place] - [address] AT [facility]  [address] [address]  Phone: [phone] Fax: [phone]      Initial Assessment (most recent)       Adult Discharge Assessment - 11/24/24 1106          Discharge Assessment    Assessment Type Discharge Planning Assessment     Confirmed/corrected address, phone number and insurance Yes     Confirmed Demographics Correct on Facesheet     Source of Information patient     Does patient/caregiver understand observation status Yes     Reason For Admission low heart rate & weakness     People in Home spouse     Do you expect to return to your current living situation? Yes     Do you have help at home or someone to help you manage your care at home? Yes     Who are your caregiver(s) and their phone number(s)? Gayle Woods, spouse [phone]     Prior to hospitilization cognitive status: Alert/Oriented     Current cognitive status: Alert/Oriented     Walking or Climbing Stairs Difficulty no     Dressing/Bathing Difficulty no     Equipment Currently Used at Home none     Readmission within 30 days? No     Patient currently being followed by outpatient case management? No     Do you currently have service(s) that help  you manage your care at home? No     Do you take prescription medications? Yes     Do you have prescription coverage? Yes     Coverage [employer]     Do you have any problems affording any of your prescribed medications? No     Is the patient taking medications as prescribed? yes     Who is going to help you get home at discharge? Gayle Woods, spouse [phone]     How do you get to doctors appointments? family or friend will provide     Are you on dialysis? No     Do you take coumadin? No     Discharge Plan A Home     Discharge Plan B Home with family     DME Needed Upon Discharge  none     Discharge Plan discussed with: Patient        OTHER    Name(s) of People in Home Gayle Woods, spouse [phone]

## 2024-11-24 NOTE — ASSESSMENT & PLAN NOTE
- Syncopal episode earlier today  - HR ranging from 20-40s via EMS and on admission   - Hx bradycardia with previous admission requiring TVP; EP felt PPM was not warranted  - Cardiology consulted  - Transcutaneous pads are in place; atropine prn   - Will admit to ICU for close observation  - BP stable, lytes wnl, glucose 184 on arrival  - Will check tsh   - Patient with no complaints at this time  - Plan per cards  - 11/24: HR stable in 40s. Plan for PPM Monday. Atropine prn.

## 2024-11-24 NOTE — SUBJECTIVE & OBJECTIVE
Past Medical History:   Diagnosis Date    Acute blood loss anemia 10/14/2019    Alzheimer's dementia     Aneurysm, abdominal aortic     BCC (basal cell carcinoma of skin) 06/29/2023    Chronic diastolic heart failure 05/20/2024    Coronary artery disease     Depression     Diabetes mellitus     HLD (hyperlipidemia)     Hypertension     Kidney stone     PAD (peripheral artery disease)     PAF (paroxysmal atrial fibrillation) 01/24/2023    Tobacco abuse        Past Surgical History:   Procedure Laterality Date    APPENDECTOMY      CORONARY STENT PLACEMENT      x 4    DV5 ROBOTIC CHOLECYSTECTOMY N/A 10/15/2024    Procedure: DV5 ROBOTIC CHOLECYSTECTOMY;  Surgeon: Fred Taylor MD;  Location: [facility] OR;  Service: General;  Laterality: N/A;    ERCP N/A 10/10/2024    Procedure: ERCP (ENDOSCOPIC RETROGRADE CHOLANGIOPANCREATOGRAPHY);  Surgeon: Morgan Hong MD;  Location: [address]);  Service: Endoscopy;  Laterality: N/A;    ESOPHAGOGASTRODUODENOSCOPY N/A 10/14/2019    Procedure: EGD (ESOPHAGOGASTRODUODENOSCOPY);  Surgeon: Carlos Mcneal III, MD;  Location: [facility];  Service: Endoscopy;  Laterality: N/A;    ESOPHAGOGASTRODUODENOSCOPY N/A 10/15/2019    Procedure: EGD (ESOPHAGOGASTRODUODENOSCOPY);  Surgeon: Carlos Mcneal III, MD;  Location: [facility];  Service: Endoscopy;  Laterality: N/A;    ESOPHAGOGASTRODUODENOSCOPY N/A 4/17/2023    Procedure: EGD (ESOPHAGOGASTRODUODENOSCOPY);  Surgeon: Nevaeh Mcconnell MD;  Location: [facility];  Service: Endoscopy;  Laterality: N/A;    INSERTION, PACEMAKER, TEMPORARY TRANSVENOUS N/A 10/15/2024    Procedure: Insertion, Pacemaker, Temporary Transvenous;  Surgeon: Demarcus Kirk MD;  Location: [facility] CATH LAB;  Service: Cardiology;  Laterality: N/A;    LEFT HEART CATHETERIZATION Left 10/11/2019    Procedure: CATHETERIZATION, HEART, LEFT;  Surgeon: Robe Gilbert MD;  Location: [facility] CATH LAB;  Service: Cardiology;  Laterality: Left;    LEFT HEART CATHETERIZATION Left  10/13/2019    Procedure: CATHETERIZATION, HEART, LEFT;  Surgeon: Robe Gilbert MD;  Location: [facility] CATH LAB;  Service: Cardiology;  Laterality: Left;    leg stent Left        Review of patient's allergies indicates:   Allergen Reactions    Metoprolol Other (See Comments)     Junctional rhythm         No current facility-administered medications on file prior to encounter.     Current Outpatient Medications on File Prior to Encounter   Medication Sig    alcohol swabs (ALCOHOL PREP PADS) PadM Twice a day    aspirin (ECOTRIN) 81 MG EC tablet Take 1 tablet (81 mg total) by mouth once daily.    blood sugar diagnostic Strp Test blood sugars twice a day    ELIQUIS 2.5 mg Tab Take 2.5 mg by mouth 2 (two) times daily.    gabapentin (NEURONTIN) 100 MG capsule Take 1 capsule (100 mg total) by mouth 2 (two) times daily as needed. PRN    isosorbide mononitrate (IMDUR) 120 MG 24 hr tablet TAKE 1 TABLET BY MOUTH EVERY DAY    lancets (ACCU-CHEK SOFTCLIX LANCETS) Misc Test blood sugars twice a day    memantine (NAMENDA) 10 MG Tab Take 1 tablet (10 mg total) by mouth 2 (two) times daily.    metFORMIN (GLUCOPHAGE) 500 MG tablet TAKE 1 TABLET BY MOUTH EVERY DAY WITH BREAKFAST    pantoprazole (PROTONIX) 40 MG tablet Take 1 tablet (40 mg total) by mouth once daily.    valsartan (DIOVAN) 160 MG tablet Take 1 tablet (160 mg total) by mouth once daily.     Family History       Problem Relation (Age of Onset)    COPD Father    Diabetes Mother, Brother          Tobacco Use    Smoking status: Former     Types: Cigars     Passive exposure: Never    Smokeless tobacco: Current   Substance and Sexual Activity    Alcohol use: Not Currently    Drug use: Not Currently    Sexual activity: Not Currently     Partners: Female     Review of Systems   Constitutional: Negative for diaphoresis, malaise/fatigue, weight gain and weight loss.   HENT:  Negative for congestion and nosebleeds.    Cardiovascular:  Positive for near-syncope. Negative for chest  pain, claudication, cyanosis, dyspnea on exertion, leg swelling, orthopnea, palpitations, paroxysmal nocturnal dyspnea and syncope.   Respiratory:  Negative for cough, hemoptysis, shortness of breath, sleep disturbances due to breathing, snoring, sputum production and wheezing.    Hematologic/Lymphatic: Negative for bleeding problem. Does not bruise/bleed easily.   Skin:  Negative for rash.   Musculoskeletal:  Negative for arthritis, back pain, falls, joint pain, muscle cramps and muscle weakness.   Gastrointestinal:  Negative for abdominal pain, constipation, diarrhea, heartburn, hematemesis, hematochezia, melena, nausea and vomiting.   Genitourinary:  Negative for dysuria, hematuria and nocturia.   Neurological:  Positive for excessive daytime sleepiness, light-headedness, loss of balance and weakness. Negative for dizziness, headaches, numbness and vertigo.     Objective:     Vital Signs (Most Recent):  Temp: 97.9 °F (36.6 °C) (11/23/24 1505)  Pulse: (!) 52 (11/23/24 1800)  Resp: 19 (11/23/24 1800)  BP: (!) 147/67 (11/23/24 1800)  SpO2: 100 % (11/23/24 1800) Vital Signs (24h Range):  Temp:  [97.7 °F (36.5 °C)-97.9 °F (36.6 °C)] 97.9 °F (36.6 °C)  Pulse:  [40-52] 52  Resp:  [12-22] 19  SpO2:  [95 %-100 %] 100 %  BP: (133-164)/(61-72) 147/67     Weight: 61.2 kg (135 lb)  Body mass index is 19.37 kg/m².    SpO2: 100 %       No intake or output data in the 24 hours ending 11/23/24 1845    Lines/Drains/Airways       Drain  Duration             Male External Urinary Catheter 11/23/24 1630 <1 day              Peripheral Intravenous Line  Duration                  Peripheral IV - Single Lumen 11/23/24 1352 22 G Left;Posterior Hand <1 day                     Physical Exam  Vitals and nursing note reviewed.   Constitutional:       Appearance: Normal appearance. He is well-developed.   HENT:      Head: Normocephalic.      Mouth/Throat:      Mouth: Mucous membranes are moist.   Neck:      Vascular: No carotid bruit or JVD.  "  Cardiovascular:      Rate and Rhythm: Normal rate. Rhythm irregular.      Pulses: Normal pulses.      Heart sounds: Normal heart sounds. No murmur heard.     No friction rub.   Pulmonary:      Effort: Pulmonary effort is normal. No respiratory distress.      Breath sounds: Normal breath sounds. No wheezing or rales.   Abdominal:      General: Bowel sounds are normal. There is no distension.      Palpations: Abdomen is soft.      Tenderness: There is no abdominal tenderness. There is no guarding.   Musculoskeletal:         General: No swelling or tenderness.      Cervical back: Neck supple. No tenderness.      Right lower leg: No edema.      Left lower leg: No edema.   Skin:     General: Skin is warm and dry.      Capillary Refill: Capillary refill takes less than 2 seconds.      Findings: No rash.   Neurological:      General: No focal deficit present.      Mental Status: He is alert and oriented to person, place, and time.   Psychiatric:         Mood and Affect: Mood normal.         Behavior: Behavior normal.         Thought Content: Thought content normal.          Significant Labs: BMP:   Recent Labs   Lab 11/23/24  1034   *      K 4.0      CO2 23   BUN 29*   CREATININE 1.4   CALCIUM 9.1   MG 1.9   , CMP   Recent Labs   Lab 11/23/24  1034      K 4.0      CO2 23   *   BUN 29*   CREATININE 1.4   CALCIUM 9.1   PROT 6.7   ALBUMIN 3.3*   BILITOT 0.7   ALKPHOS 81   AST 9*   ALT 6*   ANIONGAP 10   , CBC   Recent Labs   Lab 11/23/24  1034   WBC 4.49   HGB 11.9*   HCT 36.7*      , INR   Recent Labs   Lab 11/23/24  1034   INR 1.2   , Lipid Panel No results for input(s): "CHOL", "HDL", "LDLCALC", "TRIG", "CHOLHDL" in the last 48 hours., and Troponin   Recent Labs   Lab 11/23/24  1034   TROPONINI 0.014       Significant Imaging: Echocardiogram: 2D echo with color flow doppler:   Results for orders placed or performed during the hospital encounter of 10/11/19   2D echo with color " flow doppler   Result Value Ref Range    EF + QEF 55 55 - 65    Mitral Valve Regurgitation MILD     Aortic Valve Regurgitation MILD     Est. PA Systolic Pressure 33.25     Narrative    Date of Procedure: 10/11/2019        TEST DESCRIPTION   Technical Quality: This is a technically challenging study. There is poor endocardial definition.     Aorta: The aortic root is normal in size, measuring 3.3 cm at sinotubular junction and 3.5 cm at Sinuses of Valsalva. The proximal ascending aorta is normal in size, measuring 3.0 cm across.     Left Atrium: The left atrial volume index is moderately enlarged, measuring 45.51 cc/m2.     Left Ventricle: The left ventricle is normal in size, with an end-diastolic diameter of 4.2 cm, and an end-systolic diameter of 2.6 cm. Septal wall thickness is mildly increased, with the septum measuring 1.4 cm and the posterior wall measuring 1.1 cm   across. Relative wall thickness was increased at 0.52, and the LV mass index was increased at 116.3 g/m2 consistent with concentric left ventricular hypertrophy. The following segments were mildly hypokinetic: mid inferolateral wall.  Left ventricular systolic function appears normal. Visually estimated ejection fraction is 55-60%. The LV Doppler derived stroke volume equals 78.0 ccs.     Diastolic indices: E wave velocity 0.9 m/s, E/A ratio 1.2,  msec., E/e' ratio(avg) 13. Diastolic function is indeterminate.     Right Atrium: The right atrium is normal in size, measuring 5.2 cm in length and 2.7 cm in width in the apical view.     Right Ventricle: The right ventricle is normal in size. Global right ventricular systolic function appears normal. Tricuspid annular plane systolic excursion (TAPSE) is 2.0 cm. The estimated PA systolic pressure is 33 mmHg.     Aortic Valve:  The aortic valve is mildly sclerotic with normal leaflet mobility. The mean gradient obtained across the aortic valve is 5 mmHg. Additionally, there is mild aortic  regurgitation. There is a pressure half time of 747.0 msec.     Mitral Valve:  The mitral valve is normal in structure. The pressure half time is 54 msec. The calculated mitral valve area is 4.07 cm2. There is mild mitral regurgitation.     Tricuspid Valve:  The tricuspid valve is normal in structure.     Pulmonary Valve:  The pulmonic valve is not well seen.     IVC: IVC is normal in size and collapses > 50% with a sniff, suggesting normal right atrial pressure of 3 mmHg.     Intracavitary: There is no evidence of pericardial effusion, intracavity mass, thrombi, or vegetation.         CONCLUSIONS     1 - Normal left ventricular systolic function (EF 55-60%).     2 - Indeterminate LV diastolic function.     3 - Normal right ventricular systolic function .     4 - Mild aortic regurgitation.     5 - Moderate left atrial enlargement.     6 - Wall motion abnormalities.     7 - Concentric hypertrophy.     8 - The estimated PA systolic pressure is 33 mmHg.             This document has been electronically    SIGNED BY: Robe Gilbert MD On: 10/11/2019 16:04    and Transthoracic echo (TTE) complete (Cupid Only):   Results for orders placed or performed during the hospital encounter of 11/23/24   Echo   Result Value Ref Range    BSA 1.74 m2    LVIDd 4.3 3.5 - 6.0 cm    LV Systolic Volume 35.02 mL    LV Systolic Volume Index 19.8 mL/m2    LVIDs 3.0 2.1 - 4.0 cm    LV Diastolic Volume 83.71 mL    LV Diastolic Volume Index 47.29 mL/m2    Left Ventricular End Systolic Volume by Teichholz Method 35.02 mL    Left Ventricular End Diastolic Volume by Teichholz Method 83.71 mL    IVS 1.5 (A) 0.6 - 1.1 cm    FS 30.2 28 - 44 %    Left Ventricle Relative Wall Thickness 0.65 cm    PW 1.4 (A) 0.6 - 1.1 cm    LV mass 245.0 g    LV Mass Index 138.4 g/m2    MV Peak E Americo 0.82 m/s    TDI LATERAL 0.11 m/s    TDI SEPTAL 0.05 m/s    E/E' ratio 10.25 m/s    MV Peak A Americo 0.76 m/s    TR Max Americo 2.24 m/s    E/A ratio 1.08     IVRT 95.15 msec    E  wave deceleration time 237.40 msec    LV SEPTAL E/E' RATIO 16.40 m/s    LV LATERAL E/E' RATIO 7.45 m/s    LVOT peak americo 0.8 m/s    Left Ventricular Outflow Tract Mean Velocity 0.65 cm/s    Left Ventricular Outflow Tract Mean Gradient 1.76 mmHg    TAPSE 1.92 cm    LA size 4.34 cm    Left Atrium Minor Axis 5.22 cm    Left Atrium Major Axis 5.05 cm    RA Major Axis 4.04 cm    AV regurgitation pressure 1/2 time 1,020.133103469166726 ms    AR Max Americo 3.09 m/s    AV mean gradient 5.6 mmHg    AV peak gradient 10.2 mmHg    Ao peak americo 1.6 m/s    Ao VTI 36.8 cm    LVOT peak VTI 18.8 cm    AV Velocity Ratio 0.50     AV index (prosthetic) 0.51     Mr max americo 3.88 m/s    MV stenosis pressure 1/2 time 68.85 ms    MV valve area p 1/2 method 3.20 cm2    Triscuspid Valve Regurgitation Peak Gradient 20 mmHg    Ao root annulus 3.60 cm    Mean e' 0.08 m/s    ZLVIDS -0.07     ZLVIDD -1.29     LURDES 37.4 mL/m2    LA Vol 66.28 cm3    LA WIDTH 3.5 cm    RA Width 2.4 cm    TV resting pulmonary artery pressure 23 mmHg    RV TB RVSP 5 mmHg    Est. RA pres 3 mmHg    Narrative      Left Ventricle: The left ventricle is normal in size. Normal wall   thickness. There is concentric hypertrophy. Normal wall motion. There is   normal systolic function with a visually estimated ejection fraction of 55   - 60%. There is indeterminate diastolic function.    Right Ventricle: Systolic function is normal.    Left Atrium: Left atrium is mildly dilated.    Pulmonary Artery: The estimated pulmonary artery systolic pressure is   23 mmHg.    IVC/SVC: Normal venous pressure at 3 mmHg.

## 2024-11-24 NOTE — PROGRESS NOTES
[facility] ([facility])  Cardiology        Subjective:      Chief Complaint:  Near-syncope      HPI:   Aquiles Yates is a 84 y.o. male patient with a PMHx of CAD, diastolic HF, HTN, HLD, Afib on eliquis, DM Type 2, PVD, Alzheimer's disease who presents to the ED earlier today for evaluation of generalized weakness and syncopal episode at home which onset gradually suddenly PTA.  Per wife, patient did not lose consciousness, per EMS, patient had a brief loss of consciousness.  Pt states he is unsure what happened but was oriented to person and place when asked. Per wife, pt was getting up from the toilet in the bathroom and was uneasy/unbalanced on his feet and had disarticulated speech. Pt's wife states pt has been feeling unbalanced for 2-3 days now. Pt's wife states these sxs worried her about a potential stroke, as these sxs are similar when he had a previous stroke. Pt's wife notes pt was given pills for constipation yesterday. Pt's wife also notes pt was experiencing hallucinations yesterday as well. Pt's wife states she called EMS, where the heart rate was ranging 20-40s. Pt denies any CP, SOB, fever, or chills. Pt normally ambulates around with a walker, per wife. Pt was in a-fib. . Troponin 0.014.     ECHO    Left Ventricle: The left ventricle is normal in size. Normal wall thickness. There is concentric hypertrophy. Normal wall motion. There is normal systolic function with a visually estimated ejection fraction of 55 - 60%. There is indeterminate diastolic function.    Right Ventricle: Systolic function is normal.    Left Atrium: Left atrium is mildly dilated.    Pulmonary Artery: The estimated pulmonary artery systolic pressure is 23 mmHg.    IVC/SVC: Normal venous pressure at 3 mmHg.     EKG  Atrial fibrillation, Premature ventricular complexes  Inferior infarct ,age undetermined  T wave abnormality, consider lateral ischemia         Hospital Course:  11/24/24-Patient seen  and examined today lying in bed. Labs reviewed. Potassium up to 5.3 this morning. HR has been stable in the 40s and in atrial fibrillation. Will be planning for pacemaker placement tomorrow. Eliquis being held in preparation for pacemaker placement.               Past Medical History:   Diagnosis Date    Acute blood loss anemia 10/14/2019    Alzheimer's dementia      Aneurysm, abdominal aortic      BCC (basal cell carcinoma of skin) 06/29/2023    Chronic diastolic heart failure 05/20/2024    Coronary artery disease      Depression      Diabetes mellitus      HLD (hyperlipidemia)      Hypertension      Kidney stone      PAD (peripheral artery disease)      PAF (paroxysmal atrial fibrillation) 01/24/2023    Tobacco abuse                 Past Surgical History:   Procedure Laterality Date    APPENDECTOMY        CORONARY STENT PLACEMENT         x 4    DV5 ROBOTIC CHOLECYSTECTOMY N/A 10/15/2024     Procedure: DV5 ROBOTIC CHOLECYSTECTOMY;  Surgeon: Fred Taylor MD;  Location: [facility];  Service: General;  Laterality: N/A;    ERCP N/A 10/10/2024     Procedure: ERCP (ENDOSCOPIC RETROGRADE CHOLANGIOPANCREATOGRAPHY);  Surgeon: Morgan Hong MD;  Location: [facility] ([address] [place] [address]);  Service: Endoscopy;  Laterality: N/A;    ESOPHAGOGASTRODUODENOSCOPY N/A 10/14/2019     Procedure: EGD (ESOPHAGOGASTRODUODENOSCOPY);  Surgeon: Carlos Mcneal III, MD;  Location: [facility];  Service: Endoscopy;  Laterality: N/A;    ESOPHAGOGASTRODUODENOSCOPY N/A 10/15/2019     Procedure: EGD (ESOPHAGOGASTRODUODENOSCOPY);  Surgeon: Carlos Mcneal III, MD;  Location: [facility];  Service: Endoscopy;  Laterality: N/A;    ESOPHAGOGASTRODUODENOSCOPY N/A 4/17/2023     Procedure: EGD (ESOPHAGOGASTRODUODENOSCOPY);  Surgeon: Nevaeh Mcconnell MD;  Location: [facility] ENDO;  Service: Endoscopy;  Laterality: N/A;    INSERTION, PACEMAKER, TEMPORARY TRANSVENOUS N/A 10/15/2024     Procedure: Insertion, Pacemaker, Temporary Transvenous;  Surgeon: Demarcus Kirk,  MD;  Location: [facility] CATH LAB;  Service: Cardiology;  Laterality: N/A;    LEFT HEART CATHETERIZATION Left 10/11/2019     Procedure: CATHETERIZATION, HEART, LEFT;  Surgeon: Robe Gilbert MD;  Location: [facility] CATH LAB;  Service: Cardiology;  Laterality: Left;    LEFT HEART CATHETERIZATION Left 10/13/2019     Procedure: CATHETERIZATION, HEART, LEFT;  Surgeon: Robe Gilbert MD;  Location: [facility] CATH LAB;  Service: Cardiology;  Laterality: Left;    leg stent Left                 Review of patient's allergies indicates:   Allergen Reactions    Metoprolol Other (See Comments)       Junctional rhythm            No current facility-administered medications on file prior to encounter.           Current Outpatient Medications on File Prior to Encounter   Medication Sig    alcohol swabs (ALCOHOL PREP PADS) PadM Twice a day    aspirin (ECOTRIN) 81 MG EC tablet Take 1 tablet (81 mg total) by mouth once daily.    blood sugar diagnostic Strp Test blood sugars twice a day    ELIQUIS 2.5 mg Tab Take 2.5 mg by mouth 2 (two) times daily.    gabapentin (NEURONTIN) 100 MG capsule Take 1 capsule (100 mg total) by mouth 2 (two) times daily as needed. PRN    isosorbide mononitrate (IMDUR) 120 MG 24 hr tablet TAKE 1 TABLET BY MOUTH EVERY DAY    lancets (ACCU-CHEK SOFTCLIX LANCETS) Misc Test blood sugars twice a day    memantine (NAMENDA) 10 MG Tab Take 1 tablet (10 mg total) by mouth 2 (two) times daily.    metFORMIN (GLUCOPHAGE) 500 MG tablet TAKE 1 TABLET BY MOUTH EVERY DAY WITH BREAKFAST    pantoprazole (PROTONIX) 40 MG tablet Take 1 tablet (40 mg total) by mouth once daily.    valsartan (DIOVAN) 160 MG tablet Take 1 tablet (160 mg total) by mouth once daily.      Family History         Problem Relation (Age of Onset)     COPD Father     Diabetes Mother, Brother                   Tobacco Use    Smoking status: Former       Types: Cigars       Passive exposure: Never    Smokeless tobacco: Current   Substance and Sexual Activity    Alcohol  use: Not Currently    Drug use: Not Currently    Sexual activity: Not Currently       Partners: Female      Review of Systems   Constitutional: Negative for diaphoresis, malaise/fatigue, weight gain and weight loss.   HENT:  Negative for congestion and nosebleeds.    Cardiovascular:  Positive for near-syncope. Negative for chest pain, claudication, cyanosis, dyspnea on exertion, leg swelling, orthopnea, palpitations, paroxysmal nocturnal dyspnea and syncope.   Respiratory:  Negative for cough, hemoptysis, shortness of breath, sleep disturbances due to breathing, snoring, sputum production and wheezing.    Hematologic/Lymphatic: Negative for bleeding problem. Does not bruise/bleed easily.   Skin:  Negative for rash.   Musculoskeletal:  Negative for arthritis, back pain, falls, joint pain, muscle cramps and muscle weakness.   Gastrointestinal:  Negative for abdominal pain, constipation, diarrhea, heartburn, hematemesis, hematochezia, melena, nausea and vomiting.   Genitourinary:  Negative for dysuria, hematuria and nocturia.   Neurological:  Positive for excessive daytime sleepiness, light-headedness, loss of balance and weakness. Negative for dizziness, headaches, numbness and vertigo.      Objective:      Vital Signs (Most Recent):  Temp: 97.9 °F (36.6 °C) (11/23/24 1505)  Pulse: (!) 52 (11/23/24 1800)  Resp: 19 (11/23/24 1800)  BP: (!) 147/67 (11/23/24 1800)  SpO2: 100 % (11/23/24 1800) Vital Signs (24h Range):  Temp:  [97.7 °F (36.5 °C)-97.9 °F (36.6 °C)] 97.9 °F (36.6 °C)  Pulse:  [40-52] 52  Resp:  [12-22] 19  SpO2:  [95 %-100 %] 100 %  BP: (133-164)/(61-72) 147/67      Weight: 61.2 kg (135 lb)  Body mass index is 19.37 kg/m².     SpO2: 100 %        No intake or output data in the 24 hours ending 11/23/24 1845     Lines/Drains/Airways         Drain  Duration                Male External Urinary Catheter 11/23/24 1630 <1 day                  Peripheral Intravenous Line  Duration                     Peripheral IV  - Single Lumen 11/23/24 1352 22 G Left;Posterior Hand <1 day                          Physical Exam  Vitals and nursing note reviewed.   Constitutional:       Appearance: Normal appearance. He is well-developed.   HENT:      Head: Normocephalic.      Mouth/Throat:      Mouth: Mucous membranes are moist.   Neck:      Vascular: No carotid bruit or JVD.   Cardiovascular:      Rate and Rhythm: Normal rate. Rhythm irregular.      Pulses: Normal pulses.      Heart sounds: Normal heart sounds. No murmur heard.     No friction rub.   Pulmonary:      Effort: Pulmonary effort is normal. No respiratory distress.      Breath sounds: Normal breath sounds. No wheezing or rales.   Abdominal:      General: Bowel sounds are normal. There is no distension.      Palpations: Abdomen is soft.      Tenderness: There is no abdominal tenderness. There is no guarding.   Musculoskeletal:         General: No swelling or tenderness.      Cervical back: Neck supple. No tenderness.      Right lower leg: No edema.      Left lower leg: No edema.   Skin:     General: Skin is warm and dry.      Capillary Refill: Capillary refill takes less than 2 seconds.      Findings: No rash.   Neurological:      General: No focal deficit present.      Mental Status: He is alert and oriented to person, place, and time.   Psychiatric:         Mood and Affect: Mood normal.         Behavior: Behavior normal.         Thought Content: Thought content normal.            Significant Labs: BMP:       Recent Labs   Lab 11/23/24  1034   *      K 4.0      CO2 23   BUN 29*   CREATININE 1.4   CALCIUM 9.1   MG 1.9   , CMP       Recent Labs   Lab 11/23/24  1034      K 4.0      CO2 23   *   BUN 29*   CREATININE 1.4   CALCIUM 9.1   PROT 6.7   ALBUMIN 3.3*   BILITOT 0.7   ALKPHOS 81   AST 9*   ALT 6*   ANIONGAP 10   , CBC       Recent Labs   Lab 11/23/24  1034   WBC 4.49   HGB 11.9*   HCT 36.7*      , INR       Recent Labs   Lab  "11/23/24  1034   INR 1.2   , Lipid Panel No results for input(s): "CHOL", "HDL", "LDLCALC", "TRIG", "CHOLHDL" in the last 48 hours., and Troponin       Recent Labs   Lab 11/23/24  1034   TROPONINI 0.014         Significant Imaging: Echocardiogram: 2D echo with color flow doppler:         Results for orders placed or performed during the hospital encounter of 10/11/19   2D echo with color flow doppler   Result Value Ref Range     EF + QEF 55 55 - 65     Mitral Valve Regurgitation MILD       Aortic Valve Regurgitation MILD       Est. PA Systolic Pressure 33.25       Narrative     Date of Procedure: 10/11/2019           TEST DESCRIPTION   Technical Quality: This is a technically challenging study. There is poor endocardial definition.      Aorta: The aortic root is normal in size, measuring 3.3 cm at sinotubular junction and 3.5 cm at Sinuses of Valsalva. The proximal ascending aorta is normal in size, measuring 3.0 cm across.      Left Atrium: The left atrial volume index is moderately enlarged, measuring 45.51 cc/m2.      Left Ventricle: The left ventricle is normal in size, with an end-diastolic diameter of 4.2 cm, and an end-systolic diameter of 2.6 cm. Septal wall thickness is mildly increased, with the septum measuring 1.4 cm and the posterior wall measuring 1.1 cm   across. Relative wall thickness was increased at 0.52, and the LV mass index was increased at 116.3 g/m2 consistent with concentric left ventricular hypertrophy. The following segments were mildly hypokinetic: mid inferolateral wall.  Left ventricular systolic function appears normal. Visually estimated ejection fraction is 55-60%. The LV Doppler derived stroke volume equals 78.0 ccs.      Diastolic indices: E wave velocity 0.9 m/s, E/A ratio 1.2,  msec., E/e' ratio(avg) 13. Diastolic function is indeterminate.      Right Atrium: The right atrium is normal in size, measuring 5.2 cm in length and 2.7 cm in width in the apical view.      Right " Ventricle: The right ventricle is normal in size. Global right ventricular systolic function appears normal. Tricuspid annular plane systolic excursion (TAPSE) is 2.0 cm. The estimated PA systolic pressure is 33 mmHg.      Aortic Valve:  The aortic valve is mildly sclerotic with normal leaflet mobility. The mean gradient obtained across the aortic valve is 5 mmHg. Additionally, there is mild aortic regurgitation. There is a pressure half time of 747.0 msec.      Mitral Valve:  The mitral valve is normal in structure. The pressure half time is 54 msec. The calculated mitral valve area is 4.07 cm2. There is mild mitral regurgitation.      Tricuspid Valve:  The tricuspid valve is normal in structure.      Pulmonary Valve:  The pulmonic valve is not well seen.      IVC: IVC is normal in size and collapses > 50% with a sniff, suggesting normal right atrial pressure of 3 mmHg.      Intracavitary: There is no evidence of pericardial effusion, intracavity mass, thrombi, or vegetation.            CONCLUSIONS     1 - Normal left ventricular systolic function (EF 55-60%).     2 - Indeterminate LV diastolic function.     3 - Normal right ventricular systolic function .     4 - Mild aortic regurgitation.     5 - Moderate left atrial enlargement.     6 - Wall motion abnormalities.     7 - Concentric hypertrophy.     8 - The estimated PA systolic pressure is 33 mmHg.                  This document has been electronically    SIGNED BY: Robe Gilbert MD On: 10/11/2019 16:04    and Transthoracic echo (TTE) complete (Cupid Only):         Results for orders placed or performed during the hospital encounter of 11/23/24   Echo   Result Value Ref Range     BSA 1.74 m2     LVIDd 4.3 3.5 - 6.0 cm     LV Systolic Volume 35.02 mL     LV Systolic Volume Index 19.8 mL/m2     LVIDs 3.0 2.1 - 4.0 cm     LV Diastolic Volume 83.71 mL     LV Diastolic Volume Index 47.29 mL/m2     Left Ventricular End Systolic Volume by Teichholz Method 35.02 mL      Left Ventricular End Diastolic Volume by Teichholz Method 83.71 mL     IVS 1.5 (A) 0.6 - 1.1 cm     FS 30.2 28 - 44 %     Left Ventricle Relative Wall Thickness 0.65 cm     PW 1.4 (A) 0.6 - 1.1 cm     LV mass 245.0 g     LV Mass Index 138.4 g/m2     MV Peak E Americo 0.82 m/s     TDI LATERAL 0.11 m/s     TDI SEPTAL 0.05 m/s     E/E' ratio 10.25 m/s     MV Peak A Americo 0.76 m/s     TR Max Americo 2.24 m/s     E/A ratio 1.08       IVRT 95.15 msec     E wave deceleration time 237.40 msec     LV SEPTAL E/E' RATIO 16.40 m/s     LV LATERAL E/E' RATIO 7.45 m/s     LVOT peak americo 0.8 m/s     Left Ventricular Outflow Tract Mean Velocity 0.65 cm/s     Left Ventricular Outflow Tract Mean Gradient 1.76 mmHg     TAPSE 1.92 cm     LA size 4.34 cm     Left Atrium Minor Axis 5.22 cm     Left Atrium Major Axis 5.05 cm     RA Major Axis 4.04 cm     AV regurgitation pressure 1/2 time 1,020.783658630884050 ms     AR Max Americo 3.09 m/s     AV mean gradient 5.6 mmHg     AV peak gradient 10.2 mmHg     Ao peak americo 1.6 m/s     Ao VTI 36.8 cm     LVOT peak VTI 18.8 cm     AV Velocity Ratio 0.50       AV index (prosthetic) 0.51       Mr max americo 3.88 m/s     MV stenosis pressure 1/2 time 68.85 ms     MV valve area p 1/2 method 3.20 cm2     Triscuspid Valve Regurgitation Peak Gradient 20 mmHg     Ao root annulus 3.60 cm     Mean e' 0.08 m/s     ZLVIDS -0.07       ZLVIDD -1.29       LURDES 37.4 mL/m2     LA Vol 66.28 cm3     LA WIDTH 3.5 cm     RA Width 2.4 cm     TV resting pulmonary artery pressure 23 mmHg     RV TB RVSP 5 mmHg     Est. RA pres 3 mmHg     Narrative       Left Ventricle: The left ventricle is normal in size. Normal wall   thickness. There is concentric hypertrophy. Normal wall motion. There is   normal systolic function with a visually estimated ejection fraction of 55   - 60%. There is indeterminate diastolic function.    Right Ventricle: Systolic function is normal.    Left Atrium: Left atrium is mildly dilated.    Pulmonary Artery: The  estimated pulmonary artery systolic pressure is   23 mmHg.    IVC/SVC: Normal venous pressure at 3 mmHg.         Assessment and Plan:      * Symptomatic bradycardia  History of bradycardia with atrial fibrillation in 40s-50s  Upon contact with EMS, heart rates 20s to 40s.  Now stable in the 40s  Required TVP recently prior to ERCP  No hypotension   Transcutaneous pacer pads applied   Atropine at bedside to use as needed  Monitor in ICU  Repeat echo with normal EF  Will plan for permanent pacemaker on Monday 11/25/2024   Discussed with Interventional Cardiology  Continue to hold Eliquis, no bridging needed    11/24/24  -Will be having permanent pacemaker placement tomorrow, continue to hold Eliquis  -Potassium is slightly elevated this morning, currently not on any medications that would cause it to be high but does take Valsartan at home     Type 2 diabetes mellitus  Management per ICU/Hospital Medicine     Chronic diastolic heart failure  Currently compensated   Continue home medications     PAF (paroxysmal atrial fibrillation)  Slow ventricular response, currently in AFib   Holding Eliquis for PPM  No history of BB or CCB at home     CAD, multiple vessel  Stable   Denies chest pain   Negative troponin     Essential hypertension  Elevated on admission   Titrate medications               VTE Risk Mitigation (From admission, onward)              Ordered       IP VTE HIGH RISK PATIENT  Once         11/23/24 1233       Place sequential compression device  Until discontinued         11/23/24 1233                          Thank you for your consult. I will follow-up with patient. Please contact us if you have any additional questions.     Raul Angel MD  Cardiology   [facility] - Intensive Care ([facility])

## 2024-11-24 NOTE — ASSESSMENT & PLAN NOTE
- Patient's FSGs are controlled on current medication regimen.  - Last A1c reviewed:  Lab Results   Component Value Date    HGBA1C 6.3 (H) 10/28/2024   - Most recent fingerstick glucose reviewed-   Recent Labs   Lab 11/23/24  1326 11/23/24  1841 11/24/24  0039 11/24/24  0605   POCTGLUCOSE 145* 149* 129* 110     - Current correctional scale:  Low  - Maintain anti-hyperglycemic dose as follows:  Antihyperglycemics (From admission, onward)      Start     Stop Route Frequency Ordered    11/23/24 1405  insulin aspart U-100 pen 0-5 Units         -- SubQ Every 6 hours PRN 11/23/24 1305        - Hold Oral hypoglycemics while patient is in the hospital.  - Glucose controlled

## 2024-11-24 NOTE — ASSESSMENT & PLAN NOTE
- Echo reviewed from October; EF 60% but with intermediate diastolic function  - Cards following  - Monitor fluid status, I&Os, daily weights  - Repeat echo with no changes

## 2024-11-24 NOTE — ASSESSMENT & PLAN NOTE
- Patient has paroxysmal (<7 days) atrial fibrillation. Patient is currently in Junctional bradycardia [id number] Score: 4. The patients heart rate in the last 24 hours is as follows:  Pulse  Min: 40  Max: 53   - Replete lytes with a goal of K>4, Mg >2  - Restart eliquis once able; holding, PPM tomorrow  - Cards following

## 2024-11-25 LAB
ANION GAP SERPL CALC-SCNC: 8 MMOL/L (ref 8–16)
BASOPHILS # BLD AUTO: 0.04 K/UL (ref 0–0.2)
BASOPHILS NFR BLD: 0.8 % (ref 0–1.9)
BUN SERPL-MCNC: 29 MG/DL (ref 8–23)
CALCIUM SERPL-MCNC: 9 MG/DL (ref 8.7–10.5)
CHLORIDE SERPL-SCNC: 105 MMOL/L (ref 95–110)
CO2 SERPL-SCNC: 26 MMOL/L (ref 23–29)
CREAT SERPL-MCNC: 1.5 MG/DL (ref 0.5–1.4)
DIFFERENTIAL METHOD BLD: ABNORMAL
EOSINOPHIL # BLD AUTO: 0.5 K/UL (ref 0–0.5)
EOSINOPHIL NFR BLD: 9.4 % (ref 0–8)
ERYTHROCYTE [DISTWIDTH] IN BLOOD BY AUTOMATED COUNT: 14.3 % (ref 11.5–14.5)
EST. GFR  (NO RACE VARIABLE): 45 ML/MIN/1.73 M^2
GLUCOSE SERPL-MCNC: 127 MG/DL (ref 70–110)
HCT VFR BLD AUTO: 37.4 % (ref 40–54)
HGB BLD-MCNC: 12.5 G/DL (ref 14–18)
IMM GRANULOCYTES # BLD AUTO: 0.02 K/UL (ref 0–0.04)
IMM GRANULOCYTES NFR BLD AUTO: 0.4 % (ref 0–0.5)
LYMPHOCYTES # BLD AUTO: 1.1 K/UL (ref 1–4.8)
LYMPHOCYTES NFR BLD: 21.2 % (ref 18–48)
MAGNESIUM SERPL-MCNC: 2 MG/DL (ref 1.6–2.6)
MCH RBC QN AUTO: 29.6 PG (ref 27–31)
MCHC RBC AUTO-ENTMCNC: 33.4 G/DL (ref 32–36)
MCV RBC AUTO: 88 FL (ref 82–98)
MONOCYTES # BLD AUTO: 0.5 K/UL (ref 0.3–1)
MONOCYTES NFR BLD: 9.6 % (ref 4–15)
NEUTROPHILS # BLD AUTO: 2.9 K/UL (ref 1.8–7.7)
NEUTROPHILS NFR BLD: 58.6 % (ref 38–73)
NRBC BLD-RTO: 0 /100 WBC
OHS QRS DURATION: 92 MS
OHS QTC CALCULATION: 392 MS
PHOSPHATE SERPL-MCNC: 2.5 MG/DL (ref 2.7–4.5)
PLATELET # BLD AUTO: 145 K/UL (ref 150–450)
PMV BLD AUTO: 10.5 FL (ref 9.2–12.9)
POCT GLUCOSE: 127 MG/DL (ref 70–110)
POTASSIUM SERPL-SCNC: 4.3 MMOL/L (ref 3.5–5.1)
RBC # BLD AUTO: 4.23 M/UL (ref 4.6–6.2)
SODIUM SERPL-SCNC: 139 MMOL/L (ref 136–145)
WBC # BLD AUTO: 5.01 K/UL (ref 3.9–12.7)

## 2024-11-25 PROCEDURE — 25000003 PHARM REV CODE 250

## 2024-11-25 PROCEDURE — 83735 ASSAY OF MAGNESIUM: CPT

## 2024-11-25 PROCEDURE — 85025 COMPLETE CBC W/AUTO DIFF WBC: CPT

## 2024-11-25 PROCEDURE — 80048 BASIC METABOLIC PNL TOTAL CA: CPT

## 2024-11-25 PROCEDURE — 99233 SBSQ HOSP IP/OBS HIGH 50: CPT | Mod: ,,, | Performed by: INTERNAL MEDICINE

## 2024-11-25 PROCEDURE — 84100 ASSAY OF PHOSPHORUS: CPT

## 2024-11-25 PROCEDURE — 25000003 PHARM REV CODE 250: Mod: HCNC | Performed by: STUDENT IN AN ORGANIZED HEALTH CARE EDUCATION/TRAINING PROGRAM

## 2024-11-25 PROCEDURE — 20000000 HC ICU ROOM

## 2024-11-25 PROCEDURE — 36415 COLL VENOUS BLD VENIPUNCTURE: CPT

## 2024-11-25 RX ORDER — FAMOTIDINE 20 MG/1
20 TABLET, FILM COATED ORAL DAILY
Status: DISCONTINUED | OUTPATIENT
Start: 2024-11-26 | End: 2024-11-27

## 2024-11-25 RX ADMIN — FAMOTIDINE 20 MG: 10 INJECTION, SOLUTION INTRAVENOUS at 09:11

## 2024-11-25 RX ADMIN — APIXABAN 2.5 MG: 2.5 TABLET, FILM COATED ORAL at 08:11

## 2024-11-25 RX ADMIN — APIXABAN 2.5 MG: 2.5 TABLET, FILM COATED ORAL at 12:11

## 2024-11-25 RX ADMIN — MUPIROCIN: 20 OINTMENT TOPICAL at 09:11

## 2024-11-25 RX ADMIN — ISOSORBIDE MONONITRATE 60 MG: 60 TABLET, EXTENDED RELEASE ORAL at 09:11

## 2024-11-25 RX ADMIN — MUPIROCIN: 20 OINTMENT TOPICAL at 08:11

## 2024-11-25 NOTE — PROGRESS NOTES
Cardiology group at bedside and change plans with PM. Decide not to place today. Wife not at bedside. Will notifiy and explain.

## 2024-11-25 NOTE — HOSPITAL COURSE
"11/25/24-Patient seen and examined today, resting in bed. HR dips to 30's during sleep, no significant pauses noted. HR in 40's-50's when awake. Reviewed case with EP, no clear indication for PPM at present time though patient is certainly at risk, will need OP event monitor. Labs reviewed.     11/26/24-Patient seen and examined today with wife present, resting in bed. Awake, alert, wants to go home. No AEON. HR in 40-50's during exam. No near syncope or syncope. Labs reviewed. OP event monitor arranged.     11/27/24-Patient seen and examined today, awake, alert in bed. Feels "great". Wants to go home and see his son and family who traveled in from [facility]. HR in 60's. K 5.9, being treated. Discussed case with EP, patient has option of leadless implantation at [facility], will discuss further once his wife arrives.  "

## 2024-11-25 NOTE — PLAN OF CARE
Pt AAOx3; disoriented to situation. GCS 14; confused; reorients easily. VSS. Afebrile. SB on monitor. Room air. Quivi in place; adequate UOP this shift. BM x2 this shift. Accuchecks Q6; no coverage needed. Spouse Gayle updated at bedside.    Pt resting comfortably in bed with side rails up x3; locked and lowered with alarm set. Call light in place. Advised pt to call out if anything is needed. Pt verbalized understanding.

## 2024-11-25 NOTE — ASSESSMENT & PLAN NOTE
History of bradycardia with atrial fibrillation in 40s-50s  Upon contact with EMS, heart rates 20s to 40s.  Now stable in the 40s  Required TVP recently prior to ERCP  No hypotension   Transcutaneous pacer pads applied   Atropine at bedside to use as needed  Monitor in ICU  Repeat echo with normal EF  Will plan for permanent pacemaker on Monday 11/25/2024   Discussed with Interventional Cardiology  Continue to hold Eliquis, no bridging needed    11/25/24  -HR in 30's during sleep, 40's-50's when awake  -No significant pauses  -Reviewed cause with EP, no clear indication for PPM although patient is certainly at risk, OP event monitor recommended  -Resume Eliquis

## 2024-11-25 NOTE — PROGRESS NOTES
[facility] - Intensive Care ([facility])  Cardiology  Progress Note    Patient Name: Aquiles Yates  MRN: [id number]  Admission Date: 11/23/2024  Hospital Length of Stay: 2 days  Code Status: Full Code   Attending Physician: Jennifer De La Garza MD   Primary Care Physician: Holger Thornton MD  Expected Discharge Date:   Principal Problem:Symptomatic bradycardia    Subjective:   HPI:  Aquiles Yates is a 84 y.o. male patient with a PMHx of CAD, diastolic HF, HTN, HLD, Afib on eliquis, DM Type 2, PVD, Alzheimer's disease who presents to the ED earlier today for evaluation of generalized weakness and syncopal episode at home which onset gradually suddenly PTA.  Per wife, patient did not lose consciousness, per EMS, patient had a brief loss of consciousness.  Pt states he is unsure what happened but was oriented to person and place when asked. Per wife, pt was getting up from the toilet in the bathroom and was uneasy/unbalanced on his feet and had disarticulated speech. Pt's wife states pt has been feeling unbalanced for 2-3 days now. Pt's wife states these sxs worried her about a potential stroke, as these sxs are similar when he had a previous stroke. Pt's wife notes pt was given pills for constipation yesterday. Pt's wife also notes pt was experiencing hallucinations yesterday as well. Pt's wife states she called EMS, where the heart rate was ranging 20-40s. Pt denies any CP, SOB, fever, or chills. Pt normally ambulates around with a walker, per wife. Pt was in a-fib. . Troponin 0.014.     ECHO    Left Ventricle: The left ventricle is normal in size. Normal wall thickness. There is concentric hypertrophy. Normal wall motion. There is normal systolic function with a visually estimated ejection fraction of 55 - 60%. There is indeterminate diastolic function.    Right Ventricle: Systolic function is normal.    Left Atrium: Left atrium is mildly dilated.    Pulmonary Artery: The estimated  pulmonary artery systolic pressure is 23 mmHg.    IVC/SVC: Normal venous pressure at 3 mmHg.     EKG  Atrial fibrillation, Premature ventricular complexes  Inferior infarct ,age undetermined  T wave abnormality, consider lateral ischemia     Hospital Course:   11/25/24-Patient seen and examined today, resting in bed. HR dips to 30's during sleep, no significant pauses noted. HR in 40's-50's when awake. Reviewed case with EP, no clear indication for PPM at present time though patient is certainly at risk, will need OP event monitor. Labs reviewed.         Review of Systems   Reason unable to perform ROS: sleeping.     Objective:     Vital Signs (Most Recent):  Temp: 98.6 °F (37 °C) (11/25/24 0315)  Pulse: (!) 40 (11/25/24 1000)  Resp: (!) 21 (11/25/24 1000)  BP: (!) 161/69 (11/25/24 0930)  SpO2: 100 % (11/25/24 1000) Vital Signs (24h Range):  Temp:  [97.8 °F (36.6 °C)-98.6 °F (37 °C)] 98.6 °F (37 °C)  Pulse:  [39-57] 40  Resp:  [10-26] 21  SpO2:  [96 %-100 %] 100 %  BP: (100-174)/(51-77) 161/69     Weight: 61.2 kg (135 lb)  Body mass index is 19.37 kg/m².     SpO2: 100 %         Intake/Output Summary (Last 24 hours) at 11/25/2024 1102  Last data filed at 11/25/2024 1001  Gross per 24 hour   Intake --   Output 850 ml   Net -850 ml       Lines/Drains/Airways       Drain  Duration             Male External Urine Management Device w/ Suction 11/24/24 1000 Other (Comment) 1 day              Peripheral Intravenous Line  Duration                  Peripheral IV - Single Lumen 11/23/24 1352 22 G Left;Posterior Hand 1 day         Peripheral IV - Single Lumen 11/24/24 1015 20 G Posterior;Right Forearm 1 day                       Physical Exam  Vitals and nursing note reviewed.   Constitutional:       Appearance: He is ill-appearing.   HENT:      Head: Normocephalic and atraumatic.   Eyes:      Pupils: Pupils are equal, round, and reactive to light.   Cardiovascular:      Rate and Rhythm: Bradycardia present. Rhythm irregularly  "irregular.      Heart sounds: S1 normal and S2 normal. No murmur heard.  Pulmonary:      Effort: Pulmonary effort is normal.   Abdominal:      Palpations: Abdomen is soft.   Musculoskeletal:      Right lower leg: No edema.      Left lower leg: No edema.   Skin:     General: Skin is warm and dry.   Neurological:      Comments: Sleepy but awakens to stimuli and responds appropriately            Significant Labs: CMP   Recent Labs   Lab 11/24/24 0427 11/25/24  0543    139   K 5.3* 4.3    105   CO2 26 26   * 127*   BUN 30* 29*   CREATININE 1.4 1.5*   CALCIUM 9.1 9.0   ANIONGAP 7* 8   , CBC   Recent Labs   Lab 11/24/24 0427 11/25/24  0543   WBC 5.15 5.01   HGB 12.4* 12.5*   HCT 38.4* 37.4*    145*   , Troponin No results for input(s): "TROPONINI" in the last 48 hours., and All pertinent lab results from the last 24 hours have been reviewed.    Significant Imaging: Echocardiogram: Transthoracic echo (TTE) complete ([facility] Only):   Results for orders placed or performed during the hospital encounter of 11/23/24   Echo   Result Value Ref Range    BSA 1.74 m2    LVIDd 4.3 3.5 - 6.0 cm    LV Systolic Volume 35.02 mL    LV Systolic Volume Index 19.8 mL/m2    LVIDs 3.0 2.1 - 4.0 cm    LV Diastolic Volume 83.71 mL    LV Diastolic Volume Index 47.29 mL/m2    Left Ventricular End Systolic Volume by Teichholz Method 35.02 mL    Left Ventricular End Diastolic Volume by Teichholz Method 83.71 mL    IVS 1.5 (A) 0.6 - 1.1 cm    FS 30.2 28 - 44 %    Left Ventricle Relative Wall Thickness 0.65 cm    PW 1.4 (A) 0.6 - 1.1 cm    LV mass 245.0 g    LV Mass Index 138.4 g/m2    MV Peak E Americo 0.82 m/s    TDI LATERAL 0.11 m/s    TDI SEPTAL 0.05 m/s    E/E' ratio 10.25 m/s    MV Peak A Americo 0.76 m/s    TR Max Americo 2.24 m/s    E/A ratio 1.08     IVRT 95.15 msec    E wave deceleration time 237.40 msec    LV SEPTAL E/E' RATIO 16.40 m/s    LV LATERAL E/E' RATIO 7.45 m/s    LVOT peak americo 0.8 m/s    Left Ventricular Outflow " Tract Mean Velocity 0.65 cm/s    Left Ventricular Outflow Tract Mean Gradient 1.76 mmHg    TAPSE 1.92 cm    LA size 4.34 cm    Left Atrium Minor Axis 5.22 cm    Left Atrium Major Axis 5.05 cm    RA Major Axis 4.04 cm    AV regurgitation pressure 1/2 time 1,020.173130067591643 ms    AR Max Americo 3.09 m/s    AV mean gradient 5.6 mmHg    AV peak gradient 10.2 mmHg    Ao peak americo 1.6 m/s    Ao VTI 36.8 cm    LVOT peak VTI 18.8 cm    AV Velocity Ratio 0.50     AV index (prosthetic) 0.51     Mr max americo 3.88 m/s    MV stenosis pressure 1/2 time 68.85 ms    MV valve area p 1/2 method 3.20 cm2    Triscuspid Valve Regurgitation Peak Gradient 20 mmHg    Ao root annulus 3.60 cm    Mean e' 0.08 m/s    ZLVIDS -0.07     ZLVIDD -1.29     LURDES 37.4 mL/m2    LA Vol 66.28 cm3    LA WIDTH 3.5 cm    RA Width 2.4 cm    TV resting pulmonary artery pressure 23 mmHg    RV TB RVSP 5 mmHg    Est. RA pres 3 mmHg    Narrative      Left Ventricle: The left ventricle is normal in size. Normal wall   thickness. There is concentric hypertrophy. Normal wall motion. There is   normal systolic function with a visually estimated ejection fraction of 55   - 60%. There is indeterminate diastolic function.    Right Ventricle: Systolic function is normal.    Left Atrium: Left atrium is mildly dilated.    Pulmonary Artery: The estimated pulmonary artery systolic pressure is   23 mmHg.    IVC/SVC: Normal venous pressure at 3 mmHg.      and EKG: REviewed  Assessment and Plan:   Patient who presents with bradycardia/near syncope. No significant pauses noted on telemetry. Reviewed case with EP, no clear indication for PPM at present time, although he is certainly at risk. OP event monitor recommended.     * Symptomatic bradycardia  History of bradycardia with atrial fibrillation in 40s-50s  Upon contact with EMS, heart rates 20s to 40s.  Now stable in the 40s  Required TVP recently prior to ERCP  No hypotension   Transcutaneous pacer pads applied   Atropine at  bedside to use as needed  Monitor in ICU  Repeat echo with normal EF  Will plan for permanent pacemaker on Monday 11/25/2024   Discussed with Interventional Cardiology  Continue to hold Eliquis, no bridging needed    11/25/24  -HR in 30's during sleep, 40's-50's when awake  -No significant pauses  -Reviewed cause with EP, no clear indication for PPM although patient is certainly at risk, OP event monitor recommended  -Resume Eliquis     Type 2 diabetes mellitus  Management per ICU/Hospital Medicine    Chronic diastolic heart failure  Currently compensated   Continue home medications    Dementia  -Per primary team    PAF (paroxysmal atrial fibrillation)  Slow ventricular response, currently in AFib   Holding Eliquis for PPM  No history of BB or CCB at home    11/25/24  -Reviewed with EP, no clear indication for PPM at present time  -Resume Eliquis    CAD, multiple vessel  Stable   Denies chest pain   Negative troponin    Essential hypertension  -Elevated on admission   -Titrate medications           VTE Risk Mitigation (From admission, onward)           Ordered     IP VTE HIGH RISK PATIENT  Once         11/23/24 1233     Place sequential compression device  Until discontinued         11/23/24 1233                    Samreen Arzate PA-C  Cardiology  [facility] ([facility])

## 2024-11-25 NOTE — ASSESSMENT & PLAN NOTE
- Patient has paroxysmal (<7 days) atrial fibrillation. Patient is currently in Junctional bradycardia [id number] Score: 4. The patients heart rate in the last 24 hours is as follows:  Pulse  Min: 39  Max: 57   - Replete lytes with a goal of K>4, Mg >2  - Restart eliquis once able; holding, PPM tomorrow  - Cards following  - No PPM, eliquis has been resumed

## 2024-11-25 NOTE — PLAN OF CARE
Pt AAOx3, disoriented to situation, intermittently disoriented to place. Displays confusion during conversation, easily redirectable. Afebrile, VSS. Sinus sarita on monitor. PIV x2 saline locked. Previous quivi removed by patient, CHG bath given, linen changed and quivi replaced.   Turned independently. Side rails up x3, call light in reach.

## 2024-11-25 NOTE — ASSESSMENT & PLAN NOTE
Slow ventricular response, currently in AFib   Holding Eliquis for PPM  No history of BB or CCB at home    11/25/24  -Reviewed with EP, no clear indication for PPM at present time  -Resume Eliquis

## 2024-11-25 NOTE — ASSESSMENT & PLAN NOTE
- Patients blood pressure range in the last 24 hours was: BP  Min: 100/55  Max: 174/76.  - Holding home meds at this time  - Avoid any AVN blocking agents  - BP stable

## 2024-11-25 NOTE — PROGRESS NOTES
Care assumed. Pt supine in bed sleeping. Will arouse  to name . Goes back to sleep/ chart and labs reveiwed. Plan for PM today. NPO since midnight. Safety maintained.

## 2024-11-25 NOTE — PROGRESS NOTES
Wife at bedside and spoke maira CLARK and POC discussed,. Plan to DC pt home with event monitor available.

## 2024-11-25 NOTE — ASSESSMENT & PLAN NOTE
- Patient's FSGs are controlled on current medication regimen.  - Last A1c reviewed:  Lab Results   Component Value Date    HGBA1C 6.3 (H) 10/28/2024   - Most recent fingerstick glucose reviewed-   Recent Labs   Lab 11/24/24  1825 11/24/24  2330 11/25/24  0601   POCTGLUCOSE 181* 133* 127*       - Current correctional scale:  Low  - Maintain anti-hyperglycemic dose as follows:  Antihyperglycemics (From admission, onward)      Start     Stop Route Frequency Ordered    11/23/24 1405  insulin aspart U-100 pen 0-5 Units         -- SubQ Every 6 hours PRN 11/23/24 1305        - Hold Oral hypoglycemics while patient is in the hospital.  - Glucose controlled  - 11/25: stable

## 2024-11-25 NOTE — SUBJECTIVE & OBJECTIVE
Objective:     Vital Signs (Most Recent):  Temp: 98.6 °F (37 °C) (11/25/24 0315)  Pulse: (!) 42 (11/25/24 1500)  Resp: 15 (11/25/24 1500)  BP: (!) 121/58 (11/25/24 1500)  SpO2: 100 % (11/25/24 1500) Vital Signs (24h Range):  Temp:  [98.1 °F (36.7 °C)-98.6 °F (37 °C)] 98.6 °F (37 °C)  Pulse:  [39-57] 42  Resp:  [10-25] 15  SpO2:  [96 %-100 %] 100 %  BP: (100-174)/(51-83) 121/58     Weight: 61.2 kg (135 lb)  Body mass index is 19.37 kg/m².  Intake/Output Summary (Last 24 hours) at 11/25/2024 1630  Last data filed at 11/25/2024 1514  Gross per 24 hour   Intake 700 ml   Output 1050 ml   Net -350 ml     Physical Exam  Vitals reviewed.   Constitutional:       General: He is not in acute distress.  HENT:      Head: Normocephalic.   Eyes:      Conjunctiva/sclera: Conjunctivae normal.      Pupils: Pupils are equal, round, and reactive to light.   Cardiovascular:      Rate and Rhythm: Regular rhythm. Bradycardia present.      Pulses: Normal pulses.   Pulmonary:      Effort: Pulmonary effort is normal.      Breath sounds: No wheezing or rales.      Comments: On RA  Abdominal:      General: There is no distension.      Palpations: Abdomen is soft.      Tenderness: There is no abdominal tenderness.   Musculoskeletal:         General: Normal range of motion.   Skin:     General: Skin is warm.      Capillary Refill: Capillary refill takes less than 2 seconds.      Coloration: Skin is not jaundiced.      Findings: No bruising.   Neurological:      Mental Status: He is alert and oriented to person, place, and time.   Psychiatric:         Mood and Affect: Mood normal.         Behavior: Behavior normal.     Review of Systems   Constitutional:  Negative for chills and fatigue.   Respiratory:  Negative for chest tightness and shortness of breath.    Cardiovascular:  Negative for chest pain.   Gastrointestinal:  Negative for abdominal pain.   Genitourinary:  Negative for dysuria.   Neurological:  Negative for dizziness, syncope and  headaches.   Psychiatric/Behavioral:  Negative for agitation and confusion.      Lines/Drains/Airways       Drain  Duration             Male External Urine Management Device w/ Suction 11/24/24 1000 Other (Comment) 1 day              Peripheral Intravenous Line  Duration                  Peripheral IV - Single Lumen 11/23/24 1352 22 G Left;Posterior Hand 2 days         Peripheral IV - Single Lumen 11/24/24 1015 20 G Posterior;Right Forearm 1 day                  Significant Labs:    CBC/Anemia Profile:  Recent Labs   Lab 11/24/24 0427 11/25/24  0543   WBC 5.15 5.01   HGB 12.4* 12.5*   HCT 38.4* 37.4*    145*   MCV 90 88   RDW 14.3 14.3     Chemistries:  Recent Labs   Lab 11/24/24 0427 11/25/24  0543    139   K 5.3* 4.3    105   CO2 26 26   BUN 30* 29*   CREATININE 1.4 1.5*   CALCIUM 9.1 9.0   MG 1.9 2.0   PHOS 2.8 2.5*     Significant Imaging:  I have reviewed all pertinent imaging results/findings within the past 24 hours.  I have reviewed and interpreted all pertinent imaging results/findings within the past 24 hours.

## 2024-11-25 NOTE — PROGRESS NOTES
[facility] - Intensive Care ([facility])  Critical Care Medicine  Progress Note    Patient Name: Aquiles Yates  MRN: [id number]  Admission Date: 11/23/2024  Hospital Length of Stay: 2 days  Code Status: Full Code  Attending Provider: Jennifer De La Garza MD  Primary Care Provider: Holger Thornton MD   Principal Problem: Symptomatic bradycardia    Subjective:     HPI:  Mr. Woods is an 84-year-old male with past medical history of CAD, diastolic HF, HTN, HLD, Afib on eliquis, DM2, PVD, Alzheimer's disease who presented to ED earlier today for generalized weakness and syncopal episode at home. Per wife, patient was getting up from the toilet in the bathroom and was uneasy on his feet. She also reports disarticulated speech. She then called EMS and on their arrival HR was ranging from 20-40. Patient briefly lost consciousness. No other neuro deficits were noted at the time including but not limited to slurred speech, facial asymmetry, motor weakness, etc. Patient denied any chest pain, shortness of breath, blurred vision, headache, nausea, vomiting, diarrhea. His wife does mention that he has had mental/physical decline over the past year but has rapidly progressed within the past two weeks. Has had reduced appetite and does not get around the house like he used to. Of note, he recently was hospitalized in October and had cholecystectomy. Sarah-operatively became bradycardic and had TVP placed with improvement in HR. Did follow up with cardiology and his BB + Aricept were held due to bradycardic episode. EP felt PPM not warranted at this time.     In the ED, patient AAOx3 but with HR in 40s, periodically dropping into 30s. CArdiology was consulted. Transcutaneous pads were placed. BP has remained stable. Patient with no complaints on my examination. Lab workup significant for , CPK 14. Trop wnl. Both CT head and CXR with no acute findings. Lytes wnl. Transcutaneous pacing performed in ED with no  response, HR still fluctuating between upper 30s/lower 40s. CC consulted for admission. Echo from 10/9 was reviewed with EF 60%, intermediate diastolic function.     Hospital/ICU Course:  11/24/2024: No complaints. Sitting up in bed eating breakfast on exam. HR has been stable in 40s. Did drop into 20s briefly yesterday during bowel movement. Glucose controlled. Repeat echo with EF 55-60%. Plan for PPM Monday.     11/25/2024: Doing well this am. No complaints. HR consistently in 40s but will drop down into 30s when sleeping.     Objective:     Vital Signs (Most Recent):  Temp: 98.6 °F (37 °C) (11/25/24 0315)  Pulse: (!) 42 (11/25/24 1500)  Resp: 15 (11/25/24 1500)  BP: (!) 121/58 (11/25/24 1500)  SpO2: 100 % (11/25/24 1500) Vital Signs (24h Range):  Temp:  [98.1 °F (36.7 °C)-98.6 °F (37 °C)] 98.6 °F (37 °C)  Pulse:  [39-57] 42  Resp:  [10-25] 15  SpO2:  [96 %-100 %] 100 %  BP: (100-174)/(51-83) 121/58     Weight: 61.2 kg (135 lb)  Body mass index is 19.37 kg/m².  Intake/Output Summary (Last 24 hours) at 11/25/2024 1630  Last data filed at 11/25/2024 1514  Gross per 24 hour   Intake 700 ml   Output 1050 ml   Net -350 ml     Physical Exam  Vitals reviewed.   Constitutional:       General: He is not in acute distress.  HENT:      Head: Normocephalic.   Eyes:      Conjunctiva/sclera: Conjunctivae normal.      Pupils: Pupils are equal, round, and reactive to light.   Cardiovascular:      Rate and Rhythm: Regular rhythm. Bradycardia present.      Pulses: Normal pulses.   Pulmonary:      Effort: Pulmonary effort is normal.      Breath sounds: No wheezing or rales.      Comments: On RA  Abdominal:      General: There is no distension.      Palpations: Abdomen is soft.      Tenderness: There is no abdominal tenderness.   Musculoskeletal:         General: Normal range of motion.   Skin:     General: Skin is warm.      Capillary Refill: Capillary refill takes less than 2 seconds.      Coloration: Skin is not jaundiced.       Findings: No bruising.   Neurological:      Mental Status: He is alert and oriented to person, place, and time.   Psychiatric:         Mood and Affect: Mood normal.         Behavior: Behavior normal.     Review of Systems   Constitutional:  Negative for chills and fatigue.   Respiratory:  Negative for chest tightness and shortness of breath.    Cardiovascular:  Negative for chest pain.   Gastrointestinal:  Negative for abdominal pain.   Genitourinary:  Negative for dysuria.   Neurological:  Negative for dizziness, syncope and headaches.   Psychiatric/Behavioral:  Negative for agitation and confusion.      Lines/Drains/Airways       Drain  Duration             Male External Urine Management Device w/ Suction 11/24/24 1000 Other (Comment) 1 day              Peripheral Intravenous Line  Duration                  Peripheral IV - Single Lumen 11/23/24 1352 22 G Left;Posterior Hand 2 days         Peripheral IV - Single Lumen 11/24/24 1015 20 G Posterior;Right Forearm 1 day                  Significant Labs:    CBC/Anemia Profile:  Recent Labs   Lab 11/24/24  0427 11/25/24  0543   WBC 5.15 5.01   HGB 12.4* 12.5*   HCT 38.4* 37.4*    145*   MCV 90 88   RDW 14.3 14.3     Chemistries:  Recent Labs   Lab 11/24/24 0427 11/25/24  0543    139   K 5.3* 4.3    105   CO2 26 26   BUN 30* 29*   CREATININE 1.4 1.5*   CALCIUM 9.1 9.0   MG 1.9 2.0   PHOS 2.8 2.5*     Significant Imaging:  I have reviewed all pertinent imaging results/findings within the past 24 hours.  I have reviewed and interpreted all pertinent imaging results/findings within the past 24 hours.    Assessment/Plan:     Neuro  Dementia  - Patient with dementia with likely etiology of alzheimer's dementia. Dementia is moderate. The patient does not have signs of behavioral disturbance. Home dementia medications are Held or Continued: held..   - Continue non-pharmacologic interventions to prevent delirium (No VS between 11PM-5AM, activity during day,  opening blinds, providing glasses/hearing aids, and up in chair during daytime). Will avoid narcotics and benzos unless absolutely necessary. PRN anti-psychotics are not prescribed to avoid self harm behaviors.  - Not an issue at this time, continue supportive care    Cardiac/Vascular  * Symptomatic bradycardia  - Syncopal episode earlier today  - HR ranging from 20-40s via EMS and on admission   - Hx bradycardia with previous admission requiring TVP; EP felt PPM was not warranted  - Cardiology consulted  - Transcutaneous pads are in place; atropine prn   - Will admit to ICU for close observation  - BP stable, lytes wnl, glucose 184 on arrival  - Will check tsh   - Patient with no complaints at this time  - Plan per cards  - 11/24: HR stable in 40s. Plan for PPM Monday. Atropine prn.   - 11/25: Consistently in 40s-50s, will occasionally drop into 30s while sleeping; Discussed with cards, EP felt PPM not indicated at this time, recommend OP event monitor. Will plan on observation overnight, if stable possibly d/c tomorrow and set to  monitor in clinic.     Chronic diastolic heart failure  - Echo reviewed from October; EF 60% but with intermediate diastolic function  - Cards following  - Monitor fluid status, I&Os, daily weights  - Repeat echo with no changes    PAF (paroxysmal atrial fibrillation)  - Patient has paroxysmal (<7 days) atrial fibrillation. Patient is currently in Junctional bradycardia [id number] Score: 4. The patients heart rate in the last 24 hours is as follows:  Pulse  Min: 39  Max: 57   - Replete lytes with a goal of K>4, Mg >2  - Restart eliquis once able; holding, PPM tomorrow  - Cards following  - No PPM, eliquis has been resumed    CAD, multiple vessel  - Patient with known CAD s/p stent placement, which is controlled Will continue ASA and Statin and monitor for S/Sx of angina/ACS. Continue to monitor on telemetry.   - Cards following  - Monitor for anginal symptoms  - Trop  negative    Dyslipidemia  - Statin once able    Essential hypertension  - Patients blood pressure range in the last 24 hours was: BP  Min: 100/55  Max: 174/76.  - Holding home meds at this time  - Avoid any AVN blocking agents  - BP stable    Endocrine  Type 2 diabetes mellitus  - Patient's FSGs are controlled on current medication regimen.  - Last A1c reviewed:  Lab Results   Component Value Date    HGBA1C 6.3 (H) 10/28/2024   - Most recent fingerstick glucose reviewed-   Recent Labs   Lab 11/24/24  1825 11/24/24  2330 11/25/24  0601   POCTGLUCOSE 181* 133* 127*       - Current correctional scale:  Low  - Maintain anti-hyperglycemic dose as follows:  Antihyperglycemics (From admission, onward)      Start     Stop Route Frequency Ordered    11/23/24 1405  insulin aspart U-100 pen 0-5 Units         -- SubQ Every 6 hours PRN 11/23/24 1305        - Hold Oral hypoglycemics while patient is in the hospital.  - Glucose controlled  - 11/25: stable     Critical Care Daily Checklist:    A: Awake: RASS Goal/Actual Goal:  N/a  Actual:  N/a   B: Spontaneous Breathing Trial Performed?  N/a   C: SAT & SBT Coordinated?  N/a                      D: Delirium: CAM-ICU Overall CAM-ICU: Negative   E: Early Mobility Performed? Yes   F: Feeding Goal:    Status:     Current Diet Order   Procedures    Diet Cardiac      AS: Analgesia/Sedation None   T: Thromboembolic Prophylaxis Eliquis resumed   H: HOB > 300 Yes   U: Stress Ulcer Prophylaxis (if needed) Pepcid   G: Glucose Control Low SS coverage   B: Bowel Function Stool Occurrence: 1   I: Indwelling Catheter (Lines & Malik) Necessity PIVs   D: De-escalation of Antimicrobials/Pharmacotherapies None    Plan for the day/ETD ICU monitoring     Code Status:  Family/Goals of Care: Full Code       Critical Care Time: 39 minutes  Critical secondary to Patient has a condition that poses threat to life and bodily function: Symptomatic bradycardia      Critical care was time spent personally by me  on the following activities: development of treatment plan with patient or surrogate and bedside caregivers, discussions with consultants, evaluation of patient's response to treatment, examination of patient, ordering and performing treatments and interventions, ordering and review of laboratory studies, ordering and review of radiographic studies, pulse oximetry, re-evaluation of patient's condition. This critical care time did not overlap with that of any other provider or involve time for any procedures.     Kartik Barraza PA-C  Critical Care Medicine  [facility] ([facility])

## 2024-11-25 NOTE — PLAN OF CARE
POC with wife per cardiology and plan for DC home with Event Monitor when available. Diet advanced. VSS. Labs monitored. Safety maintained.

## 2024-11-25 NOTE — ASSESSMENT & PLAN NOTE
- Syncopal episode earlier today  - HR ranging from 20-40s via EMS and on admission   - Hx bradycardia with previous admission requiring TVP; EP felt PPM was not warranted  - Cardiology consulted  - Transcutaneous pads are in place; atropine prn   - Will admit to ICU for close observation  - BP stable, lytes wnl, glucose 184 on arrival  - Will check tsh   - Patient with no complaints at this time  - Plan per cards  - 11/24: HR stable in 40s. Plan for PPM Monday. Atropine prn.   - 11/25: Consistently in 40s-50s, will occasionally drop into 30s while sleeping; Discussed with cards, EP felt PPM not indicated at this time, recommend OP event monitor. Will plan on observation overnight, if stable possibly d/c tomorrow and set to  monitor in clinic.

## 2024-11-25 NOTE — SUBJECTIVE & OBJECTIVE
Review of Systems   Reason unable to perform ROS: sleeping.     Objective:     Vital Signs (Most Recent):  Temp: 98.6 °F (37 °C) (11/25/24 0315)  Pulse: (!) 40 (11/25/24 1000)  Resp: (!) 21 (11/25/24 1000)  BP: (!) 161/69 (11/25/24 0930)  SpO2: 100 % (11/25/24 1000) Vital Signs (24h Range):  Temp:  [97.8 °F (36.6 °C)-98.6 °F (37 °C)] 98.6 °F (37 °C)  Pulse:  [39-57] 40  Resp:  [10-26] 21  SpO2:  [96 %-100 %] 100 %  BP: (100-174)/(51-77) 161/69     Weight: 61.2 kg (135 lb)  Body mass index is 19.37 kg/m².     SpO2: 100 %         Intake/Output Summary (Last 24 hours) at 11/25/2024 1102  Last data filed at 11/25/2024 1001  Gross per 24 hour   Intake --   Output 850 ml   Net -850 ml       Lines/Drains/Airways       Drain  Duration             Male External Urine Management Device w/ Suction 11/24/24 1000 Other (Comment) 1 day              Peripheral Intravenous Line  Duration                  Peripheral IV - Single Lumen 11/23/24 1352 22 G Left;Posterior Hand 1 day         Peripheral IV - Single Lumen 11/24/24 1015 20 G Posterior;Right Forearm 1 day                       Physical Exam  Vitals and nursing note reviewed.   Constitutional:       Appearance: He is ill-appearing.   HENT:      Head: Normocephalic and atraumatic.   Eyes:      Pupils: Pupils are equal, round, and reactive to light.   Cardiovascular:      Rate and Rhythm: Bradycardia present. Rhythm irregularly irregular.      Heart sounds: S1 normal and S2 normal. No murmur heard.  Pulmonary:      Effort: Pulmonary effort is normal.   Abdominal:      Palpations: Abdomen is soft.   Musculoskeletal:      Right lower leg: No edema.      Left lower leg: No edema.   Skin:     General: Skin is warm and dry.   Neurological:      Comments: Sleepy but awakens to stimuli and responds appropriately            Significant Labs: CMP   Recent Labs   Lab 11/24/24  0427 11/25/24  0543    139   K 5.3* 4.3    105   CO2 26 26   * 127*   BUN 30* 29*  "  CREATININE 1.4 1.5*   CALCIUM 9.1 9.0   ANIONGAP 7* 8   , CBC   Recent Labs   Lab 11/24/24  0427 11/25/24  0543   WBC 5.15 5.01   HGB 12.4* 12.5*   HCT 38.4* 37.4*    145*   , Troponin No results for input(s): "TROPONINI" in the last 48 hours., and All pertinent lab results from the last 24 hours have been reviewed.    Significant Imaging: Echocardiogram: Transthoracic echo (TTE) complete (Cupid Only):   Results for orders placed or performed during the hospital encounter of 11/23/24   Echo   Result Value Ref Range    BSA 1.74 m2    LVIDd 4.3 3.5 - 6.0 cm    LV Systolic Volume 35.02 mL    LV Systolic Volume Index 19.8 mL/m2    LVIDs 3.0 2.1 - 4.0 cm    LV Diastolic Volume 83.71 mL    LV Diastolic Volume Index 47.29 mL/m2    Left Ventricular End Systolic Volume by Teichholz Method 35.02 mL    Left Ventricular End Diastolic Volume by Teichholz Method 83.71 mL    IVS 1.5 (A) 0.6 - 1.1 cm    FS 30.2 28 - 44 %    Left Ventricle Relative Wall Thickness 0.65 cm    PW 1.4 (A) 0.6 - 1.1 cm    LV mass 245.0 g    LV Mass Index 138.4 g/m2    MV Peak E Americo 0.82 m/s    TDI LATERAL 0.11 m/s    TDI SEPTAL 0.05 m/s    E/E' ratio 10.25 m/s    MV Peak A Americo 0.76 m/s    TR Max Americo 2.24 m/s    E/A ratio 1.08     IVRT 95.15 msec    E wave deceleration time 237.40 msec    LV SEPTAL E/E' RATIO 16.40 m/s    LV LATERAL E/E' RATIO 7.45 m/s    LVOT peak americo 0.8 m/s    Left Ventricular Outflow Tract Mean Velocity 0.65 cm/s    Left Ventricular Outflow Tract Mean Gradient 1.76 mmHg    TAPSE 1.92 cm    LA size 4.34 cm    Left Atrium Minor Axis 5.22 cm    Left Atrium Major Axis 5.05 cm    RA Major Axis 4.04 cm    AV regurgitation pressure 1/2 time 1,020.386800184181792 ms    AR Max Americo 3.09 m/s    AV mean gradient 5.6 mmHg    AV peak gradient 10.2 mmHg    Ao peak americo 1.6 m/s    Ao VTI 36.8 cm    LVOT peak VTI 18.8 cm    AV Velocity Ratio 0.50     AV index (prosthetic) 0.51     Mr max americo 3.88 m/s    MV stenosis pressure 1/2 time 68.85 ms "    MV valve area p 1/2 method 3.20 cm2    Triscuspid Valve Regurgitation Peak Gradient 20 mmHg    Ao root annulus 3.60 cm    Mean e' 0.08 m/s    ZLVIDS -0.07     ZLVIDD -1.29     LURDES 37.4 mL/m2    LA Vol 66.28 cm3    LA WIDTH 3.5 cm    RA Width 2.4 cm    TV resting pulmonary artery pressure 23 mmHg    RV TB RVSP 5 mmHg    Est. RA pres 3 mmHg    Narrative      Left Ventricle: The left ventricle is normal in size. Normal wall   thickness. There is concentric hypertrophy. Normal wall motion. There is   normal systolic function with a visually estimated ejection fraction of 55   - 60%. There is indeterminate diastolic function.    Right Ventricle: Systolic function is normal.    Left Atrium: Left atrium is mildly dilated.    Pulmonary Artery: The estimated pulmonary artery systolic pressure is   23 mmHg.    IVC/SVC: Normal venous pressure at 3 mmHg.      and EKG: REviewed

## 2024-11-25 NOTE — PLAN OF CARE
Pt to discharge home tomorrow with event monitor. Diet advanced. Safety maintained. Wife at bedside and updates given.

## 2024-11-26 PROBLEM — E78.5 DYSLIPIDEMIA: Chronic | Status: RESOLVED | Noted: 2019-06-12 | Resolved: 2024-11-26

## 2024-11-26 LAB
ANION GAP SERPL CALC-SCNC: 12 MMOL/L (ref 8–16)
BASOPHILS # BLD AUTO: 0.02 K/UL (ref 0–0.2)
BASOPHILS NFR BLD: 0.4 % (ref 0–1.9)
BUN SERPL-MCNC: 27 MG/DL (ref 8–23)
CALCIUM SERPL-MCNC: 9 MG/DL (ref 8.7–10.5)
CHLORIDE SERPL-SCNC: 106 MMOL/L (ref 95–110)
CO2 SERPL-SCNC: 21 MMOL/L (ref 23–29)
CREAT SERPL-MCNC: 1.3 MG/DL (ref 0.5–1.4)
DIFFERENTIAL METHOD BLD: ABNORMAL
EOSINOPHIL # BLD AUTO: 0.5 K/UL (ref 0–0.5)
EOSINOPHIL NFR BLD: 9 % (ref 0–8)
ERYTHROCYTE [DISTWIDTH] IN BLOOD BY AUTOMATED COUNT: 14.3 % (ref 11.5–14.5)
EST. GFR  (NO RACE VARIABLE): 54 ML/MIN/1.73 M^2
GLUCOSE SERPL-MCNC: 136 MG/DL (ref 70–110)
HCT VFR BLD AUTO: 38.7 % (ref 40–54)
HGB BLD-MCNC: 13 G/DL (ref 14–18)
IMM GRANULOCYTES # BLD AUTO: 0.01 K/UL (ref 0–0.04)
IMM GRANULOCYTES NFR BLD AUTO: 0.2 % (ref 0–0.5)
LYMPHOCYTES # BLD AUTO: 1.2 K/UL (ref 1–4.8)
LYMPHOCYTES NFR BLD: 23.1 % (ref 18–48)
MAGNESIUM SERPL-MCNC: 2.1 MG/DL (ref 1.6–2.6)
MCH RBC QN AUTO: 29.3 PG (ref 27–31)
MCHC RBC AUTO-ENTMCNC: 33.6 G/DL (ref 32–36)
MCV RBC AUTO: 87 FL (ref 82–98)
MONOCYTES # BLD AUTO: 0.4 K/UL (ref 0.3–1)
MONOCYTES NFR BLD: 8.2 % (ref 4–15)
NEUTROPHILS # BLD AUTO: 3 K/UL (ref 1.8–7.7)
NEUTROPHILS NFR BLD: 59.1 % (ref 38–73)
NRBC BLD-RTO: 0 /100 WBC
PHOSPHATE SERPL-MCNC: 3.6 MG/DL (ref 2.7–4.5)
PLATELET # BLD AUTO: 158 K/UL (ref 150–450)
PMV BLD AUTO: 10.8 FL (ref 9.2–12.9)
POCT GLUCOSE: 126 MG/DL (ref 70–110)
POCT GLUCOSE: 134 MG/DL (ref 70–110)
POCT GLUCOSE: 144 MG/DL (ref 70–110)
POCT GLUCOSE: 167 MG/DL (ref 70–110)
POTASSIUM SERPL-SCNC: 4.8 MMOL/L (ref 3.5–5.1)
RBC # BLD AUTO: 4.44 M/UL (ref 4.6–6.2)
SODIUM SERPL-SCNC: 139 MMOL/L (ref 136–145)
WBC # BLD AUTO: 5.02 K/UL (ref 3.9–12.7)

## 2024-11-26 PROCEDURE — 21400001 HC TELEMETRY ROOM

## 2024-11-26 PROCEDURE — 99233 SBSQ HOSP IP/OBS HIGH 50: CPT | Mod: ,,, | Performed by: INTERNAL MEDICINE

## 2024-11-26 PROCEDURE — 36415 COLL VENOUS BLD VENIPUNCTURE: CPT

## 2024-11-26 PROCEDURE — 80048 BASIC METABOLIC PNL TOTAL CA: CPT

## 2024-11-26 PROCEDURE — 25000003 PHARM REV CODE 250: Performed by: SPECIALIST

## 2024-11-26 PROCEDURE — 25000003 PHARM REV CODE 250: Performed by: STUDENT IN AN ORGANIZED HEALTH CARE EDUCATION/TRAINING PROGRAM

## 2024-11-26 PROCEDURE — 83735 ASSAY OF MAGNESIUM: CPT

## 2024-11-26 PROCEDURE — 85025 COMPLETE CBC W/AUTO DIFF WBC: CPT

## 2024-11-26 PROCEDURE — 25000003 PHARM REV CODE 250

## 2024-11-26 PROCEDURE — 84100 ASSAY OF PHOSPHORUS: CPT

## 2024-11-26 RX ADMIN — MUPIROCIN: 20 OINTMENT TOPICAL at 08:11

## 2024-11-26 RX ADMIN — FAMOTIDINE 20 MG: 20 TABLET ORAL at 08:11

## 2024-11-26 RX ADMIN — ISOSORBIDE MONONITRATE 60 MG: 60 TABLET, EXTENDED RELEASE ORAL at 08:11

## 2024-11-26 RX ADMIN — APIXABAN 2.5 MG: 2.5 TABLET, FILM COATED ORAL at 08:11

## 2024-11-26 NOTE — ASSESSMENT & PLAN NOTE
- Patient's FSGs are controlled on current medication regimen.  - Last A1c reviewed:  Lab Results   Component Value Date    HGBA1C 6.3 (H) 10/28/2024   - Most recent fingerstick glucose reviewed-   Recent Labs   Lab 11/26/24  0000 11/26/24  0541   POCTGLUCOSE 126* 134*       - Current correctional scale:  Low  - Maintain anti-hyperglycemic dose as follows:  Antihyperglycemics (From admission, onward)      Start     Stop Route Frequency Ordered    11/23/24 1405  insulin aspart U-100 pen 0-5 Units         -- SubQ Every 6 hours PRN 11/23/24 1305        - Hold Oral hypoglycemics while patient is in the hospital.  - Glucose controlled on current regimen

## 2024-11-26 NOTE — ASSESSMENT & PLAN NOTE
Patient with dementia with likely etiology of alzheimer's dementia. Dementia is moderate. The patient does not have signs of behavioral disturbance. Home dementia medications are Held or Continued: held.. Continue non-pharmacologic interventions to prevent delirium (No VS between 11PM-5AM, activity during day, opening blinds, providing glasses/hearing aids, and up in chair during daytime). Will avoid narcotics and benzos unless absolutely necessary. PRN anti-psychotics are not prescribed to avoid self harm behaviors.  Anticipate discharge home    to discuss nursing home placement?

## 2024-11-26 NOTE — CONSULTS
[facility] ([facility])  Hospital Medicine  Consult Note    Patient Name: Aquiles Yates  MRN: [id number]  Admission Date: 11/23/2024  Hospital Length of Stay: 3 days  Attending Physician: Harpreet Tillman MD   Primary Care Provider: Holger Thornton MD           Patient information was obtained from ER records and primary team.     Consults  Subjective:     Principal Problem: Symptomatic bradycardia    Chief Complaint:   Chief Complaint   Patient presents with    Loss of Consciousness     Syncopal episode this morning while using the restroom. He is bradycardic with HR of 40. He denies any CP, SOB, dizziness or weakness at this time. Pt is awake and alert, GCS 14. Hx of dementia. .         HPI: 84M  has a past medical history of Acute blood loss anemia, Alzheimer's dementia, Aneurysm, abdominal aortic, BCC (basal cell carcinoma of skin), Chronic diastolic heart failure, Coronary artery disease, Depression, Diabetes mellitus, HLD (hyperlipidemia), Hypertension, Kidney stone, PAD (peripheral artery disease), PAF (paroxysmal atrial fibrillation), and Tobacco abuse.  Admitted to icu for bradycardia with neurological changes per family. Cardiology consulted and did not recommend ppm at this time. Advised to follow up outpatient. Previously hospitalized and underwent cholecystectomy requiring transcutaneous pacing to undergo surgery.     Initial evaluation of chart reveals mild bradycardia, normotensive. On room air. Mild anemia on cbc. Stable cmp. Ct head without contrast with no definite acute abnormality. Chest x-ray with no acute abnormality. Echocardiogram with preserved ejection fraction, indeterminate diastolic function.    Hospital medicine consulted for assumption of care.       Past Medical History:   Diagnosis Date    Acute blood loss anemia 10/14/2019    Alzheimer's dementia     Aneurysm, abdominal aortic     BCC (basal cell carcinoma of skin) 06/29/2023    Chronic diastolic  heart failure 05/20/2024    Coronary artery disease     Depression     Diabetes mellitus     HLD (hyperlipidemia)     Hypertension     Kidney stone     PAD (peripheral artery disease)     PAF (paroxysmal atrial fibrillation) 01/24/2023    Tobacco abuse        Past Surgical History:   Procedure Laterality Date    APPENDECTOMY      CORONARY STENT PLACEMENT      x 4    DV5 ROBOTIC CHOLECYSTECTOMY N/A 10/15/2024    Procedure: DV5 ROBOTIC CHOLECYSTECTOMY;  Surgeon: Fred Taylor MD;  Location: [facility] OR;  Service: General;  Laterality: N/A;    ERCP N/A 10/10/2024    Procedure: ERCP (ENDOSCOPIC RETROGRADE CHOLANGIOPANCREATOGRAPHY);  Surgeon: Morgan Hong MD;  [facility]);  Service: Endoscopy;  Laterality: N/A;    ESOPHAGOGASTRODUODENOSCOPY N/A 10/14/2019    Procedure: EGD (ESOPHAGOGASTRODUODENOSCOPY);  Surgeon: Carlos Mcneal III, MD;  Location: [facility];  Service: Endoscopy;  Laterality: N/A;    ESOPHAGOGASTRODUODENOSCOPY N/A 10/15/2019    Procedure: EGD (ESOPHAGOGASTRODUODENOSCOPY);  Surgeon: Carlos Mcneal III, MD;  Location: [facility];  Service: Endoscopy;  Laterality: N/A;    ESOPHAGOGASTRODUODENOSCOPY N/A 4/17/2023    Procedure: EGD (ESOPHAGOGASTRODUODENOSCOPY);  Surgeon: Nevaeh Mcconnell MD;  Location: [facility] ENDO;  Service: Endoscopy;  Laterality: N/A;    INSERTION, PACEMAKER, TEMPORARY TRANSVENOUS N/A 10/15/2024    Procedure: Insertion, Pacemaker, Temporary Transvenous;  Surgeon: Demarcus Kirk MD;  Location: [facility] CATH LAB;  Service: Cardiology;  Laterality: N/A;    LEFT HEART CATHETERIZATION Left 10/11/2019    Procedure: CATHETERIZATION, HEART, LEFT;  Surgeon: Robe Gilbert MD;  Location: [facility] CATH LAB;  Service: Cardiology;  Laterality: Left;    LEFT HEART CATHETERIZATION Left 10/13/2019    Procedure: CATHETERIZATION, HEART, LEFT;  Surgeon: Robe Gilbert MD;  Location: [facility] CATH LAB;  Service: Cardiology;  Laterality: Left;    leg stent Left        Review of patient's  allergies indicates:   Allergen Reactions    Metoprolol Other (See Comments)     Junctional rhythm         No current facility-administered medications on file prior to encounter.     Current Outpatient Medications on File Prior to Encounter   Medication Sig    alcohol swabs (ALCOHOL PREP PADS) PadM Twice a day    aspirin (ECOTRIN) 81 MG EC tablet Take 1 tablet (81 mg total) by mouth once daily.    blood sugar diagnostic Strp Test blood sugars twice a day    ELIQUIS 2.5 mg Tab Take 2.5 mg by mouth 2 (two) times daily.    gabapentin (NEURONTIN) 100 MG capsule Take 1 capsule (100 mg total) by mouth 2 (two) times daily as needed. PRN    isosorbide mononitrate (IMDUR) 120 MG 24 hr tablet TAKE 1 TABLET BY MOUTH EVERY DAY    lancets (ACCU-CHEK SOFTCLIX LANCETS) Misc Test blood sugars twice a day    memantine (NAMENDA) 10 MG Tab Take 1 tablet (10 mg total) by mouth 2 (two) times daily.    metFORMIN (GLUCOPHAGE) 500 MG tablet TAKE 1 TABLET BY MOUTH EVERY DAY WITH BREAKFAST    pantoprazole (PROTONIX) 40 MG tablet Take 1 tablet (40 mg total) by mouth once daily.    valsartan (DIOVAN) 160 MG tablet Take 1 tablet (160 mg total) by mouth once daily.     Family History       Problem Relation (Age of Onset)    COPD Father    Diabetes Mother, Brother          Tobacco Use    Smoking status: Former     Types: Cigars     Passive exposure: Never    Smokeless tobacco: Current   Substance and Sexual Activity    Alcohol use: Not Currently    Drug use: Not Currently    Sexual activity: Not Currently     Partners: Female     Review of Systems   Unable to perform ROS: Dementia     Objective:     Vital Signs (Most Recent):  Temp: 97.7 °F (36.5 °C) (11/26/24 1200)  Pulse: (!) 57 (11/26/24 1200)  Resp: (!) 21 (11/26/24 1200)  BP: (!) 125/59 (11/26/24 1200)  SpO2: 100 % (11/26/24 1200) Vital Signs (24h Range):  Temp:  [97.5 °F (36.4 °C)-98.7 °F (37.1 °C)] 97.7 °F (36.5 °C)  Pulse:  [38-57] 57  Resp:  [13-38] 21  SpO2:  [90 %-100 %] 100 %  BP:  ()/(53-83) 125/59     Weight: 61.2 kg (135 lb)  Body mass index is 19.37 kg/m².     Physical Exam  Vitals and nursing note reviewed.   Constitutional:       General: He is awake. He is not in acute distress.     Appearance: He is ill-appearing. He is not toxic-appearing.   HENT:      Head: Normocephalic and atraumatic.   Cardiovascular:      Rate and Rhythm: Bradycardia present.   Pulmonary:      Effort: Pulmonary effort is normal. No respiratory distress.   Abdominal:      Palpations: Abdomen is soft.      Tenderness: There is no abdominal tenderness.   Musculoskeletal:      Right lower leg: No edema.      Left lower leg: No edema.   Skin:     General: Skin is warm.   Neurological:      Mental Status: Mental status is at baseline. He is disoriented.      Motor: Weakness present.      Comments: Does not follow commands          Significant Labs: All pertinent labs within the past 24 hours have been reviewed.  CBC:   Recent Labs   Lab 11/25/24  0543 11/26/24  0321   WBC 5.01 5.02   HGB 12.5* 13.0*   HCT 37.4* 38.7*   * 158     CMP:   Recent Labs   Lab 11/25/24  0543 11/26/24  0321    139   K 4.3 4.8    106   CO2 26 21*   * 136*   BUN 29* 27*   CREATININE 1.5* 1.3   CALCIUM 9.0 9.0   ANIONGAP 8 12       Magnesium:   Recent Labs   Lab 11/25/24  0543 11/26/24  0321   MG 2.0 2.1     POCT Glucose:   Recent Labs   Lab 11/26/24  0000 11/26/24  0541 11/26/24  1137   POCTGLUCOSE 126* 134* 167*       TSH:   Recent Labs   Lab 11/23/24  1034   TSH 0.728     Urine Studies: trace blood, trace rbc,trace glucose    Significant Imaging: I have reviewed all pertinent imaging results/findings within the past 24 hours.  CT: I have reviewed all pertinent results/findings within the past 24 hours and my personal findings are:  head without contrast-no definite abnormality, moderate encephalomalacia, remote lacunar infarcts  CXR: I have reviewed all pertinent results/findings within the past 24 hours and my  personal findings are:  no acute abnormality   Echo: I have reviewed all pertinent results/findings within the past 24 hours and my personal findings are:  preserved ejection fraction   Assessment/Plan:     * Symptomatic bradycardia  Neurological symptoms per family  Initially with transcutaneous pacing  Cardiology recommending outpatient follow up with holter monitor      Type 2 diabetes mellitus  Patient's FSGs are controlled on current medication regimen.  Last A1c reviewed-   Lab Results   Component Value Date    HGBA1C 6.3 (H) 10/28/2024     Most recent fingerstick glucose reviewed-   Recent Labs   Lab 11/26/24  0000 11/26/24  0541 11/26/24  1137   POCTGLUCOSE 126* 134* 167*     Current correctional scale  Low  Maintain anti-hyperglycemic dose as follows-   Antihyperglycemics (From admission, onward)      Start     Stop Route Frequency Ordered    11/23/24 1405  insulin aspart U-100 pen 0-5 Units         -- SubQ Every 6 hours PRN 11/23/24 1305          Hold Oral hypoglycemics while patient is in the hospital.    Chronic diastolic heart failure  Stable        Dementia  Patient with dementia with likely etiology of alzheimer's dementia. Dementia is moderate. The patient does not have signs of behavioral disturbance. Home dementia medications are Held or Continued: held.. Continue non-pharmacologic interventions to prevent delirium (No VS between 11PM-5AM, activity during day, opening blinds, providing glasses/hearing aids, and up in chair during daytime). Will avoid narcotics and benzos unless absolutely necessary. PRN anti-psychotics are not prescribed to avoid self harm behaviors.  Anticipate discharge home    to discuss nursing home placement?    PAF (paroxysmal atrial fibrillation)  Patient has paroxysmal (<7 days) atrial fibrillation. Patient is currently in  sinus sarita . [id number] Score: 4. The patients heart rate in the last 24 hours is as follows:  Pulse  Min: 38  Max: 57      Antiarrhythmics       Anticoagulants  apixaban tablet 2.5 mg, 2 times daily, Oral    Plan  - Replete lytes with a goal of K>4, Mg >2  - Patient is anticoagulated, see medications listed above.  - Patient's afib is currently controlled  - stable        CAD, multiple vessel  Patient with known CAD s/p stent placement and CABG, which is controlled Will continue  eliquis  and monitor for S/Sx of angina/ACS. Continue to monitor on telemetry.   Given age, dementia, falls, not currently on aspirin, plavix or statin    Essential hypertension  Patients blood pressure range in the last 24 hours was: BP  Min: 88/55  Max: 172/68.The patient's inpatient anti-hypertensive regimen is listed below:  Current Antihypertensives  isosorbide mononitrate 24 hr tablet 60 mg, Daily, Oral    Plan  - BP is controlled, no changes needed to their regimen  - relax normotensive blood pressure due to remote infarcts and risk for falls in this demographic      VTE Risk Mitigation (From admission, onward)           Ordered     apixaban tablet 2.5 mg  2 times daily         11/25/24 1105     IP VTE HIGH RISK PATIENT  Once         11/23/24 1233     Place sequential compression device  Until discontinued         11/23/24 1233                    Critical care time spent on the evaluation and treatment of severe organ dysfunction, review of pertinent labs and imaging studies, discussions with consulting providers and discussions with patient/family: 39 minutes.    Thank you for your consult. I will follow-up with patient. Please contact us if you have any additional questions.    Harpreet Tillman MD  Department of Hospital Medicine   [facility] ([facility])

## 2024-11-26 NOTE — PROGRESS NOTES
[facility] ([facility])  Critical Care Medicine  Progress Note    Patient Name: Aquiles Yates  MRN: [id number]  Admission Date: 11/23/2024  Hospital Length of Stay: 3 days  Code Status: Full Code  Attending Provider: Jennifer De La Garza MD  Primary Care Provider: Holger Thornton MD   Principal Problem: Symptomatic bradycardia    Subjective:     HPI:  Mr. Woods is an 84-year-old male with past medical history of CAD, diastolic HF, HTN, HLD, Afib on eliquis, DM2, PVD, Alzheimer's disease who presented to ED earlier today for generalized weakness and syncopal episode at home. Per wife, patient was getting up from the toilet in the bathroom and was uneasy on his feet. She also reports disarticulated speech. She then called EMS and on their arrival HR was ranging from 20-40. Patient briefly lost consciousness. No other neuro deficits were noted at the time including but not limited to slurred speech, facial asymmetry, motor weakness, etc. Patient denied any chest pain, shortness of breath, blurred vision, headache, nausea, vomiting, diarrhea. His wife does mention that he has had mental/physical decline over the past year but has rapidly progressed within the past two weeks. Has had reduced appetite and does not get around the house like he used to. Of note, he recently was hospitalized in October and had cholecystectomy. Sarah-operatively became bradycardic and had TVP placed with improvement in HR. Did follow up with cardiology and his BB + Aricept were held due to bradycardic episode. EP felt PPM not warranted at this time.     In the ED, patient AAOx3 but with HR in 40s, periodically dropping into 30s. CArdiology was consulted. Transcutaneous pads were placed. BP has remained stable. Patient with no complaints on my examination. Lab workup significant for , CPK 14. Trop wnl. Both CT head and CXR with no acute findings. Lytes wnl. Transcutaneous pacing performed in ED with no  response, HR still fluctuating between upper 30s/lower 40s. CC consulted for admission. Echo from 10/9 was reviewed with EF 60%, intermediate diastolic function.     Hospital/ICU Course:  11/24/2024: No complaints. Sitting up in bed eating breakfast on exam. HR has been stable in 40s. Did drop into 20s briefly yesterday during bowel movement. Glucose controlled. Repeat echo with EF 55-60%. Plan for PPM Monday.     11/25/2024: Doing well this am. No complaints. HR consistently in 40s but will drop down into 30s when sleeping.     11/26/2024: Continued bradycardia, otherwise stable. No plans for PPM at this time per Cards, will need OP cardiac event monitor. Stable to SD to hosp med.    Interval History: Patient drowsy, disoriented to situation. Denies any dizziness, lightheadedness, pain.      Objective:     Vital Signs (Most Recent):  Temp: 98.4 °F (36.9 °C) (11/26/24 0400)  Pulse: (!) 42 (11/26/24 0800)  Resp: (!) 27 (11/26/24 0800)  BP: (!) 157/70 (11/26/24 0800)  SpO2: (!) 90 % (11/26/24 0800) Vital Signs (24h Range):  Temp:  [98.1 °F (36.7 °C)-98.7 °F (37.1 °C)] 98.4 °F (36.9 °C)  Pulse:  [38-54] 42  Resp:  [13-32] 27  SpO2:  [90 %-100 %] 90 %  BP: ()/(53-83) 157/70     Weight: 61.2 kg (135 lb)  Body mass index is 19.37 kg/m².      Intake/Output Summary (Last 24 hours) at 11/26/2024 0849  Last data filed at 11/26/2024 0800  Gross per 24 hour   Intake 860 ml   Output 2150 ml   Net -1290 ml        Physical Exam  Constitutional:       General: He is sleeping.      Appearance: He is underweight. He is ill-appearing.   HENT:      Head: Normocephalic and atraumatic.      Nose: Nose normal.      Mouth/Throat:      Mouth: Mucous membranes are moist.   Eyes:      Pupils: Pupils are equal, round, and reactive to light.   Cardiovascular:      Rate and Rhythm: Regular rhythm. Bradycardia present.      Pulses: Normal pulses.      Heart sounds: Normal heart sounds.   Pulmonary:      Effort: Pulmonary effort is normal.  "  Abdominal:      General: Bowel sounds are normal. There is no distension.      Palpations: Abdomen is soft.   Musculoskeletal:         General: No swelling.      Cervical back: Normal range of motion and neck supple.   Skin:     General: Skin is warm and dry.   Neurological:      Mental Status: He is easily aroused. Mental status is at baseline. He is disoriented.      GCS: GCS eye subscore is 4. GCS verbal subscore is 4. GCS motor subscore is 6.           Review of Systems   Respiratory:  Negative for cough and shortness of breath.    Cardiovascular:  Negative for chest pain, palpitations and leg swelling.   Gastrointestinal:  Negative for abdominal pain, constipation, diarrhea and nausea.   Neurological:  Negative for dizziness and light-headedness.     Lines/Drains/Airways       Drain  Duration             Male External Urine Management Device w/ Suction 11/24/24 1000 Other (Comment) 1 day              Peripheral Intravenous Line  Duration                  Peripheral IV - Single Lumen 11/23/24 1352 22 G Left;Posterior Hand 2 days         Peripheral IV - Single Lumen 11/24/24 1015 20 G Posterior;Right Forearm 1 day                    Significant Labs:    CBC/Anemia Profile:  Recent Labs   Lab 11/25/24  0543 11/26/24  0321   WBC 5.01 5.02   HGB 12.5* 13.0*   HCT 37.4* 38.7*   * 158   MCV 88 87   RDW 14.3 14.3        Chemistries:  Recent Labs   Lab 11/25/24  0543 11/26/24  0321    139   K 4.3 4.8    106   CO2 26 21*   BUN 29* 27*   CREATININE 1.5* 1.3   CALCIUM 9.0 9.0   MG 2.0 2.1   PHOS 2.5* 3.6       All pertinent labs within the past 24 hours have been reviewed.    Significant Imaging:  I have reviewed all pertinent imaging results/findings within the past 24 hours.    ABG  No results for input(s): "PH", "PO2", "PCO2", "HCO3", "BE" in the last 168 hours.  Assessment/Plan:     Neuro  Dementia  - Patient with dementia with likely etiology of alzheimer's dementia. Dementia is moderate. The " patient does not have signs of behavioral disturbance. Home dementia medications are Held or Continued: held..   - Continue non-pharmacologic interventions to prevent delirium (No VS between 11PM-5AM, activity during day, opening blinds, providing glasses/hearing aids, and up in chair during daytime). Will avoid narcotics and benzos unless absolutely necessary. PRN anti-psychotics are not prescribed to avoid self harm behaviors.  - Not an issue at this time, continue supportive care    Cardiac/Vascular  * Symptomatic bradycardia  - Syncopal episode earlier today  - HR ranging from 20-40s via EMS and on admission   - Hx bradycardia with previous admission requiring TVP; EP felt PPM was not warranted  - Cardiology consulted  - Transcutaneous pads are in place; atropine prn   - Will admit to ICU for close observation  - BP stable, lytes wnl, glucose 184 on arrival  - Will check tsh   - Patient with no complaints at this time  - Plan per cards  - 11/24: HR stable in 40s. Plan for PPM Monday. Atropine prn.   - 11/25: Consistently in 40s-50s, will occasionally drop into 30s while sleeping; Discussed with cards, EP felt PPM not indicated at this time, recommend OP event monitor. Will plan on observation overnight, if stable possibly d/c tomorrow and set to  monitor in clinic.   - 11/26: Patient with stable bradycardia. Per Cards no need for PPM. Will SD to hosp med. Will need cardiac event monitor at discharge.    Chronic diastolic heart failure  - Echo reviewed from October; EF 60% but with intermediate diastolic function  - Cards following  - Monitor fluid status, I&Os, daily weights  - Repeat echo with no changes    PAF (paroxysmal atrial fibrillation)  - Patient has paroxysmal (<7 days) atrial fibrillation. Patient is currently in Junctional bradycardia [id number] Score: 4. The patients heart rate in the last 24 hours is as follows:  Pulse  Min: 38  Max: 54   - Replete lytes with a goal of K>4, Mg >2  - Cards  following  - No PPM, eliquis has been resumed    CAD, multiple vessel  - Patient with known CAD s/p stent placement, which is controlled Will continue ASA and monitor for S/Sx of angina/ACS. Continue to monitor on telemetry.   - Cards following  - Monitor for anginal symptoms  - Trop negative    Essential hypertension  - Patients blood pressure range in the last 24 hours was: BP  Min: 88/55  Max: 172/68.  - Holding home meds at this time  - Avoid any AVN blocking agents  - BP stable    Endocrine  Type 2 diabetes mellitus  - Patient's FSGs are controlled on current medication regimen.  - Last A1c reviewed:  Lab Results   Component Value Date    HGBA1C 6.3 (H) 10/28/2024   - Most recent fingerstick glucose reviewed-   Recent Labs   Lab 11/26/24  0000 11/26/24  0541   POCTGLUCOSE 126* 134*       - Current correctional scale:  Low  - Maintain anti-hyperglycemic dose as follows:  Antihyperglycemics (From admission, onward)      Start     Stop Route Frequency Ordered    11/23/24 1405  insulin aspart U-100 pen 0-5 Units         -- SubQ Every 6 hours PRN 11/23/24 1305        - Hold Oral hypoglycemics while patient is in the hospital.  - Glucose controlled on current regimen      Level III     Critical care was time spent personally by me on the following activities: development of treatment plan with patient or surrogate and bedside caregivers, discussions with consultants, evaluation of patient's response to treatment, examination of patient, ordering and performing treatments and interventions, ordering and review of laboratory studies, ordering and review of radiographic studies, pulse oximetry, re-evaluation of patient's condition. This critical care time did not overlap with that of any other provider or involve time for any procedures.     Savanah Cardneas NP  Critical Care Medicine  [facility] ([facility])

## 2024-11-26 NOTE — SUBJECTIVE & OBJECTIVE
Past Medical History:   Diagnosis Date    Acute blood loss anemia 10/14/2019    Alzheimer's dementia     Aneurysm, abdominal aortic     BCC (basal cell carcinoma of skin) 06/29/2023    Chronic diastolic heart failure 05/20/2024    Coronary artery disease     Depression     Diabetes mellitus     HLD (hyperlipidemia)     Hypertension     Kidney stone     PAD (peripheral artery disease)     PAF (paroxysmal atrial fibrillation) 01/24/2023    Tobacco abuse        Past Surgical History:   Procedure Laterality Date    APPENDECTOMY      CORONARY STENT PLACEMENT      x 4    DV5 ROBOTIC CHOLECYSTECTOMY N/A 10/15/2024    Procedure: DV5 ROBOTIC CHOLECYSTECTOMY;  Surgeon: Fred Taylor MD;  Location: [facility] OR;  Service: General;  Laterality: N/A;    ERCP N/A 10/10/2024    Procedure: ERCP (ENDOSCOPIC RETROGRADE CHOLANGIOPANCREATOGRAPHY);  Surgeon: Morgan Hong MD;  Location: [address]);  Service: Endoscopy;  Laterality: N/A;    ESOPHAGOGASTRODUODENOSCOPY N/A 10/14/2019    Procedure: EGD (ESOPHAGOGASTRODUODENOSCOPY);  Surgeon: Carlos Mcneal III, MD;  Location: [facility];  Service: Endoscopy;  Laterality: N/A;    ESOPHAGOGASTRODUODENOSCOPY N/A 10/15/2019    Procedure: EGD (ESOPHAGOGASTRODUODENOSCOPY);  Surgeon: Carlos Mcneal III, MD;  Location: [facility];  Service: Endoscopy;  Laterality: N/A;    ESOPHAGOGASTRODUODENOSCOPY N/A 4/17/2023    Procedure: EGD (ESOPHAGOGASTRODUODENOSCOPY);  Surgeon: Nevaeh Mcconnell MD;  Location: [facility];  Service: Endoscopy;  Laterality: N/A;    INSERTION, PACEMAKER, TEMPORARY TRANSVENOUS N/A 10/15/2024    Procedure: Insertion, Pacemaker, Temporary Transvenous;  Surgeon: Demarcus Kirk MD;  Location: [facility] CATH LAB;  Service: Cardiology;  Laterality: N/A;    LEFT HEART CATHETERIZATION Left 10/11/2019    Procedure: CATHETERIZATION, HEART, LEFT;  Surgeon: Robe Gilbert MD;  Location: [facility] CATH LAB;  Service: Cardiology;  Laterality: Left;    LEFT HEART CATHETERIZATION Left  10/13/2019    Procedure: CATHETERIZATION, HEART, LEFT;  Surgeon: Robe Gilbert MD;  Location: [facility] CATH LAB;  Service: Cardiology;  Laterality: Left;    leg stent Left        Review of patient's allergies indicates:   Allergen Reactions    Metoprolol Other (See Comments)     Junctional rhythm         No current facility-administered medications on file prior to encounter.     Current Outpatient Medications on File Prior to Encounter   Medication Sig    alcohol swabs (ALCOHOL PREP PADS) PadM Twice a day    aspirin (ECOTRIN) 81 MG EC tablet Take 1 tablet (81 mg total) by mouth once daily.    blood sugar diagnostic Strp Test blood sugars twice a day    ELIQUIS 2.5 mg Tab Take 2.5 mg by mouth 2 (two) times daily.    gabapentin (NEURONTIN) 100 MG capsule Take 1 capsule (100 mg total) by mouth 2 (two) times daily as needed. PRN    isosorbide mononitrate (IMDUR) 120 MG 24 hr tablet TAKE 1 TABLET BY MOUTH EVERY DAY    lancets (ACCU-CHEK SOFTCLIX LANCETS) Misc Test blood sugars twice a day    memantine (NAMENDA) 10 MG Tab Take 1 tablet (10 mg total) by mouth 2 (two) times daily.    metFORMIN (GLUCOPHAGE) 500 MG tablet TAKE 1 TABLET BY MOUTH EVERY DAY WITH BREAKFAST    pantoprazole (PROTONIX) 40 MG tablet Take 1 tablet (40 mg total) by mouth once daily.    valsartan (DIOVAN) 160 MG tablet Take 1 tablet (160 mg total) by mouth once daily.     Family History       Problem Relation (Age of Onset)    COPD Father    Diabetes Mother, Brother          Tobacco Use    Smoking status: Former     Types: Cigars     Passive exposure: Never    Smokeless tobacco: Current   Substance and Sexual Activity    Alcohol use: Not Currently    Drug use: Not Currently    Sexual activity: Not Currently     Partners: Female     Review of Systems   Unable to perform ROS: Dementia     Objective:     Vital Signs (Most Recent):  Temp: 97.7 °F (36.5 °C) (11/26/24 1200)  Pulse: (!) 57 (11/26/24 1200)  Resp: (!) 21 (11/26/24 1200)  BP: (!) 125/59 (11/26/24  1200)  SpO2: 100 % (11/26/24 1200) Vital Signs (24h Range):  Temp:  [97.5 °F (36.4 °C)-98.7 °F (37.1 °C)] 97.7 °F (36.5 °C)  Pulse:  [38-57] 57  Resp:  [13-38] 21  SpO2:  [90 %-100 %] 100 %  BP: ()/(53-83) 125/59     Weight: 61.2 kg (135 lb)  Body mass index is 19.37 kg/m².     Physical Exam  Vitals and nursing note reviewed.   Constitutional:       General: He is awake. He is not in acute distress.     Appearance: He is ill-appearing. He is not toxic-appearing.   HENT:      Head: Normocephalic and atraumatic.   Cardiovascular:      Rate and Rhythm: Bradycardia present.   Pulmonary:      Effort: Pulmonary effort is normal. No respiratory distress.   Abdominal:      Palpations: Abdomen is soft.      Tenderness: There is no abdominal tenderness.   Musculoskeletal:      Right lower leg: No edema.      Left lower leg: No edema.   Skin:     General: Skin is warm.   Neurological:      Mental Status: Mental status is at baseline. He is disoriented.      Motor: Weakness present.      Comments: Does not follow commands          Significant Labs: All pertinent labs within the past 24 hours have been reviewed.  CBC:   Recent Labs   Lab 11/25/24  0543 11/26/24  0321   WBC 5.01 5.02   HGB 12.5* 13.0*   HCT 37.4* 38.7*   * 158     CMP:   Recent Labs   Lab 11/25/24  0543 11/26/24  0321    139   K 4.3 4.8    106   CO2 26 21*   * 136*   BUN 29* 27*   CREATININE 1.5* 1.3   CALCIUM 9.0 9.0   ANIONGAP 8 12       Magnesium:   Recent Labs   Lab 11/25/24  0543 11/26/24  0321   MG 2.0 2.1     POCT Glucose:   Recent Labs   Lab 11/26/24  0000 11/26/24  0541 11/26/24  1137   POCTGLUCOSE 126* 134* 167*       TSH:   Recent Labs   Lab 11/23/24  1034   TSH 0.728     Urine Studies: trace blood, trace rbc,trace glucose    Significant Imaging: I have reviewed all pertinent imaging results/findings within the past 24 hours.  CT: I have reviewed all pertinent results/findings within the past 24 hours and my personal  findings are:  head without contrast-no definite abnormality, moderate encephalomalacia, remote lacunar infarcts  CXR: I have reviewed all pertinent results/findings within the past 24 hours and my personal findings are:  no acute abnormality   Echo: I have reviewed all pertinent results/findings within the past 24 hours and my personal findings are:  preserved ejection fraction

## 2024-11-26 NOTE — ASSESSMENT & PLAN NOTE
- Patient has paroxysmal (<7 days) atrial fibrillation. Patient is currently in Junctional bradycardia [id number] Score: 4. The patients heart rate in the last 24 hours is as follows:  Pulse  Min: 38  Max: 54   - Replete lytes with a goal of K>4, Mg >2  - Cards following  - No PPM, eliquis has been resumed

## 2024-11-26 NOTE — ASSESSMENT & PLAN NOTE
- Syncopal episode earlier today  - HR ranging from 20-40s via EMS and on admission   - Hx bradycardia with previous admission requiring TVP; EP felt PPM was not warranted  - Cardiology consulted  - Transcutaneous pads are in place; atropine prn   - Will admit to ICU for close observation  - BP stable, lytes wnl, glucose 184 on arrival  - Will check tsh   - Patient with no complaints at this time  - Plan per cards  - 11/24: HR stable in 40s. Plan for PPM Monday. Atropine prn.   - 11/25: Consistently in 40s-50s, will occasionally drop into 30s while sleeping; Discussed with cards, EP felt PPM not indicated at this time, recommend OP event monitor. Will plan on observation overnight, if stable possibly d/c tomorrow and set to  monitor in clinic.   - 11/26: Patient with stable bradycardia. Per Cards no need for PPM. Will SD to hosp med. Will need cardiac event monitor at discharge.

## 2024-11-26 NOTE — ASSESSMENT & PLAN NOTE
- Patients blood pressure range in the last 24 hours was: BP  Min: 88/55  Max: 172/68.  - Holding home meds at this time  - Avoid any AVN blocking agents  - BP stable

## 2024-11-26 NOTE — ASSESSMENT & PLAN NOTE
History of bradycardia with atrial fibrillation in 40s-50s  Upon contact with EMS, heart rates 20s to 40s.  Now stable in the 40s  Required TVP recently prior to ERCP  No hypotension   Transcutaneous pacer pads applied   Atropine at bedside to use as needed  Monitor in ICU  Repeat echo with normal EF  Will plan for permanent pacemaker on Monday 11/25/2024   Discussed with Interventional Cardiology  Continue to hold Eliquis, no bridging needed    11/25/24  -HR in 30's during sleep, 40's-50's when awake  -No significant pauses  -Reviewed cause with EP, no clear indication for PPM although patient is certainly at risk, OP event monitor recommended  -Resume Eliquis     11/26/24  -Stable, HR 40-50's  -OP event monitor planned

## 2024-11-26 NOTE — ASSESSMENT & PLAN NOTE
- Patient with known CAD s/p stent placement, which is controlled Will continue ASA and monitor for S/Sx of angina/ACS. Continue to monitor on telemetry.   - Cards following  - Monitor for anginal symptoms  - Trop negative

## 2024-11-26 NOTE — ASSESSMENT & PLAN NOTE
Patient has paroxysmal (<7 days) atrial fibrillation. Patient is currently in  sinus sarita . [id number] Score: 4. The patients heart rate in the last 24 hours is as follows:  Pulse  Min: 38  Max: 57     Antiarrhythmics       Anticoagulants  apixaban tablet 2.5 mg, 2 times daily, Oral    Plan  - Replete lytes with a goal of K>4, Mg >2  - Patient is anticoagulated, see medications listed above.  - Patient's afib is currently controlled  - stable

## 2024-11-26 NOTE — HPI
84M  has a past medical history of Acute blood loss anemia, Alzheimer's dementia, Aneurysm, abdominal aortic, BCC (basal cell carcinoma of skin), Chronic diastolic heart failure, Coronary artery disease, Depression, Diabetes mellitus, HLD (hyperlipidemia), Hypertension, Kidney stone, PAD (peripheral artery disease), PAF (paroxysmal atrial fibrillation), and Tobacco abuse.  Admitted to icu for bradycardia with neurological changes per family. Cardiology consulted and did not recommend ppm at this time. Advised to follow up outpatient. Previously hospitalized and underwent cholecystectomy requiring transcutaneous pacing to undergo surgery.     Initial evaluation of chart reveals mild bradycardia, normotensive. On room air. Mild anemia on cbc. Stable cmp. Ct head without contrast with no definite acute abnormality. Chest x-ray with no acute abnormality. Echocardiogram with preserved ejection fraction, indeterminate diastolic function.    Hospital medicine consulted for assumption of care.

## 2024-11-26 NOTE — ASSESSMENT & PLAN NOTE
Patients blood pressure range in the last 24 hours was: BP  Min: 88/55  Max: 172/68.The patient's inpatient anti-hypertensive regimen is listed below:  Current Antihypertensives  isosorbide mononitrate 24 hr tablet 60 mg, Daily, Oral    Plan  - BP is controlled, no changes needed to their regimen  - relax normotensive blood pressure due to remote infarcts and risk for falls in this demographic

## 2024-11-26 NOTE — ASSESSMENT & PLAN NOTE
Patient with known CAD s/p stent placement and CABG, which is controlled Will continue  eliquis  and monitor for S/Sx of angina/ACS. Continue to monitor on telemetry.   Given age, dementia, falls, not currently on aspirin, plavix or statin

## 2024-11-26 NOTE — ASSESSMENT & PLAN NOTE
Patient's FSGs are controlled on current medication regimen.  Last A1c reviewed-   Lab Results   Component Value Date    HGBA1C 6.3 (H) 10/28/2024     Most recent fingerstick glucose reviewed-   Recent Labs   Lab 11/26/24  0000 11/26/24  0541 11/26/24  1137   POCTGLUCOSE 126* 134* 167*     Current correctional scale  Low  Maintain anti-hyperglycemic dose as follows-   Antihyperglycemics (From admission, onward)      Start     Stop Route Frequency Ordered    11/23/24 1405  insulin aspart U-100 pen 0-5 Units         -- SubQ Every 6 hours PRN 11/23/24 1305          Hold Oral hypoglycemics while patient is in the hospital.

## 2024-11-26 NOTE — ASSESSMENT & PLAN NOTE
Neurological symptoms per family  Initially with transcutaneous pacing  Cardiology recommending outpatient follow up with holter monitor

## 2024-11-26 NOTE — SUBJECTIVE & OBJECTIVE
Review of Systems   Constitutional: Positive for malaise/fatigue.   HENT: Negative.     Eyes: Negative.    Cardiovascular: Negative.    Respiratory: Negative.     Endocrine: Negative.    Hematologic/Lymphatic: Negative.    Skin: Negative.    Musculoskeletal:  Positive for arthritis and joint pain.   Gastrointestinal: Negative.    Genitourinary: Negative.    Neurological:  Positive for weakness.   Psychiatric/Behavioral: Negative.     Allergic/Immunologic: Negative.      Objective:     Vital Signs (Most Recent):  Temp: 97.7 °F (36.5 °C) (11/26/24 1200)  Pulse: (!) 55 (11/26/24 1300)  Resp: 14 (11/26/24 1300)  BP: (!) 108/59 (11/26/24 1300)  SpO2: 100 % (11/26/24 1300) Vital Signs (24h Range):  Temp:  [97.5 °F (36.4 °C)-98.7 °F (37.1 °C)] 97.7 °F (36.5 °C)  Pulse:  [38-57] 55  Resp:  [13-38] 14  SpO2:  [90 %-100 %] 100 %  BP: ()/(53-80) 108/59     Weight: 61.2 kg (135 lb)  Body mass index is 19.37 kg/m².     SpO2: 100 %         Intake/Output Summary (Last 24 hours) at 11/26/2024 1310  Last data filed at 11/26/2024 1200  Gross per 24 hour   Intake 1110 ml   Output 1850 ml   Net -740 ml       Lines/Drains/Airways       Drain  Duration             Male External Urine Management Device w/ Suction 11/24/24 1000 Other (Comment) 2 days              Peripheral Intravenous Line  Duration                  Peripheral IV - Single Lumen 11/23/24 1352 22 G Left;Posterior Hand 2 days         Peripheral IV - Single Lumen 11/24/24 1015 20 G Posterior;Right Forearm 2 days                       Physical Exam  Vitals and nursing note reviewed.   Constitutional:       Appearance: Normal appearance.   HENT:      Head: Normocephalic and atraumatic.   Eyes:      Pupils: Pupils are equal, round, and reactive to light.   Cardiovascular:      Rate and Rhythm: Bradycardia present. Rhythm irregularly irregular.      Heart sounds: S1 normal and S2 normal. No murmur heard.  Abdominal:      Palpations: Abdomen is soft.   Musculoskeletal:  "     Right lower leg: No edema.      Left lower leg: No edema.   Neurological:      Comments: Awake, alert, oriented to person   Psychiatric:         Mood and Affect: Mood normal.            Significant Labs: CMP   Recent Labs   Lab 11/25/24  0543 11/26/24  0321    139   K 4.3 4.8    106   CO2 26 21*   * 136*   BUN 29* 27*   CREATININE 1.5* 1.3   CALCIUM 9.0 9.0   ANIONGAP 8 12   , CBC   Recent Labs   Lab 11/25/24  0543 11/26/24  0321   WBC 5.01 5.02   HGB 12.5* 13.0*   HCT 37.4* 38.7*   * 158   , Troponin No results for input(s): "TROPONINI" in the last 48 hours., and All pertinent lab results from the last 24 hours have been reviewed.    Significant Imaging: Echocardiogram: Transthoracic echo (TTE) complete (Cupid Only):   Results for orders placed or performed during the hospital encounter of 11/23/24   Echo   Result Value Ref Range    BSA 1.74 m2    LVIDd 4.3 3.5 - 6.0 cm    LV Systolic Volume 35.02 mL    LV Systolic Volume Index 19.8 mL/m2    LVIDs 3.0 2.1 - 4.0 cm    LV Diastolic Volume 83.71 mL    LV Diastolic Volume Index 47.29 mL/m2    Left Ventricular End Systolic Volume by Teichholz Method 35.02 mL    Left Ventricular End Diastolic Volume by Teichholz Method 83.71 mL    IVS 1.5 (A) 0.6 - 1.1 cm    FS 30.2 28 - 44 %    Left Ventricle Relative Wall Thickness 0.65 cm    PW 1.4 (A) 0.6 - 1.1 cm    LV mass 245.0 g    LV Mass Index 138.4 g/m2    MV Peak E Americo 0.82 m/s    TDI LATERAL 0.11 m/s    TDI SEPTAL 0.05 m/s    E/E' ratio 10.25 m/s    MV Peak A Americo 0.76 m/s    TR Max Americo 2.24 m/s    E/A ratio 1.08     IVRT 95.15 msec    E wave deceleration time 237.40 msec    LV SEPTAL E/E' RATIO 16.40 m/s    LV LATERAL E/E' RATIO 7.45 m/s    LVOT peak americo 0.8 m/s    Left Ventricular Outflow Tract Mean Velocity 0.65 cm/s    Left Ventricular Outflow Tract Mean Gradient 1.76 mmHg    TAPSE 1.92 cm    LA size 4.34 cm    Left Atrium Minor Axis 5.22 cm    Left Atrium Major Axis 5.05 cm    RA Major Axis " 4.04 cm    AV regurgitation pressure 1/2 time 1,020.623731862623649 ms    AR Max Americo 3.09 m/s    AV mean gradient 5.6 mmHg    AV peak gradient 10.2 mmHg    Ao peak americo 1.6 m/s    Ao VTI 36.8 cm    LVOT peak VTI 18.8 cm    AV Velocity Ratio 0.50     AV index (prosthetic) 0.51     Mr max americo 3.88 m/s    MV stenosis pressure 1/2 time 68.85 ms    MV valve area p 1/2 method 3.20 cm2    Triscuspid Valve Regurgitation Peak Gradient 20 mmHg    Ao root annulus 3.60 cm    Mean e' 0.08 m/s    ZLVIDS -0.07     ZLVIDD -1.29     LURDES 37.4 mL/m2    LA Vol 66.28 cm3    LA WIDTH 3.5 cm    RA Width 2.4 cm    TV resting pulmonary artery pressure 23 mmHg    RV TB RVSP 5 mmHg    Est. RA pres 3 mmHg    Narrative      Left Ventricle: The left ventricle is normal in size. Normal wall   thickness. There is concentric hypertrophy. Normal wall motion. There is   normal systolic function with a visually estimated ejection fraction of 55   - 60%. There is indeterminate diastolic function.    Right Ventricle: Systolic function is normal.    Left Atrium: Left atrium is mildly dilated.    Pulmonary Artery: The estimated pulmonary artery systolic pressure is   23 mmHg.    IVC/SVC: Normal venous pressure at 3 mmHg.     , EKG: Reviewed, and X-Ray: CXR: X-Ray Chest PA and Lateral (CXR): No results found for this visit on 11/23/24.

## 2024-11-26 NOTE — PLAN OF CARE
Problem: Adult Inpatient Plan of Care  Goal: Plan of Care Review  Outcome:  Error  Goal: Patient-Specific Goal (Individualized)  Outcome:  Error  Goal: Absence of Hospital-Acquired Illness or Injury  Outcome:  Error  Goal: Optimal Comfort and Wellbeing  Outcome:  Error  Goal: Readiness for Transition of Care  Outcome:  Error     Problem: Diabetes Comorbidity  Goal: Blood Glucose Level Within Targeted Range  Outcome:  Error     Problem: Fall Injury Risk  Goal: Absence of Fall and Fall-Related Injury  Outcome:  Error     Problem: Skin Injury Risk Increased  Goal: Skin Health and Integrity  Outcome:  Error

## 2024-11-26 NOTE — SUBJECTIVE & OBJECTIVE
Interval History: Patient drowsy, disoriented to situation. Denies any dizziness, lightheadedness, pain.      Objective:     Vital Signs (Most Recent):  Temp: 98.4 °F (36.9 °C) (11/26/24 0400)  Pulse: (!) 42 (11/26/24 0800)  Resp: (!) 27 (11/26/24 0800)  BP: (!) 157/70 (11/26/24 0800)  SpO2: (!) 90 % (11/26/24 0800) Vital Signs (24h Range):  Temp:  [98.1 °F (36.7 °C)-98.7 °F (37.1 °C)] 98.4 °F (36.9 °C)  Pulse:  [38-54] 42  Resp:  [13-32] 27  SpO2:  [90 %-100 %] 90 %  BP: ()/(53-83) 157/70     Weight: 61.2 kg (135 lb)  Body mass index is 19.37 kg/m².      Intake/Output Summary (Last 24 hours) at 11/26/2024 0849  Last data filed at 11/26/2024 0800  Gross per 24 hour   Intake 860 ml   Output 2150 ml   Net -1290 ml        Physical Exam  Constitutional:       General: He is sleeping.      Appearance: He is underweight. He is ill-appearing.   HENT:      Head: Normocephalic and atraumatic.      Nose: Nose normal.      Mouth/Throat:      Mouth: Mucous membranes are moist.   Eyes:      Pupils: Pupils are equal, round, and reactive to light.   Cardiovascular:      Rate and Rhythm: Regular rhythm. Bradycardia present.      Pulses: Normal pulses.      Heart sounds: Normal heart sounds.   Pulmonary:      Effort: Pulmonary effort is normal.   Abdominal:      General: Bowel sounds are normal. There is no distension.      Palpations: Abdomen is soft.   Musculoskeletal:         General: No swelling.      Cervical back: Normal range of motion and neck supple.   Skin:     General: Skin is warm and dry.   Neurological:      Mental Status: He is easily aroused. Mental status is at baseline. He is disoriented.      GCS: GCS eye subscore is 4. GCS verbal subscore is 4. GCS motor subscore is 6.           Review of Systems   Respiratory:  Negative for cough and shortness of breath.    Cardiovascular:  Negative for chest pain, palpitations and leg swelling.   Gastrointestinal:  Negative for abdominal pain, constipation, diarrhea and  nausea.   Neurological:  Negative for dizziness and light-headedness.     Lines/Drains/Airways       Drain  Duration             Male External Urine Management Device w/ Suction 11/24/24 1000 Other (Comment) 1 day              Peripheral Intravenous Line  Duration                  Peripheral IV - Single Lumen 11/23/24 1352 22 G Left;Posterior Hand 2 days         Peripheral IV - Single Lumen 11/24/24 1015 20 G Posterior;Right Forearm 1 day                    Significant Labs:    CBC/Anemia Profile:  Recent Labs   Lab 11/25/24  0543 11/26/24  0321   WBC 5.01 5.02   HGB 12.5* 13.0*   HCT 37.4* 38.7*   * 158   MCV 88 87   RDW 14.3 14.3        Chemistries:  Recent Labs   Lab 11/25/24  0543 11/26/24  0321    139   K 4.3 4.8    106   CO2 26 21*   BUN 29* 27*   CREATININE 1.5* 1.3   CALCIUM 9.0 9.0   MG 2.0 2.1   PHOS 2.5* 3.6       All pertinent labs within the past 24 hours have been reviewed.    Significant Imaging:  I have reviewed all pertinent imaging results/findings within the past 24 hours.

## 2024-11-26 NOTE — PROGRESS NOTES
[facility] ([facility])  Cardiology  Progress Note    Patient Name: Aquiles Yates  MRN: [id number]  Admission Date: 11/23/2024  Hospital Length of Stay: 3 days  Code Status: Full Code   Attending Physician: Harpreet Tillman MD   Primary Care Physician: Holger Thornton MD  Expected Discharge Date:   Principal Problem:Symptomatic bradycardia    Subjective:   HPI:  Aquiles Yates is a 84 y.o. male patient with a PMHx of CAD, diastolic HF, HTN, HLD, Afib on eliquis, DM Type 2, PVD, Alzheimer's disease who presents to the ED earlier today for evaluation of generalized weakness and syncopal episode at home which onset gradually suddenly PTA.  Per wife, patient did not lose consciousness, per EMS, patient had a brief loss of consciousness.  Pt states he is unsure what happened but was oriented to person and place when asked. Per wife, pt was getting up from the toilet in the bathroom and was uneasy/unbalanced on his feet and had disarticulated speech. Pt's wife states pt has been feeling unbalanced for 2-3 days now. Pt's wife states these sxs worried her about a potential stroke, as these sxs are similar when he had a previous stroke. Pt's wife notes pt was given pills for constipation yesterday. Pt's wife also notes pt was experiencing hallucinations yesterday as well. Pt's wife states she called EMS, where the heart rate was ranging 20-40s. Pt denies any CP, SOB, fever, or chills. Pt normally ambulates around with a walker, per wife. Pt was in a-fib. . Troponin 0.014.     ECHO    Left Ventricle: The left ventricle is normal in size. Normal wall thickness. There is concentric hypertrophy. Normal wall motion. There is normal systolic function with a visually estimated ejection fraction of 55 - 60%. There is indeterminate diastolic function.    Right Ventricle: Systolic function is normal.    Left Atrium: Left atrium is mildly dilated.    Pulmonary Artery: The estimated pulmonary  artery systolic pressure is 23 mmHg.    IVC/SVC: Normal venous pressure at 3 mmHg.     EKG  Atrial fibrillation, Premature ventricular complexes  Inferior infarct ,age undetermined  T wave abnormality, consider lateral ischemia     Hospital Course:   11/25/24-Patient seen and examined today, resting in bed. HR dips to 30's during sleep, no significant pauses noted. HR in 40's-50's when awake. Reviewed case with EP, no clear indication for PPM at present time though patient is certainly at risk, will need OP event monitor. Labs reviewed.     11/26/24-Patient seen and examined today with wife present, resting in bed. Awake, alert, wants to go home. No AEON. HR in 40-50's during exam. No near syncope or syncope. Labs reviewed. OP event monitor arranged.         Review of Systems   Constitutional: Positive for malaise/fatigue.   HENT: Negative.     Eyes: Negative.    Cardiovascular: Negative.    Respiratory: Negative.     Endocrine: Negative.    Hematologic/Lymphatic: Negative.    Skin: Negative.    Musculoskeletal:  Positive for arthritis and joint pain.   Gastrointestinal: Negative.    Genitourinary: Negative.    Neurological:  Positive for weakness.   Psychiatric/Behavioral: Negative.     Allergic/Immunologic: Negative.      Objective:     Vital Signs (Most Recent):  Temp: 97.7 °F (36.5 °C) (11/26/24 1200)  Pulse: (!) 55 (11/26/24 1300)  Resp: 14 (11/26/24 1300)  BP: (!) 108/59 (11/26/24 1300)  SpO2: 100 % (11/26/24 1300) Vital Signs (24h Range):  Temp:  [97.5 °F (36.4 °C)-98.7 °F (37.1 °C)] 97.7 °F (36.5 °C)  Pulse:  [38-57] 55  Resp:  [13-38] 14  SpO2:  [90 %-100 %] 100 %  BP: ()/(53-80) 108/59     Weight: 61.2 kg (135 lb)  Body mass index is 19.37 kg/m².     SpO2: 100 %         Intake/Output Summary (Last 24 hours) at 11/26/2024 1310  Last data filed at 11/26/2024 1200  Gross per 24 hour   Intake 1110 ml   Output 1850 ml   Net -740 ml       Lines/Drains/Airways       Drain  Duration             Male External  "Urine Management Device w/ Suction 11/24/24 1000 Other (Comment) 2 days              Peripheral Intravenous Line  Duration                  Peripheral IV - Single Lumen 11/23/24 1352 22 G Left;Posterior Hand 2 days         Peripheral IV - Single Lumen 11/24/24 1015 20 G Posterior;Right Forearm 2 days                       Physical Exam  Vitals and nursing note reviewed.   Constitutional:       Appearance: Normal appearance.   HENT:      Head: Normocephalic and atraumatic.   Eyes:      Pupils: Pupils are equal, round, and reactive to light.   Cardiovascular:      Rate and Rhythm: Bradycardia present. Rhythm irregularly irregular.      Heart sounds: S1 normal and S2 normal. No murmur heard.  Abdominal:      Palpations: Abdomen is soft.   Musculoskeletal:      Right lower leg: No edema.      Left lower leg: No edema.   Neurological:      Comments: Awake, alert, oriented to person   Psychiatric:         Mood and Affect: Mood normal.            Significant Labs: CMP   Recent Labs   Lab 11/25/24  0543 11/26/24  0321    139   K 4.3 4.8    106   CO2 26 21*   * 136*   BUN 29* 27*   CREATININE 1.5* 1.3   CALCIUM 9.0 9.0   ANIONGAP 8 12   , CBC   Recent Labs   Lab 11/25/24  0543 11/26/24  0321   WBC 5.01 5.02   HGB 12.5* 13.0*   HCT 37.4* 38.7*   * 158   , Troponin No results for input(s): "TROPONINI" in the last 48 hours., and All pertinent lab results from the last 24 hours have been reviewed.    Significant Imaging: Echocardiogram: Transthoracic echo (TTE) complete (Cupid Only):   Results for orders placed or performed during the hospital encounter of 11/23/24   Echo   Result Value Ref Range    BSA 1.74 m2    LVIDd 4.3 3.5 - 6.0 cm    LV Systolic Volume 35.02 mL    LV Systolic Volume Index 19.8 mL/m2    LVIDs 3.0 2.1 - 4.0 cm    LV Diastolic Volume 83.71 mL    LV Diastolic Volume Index 47.29 mL/m2    Left Ventricular End Systolic Volume by Teichholz Method 35.02 mL    Left Ventricular End Diastolic " Volume by Teichholz Method 83.71 mL    IVS 1.5 (A) 0.6 - 1.1 cm    FS 30.2 28 - 44 %    Left Ventricle Relative Wall Thickness 0.65 cm    PW 1.4 (A) 0.6 - 1.1 cm    LV mass 245.0 g    LV Mass Index 138.4 g/m2    MV Peak E Americo 0.82 m/s    TDI LATERAL 0.11 m/s    TDI SEPTAL 0.05 m/s    E/E' ratio 10.25 m/s    MV Peak A Americo 0.76 m/s    TR Max Americo 2.24 m/s    E/A ratio 1.08     IVRT 95.15 msec    E wave deceleration time 237.40 msec    LV SEPTAL E/E' RATIO 16.40 m/s    LV LATERAL E/E' RATIO 7.45 m/s    LVOT peak americo 0.8 m/s    Left Ventricular Outflow Tract Mean Velocity 0.65 cm/s    Left Ventricular Outflow Tract Mean Gradient 1.76 mmHg    TAPSE 1.92 cm    LA size 4.34 cm    Left Atrium Minor Axis 5.22 cm    Left Atrium Major Axis 5.05 cm    RA Major Axis 4.04 cm    AV regurgitation pressure 1/2 time 1,020.370396287240974 ms    AR Max Americo 3.09 m/s    AV mean gradient 5.6 mmHg    AV peak gradient 10.2 mmHg    Ao peak americo 1.6 m/s    Ao VTI 36.8 cm    LVOT peak VTI 18.8 cm    AV Velocity Ratio 0.50     AV index (prosthetic) 0.51     Mr max americo 3.88 m/s    MV stenosis pressure 1/2 time 68.85 ms    MV valve area p 1/2 method 3.20 cm2    Triscuspid Valve Regurgitation Peak Gradient 20 mmHg    Ao root annulus 3.60 cm    Mean e' 0.08 m/s    ZLVIDS -0.07     ZLVIDD -1.29     LURDES 37.4 mL/m2    LA Vol 66.28 cm3    LA WIDTH 3.5 cm    RA Width 2.4 cm    TV resting pulmonary artery pressure 23 mmHg    RV TB RVSP 5 mmHg    Est. RA pres 3 mmHg    Narrative      Left Ventricle: The left ventricle is normal in size. Normal wall   thickness. There is concentric hypertrophy. Normal wall motion. There is   normal systolic function with a visually estimated ejection fraction of 55   - 60%. There is indeterminate diastolic function.    Right Ventricle: Systolic function is normal.    Left Atrium: Left atrium is mildly dilated.    Pulmonary Artery: The estimated pulmonary artery systolic pressure is   23 mmHg.    IVC/SVC: Normal venous  pressure at 3 mmHg.     , EKG: Reviewed, and X-Ray: CXR: X-Ray Chest PA and Lateral (CXR): No results found for this visit on 11/23/24.  Assessment and Plan:   Patient stable CV wise. HR 40-50's. Event monitor arranged. F/u OP. Case reviewed with EP.    * Symptomatic bradycardia  History of bradycardia with atrial fibrillation in 40s-50s  Upon contact with EMS, heart rates 20s to 40s.  Now stable in the 40s  Required TVP recently prior to ERCP  No hypotension   Transcutaneous pacer pads applied   Atropine at bedside to use as needed  Monitor in ICU  Repeat echo with normal EF  Will plan for permanent pacemaker on Monday 11/25/2024   Discussed with Interventional Cardiology  Continue to hold Eliquis, no bridging needed    11/25/24  -HR in 30's during sleep, 40's-50's when awake  -No significant pauses  -Reviewed cause with EP, no clear indication for PPM although patient is certainly at risk, OP event monitor recommended  -Resume Eliquis     11/26/24  -Stable, HR 40-50's  -OP event monitor planned    Type 2 diabetes mellitus  Management per ICU/Hospital Medicine    Chronic diastolic heart failure  Currently compensated   Continue home medications    Dementia  -Per primary team    PAF (paroxysmal atrial fibrillation)  Slow ventricular response, currently in AFib   Holding Eliquis for PPM  No history of BB or CCB at home    11/25/24  -Reviewed with EP, no clear indication for PPM at present time  -Resume Eliquis    CAD, multiple vessel  Stable   Denies chest pain   Negative troponin    Essential hypertension  -Elevated on admission   -Titrate medications           VTE Risk Mitigation (From admission, onward)           Ordered     apixaban tablet 2.5 mg  2 times daily         11/25/24 1105     IP VTE HIGH RISK PATIENT  Once         11/23/24 1233     Place sequential compression device  Until discontinued         11/23/24 1233                    Samreen Arzate PA-C  Cardiology  [facility] ([facility])

## 2024-11-27 ENCOUNTER — HOSPITAL ENCOUNTER (INPATIENT)
Facility: HOSPITAL | Age: 84
LOS: 3 days | Discharge: HOME | DRG: 229 | End: 2024-11-30
Attending: INTERNAL MEDICINE | Admitting: INTERNAL MEDICINE
Payer: MEDICARE

## 2024-11-27 VITALS
HEART RATE: 59 BPM | OXYGEN SATURATION: 100 % | WEIGHT: 135 LBS | BODY MASS INDEX: 19.33 KG/M2 | HEIGHT: 70 IN | RESPIRATION RATE: 26 BRPM | DIASTOLIC BLOOD PRESSURE: 54 MMHG | SYSTOLIC BLOOD PRESSURE: 119 MMHG | TEMPERATURE: 98 F

## 2024-11-27 DIAGNOSIS — R00.1 BRADYCARDIA: ICD-10-CM

## 2024-11-27 DIAGNOSIS — Z95.0 PACEMAKER: ICD-10-CM

## 2024-11-27 DIAGNOSIS — R07.9 CHEST PAIN: ICD-10-CM

## 2024-11-27 DIAGNOSIS — I48.91 ATRIAL FIBRILLATION WITH SLOW VENTRICULAR RESPONSE: Primary | ICD-10-CM

## 2024-11-27 LAB
ANION GAP SERPL CALC-SCNC: 10 MMOL/L (ref 8–16)
ANION GAP SERPL CALC-SCNC: 11 MMOL/L (ref 8–16)
ANION GAP SERPL CALC-SCNC: 9 MMOL/L (ref 8–16)
BASOPHILS # BLD AUTO: 0.05 K/UL (ref 0–0.2)
BASOPHILS NFR BLD: 0.8 % (ref 0–1.9)
BUN SERPL-MCNC: 30 MG/DL (ref 8–23)
BUN SERPL-MCNC: 32 MG/DL (ref 8–23)
BUN SERPL-MCNC: 33 MG/DL (ref 8–23)
CALCIUM SERPL-MCNC: 8.7 MG/DL (ref 8.7–10.5)
CALCIUM SERPL-MCNC: 9.2 MG/DL (ref 8.7–10.5)
CALCIUM SERPL-MCNC: 9.3 MG/DL (ref 8.7–10.5)
CHLORIDE SERPL-SCNC: 102 MMOL/L (ref 95–110)
CHLORIDE SERPL-SCNC: 102 MMOL/L (ref 95–110)
CHLORIDE SERPL-SCNC: 109 MMOL/L (ref 95–110)
CO2 SERPL-SCNC: 18 MMOL/L (ref 23–29)
CO2 SERPL-SCNC: 24 MMOL/L (ref 23–29)
CO2 SERPL-SCNC: 26 MMOL/L (ref 23–29)
CREAT SERPL-MCNC: 1.6 MG/DL (ref 0.5–1.4)
CREAT SERPL-MCNC: 1.6 MG/DL (ref 0.5–1.4)
CREAT SERPL-MCNC: 1.7 MG/DL (ref 0.5–1.4)
DIFFERENTIAL METHOD BLD: ABNORMAL
EOSINOPHIL # BLD AUTO: 0.5 K/UL (ref 0–0.5)
EOSINOPHIL NFR BLD: 7.5 % (ref 0–8)
ERYTHROCYTE [DISTWIDTH] IN BLOOD BY AUTOMATED COUNT: 14.2 % (ref 11.5–14.5)
EST. GFR  (NO RACE VARIABLE): 39 ML/MIN/1.73 M^2
EST. GFR  (NO RACE VARIABLE): 42 ML/MIN/1.73 M^2
EST. GFR  (NO RACE VARIABLE): 42 ML/MIN/1.73 M^2
GLUCOSE SERPL-MCNC: 135 MG/DL (ref 70–110)
GLUCOSE SERPL-MCNC: 239 MG/DL (ref 70–110)
GLUCOSE SERPL-MCNC: 343 MG/DL (ref 70–110)
HCT VFR BLD AUTO: 38.2 % (ref 40–54)
HGB BLD-MCNC: 12.6 G/DL (ref 14–18)
IMM GRANULOCYTES # BLD AUTO: 0.02 K/UL (ref 0–0.04)
IMM GRANULOCYTES NFR BLD AUTO: 0.3 % (ref 0–0.5)
LYMPHOCYTES # BLD AUTO: 1.2 K/UL (ref 1–4.8)
LYMPHOCYTES NFR BLD: 19.5 % (ref 18–48)
MAGNESIUM SERPL-MCNC: 2.1 MG/DL (ref 1.6–2.6)
MCH RBC QN AUTO: 29.4 PG (ref 27–31)
MCHC RBC AUTO-ENTMCNC: 33 G/DL (ref 32–36)
MCV RBC AUTO: 89 FL (ref 82–98)
MONOCYTES # BLD AUTO: 0.6 K/UL (ref 0.3–1)
MONOCYTES NFR BLD: 9.2 % (ref 4–15)
NEUTROPHILS # BLD AUTO: 3.8 K/UL (ref 1.8–7.7)
NEUTROPHILS NFR BLD: 62.7 % (ref 38–73)
NRBC BLD-RTO: 0 /100 WBC
PHOSPHATE SERPL-MCNC: 3 MG/DL (ref 2.7–4.5)
PLATELET # BLD AUTO: 148 K/UL (ref 150–450)
PMV BLD AUTO: 10.4 FL (ref 9.2–12.9)
POCT GLUCOSE: 124 MG/DL (ref 70–110)
POCT GLUCOSE: 131 MG/DL (ref 70–110)
POCT GLUCOSE: 152 MG/DL (ref 70–110)
POCT GLUCOSE: 165 MG/DL (ref 70–110)
POCT GLUCOSE: 173 MG/DL (ref 70–110)
POCT GLUCOSE: 229 MG/DL (ref 70–110)
POCT GLUCOSE: 233 MG/DL (ref 70–110)
POCT GLUCOSE: 319 MG/DL (ref 70–110)
POCT GLUCOSE: 329 MG/DL (ref 70–110)
POTASSIUM SERPL-SCNC: 3.3 MMOL/L (ref 3.5–5.1)
POTASSIUM SERPL-SCNC: 3.3 MMOL/L (ref 3.5–5.1)
POTASSIUM SERPL-SCNC: 4.3 MMOL/L (ref 3.5–5.1)
POTASSIUM SERPL-SCNC: 5.9 MMOL/L (ref 3.5–5.1)
RBC # BLD AUTO: 4.28 M/UL (ref 4.6–6.2)
SODIUM SERPL-SCNC: 136 MMOL/L (ref 136–145)
SODIUM SERPL-SCNC: 137 MMOL/L (ref 136–145)
SODIUM SERPL-SCNC: 138 MMOL/L (ref 136–145)
WBC # BLD AUTO: 6.1 K/UL (ref 3.9–12.7)

## 2024-11-27 PROCEDURE — 85025 COMPLETE CBC W/AUTO DIFF WBC: CPT | Performed by: FAMILY MEDICINE

## 2024-11-27 PROCEDURE — 20600001 HC STEP DOWN PRIVATE ROOM

## 2024-11-27 PROCEDURE — 80048 BASIC METABOLIC PNL TOTAL CA: CPT | Mod: 91 | Performed by: FAMILY MEDICINE

## 2024-11-27 PROCEDURE — 25000003 PHARM REV CODE 250: Performed by: STUDENT IN AN ORGANIZED HEALTH CARE EDUCATION/TRAINING PROGRAM

## 2024-11-27 PROCEDURE — 25000003 PHARM REV CODE 250: Mod: JZ,JG | Performed by: HOSPITALIST

## 2024-11-27 PROCEDURE — 36415 COLL VENOUS BLD VENIPUNCTURE: CPT | Mod: XB | Performed by: HOSPITALIST

## 2024-11-27 PROCEDURE — 36415 COLL VENOUS BLD VENIPUNCTURE: CPT

## 2024-11-27 PROCEDURE — 63600175 PHARM REV CODE 636 W HCPCS

## 2024-11-27 PROCEDURE — 36415 COLL VENOUS BLD VENIPUNCTURE: CPT | Performed by: FAMILY MEDICINE

## 2024-11-27 PROCEDURE — 80048 BASIC METABOLIC PNL TOTAL CA: CPT

## 2024-11-27 PROCEDURE — 63600175 PHARM REV CODE 636 W HCPCS: Performed by: FAMILY MEDICINE

## 2024-11-27 PROCEDURE — 94644 CONT INHLJ TX 1ST HOUR: CPT

## 2024-11-27 PROCEDURE — 63600175 PHARM REV CODE 636 W HCPCS: Performed by: HOSPITALIST

## 2024-11-27 PROCEDURE — 84100 ASSAY OF PHOSPHORUS: CPT

## 2024-11-27 PROCEDURE — 80048 BASIC METABOLIC PNL TOTAL CA: CPT | Mod: 91 | Performed by: HOSPITALIST

## 2024-11-27 PROCEDURE — 99233 SBSQ HOSP IP/OBS HIGH 50: CPT | Mod: ,,, | Performed by: INTERNAL MEDICINE

## 2024-11-27 PROCEDURE — 25000242 PHARM REV CODE 250 ALT 637 W/ HCPCS: Performed by: HOSPITALIST

## 2024-11-27 PROCEDURE — 83735 ASSAY OF MAGNESIUM: CPT

## 2024-11-27 RX ORDER — MEMANTINE HYDROCHLORIDE 10 MG/1
10 TABLET ORAL 2 TIMES DAILY
Status: DISCONTINUED | OUTPATIENT
Start: 2024-11-28 | End: 2024-11-30 | Stop reason: HOSPADM

## 2024-11-27 RX ORDER — GLUCAGON 1 MG
1 KIT INJECTION
Status: DISCONTINUED | OUTPATIENT
Start: 2024-11-27 | End: 2024-11-27 | Stop reason: HOSPADM

## 2024-11-27 RX ORDER — NALOXONE HCL 0.4 MG/ML
0.02 VIAL (ML) INJECTION
Status: DISCONTINUED | OUTPATIENT
Start: 2024-11-28 | End: 2024-11-30 | Stop reason: HOSPADM

## 2024-11-27 RX ORDER — ALBUTEROL SULFATE 0.83 MG/ML
10 SOLUTION RESPIRATORY (INHALATION) ONCE
Status: COMPLETED | OUTPATIENT
Start: 2024-11-27 | End: 2024-11-27

## 2024-11-27 RX ORDER — VALSARTAN 160 MG/1
160 TABLET ORAL DAILY
Status: DISCONTINUED | OUTPATIENT
Start: 2024-11-28 | End: 2024-11-28

## 2024-11-27 RX ORDER — VALSARTAN 160 MG/1
160 TABLET ORAL DAILY
Status: ON HOLD
Start: 2024-12-04 | End: 2024-11-30

## 2024-11-27 RX ORDER — DEXTROSE MONOHYDRATE 100 MG/ML
25 INJECTION, SOLUTION INTRAVENOUS
Status: DISCONTINUED | OUTPATIENT
Start: 2024-11-27 | End: 2024-11-27

## 2024-11-27 RX ORDER — FAMOTIDINE 20 MG/1
20 TABLET, FILM COATED ORAL EVERY OTHER DAY
Status: DISCONTINUED | OUTPATIENT
Start: 2024-11-28 | End: 2024-11-27 | Stop reason: HOSPADM

## 2024-11-27 RX ORDER — CALCIUM GLUCONATE 20 MG/ML
1 INJECTION, SOLUTION INTRAVENOUS EVERY 10 MIN PRN
Status: DISCONTINUED | OUTPATIENT
Start: 2024-11-27 | End: 2024-11-27 | Stop reason: HOSPADM

## 2024-11-27 RX ORDER — ONDANSETRON 4 MG/1
8 TABLET, ORALLY DISINTEGRATING ORAL EVERY 8 HOURS PRN
Status: DISCONTINUED | OUTPATIENT
Start: 2024-11-28 | End: 2024-11-30 | Stop reason: HOSPADM

## 2024-11-27 RX ORDER — ACETAMINOPHEN 325 MG/1
650 TABLET ORAL EVERY 4 HOURS PRN
Status: DISCONTINUED | OUTPATIENT
Start: 2024-11-28 | End: 2024-11-30 | Stop reason: HOSPADM

## 2024-11-27 RX ORDER — INSULIN ASPART 100 [IU]/ML
0-5 INJECTION, SOLUTION INTRAVENOUS; SUBCUTANEOUS
Status: DISCONTINUED | OUTPATIENT
Start: 2024-11-28 | End: 2024-11-30 | Stop reason: HOSPADM

## 2024-11-27 RX ORDER — APIXABAN 2.5 MG/1
2.5 TABLET, FILM COATED ORAL 2 TIMES DAILY
Start: 2024-12-04

## 2024-11-27 RX ORDER — GABAPENTIN 100 MG/1
100 CAPSULE ORAL 2 TIMES DAILY
Status: DISCONTINUED | OUTPATIENT
Start: 2024-11-28 | End: 2024-11-30 | Stop reason: HOSPADM

## 2024-11-27 RX ORDER — CALCIUM GLUCONATE 20 MG/ML
1 INJECTION, SOLUTION INTRAVENOUS ONCE
Status: COMPLETED | OUTPATIENT
Start: 2024-11-27 | End: 2024-11-27

## 2024-11-27 RX ORDER — IBUPROFEN 200 MG
16 TABLET ORAL
Status: DISCONTINUED | OUTPATIENT
Start: 2024-11-27 | End: 2024-11-27 | Stop reason: HOSPADM

## 2024-11-27 RX ORDER — PANTOPRAZOLE SODIUM 40 MG/1
40 TABLET, DELAYED RELEASE ORAL DAILY
Status: DISCONTINUED | OUTPATIENT
Start: 2024-11-28 | End: 2024-11-30 | Stop reason: HOSPADM

## 2024-11-27 RX ORDER — SODIUM CHLORIDE 0.9 % (FLUSH) 0.9 %
10 SYRINGE (ML) INJECTION EVERY 12 HOURS PRN
Status: DISCONTINUED | OUTPATIENT
Start: 2024-11-28 | End: 2024-11-30 | Stop reason: HOSPADM

## 2024-11-27 RX ORDER — IBUPROFEN 200 MG
16 TABLET ORAL
Status: DISCONTINUED | OUTPATIENT
Start: 2024-11-28 | End: 2024-11-30 | Stop reason: HOSPADM

## 2024-11-27 RX ORDER — INSULIN ASPART 100 [IU]/ML
0-10 INJECTION, SOLUTION INTRAVENOUS; SUBCUTANEOUS
Status: DISCONTINUED | OUTPATIENT
Start: 2024-11-27 | End: 2024-11-27 | Stop reason: HOSPADM

## 2024-11-27 RX ORDER — ASPIRIN 81 MG/1
81 TABLET ORAL DAILY
Start: 2024-12-04 | End: 2024-11-27 | Stop reason: HOSPADM

## 2024-11-27 RX ORDER — DEXTROSE MONOHYDRATE 100 MG/ML
50 INJECTION, SOLUTION INTRAVENOUS ONCE
Status: COMPLETED | OUTPATIENT
Start: 2024-11-27 | End: 2024-11-27

## 2024-11-27 RX ORDER — IBUPROFEN 200 MG
24 TABLET ORAL
Status: DISCONTINUED | OUTPATIENT
Start: 2024-11-27 | End: 2024-11-27 | Stop reason: HOSPADM

## 2024-11-27 RX ORDER — IBUPROFEN 200 MG
24 TABLET ORAL
Status: DISCONTINUED | OUTPATIENT
Start: 2024-11-28 | End: 2024-11-30 | Stop reason: HOSPADM

## 2024-11-27 RX ORDER — GLUCAGON 1 MG
1 KIT INJECTION
Status: DISCONTINUED | OUTPATIENT
Start: 2024-11-28 | End: 2024-11-30 | Stop reason: HOSPADM

## 2024-11-27 RX ORDER — ISOSORBIDE MONONITRATE 60 MG/1
120 TABLET, EXTENDED RELEASE ORAL DAILY
Status: DISCONTINUED | OUTPATIENT
Start: 2024-11-28 | End: 2024-11-28

## 2024-11-27 RX ADMIN — INSULIN ASPART 1 UNITS: 100 INJECTION, SOLUTION INTRAVENOUS; SUBCUTANEOUS at 08:11

## 2024-11-27 RX ADMIN — ISOSORBIDE MONONITRATE 60 MG: 60 TABLET, EXTENDED RELEASE ORAL at 08:11

## 2024-11-27 RX ADMIN — MUPIROCIN: 20 OINTMENT TOPICAL at 09:11

## 2024-11-27 RX ADMIN — HUMAN INSULIN 10 UNITS: 100 INJECTION, SOLUTION SUBCUTANEOUS at 06:11

## 2024-11-27 RX ADMIN — CALCIUM GLUCONATE 1 G: 20 INJECTION, SOLUTION INTRAVENOUS at 08:11

## 2024-11-27 RX ADMIN — INSULIN ASPART 4 UNITS: 100 INJECTION, SOLUTION INTRAVENOUS; SUBCUTANEOUS at 12:11

## 2024-11-27 RX ADMIN — DEXTROSE MONOHYDRATE 50 G: 100 INJECTION, SOLUTION INTRAVENOUS at 06:11

## 2024-11-27 RX ADMIN — CALCIUM GLUCONATE 1 G: 20 INJECTION, SOLUTION INTRAVENOUS at 06:11

## 2024-11-27 RX ADMIN — INSULIN ASPART 4 UNITS: 100 INJECTION, SOLUTION INTRAVENOUS; SUBCUTANEOUS at 04:11

## 2024-11-27 RX ADMIN — ALBUTEROL SULFATE 10 MG: 2.5 SOLUTION RESPIRATORY (INHALATION) at 05:11

## 2024-11-27 NOTE — ASSESSMENT & PLAN NOTE
History of bradycardia with atrial fibrillation in 40s-50s  Upon contact with EMS, heart rates 20s to 40s.  Now stable in the 40s  Required TVP recently prior to ERCP  No hypotension   Transcutaneous pacer pads applied   Atropine at bedside to use as needed  Monitor in ICU  Repeat echo with normal EF  Will plan for permanent pacemaker on Monday 11/25/2024   Discussed with Interventional Cardiology  Continue to hold Eliquis, no bridging needed    11/25/24  -HR in 30's during sleep, 40's-50's when awake  -No significant pauses  -Reviewed cause with EP, no clear indication for PPM although patient is certainly at risk, OP event monitor recommended  -Resume Eliquis     11/26/24  -Stable, HR 40-50's  -OP event monitor planned    11/27/24  -Stable this AM, HR in 60's  -Re-discussed case with EP, patient candidate for leadless PPM at [facility]  -Unclear if patient wants to proceed as he is requesting to go home during exam, will re-discuss once his wife arrives

## 2024-11-27 NOTE — ASSESSMENT & PLAN NOTE
Slow ventricular response, currently in AFib   Holding Eliquis for PPM  No history of BB or CCB at home    11/25/24  -Reviewed with EP, no clear indication for PPM at present time  -Resume Eliquis    11/27/24  -Eliquis on hold pending re-discussion with patient/wife regarding PPM

## 2024-11-27 NOTE — PROGRESS NOTES
Pharmacist Renal Dose Adjustment Note    Aquiles Yates is a 84 y.o. male being treated with the medication famotidine    Patient Data:    Vital Signs (Most Recent):  Temp: 97.6 °F (36.4 °C) (11/27/24 0305)  Pulse: (!) 47 (11/27/24 0505)  Resp: 20 (11/27/24 0505)  BP: (!) 124/58 (11/27/24 0505)  SpO2: 100 % (11/27/24 0505) Vital Signs (72h Range):  Temp:  [97.5 °F (36.4 °C)-98.7 °F (37.1 °C)]   Pulse:  [38-57]   Resp:  [10-38]   BP: ()/(46-83)   SpO2:  [90 %-100 %]      Recent Labs   Lab 11/25/24  0543 11/26/24  0321 11/27/24  0432   CREATININE 1.5* 1.3 1.6*     Serum creatinine: 1.6 mg/dL (H) 11/27/24 0432  Estimated creatinine clearance: 29.2 mL/min (A)    Famotidine PO 20 mg daily will be changed to famotidine PO 20 mg 20 mg every other day for CrCl <30 mL/min.     Pharmacist's Name: Nuvia Manley  Pharmacist's Extension: [phone]

## 2024-11-27 NOTE — ASSESSMENT & PLAN NOTE
Patient's FSGs are controlled on current medication regimen.  Last A1c reviewed-   Lab Results   Component Value Date    HGBA1C 6.3 (H) 10/28/2024     Most recent fingerstick glucose reviewed-   Recent Labs   Lab 11/27/24  0555 11/27/24  0655 11/27/24  0914 11/27/24  1242   POCTGLUCOSE 124* 229* 319* 329*       Current correctional scale  Low  Maintain anti-hyperglycemic dose as follows-   Antihyperglycemics (From admission, onward)    Start     Stop Route Frequency Ordered    11/27/24 1351  insulin aspart U-100 pen 0-10 Units         -- SubQ Before meals & nightly PRN 11/27/24 1251        Hold Oral hypoglycemics while patient is in the hospital.

## 2024-11-27 NOTE — PROGRESS NOTES
I was contacted about syncope and bradycardia for this patient. He has AF with slow ventricular response. He meets criteria for PM implantation. Given dementia and need for single lead (ventricular only) pacing, a leadless pacemaker would be the optimal choice. This can be performed at the main campus. Discussed with Dr Kirk 11/27/24.

## 2024-11-27 NOTE — DISCHARGE SUMMARY
[facility] ([facility])  Hospital Medicine  Discharge Summary      Patient Name: Aquiles Yates  MRN: [id number]  AYESHA: [id number]  Patient Class: IP- Inpatient  Admission Date: 11/23/2024  Hospital Length of Stay: 4 days  Discharge Date and Time:  11/27/2024 3:12 PM  Attending Physician: Harpreet Tillman MD   Discharging Provider: Harpreet Tillman MD  Primary Care Provider: Hogler Thornton MD    Primary Care Team: Networked reference to record PCT     HPI:   84M  has a past medical history of Acute blood loss anemia, Alzheimer's dementia, Aneurysm, abdominal aortic, BCC (basal cell carcinoma of skin), Chronic diastolic heart failure, Coronary artery disease, Depression, Diabetes mellitus, HLD (hyperlipidemia), Hypertension, Kidney stone, PAD (peripheral artery disease), PAF (paroxysmal atrial fibrillation), and Tobacco abuse.  Admitted to icu for bradycardia with neurological changes per family. Cardiology consulted and did not recommend ppm at this time. Advised to follow up outpatient. Previously hospitalized and underwent cholecystectomy requiring transcutaneous pacing to undergo surgery.     Initial evaluation of chart reveals mild bradycardia, normotensive. On room air. Mild anemia on cbc. Stable cmp. Ct head without contrast with no definite acute abnormality. Chest x-ray with no acute abnormality. Echocardiogram with preserved ejection fraction, indeterminate diastolic function.    Hospital medicine consulted for assumption of care.       * No surgery found *      Hospital Course:   11/27 awaiting transfer for pacemaker placement. Transfer order placed.     Awaiting transfer for higher level of care, electrophysiology cardiology, for ppm.     Goals of Care Treatment Preferences:  Code Status: Full Code      SDOH Screening:  The patient was screened for utility difficulties, food insecurity, transport difficulties, housing insecurity, and interpersonal safety and there were no concerns  identified this admission.     Consults:   Consults (From admission, onward)          Status Ordering Provider     Inpatient consult to Social Work/Case Management  Once        Provider:  (Not yet assigned)    Completed ARACELI SEGUNDO     Inpatient consult to Hospitalist  Once        Provider:  (Not yet assigned)    Acknowledged CHLOE NICOLE     Inpatient consult to Cardiology  Once        Provider:  Raul Angel MD    Completed NAINA BARAJAS            No new Assessment & Plan notes have been filed under this hospital service since the last note was generated.  Service: Hospital Medicine    Final Active Diagnoses:    Diagnosis Date Noted POA    PRINCIPAL PROBLEM:  Symptomatic bradycardia [R00.1] 10/12/2019 Yes    Type 2 diabetes mellitus [E11.9] 11/23/2024 Yes    Chronic diastolic heart failure [I50.32] 05/20/2024 Yes    Dementia [F03.90] 08/01/2023 Yes    PAF (paroxysmal atrial fibrillation) [I48.0] 01/24/2023 Yes    CAD, multiple vessel [I25.10] 10/12/2019 Yes    Essential hypertension [I10] 06/12/2019 Yes     Chronic    Dyslipidemia [E78.5] 06/12/2019 Yes     Chronic      Problems Resolved During this Admission:       Discharged Condition: stable    Disposition: Another Health Care Inst*    Follow Up:    Patient Instructions:      ACCEPT - Ambulatory referral/consult to [facility]   Standing Status: Future   Referral Priority: Routine Referral Type: Consultation   Referral Reason: Specialty Services Required   Requested Specialty: Cardiology   Number of Visits Requested: 1       Significant Diagnostic Studies: Labs: CMP   Recent Labs   Lab 11/26/24  0321 11/27/24  0432 11/27/24  0915    136 137   K 4.8 5.9* 3.3*  3.3*    109 102   CO2 21* 18* 24   * 135* 343*   BUN 27* 33* 32*   CREATININE 1.3 1.6* 1.7*   CALCIUM 9.0 8.7 9.2   ANIONGAP 12 9 11   , CBC   Recent Labs   Lab 11/26/24  0321 11/27/24  0556   WBC 5.02 6.10   HGB 13.0* 12.6*   HCT 38.7*  38.2*    148*   , INR   Lab Results   Component Value Date    INR 1.2 11/23/2024    INR 1.1 10/06/2024    INR 1.0 11/05/2022   , Troponin   Recent Labs   Lab 11/23/24  1034   TROPONINI 0.014   , and All labs within the past 24 hours have been reviewed  Radiology: X-Ray: CXR: portable   CT scan:  CT head without contrast  Cardiac Graphics: Echocardiogram: Transthoracic echo (TTE) complete (Cupid Only):   Results for orders placed or performed during the hospital encounter of 11/23/24   Echo   Result Value Ref Range    BSA 1.74 m2    LVIDd 4.3 3.5 - 6.0 cm    LV Systolic Volume 35.02 mL    LV Systolic Volume Index 19.8 mL/m2    LVIDs 3.0 2.1 - 4.0 cm    LV Diastolic Volume 83.71 mL    LV Diastolic Volume Index 47.29 mL/m2    Left Ventricular End Systolic Volume by Teichholz Method 35.02 mL    Left Ventricular End Diastolic Volume by Teichholz Method 83.71 mL    IVS 1.5 (A) 0.6 - 1.1 cm    FS 30.2 28 - 44 %    Left Ventricle Relative Wall Thickness 0.65 cm    PW 1.4 (A) 0.6 - 1.1 cm    LV mass 245.0 g    LV Mass Index 138.4 g/m2    MV Peak E Americo 0.82 m/s    TDI LATERAL 0.11 m/s    TDI SEPTAL 0.05 m/s    E/E' ratio 10.25 m/s    MV Peak A Americo 0.76 m/s    TR Max Americo 2.24 m/s    E/A ratio 1.08     IVRT 95.15 msec    E wave deceleration time 237.40 msec    LV SEPTAL E/E' RATIO 16.40 m/s    LV LATERAL E/E' RATIO 7.45 m/s    LVOT peak americo 0.8 m/s    Left Ventricular Outflow Tract Mean Velocity 0.65 cm/s    Left Ventricular Outflow Tract Mean Gradient 1.76 mmHg    TAPSE 1.92 cm    LA size 4.34 cm    Left Atrium Minor Axis 5.22 cm    Left Atrium Major Axis 5.05 cm    RA Major Axis 4.04 cm    AV regurgitation pressure 1/2 time 1,020.618428509904685 ms    AR Max Americo 3.09 m/s    AV mean gradient 5.6 mmHg    AV peak gradient 10.2 mmHg    Ao peak americo 1.6 m/s    Ao VTI 36.8 cm    LVOT peak VTI 18.8 cm    AV Velocity Ratio 0.50     AV index (prosthetic) 0.51     Mr max americo 3.88 m/s    MV stenosis pressure 1/2 time 68.85 ms     MV valve area p 1/2 method 3.20 cm2    Triscuspid Valve Regurgitation Peak Gradient 20 mmHg    Ao root annulus 3.60 cm    Mean e' 0.08 m/s    ZLVIDS -0.07     ZLVIDD -1.29     LURDES 37.4 mL/m2    LA Vol 66.28 cm3    LA WIDTH 3.5 cm    RA Width 2.4 cm    TV resting pulmonary artery pressure 23 mmHg    RV TB RVSP 5 mmHg    Est. RA pres 3 mmHg    Narrative      Left Ventricle: The left ventricle is normal in size. Normal wall   thickness. There is concentric hypertrophy. Normal wall motion. There is   normal systolic function with a visually estimated ejection fraction of 55   - 60%. There is indeterminate diastolic function.    Right Ventricle: Systolic function is normal.    Left Atrium: Left atrium is mildly dilated.    Pulmonary Artery: The estimated pulmonary artery systolic pressure is   23 mmHg.    IVC/SVC: Normal venous pressure at 3 mmHg.         Pending Diagnostic Studies:       Procedure Component Value Units Date/Time    Basic metabolic panel [[id number]] Collected: 11/27/24 1448    Order Status: Sent Lab Status: In process Updated: 11/27/24 7190    Specimen: Blood            Medications:  Reconciled Home Medications:      Medication List        CHANGE how you take these medications      ELIQUIS 2.5 mg Tab  Generic drug: apixaban  Take 1 tablet (2.5 mg total) by mouth 2 (two) times daily.  Start taking on: December 4, 2024  What changed: These instructions start on December 4, 2024. If you are unsure what to do until then, ask your doctor or other care provider.     valsartan 160 MG tablet  Commonly known as: DIOVAN  Take 1 tablet (160 mg total) by mouth once daily.  Start taking on: December 4, 2024  What changed: These instructions start on December 4, 2024. If you are unsure what to do until then, ask your doctor or other care provider.            CONTINUE taking these medications      alcohol swabs Padm  Commonly known as: ALCOHOL PREP PADS  Twice a day     blood sugar diagnostic Strp  Test blood sugars  twice a day     gabapentin 100 MG capsule  Commonly known as: NEURONTIN  Take 1 capsule (100 mg total) by mouth 2 (two) times daily as needed. PRN     isosorbide mononitrate 120 MG 24 hr tablet  Commonly known as: IMDUR  TAKE 1 TABLET BY MOUTH EVERY DAY     lancets Misc  Commonly known as: ACCU-CHEK SOFTCLIX LANCETS  Test blood sugars twice a day     memantine 10 MG Tab  Commonly known as: NAMENDA  Take 1 tablet (10 mg total) by mouth 2 (two) times daily.     pantoprazole 40 MG tablet  Commonly known as: PROTONIX  Take 1 tablet (40 mg total) by mouth once daily.            STOP taking these medications      aspirin 81 MG EC tablet  Commonly known as: ECOTRIN     metFORMIN 500 MG tablet  Commonly known as: GLUCOPHAGE              Indwelling Lines/Drains at time of discharge:   Lines/Drains/Airways       Drain  Duration             Male External Urine Management Device w/ Suction 11/24/24 1000 Other (Comment) 3 days                    Time spent on the discharge of patient: 41 minutes    Critical care time spent on the evaluation and treatment of severe organ dysfunction, review of pertinent labs and imaging studies, discussions with consulting providers and discussions with patient/family: 21 minutes.     Harpreet Tillman MD  Department of Hospital Medicine  [facility] ([facility])

## 2024-11-27 NOTE — Clinical Note
DEVICE MICRA AV2 PACE LEADLESS - [id number] was deployed to Right Ventricle and thresholds performed

## 2024-11-27 NOTE — PLAN OF CARE
"0530: Patient remains stable for duration of shift. VSS, A fib on monitor with bradycardia. AAOx3. RA. Afebrile this shift. Blood glucose monitored Q6H, no coverage needed this shift. AM labs revealed hyperkalemia; medications administered (see Results and MAR, respectively). POC reviewed with patient, verbalized understanding, reinforcement needed. Currently resting in bed with safety and fall prevention measures in place. Call light in reach. Care ongoing.    0536: Patient refusing to drink Lokelma. "Wants to wait until breakfast." Education provided, patient still refused.     0600: Hyperkalemic shift initiated. D10W currently infusing. Calcium gluconate administered (see MAR).  "

## 2024-11-27 NOTE — ASSESSMENT & PLAN NOTE
History of bradycardia with atrial fibrillation in 40s-50s  Upon contact with EMS, heart rates 20s to 40s.  Now stable in the 40s  Required TVP recently prior to ERCP  No hypotension   Transcutaneous pacer pads applied   Atropine at bedside to use as needed  Monitor in ICU  Repeat echo with normal EF  Will plan for permanent pacemaker on Monday 11/25/2024   Discussed with Interventional Cardiology  Continue to hold Eliquis, no bridging needed    11/25/24  -HR in 30's during sleep, 40's-50's when awake  -No significant pauses  -Reviewed cause with EP, no clear indication for PPM although patient is certainly at risk, OP event monitor recommended  -Resume Eliquis     11/26/24  -Stable, HR 40-50's  -OP event monitor planned    11/27/24  -Stable this AM, HR in 60's  -Re-discussed case with EP, patient candidate for micro-PPM at [facility]  -Unclear if patient wants to proceed as he is requesting to go home during exam, will re-discuss once his wife arrives

## 2024-11-27 NOTE — PROGRESS NOTES
[facility] - Intensive Care ([facility])  Cardiology  Progress Note    Patient Name: Aquiles Yates  MRN: [id number]  Admission Date: 11/23/2024  Hospital Length of Stay: 4 days  Code Status: Full Code   Attending Physician: Harpreet Tillman MD   Primary Care Physician: Holger Thornton MD  Expected Discharge Date:   Principal Problem:Symptomatic bradycardia    Subjective:   HPI:  Aquiles Yates is a 84 y.o. male patient with a PMHx of CAD, diastolic HF, HTN, HLD, Afib on eliquis, DM Type 2, PVD, Alzheimer's disease who presents to the ED earlier today for evaluation of generalized weakness and syncopal episode at home which onset gradually suddenly PTA.  Per wife, patient did not lose consciousness, per EMS, patient had a brief loss of consciousness.  Pt states he is unsure what happened but was oriented to person and place when asked. Per wife, pt was getting up from the toilet in the bathroom and was uneasy/unbalanced on his feet and had disarticulated speech. Pt's wife states pt has been feeling unbalanced for 2-3 days now. Pt's wife states these sxs worried her about a potential stroke, as these sxs are similar when he had a previous stroke. Pt's wife notes pt was given pills for constipation yesterday. Pt's wife also notes pt was experiencing hallucinations yesterday as well. Pt's wife states she called EMS, where the heart rate was ranging 20-40s. Pt denies any CP, SOB, fever, or chills. Pt normally ambulates around with a walker, per wife. Pt was in a-fib. . Troponin 0.014.     ECHO    Left Ventricle: The left ventricle is normal in size. Normal wall thickness. There is concentric hypertrophy. Normal wall motion. There is normal systolic function with a visually estimated ejection fraction of 55 - 60%. There is indeterminate diastolic function.    Right Ventricle: Systolic function is normal.    Left Atrium: Left atrium is mildly dilated.    Pulmonary Artery: The estimated pulmonary  "artery systolic pressure is 23 mmHg.    IVC/SVC: Normal venous pressure at 3 mmHg.     EKG  Atrial fibrillation, Premature ventricular complexes  Inferior infarct ,age undetermined  T wave abnormality, consider lateral ischemia        Hospital Course:   11/25/24-Patient seen and examined today, resting in bed. HR dips to 30's during sleep, no significant pauses noted. HR in 40's-50's when awake. Reviewed case with EP, no clear indication for PPM at present time though patient is certainly at risk, will need OP event monitor. Labs reviewed.     11/26/24-Patient seen and examined today with wife present, resting in bed. Awake, alert, wants to go home. No AEON. HR in 40-50's during exam. No near syncope or syncope. Labs reviewed. OP event monitor arranged.     11/27/24-Patient seen and examined today, awake, alert in bed. Feels "great". Wants to go home and see his son and family who traveled in from [facility]. HR in 60's. K 5.9, being treated. Discussed case with EP, patient has option of leadless  implantation at [facility], will discuss further once his wife arrives.        Review of Systems   Constitutional: Negative.   HENT: Negative.     Eyes: Negative.    Cardiovascular: Negative.    Respiratory: Negative.     Endocrine: Negative.    Hematologic/Lymphatic: Bruises/bleeds easily.   Skin: Negative.    Musculoskeletal: Negative.    Gastrointestinal: Negative.    Genitourinary: Negative.    Neurological:  Positive for weakness.   Psychiatric/Behavioral:  Positive for memory loss.    Allergic/Immunologic: Negative.      Objective:     Vital Signs (Most Recent):  Temp: 97.5 °F (36.4 °C) (11/27/24 0701)  Pulse: 64 (11/27/24 1000)  Resp: 15 (11/27/24 1000)  BP: (!) 116/56 (11/27/24 1000)  SpO2: 98 % (11/27/24 1000) Vital Signs (24h Range):  Temp:  [97.5 °F (36.4 °C)-97.7 °F (36.5 °C)] 97.5 °F (36.4 °C)  Pulse:  [43-86] 64  Resp:  [14-41] 15  SpO2:  [87 %-100 %] 98 %  BP: ()/(46-79) 116/56     Weight: 61.2 kg " (135 lb)  Body mass index is 19.37 kg/m².     SpO2: 98 %         Intake/Output Summary (Last 24 hours) at 11/27/2024 1134  Last data filed at 11/27/2024 0900  Gross per 24 hour   Intake 821.38 ml   Output 300 ml   Net 521.38 ml       Lines/Drains/Airways       Drain  Duration             Male External Urine Management Device w/ Suction 11/24/24 1000 Other (Comment) 3 days              Peripheral Intravenous Line  Duration                  Peripheral IV - Single Lumen 11/23/24 1352 22 G Left;Posterior Hand 3 days         Peripheral IV - Single Lumen 11/24/24 1015 20 G Posterior;Right Forearm 3 days                       Physical Exam  Vitals and nursing note reviewed.   Constitutional:       General: He is not in acute distress.     Appearance: Normal appearance. He is well-developed. He is not diaphoretic.   HENT:      Head: Normocephalic and atraumatic.   Eyes:      General:         Right eye: No discharge.         Left eye: No discharge.      Pupils: Pupils are equal, round, and reactive to light.   Cardiovascular:      Rate and Rhythm: Normal rate. Rhythm irregularly irregular.      Heart sounds: Normal heart sounds, S1 normal and S2 normal. No murmur heard.  Pulmonary:      Effort: Pulmonary effort is normal. No respiratory distress.   Abdominal:      General: There is no distension.   Musculoskeletal:      Right lower leg: No edema.      Left lower leg: No edema.   Skin:     General: Skin is warm and dry.      Findings: No erythema.   Neurological:      Mental Status: He is alert and oriented to person, place, and time.   Psychiatric:         Behavior: Behavior normal.            Significant Labs: CMP   Recent Labs   Lab 11/26/24  0321 11/27/24  0432 11/27/24  0915    136 137   K 4.8 5.9* 3.3*  3.3*    109 102   CO2 21* 18* 24   * 135* 343*   BUN 27* 33* 32*   CREATININE 1.3 1.6* 1.7*   CALCIUM 9.0 8.7 9.2   ANIONGAP 12 9 11   , CBC   Recent Labs   Lab 11/26/24  0321 11/27/24  0556   WBC  "5.02 6.10   HGB 13.0* 12.6*   HCT 38.7* 38.2*    148*   , Troponin No results for input(s): "TROPONINI" in the last 48 hours., and All pertinent lab results from the last 24 hours have been reviewed.    Significant Imaging: Echocardiogram: Transthoracic echo (TTE) complete (Cupid Only):   Results for orders placed or performed during the hospital encounter of 11/23/24   Echo   Result Value Ref Range    BSA 1.74 m2    LVIDd 4.3 3.5 - 6.0 cm    LV Systolic Volume 35.02 mL    LV Systolic Volume Index 19.8 mL/m2    LVIDs 3.0 2.1 - 4.0 cm    LV Diastolic Volume 83.71 mL    LV Diastolic Volume Index 47.29 mL/m2    Left Ventricular End Systolic Volume by Teichholz Method 35.02 mL    Left Ventricular End Diastolic Volume by Teichholz Method 83.71 mL    IVS 1.5 (A) 0.6 - 1.1 cm    FS 30.2 28 - 44 %    Left Ventricle Relative Wall Thickness 0.65 cm    PW 1.4 (A) 0.6 - 1.1 cm    LV mass 245.0 g    LV Mass Index 138.4 g/m2    MV Peak E Americo 0.82 m/s    TDI LATERAL 0.11 m/s    TDI SEPTAL 0.05 m/s    E/E' ratio 10.25 m/s    MV Peak A Americo 0.76 m/s    TR Max Americo 2.24 m/s    E/A ratio 1.08     IVRT 95.15 msec    E wave deceleration time 237.40 msec    LV SEPTAL E/E' RATIO 16.40 m/s    LV LATERAL E/E' RATIO 7.45 m/s    LVOT peak americo 0.8 m/s    Left Ventricular Outflow Tract Mean Velocity 0.65 cm/s    Left Ventricular Outflow Tract Mean Gradient 1.76 mmHg    TAPSE 1.92 cm    LA size 4.34 cm    Left Atrium Minor Axis 5.22 cm    Left Atrium Major Axis 5.05 cm    RA Major Axis 4.04 cm    AV regurgitation pressure 1/2 time 1,020.645531973457692 ms    AR Max Americo 3.09 m/s    AV mean gradient 5.6 mmHg    AV peak gradient 10.2 mmHg    Ao peak americo 1.6 m/s    Ao VTI 36.8 cm    LVOT peak VTI 18.8 cm    AV Velocity Ratio 0.50     AV index (prosthetic) 0.51     Mr max americo 3.88 m/s    MV stenosis pressure 1/2 time 68.85 ms    MV valve area p 1/2 method 3.20 cm2    Triscuspid Valve Regurgitation Peak Gradient 20 mmHg    Ao root annulus 3.60 cm "    Mean e' 0.08 m/s    ZLVIDS -0.07     ZLVIDD -1.29     LURDES 37.4 mL/m2    LA Vol 66.28 cm3    LA WIDTH 3.5 cm    RA Width 2.4 cm    TV resting pulmonary artery pressure 23 mmHg    RV TB RVSP 5 mmHg    Est. RA pres 3 mmHg    Narrative      Left Ventricle: The left ventricle is normal in size. Normal wall   thickness. There is concentric hypertrophy. Normal wall motion. There is   normal systolic function with a visually estimated ejection fraction of 55   - 60%. There is indeterminate diastolic function.    Right Ventricle: Systolic function is normal.    Left Atrium: Left atrium is mildly dilated.    Pulmonary Artery: The estimated pulmonary artery systolic pressure is   23 mmHg.    IVC/SVC: Normal venous pressure at 3 mmHg.     , EKG: Reviewed, and X-Ray: CXR: X-Ray Chest 1 View (CXR): No results found for this visit on 11/23/24. and X-Ray Chest PA and Lateral (CXR): No results found for this visit on 11/23/24.  Assessment and Plan:   Patient with known bradycardia who presents with weakness/AMS. HR stable this AM in 60's. Re-discussed with EP, patient candidate for leadless PPM but will need to transfer to [facility]. Will discuss further with his wife once she arrives.     * Symptomatic bradycardia  History of bradycardia with atrial fibrillation in 40s-50s  Upon contact with EMS, heart rates 20s to 40s.  Now stable in the 40s  Required TVP recently prior to ERCP  No hypotension   Transcutaneous pacer pads applied   Atropine at bedside to use as needed  Monitor in ICU  Repeat echo with normal EF  Will plan for permanent pacemaker on Monday 11/25/2024   Discussed with Interventional Cardiology  Continue to hold Eliquis, no bridging needed    11/25/24  -HR in 30's during sleep, 40's-50's when awake  -No significant pauses  -Reviewed cause with EP, no clear indication for PPM although patient is certainly at risk, OP event monitor recommended  -Resume Eliquis     11/26/24  -Stable, HR 40-50's  -OP event monitor  planned    11/27/24  -Stable this AM, HR in 60's  -Re-discussed case with EP, patient candidate for leadless PPM at [facility]  -Unclear if patient wants to proceed as he is requesting to go home during exam, will re-discuss once his wife arrives      Type 2 diabetes mellitus  Management per ICU/Hospital Medicine    Chronic diastolic heart failure  Currently compensated   Continue home medications    Dementia  -Per primary team    PAF (paroxysmal atrial fibrillation)  Slow ventricular response, currently in AFib   Holding Eliquis for PPM  No history of BB or CCB at home    11/25/24  -Reviewed with EP, no clear indication for PPM at present time  -Resume Eliquis    11/27/24  -Eliquis on hold pending re-discussion with patient/wife regarding PPM    CAD, multiple vessel  Stable   Denies chest pain   Negative troponin    Essential hypertension  -Elevated on admission   -Titrate medications           VTE Risk Mitigation (From admission, onward)           Ordered     IP VTE HIGH RISK PATIENT  Once         11/23/24 1233     Place sequential compression device  Until discontinued         11/23/24 1233                    Samreen Arzate PA-C  Cardiology  [facility] - Intensive Care ([facility])

## 2024-11-27 NOTE — ASSESSMENT & PLAN NOTE
Neurological symptoms per family  Initially with transcutaneous pacing  Ep consulted and recommended inpatient placement of pacemaker  Transfer requested

## 2024-11-27 NOTE — PLAN OF CARE
11/27/24 1337   Rounds   Attendance Provider;Nurse    Discharge Plan A Home Health   Why the patient remains in the hospital Requires continued medical care   Transition of Care Barriers None     Possible PPM Friday

## 2024-11-27 NOTE — SUBJECTIVE & OBJECTIVE
Review of Systems   Constitutional: Negative.   HENT: Negative.     Eyes: Negative.    Cardiovascular: Negative.    Respiratory: Negative.     Endocrine: Negative.    Hematologic/Lymphatic: Bruises/bleeds easily.   Skin: Negative.    Musculoskeletal: Negative.    Gastrointestinal: Negative.    Genitourinary: Negative.    Neurological:  Positive for weakness.   Psychiatric/Behavioral:  Positive for memory loss.    Allergic/Immunologic: Negative.      Objective:     Vital Signs (Most Recent):  Temp: 97.5 °F (36.4 °C) (11/27/24 0701)  Pulse: 64 (11/27/24 1000)  Resp: 15 (11/27/24 1000)  BP: (!) 116/56 (11/27/24 1000)  SpO2: 98 % (11/27/24 1000) Vital Signs (24h Range):  Temp:  [97.5 °F (36.4 °C)-97.7 °F (36.5 °C)] 97.5 °F (36.4 °C)  Pulse:  [43-86] 64  Resp:  [14-41] 15  SpO2:  [87 %-100 %] 98 %  BP: ()/(46-79) 116/56     Weight: 61.2 kg (135 lb)  Body mass index is 19.37 kg/m².     SpO2: 98 %         Intake/Output Summary (Last 24 hours) at 11/27/2024 1134  Last data filed at 11/27/2024 0900  Gross per 24 hour   Intake 821.38 ml   Output 300 ml   Net 521.38 ml       Lines/Drains/Airways       Drain  Duration             Male External Urine Management Device w/ Suction 11/24/24 1000 Other (Comment) 3 days              Peripheral Intravenous Line  Duration                  Peripheral IV - Single Lumen 11/23/24 1352 22 G Left;Posterior Hand 3 days         Peripheral IV - Single Lumen 11/24/24 1015 20 G Posterior;Right Forearm 3 days                       Physical Exam  Vitals and nursing note reviewed.   Constitutional:       General: He is not in acute distress.     Appearance: Normal appearance. He is well-developed. He is not diaphoretic.   HENT:      Head: Normocephalic and atraumatic.   Eyes:      General:         Right eye: No discharge.         Left eye: No discharge.      Pupils: Pupils are equal, round, and reactive to light.   Cardiovascular:      Rate and Rhythm: Normal rate. Rhythm irregularly  "irregular.      Heart sounds: Normal heart sounds, S1 normal and S2 normal. No murmur heard.  Pulmonary:      Effort: Pulmonary effort is normal. No respiratory distress.   Abdominal:      General: There is no distension.   Musculoskeletal:      Right lower leg: No edema.      Left lower leg: No edema.   Skin:     General: Skin is warm and dry.      Findings: No erythema.   Neurological:      Mental Status: He is alert and oriented to person, place, and time.   Psychiatric:         Behavior: Behavior normal.            Significant Labs: CMP   Recent Labs   Lab 11/26/24  0321 11/27/24  0432 11/27/24  0915    136 137   K 4.8 5.9* 3.3*  3.3*    109 102   CO2 21* 18* 24   * 135* 343*   BUN 27* 33* 32*   CREATININE 1.3 1.6* 1.7*   CALCIUM 9.0 8.7 9.2   ANIONGAP 12 9 11   , CBC   Recent Labs   Lab 11/26/24  0321 11/27/24  0556   WBC 5.02 6.10   HGB 13.0* 12.6*   HCT 38.7* 38.2*    148*   , Troponin No results for input(s): "TROPONINI" in the last 48 hours., and All pertinent lab results from the last 24 hours have been reviewed.    Significant Imaging: Echocardiogram: Transthoracic echo (TTE) complete (Cupid Only):   Results for orders placed or performed during the hospital encounter of 11/23/24   Echo   Result Value Ref Range    BSA 1.74 m2    LVIDd 4.3 3.5 - 6.0 cm    LV Systolic Volume 35.02 mL    LV Systolic Volume Index 19.8 mL/m2    LVIDs 3.0 2.1 - 4.0 cm    LV Diastolic Volume 83.71 mL    LV Diastolic Volume Index 47.29 mL/m2    Left Ventricular End Systolic Volume by Teichholz Method 35.02 mL    Left Ventricular End Diastolic Volume by Teichholz Method 83.71 mL    IVS 1.5 (A) 0.6 - 1.1 cm    FS 30.2 28 - 44 %    Left Ventricle Relative Wall Thickness 0.65 cm    PW 1.4 (A) 0.6 - 1.1 cm    LV mass 245.0 g    LV Mass Index 138.4 g/m2    MV Peak E Americo 0.82 m/s    TDI LATERAL 0.11 m/s    TDI SEPTAL 0.05 m/s    E/E' ratio 10.25 m/s    MV Peak A Americo 0.76 m/s    TR Max Americo 2.24 m/s    E/A ratio " 1.08     IVRT 95.15 msec    E wave deceleration time 237.40 msec    LV SEPTAL E/E' RATIO 16.40 m/s    LV LATERAL E/E' RATIO 7.45 m/s    LVOT peak americo 0.8 m/s    Left Ventricular Outflow Tract Mean Velocity 0.65 cm/s    Left Ventricular Outflow Tract Mean Gradient 1.76 mmHg    TAPSE 1.92 cm    LA size 4.34 cm    Left Atrium Minor Axis 5.22 cm    Left Atrium Major Axis 5.05 cm    RA Major Axis 4.04 cm    AV regurgitation pressure 1/2 time 1,020.152120124970377 ms    AR Max Americo 3.09 m/s    AV mean gradient 5.6 mmHg    AV peak gradient 10.2 mmHg    Ao peak americo 1.6 m/s    Ao VTI 36.8 cm    LVOT peak VTI 18.8 cm    AV Velocity Ratio 0.50     AV index (prosthetic) 0.51     Mr max americo 3.88 m/s    MV stenosis pressure 1/2 time 68.85 ms    MV valve area p 1/2 method 3.20 cm2    Triscuspid Valve Regurgitation Peak Gradient 20 mmHg    Ao root annulus 3.60 cm    Mean e' 0.08 m/s    ZLVIDS -0.07     ZLVIDD -1.29     LURDES 37.4 mL/m2    LA Vol 66.28 cm3    LA WIDTH 3.5 cm    RA Width 2.4 cm    TV resting pulmonary artery pressure 23 mmHg    RV TB RVSP 5 mmHg    Est. RA pres 3 mmHg    Narrative      Left Ventricle: The left ventricle is normal in size. Normal wall   thickness. There is concentric hypertrophy. Normal wall motion. There is   normal systolic function with a visually estimated ejection fraction of 55   - 60%. There is indeterminate diastolic function.    Right Ventricle: Systolic function is normal.    Left Atrium: Left atrium is mildly dilated.    Pulmonary Artery: The estimated pulmonary artery systolic pressure is   23 mmHg.    IVC/SVC: Normal venous pressure at 3 mmHg.     , EKG: Reviewed, and X-Ray: CXR: X-Ray Chest 1 View (CXR): No results found for this visit on 11/23/24. and X-Ray Chest PA and Lateral (CXR): No results found for this visit on 11/23/24.

## 2024-11-27 NOTE — ASSESSMENT & PLAN NOTE
Patient with dementia with likely etiology of alzheimer's dementia. Dementia is moderate. The patient does not have signs of behavioral disturbance. Home dementia medications are Held or Continued: held.. Continue non-pharmacologic interventions to prevent delirium (No VS between 11PM-5AM, activity during day, opening blinds, providing glasses/hearing aids, and up in chair during daytime). Will avoid narcotics and benzos unless absolutely necessary. PRN anti-psychotics are not prescribed to avoid self harm behaviors.

## 2024-11-27 NOTE — SUBJECTIVE & OBJECTIVE
Interval History: See hospital course for today      Review of Systems   Unable to perform ROS: Dementia     Objective:     Vital Signs (Most Recent):  Temp: 97.6 °F (36.4 °C) (11/27/24 1300)  Pulse: 63 (11/27/24 1300)  Resp: (!) 24 (11/27/24 1300)  BP: (!) 105/52 (11/27/24 1300)  SpO2: 100 % (11/27/24 1300) Vital Signs (24h Range):  Temp:  [97.5 °F (36.4 °C)-97.7 °F (36.5 °C)] 97.6 °F (36.4 °C)  Pulse:  [43-86] 63  Resp:  [15-41] 24  SpO2:  [87 %-100 %] 100 %  BP: ()/(46-79) 105/52     Weight: 61.2 kg (135 lb)  Body mass index is 19.37 kg/m².    Intake/Output Summary (Last 24 hours) at 11/27/2024 1322  Last data filed at 11/27/2024 1100  Gross per 24 hour   Intake 771.38 ml   Output 300 ml   Net 471.38 ml         Physical Exam  Vitals and nursing note reviewed. Exam conducted with a chaperone present (visitor).   Constitutional:       General: He is not in acute distress.     Appearance: He is ill-appearing. He is not toxic-appearing.   HENT:      Head: Normocephalic and atraumatic.   Cardiovascular:      Rate and Rhythm: Normal rate.   Pulmonary:      Effort: Pulmonary effort is normal. No respiratory distress.   Abdominal:      Palpations: Abdomen is soft.      Tenderness: There is no abdominal tenderness.   Skin:     General: Skin is warm.   Neurological:      Mental Status: He is alert. Mental status is at baseline.      Motor: Weakness present.             Significant Labs: All pertinent labs within the past 24 hours have been reviewed.  CBC:   Recent Labs   Lab 11/26/24  0321 11/27/24  0556   WBC 5.02 6.10   HGB 13.0* 12.6*   HCT 38.7* 38.2*    148*     CMP:   Recent Labs   Lab 11/26/24  0321 11/27/24  0432 11/27/24  0915    136 137   K 4.8 5.9* 3.3*  3.3*    109 102   CO2 21* 18* 24   * 135* 343*   BUN 27* 33* 32*   CREATININE 1.3 1.6* 1.7*   CALCIUM 9.0 8.7 9.2   ANIONGAP 12 9 11     Magnesium:   Recent Labs   Lab 11/26/24  0321 11/27/24  0432   MG 2.1 2.1       Significant  Imaging: I have reviewed all pertinent imaging results/findings within the past 24 hours.

## 2024-11-27 NOTE — PROVIDER TRANSFER
(Physician in Lead of Transfers)  Outside Transfer Acceptance Note / Regional Referral Center    Upon patient arrival to floor, please send SecureChat to [facility] or call extension [phone] (if no answer, do NOT leave a callback number after the beep, rather please send a SecureChat to [facility]), for Hospital Medicine admit team assignment and for additional admit orders for the patient.  Do not page the attending physician associated with the patient on arrival (this physician may not be on duty at the time of arrival).  Rather, always send a SecureChat to [facility] or call [phone] to reach the triage physician for orders and team assignment.    Referring facility: [facility]  Referring provider: ARACELI SEGUNDO  Accepting facility: [facility]  Accepting provider: STEPHEN RODRIGUEZ  Reason for transfer:  Electrophysiology evaluation  Transfer diagnosis: Bradycardia  Transfer specialty requested: Electrophysiology  Transfer specialty notified: Yes  Transfer level: NUMBER 1-5: 2  Bed type requested: tele  Isolation status: No active isolations   Admission class or status: IP- Inpatient      Narrative     Patient with atrial fibrillation and slow ventricular response/bradycardia transferring for Electrophysiology evaluation of possible pacemaker placement.    84-year-old male with a history of anemia, dementia, abdominal aortic aneurysm, chronic diastolic heart failure, coronary artery disease/stents, diabetes, hypertension, hyperlipidemia, peripheral artery disease, paroxysmal atrial fibrillation, and choledocholithiasis with ERCP in October admitted to [facility] ICU on November 23 with symptomatic bradycardia and syncope.  EMS initially noted heart rates in the 20s.  Initial evaluation also noted a history of bradycardia with previous admission requiring transvenous pacemaker.  During his stay, Eliquis was held.  Heart rate was noted to be 30s while sleeping at  times and 40s-50s while awake.  Patient was disoriented at times during his stay but was neurologically stable.  Cardiology at [facility] spoke with the Electrophysiology service at [facility].  With the atrial fibrillation and slow ventricular response, it was felt that he met criteria for pacemaker implantation.  Oxygenation has been stable on room air.  Referring team spoke with Cardiology at [facility].  Patient was felt to be stable for a floor bed with Hospital Medicine admission at [facility].  Referring provider noted patient is awake and stable.  He is in no distress and has been hemodynamically stable    November 27:  Initial labs with sodium 136, potassium 5.9, chloride 109, CO2 18, BUN 33, creatinine 1.6, glucose 135, magnesium 2.1, phosphorus 3.6, white blood cells 6.1, hemoglobin 12.6, hematocrit 38.2, platelets 148  -repeat labs (9:15 a.m.) had sodium 137, potassium 3.3, chloride 102, CO2 24, BUN 32, creatinine 1.7, glucose 343  -repeat BMP (2:48 p.m.) sodium 138, potassium 4.3, chloride 102, CO2 26, BUN 30, creatinine 1.6, glucose 239    November 23: , CPK 14, troponin 0.014, TSH 0.728, AST 9, ALT 6, total bilirubin 0.7, albumin 3.3, INR 1.2  -CT head had no definite acute abnormality.  Indeterminate left parotid gland lesion.  -chest x-ray had no acute abnormality.  -echocardiogram had EF 55-60%.  Indeterminate diastolic function.  -EKG showed atrial fibrillation with slow ventricular response.  Ventricular rate 46.     October 10: ERCP found choledocholithiasis.  Complete removal was accomplished by biliary sphincterotomy and balloon extraction.  Biliary tree was swept.  One biliary stent was placed into the common bile duct.    Objective     Vitals: Temp: 97.6 °F (36.4 °C) (11/27/24 1300)  Pulse: 61 (11/27/24 1500)  Resp: (!) 23 (11/27/24 1500)  BP: (!) 114/53 (11/27/24 1500)  SpO2: 100 % (11/27/24 1500)  Recent Labs: CBC:   Recent Labs   Lab 11/26/24  6251  11/27/24  0556   WBC 5.02 6.10   HGB 13.0* 12.6*   HCT 38.7* 38.2*    148*     CMP:   Recent Labs   Lab 11/27/24  0432 11/27/24  0915 11/27/24  1448    137 138   K 5.9* 3.3*  3.3* 4.3    102 102   CO2 18* 24 26   * 343* 239*   BUN 33* 32* 30*   CREATININE 1.6* 1.7* 1.6*   CALCIUM 8.7 9.2 9.3   ANIONGAP 9 11 10         Instructions    Admit to Hospital Medicine  Consult Electrophysiology      STAR Foley MD  Hospital Medicine Staff  Cell: [phone]

## 2024-11-27 NOTE — ASSESSMENT & PLAN NOTE
Patient has paroxysmal (<7 days) atrial fibrillation. Patient is currently in  sinus sarita . ENSDH2DCFu Score: 4. The patients heart rate in the last 24 hours is as follows:  Pulse  Min: 43  Max: 86     Antiarrhythmics       Anticoagulants       Plan  - Replete lytes with a goal of K>4, Mg >2  - Patient is anticoagulated, see medications listed above.  - Patient's afib is currently controlled  - stable

## 2024-11-27 NOTE — PROGRESS NOTES
[facility] - Intensive Care ([facility])  [facility] Medicine  Progress Note    Patient Name: Aquiles Yates  MRN: [id number]  Patient Class: IP- Inpatient   Admission Date: 11/23/2024  Length of Stay: 4 days  Attending Physician: Harpreet Tillman MD  Primary Care Provider: Holger Thornton MD        Subjective:     Principal Problem:Symptomatic bradycardia        HPI:  84M  has a past medical history of Acute blood loss anemia, Alzheimer's dementia, Aneurysm, abdominal aortic, BCC (basal cell carcinoma of skin), Chronic diastolic heart failure, Coronary artery disease, Depression, Diabetes mellitus, HLD (hyperlipidemia), Hypertension, Kidney stone, PAD (peripheral artery disease), PAF (paroxysmal atrial fibrillation), and Tobacco abuse.  Admitted to icu for bradycardia with neurological changes per family. Cardiology consulted and did not recommend ppm at this time. Advised to follow up outpatient. Previously hospitalized and underwent cholecystectomy requiring transcutaneous pacing to undergo surgery.     Initial evaluation of chart reveals mild bradycardia, normotensive. On room air. Mild anemia on cbc. Stable cmp. Ct head without contrast with no definite acute abnormality. Chest x-ray with no acute abnormality. Echocardiogram with preserved ejection fraction, indeterminate diastolic function.    Hospital medicine consulted for assumption of care.       Overview/Hospital Course:  11/27 awaiting transfer for pacemaker placement. Transfer order placed.     Interval History: See hospital course for today      Review of Systems   Unable to perform ROS: Dementia     Objective:     Vital Signs (Most Recent):  Temp: 97.6 °F (36.4 °C) (11/27/24 1300)  Pulse: 63 (11/27/24 1300)  Resp: (!) 24 (11/27/24 1300)  BP: (!) 105/52 (11/27/24 1300)  SpO2: 100 % (11/27/24 1300) Vital Signs (24h Range):  Temp:  [97.5 °F (36.4 °C)-97.7 °F (36.5 °C)] 97.6 °F (36.4 °C)  Pulse:  [43-86] 63  Resp:  [15-41] 24  SpO2:  [87 %-100 %]  100 %  BP: ()/(46-79) 105/52     Weight: 61.2 kg (135 lb)  Body mass index is 19.37 kg/m².    Intake/Output Summary (Last 24 hours) at 11/27/2024 1322  Last data filed at 11/27/2024 1100  Gross per 24 hour   Intake 771.38 ml   Output 300 ml   Net 471.38 ml         Physical Exam  Vitals and nursing note reviewed. Exam conducted with a chaperone present (visitor).   Constitutional:       General: He is not in acute distress.     Appearance: He is ill-appearing. He is not toxic-appearing.   HENT:      Head: Normocephalic and atraumatic.   Cardiovascular:      Rate and Rhythm: Normal rate.   Pulmonary:      Effort: Pulmonary effort is normal. No respiratory distress.   Abdominal:      Palpations: Abdomen is soft.      Tenderness: There is no abdominal tenderness.   Skin:     General: Skin is warm.   Neurological:      Mental Status: He is alert. Mental status is at baseline.      Motor: Weakness present.             Significant Labs: All pertinent labs within the past 24 hours have been reviewed.  CBC:   Recent Labs   Lab 11/26/24 0321 11/27/24  0556   WBC 5.02 6.10   HGB 13.0* 12.6*   HCT 38.7* 38.2*    148*     CMP:   Recent Labs   Lab 11/26/24 0321 11/27/24  0432 11/27/24  0915    136 137   K 4.8 5.9* 3.3*  3.3*    109 102   CO2 21* 18* 24   * 135* 343*   BUN 27* 33* 32*   CREATININE 1.3 1.6* 1.7*   CALCIUM 9.0 8.7 9.2   ANIONGAP 12 9 11     Magnesium:   Recent Labs   Lab 11/26/24 0321 11/27/24  0432   MG 2.1 2.1       Significant Imaging: I have reviewed all pertinent imaging results/findings within the past 24 hours.    Assessment/Plan:      * Symptomatic bradycardia  Neurological symptoms per family  Initially with transcutaneous pacing  Ep consulted and recommended inpatient placement of pacemaker  Transfer requested       Type 2 diabetes mellitus  Patient's FSGs are controlled on current medication regimen.  Last A1c reviewed-   Lab Results   Component Value Date    HGBA1C 6.3  (H) 10/28/2024     Most recent fingerstick glucose reviewed-   Recent Labs   Lab 11/27/24  0555 11/27/24  0655 11/27/24  0914 11/27/24  1242   POCTGLUCOSE 124* 229* 319* 329*       Current correctional scale  Low  Maintain anti-hyperglycemic dose as follows-   Antihyperglycemics (From admission, onward)      Start     Stop Route Frequency Ordered    11/27/24 1351  insulin aspart U-100 pen 0-10 Units         -- SubQ Before meals & nightly PRN 11/27/24 1251          Hold Oral hypoglycemics while patient is in the hospital.    Chronic diastolic heart failure  Stable        Dementia  Patient with dementia with likely etiology of alzheimer's dementia. Dementia is moderate. The patient does not have signs of behavioral disturbance. Home dementia medications are Held or Continued: held.. Continue non-pharmacologic interventions to prevent delirium (No VS between 11PM-5AM, activity during day, opening blinds, providing glasses/hearing aids, and up in chair during daytime). Will avoid narcotics and benzos unless absolutely necessary. PRN anti-psychotics are not prescribed to avoid self harm behaviors.      PAF (paroxysmal atrial fibrillation)  Patient has paroxysmal (<7 days) atrial fibrillation. Patient is currently in  sinus sarita . [id number] Score: 4. The patients heart rate in the last 24 hours is as follows:  Pulse  Min: 43  Max: 86     Antiarrhythmics       Anticoagulants       Plan  - Replete lytes with a goal of K>4, Mg >2  - Patient is anticoagulated, see medications listed above.  - Patient's afib is currently controlled  - stable        CAD, multiple vessel  Patient with known CAD s/p stent placement and CABG, which is controlled Will continue  eliquis  and monitor for S/Sx of angina/ACS. Continue to monitor on telemetry.   Given age, dementia, falls, not currently on aspirin, plavix or statin    Essential hypertension  Patients blood pressure range in the last 24 hours was: BP  Min: 92/51  Max: 172/79.The  patient's inpatient anti-hypertensive regimen is listed below:  Current Antihypertensives  isosorbide mononitrate 24 hr tablet 60 mg, Daily, Oral    Plan  - BP is uncontrolled, will adjust as follows: hypotensive  - relax normotensive blood pressure due to remote infarcts and risk for falls in this demographic      VTE Risk Mitigation (From admission, onward)           Ordered     IP VTE HIGH RISK PATIENT  Once         11/23/24 1233     Place sequential compression device  Until discontinued         11/23/24 1233                    Discharge Planning   ZEINA:      Code Status: Full Code   Is the patient medically ready for discharge?:     Reason for patient still in hospital (select all that apply): Patient trending condition, Laboratory test, Treatment, Imaging, Consult recommendations, and Pending disposition  Discharge Plan A: Home            Critical care time spent on the evaluation and treatment of severe organ dysfunction, review of pertinent labs and imaging studies, discussions with consulting providers and discussions with patient/family: 37 minutes.      Harpreet Tillman MD  Department of Hospital Medicine   [facility] - Intensive Care ([facility])

## 2024-11-27 NOTE — ASSESSMENT & PLAN NOTE
Patients blood pressure range in the last 24 hours was: BP  Min: 92/51  Max: 172/79.The patient's inpatient anti-hypertensive regimen is listed below:  Current Antihypertensives  isosorbide mononitrate 24 hr tablet 60 mg, Daily, Oral    Plan  - BP is uncontrolled, will adjust as follows: hypotensive  - relax normotensive blood pressure due to remote infarcts and risk for falls in this demographic

## 2024-11-27 NOTE — HOSPITAL COURSE
11/27 awaiting transfer for pacemaker placement. Transfer order placed.     Awaiting transfer for higher level of care, electrophysiology cardiology, for ppm.

## 2024-11-28 PROBLEM — I48.91 ATRIAL FIBRILLATION WITH SLOW VENTRICULAR RESPONSE: Status: ACTIVE | Noted: 2023-01-24

## 2024-11-28 LAB
ALBUMIN SERPL BCP-MCNC: 2.9 G/DL (ref 3.5–5.2)
ALP SERPL-CCNC: 84 U/L (ref 40–150)
ALT SERPL W/O P-5'-P-CCNC: 9 U/L (ref 10–44)
ANION GAP SERPL CALC-SCNC: 8 MMOL/L (ref 8–16)
APTT PPP: 27.2 SEC (ref 21–32)
AST SERPL-CCNC: 13 U/L (ref 10–40)
BASOPHILS # BLD AUTO: 0.05 K/UL (ref 0–0.2)
BASOPHILS NFR BLD: 1 % (ref 0–1.9)
BILIRUB SERPL-MCNC: 0.5 MG/DL (ref 0.1–1)
BUN SERPL-MCNC: 31 MG/DL (ref 8–23)
CALCIUM SERPL-MCNC: 9.1 MG/DL (ref 8.7–10.5)
CHLORIDE SERPL-SCNC: 107 MMOL/L (ref 95–110)
CO2 SERPL-SCNC: 22 MMOL/L (ref 23–29)
CREAT SERPL-MCNC: 1.4 MG/DL (ref 0.5–1.4)
DIFFERENTIAL METHOD BLD: ABNORMAL
EOSINOPHIL # BLD AUTO: 0.4 K/UL (ref 0–0.5)
EOSINOPHIL NFR BLD: 7.5 % (ref 0–8)
ERYTHROCYTE [DISTWIDTH] IN BLOOD BY AUTOMATED COUNT: 14.2 % (ref 11.5–14.5)
EST. GFR  (NO RACE VARIABLE): 49.3 ML/MIN/1.73 M^2
GLUCOSE SERPL-MCNC: 137 MG/DL (ref 70–110)
HCT VFR BLD AUTO: 34.8 % (ref 40–54)
HGB BLD-MCNC: 11.6 G/DL (ref 14–18)
IMM GRANULOCYTES # BLD AUTO: 0.01 K/UL (ref 0–0.04)
IMM GRANULOCYTES NFR BLD AUTO: 0.2 % (ref 0–0.5)
INR PPP: 1 (ref 0.8–1.2)
LYMPHOCYTES # BLD AUTO: 1.3 K/UL (ref 1–4.8)
LYMPHOCYTES NFR BLD: 24.4 % (ref 18–48)
MAGNESIUM SERPL-MCNC: 1.9 MG/DL (ref 1.6–2.6)
MCH RBC QN AUTO: 29.4 PG (ref 27–31)
MCHC RBC AUTO-ENTMCNC: 33.3 G/DL (ref 32–36)
MCV RBC AUTO: 88 FL (ref 82–98)
MONOCYTES # BLD AUTO: 0.4 K/UL (ref 0.3–1)
MONOCYTES NFR BLD: 8.1 % (ref 4–15)
NEUTROPHILS # BLD AUTO: 3.1 K/UL (ref 1.8–7.7)
NEUTROPHILS NFR BLD: 58.8 % (ref 38–73)
NRBC BLD-RTO: 0 /100 WBC
PHOSPHATE SERPL-MCNC: 2.5 MG/DL (ref 2.7–4.5)
PLATELET # BLD AUTO: 142 K/UL (ref 150–450)
PMV BLD AUTO: 11.5 FL (ref 9.2–12.9)
POCT GLUCOSE: 135 MG/DL (ref 70–110)
POCT GLUCOSE: 173 MG/DL (ref 70–110)
POCT GLUCOSE: 192 MG/DL (ref 70–110)
POTASSIUM SERPL-SCNC: 4.3 MMOL/L (ref 3.5–5.1)
PROT SERPL-MCNC: 6.2 G/DL (ref 6–8.4)
PROTHROMBIN TIME: 11.4 SEC (ref 9–12.5)
RBC # BLD AUTO: 3.94 M/UL (ref 4.6–6.2)
SODIUM SERPL-SCNC: 137 MMOL/L (ref 136–145)
WBC # BLD AUTO: 5.2 K/UL (ref 3.9–12.7)

## 2024-11-28 PROCEDURE — 83735 ASSAY OF MAGNESIUM: CPT

## 2024-11-28 PROCEDURE — 25000003 PHARM REV CODE 250

## 2024-11-28 PROCEDURE — 85730 THROMBOPLASTIN TIME PARTIAL: CPT

## 2024-11-28 PROCEDURE — 20600001 HC STEP DOWN PRIVATE ROOM

## 2024-11-28 PROCEDURE — 80053 COMPREHEN METABOLIC PANEL: CPT

## 2024-11-28 PROCEDURE — 36415 COLL VENOUS BLD VENIPUNCTURE: CPT

## 2024-11-28 PROCEDURE — 85610 PROTHROMBIN TIME: CPT

## 2024-11-28 PROCEDURE — 85025 COMPLETE CBC W/AUTO DIFF WBC: CPT

## 2024-11-28 PROCEDURE — 84100 ASSAY OF PHOSPHORUS: CPT

## 2024-11-28 PROCEDURE — 99223 1ST HOSP IP/OBS HIGH 75: CPT | Mod: GC,,, | Performed by: STUDENT IN AN ORGANIZED HEALTH CARE EDUCATION/TRAINING PROGRAM

## 2024-11-28 RX ADMIN — GABAPENTIN 100 MG: 100 CAPSULE ORAL at 08:11

## 2024-11-28 RX ADMIN — SODIUM PHOSPHATE, MONOBASIC, MONOHYDRATE AND SODIUM PHOSPHATE, DIBASIC, ANHYDROUS 15 MMOL: 142; 276 INJECTION, SOLUTION INTRAVENOUS at 01:11

## 2024-11-28 RX ADMIN — PANTOPRAZOLE SODIUM 40 MG: 40 TABLET, DELAYED RELEASE ORAL at 08:11

## 2024-11-28 RX ADMIN — MEMANTINE 10 MG: 10 TABLET ORAL at 08:11

## 2024-11-28 NOTE — HPI
Mr. Yates is an 83 yo M with PMHx of Persistent Afib on eliquis, CAD, HFpEF, HTN, HLD, T2DM, PVD, Alzheimer's dementia, and choledocholithiasis with recent ERCP 10/15/24 was admitted at [facility] on November 23rd with symptomatic bradycardia and syncope. During cholecystectomy admission he got TVP placement 10/15-10/16/24. [place] [facility] [place] and possible pacemaker placement. Last dose Eliquis was on 11/23/24.

## 2024-11-28 NOTE — ASSESSMENT & PLAN NOTE
Mr. Yates is an 85 yo M with PMHx of Persistent Afib on eliquis, CAD, HFpEF, HTN, HLD, T2DM, PVD, Alzheimer's dementia, and choledocholithiasis with recent ERCP 10/15/24 is transferred to [facility] for EP evaluation and possible pacemaker placement.     Heart rate 50s to 80s  Blood pressure 110 systolic, 50s to 60s diastolic   Telemetry atrial fibrillation at ventricular rate of 50s  EKG 6/13/19: PACs, 12/19/23 Junctional Rhytm, 10/5/24: Afib, 11/6/24 A fib with slow response, 11/23/24 A fib with slow response  Last dose Eliquis was on 11/23/24  Keep him NPO midnight  Plan for lead less pacemaker on 11/29/2024

## 2024-11-28 NOTE — CONSULTS
[facility] - Cardiology Stepdown  Cardiac Electrophysiology  Consult Note    Admission Date: 11/27/2024  Code Status: Full Code   Attending Provider: Daniel Avila MD  Consulting Provider: Miguel Willard MD  Principal Problem:<principal problem not specified>    Inpatient consult to Electrophysiology  Consult performed by: Miguel Willard MD  Consult ordered by: Lisa Bertrand MD  Reason for consult: Symptomatic bradycardia, evaluation for pacemaker        Subjective:     Chief Complaint:  Syncope     HPI:   Mr. Yates is an 83 yo M with PMHx of Persistent Afib on eliquis, CAD, HFpEF, HTN, HLD, T2DM, PVD, Alzheimer's dementia, and choledocholithiasis with recent ERCP 10/15/24 was admitted at [facility] on November 23rd with symptomatic bradycardia and syncope. During cholecystectomy admission he got TVP placement 10/15-10/16/24. He is transferred to [facility] for EP evaluation and possible pacemaker placement. Last dose Eliquis was on 11/23/24.      Past Medical History:   Diagnosis Date    Acute blood loss anemia 10/14/2019    Alzheimer's dementia     Aneurysm, abdominal aortic     BCC (basal cell carcinoma of skin) 06/29/2023    Chronic diastolic heart failure 05/20/2024    Coronary artery disease     Depression     Diabetes mellitus     HLD (hyperlipidemia)     Hypertension     Kidney stone     PAD (peripheral artery disease)     PAF (paroxysmal atrial fibrillation) 01/24/2023    Tobacco abuse        Past Surgical History:   Procedure Laterality Date    APPENDECTOMY      CORONARY STENT PLACEMENT      x 4    DV5 ROBOTIC CHOLECYSTECTOMY N/A 10/15/2024    Procedure: DV5 ROBOTIC CHOLECYSTECTOMY;  Surgeon: Fred Taylor MD;  Location: [facility];  Service: General;  Laterality: N/A;    ERCP N/A 10/10/2024    Procedure: ERCP (ENDOSCOPIC RETROGRADE CHOLANGIOPANCREATOGRAPHY);  Surgeon: Morgan Hong MD;  Location: [address]);  Service: Endoscopy;  Laterality: N/A;    ESOPHAGOGASTRODUODENOSCOPY N/A  10/14/2019    Procedure: EGD (ESOPHAGOGASTRODUODENOSCOPY);  Surgeon: Carlos Mcneal III, MD;  Location: [facility];  Service: Endoscopy;  Laterality: N/A;    ESOPHAGOGASTRODUODENOSCOPY N/A 10/15/2019    Procedure: EGD (ESOPHAGOGASTRODUODENOSCOPY);  Surgeon: Carlos Mcneal III, MD;  Location: [facility] ENDO;  Service: Endoscopy;  Laterality: N/A;    ESOPHAGOGASTRODUODENOSCOPY N/A 4/17/2023    Procedure: EGD (ESOPHAGOGASTRODUODENOSCOPY);  Surgeon: Nevaeh Mcconnell MD;  Location: [facility];  Service: Endoscopy;  Laterality: N/A;    INSERTION, PACEMAKER, TEMPORARY TRANSVENOUS N/A 10/15/2024    Procedure: Insertion, Pacemaker, Temporary Transvenous;  Surgeon: Demarcus Kirk MD;  Location: [facility] CATH LAB;  Service: Cardiology;  Laterality: N/A;    LEFT HEART CATHETERIZATION Left 10/11/2019    Procedure: CATHETERIZATION, HEART, LEFT;  Surgeon: Robe Gilbert MD;  Location: [facility] CATH LAB;  Service: Cardiology;  Laterality: Left;    LEFT HEART CATHETERIZATION Left 10/13/2019    Procedure: CATHETERIZATION, HEART, LEFT;  Surgeon: Robe Gilbert MD;  Location: [facility] CATH LAB;  Service: Cardiology;  Laterality: Left;    leg stent Left        Review of patient's allergies indicates:   Allergen Reactions    Metoprolol Other (See Comments)     Junctional rhythm         Current Facility-Administered Medications on File Prior to Encounter   Medication    [DISCONTINUED] calcium gluconate 1 g in NS IVPB (premixed)    [DISCONTINUED] dextrose 10 % infusion    [DISCONTINUED] dextrose 10% bolus 125 mL 125 mL    [DISCONTINUED] dextrose 10% bolus 250 mL 250 mL    [DISCONTINUED] famotidine tablet 20 mg    [DISCONTINUED] glucagon (human recombinant) injection 1 mg    [DISCONTINUED] glucagon (human recombinant) injection 1 mg    [DISCONTINUED] glucose chewable tablet 16 g    [DISCONTINUED] glucose chewable tablet 24 g    [DISCONTINUED] influenza (adjuvanted) (Fluad) 45 mcg/0.5 mL IM vaccine (> or = 66 yo) 0.5 mL    [DISCONTINUED] insulin  [FreeTextEntry1] : 40-year-old white female para 1 status post Mirena IUD placement on 9/12/2024 here for follow-up visit.  Patient is reporting significant lower abdominal cramping and she is taking ibuprofen but is tearful pain and discomfort.  Denies any fevers or chills or abnormal vaginal discharge.  She denies any abnormal bleeding as well.  Ultrasound today reveals a 6.8 x 4.5 cm uterus with normally placed Mirena IUD.  Adnexa are essentially normal. aspart U-100 pen 0-10 Units    [DISCONTINUED] insulin aspart U-100 pen 0-5 Units    [DISCONTINUED] isosorbide mononitrate 24 hr tablet 60 mg    [DISCONTINUED] mupirocin 2 % ointment    [DISCONTINUED] sodium chloride 0.9% flush 10 mL    [DISCONTINUED] sodium zirconium cyclosilicate packet 10 g     Current Outpatient Medications on File Prior to Encounter   Medication Sig    alcohol swabs (ALCOHOL PREP PADS) PadM Twice a day    blood sugar diagnostic Strp Test blood sugars twice a day    [START ON 12/4/2024] ELIQUIS 2.5 mg Tab Take 1 tablet (2.5 mg total) by mouth 2 (two) times daily.    gabapentin (NEURONTIN) 100 MG capsule Take 1 capsule (100 mg total) by mouth 2 (two) times daily as needed. PRN    isosorbide mononitrate (IMDUR) 120 MG 24 hr tablet TAKE 1 TABLET BY MOUTH EVERY DAY    lancets (ACCU-CHEK SOFTCLIX LANCETS) Misc Test blood sugars twice a day    memantine (NAMENDA) 10 MG Tab Take 1 tablet (10 mg total) by mouth 2 (two) times daily.    pantoprazole (PROTONIX) 40 MG tablet Take 1 tablet (40 mg total) by mouth once daily.    [START ON 12/4/2024] valsartan (DIOVAN) 160 MG tablet Take 1 tablet (160 mg total) by mouth once daily.     Family History       Problem Relation (Age of Onset)    COPD Father    Diabetes Mother, Brother          Tobacco Use    Smoking status: Former     Types: Cigars     Passive exposure: Never    Smokeless tobacco: Current   Substance and Sexual Activity    Alcohol use: Not Currently    Drug use: Not Currently    Sexual activity: Not Currently     Partners: Female     Review of Systems   Unable to perform ROS: Dementia     Objective:     Vital Signs (Most Recent):  Temp: 97.7 °F (36.5 °C) (11/28/24 0752)  Pulse: (!) 52 (11/28/24 0752)  Resp: 18 (11/28/24 0752)  BP: (!) 129/58 (11/28/24 0752)  SpO2: (!) 93 % (11/28/24 0752) Vital Signs (24h Range):  Temp:  [97.3 °F (36.3 °C)-98.4 °F (36.9 °C)] 97.7 °F (36.5 °C)  Pulse:  [52-67] 52  Resp:  [15-27] 18  SpO2:  [93 %-100 %] 93 %  BP:  (105-136)/(52-78) 129/58     Weight: 66.6 kg (146 lb 13.2 oz)  Body mass index is 21.07 kg/m².     Physical Exam  Vitals reviewed.   Constitutional:       Appearance: He is ill-appearing.   HENT:      Head: Normocephalic and atraumatic.      Right Ear: External ear normal.      Left Ear: External ear normal.      Mouth/Throat:      Mouth: Mucous membranes are moist.   Eyes:      Extraocular Movements: Extraocular movements intact.      Conjunctiva/sclera: Conjunctivae normal.   Cardiovascular:      Rate and Rhythm: Bradycardia present. Rhythm irregular.      Heart sounds: No murmur heard.  Pulmonary:      Effort: Pulmonary effort is normal. No respiratory distress.      Breath sounds: Normal breath sounds.   Abdominal:      General: Abdomen is flat. Bowel sounds are normal.      Palpations: Abdomen is soft.      Tenderness: There is no abdominal tenderness.   Musculoskeletal:      Cervical back: Normal range of motion.      Right lower leg: No edema.      Left lower leg: No edema.   Skin:     General: Skin is warm and dry.   Neurological:      Mental Status: Mental status is at baseline.                Significant Labs: All pertinent labs within the past 24 hours have been reviewed.    Significant Imaging: I have reviewed all pertinent imaging results/findings within the past 24 hours.    Echo: 11/23/24    Left Ventricle: The left ventricle is normal in size. Normal wall thickness. There is concentric hypertrophy. Normal wall motion. There is normal systolic function with a visually estimated ejection fraction of 55 - 60%. There is indeterminate diastolic function.    Right Ventricle: Systolic function is normal.    Left Atrium: Left atrium is mildly dilated.    Pulmonary Artery: The estimated pulmonary artery systolic pressure is 23 mmHg.    IVC/SVC: Normal venous pressure at 3 mmHg.    [facility] 10/13/19  1.   Successful PCI 99% proximal RCA stenosis wt Stent Diego KYLIE x 2 overlapped.                 2.  Successful  PTCA 99% diffuse ISR mid RCA.                 3.  Unsucessful PCI  distal RCA.               Assessment and Plan:     Atrial fibrillation with slow ventricular response  Mr. Yates is an 83 yo M with PMHx of Persistent Afib on eliquis, CAD, HFpEF, HTN, HLD, T2DM, PVD, Alzheimer's dementia, and choledocholithiasis with recent ERCP 10/15/24 is transferred to [facility] for EP evaluation and possible pacemaker placement.     Heart rate 50s to 80s  Blood pressure 110 systolic, 50s to 60s diastolic   Telemetry atrial fibrillation at ventricular rate of 50s  EKG 6/13/19: PACs, 12/19/23 Junctional Rhytm, 10/5/24: Afib, 11/6/24 A fib with slow response, 11/23/24 A fib with slow response  Last dose Eliquis was on 11/23/24  Keep him NPO midnight  Plan for lead less pacemaker on 11/29/2024          Thank you for your consult. I will follow-up with patient. Please contact us if you have any additional questions.    Miguel Willard MD  Cardiac Electrophysiology  [facility] - Cardiology Stepdown

## 2024-11-28 NOTE — PLAN OF CARE
Pt maintained free from falls/trauma/injuries and skin breakdown. Pt denied pain or discomfort. Plan of care reviewed. Monitored by tele sitter. All questions and concerns addressed.     Problem: Adult Inpatient Plan of Care  Goal: Plan of Care Review  Outcome: Progressing  Goal: Patient-Specific Goal (Individualized)  Outcome: Progressing  Goal: Absence of Hospital-Acquired Illness or Injury  Outcome: Progressing  Goal: Optimal Comfort and Wellbeing  Outcome: Progressing  Goal: Readiness for Transition of Care  Outcome: Progressing     Problem: Diabetes Comorbidity  Goal: Blood Glucose Level Within Targeted Range  Outcome: Progressing     Problem: Fall Injury Risk  Goal: Absence of Fall and Fall-Related Injury  Outcome: Progressing     Problem: Skin Injury Risk Increased  Goal: Skin Health and Integrity  Outcome: Progressing

## 2024-11-28 NOTE — SUBJECTIVE & OBJECTIVE
Past Medical History:   Diagnosis Date    Acute blood loss anemia 10/14/2019    Alzheimer's dementia     Aneurysm, abdominal aortic     BCC (basal cell carcinoma of skin) 06/29/2023    Chronic diastolic heart failure 05/20/2024    Coronary artery disease     Depression     Diabetes mellitus     HLD (hyperlipidemia)     Hypertension     Kidney stone     PAD (peripheral artery disease)     PAF (paroxysmal atrial fibrillation) 01/24/2023    Tobacco abuse        Past Surgical History:   Procedure Laterality Date    APPENDECTOMY      CORONARY STENT PLACEMENT      x 4    DV5 ROBOTIC CHOLECYSTECTOMY N/A 10/15/2024    Procedure: DV5 ROBOTIC CHOLECYSTECTOMY;  Surgeon: Fred Taylor MD;  Location: [facility] OR;  Service: General;  Laterality: N/A;    ERCP N/A 10/10/2024    Procedure: ERCP (ENDOSCOPIC RETROGRADE CHOLANGIOPANCREATOGRAPHY);  Surgeon: Morgan Hong MD;  Location: [address]);  Service: Endoscopy;  Laterality: N/A;    ESOPHAGOGASTRODUODENOSCOPY N/A 10/14/2019    Procedure: EGD (ESOPHAGOGASTRODUODENOSCOPY);  Surgeon: Carlos Mcneal III, MD;  Location: [facility];  Service: Endoscopy;  Laterality: N/A;    ESOPHAGOGASTRODUODENOSCOPY N/A 10/15/2019    Procedure: EGD (ESOPHAGOGASTRODUODENOSCOPY);  Surgeon: Carlos Mcneal III, MD;  Location: [facility];  Service: Endoscopy;  Laterality: N/A;    ESOPHAGOGASTRODUODENOSCOPY N/A 4/17/2023    Procedure: EGD (ESOPHAGOGASTRODUODENOSCOPY);  Surgeon: Nevaeh Mcconnell MD;  Location: [facility];  Service: Endoscopy;  Laterality: N/A;    INSERTION, PACEMAKER, TEMPORARY TRANSVENOUS N/A 10/15/2024    Procedure: Insertion, Pacemaker, Temporary Transvenous;  Surgeon: Demarcus Kirk MD;  Location: [facility] CATH LAB;  Service: Cardiology;  Laterality: N/A;    LEFT HEART CATHETERIZATION Left 10/11/2019    Procedure: CATHETERIZATION, HEART, LEFT;  Surgeon: Robe Gilbert MD;  Location: [facility] CATH LAB;  Service: Cardiology;  Laterality: Left;    LEFT HEART CATHETERIZATION Left  10/13/2019    Procedure: CATHETERIZATION, HEART, LEFT;  Surgeon: Robe Gilbert MD;  Location: [facility] CATH LAB;  Service: Cardiology;  Laterality: Left;    leg stent Left        Review of patient's allergies indicates:   Allergen Reactions    Metoprolol Other (See Comments)     Junctional rhythm         Current Facility-Administered Medications on File Prior to Encounter   Medication    [DISCONTINUED] calcium gluconate 1 g in NS IVPB (premixed)    [DISCONTINUED] dextrose 10 % infusion    [DISCONTINUED] dextrose 10% bolus 125 mL 125 mL    [DISCONTINUED] dextrose 10% bolus 250 mL 250 mL    [DISCONTINUED] famotidine tablet 20 mg    [DISCONTINUED] glucagon (human recombinant) injection 1 mg    [DISCONTINUED] glucagon (human recombinant) injection 1 mg    [DISCONTINUED] glucose chewable tablet 16 g    [DISCONTINUED] glucose chewable tablet 24 g    [DISCONTINUED] influenza (adjuvanted) (Fluad) 45 mcg/0.5 mL IM vaccine (> or = 64 yo) 0.5 mL    [DISCONTINUED] insulin aspart U-100 pen 0-10 Units    [DISCONTINUED] insulin aspart U-100 pen 0-5 Units    [DISCONTINUED] isosorbide mononitrate 24 hr tablet 60 mg    [DISCONTINUED] mupirocin 2 % ointment    [DISCONTINUED] sodium chloride 0.9% flush 10 mL    [DISCONTINUED] sodium zirconium cyclosilicate packet 10 g     Current Outpatient Medications on File Prior to Encounter   Medication Sig    alcohol swabs (ALCOHOL PREP PADS) PadM Twice a day    blood sugar diagnostic Strp Test blood sugars twice a day    [START ON 12/4/2024] ELIQUIS 2.5 mg Tab Take 1 tablet (2.5 mg total) by mouth 2 (two) times daily.    gabapentin (NEURONTIN) 100 MG capsule Take 1 capsule (100 mg total) by mouth 2 (two) times daily as needed. PRN    isosorbide mononitrate (IMDUR) 120 MG 24 hr tablet TAKE 1 TABLET BY MOUTH EVERY DAY    lancets (ACCU-CHEK SOFTCLIX LANCETS) Misc Test blood sugars twice a day    memantine (NAMENDA) 10 MG Tab Take 1 tablet (10 mg total) by mouth 2 (two) times daily.    pantoprazole  (PROTONIX) 40 MG tablet Take 1 tablet (40 mg total) by mouth once daily.    [START ON 12/4/2024] valsartan (DIOVAN) 160 MG tablet Take 1 tablet (160 mg total) by mouth once daily.     Family History       Problem Relation (Age of Onset)    COPD Father    Diabetes Mother, Brother          Tobacco Use    Smoking status: Former     Types: Cigars     Passive exposure: Never    Smokeless tobacco: Current   Substance and Sexual Activity    Alcohol use: Not Currently    Drug use: Not Currently    Sexual activity: Not Currently     Partners: Female     Review of Systems   Unable to perform ROS: Dementia     Objective:     Vital Signs (Most Recent):  Temp: 97.7 °F (36.5 °C) (11/28/24 0752)  Pulse: (!) 52 (11/28/24 0752)  Resp: 18 (11/28/24 0752)  BP: (!) 129/58 (11/28/24 0752)  SpO2: (!) 93 % (11/28/24 0752) Vital Signs (24h Range):  Temp:  [97.3 °F (36.3 °C)-98.4 °F (36.9 °C)] 97.7 °F (36.5 °C)  Pulse:  [52-67] 52  Resp:  [15-27] 18  SpO2:  [93 %-100 %] 93 %  BP: (105-136)/(52-78) 129/58     Weight: 66.6 kg (146 lb 13.2 oz)  Body mass index is 21.07 kg/m².     Physical Exam  Vitals reviewed.   Constitutional:       Appearance: He is ill-appearing.   HENT:      Head: Normocephalic and atraumatic.      Right Ear: External ear normal.      Left Ear: External ear normal.      Mouth/Throat:      Mouth: Mucous membranes are moist.   Eyes:      Extraocular Movements: Extraocular movements intact.      Conjunctiva/sclera: Conjunctivae normal.   Cardiovascular:      Rate and Rhythm: Bradycardia present. Rhythm irregular.      Heart sounds: No murmur heard.  Pulmonary:      Effort: Pulmonary effort is normal. No respiratory distress.      Breath sounds: Normal breath sounds.   Abdominal:      General: Abdomen is flat. Bowel sounds are normal.      Palpations: Abdomen is soft.      Tenderness: There is no abdominal tenderness.   Musculoskeletal:      Cervical back: Normal range of motion.      Right lower leg: No edema.      Left  lower leg: No edema.   Skin:     General: Skin is warm and dry.   Neurological:      Mental Status: Mental status is at baseline.                Significant Labs: All pertinent labs within the past 24 hours have been reviewed.    Significant Imaging: I have reviewed all pertinent imaging results/findings within the past 24 hours.    Echo: 11/23/24    Left Ventricle: The left ventricle is normal in size. Normal wall thickness. There is concentric hypertrophy. Normal wall motion. There is normal systolic function with a visually estimated ejection fraction of 55 - 60%. There is indeterminate diastolic function.    Right Ventricle: Systolic function is normal.    Left Atrium: Left atrium is mildly dilated.    Pulmonary Artery: The estimated pulmonary artery systolic pressure is 23 mmHg.    IVC/SVC: Normal venous pressure at 3 mmHg.    [facility] 10/13/19  1.   Successful PCI 99% proximal RCA stenosis wtih Stent Diego KYLIE x 2 overlapped.                 2.  Successful PTCA 99% diffuse ISR mid RCA.                 3.  Unsucessful PCI  distal RCA.

## 2024-11-28 NOTE — HPI
Mr. Yates is an 85 yo M with PMHx of CAD, HFpEF, HTN, HLD, Afib on eliquis, T2DM, PVD, Alzheimer's dementia, and choledocholithiasis with recent ERCP in October now admitted to [facility] ICU since November 23rd with symptomatic bradycardia and syncope. Initially EMS noted HR of 20-30s. Patient has notable history for previous admission requiring TVP placement. Eliquis was held in ICU due to plan for leadless pacemaker. Was transferred to [facility] for EP evaluation and possible pacemaker placement.     On my evaluation, patient reports no complaints and is watching TV. EP consult placed and patient kept NPO incase of procedure. Held AC and antiplatelet medications.

## 2024-11-28 NOTE — HOSPITAL COURSE
Admitted for symptomatic bradycardia and EP evaluation.  Lead less pacemaker placed on 11/29/24.  Patient is hemodynamically stable and tolerated procedure well without complications.  Patient is medically stable for discharge and will be referred to PCP and electrophysiology outpatient.

## 2024-11-28 NOTE — NURSING
23:30 (11/27). Patient arrived from other Hospital. Alert, disoriented to place and time. Denies pain. No sign of distress. Iv line in place- saline locked. Tele monitor started. On room air. Call bell given on his reach. Instructed to call for any concerns or assistance. Bed on lowest position. Bed alarm on. Non skid socks on. Plan of care discussed with patient, question answered.    Wife informed of patient admission in the  unit.

## 2024-11-28 NOTE — ASSESSMENT & PLAN NOTE
Patients blood pressure range in the last 24 hours was: BP  Min: 105/52  Max: 172/79.The patient's inpatient anti-hypertensive regimen is listed below:  Current Antihypertensives       Plan  - BP is controlled, no changes needed to their regimen  - patient with low blood pressures    Restart imdur and valsartan as bp tolerates

## 2024-11-28 NOTE — ASSESSMENT & PLAN NOTE
Has hx of bradycardia with afib 40-50s. Required TVP prior to recent ERCP. Patient presented with syncope and weakness due to symptomatic bradycardia. Found to be in Afib with slow ventricular response. Was previously transcutaneously paced and transferred for EP eval for leadless Pacemaker. TSH WNL.     Plan:  - pacemaker placement 11/29  - EP following, appreciate recs  - cardiac telemetry  - consider atropine if sarita to 20-30s  - consider trancutaneous pacing if unresponsive to atropine  - held aspirin, eliquis due to potential pacemaker

## 2024-11-28 NOTE — ASSESSMENT & PLAN NOTE
Has hx of bradycardia with afib 40-50s. Required TVP prior to recent ERCP. Patient presented with syncope and weakness due to symptomatic bradycardia. Found to be in Afib with slow ventricular response. Was previously transcutaneously paced and transferred for EP eval for leadless Pacemaker.     - EP consulted for possible pacemaker  - cardiac telemetry  - consider atropine if sarita to 20-30s  - consider trancutaneous pacing if unresponsive to atropine  - held aspirin, eliquis due to potential pacemaker   - NPO

## 2024-11-28 NOTE — SUBJECTIVE & OBJECTIVE
Interval History: No complaints overnight. HR between 47-53 overnight/AM. Pacemaker placement planned for today.     Review of Systems  Objective:     Vital Signs (Most Recent):  Temp: 98.3 °F (36.8 °C) (11/29/24 0822)  Pulse: (!) 51 (11/29/24 0822)  Resp: 17 (11/29/24 0822)  BP: (!) 122/53 (11/29/24 0822)  SpO2: 96 % (11/29/24 0822) Vital Signs (24h Range):  Temp:  [97.6 °F (36.4 °C)-98.4 °F (36.9 °C)] 98.3 °F (36.8 °C)  Pulse:  [47-59] 51  Resp:  [17-19] 17  SpO2:  [95 %-98 %] 96 %  BP: ()/(50-74) 122/53     Weight: 64.7 kg (142 lb 10.2 oz)  Body mass index is 20.47 kg/m².    Intake/Output Summary (Last 24 hours) at 11/29/2024 0845  Last data filed at 11/28/2024 2000  Gross per 24 hour   Intake 702 ml   Output 600 ml   Net 102 ml         Physical Exam  Constitutional:       General: He is not in acute distress.  Cardiovascular:      Rate and Rhythm: Bradycardia present. Rhythm irregular.   Pulmonary:      Effort: Pulmonary effort is normal. No respiratory distress.      Breath sounds: Normal breath sounds. No wheezing or rales.   Abdominal:      General: Abdomen is flat. There is no distension.      Palpations: Abdomen is soft.      Tenderness: There is no abdominal tenderness.   Musculoskeletal:         General: No swelling.      Right lower leg: No edema.      Left lower leg: No edema.   Skin:     General: Skin is warm.   Neurological:      Mental Status: He is alert.             Significant Labs: All pertinent labs within the past 24 hours have been reviewed.  CBC:   Recent Labs   Lab 11/28/24  0525 11/29/24 0256   WBC 5.20 5.70   HGB 11.6* 11.8*   HCT 34.8* 35.5*   * 135*     CMP:   Recent Labs   Lab 11/27/24  1448 11/28/24  0525 11/29/24  0256    137 140   K 4.3 4.3 4.6    107 106   CO2 26 22* 21*   * 137* 149*   BUN 30* 31* 30*   CREATININE 1.6* 1.4 1.3   CALCIUM 9.3 9.1 8.6*   PROT  --  6.2 6.2   ALBUMIN  --  2.9* 2.9*   BILITOT  --  0.5 0.6   ALKPHOS  --  84 80   AST  --   13 14   ALT  --  9* 13   ANIONGAP 10 8 13     TSH:   Recent Labs   Lab 11/23/24  1034   TSH 0.728     Significant Imaging: I have reviewed all pertinent imaging results/findings within the past 24 hours.

## 2024-11-28 NOTE — SUBJECTIVE & OBJECTIVE
Past Medical History:   Diagnosis Date    Acute blood loss anemia 10/14/2019    Alzheimer's dementia     Aneurysm, abdominal aortic     BCC (basal cell carcinoma of skin) 06/29/2023    Chronic diastolic heart failure 05/20/2024    Coronary artery disease     Depression     Diabetes mellitus     HLD (hyperlipidemia)     Hypertension     Kidney stone     PAD (peripheral artery disease)     PAF (paroxysmal atrial fibrillation) 01/24/2023    Tobacco abuse        Past Surgical History:   Procedure Laterality Date    APPENDECTOMY      CORONARY STENT PLACEMENT      x 4    DV5 ROBOTIC CHOLECYSTECTOMY N/A 10/15/2024    Procedure: DV5 ROBOTIC CHOLECYSTECTOMY;  Surgeon: Fred Taylor MD;  Location: [facility] OR;  Service: General;  Laterality: N/A;    ERCP N/A 10/10/2024    Procedure: ERCP (ENDOSCOPIC RETROGRADE CHOLANGIOPANCREATOGRAPHY);  Surgeon: Morgan Hong MD;  Location: [address]);  Service: Endoscopy;  Laterality: N/A;    ESOPHAGOGASTRODUODENOSCOPY N/A 10/14/2019    Procedure: EGD (ESOPHAGOGASTRODUODENOSCOPY);  Surgeon: Carlos Mcneal III, MD;  Location: [facility];  Service: Endoscopy;  Laterality: N/A;    ESOPHAGOGASTRODUODENOSCOPY N/A 10/15/2019    Procedure: EGD (ESOPHAGOGASTRODUODENOSCOPY);  Surgeon: Carlos Mcneal III, MD;  Location: [facility];  Service: Endoscopy;  Laterality: N/A;    ESOPHAGOGASTRODUODENOSCOPY N/A 4/17/2023    Procedure: EGD (ESOPHAGOGASTRODUODENOSCOPY);  Surgeon: Nevaeh Mcconnell MD;  Location: [facility];  Service: Endoscopy;  Laterality: N/A;    INSERTION, PACEMAKER, TEMPORARY TRANSVENOUS N/A 10/15/2024    Procedure: Insertion, Pacemaker, Temporary Transvenous;  Surgeon: Demarcus Kirk MD;  Location: [facility] CATH LAB;  Service: Cardiology;  Laterality: N/A;    LEFT HEART CATHETERIZATION Left 10/11/2019    Procedure: CATHETERIZATION, HEART, LEFT;  Surgeon: Robe Gilbert MD;  Location: [facility] CATH LAB;  Service: Cardiology;  Laterality: Left;    LEFT HEART CATHETERIZATION Left  10/13/2019    Procedure: CATHETERIZATION, HEART, LEFT;  Surgeon: Robe Gilbert MD;  Location: [facility] CATH LAB;  Service: Cardiology;  Laterality: Left;    leg stent Left        Review of patient's allergies indicates:   Allergen Reactions    Metoprolol Other (See Comments)     Junctional rhythm         Current Facility-Administered Medications on File Prior to Encounter   Medication    [COMPLETED] insulin regular injection 10 Units 0.1 mL    [DISCONTINUED] calcium gluconate 1 g in NS IVPB (premixed)    [DISCONTINUED] dextrose 10 % infusion    [DISCONTINUED] dextrose 10% bolus 125 mL 125 mL    [DISCONTINUED] dextrose 10% bolus 250 mL 250 mL    [DISCONTINUED] famotidine tablet 20 mg    [DISCONTINUED] glucagon (human recombinant) injection 1 mg    [DISCONTINUED] glucagon (human recombinant) injection 1 mg    [DISCONTINUED] glucose chewable tablet 16 g    [DISCONTINUED] glucose chewable tablet 24 g    [DISCONTINUED] influenza (adjuvanted) (Fluad) 45 mcg/0.5 mL IM vaccine (> or = 66 yo) 0.5 mL    [DISCONTINUED] insulin aspart U-100 pen 0-10 Units    [DISCONTINUED] insulin aspart U-100 pen 0-5 Units    [DISCONTINUED] isosorbide mononitrate 24 hr tablet 60 mg    [DISCONTINUED] mupirocin 2 % ointment    [DISCONTINUED] sodium chloride 0.9% flush 10 mL    [DISCONTINUED] sodium zirconium cyclosilicate packet 10 g     Current Outpatient Medications on File Prior to Encounter   Medication Sig    alcohol swabs (ALCOHOL PREP PADS) PadM Twice a day    blood sugar diagnostic Strp Test blood sugars twice a day    [START ON 12/4/2024] ELIQUIS 2.5 mg Tab Take 1 tablet (2.5 mg total) by mouth 2 (two) times daily.    gabapentin (NEURONTIN) 100 MG capsule Take 1 capsule (100 mg total) by mouth 2 (two) times daily as needed. PRN    isosorbide mononitrate (IMDUR) 120 MG 24 hr tablet TAKE 1 TABLET BY MOUTH EVERY DAY    lancets (ACCU-CHEK SOFTCLIX LANCETS) Misc Test blood sugars twice a day    memantine (NAMENDA) 10 MG Tab Take 1 tablet (10  mg total) by mouth 2 (two) times daily.    pantoprazole (PROTONIX) 40 MG tablet Take 1 tablet (40 mg total) by mouth once daily.    [START ON 12/4/2024] valsartan (DIOVAN) 160 MG tablet Take 1 tablet (160 mg total) by mouth once daily.     Family History       Problem Relation (Age of Onset)    COPD Father    Diabetes Mother, Brother          Tobacco Use    Smoking status: Former     Types: Cigars     Passive exposure: Never    Smokeless tobacco: Current   Substance and Sexual Activity    Alcohol use: Not Currently    Drug use: Not Currently    Sexual activity: Not Currently     Partners: Female     Review of Systems   Unable to perform ROS: Dementia     Objective:     Vital Signs (Most Recent):  Temp: 97.3 °F (36.3 °C) (11/28/24 0356)  Pulse: (!) 58 (11/28/24 0618)  Resp: 18 (11/28/24 0356)  BP: (!) 113/55 (11/28/24 0356)  SpO2: 96 % (11/28/24 0356) Vital Signs (24h Range):  Temp:  [97.3 °F (36.3 °C)-98.4 °F (36.9 °C)] 97.3 °F (36.3 °C)  Pulse:  [55-86] 58  Resp:  [15-41] 18  SpO2:  [87 %-100 %] 96 %  BP: (105-172)/(52-79) 113/55     Weight: 66.6 kg (146 lb 13.2 oz)  Body mass index is 21.07 kg/m².     Physical Exam  Vitals reviewed.   Constitutional:       Appearance: He is ill-appearing.   HENT:      Head: Normocephalic and atraumatic.      Right Ear: External ear normal.      Left Ear: External ear normal.      Mouth/Throat:      Mouth: Mucous membranes are moist.   Eyes:      Extraocular Movements: Extraocular movements intact.      Conjunctiva/sclera: Conjunctivae normal.   Cardiovascular:      Rate and Rhythm: Bradycardia present. Rhythm irregular.      Heart sounds: No murmur heard.  Pulmonary:      Effort: Pulmonary effort is normal. No respiratory distress.      Breath sounds: Normal breath sounds.   Abdominal:      General: Abdomen is flat. Bowel sounds are normal.      Palpations: Abdomen is soft.      Tenderness: There is no abdominal tenderness.   Musculoskeletal:      Cervical back: Normal range of  motion.      Right lower leg: No edema.      Left lower leg: No edema.   Skin:     General: Skin is warm and dry.   Neurological:      Mental Status: Mental status is at baseline.      Comments: Pleasantly demented                Significant Labs: All pertinent labs within the past 24 hours have been reviewed.    Significant Imaging: I have reviewed all pertinent imaging results/findings within the past 24 hours.

## 2024-11-28 NOTE — H&P
Dario Anaya - Cardiology [facility]  History & Physical    Patient Name: Aquiles Yates  MRN: [id number]  Patient Class: IP- Inpatient  Admission Date: 11/27/2024  Attending Physician: Daniel Avila MD   Primary Care Provider: Holger Thornton MD         Patient information was obtained from patient, EMS personnel, past medical records, and ER records.     Subjective:     Principal Problem:<principal problem not specified>    Chief Complaint: No chief complaint on file.       HPI: Mr. Yates is an 85 yo M with PMHx of CAD, HFpEF, HTN, HLD, Afib on eliquis, T2DM, PVD, Alzheimer's dementia, and choledocholithiasis with recent ERCP in October now admitted to [facility] ICU since November 23rd with symptomatic bradycardia and syncope. Initially EMS noted HR of 20-30s. Patient has notable history for previous admission requiring TVP placement. Eliquis was held in ICU due to plan for leadless pacemaker. Was transferred to [facility] for EP evaluation and possible pacemaker placement.     On my evaluation, patient reports no complaints and is watching TV. EP consult placed and patient kept NPO incase of procedure. Held AC and antiplatelet medications.     Past Medical History:   Diagnosis Date    Acute blood loss anemia 10/14/2019    Alzheimer's dementia     Aneurysm, abdominal aortic     BCC (basal cell carcinoma of skin) 06/29/2023    Chronic diastolic heart failure 05/20/2024    Coronary artery disease     Depression     Diabetes mellitus     HLD (hyperlipidemia)     Hypertension     Kidney stone     PAD (peripheral artery disease)     PAF (paroxysmal atrial fibrillation) 01/24/2023    Tobacco abuse        Past Surgical History:   Procedure Laterality Date    APPENDECTOMY      CORONARY STENT PLACEMENT      x 4    DV5 ROBOTIC CHOLECYSTECTOMY N/A 10/15/2024    Procedure: DV5 ROBOTIC CHOLECYSTECTOMY;  Surgeon: Fred Taylor MD;  Location: [facility] OR;  Service: General;  Laterality:  N/A;    ERCP N/A 10/10/2024    Procedure: ERCP (ENDOSCOPIC RETROGRADE CHOLANGIOPANCREATOGRAPHY);  Surgeon: Morgan Hong MD;  Location: [address]);  Service: Endoscopy;  Laterality: N/A;    ESOPHAGOGASTRODUODENOSCOPY N/A 10/14/2019    Procedure: EGD (ESOPHAGOGASTRODUODENOSCOPY);  Surgeon: Carlos Mcneal III, MD;  Location: [facility];  Service: Endoscopy;  Laterality: N/A;    ESOPHAGOGASTRODUODENOSCOPY N/A 10/15/2019    Procedure: EGD (ESOPHAGOGASTRODUODENOSCOPY);  Surgeon: Carlos Mcneal III, MD;  Location: [facility];  Service: Endoscopy;  Laterality: N/A;    ESOPHAGOGASTRODUODENOSCOPY N/A 4/17/2023    Procedure: EGD (ESOPHAGOGASTRODUODENOSCOPY);  Surgeon: Nevaeh Mcconnell MD;  Location: [facility];  Service: Endoscopy;  Laterality: N/A;    INSERTION, PACEMAKER, TEMPORARY TRANSVENOUS N/A 10/15/2024    Procedure: Insertion, Pacemaker, Temporary Transvenous;  Surgeon: Demarcus Kirk MD;  Location: [facility] CATH LAB;  Service: Cardiology;  Laterality: N/A;    LEFT HEART CATHETERIZATION Left 10/11/2019    Procedure: CATHETERIZATION, HEART, LEFT;  Surgeon: Robe Gilbert MD;  Location: [facility] CATH LAB;  Service: Cardiology;  Laterality: Left;    LEFT HEART CATHETERIZATION Left 10/13/2019    Procedure: CATHETERIZATION, HEART, LEFT;  Surgeon: Robe Gilbert MD;  Location: [facility] CATH LAB;  Service: Cardiology;  Laterality: Left;    leg stent Left        Review of patient's allergies indicates:   Allergen Reactions    Metoprolol Other (See Comments)     Junctional rhythm         Current Facility-Administered Medications on File Prior to Encounter   Medication    [COMPLETED] insulin regular injection 10 Units 0.1 mL    [DISCONTINUED] calcium gluconate 1 g in NS IVPB (premixed)    [DISCONTINUED] dextrose 10 % infusion    [DISCONTINUED] dextrose 10% bolus 125 mL 125 mL    [DISCONTINUED] dextrose 10% bolus 250 mL 250 mL    [DISCONTINUED] famotidine tablet 20 mg    [DISCONTINUED] glucagon (human recombinant)  injection 1 mg    [DISCONTINUED] glucagon (human recombinant) injection 1 mg    [DISCONTINUED] glucose chewable tablet 16 g    [DISCONTINUED] glucose chewable tablet 24 g    [DISCONTINUED] influenza (adjuvanted) (Fluad) 45 mcg/0.5 mL IM vaccine (> or = 64 yo) 0.5 mL    [DISCONTINUED] insulin aspart U-100 pen 0-10 Units    [DISCONTINUED] insulin aspart U-100 pen 0-5 Units    [DISCONTINUED] isosorbide mononitrate 24 hr tablet 60 mg    [DISCONTINUED] mupirocin 2 % ointment    [DISCONTINUED] sodium chloride 0.9% flush 10 mL    [DISCONTINUED] sodium zirconium cyclosilicate packet 10 g     Current Outpatient Medications on File Prior to Encounter   Medication Sig    alcohol swabs (ALCOHOL PREP PADS) PadM Twice a day    blood sugar diagnostic Strp Test blood sugars twice a day    [START ON 12/4/2024] ELIQUIS 2.5 mg Tab Take 1 tablet (2.5 mg total) by mouth 2 (two) times daily.    gabapentin (NEURONTIN) 100 MG capsule Take 1 capsule (100 mg total) by mouth 2 (two) times daily as needed. PRN    isosorbide mononitrate (IMDUR) 120 MG 24 hr tablet TAKE 1 TABLET BY MOUTH EVERY DAY    lancets (ACCU-CHEK SOFTCLIX LANCETS) Misc Test blood sugars twice a day    memantine (NAMENDA) 10 MG Tab Take 1 tablet (10 mg total) by mouth 2 (two) times daily.    pantoprazole (PROTONIX) 40 MG tablet Take 1 tablet (40 mg total) by mouth once daily.    [START ON 12/4/2024] valsartan (DIOVAN) 160 MG tablet Take 1 tablet (160 mg total) by mouth once daily.     Family History       Problem Relation (Age of Onset)    COPD Father    Diabetes Mother, Brother          Tobacco Use    Smoking status: Former     Types: Cigars     Passive exposure: Never    Smokeless tobacco: Current   Substance and Sexual Activity    Alcohol use: Not Currently    Drug use: Not Currently    Sexual activity: Not Currently     Partners: Female     Review of Systems   Unable to perform ROS: Dementia     Objective:     Vital Signs (Most Recent):  Temp: 97.3 °F (36.3 °C)  (11/28/24 0356)  Pulse: (!) 58 (11/28/24 0618)  Resp: 18 (11/28/24 0356)  BP: (!) 113/55 (11/28/24 0356)  SpO2: 96 % (11/28/24 0356) Vital Signs (24h Range):  Temp:  [97.3 °F (36.3 °C)-98.4 °F (36.9 °C)] 97.3 °F (36.3 °C)  Pulse:  [55-86] 58  Resp:  [15-41] 18  SpO2:  [87 %-100 %] 96 %  BP: (105-172)/(52-79) 113/55     Weight: 66.6 kg (146 lb 13.2 oz)  Body mass index is 21.07 kg/m².     Physical Exam  Vitals reviewed.   Constitutional:       Appearance: He is ill-appearing.   HENT:      Head: Normocephalic and atraumatic.      Right Ear: External ear normal.      Left Ear: External ear normal.      Mouth/Throat:      Mouth: Mucous membranes are moist.   Eyes:      Extraocular Movements: Extraocular movements intact.      Conjunctiva/sclera: Conjunctivae normal.   Cardiovascular:      Rate and Rhythm: Bradycardia present. Rhythm irregular.      Heart sounds: No murmur heard.  Pulmonary:      Effort: Pulmonary effort is normal. No respiratory distress.      Breath sounds: Normal breath sounds.   Abdominal:      General: Abdomen is flat. Bowel sounds are normal.      Palpations: Abdomen is soft.      Tenderness: There is no abdominal tenderness.   Musculoskeletal:      Cervical back: Normal range of motion.      Right lower leg: No edema.      Left lower leg: No edema.   Skin:     General: Skin is warm and dry.   Neurological:      Mental Status: Mental status is at baseline.      Comments: Pleasantly demented                Significant Labs: All pertinent labs within the past 24 hours have been reviewed.    Significant Imaging: I have reviewed all pertinent imaging results/findings within the past 24 hours.  Assessment/Plan:     Type 2 diabetes mellitus  Low dose ssi  Q6h glucose checks    Chronic diastolic heart failure  Compensated, GDMT as tolerated. Currently euvolemic and compensated      Dementia  Continue memantine    PAF (paroxysmal atrial fibrillation)  Slow ventricular response, currently in AFib   Holding  Eliquis for PPM  No history of BB or CCB at home    CAD, multiple vessel  Unsure as to why patient is not currently on statin. Consider adding. Will further chart review.     Symptomatic bradycardia  Has hx of bradycardia with afib 40-50s. Required TVP prior to recent ERCP. Patient presented with syncope and weakness due to symptomatic bradycardia. Found to be in Afib with slow ventricular response. Was previously transcutaneously paced and transferred for EP eval for leadless Pacemaker.     - EP consulted for possible pacemaker  - cardiac telemetry  - consider atropine if sarita to 20-30s  - consider trancutaneous pacing if unresponsive to atropine  - held aspirin, eliquis due to potential pacemaker   - NPO      AP (angina pectoris)  Consider restarting imdur if bp can tolerate      Essential hypertension  Patients blood pressure range in the last 24 hours was: BP  Min: 105/52  Max: 172/79.The patient's inpatient anti-hypertensive regimen is listed below:  Current Antihypertensives       Plan  - BP is controlled, no changes needed to their regimen  - patient with low blood pressures    Restart imdur and valsartan as bp tolerates    PAD (peripheral artery disease)  Held aspirin, eliquis due to potential pacemaker        VTE Risk Mitigation (From admission, onward)           Ordered     Reason for No Pharmacological VTE Prophylaxis  Once        Question:  Reasons:  Answer:  Already adequately anticoagulated on oral Anticoagulants    11/27/24 2345     IP VTE HIGH RISK PATIENT  Once         11/27/24 2345     Place sequential compression device  Until discontinued         11/27/24 2345                                    Lisa Bertrand MD  Department of Hospital Medicine  [facility] - Cardiology Stepdown

## 2024-11-29 ENCOUNTER — ANESTHESIA (OUTPATIENT)
Dept: MEDSURG UNIT | Facility: HOSPITAL | Age: 84
End: 2024-11-29
Payer: MEDICARE

## 2024-11-29 ENCOUNTER — ANESTHESIA EVENT (OUTPATIENT)
Dept: MEDSURG UNIT | Facility: HOSPITAL | Age: 84
End: 2024-11-29
Payer: MEDICARE

## 2024-11-29 PROBLEM — N17.9 AKI (ACUTE KIDNEY INJURY): Status: ACTIVE | Noted: 2024-11-29

## 2024-11-29 LAB
ALBUMIN SERPL BCP-MCNC: 2.9 G/DL (ref 3.5–5.2)
ALP SERPL-CCNC: 80 U/L (ref 40–150)
ALT SERPL W/O P-5'-P-CCNC: 13 U/L (ref 10–44)
ANION GAP SERPL CALC-SCNC: 13 MMOL/L (ref 8–16)
AST SERPL-CCNC: 14 U/L (ref 10–40)
BASOPHILS # BLD AUTO: 0.04 K/UL (ref 0–0.2)
BASOPHILS NFR BLD: 0.7 % (ref 0–1.9)
BILIRUB SERPL-MCNC: 0.6 MG/DL (ref 0.1–1)
BUN SERPL-MCNC: 30 MG/DL (ref 8–23)
CALCIUM SERPL-MCNC: 8.6 MG/DL (ref 8.7–10.5)
CHLORIDE SERPL-SCNC: 106 MMOL/L (ref 95–110)
CO2 SERPL-SCNC: 21 MMOL/L (ref 23–29)
CREAT SERPL-MCNC: 1.3 MG/DL (ref 0.5–1.4)
DIFFERENTIAL METHOD BLD: ABNORMAL
EOSINOPHIL # BLD AUTO: 0.5 K/UL (ref 0–0.5)
EOSINOPHIL NFR BLD: 9.5 % (ref 0–8)
ERYTHROCYTE [DISTWIDTH] IN BLOOD BY AUTOMATED COUNT: 14.1 % (ref 11.5–14.5)
EST. GFR  (NO RACE VARIABLE): 53.8 ML/MIN/1.73 M^2
GLUCOSE SERPL-MCNC: 149 MG/DL (ref 70–110)
HCT VFR BLD AUTO: 35.5 % (ref 40–54)
HGB BLD-MCNC: 11.8 G/DL (ref 14–18)
IMM GRANULOCYTES # BLD AUTO: 0.01 K/UL (ref 0–0.04)
IMM GRANULOCYTES NFR BLD AUTO: 0.2 % (ref 0–0.5)
LYMPHOCYTES # BLD AUTO: 1.2 K/UL (ref 1–4.8)
LYMPHOCYTES NFR BLD: 20.7 % (ref 18–48)
MAGNESIUM SERPL-MCNC: 2 MG/DL (ref 1.6–2.6)
MCH RBC QN AUTO: 29.1 PG (ref 27–31)
MCHC RBC AUTO-ENTMCNC: 33.2 G/DL (ref 32–36)
MCV RBC AUTO: 87 FL (ref 82–98)
MONOCYTES # BLD AUTO: 0.6 K/UL (ref 0.3–1)
MONOCYTES NFR BLD: 9.8 % (ref 4–15)
NEUTROPHILS # BLD AUTO: 3.4 K/UL (ref 1.8–7.7)
NEUTROPHILS NFR BLD: 59.1 % (ref 38–73)
NRBC BLD-RTO: 0 /100 WBC
OHS QRS DURATION: 88 MS
OHS QTC CALCULATION: 410 MS
PHOSPHATE SERPL-MCNC: 3.7 MG/DL (ref 2.7–4.5)
PLATELET # BLD AUTO: 135 K/UL (ref 150–450)
PMV BLD AUTO: 11 FL (ref 9.2–12.9)
POCT GLUCOSE: 142 MG/DL (ref 70–110)
POTASSIUM SERPL-SCNC: 4.6 MMOL/L (ref 3.5–5.1)
PROT SERPL-MCNC: 6.2 G/DL (ref 6–8.4)
RBC # BLD AUTO: 4.06 M/UL (ref 4.6–6.2)
SODIUM SERPL-SCNC: 140 MMOL/L (ref 136–145)
WBC # BLD AUTO: 5.7 K/UL (ref 3.9–12.7)

## 2024-11-29 PROCEDURE — 93005 ELECTROCARDIOGRAM TRACING: CPT

## 2024-11-29 PROCEDURE — 80053 COMPREHEN METABOLIC PANEL: CPT

## 2024-11-29 PROCEDURE — 37000009 HC ANESTHESIA EA ADD 15 MINS: Mod: HCNC | Performed by: STUDENT IN AN ORGANIZED HEALTH CARE EDUCATION/TRAINING PROGRAM

## 2024-11-29 PROCEDURE — 94761 N-INVAS EAR/PLS OXIMETRY MLT: CPT

## 2024-11-29 PROCEDURE — 85025 COMPLETE CBC W/AUTO DIFF WBC: CPT

## 2024-11-29 PROCEDURE — 99499 UNLISTED E&M SERVICE: CPT | Mod: ,,, | Performed by: STUDENT IN AN ORGANIZED HEALTH CARE EDUCATION/TRAINING PROGRAM

## 2024-11-29 PROCEDURE — 36415 COLL VENOUS BLD VENIPUNCTURE: CPT

## 2024-11-29 PROCEDURE — 20600001 HC STEP DOWN PRIVATE ROOM

## 2024-11-29 PROCEDURE — C1894 INTRO/SHEATH, NON-LASER: HCPCS | Mod: HCNC | Performed by: STUDENT IN AN ORGANIZED HEALTH CARE EDUCATION/TRAINING PROGRAM

## 2024-11-29 PROCEDURE — 82962 GLUCOSE BLOOD TEST: CPT | Mod: HCNC | Performed by: STUDENT IN AN ORGANIZED HEALTH CARE EDUCATION/TRAINING PROGRAM

## 2024-11-29 PROCEDURE — 84100 ASSAY OF PHOSPHORUS: CPT

## 2024-11-29 PROCEDURE — 93010 ELECTROCARDIOGRAM REPORT: CPT | Mod: ,,, | Performed by: INTERNAL MEDICINE

## 2024-11-29 PROCEDURE — 02HK3NZ INSERTION OF INTRACARDIAC PACEMAKER INTO RIGHT VENTRICLE, PERCUTANEOUS APPROACH: ICD-10-PCS | Performed by: STUDENT IN AN ORGANIZED HEALTH CARE EDUCATION/TRAINING PROGRAM

## 2024-11-29 PROCEDURE — 25000003 PHARM REV CODE 250

## 2024-11-29 PROCEDURE — 63600175 PHARM REV CODE 636 W HCPCS

## 2024-11-29 PROCEDURE — 63600175 PHARM REV CODE 636 W HCPCS: Performed by: STUDENT IN AN ORGANIZED HEALTH CARE EDUCATION/TRAINING PROGRAM

## 2024-11-29 PROCEDURE — C1730 CATH, EP, 19 OR FEW ELECT: HCPCS | Mod: HCNC | Performed by: STUDENT IN AN ORGANIZED HEALTH CARE EDUCATION/TRAINING PROGRAM

## 2024-11-29 PROCEDURE — C1769 GUIDE WIRE: HCPCS | Mod: HCNC | Performed by: STUDENT IN AN ORGANIZED HEALTH CARE EDUCATION/TRAINING PROGRAM

## 2024-11-29 PROCEDURE — 25500020 PHARM REV CODE 255: Performed by: STUDENT IN AN ORGANIZED HEALTH CARE EDUCATION/TRAINING PROGRAM

## 2024-11-29 PROCEDURE — 37000008 HC ANESTHESIA 1ST 15 MINUTES: Mod: HCNC | Performed by: STUDENT IN AN ORGANIZED HEALTH CARE EDUCATION/TRAINING PROGRAM

## 2024-11-29 PROCEDURE — 83735 ASSAY OF MAGNESIUM: CPT

## 2024-11-29 PROCEDURE — 33274 TCAT INSJ/RPL PERM LDLS PM: CPT | Mod: HCNC,,, | Performed by: STUDENT IN AN ORGANIZED HEALTH CARE EDUCATION/TRAINING PROGRAM

## 2024-11-29 PROCEDURE — C1785 PMKR, DUAL, RATE-RESP: HCPCS | Mod: HCNC | Performed by: STUDENT IN AN ORGANIZED HEALTH CARE EDUCATION/TRAINING PROGRAM

## 2024-11-29 PROCEDURE — 25000003 PHARM REV CODE 250: Performed by: STUDENT IN AN ORGANIZED HEALTH CARE EDUCATION/TRAINING PROGRAM

## 2024-11-29 PROCEDURE — 33274 TCAT INSJ/RPL PERM LDLS PM: CPT | Mod: HCNC | Performed by: STUDENT IN AN ORGANIZED HEALTH CARE EDUCATION/TRAINING PROGRAM

## 2024-11-29 DEVICE — TPS MC2AVR1 MICRA AV2 US
Type: IMPLANTABLE DEVICE | Site: HEART | Status: FUNCTIONAL
Brand: MICRA™ AV2

## 2024-11-29 RX ORDER — HEPARIN SODIUM 1000 [USP'U]/ML
INJECTION, SOLUTION INTRAVENOUS; SUBCUTANEOUS
Status: DISCONTINUED | OUTPATIENT
Start: 2024-11-29 | End: 2024-11-29

## 2024-11-29 RX ORDER — PROPOFOL 10 MG/ML
VIAL (ML) INTRAVENOUS CONTINUOUS PRN
Status: DISCONTINUED | OUTPATIENT
Start: 2024-11-29 | End: 2024-11-29

## 2024-11-29 RX ORDER — HEPARIN SOD,PORCINE/0.9 % NACL 1000/500ML
INTRAVENOUS SOLUTION INTRAVENOUS
Status: DISCONTINUED | OUTPATIENT
Start: 2024-11-29 | End: 2024-11-30 | Stop reason: HOSPADM

## 2024-11-29 RX ORDER — EPHEDRINE SULFATE 50 MG/ML
INJECTION, SOLUTION INTRAVENOUS
Status: DISCONTINUED | OUTPATIENT
Start: 2024-11-29 | End: 2024-11-29

## 2024-11-29 RX ORDER — GLUCAGON 1 MG
1 KIT INJECTION
Status: DISCONTINUED | OUTPATIENT
Start: 2024-11-29 | End: 2024-11-30 | Stop reason: HOSPADM

## 2024-11-29 RX ORDER — SODIUM CHLORIDE 0.9 % (FLUSH) 0.9 %
3 SYRINGE (ML) INJECTION
Status: DISCONTINUED | OUTPATIENT
Start: 2024-11-29 | End: 2024-11-30 | Stop reason: HOSPADM

## 2024-11-29 RX ORDER — FENTANYL CITRATE 50 UG/ML
INJECTION, SOLUTION INTRAMUSCULAR; INTRAVENOUS
Status: DISCONTINUED | OUTPATIENT
Start: 2024-11-29 | End: 2024-11-29

## 2024-11-29 RX ORDER — IODIXANOL 320 MG/ML
INJECTION, SOLUTION INTRAVASCULAR
Status: DISCONTINUED | OUTPATIENT
Start: 2024-11-29 | End: 2024-11-30 | Stop reason: HOSPADM

## 2024-11-29 RX ORDER — CEFAZOLIN SODIUM 1 G/3ML
INJECTION, POWDER, FOR SOLUTION INTRAMUSCULAR; INTRAVENOUS
Status: DISCONTINUED | OUTPATIENT
Start: 2024-11-29 | End: 2024-11-29

## 2024-11-29 RX ORDER — LIDOCAINE HYDROCHLORIDE 20 MG/ML
INJECTION, SOLUTION INFILTRATION; PERINEURAL
Status: DISCONTINUED | OUTPATIENT
Start: 2024-11-29 | End: 2024-11-30 | Stop reason: HOSPADM

## 2024-11-29 RX ADMIN — PANTOPRAZOLE SODIUM 40 MG: 40 TABLET, DELAYED RELEASE ORAL at 01:11

## 2024-11-29 RX ADMIN — GABAPENTIN 100 MG: 100 CAPSULE ORAL at 01:11

## 2024-11-29 RX ADMIN — SODIUM CHLORIDE: 9 INJECTION, SOLUTION INTRAVENOUS at 10:11

## 2024-11-29 RX ADMIN — EPHEDRINE SULFATE 10 MG: 50 INJECTION INTRAVENOUS at 10:11

## 2024-11-29 RX ADMIN — MEMANTINE 10 MG: 10 TABLET ORAL at 01:11

## 2024-11-29 RX ADMIN — FENTANYL CITRATE 25 MCG: 0.05 INJECTION, SOLUTION INTRAMUSCULAR; INTRAVENOUS at 11:11

## 2024-11-29 RX ADMIN — EPHEDRINE SULFATE 5 MG: 50 INJECTION INTRAVENOUS at 11:11

## 2024-11-29 RX ADMIN — MEMANTINE 10 MG: 10 TABLET ORAL at 08:11

## 2024-11-29 RX ADMIN — HEPARIN SODIUM 2000 UNITS: 1000 INJECTION, SOLUTION INTRAVENOUS; SUBCUTANEOUS at 11:11

## 2024-11-29 RX ADMIN — CEFAZOLIN 2 G: 330 INJECTION, POWDER, FOR SOLUTION INTRAMUSCULAR; INTRAVENOUS at 10:11

## 2024-11-29 RX ADMIN — GABAPENTIN 100 MG: 100 CAPSULE ORAL at 08:11

## 2024-11-29 RX ADMIN — PROPOFOL 100 MCG/KG/MIN: 10 INJECTION, EMULSION INTRAVENOUS at 10:11

## 2024-11-29 NOTE — TRANSFER OF CARE
Anesthesia Transfer of Care Note    Patient: Aquiles Yates    Procedure(s) Performed: Procedure(s) (LRB):  [id number] (N/A)    Patient location: PACU    Anesthesia Type: general    Transport from OR: Transported from OR on 6-10 L/min O2 by face mask with adequate spontaneous ventilation    Post pain: adequate analgesia    Post assessment: no apparent anesthetic complications and tolerated procedure well    Post vital signs: stable    Level of consciousness: awake    Nausea/Vomiting: no nausea/vomiting    Complications: none    Transfer of care protocol was followed      Last vitals: Visit Vitals  BP (!) 122/53 (BP Location: Right arm, Patient Position: Lying)   Pulse (!) 51   Temp 36.8 °C (98.3 °F) (Oral)   Resp 17   Wt 64.7 kg (142 lb 10.2 oz)   SpO2 96%   BMI 20.47 kg/m²

## 2024-11-29 NOTE — PLAN OF CARE
Problem: Adult Inpatient Plan of Care  Goal: Plan of Care Review  Outcome: Progressing  Goal: Patient-Specific Goal (Individualized)  Outcome: Progressing  Goal: Absence of Hospital-Acquired Illness or Injury  Outcome: Progressing  Goal: Optimal Comfort and Wellbeing  Outcome: Progressing  Goal: Readiness for Transition of Care  Outcome: Progressing     Problem: Diabetes Comorbidity  Goal: Blood Glucose Level Within Targeted Range  Outcome: Progressing     Problem: Fall Injury Risk  Goal: Absence of Fall and Fall-Related Injury  Outcome: Progressing     Problem: Skin Injury Risk Increased  Goal: Skin Health and Integrity  Outcome: Progressing     Problem: Acute Kidney Injury/Impairment  Goal: Fluid and Electrolyte Balance  Outcome: Progressing  Goal: Improved Oral Intake  Outcome: Progressing  Goal: Effective Renal Function  Outcome: Progressing     Problem: Wound  Goal: Optimal Coping  Outcome: Progressing  Goal: Optimal Functional Ability  Outcome: Progressing  Goal: Absence of Infection Signs and Symptoms  Outcome: Progressing  Goal: Improved Oral Intake  Outcome: Progressing  Goal: Optimal Pain Control and Function  Outcome: Progressing  Goal: Skin Health and Integrity  Outcome: Progressing  Goal: Optimal Wound Healing  Outcome: Progressing

## 2024-11-29 NOTE — SUBJECTIVE & OBJECTIVE
Interval History: No Acute Events Overnight. He is NPO      Past Medical History:   Diagnosis Date    Acute blood loss anemia 10/14/2019    Alzheimer's dementia     Aneurysm, abdominal aortic     BCC (basal cell carcinoma of skin) 06/29/2023    Chronic diastolic heart failure 05/20/2024    Coronary artery disease     Depression     Diabetes mellitus     HLD (hyperlipidemia)     Hypertension     Kidney stone     PAD (peripheral artery disease)     PAF (paroxysmal atrial fibrillation) 01/24/2023    Tobacco abuse        Past Surgical History:   Procedure Laterality Date    APPENDECTOMY      CORONARY STENT PLACEMENT      x 4    DV5 ROBOTIC CHOLECYSTECTOMY N/A 10/15/2024    Procedure: DV5 ROBOTIC CHOLECYSTECTOMY;  Surgeon: Fred Taylor MD;  Location: [facility] OR;  Service: General;  Laterality: N/A;    ERCP N/A 10/10/2024    Procedure: ERCP (ENDOSCOPIC RETROGRADE CHOLANGIOPANCREATOGRAPHY);  Surgeon: Morgan Hong MD;  [facility] [place] [address]);  Service: Endoscopy;  Laterality: N/A;    ESOPHAGOGASTRODUODENOSCOPY N/A 10/14/2019    Procedure: EGD (ESOPHAGOGASTRODUODENOSCOPY);  Surgeon: Carlos Mcneal III, MD;  Location: [facility];  Service: Endoscopy;  Laterality: N/A;    ESOPHAGOGASTRODUODENOSCOPY N/A 10/15/2019    Procedure: EGD (ESOPHAGOGASTRODUODENOSCOPY);  Surgeon: Carlos Mcneal III, MD;  Location: [facility];  Service: Endoscopy;  Laterality: N/A;    ESOPHAGOGASTRODUODENOSCOPY N/A 4/17/2023    Procedure: EGD (ESOPHAGOGASTRODUODENOSCOPY);  Surgeon: Nevaeh Mcconnell MD;  Location: [facility];  Service: Endoscopy;  Laterality: N/A;    INSERTION, PACEMAKER, TEMPORARY TRANSVENOUS N/A 10/15/2024    Procedure: Insertion, Pacemaker, Temporary Transvenous;  Surgeon: Demarcus Kirk MD;  Location: [facility] CATH LAB;  Service: Cardiology;  Laterality: N/A;    LEFT HEART CATHETERIZATION Left 10/11/2019    Procedure: CATHETERIZATION, HEART, LEFT;  Surgeon: Robe Gilbert MD;  Location: [facility] CATH LAB;  Service:  Cardiology;  Laterality: Left;    LEFT HEART CATHETERIZATION Left 10/13/2019    Procedure: CATHETERIZATION, HEART, LEFT;  Surgeon: Robe Gilbert MD;  Location: [facility] CATH LAB;  Service: Cardiology;  Laterality: Left;    leg stent Left        Review of patient's allergies indicates:   Allergen Reactions    Metoprolol Other (See Comments)     Junctional rhythm         No current facility-administered medications on file prior to encounter.     Current Outpatient Medications on File Prior to Encounter   Medication Sig    alcohol swabs (ALCOHOL PREP PADS) PadM Twice a day    blood sugar diagnostic Strp Test blood sugars twice a day    [START ON 12/4/2024] ELIQUIS 2.5 mg Tab Take 1 tablet (2.5 mg total) by mouth 2 (two) times daily.    gabapentin (NEURONTIN) 100 MG capsule Take 1 capsule (100 mg total) by mouth 2 (two) times daily as needed. PRN    isosorbide mononitrate (IMDUR) 120 MG 24 hr tablet TAKE 1 TABLET BY MOUTH EVERY DAY    lancets (ACCU-CHEK SOFTCLIX LANCETS) Misc Test blood sugars twice a day    memantine (NAMENDA) 10 MG Tab Take 1 tablet (10 mg total) by mouth 2 (two) times daily.    pantoprazole (PROTONIX) 40 MG tablet Take 1 tablet (40 mg total) by mouth once daily.    [START ON 12/4/2024] valsartan (DIOVAN) 160 MG tablet Take 1 tablet (160 mg total) by mouth once daily.     Family History       Problem Relation (Age of Onset)    COPD Father    Diabetes Mother, Brother          Tobacco Use    Smoking status: Former     Types: Cigars     Passive exposure: Never    Smokeless tobacco: Current   Substance and Sexual Activity    Alcohol use: Not Currently    Drug use: Not Currently    Sexual activity: Not Currently     Partners: Female     Review of Systems   Unable to perform ROS: Dementia     Objective:     Vital Signs (Most Recent):  Temp: 97.9 °F (36.6 °C) (11/29/24 0338)  Pulse: (!) 53 (11/29/24 0338)  Resp: 18 (11/29/24 0338)  BP: (!) 146/74 (11/29/24 0338)  SpO2: 98 % (11/29/24 0338) Vital Signs (24h  Range):  Temp:  [97.6 °F (36.4 °C)-98.4 °F (36.9 °C)] 97.9 °F (36.6 °C)  Pulse:  [47-59] 53  Resp:  [18-19] 18  SpO2:  [93 %-98 %] 98 %  BP: ()/(50-74) 146/74     Weight: 64.7 kg (142 lb 10.2 oz)  Body mass index is 20.47 kg/m².     Physical Exam  Vitals reviewed.   Constitutional:       Appearance: He is ill-appearing.   HENT:      Head: Normocephalic and atraumatic.      Right Ear: External ear normal.      Left Ear: External ear normal.      Mouth/Throat:      Mouth: Mucous membranes are moist.   Eyes:      Extraocular Movements: Extraocular movements intact.      Conjunctiva/sclera: Conjunctivae normal.   Cardiovascular:      Rate and Rhythm: Bradycardia present. Rhythm irregular.      Heart sounds: No murmur heard.  Pulmonary:      Effort: Pulmonary effort is normal. No respiratory distress.      Breath sounds: Normal breath sounds.   Abdominal:      General: Abdomen is flat. Bowel sounds are normal.      Palpations: Abdomen is soft.      Tenderness: There is no abdominal tenderness.   Musculoskeletal:      Cervical back: Normal range of motion.      Right lower leg: No edema.      Left lower leg: No edema.   Skin:     General: Skin is warm and dry.   Neurological:      Mental Status: Mental status is at baseline.                Significant Labs: All pertinent labs within the past 24 hours have been reviewed.    Significant Imaging: I have reviewed all pertinent imaging results/findings within the past 24 hours.    Echo: 11/23/24    Left Ventricle: The left ventricle is normal in size. Normal wall thickness. There is concentric hypertrophy. Normal wall motion. There is normal systolic function with a visually estimated ejection fraction of 55 - 60%. There is indeterminate diastolic function.    Right Ventricle: Systolic function is normal.    Left Atrium: Left atrium is mildly dilated.    Pulmonary Artery: The estimated pulmonary artery systolic pressure is 23 mmHg.    IVC/SVC: Normal venous pressure at 3  mmHg.    [facility] 10/13/19  1.   Successful PCI 99% proximal RCA stenosis wtih Stent Diego KYLIE x 2 overlapped.                 2.  Successful PTCA 99% diffuse ISR mid RCA.                 3.  Unsucessful PCI  distal RCA.

## 2024-11-29 NOTE — ASSESSMENT & PLAN NOTE
Current Antihypertensives       Plan  - patient with low blood pressures  Restart imdur and valsartan as bp tolerates

## 2024-11-29 NOTE — NURSING TRANSFER
Nursing Transfer Note      11/29/2024   1:25 PM    Reason patient is being transferred: post procedure     Transfer To: [id number]    Transfer via bed    Transfer with cardiac monitoring via centralized telemetry     Transported by transport     Chart send with patient: Yes    Notified: spouse via text messaging system    Patient reassessed at: 11/29/2024 5096

## 2024-11-29 NOTE — ANESTHESIA POSTPROCEDURE EVALUATION
Anesthesia Post Evaluation    Patient: Aquiles Yates    Procedure(s) Performed: Procedure(s) (LRB):  [id number] (N/A)    Final Anesthesia Type: general      Patient location during evaluation: PACU  Patient participation: Yes- Able to Participate  Level of consciousness: awake and alert  Post-procedure vital signs: reviewed and stable  Pain management: adequate  Airway patency: patent    PONV status at discharge: No PONV  Anesthetic complications: no      Cardiovascular status: blood pressure returned to baseline  Respiratory status: unassisted  Hydration status: euvolemic  Follow-up not needed.              Vitals Value Taken Time   /65 11/29/24 1208   Temp 98 11/29/24 1214   Pulse 56 11/29/24 1213   Resp 17 11/29/24 1213   SpO2 98 % 11/29/24 1213   Vitals shown include unfiled device data.      No case tracking events are documented in the log.      Pain/Carmen Score: No data recorded

## 2024-11-29 NOTE — ANESTHESIA PREPROCEDURE EVALUATION
[facility]  Anesthesia Pre-Operative Evaluation         Patient Name: Aquiles Yates  YOB: 1940  MRN: [id number]    SUBJECTIVE:     Pre-operative Evaluation for Procedure(s) (LRB):  [id number] (N/A)     11/29/2024    Aquiles Yates is a 84 y.o. male with a PMHx significant for Afib on eliquis, CAD, HFpEF, HTN, HLD, T2DM, PAD, previous smoker, Alzheimer's dementia, and choledocholithiasis with recent ERCP 10/15/24 now with symptomatic bradycardia.     Level of Care: Floor   Hemodynamic Status: 97 - 146 / 53 - 74  Respiratory Status: RA   IV Access: 20G PIV x2  NPO Status: NPO since MN    Previous Airway:   Intubation:     Induction:  Intravenous    Intubated:  Postinduction    Mask Ventilation:  Easy mask    Attempts:  1    Attempted By:  CRNA    Method of Intubation:  Direct    Blade:  Claros 2    Laryngeal View Grade: Grade I - full view of cords      Difficult Airway Encountered?: No      Complications:  None    Airway Device:  Oral endotracheal tube    Airway Device Size:  7.5    Style/Cuff Inflation:  Cuffed (inflated to minimal occlusive pressure)    Tube secured:  22    Secured at:  The lips    Placement Verified By:  Capnometry    Complicating Factors:  None    Findings Post-Intubation:  BS equal bilateral       LDA:        Peripheral IV - Single Lumen 11/23/24 1352 22 G Left;Posterior Hand (Active)   Site Assessment Clean;Intact;Dry 11/29/24 0400   Line Securement Device Secured with sutureless device 11/28/24 1915   Extremity Assessment Distal to IV No abnormal discoloration 11/28/24 1915   Line Status Saline locked 11/29/24 0400   Dressing Status Clean;Dry;Intact 11/29/24 0400   Dressing Intervention Integrity maintained 11/29/24 0400   Dressing Change Due 11/27/24 11/27/24 2302   Site Change Due 11/27/24 11/27/24 2302   Reason Not Rotated Not due 11/27/24 2302   Number of days: 5            Peripheral IV - Single Lumen 11/24/24 1015 20 G  Posterior;Right Forearm (Active)   Site Assessment Clean;Dry;Intact 11/29/24 0400   Line Securement Device Secured with sutureless device 11/28/24 1915   Extremity Assessment Distal to IV No abnormal discoloration 11/28/24 1915   Line Status Saline locked 11/29/24 0400   Dressing Status Clean;Dry;Intact 11/29/24 0400   Dressing Intervention Integrity maintained 11/29/24 0400   Dressing Change Due 11/28/24 11/27/24 2302   Site Change Due 11/28/24 11/27/24 2302   Reason Not Rotated Not due 11/27/24 2302   Number of days: 4       Male External Urine Management Device w/ Suction 11/24/24 1000 Other (Comment) (Active)   Collection Container Other (Comment) 11/28/24 1915   Securement Method secured to lower ABD w/ adhesive device 11/28/24 1915   Skin no redness;no breakdown 11/28/24 1915   Tolerance no signs/symptoms of discomfort 11/28/24 1915   Suction Continuous suction at 60 mmHg 11/28/24 1915   Output (mL) 0 mL 11/26/24 1905   Catheter Change Date 11/27/24 11/27/24 2302   Catheter Change Time 2350 11/27/24 2302   Number of days: 4       Drips: None documente        Patient Active Problem List   Diagnosis    Coronary artery disease of native artery of native heart with stable angina pectoris    S/P PTCA (percutaneous transluminal coronary angioplasty)    PAD (peripheral artery disease)    S/P AAA repair    Essential hypertension    Dyslipidemia    Type 2 diabetes mellitus with other specified complication, unspecified whether long term insulin use    Decreased hearing of both ears    Abnormal ECG    History of PTCA    PAC (premature atrial contraction)    Claudication in peripheral vascular disease    Nonrheumatic aortic valve insufficiency    NSTEMI (non-ST elevated myocardial infarction)    AP (angina pectoris)    History of tobacco abuse/stopped 12/2023    Symptomatic bradycardia    CAD, multiple vessel    Ischemic cardiomyopathy    History of GI bleed    LAE (left atrial enlargement)    LVH (left ventricular  hypertrophy)    Weakness    Memory deficit    Chronic pain    Atrial fibrillation with slow ventricular response    Anticoagulated    Hypertensive heart disease with heart failure    AVM (arteriovenous malformation) of stomach, acquired with hemorrhage    Acute encephalopathy    Anemia    BCC (basal cell carcinoma of skin)    B12 deficiency    Folate deficiency    Dementia    Centrilobular emphysema    History of CVA (cerebrovascular accident)    Chronic diastolic heart failure    Calculus of bile duct without cholecystitis with obstruction    Severe protein-calorie malnutrition    Hx of cholecystectomy    Type 2 diabetes mellitus       Review of patient's allergies indicates:   Allergen Reactions    Metoprolol Other (See Comments)     Junctional rhythm         Current Inpatient Medications:   gabapentin  100 mg Oral BID    memantine  10 mg Oral BID    pantoprazole  40 mg Oral Daily       Past Surgical History:   Procedure Laterality Date    APPENDECTOMY      CORONARY STENT PLACEMENT      x 4    DV5 ROBOTIC CHOLECYSTECTOMY N/A 10/15/2024    Procedure: DV5 ROBOTIC CHOLECYSTECTOMY;  Surgeon: Fred Taylor MD;  Location[facility];  Service: General;  Laterality: N/A;    ERCP N/A 10/10/2024    Procedure: ERCP (ENDOSCOPIC RETROGRADE CHOLANGIOPANCREATOGRAPHY);  Surgeon: Morgan Hong MD;  Location: [address]);  Service: Endoscopy;  Laterality: N/A;    ESOPHAGOGASTRODUODENOSCOPY N/A 10/14/2019    Procedure: EGD (ESOPHAGOGASTRODUODENOSCOPY);  Surgeon: Carlos Mcneal III, MD;  Location: [facility];  Service: Endoscopy;  Laterality: N/A;    ESOPHAGOGASTRODUODENOSCOPY N/A 10/15/2019    Procedure: EGD (ESOPHAGOGASTRODUODENOSCOPY);  Surgeon: Carlos Mcneal III, MD;  Location: [facility];  Service: Endoscopy;  Laterality: N/A;    ESOPHAGOGASTRODUODENOSCOPY N/A 4/17/2023    Procedure: EGD (ESOPHAGOGASTRODUODENOSCOPY);  Surgeon: Nevaeh Mcconnell MD;  Location: [facility];  Service: Endoscopy;  Laterality: N/A;     INSERTION, PACEMAKER, TEMPORARY TRANSVENOUS N/A 10/15/2024    Procedure: Insertion, Pacemaker, Temporary Transvenous;  Surgeon: Demarcus Kirk MD;  Location: [facility] CATH LAB;  Service: Cardiology;  Laterality: N/A;    LEFT HEART CATHETERIZATION Left 10/11/2019    Procedure: CATHETERIZATION, HEART, LEFT;  Surgeon: Robe Gilbert MD;  Location: [facility] CATH LAB;  Service: Cardiology;  Laterality: Left;    LEFT HEART CATHETERIZATION Left 10/13/2019    Procedure: CATHETERIZATION, HEART, LEFT;  Surgeon: Robe Gilbert MD;  Location: [facility] CATH LAB;  Service: Cardiology;  Laterality: Left;    leg stent Left        Social History     Substance and Sexual Activity   Drug Use Not Currently     Tobacco Use: High Risk (11/6/2024)    Patient History     Smoking Tobacco Use: Former     Smokeless Tobacco Use: Current     Passive Exposure: Never     Alcohol Use: Not At Risk (6/29/2023)    AUDIT-C     Frequency of Alcohol Consumption: Never     Average Number of Drinks: Patient does not drink     Frequency of Binge Drinking: Never       OBJECTIVE:     Vital Signs Range (Last 24H):  Temp:  [36.4 °C (97.6 °F)-36.9 °C (98.4 °F)]   Pulse:  [47-59]   Resp:  [18-19]   BP: ()/(50-74)   SpO2:  [95 %-98 %]       Significant Labs    Heme Profile  Lab Results   Component Value Date    WBC 5.70 11/29/2024    HGB 11.8 (L) 11/29/2024    HCT 35.5 (L) 11/29/2024     (L) 11/29/2024       Coagulation Studies  Lab Results   Component Value Date    LABPROT 11.4 11/28/2024    INR 1.0 11/28/2024    APTT 27.2 11/28/2024       Metabolic Profile  Lab Results   Component Value Date     11/29/2024    K 4.6 11/29/2024     11/29/2024    CO2 21 (L) 11/29/2024    BUN 30 (H) 11/29/2024    CREATININE 1.3 11/29/2024    MG 2.0 11/29/2024    PHOS 3.7 11/29/2024       Liver Function Tests  Lab Results   Component Value Date    AST 14 11/29/2024    ALT 13 11/29/2024    ALKPHOS 80 11/29/2024    BILITOT 0.6 11/29/2024    PROT 6.2 11/29/2024     ALBUMIN 2.9 (L) 11/29/2024       Lipid Profile  Lab Results   Component Value Date    CHOL 150 05/07/2024    HDL 36 (L) 05/07/2024    TRIG 99 05/07/2024       Endocrine Profile  Lab Results   Component Value Date    HGBA1C 6.3 (H) 10/28/2024    TSH 0.728 11/23/2024       Diagnostic Studies        EKG:   Results for orders placed or performed during the hospital encounter of 11/23/24   EKG 12-lead    Collection Time: 11/23/24 10:22 AM   Result Value Ref Range    QRS Duration 92 ms    OHS QTC Calculation 392 ms    Narrative    Test Reason : R53.1,    Vent. Rate :  46 BPM     Atrial Rate :    BPM     P-R Int :    ms          QRS Dur :  92 ms      QT Int : 448 ms       P-R-T Axes :    -19 267 degrees    QTcB Int : 392 ms    Afib with slow ventricular response  Confirmed by Ben Flanagan ([id number]) on 11/25/2024 8:43:58 AM    Referred By: AAAREFERRAL SELF           Confirmed By: Ben Flanagan       TTE   Results for orders placed during the hospital encounter of 11/23/24    Echo    Interpretation Summary    Left Ventricle: The left ventricle is normal in size. Normal wall thickness. There is concentric hypertrophy. Normal wall motion. There is normal systolic function with a visually estimated ejection fraction of 55 - 60%. There is indeterminate diastolic function.    Right Ventricle: Systolic function is normal.    Left Atrium: Left atrium is mildly dilated.    Pulmonary Artery: The estimated pulmonary artery systolic pressure is 23 mmHg.    IVC/SVC: Normal venous pressure at 3 mmHg.        Nuclear Stress Echo   Results for orders placed during the hospital encounter of 09/02/20    Nuclear Stress - Cardiology Interpreted    Interpretation Summary    Abnormal myocardial perfusion study.    Perfusion Defect There is a severe intensity, fixed defect consistent with scar  in the basal to distal inferolateral wall(s) with mild pato-infarct ischemia.    Gated perfusion images showed an ejection fraction of 42% at rest and  53% post stress. Normal ejection fraction is greater than 53%.    The EKG portion of this study is negative for ischemia.    There were no arrhythmias during stress.          ASSESSMENT/PLAN:     Aquiles Yates is a 84 y.o. male with Afib on eliquis, CAD, HFpEF, HTN, HLD, T2DM, PAD, previous smoker, Alzheimer's dementia, and choledocholithiasis with recent ERCP 10/15/24 now with symptomatic bradycardia.     Pre-op Assessment    I have reviewed the Patient Summary Reports.     I have reviewed the Nursing Notes. I have reviewed the NPO Status.   I have reviewed the Medications.     Review of Systems  Anesthesia Hx:  No problems with previous Anesthesia   History of prior surgery of interest to airway management or planning:             Social:  Former Smoker       Cardiovascular:     Hypertension, poorly controlled  Past MI CAD    Dysrhythmias                                      Endocrine:  Diabetes               Physical Exam  General: Well nourished, Cooperative and Alert    Airway:  Mallampati: III   Mouth Opening: Normal  TM Distance: Normal  Tongue: Normal  Neck ROM: Normal ROM    Chest/Lungs:  Normal Respiratory Rate    Heart:  Rate: Normal        Anesthesia Plan  Type of Anesthesia, risks & benefits discussed:    Anesthesia Type: Gen ETT, Gen Supraglottic Airway  Intra-op Monitoring Plan: Standard ASA Monitors  Post Op Pain Control Plan: multimodal analgesia and IV/PO Opioids PRN  Induction:  IV  Airway Plan: Direct and Video, Post-Induction  Informed Consent: Informed consent signed with the Patient representative and all parties understand the risks and agree with anesthesia plan.  All questions answered.   ASA Score: 4  Day of Surgery Review of History & Physical: H&P Update referred to the surgeon/provider.  Anesthesia Plan Notes: NPO confirmed      Ready For Surgery From Anesthesia Perspective.     .

## 2024-11-29 NOTE — PLAN OF CARE
[facility] - Cardiology Stepdown  Initial Discharge Assessment       Primary Care Provider: Holger Thornton MD    Admission Diagnosis: Bradycardia [R00.1]    Admission Date: 11/27/2024  Expected Discharge Date: 11/30/2024    Transition of Care Barriers: None    Payor: HU[employer] MANAGED MEDICARE / Plan: [employer] MEDICARE HMO / Product Type: Capitation /     Extended Emergency Contact Information  Primary Emergency Contact: Gayle Woods  Home Phone: [phone]  Mobile Phone: [phone]  Relation: Spouse  Secondary Emergency Contact: Pete Yates  Mobile Phone: [phone]  Relation: Son  Preferred language: English   needed? No    Discharge Plan A: Home with family  Discharge Plan B: Home Health      [id number] [place] - [address] AT [facility]  [address] [address]  Phone: [phone] Fax: [phone]      Initial Assessment (most recent)       Adult Discharge Assessment - 11/29/24 1428          Discharge Assessment    Assessment Type Discharge Planning Assessment     Confirmed/corrected address, phone number and insurance Yes     Source of Information patient;family;health record     Communicated ZEINA with patient/caregiver Yes     Reason For Admission symptomatic bradycardia     People in Home spouse     Facility Arrived From: [facility]     Do you expect to return to your current living situation? Yes     Do you have help at home or someone to help you manage your care at home? Yes     Who are your caregiver(s) and their phone number(s)? Gayle Woods (spouse) [phone]     Prior to hospitilization cognitive status: Alert/Oriented     Current cognitive status: Alert/Oriented     Walking or Climbing Stairs Difficulty no     Dressing/Bathing Difficulty no     Equipment Currently Used at Home none     Readmission within 30 days? No     Patient currently being followed by outpatient case management? No      Do you currently have service(s) that help you manage your care at home? No     Do you take prescription medications? Yes     Do you have prescription coverage? Yes     Do you have any problems affording any of your prescribed medications? No     Is the patient taking medications as prescribed? yes     Who is going to help you get home at discharge? Gayle Woods (spouse) [phone]     How do you get to doctors appointments? family or friend will provide     Are you on dialysis? No     Do you take coumadin? No     Discharge Plan A Home with family     Discharge Plan B Home Health     DME Needed Upon Discharge  none     Discharge Plan discussed with: Patient;Spouse/sig other     Name(s) and Number(s) Gayle Woods (spouse) [phone]     Transition of Care Barriers None                   Pt transferred from [facility] for pacemaker placement.  ASHLEY met with pt and wife Gayle at bedside to discuss discharge planning.  Pt lives with his wife and is independent with ambulation and ADLs.  No DME, HH, dialysis, or coumadin.  PCP is Dr Thornton.  Pt will have transportation and assistance from his wife at discharge.  Discharge Plan A and Plan B have been determined by review of patient's clinical status, future medical and therapeutic needs, and coverage/benefits for post-acute care in coordination with multidisciplinary team members.  ASHLEY name and ext on whiteboard; discharge planning booklet provided.  Will continue to follow.      Ashanti Decker LMSW  [facility]  [id number]

## 2024-11-29 NOTE — ASSESSMENT & PLAN NOTE
Creatinine baseline around 1.2  - Likely pre-renal from dehydration and volume losses    Plan:   - Improving Cr; continue to monitor  - Consider checking Xin/FeNa/FeUrea if no improvements with IV fluids and or Lasix Trial  - Avoid nephrotoxic agents such as NSAIDs, gadolinium and IV radiocontrast.(Dilcia w/ GFR under 30)  - Renally dose meds to current GFR.  - Maintain MAP > 65.

## 2024-11-29 NOTE — PROGRESS NOTES
[facility] - Cardiology Stepdown  Cardiac Electrophysiology  Progress Note    Admission Date: 11/27/2024  Code Status: Full Code   Attending Physician: Daniel Avila MD   Expected Discharge Date:   Principal Problem:<principal problem not specified>    Subjective:     Interval History: No Acute Events Overnight. He is NPO      Past Medical History:   Diagnosis Date    Acute blood loss anemia 10/14/2019    Alzheimer's dementia     Aneurysm, abdominal aortic     BCC (basal cell carcinoma of skin) 06/29/2023    Chronic diastolic heart failure 05/20/2024    Coronary artery disease     Depression     Diabetes mellitus     HLD (hyperlipidemia)     Hypertension     Kidney stone     PAD (peripheral artery disease)     PAF (paroxysmal atrial fibrillation) 01/24/2023    Tobacco abuse        Past Surgical History:   Procedure Laterality Date    APPENDECTOMY      CORONARY STENT PLACEMENT      x 4    DV5 ROBOTIC CHOLECYSTECTOMY N/A 10/15/2024    Procedure: DV5 ROBOTIC CHOLECYSTECTOMY;  Surgeon: Fred Taylor MD;  Location: [facility];  Service: General;  Laterality: N/A;    ERCP N/A 10/10/2024    Procedure: ERCP (ENDOSCOPIC RETROGRADE CHOLANGIOPANCREATOGRAPHY);  Surgeon: Morgan Hong MD;  Location: [address]);  Service: Endoscopy;  Laterality: N/A;    ESOPHAGOGASTRODUODENOSCOPY N/A 10/14/2019    Procedure: EGD (ESOPHAGOGASTRODUODENOSCOPY);  Surgeon: Carlos Mcneal III, MD;  Location: [facility];  Service: Endoscopy;  Laterality: N/A;    ESOPHAGOGASTRODUODENOSCOPY N/A 10/15/2019    Procedure: EGD (ESOPHAGOGASTRODUODENOSCOPY);  Surgeon: Carlos Mcneal III, MD;  Location: [facility];  Service: Endoscopy;  Laterality: N/A;    ESOPHAGOGASTRODUODENOSCOPY N/A 4/17/2023    Procedure: EGD (ESOPHAGOGASTRODUODENOSCOPY);  Surgeon: Nevaeh Mcconnell MD;  Location: [facility];  Service: Endoscopy;  Laterality: N/A;    INSERTION, PACEMAKER, TEMPORARY TRANSVENOUS N/A 10/15/2024    Procedure: Insertion, Pacemaker, Temporary  Transvenous;  Surgeon: Demarcus Kirk MD;  Location: [facility] CATH LAB;  Service: Cardiology;  Laterality: N/A;    LEFT HEART CATHETERIZATION Left 10/11/2019    Procedure: CATHETERIZATION, HEART, LEFT;  Surgeon: Robe Gilbert MD;  Location: [facility] CATH LAB;  Service: Cardiology;  Laterality: Left;    LEFT HEART CATHETERIZATION Left 10/13/2019    Procedure: CATHETERIZATION, HEART, LEFT;  Surgeon: Robe Gilbert MD;  Location: [facility] CATH LAB;  Service: Cardiology;  Laterality: Left;    leg stent Left        Review of patient's allergies indicates:   Allergen Reactions    Metoprolol Other (See Comments)     Junctional rhythm         No current facility-administered medications on file prior to encounter.     Current Outpatient Medications on File Prior to Encounter   Medication Sig    alcohol swabs (ALCOHOL PREP PADS) PadM Twice a day    blood sugar diagnostic Strp Test blood sugars twice a day    [START ON 12/4/2024] ELIQUIS 2.5 mg Tab Take 1 tablet (2.5 mg total) by mouth 2 (two) times daily.    gabapentin (NEURONTIN) 100 MG capsule Take 1 capsule (100 mg total) by mouth 2 (two) times daily as needed. PRN    isosorbide mononitrate (IMDUR) 120 MG 24 hr tablet TAKE 1 TABLET BY MOUTH EVERY DAY    lancets (ACCU-CHEK SOFTCLIX LANCETS) Misc Test blood sugars twice a day    memantine (NAMENDA) 10 MG Tab Take 1 tablet (10 mg total) by mouth 2 (two) times daily.    pantoprazole (PROTONIX) 40 MG tablet Take 1 tablet (40 mg total) by mouth once daily.    [START ON 12/4/2024] valsartan (DIOVAN) 160 MG tablet Take 1 tablet (160 mg total) by mouth once daily.     Family History       Problem Relation (Age of Onset)    COPD Father    Diabetes Mother, Brother          Tobacco Use    Smoking status: Former     Types: Cigars     Passive exposure: Never    Smokeless tobacco: Current   Substance and Sexual Activity    Alcohol use: Not Currently    Drug use: Not Currently    Sexual activity: Not Currently     Partners: Female     Review of  Systems   Unable to perform ROS: Dementia     Objective:     Vital Signs (Most Recent):  Temp: 97.9 °F (36.6 °C) (11/29/24 0338)  Pulse: (!) 53 (11/29/24 0338)  Resp: 18 (11/29/24 0338)  BP: (!) 146/74 (11/29/24 0338)  SpO2: 98 % (11/29/24 0338) Vital Signs (24h Range):  Temp:  [97.6 °F (36.4 °C)-98.4 °F (36.9 °C)] 97.9 °F (36.6 °C)  Pulse:  [47-59] 53  Resp:  [18-19] 18  SpO2:  [93 %-98 %] 98 %  BP: ()/(50-74) 146/74     Weight: 64.7 kg (142 lb 10.2 oz)  Body mass index is 20.47 kg/m².     Physical Exam  Vitals reviewed.   Constitutional:       Appearance: He is ill-appearing.   HENT:      Head: Normocephalic and atraumatic.      Right Ear: External ear normal.      Left Ear: External ear normal.      Mouth/Throat:      Mouth: Mucous membranes are moist.   Eyes:      Extraocular Movements: Extraocular movements intact.      Conjunctiva/sclera: Conjunctivae normal.   Cardiovascular:      Rate and Rhythm: Bradycardia present. Rhythm irregular.      Heart sounds: No murmur heard.  Pulmonary:      Effort: Pulmonary effort is normal. No respiratory distress.      Breath sounds: Normal breath sounds.   Abdominal:      General: Abdomen is flat. Bowel sounds are normal.      Palpations: Abdomen is soft.      Tenderness: There is no abdominal tenderness.   Musculoskeletal:      Cervical back: Normal range of motion.      Right lower leg: No edema.      Left lower leg: No edema.   Skin:     General: Skin is warm and dry.   Neurological:      Mental Status: Mental status is at baseline.                Significant Labs: All pertinent labs within the past 24 hours have been reviewed.    Significant Imaging: I have reviewed all pertinent imaging results/findings within the past 24 hours.    Echo: 11/23/24    Left Ventricle: The left ventricle is normal in size. Normal wall thickness. There is concentric hypertrophy. Normal wall motion. There is normal systolic function with a visually estimated ejection fraction of 55 -  60%. There is indeterminate diastolic function.    Right Ventricle: Systolic function is normal.    Left Atrium: Left atrium is mildly dilated.    Pulmonary Artery: The estimated pulmonary artery systolic pressure is 23 mmHg.    IVC/SVC: Normal venous pressure at 3 mmHg.    [facility] 10/13/19  1.   Successful PCI 99% proximal RCA stenosis wtih Stent [place] KYLIE x 2 overlapped.                 2.  Successful PTCA 99% diffuse ISR mid RCA.                 3.  Unsucessful PCI  distal RCA.         Assessment and Plan:     Atrial fibrillation with slow ventricular response  Mr. Yates is an 85 yo M with PMHx of Persistent Afib on eliquis, CAD, HFpEF, HTN, HLD, T2DM, PVD, Alzheimer's dementia, and choledocholithiasis with recent ERCP 10/15/24 is transferred to [facility] for EP evaluation and possible pacemaker placement.     Heart rate 50s to 80s  Blood pressure 110 systolic, 50s to 60s diastolic   Telemetry atrial fibrillation at ventricular rate of 40-50s  EKG 6/13/19: PACs, 12/19/23 Junctional Rhytm, 10/5/24: Afib, 11/6/24 A fib with slow response, 11/23/24 A fib with slow response  Last dose Eliquis was on 11/23/24  Keep him NPO   Plan for lead less pacemaker on 11/29/2024          Miguel Willard MD  Cardiac Electrophysiology  [facility] - Cardiology Stepdown

## 2024-11-29 NOTE — ASSESSMENT & PLAN NOTE
Mr. Yates is an 83 yo M with PMHx of Persistent Afib on eliquis, CAD, HFpEF, HTN, HLD, T2DM, PVD, Alzheimer's dementia, and choledocholithiasis with recent ERCP 10/15/24 is transferred to [facility] for EP evaluation and possible pacemaker placement.     Heart rate 50s to 80s  Blood pressure 110 systolic, 50s to 60s diastolic   Telemetry atrial fibrillation at ventricular rate of 40-50s  EKG 6/13/19: PACs, 12/19/23 Junctional Rhytm, 10/5/24: Afib, 11/6/24 A fib with slow response, 11/23/24 A fib with slow response  Last dose Eliquis was on 11/23/24  Keep him NPO   Plan for lead less pacemaker on 11/29/2024

## 2024-11-29 NOTE — PROGRESS NOTES
Dario Anaya - Cardiology [facility] Medicine  Progress Note    Patient Name: Aquiles Yates  MRN: [id number]  Patient Class: IP- Inpatient   Admission Date: 11/27/2024  Length of Stay: 2 days  Attending Physician: Daniel Avila MD  Primary Care Provider: Holger Thornton MD        Subjective:     Principal Problem:Symptomatic bradycardia    HPI:  Mr. Yates is an 85 yo M with PMHx of CAD, HFpEF, HTN, HLD, Afib on eliquis, T2DM, PVD, Alzheimer's dementia, and choledocholithiasis with recent ERCP in October now admitted to [facility] ICU since November 23rd with symptomatic bradycardia and syncope. Initially EMS noted HR of 20-30s. Patient has notable history for previous admission requiring TVP placement. Eliquis was held in ICU due to plan for leadless pacemaker. [place] [facility] [place] and possible pacemaker placement.     On my evaluation, patient reports no complaints and is watching TV. EP consult placed and patient kept NPO incase of procedure. Held AC and antiplatelet medications.     Overview/Hospital Course:  Admitted for symptomatic bradycardia and EP evaluation. Pacemaker placement planned for 11/29/24.     Interval History: No complaints overnight. HR between 47-53 overnight/AM. Pacemaker placement planned for today.     Review of Systems  Objective:     Vital Signs (Most Recent):  Temp: 98.3 °F (36.8 °C) (11/29/24 0822)  Pulse: (!) 51 (11/29/24 0822)  Resp: 17 (11/29/24 0822)  BP: (!) 122/53 (11/29/24 0822)  SpO2: 96 % (11/29/24 0822) Vital Signs (24h Range):  Temp:  [97.6 °F (36.4 °C)-98.4 °F (36.9 °C)] 98.3 °F (36.8 °C)  Pulse:  [47-59] 51  Resp:  [17-19] 17  SpO2:  [95 %-98 %] 96 %  BP: ()/(50-74) 122/53     Weight: 64.7 kg (142 lb 10.2 oz)  Body mass index is 20.47 kg/m².    Intake/Output Summary (Last 24 hours) at 11/29/2024 0845  Last data filed at 11/28/2024 2000  Gross per 24 hour   Intake 702 ml   Output 600 ml   Net 102 ml          Physical Exam  Constitutional:       General: He is not in acute distress.  Cardiovascular:      Rate and Rhythm: Bradycardia present. Rhythm irregular.   Pulmonary:      Effort: Pulmonary effort is normal. No respiratory distress.      Breath sounds: Normal breath sounds. No wheezing or rales.   Abdominal:      General: Abdomen is flat. There is no distension.      Palpations: Abdomen is soft.      Tenderness: There is no abdominal tenderness.   Musculoskeletal:         General: No swelling.      Right lower leg: No edema.      Left lower leg: No edema.   Skin:     General: Skin is warm.   Neurological:      Mental Status: He is alert.             Significant Labs: All pertinent labs within the past 24 hours have been reviewed.  CBC:   Recent Labs   Lab 11/28/24  0525 11/29/24  0256   WBC 5.20 5.70   HGB 11.6* 11.8*   HCT 34.8* 35.5*   * 135*     CMP:   Recent Labs   Lab 11/27/24  1448 11/28/24  0525 11/29/24  0256    137 140   K 4.3 4.3 4.6    107 106   CO2 26 22* 21*   * 137* 149*   BUN 30* 31* 30*   CREATININE 1.6* 1.4 1.3   CALCIUM 9.3 9.1 8.6*   PROT  --  6.2 6.2   ALBUMIN  --  2.9* 2.9*   BILITOT  --  0.5 0.6   ALKPHOS  --  84 80   AST  --  13 14   ALT  --  9* 13   ANIONGAP 10 8 13     TSH:   Recent Labs   Lab 11/23/24  1034   TSH 0.728     Significant Imaging: I have reviewed all pertinent imaging results/findings within the past 24 hours.    Assessment/Plan:      * Symptomatic bradycardia  Has hx of bradycardia with afib 40-50s. Required TVP prior to recent ERCP. Patient presented with syncope and weakness due to symptomatic bradycardia. Found to be in Afib with slow ventricular response. Was previously transcutaneously paced and transferred for EP eval for leadless Pacemaker. TSH WNL.     Plan:  - pacemaker placement 11/29  - EP following, appreciate recs  - cardiac telemetry  - consider atropine if sarita to 20-30s  - consider trancutaneous pacing if unresponsive to atropine  -  held aspirin, eliquis due to potential pacemaker         Chronic diastolic heart failure  Compensated, GDMT as tolerated. Currently euvolemic and compensated      Atrial fibrillation with slow ventricular response  Slow ventricular response, currently in AFib   Holding Eliquis for PPM  No history of BB or CCB at home    NOAH (acute kidney injury)  Creatinine baseline around 1.2  - Likely pre-renal from dehydration and volume losses    Plan:   - Improving Cr; continue to monitor  - Consider checking Xin/FeNa/FeUrea if no improvements with IV fluids and or Lasix Trial  - Avoid nephrotoxic agents such as NSAIDs, gadolinium and IV radiocontrast.(Dilcia w/ GFR under 30)  - Renally dose meds to current GFR.  - Maintain MAP > 65.     Type 2 diabetes mellitus  Low dose ssi  Q6h glucose checks    Dementia  Continue memantine    CAD, multiple vessel  Unsure as to why patient is not currently on statin. Consider adding. Will further chart review.     AP (angina pectoris)  Consider restarting imdur if bp can tolerate      Essential hypertension  Current Antihypertensives       Plan  - patient with low blood pressures  Restart imdur and valsartan as bp tolerates    PAD (peripheral artery disease)  Held aspirin, eliquis due to potential pacemaker        VTE Risk Mitigation (From admission, onward)           Ordered     heparin (porcine) 2,000 Units in 0.9% NaCl 1,000 mL  As needed (PRN)         11/29/24 1037     heparin infusion 1,000 units/500 ml in 0.9% NaCl (on sterile field)  As needed (PRN)         11/29/24 1037     Reason for No Pharmacological VTE Prophylaxis  Once        Question:  Reasons:  Answer:  Already adequately anticoagulated on oral Anticoagulants    11/27/24 2345     IP VTE HIGH RISK PATIENT  Once         11/27/24 2345     Place sequential compression device  Until discontinued         11/27/24 2345                    Discharge Planning   ZEINA:      Code Status: Full Code   Is the patient medically ready for  discharge?:     Reason for patient still in hospital (select all that apply): Patient trending condition                     Yaquelin Whalen MD  Department of Hospital Medicine   [facility] - Cardiology Stepdown

## 2024-11-30 ENCOUNTER — ANESTHESIA EVENT (OUTPATIENT)
Dept: ENDOSCOPY | Facility: HOSPITAL | Age: 84
End: 2024-11-30
Payer: MEDICARE

## 2024-11-30 VITALS
BODY MASS INDEX: 20.91 KG/M2 | HEART RATE: 56 BPM | OXYGEN SATURATION: 96 % | RESPIRATION RATE: 17 BRPM | TEMPERATURE: 98 F | SYSTOLIC BLOOD PRESSURE: 125 MMHG | DIASTOLIC BLOOD PRESSURE: 58 MMHG | WEIGHT: 145.75 LBS

## 2024-11-30 LAB
ANION GAP SERPL CALC-SCNC: 6 MMOL/L (ref 8–16)
BUN SERPL-MCNC: 25 MG/DL (ref 8–23)
CALCIUM SERPL-MCNC: 9.1 MG/DL (ref 8.7–10.5)
CHLORIDE SERPL-SCNC: 107 MMOL/L (ref 95–110)
CO2 SERPL-SCNC: 24 MMOL/L (ref 23–29)
CREAT SERPL-MCNC: 1.4 MG/DL (ref 0.5–1.4)
EST. GFR  (NO RACE VARIABLE): 49.3 ML/MIN/1.73 M^2
GLUCOSE SERPL-MCNC: 154 MG/DL (ref 70–110)
MAGNESIUM SERPL-MCNC: 1.8 MG/DL (ref 1.6–2.6)
PHOSPHATE SERPL-MCNC: 3.1 MG/DL (ref 2.7–4.5)
POCT GLUCOSE: 164 MG/DL (ref 70–110)
POTASSIUM SERPL-SCNC: 4.8 MMOL/L (ref 3.5–5.1)
SODIUM SERPL-SCNC: 137 MMOL/L (ref 136–145)

## 2024-11-30 PROCEDURE — 36415 COLL VENOUS BLD VENIPUNCTURE: CPT | Mod: HCNC

## 2024-11-30 PROCEDURE — 99233 SBSQ HOSP IP/OBS HIGH 50: CPT | Mod: HCNC,,, | Performed by: STUDENT IN AN ORGANIZED HEALTH CARE EDUCATION/TRAINING PROGRAM

## 2024-11-30 PROCEDURE — 80048 BASIC METABOLIC PNL TOTAL CA: CPT | Mod: HCNC | Performed by: STUDENT IN AN ORGANIZED HEALTH CARE EDUCATION/TRAINING PROGRAM

## 2024-11-30 PROCEDURE — 83735 ASSAY OF MAGNESIUM: CPT | Mod: HCNC

## 2024-11-30 PROCEDURE — 25000003 PHARM REV CODE 250: Mod: HCNC

## 2024-11-30 PROCEDURE — 84100 ASSAY OF PHOSPHORUS: CPT | Mod: HCNC

## 2024-11-30 RX ORDER — VALSARTAN 160 MG/1
160 TABLET ORAL DAILY
Start: 2024-12-04 | End: 2025-12-04

## 2024-11-30 RX ADMIN — MEMANTINE 10 MG: 10 TABLET ORAL at 09:11

## 2024-11-30 RX ADMIN — PANTOPRAZOLE SODIUM 40 MG: 40 TABLET, DELAYED RELEASE ORAL at 09:11

## 2024-11-30 RX ADMIN — GABAPENTIN 100 MG: 100 CAPSULE ORAL at 09:11

## 2024-11-30 NOTE — PLAN OF CARE
Patient free of falls or injuries. Denied any pain or discomfort. Atrial paced on monitor. A fib and bradycardia noted with HR maintaining >50. R groin access site covered with gauze and Tegaderm. Dressing CDI. No signs of bleeding and no hematoma noted. No other events overnight.       Problem: Adult Inpatient Plan of Care  Goal: Plan of Care Review  Outcome: Progressing  Goal: Patient-Specific Goal (Individualized)  Outcome: Progressing  Goal: Absence of Hospital-Acquired Illness or Injury  Outcome: Progressing  Goal: Optimal Comfort and Wellbeing  Outcome: Progressing  Goal: Readiness for Transition of Care  Outcome: Progressing     Problem: Skin Injury Risk Increased  Goal: Skin Health and Integrity  Outcome: Progressing     Problem: Wound  Goal: Optimal Coping  Outcome: Progressing  Goal: Optimal Functional Ability  Outcome: Progressing  Goal: Absence of Infection Signs and Symptoms  Outcome: Progressing  Goal: Improved Oral Intake  Outcome: Progressing  Goal: Optimal Pain Control and Function  Outcome: Progressing  Goal: Skin Health and Integrity  Outcome: Progressing  Goal: Optimal Wound Healing  Outcome: Progressing

## 2024-11-30 NOTE — DISCHARGE SUMMARY
Dario Anaya - Cardiology [facility] Medicine  Discharge Summary      Patient Name: Aquiles Yates  MRN: [id number]  AYESHA: [id number]  Patient Class: IP- Inpatient  Admission Date: 11/27/2024  Hospital Length of Stay: 3 days  Discharge Date and Time:  11/30/2024 11:41 AM  Attending Physician: Daniel Avila MD   Discharging Provider: Yaquelin Whalen MD  Primary Care Provider: Holger Thornton MD  Hospital Medicine Team: [facility] 4 Yaquelin Whalen MD  Primary Care Team: [facility] 4    HPI:   Mr. Yates is an 85 yo M with PMHx of CAD, HFpEF, HTN, HLD, Afib on eliquis, T2DM, PVD, Alzheimer's dementia, and choledocholithiasis with recent ERCP in October now admitted to [facility] ICU since November 23rd with symptomatic bradycardia and syncope. Initially EMS noted HR of 20-30s. Patient has notable history for previous admission requiring TVP placement. Eliquis was held in ICU due to plan for leadless pacemaker. Was transferred to [facility] for EP evaluation and possible pacemaker placement.     On my evaluation, patient reports no complaints and is watching TV. EP consult placed and patient kept NPO incase of procedure. Held AC and antiplatelet medications.     Procedure(s) (LRB):  [id number] (N/A)      Hospital Course:   Admitted for symptomatic bradycardia and EP evaluation.  Leadless pacemaker placed on 11/29/24.  Patient is hemodynamically stable and tolerated procedure well without complications.  Patient is medically stable for discharge and will be referred to PCP and electrophysiology outpatient.       Goals of Care Treatment Preferences:  Code Status: Full Code      Review of Systems  Objective:     Vital Signs (Most Recent):  Temp: 97.9 °F (36.6 °C) (11/30/24 0347)  Pulse: 60 (11/30/24 0347)  Resp: 18 (11/30/24 0347)  BP: 130/67 (11/30/24 0347)  SpO2: 95 % (11/30/24 0347) Vital Signs (24h Range):  Temp:  [97.4 °F (36.3 °C)-98.7 °F  (37.1 °C)] 97.9 °F (36.6 °C)  Pulse:  [51-75] 60  Resp:  [17-21] 18  SpO2:  [94 %-100 %] 95 %  BP: (111-165)/(50-71) 130/67     Weight: 66.1 kg (145 lb 11.6 oz)  Body mass index is 20.91 kg/m².    Intake/Output Summary (Last 24 hours) at 11/30/2024 0816  Last data filed at 11/30/2024 0416  Gross per 24 hour   Intake 510 ml   Output 650 ml   Net -140 ml         Physical Exam  Constitutional:       General: He is not in acute distress.  Cardiovascular:      Rate and Rhythm: Bradycardia present. Rhythm irregular.   Pulmonary:      Effort: Pulmonary effort is normal. No respiratory distress.      Breath sounds: Normal breath sounds. No wheezing or rales.   Abdominal:      General: Abdomen is flat. There is no distension.      Palpations: Abdomen is soft.      Tenderness: There is no abdominal tenderness.   Musculoskeletal:         General: No swelling.      Right lower leg: No edema.      Left lower leg: No edema.   Skin:     General: Skin is warm.   Neurological:      Mental Status: He is alert.             Significant Labs: All pertinent labs within the past 24 hours have been reviewed.  CBC:   Recent Labs   Lab 11/29/24 0256   WBC 5.70   HGB 11.8*   HCT 35.5*   *     CMP:   Recent Labs   Lab 11/29/24 0256 11/30/24  0432    137   K 4.6 4.8    107   CO2 21* 24   * 154*   BUN 30* 25*   CREATININE 1.3 1.4   CALCIUM 8.6* 9.1   PROT 6.2  --    ALBUMIN 2.9*  --    BILITOT 0.6  --    ALKPHOS 80  --    AST 14  --    ALT 13  --    ANIONGAP 13 6*       Significant Imaging: I have reviewed all pertinent imaging results/findings within the past 24 hours.      Consults:   Consults (From admission, onward)          Status Ordering Provider     Inpatient consult to Electrophysiology  Once        Provider:  (Not yet assigned)    Completed PRIMO DARBY            Final Active Diagnoses:    Diagnosis Date Noted POA    PRINCIPAL PROBLEM:  Symptomatic bradycardia [R00.1] 10/12/2019 Yes    Chronic diastolic  heart failure [I50.32] 05/20/2024 Yes    Atrial fibrillation with slow ventricular response [I48.91] 01/24/2023 Yes    NOAH (acute kidney injury) [N17.9] 11/29/2024 Yes    Type 2 diabetes mellitus [E11.9] 11/23/2024 Yes    Dementia [F03.90] 08/01/2023 Yes    CAD, multiple vessel [I25.10] 10/12/2019 Yes    AP (angina pectoris) [I20.9] 10/11/2019 Yes    Essential hypertension [I10] 06/12/2019 Yes     Chronic    PAD (peripheral artery disease) [I73.9] 06/12/2019 Yes     Chronic      Problems Resolved During this Admission:       Discharged Condition: stable    Disposition: Home or Self Care    Follow Up:  PCP, electrophysiology    Patient Instructions:      COMPREHENSIVE METABOLIC PANEL   Standing Status: Future Standing Exp. Date: 02/28/26     Ambulatory referral/consult to Internal Medicine   Standing Status: Future   Referral Priority: Urgent Referral Type: Consultation   Referral Reason: Specialty Services Required   Requested Specialty: Internal Medicine   Number of Visits Requested: 1     Ambulatory referral/consult to Electrophysiology   Standing Status: Future   Referral Priority: Urgent Referral Type: Consultation   Referral Reason: Specialty Services Required   Requested Specialty: Electrophysiology   Number of Visits Requested: 1       Significant Diagnostic Studies: N/A    Pending Diagnostic Studies:       None           Medications:  Reconciled Home Medications:      Medication List        CHANGE how you take these medications      valsartan 160 MG tablet  Commonly known as: DIOVAN  Take 1 tablet (160 mg total) by mouth once daily.  Start taking on: December 4, 2024  Notes to patient: Wait until PCP follow up            CONTINUE taking these medications      alcohol swabs Padm  Commonly known as: ALCOHOL PREP PADS  Twice a day     blood sugar diagnostic Strp  Test blood sugars twice a day     ELIQUIS 2.5 mg Tab  Generic drug: apixaban  Take 1 tablet (2.5 mg total) by mouth 2 (two) times daily.  Start taking  on: December 4, 2024     gabapentin 100 MG capsule  Commonly known as: NEURONTIN  Take 1 capsule (100 mg total) by mouth 2 (two) times daily as needed. PRN     isosorbide mononitrate 120 MG 24 hr tablet  Commonly known as: IMDUR  TAKE 1 TABLET BY MOUTH EVERY DAY     lancets Misc  Commonly known as: ACCU-CHEK SOFTCLIX LANCETS  Test blood sugars twice a day     memantine 10 MG Tab  Commonly known as: NAMENDA  Take 1 tablet (10 mg total) by mouth 2 (two) times daily.     pantoprazole 40 MG tablet  Commonly known as: PROTONIX  Take 1 tablet (40 mg total) by mouth once daily.              Indwelling Lines/Drains at time of discharge:   Lines/Drains/Airways       None              Time spent on the discharge of patient: 60 minutes         Yaquelin Whalen MD  Department of Hospital Medicine  [facility] - Cardiology Stepdown

## 2024-11-30 NOTE — PROGRESS NOTES
Dario Anaya - Cardiology Stepdown  Cardiac Electrophysiology  Progress Note    Admission Date: 11/27/2024  Code Status: Full Code   Attending Physician: Daniel Avila MD   Expected Discharge Date: 11/30/2024  Principal Problem:Symptomatic bradycardia    Subjective:     Interval History: No Acute Events Overnight. S/p leadless pacemaker insertion 11/29/24. Right Groin no hematoma/bleeding/swelling/oozing    Past Medical History:   Diagnosis Date    Acute blood loss anemia 10/14/2019    Alzheimer's dementia     Aneurysm, abdominal aortic     BCC (basal cell carcinoma of skin) 06/29/2023    Chronic diastolic heart failure 05/20/2024    Coronary artery disease     Depression     Diabetes mellitus     HLD (hyperlipidemia)     Hypertension     Kidney stone     PAD (peripheral artery disease)     PAF (paroxysmal atrial fibrillation) 01/24/2023    Tobacco abuse        Past Surgical History:   Procedure Laterality Date    APPENDECTOMY      CORONARY STENT PLACEMENT      x 4    DV5 ROBOTIC CHOLECYSTECTOMY N/A 10/15/2024    Procedure: DV5 ROBOTIC CHOLECYSTECTOMY;  Surgeon: Fred Taylor MD;  Location: [facility];  Service: General;  Laterality: N/A;    ERCP N/A 10/10/2024    Procedure: ERCP (ENDOSCOPIC RETROGRADE CHOLANGIOPANCREATOGRAPHY);  Surgeon: Morgan Hong MD;  Location: [address]);  Service: Endoscopy;  Laterality: N/A;    ESOPHAGOGASTRODUODENOSCOPY N/A 10/14/2019    Procedure: EGD (ESOPHAGOGASTRODUODENOSCOPY);  Surgeon: Carlos Mcneal III, MD;  Location: [facility];  Service: Endoscopy;  Laterality: N/A;    ESOPHAGOGASTRODUODENOSCOPY N/A 10/15/2019    Procedure: EGD (ESOPHAGOGASTRODUODENOSCOPY);  Surgeon: Carlos Mcneal III, MD;  Location: [facility];  Service: Endoscopy;  Laterality: N/A;    ESOPHAGOGASTRODUODENOSCOPY N/A 4/17/2023    Procedure: EGD (ESOPHAGOGASTRODUODENOSCOPY);  Surgeon: Nevaeh Mcconnell MD;  Location: [facility];  Service: Endoscopy;  Laterality: N/A;    INSERTION, PACEMAKER,  TEMPORARY TRANSVENOUS N/A 10/15/2024    Procedure: Insertion, Pacemaker, Temporary Transvenous;  Surgeon: Demarcus Kirk MD;  Location: [facility] CATH LAB;  Service: Cardiology;  Laterality: N/A;    LEFT HEART CATHETERIZATION Left 10/11/2019    Procedure: CATHETERIZATION, HEART, LEFT;  Surgeon: Robe Gilbert MD;  Location: [facility] CATH LAB;  Service: Cardiology;  Laterality: Left;    LEFT HEART CATHETERIZATION Left 10/13/2019    Procedure: CATHETERIZATION, HEART, LEFT;  Surgeon: Robe Gilbert MD;  Location: [facility] CATH LAB;  Service: Cardiology;  Laterality: Left;    leg stent Left        Review of patient's allergies indicates:   Allergen Reactions    Metoprolol Other (See Comments)     Junctional rhythm         No current facility-administered medications on file prior to encounter.     Current Outpatient Medications on File Prior to Encounter   Medication Sig    alcohol swabs (ALCOHOL PREP PADS) PadM Twice a day    blood sugar diagnostic Strp Test blood sugars twice a day    [START ON 12/4/2024] ELIQUIS 2.5 mg Tab Take 1 tablet (2.5 mg total) by mouth 2 (two) times daily.    gabapentin (NEURONTIN) 100 MG capsule Take 1 capsule (100 mg total) by mouth 2 (two) times daily as needed. PRN    isosorbide mononitrate (IMDUR) 120 MG 24 hr tablet TAKE 1 TABLET BY MOUTH EVERY DAY    lancets (ACCU-CHEK SOFTCLIX LANCETS) Misc Test blood sugars twice a day    memantine (NAMENDA) 10 MG Tab Take 1 tablet (10 mg total) by mouth 2 (two) times daily.    pantoprazole (PROTONIX) 40 MG tablet Take 1 tablet (40 mg total) by mouth once daily.    [START ON 12/4/2024] valsartan (DIOVAN) 160 MG tablet Take 1 tablet (160 mg total) by mouth once daily.     Family History       Problem Relation (Age of Onset)    COPD Father    Diabetes Mother, Brother          Tobacco Use    Smoking status: Former     Types: Cigars     Passive exposure: Never    Smokeless tobacco: Current   Substance and Sexual Activity    Alcohol use: Not Currently    Drug use:  Not Currently    Sexual activity: Not Currently     Partners: Female       Objective:     Vital Signs (Most Recent):  Temp: 97.9 °F (36.6 °C) (11/30/24 0347)  Pulse: 60 (11/30/24 0347)  Resp: 18 (11/30/24 0347)  BP: 130/67 (11/30/24 0347)  SpO2: 95 % (11/30/24 0347) Vital Signs (24h Range):  Temp:  [97.4 °F (36.3 °C)-98.7 °F (37.1 °C)] 97.9 °F (36.6 °C)  Pulse:  [51-75] 60  Resp:  [17-21] 18  SpO2:  [94 %-100 %] 95 %  BP: (111-165)/(50-71) 130/67     Weight: 66.1 kg (145 lb 11.6 oz)  Body mass index is 20.91 kg/m².     Physical Exam  Vitals reviewed.   Constitutional:       Appearance: He is ill-appearing.   HENT:      Head: Normocephalic and atraumatic.      Right Ear: External ear normal.      Left Ear: External ear normal.      Mouth/Throat:      Mouth: Mucous membranes are moist.   Eyes:      Extraocular Movements: Extraocular movements intact.      Conjunctiva/sclera: Conjunctivae normal.   Cardiovascular:      Rate and Rhythm: Bradycardia present. Rhythm irregular.      Heart sounds: No murmur heard.  Pulmonary:      Effort: Pulmonary effort is normal. No respiratory distress.      Breath sounds: Normal breath sounds.   Abdominal:      General: Abdomen is flat. Bowel sounds are normal.      Palpations: Abdomen is soft.      Tenderness: There is no abdominal tenderness.   Musculoskeletal:      Cervical back: Normal range of motion.      Right lower leg: No edema.      Left lower leg: No edema.   Skin:     General: Skin is warm and dry.   Neurological:      Mental Status: Mental status is at baseline.                Significant Labs: All pertinent labs within the past 24 hours have been reviewed.    Significant Imaging: I have reviewed all pertinent imaging results/findings within the past 24 hours.    Echo: 11/23/24    Left Ventricle: The left ventricle is normal in size. Normal wall thickness. There is concentric hypertrophy. Normal wall motion. There is normal systolic function with a visually estimated  ejection fraction of 55 - 60%. There is indeterminate diastolic function.    Right Ventricle: Systolic function is normal.    Left Atrium: Left atrium is mildly dilated.    Pulmonary Artery: The estimated pulmonary artery systolic pressure is 23 mmHg.    IVC/SVC: Normal venous pressure at 3 mmHg.    [facility] 10/13/19  1.   Successful PCI 99% proximal RCA stenosis wtih Stent [place] KYLIE x 2 overlapped.                 2.  Successful PTCA 99% diffuse ISR mid RCA.                 3.  Unsucessful PCI  distal RCA.             Assessment and Plan:     Atrial fibrillation with slow ventricular response  Mr. Yates is an 85 yo M with PMHx of Persistent Afib on eliquis, CAD, HFpEF, HTN, HLD, T2DM, PVD, Alzheimer's dementia, and choledocholithiasis with recent ERCP 10/15/24 is transferred to [facility] for EP evaluation and possible pacemaker placement.       Telemetry PACs occasionally HR 60s  S/p leadless pacemaker insertion 11/29/24  Right Groin no hematoma/bleeding/swelling/oozing  - we will sign off today  - Please call us if any questions           Miguel Willard MD  Cardiac Electrophysiology  [facility] - Cardiology Stepdown

## 2024-11-30 NOTE — PLAN OF CARE
AAOX3,VSS,O2 sats 96% on room air.Plan of care discussed with patient and spouse. Patient being discharged s/p day 1, leadless pacemaker placed.Patient has no complaints of chest pain/SOB, palpitations, lightheadedness, dizziness, or n/v. R groin site covered with gauze and tegaderm. Dressing CDI, no bleeding, discharge, oozing, or hematoma noted. Slight bruising noted around dressing / puncture site. Discussed medications and care. Patient has no questions at this time.Patient demented, hard of hearing, and withdrawn.Pt visualised and stable, with no acute distress noted.Call light within reach.Pt resting,bed at lowest position, side rails x3.Pt's spouse by the bedside.Will continue to monitor through the shift, plan of care ongoing.      Problem: Wound  Goal: Skin Health and Integrity  Outcome: Progressing  Goal: Optimal Wound Healing  Outcome: Progressing     Problem: Adult Inpatient Plan of Care  Goal: Plan of Care Review  Outcome: Met  Goal: Patient-Specific Goal (Individualized)  Outcome: Met  Goal: Absence of Hospital-Acquired Illness or Injury  Outcome: Met  Goal: Optimal Comfort and Wellbeing  Outcome: Met  Goal: Readiness for Transition of Care  Outcome: Met     Problem: Diabetes Comorbidity  Goal: Blood Glucose Level Within Targeted Range  Outcome: Met     Problem: Fall Injury Risk  Goal: Absence of Fall and Fall-Related Injury  Outcome: Met     Problem: Skin Injury Risk Increased  Goal: Skin Health and Integrity  Outcome: Met     Problem: Acute Kidney Injury/Impairment  Goal: Fluid and Electrolyte Balance  Outcome: Met  Goal: Improved Oral Intake  Outcome: Met  Goal: Effective Renal Function  Outcome: Met     Problem: Wound  Goal: Optimal Coping  Outcome: Met  Goal: Optimal Functional Ability  Outcome: Met  Goal: Absence of Infection Signs and Symptoms  Outcome: Met  Goal: Improved Oral Intake  Outcome: Met  Goal: Optimal Pain Control and Function  Outcome: Met

## 2024-11-30 NOTE — PLAN OF CARE
[facility] - Cardiology Stepdown  Discharge Final Note    Primary Care Provider: Holger Thornton MD    Expected Discharge Date: 11/30/2024    Final Discharge Note (most recent)       Final Note - 11/30/24 1533          Final Note    Assessment Type Final Discharge Note (P)      Anticipated Discharge Disposition Home or Self Care (P)      What phone number can be called within the next 1-3 days to see how you are doing after discharge? [phone] (P)      Hospital Resources/Appts/Education Provided Provided patient/caregiver with written discharge plan information;Provided education on problems/symptoms using teachback;Appointments scheduled and added to AVS (P)         Post-Acute Status    Post-Acute Authorization Other (P)      Coverage [employer] (P)      Other Status No Post-Acute Service Needs (P)      Discharge Delays None known at this time (P)                      Important Message from Medicare             SW noting pt's discharge orders. After review of pt's medical record, no discharge needs identified at this time. Spouse at bedside and prepared to transport pt home.     Lissa Hernandez LMSW

## 2024-11-30 NOTE — ASSESSMENT & PLAN NOTE
Current Antihypertensives       Plan  - patient with low blood pressures  Restart imdur   Hold valsartan until PCP follow-up

## 2024-11-30 NOTE — NURSING
Patient is ready for discharge. Patient stable alert and oriented x4. VSS. IVs removed. No complaints of chest pain, palpitations, SOB, dizziness,lightheadedness, or n/v. Discussed discharge plan with wife,patient withdrawn and demented. Reviewed medications and side effects, appointments, and answered questions with patient and spouse. No Rx, no new medications added to regimen.

## 2024-11-30 NOTE — SUBJECTIVE & OBJECTIVE
Interval History: No Acute Events Overnight. S/p leadless pacemaker insertion 11/29/24. Right Groin no hematoma/bleeding/swelling/oozing    Past Medical History:   Diagnosis Date    Acute blood loss anemia 10/14/2019    Alzheimer's dementia     Aneurysm, abdominal aortic     BCC (basal cell carcinoma of skin) 06/29/2023    Chronic diastolic heart failure 05/20/2024    Coronary artery disease     Depression     Diabetes mellitus     HLD (hyperlipidemia)     Hypertension     Kidney stone     PAD (peripheral artery disease)     PAF (paroxysmal atrial fibrillation) 01/24/2023    Tobacco abuse        Past Surgical History:   Procedure Laterality Date    APPENDECTOMY      CORONARY STENT PLACEMENT      x 4    DV5 ROBOTIC CHOLECYSTECTOMY N/A 10/15/2024    Procedure: DV5 ROBOTIC CHOLECYSTECTOMY;  Surgeon: Fred Taylor MD;  Location: [facility] OR;  Service: General;  Laterality: N/A;    ERCP N/A 10/10/2024    Procedure: ERCP (ENDOSCOPIC RETROGRADE CHOLANGIOPANCREATOGRAPHY);  Surgeon: Morgan Hong MD;  Location: [facility] ([address] [place] [address]);  Service: Endoscopy;  Laterality: N/A;    ESOPHAGOGASTRODUODENOSCOPY N/A 10/14/2019    Procedure: EGD (ESOPHAGOGASTRODUODENOSCOPY);  Surgeon: Carlos Mcneal III, MD;  Location: [facility];  Service: Endoscopy;  Laterality: N/A;    ESOPHAGOGASTRODUODENOSCOPY N/A 10/15/2019    Procedure: EGD (ESOPHAGOGASTRODUODENOSCOPY);  Surgeon: Carlos Mcneal III, MD;  Location: [facility];  Service: Endoscopy;  Laterality: N/A;    ESOPHAGOGASTRODUODENOSCOPY N/A 4/17/2023    Procedure: EGD (ESOPHAGOGASTRODUODENOSCOPY);  Surgeon: Nevaeh Mcconnell MD;  Location: [facility];  Service: Endoscopy;  Laterality: N/A;    INSERTION, PACEMAKER, TEMPORARY TRANSVENOUS N/A 10/15/2024    Procedure: Insertion, Pacemaker, Temporary Transvenous;  Surgeon: Demarcus Kirk MD;  Location: [facility] CATH LAB;  Service: Cardiology;  Laterality: N/A;    LEFT HEART CATHETERIZATION Left 10/11/2019    Procedure: CATHETERIZATION, HEART,  LEFT;  Surgeon: Robe Gilbert MD;  Location: [facility] CATH LAB;  Service: Cardiology;  Laterality: Left;    LEFT HEART CATHETERIZATION Left 10/13/2019    Procedure: CATHETERIZATION, HEART, LEFT;  Surgeon: Robe Gilbert MD;  Location: [facility] CATH LAB;  Service: Cardiology;  Laterality: Left;    leg stent Left        Review of patient's allergies indicates:   Allergen Reactions    Metoprolol Other (See Comments)     Junctional rhythm         No current facility-administered medications on file prior to encounter.     Current Outpatient Medications on File Prior to Encounter   Medication Sig    alcohol swabs (ALCOHOL PREP PADS) PadM Twice a day    blood sugar diagnostic Strp Test blood sugars twice a day    [START ON 12/4/2024] ELIQUIS 2.5 mg Tab Take 1 tablet (2.5 mg total) by mouth 2 (two) times daily.    gabapentin (NEURONTIN) 100 MG capsule Take 1 capsule (100 mg total) by mouth 2 (two) times daily as needed. PRN    isosorbide mononitrate (IMDUR) 120 MG 24 hr tablet TAKE 1 TABLET BY MOUTH EVERY DAY    lancets (ACCU-CHEK SOFTCLIX LANCETS) Misc Test blood sugars twice a day    memantine (NAMENDA) 10 MG Tab Take 1 tablet (10 mg total) by mouth 2 (two) times daily.    pantoprazole (PROTONIX) 40 MG tablet Take 1 tablet (40 mg total) by mouth once daily.    [START ON 12/4/2024] valsartan (DIOVAN) 160 MG tablet Take 1 tablet (160 mg total) by mouth once daily.     Family History       Problem Relation (Age of Onset)    COPD Father    Diabetes Mother, Brother          Tobacco Use    Smoking status: Former     Types: Cigars     Passive exposure: Never    Smokeless tobacco: Current   Substance and Sexual Activity    Alcohol use: Not Currently    Drug use: Not Currently    Sexual activity: Not Currently     Partners: Female       Objective:     Vital Signs (Most Recent):  Temp: 97.9 °F (36.6 °C) (11/30/24 0347)  Pulse: 60 (11/30/24 0347)  Resp: 18 (11/30/24 0347)  BP: 130/67 (11/30/24 0347)  SpO2: 95 % (11/30/24 0347) Vital  Signs (24h Range):  Temp:  [97.4 °F (36.3 °C)-98.7 °F (37.1 °C)] 97.9 °F (36.6 °C)  Pulse:  [51-75] 60  Resp:  [17-21] 18  SpO2:  [94 %-100 %] 95 %  BP: (111-165)/(50-71) 130/67     Weight: 66.1 kg (145 lb 11.6 oz)  Body mass index is 20.91 kg/m².     Physical Exam  Vitals reviewed.   Constitutional:       Appearance: He is ill-appearing.   HENT:      Head: Normocephalic and atraumatic.      Right Ear: External ear normal.      Left Ear: External ear normal.      Mouth/Throat:      Mouth: Mucous membranes are moist.   Eyes:      Extraocular Movements: Extraocular movements intact.      Conjunctiva/sclera: Conjunctivae normal.   Cardiovascular:      Rate and Rhythm: Bradycardia present. Rhythm irregular.      Heart sounds: No murmur heard.  Pulmonary:      Effort: Pulmonary effort is normal. No respiratory distress.      Breath sounds: Normal breath sounds.   Abdominal:      General: Abdomen is flat. Bowel sounds are normal.      Palpations: Abdomen is soft.      Tenderness: There is no abdominal tenderness.   Musculoskeletal:      Cervical back: Normal range of motion.      Right lower leg: No edema.      Left lower leg: No edema.   Skin:     General: Skin is warm and dry.   Neurological:      Mental Status: Mental status is at baseline.                Significant Labs: All pertinent labs within the past 24 hours have been reviewed.    Significant Imaging: I have reviewed all pertinent imaging results/findings within the past 24 hours.    Echo: 11/23/24    Left Ventricle: The left ventricle is normal in size. Normal wall thickness. There is concentric hypertrophy. Normal wall motion. There is normal systolic function with a visually estimated ejection fraction of 55 - 60%. There is indeterminate diastolic function.    Right Ventricle: Systolic function is normal.    Left Atrium: Left atrium is mildly dilated.    Pulmonary Artery: The estimated pulmonary artery systolic pressure is 23 mmHg.    IVC/SVC: Normal venous  pressure at 3 mmHg.    [facility] 10/13/19  1.   Successful PCI 99% proximal RCA stenosis wtih Stent Diego KYLIE x 2 overlapped.                 2.  Successful PTCA 99% diffuse ISR mid RCA.                 3.  Unsucessful PCI  distal RCA.

## 2024-11-30 NOTE — SUBJECTIVE & OBJECTIVE
Interval History:  Heart rate stable in the 50s-60.  Patient denies any complaints overnight. NAEON.     Review of Systems  Objective:     Vital Signs (Most Recent):  Temp: 97.9 °F (36.6 °C) (11/30/24 0347)  Pulse: 60 (11/30/24 0347)  Resp: 18 (11/30/24 0347)  BP: 130/67 (11/30/24 0347)  SpO2: 95 % (11/30/24 0347) Vital Signs (24h Range):  Temp:  [97.4 °F (36.3 °C)-98.7 °F (37.1 °C)] 97.9 °F (36.6 °C)  Pulse:  [51-75] 60  Resp:  [17-21] 18  SpO2:  [94 %-100 %] 95 %  BP: (111-165)/(50-71) 130/67     Weight: 66.1 kg (145 lb 11.6 oz)  Body mass index is 20.91 kg/m².    Intake/Output Summary (Last 24 hours) at 11/30/2024 0816  Last data filed at 11/30/2024 0416  Gross per 24 hour   Intake 510 ml   Output 650 ml   Net -140 ml         Physical Exam  Constitutional:       General: He is not in acute distress.  Cardiovascular:      Rate and Rhythm: Bradycardia present. Rhythm irregular.   Pulmonary:      Effort: Pulmonary effort is normal. No respiratory distress.      Breath sounds: Normal breath sounds. No wheezing or rales.   Abdominal:      General: Abdomen is flat. There is no distension.      Palpations: Abdomen is soft.      Tenderness: There is no abdominal tenderness.   Musculoskeletal:         General: No swelling.      Right lower leg: No edema.      Left lower leg: No edema.   Skin:     General: Skin is warm.   Neurological:      Mental Status: He is alert.             Significant Labs: All pertinent labs within the past 24 hours have been reviewed.  CBC:   Recent Labs   Lab 11/29/24 0256   WBC 5.70   HGB 11.8*   HCT 35.5*   *     CMP:   Recent Labs   Lab 11/29/24 0256 11/30/24  0432    137   K 4.6 4.8    107   CO2 21* 24   * 154*   BUN 30* 25*   CREATININE 1.3 1.4   CALCIUM 8.6* 9.1   PROT 6.2  --    ALBUMIN 2.9*  --    BILITOT 0.6  --    ALKPHOS 80  --    AST 14  --    ALT 13  --    ANIONGAP 13 6*       Significant Imaging: I have reviewed all pertinent imaging results/findings  within the past 24 hours.

## 2024-11-30 NOTE — ASSESSMENT & PLAN NOTE
Mr. Yates is an 85 yo M with PMHx of Persistent Afib on eliquis, CAD, HFpEF, HTN, HLD, T2DM, PVD, Alzheimer's dementia, and choledocholithiasis with recent ERCP 10/15/24 is [place] [facility] [place] and possible pacemaker placement.       Telemetry PACs occasionally HR 60s  S/p leadless pacemaker insertion 11/29/24  Right Groin no hematoma/bleeding/swelling/oozing  - we will sign off today  - Please call us if any questions

## 2024-11-30 NOTE — ASSESSMENT & PLAN NOTE
Has hx of bradycardia with afib 40-50s. Required TVP prior to recent ERCP. Patient presented with syncope and weakness due to symptomatic bradycardia. Found to be in Afib with slow ventricular response. Was previously transcutaneously paced and transferred for EP eval for leadless Pacemaker. TSH WNL.     Plan:  - pacemaker placement 11/29, tolerated procedure well and heart rate consistently above the 50  - EP following, appreciate recs  - cardiac telemetry  - held aspirin, eliquis due to potential pacemaker

## 2024-12-02 ENCOUNTER — HOSPITAL ENCOUNTER (OUTPATIENT)
Facility: HOSPITAL | Age: 84
Discharge: HOME | End: 2024-12-02
Attending: INTERNAL MEDICINE | Admitting: INTERNAL MEDICINE
Payer: MEDICARE

## 2024-12-02 ENCOUNTER — ANESTHESIA (OUTPATIENT)
Dept: ENDOSCOPY | Facility: HOSPITAL | Age: 84
End: 2024-12-02
Payer: MEDICARE

## 2024-12-02 VITALS
DIASTOLIC BLOOD PRESSURE: 63 MMHG | RESPIRATION RATE: 12 BRPM | HEIGHT: 70 IN | OXYGEN SATURATION: 100 % | BODY MASS INDEX: 20.76 KG/M2 | HEART RATE: 50 BPM | WEIGHT: 145 LBS | TEMPERATURE: 98 F | SYSTOLIC BLOOD PRESSURE: 116 MMHG

## 2024-12-02 DIAGNOSIS — Z46.89 ENCOUNTER FOR REMOVAL OF BILIARY STENT: ICD-10-CM

## 2024-12-02 DIAGNOSIS — I49.1 PAC (PREMATURE ATRIAL CONTRACTION): Primary | ICD-10-CM

## 2024-12-02 DIAGNOSIS — K80.51 CALCULUS OF BILE DUCT WITHOUT CHOLECYSTITIS WITH OBSTRUCTION: Primary | ICD-10-CM

## 2024-12-02 LAB — POCT GLUCOSE: 146 MG/DL (ref 70–110)

## 2024-12-02 PROCEDURE — C1726 CATH, BAL DIL, NON-VASCULAR: HCPCS | Mod: HCNC | Performed by: INTERNAL MEDICINE

## 2024-12-02 PROCEDURE — 37000008 HC ANESTHESIA 1ST 15 MINUTES: Mod: HCNC | Performed by: INTERNAL MEDICINE

## 2024-12-02 PROCEDURE — 27200997: Mod: HCNC | Performed by: INTERNAL MEDICINE

## 2024-12-02 PROCEDURE — 43275 ERCP REMOVE FORGN BODY DUCT: CPT | Mod: HCNC | Performed by: INTERNAL MEDICINE

## 2024-12-02 PROCEDURE — 25000003 PHARM REV CODE 250: Mod: HCNC | Performed by: NURSE ANESTHETIST, CERTIFIED REGISTERED

## 2024-12-02 PROCEDURE — C1769 GUIDE WIRE: HCPCS | Mod: HCNC | Performed by: INTERNAL MEDICINE

## 2024-12-02 PROCEDURE — 74328 X-RAY BILE DUCT ENDOSCOPY: CPT | Mod: TC,HCNC | Performed by: INTERNAL MEDICINE

## 2024-12-02 PROCEDURE — 43275 ERCP REMOVE FORGN BODY DUCT: CPT | Mod: HCNC,,, | Performed by: INTERNAL MEDICINE

## 2024-12-02 PROCEDURE — 43277 ERCP EA DUCT/AMPULLA DILATE: CPT | Mod: 59,HCNC | Performed by: INTERNAL MEDICINE

## 2024-12-02 PROCEDURE — 74328 X-RAY BILE DUCT ENDOSCOPY: CPT | Mod: 26,HCNC,, | Performed by: INTERNAL MEDICINE

## 2024-12-02 PROCEDURE — 43277 ERCP EA DUCT/AMPULLA DILATE: CPT | Mod: 59,HCNC,, | Performed by: INTERNAL MEDICINE

## 2024-12-02 PROCEDURE — 25000003 PHARM REV CODE 250: Mod: HCNC | Performed by: INTERNAL MEDICINE

## 2024-12-02 PROCEDURE — 43264 ERCP REMOVE DUCT CALCULI: CPT | Mod: HCNC | Performed by: INTERNAL MEDICINE

## 2024-12-02 PROCEDURE — 27202125 HC BALLOON, EXTRACTION (ANY): Mod: HCNC | Performed by: INTERNAL MEDICINE

## 2024-12-02 PROCEDURE — D9220A PRA ANESTHESIA: Mod: CRNA,,, | Performed by: NURSE ANESTHETIST, CERTIFIED REGISTERED

## 2024-12-02 PROCEDURE — 25500020 PHARM REV CODE 255: Mod: HCNC | Performed by: INTERNAL MEDICINE

## 2024-12-02 PROCEDURE — 43264 ERCP REMOVE DUCT CALCULI: CPT | Mod: 51,HCNC,, | Performed by: INTERNAL MEDICINE

## 2024-12-02 PROCEDURE — D9220A PRA ANESTHESIA: Mod: ANES,,, | Performed by: ANESTHESIOLOGY

## 2024-12-02 PROCEDURE — 27202303 HC GRASPER, SPECIALTY: Mod: HCNC | Performed by: INTERNAL MEDICINE

## 2024-12-02 PROCEDURE — 37000009 HC ANESTHESIA EA ADD 15 MINS: Mod: HCNC | Performed by: INTERNAL MEDICINE

## 2024-12-02 PROCEDURE — 63600175 PHARM REV CODE 636 W HCPCS: Mod: HCNC | Performed by: NURSE ANESTHETIST, CERTIFIED REGISTERED

## 2024-12-02 RX ORDER — TOPICAL ANESTHETIC 200 MG/ML
SPRAY DENTAL; PERIODONTAL
Status: DISCONTINUED | OUTPATIENT
Start: 2024-12-02 | End: 2024-12-02

## 2024-12-02 RX ORDER — SODIUM CHLORIDE 0.9 % (FLUSH) 0.9 %
10 SYRINGE (ML) INJECTION
Status: DISCONTINUED | OUTPATIENT
Start: 2024-12-02 | End: 2024-12-02 | Stop reason: HOSPADM

## 2024-12-02 RX ORDER — SODIUM CHLORIDE 9 MG/ML
INJECTION, SOLUTION INTRAVENOUS CONTINUOUS
Status: DISCONTINUED | OUTPATIENT
Start: 2024-12-02 | End: 2024-12-02 | Stop reason: HOSPADM

## 2024-12-02 RX ORDER — CIPROFLOXACIN 500 MG/1
500 TABLET ORAL EVERY 12 HOURS
Qty: 10 TABLET | Refills: 0 | Status: SHIPPED | OUTPATIENT
Start: 2024-12-02 | End: 2024-12-07

## 2024-12-02 RX ORDER — LIDOCAINE HYDROCHLORIDE 20 MG/ML
INJECTION INTRAVENOUS
Status: DISCONTINUED | OUTPATIENT
Start: 2024-12-02 | End: 2024-12-02

## 2024-12-02 RX ORDER — CIPROFLOXACIN 2 MG/ML
INJECTION, SOLUTION INTRAVENOUS
Status: DISCONTINUED | OUTPATIENT
Start: 2024-12-02 | End: 2024-12-02

## 2024-12-02 RX ORDER — INDOMETHACIN 50 MG/1
SUPPOSITORY RECTAL
Status: COMPLETED | OUTPATIENT
Start: 2024-12-02 | End: 2024-12-02

## 2024-12-02 RX ORDER — PROPOFOL 10 MG/ML
VIAL (ML) INTRAVENOUS CONTINUOUS PRN
Status: DISCONTINUED | OUTPATIENT
Start: 2024-12-02 | End: 2024-12-02

## 2024-12-02 RX ORDER — PROPOFOL 10 MG/ML
VIAL (ML) INTRAVENOUS
Status: DISCONTINUED | OUTPATIENT
Start: 2024-12-02 | End: 2024-12-02

## 2024-12-02 RX ADMIN — PROPOFOL 20 MG: 10 INJECTION, EMULSION INTRAVENOUS at 11:12

## 2024-12-02 RX ADMIN — CIPROFLOXACIN 400 MG: 2 INJECTION, SOLUTION INTRAVENOUS at 12:12

## 2024-12-02 RX ADMIN — PROPOFOL 75 MCG/KG/MIN: 10 INJECTION, EMULSION INTRAVENOUS at 12:12

## 2024-12-02 RX ADMIN — GLYCOPYRROLATE 0.1 MG: 0.2 INJECTION, SOLUTION INTRAMUSCULAR; INTRAVITREAL at 11:12

## 2024-12-02 RX ADMIN — LIDOCAINE HYDROCHLORIDE 100 MG: 20 INJECTION, SOLUTION INTRAVENOUS at 11:12

## 2024-12-02 RX ADMIN — TOPICAL ANESTHETIC 1 EACH: 200 SPRAY DENTAL; PERIODONTAL at 11:12

## 2024-12-02 RX ADMIN — SODIUM CHLORIDE: 0.9 INJECTION, SOLUTION INTRAVENOUS at 11:12

## 2024-12-02 NOTE — H&P
Short Stay Endoscopy History and Physical    PCP - Holger Thornton MD  Referring Physician - Morgan Hong MD  [address] [place] [address]    Procedure - ERCP  ASA - per anesthesia  Mallampati - per anesthesia  History of Anesthesia problems - per anesthesia  Family history Anesthesia problems -  per anesthesia   Plan of anesthesia - per anesthesia    HPI:  This is a 84 y.o. male here for evaluation of: ERCP for stent removal and final clearance of CBD if any stones remain (s/p cholecystectomy mid Oct 2024)    Reflux - no  Dysphagia - no  Abdominal pain - no  Diarrhea - no    ROS:  Constitutional: No fevers, chills, No weight loss  CV: No chest pain  Pulm: No cough, No shortness of breath  Ophtho: No vision changes  GI: see HPI  Derm: No rash    Medical History:  has a past medical history of Acute blood loss anemia (10/14/2019), Alzheimer's dementia, Aneurysm, abdominal aortic, BCC (basal cell carcinoma of skin) (06/29/2023), Chronic diastolic heart failure (05/20/2024), Coronary artery disease, Depression, Diabetes mellitus, HLD (hyperlipidemia), Hypertension, Kidney stone, PAD (peripheral artery disease), PAF (paroxysmal atrial fibrillation) (01/24/2023), and Tobacco abuse.    Surgical History:  has a past surgical history that includes Coronary stent placement; Appendectomy; Esophagogastroduodenoscopy (N/A, 10/14/2019); Esophagogastroduodenoscopy (N/A, 10/15/2019); Left heart catheterization (Left, 10/11/2019); Left heart catheterization (Left, 10/13/2019); leg stent (Left); Esophagogastroduodenoscopy (N/A, 4/17/2023); ERCP (N/A, 10/10/2024); insertion, pacemaker, temporary transvenous (N/A, 10/15/2024); dv5 robotic cholecystectomy (N/A, 10/15/2024); and Implantation of leadless pacemaker (N/A, 11/29/2024).    Family History: family history includes COPD in his father; Diabetes in his brother and mother..    Social History:  reports that he has quit smoking. His smoking use  included cigars. He has never been exposed to tobacco smoke. He uses smokeless tobacco. He reports that he does not currently use alcohol. He reports that he does not currently use drugs.    Review of patient's allergies indicates:   Allergen Reactions    Metoprolol Other (See Comments)     Junctional rhythm         Medications:   Medications Prior to Admission   Medication Sig Dispense Refill Last Dose/Taking    gabapentin (NEURONTIN) 100 MG capsule Take 1 capsule (100 mg total) by mouth 2 (two) times daily as needed. PRN 60 capsule 3 12/1/2024    memantine (NAMENDA) 10 MG Tab Take 1 tablet (10 mg total) by mouth 2 (two) times daily. 60 tablet 6 12/1/2024 Morning    pantoprazole (PROTONIX) 40 MG tablet Take 1 tablet (40 mg total) by mouth once daily. 90 tablet 3 12/1/2024    alcohol swabs (ALCOHOL PREP PADS) PadM Twice a day 400 each 3     blood sugar diagnostic Strp Test blood sugars twice a day 200 strip 6     [START ON 12/4/2024] ELIQUIS 2.5 mg Tab Take 1 tablet (2.5 mg total) by mouth 2 (two) times daily.   10/31/2024    isosorbide mononitrate (IMDUR) 120 MG 24 hr tablet TAKE 1 TABLET BY MOUTH EVERY DAY 90 tablet 3 11/2/2024    lancets (ACCU-CHEK SOFTCLIX LANCETS) Misc Test blood sugars twice a day 200 each 6     [START ON 12/4/2024] valsartan (DIOVAN) 160 MG tablet Take 1 tablet (160 mg total) by mouth once daily. Hold until you see PCP   Unknown       Physical Exam:    Vital Signs: There were no vitals filed for this visit.    General Appearance: Well appearing in no acute distress    Labs:  Lab Results   Component Value Date    WBC 5.70 11/29/2024    HGB 11.8 (L) 11/29/2024    HCT 35.5 (L) 11/29/2024     (L) 11/29/2024    CHOL 150 05/07/2024    TRIG 99 05/07/2024    HDL 36 (L) 05/07/2024    ALT 13 11/29/2024    AST 14 11/29/2024     11/30/2024    K 4.8 11/30/2024     11/30/2024    CREATININE 1.4 11/30/2024    BUN 25 (H) 11/30/2024    CO2 24 11/30/2024    TSH 0.728 11/23/2024    INR 1.0  11/28/2024    HGBA1C 6.3 (H) 10/28/2024       I have explained the risks and benefits of this endoscopic procedure to the patient including but not limited to bleeding, pancreatitis, inflammation, infection, perforation, missing a lesion and death.      Dawson Dejesus MD

## 2024-12-02 NOTE — TRANSFER OF CARE
"Anesthesia Transfer of Care Note    Patient: Aquiles Yates    Procedure(s) Performed: Procedure(s) (LRB):  ERCP (ENDOSCOPIC RETROGRADE CHOLANGIOPANCREATOGRAPHY) (N/A)    Patient location: GI    Anesthesia Type: general    Transport from OR: Transported from OR on room air with adequate spontaneous ventilation    Post pain: adequate analgesia    Post assessment: no apparent anesthetic complications and tolerated procedure well    Post vital signs: stable    Level of consciousness: sedated    Nausea/Vomiting: no nausea/vomiting    Complications: none    Transfer of care protocol was followed    Last vitals: Visit Vitals  BP (!) 149/77   Pulse (!) 51   Temp 36.4 °C (97.5 °F)   Resp 18   Ht 5' 10" (1.778 m)   Wt 65.8 kg (145 lb)   SpO2 (!) 93%   BMI 20.81 kg/m²     "

## 2024-12-02 NOTE — ANESTHESIA PREPROCEDURE EVALUATION
[facility]  Anesthesia Pre-Operative Evaluation         Patient Name: Aquiles Yates  YOB: 1940  MRN: [id number]    SUBJECTIVE:     Pre-operative Evaluation for Procedure(s) (LRB):  ERCP     12/02/2024    Aquiles Yates is a 84 y.o. male with a PMHx significant for Afib on eliquis, CAD, HFpEF, HTN, HLD, T2DM, PAD, previous smoker, Alzheimer's dementia, and choledocholithiasis with recent ERCP 10/15/24 now with symptomatic bradycardia.     Level of Care: Floor   Hemodynamic Status: 97 - 146 / 53 - 74  Respiratory Status: RA   IV Access: 20G PIV x2  NPO Status: NPO since MN    Previous Airway:   Intubation:     Induction:  Intravenous    Intubated:  Postinduction    Mask Ventilation:  Easy mask    Attempts:  1    Attempted By:  CRNA    Method of Intubation:  Direct    Blade:  Claros 2    Laryngeal View Grade: Grade I - full view of cords      Difficult Airway Encountered?: No      Complications:  None    Airway Device:  Oral endotracheal tube    Airway Device Size:  7.5    Style/Cuff Inflation:  Cuffed (inflated to minimal occlusive pressure)    Tube secured:  22    Secured at:  The lips    Placement Verified By:  Capnometry    Complicating Factors:  None    Findings Post-Intubation:  BS equal bilateral       Drips:      0.9% NaCl   Intravenous Continuous           Patient Active Problem List   Diagnosis    Coronary artery disease of native artery of native heart with stable angina pectoris    S/P PTCA (percutaneous transluminal coronary angioplasty)    PAD (peripheral artery disease)    S/P AAA repair    Essential hypertension    Dyslipidemia    Type 2 diabetes mellitus with other specified complication, unspecified whether long term insulin use    Decreased hearing of both ears    Abnormal ECG    History of PTCA    PAC (premature atrial contraction)    Claudication in peripheral vascular disease    Nonrheumatic aortic valve insufficiency    NSTEMI (non-ST elevated  myocardial infarction)    AP (angina pectoris)    History of tobacco abuse/stopped 12/2023    Symptomatic bradycardia    CAD, multiple vessel    Ischemic cardiomyopathy    History of GI bleed    LAE (left atrial enlargement)    LVH (left ventricular hypertrophy)    Weakness    Memory deficit    Chronic pain    Atrial fibrillation with slow ventricular response    Anticoagulated    Hypertensive heart disease with heart failure    AVM (arteriovenous malformation) of stomach, acquired with hemorrhage    Acute encephalopathy    Anemia    BCC (basal cell carcinoma of skin)    B12 deficiency    Folate deficiency    Dementia    Centrilobular emphysema    History of CVA (cerebrovascular accident)    Chronic diastolic heart failure    Calculus of bile duct without cholecystitis with obstruction    Severe protein-calorie malnutrition    Hx of cholecystectomy    Type 2 diabetes mellitus    NOAH (acute kidney injury)       Review of patient's allergies indicates:   Allergen Reactions    Metoprolol Other (See Comments)     Junctional rhythm         Current Inpatient Medications:        Past Surgical History:   Procedure Laterality Date    APPENDECTOMY      CORONARY STENT PLACEMENT      x 4    DV5 ROBOTIC CHOLECYSTECTOMY N/A 10/15/2024    Procedure: DV5 ROBOTIC CHOLECYSTECTOMY;  Surgeon: Fred Taylor MD;  Location: [facility] OR;  Service: General;  Laterality: N/A;    ERCP N/A 10/10/2024    Procedure: ERCP (ENDOSCOPIC RETROGRADE CHOLANGIOPANCREATOGRAPHY);  Surgeon: Morgan Hong MD;  Location: [facility] ([address] [place] [address]);  Service: Endoscopy;  Laterality: N/A;    ESOPHAGOGASTRODUODENOSCOPY N/A 10/14/2019    Procedure: EGD (ESOPHAGOGASTRODUODENOSCOPY);  Surgeon: Carlos Mcneal III, MD;  Location: [facility];  Service: Endoscopy;  Laterality: N/A;    ESOPHAGOGASTRODUODENOSCOPY N/A 10/15/2019    Procedure: EGD (ESOPHAGOGASTRODUODENOSCOPY);  Surgeon: Carlos Mcneal III, MD;  Location: [facility];  Service: Endoscopy;  Laterality: N/A;     ESOPHAGOGASTRODUODENOSCOPY N/A 4/17/2023    Procedure: EGD (ESOPHAGOGASTRODUODENOSCOPY);  Surgeon: Nevaeh Mcconnell MD;  Location: [facility] ENDO;  Service: Endoscopy;  Laterality: N/A;    IMPLANTATION OF LEADLESS PACEMAKER N/A 11/29/2024    Procedure: [id number];  Surgeon: WENDI Murdock MD;  Location: [facility] EP LAB;  Service: Cardiology;  Laterality: N/A;  SB, VIKRAM SALEH MDT, ANES, EH,     INSERTION, PACEMAKER, TEMPORARY TRANSVENOUS N/A 10/15/2024    Procedure: Insertion, Pacemaker, Temporary Transvenous;  Surgeon: Demarcus Kirk MD;  Location: [facility] CATH LAB;  Service: Cardiology;  Laterality: N/A;    LEFT HEART CATHETERIZATION Left 10/11/2019    Procedure: CATHETERIZATION, HEART, LEFT;  Surgeon: Robe Gilbert MD;  Location: [facility] CATH LAB;  Service: Cardiology;  Laterality: Left;    LEFT HEART CATHETERIZATION Left 10/13/2019    Procedure: CATHETERIZATION, HEART, LEFT;  Surgeon: Robe Gilbert MD;  Location: [facility] CATH LAB;  Service: Cardiology;  Laterality: Left;    leg stent Left        Social History     Substance and Sexual Activity   Drug Use Not Currently     Tobacco Use: High Risk (12/2/2024)    Patient History     Smoking Tobacco Use: Former     Smokeless Tobacco Use: Current     Passive Exposure: Never     Alcohol Use: Not At Risk (6/29/2023)    AUDIT-C     Frequency of Alcohol Consumption: Never     Average Number of Drinks: Patient does not drink     Frequency of Binge Drinking: Never       OBJECTIVE:     Vital Signs Range (Last 24H):  Temp:  [36.4 °C (97.5 °F)]   Pulse:  [51]   Resp:  [18]   BP: (149)/(77)   SpO2:  [93 %]       Significant Labs    Heme Profile  Lab Results   Component Value Date    WBC 5.70 11/29/2024    HGB 11.8 (L) 11/29/2024    HCT 35.5 (L) 11/29/2024     (L) 11/29/2024       Coagulation Studies  Lab Results   Component Value Date    LABPROT 11.4 11/28/2024    INR 1.0 11/28/2024    APTT 27.2 11/28/2024       Metabolic Profile  Lab Results    Component Value Date     11/30/2024    K 4.8 11/30/2024     11/30/2024    CO2 24 11/30/2024    BUN 25 (H) 11/30/2024    CREATININE 1.4 11/30/2024    MG 1.8 11/30/2024    PHOS 3.1 11/30/2024       Liver Function Tests  Lab Results   Component Value Date    AST 14 11/29/2024    ALT 13 11/29/2024    ALKPHOS 80 11/29/2024    BILITOT 0.6 11/29/2024    PROT 6.2 11/29/2024    ALBUMIN 2.9 (L) 11/29/2024       Lipid Profile  Lab Results   Component Value Date    CHOL 150 05/07/2024    HDL 36 (L) 05/07/2024    TRIG 99 05/07/2024       Endocrine Profile  Lab Results   Component Value Date    HGBA1C 6.3 (H) 10/28/2024    TSH 0.728 11/23/2024       Diagnostic Studies        EKG:   Results for orders placed or performed during the hospital encounter of 11/27/24   EKG 12-lead    Collection Time: 11/29/24 12:27 PM   Result Value Ref Range    QRS Duration 88 ms    [facility] QTC Calculation 410 ms    Narrative    Test Reason : Z95.0,    Vent. Rate :  58 BPM     Atrial Rate :  58 BPM     P-R Int : 170 ms          QRS Dur :  88 ms      QT Int : 418 ms       P-R-T Axes :  79 -13 253 degrees    QTcB Int : 410 ms    Sinus bradycardia with occasional ventricular-paced complexes and with  occasional Premature ventricular complexes  Low voltage QRS in the limb leads  Abnormal ECG  When compared with ECG of 23-Nov-2024 10:22,  Electronic ventricular pacemaker has replaced Junctional rhythm  Confirmed by Robel Mackay ([id number]) on 11/29/2024 12:54:55 PM    Referred By: ARACELI SEGUNDO           Confirmed By: Robel Mackay       TTE   Results for orders placed during the hospital encounter of 11/23/24    Echo    Interpretation Summary    Left Ventricle: The left ventricle is normal in size. Normal wall thickness. There is concentric hypertrophy. Normal wall motion. There is normal systolic function with a visually estimated ejection fraction of 55 - 60%. There is indeterminate diastolic function.    Right Ventricle: Systolic function is normal.     Left Atrium: Left atrium is mildly dilated.    Pulmonary Artery: The estimated pulmonary artery systolic pressure is 23 mmHg.    IVC/SVC: Normal venous pressure at 3 mmHg.        Nuclear Stress Echo   Results for orders placed during the hospital encounter of 09/02/20    Nuclear Stress - Cardiology Interpreted    Interpretation Summary    Abnormal myocardial perfusion study.    Perfusion Defect There is a severe intensity, fixed defect consistent with scar  in the basal to distal inferolateral wall(s) with mild pato-infarct ischemia.    Gated perfusion images showed an ejection fraction of 42% at rest and 53% post stress. Normal ejection fraction is greater than 53%.    The EKG portion of this study is negative for ischemia.    There were no arrhythmias during stress.          ASSESSMENT/PLAN:     Aquiles Yates is a 84 y.o. male with Afib on eliquis, CAD, HFpEF, HTN, HLD, T2DM, PAD, previous smoker, Alzheimer's dementia, and now with symptomatic bradycardia.     Pre-op Assessment    I have reviewed the Patient Summary Reports.     I have reviewed the Nursing Notes. I have reviewed the NPO Status.   I have reviewed the Medications.     Review of Systems  Anesthesia Hx:  No problems with previous Anesthesia   History of prior surgery of interest to airway management or planning:             Social:  Former Smoker       Cardiovascular:    Pacemaker Hypertension, poorly controlled  Past MI CAD    Dysrhythmias      PVD     Recent pacemaker                            Pulmonary:   COPD                     Renal/:  Chronic Renal Disease, CKD                Hepatic/GI:        choledocholithiasis with stent and then cholecystectomy              Neurological:       Dementia moderate                        Endocrine:  Diabetes Denies Hypothyroidism.  Denies Hyperthyroidism.             Physical Exam  General: Well nourished, Cooperative and Alert    Airway:  Mallampati: III   Mouth Opening: Normal  TM Distance:  Normal  Tongue: Normal  Neck ROM: Normal ROM    Dental:  Dentures        Anesthesia Plan  Type of Anesthesia, risks & benefits discussed:    Anesthesia Type: Gen Natural Airway, Gen ETT  Intra-op Monitoring Plan: Standard ASA Monitors  Post Op Pain Control Plan: multimodal analgesia and IV/PO Opioids PRN  Induction:  IV  Airway Plan: Direct and Video, Post-Induction  Informed Consent: Informed consent signed with the Patient representative and all parties understand the risks and agree with anesthesia plan.  All questions answered.   ASA Score: 3  Day of Surgery Review of History & Physical: H&P Update referred to the surgeon/provider.  Anesthesia Plan Notes:     Recent pacemaker placed 11/29.      Medium high risk due to past CV events and high risk of post-op cognitive dysfunction.  Avoid long-acting meds.     Ready For Surgery From Anesthesia Perspective.     .

## 2024-12-02 NOTE — [employer] PATIENT INSTRUCTIONS
Discharge Summary/Instructions after an Endoscopic Procedure  Patient Name: Aquiles Yates  Patient MRN: [id number]  Patient   YOB: 1940 Monday, December 2, 2024  Dawson Dejesus MD  Dear patient,  As a result of recent federal legislation (The Federal Cures Act), you may   receive lab or pathology results from your procedure in your [facility]   account before your physician is able to contact you. Your physician or   their representative will relay the results to you with their   recommendations at their soonest availability.  Thank you,  Your health is very important to us during the Covid Crisis. Following your   procedure today, you will receive a daily text for 2 weeks asking about   signs or symptoms of Covid 19.  Please respond to this text when you   receive it so we can follow up and keep you as safe as possible.   RESTRICTIONS:  During your procedure today, you received medications for sedation.  These   medications may affect your judgment, balance and coordination.  Therefore,   for 24 hours, you have the following restrictions:   - DO NOT drive a car, operate machinery, make legal/financial decisions,   sign important papers or drink alcohol.    ACTIVITY:  Today: no heavy lifting, straining or running due to procedural   sedation/anesthesia.  The following day: return to full activity including work.  DIET:  Eat and drink normally unless instructed otherwise.     TREATMENT FOR COMMON SIDE EFFECTS:  - Mild abdominal pain, nausea, belching, bloating or excessive gas:  rest,   eat lightly and use a heating pad.  - Sore Throat: treat with throat lozenges and/or gargle with warm salt   water.  - Because air was used during the procedure, expelling large amounts of air   from your rectum or belching is normal.  - If a bowel prep was taken, you may not have a bowel movement for 1-3 days.    This is normal.  SYMPTOMS TO WATCH FOR AND REPORT TO YOUR PHYSICIAN:  1. Abdominal pain or bloating,  other than gas cramps.  2. Chest pain.  3. Back pain.  4. Signs of infection such as: chills or fever occurring within 24 hours   after the procedure.  5. Rectal bleeding, which would show as bright red, maroon, or black stools.   (A tablespoon of blood from the rectum is not serious, especially if   hemorrhoids are present.)  6. Vomiting.  7. Weakness or dizziness.  GO DIRECTLY TO THE NEAREST EMERGENCY ROOM IF YOU HAVE ANY OF THE FOLLOWING:      Difficulty breathing              Chills and/or fever over 101 F   Persistent vomiting and/or vomiting blood   Severe abdominal pain   Severe chest pain   Black, tarry stools   Bleeding- more than one tablespoon   Any other symptom or condition that you feel may need urgent attention  Your doctor recommends these additional instructions:  If any biopsies were taken, your doctors clinic will contact you in 1 to 2   weeks with any results.  - Discharge patient to home.   - Patient has a contact number available for emergencies.  The signs and   symptoms of potential delayed complications were discussed with the   patient.  Return to normal activities tomorrow.  Written discharge   instructions were provided to the patient.   - Resume previous diet.   - Resume Eliquis (apixaban) at prior dose in 1 day.   - The patient should not require a repeat ERCP unless future clinical   concern for recurrent biliary obstruction develops or is detected on future   imaging or labs.   - Cipro (ciprofloxacin) 500 mg PO BID for 5 days.   - Watch for pancreatitis, bleeding, perforation, and cholangitis.  For questions, problems or results please call your physician - Dawson Dejesus MD.  EMERGENCY PHONE NUMBER: [phone],  LAB RESULTS: [phone]  IF A COMPLICATION OR EMERGENCY SITUATION ARISES AND YOU ARE UNABLE TO REACH   YOUR PHYSICIAN - GO DIRECTLY TO THE EMERGENCY ROOM.  Dawson Dejesus MD  12/2/2024 1:22:49 PM  This report has been verified and signed electronically.  Dear  patient,  As a result of recent federal legislation (The Federal Cures Act), you may   receive lab or pathology results from your procedure in your [facility]   account before your physician is able to contact you. Your physician or   their representative will relay the results to you with their   recommendations at their soonest availability.  Thank you,  PROVATION

## 2024-12-03 ENCOUNTER — PATIENT OUTREACH (OUTPATIENT)
Dept: ADMINISTRATIVE | Facility: CLINIC | Age: 84
End: 2024-12-03
Payer: MEDICARE

## 2024-12-03 NOTE — ANESTHESIA POSTPROCEDURE EVALUATION
Anesthesia Post Evaluation    Patient: Aquiles Yates    Procedure(s) Performed: Procedure(s) (LRB):  ERCP (ENDOSCOPIC RETROGRADE CHOLANGIOPANCREATOGRAPHY) (N/A)    Final Anesthesia Type: general      Patient location during evaluation: PACU  Patient participation: Yes- Able to Participate  Level of consciousness: awake and alert  Post-procedure vital signs: reviewed and stable  Pain management: adequate  Airway patency: patent    PONV status at discharge: No PONV  Anesthetic complications: no      Cardiovascular status: blood pressure returned to baseline  Respiratory status: unassisted  Hydration status: euvolemic  Follow-up not needed.          Vitals Value Taken Time   /63 12/02/24 1345   Temp 36.4 °C (97.5 °F) 12/02/24 1315   Pulse 50 12/02/24 1345   Resp 12 12/02/24 1345   SpO2 100 % 12/02/24 1345         Event Time   Out of Recovery 14:28:22         Pain/Carmen Score: Carmen Score: 10 (12/2/2024  1:45 PM)

## 2024-12-03 NOTE — PROGRESS NOTES
C3 nurse attempted to contact Aquiles Yates for a TCC post hospital discharge follow up call. No answer. Left voicemail with callback information. The patient has a scheduled HOSFU appointment with Coty Malki on 12/04/2024 @ 1000.

## 2024-12-03 NOTE — PROGRESS NOTES
Transitional Care Note    Family and/or Caretaker present at visit?  Yes  Diagnostic tests reviewed/disposition: No diagnosic tests pending after this hospitalization.  Disease/illness education:   Discussed, all questions answered.  Home health/community services discussion/referrals: Patient does not have home health established from hospital visit.  They do not need home health.  If needed, we will set up home health for the patient.   Establishment or re-establishment of referral orders for community resources: No other necessary community resources.   Discussion with other health care providers: No discussion with other health care providers necessary.    SUBJECTIVE:      Aquiles Yates is a 84 y.o. male here to the office today for:  HOSPITAL FOLLOW-UP    HPI:    Aquiles Yates is here today for a hospital follow-up.  I have reviewed the patient's medical history in detail and updated the computerized patient record.    ADMITTED TO: [facility]  DATES OF SERVICE:11/28-11/30        REVIEW OF SYSTEMS:  Review of Systems   Constitutional:  Negative for chills and fever.   Respiratory:  Negative for chest tightness and shortness of breath.    Cardiovascular:  Negative for chest pain and palpitations.   Gastrointestinal:  Negative for abdominal pain, constipation, diarrhea, nausea and vomiting.   Neurological:  Negative for dizziness, weakness and numbness.        ALLERGIES:  Review of patient's allergies indicates:   Allergen Reactions    Metoprolol Other (See Comments)     Junctional rhythm         CURRENT PROBLEM LIST:  Patient Active Problem List   Diagnosis    Coronary artery disease of native artery of native heart with stable angina pectoris    S/P PTCA (percutaneous transluminal coronary angioplasty)    PAD (peripheral artery disease)    S/P AAA repair    Essential hypertension    Dyslipidemia    Type 2 diabetes mellitus with other specified complication, unspecified whether  long term insulin use    Decreased hearing of both ears    Abnormal ECG    History of PTCA    PAC (premature atrial contraction)    Claudication in peripheral vascular disease    Nonrheumatic aortic valve insufficiency    NSTEMI (non-ST elevated myocardial infarction)    AP (angina pectoris)    History of tobacco abuse/stopped 12/2023    Symptomatic bradycardia    CAD, multiple vessel    Ischemic cardiomyopathy    History of GI bleed    LAE (left atrial enlargement)    LVH (left ventricular hypertrophy)    Weakness    Memory deficit    Chronic pain    Atrial fibrillation with slow ventricular response    Anticoagulated    Hypertensive heart disease with heart failure    AVM (arteriovenous malformation) of stomach, acquired with hemorrhage    Acute encephalopathy    Anemia    BCC (basal cell carcinoma of skin)    B12 deficiency    Folate deficiency    Dementia    Centrilobular emphysema    History of CVA (cerebrovascular accident)    Chronic diastolic heart failure    Calculus of bile duct without cholecystitis with obstruction    Severe protein-calorie malnutrition    Hx of cholecystectomy    Type 2 diabetes mellitus    NOAH (acute kidney injury)       CURRENT MEDICATION LIST:    Current Outpatient Medications:     alcohol swabs (ALCOHOL PREP PADS) PadM, Twice a day, Disp: 400 each, Rfl: 3    blood sugar diagnostic Strp, Test blood sugars twice a day, Disp: 200 strip, Rfl: 6    ciprofloxacin HCl (CIPRO) 500 MG tablet, Take 1 tablet (500 mg total) by mouth every 12 (twelve) hours. for 5 days, Disp: 10 tablet, Rfl: 0    ELIQUIS 2.5 mg Tab, Take 1 tablet (2.5 mg total) by mouth 2 (two) times daily., Disp: , Rfl:     gabapentin (NEURONTIN) 100 MG capsule, Take 1 capsule (100 mg total) by mouth 2 (two) times daily as needed. PRN, Disp: 60 capsule, Rfl: 3    isosorbide mononitrate (IMDUR) 120 MG 24 hr tablet, TAKE 1 TABLET BY MOUTH EVERY DAY, Disp: 90 tablet, Rfl: 3    lancets (ACCU-CHEK SOFTCLIX LANCETS) Misc, Test blood  "sugars twice a day, Disp: 200 each, Rfl: 6    memantine (NAMENDA) 10 MG Tab, Take 1 tablet (10 mg total) by mouth 2 (two) times daily., Disp: 60 tablet, Rfl: 6    pantoprazole (PROTONIX) 40 MG tablet, Take 1 tablet (40 mg total) by mouth once daily., Disp: 90 tablet, Rfl: 3    valsartan (DIOVAN) 160 MG tablet, Take 1 tablet (160 mg total) by mouth once daily. Hold until you see PCP, Disp: , Rfl:       Past medical, surgical, family and social histories have been reviewed today.      OBJECTIVE:     Vitals:    12/04/24 1007   BP: 100/60   Pulse: 60   Resp: 18   Temp: 97.7 °F (36.5 °C)   Weight: 63 kg (138 lb 14.2 oz)   Height: 5' 10" (1.778 m)       Physical Exam  Constitutional:       General: He is not in acute distress.     Appearance: Normal appearance. He is normal weight.   HENT:      Head: Normocephalic and atraumatic.   Cardiovascular:      Rate and Rhythm: Normal rate and regular rhythm.      Heart sounds: Normal heart sounds. No murmur heard.     No friction rub. No gallop.   Pulmonary:      Effort: Pulmonary effort is normal.      Breath sounds: Normal breath sounds. No wheezing, rhonchi or rales.   Abdominal:      General: Bowel sounds are normal. There is no distension.      Palpations: Abdomen is soft.      Tenderness: There is no abdominal tenderness. There is no rebound.   Skin:     General: Skin is warm and dry.      Coloration: Skin is not jaundiced.   Neurological:      General: No focal deficit present.      Mental Status: He is alert and oriented to person, place, and time. Mental status is at baseline.   Psychiatric:         Mood and Affect: Mood normal.         Behavior: Behavior normal.           ASSESSMENT:     1. Atrial fibrillation with slow ventricular response    Comments:  Incsions healing well. Has cardiology appointments scheduled.  Orders:  -     Ambulatory referral/consult to Internal Medicine                  "

## 2024-12-04 ENCOUNTER — OFFICE VISIT (OUTPATIENT)
Dept: FAMILY MEDICINE | Facility: CLINIC | Age: 84
End: 2024-12-04
Payer: MEDICARE

## 2024-12-04 VITALS
WEIGHT: 138.88 LBS | RESPIRATION RATE: 18 BRPM | DIASTOLIC BLOOD PRESSURE: 60 MMHG | SYSTOLIC BLOOD PRESSURE: 100 MMHG | HEIGHT: 70 IN | BODY MASS INDEX: 19.88 KG/M2 | HEART RATE: 60 BPM | TEMPERATURE: 98 F

## 2024-12-04 DIAGNOSIS — I48.91 ATRIAL FIBRILLATION WITH SLOW VENTRICULAR RESPONSE: ICD-10-CM

## 2024-12-04 PROCEDURE — 99999 PR PBB SHADOW E&M-EST. PATIENT-LVL IV: CPT | Mod: PBBFAC,HCNC,, | Performed by: INTERNAL MEDICINE

## 2024-12-13 ENCOUNTER — CLINICAL SUPPORT (OUTPATIENT)
Dept: CARDIOLOGY | Facility: HOSPITAL | Age: 84
End: 2024-12-13
Attending: STUDENT IN AN ORGANIZED HEALTH CARE EDUCATION/TRAINING PROGRAM
Payer: MEDICARE

## 2024-12-13 DIAGNOSIS — I49.1 PAC (PREMATURE ATRIAL CONTRACTION): ICD-10-CM

## 2024-12-13 PROCEDURE — 93279 PRGRMG DEV EVAL PM/LDLS PM: CPT | Mod: HCNC

## 2024-12-13 PROCEDURE — 93279 PRGRMG DEV EVAL PM/LDLS PM: CPT | Mod: 26,HCNC,, | Performed by: STUDENT IN AN ORGANIZED HEALTH CARE EDUCATION/TRAINING PROGRAM

## 2024-12-16 LAB — OHS CV RV PACING PERCENT: 41.6 %

## 2024-12-30 ENCOUNTER — CLINICAL SUPPORT (OUTPATIENT)
Dept: CARDIOLOGY | Facility: HOSPITAL | Age: 84
End: 2024-12-30
Attending: STUDENT IN AN ORGANIZED HEALTH CARE EDUCATION/TRAINING PROGRAM
Payer: MEDICARE

## 2024-12-30 DIAGNOSIS — I48.91 UNSPECIFIED ATRIAL FIBRILLATION: ICD-10-CM

## 2024-12-30 DIAGNOSIS — Z95.0 PRESENCE OF CARDIAC PACEMAKER: ICD-10-CM

## 2024-12-30 DIAGNOSIS — R00.1 BRADYCARDIA, UNSPECIFIED: ICD-10-CM

## 2024-12-30 PROCEDURE — 93294 REM INTERROG EVL PM/LDLS PM: CPT | Mod: HCNC,,, | Performed by: STUDENT IN AN ORGANIZED HEALTH CARE EDUCATION/TRAINING PROGRAM

## 2024-12-30 PROCEDURE — 93296 REM INTERROG EVL PM/IDS: CPT | Mod: HCNC | Performed by: STUDENT IN AN ORGANIZED HEALTH CARE EDUCATION/TRAINING PROGRAM

## 2024-12-31 LAB
OHS CV DC REMOTE DEVICE TYPE: NORMAL
OHS CV RV PACING PERCENT: 78.56 %

## 2025-01-31 ENCOUNTER — OFFICE VISIT (OUTPATIENT)
Dept: FAMILY MEDICINE | Facility: CLINIC | Age: 85
End: 2025-01-31
Payer: MEDICARE

## 2025-01-31 ENCOUNTER — LAB VISIT (OUTPATIENT)
Dept: LAB | Facility: HOSPITAL | Age: 85
End: 2025-01-31
Attending: INTERNAL MEDICINE
Payer: MEDICARE

## 2025-01-31 VITALS
BODY MASS INDEX: 19.07 KG/M2 | TEMPERATURE: 97 F | HEIGHT: 70 IN | WEIGHT: 133.19 LBS | SYSTOLIC BLOOD PRESSURE: 102 MMHG | HEART RATE: 59 BPM | OXYGEN SATURATION: 95 % | DIASTOLIC BLOOD PRESSURE: 60 MMHG

## 2025-01-31 DIAGNOSIS — I10 ESSENTIAL HYPERTENSION: Chronic | ICD-10-CM

## 2025-01-31 DIAGNOSIS — I48.91 ATRIAL FIBRILLATION WITH SLOW VENTRICULAR RESPONSE: ICD-10-CM

## 2025-01-31 DIAGNOSIS — F02.80 ALZHEIMER'S DEMENTIA WITHOUT BEHAVIORAL DISTURBANCE, PSYCHOTIC DISTURBANCE, MOOD DISTURBANCE, OR ANXIETY, UNSPECIFIED DEMENTIA SEVERITY, UNSPECIFIED TIMING OF DEMENTIA ONSET: ICD-10-CM

## 2025-01-31 DIAGNOSIS — E11.9 TYPE 2 DIABETES MELLITUS WITHOUT COMPLICATION, WITHOUT LONG-TERM CURRENT USE OF INSULIN: ICD-10-CM

## 2025-01-31 DIAGNOSIS — E78.5 DYSLIPIDEMIA: Chronic | ICD-10-CM

## 2025-01-31 DIAGNOSIS — D64.9 ANEMIA, UNSPECIFIED TYPE: Primary | ICD-10-CM

## 2025-01-31 DIAGNOSIS — G30.9 ALZHEIMER'S DEMENTIA WITHOUT BEHAVIORAL DISTURBANCE, PSYCHOTIC DISTURBANCE, MOOD DISTURBANCE, OR ANXIETY, UNSPECIFIED DEMENTIA SEVERITY, UNSPECIFIED TIMING OF DEMENTIA ONSET: ICD-10-CM

## 2025-01-31 DIAGNOSIS — Z79.01 ANTICOAGULATED: ICD-10-CM

## 2025-01-31 DIAGNOSIS — I50.32 CHRONIC DIASTOLIC HEART FAILURE: ICD-10-CM

## 2025-01-31 DIAGNOSIS — I25.118 CORONARY ARTERY DISEASE OF NATIVE ARTERY OF NATIVE HEART WITH STABLE ANGINA PECTORIS: ICD-10-CM

## 2025-01-31 PROCEDURE — 83036 HEMOGLOBIN GLYCOSYLATED A1C: CPT | Mod: HCNC | Performed by: INTERNAL MEDICINE

## 2025-01-31 PROCEDURE — 99999 PR PBB SHADOW E&M-EST. PATIENT-LVL III: CPT | Mod: PBBFAC,HCNC,, | Performed by: INTERNAL MEDICINE

## 2025-01-31 PROCEDURE — G2211 COMPLEX E/M VISIT ADD ON: HCPCS | Mod: HCNC,S$GLB,, | Performed by: INTERNAL MEDICINE

## 2025-01-31 PROCEDURE — 36415 COLL VENOUS BLD VENIPUNCTURE: CPT | Mod: HCNC,PO | Performed by: INTERNAL MEDICINE

## 2025-01-31 PROCEDURE — 3078F DIAST BP <80 MM HG: CPT | Mod: HCNC,CPTII,S$GLB, | Performed by: INTERNAL MEDICINE

## 2025-01-31 PROCEDURE — 99214 OFFICE O/P EST MOD 30 MIN: CPT | Mod: HCNC,S$GLB,, | Performed by: INTERNAL MEDICINE

## 2025-01-31 PROCEDURE — 1126F AMNT PAIN NOTED NONE PRSNT: CPT | Mod: HCNC,CPTII,S$GLB, | Performed by: INTERNAL MEDICINE

## 2025-01-31 PROCEDURE — 1101F PT FALLS ASSESS-DOCD LE1/YR: CPT | Mod: HCNC,CPTII,S$GLB, | Performed by: INTERNAL MEDICINE

## 2025-01-31 PROCEDURE — 3288F FALL RISK ASSESSMENT DOCD: CPT | Mod: HCNC,CPTII,S$GLB, | Performed by: INTERNAL MEDICINE

## 2025-01-31 PROCEDURE — 3074F SYST BP LT 130 MM HG: CPT | Mod: HCNC,CPTII,S$GLB, | Performed by: INTERNAL MEDICINE

## 2025-01-31 NOTE — PROGRESS NOTES
Subjective:       Patient ID: Aquiles Yates is a 84 y.o. male.    Chief Complaint: Follow-up (3 month), Hypertension, Hyperlipidemia, Diabetes, Atrial Fibrillation, Anticoagulation, and Coronary Artery Disease    Here with wife    Follow-up  Associated symptoms include arthralgias, fatigue, myalgias and weakness. Pertinent negatives include no abdominal pain, chest pain, chills, coughing, diaphoresis, fever, headaches, joint swelling, nausea, neck pain, numbness, rash, sore throat or vomiting.   Hypertension  Pertinent negatives include no chest pain, headaches, neck pain, palpitations or shortness of breath.   Hyperlipidemia  Associated symptoms include myalgias. Pertinent negatives include no chest pain or shortness of breath.   Diabetes  Hypoglycemia symptoms include confusion. Pertinent negatives for hypoglycemia include no dizziness, headaches, nervousness/anxiousness, pallor, seizures, speech difficulty or tremors. Associated symptoms include fatigue and weakness. Pertinent negatives for diabetes include no chest pain, no polydipsia, no polyphagia and no polyuria.   Atrial Fibrillation  Symptoms include weakness. Symptoms are negative for chest pain, dizziness, palpitations and shortness of breath. Past medical history includes atrial fibrillation, CAD and hyperlipidemia.   Coronary Artery Disease  Pertinent negatives include no chest pain, chest tightness, dizziness, palpitations or shortness of breath. Risk factors include hyperlipidemia.     Past Medical History:   Diagnosis Date    Acute blood loss anemia 10/14/2019    Alzheimer's dementia     Aneurysm, abdominal aortic     BCC (basal cell carcinoma of skin) 06/29/2023    Chronic diastolic heart failure 05/20/2024    Coronary artery disease     Depression     Diabetes mellitus     HLD (hyperlipidemia)     Hypertension     Kidney stone     PAD (peripheral artery disease)     PAF (paroxysmal atrial fibrillation) 01/24/2023    Tobacco abuse       Past Surgical History:   Procedure Laterality Date    APPENDECTOMY      CORONARY STENT PLACEMENT      x 4    DV5 ROBOTIC CHOLECYSTECTOMY N/A 10/15/2024    Procedure: DV5 ROBOTIC CHOLECYSTECTOMY;  Surgeon: Fred Taylor MD;  Location: [facility] OR;  Service: General;  Laterality: N/A;    ERCP N/A 10/10/2024    Procedure: ERCP (ENDOSCOPIC RETROGRADE CHOLANGIOPANCREATOGRAPHY);  Surgeon: Morgan Hong MD;  Location: [facility] ENDO ([address] [place] [address]);  Service: Endoscopy;  Laterality: N/A;    ERCP N/A 12/2/2024    Procedure: ERCP (ENDOSCOPIC RETROGRADE CHOLANGIOPANCREATOGRAPHY);  Surgeon: Dawson Dejesus MD;  Location: [facility];  Service: Endoscopy;  Laterality: N/A;  10/19/24: instructions sent via mail. Eliquis approval in TE MD Gilbert-MALVIN  11/25 ATTEMPTED PRE-CALL. PT IS CURRENTLY IN THE HOSPITAL-RT    ESOPHAGOGASTRODUODENOSCOPY N/A 10/14/2019    Procedure: EGD (ESOPHAGOGASTRODUODENOSCOPY);  Surgeon: Carlos Mcneal III, MD;  Location: [facility];  Service: Endoscopy;  Laterality: N/A;    ESOPHAGOGASTRODUODENOSCOPY N/A 10/15/2019    Procedure: EGD (ESOPHAGOGASTRODUODENOSCOPY);  Surgeon: Carlos Mcneal III, MD;  Location: [facility];  Service: Endoscopy;  Laterality: N/A;    ESOPHAGOGASTRODUODENOSCOPY N/A 4/17/2023    Procedure: EGD (ESOPHAGOGASTRODUODENOSCOPY);  Surgeon: Nevaeh Mcconnell MD;  Location: [facility];  Service: Endoscopy;  Laterality: N/A;    IMPLANTATION OF LEADLESS PACEMAKER N/A 11/29/2024    Procedure: [id number];  Surgeon: WENDI Murdock MD;  Location: [facility] EP LAB;  Service: Cardiology;  Laterality: N/A;  VIKRAM BANKS MDT, ANES, ,     INSERTION, PACEMAKER, TEMPORARY TRANSVENOUS N/A 10/15/2024    Procedure: Insertion, Pacemaker, Temporary Transvenous;  Surgeon: Demarcus Kirk MD;  Location: [facility] CATH LAB;  Service: Cardiology;  Laterality: N/A;    LEFT HEART CATHETERIZATION Left 10/11/2019    Procedure: CATHETERIZATION, HEART, LEFT;  Surgeon: Robe Gilbert MD;   Location: [facility] CATH LAB;  Service: Cardiology;  Laterality: Left;    LEFT HEART CATHETERIZATION Left 10/13/2019    Procedure: CATHETERIZATION, HEART, LEFT;  Surgeon: Robe Gilbert MD;  Location: [facility] CATH LAB;  Service: Cardiology;  Laterality: Left;    leg stent Left      Family History   Problem Relation Name Age of Onset    Diabetes Mother      COPD Father      Diabetes Brother       Social History     Socioeconomic History    Marital status:    Tobacco Use    Smoking status: Former     Types: Cigars     Passive exposure: Never    Smokeless tobacco: Current   Substance and Sexual Activity    Alcohol use: Not Currently    Drug use: Not Currently    Sexual activity: Not Currently     Partners: Female     Social Drivers of Health     Financial Resource Strain: Low Risk  (11/23/2024)    Overall Financial Resource Strain (CARDIA)     Difficulty of Paying Living Expenses: Not very hard   Food Insecurity: No Food Insecurity (11/23/2024)    Hunger Vital Sign     Worried About Running Out of Food in the Last Year: Never true     Ran Out of Food in the Last Year: Never true   Transportation Needs: No Transportation Needs (11/23/2024)    TRANSPORTATION NEEDS     Transportation : No   Physical Activity: Inactive (3/27/2024)    Exercise Vital Sign     Days of Exercise per Week: 0 days     Minutes of Exercise per Session: 0 min   Stress: No Stress Concern Present (11/23/2024)    [employer] [place] [employer] - Occupational Stress Questionnaire     Feeling of Stress : Only a little   Housing Stability: Low Risk  (11/23/2024)    Housing Stability Vital Sign     Unable to Pay for Housing in the Last Year: No     Homeless in the Last Year: No     Review of Systems   Constitutional:  Positive for fatigue. Negative for activity change, appetite change, chills, diaphoresis, fever and unexpected weight change.   HENT:  Negative for drooling, ear discharge, ear pain, facial swelling, hearing loss, mouth sores,  nosebleeds, postnasal drip, rhinorrhea, sinus pressure, sneezing, sore throat, tinnitus, trouble swallowing and voice change.    Eyes:  Negative for photophobia, redness and visual disturbance.   Respiratory:  Negative for apnea, cough, choking, chest tightness, shortness of breath and wheezing.    Cardiovascular:  Negative for chest pain and palpitations.   Gastrointestinal:  Negative for abdominal distention, abdominal pain, anal bleeding, blood in stool, constipation, diarrhea, nausea and vomiting.   Endocrine: Negative for cold intolerance, heat intolerance, polydipsia, polyphagia and polyuria.   Genitourinary:  Negative for difficulty urinating, dysuria, enuresis, flank pain, frequency, genital sores, hematuria and urgency.   Musculoskeletal:  Positive for arthralgias, gait problem and myalgias. Negative for back pain, joint swelling, neck pain and neck stiffness.   Skin:  Negative for color change, pallor, rash and wound.   Allergic/Immunologic: Negative for food allergies and immunocompromised state.   Neurological:  Positive for weakness. Negative for dizziness, tremors, seizures, syncope, facial asymmetry, speech difficulty, light-headedness, numbness and headaches.   Hematological:  Negative for adenopathy. Does not bruise/bleed easily.   Psychiatric/Behavioral:  Positive for confusion. Negative for agitation, behavioral problems, decreased concentration, dysphoric mood, hallucinations, self-injury, sleep disturbance and suicidal ideas. The patient is not nervous/anxious and is not hyperactive.        Objective:      Physical Exam  Vitals and nursing note reviewed.   Constitutional:       General: He is not in acute distress.     Appearance: Normal appearance. He is well-developed. He is not diaphoretic.   HENT:      Head: Normocephalic and atraumatic.      Mouth/Throat:      Pharynx: No oropharyngeal exudate.   Eyes:      General: No scleral icterus.     Pupils: Pupils are equal, round, and reactive to  light.   Neck:      Thyroid: No thyromegaly.      Vascular: No carotid bruit or JVD.      Trachea: No tracheal deviation.   Cardiovascular:      Rate and Rhythm: Normal rate and regular rhythm.      Heart sounds: Normal heart sounds.   Pulmonary:      Effort: Pulmonary effort is normal. No respiratory distress.      Breath sounds: Normal breath sounds. No wheezing or rales.   Chest:      Chest wall: No tenderness.   Abdominal:      General: Bowel sounds are normal. There is no distension.      Palpations: Abdomen is soft.      Tenderness: There is no abdominal tenderness. There is no guarding or rebound.   Musculoskeletal:         General: No tenderness. Normal range of motion.      Cervical back: Normal range of motion and neck supple.   Lymphadenopathy:      Cervical: No cervical adenopathy.   Skin:     General: Skin is warm and dry.      Coloration: Skin is not pale.      Findings: No erythema or rash.   Neurological:      Mental Status: He is alert.         CMP  Sodium   Date Value Ref Range Status   11/30/2024 137 136 - 145 mmol/L Final     Potassium   Date Value Ref Range Status   11/30/2024 4.8 3.5 - 5.1 mmol/L Final     Chloride   Date Value Ref Range Status   11/30/2024 107 95 - 110 mmol/L Final     CO2   Date Value Ref Range Status   11/30/2024 24 23 - 29 mmol/L Final     Glucose   Date Value Ref Range Status   11/30/2024 154 (H) 70 - 110 mg/dL Final     BUN   Date Value Ref Range Status   11/30/2024 25 (H) 8 - 23 mg/dL Final     Creatinine   Date Value Ref Range Status   11/30/2024 1.4 0.5 - 1.4 mg/dL Final     Calcium   Date Value Ref Range Status   11/30/2024 9.1 8.7 - 10.5 mg/dL Final     Total Protein   Date Value Ref Range Status   11/29/2024 6.2 6.0 - 8.4 g/dL Final     Albumin   Date Value Ref Range Status   11/29/2024 2.9 (L) 3.5 - 5.2 g/dL Final     Total Bilirubin   Date Value Ref Range Status   11/29/2024 0.6 0.1 - 1.0 mg/dL Final     Comment:     For infants and newborns, interpretation of  results should be based  on gestational age, weight and in agreement with clinical  observations.    Premature Infant recommended reference ranges:  Up to 24 hours.............<8.0 mg/dL  Up to 48 hours............<12.0 mg/dL  3-5 days..................<15.0 mg/dL  6-29 days.................<15.0 mg/dL       Alkaline Phosphatase   Date Value Ref Range Status   11/29/2024 80 40 - 150 U/L Final     AST   Date Value Ref Range Status   11/29/2024 14 10 - 40 U/L Final     ALT   Date Value Ref Range Status   11/29/2024 13 10 - 44 U/L Final     Anion Gap   Date Value Ref Range Status   11/30/2024 6 (L) 8 - 16 mmol/L Final     eGFR if    Date Value Ref Range Status   06/15/2022 >60 >60 mL/min/1.73 m^2 Final     eGFR if non    Date Value Ref Range Status   06/15/2022 >60 >60 mL/min/1.73 m^2 Final     Comment:     Calculation used to obtain the estimated glomerular filtration  rate (eGFR) is the CKD-EPI equation.        Lab Results   Component Value Date    WBC 5.70 11/29/2024    HGB 11.8 (L) 11/29/2024    HCT 35.5 (L) 11/29/2024    MCV 87 11/29/2024     (L) 11/29/2024     Lab Results   Component Value Date    CHOL 150 05/07/2024     Lab Results   Component Value Date    HDL 36 (L) 05/07/2024     Lab Results   Component Value Date    LDLCALC 94.2 05/07/2024     Lab Results   Component Value Date    TRIG 99 05/07/2024     Lab Results   Component Value Date    CHOLHDL 24.0 05/07/2024     Lab Results   Component Value Date    TSH 0.728 11/23/2024     Lab Results   Component Value Date    HGBA1C 6.3 (H) 10/28/2024     Assessment:       1. Anemia, unspecified type    2. Anticoagulated    3. Type 2 diabetes mellitus without complication, without long-term current use of insulin    4. Essential hypertension    5. Dyslipidemia    6. Alzheimer's dementia without behavioral disturbance, psychotic disturbance, mood disturbance, or anxiety, unspecified dementia severity, unspecified timing of  dementia onset    7. Coronary artery disease of native artery of native heart with stable angina pectoris    8. Chronic diastolic heart failure    9. Atrial fibrillation with slow ventricular response        Plan:   Anemia, unspecified type    Anticoagulated    Type 2 diabetes mellitus without complication, without long-term current use of insulin  -     Hemoglobin A1C; Future; Expected date: 01/31/2025    Essential hypertension    Dyslipidemia    Alzheimer's dementia without behavioral disturbance, psychotic disturbance, mood disturbance, or anxiety, unspecified dementia severity, unspecified timing of dementia onset-----------sees neurology-    Coronary artery disease of native artery of native heart with stable angina pectoris------sees cards-------------    Chronic diastolic heart failure    Atrial fibrillation with slow ventricular response      Continue meds---------------as above---------------f/u 4 months-------------------

## 2025-02-01 LAB
ESTIMATED AVG GLUCOSE: 137 MG/DL (ref 68–131)
HBA1C MFR BLD: 6.4 % (ref 4–5.6)

## 2025-02-21 DIAGNOSIS — Z00.00 ENCOUNTER FOR MEDICARE ANNUAL WELLNESS EXAM: ICD-10-CM

## 2025-02-23 PROBLEM — Z95.0 PACEMAKER: Status: ACTIVE | Noted: 2025-02-23

## 2025-02-24 ENCOUNTER — OFFICE VISIT (OUTPATIENT)
Dept: CARDIOLOGY | Facility: CLINIC | Age: 85
End: 2025-02-24
Payer: MEDICARE

## 2025-02-24 VITALS
DIASTOLIC BLOOD PRESSURE: 60 MMHG | SYSTOLIC BLOOD PRESSURE: 120 MMHG | BODY MASS INDEX: 20.66 KG/M2 | HEART RATE: 62 BPM | WEIGHT: 143.94 LBS

## 2025-02-24 DIAGNOSIS — I25.5 ISCHEMIC CARDIOMYOPATHY: ICD-10-CM

## 2025-02-24 DIAGNOSIS — I50.32 CHRONIC DIASTOLIC HEART FAILURE: ICD-10-CM

## 2025-02-24 DIAGNOSIS — Z79.01 ANTICOAGULATED: ICD-10-CM

## 2025-02-24 DIAGNOSIS — I20.9 AP (ANGINA PECTORIS): ICD-10-CM

## 2025-02-24 DIAGNOSIS — R00.1 SYMPTOMATIC BRADYCARDIA: ICD-10-CM

## 2025-02-24 DIAGNOSIS — I49.1 PAC (PREMATURE ATRIAL CONTRACTION): ICD-10-CM

## 2025-02-24 DIAGNOSIS — I73.9 CLAUDICATION IN PERIPHERAL VASCULAR DISEASE: ICD-10-CM

## 2025-02-24 DIAGNOSIS — I35.1 NONRHEUMATIC AORTIC VALVE INSUFFICIENCY: ICD-10-CM

## 2025-02-24 DIAGNOSIS — Z95.0 PACEMAKER: ICD-10-CM

## 2025-02-24 DIAGNOSIS — F02.80 ALZHEIMER'S DEMENTIA WITHOUT BEHAVIORAL DISTURBANCE, PSYCHOTIC DISTURBANCE, MOOD DISTURBANCE, OR ANXIETY, UNSPECIFIED DEMENTIA SEVERITY, UNSPECIFIED TIMING OF DEMENTIA ONSET: ICD-10-CM

## 2025-02-24 DIAGNOSIS — E11.69 TYPE 2 DIABETES MELLITUS WITH OTHER SPECIFIED COMPLICATION, UNSPECIFIED WHETHER LONG TERM INSULIN USE: ICD-10-CM

## 2025-02-24 DIAGNOSIS — E78.5 DYSLIPIDEMIA: Chronic | ICD-10-CM

## 2025-02-24 DIAGNOSIS — R94.31 ABNORMAL ECG: ICD-10-CM

## 2025-02-24 DIAGNOSIS — I10 ESSENTIAL HYPERTENSION: Chronic | ICD-10-CM

## 2025-02-24 DIAGNOSIS — I51.7 LAE (LEFT ATRIAL ENLARGEMENT): ICD-10-CM

## 2025-02-24 DIAGNOSIS — I25.10 CAD, MULTIPLE VESSEL: ICD-10-CM

## 2025-02-24 DIAGNOSIS — G30.9 ALZHEIMER'S DEMENTIA WITHOUT BEHAVIORAL DISTURBANCE, PSYCHOTIC DISTURBANCE, MOOD DISTURBANCE, OR ANXIETY, UNSPECIFIED DEMENTIA SEVERITY, UNSPECIFIED TIMING OF DEMENTIA ONSET: ICD-10-CM

## 2025-02-24 DIAGNOSIS — I73.9 PAD (PERIPHERAL ARTERY DISEASE): Chronic | ICD-10-CM

## 2025-02-24 DIAGNOSIS — Z98.61 S/P PTCA (PERCUTANEOUS TRANSLUMINAL CORONARY ANGIOPLASTY): ICD-10-CM

## 2025-02-24 DIAGNOSIS — Z86.79 S/P AAA REPAIR: ICD-10-CM

## 2025-02-24 DIAGNOSIS — Z98.61 HISTORY OF PTCA: ICD-10-CM

## 2025-02-24 DIAGNOSIS — Z98.890 S/P AAA REPAIR: ICD-10-CM

## 2025-02-24 DIAGNOSIS — I48.91 ATRIAL FIBRILLATION WITH SLOW VENTRICULAR RESPONSE: ICD-10-CM

## 2025-02-24 DIAGNOSIS — Z87.19 HISTORY OF GI BLEED: ICD-10-CM

## 2025-02-24 DIAGNOSIS — I51.7 LVH (LEFT VENTRICULAR HYPERTROPHY): ICD-10-CM

## 2025-02-24 DIAGNOSIS — I25.118 CORONARY ARTERY DISEASE OF NATIVE ARTERY OF NATIVE HEART WITH STABLE ANGINA PECTORIS: Primary | ICD-10-CM

## 2025-02-24 DIAGNOSIS — Z86.73 HISTORY OF CVA (CEREBROVASCULAR ACCIDENT): ICD-10-CM

## 2025-02-24 PROCEDURE — 3074F SYST BP LT 130 MM HG: CPT | Mod: HCNC,CPTII,S$GLB, | Performed by: INTERNAL MEDICINE

## 2025-02-24 PROCEDURE — 1160F RVW MEDS BY RX/DR IN RCRD: CPT | Mod: HCNC,CPTII,S$GLB, | Performed by: INTERNAL MEDICINE

## 2025-02-24 PROCEDURE — 1126F AMNT PAIN NOTED NONE PRSNT: CPT | Mod: HCNC,CPTII,S$GLB, | Performed by: INTERNAL MEDICINE

## 2025-02-24 PROCEDURE — 3078F DIAST BP <80 MM HG: CPT | Mod: HCNC,CPTII,S$GLB, | Performed by: INTERNAL MEDICINE

## 2025-02-24 PROCEDURE — 99214 OFFICE O/P EST MOD 30 MIN: CPT | Mod: HCNC,S$GLB,, | Performed by: INTERNAL MEDICINE

## 2025-02-24 PROCEDURE — 99999 PR PBB SHADOW E&M-EST. PATIENT-LVL II: CPT | Mod: PBBFAC,HCNC,, | Performed by: INTERNAL MEDICINE

## 2025-02-24 PROCEDURE — 1159F MED LIST DOCD IN RCRD: CPT | Mod: HCNC,CPTII,S$GLB, | Performed by: INTERNAL MEDICINE

## 2025-02-24 NOTE — PROGRESS NOTES
Subjective:    Patient ID:  Aquiles Yates is a 84 y.o. male who presents for evaluation of Coronary Artery Disease and Atrial Fibrillation          HPI Pt presents for eval.  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  Past hx pertinent for following:  H/o L LE stents in [place].   First PCI 1998, total of 5 stents with last one in [place] 2013.  Echo 7/19 normal LVEF, DD,  LVH, mild AI.   B LE vasc studies 7/19 B LE PAD, L > R LE.   S/p C Oct 2019 for NSTEMI.  Pt had PCI KYLIE x one to mid LCX ISR, KYLIE x 2 to OM2, and PCI prox RCA KYLIE x 2, PTCA mid RCA ISR.  Failed PCI distal occluded RCA.  EF 45%.  Nonobstructive LAD disease, small diffusely diseased diagonal vessels.  He developed post PCI GI bleed and required PRBC and EGD showing angiodysplasia tx w cauterization.  Echo 10/19 normal LV function, mild AI, LAE, LVH, inferolateral WMA.  Stress MPI 9/20 inferolateral scar with mild pato-infarct ischemia, EF 42%.  Echo 9/20 LVEF 40 - 45%, DD, LAE, LVH, WMA.  B LE arterial vasc studies 8/20:  Severe B LE PAD.   JOSELYN 8/20  R LE 0.76, L LE 0.62  Referred to vascular surgery for his PAD/AAA f/u.  He did see Dr. Mccarthy, [facility] surgery.  He was hospitalized 6/22 for TIA.  Patient had evidence of multiple lacunar/cerebellar infarcts on imaging, and mild carotid disease.  Was thought to be due to vascular disease.  Echo June 2022 normal EF, grade I DD, LVH, LAE.  S/p acute CVA Nov 2022.  Has been weaker, less talkative, off balance since his latest stroke.  Ecg 12/2/22 a fib noted, old inferior infarct.  A fib would be a new dx.  2 week Vital Holter Feb 2023: NSR, PAF noted.  S/p admission April 2023 w GI bleed.  Tx with EGD, cauterization of AV malformations.  Eliquis resumed but at low dose.  Now here.  Pt last seen May 2024.  Pt had  admission Nov 2024 with syncope, weakness and a fib w slow VR.  Pt tx to [facility] for leadless PPM placement.  Echo Nov 2024 EF reported as 55%, LVH, LAE, PAP 23 mmHg.  Stable CV status.  Angina and CHF controlled on current med tx.  No acute CV issues.  Compliant w meds.  Not smoking.  No rest PAD sxs.  BP stable.  Chart/labs reviewed.  Was on statin in past but was stopped, presumably for abnl LFTs.  No recent falls.  Does not drink water.  He is in weak frail condition overall.          Current Outpatient Medications:     alcohol swabs (ALCOHOL PREP PADS) PadM, Twice a day, Disp: 400 each, Rfl: 3    blood sugar diagnostic Strp, Test blood sugars twice a day, Disp: 200 strip, Rfl: 6    ELIQUIS 2.5 mg Tab, Take 1 tablet (2.5 mg total) by mouth 2 (two) times daily., Disp: , Rfl:     gabapentin (NEURONTIN) 100 MG capsule, Take 1 capsule (100 mg total) by mouth 2 (two) times daily as needed. PRN, Disp: 60 capsule, Rfl: 3    isosorbide mononitrate (IMDUR) 120 MG 24 hr tablet, TAKE 1 TABLET BY MOUTH EVERY DAY, Disp: 90 tablet, Rfl: 3    lancets (ACCU-CHEK SOFTCLIX LANCETS) Misc, Test blood sugars twice a day, Disp: 200 each, Rfl: 6    memantine (NAMENDA) 10 MG Tab, Take 1 tablet (10 mg total) by mouth 2 (two) times daily., Disp: 60 tablet, Rfl: 6    pantoprazole (PROTONIX) 40 MG tablet, Take 1 tablet (40 mg total) by mouth once daily., Disp: 90 tablet, Rfl: 3    valsartan (DIOVAN) 160 MG tablet, Take 1 tablet (160 mg total) by mouth once daily. Hold until you see PCP, Disp: , Rfl:       Review of Systems   Constitutional: Positive for decreased appetite.   HENT: Negative.     Eyes: Negative.    Cardiovascular: Negative.    Respiratory: Negative.     Endocrine: Negative.    Hematologic/Lymphatic: Negative.    Skin: Negative.    Musculoskeletal: Negative.    Gastrointestinal: Negative.    Genitourinary: Negative.    Neurological:  Positive for loss of balance and weakness.   Psychiatric/Behavioral:  Positive for memory  loss.    Allergic/Immunologic: Negative.           /60 (BP Location: Right arm, Patient Position: Sitting)   Pulse 62   Wt 65.3 kg (143 lb 15.4 oz)   BMI 20.66 kg/m²     Wt Readings from Last 3 Encounters:   02/24/25 65.3 kg (143 lb 15.4 oz)   01/31/25 60.4 kg (133 lb 2.5 oz)   12/04/24 63 kg (138 lb 14.2 oz)     Temp Readings from Last 3 Encounters:   01/31/25 96.7 °F (35.9 °C) (Tympanic)   12/04/24 97.7 °F (36.5 °C)   12/02/24 97.5 °F (36.4 °C) (Temporal)     BP Readings from Last 3 Encounters:   02/24/25 120/60   01/31/25 102/60   12/04/24 100/60     Pulse Readings from Last 3 Encounters:   02/24/25 62   01/31/25 (!) 59   12/04/24 60       Objective:    Physical Exam  Vitals and nursing note reviewed.   Constitutional:       Appearance: He is well-developed.   HENT:      Head: Normocephalic.   Neck:      Thyroid: No thyromegaly.      Vascular: No carotid bruit or JVD.   Cardiovascular:      Rate and Rhythm: Normal rate. Rhythm irregularly irregular.      Pulses:           Radial pulses are 2+ on the right side and 2+ on the left side.      Heart sounds: S1 normal and S2 normal. Heart sounds not distant. No midsystolic click and no opening snap. No murmur heard.     No friction rub. No S3 or S4 sounds.   Pulmonary:      Effort: Pulmonary effort is normal.      Breath sounds: Normal breath sounds. No wheezing or rales.   Abdominal:      General: Bowel sounds are normal. There is no distension or abdominal bruit.      Palpations: Abdomen is soft. There is no mass.      Tenderness: There is no abdominal tenderness.   Musculoskeletal:      Cervical back: Neck supple.   Skin:     General: Skin is warm.   Neurological:      Mental Status: He is alert.       I have reviewed all pertinent labs and cardiac studies.      Chemistry        Component Value Date/Time     11/30/2024 0432    K 4.8 11/30/2024 0432     11/30/2024 0432    CO2 24 11/30/2024 0432    BUN 25 (H) 11/30/2024 0432    CREATININE 1.4  11/30/2024 0432     (H) 11/30/2024 0432        Component Value Date/Time    CALCIUM 9.1 11/30/2024 0432    ALKPHOS 80 11/29/2024 0256    AST 14 11/29/2024 0256    ALT 13 11/29/2024 0256    BILITOT 0.6 11/29/2024 0256    ESTGFRAFRICA >60 06/15/2022 0459    EGFRNONAA >60 06/15/2022 0459        Lab Results   Component Value Date    WBC 5.70 11/29/2024    HGB 11.8 (L) 11/29/2024    HCT 35.5 (L) 11/29/2024    MCV 87 11/29/2024     (L) 11/29/2024       Lab Results   Component Value Date    HGBA1C 6.4 (H) 01/31/2025     Lab Results   Component Value Date    CHOL 150 05/07/2024    CHOL 158 11/04/2022    CHOL 124 06/13/2022     Lab Results   Component Value Date    HDL 36 (L) 05/07/2024    HDL 40 11/04/2022    HDL 34 (L) 06/13/2022     Lab Results   Component Value Date    LDLCALC 94.2 05/07/2024    LDLCALC 96.8 11/04/2022    LDLCALC 69.2 06/13/2022     Lab Results   Component Value Date    TRIG 99 05/07/2024    TRIG 106 11/04/2022    TRIG 104 06/13/2022     Lab Results   Component Value Date    CHOLHDL 24.0 05/07/2024    CHOLHDL 25.3 11/04/2022    CHOLHDL 27.4 06/13/2022         Results for orders placed during the hospital encounter of 06/13/22    Echo    Interpretation Summary  · The left ventricle is normal in size with severe concentric hypertrophy and normal systolic function.  · Mild left atrial enlargement.  · The estimated ejection fraction is 55%.  · Grade II left ventricular diastolic dysfunction.  · Normal right ventricular size with normal right ventricular systolic function.            Results for orders placed during the hospital encounter of 11/23/24    Echo    Interpretation Summary    Left Ventricle: The left ventricle is normal in size. Normal wall thickness. There is concentric hypertrophy. Normal wall motion. There is normal systolic function with a visually estimated ejection fraction of 55 - 60%. There is indeterminate diastolic function.    Right Ventricle: Systolic function is normal.     Left Atrium: Left atrium is mildly dilated.    Pulmonary Artery: The estimated pulmonary artery systolic pressure is 23 mmHg.    IVC/SVC: Normal venous pressure at 3 mmHg.          Assessment:       1. Coronary artery disease of native artery of native heart with stable angina pectoris    2. Abnormal ECG    3. AP (angina pectoris)    4. Anticoagulated    5. CAD, multiple vessel    6. Claudication in peripheral vascular disease    7. Chronic diastolic heart failure    8. Essential hypertension    9. Dyslipidemia    10. Alzheimer's dementia without behavioral disturbance, psychotic disturbance, mood disturbance, or anxiety, unspecified dementia severity, unspecified timing of dementia onset    11. History of PTCA    12. History of GI bleed    13. History of CVA (cerebrovascular accident)    14. LVH (left ventricular hypertrophy)    15. LAE (left atrial enlargement)    16. Ischemic cardiomyopathy    17. Nonrheumatic aortic valve insufficiency    18. S/P PTCA (percutaneous transluminal coronary angioplasty)    19. S/P AAA repair    20. Symptomatic bradycardia    21. PAC (premature atrial contraction)    22. PAD (peripheral artery disease)    23. Type 2 diabetes mellitus with other specified complication, unspecified whether long term insulin use    24. Pacemaker    25. Atrial fibrillation with slow ventricular response           Plan:             Stable CV conditions on current med tx.  Symptomatic bradycardia: s/p leadless PPM Nov 2024.  F/u PPM clinic and EP as advised.  PAF: S/p acute CVA Nov 2022.  Ecg Dec 2022 a fib noted. New dx.  Continue Eliquis 2.5 mg bid.  Patient advised of indications for above medications, risks/benefits and side effect profile.  CAD: Stable. Continue OMT for CAD.  CHF: Compensated.  Fall risk precautions strongly advised.  Reviewed all tests and above medical conditions with patient in detail and formulated treatment plan.  Continue optimal medical treatment for cardiovascular  conditions.  Cardiac low salt diet advised.  Watch weight loss; f/u w PCP.  GI bleed: stable. S/p EGD tx. F/u w GI.  Continue PPI tx.  HTN: Need for BP control and HTN goals (if needed) were discussed and tx plan formulated.  Goal < 130/80.  Continue current HTN meds.  Lipids: low cholesterol diet.  Smoking: continued smoking cessation advised.  DM: optimal HGAIC control advised.  PAD: F/u w Vasc Surgery as advised.      F/u in 6 months.      I have reviewed all pertinent labs and cardiac studies independently. Plans and recommendations have been formulated under my direct supervision. All questions answered and patient voiced understanding.

## 2025-03-06 ENCOUNTER — TELEPHONE (OUTPATIENT)
Dept: ELECTROPHYSIOLOGY | Facility: CLINIC | Age: 85
End: 2025-03-06
Payer: MEDICARE

## 2025-03-06 ENCOUNTER — HOSPITAL ENCOUNTER (OUTPATIENT)
Dept: CARDIOLOGY | Facility: CLINIC | Age: 85
Discharge: HOME | End: 2025-03-06
Payer: MEDICARE

## 2025-03-06 ENCOUNTER — CLINICAL SUPPORT (OUTPATIENT)
Dept: CARDIOLOGY | Facility: HOSPITAL | Age: 85
End: 2025-03-06
Attending: STUDENT IN AN ORGANIZED HEALTH CARE EDUCATION/TRAINING PROGRAM
Payer: MEDICARE

## 2025-03-06 ENCOUNTER — OFFICE VISIT (OUTPATIENT)
Dept: ELECTROPHYSIOLOGY | Facility: CLINIC | Age: 85
End: 2025-03-06
Payer: MEDICARE

## 2025-03-06 VITALS
BODY MASS INDEX: 20.47 KG/M2 | HEART RATE: 64 BPM | WEIGHT: 143 LBS | DIASTOLIC BLOOD PRESSURE: 55 MMHG | HEIGHT: 70 IN | SYSTOLIC BLOOD PRESSURE: 113 MMHG

## 2025-03-06 DIAGNOSIS — R41.3 MEMORY DEFICIT: ICD-10-CM

## 2025-03-06 DIAGNOSIS — E11.22 TYPE 2 DIABETES MELLITUS WITH STAGE 3 CHRONIC KIDNEY DISEASE, WITHOUT LONG-TERM CURRENT USE OF INSULIN, UNSPECIFIED WHETHER STAGE 3A OR 3B CKD: ICD-10-CM

## 2025-03-06 DIAGNOSIS — I51.7 LAE (LEFT ATRIAL ENLARGEMENT): ICD-10-CM

## 2025-03-06 DIAGNOSIS — I25.5 ISCHEMIC CARDIOMYOPATHY: ICD-10-CM

## 2025-03-06 DIAGNOSIS — I49.1 PAC (PREMATURE ATRIAL CONTRACTION): ICD-10-CM

## 2025-03-06 DIAGNOSIS — Z95.0 PRESENCE OF LEADLESS CARDIAC PACEMAKER: ICD-10-CM

## 2025-03-06 DIAGNOSIS — I25.118 CORONARY ARTERY DISEASE OF NATIVE ARTERY OF NATIVE HEART WITH STABLE ANGINA PECTORIS: ICD-10-CM

## 2025-03-06 DIAGNOSIS — Z87.19 HISTORY OF GI BLEED: ICD-10-CM

## 2025-03-06 DIAGNOSIS — Z98.61 S/P PTCA (PERCUTANEOUS TRANSLUMINAL CORONARY ANGIOPLASTY): ICD-10-CM

## 2025-03-06 DIAGNOSIS — I10 PRIMARY HYPERTENSION: ICD-10-CM

## 2025-03-06 DIAGNOSIS — I49.5 TACHYCARDIA-BRADYCARDIA SYNDROME: Primary | ICD-10-CM

## 2025-03-06 DIAGNOSIS — Z86.79 S/P AAA REPAIR: ICD-10-CM

## 2025-03-06 DIAGNOSIS — Z98.890 S/P AAA REPAIR: ICD-10-CM

## 2025-03-06 DIAGNOSIS — I48.91 ATRIAL FIBRILLATION WITH SLOW VENTRICULAR RESPONSE: ICD-10-CM

## 2025-03-06 DIAGNOSIS — I50.32 HEART FAILURE WITH RECOVERED EJECTION FRACTION (HFRECEF): ICD-10-CM

## 2025-03-06 DIAGNOSIS — K80.50 CHOLEDOCHOLITHIASIS: ICD-10-CM

## 2025-03-06 DIAGNOSIS — E78.2 MIXED HYPERLIPIDEMIA: ICD-10-CM

## 2025-03-06 DIAGNOSIS — G30.9 ALZHEIMER'S DEMENTIA WITHOUT BEHAVIORAL DISTURBANCE, PSYCHOTIC DISTURBANCE, MOOD DISTURBANCE, OR ANXIETY, UNSPECIFIED DEMENTIA SEVERITY, UNSPECIFIED TIMING OF DEMENTIA ONSET: ICD-10-CM

## 2025-03-06 DIAGNOSIS — N18.30 TYPE 2 DIABETES MELLITUS WITH STAGE 3 CHRONIC KIDNEY DISEASE, WITHOUT LONG-TERM CURRENT USE OF INSULIN, UNSPECIFIED WHETHER STAGE 3A OR 3B CKD: ICD-10-CM

## 2025-03-06 DIAGNOSIS — I73.9 PAD (PERIPHERAL ARTERY DISEASE): Chronic | ICD-10-CM

## 2025-03-06 DIAGNOSIS — N18.30 STAGE 3 CHRONIC KIDNEY DISEASE, UNSPECIFIED WHETHER STAGE 3A OR 3B CKD: ICD-10-CM

## 2025-03-06 DIAGNOSIS — F02.80 ALZHEIMER'S DEMENTIA WITHOUT BEHAVIORAL DISTURBANCE, PSYCHOTIC DISTURBANCE, MOOD DISTURBANCE, OR ANXIETY, UNSPECIFIED DEMENTIA SEVERITY, UNSPECIFIED TIMING OF DEMENTIA ONSET: ICD-10-CM

## 2025-03-06 LAB
OHS QRS DURATION: 94 MS
OHS QTC CALCULATION: 443 MS

## 2025-03-06 PROCEDURE — 3074F SYST BP LT 130 MM HG: CPT | Mod: HCNC,CPTII,S$GLB, | Performed by: STUDENT IN AN ORGANIZED HEALTH CARE EDUCATION/TRAINING PROGRAM

## 2025-03-06 PROCEDURE — 93010 ELECTROCARDIOGRAM REPORT: CPT | Mod: HCNC,S$GLB,, | Performed by: STUDENT IN AN ORGANIZED HEALTH CARE EDUCATION/TRAINING PROGRAM

## 2025-03-06 PROCEDURE — 1101F PT FALLS ASSESS-DOCD LE1/YR: CPT | Mod: HCNC,CPTII,S$GLB, | Performed by: STUDENT IN AN ORGANIZED HEALTH CARE EDUCATION/TRAINING PROGRAM

## 2025-03-06 PROCEDURE — 99999 PR PBB SHADOW E&M-EST. PATIENT-LVL III: CPT | Mod: PBBFAC,HCNC,, | Performed by: STUDENT IN AN ORGANIZED HEALTH CARE EDUCATION/TRAINING PROGRAM

## 2025-03-06 PROCEDURE — 1159F MED LIST DOCD IN RCRD: CPT | Mod: HCNC,CPTII,S$GLB, | Performed by: STUDENT IN AN ORGANIZED HEALTH CARE EDUCATION/TRAINING PROGRAM

## 2025-03-06 PROCEDURE — 93005 ELECTROCARDIOGRAM TRACING: CPT | Mod: HCNC,S$GLB,, | Performed by: STUDENT IN AN ORGANIZED HEALTH CARE EDUCATION/TRAINING PROGRAM

## 2025-03-06 PROCEDURE — 1126F AMNT PAIN NOTED NONE PRSNT: CPT | Mod: HCNC,CPTII,S$GLB, | Performed by: STUDENT IN AN ORGANIZED HEALTH CARE EDUCATION/TRAINING PROGRAM

## 2025-03-06 PROCEDURE — 3288F FALL RISK ASSESSMENT DOCD: CPT | Mod: HCNC,CPTII,S$GLB, | Performed by: STUDENT IN AN ORGANIZED HEALTH CARE EDUCATION/TRAINING PROGRAM

## 2025-03-06 PROCEDURE — 3078F DIAST BP <80 MM HG: CPT | Mod: HCNC,CPTII,S$GLB, | Performed by: STUDENT IN AN ORGANIZED HEALTH CARE EDUCATION/TRAINING PROGRAM

## 2025-03-06 PROCEDURE — 99214 OFFICE O/P EST MOD 30 MIN: CPT | Mod: HCNC,S$GLB,, | Performed by: STUDENT IN AN ORGANIZED HEALTH CARE EDUCATION/TRAINING PROGRAM

## 2025-03-06 PROCEDURE — 93279 PRGRMG DEV EVAL PM/LDLS PM: CPT | Mod: HCNC

## 2025-03-06 NOTE — PROGRESS NOTES
Electrophysiology Clinic Note    Reason for follow-up patient visit: Ongoing evaluation and routine device surveillance of a Medtronic Micra AV leadless pacemaker, implanted 11/29/2024 in the setting of periods of slowly conducting paroxysmal atrial fibrillation with reported symptoms of transient dizziness and lightheadedness.    PRESENTING HISTORY:     History of Present Illness:  Mr. Aquiles Yates is a antacha 84-year-old gentleman who presents today for ongoing evaluation and routine device surveillance of a Medtronic Micra AV leadless pacemaker, implanted 11/29/2024 in the setting of periods of slowly conducting paroxysmal atrial fibrillation with reported symptoms of transient dizziness and lightheadedness. He has a past medical history significant for paroxysmal atrial fibrillation, at times with slow ventricular response and symptomatic bradycardia, s/p implantation of a Medtronic Micra AV leadless pacemaker on 11/29/2024, coronary artery disease s/p prior PCI of the proximal and mid RCA, mid OM2, and mid LCx - most recently on 10/13/2019, HFpEF with most recent LVEF 55-60% with mild left atrial dilation, hypertension, hyperlipidemia, type II diabetes mellitus, CKD stage III, peripheral vascular disease s/p prior bilateral femoral arterial bypass, a stable abdominal aortic aneurysm s/p TEVAR in 2013, Alzheimer's dementia, and choledocholithiasis with an ERCP on 10/15/2024.     He was transferred to [facility] from [facility], admitted there initially on 11/23/2024, secondary to periods of slowly conducting atrial fibrillation with reported symptoms of transient dizziness and lightheadedness. Upon arrival here, his systolic function remained preserved, with LVEF 55-60%. His intrinsic QRSd was narrow at 88ms. We discussed the indication for implantation of a pacemaker, ideally with a Medtronic Micra AV leadless pacemaker given his functional limitations, with both the patient and  "his family. He underwent successful device implantation and was subsequently safely discharged. Since returning home, he has recovered well and has been able to resume his baseline level of physical activity. He is largely sedentary and requires his wheelchair for ambulation of farther distances. He reports decreased appetite, with his wife reporting worsening memory loss and weakness.       In discussion with Mr. Edwards and his wife today, he tells me that he is feeling overall "pretty good". He denies any recurrent episodes of dizziness, lightheadedness, or presyncope following his device implantation. He has not suffered any syncopal events. He denies any chest pain or chest discomfort, palpitations, nausea or vomiting, orthopnea, or PND. He reports baseline mild shortness of breath and dyspnea with exertion that he feels has remained stable. He cannot climb a flight of stairs and is limited by knee, hip, and back osteoarthritis. He reports postural instability with standing.    Review of Systems:  Review of Systems   Constitutional:  Positive for appetite change and fatigue. Negative for activity change.        Unintentional weight loss with decreased appetite.    HENT:  Positive for hearing loss. Negative for nasal congestion, nosebleeds, postnasal drip, rhinorrhea, sinus pressure/congestion, sneezing and sore throat.    Respiratory:  Positive for shortness of breath. Negative for apnea, cough, chest tightness and wheezing.    Cardiovascular:  Negative for chest pain, palpitations and leg swelling.   Gastrointestinal:  Negative for abdominal distention, abdominal pain, blood in stool, change in bowel habit, constipation, diarrhea, nausea and vomiting.   Genitourinary:  Negative for dysuria and hematuria.   Musculoskeletal:  Positive for arthralgias, back pain and gait problem.   Neurological:  Positive for weakness. Negative for dizziness, seizures, syncope, light-headedness, headaches and coordination " difficulties.        PAST HISTORY:     Past Medical History:   Diagnosis Date    Acute blood loss anemia 10/14/2019    Alzheimer's dementia     Aneurysm, abdominal aortic     BCC (basal cell carcinoma of skin) 06/29/2023    Chronic diastolic heart failure 05/20/2024    Coronary artery disease     Depression     Diabetes mellitus     HLD (hyperlipidemia)     Hypertension     Kidney stone     Pacemaker 02/23/2025    PAD (peripheral artery disease)     PAF (paroxysmal atrial fibrillation) 01/24/2023    Tobacco abuse        Past Surgical History:   Procedure Laterality Date    APPENDECTOMY      CORONARY STENT PLACEMENT      x 4    DV5 ROBOTIC CHOLECYSTECTOMY N/A 10/15/2024    Procedure: DV5 ROBOTIC CHOLECYSTECTOMY;  Surgeon: Fred Taylor MD;  Location: [facility] OR;  Service: General;  Laterality: N/A;    ERCP N/A 10/10/2024    Procedure: ERCP (ENDOSCOPIC RETROGRADE CHOLANGIOPANCREATOGRAPHY);  Surgeon: Morgan Hong MD;  Location: [facility] ([address] [place] [address]);  Service: Endoscopy;  Laterality: N/A;    ERCP N/A 12/2/2024    Procedure: ERCP (ENDOSCOPIC RETROGRADE CHOLANGIOPANCREATOGRAPHY);  Surgeon: Dawson Dejesus MD;  Location: [facility];  Service: Endoscopy;  Laterality: N/A;  10/19/24: instructions sent via mail. Eliquis approval in TE MD Gilbert-GD  11/25 ATTEMPTED PRE-CALL. PT IS CURRENTLY IN THE HOSPITAL-RT    ESOPHAGOGASTRODUODENOSCOPY N/A 10/14/2019    Procedure: EGD (ESOPHAGOGASTRODUODENOSCOPY);  Surgeon: Carlos Mcneal III, MD;  Location: [facility];  Service: Endoscopy;  Laterality: N/A;    ESOPHAGOGASTRODUODENOSCOPY N/A 10/15/2019    Procedure: EGD (ESOPHAGOGASTRODUODENOSCOPY);  Surgeon: Carlos Mcneal III, MD;  Location: [facility] ENDO;  Service: Endoscopy;  Laterality: N/A;    ESOPHAGOGASTRODUODENOSCOPY N/A 4/17/2023    Procedure: EGD (ESOPHAGOGASTRODUODENOSCOPY);  Surgeon: Nevaeh Mcconnell MD;  Location: [facility];  Service: Endoscopy;  Laterality: N/A;    IMPLANTATION OF LEADLESS PACEMAKER N/A 11/29/2024     "Procedure: [id number];  Surgeon: WENDI Murdock MD;  Location: [facility] EP LAB;  Service: Cardiology;  Laterality: N/A;  SB, YINKA PICHARDO, GARRETTS, EH,     INSERTION, PACEMAKER, TEMPORARY TRANSVENOUS N/A 10/15/2024    Procedure: Insertion, Pacemaker, Temporary Transvenous;  Surgeon: Demarcus Kirk MD;  Location: [facility] CATH LAB;  Service: Cardiology;  Laterality: N/A;    LEFT HEART CATHETERIZATION Left 10/11/2019    Procedure: CATHETERIZATION, HEART, LEFT;  Surgeon: Robe Gilbert MD;  Location: [facility] CATH LAB;  Service: Cardiology;  Laterality: Left;    LEFT HEART CATHETERIZATION Left 10/13/2019    Procedure: CATHETERIZATION, HEART, LEFT;  Surgeon: Robe Gilbert MD;  Location: [facility] CATH LAB;  Service: Cardiology;  Laterality: Left;    leg stent Left        Family History:  Family History   Problem Relation Name Age of Onset    Diabetes Mother      COPD Father      Diabetes Brother         Social History:  He  reports that he has quit smoking. His smoking use included cigars. He has never been exposed to tobacco smoke. He uses smokeless tobacco. He reports that he does not currently use alcohol. He reports that he does not currently use drugs.      MEDICATIONS & ALLERGIES:     Review of patient's allergies indicates:   Allergen Reactions    Metoprolol Other (See Comments)     Junctional rhythm         Medications Ordered Prior to Encounter[1]     OBJECTIVE:     Vital Signs:  BP (!) 113/55 (BP Location: Left arm, Patient Position: Sitting)   Pulse 64   Ht 5' 10" (1.778 m)   Wt 64.9 kg (143 lb)   BMI 20.52 kg/m²     Physical Exam:  Physical Exam  Constitutional:       General: He is not in acute distress.     Appearance: Normal appearance. He is not ill-appearing or diaphoretic.      Comments: Well-appearing, thin, elderly man in NAD, presenting in a wheelchair.    HENT:      Head: Normocephalic and atraumatic.      Nose: Nose normal.      Mouth/Throat:      Mouth: Mucous membranes " are moist.      Pharynx: Oropharynx is clear.   Eyes:      Pupils: Pupils are equal, round, and reactive to light.   Cardiovascular:      Rate and Rhythm: Bradycardia present. Rhythm irregular.      Pulses: Normal pulses.      Heart sounds: Normal heart sounds. No murmur heard.     No friction rub. No gallop.      Comments: Sinus bradycardia with occasional PACs with irregular cycle lengths on an in-office rhythm strip with intermittent ventricular pacing.   Pulmonary:      Effort: Pulmonary effort is normal. No respiratory distress.      Breath sounds: Normal breath sounds. No wheezing, rhonchi or rales.   Chest:      Chest wall: No tenderness.   Abdominal:      General: There is no distension.      Palpations: Abdomen is soft.      Tenderness: There is no abdominal tenderness.   Musculoskeletal:         General: No swelling or tenderness.      Cervical back: Normal range of motion.      Right lower leg: No edema.      Left lower leg: No edema.   Skin:     General: Skin is warm and dry.      Findings: No erythema, lesion or rash.   Neurological:      General: No focal deficit present.      Mental Status: He is alert and oriented to person, place, and time. Mental status is at baseline.      Motor: No weakness.      Gait: Gait normal.   Psychiatric:         Mood and Affect: Mood normal.         Behavior: Behavior normal.        Laboratory Data:  Lab Results   Component Value Date    WBC 5.70 11/29/2024    HGB 11.8 (L) 11/29/2024    HCT 35.5 (L) 11/29/2024    MCV 87 11/29/2024     (L) 11/29/2024     Lab Results   Component Value Date     (H) 11/30/2024     11/30/2024    K 4.8 11/30/2024     11/30/2024    CO2 24 11/30/2024    BUN 25 (H) 11/30/2024    CREATININE 1.4 11/30/2024    CALCIUM 9.1 11/30/2024    MG 1.8 11/30/2024     Lab Results   Component Value Date    INR 1.0 11/28/2024    INR 1.2 11/23/2024    INR 1.1 10/06/2024       Pertinent Cardiac Data:  Personal interpretation of today's  ECG: Sinus bradycardia with occasional PACs and intermittent ventricular pacing, rate of 64 bpm, QRS 94 ms, QT/QTc 430/443 ms, low voltage limb leads.     Resting 2D Transthoracic Echocardiogram - 7/10/2019:    1 - Normal left ventricular systolic function (EF 55-60%).     2 - Impaired LV relaxation, elevated LAP (grade 2 diastolic dysfunction).     3 - Moderate left atrial enlargement.     4 - Normal right ventricular systolic function .     5 - The estimated PA systolic pressure is 37 mmHg.     6 - Mild aortic regurgitation.     7 - Mild tricuspid regurgitation.     Coronary Angiogram - 10/11/2019:  Diagnostic:      - Left Main Coronary Artery:              The LM is normal. There is INDER 3 flow.      - Left Anterior Descending Artery:              The LAD. There is INDER 3 flow. LAD HAS NONOBSTRUCTIVE DISEASE.      - D1:              The D1. There is INDER 3 flow. SMALL AND DIFFUSE SEVERE DISEASE.      - D2:              The D2. There is INDER 3 flow. SMALL VESSEL AND DIFFUSE SEVERE DISEASE.      - Left Circumflex Artery:              The mid LCX has a 80% stenosis within the stent. There is INDER 3 flow. The remaining portion of the vessel is diffusely diseased (75).                      Lesion Details:   This is a Type C lesion.  The length is 15 mm, moderate proximal tortuosity, segment angulation of 45-90 degrees, none to mild calcification. No bifurcation is present.      - OM1:              The OM1. There is INDER 3 flow. NONOBSTRUCTIVE DISEASE.      - Right Coronary Artery:             RCA IS SEVERELY DISEASED.  THERE IS 95% PROXIMAL STENOSIS.  THERE IS STENT IN MID SEGMENT WITH SEVERE DISEASE IN 90% RANGE AND THEN STENT IS OCCLUDED IN MID SEGMENT.  THERE IS OCCLUDED STENT NOTED IN DISTAL SEGMENT AND OCCLUDED STENT IN PDA.      - OM2:              The proximal OM2 has a 95% stenosis. There is INDER 3 flow. The remaining portion of the vessel is diffusely diseased (75).                      Lesion Details:   This  is a Type C lesion.  The length is 20mm, mild proximal tortuosity, segment angulation of <45 degrees, none to mild calcification. Bifurcation is present.              The mid OM2 has a 80% stenosis. There is INDER 3 flow. The remaining portion of the vessel is diffusely diseased (75).                      Lesion Details:   This is a Type C lesion.  The length is 20mm, mild proximal tortuosity, segment angulation of <45 degrees, none to mild calcification. No bifurcation is present.   Intervention   ALL 3 STENT DIEGO KYLIE WERE OVERLAPPED FROM MID LCX EXTENDING INTO OM2.        Mid LCX:               The lesion was successfully intervened. Post-stenosis of 0% and post-INDER 3 flow. The vessel was accessed natively.  The following items were used: Blln [place] 2.5 X 30, Stent [place] 3.0 X 15 (KYLIE) and Blln [place] 3.5 X 15.        Proximal OM2:               The lesion was successfully intervened. Post-stenosis of 0% and post-INDER 3 flow. The vessel was accessed natively.  The following items were used: Blln [place] 2.5 X 30, Stent [place] 2.5 X 22 (KYLIE) and Blln Quantum 2.5 X 20.        Mid OM2:               The lesion was successfully intervened. Post-stenosis of 0% and post-INDER 3 flow. The vessel was accessed natively.  The following items were used: Blln [place] 2.5 X 30, Stent [place] 2.25 X 22 (KYLIE) and Blln Quantum 2.5 X 20.     Coronary Angiogram - 10/13/2019:   1.   Successful PCI 99% proximal RCA stenosis wtih Stent [place] KYLIE x 2 overlapped.                 2.  Successful PTCA 99% diffuse ISR mid RCA.                 3.  Unsucessful PCI  distal RCA.    4.   The patient did not undergo primary PCI.     Resting 2D Transthoracic Echocardiogram - 10/11/2019:    1 - Normal left ventricular systolic function (EF 55-60%).     2 - Indeterminate LV diastolic function.     3 - Normal right ventricular systolic function .     4 - Mild aortic regurgitation.     5 - Moderate left atrial enlargement.     6 - Wall motion abnormalities.      7 - Concentric hypertrophy.     8 - The estimated PA systolic pressure is 33 mmHg.     Resting 2D Transthoracic Echocardiogram - 9/2/2020:  Mildly decreased left ventricular systolic function. The estimated ejection fraction is 40 - 45%.  Grade I (mild) left ventricular diastolic dysfunction consistent with impaired relaxation.  Normal right ventricular systolic function.  Concentric left ventricular hypertrophy.  Local segmental wall motion abnormalities.  Moderate left atrial enlargement.    Resting 2D Transthoracic Echocardiogram - 6/14/2022:  The left ventricle is normal in size with severe concentric hypertrophy and normal systolic function.  Mild left atrial enlargement.  The estimated ejection fraction is 55%.  Grade II left ventricular diastolic dysfunction.  Normal right ventricular size with normal right ventricular systolic function.    Resting 2D Transthoracic Echocardiogram - 10/9/2024:    Left Ventricle: The left ventricle is normal in size. Normal wall thickness. There is concentric hypertrophy. Normal wall motion. Ejection fraction is approximately 60%. There is indeterminate diastolic function.    Right Ventricle: Normal right ventricular cavity size. Wall thickness is normal. Right ventricle wall motion  is normal. Systolic function is normal.    Left Atrium: Left atrium is severely dilated.    Aortic Valve: There is mild aortic regurgitation.    Mitral Valve: There is mild regurgitation.    Tricuspid Valve: There is mild regurgitation.    Pulmonary Artery: The estimated pulmonary artery systolic pressure is 31 mmHg.    IVC/SVC: Normal venous pressure at 3 mmHg.    Resting 2D Transthoracic Echocardiogram - 11/23/2024:    Left Ventricle: The left ventricle is normal in size. Normal wall thickness. There is concentric hypertrophy. Normal wall motion. There is normal systolic function with a visually estimated ejection fraction of 55 - 60%. There is indeterminate diastolic function.    Right  Ventricle: Systolic function is normal.    Left Atrium: Left atrium is mildly dilated.    Pulmonary Artery: The estimated pulmonary artery systolic pressure is 23 mmHg.    IVC/SVC: Normal venous pressure at 3 mmHg.    Personal interpretation of today's Device Interrogation: Personal review of his device interrogation report (Medtronic Micra AV leadless pacemaker, implanted 11/29/2024) reveals adequate battery longevity with an estimated >10 years of battery life remaining. His sensing, threshold, and impedance parameters were acceptable and stable. He is AM-VS 0%, VS-only 19.1%, AM- 0%, and  only 80.9%. There are no concerning VHR episodes noted.        ASSESSMENT & PLAN:   Mr. Aquiles Yates is a natacha 84-year-old gentleman who presents today for ongoing evaluation and routine device surveillance of a Medtronic Micra AV leadless pacemaker, implanted 11/29/2024 in the setting of periods of slowly conducting paroxysmal atrial fibrillation with reported symptoms of transient dizziness and lightheadedness. He has a past medical history significant for paroxysmal atrial fibrillation, at times with slow ventricular response and symptomatic bradycardia, s/p implantation of a Medtronic Micra AV leadless pacemaker on 11/29/2024, coronary artery disease s/p prior PCI of the proximal and mid RCA, mid OM2, and mid LCx - most recently on 10/13/2019, HFpEF with most recent LVEF 55-60% with mild left atrial dilation, hypertension, hyperlipidemia, type II diabetes mellitus, CKD stage III, peripheral vascular disease s/p prior bilateral femoral arterial bypass, a stable abdominal aortic aneurysm s/p TEVAR in 2013, Alzheimer's dementia, and choledocholithiasis with an ERCP on 10/15/2024.     - He has a normally functioning Micra AV leadless pacemaker on interrogation today. He will continue to send remote transmissions, with his next in-office interrogation in six months.   - He often requires RV pacing, with an  overall RV pacing burden of 80.9%. Since undergoing pacemaker implantation, he reports improved symptoms with no subsequent events of dizziness, lightheadedness, syncope or presyncope. His systolic function remains preserved with LVEF 55-60%. His intrinsic QRSd remains narrow at 94ms. He is presently in sinus rhythm with frequent PACs and intermittent ventricular pacing. He has no present indication for device upgrade.   - He remains on oral anticoagulation with apixaban 2.5mg po BID with no adverse bleeding events reported.   - We discussed lifestyle modification to mitigate his prior syncopal events, including ensuring adequate hydration and making sure he eats regular meals, stress management, appropriate sleep hygiene and encouragement for improved sleep, and avoidance of potential triggers such as excessive alcohol or caffeine. We specifically addressed the deleterious effects of nicotine on the cardiovascular system, and encourage tobacco cessation.      This patient will return to clinic with me in six months with continued remote transmissions in the interim. All questions and concerns were addressed in this visit. Total time spent in this patient encounter: 38 minutes.     Signing Physician:       AAKASH Murdock MD  Electrophysiology Attending         [1]   Current Outpatient Medications on File Prior to Visit   Medication Sig Dispense Refill    alcohol swabs (ALCOHOL PREP PADS) PadM Twice a day 400 each 3    blood sugar diagnostic Strp Test blood sugars twice a day 200 strip 6    ELIQUIS 2.5 mg Tab Take 1 tablet (2.5 mg total) by mouth 2 (two) times daily.      gabapentin (NEURONTIN) 100 MG capsule Take 1 capsule (100 mg total) by mouth 2 (two) times daily as needed. PRN 60 capsule 3    isosorbide mononitrate (IMDUR) 120 MG 24 hr tablet TAKE 1 TABLET BY MOUTH EVERY DAY 90 tablet 3    lancets (ACCU-CHEK SOFTCLIX LANCETS) Misc Test blood sugars twice a day 200 each 6    memantine (NAMENDA) 10 MG Tab Take  1 tablet (10 mg total) by mouth 2 (two) times daily. 60 tablet 6    pantoprazole (PROTONIX) 40 MG tablet Take 1 tablet (40 mg total) by mouth once daily. 90 tablet 3    valsartan (DIOVAN) 160 MG tablet Take 1 tablet (160 mg total) by mouth once daily. Hold until you see PCP       No current facility-administered medications on file prior to visit.

## 2025-03-31 ENCOUNTER — CLINICAL SUPPORT (OUTPATIENT)
Dept: CARDIOLOGY | Facility: HOSPITAL | Age: 85
End: 2025-03-31
Attending: STUDENT IN AN ORGANIZED HEALTH CARE EDUCATION/TRAINING PROGRAM
Payer: MEDICARE

## 2025-03-31 ENCOUNTER — CLINICAL SUPPORT (OUTPATIENT)
Dept: CARDIOLOGY | Facility: HOSPITAL | Age: 85
End: 2025-03-31
Payer: MEDICARE

## 2025-03-31 DIAGNOSIS — I48.91 UNSPECIFIED ATRIAL FIBRILLATION: ICD-10-CM

## 2025-03-31 DIAGNOSIS — R00.1 BRADYCARDIA, UNSPECIFIED: ICD-10-CM

## 2025-03-31 DIAGNOSIS — Z95.0 PRESENCE OF CARDIAC PACEMAKER: ICD-10-CM

## 2025-03-31 PROCEDURE — 93294 REM INTERROG EVL PM/LDLS PM: CPT | Mod: HCNC,,, | Performed by: STUDENT IN AN ORGANIZED HEALTH CARE EDUCATION/TRAINING PROGRAM

## 2025-03-31 PROCEDURE — 93296 REM INTERROG EVL PM/IDS: CPT | Mod: HCNC | Performed by: STUDENT IN AN ORGANIZED HEALTH CARE EDUCATION/TRAINING PROGRAM

## 2025-04-06 PROBLEM — R94.31 ABNORMAL ECG: Status: RESOLVED | Noted: 2019-06-13 | Resolved: 2025-04-06

## 2025-04-06 PROBLEM — I49.5 TACHYCARDIA-BRADYCARDIA SYNDROME: Status: ACTIVE | Noted: 2025-04-06

## 2025-04-06 PROBLEM — E78.2 MIXED HYPERLIPIDEMIA: Status: ACTIVE | Noted: 2025-04-06

## 2025-04-06 PROBLEM — I20.9 AP (ANGINA PECTORIS): Status: RESOLVED | Noted: 2019-10-11 | Resolved: 2025-04-06

## 2025-04-06 PROBLEM — I50.32 HEART FAILURE WITH RECOVERED EJECTION FRACTION (HFRECEF): Status: ACTIVE | Noted: 2023-03-01

## 2025-04-06 PROBLEM — K80.50 CHOLEDOCHOLITHIASIS: Status: ACTIVE | Noted: 2025-04-06

## 2025-04-10 LAB
OHS CV DC REMOTE DEVICE TYPE: NORMAL
OHS CV RV PACING PERCENT: 81 %

## 2025-05-08 RX ORDER — DONEPEZIL HYDROCHLORIDE 5 MG/1
5 TABLET, FILM COATED ORAL DAILY
Qty: 90 TABLET | Refills: 1 | Status: SHIPPED | OUTPATIENT
Start: 2025-05-08

## 2025-05-08 NOTE — TELEPHONE ENCOUNTER
----- Message from Josh sent at 5/8/2025  1:51 PM CDT -----  Contact: Sarah/Wife  .Type:  RX Refill RequestWho Called: Sarah/WifeRefill or New Rx:refillRX Name and Strength: donepezil 5mgHow is the patient currently taking it? (ex. 1XDay): as prescribed Is this a 30 day or 90 day RX:n/aPreferred Pharmacy with phone number:[facility]acy [id number] - [place] - [address] [facility] [address] [place] [address]Hkggs: [phone] Fax: [phone] or Mail Order:local Ordering Provider:n/aWould the patient rather a call back or a response via [facility]? Call Best Call Back Number:[phone] Information:  only have 2-3 pills left, pharmacy told her to call doctor for refill

## 2025-05-20 RX ORDER — APIXABAN 2.5 MG/1
2.5 TABLET, FILM COATED ORAL 2 TIMES DAILY
Qty: 60 TABLET | Refills: 6 | Status: SHIPPED | OUTPATIENT
Start: 2025-05-20

## 2025-05-20 NOTE — TELEPHONE ENCOUNTER
No care due was identified.  [facility] Embedded Care Due Messages. Reference number: [id number].   5/20/2025 12:21:12 AM CDT

## 2025-05-21 ENCOUNTER — OFFICE VISIT (OUTPATIENT)
Dept: FAMILY MEDICINE | Facility: CLINIC | Age: 85
End: 2025-05-21
Payer: MEDICARE

## 2025-05-21 VITALS
RESPIRATION RATE: 18 BRPM | HEART RATE: 66 BPM | HEIGHT: 70 IN | TEMPERATURE: 97 F | DIASTOLIC BLOOD PRESSURE: 70 MMHG | BODY MASS INDEX: 19.69 KG/M2 | SYSTOLIC BLOOD PRESSURE: 120 MMHG | WEIGHT: 137.56 LBS

## 2025-05-21 DIAGNOSIS — R26.9 ABNORMALITY OF GAIT AND MOBILITY: ICD-10-CM

## 2025-05-21 DIAGNOSIS — G30.9 MODERATE ALZHEIMER'S DEMENTIA WITH OTHER BEHAVIORAL DISTURBANCE, UNSPECIFIED TIMING OF DEMENTIA ONSET: ICD-10-CM

## 2025-05-21 DIAGNOSIS — J43.2 CENTRILOBULAR EMPHYSEMA: ICD-10-CM

## 2025-05-21 DIAGNOSIS — I73.9 CLAUDICATION IN PERIPHERAL VASCULAR DISEASE: ICD-10-CM

## 2025-05-21 DIAGNOSIS — Z00.00 ENCOUNTER FOR MEDICARE ANNUAL WELLNESS EXAM: Primary | ICD-10-CM

## 2025-05-21 DIAGNOSIS — I48.91 ATRIAL FIBRILLATION WITH SLOW VENTRICULAR RESPONSE: ICD-10-CM

## 2025-05-21 DIAGNOSIS — E78.2 MIXED HYPERLIPIDEMIA: ICD-10-CM

## 2025-05-21 DIAGNOSIS — L98.9 SKIN LESIONS: ICD-10-CM

## 2025-05-21 DIAGNOSIS — I50.32 HEART FAILURE WITH RECOVERED EJECTION FRACTION (HFRECEF): ICD-10-CM

## 2025-05-21 DIAGNOSIS — E11.69 TYPE 2 DIABETES MELLITUS WITH OTHER SPECIFIED COMPLICATION, UNSPECIFIED WHETHER LONG TERM INSULIN USE: ICD-10-CM

## 2025-05-21 DIAGNOSIS — Z95.0 PRESENCE OF LEADLESS CARDIAC PACEMAKER: ICD-10-CM

## 2025-05-21 DIAGNOSIS — Z99.89 DEPENDENCE ON OTHER ENABLING MACHINES AND DEVICES: ICD-10-CM

## 2025-05-21 DIAGNOSIS — F02.80 ALZHEIMER DISEASE: ICD-10-CM

## 2025-05-21 DIAGNOSIS — F02.B18 MODERATE ALZHEIMER'S DEMENTIA WITH OTHER BEHAVIORAL DISTURBANCE, UNSPECIFIED TIMING OF DEMENTIA ONSET: ICD-10-CM

## 2025-05-21 DIAGNOSIS — I25.10 CAD, MULTIPLE VESSEL: ICD-10-CM

## 2025-05-21 DIAGNOSIS — G30.9 ALZHEIMER DISEASE: ICD-10-CM

## 2025-05-21 PROCEDURE — 99999 PR PBB SHADOW E&M-EST. PATIENT-LVL IV: CPT | Mod: PBBFAC,HCNC,, | Performed by: NURSE PRACTITIONER

## 2025-05-21 NOTE — PROGRESS NOTES
"  Aquiles Yatse presented for a follow-up Medicare AWV today. The following components were reviewed and updated:  Patient accompanied by  wife , who was present throughout the visit and aided in information gathering.            Medical history  Family History  Social history  Allergies and Current Medications  Health Risk Assessment  Health Maintenance  Care Team    **See Completed Assessments for Annual Wellness visit with in the encounter summary    The following assessments were completed:  Depression Screening  Cognitive function Screening  Timed Get Up Test  Whisper Test      Opioid documentation:      Patient does not have a current opioid prescription.          Vitals:    05/21/25 1250   BP: 120/70   Pulse: 66   Resp: 18   Temp: 97.4 °F (36.3 °C)   Weight: 67 kg (147 lb 11.3 oz)   Height: 5' 10" (1.778 m)     Body mass index is 21.19 kg/m².       Physical Exam  Vitals and nursing note reviewed.   Constitutional:       Appearance: Normal appearance.   HENT:      Head: Normocephalic and atraumatic.   Eyes:      Pupils: Pupils are equal, round, and reactive to light.   Cardiovascular:      Rate and Rhythm: Normal rate and regular rhythm.   Pulmonary:      Effort: Pulmonary effort is normal.      Breath sounds: Normal breath sounds.   Musculoskeletal:         General: Normal range of motion.   Skin:     General: Skin is warm.   Neurological:      Mental Status: He is alert. Mental status is at baseline.      Motor: Weakness present.      Gait: Gait abnormal.      Comments: Use walker   Psychiatric:         Mood and Affect: Affect is flat.         Behavior: Behavior is slowed.         Cognition and Memory: Memory is impaired.      Comments: Sitting with eye closed     Current Outpatient Medications   Medication Instructions    alcohol swabs (ALCOHOL PREP PADS) PadM Twice a day    blood sugar diagnostic Strp Test blood sugars twice a day    donepeziL (ARICEPT) 5 mg, Oral, Daily    ELIQUIS 2.5 mg, Oral, 2 " times daily    gabapentin (NEURONTIN) 100 mg, Oral, 2 times daily PRN, PRN    isosorbide mononitrate (IMDUR) 120 mg, Oral    lancets (ACCU-CHEK SOFTCLIX LANCETS) Misc Test blood sugars twice a day    memantine (NAMENDA) 10 mg, Oral, 2 times daily    pantoprazole (PROTONIX) 40 mg, Oral, Daily    valsartan (DIOVAN) 160 mg, Oral, Daily, Hold until you see PCP           Diagnoses and health risks identified today and associated recommendations/orders:  1. Encounter for Medicare annual wellness exam    - Referral to Enhanced Annual Wellness Visit (eAWV) M+2    2. Alzheimer disease  Chronic and Stable on Aricept and Namenda. Continue current treatment plan as previously prescribed with your neuro      3. Atrial fibrillation with slow ventricular response  Chronic and Stable on Eliqus. Continue current treatment plan as previously prescribed with your card      4. 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 Continue current treatment plan as previously prescribed with your card    6. CAD, multiple vessel  Chronic and Stable on Eliqus and Diovan. Continue current treatment plan as previously prescribed with your card    7. Type 2 diabetes mellitus with other specified complication, unspecified whether long term insulin use  Chronic and Stable on diet. Continue current treatment plan as previously prescribed with your PCP    8. Centrilobular emphysema  Chronic and Stable. Continue current treatment plan as previously prescribed with your PCP    9. Presence of leadless cardiac pacemaker  Chronic and Stable. Followed by card md    10. Skin lesions   Ambulatory referral/consult to Dermatology; Future    11. Mixed hyperlipidemia-  Chronic and Stable on diet. Continue current treatment plan as previously prescribed with your  PCP-card      I offered to discuss advanced care planning, including how to pick a person who would make decisions for you if you were unable to make them for yourself, called a health care power of , and what kind of decisions you might make such as use of life sustaining treatments such as ventilators and tube feeding when faced with a life limiting illness recorded on a living will that they will need to know. (How you want to be cared for as you near the end of your natural life)     X  Patient is unable to engage in a discussion regarding advanced directives due to severe physical and/or cognitive impairment.      Provided Aquiels with a 5-10 year written screening schedule and personal prevention plan. Recommendations were developed using the USPSTF age appropriate recommendations. Education, counseling, and referrals were provided as needed.  After Visit Summary printed and given to patient which includes a list of additional screenings\tests needed.  Graciela Gates NP

## 2025-05-21 NOTE — PATIENT INSTRUCTIONS
Counseling and Referral of Other Preventative  (Italic type indicates deductible and co-insurance are waived)    Patient Name: Aquiles Yates  Today's Date: 5/21/2025    Health Maintenance       Date Due Completion Date    Diabetes Urine Screening Never done ---    TETANUS VACCINE Never done ---    Shingles Vaccine (1 of 2) Never done ---    RSV Vaccine (Age 60+ and Pregnant patients) (1 - 1-dose 75+ series) Never done ---    Diabetic Eye Exam 02/07/2024 2/7/2023    COVID-19 Vaccine (4 - 2024-25 season) 09/01/2024 12/3/2021    Lipid Panel 05/07/2025 5/7/2024    Hemoglobin A1c 07/31/2025 1/31/2025        Orders Placed This Encounter   Procedures    Ambulatory referral/consult to Dermatology       The following information is provided to all patients.  This information is to help you find resources for any of the problems found today that may be affecting your health:                  Living healthy guide: www.[facility].[place].[facility]      Understanding Diabetes: www.diabetes.org      Eating healthy: www.cdc.gov/healthyweight      [facility] home safety checklist: www.cdc.gov/steadi/patient.html      Agency on Aging: www.goea.[place].[facility]      Alcoholics anonymous (AA): www.aa.org      Physical Activity: www.nancie.nih.gov/xj7oyvk      Tobacco use: www.quitwithusla.org

## 2025-05-21 NOTE — PROGRESS NOTES
"  Aquiles Yates presented for a  Medicare AWV and comprehensive Health Risk Assessment today. The following components were reviewed and updated:    Medical history  Family History  Social history  Allergies and Current Medications  Health Risk Assessment  Health Maintenance  Care Team         ** See Completed Assessments for Annual Wellness Visit within the encounter summary.**         The following assessments were completed:  Living Situation  CAGE  Depression Screening  Timed Get Up and Go  Whisper Test  Cognitive Function Screening  Nutrition Screening  ADL Screening  PAQ Screening      Opioid documentation:      Patient {does/does not have a current opioid prescription:90542}     Vitals:    05/21/25 1250   BP: 120/70   Pulse: 66   Resp: 18   Temp: 97.4 °F (36.3 °C)   Weight: 67 kg (147 lb 11.3 oz)   Height: 5' 10" (1.778 m)     Body mass index is 21.19 kg/m².  Physical Exam          Diagnoses and health risks identified today and associated recommendations/orders:    1. Encounter for Medicare annual wellness exam  ***  - Referral to Enhanced Annual Wellness Visit (eAWV) M+2    2. Alzheimer disease  ***    3. Atrial fibrillation with slow ventricular response  ***    4. Claudication in peripheral vascular disease  ***    5. 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 CAD, multiple vessel  ***    7. Type 2 diabetes mellitus with other specified complication, unspecified whether long term insulin use  ***    8. Centrilobular emphysema  ***    9. Presence of leadless cardiac pacemaker  ***    10. Skin lesions  ***  - Ambulatory referral/consult to Dermatology; Future    11. Mixed hyperlipidemia  ***      Provided Aquiles with a 5-10 year written screening schedule and personal prevention plan. Recommendations were developed using the USPSTF age appropriate recommendations. Education, counseling, and referrals were provided as needed. After Visit Summary printed and given to patient " which includes a list of additional screenings\tests needed.    No follow-ups on file.    Graciela Gates NP

## 2025-05-22 ENCOUNTER — TELEPHONE (OUTPATIENT)
Dept: FAMILY MEDICINE | Facility: CLINIC | Age: 85
End: 2025-05-22
Payer: MEDICARE

## 2025-05-23 DIAGNOSIS — K21.9 GASTROESOPHAGEAL REFLUX DISEASE, UNSPECIFIED WHETHER ESOPHAGITIS PRESENT: Primary | ICD-10-CM

## 2025-05-23 RX ORDER — PANTOPRAZOLE SODIUM 40 MG/1
40 TABLET, DELAYED RELEASE ORAL
Qty: 90 TABLET | Refills: 2 | Status: SHIPPED | OUTPATIENT
Start: 2025-05-23

## 2025-05-23 NOTE — TELEPHONE ENCOUNTER
Refill Routing Note   Medication(s) are not appropriate for processing by [facility] for the following reason(s):        No active prescription written by provider    [facility] action(s):  Defer               Appointments  past 12m or future 3m with PCP    Date Provider   Last Visit   1/31/2025 Holger Thornton MD   Next Visit   6/3/2025 Holger Thornton MD   ED visits in past 90 days: 0        Note composed:5:42 AM 05/23/2025

## 2025-05-23 NOTE — PROGRESS NOTES
Subjective   Patient ID:  Aquiles Yates is a 84 y.o. male who presents for follow-up of hospital follow-up    HPI  Mr. Jerry Yates is an 84 year old male patient whose current medical conditions include CAD (multiple PCI), chronic diastolic CHF, HTN, PAF, DM type II, LAE, LVH, PVD, dementia, and hyperlipidemia who presents today for hospital follow-up. Patient recently admitted to [facility] due to cholelithiasis. During admission, he was noted to have junctional bradycardia. He was evaluated by EP and underwent TVP placement prior to successful cholecystectomy and was later discharged home. He is scheduled to undergo ERCP next month and pre-op evaluation has been requested. He returns today, accompanied by his wife. She states he seems more fatigued/weak. Recovering from hospital stay. Some issues with abd pain, relieved by occasional Ibuprofen usage/heating pad. No near syncope, syncope, or falls. No complaints of LH/dizziness. Apparently no complaints of CP or SOB. No s/s suggestive of CHF. BP stable. Patient is compliant with his medications, stopped Aricept. Uses walker. Appetite is less than usual, iman wife feels it is gradually picking up.  TTE from admission with normal EF. No bleeding issues. No s/s of TIA/CVA.    EKG today shows junctional bradycardia, PVC's, cannot rule out inferior infarct, no acute ischemic changes.     Review of Systems   Musculoskeletal:  Positive for arthritis and joint pain.   Neurological:  Positive for weakness.   Psychiatric/Behavioral:  Positive for memory loss.           Objective     Physical Exam  Vitals and nursing note reviewed.   Constitutional:       General: He is not in acute distress.     Appearance: Normal appearance. He is well-developed. He is not diaphoretic.   HENT:      Head: Normocephalic and atraumatic.   Eyes:      General:         Right eye: No discharge.         Left eye: No discharge.      Pupils: Pupils are equal, round, and reactive to light.    Cardiovascular:      Rate and Rhythm: Regular rhythm. Bradycardia present. Extrasystoles are present.     Heart sounds: Normal heart sounds, S1 normal and S2 normal. No murmur heard.  Pulmonary:      Effort: Pulmonary effort is normal. No respiratory distress.      Breath sounds: Normal breath sounds.   Abdominal:      General: There is no distension.   Musculoskeletal:      Right lower leg: No edema.      Left lower leg: No edema.   Skin:     General: Skin is warm and dry.      Findings: No erythema.   Neurological:      Mental Status: He is alert.      Comments: Awake, alert, pleasantly confused   Psychiatric:         Mood and Affect: Mood normal.       TTE Results 10/9/24  Summary  Show Result Comparison     Left Ventricle: The left ventricle is normal in size. Normal wall thickness. There is concentric hypertrophy. Normal wall motion. Ejection fraction is approximately 60%. There is indeterminate diastolic function.    Right Ventricle: Normal right ventricular cavity size. Wall thickness is normal. Right ventricle wall motion  is normal. Systolic function is normal.    Left Atrium: Left atrium is severely dilated.    Aortic Valve: There is mild aortic regurgitation.    Mitral Valve: There is mild regurgitation.    Tricuspid Valve: There is mild regurgitation.    Pulmonary Artery: The estimated pulmonary artery systolic pressure is 31 mmHg.    IVC/SVC: Normal venous pressure at 3 mmHg.       Assessment and Plan     1. Bradycardia    2. History of CVA (cerebrovascular accident)    3. Memory deficit    4. AP (angina pectoris)    5. CAD, multiple vessel    6. Chronic diastolic heart failure    7. Coronary artery disease of native artery of native heart with stable angina pectoris    8. Dyslipidemia    9. Essential hypertension    10. Hypertensive heart disease with heart failure    11. LAE (left atrial enlargement)    12. Nonrheumatic aortic valve insufficiency    13. S/P AAA repair    14. PAF (paroxysmal atrial  fibrillation)    15. PAD (peripheral artery disease)    16. Anticoagulated    17. History of GI bleed    18. History of tobacco abuse/stopped 12/2023      Patient presents for f/u. Doing well overall. Still junctional today on EKG, HR 60. Asymptomatic. Needs ERCP. Will discuss further with Dr. Gilbert and EP but should be stable to proceed. Plan of care was previously discussed with [facility], Dr. Alexander who did not feel PPM was warranted at that time. Will re-discuss. atient and did not feel PPM was warranted.   Plan:  -Will review case with EP and Dr. Gilbert, HR stable today in clinic, asymptomatic  -Continue current CV meds mgmt, off BB therapy and Aricept  -Fall precautions  -Follow-up with Dr. Gilbert in 3 months    Visit today included increased complexity associated with the care of the episodic problem bradycardia addressed and managing the longitudinal care of the patient due to the serious and/or complex managed problem(s) CAD, HTN, AC, PAF    ADDENDUM:    Discussed with Dr. Gilbert. EKG with probable afib, HR stable. No cardiac contraindications to proceed. Moderate risk of pato and post OP CV complications. May hold Eliquis 2 days prior, resume post op ASAP.     DO NOT take any Tylenol (Acetaminophen) or narcotics containing Tylenol until after 10pm . You received Tylenol during your operation and it can cause damage to your liver if too much is taken within a 24 hour time period.

## 2025-06-03 ENCOUNTER — LAB VISIT (OUTPATIENT)
Dept: LAB | Facility: HOSPITAL | Age: 85
End: 2025-06-03
Attending: INTERNAL MEDICINE
Payer: MEDICARE

## 2025-06-03 ENCOUNTER — OFFICE VISIT (OUTPATIENT)
Dept: FAMILY MEDICINE | Facility: CLINIC | Age: 85
End: 2025-06-03
Payer: MEDICARE

## 2025-06-03 VITALS
BODY MASS INDEX: 19.68 KG/M2 | TEMPERATURE: 97 F | HEIGHT: 70 IN | OXYGEN SATURATION: 96 % | SYSTOLIC BLOOD PRESSURE: 122 MMHG | DIASTOLIC BLOOD PRESSURE: 66 MMHG | HEART RATE: 70 BPM | WEIGHT: 137.44 LBS

## 2025-06-03 DIAGNOSIS — I10 PRIMARY HYPERTENSION: ICD-10-CM

## 2025-06-03 DIAGNOSIS — E11.69 TYPE 2 DIABETES MELLITUS WITH OTHER SPECIFIED COMPLICATION, UNSPECIFIED WHETHER LONG TERM INSULIN USE: ICD-10-CM

## 2025-06-03 DIAGNOSIS — E78.2 MIXED HYPERLIPIDEMIA: ICD-10-CM

## 2025-06-03 DIAGNOSIS — E11.69 TYPE 2 DIABETES MELLITUS WITH OTHER SPECIFIED COMPLICATION, UNSPECIFIED WHETHER LONG TERM INSULIN USE: Primary | ICD-10-CM

## 2025-06-03 DIAGNOSIS — I48.91 ATRIAL FIBRILLATION WITH SLOW VENTRICULAR RESPONSE: ICD-10-CM

## 2025-06-03 DIAGNOSIS — I25.118 CORONARY ARTERY DISEASE OF NATIVE ARTERY OF NATIVE HEART WITH STABLE ANGINA PECTORIS: ICD-10-CM

## 2025-06-03 DIAGNOSIS — I50.32 CHRONIC DIASTOLIC HEART FAILURE: ICD-10-CM

## 2025-06-03 DIAGNOSIS — Z86.73 HISTORY OF CVA (CEREBROVASCULAR ACCIDENT): ICD-10-CM

## 2025-06-03 DIAGNOSIS — Z98.61 S/P PTCA (PERCUTANEOUS TRANSLUMINAL CORONARY ANGIOPLASTY): ICD-10-CM

## 2025-06-03 DIAGNOSIS — D64.9 ANEMIA, UNSPECIFIED TYPE: ICD-10-CM

## 2025-06-03 DIAGNOSIS — Z79.01 ANTICOAGULATED: ICD-10-CM

## 2025-06-03 DIAGNOSIS — F02.B18 MODERATE ALZHEIMER'S DEMENTIA WITH OTHER BEHAVIORAL DISTURBANCE, UNSPECIFIED TIMING OF DEMENTIA ONSET: ICD-10-CM

## 2025-06-03 DIAGNOSIS — Z95.0 PRESENCE OF LEADLESS CARDIAC PACEMAKER: ICD-10-CM

## 2025-06-03 DIAGNOSIS — G30.9 MODERATE ALZHEIMER'S DEMENTIA WITH OTHER BEHAVIORAL DISTURBANCE, UNSPECIFIED TIMING OF DEMENTIA ONSET: ICD-10-CM

## 2025-06-03 DIAGNOSIS — Z87.19 HISTORY OF GI BLEED: ICD-10-CM

## 2025-06-03 LAB
ALBUMIN SERPL BCP-MCNC: 3.8 G/DL (ref 3.5–5.2)
ALP SERPL-CCNC: 70 UNIT/L (ref 40–150)
ALT SERPL W/O P-5'-P-CCNC: 6 UNIT/L (ref 10–44)
ANION GAP (OHS): 9 MMOL/L (ref 8–16)
AST SERPL-CCNC: 11 UNIT/L (ref 11–45)
BILIRUB SERPL-MCNC: 0.5 MG/DL (ref 0.1–1)
BUN SERPL-MCNC: 34 MG/DL (ref 8–23)
CALCIUM SERPL-MCNC: 9.3 MG/DL (ref 8.7–10.5)
CHLORIDE SERPL-SCNC: 108 MMOL/L (ref 95–110)
CO2 SERPL-SCNC: 25 MMOL/L (ref 23–29)
CREAT SERPL-MCNC: 1.5 MG/DL (ref 0.5–1.4)
EAG (OHS): 140 MG/DL (ref 68–131)
GFR SERPLBLD CREATININE-BSD FMLA CKD-EPI: 45 ML/MIN/1.73/M2
GLUCOSE SERPL-MCNC: 182 MG/DL (ref 70–110)
HBA1C MFR BLD: 6.5 % (ref 4–5.6)
POTASSIUM SERPL-SCNC: 4.4 MMOL/L (ref 3.5–5.1)
PROT SERPL-MCNC: 7 GM/DL (ref 6–8.4)
SODIUM SERPL-SCNC: 142 MMOL/L (ref 136–145)

## 2025-06-03 PROCEDURE — 3078F DIAST BP <80 MM HG: CPT | Mod: CPTII,HCNC,S$GLB, | Performed by: INTERNAL MEDICINE

## 2025-06-03 PROCEDURE — 99215 OFFICE O/P EST HI 40 MIN: CPT | Mod: HCNC,S$GLB,, | Performed by: INTERNAL MEDICINE

## 2025-06-03 PROCEDURE — 36415 COLL VENOUS BLD VENIPUNCTURE: CPT | Mod: HCNC,PO

## 2025-06-03 PROCEDURE — 83036 HEMOGLOBIN GLYCOSYLATED A1C: CPT | Mod: HCNC

## 2025-06-03 PROCEDURE — 1126F AMNT PAIN NOTED NONE PRSNT: CPT | Mod: CPTII,HCNC,S$GLB, | Performed by: INTERNAL MEDICINE

## 2025-06-03 PROCEDURE — 99999 PR PBB SHADOW E&M-EST. PATIENT-LVL IV: CPT | Mod: PBBFAC,HCNC,, | Performed by: INTERNAL MEDICINE

## 2025-06-03 PROCEDURE — 80053 COMPREHEN METABOLIC PANEL: CPT | Mod: HCNC

## 2025-06-03 PROCEDURE — 3074F SYST BP LT 130 MM HG: CPT | Mod: CPTII,HCNC,S$GLB, | Performed by: INTERNAL MEDICINE

## 2025-06-03 PROCEDURE — G2211 COMPLEX E/M VISIT ADD ON: HCPCS | Mod: HCNC,S$GLB,, | Performed by: INTERNAL MEDICINE

## 2025-06-03 PROCEDURE — 1159F MED LIST DOCD IN RCRD: CPT | Mod: CPTII,HCNC,S$GLB, | Performed by: INTERNAL MEDICINE

## 2025-06-04 ENCOUNTER — TELEPHONE (OUTPATIENT)
Dept: FAMILY MEDICINE | Facility: CLINIC | Age: 85
End: 2025-06-04
Payer: MEDICARE

## 2025-06-04 ENCOUNTER — RESULTS FOLLOW-UP (OUTPATIENT)
Dept: FAMILY MEDICINE | Facility: CLINIC | Age: 85
End: 2025-06-04
Payer: MEDICARE

## 2025-06-18 ENCOUNTER — TELEPHONE (OUTPATIENT)
Dept: CARDIOLOGY | Facility: CLINIC | Age: 85
End: 2025-06-18
Payer: MEDICARE

## 2025-06-18 NOTE — TELEPHONE ENCOUNTER
LVM to review the medication with PT       Copied from CRM #[id number]. Topic: General Inquiry - Patient Advice  >> Jun 18, 2025  3:20 PM Nia wrote:  Pt wife is calling in regards to rather the pt should be taking valsartan (DIOVAN) 160 MG tablet.please call pt back at [phone]

## 2025-06-19 ENCOUNTER — TELEPHONE (OUTPATIENT)
Dept: INTERNAL MEDICINE | Facility: CLINIC | Age: 85
End: 2025-06-19
Payer: MEDICARE

## 2025-06-19 DIAGNOSIS — G30.9 MODERATE ALZHEIMER'S DEMENTIA WITH OTHER BEHAVIORAL DISTURBANCE, UNSPECIFIED TIMING OF DEMENTIA ONSET: ICD-10-CM

## 2025-06-19 DIAGNOSIS — F02.B18 MODERATE ALZHEIMER'S DEMENTIA WITH OTHER BEHAVIORAL DISTURBANCE, UNSPECIFIED TIMING OF DEMENTIA ONSET: ICD-10-CM

## 2025-06-19 DIAGNOSIS — R41.3 MEMORY DEFICIT: ICD-10-CM

## 2025-06-19 RX ORDER — VALSARTAN 160 MG/1
160 TABLET ORAL DAILY
Start: 2025-06-19 | End: 2026-06-19

## 2025-06-19 RX ORDER — MEMANTINE HYDROCHLORIDE 10 MG/1
10 TABLET ORAL 2 TIMES DAILY
Qty: 60 TABLET | Refills: 6 | Status: SHIPPED | OUTPATIENT
Start: 2025-06-19

## 2025-06-19 NOTE — TELEPHONE ENCOUNTER
Copied from CRM #[id number]. Topic: Medications - Medication Refill  >> Jun 19, 2025 12:43 PM Bita wrote:  Type:  RX Refill Request    Who Called: Aquiles  Refill or New Rx:refill   RX Name and Strength:valsartan (DIOVAN) 160 MG tablet  How is the patient currently taking it? (ex. 1XDay):  Is this a 30 day or 90 day RX:  Preferred Pharmacy with phone number:  [facility]/pharmacy #[id number] - [place] - [address] AT [place] [address] [address] [address]  Phone: [phone] Fax: [phone]  Local or Mail Order:local  Ordering Provider:Dr Thornton   Would the patient rather a call back or a response via [facility]? Call back   Best Call Back Number:[phone]  Additional Information:

## 2025-06-19 NOTE — TELEPHONE ENCOUNTER
No care due was identified.  [facility] Embedded Care Due Messages. Reference number: [id number].   6/19/2025 1:35:12 PM CDT

## 2025-06-19 NOTE — TELEPHONE ENCOUNTER
Copied from CRM #[id number]. Topic: General Inquiry - Return Call  >> Jun 18, 2025  4:31 PM Marie wrote:  Type:  Patient Returning Call    Who Called:wife  Who Left Message for Patient:  Does the patient know what this is regarding?:misses a call from your office  Would the patient rather a call back or a response via [facility]? Call back  Best Call Back Number:[phone]  Additional Information: n [phone]

## 2025-06-19 NOTE — TELEPHONE ENCOUNTER
Copied from CRM #[id number]. Topic: Medications - Medication Refill  >> Jun 19, 2025 12:47 PM Bita wrote:  Type:  RX Refill Request    Who Called: Wife   Refill or New Rx:refill   RX Name and Strength:memantine (NAMENDA) 10 MG Tab  How is the patient currently taking it? (ex. 1XDay):  Is this a 30 day or 90 day RX:  Preferred Pharmacy with phone number:  [facility]/pharmacy #[id number] - [place] - [address] [place] [address]  Phone: [phone] Fax: [phone]  Local or Mail Order:local   Ordering Provider:Dr [place]   Would the patient rather a call back or a response via [facility]? Call back   Best Call Back Number:[phone]   Additional Information:

## 2025-06-30 ENCOUNTER — CLINICAL SUPPORT (OUTPATIENT)
Dept: CARDIOLOGY | Facility: HOSPITAL | Age: 85
End: 2025-06-30
Attending: STUDENT IN AN ORGANIZED HEALTH CARE EDUCATION/TRAINING PROGRAM
Payer: MEDICARE

## 2025-06-30 ENCOUNTER — CLINICAL SUPPORT (OUTPATIENT)
Dept: CARDIOLOGY | Facility: HOSPITAL | Age: 85
End: 2025-06-30
Payer: MEDICARE

## 2025-06-30 DIAGNOSIS — Z95.0 PRESENCE OF CARDIAC PACEMAKER: ICD-10-CM

## 2025-06-30 DIAGNOSIS — R00.1 BRADYCARDIA, UNSPECIFIED: ICD-10-CM

## 2025-06-30 DIAGNOSIS — I48.91 UNSPECIFIED ATRIAL FIBRILLATION: ICD-10-CM

## 2025-06-30 PROCEDURE — 93296 REM INTERROG EVL PM/IDS: CPT | Mod: HCNC | Performed by: STUDENT IN AN ORGANIZED HEALTH CARE EDUCATION/TRAINING PROGRAM

## 2025-06-30 PROCEDURE — 93294 REM INTERROG EVL PM/LDLS PM: CPT | Mod: HCNC,,, | Performed by: STUDENT IN AN ORGANIZED HEALTH CARE EDUCATION/TRAINING PROGRAM

## 2025-07-03 LAB
OHS CV DC REMOTE DEVICE TYPE: NORMAL
OHS CV RV PACING PERCENT: 82.9 %

## 2025-09-04 DIAGNOSIS — I48.91 ATRIAL FIBRILLATION WITH SLOW VENTRICULAR RESPONSE: ICD-10-CM

## 2025-09-04 DIAGNOSIS — I73.9 PAD (PERIPHERAL ARTERY DISEASE): Primary | Chronic | ICD-10-CM

## (undated) DEVICE — RELOAD SUREFORM 45 3.5 BLU 6R

## (undated) DEVICE — SUT MONOCRYL 4.0 PS2 CP496G

## (undated) DEVICE — NDL ECLIPSE SAFETY 23G 1.5IN

## (undated) DEVICE — KIT SITE-RITE NDL GUIDE 21G

## (undated) DEVICE — STAPLER SUREFORM SGL USE 45

## (undated) DEVICE — INTRODUCER HEMOSTASIS 6.5FR

## (undated) DEVICE — HEADREST ROUND DISP FOAM 9IN

## (undated) DEVICE — IRRIGATOR ENDOWRIST XI SUCTION

## (undated) DEVICE — PACK EP DRAPE OMC

## (undated) DEVICE — CATH TEMP PACER 5.0FR

## (undated) DEVICE — SYR PLASTIPAK LL 3ML

## (undated) DEVICE — PACK BASIC SETUP SC BR

## (undated) DEVICE — DILATOR VESSEL COONS 18FR 20CM

## (undated) DEVICE — ADHESIVE DERMABOND ADVANCED

## (undated) DEVICE — SUT VICRYL 3-0 27 SH

## (undated) DEVICE — BAG TISSUE RETRIEVAL 5MM

## (undated) DEVICE — SUT MONOCRYL 4-0 PS-1 UND

## (undated) DEVICE — DILATOR VES COONS .038X16X20CM

## (undated) DEVICE — KIT ANTIFOG W/SPONG & FLUID

## (undated) DEVICE — KIT INTRODUCER STIFFEN MICRO

## (undated) DEVICE — PACK HEART CATH BR

## (undated) DEVICE — R CATH RESPONS QPLR JSN 6F 120

## (undated) DEVICE — PAD DEFIB CADENCE ADULT R2

## (undated) DEVICE — OBTURATOR BLADELESS 8MM XI CLR

## (undated) DEVICE — DRAPE ARM DAVINCI XI

## (undated) DEVICE — CLIP HEMO-LOK ML

## (undated) DEVICE — DILATOR COONS TAPER 14FR

## (undated) DEVICE — SET PNEUMOCLEAR HEAT HUM SE HF

## (undated) DEVICE — SHEATH INTRODUCER 6FR 11CM

## (undated) DEVICE — SHEATH HEMOSTASIS 8.5FR

## (undated) DEVICE — SOL NORMAL USPCA 0.9%

## (undated) DEVICE — DRAPE THREE-QTR REINF 53X77IN

## (undated) DEVICE — SEAL CANN UNIVERSAL 5-12MM

## (undated) DEVICE — GUIDE WIRE AMPLATZ .035X1 MDTH

## (undated) DEVICE — KIT PROBE COVER WITH GEL

## (undated) DEVICE — DEVICE CLOSURE DISP 14G

## (undated) DEVICE — DRAPE COLUMN DAVINCI XI

## (undated) DEVICE — SHEET THYROID W/ISO-BAC

## (undated) DEVICE — ELECTRODE REM PLYHSV RETURN 9

## (undated) DEVICE — APPLICATOR CHLORAPREP ORN 26ML

## (undated) DEVICE — TOWEL OR DISP STRL BLUE 4/PK

## (undated) DEVICE — COVER LIGHT HANDLE 80/CA

## (undated) DEVICE — DRAPE LAPSCP CHOLE 122X102X78

## (undated) DEVICE — DYE ICG INJECTION 25MG

## (undated) DEVICE — SOL STRL WATER INJ 1000ML BG

## (undated) DEVICE — SUT SILK 2-0 PS 18IN BLACK

## (undated) DEVICE — VISIPAQUE CONTRAST 320MG/100ML

## (undated) DEVICE — GOWN POLY REINF BRTH SLV XL

## (undated) DEVICE — MANIFOLD 4 PORT

## (undated) DEVICE — COVER TIP CURVED SCISSORS XI

## (undated) DEVICE — GLOVE SENSICARE PI GRN 7

## (undated) DEVICE — GLOVE SIGNATURE ESSNTL LTX 7